# Patient Record
Sex: MALE | Race: WHITE | NOT HISPANIC OR LATINO | Employment: OTHER | URBAN - METROPOLITAN AREA
[De-identification: names, ages, dates, MRNs, and addresses within clinical notes are randomized per-mention and may not be internally consistent; named-entity substitution may affect disease eponyms.]

---

## 2017-01-03 ENCOUNTER — OFFICE VISIT (OUTPATIENT)
Dept: CARDIOLOGY | Facility: CLINIC | Age: 82
End: 2017-01-03

## 2017-01-03 VITALS
WEIGHT: 166 LBS | HEIGHT: 68 IN | HEART RATE: 87 BPM | DIASTOLIC BLOOD PRESSURE: 76 MMHG | SYSTOLIC BLOOD PRESSURE: 144 MMHG | BODY MASS INDEX: 25.16 KG/M2

## 2017-01-03 DIAGNOSIS — I10 ESSENTIAL HYPERTENSION: ICD-10-CM

## 2017-01-03 DIAGNOSIS — I48.0 PAROXYSMAL ATRIAL FIBRILLATION (HCC): Primary | ICD-10-CM

## 2017-01-03 DIAGNOSIS — R60.0 BILATERAL EDEMA OF LOWER EXTREMITY: ICD-10-CM

## 2017-01-03 PROCEDURE — 93000 ELECTROCARDIOGRAM COMPLETE: CPT | Performed by: INTERNAL MEDICINE

## 2017-01-03 PROCEDURE — 99213 OFFICE O/P EST LOW 20 MIN: CPT | Performed by: INTERNAL MEDICINE

## 2017-01-03 RX ORDER — ATORVASTATIN CALCIUM 10 MG/1
TABLET, FILM COATED ORAL
COMMUNITY
End: 2023-03-20 | Stop reason: ALTCHOICE

## 2017-01-03 NOTE — MR AVS SNAPSHOT
Asia Ly   1/3/2017 10:20 AM   Office Visit    Dept Phone:  [phone]   Encounter #:  [id number]    Provider:  Hector Rivera MD   Department:  [facility] CARDIOLOGY                Your Full Care Plan              Today's Medication Changes          These changes are accurate as of: 1/3/17 10:52 AM.  If you have any questions, ask your nurse or doctor.               Stop taking medication(s)listed here:     [id number] PO   Stopped by:  Hector Rivera MD           simvastatin 20 MG tablet   Commonly known as:  ZOCOR   Stopped by:  Hector Rivera MD                      Your Updated Medication List          This list is accurate as of: 1/3/17 10:52 AM.  Always use your most recent med list.                apixaban 5 MG tablet tablet   Commonly known as:  ELIQUIS       atorvastatin 10 MG tablet   Commonly known as:  LIPITOR       BUDESONIDE NA       diltiaZEM 240 MG 24 hr capsule   Commonly known as:  TIAZAC   Take 1 capsule by mouth Daily.       L-methylfolate Calcium 15 MG tablet       VITAMIN B-12 CR PO               We Performed the Following     ECG 12 Lead       You Were Diagnosed With        Codes Comments    Paroxysmal atrial fibrillation    -  Primary ICD-10-CM: I48.0  ICD-9-CM: 427.31     Essential hypertension     ICD-10-CM: I10  ICD-9-CM: 401.9     Bilateral edema of lower extremity     ICD-10-CM: R60.0  ICD-9-CM: 782.3       Instructions     None    Patient Instructions History      Upcoming Appointments     Visit Type Date Time Department    FOLLOW UP 1/3/2017 10:20 AM [facility]    FOLLOW UP 5/3/2017 12:00 PM [facility]      [employer] Signup     [facility] [employer] allows you to send messages to your doctor, view your test results, renew your prescriptions, schedule appointments, and more. To sign up, go to [employer] and click on the Sign Up Now link in the New User? box. Enter your  "[employer] Activation Code exactly as it appears below along with the last four digits of your Social Security Number and your Date of Birth () to complete the sign-up process. If you do not sign up before the expiration date, you must request a new code.    [employer] Activation Code: [email or web address]  Expires: 2017 10:52 AM    If you have questions, you can email [email or web address] or call [phone] to talk to our [employer] staff. Remember, [employer] is NOT to be used for urgent needs. For medical emergencies, dial 911.               Other Info from Your Visit           Your Appointments     May 03, 2017 12:00 PM EDT   Follow Up with Hector Rivera MD   [facility] CARDIOLOGY (--)    [address]  [place] [id number] [phone]           Arrive 15 minutes prior to appointment.              Allergies     No Known Allergies      Reason for Visit     Atrial Fibrillation           Vital Signs     Blood Pressure Pulse Height Weight Body Mass Index Smoking Status    144/76 (BP Location: Right arm) 87 68\" (172.7 cm) 166 lb (75.3 kg) 25.24 kg/m2 Former Smoker      Problems and Diagnoses Noted     Atrial fibrillation (irregular heartbeat)    High blood pressure        Edema of both legs          Results     ECG 12 Lead               "

## 2017-01-03 NOTE — PROGRESS NOTES
Date of Office Visit: 17  Encounter Provider: Hector Rivera MD  Place of Service: [facility] CARDIOLOGY  Patient Name: Asia Ly  :1932      Chief Complaint   Patient presents with   • Atrial Fibrillation     History of Present Illness  HPI Comments:  The patient is a pleasant 84-year-old gentleman with history of paroxysmal  atrial fibrillation. He had been going in and out of atrial fibrillation  when he had run out of his sotalol. This happened in May 2004 when he was  traveling on a plane a fair amount. He went to the emergency room in [place]  where he was found to be in atrial fibrillation. He converted back to sinus  rhythm spontaneously. He saw Dr. Hector Patel about atrial fibrillation  ablation but it was felt it would be best not to pursue that. He also saw  Dr. Man, for a second opinion but again it was felt he was doing well  on sotalol and to maintain him on that.   He then had a stroke and TIA in 2016 was at [facility] was switched from aspirin Ellik was for that event.  He then had been doing reasonably well but then presented to [facility] in 2016 2 times with symptomatic rapid atrial fibrillation.  While there he had an echocardiogram that showed normal left ventricular systolic function.  Moderate tricuspid insufficiency with mild pulmonary hypertension mild mitral insufficiency.  He also do perfusion stress test for some reason that was negative for ischemia.  He was started on amiodarone and discharged came right back in with rapid atrial fibrillation he was given a higher dose of amiodarone and a slower taper dose.  Given his history of pulmonary fibrosis we felt that was a poor choice so discontinued it.  He comes in for follow-up now.  He has some cough but unchanged.  About a week ago he developed some lower extremity edema just at his feet though.  He's had no increased shortness of breath.  No  orthopnea or PND.  No palpitations, near-syncope or syncope.  No stroke type symptoms and no bleeding.  He exercises every day at the gym walks a mile in 15 minutes up at 10% grade and then lifts for 25 minutes    Atrial Fibrillation   Symptoms are negative for dizziness and weakness. Past medical history includes atrial fibrillation.         Past Medical History   Diagnosis Date   • Aspiration pneumonia    • Atrial fibrillation    • Bronchiectasis    • Crohn's disease    • Gout    • Gout    • Hard of hearing    • Hyperlipidemia    • Peptic ulcer    • Pulmonary fibrosis          Past Surgical History   Procedure Laterality Date   • Testicle surgery       benign tumor removed.   • Colonoscopy           Current Outpatient Prescriptions on File Prior to Visit   Medication Sig Dispense Refill   • apixaban (ELIQUIS) 5 MG tablet tablet Take 5 mg by mouth 2 (Two) Times a Day.     • BUDESONIDE NA Take 3 mg by mouth 2 (Two) Times a Day.     • Cyanocobalamin (VITAMIN B-12 CR PO) Take  by mouth Daily.     • diltiaZEM (TIAZAC) 240 MG 24 hr capsule Take 1 capsule by mouth Daily. 30 capsule 11   • [DISCONTINUED] simvastatin (ZOCOR) 20 MG tablet Take 1 tablet by mouth Daily. 30 tablet 11     No current facility-administered medications on file prior to visit.          Social History     Social History   • Marital status:      Spouse name: N/A   • Number of children: N/A   • Years of education: N/A     Occupational History   • Not on file.     Social History Main Topics   • Smoking status: Former Smoker     Types: Cigars   • Smokeless tobacco: Not on file      Comment: Quit 25 years ago   • Alcohol use No   • Drug use: No   • Sexual activity: Defer     Other Topics Concern   • Not on file     Social History Narrative       Family History   Problem Relation Age of Onset   • Stroke Mother    • Heart disease Sister      Heart Valve Replacement   • Ovarian cancer Sister    • Rheumatic fever Sister    • Stroke Father   "        Review of Systems   Constitution: Negative for decreased appetite, diaphoresis, fever, weakness, malaise/fatigue, weight gain and weight loss.   HENT: Positive for hearing loss. Negative for congestion, headaches, nosebleeds and tinnitus.    Eyes: Negative for blurred vision, double vision, vision loss in left eye, vision loss in right eye and visual disturbance.   Cardiovascular:        As noted in HPI   Respiratory:        As noted HPI   Endocrine: Negative for cold intolerance and heat intolerance.   Hematologic/Lymphatic: Negative for bleeding problem. Does not bruise/bleed easily.   Skin: Negative for color change, flushing, itching and rash.   Musculoskeletal: Negative for arthritis, back pain, joint pain, joint swelling, muscle weakness and myalgias.   Gastrointestinal: Negative for bloating, abdominal pain, constipation, diarrhea, dysphagia, heartburn, hematemesis, hematochezia, melena, nausea and vomiting.   Genitourinary: Negative for bladder incontinence, dysuria, frequency, nocturia and urgency.   Neurological: Negative for dizziness, focal weakness, light-headedness, loss of balance, numbness, paresthesias and vertigo.   Psychiatric/Behavioral: Negative for depression, memory loss and substance abuse.       Procedures      ECG 12 Lead  Date/Time: 1/3/2017 10:48 AM  Performed by: SHARON DOBBINS  Authorized by: SHARON DOBBINS   Comparison: compared with previous ECG   Similar to previous ECG  Rhythm: sinus rhythm  Rate: normal  QRS axis: normal                 Objective:      Visit Vitals   • /76 (BP Location: Right arm)   • Pulse 87   • Ht 68\" (172.7 cm)   • Wt 166 lb (75.3 kg)   • BMI 25.24 kg/m2          Physical Exam  Physical Exam   Constitutional: He is oriented to person, place, and time. He appears well-developed and well-nourished. No distress.   HENT:   Head: Normocephalic.   Eyes: Conjunctivae are normal. Pupils are equal, round, and reactive to light. No scleral icterus. "   Neck: JVD (Mild) present. Normal carotid pulses and no hepatojugular reflux present. Carotid bruit is not present. No tracheal deviation, no edema and no erythema present. No thyromegaly present.   Cardiovascular: Normal rate, regular rhythm, S1 normal, S2 normal, normal heart sounds and intact distal pulses.   No extrasystoles are present. PMI is not displaced.  Exam reveals no gallop, no distant heart sounds and no friction rub.    No murmur heard.  Pulses:       Carotid pulses are 2+ on the right side, and 2+ on the left side.       Radial pulses are 2+ on the right side, and 2+ on the left side.        Femoral pulses are 2+ on the right side, and 2+ on the left side.       Dorsalis pedis pulses are 2+ on the right side, and 2+ on the left side.        Posterior tibial pulses are 2+ on the right side, and 2+ on the left side.   Pulmonary/Chest: Effort normal. No respiratory distress. He has no decreased breath sounds. He has no wheezes. He has no rhonchi. He has rales (Diffuse coarse worse in the bases.). He exhibits no tenderness.   Abdominal: Soft. Bowel sounds are normal. He exhibits no distension and no mass. There is no hepatosplenomegaly. There is no tenderness. There is no rebound and no guarding.   Musculoskeletal: He exhibits edema (1+ bilateral at his feet.). He exhibits no tenderness or deformity.   Neurological: He is alert and oriented to person, place, and time.   Skin: Skin is warm and dry. No rash noted. He is not diaphoretic. No cyanosis or erythema. No pallor. Nails show no clubbing.   Psychiatric: He has a normal mood and affect. His speech is normal and behavior is normal. Judgment and thought content normal.           Assessment:   1. This is a 84-year-old gentleman with a history of paroxysmal atrial fibrillation.   Atrial Fibrillation and Atrial Flutter  Assessment  • The patient has paroxysmal atrial fibrillation  • This is non-valvular in etiology  • The patient's CHADS2-VASc score is  3  • A [id number] score of 2 or more is considered a high risk for a thromboembolic event  • Apixaban prescribed     Plan  • Attempt to maintain sinus rhythm  • Continue apixaban for antithrombotic therapy, bleeding issues discussed  • Continue diltiazem for rhythm control       2. History of Crohn's disease.  3. History of gout, seems to be stable.  4. History of aspiration pneumonia, pulmonary fibrosis, and bronchiectasis.   5. Elevated blood pressure. As above we'll increase the diltiazem.   6.  Mild lower extremity edema this could be due to his underlying lung disease it could be distributed venous insufficiency has no other symptoms to suggest heart failure will follow this.         Plan:

## 2017-01-09 ENCOUNTER — OFFICE (OUTPATIENT)
Dept: URBAN - METROPOLITAN AREA OTHER 6 | Facility: OTHER | Age: 82
End: 2017-01-09

## 2017-01-09 VITALS
WEIGHT: 168 LBS | HEART RATE: 68 BPM | SYSTOLIC BLOOD PRESSURE: 140 MMHG | DIASTOLIC BLOOD PRESSURE: 69 MMHG | HEIGHT: 68 IN

## 2017-01-09 DIAGNOSIS — Z86.010 PERSONAL HISTORY OF COLONIC POLYPS: ICD-10-CM

## 2017-01-09 DIAGNOSIS — K50.10 CROHN'S DISEASE OF LARGE INTESTINE WITHOUT COMPLICATIONS: ICD-10-CM

## 2017-01-09 PROCEDURE — 99212 OFFICE O/P EST SF 10 MIN: CPT | Performed by: INTERNAL MEDICINE

## 2017-05-03 ENCOUNTER — OFFICE VISIT (OUTPATIENT)
Dept: CARDIOLOGY | Facility: CLINIC | Age: 82
End: 2017-05-03

## 2017-05-03 VITALS
WEIGHT: 166 LBS | HEIGHT: 68 IN | SYSTOLIC BLOOD PRESSURE: 140 MMHG | DIASTOLIC BLOOD PRESSURE: 70 MMHG | HEART RATE: 88 BPM | BODY MASS INDEX: 25.16 KG/M2

## 2017-05-03 DIAGNOSIS — I48.0 PAROXYSMAL ATRIAL FIBRILLATION (HCC): Primary | ICD-10-CM

## 2017-05-03 DIAGNOSIS — J84.10 PULMONARY FIBROSIS (HCC): ICD-10-CM

## 2017-05-03 DIAGNOSIS — R60.0 BILATERAL EDEMA OF LOWER EXTREMITY: ICD-10-CM

## 2017-05-03 DIAGNOSIS — I10 ESSENTIAL HYPERTENSION: ICD-10-CM

## 2017-05-03 PROCEDURE — 99214 OFFICE O/P EST MOD 30 MIN: CPT | Performed by: INTERNAL MEDICINE

## 2017-05-03 PROCEDURE — 93000 ELECTROCARDIOGRAM COMPLETE: CPT | Performed by: INTERNAL MEDICINE

## 2017-07-10 ENCOUNTER — OFFICE (OUTPATIENT)
Dept: URBAN - METROPOLITAN AREA OTHER 6 | Facility: OTHER | Age: 82
End: 2017-07-10

## 2017-07-10 VITALS
WEIGHT: 165 LBS | HEART RATE: 68 BPM | SYSTOLIC BLOOD PRESSURE: 140 MMHG | HEIGHT: 68 IN | DIASTOLIC BLOOD PRESSURE: 78 MMHG

## 2017-07-10 DIAGNOSIS — Z86.010 PERSONAL HISTORY OF COLONIC POLYPS: ICD-10-CM

## 2017-07-10 DIAGNOSIS — Z79.52 LONG TERM (CURRENT) USE OF SYSTEMIC STEROIDS: ICD-10-CM

## 2017-07-10 DIAGNOSIS — K50.10 CROHN'S DISEASE OF LARGE INTESTINE WITHOUT COMPLICATIONS: ICD-10-CM

## 2017-07-10 PROCEDURE — 99212 OFFICE O/P EST SF 10 MIN: CPT | Performed by: INTERNAL MEDICINE

## 2017-07-11 ENCOUNTER — TRANSCRIBE ORDERS (OUTPATIENT)
Dept: ADMINISTRATIVE | Facility: HOSPITAL | Age: 82
End: 2017-07-11

## 2017-07-11 DIAGNOSIS — Z79.52 LONG TERM (CURRENT) USE OF SYSTEMIC STEROIDS: Primary | ICD-10-CM

## 2017-07-17 ENCOUNTER — HOSPITAL ENCOUNTER (OUTPATIENT)
Dept: BONE DENSITY | Facility: HOSPITAL | Age: 82
Discharge: HOME | End: 2017-07-17
Attending: INTERNAL MEDICINE | Admitting: INTERNAL MEDICINE

## 2017-07-17 DIAGNOSIS — Z79.52 LONG TERM (CURRENT) USE OF SYSTEMIC STEROIDS: ICD-10-CM

## 2017-07-17 PROCEDURE — 77080 DXA BONE DENSITY AXIAL: CPT

## 2017-07-27 ENCOUNTER — TELEPHONE (OUTPATIENT)
Dept: CARDIOLOGY | Facility: CLINIC | Age: 82
End: 2017-07-27

## 2017-07-27 NOTE — TELEPHONE ENCOUNTER
Patient called to report a rash on his back and neck x 1.5 months now.  He states his stomach is bloated x 2 mos.  He feels Eliquis is the cause.  He is not SOA but does have edema of his ankles.      Patient's phone number is [phone]/ LEIDA

## 2017-07-27 NOTE — TELEPHONE ENCOUNTER
Patient notified Dr. Rivera's instruction is to see PCP for the rash.  Patient verbalized understanding./ LEIDA

## 2017-07-28 ENCOUNTER — HOSPITAL ENCOUNTER (EMERGENCY)
Facility: HOSPITAL | Age: 82
Discharge: HOME | End: 2017-07-28
Attending: EMERGENCY MEDICINE | Admitting: EMERGENCY MEDICINE

## 2017-07-28 ENCOUNTER — APPOINTMENT (OUTPATIENT)
Dept: GENERAL RADIOLOGY | Facility: HOSPITAL | Age: 82
End: 2017-07-28

## 2017-07-28 VITALS
TEMPERATURE: 98.9 F | DIASTOLIC BLOOD PRESSURE: 80 MMHG | OXYGEN SATURATION: 95 % | BODY MASS INDEX: 24.86 KG/M2 | SYSTOLIC BLOOD PRESSURE: 139 MMHG | WEIGHT: 164 LBS | HEIGHT: 68 IN | HEART RATE: 78 BPM | RESPIRATION RATE: 15 BRPM

## 2017-07-28 DIAGNOSIS — I48.0 PAROXYSMAL ATRIAL FIBRILLATION (HCC): Primary | ICD-10-CM

## 2017-07-28 LAB
ALBUMIN SERPL-MCNC: 4 G/DL (ref 3.5–5.2)
ALBUMIN/GLOB SERPL: 1.2 G/DL
ALP SERPL-CCNC: 51 U/L (ref 39–117)
ALT SERPL W P-5'-P-CCNC: 17 U/L (ref 1–41)
ANION GAP SERPL CALCULATED.3IONS-SCNC: 13.2 MMOL/L
AST SERPL-CCNC: 15 U/L (ref 1–40)
BASOPHILS # BLD AUTO: 0.04 10*3/MM3 (ref 0–0.2)
BASOPHILS NFR BLD AUTO: 0.4 % (ref 0–1.5)
BILIRUB SERPL-MCNC: 0.3 MG/DL (ref 0.1–1.2)
BUN BLD-MCNC: 17 MG/DL (ref 8–23)
BUN/CREAT SERPL: 14.4 (ref 7–25)
CALCIUM SPEC-SCNC: 8.9 MG/DL (ref 8.6–10.5)
CHLORIDE SERPL-SCNC: 105 MMOL/L (ref 98–107)
CO2 SERPL-SCNC: 23.8 MMOL/L (ref 22–29)
CREAT BLD-MCNC: 1.18 MG/DL (ref 0.76–1.27)
DEPRECATED RDW RBC AUTO: 53.8 FL (ref 37–54)
EOSINOPHIL # BLD AUTO: 0.07 10*3/MM3 (ref 0–0.7)
EOSINOPHIL NFR BLD AUTO: 0.7 % (ref 0.3–6.2)
ERYTHROCYTE [DISTWIDTH] IN BLOOD BY AUTOMATED COUNT: 15.5 % (ref 11.5–14.5)
GFR SERPL CREATININE-BSD FRML MDRD: 59 ML/MIN/1.73
GLOBULIN UR ELPH-MCNC: 3.3 GM/DL
GLUCOSE BLD-MCNC: 121 MG/DL (ref 65–99)
HCT VFR BLD AUTO: 42.3 % (ref 40.4–52.2)
HGB BLD-MCNC: 13.3 G/DL (ref 13.7–17.6)
HOLD SPECIMEN: NORMAL
HOLD SPECIMEN: NORMAL
IMM GRANULOCYTES # BLD: 0.16 10*3/MM3 (ref 0–0.03)
IMM GRANULOCYTES NFR BLD: 1.6 % (ref 0–0.5)
INR PPP: 1.17 (ref 0.9–1.1)
LYMPHOCYTES # BLD AUTO: 1.25 10*3/MM3 (ref 0.9–4.8)
LYMPHOCYTES NFR BLD AUTO: 12.6 % (ref 19.6–45.3)
MAGNESIUM SERPL-MCNC: 2.3 MG/DL (ref 1.6–2.4)
MCH RBC QN AUTO: 30.2 PG (ref 27–32.7)
MCHC RBC AUTO-ENTMCNC: 31.4 G/DL (ref 32.6–36.4)
MCV RBC AUTO: 96.1 FL (ref 79.8–96.2)
MONOCYTES # BLD AUTO: 0.8 10*3/MM3 (ref 0.2–1.2)
MONOCYTES NFR BLD AUTO: 8 % (ref 5–12)
NEUTROPHILS # BLD AUTO: 7.64 10*3/MM3 (ref 1.9–8.1)
NEUTROPHILS NFR BLD AUTO: 76.7 % (ref 42.7–76)
NRBC BLD MANUAL-RTO: 0 /100 WBC (ref 0–0)
PLATELET # BLD AUTO: 195 10*3/MM3 (ref 140–500)
PMV BLD AUTO: 10.9 FL (ref 6–12)
POTASSIUM BLD-SCNC: 4.1 MMOL/L (ref 3.5–5.2)
PROT SERPL-MCNC: 7.3 G/DL (ref 6–8.5)
PROTHROMBIN TIME: 14.5 SECONDS (ref 11.7–14.2)
RBC # BLD AUTO: 4.4 10*6/MM3 (ref 4.6–6)
SODIUM BLD-SCNC: 142 MMOL/L (ref 136–145)
TROPONIN T SERPL-MCNC: <0.01 NG/ML (ref 0–0.03)
WBC NRBC COR # BLD: 9.96 10*3/MM3 (ref 4.5–10.7)
WHOLE BLOOD HOLD SPECIMEN: NORMAL
WHOLE BLOOD HOLD SPECIMEN: NORMAL

## 2017-07-28 PROCEDURE — 83735 ASSAY OF MAGNESIUM: CPT | Performed by: EMERGENCY MEDICINE

## 2017-07-28 PROCEDURE — 93010 ELECTROCARDIOGRAM REPORT: CPT | Performed by: INTERNAL MEDICINE

## 2017-07-28 PROCEDURE — 96374 THER/PROPH/DIAG INJ IV PUSH: CPT

## 2017-07-28 PROCEDURE — 99284 EMERGENCY DEPT VISIT MOD MDM: CPT

## 2017-07-28 PROCEDURE — 93005 ELECTROCARDIOGRAM TRACING: CPT

## 2017-07-28 PROCEDURE — 71010 HC CHEST PA OR AP: CPT

## 2017-07-28 PROCEDURE — 80053 COMPREHEN METABOLIC PANEL: CPT | Performed by: EMERGENCY MEDICINE

## 2017-07-28 PROCEDURE — 93005 ELECTROCARDIOGRAM TRACING: CPT | Performed by: EMERGENCY MEDICINE

## 2017-07-28 PROCEDURE — 85025 COMPLETE CBC W/AUTO DIFF WBC: CPT | Performed by: EMERGENCY MEDICINE

## 2017-07-28 PROCEDURE — 85610 PROTHROMBIN TIME: CPT | Performed by: EMERGENCY MEDICINE

## 2017-07-28 PROCEDURE — 84484 ASSAY OF TROPONIN QUANT: CPT | Performed by: EMERGENCY MEDICINE

## 2017-07-28 RX ORDER — SODIUM CHLORIDE 0.9 % (FLUSH) 0.9 %
10 SYRINGE (ML) INJECTION AS NEEDED
Status: DISCONTINUED | OUTPATIENT
Start: 2017-07-28 | End: 2017-07-28 | Stop reason: HOSPADM

## 2017-07-28 RX ORDER — METOPROLOL TARTRATE 5 MG/5ML
5 INJECTION INTRAVENOUS ONCE
Status: COMPLETED | OUTPATIENT
Start: 2017-07-28 | End: 2017-07-28

## 2017-07-28 RX ADMIN — METOPROLOL TARTRATE 5 MG: 5 INJECTION INTRAVENOUS at 15:36

## 2017-08-02 ENCOUNTER — OFFICE VISIT (OUTPATIENT)
Dept: CARDIOLOGY | Facility: CLINIC | Age: 82
End: 2017-08-02

## 2017-08-02 VITALS
BODY MASS INDEX: 24.86 KG/M2 | SYSTOLIC BLOOD PRESSURE: 150 MMHG | DIASTOLIC BLOOD PRESSURE: 70 MMHG | HEIGHT: 68 IN | HEART RATE: 82 BPM | WEIGHT: 164 LBS

## 2017-08-02 DIAGNOSIS — I10 ESSENTIAL HYPERTENSION: ICD-10-CM

## 2017-08-02 DIAGNOSIS — I48.0 PAROXYSMAL ATRIAL FIBRILLATION (HCC): Primary | ICD-10-CM

## 2017-08-02 DIAGNOSIS — J84.10 PULMONARY FIBROSIS (HCC): ICD-10-CM

## 2017-08-02 PROCEDURE — 99214 OFFICE O/P EST MOD 30 MIN: CPT | Performed by: INTERNAL MEDICINE

## 2017-08-02 PROCEDURE — 93000 ELECTROCARDIOGRAM COMPLETE: CPT | Performed by: INTERNAL MEDICINE

## 2017-08-02 NOTE — PROGRESS NOTES
Date of Office Visit: 17  Encounter Provider: Hector Rivera MD  Place of Service: [facility] CARDIOLOGY  Patient Name: Asia Ly  :1932      Chief Complaint   Patient presents with   • Palpitations   • Leg Swelling     History of Present Illness  HPI Comments:  The patient is a pleasant 84-year-old gentleman with history of paroxysmal  atrial fibrillation. He had been going in and out of atrial fibrillation  when he had run out of his sotalol. This happened in May 2004 when he was  traveling on a plane a fair amount. He went to the emergency room in [place]  where he was found to be in atrial fibrillation. He converted back to sinus  rhythm spontaneously. He saw Dr. Hector Patel about atrial fibrillation  ablation but it was felt it would be best not to pursue that. He also saw  Dr. Man, for a second opinion but again it was felt he was doing well  on sotalol and to maintain him on that.   He then had a stroke and TIA in 2016 was at [facility] was switched from aspirin Ellik was for that event.  He then had been doing reasonably well but then presented to [facility] in 2016 2 times with symptomatic rapid atrial fibrillation.  While there he had an echocardiogram that showed normal left ventricular systolic function.  Moderate tricuspid insufficiency with mild pulmonary hypertension mild mitral insufficiency.  He also do perfusion stress test for some reason that was negative for ischemia.  He was started on amiodarone and discharged came right back in with rapid atrial fibrillation he was given a higher dose of amiodarone and a slower taper dose.  Given his history of pulmonary fibrosis we felt that was a poor choice so discontinued it.  He comes in for follow-up now.  Unfortunately 2017 while just sitting there he just started not feeling normal and his wife taken the emergency him is found to be in atrial  fibrillation he did convert back to sinus rhythm.  That's the first episode he had an probably 3 or 4 years and he has had no episodes since then.  He still exercises he lifts weights daily and walks a mile in 15 minutes at 10% grade.  He denies chest pain and pressure denies near syncope and syncope and denies shortness of breath, orthopnea, and PND.  He's had no blood in his stool or black tarry stools and no stroke type symptoms.    Palpitations    Pertinent negatives include no diaphoresis, dizziness, fever, malaise/fatigue, nausea, numbness, vomiting or weakness.   Leg Swelling   Pertinent negatives include no abdominal pain, congestion, diaphoresis, fever, headaches, joint swelling, myalgias, nausea, numbness, rash, vertigo, vomiting or weakness.   Atrial Fibrillation   Symptoms are negative for dizziness and weakness. Past medical history includes atrial fibrillation.         Past Medical History:   Diagnosis Date   • Aspiration pneumonia    • Atrial fibrillation    • Bronchiectasis    • Crohn's disease    • Gout    • Gout    • Hard of hearing    • Hyperlipidemia    • Peptic ulcer    • Pulmonary fibrosis          Past Surgical History:   Procedure Laterality Date   • COLONOSCOPY     • TESTICLE SURGERY      benign tumor removed.         Current Outpatient Prescriptions on File Prior to Visit   Medication Sig Dispense Refill   • apixaban (ELIQUIS) 5 MG tablet tablet Take 5 mg by mouth 2 (Two) Times a Day.     • atorvastatin (LIPITOR) 10 MG tablet Take 10 mg by mouth Daily.     • BUDESONIDE NA Take 3 mg by mouth Daily.     • cephalexin (KEFLEX) 500 MG capsule Take 1 capsule by mouth 4 (Four) Times a Day. 40 capsule 0   • Cyanocobalamin (VITAMIN B-12 CR PO) Take 500 mg by mouth Daily.     • diltiaZEM (TIAZAC) 240 MG 24 hr capsule Take 1 capsule by mouth Daily. 30 capsule 11   • L-methylfolate Calcium 15 MG tablet Take 15 mg by mouth.       No current facility-administered medications on file prior to visit.           Social History     Social History   • Marital status:      Spouse name: N/A   • Number of children: N/A   • Years of education: N/A     Occupational History   • Not on file.     Social History Main Topics   • Smoking status: Former Smoker     Types: Cigars   • Smokeless tobacco: Not on file      Comment: Quit 25 years ago   • Alcohol use No   • Drug use: No   • Sexual activity: Defer     Other Topics Concern   • Not on file     Social History Narrative       Family History   Problem Relation Age of Onset   • Stroke Mother    • Heart disease Sister      Heart Valve Replacement   • Ovarian cancer Sister    • Rheumatic fever Sister    • Stroke Father          Review of Systems   Constitution: Negative for decreased appetite, diaphoresis, fever, weakness, malaise/fatigue, weight gain and weight loss.   HENT: Negative for congestion, headaches, hearing loss, nosebleeds and tinnitus.    Eyes: Negative for blurred vision, double vision, vision loss in left eye, vision loss in right eye and visual disturbance.   Cardiovascular:        As noted in HPI   Respiratory:        As noted HPI   Endocrine: Negative for cold intolerance and heat intolerance.   Hematologic/Lymphatic: Negative for bleeding problem. Does not bruise/bleed easily.   Skin: Negative for color change, flushing, itching and rash.   Musculoskeletal: Negative for arthritis, back pain, joint pain, joint swelling, muscle weakness and myalgias.   Gastrointestinal: Negative for bloating, abdominal pain, constipation, diarrhea, dysphagia, heartburn, hematemesis, hematochezia, melena, nausea and vomiting.   Genitourinary: Negative for bladder incontinence, dysuria, frequency, nocturia and urgency.   Neurological: Negative for dizziness, focal weakness, light-headedness, loss of balance, numbness, paresthesias and vertigo.   Psychiatric/Behavioral: Negative for depression, memory loss and substance abuse.       Procedures      ECG 12 Lead  Date/Time:  "8/2/2017 3:24 PM  Performed by: SHARON DOBBINS  Authorized by: SHARON DOBBINS   Comparison: compared with previous ECG   Similar to previous ECG  Rhythm: sinus rhythm  Rate: normal  QRS axis: normal                 Objective:    /70 (BP Location: Left arm, Patient Position: Sitting)  Pulse 82  Ht 68\" (172.7 cm)  Wt 164 lb (74.4 kg)  BMI 24.94 kg/m2       Physical Exam  Physical Exam   Constitutional: He is oriented to person, place, and time. He appears well-developed and well-nourished. No distress.   HENT:   Head: Normocephalic.   Eyes: Conjunctivae are normal. Pupils are equal, round, and reactive to light. No scleral icterus.   Neck: Normal carotid pulses, no hepatojugular reflux and no JVD present. Carotid bruit is not present. No tracheal deviation, no edema and no erythema present. No thyromegaly present.   Cardiovascular: Normal rate, regular rhythm, S1 normal, S2 normal and intact distal pulses.   No extrasystoles are present. PMI is not displaced.  Exam reveals no gallop, no distant heart sounds and no friction rub.    Murmur heard.   Systolic murmur is present with a grade of 2/6   Pulses:       Carotid pulses are 2+ on the right side, and 2+ on the left side.       Radial pulses are 2+ on the right side, and 2+ on the left side.        Femoral pulses are 2+ on the right side, and 2+ on the left side.       Dorsalis pedis pulses are 2+ on the right side, and 2+ on the left side.        Posterior tibial pulses are 2+ on the right side, and 2+ on the left side.   Pulmonary/Chest: Effort normal and breath sounds normal. No respiratory distress. He has no decreased breath sounds. He has no wheezes. He has no rhonchi. He has no rales. He exhibits no tenderness.   Abdominal: Soft. Bowel sounds are normal. He exhibits no distension and no mass. There is no hepatosplenomegaly. There is no tenderness. There is no rebound and no guarding.   Musculoskeletal: He exhibits no edema, tenderness or " deformity.   Neurological: He is alert and oriented to person, place, and time.   Skin: Skin is warm and dry. No rash noted. He is not diaphoretic. No cyanosis or erythema. No pallor. Nails show no clubbing.   Psychiatric: He has a normal mood and affect. His speech is normal and behavior is normal. Judgment and thought content normal.           Assessment:   1. This is a 84-year-old gentleman with a history of paroxysmal atrial fibrillation.   Atrial Fibrillation and Atrial Flutter  Assessment  • The patient has paroxysmal atrial fibrillation  • This is non-valvular in etiology  • The patient's CHADS2-VASc score is 2  • A [id number] score of 2 or more is considered a high risk for a thromboembolic event  • Apixaban prescribed    Plan  • Attempt to maintain sinus rhythm  • Continue apixaban for antithrombotic therapy, bleeding issues discussed  • Continue diltiazem for rhythm control  Recurrent episode in July 2017 but now back in sinus rhythm.  We did tell me did try taking diltiazem if it happens again if he is stable he'll see us skin in follow-up in 6 months.    2. History of Crohn's disease.  3. History of gout, seems to be stable.  4. History of aspiration pneumonia, pulmonary fibrosis, and bronchiectasis.   5. Elevated blood pressure. As above we'll increase the diltiazem.   6. Mild lower extremity edema resolved with compression stockings          Plan:

## 2017-11-03 ENCOUNTER — OFFICE VISIT (OUTPATIENT)
Dept: CARDIOLOGY | Facility: CLINIC | Age: 82
End: 2017-11-03

## 2017-11-03 VITALS
BODY MASS INDEX: 25.76 KG/M2 | HEIGHT: 68 IN | WEIGHT: 170 LBS | RESPIRATION RATE: 16 BRPM | DIASTOLIC BLOOD PRESSURE: 70 MMHG | HEART RATE: 130 BPM | SYSTOLIC BLOOD PRESSURE: 138 MMHG

## 2017-11-03 DIAGNOSIS — R60.0 BILATERAL EDEMA OF LOWER EXTREMITY: ICD-10-CM

## 2017-11-03 DIAGNOSIS — I48.0 PAROXYSMAL ATRIAL FIBRILLATION (HCC): Primary | ICD-10-CM

## 2017-11-03 DIAGNOSIS — J84.10 PULMONARY FIBROSIS (HCC): ICD-10-CM

## 2017-11-03 DIAGNOSIS — I10 ESSENTIAL HYPERTENSION: ICD-10-CM

## 2017-11-03 PROCEDURE — 99214 OFFICE O/P EST MOD 30 MIN: CPT | Performed by: INTERNAL MEDICINE

## 2017-11-03 PROCEDURE — 93000 ELECTROCARDIOGRAM COMPLETE: CPT | Performed by: INTERNAL MEDICINE

## 2017-11-03 RX ORDER — DILTIAZEM HYDROCHLORIDE 240 MG/1
240 CAPSULE, EXTENDED RELEASE ORAL DAILY
Qty: 30 CAPSULE | Refills: 11 | Status: SHIPPED | OUTPATIENT
Start: 2017-11-03 | End: 2017-11-03

## 2017-11-03 RX ORDER — FUROSEMIDE 20 MG/1
20 TABLET ORAL DAILY
Qty: 30 TABLET | Refills: 11 | Status: SHIPPED | OUTPATIENT
Start: 2017-11-03 | End: 2018-02-09 | Stop reason: SDDI

## 2017-11-03 NOTE — PROGRESS NOTES
Date of Office Visit: 17  Encounter Provider: Hector Rivera MD  Place of Service: [facility] CARDIOLOGY  Patient Name: Asia Ly  :1932      No chief complaint on file.    History of Present Illness  HPI Comments:  The patient is a pleasant 85-year-old gentleman with history of paroxysmal  atrial fibrillation. He had been going in and out of atrial fibrillation  when he had run out of his sotalol. This happened in May 2004 when he was  traveling on a plane a fair amount. He went to the emergency room in [place]  where he was found to be in atrial fibrillation. He converted back to sinus  rhythm spontaneously. He saw Dr. Hector Patel about atrial fibrillation  ablation but it was felt it would be best not to pursue that. He also saw  Dr. Man, for a second opinion but again it was felt he was doing well  on sotalol and to maintain him on that.   He then had a stroke and TIA in 2016 was at [facility] was switched from aspirin Eliquis was for that event.  He then had been doing reasonably well but then presented to [facility] in 2016 2 times with symptomatic rapid atrial fibrillation.  While there he had an echocardiogram that showed normal left ventricular systolic function.  Moderate tricuspid insufficiency with mild pulmonary hypertension mild mitral insufficiency.  He also do perfusion stress test for some reason that was negative for ischemia.  He was started on amiodarone and discharged came right back in with rapid atrial fibrillation he was given a higher dose of amiodarone and a slower taper dose.  Given his history of pulmonary fibrosis we felt that was a poor choice so discontinued it.  Unfortunately 2017 while just sitting there he just started not feeling normal and his wife taken the emergency him is found to be in atrial fibrillation he did convert back to sinus rhythm.  That's the first episode he  had an probably 3 or 4 years.  Of note however he been taken off his diltiazem due to an allergic reaction of labor rash.  He now comes in for follow-up he's had increased lower extremity edema.  No real increased shortness of breath he says he's gained about 4 pounds nor orthopnea or PND he still exercising 5 days a week where he walks a mile and lives weights and has no problems doing that he has noted that his heart rate is increased no near-syncope or syncope no fevers chills or sweats.    Palpitations    Pertinent negatives include no diaphoresis, dizziness, fever, malaise/fatigue, nausea, numbness, vomiting or weakness.   Leg Swelling   Pertinent negatives include no abdominal pain, congestion, diaphoresis, fever, headaches, joint swelling, myalgias, nausea, numbness, rash, vertigo, vomiting or weakness.   Atrial Fibrillation   Symptoms are negative for dizziness and weakness. Past medical history includes atrial fibrillation.         Past Medical History:   Diagnosis Date   • Aspiration pneumonia    • Atrial fibrillation    • Bronchiectasis    • Crohn's disease    • Gout    • Gout    • Hard of hearing    • Hyperlipidemia    • Peptic ulcer    • Pulmonary fibrosis          Past Surgical History:   Procedure Laterality Date   • COLONOSCOPY     • TESTICLE SURGERY      benign tumor removed.         Current Outpatient Prescriptions on File Prior to Visit   Medication Sig Dispense Refill   • apixaban (ELIQUIS) 5 MG tablet tablet Take 5 mg by mouth 2 (Two) Times a Day.     • atorvastatin (LIPITOR) 10 MG tablet Take 10 mg by mouth Daily.     • cephalexin (KEFLEX) 500 MG capsule Take 1 capsule by mouth 4 (Four) Times a Day. 40 capsule 0   • Cyanocobalamin (VITAMIN B-12 CR PO) Take 500 mg by mouth Daily.     • L-methylfolate Calcium 15 MG tablet Take 15 mg by mouth.     • [DISCONTINUED] BUDESONIDE NA Take 3 mg by mouth Daily.     • [DISCONTINUED] diltiaZEM (TIAZAC) 240 MG 24 hr capsule Take 1 capsule by mouth Daily. 30  capsule 11     No current facility-administered medications on file prior to visit.          Social History     Social History   • Marital status:      Spouse name: N/A   • Number of children: N/A   • Years of education: N/A     Occupational History   • Not on file.     Social History Main Topics   • Smoking status: Former Smoker     Types: Cigars   • Smokeless tobacco: Never Used      Comment: Quit 25 years ago   • Alcohol use No      Comment: daily caffiene   • Drug use: No   • Sexual activity: Defer     Other Topics Concern   • Not on file     Social History Narrative       Family History   Problem Relation Age of Onset   • Stroke Mother    • Heart disease Sister      Heart Valve Replacement   • Ovarian cancer Sister    • Rheumatic fever Sister    • Stroke Father          Review of Systems   Constitution: Negative for decreased appetite, diaphoresis, fever, weakness, malaise/fatigue, weight gain and weight loss.   HENT: Negative for congestion, hearing loss, nosebleeds and tinnitus.    Eyes: Negative for blurred vision, double vision, vision loss in left eye, vision loss in right eye and visual disturbance.   Cardiovascular:        As noted in HPI   Respiratory:        As noted HPI   Endocrine: Negative for cold intolerance and heat intolerance.   Hematologic/Lymphatic: Negative for bleeding problem. Does not bruise/bleed easily.   Skin: Negative for color change, flushing, itching and rash.   Musculoskeletal: Negative for arthritis, back pain, joint pain, joint swelling, muscle weakness and myalgias.   Gastrointestinal: Negative for bloating, abdominal pain, constipation, diarrhea, dysphagia, heartburn, hematemesis, hematochezia, melena, nausea and vomiting.   Genitourinary: Negative for bladder incontinence, dysuria, frequency, nocturia and urgency.   Neurological: Negative for dizziness, focal weakness, headaches, light-headedness, loss of balance, numbness, paresthesias and vertigo.  "  Psychiatric/Behavioral: Negative for depression, memory loss and substance abuse.       Procedures      ECG 12 Lead  Date/Time: 11/3/2017 11:29 AM  Performed by: SHARON DOBBINS  Authorized by: SHARON DOBBINS   Comparison: compared with previous ECG   Comparison to previous ECG: Atrial arrhythmias are new  Rate: normal  QRS axis: normal                 Objective:    /70 (BP Location: Left arm, Patient Position: Sitting, Cuff Size: Adult)  Pulse (!) 130  Resp 16  Ht 68\" (172.7 cm)  Wt 170 lb (77.1 kg)  BMI 25.85 kg/m2       Physical Exam  Physical Exam   Constitutional: He is oriented to person, place, and time. He appears well-developed and well-nourished. No distress.   HENT:   Head: Normocephalic.   Eyes: Conjunctivae are normal. Pupils are equal, round, and reactive to light. No scleral icterus.   Neck: Normal carotid pulses, no hepatojugular reflux and no JVD present. Carotid bruit is not present. No tracheal deviation, no edema and no erythema present. No thyromegaly present.   Cardiovascular: S1 normal, S2 normal, normal heart sounds and intact distal pulses.  An irregularly irregular rhythm present.  No extrasystoles are present. Tachycardia present.  PMI is not displaced.  Exam reveals no gallop, no distant heart sounds and no friction rub.    No murmur heard.  Pulses:       Carotid pulses are 2+ on the right side, and 2+ on the left side.       Radial pulses are 2+ on the right side, and 2+ on the left side.        Femoral pulses are 2+ on the right side, and 2+ on the left side.       Dorsalis pedis pulses are 2+ on the right side, and 2+ on the left side.        Posterior tibial pulses are 2+ on the right side, and 2+ on the left side.   Pulmonary/Chest: Effort normal. No respiratory distress. He has no decreased breath sounds. He has no wheezes. He has no rhonchi. He has rales (Coarse 1/4 up bilaterally). He exhibits no tenderness.   Abdominal: Soft. Bowel sounds are normal. He exhibits " no distension and no mass. There is no hepatosplenomegaly. There is no tenderness. There is no rebound and no guarding.   Musculoskeletal: He exhibits edema (2 + Bilateral tibial). He exhibits no tenderness or deformity.   Neurological: He is alert and oriented to person, place, and time.   Skin: Skin is warm and dry. No rash noted. He is not diaphoretic. No cyanosis or erythema. No pallor. Nails show no clubbing.   Psychiatric: He has a normal mood and affect. His speech is normal and behavior is normal. Judgment and thought content normal.           Assessment:   1. This is a 85-year-old gentleman with a history of paroxysmal atrial fibrillation.   Atrial Fibrillation and Atrial Flutter  Assessment  • The patient has paroxysmal atrial fibrillation  • This is non-valvular in etiology  • The patient's CHADS2-VASc score is 2  • A VAI9AZ2-RXAc score of 2 or more is considered a high risk for a thromboembolic event  • Apixaban prescribed    Plan  • Attempt to maintain sinus rhythm  • Continue apixaban for antithrombotic therapy, bleeding issues discussed  • Add beta blocker for rate control  Off amiodarone due to pulmonary fibrosis, rash I believe on diltiazem.  He is now having increased ectopy start metoprolol 25 mg twice a day.     2. History of Crohn's disease.  3. History of gout, seems to be stable.  4. History of aspiration pneumonia, pulmonary fibrosis, and bronchiectasis.  As above we'll stop the amiodarone.  5.   hypertension blood pressure adequate control.  6.  Lower extremity edema think this may just be due to his atrial arrhythmia he is to return for an echocardiogram and a start him on metoprolol really her going to start him on furosemide 87 echo done next week and check ABG MP.       Plan:

## 2017-11-09 ENCOUNTER — TELEPHONE (OUTPATIENT)
Dept: CARDIOLOGY | Facility: CLINIC | Age: 82
End: 2017-11-09

## 2017-11-09 ENCOUNTER — HOSPITAL ENCOUNTER (OUTPATIENT)
Dept: CARDIOLOGY | Facility: HOSPITAL | Age: 82
Discharge: HOME | End: 2017-11-09
Attending: INTERNAL MEDICINE | Admitting: INTERNAL MEDICINE

## 2017-11-09 VITALS
SYSTOLIC BLOOD PRESSURE: 118 MMHG | DIASTOLIC BLOOD PRESSURE: 60 MMHG | BODY MASS INDEX: 25.31 KG/M2 | HEIGHT: 68 IN | WEIGHT: 167 LBS | HEART RATE: 76 BPM

## 2017-11-09 LAB
ASCENDING AORTA: 3.8 CM
BH CV ECHO MEAS - ACS: 1.5 CM
BH CV ECHO MEAS - AO MAX PG (FULL): 23.6 MMHG
BH CV ECHO MEAS - AO MAX PG: 29.2 MMHG
BH CV ECHO MEAS - AO MEAN PG (FULL): 12.9 MMHG
BH CV ECHO MEAS - AO MEAN PG: 16.5 MMHG
BH CV ECHO MEAS - AO ROOT AREA (BSA CORRECTED): 1.8
BH CV ECHO MEAS - AO ROOT AREA: 9.1 CM^2
BH CV ECHO MEAS - AO ROOT DIAM: 3.4 CM
BH CV ECHO MEAS - AO V2 MAX: 270.3 CM/SEC
BH CV ECHO MEAS - AO V2 MEAN: 194 CM/SEC
BH CV ECHO MEAS - AO V2 VTI: 62.2 CM
BH CV ECHO MEAS - AVA(I,A): 1.5 CM^2
BH CV ECHO MEAS - AVA(I,D): 1.5 CM^2
BH CV ECHO MEAS - AVA(V,A): 1.4 CM^2
BH CV ECHO MEAS - AVA(V,D): 1.4 CM^2
BH CV ECHO MEAS - BSA(HAYCOCK): 1.9 M^2
BH CV ECHO MEAS - BSA: 1.9 M^2
BH CV ECHO MEAS - BZI_BMI: 25.5 KILOGRAMS/M^2
BH CV ECHO MEAS - BZI_METRIC_HEIGHT: 172.7 CM
BH CV ECHO MEAS - BZI_METRIC_WEIGHT: 76.2 KG
BH CV ECHO MEAS - CONTRAST EF (2CH): 56.4 ML/M^2
BH CV ECHO MEAS - CONTRAST EF 4CH: 60 ML/M^2
BH CV ECHO MEAS - EDV(MOD-SP2): 117 ML
BH CV ECHO MEAS - EDV(MOD-SP4): 130 ML
BH CV ECHO MEAS - EDV(TEICH): 97.5 ML
BH CV ECHO MEAS - EF(CUBED): 64.6 %
BH CV ECHO MEAS - EF(MOD-SP2): 56.4 %
BH CV ECHO MEAS - EF(MOD-SP4): 60 %
BH CV ECHO MEAS - EF(TEICH): 56.1 %
BH CV ECHO MEAS - ESV(MOD-SP2): 51 ML
BH CV ECHO MEAS - ESV(MOD-SP4): 52 ML
BH CV ECHO MEAS - ESV(TEICH): 42.8 ML
BH CV ECHO MEAS - FS: 29.2 %
BH CV ECHO MEAS - IVS/LVPW: 1
BH CV ECHO MEAS - IVSD: 1.1 CM
BH CV ECHO MEAS - LAT PEAK E' VEL: 8 CM/SEC
BH CV ECHO MEAS - LV DIASTOLIC VOL/BSA (35-75): 68.5 ML/M^2
BH CV ECHO MEAS - LV MASS(C)D: 173.3 GRAMS
BH CV ECHO MEAS - LV MASS(C)DI: 91.3 GRAMS/M^2
BH CV ECHO MEAS - LV MAX PG: 5.6 MMHG
BH CV ECHO MEAS - LV MEAN PG: 3.5 MMHG
BH CV ECHO MEAS - LV SYSTOLIC VOL/BSA (12-30): 27.4 ML/M^2
BH CV ECHO MEAS - LV V1 MAX: 118.8 CM/SEC
BH CV ECHO MEAS - LV V1 MEAN: 89.9 CM/SEC
BH CV ECHO MEAS - LV V1 VTI: 29.7 CM
BH CV ECHO MEAS - LVIDD: 4.6 CM
BH CV ECHO MEAS - LVIDS: 3.3 CM
BH CV ECHO MEAS - LVLD AP2: 8 CM
BH CV ECHO MEAS - LVLD AP4: 8.3 CM
BH CV ECHO MEAS - LVLS AP2: 7.1 CM
BH CV ECHO MEAS - LVLS AP4: 7.3 CM
BH CV ECHO MEAS - LVOT AREA (M): 3.1 CM^2
BH CV ECHO MEAS - LVOT AREA: 3.1 CM^2
BH CV ECHO MEAS - LVOT DIAM: 2 CM
BH CV ECHO MEAS - LVPWD: 1 CM
BH CV ECHO MEAS - MED PEAK E' VEL: 8 CM/SEC
BH CV ECHO MEAS - MR MAX PG: 112.3 MMHG
BH CV ECHO MEAS - MR MAX VEL: 529.9 CM/SEC
BH CV ECHO MEAS - MV A DUR: 0.12 SEC
BH CV ECHO MEAS - MV A MAX VEL: 96.5 CM/SEC
BH CV ECHO MEAS - MV DEC SLOPE: 395 CM/SEC^2
BH CV ECHO MEAS - MV DEC TIME: 0.23 SEC
BH CV ECHO MEAS - MV E MAX VEL: 95.1 CM/SEC
BH CV ECHO MEAS - MV E/A: 0.98
BH CV ECHO MEAS - MV MAX PG: 6.8 MMHG
BH CV ECHO MEAS - MV MEAN PG: 2.4 MMHG
BH CV ECHO MEAS - MV P1/2T MAX VEL: 95.1 CM/SEC
BH CV ECHO MEAS - MV P1/2T: 70.5 MSEC
BH CV ECHO MEAS - MV V2 MAX: 130.3 CM/SEC
BH CV ECHO MEAS - MV V2 MEAN: 72.9 CM/SEC
BH CV ECHO MEAS - MV V2 VTI: 37.2 CM
BH CV ECHO MEAS - MVA P1/2T LCG: 2.3 CM^2
BH CV ECHO MEAS - MVA(P1/2T): 3.1 CM^2
BH CV ECHO MEAS - MVA(VTI): 2.5 CM^2
BH CV ECHO MEAS - PA ACC TIME: 0.08 SEC
BH CV ECHO MEAS - PA MAX PG (FULL): 1.7 MMHG
BH CV ECHO MEAS - PA MAX PG: 3.4 MMHG
BH CV ECHO MEAS - PA PR(ACCEL): 42.6 MMHG
BH CV ECHO MEAS - PA V2 MAX: 92.3 CM/SEC
BH CV ECHO MEAS - PULM A REVS DUR: 0.12 SEC
BH CV ECHO MEAS - PULM A REVS VEL: 22.7 CM/SEC
BH CV ECHO MEAS - PULM DIAS VEL: 27.9 CM/SEC
BH CV ECHO MEAS - PULM S/D: 1.1
BH CV ECHO MEAS - PULM SYS VEL: 30.5 CM/SEC
BH CV ECHO MEAS - PVA(V,A): 2.4 CM^2
BH CV ECHO MEAS - PVA(V,D): 2.4 CM^2
BH CV ECHO MEAS - QP/QS: 0.49
BH CV ECHO MEAS - RV MAX PG: 1.7 MMHG
BH CV ECHO MEAS - RV MEAN PG: 1.1 MMHG
BH CV ECHO MEAS - RV V1 MAX: 66 CM/SEC
BH CV ECHO MEAS - RV V1 MEAN: 51 CM/SEC
BH CV ECHO MEAS - RV V1 VTI: 13.6 CM
BH CV ECHO MEAS - RVOT AREA: 3.3 CM^2
BH CV ECHO MEAS - RVOT DIAM: 2 CM
BH CV ECHO MEAS - SI(AO): 299.5 ML/M^2
BH CV ECHO MEAS - SI(CUBED): 33.2 ML/M^2
BH CV ECHO MEAS - SI(LVOT): 48.8 ML/M^2
BH CV ECHO MEAS - SI(MOD-SP2): 34.8 ML/M^2
BH CV ECHO MEAS - SI(MOD-SP4): 41.1 ML/M^2
BH CV ECHO MEAS - SI(TEICH): 28.9 ML/M^2
BH CV ECHO MEAS - SV(AO): 568.4 ML
BH CV ECHO MEAS - SV(CUBED): 63 ML
BH CV ECHO MEAS - SV(LVOT): 92.6 ML
BH CV ECHO MEAS - SV(MOD-SP2): 66 ML
BH CV ECHO MEAS - SV(MOD-SP4): 78 ML
BH CV ECHO MEAS - SV(RVOT): 45 ML
BH CV ECHO MEAS - SV(TEICH): 54.8 ML
BH CV ECHO MEAS - TAPSE (>1.6): 2.6 CM2
BH CV ECHO MEAS - TR MAX VEL: 272.8 CM/SEC
BH CV XLRA - RV BASE: 3.1 CM
BH CV XLRA - TDI S': 15 CM/SEC
E/E' RATIO: 12.5
LEFT ATRIUM VOLUME INDEX: 27 ML/M2
LV EF 2D ECHO EST: 60 %
SINUS: 2.5 CM
STJ: 3 CM

## 2017-11-09 PROCEDURE — 93306 TTE W/DOPPLER COMPLETE: CPT | Performed by: INTERNAL MEDICINE

## 2017-11-09 PROCEDURE — 93306 TTE W/DOPPLER COMPLETE: CPT

## 2017-11-09 PROCEDURE — 25010000002 PERFLUTREN (DEFINITY) 8.476 MG IN SODIUM CHLORIDE 0.9 % 10 ML INJECTION: Performed by: INTERNAL MEDICINE

## 2017-11-09 RX ADMIN — PERFLUTREN 1.5 ML: 6.52 INJECTION, SUSPENSION INTRAVENOUS at 13:58

## 2017-11-09 NOTE — TELEPHONE ENCOUNTER
Asia Ly  Male, 85 y.o., 08/21/1932  PCP:   Lubna Lobo MD  Language:   English  Need Interp:   No  Last Weight:   167 lb (75.8 kg)  Phone:   H: [phone] M: [phone]  Allergies  Diltiazem  Health Maintenance:   Due  FYI:   None  Primary Ins.:   MEDICARE  MRN:   [id number]  MyChart:   Pending  Pharmacy:   [facility] [id number] [place] [address] AT [facility] [address] - [phone] [facility] [phone] FX [[id number]] [facility] [address] [place], [place] - [address] [facility] - [phone] [facility] [phone] FX [[id number]]  Preferred Lab:   None  Next Appt with Me:   02/01/2018  Next Appt Date by Dept:   02/01/2018    PACS Images        Radiology Images         Adult Transthoracic Echo Complete W/ Cont if Necessary Per Protocol   Status:  Final result   Visible to patient:  No (Not Released) Dx:  Paroxysmal atrial fibrillation Order: [id number]         Details        Reading Physician Reading Date Result Priority       Nicky Giron MD 11/9/2017 Routine           Result Text             · Left ventricular systolic function is normal. Estimated EF = 60%.  · Left ventricular diastolic dysfunction (grade II) consistent with pseudonormalization.  · Mild aortic valve stenosis is present.                    All Measurements          Ref Range & Units 1:52 PM         TDI S' cm/sec 15.00       RV Base cm 3.10       Sinus cm 2.50       STJ cm 3.00       Ascending aorta cm 3.80       E/E' ratio  12.5       LA Volume Index mL/m2 27.0       Lat Peak E' Jaylon cm/sec 8.0       Med Peak E' Jaylon cm/sec 8.00       TAPSE (>1.6) cm2 2.60       BSA m^2 1.9P       IVSd cm 1.1P       LVIDd cm 4.6P       LVIDs cm 3.3P       LVPWd cm 1.0P       IVS/LVPW  1.0P       FS % 29.2P       EDV(Teich) ml 97.5P       ESV(Teich) ml 42.8P       EF(Teich) % 56.1P       EF(cubed) % 64.6P       LV mass(C)d grams 173.3P       LV mass(C)dI grams/m^2 91.3P       SV(Teich) ml 54.8P       SI(Teich) ml/m^2  28.9P       SV(cubed) ml 63.0P       SI(cubed) ml/m^2 33.2P       Ao root diam cm 3.4P       Ao root area cm^2 9.1P       ACS cm 1.5P       LVOT diam cm 2.0P       LVOT area cm^2 3.1P       LVOT area(traced) cm^2 3.1P       RVOT diam cm 2.0P       RVOT area cm^2 3.3P       LVLd ap4 cm 8.3P       EDV(MOD-sp4) ml 130.0P       LVLs ap4 cm 7.3P       ESV(MOD-sp4) ml 52.0P       EF(MOD-sp4) % 60.0P       LVLd ap2 cm 8.0P       EDV(MOD-sp2) ml 117.0P       LVLs ap2 cm 7.1P       ESV(MOD-sp2) ml 51.0P       EF(MOD-sp2) % 56.4P       SV(MOD-sp4) ml 78.0P       SI(MOD-sp4) ml/m^2 41.1P       SV(MOD-sp2) ml 66.0P       SI(MOD-sp2) ml/m^2 34.8P       Ao root area (BSA corrected)  1.8P       Ao root area (BSA corrected) ml/m^2 56.4P       CONTRAST EF 4CH ml/m^2 60.0P       LV Diastolic corrected for BSA ml/m^2 68.5P       LV Systolic corrected for BSA ml/m^2 27.4P       MV A dur sec 0.12P       MV E max josselin cm/sec 95.1P       MV A max josselin cm/sec 96.5P       MV E/A  0.98P       MV V2 max cm/sec 130.3P       MV max PG mmHg 6.8P       MV V2 mean cm/sec 72.9P       MV mean PG mmHg 2.4P       MV V2 VTI cm 37.2P       MVA(VTI) cm^2 2.5P       MV P1/2t max josselin cm/sec 95.1P       MV P1/2t msec 70.5P       MVA(P1/2t) cm^2 3.1P       MV dec slope cm/sec^2 395.0P       MV dec time sec 0.23P       Ao pk josselin cm/sec 270.3P       Ao max PG mmHg 29.2P       Ao max PG (full) mmHg 23.6P       Ao V2 mean cm/sec 194.0P       Ao mean PG mmHg 16.5P       Ao mean PG (full) mmHg 12.9P       Ao V2 VTI cm 62.2P       ÓSCAR(I,A) cm^2 1.5P       ÓSCAR(I,D) cm^2 1.5P       ÓSCAR(V,A) cm^2 1.4P       ÓSCAR(V,D) cm^2 1.4P       LV V1 max PG mmHg 5.6P       LV V1 mean PG mmHg 3.5P       LV V1 max cm/sec 118.8P       LV V1 mean cm/sec 89.9P       LV V1 VTI cm 29.7P       MR max josselin cm/sec 529.9P       MR max PG mmHg 112.3P       SV(Ao) ml 568.4P       SI(Ao) ml/m^2 299.5P       SV(LVOT) ml 92.6P       SV(RVOT) ml 45.0P       SI(LVOT) ml/m^2 48.8P       PA V2 max  cm/sec 92.3P       PA max PG mmHg 3.4P       PA max PG (full) mmHg 1.7P       BH CV ECHO DONG - PVA(V,A) cm^2 2.4P       BH CV ECHO DONG - PVA(V,D) cm^2 2.4P       PA acc time sec 0.08P       RV V1 max PG mmHg 1.7P       RV V1 mean PG mmHg 1.1P       RV V1 max cm/sec 66.0P       RV V1 mean cm/sec 51.0P       RV V1 VTI cm 13.6P       TR max jaylon cm/sec 272.8P       PA pr(Accel) mmHg 42.6P       Pulm Sys Jaylon cm/sec 30.5P       Pulm Fierro Jaylon cm/sec 27.9P       Pulm S/D  1.1P       Qp/Qs  0.49P       Pulm A Revs Dur sec 0.12P       Pulm A Revs Jaylon cm/sec 22.7P       MVA P1/2T LCG cm^2 2.3P       BH CV ECHO DONG - BZI_BMI kilograms/m^2 25.5P        CV ECHO DONG - BSA(HAYCOCK) m^2 1.9P       BH CV ECHO DONG - BZI_METRIC_WEIGHT kg 76.2P        CV ECHO DONG - BZI_METRIC_HEIGHT cm 172.7P       Echo EF Estimated % 60       Resulting Agency  [facility]CHOC[facility]AP[facility]  [address] [place] [address]  [address] [place] [id number] [phone]            Study Description        A two-dimensional transthoracic echocardiogram with color flow and Doppler was performed. The study is technically adequate for diagnosis.Verbal consent was obtained from the patient to use Definity contrast in order to optimize the study. The use of Definity was indicated as two or more contiguous segments of the left ventricular endocardial border were unable to be adequately visualized by standard imaging methods. 1.3 mL of mechanically activated Definity was mixed with 8.7 mL of normal saline. A total of 1.5 mL of the resulting Definity solution was administered, and the remaining contrast was wasted and discarded.   No adverse reaction to contrast was noted. Lot# [id number]  Exp 12/1/18.   O2 sat 96%.         Echocardiogram Findings        Left Ventricle  Left ventricular systolic function is normal. Calculated EF = 60%. Estimated EF = 60%. Normal left ventricular cavity size and wall thickness noted. All  left ventricular wall segments contract normally. Left ventricular diastolic dysfunction is noted (grade II w/high LAP) consistent with pseudonormalization.       Right Ventricle  Normal right ventricular cavity size and systolic function noted.       Left Atrium  Normal left atrial size noted.       Right Atrium  Normal right atrial size noted.       Aortic Valve  The valve appears trileaflet. The aortic valve is abnormal in structure. There is calcification of the aortic valve.Trace aortic valve regurgitation is present. Mild aortic valve stenosis is present.       Mitral Valve  The mitral valve is grossly normal in structure. Trace mitral valve regurgitation is present. No significant mitral valve stenosis is present.       Tricuspid Valve  The tricuspid valve is grossly normal. No tricuspid valve stenosis is present. Trace tricuspid valve regurgitation is present.       Pulmonic Valve  The pulmonic valve is grossly normal in structure. There is no significant pulmonic valve stenosis present. There is trace pulmonic valve regurgitation present.       Greater Vessels  No dilation of the aortic root is present. Inferior vena cava not well visualized. The inferior vena cava is indeterminate in size (unable to calculate RVSP). Abd aorta was not well visualized .       Pericardium  There is no evidence of pericardial effusion.           Wall Scoring        Score Index: 1.000 Percent Normal: 100.0%             The left ventricular wall motion is normal.                         PACS Images        Show images for Adult Transthoracic Echo Complete W/ Cont if Necessary Per Protocol                Specimen Collected: 11/09/17  1:35 PM Last Resulted: 11/09/17  3:29 PM                 P=Value has a preliminary status                Status of Other Orders        Order    Lab Status Result Date Provider Status       Basic Metabolic Panel No Result  Ordered       ECG 12 Lead Final result 11/3/2017 Open                  Routing  History        Priority Sent On From To Message Type        11/9/2017  3:33 PM MD Hector Fields MD Results

## 2017-11-10 NOTE — TELEPHONE ENCOUNTER
Called L/M echo ok. He is to get BMP and call me. Let me know how he is doing on the water pill and metoprolol.PIPPA

## 2017-11-13 ENCOUNTER — TELEPHONE (OUTPATIENT)
Dept: CARDIOLOGY | Facility: CLINIC | Age: 82
End: 2017-11-13

## 2017-11-13 ENCOUNTER — APPOINTMENT (OUTPATIENT)
Dept: LAB | Facility: HOSPITAL | Age: 82
End: 2017-11-13
Attending: INTERNAL MEDICINE

## 2017-11-13 LAB
ANION GAP SERPL CALCULATED.3IONS-SCNC: 12.1 MMOL/L
BUN BLD-MCNC: 23 MG/DL (ref 8–23)
BUN/CREAT SERPL: 19.8 (ref 7–25)
CALCIUM SPEC-SCNC: 9.2 MG/DL (ref 8.6–10.5)
CHLORIDE SERPL-SCNC: 104 MMOL/L (ref 98–107)
CO2 SERPL-SCNC: 25.9 MMOL/L (ref 22–29)
CREAT BLD-MCNC: 1.16 MG/DL (ref 0.76–1.27)
GFR SERPL CREATININE-BSD FRML MDRD: 60 ML/MIN/1.73
GLUCOSE BLD-MCNC: 107 MG/DL (ref 65–99)
POTASSIUM BLD-SCNC: 3.7 MMOL/L (ref 3.5–5.2)
SODIUM BLD-SCNC: 142 MMOL/L (ref 136–145)

## 2017-11-13 PROCEDURE — 36415 COLL VENOUS BLD VENIPUNCTURE: CPT | Performed by: INTERNAL MEDICINE

## 2017-11-13 PROCEDURE — 80048 BASIC METABOLIC PNL TOTAL CA: CPT | Performed by: INTERNAL MEDICINE

## 2017-11-13 NOTE — TELEPHONE ENCOUNTER
Asia Ly  Male, 85 y.o., 08/21/1932  PCP:   Lubna Lobo MD  Language:   English  Need Interp:   No  Last Weight:   167 lb (75.8 kg)  Phone:   H: [phone] M: [phone]  Allergies  Diltiazem  Health Maintenance:   Due  FYI:   None  Primary Ins.:   MEDICARE  MRN:   [id number]  MyChart:   Pending  Pharmacy:   [facility] [address] [place] [address] [address] [facility] [address] - [phone] [facility] [phone] FX [[id number]] [facility] [address] [place], [place] - [address] [facility] - [phone] [facility] [phone] FX [[id number]]  Preferred Lab:   None  Next Appt with Me:   02/01/2018  Next Appt Date by Dept:   02/01/2018   Basic Metabolic Panel   Status:  Final result   Visible to patient:  No (Not Released) Dx:  Bilateral edema of lower extremity Order: [id number]             Ref Range & Units 7:53 AM   3mo ago   1yr ago        Glucose 65 - 99 mg/dL 107 (H) 121 (H) 85      BUN 8 - 23 mg/dL 23 17 8      Creatinine 0.76 - 1.27 mg/dL 1.16 1.18 1.04      Sodium 136 - 145 mmol/L 142 142 140      Potassium 3.5 - 5.2 mmol/L 3.7 4.1 4.3      Chloride 98 - 107 mmol/L 104 105 103      CO2 22.0 - 29.0 mmol/L 25.9 23.8 23.9      Calcium 8.6 - 10.5 mg/dL 9.2 8.9 9.1      eGFR Non African Amer >60 mL/min/1.73 60 (L) 59 (L) 68      BUN/Creatinine Ratio 7.0 - 25.0 19.8 14.4 7.7      Anion Gap mmol/L 12.1 13.2 13.1     Resulting Agency   [facility]  [facility]  [facility]       Narrative        The MDRD GFR formula is only valid for adults with stable renal function between ages 18 and 70.            Specimen Collected: 11/13/17  7:53 AM Last Resulted: 11/13/17  8:56 AM                                 Status of Other Orders        Order    Lab Status Result Date Provider Status       Adult Transthoracic Echo Complete W/ Cont if Necessary Per Protocol Final result 11/9/2017 Reviewed 11/9/2017  3:41 PM       ECG 12 Lead Final result 11/3/2017 Open                  Routing History        Priority  Sent On From To Message Type        11/13/2017  8:56 AM Lab, Background User Hector Rivera MD Results        11/9/2017  3:33 PM MD Hector Fields MD Results

## 2017-11-20 ENCOUNTER — TELEPHONE (OUTPATIENT)
Dept: CARDIOLOGY | Facility: CLINIC | Age: 82
End: 2017-11-20

## 2017-11-20 NOTE — TELEPHONE ENCOUNTER
Patient called confused as to whether he should or should not take Furosemide.  It appears you wanted him to continue it in the notes.  He said you told him to stop the Furosemide or the Budesonide. He can't remember which.  He has an appt to Dr. Cabrera in a couple of weeks and one to see you 2/1/18.  His care giver says his feet and hands are swelling.  I do not think he has stopped taking Furosemide yet but it is difficult to get a clear answer when they not in agreement while on the phone.     Patient's phone number is [phone]./ LEIDA

## 2017-11-20 NOTE — TELEPHONE ENCOUNTER
I called and spoke with patient's caregiver.  I informed her patient is to continue on the Furosemide per Dr. Rivera's instruction.  She verbalized understanding and states she will inform the patient./ LEIDA

## 2017-12-09 ENCOUNTER — HOSPITAL ENCOUNTER (EMERGENCY)
Facility: HOSPITAL | Age: 82
Discharge: HOME | End: 2017-12-09
Attending: FAMILY MEDICINE | Admitting: FAMILY MEDICINE

## 2017-12-09 VITALS
SYSTOLIC BLOOD PRESSURE: 123 MMHG | OXYGEN SATURATION: 100 % | BODY MASS INDEX: 26.22 KG/M2 | HEART RATE: 72 BPM | RESPIRATION RATE: 16 BRPM | DIASTOLIC BLOOD PRESSURE: 62 MMHG | HEIGHT: 68 IN | TEMPERATURE: 97.9 F | WEIGHT: 173 LBS

## 2017-12-09 DIAGNOSIS — L85.3 DRY SKIN DERMATITIS: Primary | ICD-10-CM

## 2017-12-09 PROCEDURE — 99283 EMERGENCY DEPT VISIT LOW MDM: CPT

## 2017-12-09 RX ORDER — CLOTRIMAZOLE AND BETAMETHASONE DIPROPIONATE 10; .64 MG/G; MG/G
CREAM TOPICAL 2 TIMES DAILY
COMMUNITY
End: 2018-02-09

## 2017-12-09 RX ORDER — ERYTHROMYCIN 250 MG/1
500 TABLET, COATED ORAL 2 TIMES DAILY
COMMUNITY
End: 2018-02-09

## 2017-12-09 RX ORDER — INDOMETHACIN 25 MG/1
25 CAPSULE ORAL 3 TIMES DAILY PRN
COMMUNITY
End: 2018-03-09 | Stop reason: HOSPADM

## 2017-12-09 RX ORDER — HYDROXYZINE HYDROCHLORIDE 25 MG/1
25 TABLET, FILM COATED ORAL NIGHTLY PRN
Qty: 20 TABLET | Refills: 0 | Status: ON HOLD | OUTPATIENT
Start: 2017-12-09 | End: 2018-03-09

## 2017-12-09 NOTE — ED TRIAGE NOTES
Body wide itching and rash for 1 week that worsened in past 24 hours. Pt unsure what is causing the rash.

## 2017-12-10 NOTE — ED PROVIDER NOTES
EMERGENCY DEPARTMENT ENCOUNTER    CHIEF COMPLAINT  Chief Complaint: rash  History given by: patient  History limited by: nothing   Room Number: 26/26  PMD: Lubna Lobo MD      HPI:  Pt is a 85 y.o. male who presents complaining of diffuse, itchy rash for 4 days. Pt has been using Lubriderm lotion without relief. He is unsure what the source of his rash could be, and his spouse has not developed similar sx. He has had similar outbreaks in the past and usually receives an injection of something in ED; he does not remember what sort of injection he received in the past. Pt also complains of leg swelling and is on Lasix. He has not started any new medications recently. Pt also has a h/o A-fib (on Eliquis), Crohn's disease, and gout.     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   • TIA (transient ischemic attack) 08/19/2016   • Atrial fibrillation, paroxysmal 08/19/2016   • Hyperlipidemia 08/19/2016   • Gout 08/19/2016   • Crohn's disease 08/19/2016     Resolved Ambulatory Problems     Diagnosis Date Noted   • No Resolved Ambulatory Problems     Past Medical History:   Diagnosis Date   • Aspiration pneumonia    • Atrial fibrillation    • Bronchiectasis    • Crohn's disease    • Gout    • Gout    • Hard of hearing    • Hyperlipidemia    • Peptic ulcer    • Pulmonary fibrosis        PAST SURGICAL HISTORY  Past Surgical History:   Procedure Laterality Date   • COLONOSCOPY     • TESTICLE SURGERY      benign tumor removed.       FAMILY HISTORY  Family History   Problem Relation Age of Onset   • Stroke Mother     • Heart disease Sister      Heart Valve Replacement   • Ovarian cancer Sister    • Rheumatic fever Sister    • Stroke Father        SOCIAL HISTORY  Social History     Social History   • Marital status:      Spouse name: N/A   • Number of children: N/A   • Years of education: N/A     Occupational History   • Not on file.     Social History Main Topics   • Smoking status: Former Smoker     Types: Cigars   • Smokeless tobacco: Never Used      Comment: Quit 25 years ago   • Alcohol use No      Comment: daily caffiene   • Drug use: No   • Sexual activity: Defer     Other Topics Concern   • Not on file     Social History Narrative       ALLERGIES  Diltiazem    REVIEW OF SYSTEMS  Review of Systems   Constitutional: Negative for fever.   Respiratory: Negative for shortness of breath.    Cardiovascular: Positive for leg swelling. Negative for chest pain.   Skin: Positive for rash (itchy).       PHYSICAL EXAM  ED Triage Vitals   Temp Heart Rate Resp BP SpO2   12/09/17 1747 12/09/17 1747 12/09/17 1756 12/09/17 1756 12/09/17 1747   97.9 °F (36.6 °C) 93 18 132/65 97 %      Temp src Heart Rate Source Patient Position BP Location FiO2 (%)   -- -- -- -- --              Physical Exam   Constitutional: No distress.   HENT:   Head: Normocephalic and atraumatic.   Eyes: EOM are normal.   Neck: Normal range of motion.   Cardiovascular: Normal heart sounds.    Pulmonary/Chest: No respiratory distress.   Abdominal: There is no tenderness.   Musculoskeletal: He exhibits edema (mild, pedal).   Neurological: He is alert.   Skin: Skin is warm and dry. Bruising (scattered, BLE) and rash (scattered areas of mild excoriation on legs and forearms) noted.   Nursing note and vitals reviewed.        PROCEDURES  Procedures      PROGRESS AND CONSULTS  ED Course     2119: Discussed plan to treat pt with Atarax, and I recommended that he use Benadryl. I explained that dry skin could also cause these sx, so I encouraged him to take briefer  showers and use baby oil. He will f/u with his dermatologist as needed. All questions were addressed.       MEDICAL DECISION MAKING  Results were reviewed/discussed with the patient and they were also made aware of online access. Pt also made aware that some labs, such as cultures, will not be resulted during ER visit and follow up with PMD is necessary.     MDM  Number of Diagnoses or Management Options  Patient Progress  Patient progress: stable         DIAGNOSIS  Final diagnoses:   Dry skin dermatitis       DISPOSITION  DISCHARGE    Patient discharged in stable condition.    Reviewed implications of results, diagnosis, meds, responsibility to follow up, warning signs and symptoms of possible worsening, potential complications and reasons to return to ER.    Patient/Family voiced understanding of above instructions.    Discussed plan for discharge, as there is no emergent indication for admission.  Pt/family is agreeable and understands need for follow up and repeat testing.  Pt is aware that discharge does not mean that nothing is wrong but it indicates no emergency is present that requires admission and they must continue care with follow-up as given below or physician of their choice.     FOLLOW-UP  Vance Alexander MD  [address] [address]  [phone]      If not improving in a few days see Dr Alexander in follow up         Medication List      New Prescriptions          hydrOXYzine 25 MG tablet   Commonly known as:  ATARAX   Take 1 tablet by mouth At Night As Needed for Itching.         Stop          cephalexin 500 MG capsule   Commonly known as:  KEFLEX           Latest Documented Vital Signs:  As of 9:28 PM  BP- 127/62 HR- 74 Temp- 97.9 °F (36.6 °C) O2 sat- 94%    --  Documentation assistance provided by ananya Fox for Stanislaw Mayfield.  Information recorded by the ananya was done at my direction and has been verified and validated by me.       Fernanda Fox  12/09/17 5793       Stanislaw  MD Chaparro  12/10/17 [id number]

## 2017-12-10 NOTE — DISCHARGE INSTRUCTIONS
Take short showers and then towel dry.  Then use Shamar's Baby Oil on your arms and legs to trap in moisture.  Return if you worsen.

## 2018-01-08 ENCOUNTER — OFFICE (OUTPATIENT)
Dept: URBAN - METROPOLITAN AREA OTHER 6 | Facility: OTHER | Age: 83
End: 2018-01-08
Payer: COMMERCIAL

## 2018-01-08 VITALS
WEIGHT: 167 LBS | HEART RATE: 98 BPM | DIASTOLIC BLOOD PRESSURE: 72 MMHG | SYSTOLIC BLOOD PRESSURE: 120 MMHG | HEIGHT: 68 IN

## 2018-01-08 DIAGNOSIS — K50.10 CROHN'S DISEASE OF LARGE INTESTINE WITHOUT COMPLICATIONS: ICD-10-CM

## 2018-01-08 PROCEDURE — 99213 OFFICE O/P EST LOW 20 MIN: CPT | Performed by: INTERNAL MEDICINE

## 2018-01-14 ENCOUNTER — HOSPITAL ENCOUNTER (EMERGENCY)
Facility: HOSPITAL | Age: 83
Discharge: HOME | End: 2018-01-14
Attending: EMERGENCY MEDICINE | Admitting: EMERGENCY MEDICINE

## 2018-01-14 VITALS
RESPIRATION RATE: 16 BRPM | OXYGEN SATURATION: 99 % | SYSTOLIC BLOOD PRESSURE: 113 MMHG | HEIGHT: 68 IN | DIASTOLIC BLOOD PRESSURE: 62 MMHG | HEART RATE: 83 BPM | BODY MASS INDEX: 24.86 KG/M2 | WEIGHT: 164 LBS | TEMPERATURE: 98.2 F

## 2018-01-14 DIAGNOSIS — M10.042 ACUTE IDIOPATHIC GOUT OF LEFT HAND: Primary | ICD-10-CM

## 2018-01-14 LAB
ALBUMIN SERPL-MCNC: 3.2 G/DL (ref 3.5–5.2)
ALBUMIN/GLOB SERPL: 0.9 G/DL
ALP SERPL-CCNC: 51 U/L (ref 39–117)
ALT SERPL W P-5'-P-CCNC: 20 U/L (ref 1–41)
ANION GAP SERPL CALCULATED.3IONS-SCNC: 10.4 MMOL/L
AST SERPL-CCNC: 30 U/L (ref 1–40)
BASOPHILS # BLD AUTO: 0.01 10*3/MM3 (ref 0–0.2)
BASOPHILS NFR BLD AUTO: 0.1 % (ref 0–1.5)
BILIRUB SERPL-MCNC: 0.4 MG/DL (ref 0.1–1.2)
BUN BLD-MCNC: 16 MG/DL (ref 8–23)
BUN/CREAT SERPL: 14.2 (ref 7–25)
CALCIUM SPEC-SCNC: 8.6 MG/DL (ref 8.6–10.5)
CHLORIDE SERPL-SCNC: 100 MMOL/L (ref 98–107)
CO2 SERPL-SCNC: 26.6 MMOL/L (ref 22–29)
CREAT BLD-MCNC: 1.13 MG/DL (ref 0.76–1.27)
DEPRECATED RDW RBC AUTO: 49.1 FL (ref 37–54)
EOSINOPHIL # BLD AUTO: 0.09 10*3/MM3 (ref 0–0.7)
EOSINOPHIL NFR BLD AUTO: 1 % (ref 0.3–6.2)
ERYTHROCYTE [DISTWIDTH] IN BLOOD BY AUTOMATED COUNT: 13.8 % (ref 11.5–14.5)
ERYTHROCYTE [SEDIMENTATION RATE] IN BLOOD: 55 MM/HR (ref 0–20)
GFR SERPL CREATININE-BSD FRML MDRD: 62 ML/MIN/1.73
GLOBULIN UR ELPH-MCNC: 3.4 GM/DL
GLUCOSE BLD-MCNC: 161 MG/DL (ref 65–99)
HCT VFR BLD AUTO: 38.6 % (ref 40.4–52.2)
HGB BLD-MCNC: 11.9 G/DL (ref 13.7–17.6)
IMM GRANULOCYTES # BLD: 0.05 10*3/MM3 (ref 0–0.03)
IMM GRANULOCYTES NFR BLD: 0.6 % (ref 0–0.5)
LYMPHOCYTES # BLD AUTO: 0.85 10*3/MM3 (ref 0.9–4.8)
LYMPHOCYTES NFR BLD AUTO: 9.5 % (ref 19.6–45.3)
MCH RBC QN AUTO: 30 PG (ref 27–32.7)
MCHC RBC AUTO-ENTMCNC: 30.8 G/DL (ref 32.6–36.4)
MCV RBC AUTO: 97.2 FL (ref 79.8–96.2)
MONOCYTES # BLD AUTO: 0.79 10*3/MM3 (ref 0.2–1.2)
MONOCYTES NFR BLD AUTO: 8.8 % (ref 5–12)
NEUTROPHILS # BLD AUTO: 7.16 10*3/MM3 (ref 1.9–8.1)
NEUTROPHILS NFR BLD AUTO: 80 % (ref 42.7–76)
NRBC BLD MANUAL-RTO: 0 /100 WBC (ref 0–0)
PLAT MORPH BLD: NORMAL
PLATELET # BLD AUTO: 136 10*3/MM3 (ref 140–500)
PMV BLD AUTO: 12.8 FL (ref 6–12)
POTASSIUM BLD-SCNC: 4.6 MMOL/L (ref 3.5–5.2)
PROT SERPL-MCNC: 6.6 G/DL (ref 6–8.5)
RBC # BLD AUTO: 3.97 10*6/MM3 (ref 4.6–6)
RBC MORPH BLD: NORMAL
SODIUM BLD-SCNC: 137 MMOL/L (ref 136–145)
WBC MORPH BLD: NORMAL
WBC NRBC COR # BLD: 8.95 10*3/MM3 (ref 4.5–10.7)

## 2018-01-14 PROCEDURE — 85025 COMPLETE CBC W/AUTO DIFF WBC: CPT | Performed by: PHYSICIAN ASSISTANT

## 2018-01-14 PROCEDURE — 99283 EMERGENCY DEPT VISIT LOW MDM: CPT

## 2018-01-14 PROCEDURE — 85007 BL SMEAR W/DIFF WBC COUNT: CPT | Performed by: PHYSICIAN ASSISTANT

## 2018-01-14 PROCEDURE — 36415 COLL VENOUS BLD VENIPUNCTURE: CPT

## 2018-01-14 PROCEDURE — 80053 COMPREHEN METABOLIC PANEL: CPT | Performed by: PHYSICIAN ASSISTANT

## 2018-01-14 PROCEDURE — 85652 RBC SED RATE AUTOMATED: CPT | Performed by: PHYSICIAN ASSISTANT

## 2018-01-14 RX ORDER — PREDNISONE 20 MG/1
TABLET ORAL
Qty: 15 TABLET | Refills: 0 | Status: SHIPPED | OUTPATIENT
Start: 2018-01-14 | End: 2018-02-09

## 2018-01-14 RX ORDER — METOPROLOL TARTRATE 50 MG/1
50 TABLET, FILM COATED ORAL 2 TIMES DAILY
COMMUNITY
End: 2018-04-26 | Stop reason: SDUPTHER

## 2018-01-14 NOTE — ED PROVIDER NOTES
EMERGENCY DEPARTMENT ENCOUNTER    CHIEF COMPLAINT  Chief Complaint: L hand  History given by: Patient  History limited by: none  Room Number: 17/17  PMD: Lubna Lobo MD      HPI:  Pt is a 85 y.o. male who presents complaining of edema and pain to the left hand that began this morning upon awakening. Pt denies fever, chills, SOA, and any hx of injury. Pt confirms taking Tylenol prior to arrival at ED, which he states reduced the pain. Pt confirms hx of GOUT to BLE and his wife states he has had leg swelling since being put on Indocin and Eliquis. Pt denies hx of diabetes.     Duration:  Several hours  Onset: gradual  Timing: constant  Location: left hand  Radiation: none  Quality: pain and edema  Intensity/Severity: mild  Progression: improved  Associated Symptoms: none  Aggravating Factors: none  Alleviating Factors: tylenol  Previous Episodes: Pt has hx of GOUT to feet.   Treatment before arrival: Pt took tylenol prior to arrival at ED, which improved pain.     PAST MEDICAL HISTORY  Active Ambulatory Problems     Diagnosis Date Noted   • Ataxia 08/19/2016   • TIA (transient ischemic attack) 08/19/2016   • Atrial fibrillation, paroxysmal 08/19/2016   • Hyperlipidemia 08/19/2016   • Gout 08/19/2016   • Crohn's disease 08/19/2016     Resolved Ambulatory Problems     Diagnosis Date Noted   • No Resolved Ambulatory Problems     Past Medical History:   Diagnosis Date   • Aspiration pneumonia    • Atrial fibrillation    • Bronchiectasis    • Crohn's disease    • Gout    • Gout    • Hard of hearing    • Hyperlipidemia    • Peptic ulcer    • Pulmonary fibrosis        PAST SURGICAL HISTORY  Past Surgical History:   Procedure Laterality Date   • COLONOSCOPY     • TESTICLE SURGERY      benign tumor removed.       FAMILY HISTORY  Family History   Problem Relation Age of Onset   • Stroke Mother    • Heart disease Sister      Heart Valve Replacement   • Ovarian cancer Sister    • Rheumatic fever Sister    • Stroke Father         SOCIAL HISTORY  Social History     Social History   • Marital status:      Spouse name: N/A   • Number of children: N/A   • Years of education: N/A     Occupational History   • Not on file.     Social History Main Topics   • Smoking status: Former Smoker     Types: Cigars   • Smokeless tobacco: Never Used      Comment: Quit 25 years ago   • Alcohol use No      Comment: daily caffiene   • Drug use: No   • Sexual activity: Defer     Other Topics Concern   • Not on file     Social History Narrative       ALLERGIES  Diltiazem    REVIEW OF SYSTEMS  Review of Systems   Constitutional: Negative for activity change, appetite change and fever.   Respiratory: Negative for cough and shortness of breath.    Cardiovascular: Negative for chest pain and leg swelling.   Genitourinary: Negative for decreased urine volume and dysuria.   Musculoskeletal: Positive for joint swelling (Left hand). Negative for neck pain.   Skin: Negative for rash and wound.   Neurological: Negative for weakness, numbness and headaches.   Psychiatric/Behavioral: Negative.    All other systems reviewed and are negative.      PHYSICAL EXAM  ED Triage Vitals   Temp Heart Rate Resp BP SpO2   01/14/18 0738 01/14/18 0736 01/14/18 0736 01/14/18 0736 01/14/18 0738   98.2 °F (36.8 °C) 83 16 151/67 96 %      Temp src Heart Rate Source Patient Position BP Location FiO2 (%)   01/14/18 0738 01/14/18 0736 01/14/18 0736 01/14/18 0736 --   Oral Monitor Sitting Right arm        Physical Exam   Constitutional: He is oriented to person, place, and time and well-developed, well-nourished, and in no distress.   HENT:   Head: Normocephalic and atraumatic.   Eyes: EOM are normal. Pupils are equal, round, and reactive to light.   Neck: Normal range of motion. Neck supple.   Cardiovascular: Normal rate, regular rhythm and normal heart sounds.    Pulmonary/Chest: Effort normal and breath sounds normal. No respiratory distress.   Abdominal: Soft. There is no  tenderness. There is no rebound and no guarding.   Musculoskeletal: Normal range of motion. He exhibits no edema.        Left hand: He exhibits tenderness (at first MCP joint and carpal metacarpal joint) and swelling (and erythema).   Pt has no lymphangitis.    Neurological: He is alert and oriented to person, place, and time. He has normal sensation and normal strength.   Skin: Skin is warm and dry.   Psychiatric: Mood and affect normal.   Nursing note and vitals reviewed.      LAB RESULTS  Lab Results (last 24 hours)     Procedure Component Value Units Date/Time    CBC & Differential [[id number]] Collected:  01/14/18 0804    Specimen:  Blood Updated:  01/14/18 0850    Narrative:       The following orders were created for panel order CBC & Differential.  Procedure                               Abnormality         Status                     ---------                               -----------         ------                     Scan Slide[[id number]]                   Normal              Final result               CBC Auto Differential[[id number]]        Abnormal            Final result                 Please view results for these tests on the individual orders.    Comprehensive Metabolic Panel [[id number]]  (Abnormal) Collected:  01/14/18 0804    Specimen:  Blood Updated:  01/14/18 0843     Glucose 161 (H) mg/dL      BUN 16 mg/dL      Creatinine 1.13 mg/dL      Sodium 137 mmol/L      Potassium 4.6 mmol/L       Specimen hemolyzed.  Results may be affected.        Chloride 100 mmol/L      CO2 26.6 mmol/L      Calcium 8.6 mg/dL      Total Protein 6.6 g/dL      Albumin 3.20 (L) g/dL      ALT (SGPT) 20 U/L       Specimen hemolyzed.  Results may be affected.        AST (SGOT) 30 U/L       Specimen hemolyzed.  Results may be affected.        Alkaline Phosphatase 51 U/L      Total Bilirubin 0.4 mg/dL      eGFR Non African Amer 62 mL/min/1.73      Globulin 3.4 gm/dL      A/G Ratio 0.9 g/dL      BUN/Creatinine Ratio 14.2      Anion Gap 10.4 mmol/L     Narrative:       The MDRD GFR formula is only valid for adults with stable renal function between ages 18 and 70.    Sedimentation Rate [[id number]]  (Abnormal) Collected:  01/14/18 0804    Specimen:  Blood Updated:  01/14/18 0854     Sed Rate 55 (H) mm/hr     CBC Auto Differential [[id number]]  (Abnormal) Collected:  01/14/18 0804    Specimen:  Blood Updated:  01/14/18 0850     WBC 8.95 10*3/mm3      RBC 3.97 (L) 10*6/mm3      Hemoglobin 11.9 (L) g/dL      Hematocrit 38.6 (L) %      MCV 97.2 (H) fL      MCH 30.0 pg      MCHC 30.8 (L) g/dL      RDW 13.8 %      RDW-SD 49.1 fl      MPV 12.8 (H) fL      Platelets 136 (L) 10*3/mm3      Neutrophil % 80.0 (H) %      Lymphocyte % 9.5 (L) %      Monocyte % 8.8 %      Eosinophil % 1.0 %      Basophil % 0.1 %      Immature Grans % 0.6 (H) %      Neutrophils, Absolute 7.16 10*3/mm3      Lymphocytes, Absolute 0.85 (L) 10*3/mm3      Monocytes, Absolute 0.79 10*3/mm3      Eosinophils, Absolute 0.09 10*3/mm3      Basophils, Absolute 0.01 10*3/mm3      Immature Grans, Absolute 0.05 (H) 10*3/mm3      nRBC 0.0 /100 WBC     Scan Slide [[id number]]  (Normal) Collected:  01/14/18 0804    Specimen:  Blood Updated:  01/14/18 0850     RBC Morphology Normal     WBC Morphology Normal     Platelet Morphology Normal          I ordered the above labs and reviewed the results    Procedures      PROGRESS AND CONSULTS  ED Course     0755  Labs ordered for further evaluation.     0920  Pt rechecked and resting comfortably. Discussed indication of GOUT in hand, results of labs including elevated Sed rate, and plan for discharge with steroids, thumb splint, and plan to follow up with PCP. Pt understands and agrees with plan, all questions addressed.       MEDICAL DECISION MAKING  Results were reviewed/discussed with the patient and they were also made aware of online access. Pt also made aware that some labs, such as cultures, will not be resulted during ER visit and follow up  with PMD is necessary.     MDM  Number of Diagnoses or Management Options  Acute idiopathic gout of left hand:   Diagnosis management comments: No evidence of septic joint.        Amount and/or Complexity of Data Reviewed  Clinical lab tests: ordered and reviewed (Sed Rate - 55)           DIAGNOSIS  Final diagnoses:   Acute idiopathic gout of left hand       DISPOSITION  DISCHARGE    Patient discharged in stable condition.    Reviewed implications of results, diagnosis, meds, responsibility to follow up, warning signs and symptoms of possible worsening, potential complications and reasons to return to ER.    Patient/Family voiced understanding of above instructions.    Discussed plan for discharge, as there is no emergent indication for admission.  Pt/family is agreeable and understands need for follow up and repeat testing.  Pt is aware that discharge does not mean that nothing is wrong but it indicates no emergency is present that requires admission and they must continue care with follow-up as given below or physician of their choice.     FOLLOW-UP  Lubna Lobo MD  [address] [facility] [address]  [place] [address]  [phone]    In 1 week  if no improvement         Medication List      New Prescriptions          predniSONE 20 MG tablet   Commonly known as:  DELTASONE   Take 2 tablets once a day for 5 days, then 1 tablet once a day for 5 days         Changed          metoprolol tartrate 50 MG tablet   Commonly known as:  LOPRESSOR   What changed:  Another medication with the same name was removed. Continue   taking this medication, and follow the directions you see here.         Stop          cephalexin 500 MG capsule   Commonly known as:  KEFLEX               Latest Documented Vital Signs:  As of 9:18 AM  BP- 113/62 HR- 83 Temp- 98.2 °F (36.8 °C) (Oral) O2 sat- 99%    --  Documentation assistance provided by ananya Diamond for ZARA Ugarte.  Information recorded by the ananya was done at my  direction and has been verified and validated by me.          Cathleen Diamond  01/14/18 0922       ZARA Damon  01/14/18 [id number]

## 2018-01-14 NOTE — ED PROVIDER NOTES
The pt c/o left 1st finger edema and pain which began earlier this morning.       PEx  Left thumb: Mild swelling and limited range of motion due to pain      Agree with plan to discharge pt with steroids and a thumb splint. The pt denies pain medication.    I supervised care provided by the midlevel provider.    We have discussed this patient's history, physical exam, and treatment plan.   I have reviewed the note and personally saw and examined the patient and agree with the plan of care.    Documentation assistance provided by ananya Poole for Dr. Perez.  Information recorded by the scribe was done at my direction and has been verified and validated by me.       Lisa Poole  01/14/18 0910       Kevin Perez MD  01/14/18 0913

## 2018-02-09 ENCOUNTER — OFFICE VISIT (OUTPATIENT)
Dept: CARDIOLOGY | Facility: CLINIC | Age: 83
End: 2018-02-09

## 2018-02-09 VITALS
SYSTOLIC BLOOD PRESSURE: 110 MMHG | BODY MASS INDEX: 24.25 KG/M2 | HEART RATE: 77 BPM | DIASTOLIC BLOOD PRESSURE: 72 MMHG | HEIGHT: 68 IN | WEIGHT: 160 LBS

## 2018-02-09 DIAGNOSIS — J84.10 PULMONARY FIBROSIS (HCC): ICD-10-CM

## 2018-02-09 DIAGNOSIS — I10 ESSENTIAL HYPERTENSION: ICD-10-CM

## 2018-02-09 DIAGNOSIS — I48.0 PAROXYSMAL ATRIAL FIBRILLATION (HCC): Primary | ICD-10-CM

## 2018-02-09 DIAGNOSIS — E78.2 MIXED HYPERLIPIDEMIA: ICD-10-CM

## 2018-02-09 PROCEDURE — 99213 OFFICE O/P EST LOW 20 MIN: CPT | Performed by: INTERNAL MEDICINE

## 2018-02-09 PROCEDURE — 93000 ELECTROCARDIOGRAM COMPLETE: CPT | Performed by: INTERNAL MEDICINE

## 2018-02-09 NOTE — PROGRESS NOTES
Date of Office Visit: 18  Encounter Provider: Hector Rivera MD  Place of Service: [facility] CARDIOLOGY  Patient Name: Asia Ly  :1932      Chief Complaint   Patient presents with   • Atrial Fibrillation     History of Present Illness  HPI Comments:  The patient is a pleasant 85-year-old gentleman with history of paroxysmal  atrial fibrillation. He had been going in and out of atrial fibrillation  when he had run out of his sotalol. This happened in May 2004 when he was  traveling on a plane a fair amount. He went to the emergency room in [place]  where he was found to be in atrial fibrillation. He converted back to sinus  rhythm spontaneously. He saw Dr. Hector Patel about atrial fibrillation  ablation but it was felt it would be best not to pursue that. He also saw  Dr. Man, for a second opinion but again it was felt he was doing well  on sotalol and to maintain him on that.   He then had a stroke and TIA in 2016 was at [facility] was switched from aspirin Eliquis was for that event.  He then had been doing reasonably well but then presented to [facility] in 2016 2 times with symptomatic rapid atrial fibrillation.  While there he had an echocardiogram that showed normal left ventricular systolic function.  Moderate tricuspid insufficiency with mild pulmonary hypertension mild mitral insufficiency.  He also do perfusion stress test for some reason that was negative for ischemia.  He was started on amiodarone and discharged came right back in with rapid atrial fibrillation he was given a higher dose of amiodarone and a slower taper dose.  Given his history of pulmonary fibrosis we felt that was a poor choice so discontinued it.  Unfortunately 2017 while just sitting there he just started not feeling normal and his wife taken the emergency him is found to be in atrial fibrillation he did convert back to sinus  rhythm.  That's the first episode he had an probably 3 or 4 years.  Of note however he been taken off his diltiazem due to an allergic reaction of labor rash.    He was here in November with increased lower extremity edema he was also in rapid atrial fibrillation at that time he had a rash on diltiazem so we did an echocardiogram that showed normal left ventricular function mild aortic valve stenosis we placed him on metoprolol added diuretics he improved as his acidemia resolved he then stopped his diuretic.  He now comes in for follow-up.  He's doing much better.  His edema has resolved.  He denies shortness of breath.  He denies palpitations near syncope or syncope.  Denies orthopnea or PND.  Denies any stroke type symptoms like abrupt loss of vision, paralysis, paresthesia, or dysarthria.  No blood in his stool or black tarry stools.    Atrial Fibrillation   Symptoms are negative for dizziness and weakness. Past medical history includes atrial fibrillation.   Palpitations    Pertinent negatives include no diaphoresis, dizziness, fever, malaise/fatigue, nausea, numbness, vomiting or weakness.   Leg Swelling   Pertinent negatives include no abdominal pain, congestion, diaphoresis, fever, headaches, joint swelling, myalgias, nausea, numbness, rash, vertigo, vomiting or weakness.         Past Medical History:   Diagnosis Date   • Aspiration pneumonia    • Atrial fibrillation    • Bronchiectasis    • Crohn's disease    • Gout    • Hard of hearing    • Hyperlipidemia    • Peptic ulcer    • Pulmonary fibrosis    • Stroke    • TIA (transient ischemic attack)          Past Surgical History:   Procedure Laterality Date   • COLONOSCOPY     • TESTICLE SURGERY      benign tumor removed.         Current Outpatient Prescriptions on File Prior to Visit   Medication Sig Dispense Refill   • apixaban (ELIQUIS) 5 MG tablet tablet Take 5 mg by mouth 2 (Two) Times a Day.     • atorvastatin (LIPITOR) 10 MG tablet Take 10 mg by mouth  Daily.     • Cyanocobalamin (VITAMIN B-12 CR PO) Take 500 mg by mouth Daily.     • hydrOXYzine (ATARAX) 25 MG tablet Take 1 tablet by mouth At Night As Needed for Itching. 20 tablet 0   • indomethacin (INDOCIN) 25 MG capsule Take 25 mg by mouth 3 (Three) Times a Day As Needed for Mild Pain .     • L-methylfolate Calcium 15 MG tablet Take 15 mg by mouth Daily.     • metoprolol tartrate (LOPRESSOR) 50 MG tablet Take 50 mg by mouth 2 (Two) Times a Day.     • Polyethyl Glycol-Propyl Glycol (SYSTANE ULTRA OP) Apply  to eye As Needed.     • [DISCONTINUED] metoprolol tartrate (LOPRESSOR) 25 MG tablet Take 1 tablet by mouth 2 (Two) Times a Day. 60 tablet 11   • [DISCONTINUED] cephalexin (KEFLEX) 500 MG capsule Take 1 capsule by mouth 4 (Four) Times a Day. 40 capsule 0   • [DISCONTINUED] clotrimazole-betamethasone (LOTRISONE) 1-0.05 % cream Apply  topically 2 (Two) Times a Day.     • [DISCONTINUED] erythromycin base (E-MYCIN) 250 MG tablet Take 500 mg by mouth 2 (Two) Times a Day.     • [DISCONTINUED] furosemide (LASIX) 20 MG tablet Take 1 tablet by mouth Daily. 30 tablet 11   • [DISCONTINUED] predniSONE (DELTASONE) 20 MG tablet Take 2 tablets once a day for 5 days, then 1 tablet once a day for 5 days 15 tablet 0     No current facility-administered medications on file prior to visit.          Social History     Social History   • Marital status:      Spouse name: N/A   • Number of children: N/A   • Years of education: N/A     Occupational History   • Not on file.     Social History Main Topics   • Smoking status: Former Smoker     Types: Cigars   • Smokeless tobacco: Never Used      Comment: Quit 25 years ago   • Alcohol use No      Comment: daily caffiene   • Drug use: No   • Sexual activity: Defer     Other Topics Concern   • Not on file     Social History Narrative       Family History   Problem Relation Age of Onset   • Stroke Mother    • Heart disease Sister      Heart Valve Replacement   • Ovarian cancer Sister  "   • Rheumatic fever Sister    • Stroke Father          Review of Systems   Constitution: Negative for decreased appetite, diaphoresis, fever, weakness, malaise/fatigue, weight gain and weight loss.   HENT: Positive for hearing loss. Negative for congestion, nosebleeds and tinnitus.    Eyes: Negative for blurred vision, double vision, vision loss in left eye, vision loss in right eye and visual disturbance.   Cardiovascular:        As noted in HPI   Respiratory:        As noted HPI   Endocrine: Negative for cold intolerance and heat intolerance.   Hematologic/Lymphatic: Negative for bleeding problem. Does not bruise/bleed easily.   Skin: Negative for color change, flushing, itching and rash.   Musculoskeletal: Negative for arthritis, back pain, joint pain, joint swelling, muscle weakness and myalgias.   Gastrointestinal: Negative for bloating, abdominal pain, constipation, diarrhea, dysphagia, heartburn, hematemesis, hematochezia, melena, nausea and vomiting.   Genitourinary: Positive for frequency. Negative for bladder incontinence, dysuria, nocturia and urgency.   Neurological: Negative for dizziness, focal weakness, headaches, light-headedness, loss of balance, numbness, paresthesias and vertigo.   Psychiatric/Behavioral: Negative for depression, memory loss and substance abuse.       Procedures      ECG 12 Lead  Date/Time: 2/9/2018 1:16 PM  Performed by: SHARON DOBBINS  Authorized by: SHARON DOBBINS   Comparison: compared with previous ECG   Rhythm: sinus rhythm  Rate: normal  QRS axis: normal                 Objective:    /72 (BP Location: Left arm)  Pulse 77  Ht 172.7 cm (68\")  Wt 72.6 kg (160 lb)  BMI 24.33 kg/m2       Physical Exam  Physical Exam   Constitutional: He is oriented to person, place, and time. He appears well-developed and well-nourished. No distress.   HENT:   Head: Normocephalic.   Eyes: Conjunctivae are normal. Pupils are equal, round, and reactive to light. No scleral " icterus.   Neck: Normal carotid pulses, no hepatojugular reflux and no JVD present. Carotid bruit is not present. No tracheal deviation, no edema and no erythema present. No thyromegaly present.   Cardiovascular: Normal rate, regular rhythm, S1 normal, S2 normal, normal heart sounds and intact distal pulses.   No extrasystoles are present. PMI is not displaced.  Exam reveals no gallop, no distant heart sounds and no friction rub.    No murmur heard.  Pulses:       Carotid pulses are 2+ on the right side, and 2+ on the left side.       Radial pulses are 2+ on the right side, and 2+ on the left side.        Femoral pulses are 2+ on the right side, and 2+ on the left side.       Dorsalis pedis pulses are 2+ on the right side, and 2+ on the left side.        Posterior tibial pulses are 2+ on the right side, and 2+ on the left side.   Pulmonary/Chest: Effort normal. No respiratory distress. He has no decreased breath sounds. He has no wheezes. He has no rhonchi. He has rales (Coarse rales 1/3 up bilaterally). He exhibits no tenderness.   Abdominal: Soft. Bowel sounds are normal. He exhibits no distension and no mass. There is no hepatosplenomegaly. There is no tenderness. There is no rebound and no guarding.   Musculoskeletal: He exhibits no edema, tenderness or deformity.   Neurological: He is alert and oriented to person, place, and time.   Skin: Skin is warm and dry. No rash noted. He is not diaphoretic. No cyanosis or erythema. No pallor. Nails show no clubbing.   Psychiatric: He has a normal mood and affect. His speech is normal and behavior is normal. Judgment and thought content normal.           Assessment:   1. This is a 85-year-old gentleman with a history of paroxysmal atrial fibrillation. Off amiodarone due to pulmonary fibrosis, rash I believe on diltiazem.   Atrial Fibrillation and Atrial Flutter  Assessment  • The patient has paroxysmal atrial fibrillation  • This is non-valvular in etiology  • The  patient's CHADS2-VASc score is 3  • A [id number] score of 2 or more is considered a high risk for a thromboembolic event  • Apixaban prescribed    Plan  • Attempt to maintain sinus rhythm  • Continue apixaban for antithrombotic therapy, bleeding issues discussed  • Continue beta blocker for rhythm control  Remaining in sinus rhythm on beta blocker anticoagulated with eliquis.  He will continue the same see us in 6 months.     2. History of Crohn's disease.  3. History of gout, seems to be stable.  4. History of aspiration pneumonia, pulmonary fibrosis, and bronchiectasis.    5.   Hypertension blood pressure adequate control.  6.  Lower extremity edema in retrospect I believe this was due to his atrial fibrillation.  7.  Aortic stenosis.  Echocardiogram November 2017 this was just mild.         Plan:

## 2018-02-11 ENCOUNTER — APPOINTMENT (OUTPATIENT)
Dept: GENERAL RADIOLOGY | Facility: HOSPITAL | Age: 83
End: 2018-02-11

## 2018-02-11 ENCOUNTER — HOSPITAL ENCOUNTER (EMERGENCY)
Facility: HOSPITAL | Age: 83
Discharge: HOME | End: 2018-02-12
Attending: EMERGENCY MEDICINE | Admitting: EMERGENCY MEDICINE

## 2018-02-11 DIAGNOSIS — J18.9 PNEUMONIA OF LEFT LUNG DUE TO INFECTIOUS ORGANISM, UNSPECIFIED PART OF LUNG: Primary | ICD-10-CM

## 2018-02-11 LAB
ALBUMIN SERPL-MCNC: 3.6 G/DL (ref 3.5–5.2)
ALBUMIN/GLOB SERPL: 0.9 G/DL
ALP SERPL-CCNC: 51 U/L (ref 39–117)
ALT SERPL W P-5'-P-CCNC: 28 U/L (ref 1–41)
ANION GAP SERPL CALCULATED.3IONS-SCNC: 14 MMOL/L
AST SERPL-CCNC: 16 U/L (ref 1–40)
BASOPHILS # BLD AUTO: 0.02 10*3/MM3 (ref 0–0.2)
BASOPHILS NFR BLD AUTO: 0.2 % (ref 0–1.5)
BILIRUB SERPL-MCNC: 0.5 MG/DL (ref 0.1–1.2)
BUN BLD-MCNC: 20 MG/DL (ref 8–23)
BUN/CREAT SERPL: 14.2 (ref 7–25)
CALCIUM SPEC-SCNC: 9.4 MG/DL (ref 8.6–10.5)
CHLORIDE SERPL-SCNC: 98 MMOL/L (ref 98–107)
CO2 SERPL-SCNC: 28 MMOL/L (ref 22–29)
CREAT BLD-MCNC: 1.41 MG/DL (ref 0.76–1.27)
D-LACTATE SERPL-SCNC: 1.1 MMOL/L (ref 0.5–2)
DEPRECATED RDW RBC AUTO: 51.2 FL (ref 37–54)
EOSINOPHIL # BLD AUTO: 0.04 10*3/MM3 (ref 0–0.7)
EOSINOPHIL NFR BLD AUTO: 0.4 % (ref 0.3–6.2)
ERYTHROCYTE [DISTWIDTH] IN BLOOD BY AUTOMATED COUNT: 15.3 % (ref 11.5–14.5)
FLUAV AG NPH QL: NEGATIVE
FLUBV AG NPH QL IA: NEGATIVE
GFR SERPL CREATININE-BSD FRML MDRD: 48 ML/MIN/1.73
GLOBULIN UR ELPH-MCNC: 4.1 GM/DL
GLUCOSE BLD-MCNC: 136 MG/DL (ref 65–99)
HCT VFR BLD AUTO: 38.7 % (ref 40.4–52.2)
HGB BLD-MCNC: 12.3 G/DL (ref 13.7–17.6)
IMM GRANULOCYTES # BLD: 0.25 10*3/MM3 (ref 0–0.03)
IMM GRANULOCYTES NFR BLD: 2.8 % (ref 0–0.5)
LYMPHOCYTES # BLD AUTO: 1.03 10*3/MM3 (ref 0.9–4.8)
LYMPHOCYTES NFR BLD AUTO: 11.5 % (ref 19.6–45.3)
MCH RBC QN AUTO: 29.6 PG (ref 27–32.7)
MCHC RBC AUTO-ENTMCNC: 31.8 G/DL (ref 32.6–36.4)
MCV RBC AUTO: 93 FL (ref 79.8–96.2)
MONOCYTES # BLD AUTO: 0.6 10*3/MM3 (ref 0.2–1.2)
MONOCYTES NFR BLD AUTO: 6.7 % (ref 5–12)
NEUTROPHILS # BLD AUTO: 7 10*3/MM3 (ref 1.9–8.1)
NEUTROPHILS NFR BLD AUTO: 78.4 % (ref 42.7–76)
PLATELET # BLD AUTO: 168 10*3/MM3 (ref 140–500)
PMV BLD AUTO: 12 FL (ref 6–12)
POTASSIUM BLD-SCNC: 4.1 MMOL/L (ref 3.5–5.2)
PROT SERPL-MCNC: 7.7 G/DL (ref 6–8.5)
RBC # BLD AUTO: 4.16 10*6/MM3 (ref 4.6–6)
SODIUM BLD-SCNC: 140 MMOL/L (ref 136–145)
WBC NRBC COR # BLD: 8.94 10*3/MM3 (ref 4.5–10.7)

## 2018-02-11 PROCEDURE — 87804 INFLUENZA ASSAY W/OPTIC: CPT | Performed by: EMERGENCY MEDICINE

## 2018-02-11 PROCEDURE — 83605 ASSAY OF LACTIC ACID: CPT | Performed by: EMERGENCY MEDICINE

## 2018-02-11 PROCEDURE — 85025 COMPLETE CBC W/AUTO DIFF WBC: CPT | Performed by: EMERGENCY MEDICINE

## 2018-02-11 PROCEDURE — 96365 THER/PROPH/DIAG IV INF INIT: CPT

## 2018-02-11 PROCEDURE — 80053 COMPREHEN METABOLIC PANEL: CPT | Performed by: EMERGENCY MEDICINE

## 2018-02-11 PROCEDURE — 71046 X-RAY EXAM CHEST 2 VIEWS: CPT

## 2018-02-11 PROCEDURE — 87040 BLOOD CULTURE FOR BACTERIA: CPT | Performed by: EMERGENCY MEDICINE

## 2018-02-11 PROCEDURE — 25010000002 AZITHROMYCIN PER 500 MG: Performed by: EMERGENCY MEDICINE

## 2018-02-11 PROCEDURE — 25010000002 CEFTRIAXONE PER 250 MG: Performed by: EMERGENCY MEDICINE

## 2018-02-11 PROCEDURE — 99284 EMERGENCY DEPT VISIT MOD MDM: CPT

## 2018-02-11 PROCEDURE — 96367 TX/PROPH/DG ADDL SEQ IV INF: CPT

## 2018-02-11 RX ORDER — ACETAMINOPHEN 500 MG
1000 TABLET ORAL ONCE
Status: DISCONTINUED | OUTPATIENT
Start: 2018-02-11 | End: 2018-02-12 | Stop reason: HOSPADM

## 2018-02-11 RX ORDER — CEFTRIAXONE SODIUM 1 G/50ML
1 INJECTION, SOLUTION INTRAVENOUS ONCE
Status: COMPLETED | OUTPATIENT
Start: 2018-02-11 | End: 2018-02-11

## 2018-02-11 RX ADMIN — AZITHROMYCIN 500 MG: 500 INJECTION, POWDER, LYOPHILIZED, FOR SOLUTION INTRAVENOUS at 22:53

## 2018-02-11 RX ADMIN — SODIUM CHLORIDE 1000 ML: 9 INJECTION, SOLUTION INTRAVENOUS at 21:58

## 2018-02-11 RX ADMIN — CEFTRIAXONE SODIUM 1 G: 1 INJECTION, SOLUTION INTRAVENOUS at 21:58

## 2018-02-12 VITALS
RESPIRATION RATE: 18 BRPM | DIASTOLIC BLOOD PRESSURE: 75 MMHG | BODY MASS INDEX: 25.01 KG/M2 | HEART RATE: 98 BPM | HEIGHT: 68 IN | SYSTOLIC BLOOD PRESSURE: 128 MMHG | WEIGHT: 165 LBS | TEMPERATURE: 99.7 F | OXYGEN SATURATION: 97 %

## 2018-02-12 RX ORDER — LEVOFLOXACIN 500 MG/1
500 TABLET, FILM COATED ORAL DAILY
Qty: 5 TABLET | Refills: 0 | Status: ON HOLD | OUTPATIENT
Start: 2018-02-12 | End: 2018-03-09

## 2018-02-12 NOTE — ED PROVIDER NOTES
EMERGENCY DEPARTMENT ENCOUNTER    CHIEF COMPLAINT  Chief Complaint: fever, chills  History given by: patient, spouse  History limited by: nothing  Room Number: 41/41  PMD: Lubna Lobo MD      HPI:  Pt is a 85 y.o. male who presents complaining of chills for the last 2-3 days. Pt's spouse states that she measured the pt's fever at 102 earlier today. Pt's spouse states that the pt has had a chronic cough and pt denies sore throat, HA, CP, back pain or generalized myalgias.    Duration:  2-3 days  Onset: gradual  Timing: constant  Location: generalized  Radiation: N/A  Quality: chills  Intensity/Severity: moderate  Progression: worsening  Associated Symptoms: fever, chronic unchanged cough  Aggravating Factors: none  Alleviating Factors: none  Previous Episodes: none mentioned  Treatment before arrival: none    PAST MEDICAL HISTORY  Active Ambulatory Problems     Diagnosis Date Noted   • Ataxia 08/19/2016   • TIA (transient ischemic attack) 08/19/2016   • Atrial fibrillation, paroxysmal 08/19/2016   • Hyperlipidemia 08/19/2016   • Gout 08/19/2016   • Crohn's disease 08/19/2016     Resolved Ambulatory Problems     Diagnosis Date Noted   • No Resolved Ambulatory Problems     Past Medical History:   Diagnosis Date   • Aspiration pneumonia    • Atrial fibrillation    • Bronchiectasis    • Crohn's disease    • Gout    • Hard of hearing    • Hyperlipidemia    • Peptic ulcer    • Pulmonary fibrosis    • Stroke    • TIA (transient ischemic attack)        PAST SURGICAL HISTORY  Past Surgical History:   Procedure Laterality Date   • COLONOSCOPY     • TESTICLE SURGERY      benign tumor removed.       FAMILY HISTORY  Family History   Problem Relation Age of Onset   • Stroke Mother    • Heart disease Sister      Heart Valve Replacement   • Ovarian cancer Sister    • Rheumatic fever Sister    • Stroke Father        SOCIAL HISTORY  Social History     Social History   • Marital status:      Spouse name: N/A   • Number of  children: N/A   • Years of education: N/A     Occupational History   • Not on file.     Social History Main Topics   • Smoking status: Former Smoker     Types: Cigars   • Smokeless tobacco: Never Used      Comment: Quit 25 years ago   • Alcohol use No      Comment: daily caffiene   • Drug use: No   • Sexual activity: Defer     Other Topics Concern   • Not on file     Social History Narrative       ALLERGIES  Diltiazem    REVIEW OF SYSTEMS  Review of Systems   Constitutional: Positive for chills and fever (RIke=270). Negative for activity change and appetite change.   HENT: Negative for congestion and sore throat.    Eyes: Negative.    Respiratory: Positive for cough (chronic, unchanged). Negative for shortness of breath.    Cardiovascular: Negative for chest pain and leg swelling.   Gastrointestinal: Negative for abdominal pain, diarrhea and vomiting.   Endocrine: Negative.    Genitourinary: Negative for decreased urine volume and dysuria.   Musculoskeletal: Negative for neck pain.   Skin: Negative for rash and wound.   Allergic/Immunologic: Negative.    Neurological: Negative for weakness, numbness and headaches.   Hematological: Negative.    Psychiatric/Behavioral: Negative.    All other systems reviewed and are negative.      PHYSICAL EXAM  ED Triage Vitals   Temp Heart Rate Resp BP SpO2   02/11/18 2022 02/11/18 2022 02/11/18 2026 02/11/18 2037 02/11/18 2022   102.2 °F (39 °C) 104 20 167/80 94 %      Temp src Heart Rate Source Patient Position BP Location FiO2 (%)   02/11/18 2022 02/11/18 2022 02/11/18 2037 02/11/18 2037 --   Tympanic Monitor Lying Right arm        Physical Exam   Constitutional:   Pt appears elderly   HENT:   Head: Normocephalic and atraumatic.   Eyes: EOM are normal. Pupils are equal, round, and reactive to light.   Neck: Normal range of motion. Neck supple.   Cardiovascular: Normal rate, regular rhythm and normal heart sounds.    Pulmonary/Chest: Effort normal. No respiratory distress. He has  rales in the right lower field and the left lower field.   Abdominal: Soft. There is no tenderness. There is no rebound and no guarding.   Musculoskeletal: Normal range of motion. He exhibits no edema.   Neurological: He is alert. He has normal sensation and normal strength.   Skin: Skin is warm and dry.   Psychiatric: Mood and affect normal.   Nursing note and vitals reviewed.      LAB RESULTS  Lab Results (last 24 hours)     Procedure Component Value Units Date/Time    Influenza Antigen, Rapid - Swab, Nasopharynx [[id number]]  (Normal) Collected:  02/11/18 2037    Specimen:  Swab from Nasopharynx Updated:  02/11/18 2111     Influenza A Ag, EIA Negative     Influenza B Ag, EIA Negative    Blood Culture - Blood, [[id number]] Collected:  02/11/18 2138    Specimen:  Blood from Arm, Right Updated:  02/11/18 2209    CBC & Differential [[id number]] Collected:  02/11/18 2153    Specimen:  Blood Updated:  02/11/18 2216    Narrative:       The following orders were created for panel order CBC & Differential.  Procedure                               Abnormality         Status                     ---------                               -----------         ------                     CBC Auto Differential[[id number]]        Abnormal            Final result                 Please view results for these tests on the individual orders.    Comprehensive Metabolic Panel [[id number]]  (Abnormal) Collected:  02/11/18 2153    Specimen:  Blood Updated:  02/11/18 2244     Glucose 136 (H) mg/dL      BUN 20 mg/dL      Creatinine 1.41 (H) mg/dL      Sodium 140 mmol/L      Potassium 4.1 mmol/L      Chloride 98 mmol/L      CO2 28.0 mmol/L      Calcium 9.4 mg/dL      Total Protein 7.7 g/dL      Albumin 3.60 g/dL      ALT (SGPT) 28 U/L      AST (SGOT) 16 U/L      Alkaline Phosphatase 51 U/L      Total Bilirubin 0.5 mg/dL      eGFR Non African Amer 48 (L) mL/min/1.73      Globulin 4.1 gm/dL      A/G Ratio 0.9 g/dL      BUN/Creatinine Ratio 14.2      Anion Gap 14.0 mmol/L     Narrative:       The MDRD GFR formula is only valid for adults with stable renal function between ages 18 and 70.    Blood Culture - Blood, [[id number]] Collected:  02/11/18 2153    Specimen:  Blood from Arm, Right Updated:  02/11/18 2208    Lactic Acid, Plasma [[id number]]  (Normal) Collected:  02/11/18 2153    Specimen:  Blood Updated:  02/11/18 2241     Lactate 1.1 mmol/L     CBC Auto Differential [[id number]]  (Abnormal) Collected:  02/11/18 2153    Specimen:  Blood Updated:  02/11/18 2216     WBC 8.94 10*3/mm3      RBC 4.16 (L) 10*6/mm3      Hemoglobin 12.3 (L) g/dL      Hematocrit 38.7 (L) %      MCV 93.0 fL      MCH 29.6 pg      MCHC 31.8 (L) g/dL      RDW 15.3 (H) %      RDW-SD 51.2 fl      MPV 12.0 fL      Platelets 168 10*3/mm3      Neutrophil % 78.4 (H) %      Lymphocyte % 11.5 (L) %      Monocyte % 6.7 %      Eosinophil % 0.4 %      Basophil % 0.2 %      Immature Grans % 2.8 (H) %      Neutrophils, Absolute 7.00 10*3/mm3      Lymphocytes, Absolute 1.03 10*3/mm3      Monocytes, Absolute 0.60 10*3/mm3      Eosinophils, Absolute 0.04 10*3/mm3      Basophils, Absolute 0.02 10*3/mm3      Immature Grans, Absolute 0.25 (H) 10*3/mm3           I ordered the above labs and reviewed the results    RADIOLOGY  XR Chest 2 View   Preliminary Result   Infiltrate of the lingula cannot be excluded, please clinically   correlate.                   I ordered the above noted radiological studies. Interpreted by radiologist. Reviewed by me in PACS.       PROCEDURES  Procedures      PROGRESS AND CONSULTS  ED Course     2108- Ordered blood work, lactic acid, blood cultures for further evaluation. Ordered Rocephin and Zithromax for pneumonia and IVF for hydration.    2123- Ordered tylenol for fever.    2149- Rechecked pt. Pt's spouse expressed concern about the pt's confusion but states that this has been going on for several months. Notified pt and family of the pt's CXR and that I am waiting on the pt's  lab work at this time. Pt understands and agrees with the plan, all questions answered.    2357- Rechecked pt. Pt is resting comfortably. Notified pt and family of the pt's lab and imaging results, including his CXR results and WBC count. Discussed the plan to discharge the pt home with a prescription for abx after consult the pt's pulmonologist. Pt understands and agrees with the plan, all questions answered.    0002- Placed call to pulmonology for consult.    0011- Discussed the pt's case with Dr. Mueller (pulmonology), who agrees with the plan to discharge the pt home on abx.    MEDICAL DECISION MAKING  Results were reviewed/discussed with the patient and they were also made aware of online access. Pt also made aware that some labs, such as cultures, will not be resulted during ER visit and follow up with PMD is necessary.     MDM  Number of Diagnoses or Management Options  Pneumonia of left lung due to infectious organism, unspecified part of lung:      Amount and/or Complexity of Data Reviewed  Clinical lab tests: ordered and reviewed (WBC=8.94)  Tests in the radiology section of CPT®: ordered and reviewed (CXR shows an infiltrate of the lingula)  Obtain history from someone other than the patient: yes (spouse)  Discuss the patient with other providers: yes (D/w Dr. Mueller (pulmonology))  Independent visualization of images, tracings, or specimens: yes    Patient Progress  Patient progress: stable         DIAGNOSIS  Final diagnoses:   Pneumonia of left lung due to infectious organism, unspecified part of lung       DISPOSITION  DISCHARGE    Patient discharged in stable condition.    Reviewed implications of results, diagnosis, meds, responsibility to follow up, warning signs and symptoms of possible worsening, potential complications and reasons to return to ER, including fever, worsening cough or any concerns.    Patient/Family voiced understanding of above instructions.    Discussed plan for discharge, as  there is no emergent indication for admission.  Pt/family is agreeable and understands need for follow up and repeat testing.  Pt is aware that discharge does not mean that nothing is wrong but it indicates no emergency is present that requires admission and they must continue care with follow-up as given below or physician of their choice.     FOLLOW-UP  Lubna Lobo MD  [address]  [address]  [facility] [address]  [phone]    Call  As needed, If symptoms worsen    [facility]  [address]  [facility] [address]  [phone]  Schedule an appointment as soon as possible for a visit           Medication List      New Prescriptions          levoFLOXacin 500 MG tablet   Commonly known as:  LEVAQUIN   Take 1 tablet by mouth Daily.               Latest Documented Vital Signs:  As of 12:52 AM  BP- 128/75 HR- 98 Temp- 99.7 °F (37.6 °C) (Oral) O2 sat- 97%    --  Documentation assistance provided by ananya Bermudez for Dr. Perez.  Information recorded by the scribe was done at my direction and has been verified and validated by me.     Irina Bermudez  02/12/18 0052       Monty Perez MD  02/12/18 2382

## 2018-02-16 LAB
BACTERIA SPEC AEROBE CULT: NORMAL
BACTERIA SPEC AEROBE CULT: NORMAL

## 2018-03-02 ENCOUNTER — HOSPITAL ENCOUNTER (EMERGENCY)
Facility: HOSPITAL | Age: 83
Discharge: HOME | End: 2018-03-02
Attending: EMERGENCY MEDICINE | Admitting: EMERGENCY MEDICINE

## 2018-03-02 ENCOUNTER — APPOINTMENT (OUTPATIENT)
Dept: CT IMAGING | Facility: HOSPITAL | Age: 83
End: 2018-03-02

## 2018-03-02 VITALS
WEIGHT: 164 LBS | HEART RATE: 69 BPM | TEMPERATURE: 97.2 F | RESPIRATION RATE: 14 BRPM | OXYGEN SATURATION: 96 % | HEIGHT: 68 IN | SYSTOLIC BLOOD PRESSURE: 110 MMHG | DIASTOLIC BLOOD PRESSURE: 56 MMHG | BODY MASS INDEX: 24.86 KG/M2

## 2018-03-02 DIAGNOSIS — D64.9 MILD ANEMIA: ICD-10-CM

## 2018-03-02 DIAGNOSIS — R42 DIZZINESS: Primary | ICD-10-CM

## 2018-03-02 LAB
ALBUMIN SERPL-MCNC: 3.2 G/DL (ref 3.5–5.2)
ALBUMIN/GLOB SERPL: 1 G/DL
ALP SERPL-CCNC: 42 U/L (ref 39–117)
ALT SERPL W P-5'-P-CCNC: 9 U/L (ref 1–41)
ANION GAP SERPL CALCULATED.3IONS-SCNC: 11.3 MMOL/L
AST SERPL-CCNC: 18 U/L (ref 1–40)
BASOPHILS # BLD AUTO: 0.05 10*3/MM3 (ref 0–0.2)
BASOPHILS NFR BLD AUTO: 0.8 % (ref 0–1.5)
BILIRUB SERPL-MCNC: 0.4 MG/DL (ref 0.1–1.2)
BUN BLD-MCNC: 16 MG/DL (ref 8–23)
BUN/CREAT SERPL: 11.9 (ref 7–25)
CALCIUM SPEC-SCNC: 9.2 MG/DL (ref 8.6–10.5)
CHLORIDE SERPL-SCNC: 106 MMOL/L (ref 98–107)
CO2 SERPL-SCNC: 26.7 MMOL/L (ref 22–29)
CREAT BLD-MCNC: 1.34 MG/DL (ref 0.76–1.27)
DEPRECATED RDW RBC AUTO: 53.1 FL (ref 37–54)
EOSINOPHIL # BLD AUTO: 0.11 10*3/MM3 (ref 0–0.7)
EOSINOPHIL NFR BLD AUTO: 1.7 % (ref 0.3–6.2)
ERYTHROCYTE [DISTWIDTH] IN BLOOD BY AUTOMATED COUNT: 15.9 % (ref 11.5–14.5)
GFR SERPL CREATININE-BSD FRML MDRD: 51 ML/MIN/1.73
GLOBULIN UR ELPH-MCNC: 3.2 GM/DL
GLUCOSE BLD-MCNC: 122 MG/DL (ref 65–99)
HCT VFR BLD AUTO: 33.9 % (ref 40.4–52.2)
HGB BLD-MCNC: 10.6 G/DL (ref 13.7–17.6)
IMM GRANULOCYTES # BLD: 0.06 10*3/MM3 (ref 0–0.03)
IMM GRANULOCYTES NFR BLD: 1 % (ref 0–0.5)
LYMPHOCYTES # BLD AUTO: 1.79 10*3/MM3 (ref 0.9–4.8)
LYMPHOCYTES NFR BLD AUTO: 28.4 % (ref 19.6–45.3)
MCH RBC QN AUTO: 28.8 PG (ref 27–32.7)
MCHC RBC AUTO-ENTMCNC: 31.3 G/DL (ref 32.6–36.4)
MCV RBC AUTO: 92.1 FL (ref 79.8–96.2)
MONOCYTES # BLD AUTO: 0.63 10*3/MM3 (ref 0.2–1.2)
MONOCYTES NFR BLD AUTO: 10 % (ref 5–12)
NEUTROPHILS # BLD AUTO: 3.67 10*3/MM3 (ref 1.9–8.1)
NEUTROPHILS NFR BLD AUTO: 58.1 % (ref 42.7–76)
PLATELET # BLD AUTO: 264 10*3/MM3 (ref 140–500)
PMV BLD AUTO: 10.7 FL (ref 6–12)
POTASSIUM BLD-SCNC: 4.7 MMOL/L (ref 3.5–5.2)
PROT SERPL-MCNC: 6.4 G/DL (ref 6–8.5)
RBC # BLD AUTO: 3.68 10*6/MM3 (ref 4.6–6)
SODIUM BLD-SCNC: 144 MMOL/L (ref 136–145)
TROPONIN T SERPL-MCNC: 0.03 NG/ML (ref 0–0.03)
WBC NRBC COR # BLD: 6.31 10*3/MM3 (ref 4.5–10.7)

## 2018-03-02 PROCEDURE — 85025 COMPLETE CBC W/AUTO DIFF WBC: CPT | Performed by: EMERGENCY MEDICINE

## 2018-03-02 PROCEDURE — 99284 EMERGENCY DEPT VISIT MOD MDM: CPT

## 2018-03-02 PROCEDURE — 80053 COMPREHEN METABOLIC PANEL: CPT | Performed by: EMERGENCY MEDICINE

## 2018-03-02 PROCEDURE — 93010 ELECTROCARDIOGRAM REPORT: CPT | Performed by: INTERNAL MEDICINE

## 2018-03-02 PROCEDURE — 84484 ASSAY OF TROPONIN QUANT: CPT | Performed by: EMERGENCY MEDICINE

## 2018-03-02 PROCEDURE — 93005 ELECTROCARDIOGRAM TRACING: CPT | Performed by: EMERGENCY MEDICINE

## 2018-03-02 PROCEDURE — 70450 CT HEAD/BRAIN W/O DYE: CPT

## 2018-03-02 RX ORDER — SODIUM CHLORIDE 0.9 % (FLUSH) 0.9 %
10 SYRINGE (ML) INJECTION AS NEEDED
Status: DISCONTINUED | OUTPATIENT
Start: 2018-03-02 | End: 2018-03-02 | Stop reason: HOSPADM

## 2018-03-02 RX ORDER — FUROSEMIDE 10 MG/ML
SOLUTION ORAL DAILY
Status: ON HOLD | COMMUNITY
End: 2018-03-09 | Stop reason: DRUGHIGH

## 2018-03-02 RX ORDER — ALLOPURINOL 100 MG/1
100 TABLET ORAL DAILY
Status: ON HOLD | COMMUNITY
End: 2018-03-09 | Stop reason: ALTCHOICE

## 2018-03-02 NOTE — DISCHARGE INSTRUCTIONS
Anemia, Nonspecific  Anemia is a condition in which the concentration of red blood cells or hemoglobin in the blood is below normal. Hemoglobin is a substance in red blood cells that carries oxygen to the tissues of the body. Anemia results in not enough oxygen reaching these tissues.  What are the causes?  Common causes of anemia include:  · Excessive bleeding. Bleeding may be internal or external. This includes excessive bleeding from periods (in women) or from the intestine.  · Poor nutrition.  · Chronic kidney, thyroid, and liver disease.  · Bone marrow disorders that decrease red blood cell production.  · Cancer and treatments for cancer.  · HIV, AIDS, and their treatments.  · Spleen problems that increase red blood cell destruction.  · Blood disorders.  · Excess destruction of red blood cells due to infection, medicines, and autoimmune disorders.  What are the signs or symptoms?  · Minor weakness.  · Dizziness.  · Headache.  · Palpitations.  · Shortness of breath, especially with exercise.  · Paleness.  · Cold sensitivity.  · Indigestion.  · Nausea.  · Difficulty sleeping.  · Difficulty concentrating.  Symptoms may occur suddenly or they may develop slowly.  How is this diagnosed?  Additional blood tests are often needed. These help your health care provider determine the best treatment. Your health care provider will check your stool for blood and look for other causes of blood loss.  How is this treated?  Treatment varies depending on the cause of the anemia. Treatment can include:  · Supplements of iron, vitamin B12, or folic acid.  · Hormone medicines.  · A blood transfusion. This may be needed if blood loss is severe.  · Hospitalization. This may be needed if there is significant continual blood loss.  · Dietary changes.  · Spleen removal.  Follow these instructions at home:  Keep all follow-up appointments. It often takes many weeks to correct anemia, and having your health care provider check on your  condition and your response to treatment is very important.  Get help right away if:  · You develop extreme weakness, shortness of breath, or chest pain.  · You become dizzy or have trouble concentrating.  · You develop heavy vaginal bleeding.  · You develop a rash.  · You have bloody or black, tarry stools.  · You faint.  · You vomit up blood.  · You vomit repeatedly.  · You have abdominal pain.  · You have a fever or persistent symptoms for more than 2-3 days.  · You have a fever and your symptoms suddenly get worse.  · You are dehydrated.  This information is not intended to replace advice given to you by your health care provider. Make sure you discuss any questions you have with your health care provider.  Document Released: 01/25/2006 Document Revised: 05/31/2017 Document Reviewed: 06/13/2014  [email or web address] Patient Education © 2017 [email or web address]

## 2018-03-02 NOTE — ED NOTES
Pt reports that he was dizzy for a minute or two when he first got up this morning from bed. Went into kitchen and felt dizzy. Pt took all AM meds excepting lasix. Pt is currently resting comfortably and denies any current symptoms.      Tariq Petty RN  03/02/18 9036

## 2018-03-02 NOTE — ED PROVIDER NOTES
EMERGENCY DEPARTMENT ENCOUNTER    CHIEF COMPLAINT  Chief Complaint: dizziness  History given by: Patient  History limited by: N/A  Room Number:   PMD: Lubna Lobo MD      HPI:  Pt is a 85 y.o. male who presents complaining of a 5-6 episode of dizziness with near syncope this morning.  Pt states he felt off-balance and grabbed the wall to keep from falling, but the episode resolved on its own.   Pt reports his HR during the episode was 107, which is high for his baseline of high 80s.  Pt denies any other sx, including dysarthria and dysphasia, and states in the ER his dizziness is still resolved.  Pt also denies HA, nausea, numbness, tingling, or difficulty speaking or swallowing.    Duration:  5-6 minutes  Onset: gradual  Timin episoe  Location: neuro  Radiation: none  Quality: 'off-balance'  Intensity/Severity: moderate  Progression: resolved  Associated Symptoms: none  Aggravating Factors: none  Alleviating Factors: none  Previous Episodes: none  Treatment before arrival: none    PAST MEDICAL HISTORY  Active Ambulatory Problems     Diagnosis Date Noted   • Ataxia 2016   • TIA (transient ischemic attack) 2016   • Atrial fibrillation, paroxysmal 2016   • Hyperlipidemia 2016   • Gout 2016   • Crohn's disease 2016     Resolved Ambulatory Problems     Diagnosis Date Noted   • No Resolved Ambulatory Problems     Past Medical History:   Diagnosis Date   • Aspiration pneumonia    • Atrial fibrillation    • Bronchiectasis    • Crohn's disease    • Gout    • Hard of hearing    • Hyperlipidemia    • Peptic ulcer    • Pulmonary fibrosis    • Stroke    • TIA (transient ischemic attack)        PAST SURGICAL HISTORY  Past Surgical History:   Procedure Laterality Date   • COLONOSCOPY     • TESTICLE SURGERY      benign tumor removed.       FAMILY HISTORY  Family History   Problem Relation Age of Onset   • Stroke Mother    • Heart disease Sister      Heart Valve Replacement   •  Ovarian cancer Sister    • Rheumatic fever Sister    • Stroke Father        SOCIAL HISTORY  Social History     Social History   • Marital status:      Spouse name: N/A   • Number of children: N/A   • Years of education: N/A     Occupational History   • Not on file.     Social History Main Topics   • Smoking status: Former Smoker     Types: Cigars   • Smokeless tobacco: Never Used      Comment: Quit 25 years ago   • Alcohol use No      Comment: daily caffiene   • Drug use: No   • Sexual activity: Defer     Other Topics Concern   • Not on file     Social History Narrative       ALLERGIES  Diltiazem    REVIEW OF SYSTEMS  Review of Systems   Constitutional: Negative for activity change, appetite change and fever.   HENT: Negative for congestion and sore throat.    Eyes: Negative.    Respiratory: Negative for cough and shortness of breath.    Cardiovascular: Negative for chest pain and leg swelling.   Gastrointestinal: Negative for abdominal pain, diarrhea and vomiting.   Endocrine: Negative.    Genitourinary: Negative for decreased urine volume and dysuria.   Musculoskeletal: Negative for neck pain.   Skin: Negative for rash and wound.   Allergic/Immunologic: Negative.    Neurological: Positive for dizziness (5-6 minute episode this morning). Negative for weakness, numbness and headaches.        Negative for dysarthria or dysphagia   Hematological: Negative.    Psychiatric/Behavioral: Negative.    All other systems reviewed and are negative.      PHYSICAL EXAM  ED Triage Vitals   Temp Heart Rate Resp BP SpO2   03/02/18 0657 03/02/18 0657 03/02/18 0657 03/02/18 0724 03/02/18 0657   96.1 °F (35.6 °C) 110 16 115/68 98 %      Temp src Heart Rate Source Patient Position BP Location FiO2 (%)   03/02/18 0657 03/02/18 0657 03/02/18 0724 03/02/18 0724 --   Tympanic Monitor Lying Right arm        Physical Exam   Constitutional: He is oriented to person, place, and time and well-developed, well-nourished, and in no  distress.   HENT:   Head: Normocephalic and atraumatic.   Eyes: EOM are normal. Pupils are equal, round, and reactive to light.   Neck: Normal range of motion. Neck supple.   Cardiovascular: Normal rate, regular rhythm and normal heart sounds.    Pulmonary/Chest: Effort normal and breath sounds normal. No respiratory distress.   Abdominal: Soft. There is no tenderness. There is no rebound and no guarding.   Musculoskeletal: Normal range of motion. He exhibits no edema.   Neurological: He is alert and oriented to person, place, and time. He has normal motor skills, normal sensation, normal strength and intact cranial nerves. He has a normal Straight Leg Raise Test.   Neuro is intact.     Skin: Skin is warm and dry.   Psychiatric: Mood and affect normal.   Nursing note and vitals reviewed.      LAB RESULTS  Lab Results (last 24 hours)     Procedure Component Value Units Date/Time    CBC & Differential [[id number]] Collected:  03/02/18 0744    Specimen:  Blood Updated:  03/02/18 0757    Narrative:       The following orders were created for panel order CBC & Differential.  Procedure                               Abnormality         Status                     ---------                               -----------         ------                     CBC Auto Differential[[id number]]        Abnormal            Final result                 Please view results for these tests on the individual orders.    Comprehensive Metabolic Panel [[id number]]  (Abnormal) Collected:  03/02/18 0744    Specimen:  Blood Updated:  03/02/18 0821     Glucose 122 (H) mg/dL      BUN 16 mg/dL      Creatinine 1.34 (H) mg/dL      Sodium 144 mmol/L      Potassium 4.7 mmol/L      Chloride 106 mmol/L      CO2 26.7 mmol/L      Calcium 9.2 mg/dL      Total Protein 6.4 g/dL      Albumin 3.20 (L) g/dL      ALT (SGPT) 9 U/L      AST (SGOT) 18 U/L      Alkaline Phosphatase 42 U/L      Total Bilirubin 0.4 mg/dL      eGFR Non African Amer 51 (L) mL/min/1.73       Globulin 3.2 gm/dL      A/G Ratio 1.0 g/dL      BUN/Creatinine Ratio 11.9     Anion Gap 11.3 mmol/L     Narrative:       The MDRD GFR formula is only valid for adults with stable renal function between ages 18 and 70.    Troponin [[id number]]  (Normal) Collected:  03/02/18 0744    Specimen:  Blood Updated:  03/02/18 0821     Troponin T 0.030 ng/mL     Narrative:       Troponin T Reference Ranges:  Less than 0.03 ng/mL:    Negative for AMI  0.03 to 0.09 ng/mL:      Indeterminant for AMI  Greater than 0.09 ng/mL: Positive for AMI    CBC Auto Differential [[id number]]  (Abnormal) Collected:  03/02/18 0744    Specimen:  Blood Updated:  03/02/18 0757     WBC 6.31 10*3/mm3      RBC 3.68 (L) 10*6/mm3      Hemoglobin 10.6 (L) g/dL      Hematocrit 33.9 (L) %      MCV 92.1 fL      MCH 28.8 pg      MCHC 31.3 (L) g/dL      RDW 15.9 (H) %      RDW-SD 53.1 fl      MPV 10.7 fL      Platelets 264 10*3/mm3      Neutrophil % 58.1 %      Lymphocyte % 28.4 %      Monocyte % 10.0 %      Eosinophil % 1.7 %      Basophil % 0.8 %      Immature Grans % 1.0 (H) %      Neutrophils, Absolute 3.67 10*3/mm3      Lymphocytes, Absolute 1.79 10*3/mm3      Monocytes, Absolute 0.63 10*3/mm3      Eosinophils, Absolute 0.11 10*3/mm3      Basophils, Absolute 0.05 10*3/mm3      Immature Grans, Absolute 0.06 (H) 10*3/mm3           I ordered the above labs and reviewed the results    RADIOLOGY  CT Head Without Contrast    (Results Pending)   CT Head shows atrophy and small vessel disease compared to old but no acute changes.     I ordered the above noted radiological studies. Interpreted by radiologist. Discussed with radiologist (Dr. Davis). Reviewed by me in PACS.       PROCEDURES  Procedures    EKG           EKG time: 0816  Rhythm/Rate: NSR, 75  P waves and MD: normal  QRS, axis: normal   ST and T waves: normal     Interpreted Contemporaneously by me, independently viewed  Unchanged compared to prior 7/28/17.    PROGRESS AND CONSULTS  ED Course      0731  Ordered CT Head w/o contrast, troponin, CMP, CBC, EKG.  Ordered orthostatics, pulse ox, BP and cardiac monitoring.     0820  Per RN, pt's orthostatics are negative.     0930  Rechecked pt, who is resting comfortably.  Pt states he remains without dizziness.  Discussed slightly low Hgb level, but otherwise unremarkable workup and plan to discharge the pt with recommendation to f/u or return to the ED if episodic dizziness continues. Pt understands and agrees with the plan, all questions answered.      MEDICAL DECISION MAKING  Results were reviewed/discussed with the patient and they were also made aware of online access. Pt also made aware that some labs, such as cultures, will not be resulted during ER visit and follow up with PMD is necessary.     MDM  Number of Diagnoses or Management Options     Amount and/or Complexity of Data Reviewed  Clinical lab tests: ordered and reviewed (CBC: Hgb 10.6. )  Tests in the radiology section of CPT®: ordered and reviewed (CT Head shows atrophy and small vessel disease compared to old but no acute changes. )  Tests in the medicine section of CPT®: ordered and reviewed (See EKG in procedure note.)  Discussion of test results with the performing providers: yes (Dr. Davis (radiology))  Independent visualization of images, tracings, or specimens: yes    Patient Progress  Patient progress: stable         DIAGNOSIS  Final diagnoses:   Dizziness- resolved   Mild anemia       DISPOSITION  DISCHARGE    Patient discharged in stable condition.    Reviewed implications of results, diagnosis, meds, responsibility to follow up, warning signs and symptoms of possible worsening, potential complications and reasons to return to ER.    Patient/Family voiced understanding of above instructions.    Discussed plan for discharge, as there is no emergent indication for admission.  Pt/family is agreeable and understands need for follow up and repeat testing.  Pt is aware that discharge does  not mean that nothing is wrong but it indicates no emergency is present that requires admission and they must continue care with follow-up as given below or physician of their choice.     FOLLOW-UP  Lubna Lobo MD  [address] [id number] [phone]      follow up of symptoms and anemia         Medication List      Notice     No changes were made to your prescriptions during this visit.            Latest Documented Vital Signs:  As of 9:37 AM  BP- 107/61 HR- 77 Temp- 96.1 °F (35.6 °C) (Tympanic) O2 sat- 96%    --  Documentation assistance provided by ananya Mojica for Dr. Glynn.  Information recorded by the scribe was done at my direction and has been verified and validated by me.       Madonna Mojica  03/02/18 0951       Virgilio Glynn MD  03/02/18 0124

## 2018-03-02 NOTE — ED TRIAGE NOTES
Pt states when he got up this am out of bed had dizziness with near syncope.  States grabbed wall to keep from falling.

## 2018-03-04 ENCOUNTER — HOSPITAL ENCOUNTER (EMERGENCY)
Facility: HOSPITAL | Age: 83
Discharge: HOME | End: 2018-03-04
Attending: EMERGENCY MEDICINE | Admitting: EMERGENCY MEDICINE

## 2018-03-04 VITALS
DIASTOLIC BLOOD PRESSURE: 69 MMHG | WEIGHT: 164 LBS | TEMPERATURE: 98.6 F | BODY MASS INDEX: 24.86 KG/M2 | SYSTOLIC BLOOD PRESSURE: 129 MMHG | HEIGHT: 68 IN | RESPIRATION RATE: 16 BRPM | OXYGEN SATURATION: 97 % | HEART RATE: 92 BPM

## 2018-03-04 DIAGNOSIS — D64.9 ANEMIA, UNSPECIFIED TYPE: ICD-10-CM

## 2018-03-04 DIAGNOSIS — R53.1 GENERAL WEAKNESS: Primary | ICD-10-CM

## 2018-03-04 DIAGNOSIS — D50.8 IRON DEFICIENCY ANEMIA SECONDARY TO INADEQUATE DIETARY IRON INTAKE: ICD-10-CM

## 2018-03-04 LAB
ANION GAP SERPL CALCULATED.3IONS-SCNC: 11 MMOL/L
BASOPHILS # BLD AUTO: 0.04 10*3/MM3 (ref 0–0.2)
BASOPHILS NFR BLD AUTO: 0.6 % (ref 0–1.5)
BUN BLD-MCNC: 20 MG/DL (ref 8–23)
BUN/CREAT SERPL: 15.9 (ref 7–25)
CALCIUM SPEC-SCNC: 9.3 MG/DL (ref 8.6–10.5)
CHLORIDE SERPL-SCNC: 105 MMOL/L (ref 98–107)
CO2 SERPL-SCNC: 25 MMOL/L (ref 22–29)
CREAT BLD-MCNC: 1.26 MG/DL (ref 0.76–1.27)
DEPRECATED RDW RBC AUTO: 52.6 FL (ref 37–54)
EOSINOPHIL # BLD AUTO: 0.12 10*3/MM3 (ref 0–0.7)
EOSINOPHIL NFR BLD AUTO: 1.8 % (ref 0.3–6.2)
ERYTHROCYTE [DISTWIDTH] IN BLOOD BY AUTOMATED COUNT: 15.5 % (ref 11.5–14.5)
GFR SERPL CREATININE-BSD FRML MDRD: 54 ML/MIN/1.73
GLUCOSE BLD-MCNC: 94 MG/DL (ref 65–99)
HCT VFR BLD AUTO: 32 % (ref 40.4–52.2)
HGB BLD-MCNC: 9.8 G/DL (ref 13.7–17.6)
HOLD SPECIMEN: NORMAL
IMM GRANULOCYTES # BLD: 0.04 10*3/MM3 (ref 0–0.03)
IMM GRANULOCYTES NFR BLD: 0.6 % (ref 0–0.5)
LYMPHOCYTES # BLD AUTO: 2.02 10*3/MM3 (ref 0.9–4.8)
LYMPHOCYTES NFR BLD AUTO: 30.3 % (ref 19.6–45.3)
MCH RBC QN AUTO: 28.2 PG (ref 27–32.7)
MCHC RBC AUTO-ENTMCNC: 30.6 G/DL (ref 32.6–36.4)
MCV RBC AUTO: 92 FL (ref 79.8–96.2)
MONOCYTES # BLD AUTO: 0.57 10*3/MM3 (ref 0.2–1.2)
MONOCYTES NFR BLD AUTO: 8.6 % (ref 5–12)
NEUTROPHILS # BLD AUTO: 3.87 10*3/MM3 (ref 1.9–8.1)
NEUTROPHILS NFR BLD AUTO: 58.1 % (ref 42.7–76)
PLATELET # BLD AUTO: 227 10*3/MM3 (ref 140–500)
PMV BLD AUTO: 10.5 FL (ref 6–12)
POTASSIUM BLD-SCNC: 3.8 MMOL/L (ref 3.5–5.2)
RBC # BLD AUTO: 3.48 10*6/MM3 (ref 4.6–6)
SODIUM BLD-SCNC: 141 MMOL/L (ref 136–145)
TROPONIN T SERPL-MCNC: 0.02 NG/ML (ref 0–0.03)
WBC NRBC COR # BLD: 6.66 10*3/MM3 (ref 4.5–10.7)
WHOLE BLOOD HOLD SPECIMEN: NORMAL

## 2018-03-04 PROCEDURE — 82607 VITAMIN B-12: CPT | Performed by: INTERNAL MEDICINE

## 2018-03-04 PROCEDURE — 93010 ELECTROCARDIOGRAM REPORT: CPT | Performed by: INTERNAL MEDICINE

## 2018-03-04 PROCEDURE — 80048 BASIC METABOLIC PNL TOTAL CA: CPT | Performed by: EMERGENCY MEDICINE

## 2018-03-04 PROCEDURE — 84466 ASSAY OF TRANSFERRIN: CPT | Performed by: INTERNAL MEDICINE

## 2018-03-04 PROCEDURE — 83615 LACTATE (LD) (LDH) ENZYME: CPT | Performed by: INTERNAL MEDICINE

## 2018-03-04 PROCEDURE — 93005 ELECTROCARDIOGRAM TRACING: CPT | Performed by: EMERGENCY MEDICINE

## 2018-03-04 PROCEDURE — 83540 ASSAY OF IRON: CPT | Performed by: INTERNAL MEDICINE

## 2018-03-04 PROCEDURE — 99284 EMERGENCY DEPT VISIT MOD MDM: CPT

## 2018-03-04 PROCEDURE — 36415 COLL VENOUS BLD VENIPUNCTURE: CPT | Performed by: EMERGENCY MEDICINE

## 2018-03-04 PROCEDURE — 84484 ASSAY OF TROPONIN QUANT: CPT | Performed by: EMERGENCY MEDICINE

## 2018-03-04 PROCEDURE — 85025 COMPLETE CBC W/AUTO DIFF WBC: CPT | Performed by: EMERGENCY MEDICINE

## 2018-03-04 NOTE — ED TRIAGE NOTES
"Pt reports dizziness and nausea states he was the here for the same thing Friday. Pt states \"I feel lousy\" pt states he felt fine when he left ER Friday and this afternoon felt bad again   "

## 2018-03-04 NOTE — ED PROVIDER NOTES
" EMERGENCY DEPARTMENT ENCOUNTER    CHIEF COMPLAINT  Chief Complaint: generalized weakness   History given by: none  History limited by: none  Room Number: 12/12  PMD: MD Dr. Darius Becerra, GI    HPI:  Pt is a 85 y.o. male who presents complaining of generalized weakness and nausea starting at 1400 today, which he states is currently improved but not resolved. Pt described his feeling as \"I just didn't feel myself,\" and also c/o diarrhea for several days, with 3 episodes of liquid stool today. Pt denies vomiting, HA, fever, sore throat, CP, SOA, cough, abd pain, abnormal PO intake, bloody stool, dysuria, back pain. Pt states that he was here 2 days ago for intermittent similar symptoms. Pt states that he is compliant with his medications.     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   • TIA (transient ischemic attack) 08/19/2016   • Atrial fibrillation, paroxysmal 08/19/2016   • Hyperlipidemia 08/19/2016   • Gout 08/19/2016   • Crohn's disease 08/19/2016     Resolved Ambulatory Problems     Diagnosis Date Noted   • No Resolved Ambulatory Problems     Past Medical History:   Diagnosis Date   • Aspiration pneumonia    • Atrial fibrillation    • Bronchiectasis    • Crohn's disease    • Gout    • Hard of hearing    • Hyperlipidemia    • Peptic ulcer    • Pulmonary fibrosis    • Stroke    • TIA (transient ischemic attack)        PAST SURGICAL HISTORY  Past Surgical History:   Procedure Laterality Date   • COLONOSCOPY     • TESTICLE SURGERY      benign tumor removed.       FAMILY HISTORY  Family History   Problem Relation Age of " Onset   • Stroke Mother    • Heart disease Sister      Heart Valve Replacement   • Ovarian cancer Sister    • Rheumatic fever Sister    • Stroke Father        SOCIAL HISTORY  Social History     Social History   • Marital status:      Spouse name: N/A   • Number of children: N/A   • Years of education: N/A     Occupational History   • Not on file.     Social History Main Topics   • Smoking status: Former Smoker     Types: Cigars   • Smokeless tobacco: Never Used      Comment: Quit 25 years ago   • Alcohol use No      Comment: daily caffiene   • Drug use: No   • Sexual activity: Defer     Other Topics Concern   • Not on file     Social History Narrative       ALLERGIES  Diltiazem    REVIEW OF SYSTEMS  Review of Systems   Constitutional: Negative for appetite change, chills and fever.   HENT: Negative.  Negative for sore throat.    Eyes: Negative.    Respiratory: Negative.  Negative for cough.    Cardiovascular: Negative.  Negative for chest pain.   Gastrointestinal: Positive for diarrhea (x3 today) and nausea. Negative for vomiting.   Genitourinary: Negative.  Negative for dysuria.   Musculoskeletal: Negative.  Negative for back pain.   Skin: Negative.  Negative for rash.   Neurological: Positive for weakness (generalized). Negative for headaches.       PHYSICAL EXAM  ED Triage Vitals   Temp Heart Rate Resp BP SpO2   03/04/18 1456 03/04/18 1456 03/04/18 1502 03/04/18 1502 03/04/18 1456   98.8 °F (37.1 °C) 132 18 129/76 98 %      Temp src Heart Rate Source Patient Position BP Location FiO2 (%)   03/04/18 1456 -- -- -- --   Tympanic           Physical Exam   Constitutional: No distress.   HENT:   Head: Normocephalic and atraumatic.   Mouth/Throat: Oropharynx is clear and moist and mucous membranes are normal.   Eyes:   Unremarkable   Cardiovascular: Regular rhythm.  Tachycardia present.    Pulmonary/Chest: Breath sounds normal. No respiratory distress.   O2 saturation of 98% on room air   Abdominal: There is no  tenderness.   Genitourinary: Rectal exam shows guaiac negative stool.   Musculoskeletal: He exhibits no edema or tenderness.   Neurological: He is alert. No sensory deficit.   Skin: No rash noted.   Nursing note and vitals reviewed.      LAB RESULTS  Lab Results (last 24 hours)     Procedure Component Value Units Date/Time    CBC & Differential [[id number]] Collected:  03/04/18 1705    Specimen:  Blood Updated:  03/04/18 1719    Narrative:       The following orders were created for panel order CBC & Differential.  Procedure                               Abnormality         Status                     ---------                               -----------         ------                     CBC Auto Differential[[id number]]        Abnormal            Final result                 Please view results for these tests on the individual orders.    Basic Metabolic Panel [[id number]]  (Abnormal) Collected:  03/04/18 1705    Specimen:  Blood Updated:  03/04/18 1752     Glucose 94 mg/dL      BUN 20 mg/dL      Creatinine 1.26 mg/dL      Sodium 141 mmol/L      Potassium 3.8 mmol/L      Chloride 105 mmol/L      CO2 25.0 mmol/L      Calcium 9.3 mg/dL      eGFR Non African Amer 54 (L) mL/min/1.73      BUN/Creatinine Ratio 15.9     Anion Gap 11.0 mmol/L     Narrative:       The MDRD GFR formula is only valid for adults with stable renal function between ages 18 and 70.    Troponin [[id number]]  (Normal) Collected:  03/04/18 1705    Specimen:  Blood Updated:  03/04/18 1752     Troponin T 0.017 ng/mL     Narrative:       Troponin T Reference Ranges:  Less than 0.03 ng/mL:    Negative for AMI  0.03 to 0.09 ng/mL:      Indeterminant for AMI  Greater than 0.09 ng/mL: Positive for AMI    CBC Auto Differential [[id number]]  (Abnormal) Collected:  03/04/18 1705    Specimen:  Blood Updated:  03/04/18 1719     WBC 6.66 10*3/mm3      RBC 3.48 (L) 10*6/mm3      Hemoglobin 9.8 (L) g/dL      Hematocrit 32.0 (L) %      MCV 92.0 fL      MCH 28.2 pg       MCHC 30.6 (L) g/dL      RDW 15.5 (H) %      RDW-SD 52.6 fl      MPV 10.5 fL      Platelets 227 10*3/mm3      Neutrophil % 58.1 %      Lymphocyte % 30.3 %      Monocyte % 8.6 %      Eosinophil % 1.8 %      Basophil % 0.6 %      Immature Grans % 0.6 (H) %      Neutrophils, Absolute 3.87 10*3/mm3      Lymphocytes, Absolute 2.02 10*3/mm3      Monocytes, Absolute 0.57 10*3/mm3      Eosinophils, Absolute 0.12 10*3/mm3      Basophils, Absolute 0.04 10*3/mm3      Immature Grans, Absolute 0.04 (H) 10*3/mm3           I ordered the above labs and reviewed the results    PROCEDURES  Procedures  EKG           EKG time: 1504  Rhythm/Rate: sinus tachycardia 115  P waves and MT: normal   QRS, axis: normal    ST and T waves: normal      Interpreted Contemporaneously by me, independently viewed  changed compared to prior 3/2  Rate is faster    PROGRESS AND CONSULTS  ED Course   1610  Ordered labs for further evaluation.   1759  Rechecked pt, who is resting comfortably. Discussed the pt's labs with decreased HGB. Pt denies recurrent nosebleeds, bleeding, or black or bloody stool. Pt's wife reports a Hx of rectal bleeding. Preformed a rectal exam. Plan to admit the pt. Plan to consult the pt's GI, Dr. Mccoy. Pt understands and agrees with the plan, and all questions were answered.   1806  Ordered consult to GI.   1820  Received a call from Dr. Rizo and discussed pt's case. Dr. Rizo was comfortable with d/c wit hthe pt with close f/u with Dr. Mccoy.  1822  Rechecked pt, who is resting comfortably. Discussed conversation with Dr. Rizo and plan to d/c the pt with close f/u with Dr. Mccoy. Advised pt to stop his eliquis, and to return to the ER for development of further bleeding or bloody stool. Pt understands and agrees with the plan, and all questions were answered.     MEDICAL DECISION MAKING  Results were reviewed/discussed with the patient and they were also made aware of online access. Pt also made aware that some labs, such as  cultures, will not be resulted during ER visit and follow up with PMD is necessary.     MDM  Number of Diagnoses or Management Options  Anemia, unspecified type:   General weakness:      Amount and/or Complexity of Data Reviewed  Clinical lab tests: reviewed and ordered (HGB 9.8, WBC 6.66, Troponin 0.017)  Tests in the medicine section of CPT®: reviewed and ordered (See procedures section for EKG)  Decide to obtain previous medical records or to obtain history from someone other than the patient: yes (Pt's records in EPIC)  Review and summarize past medical records: yes (Reviewed pt's ER visit from 2 days ago. )    Patient Progress  Patient progress: stable         DIAGNOSIS  Final diagnoses:   General weakness   Anemia, unspecified type       DISPOSITION  DISCHARGE    Patient discharged in stable condition.    Reviewed implications of results, diagnosis, meds, responsibility to follow up, warning signs and symptoms of possible worsening, potential complications and reasons to return to ER.    Patient/Family voiced understanding of above instructions.    Discussed plan for discharge, as there is no emergent indication for admission.  Pt/family is agreeable and understands need for follow up and repeat testing.  Pt is aware that discharge does not mean that nothing is wrong but it indicates no emergency is present that requires admission and they must continue care with follow-up as given below or physician of their choice.     FOLLOW-UP  Aron Cabrera MD  [address] [facility] [address]  [place] [address]  [phone]          Lubna Lobo MD  [address] [facility] [address]  [place] [address]  [phone]      As needed         Medication List      Stop          furosemide 10 MG/ML solution   Commonly known as:  LASIX       indomethacin 25 MG capsule   Commonly known as:  INDOCIN       levoFLOXacin 500 MG tablet   Commonly known as:  LEVAQUIN       SYSTANE ULTRA OP               Latest Documented Vital Signs:  As  of 6:28 PM  BP- 129/76 HR- 101 Temp- 98.8 °F (37.1 °C) (Tympanic) O2 sat- 98%    --  Documentation assistance provided by ananya Meyer for Dr. Perez.  Information recorded by the ananya was done at my direction and has been verified and validated by me.       Prosper Meyer  03/04/18 1828       Kevin Perez MD  03/04/18 1827

## 2018-03-04 NOTE — DISCHARGE INSTRUCTIONS
Stop Eliquis for now.  Need to discuss further usage with Dr Cabrera and Dr Lobo.      Anemia, Nonspecific  Anemia is a condition in which the concentration of red blood cells or hemoglobin in the blood is below normal. Hemoglobin is a substance in red blood cells that carries oxygen to the tissues of the body. Anemia results in not enough oxygen reaching these tissues.  What are the causes?  Common causes of anemia include:  · Excessive bleeding. Bleeding may be internal or external. This includes excessive bleeding from periods (in women) or from the intestine.  · Poor nutrition.  · Chronic kidney, thyroid, and liver disease.  · Bone marrow disorders that decrease red blood cell production.  · Cancer and treatments for cancer.  · HIV, AIDS, and their treatments.  · Spleen problems that increase red blood cell destruction.  · Blood disorders.  · Excess destruction of red blood cells due to infection, medicines, and autoimmune disorders.  What are the signs or symptoms?  · Minor weakness.  · Dizziness.  · Headache.  · Palpitations.  · Shortness of breath, especially with exercise.  · Paleness.  · Cold sensitivity.  · Indigestion.  · Nausea.  · Difficulty sleeping.  · Difficulty concentrating.  Symptoms may occur suddenly or they may develop slowly.  How is this diagnosed?  Additional blood tests are often needed. These help your health care provider determine the best treatment. Your health care provider will check your stool for blood and look for other causes of blood loss.  How is this treated?  Treatment varies depending on the cause of the anemia. Treatment can include:  · Supplements of iron, vitamin B12, or folic acid.  · Hormone medicines.  · A blood transfusion. This may be needed if blood loss is severe.  · Hospitalization. This may be needed if there is significant continual blood loss.  · Dietary changes.  · Spleen removal.  Follow these instructions at home:  Keep all follow-up appointments. It often  takes many weeks to correct anemia, and having your health care provider check on your condition and your response to treatment is very important.  Get help right away if:  · You develop extreme weakness, shortness of breath, or chest pain.  · You become dizzy or have trouble concentrating.  · You develop heavy vaginal bleeding.  · You develop a rash.  · You have bloody or black, tarry stools.  · You faint.  · You vomit up blood.  · You vomit repeatedly.  · You have abdominal pain.  · You have a fever or persistent symptoms for more than 2-3 days.  · You have a fever and your symptoms suddenly get worse.  · You are dehydrated.  This information is not intended to replace advice given to you by your health care provider. Make sure you discuss any questions you have with your health care provider.  Document Released: 01/25/2006 Document Revised: 05/31/2017 Document Reviewed: 06/13/2014  [employer] Patient Education © 2017 [employer]

## 2018-03-05 ENCOUNTER — OFFICE (OUTPATIENT)
Dept: URBAN - METROPOLITAN AREA OTHER 6 | Facility: OTHER | Age: 83
End: 2018-03-05

## 2018-03-05 VITALS
HEIGHT: 68 IN | SYSTOLIC BLOOD PRESSURE: 150 MMHG | DIASTOLIC BLOOD PRESSURE: 86 MMHG | WEIGHT: 164 LBS | HEART RATE: 92 BPM

## 2018-03-05 DIAGNOSIS — Z86.010 PERSONAL HISTORY OF COLONIC POLYPS: ICD-10-CM

## 2018-03-05 DIAGNOSIS — D50.8 IRON DEFICIENCY ANEMIA SECONDARY TO INADEQUATE DIETARY IRON INTAKE: Primary | ICD-10-CM

## 2018-03-05 DIAGNOSIS — D50.9 IRON DEFICIENCY ANEMIA, UNSPECIFIED: ICD-10-CM

## 2018-03-05 DIAGNOSIS — K30 FUNCTIONAL DYSPEPSIA: ICD-10-CM

## 2018-03-05 DIAGNOSIS — K50.10 CROHN'S DISEASE OF LARGE INTESTINE WITHOUT COMPLICATIONS: ICD-10-CM

## 2018-03-05 LAB
IRON 24H UR-MRATE: 52 MCG/DL (ref 59–158)
IRON SATN MFR SERPL: 16 % (ref 20–50)
LDH SERPL-CCNC: 248 U/L (ref 135–225)
TIBC SERPL-MCNC: 326 MCG/DL (ref 298–536)
TRANSFERRIN SERPL-MCNC: 219 MG/DL (ref 200–360)
VIT B12 BLD-MCNC: 919 PG/ML (ref 211–946)

## 2018-03-05 PROCEDURE — 99213 OFFICE O/P EST LOW 20 MIN: CPT | Performed by: INTERNAL MEDICINE

## 2018-03-06 DIAGNOSIS — R53.83 MALAISE AND FATIGUE: ICD-10-CM

## 2018-03-06 DIAGNOSIS — R63.4 WEIGHT LOSS, ABNORMAL: Primary | ICD-10-CM

## 2018-03-06 DIAGNOSIS — R53.81 MALAISE AND FATIGUE: ICD-10-CM

## 2018-03-07 ENCOUNTER — TELEPHONE (OUTPATIENT)
Dept: CARDIOLOGY | Facility: CLINIC | Age: 83
End: 2018-03-07

## 2018-03-07 ENCOUNTER — OFFICE VISIT (OUTPATIENT)
Dept: CARDIOLOGY | Facility: CLINIC | Age: 83
End: 2018-03-07

## 2018-03-07 VITALS
WEIGHT: 165 LBS | BODY MASS INDEX: 25.01 KG/M2 | HEART RATE: 84 BPM | DIASTOLIC BLOOD PRESSURE: 76 MMHG | HEIGHT: 68 IN | SYSTOLIC BLOOD PRESSURE: 146 MMHG

## 2018-03-07 DIAGNOSIS — R60.0 BILATERAL EDEMA OF LOWER EXTREMITY: ICD-10-CM

## 2018-03-07 DIAGNOSIS — J84.10 PULMONARY FIBROSIS (HCC): ICD-10-CM

## 2018-03-07 DIAGNOSIS — D50.0 ANEMIA, BLOOD LOSS: ICD-10-CM

## 2018-03-07 DIAGNOSIS — I10 ESSENTIAL HYPERTENSION: ICD-10-CM

## 2018-03-07 DIAGNOSIS — G45.8 OTHER SPECIFIED TRANSIENT CEREBRAL ISCHEMIAS: ICD-10-CM

## 2018-03-07 DIAGNOSIS — E78.2 MIXED HYPERLIPIDEMIA: ICD-10-CM

## 2018-03-07 DIAGNOSIS — I48.0 PAROXYSMAL ATRIAL FIBRILLATION (HCC): Primary | ICD-10-CM

## 2018-03-07 PROCEDURE — 93000 ELECTROCARDIOGRAM COMPLETE: CPT | Performed by: NURSE PRACTITIONER

## 2018-03-07 PROCEDURE — 99214 OFFICE O/P EST MOD 30 MIN: CPT | Performed by: NURSE PRACTITIONER

## 2018-03-07 NOTE — TELEPHONE ENCOUNTER
----- Message from KYLAH Jha sent at 3/7/2018 10:35 AM EST -----  I called Dr. Mccoy back and left my cell phone number for him to return my phone call.  Should also have my note from today available and apneic to review.  It is okay for him to have EGD and colonoscopy done.   ----- Message -----     From: Madina Johnston MA     Sent: 3/7/2018  10:11 AM       To: KYLAH Jha,     Please see note below from Dr. Cabrera.     Thank you/ LEIDA    ----- Message -----     From: Aron Cabrera MD     Sent: 3/6/2018   4:51 PM       To: Hector Rivera MD    Just checking in , he is a patient of mine with some progressive anemia,  He feels weak, short of air, some ankle swelling,   More symptoms than I would expect for a gradual decrease of hemoglobin to 9.8  Please evaluate him ( has appointment tomorrow) and make sure there is nothing cardiac related that needs to be addressed prior to my doing and egd and colonoscopy. Please have the practitioner call me [phone]

## 2018-03-07 NOTE — TELEPHONE ENCOUNTER
Patient's wife called back to inform you patient is scheduled to have a colonoscopy on 3/9/18 with Dr. Cabrera./LEIDA

## 2018-03-07 NOTE — PROGRESS NOTES
Date of Office Visit: 2018  Encounter Provider: KYLAH Jha  Place of Service: [facility] CARDIOLOGY  Patient Name: Asia Ly  :1932    Chief Complaint   Patient presents with   • Atrial Fibrillation     Hospital followup   :     HPI: Asia Ly is a 85 y.o. male is a patient of Dr. Rivera. I am seeing Him for the first time today and have reviewed his records. His past medical history is significant of the dictation, paroxysmal atrial fibrillation, aortic stenosis, hyperlipidemia, B12 anemia,Crohn's disease, colon polyps, lower extremity edema, TIA,  Pulmonary HTN and pulmonary fibrosis.      The past he has been in and out of atrial fibrillation as a result of running out of his sotalol.  This occurred in May 2004 when he traveling and ended up in an emergency room in [place] found to be in atrial fibrillation.  Active sinus rhythm spontaneously.  He saw Dr. Hector Patel about atrial fibrillation ablation but it was felt it was best not to pursue that.  He was also evaluated by Dr. Man in this office with electrophysiology and it was felt that he was doing well on sotalol and to maintain him on that.   In 2016 he suffered from a stroke and TIA at [facility] and was switched from aspirin to elevate with at that time.  In 2016 he presented [facility] with atrial fibrillation and had an echocardiogram at that time that showed normal left ventricular systolic dysfunction, moderate tricuspid insufficiency, mild mitral insufficiency , mild pulmonary hypertension.  He also had a perfusion stress test that was negative for ischemia.  He was started on amiodarone.  At some point he was taking off diltiazem due to an allergic reaction of a rash.This was eventually discontinued considering his history of pulmonary fibrosis.     He had a Transthoracic echocardiogram in 2017 that showed an EF of 60%, grade 2  diastolic dysfunction, and mild aortic valve stenosis.  Around that same time he was complaining of increased lower extremity edema and was found to be in rapid atrial fibrillation at that time.  Metoprolol titrate was added along with diuretic and his acidemia improved.   He was last seen in the office on February 9, 2018.  At that time he verbalized he was doing much better.  His edema had resolved.   He presents today with complaint of lower extremity edema.  Currently taken 10 mg of Lasix daily verbalizes that this is helping.  2 weeks ago he was in the emergency room and was diagnosed with pneumonia and he is currently taking Levaquin.  Presented a second time to the ER with complaint of dizziness that occurred 5-6 times. He is occasionally lightheaded more so with position changes and gets off balance.  Appears that his primary care doctor had ordered a CT of the head on March 2, 2018 to evaluate vertigo and unsteady gait that was unchanged when compared to the prior CT in August 2016.He denies chest pain, stroke like symptoms, and near syncope or syncope.  He denies blood in the urine or stool currently.  His wife reports that one month ago she saw a trace amount of blood in the toilet with stool.  He occasionally feels his heart beating fast and when he checks that he gets a heart rate of 106.  He states that majority of the heart rate is 70s to 80s.  Denies shortness of breath.  It is of concern is that he was told to hold Eliquis due to anemia that was noted on March 4 with a hemoglobin of 9.8 and platelet count of 227.  Last year his hemoglobin was up to 13.  He is being followed by Dr. Mccoy for further evaluation.  Patient was scheduled for a EGD and colonoscopy this morning but decided to not get this testing done until they were evaluated by the cardiology and primary care doctor.     Allergies   Allergen Reactions   • Diltiazem        Past Medical History:   Diagnosis Date   • Aortic stenosis    •  "Aspiration pneumonia    • Atrial fibrillation    • Bronchiectasis    • Crohn's disease    • Dizziness    • Generalized weakness    • Gout    • Hard of hearing    • Hyperlipidemia    • Leg swelling    • Lower extremity edema    • Mild anemia    • Near syncope    • PAF (paroxysmal atrial fibrillation)    • Palpitations    • Peptic ulcer    • Pneumonia of left lung due to infectious organism    • Pulmonary fibrosis    • Stroke    • TIA (transient ischemic attack)        Past Surgical History:   Procedure Laterality Date   • COLONOSCOPY     • TESTICLE SURGERY      benign tumor removed.         Family and social history reviewed.     Review of Systems   Constitution: Positive for decreased appetite and malaise/fatigue.   HENT: Positive for hearing loss.         [place]   Cardiovascular: Positive for leg swelling.   Respiratory: Positive for snoring.    Musculoskeletal: Positive for joint pain and joint swelling.   Genitourinary: Positive for frequency.   Neurological: Positive for excessive daytime sleepiness.     All other systems were reviewed and are negative       Objective:     Vitals:    03/07/18 0758   BP: 146/76   Pulse: 84   Weight: 74.8 kg (165 lb)   Height: 172.7 cm (68\")     Body mass index is 25.09 kg/(m^2).    PHYSICAL EXAM:  Physical Exam   Constitutional: He is oriented to person, place, and time. He appears well-developed and well-nourished. No distress.   HENT:   Head: Normocephalic.   [place]   Eyes: Conjunctivae are normal.   Neck: Normal range of motion. No JVD present.   Cardiovascular: Normal rate, regular rhythm, normal heart sounds and intact distal pulses.    No murmur heard.  Pulses:       Carotid pulses are 2+ on the right side, and 2+ on the left side.       Radial pulses are 2+ on the right side, and 2+ on the left side.        Posterior tibial pulses are 2+ on the right side, and 2+ on the left side.   1 + lower extremity edema   Pulmonary/Chest: Effort normal and breath sounds normal. No " respiratory distress. He has no wheezes. He has no rhonchi. He has no rales. He exhibits no tenderness.   Abdominal: Soft. Bowel sounds are normal. He exhibits no distension.   Musculoskeletal: Normal range of motion. He exhibits no edema.   Neurological: He is alert and oriented to person, place, and time.   Skin: Skin is warm, dry and intact. No rash noted. He is not diaphoretic. No cyanosis.   Psychiatric: He has a normal mood and affect. His behavior is normal. Judgment and thought content normal.       ECG 12 Lead  Date/Time: 3/7/2018 9:11 AM  Performed by: CHEN DAWSON  Authorized by: CHEN DAWSON   Comparison: compared with previous ECG from 3/4/2018  Similar to previous ECG  Rhythm: sinus rhythm  Rate: normal  BPM: 84  ST Segments: ST segments normal  T Waves: T waves normal  QRS axis: normal  Clinical impression: normal ECG          Current Outpatient Prescriptions   Medication Sig Dispense Refill   • allopurinol (ZYLOPRIM) 100 MG tablet Take 100 mg by mouth Daily.     • atorvastatin (LIPITOR) 10 MG tablet Take 10 mg by mouth Daily.     • Cyanocobalamin (VITAMIN B-12 CR PO) Take 500 mg by mouth Daily.     • furosemide (LASIX) 10 MG/ML solution Take  by mouth Daily.     • hydrOXYzine (ATARAX) 25 MG tablet Take 1 tablet by mouth At Night As Needed for Itching. 20 tablet 0   • indomethacin (INDOCIN) 25 MG capsule Take 25 mg by mouth 3 (Three) Times a Day As Needed for Mild Pain .     • L-methylfolate Calcium 15 MG tablet Take 15 mg by mouth Daily.     • levoFLOXacin (LEVAQUIN) 500 MG tablet Take 1 tablet by mouth Daily. 5 tablet 0   • metoprolol tartrate (LOPRESSOR) 50 MG tablet Take 50 mg by mouth 2 (Two) Times a Day.     • Polyethyl Glycol-Propyl Glycol (SYSTANE ULTRA OP) Apply  to eye As Needed.       No current facility-administered medications for this visit.      Assessment:       Diagnosis Plan   1. Paroxysmal atrial fibrillation     2. Essential hypertension     3. Pulmonary fibrosis     4. Mixed  hyperlipidemia     5. Bilateral edema of lower extremity     6. Anemia, blood loss     7. Other specified transient cerebral ischemias          Orders Placed This Encounter   Procedures   • ECG 12 Lead     This order was created via procedure documentation        Plan:    This is a pleasant 85-year-old gentleman, accompanied by his wife, who has a history of paroxysmal atrial fibrillation.  He is currently taken metoprolol 25 mg twice a day.  In the past he was taken off of amiodarone due to pulmonary fibrosis, and did not tolerate diltiazem due to a rash.  Then in the ER recently and was diagnosed with pneumonia but has also been complaining of increased dizziness and unsteady gait.  He was found to be anemic with a hemoglobin in the 9 range where last year that was in the 13 range.  It was recommended that he have a EGD and colonoscopy with Dr. Cowart but the patient decided to hold off until he was evaluated in the cardiology and primary care office.  Considering his history of iron deficiency anemia, Crohn's disease, colon polyps, he has been instructed to have testing completed with Dr. Cowart as soon as possible and that we will reevaluate anticoagulation after that.  Considering his blood pressure today from what he reports at home I will have him take 50 mg of metoprolol tartrate twice a day and have instructed that he check blood pressure and heart rate daily.    1. Atrial Fibrillation and Atrial Flutter  Assessment  • The patient has paroxysmal atrial fibrillation  • This is non-valvular in etiology  • The patient's CHADS2-VASc score is 5  • A [id number] score of 2 or more is considered a high risk for a thromboembolic event  • Currently off Eliquis due to acute blood loss anemia- pending EGD and Colonoscopy.     Plan  • Attempt to maintain sinus rhythm  • Continue beta blocker for rhythm control  • Increased metoprolol to 50 mg BID    2.Hypertension-blood pressure today is 142/76.  Patient reports  average  blood pressure at home is 150-160/70-80s.  Bp in ER on 3/2/18 was 115/68. BP in ER on 3/4/18 was 129/76.  Increase his metoprolol to 50 mg twice a day from 25 mg twice a day with help for better rate control and to decrease the recurrence of him going into atrial fibrillation while off Eliquis.  Instructed that he continue to check blood pressure at home along with heart rate.  3.  Pulmonary fibrosis- patient is followed by Dr. Mueller with pulmonary. AMiodorone discontinued in the past due to this.   4.  Hyperlipidemia- on 10 mg Lipitor followed by PCP.  5.  Bilateral lower extremity edema- droop on Lasix.  6.  Anemia, blood loss-due to get EGD and colonoscopy with gastroenterology.  Pt wife is to call me with they are new appointment as they had canceled his appointment for this morning with the concern that they should seek cardiology and primary care doctor prior to this.    7.  History of TIA-no strokelike symptoms   8.  Hard of hearing     Plan of care reviewed with Dr. Vickers  Follow up in this office after results from EGD and colonoscopy are obtained and gastroenterology recommendations are made to reevaluate anticoagulation.    Patient was instructed to call the office if new symptoms develop or report to nearest ER if heart attack or stroke is suspected.        It has been a pleasure to participate in this patient's care.      Thank you,  KYLAH Jha

## 2018-03-09 ENCOUNTER — ON CAMPUS - OUTPATIENT (OUTPATIENT)
Dept: URBAN - METROPOLITAN AREA HOSPITAL 114 | Facility: HOSPITAL | Age: 83
End: 2018-03-09

## 2018-03-09 ENCOUNTER — TELEPHONE (OUTPATIENT)
Dept: CARDIOLOGY | Facility: CLINIC | Age: 83
End: 2018-03-09

## 2018-03-09 ENCOUNTER — HOSPITAL ENCOUNTER (OUTPATIENT)
Facility: HOSPITAL | Age: 83
Setting detail: HOSPITAL OUTPATIENT SURGERY
Discharge: HOME | End: 2018-03-09
Attending: INTERNAL MEDICINE | Admitting: INTERNAL MEDICINE

## 2018-03-09 ENCOUNTER — ANESTHESIA (OUTPATIENT)
Dept: GASTROENTEROLOGY | Facility: HOSPITAL | Age: 83
End: 2018-03-09

## 2018-03-09 ENCOUNTER — ANESTHESIA EVENT (OUTPATIENT)
Dept: GASTROENTEROLOGY | Facility: HOSPITAL | Age: 83
End: 2018-03-09

## 2018-03-09 VITALS
OXYGEN SATURATION: 100 % | HEART RATE: 78 BPM | DIASTOLIC BLOOD PRESSURE: 69 MMHG | TEMPERATURE: 97.9 F | BODY MASS INDEX: 24.46 KG/M2 | HEIGHT: 68 IN | WEIGHT: 161.4 LBS | SYSTOLIC BLOOD PRESSURE: 138 MMHG | RESPIRATION RATE: 16 BRPM

## 2018-03-09 DIAGNOSIS — R10.13 EPIGASTRIC PAIN: ICD-10-CM

## 2018-03-09 DIAGNOSIS — T50.2X5S DIURETICS CAUSING ADVERSE EFFECT IN THERAPEUTIC USE, SEQUELA: ICD-10-CM

## 2018-03-09 DIAGNOSIS — K44.9 DIAPHRAGMATIC HERNIA WITHOUT OBSTRUCTION OR GANGRENE: ICD-10-CM

## 2018-03-09 DIAGNOSIS — K29.50 UNSPECIFIED CHRONIC GASTRITIS WITHOUT BLEEDING: ICD-10-CM

## 2018-03-09 DIAGNOSIS — K57.30 DIVERTICULOSIS OF LARGE INTESTINE WITHOUT PERFORATION OR ABS: ICD-10-CM

## 2018-03-09 DIAGNOSIS — K50.90 CROHN'S DISEASE (HCC): ICD-10-CM

## 2018-03-09 DIAGNOSIS — I48.0 PAF (PAROXYSMAL ATRIAL FIBRILLATION) (HCC): Primary | ICD-10-CM

## 2018-03-09 DIAGNOSIS — K21.0 GASTRO-ESOPHAGEAL REFLUX DISEASE WITH ESOPHAGITIS: ICD-10-CM

## 2018-03-09 DIAGNOSIS — Z79.01 ANTICOAGULANT LONG-TERM USE: ICD-10-CM

## 2018-03-09 DIAGNOSIS — R63.4 ABNORMAL WEIGHT LOSS: ICD-10-CM

## 2018-03-09 DIAGNOSIS — D50.0 BLOOD LOSS ANEMIA: ICD-10-CM

## 2018-03-09 DIAGNOSIS — D50.9 IRON DEFICIENCY ANEMIA, UNSPECIFIED: ICD-10-CM

## 2018-03-09 DIAGNOSIS — D50.9 IRON DEFICIENCY ANEMIA: ICD-10-CM

## 2018-03-09 DIAGNOSIS — K29.80 DUODENITIS WITHOUT BLEEDING: ICD-10-CM

## 2018-03-09 DIAGNOSIS — K50.10 CROHN'S DISEASE OF LARGE INTESTINE WITHOUT COMPLICATIONS: ICD-10-CM

## 2018-03-09 LAB
ANION GAP SERPL CALCULATED.3IONS-SCNC: 7.5 MMOL/L
BUN BLD-MCNC: 13 MG/DL (ref 8–23)
BUN/CREAT SERPL: 9.3 (ref 7–25)
CALCIUM SPEC-SCNC: 8.8 MG/DL (ref 8.6–10.5)
CHLORIDE SERPL-SCNC: 101 MMOL/L (ref 98–107)
CO2 SERPL-SCNC: 31.5 MMOL/L (ref 22–29)
CREAT BLD-MCNC: 1.4 MG/DL (ref 0.76–1.27)
DEPRECATED RDW RBC AUTO: 53.2 FL (ref 37–54)
ERYTHROCYTE [DISTWIDTH] IN BLOOD BY AUTOMATED COUNT: 15.9 % (ref 11.5–14.5)
GFR SERPL CREATININE-BSD FRML MDRD: 48 ML/MIN/1.73
GLUCOSE BLD-MCNC: 94 MG/DL (ref 65–99)
HCT VFR BLD AUTO: 31.9 % (ref 40.4–52.2)
HGB BLD-MCNC: 9.9 G/DL (ref 13.7–17.6)
LDH SERPL-CCNC: 185 U/L (ref 135–225)
MCH RBC QN AUTO: 28.2 PG (ref 27–32.7)
MCHC RBC AUTO-ENTMCNC: 31 G/DL (ref 32.6–36.4)
MCV RBC AUTO: 90.9 FL (ref 79.8–96.2)
PLATELET # BLD AUTO: 189 10*3/MM3 (ref 140–500)
PMV BLD AUTO: 10.7 FL (ref 6–12)
POTASSIUM BLD-SCNC: 4.4 MMOL/L (ref 3.5–5.2)
RBC # BLD AUTO: 3.51 10*6/MM3 (ref 4.6–6)
SODIUM BLD-SCNC: 140 MMOL/L (ref 136–145)
WBC NRBC COR # BLD: 7.49 10*3/MM3 (ref 4.5–10.7)

## 2018-03-09 PROCEDURE — 25010000002 PROPOFOL 10 MG/ML EMULSION: Performed by: ANESTHESIOLOGY

## 2018-03-09 PROCEDURE — 45380 COLONOSCOPY AND BIOPSY: CPT | Performed by: INTERNAL MEDICINE

## 2018-03-09 PROCEDURE — 88305 TISSUE EXAM BY PATHOLOGIST: CPT | Performed by: INTERNAL MEDICINE

## 2018-03-09 PROCEDURE — 88312 SPECIAL STAINS GROUP 1: CPT | Performed by: INTERNAL MEDICINE

## 2018-03-09 PROCEDURE — 43239 EGD BIOPSY SINGLE/MULTIPLE: CPT | Performed by: INTERNAL MEDICINE

## 2018-03-09 PROCEDURE — 83615 LACTATE (LD) (LDH) ENZYME: CPT | Performed by: INTERNAL MEDICINE

## 2018-03-09 PROCEDURE — 80048 BASIC METABOLIC PNL TOTAL CA: CPT | Performed by: INTERNAL MEDICINE

## 2018-03-09 PROCEDURE — 85027 COMPLETE CBC AUTOMATED: CPT | Performed by: INTERNAL MEDICINE

## 2018-03-09 PROCEDURE — 88342 IMHCHEM/IMCYTCHM 1ST ANTB: CPT | Performed by: INTERNAL MEDICINE

## 2018-03-09 RX ORDER — SODIUM CHLORIDE, SODIUM LACTATE, POTASSIUM CHLORIDE, CALCIUM CHLORIDE 600; 310; 30; 20 MG/100ML; MG/100ML; MG/100ML; MG/100ML
1000 INJECTION, SOLUTION INTRAVENOUS CONTINUOUS PRN
Status: DISCONTINUED | OUTPATIENT
Start: 2018-03-09 | End: 2018-03-09 | Stop reason: HOSPADM

## 2018-03-09 RX ORDER — FUROSEMIDE 20 MG/1
20 TABLET ORAL DAILY
COMMUNITY
End: 2020-08-29 | Stop reason: HOSPADM

## 2018-03-09 RX ORDER — LIDOCAINE HYDROCHLORIDE 20 MG/ML
INJECTION, SOLUTION INFILTRATION; PERINEURAL AS NEEDED
Status: DISCONTINUED | OUTPATIENT
Start: 2018-03-09 | End: 2018-03-09 | Stop reason: SURG

## 2018-03-09 RX ORDER — PROPOFOL 10 MG/ML
VIAL (ML) INTRAVENOUS AS NEEDED
Status: DISCONTINUED | OUTPATIENT
Start: 2018-03-09 | End: 2018-03-09 | Stop reason: SURG

## 2018-03-09 RX ORDER — LIDOCAINE HYDROCHLORIDE 10 MG/ML
0.5 INJECTION, SOLUTION INFILTRATION; PERINEURAL ONCE AS NEEDED
Status: DISCONTINUED | OUTPATIENT
Start: 2018-03-09 | End: 2018-03-09 | Stop reason: HOSPADM

## 2018-03-09 RX ORDER — SODIUM CHLORIDE 0.9 % (FLUSH) 0.9 %
3 SYRINGE (ML) INJECTION AS NEEDED
Status: DISCONTINUED | OUTPATIENT
Start: 2018-03-09 | End: 2018-03-09 | Stop reason: HOSPADM

## 2018-03-09 RX ADMIN — SODIUM CHLORIDE, POTASSIUM CHLORIDE, SODIUM LACTATE AND CALCIUM CHLORIDE 1000 ML: 600; 310; 30; 20 INJECTION, SOLUTION INTRAVENOUS at 06:20

## 2018-03-09 RX ADMIN — PROPOFOL 200 MG: 10 INJECTION, EMULSION INTRAVENOUS at 07:30

## 2018-03-09 RX ADMIN — LIDOCAINE HYDROCHLORIDE 100 MG: 20 INJECTION, SOLUTION INFILTRATION; PERINEURAL at 07:13

## 2018-03-09 RX ADMIN — PROPOFOL 200 MG: 10 INJECTION, EMULSION INTRAVENOUS at 07:13

## 2018-03-09 NOTE — ANESTHESIA PREPROCEDURE EVALUATION
Anesthesia Evaluation     Patient summary reviewed and Nursing notes reviewed   NPO Solid Status: > 8 hours  NPO Liquid Status: > 2 hours           Airway   Mallampati: II  TM distance: <3 FB  Neck ROM: limited  Possible difficult intubation  Dental - normal exam   (+) poor dentition    Pulmonary - normal exam   (+) pneumonia resolved , COPD moderate,   Cardiovascular - normal exam    ECG reviewed  PT is on anticoagulation therapy  Patient on routine beta blocker and Beta blocker given within 24 hours of surgery    (+) hypertension well controlled, valvular problems/murmurs, dysrhythmias Atrial Fib,       Neuro/Psych  (+) TIA, CVA, dizziness/light headedness,     GI/Hepatic/Renal/Endo      Musculoskeletal     Abdominal  - normal exam    Bowel sounds: normal.   Substance History      OB/GYN          Other                      Anesthesia Plan    ASA 3     MAC     Anesthetic plan and risks discussed with patient.

## 2018-03-09 NOTE — DISCHARGE INSTRUCTIONS
For the next 24 hours patient needs to be with a responsible adult.    For 24 hours DO NOT drive, operate machinery, appliances, drink alcohol, make important decisions or sign legal documents.    Start with a light or bland diet and advance to regular diet as tolerated.    Follow recommendations on procedure report provided by your doctor.    Call Dr Cabrera for problems [phone]    Problems may include but not limited to: large amounts of bleeding, trouble breathing, repeated vomiting, severe unrelieved pain, fever or chills.

## 2018-03-09 NOTE — ANESTHESIA POSTPROCEDURE EVALUATION
"Patient: Asia Ly    Procedure Summary     Date Anesthesia Start Anesthesia Stop Room / Location    03/09/18 0709 0749  [facility] /  [facility]       Procedure Diagnosis Surgeon Provider    ESOPHAGOGASTRODUODENOSCOPY with bx (N/A Esophagus); COLONOSCOPY to terminal ileum with bx (N/A ) No diagnosis on file. MD Nayely Taveras MD          Anesthesia Type: MAC  Last vitals  BP   138/69 (03/09/18 0810)   Temp   36.6 °C (97.9 °F) (03/09/18 0614)   Pulse   78 (03/09/18 0810)   Resp   16 (03/09/18 0810)     SpO2   100 % (03/09/18 0810)     Post Anesthesia Care and Evaluation    Patient location during evaluation: PACU  Patient participation: complete - patient participated  Level of consciousness: awake  Pain score: 0  Pain management: adequate  Airway patency: patent  Anesthetic complications: No anesthetic complications    Cardiovascular status: acceptable  Respiratory status: acceptable  Hydration status: acceptable    Comments: Blood pressure 138/69, pulse 78, temperature 36.6 °C (97.9 °F), temperature source Oral, resp. rate 16, height 172.7 cm (68\"), weight 73.2 kg (161 lb 6.4 oz), SpO2 100 %.    No anesthesia care post op    "

## 2018-03-09 NOTE — H&P
"      Patient Care Team:  Lubna Lobo MD as PCP - General  Lubna Lobo MD as PCP - Family Medicine    Chief complaint  Anemia, fe def,  Weight loss    Subjective     History of Present Illness  Recent weakness,  Hx of crohns disease.  Progressive anemia . Case reviewed with cardiology who ok holding AC and scopes  Review of Systems   Constitutional: Positive for fatigue and unexpected weight change.   HENT: Negative.    Eyes: Negative.    Respiratory: Positive for shortness of breath.    Cardiovascular: Positive for chest pain.   Gastrointestinal: Positive for abdominal distention.        Past Medical History:   Diagnosis Date   • Aortic stenosis    • Aspiration pneumonia    • Atrial fibrillation    • Bronchiectasis    • COPD (chronic obstructive pulmonary disease)    • Crohn's disease    • Dizziness    • Gastric ulcer    • Generalized weakness    • Gout    • Hard of hearing    • Hyperlipidemia    • Hypertension    • Leg swelling    • Lower extremity edema    • Mild anemia    • Near syncope    • PAF (paroxysmal atrial fibrillation)    • Palpitations    • Peptic ulcer    • Pneumonia of left lung due to infectious organism    • Pulmonary fibrosis    • Stroke    • TIA (transient ischemic attack)      Past Surgical History:   Procedure Laterality Date   • CATARACT EXTRACTION Bilateral    • COLONOSCOPY     • CYSTECTOMY      back and shoulder area   • EYE SURGERY Left     detached retina   • EYE SURGERY Right     \"repaired a hole in the eye\"   • TESTICLE SURGERY      benign tumor removed.     Family History   Problem Relation Age of Onset   • Stroke Mother    • Heart disease Sister      Heart Valve Replacement   • Ovarian cancer Sister    • Rheumatic fever Sister    • Stroke Father      Social History   Substance Use Topics   • Smoking status: Former Smoker     Types: Cigars   • Smokeless tobacco: Never Used      Comment: Quit 25 years ago   • Alcohol use No      Comment: daily caffiene     Prescriptions Prior to " Admission   Medication Sig Dispense Refill Last Dose   • apixaban (ELIQUIS) 5 MG tablet tablet Take 5 mg by mouth 2 (Two) Times a Day.   3/4/2018 at 2100   • CLINDAMYCIN PHOSPHATE EX Apply  topically As Needed (rash).   3/9/2018 at 0430   • Cyanocobalamin (VITAMIN B-12 CR PO) Take 500 mg by mouth Daily.   3/8/2018 at 0900   • furosemide (LASIX) 20 MG tablet Take 20 mg by mouth Daily.   3/6/2018 at 0900   • Polyethyl Glycol-Propyl Glycol (SYSTANE ULTRA OP) Apply  to eye As Needed.   3/9/2018 at 0430   • atorvastatin (LIPITOR) 10 MG tablet Take 10 mg by mouth Daily.   3/8/2018 at 2100   • BUDESONIDE PO Take 3 mg by mouth Daily.   More than a month at Unknown time   • indomethacin (INDOCIN) 25 MG capsule Take 25 mg by mouth 3 (Three) Times a Day As Needed for Mild Pain .   3/3/2018 at 0900   • metoprolol tartrate (LOPRESSOR) 50 MG tablet Take 50 mg by mouth 2 (Two) Times a Day.   3/9/2018 at 0430     Allergies:  Diltiazem    Objective      Vital Signs  Temp:  [97.9 °F (36.6 °C)] 97.9 °F (36.6 °C)  Heart Rate:  [86] 86  Resp:  [16] 16  BP: (117)/(64) 117/64    Physical Exam   Constitutional: He is oriented to person, place, and time. He appears well-developed and well-nourished.   HENT:   Mouth/Throat: Oropharynx is clear and moist.   Eyes: Conjunctivae are normal.   Neck: Neck supple.   Pulmonary/Chest: Effort normal.   Abdominal: Soft.   Neurological: He is alert and oriented to person, place, and time.   Skin: Skin is warm and dry.   Psychiatric: He has a normal mood and affect.       Results Review:   I reviewed the patient's new clinical results.      Assessment/Plan     Active Problems:    * No active hospital problems. *      Assessment:  (Fe def anemia  Weakness  crohns disease).     Plan:   (Upper and lower tract endoscopy, risks, alternatives and benefits dicussed  with patient and patient is agreeable to proceed.).       I discussed the patients findings and my recommendations with patient and nursing  staff    Aron Cabrera MD  03/09/18  7:12 AM

## 2018-03-09 NOTE — TELEPHONE ENCOUNTER
Patient's wife called to discuss the patients plan of care w/you. Per Ms Aliyah he underwent a colonoscopy today, and was dx w/ulcers. She believes he was advised to begin Omeprazole and Iron, resume Eliquis, and discontinue his Furosemide and Indomethacin.  I do not see where the Furosemide was discontinued. She would like to d/w you before they implement any of these. She can be reached on [phone]. Thanks/amk

## 2018-03-09 NOTE — PLAN OF CARE
Problem: Patient Care Overview (Adult)  Goal: Plan of Care Review  Outcome: Ongoing (interventions implemented as appropriate)   03/09/18 0624   Coping/Psychosocial Response Interventions   Plan Of Care Reviewed With patient   Patient Care Overview   Progress no change   Outcome Evaluation   Outcome Summary/Follow up Plan pending procedure results     Goal: Adult Individualization and Mutuality  Outcome: Ongoing (interventions implemented as appropriate)   03/09/18 0624   Individualization   [facility]   Mutuality/Individual Preferences   What Anxieties, Fears or Concerns Do You Have About Your Health or Care? none     Goal: Discharge Needs Assessment  Outcome: Ongoing (interventions implemented as appropriate)   03/09/18 0624   Discharge Needs Assessment   Concerns To Be Addressed no discharge needs identified   Discharge Disposition home or self-care   Living Environment   Transportation Available car       Problem: GI Endoscopy (Adult)  Goal: Signs and Symptoms of Listed Potential Problems Will be Absent or Manageable (GI Endoscopy)  Outcome: Ongoing (interventions implemented as appropriate)   03/09/18 0624   GI Endoscopy   Problems Assessed (GI Endoscopy) pain;bleeding;hypoxia/hypoxemia   Problems Present (GI Endoscopy) none

## 2018-03-09 NOTE — TELEPHONE ENCOUNTER
Spoke with patient's wife and agreed to restart ELiquis. Agree with stop of lasix with decreased kidney function. Will have an appt made for the end of next week to f/u on lab work and swelling.    AL

## 2018-03-12 ENCOUNTER — LAB (OUTPATIENT)
Dept: LAB | Facility: HOSPITAL | Age: 83
End: 2018-03-12

## 2018-03-12 DIAGNOSIS — I48.0 PAF (PAROXYSMAL ATRIAL FIBRILLATION) (HCC): ICD-10-CM

## 2018-03-12 DIAGNOSIS — Z79.01 ANTICOAGULANT LONG-TERM USE: ICD-10-CM

## 2018-03-12 DIAGNOSIS — T50.2X5S DIURETICS CAUSING ADVERSE EFFECT IN THERAPEUTIC USE, SEQUELA: ICD-10-CM

## 2018-03-12 DIAGNOSIS — D50.0 BLOOD LOSS ANEMIA: ICD-10-CM

## 2018-03-12 LAB
ANION GAP SERPL CALCULATED.3IONS-SCNC: 12.3 MMOL/L
BUN BLD-MCNC: 14 MG/DL (ref 8–23)
BUN/CREAT SERPL: 11 (ref 7–25)
CALCIUM SPEC-SCNC: 9.6 MG/DL (ref 8.6–10.5)
CHLORIDE SERPL-SCNC: 99 MMOL/L (ref 98–107)
CO2 SERPL-SCNC: 27.7 MMOL/L (ref 22–29)
CREAT BLD-MCNC: 1.27 MG/DL (ref 0.76–1.27)
DEPRECATED RDW RBC AUTO: 52.8 FL (ref 37–54)
ERYTHROCYTE [DISTWIDTH] IN BLOOD BY AUTOMATED COUNT: 15.8 % (ref 11.5–14.5)
GFR SERPL CREATININE-BSD FRML MDRD: 54 ML/MIN/1.73
GLUCOSE BLD-MCNC: 97 MG/DL (ref 65–99)
HCT VFR BLD AUTO: 35.1 % (ref 40.4–52.2)
HGB BLD-MCNC: 10.8 G/DL (ref 13.7–17.6)
MCH RBC QN AUTO: 28.2 PG (ref 27–32.7)
MCHC RBC AUTO-ENTMCNC: 30.8 G/DL (ref 32.6–36.4)
MCV RBC AUTO: 91.6 FL (ref 79.8–96.2)
PLATELET # BLD AUTO: 219 10*3/MM3 (ref 140–500)
PMV BLD AUTO: 11.3 FL (ref 6–12)
POTASSIUM BLD-SCNC: 4.8 MMOL/L (ref 3.5–5.2)
RBC # BLD AUTO: 3.83 10*6/MM3 (ref 4.6–6)
SODIUM BLD-SCNC: 139 MMOL/L (ref 136–145)
WBC NRBC COR # BLD: 10.76 10*3/MM3 (ref 4.5–10.7)

## 2018-03-12 PROCEDURE — 36415 COLL VENOUS BLD VENIPUNCTURE: CPT

## 2018-03-12 PROCEDURE — 80048 BASIC METABOLIC PNL TOTAL CA: CPT

## 2018-03-12 PROCEDURE — 85027 COMPLETE CBC AUTOMATED: CPT

## 2018-03-13 LAB
CYTO UR: NORMAL
LAB AP CASE REPORT: NORMAL
Lab: NORMAL
PATH REPORT.ADDENDUM SPEC: NORMAL
PATH REPORT.FINAL DX SPEC: NORMAL
PATH REPORT.GROSS SPEC: NORMAL

## 2018-03-15 ENCOUNTER — OFFICE VISIT (OUTPATIENT)
Dept: CARDIOLOGY | Facility: CLINIC | Age: 83
End: 2018-03-15

## 2018-03-15 VITALS
HEART RATE: 73 BPM | HEIGHT: 68 IN | DIASTOLIC BLOOD PRESSURE: 70 MMHG | SYSTOLIC BLOOD PRESSURE: 122 MMHG | WEIGHT: 164 LBS | BODY MASS INDEX: 24.86 KG/M2

## 2018-03-15 DIAGNOSIS — Z79.01 ANTICOAGULANT LONG-TERM USE: ICD-10-CM

## 2018-03-15 DIAGNOSIS — D50.0 BLOOD LOSS ANEMIA: ICD-10-CM

## 2018-03-15 DIAGNOSIS — I10 ESSENTIAL HYPERTENSION: ICD-10-CM

## 2018-03-15 DIAGNOSIS — R60.0 BILATERAL EDEMA OF LOWER EXTREMITY: ICD-10-CM

## 2018-03-15 DIAGNOSIS — D50.9 IRON DEFICIENCY ANEMIA, UNSPECIFIED IRON DEFICIENCY ANEMIA TYPE: ICD-10-CM

## 2018-03-15 DIAGNOSIS — I48.0 PAF (PAROXYSMAL ATRIAL FIBRILLATION) (HCC): Primary | ICD-10-CM

## 2018-03-15 PROCEDURE — 99214 OFFICE O/P EST MOD 30 MIN: CPT | Performed by: NURSE PRACTITIONER

## 2018-03-15 PROCEDURE — 93000 ELECTROCARDIOGRAM COMPLETE: CPT | Performed by: NURSE PRACTITIONER

## 2018-03-15 RX ORDER — ERYTHROMYCIN 250 MG/1
250 TABLET, COATED ORAL 2 TIMES DAILY PRN
COMMUNITY
End: 2018-04-26

## 2018-03-15 RX ORDER — FERROUS SULFATE 325(65) MG
325 TABLET ORAL
COMMUNITY
End: 2020-08-29 | Stop reason: HOSPADM

## 2018-03-15 RX ORDER — OMEPRAZOLE 40 MG/1
40 CAPSULE, DELAYED RELEASE ORAL DAILY
COMMUNITY

## 2018-03-15 NOTE — PROGRESS NOTES
Date of Office Visit: 2018  Encounter Provider: KYLAH Jha  Place of Service: [facility] CARDIOLOGY  Patient Name: Asia Ly  :1932    Chief Complaint   Patient presents with   • Atrial Fibrillation   • Edema   • Hypertension   :     HPI: Asia Ly is a 85 y.o. male is a patient of Dr. Rivera.  His past medical history is significant of paroxysmal atrial fibrillation, aortic stenosis, hyperlipidemia, B12 anemia,Crohn's disease, colon polyps, lower extremity edema, TIA,  Pulmonary HTN and pulmonary fibrosis.      In the past he has been in and out of atrial fibrillation as a result of running out of his sotalol.  This occurred in May 2004 when he traveling and ended up in an emergency room in [place] found to be in atrial fibrillation.  Active sinus rhythm spontaneously.  He saw Dr. Hector Patel about atrial fibrillation ablation but it was felt it was best not to pursue that.  He was also evaluated by Dr. Man in this office with electrophysiology and it was felt that he was doing well on sotalol and to maintain him on that.   In 2016 he suffered from a stroke and TIA at [facility] and was switched from aspirin to elevate with at that time.  In 2016 he presented [facility] with atrial fibrillation and had an echocardiogram at that time that showed normal left ventricular systolic dysfunction, moderate tricuspid insufficiency, mild mitral insufficiency , mild pulmonary hypertension.  He also had a perfusion stress test that was negative for ischemia.  He was started on amiodarone.  At some point he was taking off diltiazem due to an allergic reaction of a rash.This was eventually discontinued considering his history of pulmonary fibrosis.     He had a Transthoracic echocardiogram in 2017 that showed an EF of 60%, grade 2 diastolic dysfunction, and mild aortic valve stenosis.  Around that same time he  was complaining of increased lower extremity edema and was found to be in rapid atrial fibrillation at that time.  Metoprolol titrate was added along with diuretic and his acidemia improved.   He was last seen in the office on February 9, 2018.  At that time he verbalized he was doing much better.  His edema had resolved.   When he was last seen he complained of lower extremity edema.  Currently taken 10 mg of Lasix daily verbalizes that this is helping.  2 weeks prior he was in the emergency room and was diagnosed with pneumonia and he is currently taking Levaquin.  Presented a second time to the ER with complaint of dizziness that occurred 5-6 times. He is occasionally lightheaded more so with position changes and gets off balance.  Appears that his primary care doctor had ordered a CT of the head on March 2, 2018 to evaluate vertigo and unsteady gait that was unchanged when compared to the prior CT in August 2016. He denies chest pain, stroke like symptoms, and near syncope or syncope.  He denies blood in the urine or stool currently.  His wife reports that one month ago she saw a trace amount of blood in the toilet with stool.  He occasionally feels his heart beating fast and when he checks that he gets a heart rate of 106.  He states that majority of the heart rate is 70s to 80s.  Denies shortness of breath.  It is of concern is that he was told to hold Eliquis due to anemia that was noted on March 4 with a hemoglobin of 9.8 and platelet count of 227.  Last year his hemoglobin was up to 13.  He was due for EGD and colonoscopy.      On 3/9/2018 he had EGD and colonoscopy that revealed sophagitis with no bleeding found at the gastroesophageal junction with biopsy sent, small hiatal hernia and inflammation in the duodenum. Colonoscopy revealed multiple large reticula in the sigmoid colon abscesses were sent from the right and left colon due to his history of Crohn's.  Thus he was instructed to restart Eliquis  "after the procedure.  He was instructed to stop taking indomethacin and allopurinol by Dr. Mccoy. His hemoglobin had increased to 10.8 on 3/12/2018, white count slightly elevated, and creatinine was 1.27 with an EGFR of 54.    Presents today for follow-up.  Reports improved strength and is no longer fatigued.  He plans to get back in the gym on Monday because for the last 2 months he has not felt like going to the gym.  He continues to have lower extremity edema that fluctuates daily and he is taking Lasix 20 mg for this.  He has not had blood in the urine or stool.  Due to see his primary care doctor tomorrow.  He is still on antibiotic for recent pneumonia and was told to continue that until pulmonary follow-up.    Allergies   Allergen Reactions   • Diltiazem        Past Medical History:   Diagnosis Date   • Aortic stenosis    • Aspiration pneumonia    • Atrial fibrillation    • Bronchiectasis    • COPD (chronic obstructive pulmonary disease)    • Crohn's disease    • Dizziness    • Gastric ulcer    • Generalized weakness    • Gout    • Hard of hearing    • Hyperlipidemia    • Hypertension    • Leg swelling    • Lower extremity edema    • Mild anemia    • Near syncope    • PAF (paroxysmal atrial fibrillation)    • Palpitations    • Peptic ulcer    • Pneumonia of left lung due to infectious organism    • Pulmonary fibrosis    • Stroke    • TIA (transient ischemic attack)        Past Surgical History:   Procedure Laterality Date   • CATARACT EXTRACTION Bilateral    • COLONOSCOPY     • COLONOSCOPY N/A 3/9/2018    Procedure: COLONOSCOPY to terminal ileum with bx;  Surgeon: Aron Cabrera MD;  Location: [facility] ENDOSCOPY;  Service:    • CYSTECTOMY      back and shoulder area   • ENDOSCOPY N/A 3/9/2018    Procedure: ESOPHAGOGASTRODUODENOSCOPY with bx;  Surgeon: Aron Cabrera MD;  Location: [facility] ENDOSCOPY;  Service:    • EYE SURGERY Left     detached retina   • EYE SURGERY Right     \"repaired a hole in the eye\"   • " "TESTICLE SURGERY      benign tumor removed.     Family and social history reviewed.     Review of Systems   HENT: Positive for hearing loss.         [place]   Cardiovascular: Positive for leg swelling.   Respiratory: Positive for snoring.    Musculoskeletal: Positive for joint pain and joint swelling.   Genitourinary: Positive for frequency.   Neurological: Positive for excessive daytime sleepiness.     All other systems were reviewed and are negative      Objective:     Vitals:    03/15/18 1135   BP: 122/70   BP Location: Left arm   Patient Position: Sitting   Pulse: 73   Weight: 74.4 kg (164 lb)   Height: 172.7 cm (68\")     Body mass index is 24.94 kg/m².    PHYSICAL EXAM:  Physical Exam   Constitutional: He is oriented to person, place, and time. He appears well-developed and well-nourished. No distress.   HENT:   Head: Normocephalic.   [place]   Eyes: Conjunctivae are normal.   Glasses on   Neck: Normal range of motion. No JVD present.   Cardiovascular: Normal rate, regular rhythm, normal heart sounds and intact distal pulses.    No murmur heard.  Pulses:       Carotid pulses are 2+ on the right side, and 2+ on the left side.       Radial pulses are 2+ on the right side, and 2+ on the left side.        Posterior tibial pulses are 2+ on the right side, and 2+ on the left side.   1 + lower extremity edema   Pulmonary/Chest: Effort normal. No respiratory distress. He has no wheezes. He has no rhonchi. He has rales in the right lower field and the left lower field. He exhibits no tenderness.   Abdominal: Soft. Bowel sounds are normal. He exhibits no distension.   Musculoskeletal: Normal range of motion. He exhibits no edema.   Neurological: He is alert and oriented to person, place, and time.   Skin: Skin is warm, dry and intact. No rash noted. He is not diaphoretic. No cyanosis.   Psychiatric: He has a normal mood and affect. His behavior is normal. Judgment and thought content normal.       ECG 12 Lead  Date/Time: " 3/15/2018 12:04 PM  Performed by: CHEN DAWSON  Authorized by: CHEN DAWSON   Comparison: compared with previous ECG from 3/7/2018  Similar to previous ECG  Rhythm: sinus rhythm  Ectopy: atrial premature contractions  Rate: normal  BPM: 73  ST Segments: ST segments normal  T Waves: T waves normal  QRS axis: normal  Clinical impression: non-specific ECG          Current Outpatient Prescriptions   Medication Sig Dispense Refill   • apixaban (ELIQUIS) 5 MG tablet tablet Take 5 mg by mouth 2 (Two) Times a Day.     • atorvastatin (LIPITOR) 10 MG tablet Take 10 mg by mouth Daily.     • BUDESONIDE PO Take 3 mg by mouth Daily.     • CLINDAMYCIN PHOSPHATE EX Apply  topically As Needed (rash).     • Cyanocobalamin (VITAMIN B-12 CR PO) Take 500 mg by mouth Daily.     • erythromycin base (E-MYCIN) 250 MG tablet Take 250 mg by mouth 2 (Two) Times a Day As Needed.     • ferrous sulfate 325 (65 FE) MG tablet Take 325 mg by mouth 3 (Three) Times a Day.     • furosemide (LASIX) 20 MG tablet Take 20 mg by mouth Daily.     • metoprolol tartrate (LOPRESSOR) 50 MG tablet Take 50 mg by mouth 2 (Two) Times a Day.     • omeprazole (priLOSEC) 40 MG capsule Take 40 mg by mouth Daily.     • Polyethyl Glycol-Propyl Glycol (SYSTANE ULTRA OP) Apply  to eye As Needed.       No current facility-administered medications for this visit.      Assessment:       Diagnosis Plan   1. PAF (paroxysmal atrial fibrillation)  ECG 12 Lead   2. Blood loss anemia     3. Anticoagulant long-term use  ECG 12 Lead   4. Essential hypertension  ECG 12 Lead   5. Bilateral edema of lower extremity  ECG 12 Lead   6. Iron deficiency anemia, unspecified iron deficiency anemia type  ECG 12 Lead        Orders Placed This Encounter   Procedures   • ECG 12 Lead     This order was created via procedure documentation        Plan:    This is a pleasant 85-year-old gentleman, accompanied by his wife, who has a history of paroxysmal atrial fibrillation.  He is currently taken  metoprolol 25 mg twice a day. He was taken off amiodarone due to pulmonary fibrosis and rash development.  Then in the ER recently, was diagnosed with pneumonia, complaining of increased dizziness and unsteady gait.  He was found to be anemic with a hemoglobin in the 9 range where last year that was in the 13 range.  He canceled his EGD and colonoscopy so that he could touch base with the cardiology office since Eliquis was stopped. When I last saw him I instructed that he take metoprolol 50 mg twice daily and continue to check his blood pressure and heart rate.  So I recommended that he get his EGD and colonoscopy by Dr. Mccoy wire was noted that he had esophagitis with no bleeding found at the gastroesophageal junction with biopsy sent, small hiatal hernia and inflammation in the duodenum on EGD.  Colonoscopy revealed multiple large reticula in the sigmoid colon abscesses were sent from the right and left colon due to his history of Crohn's. He restarted Eliquis after the procedure and started ferrous sulfate for iron replacement.      1. Atrial Fibrillation and Atrial Flutter  Assessment  • The patient has paroxysmal atrial fibrillation  • This is non-valvular in etiology  • The patient's CHADS2-VASc score is 5  • A [id number] score of 2 or more is considered a high risk for a thromboembolic event  • Currently off Eliquis due to acute blood loss anemia- pending EGD and Colonoscopy.     Plan  • Attempt to maintain sinus rhythm  • Continue apixaban for antithrombotic therapy, bleeding issues discussed  • Continue beta blocker for rhythm control  • Increased metoprolol to 50 mg BID    2.Hypertension-let pressure is 122/70 today.  This is much improved since increasing his metoprolol tartrate to 50 mg twice a day last visit.    3.  Pulmonary fibrosis- patient is followed by Dr. Mueller with pulmonary. Amiodorone discontinued in the past due to this.     4.  Hyperlipidemia- on 10 mg Lipitor followed by PCP.    5.   Bilateral lower extremity edema- continue on Lasix 20mg as last renal function was stable with creatinine 1.27 and eGFR 54.    6.  Iron deficiency anemia-he is currently on ferrous sulfate without constipation.  Last hemoglobin was  10.8.     7.  History of TIA-no strokelike symptoms     8.  Hard of hearing     9.  Currently being treated for pneumonia per Dr. Chidi Chung.  His exam today was positive for rales in the posterior lower lobes bilaterally.  Patient denies shortness of breath.    10. Gout- off of allopurinol and indomethacin per Dr. Mccoy.    Follow up in 3 months with Dr. Rivera.    Patient was instructed to call the office if new symptoms develop or report to nearest ER if heart attack or stroke is suspected.        It has been a pleasure to participate in this patient's care.      Thank you,  KYLAH Jha

## 2018-03-23 ENCOUNTER — HOSPITAL ENCOUNTER (EMERGENCY)
Facility: HOSPITAL | Age: 83
Discharge: HOME | End: 2018-03-23
Attending: EMERGENCY MEDICINE | Admitting: EMERGENCY MEDICINE

## 2018-03-23 ENCOUNTER — APPOINTMENT (OUTPATIENT)
Dept: GENERAL RADIOLOGY | Facility: HOSPITAL | Age: 83
End: 2018-03-23

## 2018-03-23 VITALS
HEIGHT: 68 IN | TEMPERATURE: 99.2 F | WEIGHT: 164 LBS | SYSTOLIC BLOOD PRESSURE: 99 MMHG | OXYGEN SATURATION: 98 % | DIASTOLIC BLOOD PRESSURE: 60 MMHG | HEART RATE: 70 BPM | RESPIRATION RATE: 16 BRPM | BODY MASS INDEX: 24.86 KG/M2

## 2018-03-23 DIAGNOSIS — M70.41 PREPATELLAR BURSITIS OF RIGHT KNEE: Primary | ICD-10-CM

## 2018-03-23 PROCEDURE — 73560 X-RAY EXAM OF KNEE 1 OR 2: CPT

## 2018-03-23 PROCEDURE — 99283 EMERGENCY DEPT VISIT LOW MDM: CPT

## 2018-03-23 RX ORDER — HYDROCODONE BITARTRATE AND ACETAMINOPHEN 5; 325 MG/1; MG/1
1 TABLET ORAL EVERY 6 HOURS PRN
Qty: 12 TABLET | Refills: 0 | Status: SHIPPED | OUTPATIENT
Start: 2018-03-23 | End: 2018-04-02

## 2018-03-23 RX ORDER — ACETAMINOPHEN 325 MG/1
650 TABLET ORAL ONCE
Status: DISCONTINUED | OUTPATIENT
Start: 2018-03-23 | End: 2018-03-23 | Stop reason: HOSPADM

## 2018-03-23 NOTE — ED TRIAGE NOTES
Patient to er from home with c/o pain in right knee, stated this pain started after working out at the gym yesterday.

## 2018-03-23 NOTE — ED PROVIDER NOTES
" FAST TRACK EMERGENCY DEPARTMENT ENCOUNTER    CHIEF COMPLAINT  Chief Complaint: Right knee pain  History given by: Patient  History limited by: Nothing  Room Number: 46/46  PMD: Lubna Lobo MD      HPI:  Pt is a 85 y.o. male who presents complaining of right knee pain that began 2 days ago. The pt states he went to the gym 3 days ago. The pt states he used the stationary bike for the first time in a long period of time but did not feel the knee pain initially. The pt states he woke up 2 days ago with right knee pain. The pt states the right knee pain is worse with bearing weight. The pt denies being on his knees crawling or kneeling in the past several days. The pt takes Eliquis secondary to atrial fibrillation.    Duration: 2 days  Onset: Gradual  Timing: Constant  Location: Right knee  Radiation: None  Quality: \"Pain\"  Intensity/Severity: Moderate  Progression: Unchanged  Associated Symptoms: None stated  Aggravating Factors: Bearing weight  Alleviating Factors: None stated  Previous Episodes: None  Treatment before arrival: Nothing    PAST MEDICAL HISTORY  Active Ambulatory Problems     Diagnosis Date Noted   • Ataxia 08/19/2016   • TIA (transient ischemic attack) 08/19/2016   • Atrial fibrillation, paroxysmal 08/19/2016   • Hyperlipidemia 08/19/2016   • Gout 08/19/2016   • Crohn's disease 08/19/2016     Resolved Ambulatory Problems     Diagnosis Date Noted   • No Resolved Ambulatory Problems     Past Medical History:   Diagnosis Date   • Aortic stenosis    • Aspiration pneumonia    • Atrial fibrillation    • Bronchiectasis    • COPD (chronic obstructive pulmonary disease)    • Crohn's disease    • Dizziness    • Gastric ulcer    • Generalized weakness    • Gout    • Hard of hearing    • Hyperlipidemia    • Hypertension    • Leg swelling    • Lower extremity edema    • Mild anemia    • Near syncope    • PAF (paroxysmal atrial fibrillation)    • Palpitations    • Peptic ulcer    • Pneumonia of left lung due to " "infectious organism    • Pulmonary fibrosis    • Stroke    • TIA (transient ischemic attack)        PAST SURGICAL HISTORY  Past Surgical History:   Procedure Laterality Date   • CATARACT EXTRACTION Bilateral    • COLONOSCOPY     • COLONOSCOPY N/A 3/9/2018    Procedure: COLONOSCOPY to terminal ileum with bx;  Surgeon: Aron Cabrera MD;  Location: [facility] ENDOSCOPY;  Service:    • CYSTECTOMY      back and shoulder area   • ENDOSCOPY N/A 3/9/2018    Procedure: ESOPHAGOGASTRODUODENOSCOPY with bx;  Surgeon: Aron Cabrera MD;  Location: [facility] ENDOSCOPY;  Service:    • EYE SURGERY Left     detached retina   • EYE SURGERY Right     \"repaired a hole in the eye\"   • TESTICLE SURGERY      benign tumor removed.       FAMILY HISTORY  Family History   Problem Relation Age of Onset   • Stroke Mother    • Heart disease Mother    • Hypertension Mother    • Diabetes Mother    • Heart disease Sister      Heart Valve Replacement   • Ovarian cancer Sister    • Rheumatic fever Sister    • Diabetes Sister    • Stroke Father        SOCIAL HISTORY  Social History     Social History   • Marital status:      Spouse name: N/A   • Number of children: N/A   • Years of education: N/A     Occupational History   • Not on file.     Social History Main Topics   • Smoking status: Former Smoker     Types: Cigars   • Smokeless tobacco: Never Used      Comment: Quit 25 years ago   • Alcohol use No      Comment: daily caffiene   • Drug use: No   • Sexual activity: Defer     Other Topics Concern   • Not on file     Social History Narrative   • No narrative on file       ALLERGIES  Diltiazem    REVIEW OF SYSTEMS  Review of Systems   Constitutional: Negative for chills and fever.   Respiratory: Negative for cough and shortness of breath.    Cardiovascular: Negative for chest pain and palpitations.   Gastrointestinal: Negative for abdominal pain and vomiting.   Genitourinary: Negative for dysuria.   Musculoskeletal: Positive for arthralgias (Right " knee). Negative for back pain.   Neurological: Negative for weakness and numbness.   All other systems reviewed and are negative.      PHYSICAL EXAM  ED Triage Vitals   Temp Heart Rate Resp BP SpO2   03/23/18 1310 03/23/18 1310 03/23/18 1314 03/23/18 1313 03/23/18 1310   99.2 °F (37.3 °C) 94 18 132/60 96 %      Temp src Heart Rate Source Patient Position BP Location FiO2 (%)   03/23/18 1310 03/23/18 1310 03/23/18 1313 -- --   Tympanic Monitor Sitting         Physical Exam   Constitutional: He is oriented to person, place, and time and well-developed, well-nourished, and in no distress.  Non-toxic appearance. No distress.   HENT:   Head: Normocephalic and atraumatic.   Eyes: EOM are normal.   Neck: Normal range of motion.   Cardiovascular: Normal rate, regular rhythm and normal heart sounds.    No murmur heard.  Pulses:       Posterior tibial pulses are 2+ on the right side, and 2+ on the left side.   Pulmonary/Chest: Effort normal and breath sounds normal. No respiratory distress. He has no wheezes.   Abdominal: Soft. Bowel sounds are normal. There is no tenderness. There is no rebound and no guarding.   Musculoskeletal: Normal range of motion. He exhibits tenderness (Anterior aspect of the right knee). He exhibits no edema.        Right knee: He exhibits swelling (localized to the prepatellar bursa) and effusion (of prepatellar bursa small, no joint effusion). He exhibits no ecchymosis, no deformity, no laceration, no LCL laxity, normal patellar mobility and no MCL laxity. Tenderness (mild without erythema to prepatellar area anterior-inferior aspect of patella) found. No medial joint line and no lateral joint line tenderness noted.   The pt has no joint effusion or laxity of the right knee.   Neurological: He is alert and oriented to person, place, and time.   Skin: Skin is warm and dry.   No signs of skin breakdown, abrasion, irritation to anterior knee   Psychiatric: Affect normal.   Nursing note and vitals  reviewed.    RADIOLOGY  XR Knee 1 or 2 View Right   Final Result   1. Normal osseous structures for patient's age, mild relative medial  compartmental narrowing to severe vascular calcification.  3. No acute abnormality.        I ordered the above noted radiological studies. Interpreted by radiologist. Reviewed by me in PACS.       PROCEDURES  Procedures      PROGRESS AND CONSULTS  ED Course     1329  XR Right Knee ordered for further evaluation.    1439  BP- 132/60 HR- 94 Temp- 99.2 °F (37.3 °C) (Tympanic) O2 sat- 96%    Rechecked pt, he is resting comfortably. Discussed the XR results with the pt that show no fx. Discussed the plan to discharge the pt with the plan to f/u with Dr. Lobo for further evaluation. I informed the pt that he should f/u with Dr. Zaidi or Dr. Estrada if the symptoms persist. I advised the pt to use ice and elevation. I informed the pt that he could take the Norco for his pain, but the patient refuses the prescription. The pt understands and agrees with the plan.    MEDICAL DECISION MAKING  Results were reviewed/discussed with the patient and they were also made aware of online access. Pt also made aware that some labs, such as cultures, will not be resulted during ER visit and follow up with PMD is necessary.     MDM  Number of Diagnoses or Management Options  Prepatellar bursitis of right knee:      Amount and/or Complexity of Data Reviewed  Tests in the radiology section of CPT®: ordered and reviewed (XR Right Knee - 1. Normal osseous structures for patient's age, mild relative medial  compartmental narrowing to severe vascular calcification.  3. No acute abnormality.)    Patient Progress  Patient progress: stable         DIAGNOSIS  Final diagnoses:   Prepatellar bursitis of right knee       DISPOSITION  DISCHARGE    Patient discharged in stable condition.    Reviewed implications of results, diagnosis, meds, responsibility to follow up, warning signs and symptoms of possible  worsening, potential complications and reasons to return to ER.    Patient/Family voiced understanding of above instructions.    Discussed plan for discharge, as there is no emergent indication for admission. Patient referred to primary care provider for BP management due to today's BP. Pt/family is agreeable and understands need for follow up and repeat testing.  Pt is aware that discharge does not mean that nothing is wrong but it indicates no emergency is present that requires admission and they must continue care with follow-up as given below or physician of their choice.     FOLLOW-UP  Lubna Lobo MD  [address]  [address]  [place] [address]  [phone]    In 3 days  For follow up    Sheree Zaidi MD  [address]  [address]  [place] [address]  [phone]    In 1 week  As needed    Jeff Estrada MD  [address]  [address]  [place] [address]  [phone]    In 1 week  As needed         Medication List      New Prescriptions    HYDROcodone-acetaminophen 5-325 MG per tablet  Commonly known as:  NORCO  Take 1 tablet by mouth Every 6 (Six) Hours As Needed for Moderate Pain .          Pt refused lortab rx and agrees to follow with ortho, ice/elevate while sitting    Latest Documented Vital Signs:  As of 2:41 PM  BP- 132/60 HR- 94 Temp- 99.2 °F (37.3 °C) (Tympanic) O2 sat- 96%    --  Documentation assistance provided by ananya Chin for Dr. Schwartz.  Information recorded by the scribe was done at my direction and has been verified and validated by me.       Tucker Chin  03/23/18 [id number]       Vivian Schwartz MD  03/24/18 [id number]

## 2018-03-23 NOTE — DISCHARGE INSTRUCTIONS
You are advised to follow closely with Dr. Lobo, and Dr Zaidi, Dr Estrada or orthopedist of your choice in 5-7 days for recheck, final results of imaging testing, and further testing/treatment as needed.    Ice right knee at least 3 times daily   Do not do any activity on your knees, no crawling, kneeling      Please return to the emergency department immediately with chest pain different than usual for you, shortness of air, abdominal pain, persistent vomiting/fever, blood in emesis or stool, lightheadedness/fainting, problems with speech, one sided weakness/numbness, new incontinence, problems with vision, increased pain/swelling/redness or for worsening of symptoms or other concerns.

## 2018-03-26 ENCOUNTER — TELEPHONE (OUTPATIENT)
Dept: ORTHOPEDIC SURGERY | Facility: CLINIC | Age: 83
End: 2018-03-26

## 2018-04-02 ENCOUNTER — OFFICE VISIT (OUTPATIENT)
Dept: ORTHOPEDIC SURGERY | Facility: CLINIC | Age: 83
End: 2018-04-02

## 2018-04-02 ENCOUNTER — LAB (OUTPATIENT)
Dept: LAB | Facility: HOSPITAL | Age: 83
End: 2018-04-02

## 2018-04-02 VITALS — WEIGHT: 162 LBS | BODY MASS INDEX: 24.55 KG/M2 | HEIGHT: 68 IN

## 2018-04-02 DIAGNOSIS — R53.83 MALAISE AND FATIGUE: ICD-10-CM

## 2018-04-02 DIAGNOSIS — R63.4 WEIGHT LOSS, ABNORMAL: ICD-10-CM

## 2018-04-02 DIAGNOSIS — M70.41 PREPATELLAR BURSITIS OF RIGHT KNEE: Primary | ICD-10-CM

## 2018-04-02 DIAGNOSIS — R53.81 MALAISE AND FATIGUE: ICD-10-CM

## 2018-04-02 LAB — CORTIS SERPL-MCNC: 3.68 MCG/DL

## 2018-04-02 PROCEDURE — 99202 OFFICE O/P NEW SF 15 MIN: CPT | Performed by: ORTHOPAEDIC SURGERY

## 2018-04-02 PROCEDURE — 82533 TOTAL CORTISOL: CPT

## 2018-04-02 PROCEDURE — 36415 COLL VENOUS BLD VENIPUNCTURE: CPT

## 2018-04-02 NOTE — PROGRESS NOTES
New Right Knee      Patient: Asia Ly        YOB: 1932    Medical Record Number: [id number]        Chief Complaints: right knee pain  Chief Complaint   Patient presents with   • Right Knee - Establish Care           History of Present Illness: This is a  85 y.o. patient of birth is complaining of right knee pain is been ongoing about a week and a half he states he was at the gym did nothing out of the ordinary got home and noticed a swelling on anterior aspect of his knee.  There is no redness no pain just swelling right on the front of his kneecap.  No prior history similar symptoms his symptoms are resolved at this point no prior history similar symptoms past medical history marked for stomach ulcers      Allergies:   Allergies   Allergen Reactions   • Diltiazem        Medications:   Home Medications:  Current Outpatient Prescriptions on File Prior to Visit   Medication Sig   • apixaban (ELIQUIS) 5 MG tablet tablet Take 5 mg by mouth 2 (Two) Times a Day.   • atorvastatin (LIPITOR) 10 MG tablet Take 10 mg by mouth Daily.   • BUDESONIDE PO Take 3 mg by mouth Daily.   • CLINDAMYCIN PHOSPHATE EX Apply  topically As Needed (rash).   • Cyanocobalamin (VITAMIN B-12 CR PO) Take 500 mg by mouth Daily.   • erythromycin base (E-MYCIN) 250 MG tablet Take 250 mg by mouth 2 (Two) Times a Day As Needed.   • ferrous sulfate 325 (65 FE) MG tablet Take 325 mg by mouth 3 (Three) Times a Day.   • furosemide (LASIX) 20 MG tablet Take 20 mg by mouth Daily.   • metoprolol tartrate (LOPRESSOR) 50 MG tablet Take 50 mg by mouth 2 (Two) Times a Day.   • omeprazole (priLOSEC) 40 MG capsule Take 40 mg by mouth Daily.   • Polyethyl Glycol-Propyl Glycol (SYSTANE ULTRA OP) Apply  to eye As Needed.   • [DISCONTINUED] HYDROcodone-acetaminophen (NORCO) 5-325 MG per tablet Take 1 tablet by mouth Every 6 (Six) Hours As Needed for Moderate Pain .     No current facility-administered medications on file prior to visit.      Current  "Medications:  Scheduled Meds:  Continuous Infusions:  No current facility-administered medications for this visit.   PRN Meds:.    Past Medical History:   Diagnosis Date   • Aortic stenosis    • Aspiration pneumonia    • Atrial fibrillation    • Bronchiectasis    • COPD (chronic obstructive pulmonary disease)    • Crohn's disease    • Dizziness    • Gastric ulcer    • Generalized weakness    • Gout    • Hard of hearing    • Hyperlipidemia    • Hypertension    • Leg swelling    • Lower extremity edema    • Mild anemia    • Near syncope    • PAF (paroxysmal atrial fibrillation)    • Palpitations    • Peptic ulcer    • Pneumonia of left lung due to infectious organism    • Pulmonary fibrosis    • Stroke    • TIA (transient ischemic attack)         Past Surgical History:   Procedure Laterality Date   • CATARACT EXTRACTION Bilateral    • COLONOSCOPY     • COLONOSCOPY N/A 3/9/2018    Procedure: COLONOSCOPY to terminal ileum with bx;  Surgeon: Aron Cabrera MD;  Location: [facility] ENDOSCOPY;  Service:    • CYSTECTOMY      back and shoulder area   • ENDOSCOPY N/A 3/9/2018    Procedure: ESOPHAGOGASTRODUODENOSCOPY with bx;  Surgeon: Aron Cabrera MD;  Location: [facility] ENDOSCOPY;  Service:    • EYE SURGERY Left     detached retina   • EYE SURGERY Right     \"repaired a hole in the eye\"   • TESTICLE SURGERY      benign tumor removed.        Social History     Occupational History   • Not on file.     Social History Main Topics   • Smoking status: Former Smoker     Types: Cigars   • Smokeless tobacco: Never Used      Comment: Quit 25 years ago   • Alcohol use No      Comment: daily caffiene   • Drug use: No   • Sexual activity: Defer    Social History     Social History Narrative   • No narrative on file        Family History   Problem Relation Age of Onset   • Stroke Mother    • Heart disease Mother    • Hypertension Mother    • Diabetes Mother    • Heart disease Sister      Heart Valve Replacement   • Ovarian cancer Sister  " "  • Rheumatic fever Sister    • Diabetes Sister    • Stroke Father              Review of Systems: 14 point review of systems are remarkable for the pertinent positives listed in the chart by the patient the remainder are negative    Review of Systems      Physical Exam: 85 y.o. male  General Appearance:    Alert, cooperative, in no acute distress                 Vitals:    04/02/18 1113   Weight: 73.5 kg (162 lb)   Height: 172.7 cm (68\")      Patient is alert and read ×3 no acute distress appears her above-listed at height weight and age.  Affect is normal respiratory rate is normal unlabored. Heart rate regular rate rhythm, sclera, dentition and hearing are normal for the purpose of this exam.        Ortho Exam Physical exam of the right knee reveals no effusion, no erythema.  It does appear to have some result redness that is faint just in the area the prepatellar bursa The patient has no palpable tenderness along the medial joint line, no tenderness about the lateral joint line.  The patient has a negative bounce home, negative Angi and a stable ligamentous exam.  Quad tone is reasonable and symmetric.  There are no overlying skin changes no lymphedema no lymphadenopathy.  There is good hip range of motion which is full symmetric and asymptomatic and a normal ankle exam.        Procedures             Radiology:   AP, Lateral x-rays of the right knee are in Epic for my review there are no other comparative films these show some mild patellofemoral OA and mild medial OA otherwise no acute bony pathology  Assessment/Plan:  Resolved prepatellar bursitis I instructed him to keep all weight off the anterior aspect of his knee if his symptoms return will call we will get him back in here quickly                                "

## 2018-04-13 ENCOUNTER — TRANSCRIBE ORDERS (OUTPATIENT)
Dept: LAB | Facility: HOSPITAL | Age: 83
End: 2018-04-13

## 2018-04-13 ENCOUNTER — LAB (OUTPATIENT)
Dept: LAB | Facility: HOSPITAL | Age: 83
End: 2018-04-13
Attending: INTERNAL MEDICINE

## 2018-04-13 DIAGNOSIS — D50.9 IRON DEFICIENCY ANEMIA, UNSPECIFIED IRON DEFICIENCY ANEMIA TYPE: Primary | ICD-10-CM

## 2018-04-13 DIAGNOSIS — R10.13 DYSPEPSIA: ICD-10-CM

## 2018-04-13 DIAGNOSIS — D50.9 IRON DEFICIENCY ANEMIA, UNSPECIFIED IRON DEFICIENCY ANEMIA TYPE: ICD-10-CM

## 2018-04-13 LAB
ALBUMIN SERPL-MCNC: 3.9 G/DL (ref 3.5–5.2)
ALBUMIN/GLOB SERPL: 1.3 G/DL
ALP SERPL-CCNC: 47 U/L (ref 39–117)
ALT SERPL W P-5'-P-CCNC: 12 U/L (ref 1–41)
ANION GAP SERPL CALCULATED.3IONS-SCNC: 11.1 MMOL/L
AST SERPL-CCNC: 15 U/L (ref 1–40)
BASOPHILS # BLD AUTO: 0.04 10*3/MM3 (ref 0–0.2)
BASOPHILS NFR BLD AUTO: 0.4 % (ref 0–1.5)
BILIRUB SERPL-MCNC: 0.6 MG/DL (ref 0.1–1.2)
BUN BLD-MCNC: 21 MG/DL (ref 8–23)
BUN/CREAT SERPL: 17.5 (ref 7–25)
CALCIUM SPEC-SCNC: 9.2 MG/DL (ref 8.6–10.5)
CHLORIDE SERPL-SCNC: 102 MMOL/L (ref 98–107)
CO2 SERPL-SCNC: 26.9 MMOL/L (ref 22–29)
CREAT BLD-MCNC: 1.2 MG/DL (ref 0.76–1.27)
DEPRECATED RDW RBC AUTO: 58.7 FL (ref 37–54)
EOSINOPHIL # BLD AUTO: 0.22 10*3/MM3 (ref 0–0.7)
EOSINOPHIL NFR BLD AUTO: 2.4 % (ref 0.3–6.2)
ERYTHROCYTE [DISTWIDTH] IN BLOOD BY AUTOMATED COUNT: 17 % (ref 11.5–14.5)
GFR SERPL CREATININE-BSD FRML MDRD: 58 ML/MIN/1.73
GLOBULIN UR ELPH-MCNC: 3 GM/DL
GLUCOSE BLD-MCNC: 96 MG/DL (ref 65–99)
HCT VFR BLD AUTO: 36 % (ref 40.4–52.2)
HGB BLD-MCNC: 10.7 G/DL (ref 13.7–17.6)
IMM GRANULOCYTES # BLD: 0 10*3/MM3 (ref 0–0.03)
IMM GRANULOCYTES NFR BLD: 0 % (ref 0–0.5)
LYMPHOCYTES # BLD AUTO: 2.15 10*3/MM3 (ref 0.9–4.8)
LYMPHOCYTES NFR BLD AUTO: 23.5 % (ref 19.6–45.3)
MCH RBC QN AUTO: 28.2 PG (ref 27–32.7)
MCHC RBC AUTO-ENTMCNC: 29.7 G/DL (ref 32.6–36.4)
MCV RBC AUTO: 95 FL (ref 79.8–96.2)
MONOCYTES # BLD AUTO: 0.76 10*3/MM3 (ref 0.2–1.2)
MONOCYTES NFR BLD AUTO: 8.3 % (ref 5–12)
NEUTROPHILS # BLD AUTO: 5.98 10*3/MM3 (ref 1.9–8.1)
NEUTROPHILS NFR BLD AUTO: 65.4 % (ref 42.7–76)
PLATELET # BLD AUTO: 173 10*3/MM3 (ref 140–500)
PMV BLD AUTO: 11.8 FL (ref 6–12)
POTASSIUM BLD-SCNC: 4.4 MMOL/L (ref 3.5–5.2)
PROT SERPL-MCNC: 6.9 G/DL (ref 6–8.5)
RBC # BLD AUTO: 3.79 10*6/MM3 (ref 4.6–6)
SODIUM BLD-SCNC: 140 MMOL/L (ref 136–145)
WBC NRBC COR # BLD: 9.15 10*3/MM3 (ref 4.5–10.7)

## 2018-04-13 PROCEDURE — 85025 COMPLETE CBC W/AUTO DIFF WBC: CPT

## 2018-04-13 PROCEDURE — 80053 COMPREHEN METABOLIC PANEL: CPT

## 2018-04-13 PROCEDURE — 36415 COLL VENOUS BLD VENIPUNCTURE: CPT

## 2018-04-22 ENCOUNTER — HOSPITAL ENCOUNTER (EMERGENCY)
Facility: HOSPITAL | Age: 83
Discharge: HOME | End: 2018-04-22
Attending: EMERGENCY MEDICINE | Admitting: EMERGENCY MEDICINE

## 2018-04-22 ENCOUNTER — APPOINTMENT (OUTPATIENT)
Dept: GENERAL RADIOLOGY | Facility: HOSPITAL | Age: 83
End: 2018-04-22

## 2018-04-22 VITALS
HEIGHT: 68 IN | SYSTOLIC BLOOD PRESSURE: 141 MMHG | BODY MASS INDEX: 24.86 KG/M2 | OXYGEN SATURATION: 97 % | DIASTOLIC BLOOD PRESSURE: 68 MMHG | HEART RATE: 70 BPM | TEMPERATURE: 98.6 F | WEIGHT: 164 LBS | RESPIRATION RATE: 16 BRPM

## 2018-04-22 DIAGNOSIS — E79.0 ELEVATED BLOOD URIC ACID LEVEL: ICD-10-CM

## 2018-04-22 DIAGNOSIS — M79.672 ACUTE FOOT PAIN, LEFT: Primary | ICD-10-CM

## 2018-04-22 LAB
ANION GAP SERPL CALCULATED.3IONS-SCNC: 10.3 MMOL/L
BASOPHILS # BLD AUTO: 0.04 10*3/MM3 (ref 0–0.2)
BASOPHILS NFR BLD AUTO: 0.4 % (ref 0–1.5)
BUN BLD-MCNC: 25 MG/DL (ref 8–23)
BUN/CREAT SERPL: 22.1 (ref 7–25)
CALCIUM SPEC-SCNC: 9.3 MG/DL (ref 8.6–10.5)
CHLORIDE SERPL-SCNC: 100 MMOL/L (ref 98–107)
CO2 SERPL-SCNC: 28.7 MMOL/L (ref 22–29)
CREAT BLD-MCNC: 1.13 MG/DL (ref 0.76–1.27)
DEPRECATED RDW RBC AUTO: 55.5 FL (ref 37–54)
EOSINOPHIL # BLD AUTO: 0.13 10*3/MM3 (ref 0–0.7)
EOSINOPHIL NFR BLD AUTO: 1.3 % (ref 0.3–6.2)
ERYTHROCYTE [DISTWIDTH] IN BLOOD BY AUTOMATED COUNT: 16.3 % (ref 11.5–14.5)
GFR SERPL CREATININE-BSD FRML MDRD: 62 ML/MIN/1.73
GLUCOSE BLD-MCNC: 80 MG/DL (ref 65–99)
HCT VFR BLD AUTO: 36.4 % (ref 40.4–52.2)
HGB BLD-MCNC: 11.2 G/DL (ref 13.7–17.6)
HOLD SPECIMEN: NORMAL
IMM GRANULOCYTES # BLD: 0.02 10*3/MM3 (ref 0–0.03)
IMM GRANULOCYTES NFR BLD: 0.2 % (ref 0–0.5)
LYMPHOCYTES # BLD AUTO: 1.84 10*3/MM3 (ref 0.9–4.8)
LYMPHOCYTES NFR BLD AUTO: 18.9 % (ref 19.6–45.3)
MCH RBC QN AUTO: 28.5 PG (ref 27–32.7)
MCHC RBC AUTO-ENTMCNC: 30.8 G/DL (ref 32.6–36.4)
MCV RBC AUTO: 92.6 FL (ref 79.8–96.2)
MONOCYTES # BLD AUTO: 0.79 10*3/MM3 (ref 0.2–1.2)
MONOCYTES NFR BLD AUTO: 8.1 % (ref 5–12)
NEUTROPHILS # BLD AUTO: 6.94 10*3/MM3 (ref 1.9–8.1)
NEUTROPHILS NFR BLD AUTO: 71.1 % (ref 42.7–76)
PLATELET # BLD AUTO: 182 10*3/MM3 (ref 140–500)
PMV BLD AUTO: 11.6 FL (ref 6–12)
POTASSIUM BLD-SCNC: 4.1 MMOL/L (ref 3.5–5.2)
RBC # BLD AUTO: 3.93 10*6/MM3 (ref 4.6–6)
SODIUM BLD-SCNC: 139 MMOL/L (ref 136–145)
URATE SERPL-MCNC: 8 MG/DL (ref 3.4–7)
WBC NRBC COR # BLD: 9.76 10*3/MM3 (ref 4.5–10.7)
WHOLE BLOOD HOLD SPECIMEN: NORMAL

## 2018-04-22 PROCEDURE — 99284 EMERGENCY DEPT VISIT MOD MDM: CPT

## 2018-04-22 PROCEDURE — 36415 COLL VENOUS BLD VENIPUNCTURE: CPT

## 2018-04-22 PROCEDURE — 73630 X-RAY EXAM OF FOOT: CPT

## 2018-04-22 PROCEDURE — 84550 ASSAY OF BLOOD/URIC ACID: CPT | Performed by: NURSE PRACTITIONER

## 2018-04-22 PROCEDURE — 85025 COMPLETE CBC W/AUTO DIFF WBC: CPT | Performed by: NURSE PRACTITIONER

## 2018-04-22 PROCEDURE — 80048 BASIC METABOLIC PNL TOTAL CA: CPT | Performed by: NURSE PRACTITIONER

## 2018-04-22 RX ORDER — DOXYCYCLINE HYCLATE 50 MG/1
100 TABLET, FILM COATED ORAL DAILY
COMMUNITY
End: 2018-04-26

## 2018-04-22 RX ORDER — ALLOPURINOL 100 MG/1
100 TABLET ORAL DAILY
COMMUNITY
End: 2021-07-12 | Stop reason: HOSPADM

## 2018-04-22 NOTE — ED PROVIDER NOTES
"EMERGENCY DEPARTMENT ENCOUNTER    Room Number:  06/06  Date seen:  4/22/2018  Time seen: 9:27 AM  PCP: Lubna Lobo MD    HPI:  Chief complaint:Left foot pain  Context:Asia Ly is a 85 y.o. male who presents to the ED with c/o left foot pain that began when he woke up in the morning on Thursday.  The pain was constant Thursday - Saturday but today the pain only occurs when he walks.  Pt has a history of gout in his right foot.  His last last flare up was one month ago. Pt denies any issues in he the left foot.     Timing:Constant  Duration: 3 days  Location:Left foot  Radiation:None  Quality:\"Pain\"  Intensity/Severity:Moderate  Associated Symptoms:None  Aggravating Factors:Walking  Alleviating Factors:None  Previous Episodes:None  Treatment before arrival:None      MEDICAL RECORD REVIEW  Pt is taking alocurenol for gout and ellaquis for afib.       ALLERGIES  Diltiazem    PAST MEDICAL HISTORY  Active Ambulatory Problems     Diagnosis Date Noted   • Ataxia 08/19/2016   • TIA (transient ischemic attack) 08/19/2016   • Atrial fibrillation, paroxysmal 08/19/2016   • Hyperlipidemia 08/19/2016   • Gout 08/19/2016   • Crohn's disease 08/19/2016     Resolved Ambulatory Problems     Diagnosis Date Noted   • No Resolved Ambulatory Problems     Past Medical History:   Diagnosis Date   • Aortic stenosis    • Aspiration pneumonia    • Atrial fibrillation    • Bronchiectasis    • COPD (chronic obstructive pulmonary disease)    • Crohn's disease    • Dizziness    • Gastric ulcer    • Generalized weakness    • Gout    • Hard of hearing    • Hyperlipidemia    • Hypertension    • Leg swelling    • Lower extremity edema    • Mild anemia    • Near syncope    • PAF (paroxysmal atrial fibrillation)    • Palpitations    • Peptic ulcer    • Pneumonia of left lung due to infectious organism    • Pulmonary fibrosis    • Stroke    • TIA (transient ischemic attack)        PAST SURGICAL HISTORY  Past Surgical History:   Procedure " "Laterality Date   • CATARACT EXTRACTION Bilateral    • COLONOSCOPY     • COLONOSCOPY N/A 3/9/2018    Procedure: COLONOSCOPY to terminal ileum with bx;  Surgeon: Aron Cabrera MD;  Location: [facility] ENDOSCOPY;  Service:    • CYSTECTOMY      back and shoulder area   • ENDOSCOPY N/A 3/9/2018    Procedure: ESOPHAGOGASTRODUODENOSCOPY with bx;  Surgeon: Aron Cabrera MD;  Location: [facility] ENDOSCOPY;  Service:    • EYE SURGERY Left     detached retina   • EYE SURGERY Right     \"repaired a hole in the eye\"   • TESTICLE SURGERY      benign tumor removed.       FAMILY HISTORY  Family History   Problem Relation Age of Onset   • Stroke Mother    • Heart disease Mother    • Hypertension Mother    • Diabetes Mother    • Heart disease Sister      Heart Valve Replacement   • Ovarian cancer Sister    • Rheumatic fever Sister    • Diabetes Sister    • Stroke Father        SOCIAL HISTORY  Social History     Social History   • Marital status:      Spouse name: N/A   • Number of children: N/A   • Years of education: N/A     Occupational History   • Not on file.     Social History Main Topics   • Smoking status: Former Smoker     Types: Cigars   • Smokeless tobacco: Never Used      Comment: Quit 25 years ago   • Alcohol use No      Comment: daily caffiene   • Drug use: No   • Sexual activity: Defer     Other Topics Concern   • Not on file     Social History Narrative   • No narrative on file       REVIEW OF SYSTEMS  Review of Systems   Constitutional: Negative for activity change, appetite change, diaphoresis and fever.   HENT: Negative for trouble swallowing.    Eyes: Negative for visual disturbance.   Respiratory: Negative for cough, chest tightness, shortness of breath and wheezing.    Cardiovascular: Negative for chest pain, palpitations and leg swelling.   Gastrointestinal: Negative for abdominal pain, diarrhea, nausea and vomiting.   Genitourinary: Negative for dysuria.   Musculoskeletal: Negative for back pain.        " Left foot pain   Skin: Negative for rash.   Neurological: Negative for dizziness, speech difficulty and light-headedness.       PHYSICAL EXAM  ED Triage Vitals   Temp Heart Rate Resp BP SpO2   04/22/18 0844 04/22/18 0844 04/22/18 0844 04/22/18 0848 04/22/18 0844   98.6 °F (37 °C) 74 16 121/62 100 %      Temp src Heart Rate Source Patient Position BP Location FiO2 (%)   04/22/18 0844 -- -- -- --   Tympanic         Physical Exam   Constitutional: He is oriented to person, place, and time and well-developed, well-nourished, and in no distress. No distress.   HENT:   Head: Normocephalic and atraumatic.   Eyes: Pupils are equal, round, and reactive to light.   Neck: Normal range of motion. Neck supple.   Cardiovascular: Normal rate, S1 normal, S2 normal and normal heart sounds.  Exam reveals no gallop and no friction rub.    No murmur heard.  Pulmonary/Chest: Effort normal. No respiratory distress. He has decreased breath sounds in the right lower field and the left lower field. He has no wheezes. He has no rales. He exhibits no tenderness.   Abdominal: Soft. There is no rebound and no guarding.   Musculoskeletal: Normal range of motion. He exhibits no deformity.   There is pain at the base of the 5th MTP on the left foot.   Lymphadenopathy:     He has no cervical adenopathy.   Neurological: He is alert and oriented to person, place, and time.   Skin: Skin is warm and dry.   Psychiatric: Affect normal.   Nursing note and vitals reviewed.      LAB RESULTS  Recent Results (from the past 24 hour(s))   Basic Metabolic Panel    Collection Time: 04/22/18 10:05 AM   Result Value Ref Range    Glucose 80 65 - 99 mg/dL    BUN 25 (H) 8 - 23 mg/dL    Creatinine 1.13 0.76 - 1.27 mg/dL    Sodium 139 136 - 145 mmol/L    Potassium 4.1 3.5 - 5.2 mmol/L    Chloride 100 98 - 107 mmol/L    CO2 28.7 22.0 - 29.0 mmol/L    Calcium 9.3 8.6 - 10.5 mg/dL    eGFR Non African Amer 62 >60 mL/min/1.73    BUN/Creatinine Ratio 22.1 7.0 - 25.0    Anion  Gap 10.3 mmol/L   Uric Acid    Collection Time: 04/22/18 10:05 AM   Result Value Ref Range    Uric Acid 8.0 (H) 3.4 - 7.0 mg/dL   CBC Auto Differential    Collection Time: 04/22/18 10:05 AM   Result Value Ref Range    WBC 9.76 4.50 - 10.70 10*3/mm3    RBC 3.93 (L) 4.60 - 6.00 10*6/mm3    Hemoglobin 11.2 (L) 13.7 - 17.6 g/dL    Hematocrit 36.4 (L) 40.4 - 52.2 %    MCV 92.6 79.8 - 96.2 fL    MCH 28.5 27.0 - 32.7 pg    MCHC 30.8 (L) 32.6 - 36.4 g/dL    RDW 16.3 (H) 11.5 - 14.5 %    RDW-SD 55.5 (H) 37.0 - 54.0 fl    MPV 11.6 6.0 - 12.0 fL    Platelets 182 140 - 500 10*3/mm3    Neutrophil % 71.1 42.7 - 76.0 %    Lymphocyte % 18.9 (L) 19.6 - 45.3 %    Monocyte % 8.1 5.0 - 12.0 %    Eosinophil % 1.3 0.3 - 6.2 %    Basophil % 0.4 0.0 - 1.5 %    Immature Grans % 0.2 0.0 - 0.5 %    Neutrophils, Absolute 6.94 1.90 - 8.10 10*3/mm3    Lymphocytes, Absolute 1.84 0.90 - 4.80 10*3/mm3    Monocytes, Absolute 0.79 0.20 - 1.20 10*3/mm3    Eosinophils, Absolute 0.13 0.00 - 0.70 10*3/mm3    Basophils, Absolute 0.04 0.00 - 0.20 10*3/mm3    Immature Grans, Absolute 0.02 0.00 - 0.03 10*3/mm3   Light Blue Top    Collection Time: 04/22/18 10:05 AM   Result Value Ref Range    Extra Tube hold for add-on    Gold Top - SST    Collection Time: 04/22/18 10:05 AM   Result Value Ref Range    Extra Tube Hold for add-ons.        I ordered the above labs and reviewed the results    RADIOLOGY  XR Foot 3+ View Left   Final Result        THREE-VIEW LEFT FOOT     HISTORY: Pain involving 5th metatarsal when walking.     FINDINGS:  There is moderate diffuse osteoporosis unchanged from  12/11/2015. There are prominent degenerative changes at the first MTP  joint also unchanged. The remainder of the foot is unremarkable with  particular reference to the 5th metatarsal.     This report was finalized on 4/22/2018 10:44 AM by Dr. Alexx Dimas MD.  I ordered the above noted radiological studies and reviewed the images on the PACS system.      MEDICATIONS GIVEN  "IN ER  Medications - No data to display      PROCEDURES  Procedures    COURSE & MEDICAL DECISION MAKING  Pertinent Labs and Imaging studies that were ordered and reviewed are noted above.  Results were reviewed/discussed with the patient and they were also made aware of online assess.  Pt also made aware that some labs, such as cultures, will not be resulted during ER visit and follow up with PMD is necessary.     PROGRESS AND CONSULTS    Progress Notes:    ED Course     0937  Ordered blood work and foot XR for further evaluation.    1048  Reviewed pt's history and workup with Dr. Glynn.  After a bedside evaluation; Dr Glynn agrees with the plan of care    1106  Workup shows nothing acute.  Discussed the plan to discharge with outpatient follow up with Dr. Lobo. Pt understands and agrees with the plan, all questions answered.    The patient's history, physical exam, and lab findings were discussed with the physician, who also performed a face to face history and physical exam.  I discussed all results and noted any abnormalities with patient.  Discussed absoute need to recheck abnormalities with their family physician.  I answered any of the patient's questions.  Discussed plan for discharge, as there is no emergent indication for admission.  Pt is agreeable and understands need for follow up and repeat testing.  Pt is aware that discharge does not mean that nothing is wrong but it indicates no emergency is present and they must continue care with their family physician.  Pt is discharged with instructions to follow up with primary care doctor to have their blood pressure rechecked.       Disposition vitals:  /68 (BP Location: Right arm, Patient Position: Lying)   Pulse 70   Temp 98.6 °F (37 °C) (Tympanic)   Resp 16   Ht 172.7 cm (68\")   Wt 74.4 kg (164 lb)   SpO2 97%   BMI 24.94 kg/m²       DIAGNOSIS  Final diagnoses:   Acute foot pain, left   Elevated blood uric acid level       FOLLOW UP "   Lubna Lobo MD  [address] [place] [address]  [phone]    Schedule an appointment as soon as possible for a visit in 3 days        RX     Medication List      No changes were made to your prescriptions during this visit.       Documentation assistance provided by ananya Hager for KYLAH Graham.  Information recorded by the scribe was done at my direction and has been verified and validated by me.  Electronically signed by Brenda Hager on 4/22/2018 at time 11:52 AM       Brenda Hager  04/22/18 1152       Trice Will, KYLAH  04/22/18 [id number]

## 2018-04-22 NOTE — ED PROVIDER NOTES
Pt presents to the ED c/o left foot pain that woke him up 4 days ago. He states that it continued until 2 days ago when the pain went away except for when he walks. Pt has hx of gout in foot. On exam, swelling and tenderness over dorsal aspect of the left foot. No lymphangitis.    Attestation:  The N[facility] and I have discussed this patient's history, physical exam, and treatment plan.  I have reviewed the documentation and personally had a face to face interaction with the patient. I affirm the documentation and agree with the treatment and plan.  The attached note describes my personal findings.      Documentation assistance provided by ananya Madrid for . Information recorded by the scribe was done at my direction and has been verified and validated by me.     Godfrey Madrid  04/22/18 3945       Virgilio Glynn MD  04/22/18 7212

## 2018-04-25 ENCOUNTER — APPOINTMENT (OUTPATIENT)
Dept: GENERAL RADIOLOGY | Facility: HOSPITAL | Age: 83
End: 2018-04-25

## 2018-04-25 ENCOUNTER — HOSPITAL ENCOUNTER (EMERGENCY)
Facility: HOSPITAL | Age: 83
Discharge: HOME | End: 2018-04-25
Attending: EMERGENCY MEDICINE | Admitting: EMERGENCY MEDICINE

## 2018-04-25 VITALS
RESPIRATION RATE: 18 BRPM | HEART RATE: 75 BPM | HEIGHT: 65 IN | WEIGHT: 161 LBS | SYSTOLIC BLOOD PRESSURE: 153 MMHG | DIASTOLIC BLOOD PRESSURE: 44 MMHG | BODY MASS INDEX: 26.82 KG/M2 | TEMPERATURE: 99.1 F | OXYGEN SATURATION: 95 %

## 2018-04-25 DIAGNOSIS — I48.91 ATRIAL FIBRILLATION WITH RVR (HCC): Primary | ICD-10-CM

## 2018-04-25 LAB
ALBUMIN SERPL-MCNC: 3.8 G/DL (ref 3.5–5.2)
ALBUMIN/GLOB SERPL: 1.2 G/DL
ALP SERPL-CCNC: 46 U/L (ref 39–117)
ALT SERPL W P-5'-P-CCNC: 7 U/L (ref 1–41)
ANION GAP SERPL CALCULATED.3IONS-SCNC: 13.1 MMOL/L
AST SERPL-CCNC: 15 U/L (ref 1–40)
BASOPHILS # BLD AUTO: 0.02 10*3/MM3 (ref 0–0.2)
BASOPHILS NFR BLD AUTO: 0.3 % (ref 0–1.5)
BILIRUB SERPL-MCNC: 0.6 MG/DL (ref 0.1–1.2)
BUN BLD-MCNC: 23 MG/DL (ref 8–23)
BUN/CREAT SERPL: 18.4 (ref 7–25)
CALCIUM SPEC-SCNC: 9.6 MG/DL (ref 8.6–10.5)
CHLORIDE SERPL-SCNC: 99 MMOL/L (ref 98–107)
CO2 SERPL-SCNC: 26.9 MMOL/L (ref 22–29)
CREAT BLD-MCNC: 1.25 MG/DL (ref 0.76–1.27)
DEPRECATED RDW RBC AUTO: 55.8 FL (ref 37–54)
EOSINOPHIL # BLD AUTO: 0.06 10*3/MM3 (ref 0–0.7)
EOSINOPHIL NFR BLD AUTO: 0.9 % (ref 0.3–6.2)
ERYTHROCYTE [DISTWIDTH] IN BLOOD BY AUTOMATED COUNT: 16.2 % (ref 11.5–14.5)
GFR SERPL CREATININE-BSD FRML MDRD: 55 ML/MIN/1.73
GLOBULIN UR ELPH-MCNC: 3.3 GM/DL
GLUCOSE BLD-MCNC: 141 MG/DL (ref 65–99)
HCT VFR BLD AUTO: 35.3 % (ref 40.4–52.2)
HGB BLD-MCNC: 10.9 G/DL (ref 13.7–17.6)
HOLD SPECIMEN: NORMAL
HOLD SPECIMEN: NORMAL
IMM GRANULOCYTES # BLD: 0.02 10*3/MM3 (ref 0–0.03)
IMM GRANULOCYTES NFR BLD: 0.3 % (ref 0–0.5)
INR PPP: 1.32 (ref 0.9–1.1)
LYMPHOCYTES # BLD AUTO: 0.67 10*3/MM3 (ref 0.9–4.8)
LYMPHOCYTES NFR BLD AUTO: 10.6 % (ref 19.6–45.3)
MAGNESIUM SERPL-MCNC: 2 MG/DL (ref 1.6–2.4)
MCH RBC QN AUTO: 28.7 PG (ref 27–32.7)
MCHC RBC AUTO-ENTMCNC: 30.9 G/DL (ref 32.6–36.4)
MCV RBC AUTO: 92.9 FL (ref 79.8–96.2)
MONOCYTES # BLD AUTO: 0.52 10*3/MM3 (ref 0.2–1.2)
MONOCYTES NFR BLD AUTO: 8.2 % (ref 5–12)
NEUTROPHILS # BLD AUTO: 5.05 10*3/MM3 (ref 1.9–8.1)
NEUTROPHILS NFR BLD AUTO: 80 % (ref 42.7–76)
PLATELET # BLD AUTO: 163 10*3/MM3 (ref 140–500)
PMV BLD AUTO: 11.8 FL (ref 6–12)
POTASSIUM BLD-SCNC: 3.9 MMOL/L (ref 3.5–5.2)
PROT SERPL-MCNC: 7.1 G/DL (ref 6–8.5)
PROTHROMBIN TIME: 16.2 SECONDS (ref 11.7–14.2)
RBC # BLD AUTO: 3.8 10*6/MM3 (ref 4.6–6)
SODIUM BLD-SCNC: 139 MMOL/L (ref 136–145)
TROPONIN T SERPL-MCNC: 0.03 NG/ML (ref 0–0.03)
TROPONIN T SERPL-MCNC: 0.03 NG/ML (ref 0–0.03)
WBC NRBC COR # BLD: 6.32 10*3/MM3 (ref 4.5–10.7)
WHOLE BLOOD HOLD SPECIMEN: NORMAL
WHOLE BLOOD HOLD SPECIMEN: NORMAL

## 2018-04-25 PROCEDURE — 85025 COMPLETE CBC W/AUTO DIFF WBC: CPT | Performed by: EMERGENCY MEDICINE

## 2018-04-25 PROCEDURE — 96374 THER/PROPH/DIAG INJ IV PUSH: CPT

## 2018-04-25 PROCEDURE — 93005 ELECTROCARDIOGRAM TRACING: CPT | Performed by: EMERGENCY MEDICINE

## 2018-04-25 PROCEDURE — 80053 COMPREHEN METABOLIC PANEL: CPT | Performed by: EMERGENCY MEDICINE

## 2018-04-25 PROCEDURE — 71046 X-RAY EXAM CHEST 2 VIEWS: CPT

## 2018-04-25 PROCEDURE — 96361 HYDRATE IV INFUSION ADD-ON: CPT

## 2018-04-25 PROCEDURE — 96376 TX/PRO/DX INJ SAME DRUG ADON: CPT

## 2018-04-25 PROCEDURE — 93010 ELECTROCARDIOGRAM REPORT: CPT | Performed by: INTERNAL MEDICINE

## 2018-04-25 PROCEDURE — 85610 PROTHROMBIN TIME: CPT | Performed by: EMERGENCY MEDICINE

## 2018-04-25 PROCEDURE — 84484 ASSAY OF TROPONIN QUANT: CPT | Performed by: EMERGENCY MEDICINE

## 2018-04-25 PROCEDURE — 99284 EMERGENCY DEPT VISIT MOD MDM: CPT

## 2018-04-25 PROCEDURE — 83735 ASSAY OF MAGNESIUM: CPT | Performed by: EMERGENCY MEDICINE

## 2018-04-25 RX ORDER — SODIUM CHLORIDE 0.9 % (FLUSH) 0.9 %
10 SYRINGE (ML) INJECTION AS NEEDED
Status: DISCONTINUED | OUTPATIENT
Start: 2018-04-25 | End: 2018-04-25 | Stop reason: HOSPADM

## 2018-04-25 RX ORDER — METOPROLOL TARTRATE 5 MG/5ML
INJECTION INTRAVENOUS
Status: COMPLETED
Start: 2018-04-25 | End: 2018-04-25

## 2018-04-25 RX ORDER — METOPROLOL TARTRATE 5 MG/5ML
5 INJECTION INTRAVENOUS ONCE
Status: COMPLETED | OUTPATIENT
Start: 2018-04-25 | End: 2018-04-25

## 2018-04-25 RX ADMIN — METOPROLOL TARTRATE 5 MG: 5 INJECTION, SOLUTION INTRAVENOUS at 07:45

## 2018-04-25 RX ADMIN — METOPROLOL TARTRATE 5 MG: 5 INJECTION, SOLUTION INTRAVENOUS at 08:40

## 2018-04-25 RX ADMIN — SODIUM CHLORIDE 500 ML: 9 INJECTION, SOLUTION INTRAVENOUS at 07:08

## 2018-04-25 NOTE — ED PROVIDER NOTES
EMERGENCY DEPARTMENT ENCOUNTER    CHIEF COMPLAINT  Chief Complaint: Generalized weakness  History given by: Pt  History limited by: Nothing  Room Number: 13/13  PMD: Lubna Lobo MD  Cardiology: Dr. Rivera    HPI:  Pt is a 85 y.o. male who presents complaining of generalized weakness starting this morning. Pt also c/o dizziness, nausea, pallor, palpitations and diaphoresis. Pt denies CP, SOA, blood in stool or fever. Wife reports that the pt had a cortisone shot for gout two days ago. Wife reports that the pt had colonoscopy with Dr. Cabrera last month for blood in stool. Pt report a hx of afib.     Duration:  This AM  Onset: gradual  Timing: constant  Location: generalized  Quality: generalized weakness  Intensity/Severity: moderate  Progression: unchanged  Associated Symptoms: dizziness, nausea, pallor, palpitations, diaphoresis  Aggravating Factors: none  Alleviating Factors: none  Previous Episodes: Pt reports a hx of afib  Treatment before arrival: Wife states that the pt had a cortisone shot for gout two days ago    PAST MEDICAL HISTORY  Active Ambulatory Problems     Diagnosis Date Noted   • Ataxia 08/19/2016   • TIA (transient ischemic attack) 08/19/2016   • Atrial fibrillation, paroxysmal 08/19/2016   • Hyperlipidemia 08/19/2016   • Gout 08/19/2016   • Crohn's disease 08/19/2016     Resolved Ambulatory Problems     Diagnosis Date Noted   • No Resolved Ambulatory Problems     Past Medical History:   Diagnosis Date   • Aortic stenosis    • Aspiration pneumonia    • Atrial fibrillation    • Bronchiectasis    • COPD (chronic obstructive pulmonary disease)    • Crohn's disease    • Dizziness    • Gastric ulcer    • Generalized weakness    • Gout    • Hard of hearing    • Hyperlipidemia    • Hypertension    • Leg swelling    • Lower extremity edema    • Mild anemia    • Near syncope    • PAF (paroxysmal atrial fibrillation)    • Palpitations    • Peptic ulcer    • Pneumonia of left lung due to infectious  "organism    • Pulmonary fibrosis    • Stroke    • TIA (transient ischemic attack)        PAST SURGICAL HISTORY  Past Surgical History:   Procedure Laterality Date   • CATARACT EXTRACTION Bilateral    • COLONOSCOPY     • COLONOSCOPY N/A 3/9/2018    Procedure: COLONOSCOPY to terminal ileum with bx;  Surgeon: Aron Cabrera MD;  Location: [facility] ENDOSCOPY;  Service:    • CYSTECTOMY      back and shoulder area   • ENDOSCOPY N/A 3/9/2018    Procedure: ESOPHAGOGASTRODUODENOSCOPY with bx;  Surgeon: Aron Cabrera MD;  Location: [facility] ENDOSCOPY;  Service:    • EYE SURGERY Left     detached retina   • EYE SURGERY Right     \"repaired a hole in the eye\"   • TESTICLE SURGERY      benign tumor removed.       FAMILY HISTORY  Family History   Problem Relation Age of Onset   • Stroke Mother    • Heart disease Mother    • Hypertension Mother    • Diabetes Mother    • Heart disease Sister      Heart Valve Replacement   • Ovarian cancer Sister    • Rheumatic fever Sister    • Diabetes Sister    • Stroke Father        SOCIAL HISTORY  Social History     Social History   • Marital status:      Spouse name: N/A   • Number of children: N/A   • Years of education: N/A     Occupational History   • Not on file.     Social History Main Topics   • Smoking status: Former Smoker     Types: Cigars   • Smokeless tobacco: Never Used      Comment: Quit 25 years ago   • Alcohol use No      Comment: daily caffiene   • Drug use: No   • Sexual activity: Defer     Other Topics Concern   • Not on file     Social History Narrative   • No narrative on file       ALLERGIES  Diltiazem    REVIEW OF SYSTEMS  Review of Systems   Constitutional: Positive for diaphoresis. Negative for activity change, appetite change and fever.   HENT: Negative for congestion and sore throat.    Eyes: Negative.    Respiratory: Negative for cough and shortness of breath.    Cardiovascular: Positive for palpitations. Negative for chest pain and leg swelling. "   Gastrointestinal: Negative for abdominal pain, diarrhea and vomiting.   Endocrine: Negative.    Genitourinary: Negative for decreased urine volume and dysuria.   Musculoskeletal: Negative for neck pain.   Skin: Positive for pallor. Negative for rash and wound.   Allergic/Immunologic: Negative.    Neurological: Positive for dizziness and weakness (generalized). Negative for numbness and headaches.   Hematological: Negative.    Psychiatric/Behavioral: Negative.    All other systems reviewed and are negative.      PHYSICAL EXAM  ED Triage Vitals   Temp Heart Rate Resp BP SpO2   04/25/18 0634 04/25/18 0634 04/25/18 0709 04/25/18 0709 04/25/18 0634   98.3 °F (36.8 °C) (!) 150 16 98/79 90 %      Temp src Heart Rate Source Patient Position BP Location FiO2 (%)   04/25/18 0634 04/25/18 0634 04/25/18 0709 04/25/18 0709 --   Oral Monitor Lying Left arm          Physical Exam   Constitutional: He is oriented to person, place, and time and well-developed, well-nourished, and in no distress.   HENT:   Head: Normocephalic and atraumatic.   Mouth/Throat: Oropharynx is clear and moist.   Eyes: EOM are normal. Pupils are equal, round, and reactive to light.   No pale conjunctivae   Neck: Normal range of motion. Neck supple.   Cardiovascular: Normal heart sounds.  An irregularly irregular rhythm present. Tachycardia present.    No murmur heard.  Pulmonary/Chest: Effort normal and breath sounds normal. No respiratory distress.   Abdominal: Soft. Bowel sounds are normal. There is no tenderness. There is no rebound and no guarding.   Musculoskeletal: Normal range of motion. He exhibits no edema (BLE) or tenderness (calf).   Neurological: He is alert and oriented to person, place, and time. He has normal sensation and normal strength.   Skin: Skin is warm and dry. No pallor.   Psychiatric: Mood and affect normal.   Nursing note and vitals reviewed.      LAB RESULTS  Lab Results (last 24 hours)     Procedure Component Value Units  Date/Time    CBC & Differential [[id number]] Collected:  04/25/18 0700    Specimen:  Blood Updated:  04/25/18 0724    Narrative:       The following orders were created for panel order CBC & Differential.  Procedure                               Abnormality         Status                     ---------                               -----------         ------                     CBC Auto Differential[[id number]]        Abnormal            Final result                 Please view results for these tests on the individual orders.    Comprehensive Metabolic Panel [[id number]]  (Abnormal) Collected:  04/25/18 0700    Specimen:  Blood Updated:  04/25/18 0734     Glucose 141 (H) mg/dL      BUN 23 mg/dL      Creatinine 1.25 mg/dL      Sodium 139 mmol/L      Potassium 3.9 mmol/L      Chloride 99 mmol/L      CO2 26.9 mmol/L      Calcium 9.6 mg/dL      Total Protein 7.1 g/dL      Albumin 3.80 g/dL      ALT (SGPT) 7 U/L      AST (SGOT) 15 U/L      Alkaline Phosphatase 46 U/L      Total Bilirubin 0.6 mg/dL      eGFR Non African Amer 55 (L) mL/min/1.73      Globulin 3.3 gm/dL      A/G Ratio 1.2 g/dL      BUN/Creatinine Ratio 18.4     Anion Gap 13.1 mmol/L     Narrative:       The MDRD GFR formula is only valid for adults with stable renal function between ages 18 and 70.    Protime-INR [[id number]]  (Abnormal) Collected:  04/25/18 0700    Specimen:  Blood Updated:  04/25/18 0732     Protime 16.2 (H) Seconds      INR 1.32 (H)    Magnesium [[id number]]  (Normal) Collected:  04/25/18 0700    Specimen:  Blood Updated:  04/25/18 0734     Magnesium 2.0 mg/dL     Troponin [[id number]]  (Abnormal) Collected:  04/25/18 0700    Specimen:  Blood Updated:  04/25/18 0734     Troponin T 0.032 (H) ng/mL     Narrative:       Troponin T Reference Ranges:  Less than 0.03 ng/mL:    Negative for AMI  0.03 to 0.09 ng/mL:      Indeterminant for AMI  Greater than 0.09 ng/mL: Positive for AMI    CBC Auto Differential [[id number]]  (Abnormal) Collected:   04/25/18 0700    Specimen:  Blood Updated:  04/25/18 0724     WBC 6.32 10*3/mm3      RBC 3.80 (L) 10*6/mm3      Hemoglobin 10.9 (L) g/dL      Hematocrit 35.3 (L) %      MCV 92.9 fL      MCH 28.7 pg      MCHC 30.9 (L) g/dL      RDW 16.2 (H) %      RDW-SD 55.8 (H) fl      MPV 11.8 fL      Platelets 163 10*3/mm3      Neutrophil % 80.0 (H) %      Lymphocyte % 10.6 (L) %      Monocyte % 8.2 %      Eosinophil % 0.9 %      Basophil % 0.3 %      Immature Grans % 0.3 %      Neutrophils, Absolute 5.05 10*3/mm3      Lymphocytes, Absolute 0.67 (L) 10*3/mm3      Monocytes, Absolute 0.52 10*3/mm3      Eosinophils, Absolute 0.06 10*3/mm3      Basophils, Absolute 0.02 10*3/mm3      Immature Grans, Absolute 0.02 10*3/mm3     Troponin [[id number]]  (Abnormal) Collected:  04/25/18 0957    Specimen:  Blood Updated:  04/25/18 1034     Troponin T 0.033 (H) ng/mL     Narrative:       Troponin T Reference Ranges:  Less than 0.03 ng/mL:    Negative for AMI  0.03 to 0.09 ng/mL:      Indeterminant for AMI  Greater than 0.09 ng/mL: Positive for AMI          I ordered the above labs and reviewed the results    RADIOLOGY  XR Chest 2 View   Final Result   FINDINGS: Lower lung volumes on the current examination with some  crowding of the bronchovascular markings, vasculature appears engorged  consistent with congestion. There is an area of scarring at the right  lung base. There is increased opacity at this location which could  represent atelectasis or acute consolidation.     Cardiac size upper limits of normal. Mediastinum is stable. There are  calcified mediastinal and hilar lymph nodes again noted. No pleural  effusion identified. The remainder is unremarkable.        I ordered the above noted radiological studies. Interpreted by radiologist. Reviewed by me in PACS.       PROCEDURES  Critical Care  Performed by: KILO BEE  Authorized by: KILO BEE     Critical care provider statement:     Critical care time (minutes):  35     Critical care time was exclusive of:  Separately billable procedures and treating other patients    Critical care was necessary to treat or prevent imminent or life-threatening deterioration of the following conditions:  Cardiac failure    Critical care was time spent personally by me on the following activities:  Ordering and performing treatments and interventions, ordering and review of laboratory studies, ordering and review of radiographic studies, pulse oximetry, re-evaluation of patient's condition, review of old charts, obtaining history from patient or surrogate, interpretation of cardiac output measurements, examination of patient, evaluation of patient's response to treatment, discussions with consultants and development of treatment plan with patient or surrogate          EKG           EKG time: 0642  Rhythm/Rate: afib, 168  P waves and DC: afib  QRS, axis: Q wave in V1  ST and T waves: 1 mm ST depression in V2 and V3     Interpreted Contemporaneously by me, independently viewed  changed compared to prior 3/4/18    EKG           EKG time: 1123  Rhythm/Rate: nsr, 86  P waves and DC: normal  QRS, axis: normal   ST and T waves: normal     Interpreted Contemporaneously by me, independently viewed  changed compared to prior at 0642 where the pt is no longer in afib        PROGRESS AND CONSULTS  ED Course     0659 - Lab work, EKG and CXR ordered for fruther evaluation. Lopressor ordered.     0707 - IVF ordered, and lopressor will be held until the pt's BP improves.    0813 - Rechecked pt. Pt is resting comfortably in NAD.     0826 - Lopressor ordered.    0847 - Repeat troponin ordered.     1052 - Call placed with LCG.     1106 -  Rechecked pt. Pt is resting comfortably in NAD. Pt has returned to a sinus rhythm. Wife reports that the pt stood up on his own to go to the bathroom and took his heart monitor leads off without alerting staff. Informed them of the pt's first troponin level which was in an  indeterminate range, but that his repeat troponin did not show any further increase. D/w pt the plan to recollect a second EKG prior to plans for disposition.     1218 - Discussed pt care with Dr. Delgado ([facility] [place]) who reports that the pt is suitable for discharge home and states he can follow up with his cardiologist.     1219 - Rechecked pt. Pt is resting comfortably in NAD. Pt remains in sinus rhythm. Informed them of the consult with Dr. Delgado and the plan to discharge home with a follow up with cardiology. Pt understands and agrees with plan. All questions answered.     MEDICAL DECISION MAKING  Results were reviewed/discussed with the patient and they were also made aware of online access. Pt also made aware that some labs, such as cultures, will not be resulted during ER visit and follow up with PMD is necessary.     MDM  Number of Diagnoses or Management Options  Atrial fibrillation with RVR:      Amount and/or Complexity of Data Reviewed  Clinical lab tests: reviewed and ordered (Magnesium - 2  First troponin - 0.032  Second troponin - 0.033)  Tests in the radiology section of CPT®: reviewed and ordered (CXR  - atelectasis in R lung base)  Tests in the medicine section of CPT®: reviewed and ordered (See EKG procedures note.)  Decide to obtain previous medical records or to obtain history from someone other than the patient: yes  Discuss the patient with other providers: yes (Dr. Delgado ([facility] [place]))    Critical Care  Total time providing critical care: 30-74 minutes         DIAGNOSIS  Final diagnoses:   Atrial fibrillation with RVR       DISPOSITION  DISCHARGE    Patient discharged in stable condition.    Reviewed implications of results, diagnosis, meds, responsibility to follow up, warning signs and symptoms of possible worsening, potential complications and reasons to return to ER.    Patient/Family voiced understanding of above instructions.    Discussed plan for discharge, as there is no emergent indication for  admission. Patient referred to primary care provider for BP management due to today's BP. Pt/family is agreeable and understands need for follow up and repeat testing.  Pt is aware that discharge does not mean that nothing is wrong but it indicates no emergency is present that requires admission and they must continue care with follow-up as given below or physician of their choice.     FOLLOW-UP  Lubna Lobo MD  [address]  [address]  [place] [address]  [phone]      As needed    Hector Rivera MD  [address]  [address]  [place] [address]  [phone]    Schedule an appointment as soon as possible for a visit            Medication List      No changes were made to your prescriptions during this visit.           Latest Documented Vital Signs:  As of 12:29 PM  BP- 98/47 HR- 85 Temp- 98.3 °F (36.8 °C) (Oral) O2 sat- 94%    --  Documentation assistance provided by ananya Rojas for Dr. Zuniga.  Information recorded by the scribe was done at my direction and has been verified and validated by me.              Km Rojas  04/25/18 1145       Km Rojas  04/25/18 1229       Km Rojas  04/25/18 1230       Flo Zuniga MD  04/25/18 5304

## 2018-04-25 NOTE — DISCHARGE INSTRUCTIONS
Continue home medications and follow up with Dr. Rivera.  Please return to the ED if symptoms worsen with dizziness or chest pain.

## 2018-04-25 NOTE — ED TRIAGE NOTES
Pt ambulated into ED without assistance using cane, states wanting to be seen, c/o weakness, does not feel well. Spouse stated pt has a heart condition, has had nausea, and when pt turned to sit down, spouse loudly stated pt was going to fall. Pt no sign he was in distress, no walking difficulty noted. Pt immediately assisted by EDT and [facility] paramedic into wheelchair.

## 2018-04-26 ENCOUNTER — OFFICE VISIT (OUTPATIENT)
Dept: CARDIOLOGY | Facility: CLINIC | Age: 83
End: 2018-04-26

## 2018-04-26 VITALS
BODY MASS INDEX: 24.71 KG/M2 | DIASTOLIC BLOOD PRESSURE: 76 MMHG | WEIGHT: 163 LBS | HEART RATE: 82 BPM | SYSTOLIC BLOOD PRESSURE: 122 MMHG | HEIGHT: 68 IN

## 2018-04-26 DIAGNOSIS — I35.0 NONRHEUMATIC AORTIC VALVE STENOSIS: ICD-10-CM

## 2018-04-26 DIAGNOSIS — I10 ESSENTIAL HYPERTENSION: ICD-10-CM

## 2018-04-26 DIAGNOSIS — I48.0 PAF (PAROXYSMAL ATRIAL FIBRILLATION) (HCC): Primary | ICD-10-CM

## 2018-04-26 PROCEDURE — 93000 ELECTROCARDIOGRAM COMPLETE: CPT | Performed by: INTERNAL MEDICINE

## 2018-04-26 PROCEDURE — 99214 OFFICE O/P EST MOD 30 MIN: CPT | Performed by: INTERNAL MEDICINE

## 2018-04-26 RX ORDER — METOPROLOL TARTRATE 50 MG/1
50 TABLET, FILM COATED ORAL 3 TIMES DAILY
Qty: 90 TABLET | Refills: 6 | Status: SHIPPED | OUTPATIENT
Start: 2018-04-26 | End: 2018-09-13 | Stop reason: SDUPTHER

## 2018-04-26 NOTE — PROGRESS NOTES
Date of Office Visit: 18  Encounter Provider: Hector Rivera MD  Place of Service: [facility] CARDIOLOGY  Patient Name: Asia Ly  :1932  Referral Provider:Lubna Lobo MD      Chief Complaint   Patient presents with   • Atrial Fibrillation     History of Present Illness   The patient is a pleasant 85-year-old gentleman with history of paroxysmal  atrial fibrillation. He had been going in and out of atrial fibrillation  when he had run out of his sotalol. This happened in May 2004 when he was  traveling on a plane a fair amount. He went to the emergency room in [place]  where he was found to be in atrial fibrillation. He converted back to sinus  rhythm spontaneously. He saw Dr. Hector Patel about atrial fibrillation  ablation but it was felt it would be best not to pursue that. He also saw  Dr. Man, for a second opinion but again it was felt he was doing well  on sotalol and to maintain him on that.   He then had a stroke and TIA in 2016 was at [facility] was switched from aspirin Eliquis was for that event.  He then had been doing reasonably well but then presented to [facility] in 2016 2 times with symptomatic rapid atrial fibrillation.  While there he had an echocardiogram that showed normal left ventricular systolic function.  Moderate tricuspid insufficiency with mild pulmonary hypertension mild mitral insufficiency.  He also do perfusion stress test for some reason that was negative for ischemia.  He was started on amiodarone and discharged came right back in with rapid atrial fibrillation he was given a higher dose of amiodarone and a slower taper dose.  Given his history of pulmonary fibrosis we felt that was a poor choice so discontinued it.  Unfortunately 2017 while just sitting there he just started not feeling normal and his wife taken the emergency him is found to be in atrial fibrillation he did  convert back to sinus rhythm.  That's the first episode he had an probably 3 or 4 years.  Of note however he been taken off his diltiazem due to an allergic reaction of labor rash.    He was here in November with increased lower extremity edema he was also in rapid atrial fibrillation at that time he had a rash on diltiazem so we did an echocardiogram that showed normal left ventricular function mild aortic valve stenosis we placed him on metoprolol added diuretics he improved as his acidemia resolved he then stopped his diuretic.    He then continued to have this rash was felt maybe could be due to his antibiotic doxycycline that was stopped.  He's been back in the emergency room around January February 2018 with iron deficiency anemia was anticoagulation he got scoped no obvious source was found but that been stable.  He's been in and out of the emergency room almost once or twice a month this year for orthopedic issues gout issues as well as his rash.  And then yesterday he went to the emergency room with rapid heart beating palpitations was found to be in rapid atrial fibrillation but did convert back to sinus rhythm.  In retrospect the last time he was in the emergency room for age fibrillation was July 2017.  He states overall he doesn't have a problem with his irregular rhythm his wife says that he does that she's often an atrial relation when they come to the emergency room looking back through all those it's not the case.  He's had no chest pain or pressure no shortness of breath.  He does have fatigue and of course all these other issues of concern of gradually worsening on him.      Atrial Fibrillation   Symptoms are negative for dizziness and weakness. Past medical history includes atrial fibrillation.   Palpitations    Associated symptoms include malaise/fatigue. Pertinent negatives include no diaphoresis, dizziness, fever, nausea, numbness, vomiting or weakness.   Leg Swelling   Pertinent negatives  "include no abdominal pain, congestion, diaphoresis, fever, headaches, joint swelling, myalgias, nausea, numbness, rash, vertigo, vomiting or weakness.         Past Medical History:   Diagnosis Date   • Aortic stenosis    • Aspiration pneumonia    • Atrial fibrillation    • Bronchiectasis    • COPD (chronic obstructive pulmonary disease)    • Crohn's disease    • Dizziness    • Gastric ulcer    • Generalized weakness    • Gout    • Hard of hearing    • Hyperlipidemia    • Hypertension    • Leg swelling    • Lower extremity edema    • Mild anemia    • Near syncope    • PAF (paroxysmal atrial fibrillation)    • Palpitations    • Peptic ulcer    • Pneumonia of left lung due to infectious organism    • Pulmonary fibrosis    • Stroke    • TIA (transient ischemic attack)          Past Surgical History:   Procedure Laterality Date   • CATARACT EXTRACTION Bilateral    • COLONOSCOPY     • COLONOSCOPY N/A 3/9/2018    Procedure: COLONOSCOPY to terminal ileum with bx;  Surgeon: Aron Cabrera MD;  Location: [facility] ENDOSCOPY;  Service:    • CYSTECTOMY      back and shoulder area   • ENDOSCOPY N/A 3/9/2018    Procedure: ESOPHAGOGASTRODUODENOSCOPY with bx;  Surgeon: Aron Cabrera MD;  Location: [facility] ENDOSCOPY;  Service:    • EYE SURGERY Left     detached retina   • EYE SURGERY Right     \"repaired a hole in the eye\"   • TESTICLE SURGERY      benign tumor removed.         Current Outpatient Prescriptions on File Prior to Visit   Medication Sig Dispense Refill   • allopurinol (ZYLOPRIM) 100 MG tablet Take 100 mg by mouth 2 (Two) Times a Day.     • apixaban (ELIQUIS) 5 MG tablet tablet Take 5 mg by mouth 2 (Two) Times a Day.     • atorvastatin (LIPITOR) 10 MG tablet Take 10 mg by mouth Daily.     • BUDESONIDE PO Take 3 mg by mouth Daily.     • CLINDAMYCIN PHOSPHATE EX Apply  topically As Needed (rash).     • Cyanocobalamin (VITAMIN B-12 CR PO) Take 500 mg by mouth Daily.     • ferrous sulfate 325 (65 FE) MG tablet Take 325 mg by " mouth 3 (Three) Times a Day.     • furosemide (LASIX) 20 MG tablet Take 20 mg by mouth Daily.     • omeprazole (priLOSEC) 40 MG capsule Take 40 mg by mouth Daily.     • [DISCONTINUED] metoprolol tartrate (LOPRESSOR) 50 MG tablet Take 50 mg by mouth 2 (Two) Times a Day.     • [DISCONTINUED] Doxycycline Hyclate 50 MG tablet Take 100 mg by mouth Daily.     • [DISCONTINUED] erythromycin base (E-MYCIN) 250 MG tablet Take 250 mg by mouth 2 (Two) Times a Day As Needed.       Current Facility-Administered Medications on File Prior to Visit   Medication Dose Route Frequency Provider Last Rate Last Dose   • [DISCONTINUED] sodium chloride 0.9 % flush 10 mL  10 mL Intravenous PRN Flo Zuniga MD       • [DISCONTINUED] sodium chloride 0.9 % flush 10 mL  10 mL Intravenous PRN Flo Zuniga MD             Social History     Social History   • Marital status:      Spouse name: N/A   • Number of children: N/A   • Years of education: N/A     Occupational History   • Not on file.     Social History Main Topics   • Smoking status: Former Smoker     Types: Cigars   • Smokeless tobacco: Never Used      Comment: Quit 25 years ago   • Alcohol use No      Comment: daily caffiene   • Drug use: No   • Sexual activity: Defer     Other Topics Concern   • Not on file     Social History Narrative   • No narrative on file       Family History   Problem Relation Age of Onset   • Stroke Mother    • Heart disease Mother    • Hypertension Mother    • Diabetes Mother    • Heart disease Sister      Heart Valve Replacement   • Ovarian cancer Sister    • Rheumatic fever Sister    • Diabetes Sister    • Stroke Father          Review of Systems   Constitution: Positive for malaise/fatigue. Negative for decreased appetite, diaphoresis, fever, weakness, weight gain and weight loss.   HENT: Positive for hearing loss. Negative for congestion, nosebleeds and tinnitus.    Eyes: Negative for blurred vision, double vision, vision loss in left eye,  "vision loss in right eye and visual disturbance.   Cardiovascular:        As noted in HPI   Respiratory:        As noted HPI   Endocrine: Negative for cold intolerance and heat intolerance.   Hematologic/Lymphatic: Negative for bleeding problem. Does not bruise/bleed easily.   Skin: Negative for color change, flushing, itching and rash.   Musculoskeletal: Positive for joint pain. Negative for arthritis, back pain, joint swelling, muscle weakness and myalgias.   Gastrointestinal: Negative for bloating, abdominal pain, constipation, diarrhea, dysphagia, heartburn, hematemesis, hematochezia, melena, nausea and vomiting.   Genitourinary: Positive for frequency. Negative for bladder incontinence, dysuria, nocturia and urgency.   Neurological: Negative for dizziness, focal weakness, headaches, light-headedness, loss of balance, numbness, paresthesias and vertigo.   Psychiatric/Behavioral: Negative for depression, memory loss and substance abuse.       Procedures      ECG 12 Lead  Date/Time: 4/26/2018 11:59 AM  Performed by: SHARON DOBBINS  Authorized by: SHARON DOBBINS   Comparison: compared with previous ECG   Similar to previous ECG  Rhythm: sinus rhythm  Rate: normal  QRS axis: normal                  Objective:    /76 (BP Location: Right arm)   Pulse 82   Ht 172.7 cm (68\")   Wt 73.9 kg (163 lb)   BMI 24.78 kg/m²        Physical Exam  Physical Exam   Constitutional: He is oriented to person, place, and time. He appears well-developed and well-nourished. No distress.   HENT:   Head: Normocephalic.   Eyes: Conjunctivae are normal. Pupils are equal, round, and reactive to light. No scleral icterus.   Neck: Normal carotid pulses, no hepatojugular reflux and no JVD present. Carotid bruit is not present. No tracheal deviation, no edema and no erythema present. No thyromegaly present.   Cardiovascular: Normal rate, regular rhythm, S1 normal, S2 normal and intact distal pulses.   No extrasystoles are present. " PMI is not displaced.  Exam reveals no gallop, no distant heart sounds and no friction rub.    Murmur heard.   Systolic murmur is present with a grade of 2/6  at the upper right sternal border  Pulses:       Carotid pulses are 2+ on the right side, and 2+ on the left side.       Radial pulses are 2+ on the right side, and 2+ on the left side.        Femoral pulses are 2+ on the right side, and 2+ on the left side.       Dorsalis pedis pulses are 2+ on the right side, and 2+ on the left side.        Posterior tibial pulses are 2+ on the right side, and 2+ on the left side.   Pulmonary/Chest: Effort normal and breath sounds normal. No respiratory distress. He has no decreased breath sounds. He has no wheezes. He has no rhonchi. He has no rales. He exhibits no tenderness.   Abdominal: Soft. Bowel sounds are normal. He exhibits no distension and no mass. There is no hepatosplenomegaly. There is no tenderness. There is no rebound and no guarding.   Musculoskeletal: He exhibits no edema, tenderness or deformity.   Neurological: He is alert and oriented to person, place, and time.   Skin: Skin is warm and dry. No rash noted. He is not diaphoretic. No cyanosis or erythema. No pallor. Nails show no clubbing.   Rash on face.   Psychiatric: He has a normal mood and affect. His speech is normal and behavior is normal. Judgment and thought content normal.           Assessment:   1. This is a 85-year-old gentleman with a history of paroxysmal atrial fibrillation. Off amiodarone due to pulmonary fibrosis, rash I believe on diltiazem.   Atrial Fibrillation and Atrial Flutter  Assessment  • The patient has paroxysmal atrial fibrillation  • This is non-valvular in etiology  • The patient's CHADS2-VASc score is 3  • A [id number] score of 2 or more is considered a high risk for a thromboembolic event  • Apixaban prescribed     Plan  • Attempt to maintain sinus rhythm  • Continue apixaban for antithrombotic therapy, bleeding issues  discussed  • Continue beta blocker for rhythm control  He's had another episode of atrial fibrillation but brief but symptomatic.  Were upping his metoprolol to 50 mg 3 times a day.  He's in a see our nurse practitioner in 3 months me in 6 months.  I discussed with both his wife the patient in their son on the phone that he really only had 2 episodes of atrial fibrillation in the past 9 months.  The next drug to treat him is going to be tikosyn which would not be easy to do it have to be admitted for 3-4 days.  Therefore been a continue this approach if he has increased episodes of atrial fibrillation will need to move towards that.    2. History of Crohn's disease.  3. History of gout, seems to be stable.  4. History of aspiration pneumonia, pulmonary fibrosis, and bronchiectasis.    5.   Hypertension blood pressure adequate control.  6.  Lower extremity edema in retrospect I believe this was due to his atrial fibrillation.  7.  Aortic stenosis.  Echocardiogram November 2017 this was just mild.   8.  Anemia appears stable continue the same he's tolerating eliquis.  9.  Rash of unclear etiology but he is following with dermatology as felt it was due to the diltiazem and perhaps now the antibiotic doxycycline.         Plan:

## 2018-04-29 ENCOUNTER — APPOINTMENT (OUTPATIENT)
Dept: GENERAL RADIOLOGY | Facility: HOSPITAL | Age: 83
End: 2018-04-29

## 2018-04-29 ENCOUNTER — HOSPITAL ENCOUNTER (INPATIENT)
Facility: HOSPITAL | Age: 83
LOS: 6 days | Discharge: HOME | End: 2018-05-05
Attending: EMERGENCY MEDICINE | Admitting: INTERNAL MEDICINE

## 2018-04-29 DIAGNOSIS — J10.1 INFLUENZA B: ICD-10-CM

## 2018-04-29 DIAGNOSIS — J18.9 PNEUMONIA OF BOTH LUNGS DUE TO INFECTIOUS ORGANISM, UNSPECIFIED PART OF LUNG: ICD-10-CM

## 2018-04-29 DIAGNOSIS — R09.02 HYPOXIA: Primary | ICD-10-CM

## 2018-04-29 LAB
ALBUMIN SERPL-MCNC: 3.5 G/DL (ref 3.5–5.2)
ALBUMIN/GLOB SERPL: 1.1 G/DL
ALP SERPL-CCNC: 40 U/L (ref 39–117)
ALT SERPL W P-5'-P-CCNC: 13 U/L (ref 1–41)
ANION GAP SERPL CALCULATED.3IONS-SCNC: 15.4 MMOL/L
AST SERPL-CCNC: 30 U/L (ref 1–40)
BASOPHILS # BLD AUTO: 0 10*3/MM3 (ref 0–0.2)
BASOPHILS NFR BLD AUTO: 0 % (ref 0–1.5)
BILIRUB SERPL-MCNC: 0.5 MG/DL (ref 0.1–1.2)
BUN BLD-MCNC: 32 MG/DL (ref 8–23)
BUN/CREAT SERPL: 22.5 (ref 7–25)
CALCIUM SPEC-SCNC: 8.9 MG/DL (ref 8.6–10.5)
CHLORIDE SERPL-SCNC: 99 MMOL/L (ref 98–107)
CO2 SERPL-SCNC: 22.6 MMOL/L (ref 22–29)
CREAT BLD-MCNC: 1.42 MG/DL (ref 0.76–1.27)
D-LACTATE SERPL-SCNC: 1.9 MMOL/L (ref 0.5–2)
DEPRECATED RDW RBC AUTO: 54.6 FL (ref 37–54)
EOSINOPHIL # BLD AUTO: 0 10*3/MM3 (ref 0–0.7)
EOSINOPHIL NFR BLD AUTO: 0 % (ref 0.3–6.2)
ERYTHROCYTE [DISTWIDTH] IN BLOOD BY AUTOMATED COUNT: 16.4 % (ref 11.5–14.5)
FLUAV AG NPH QL: NEGATIVE
FLUBV AG NPH QL IA: POSITIVE
GFR SERPL CREATININE-BSD FRML MDRD: 47 ML/MIN/1.73
GLOBULIN UR ELPH-MCNC: 3.3 GM/DL
GLUCOSE BLD-MCNC: 114 MG/DL (ref 65–99)
HCT VFR BLD AUTO: 32.4 % (ref 40.4–52.2)
HGB BLD-MCNC: 10.3 G/DL (ref 13.7–17.6)
IMM GRANULOCYTES # BLD: 0.03 10*3/MM3 (ref 0–0.03)
IMM GRANULOCYTES NFR BLD: 0.6 % (ref 0–0.5)
LYMPHOCYTES # BLD AUTO: 0.4 10*3/MM3 (ref 0.9–4.8)
LYMPHOCYTES NFR BLD AUTO: 7.7 % (ref 19.6–45.3)
MCH RBC QN AUTO: 28.5 PG (ref 27–32.7)
MCHC RBC AUTO-ENTMCNC: 31.8 G/DL (ref 32.6–36.4)
MCV RBC AUTO: 89.8 FL (ref 79.8–96.2)
MONOCYTES # BLD AUTO: 0.35 10*3/MM3 (ref 0.2–1.2)
MONOCYTES NFR BLD AUTO: 6.7 % (ref 5–12)
NEUTROPHILS # BLD AUTO: 4.46 10*3/MM3 (ref 1.9–8.1)
NEUTROPHILS NFR BLD AUTO: 85.6 % (ref 42.7–76)
NT-PROBNP SERPL-MCNC: 2375 PG/ML (ref 0–1800)
PLATELET # BLD AUTO: 160 10*3/MM3 (ref 140–500)
PMV BLD AUTO: 12.3 FL (ref 6–12)
POTASSIUM BLD-SCNC: 3.8 MMOL/L (ref 3.5–5.2)
PROCALCITONIN SERPL-MCNC: 1.64 NG/ML (ref 0.1–0.25)
PROT SERPL-MCNC: 6.8 G/DL (ref 6–8.5)
RBC # BLD AUTO: 3.61 10*6/MM3 (ref 4.6–6)
SODIUM BLD-SCNC: 137 MMOL/L (ref 136–145)
TROPONIN T SERPL-MCNC: 0.03 NG/ML (ref 0–0.03)
TROPONIN T SERPL-MCNC: 0.05 NG/ML (ref 0–0.03)
WBC NRBC COR # BLD: 5.21 10*3/MM3 (ref 4.5–10.7)

## 2018-04-29 PROCEDURE — 94799 UNLISTED PULMONARY SVC/PX: CPT

## 2018-04-29 PROCEDURE — 71046 X-RAY EXAM CHEST 2 VIEWS: CPT

## 2018-04-29 PROCEDURE — 87804 INFLUENZA ASSAY W/OPTIC: CPT | Performed by: EMERGENCY MEDICINE

## 2018-04-29 PROCEDURE — 25010000002 AMIODARONE IN DEXTROSE 5% 150-4.21 MG/100ML-% SOLUTION: Performed by: NURSE PRACTITIONER

## 2018-04-29 PROCEDURE — 93010 ELECTROCARDIOGRAM REPORT: CPT | Performed by: INTERNAL MEDICINE

## 2018-04-29 PROCEDURE — 83880 ASSAY OF NATRIURETIC PEPTIDE: CPT | Performed by: EMERGENCY MEDICINE

## 2018-04-29 PROCEDURE — 25010000002 CEFTRIAXONE PER 250 MG: Performed by: EMERGENCY MEDICINE

## 2018-04-29 PROCEDURE — 84484 ASSAY OF TROPONIN QUANT: CPT | Performed by: INTERNAL MEDICINE

## 2018-04-29 PROCEDURE — 93005 ELECTROCARDIOGRAM TRACING: CPT

## 2018-04-29 PROCEDURE — 80053 COMPREHEN METABOLIC PANEL: CPT | Performed by: EMERGENCY MEDICINE

## 2018-04-29 PROCEDURE — 25010000002 AZITHROMYCIN PER 500 MG: Performed by: EMERGENCY MEDICINE

## 2018-04-29 PROCEDURE — 94640 AIRWAY INHALATION TREATMENT: CPT

## 2018-04-29 PROCEDURE — 84484 ASSAY OF TROPONIN QUANT: CPT | Performed by: EMERGENCY MEDICINE

## 2018-04-29 PROCEDURE — 87040 BLOOD CULTURE FOR BACTERIA: CPT | Performed by: EMERGENCY MEDICINE

## 2018-04-29 PROCEDURE — 25010000002 AMIODARONE IN DEXTROSE 5% 360-4.14 MG/200ML-% SOLUTION: Performed by: NURSE PRACTITIONER

## 2018-04-29 PROCEDURE — 83605 ASSAY OF LACTIC ACID: CPT | Performed by: EMERGENCY MEDICINE

## 2018-04-29 PROCEDURE — 85025 COMPLETE CBC W/AUTO DIFF WBC: CPT | Performed by: EMERGENCY MEDICINE

## 2018-04-29 PROCEDURE — 25010000002 DIGOXIN PER 500 MCG: Performed by: NURSE PRACTITIONER

## 2018-04-29 PROCEDURE — 99284 EMERGENCY DEPT VISIT MOD MDM: CPT

## 2018-04-29 PROCEDURE — 84145 PROCALCITONIN (PCT): CPT | Performed by: INTERNAL MEDICINE

## 2018-04-29 PROCEDURE — 93005 ELECTROCARDIOGRAM TRACING: CPT | Performed by: EMERGENCY MEDICINE

## 2018-04-29 PROCEDURE — 36415 COLL VENOUS BLD VENIPUNCTURE: CPT

## 2018-04-29 RX ORDER — CEFTRIAXONE SODIUM 1 G/50ML
1 INJECTION, SOLUTION INTRAVENOUS ONCE
Status: COMPLETED | OUTPATIENT
Start: 2018-04-29 | End: 2018-04-29

## 2018-04-29 RX ORDER — ALBUTEROL SULFATE 2.5 MG/3ML
2.5 SOLUTION RESPIRATORY (INHALATION) ONCE
Status: COMPLETED | OUTPATIENT
Start: 2018-04-29 | End: 2018-04-29

## 2018-04-29 RX ORDER — OSELTAMIVIR PHOSPHATE 30 MG/1
30 CAPSULE ORAL EVERY 12 HOURS SCHEDULED
Status: DISCONTINUED | OUTPATIENT
Start: 2018-04-29 | End: 2018-05-04

## 2018-04-29 RX ORDER — CHOLECALCIFEROL (VITAMIN D3) 125 MCG
500 CAPSULE ORAL DAILY
Status: DISCONTINUED | OUTPATIENT
Start: 2018-04-30 | End: 2018-05-05 | Stop reason: HOSPADM

## 2018-04-29 RX ORDER — OSELTAMIVIR PHOSPHATE 30 MG/1
30 CAPSULE ORAL ONCE
Status: COMPLETED | OUTPATIENT
Start: 2018-04-29 | End: 2018-04-29

## 2018-04-29 RX ORDER — IPRATROPIUM BROMIDE AND ALBUTEROL SULFATE 2.5; .5 MG/3ML; MG/3ML
3 SOLUTION RESPIRATORY (INHALATION)
Status: DISCONTINUED | OUTPATIENT
Start: 2018-04-29 | End: 2018-05-04

## 2018-04-29 RX ORDER — PANTOPRAZOLE SODIUM 40 MG/1
40 TABLET, DELAYED RELEASE ORAL EVERY MORNING
Status: DISCONTINUED | OUTPATIENT
Start: 2018-04-30 | End: 2018-05-05 | Stop reason: HOSPADM

## 2018-04-29 RX ORDER — ATORVASTATIN CALCIUM 10 MG/1
10 TABLET, FILM COATED ORAL DAILY
Status: DISCONTINUED | OUTPATIENT
Start: 2018-04-29 | End: 2018-05-05 | Stop reason: HOSPADM

## 2018-04-29 RX ORDER — FUROSEMIDE 20 MG/1
20 TABLET ORAL DAILY
Status: DISCONTINUED | OUTPATIENT
Start: 2018-04-30 | End: 2018-05-05 | Stop reason: HOSPADM

## 2018-04-29 RX ORDER — DIGOXIN 0.25 MG/ML
125 INJECTION INTRAMUSCULAR; INTRAVENOUS ONCE
Status: COMPLETED | OUTPATIENT
Start: 2018-04-29 | End: 2018-04-29

## 2018-04-29 RX ORDER — METOPROLOL TARTRATE 50 MG/1
50 TABLET, FILM COATED ORAL 3 TIMES DAILY
Status: DISCONTINUED | OUTPATIENT
Start: 2018-04-29 | End: 2018-05-05 | Stop reason: HOSPADM

## 2018-04-29 RX ORDER — IPRATROPIUM BROMIDE AND ALBUTEROL SULFATE 2.5; .5 MG/3ML; MG/3ML
3 SOLUTION RESPIRATORY (INHALATION)
Status: DISCONTINUED | OUTPATIENT
Start: 2018-04-29 | End: 2018-05-05 | Stop reason: HOSPADM

## 2018-04-29 RX ORDER — FERROUS SULFATE 325(65) MG
325 TABLET ORAL 3 TIMES DAILY
Status: DISCONTINUED | OUTPATIENT
Start: 2018-04-29 | End: 2018-05-05 | Stop reason: HOSPADM

## 2018-04-29 RX ORDER — ACETAMINOPHEN 500 MG
1000 TABLET ORAL ONCE
Status: COMPLETED | OUTPATIENT
Start: 2018-04-29 | End: 2018-04-29

## 2018-04-29 RX ORDER — BUDESONIDE 3 MG/1
3 CAPSULE, COATED PELLETS ORAL DAILY
Status: DISCONTINUED | OUTPATIENT
Start: 2018-04-30 | End: 2018-05-05 | Stop reason: HOSPADM

## 2018-04-29 RX ADMIN — METOPROLOL TARTRATE 50 MG: 50 TABLET, FILM COATED ORAL at 16:58

## 2018-04-29 RX ADMIN — FERROUS SULFATE TAB 325 MG (65 MG ELEMENTAL FE) 325 MG: 325 (65 FE) TAB at 20:50

## 2018-04-29 RX ADMIN — APIXABAN 5 MG: 5 TABLET, FILM COATED ORAL at 20:49

## 2018-04-29 RX ADMIN — CEFTRIAXONE SODIUM 1 G: 1 INJECTION, SOLUTION INTRAVENOUS at 09:20

## 2018-04-29 RX ADMIN — OSELTAMIVIR PHOSPHATE 30 MG: 30 CAPSULE ORAL at 09:43

## 2018-04-29 RX ADMIN — OSELTAMIVIR PHOSPHATE 30 MG: 30 CAPSULE ORAL at 20:49

## 2018-04-29 RX ADMIN — ACETAMINOPHEN 1000 MG: 500 TABLET, FILM COATED ORAL at 07:53

## 2018-04-29 RX ADMIN — AMIODARONE HYDROCHLORIDE 0.5 MG/MIN: 1.8 INJECTION, SOLUTION INTRAVENOUS at 22:14

## 2018-04-29 RX ADMIN — ALBUTEROL SULFATE 2.5 MG: 2.5 SOLUTION RESPIRATORY (INHALATION) at 08:05

## 2018-04-29 RX ADMIN — FERROUS SULFATE TAB 325 MG (65 MG ELEMENTAL FE) 325 MG: 325 (65 FE) TAB at 16:58

## 2018-04-29 RX ADMIN — OSELTAMIVIR PHOSPHATE 30 MG: 30 CAPSULE ORAL at 16:58

## 2018-04-29 RX ADMIN — AZITHROMYCIN MONOHYDRATE 500 MG: 500 INJECTION, POWDER, LYOPHILIZED, FOR SOLUTION INTRAVENOUS at 09:43

## 2018-04-29 RX ADMIN — DIGOXIN 125 MCG: 0.25 INJECTION INTRAMUSCULAR; INTRAVENOUS at 18:07

## 2018-04-29 RX ADMIN — ATORVASTATIN CALCIUM 10 MG: 10 TABLET, FILM COATED ORAL at 16:58

## 2018-04-29 RX ADMIN — IPRATROPIUM BROMIDE AND ALBUTEROL SULFATE 3 ML: .5; 3 SOLUTION RESPIRATORY (INHALATION) at 16:15

## 2018-04-29 RX ADMIN — AMIODARONE HYDROCHLORIDE 150 MG: 1.5 INJECTION, SOLUTION INTRAVENOUS at 21:43

## 2018-04-30 ENCOUNTER — APPOINTMENT (OUTPATIENT)
Dept: GENERAL RADIOLOGY | Facility: HOSPITAL | Age: 83
End: 2018-04-30
Attending: INTERNAL MEDICINE

## 2018-04-30 LAB
ANION GAP SERPL CALCULATED.3IONS-SCNC: 12.7 MMOL/L
ANION GAP SERPL CALCULATED.3IONS-SCNC: 15 MMOL/L
BUN BLD-MCNC: 24 MG/DL (ref 8–23)
BUN BLD-MCNC: 25 MG/DL (ref 8–23)
BUN/CREAT SERPL: 21.1 (ref 7–25)
BUN/CREAT SERPL: 22.5 (ref 7–25)
CALCIUM SPEC-SCNC: 8.1 MG/DL (ref 8.6–10.5)
CALCIUM SPEC-SCNC: 8.3 MG/DL (ref 8.6–10.5)
CHLORIDE SERPL-SCNC: 100 MMOL/L (ref 98–107)
CHLORIDE SERPL-SCNC: 100 MMOL/L (ref 98–107)
CO2 SERPL-SCNC: 21 MMOL/L (ref 22–29)
CO2 SERPL-SCNC: 23.3 MMOL/L (ref 22–29)
CREAT BLD-MCNC: 1.11 MG/DL (ref 0.76–1.27)
CREAT BLD-MCNC: 1.14 MG/DL (ref 0.76–1.27)
DEPRECATED RDW RBC AUTO: 55.1 FL (ref 37–54)
ERYTHROCYTE [DISTWIDTH] IN BLOOD BY AUTOMATED COUNT: 16.6 % (ref 11.5–14.5)
GFR SERPL CREATININE-BSD FRML MDRD: 61 ML/MIN/1.73
GFR SERPL CREATININE-BSD FRML MDRD: 63 ML/MIN/1.73
GLUCOSE BLD-MCNC: 84 MG/DL (ref 65–99)
GLUCOSE BLD-MCNC: 90 MG/DL (ref 65–99)
HCT VFR BLD AUTO: 29.7 % (ref 40.4–52.2)
HGB BLD-MCNC: 9.4 G/DL (ref 13.7–17.6)
MAGNESIUM SERPL-MCNC: 2.1 MG/DL (ref 1.6–2.4)
MCH RBC QN AUTO: 28.6 PG (ref 27–32.7)
MCHC RBC AUTO-ENTMCNC: 31.6 G/DL (ref 32.6–36.4)
MCV RBC AUTO: 90.3 FL (ref 79.8–96.2)
PLATELET # BLD AUTO: 138 10*3/MM3 (ref 140–500)
PMV BLD AUTO: 12.1 FL (ref 6–12)
POTASSIUM BLD-SCNC: 3.6 MMOL/L (ref 3.5–5.2)
POTASSIUM BLD-SCNC: 3.7 MMOL/L (ref 3.5–5.2)
RBC # BLD AUTO: 3.29 10*6/MM3 (ref 4.6–6)
SODIUM BLD-SCNC: 136 MMOL/L (ref 136–145)
SODIUM BLD-SCNC: 136 MMOL/L (ref 136–145)
WBC NRBC COR # BLD: 6.18 10*3/MM3 (ref 4.5–10.7)

## 2018-04-30 PROCEDURE — 99223 1ST HOSP IP/OBS HIGH 75: CPT | Performed by: INTERNAL MEDICINE

## 2018-04-30 PROCEDURE — 93010 ELECTROCARDIOGRAM REPORT: CPT | Performed by: INTERNAL MEDICINE

## 2018-04-30 PROCEDURE — 80048 BASIC METABOLIC PNL TOTAL CA: CPT | Performed by: INTERNAL MEDICINE

## 2018-04-30 PROCEDURE — 83735 ASSAY OF MAGNESIUM: CPT | Performed by: INTERNAL MEDICINE

## 2018-04-30 PROCEDURE — 94799 UNLISTED PULMONARY SVC/PX: CPT

## 2018-04-30 PROCEDURE — 25010000002 CEFTRIAXONE PER 250 MG: Performed by: INTERNAL MEDICINE

## 2018-04-30 PROCEDURE — 85027 COMPLETE CBC AUTOMATED: CPT | Performed by: INTERNAL MEDICINE

## 2018-04-30 PROCEDURE — 71046 X-RAY EXAM CHEST 2 VIEWS: CPT

## 2018-04-30 PROCEDURE — 93005 ELECTROCARDIOGRAM TRACING: CPT | Performed by: INTERNAL MEDICINE

## 2018-04-30 RX ORDER — DOFETILIDE 0.25 MG/1
250 CAPSULE ORAL EVERY 12 HOURS SCHEDULED
Status: DISCONTINUED | OUTPATIENT
Start: 2018-05-01 | End: 2018-05-04

## 2018-04-30 RX ORDER — AZITHROMYCIN 250 MG/1
500 TABLET, FILM COATED ORAL
Status: DISCONTINUED | OUTPATIENT
Start: 2018-04-30 | End: 2018-05-01

## 2018-04-30 RX ORDER — DOFETILIDE 0.25 MG/1
250 CAPSULE ORAL EVERY 12 HOURS SCHEDULED
Status: DISCONTINUED | OUTPATIENT
Start: 2018-04-30 | End: 2018-04-30

## 2018-04-30 RX ORDER — NITROGLYCERIN 0.4 MG/1
0.4 TABLET SUBLINGUAL
Status: DISCONTINUED | OUTPATIENT
Start: 2018-04-30 | End: 2018-05-05 | Stop reason: HOSPADM

## 2018-04-30 RX ORDER — CEFTRIAXONE SODIUM 1 G/50ML
1 INJECTION, SOLUTION INTRAVENOUS EVERY 24 HOURS
Status: COMPLETED | OUTPATIENT
Start: 2018-04-30 | End: 2018-05-03

## 2018-04-30 RX ADMIN — OSELTAMIVIR PHOSPHATE 30 MG: 30 CAPSULE ORAL at 21:23

## 2018-04-30 RX ADMIN — APIXABAN 5 MG: 5 TABLET, FILM COATED ORAL at 09:53

## 2018-04-30 RX ADMIN — METOPROLOL TARTRATE 50 MG: 50 TABLET, FILM COATED ORAL at 17:10

## 2018-04-30 RX ADMIN — FERROUS SULFATE TAB 325 MG (65 MG ELEMENTAL FE) 325 MG: 325 (65 FE) TAB at 09:53

## 2018-04-30 RX ADMIN — IPRATROPIUM BROMIDE AND ALBUTEROL SULFATE 3 ML: .5; 3 SOLUTION RESPIRATORY (INHALATION) at 14:29

## 2018-04-30 RX ADMIN — CEFTRIAXONE SODIUM 1 G: 1 INJECTION, SOLUTION INTRAVENOUS at 21:24

## 2018-04-30 RX ADMIN — IPRATROPIUM BROMIDE AND ALBUTEROL SULFATE 3 ML: .5; 3 SOLUTION RESPIRATORY (INHALATION) at 07:13

## 2018-04-30 RX ADMIN — Medication 500 MCG: at 09:53

## 2018-04-30 RX ADMIN — AZITHROMYCIN 500 MG: 250 TABLET, FILM COATED ORAL at 21:24

## 2018-04-30 RX ADMIN — FUROSEMIDE 20 MG: 20 TABLET ORAL at 09:53

## 2018-04-30 RX ADMIN — ATORVASTATIN CALCIUM 10 MG: 10 TABLET, FILM COATED ORAL at 09:53

## 2018-04-30 RX ADMIN — BUDESONIDE 3 MG: 3 CAPSULE ORAL at 09:53

## 2018-04-30 RX ADMIN — FERROUS SULFATE TAB 325 MG (65 MG ELEMENTAL FE) 325 MG: 325 (65 FE) TAB at 21:24

## 2018-04-30 RX ADMIN — APIXABAN 5 MG: 5 TABLET, FILM COATED ORAL at 21:24

## 2018-04-30 RX ADMIN — IPRATROPIUM BROMIDE AND ALBUTEROL SULFATE 3 ML: .5; 3 SOLUTION RESPIRATORY (INHALATION) at 10:23

## 2018-04-30 RX ADMIN — FERROUS SULFATE TAB 325 MG (65 MG ELEMENTAL FE) 325 MG: 325 (65 FE) TAB at 17:10

## 2018-04-30 RX ADMIN — METOPROLOL TARTRATE 50 MG: 50 TABLET, FILM COATED ORAL at 09:53

## 2018-04-30 RX ADMIN — METOPROLOL TARTRATE 50 MG: 50 TABLET, FILM COATED ORAL at 21:23

## 2018-05-01 LAB
ANION GAP SERPL CALCULATED.3IONS-SCNC: 12.9 MMOL/L
BUN BLD-MCNC: 20 MG/DL (ref 8–23)
BUN/CREAT SERPL: 20 (ref 7–25)
CALCIUM SPEC-SCNC: 8.2 MG/DL (ref 8.6–10.5)
CHLORIDE SERPL-SCNC: 101 MMOL/L (ref 98–107)
CO2 SERPL-SCNC: 23.1 MMOL/L (ref 22–29)
CREAT BLD-MCNC: 1 MG/DL (ref 0.76–1.27)
DEPRECATED RDW RBC AUTO: 55.4 FL (ref 37–54)
ERYTHROCYTE [DISTWIDTH] IN BLOOD BY AUTOMATED COUNT: 16.3 % (ref 11.5–14.5)
GFR SERPL CREATININE-BSD FRML MDRD: 71 ML/MIN/1.73
GLUCOSE BLD-MCNC: 88 MG/DL (ref 65–99)
GLUCOSE BLDC GLUCOMTR-MCNC: 104 MG/DL (ref 70–130)
HCT VFR BLD AUTO: 31.9 % (ref 40.4–52.2)
HGB BLD-MCNC: 9.7 G/DL (ref 13.7–17.6)
MCH RBC QN AUTO: 28 PG (ref 27–32.7)
MCHC RBC AUTO-ENTMCNC: 30.4 G/DL (ref 32.6–36.4)
MCV RBC AUTO: 91.9 FL (ref 79.8–96.2)
NT-PROBNP SERPL-MCNC: 1099 PG/ML (ref 0–1800)
PLATELET # BLD AUTO: 132 10*3/MM3 (ref 140–500)
PMV BLD AUTO: 12.7 FL (ref 6–12)
POTASSIUM BLD-SCNC: 3.5 MMOL/L (ref 3.5–5.2)
RBC # BLD AUTO: 3.47 10*6/MM3 (ref 4.6–6)
SODIUM BLD-SCNC: 137 MMOL/L (ref 136–145)
WBC NRBC COR # BLD: 4.07 10*3/MM3 (ref 4.5–10.7)

## 2018-05-01 PROCEDURE — 97161 PT EVAL LOW COMPLEX 20 MIN: CPT

## 2018-05-01 PROCEDURE — 25010000002 METHYLPREDNISOLONE PER 125 MG: Performed by: INTERNAL MEDICINE

## 2018-05-01 PROCEDURE — 80048 BASIC METABOLIC PNL TOTAL CA: CPT | Performed by: INTERNAL MEDICINE

## 2018-05-01 PROCEDURE — 93010 ELECTROCARDIOGRAM REPORT: CPT | Performed by: INTERNAL MEDICINE

## 2018-05-01 PROCEDURE — 25010000002 CEFTRIAXONE PER 250 MG: Performed by: INTERNAL MEDICINE

## 2018-05-01 PROCEDURE — 83880 ASSAY OF NATRIURETIC PEPTIDE: CPT | Performed by: INTERNAL MEDICINE

## 2018-05-01 PROCEDURE — 85027 COMPLETE CBC AUTOMATED: CPT | Performed by: INTERNAL MEDICINE

## 2018-05-01 PROCEDURE — 99233 SBSQ HOSP IP/OBS HIGH 50: CPT | Performed by: INTERNAL MEDICINE

## 2018-05-01 PROCEDURE — 93005 ELECTROCARDIOGRAM TRACING: CPT | Performed by: INTERNAL MEDICINE

## 2018-05-01 PROCEDURE — 94799 UNLISTED PULMONARY SVC/PX: CPT

## 2018-05-01 PROCEDURE — 82962 GLUCOSE BLOOD TEST: CPT

## 2018-05-01 RX ORDER — METHYLPREDNISOLONE SODIUM SUCCINATE 125 MG/2ML
125 INJECTION, POWDER, LYOPHILIZED, FOR SOLUTION INTRAMUSCULAR; INTRAVENOUS ONCE
Status: COMPLETED | OUTPATIENT
Start: 2018-05-01 | End: 2018-05-01

## 2018-05-01 RX ORDER — PREDNISONE 20 MG/1
40 TABLET ORAL
Status: DISCONTINUED | OUTPATIENT
Start: 2018-05-02 | End: 2018-05-04

## 2018-05-01 RX ORDER — POTASSIUM CHLORIDE 750 MG/1
20 CAPSULE, EXTENDED RELEASE ORAL ONCE
Status: COMPLETED | OUTPATIENT
Start: 2018-05-01 | End: 2018-05-01

## 2018-05-01 RX ORDER — DIGOXIN 0.25 MG/ML
250 INJECTION INTRAMUSCULAR; INTRAVENOUS ONCE
Status: DISCONTINUED | OUTPATIENT
Start: 2018-05-01 | End: 2018-05-05 | Stop reason: HOSPADM

## 2018-05-01 RX ADMIN — FUROSEMIDE 20 MG: 20 TABLET ORAL at 09:10

## 2018-05-01 RX ADMIN — FERROUS SULFATE TAB 325 MG (65 MG ELEMENTAL FE) 325 MG: 325 (65 FE) TAB at 09:10

## 2018-05-01 RX ADMIN — IPRATROPIUM BROMIDE AND ALBUTEROL SULFATE 3 ML: .5; 3 SOLUTION RESPIRATORY (INHALATION) at 07:38

## 2018-05-01 RX ADMIN — FERROUS SULFATE TAB 325 MG (65 MG ELEMENTAL FE) 325 MG: 325 (65 FE) TAB at 16:02

## 2018-05-01 RX ADMIN — IPRATROPIUM BROMIDE AND ALBUTEROL SULFATE 3 ML: .5; 3 SOLUTION RESPIRATORY (INHALATION) at 15:37

## 2018-05-01 RX ADMIN — METHYLPREDNISOLONE SODIUM SUCCINATE 125 MG: 125 INJECTION, POWDER, FOR SOLUTION INTRAMUSCULAR; INTRAVENOUS at 12:54

## 2018-05-01 RX ADMIN — OSELTAMIVIR PHOSPHATE 30 MG: 30 CAPSULE ORAL at 09:10

## 2018-05-01 RX ADMIN — APIXABAN 5 MG: 5 TABLET, FILM COATED ORAL at 09:10

## 2018-05-01 RX ADMIN — Medication 500 MCG: at 09:10

## 2018-05-01 RX ADMIN — APIXABAN 5 MG: 5 TABLET, FILM COATED ORAL at 20:50

## 2018-05-01 RX ADMIN — ATORVASTATIN CALCIUM 10 MG: 10 TABLET, FILM COATED ORAL at 09:09

## 2018-05-01 RX ADMIN — METOPROLOL TARTRATE 50 MG: 50 TABLET, FILM COATED ORAL at 09:10

## 2018-05-01 RX ADMIN — POTASSIUM CHLORIDE 20 MEQ: 750 CAPSULE, EXTENDED RELEASE ORAL at 09:19

## 2018-05-01 RX ADMIN — OSELTAMIVIR PHOSPHATE 30 MG: 30 CAPSULE ORAL at 20:50

## 2018-05-01 RX ADMIN — FERROUS SULFATE TAB 325 MG (65 MG ELEMENTAL FE) 325 MG: 325 (65 FE) TAB at 20:50

## 2018-05-01 RX ADMIN — DOFETILIDE 250 MCG: 0.25 CAPSULE ORAL at 20:48

## 2018-05-01 RX ADMIN — BUDESONIDE 3 MG: 3 CAPSULE ORAL at 09:10

## 2018-05-01 RX ADMIN — PANTOPRAZOLE SODIUM 40 MG: 40 TABLET, DELAYED RELEASE ORAL at 06:16

## 2018-05-01 RX ADMIN — IPRATROPIUM BROMIDE AND ALBUTEROL SULFATE 3 ML: .5; 3 SOLUTION RESPIRATORY (INHALATION) at 19:02

## 2018-05-01 RX ADMIN — DOFETILIDE 250 MCG: 0.25 CAPSULE ORAL at 09:10

## 2018-05-01 RX ADMIN — CEFTRIAXONE SODIUM 1 G: 1 INJECTION, SOLUTION INTRAVENOUS at 20:47

## 2018-05-01 RX ADMIN — METOPROLOL TARTRATE 50 MG: 50 TABLET, FILM COATED ORAL at 16:02

## 2018-05-01 RX ADMIN — IPRATROPIUM BROMIDE AND ALBUTEROL SULFATE 3 ML: .5; 3 SOLUTION RESPIRATORY (INHALATION) at 11:50

## 2018-05-01 RX ADMIN — METOPROLOL TARTRATE 50 MG: 50 TABLET, FILM COATED ORAL at 20:50

## 2018-05-01 NOTE — PROGRESS NOTES
Continued Stay Note   [facility]     Patient Name: Asia Ly  MRN: [id number]  Today's Date: 5/1/2018    Admit Date: 4/29/2018          Discharge Plan     Row Name 05/01/18 1520       Plan    Plan Comments spoke to [facility]  She is checking on Tikosyn.  Pt has no needs                Discharge Codes    No documentation.           Gracie Mirza RN

## 2018-05-01 NOTE — PLAN OF CARE
Problem: Patient Care Overview  Goal: Plan of Care Review   05/01/18 8837   OTHER   Outcome Summary At this time pt is independent/supervision level of assistance w functional mobility. Due to the above pt is not appropriate for skilled PT at this time and is safe to ambulate w nsg while at [facility]. PT will sign off at this time.   Coping/Psychosocial   Plan of Care Reviewed With patient

## 2018-05-01 NOTE — THERAPY EVALUATION
"Acute Care - Physical Therapy Initial Evaluation   [facility]     Patient Name: Asia Ly  : 1932  MRN: [id number]  Today's Date: 2018   Onset of Illness/Injury or Date of Surgery: 18  Date of Referral to PT: 18  Referring Physician: Herrera      Admit Date: 2018    Visit Dx:     ICD-10-CM ICD-9-CM   1. Hypoxia R09.02 799.02   2. Pneumonia of both lungs due to infectious organism, unspecified part of lung J18.9 483.8   3. Influenza B J10.1 487.1     Patient Active Problem List   Diagnosis   • Ataxia   • TIA (transient ischemic attack)   • Atrial fibrillation, paroxysmal   • Hyperlipidemia   • Gout   • Crohn's disease   • Hypoxia     Past Medical History:   Diagnosis Date   • Aortic stenosis    • Aspiration pneumonia    • Atrial fibrillation    • Bronchiectasis    • COPD (chronic obstructive pulmonary disease)    • Crohn's disease    • Dizziness    • Gastric ulcer    • Generalized weakness    • Gout    • Hard of hearing    • Hyperlipidemia    • Hypertension    • Leg swelling    • Lower extremity edema    • Mild anemia    • Near syncope    • PAF (paroxysmal atrial fibrillation)    • Palpitations    • Peptic ulcer    • Pneumonia of left lung due to infectious organism    • Pulmonary fibrosis    • Stroke    • TIA (transient ischemic attack)      Past Surgical History:   Procedure Laterality Date   • CATARACT EXTRACTION Bilateral    • COLONOSCOPY     • COLONOSCOPY N/A 3/9/2018    Procedure: COLONOSCOPY to terminal ileum with bx;  Surgeon: Aron Cabrera MD;  Location: [facility] ENDOSCOPY;  Service:    • CYSTECTOMY      back and shoulder area   • ENDOSCOPY N/A 3/9/2018    Procedure: ESOPHAGOGASTRODUODENOSCOPY with bx;  Surgeon: Aron Cabrera MD;  Location: [facility] ENDOSCOPY;  Service:    • EYE SURGERY Left     detached retina   • EYE SURGERY Right     \"repaired a hole in the eye\"   • TESTICLE SURGERY      benign tumor removed.        PT ASSESSMENT (last 12 hours)      Physical Therapy " Evaluation     Row Name 05/01/18 1537          PT Evaluation Time/Intention    Subjective Information no complaints  -MD     Document Type evaluation  -MD     Mode of Treatment physical therapy  -MD     Patient Effort excellent  -MD     Row Name 05/01/18 1537          General Information    Patient Profile Reviewed? yes  -MD     Onset of Illness/Injury or Date of Surgery 04/29/18  -MD     Referring Physician Herrera  -MD     Patient Observations alert;cooperative;agree to therapy  -MD     Patient/Family Observations Pt supine in bed showing no signs of acute distress.  -MD     Prior Level of Function independent:  -MD     Equipment Currently Used at Home none  -MD     Limitations/Impairments hearing  -MD     Row Name 05/01/18 1537          Relationship/Environment    Primary Source of Support/Comfort spouse  -MD     Lives With spouse  -MD     Family Caregiver if Needed spouse  -MD     Row Name 05/01/18 1537          Resource/Environmental Concerns    Current Living Arrangements home/apartment/condo  -MD     Row Name 05/01/18 1537          Cognitive Assessment/Intervention- PT/OT    Orientation Status (Cognition) oriented x 3  -MD     Follows Commands (Cognition) WNL  -MD     Row Name 05/01/18 1537          Bed Mobility Assessment/Treatment    Bed Mobility Assessment/Treatment supine-sit-supine  -MD     Supine-Sit-Supine Holly (Bed Mobility) independent  -MD     Row Name 05/01/18 1537          Transfer Assessment/Treatment    Transfer Assessment/Treatment sit-stand transfer;stand-sit transfer  -MD     Sit-Stand Holly (Transfers) supervision  -MD     Stand-Sit Holly (Transfers) supervision  -MD     Row Name 05/01/18 1537          Gait/Stairs Assessment/Training    Holly Level (Gait) supervision  -MD     Distance in Feet (Gait) 120  -MD     Deviations/Abnormal Patterns (Gait) gait speed decreased  -MD     Row Name 05/01/18 1537          General ROM    GENERAL ROM COMMENTS B LE AROM WFL  -MD      Row Name 05/01/18 1537          MMT (Manual Muscle Testing)    Additional Documentation General Assessment (Manual Muscle Testing) (Group)  -MD     Row Name 05/01/18 1537          General Assessment (Manual Muscle Testing)    General Manual Muscle Testing (MMT) Assessment no strength deficits identified  -MD     Row Name 05/01/18 1537          Pain Assessment    Additional Documentation Pain Scale: Numbers Pre/Post-Treatment (Group)  -MD     Row Name 05/01/18 1537          Pain Scale: Numbers Pre/Post-Treatment    Pain Scale: Numbers, Pretreatment 0/10 - no pain  -MD     Row Name 05/01/18 1537          Physical Therapy Clinical Impression    Date of Referral to PT 05/01/18  -MD     Functional Level at Time of Evaluation (PT Clinical Impression) supervision-independent  -MD     Row Name 05/01/18 1537          Vital Signs    Pre SpO2 (%) 96  -MD     O2 Delivery Pre Treatment supplemental O2  -MD     Intra SpO2 (%) 91  -MD     O2 Delivery Intra Treatment room air  -MD     Post SpO2 (%) 94  -MD     O2 Delivery Post Treatment room air  -MD     Pre Patient Position Supine  -MD     Intra Patient Position Standing  -MD     Post Patient Position Supine  -MD     Row Name 05/01/18 1537          Positioning and Restraints    Pre-Treatment Position in bed  -MD     Post Treatment Position bed  -MD     In Bed supine;with family/caregiver;notified nsg  -MD       User Key  (r) = Recorded By, (t) = Taken By, (c) = Cosigned By    Initials Name Provider Type    MD Lisa Arroyo, PT Physical Therapist              PT Recommendation and Plan  Therapy Frequency (PT Clinical Impression): evaluation only  Plan of Care Reviewed With: patient  Outcome Summary: At this time pt is independent/supervision level of assistance w functional mobility.  Due to the above pt is not appropriate for skilled PT at this time and is safe to ambulate w nsg while at [facility].  PT will sign off at this time.          Outcome Measures     Row Name 05/01/18 1500              How much help from another person do you currently need...    Turning from your back to your side while in flat bed without using bedrails? 4  -MD      Moving from lying on back to sitting on the side of a flat bed without bedrails? 4  -MD      Moving to and from a bed to a chair (including a wheelchair)? 4  -MD      Standing up from a chair using your arms (e.g., wheelchair, bedside chair)? 4  -MD      Climbing 3-5 steps with a railing? 3  -MD      To walk in hospital room? 3  -MD      AM-PAC 6 Clicks Score 22  -MD         Functional Assessment    Outcome Measure Options AM-PAC 6 Clicks Basic Mobility (PT)  -MD        User Key  (r) = Recorded By, (t) = Taken By, (c) = Cosigned By    Initials Name Provider Type    MD Lisa Arroyo, PT Physical Therapist           Time Calculation:         PT Charges     Row Name 05/01/18 1537             Time Calculation    Start Time 1528  -MD      Stop Time 1537  -MD      Time Calculation (min) 9 min  -MD      PT Received On 05/01/18  -MD        User Key  (r) = Recorded By, (t) = Taken By, (c) = Cosigned By    Initials Name Provider Type    MD Lisa Arroyo, PT Physical Therapist          Therapy Charges for Today     Code Description Service Date Service Provider Modifiers Qty    91174752144 HC PT EVAL LOW COMPLEXITY 1 5/1/2018 Lisa Arroyo, PT GP 1          PT G-Codes  Outcome Measure Options: AM-PAC 6 Clicks Basic Mobility (PT)      Lisa Arroyo PT  5/1/2018

## 2018-05-01 NOTE — PLAN OF CARE
Problem: Patient Care Overview  Goal: Plan of Care Review  Outcome: Ongoing (interventions implemented as appropriate)   05/01/18 7015   Plan of Care Review   Progress improving   OTHER   Outcome Summary SATs decreased 87% while sleeping. Placed on 2LNC SATs increased to 92%. SATs 92-97% while awake and ambulating with PT. Lungs coarse. Solu-Medrol x 1 given today, Prednisone starting in am. Ambulated with PT without any difficulty. PT signed off. HR increased today 120-170's, A-fib. Now SR. Tikosyn started today. Monitor QTc. EKG's ordered before Tikosyn dose. Monitor SATs, lung sounds and vital signs.   Coping/Psychosocial   Plan of Care Reviewed With patient;spouse     Goal: Discharge Needs Assessment  Outcome: Ongoing (interventions implemented as appropriate)      Problem: Breathing Pattern Ineffective (Adult)  Goal: Effective Oxygenation/Ventilation  Outcome: Ongoing (interventions implemented as appropriate)    Goal: Anxiety/Fear Reduction  Outcome: Ongoing (interventions implemented as appropriate)      Problem: Fall Risk (Adult)  Goal: Absence of Fall  Outcome: Ongoing (interventions implemented as appropriate)

## 2018-05-01 NOTE — PROGRESS NOTES
[facility]  Phone: [phone]  Humphrey Silverman MD    Subjective:  LOS: 2    Difficult to communicate as he is very hard of hearing.  He denies complaints.  However he has never really mentioned a lot of medical problems to me.    Objective   Vital Signs past 24hrs    Temp range: Temp (24hrs), Av °F (36.7 °C), Min:97.2 °F (36.2 °C), Max:98.6 °F (37 °C)    BP range: BP: ()/(51-74) 101/51  Pulse range: Heart Rate:  [] 79  Resp rate range: Resp:  [15-18] 18    Device (Oxygen Therapy): room airFlow (L/min):  [2] 2  Oxygen range:SpO2:  [86 %-100 %] 95 %      69.7 kg (153 lb 9.6 oz); Body mass index is 23.35 kg/m².    Intake/Output Summary (Last 24 hours) at 18 1421  Last data filed at 18 0900   Gross per 24 hour   Intake              510 ml   Output                0 ml   Net              510 ml       Physical Exam   Constitutional: He appears well-developed.   Cardiovascular: Normal rate.  An irregular rhythm present.   No murmur heard.  Pulmonary/Chest: He has decreased breath sounds. He has wheezes (v mild coarse). He has no rhonchi. He has no rales.   Abdominal: Soft. Bowel sounds are normal. There is no tenderness.   Musculoskeletal: He exhibits no edema.   Neurological: He is alert.   Skin: Rash noted.   Nursing note and vitals reviewed.    Results Review:    I have reviewed the laboratory and imaging data since the last note by [facility] physician.  My annotations are noted in assessment and plan.    No radiology results for the last day           Medication Review:  I have reviewed the current MAR.  My annotations are noted in assessment and plan.       Plan   PCCM Problems  Influenza B infection  Right sided infiltrate  Bronchiectasis  Elevated troponin  Acute versus chronic kidney disease  Persistent A. Fib    Plan of Treatment  Continue Tamiflu.    Some wheezing today, give medrol x 1. May need oral steroids for few days.    Chest x-ray slightly worse and pro-calcitonin  elevated.  Given ceftriaxone and azithromycin.  Patient is allergic to doxycycline.    Appreciate cardiology input. Note plans to start Tikosyn. Will stop Azithro. Hold on plans for further diuretic as BNP not high.    I tried to call his wife to discuss planning of discharge with her. Unable to reach her. Order PT and walk ox.    Humphrey Silverman MD  05/01/18  2:21 PM    Part of this note may be an electronic transcription/translation of spoken language to printed text using the Dragon Dictation System.

## 2018-05-01 NOTE — PROGRESS NOTES
"CC: Atrial fibrillation    Interval History:   Feels better today.    Vital Signs  Temp:  [97.5 °F (36.4 °C)-98.6 °F (37 °C)] 98.6 °F (37 °C)  Heart Rate:  [63-85] 63  Resp:  [16-18] 18  BP: (103-112)/(52-61) 103/54    Intake/Output Summary (Last 24 hours) at 05/01/18 0720  Last data filed at 05/01/18 0627   Gross per 24 hour   Intake              480 ml   Output                0 ml   Net              480 ml     Flowsheet Rows    Flowsheet Row First Filed Value   Admission Height 172.7 cm (68\") Documented at 04/29/2018 0659   Admission Weight 69.9 kg (154 lb) Documented at 04/29/2018 0659          PHYSICAL EXAM:  General: No acute distress  Resp:NL Rate, unlabored, Decreased sounds in bases bilateral rales third way up.  CV:NL rate and rhythm, NL PMI, Nl S1 and S2, no Mumur, no gallop, no rub, No JVD. Normal pedal pulses  ABD:Nl sounds, no masses or tenderness, nondistended, no quarding or rebound  Neuro: alert,cooperative and oriented  Extr: No edema or cyanosis, moves all extremities      Results Review:      Results from last 7 days  Lab Units 05/01/18  0428   SODIUM mmol/L 137   POTASSIUM mmol/L 3.5   CHLORIDE mmol/L 101   CO2 mmol/L 23.1   BUN mg/dL 20   CREATININE mg/dL 1.00   GLUCOSE mg/dL 88   CALCIUM mg/dL 8.2*       Results from last 7 days  Lab Units 04/29/18  1355 04/29/18  0735 04/25/18  0957   TROPONIN T ng/mL 0.026 0.047* 0.033*       Results from last 7 days  Lab Units 05/01/18  0428   WBC 10*3/mm3 4.07*   HEMOGLOBIN g/dL 9.7*   HEMATOCRIT % 31.9*   PLATELETS 10*3/mm3 132*       Results from last 7 days  Lab Units 04/25/18  0700   INR  1.32*           Results from last 7 days  Lab Units 04/30/18  0537   MAGNESIUM mg/dL 2.1         @LABRCNT(bnp)@  I reviewed the patient's new clinical results.  I personally viewed and interpreted the patient's EKG/Telemetry data        Medication Review:   Meds reviewed         Assessment/Plan  1.  Fever chills evidence pneumonia positive for influenza..  Per " pulmonary.  2. Paroxysmal atrial fibrillation. Off amiodarone due to pulmonary fibrosis, he had been on diltiazem discontinued due to rash.  • The patient's CHADS2-VASc score is 3.  This episode could've been caused by fever.   this is been recurrent.  Will start Tikosyn today.  3. History of Crohn's disease.  4.. History of gout, seems to be stable.  5. History of aspiration pneumonia, pulmonary fibrosis, and bronchiectasis.    6.   Hypertension blood pressure adequate control.  7.  Aortic stenosis.  Echocardiogram November 2017 this was just mild.   8.  Anemia appears stable continue the same he's tolerating eliquis.  9.  Rash of unclear etiology        Hector Rivera MD  05/01/18  7:20 AM

## 2018-05-01 NOTE — PLAN OF CARE
Problem: Patient Care Overview  Goal: Plan of Care Review  Outcome: Ongoing (interventions implemented as appropriate)   05/01/18 5338   Plan of Care Review   Progress no change   OTHER   Outcome Summary no falls; vital signs stable; antibiotics adjusted per MD; medications given per MAR; 2L NC placed during sleep; encouraged to call for assistance; will continue to monitor   Coping/Psychosocial   Plan of Care Reviewed With patient       Problem: Breathing Pattern Ineffective (Adult)  Goal: Effective Oxygenation/Ventilation  Outcome: Ongoing (interventions implemented as appropriate)      Problem: Fall Risk (Adult)  Goal: Absence of Fall  Outcome: Ongoing (interventions implemented as appropriate)

## 2018-05-02 ENCOUNTER — APPOINTMENT (OUTPATIENT)
Dept: GENERAL RADIOLOGY | Facility: HOSPITAL | Age: 83
End: 2018-05-02

## 2018-05-02 PROCEDURE — 63710000001 PREDNISONE PER 1 MG: Performed by: INTERNAL MEDICINE

## 2018-05-02 PROCEDURE — 93010 ELECTROCARDIOGRAM REPORT: CPT | Performed by: INTERNAL MEDICINE

## 2018-05-02 PROCEDURE — 94762 N-INVAS EAR/PLS OXIMTRY CONT: CPT

## 2018-05-02 PROCEDURE — 25010000002 CEFTRIAXONE PER 250 MG: Performed by: INTERNAL MEDICINE

## 2018-05-02 PROCEDURE — 93005 ELECTROCARDIOGRAM TRACING: CPT | Performed by: INTERNAL MEDICINE

## 2018-05-02 PROCEDURE — 94618 PULMONARY STRESS TESTING: CPT

## 2018-05-02 PROCEDURE — 99233 SBSQ HOSP IP/OBS HIGH 50: CPT | Performed by: INTERNAL MEDICINE

## 2018-05-02 PROCEDURE — 94799 UNLISTED PULMONARY SVC/PX: CPT

## 2018-05-02 PROCEDURE — 71046 X-RAY EXAM CHEST 2 VIEWS: CPT

## 2018-05-02 RX ADMIN — IPRATROPIUM BROMIDE AND ALBUTEROL SULFATE 3 ML: .5; 3 SOLUTION RESPIRATORY (INHALATION) at 15:04

## 2018-05-02 RX ADMIN — DOFETILIDE 250 MCG: 0.25 CAPSULE ORAL at 09:25

## 2018-05-02 RX ADMIN — OSELTAMIVIR PHOSPHATE 30 MG: 30 CAPSULE ORAL at 09:29

## 2018-05-02 RX ADMIN — FUROSEMIDE 20 MG: 20 TABLET ORAL at 09:29

## 2018-05-02 RX ADMIN — METOPROLOL TARTRATE 50 MG: 50 TABLET, FILM COATED ORAL at 16:55

## 2018-05-02 RX ADMIN — IPRATROPIUM BROMIDE AND ALBUTEROL SULFATE 3 ML: .5; 3 SOLUTION RESPIRATORY (INHALATION) at 06:53

## 2018-05-02 RX ADMIN — METOPROLOL TARTRATE 50 MG: 50 TABLET, FILM COATED ORAL at 09:28

## 2018-05-02 RX ADMIN — PANTOPRAZOLE SODIUM 40 MG: 40 TABLET, DELAYED RELEASE ORAL at 06:38

## 2018-05-02 RX ADMIN — IPRATROPIUM BROMIDE AND ALBUTEROL SULFATE 3 ML: .5; 3 SOLUTION RESPIRATORY (INHALATION) at 19:31

## 2018-05-02 RX ADMIN — APIXABAN 5 MG: 5 TABLET, FILM COATED ORAL at 09:29

## 2018-05-02 RX ADMIN — ATORVASTATIN CALCIUM 10 MG: 10 TABLET, FILM COATED ORAL at 09:29

## 2018-05-02 RX ADMIN — FERROUS SULFATE TAB 325 MG (65 MG ELEMENTAL FE) 325 MG: 325 (65 FE) TAB at 16:55

## 2018-05-02 RX ADMIN — BUDESONIDE 3 MG: 3 CAPSULE ORAL at 09:29

## 2018-05-02 RX ADMIN — FERROUS SULFATE TAB 325 MG (65 MG ELEMENTAL FE) 325 MG: 325 (65 FE) TAB at 21:42

## 2018-05-02 RX ADMIN — FERROUS SULFATE TAB 325 MG (65 MG ELEMENTAL FE) 325 MG: 325 (65 FE) TAB at 09:29

## 2018-05-02 RX ADMIN — IPRATROPIUM BROMIDE AND ALBUTEROL SULFATE 3 ML: .5; 3 SOLUTION RESPIRATORY (INHALATION) at 11:22

## 2018-05-02 RX ADMIN — Medication 500 MCG: at 09:29

## 2018-05-02 RX ADMIN — METOPROLOL TARTRATE 50 MG: 50 TABLET, FILM COATED ORAL at 21:42

## 2018-05-02 RX ADMIN — PREDNISONE 40 MG: 20 TABLET ORAL at 09:29

## 2018-05-02 RX ADMIN — CEFTRIAXONE SODIUM 1 G: 1 INJECTION, SOLUTION INTRAVENOUS at 21:42

## 2018-05-02 RX ADMIN — APIXABAN 5 MG: 5 TABLET, FILM COATED ORAL at 21:42

## 2018-05-02 RX ADMIN — OSELTAMIVIR PHOSPHATE 30 MG: 30 CAPSULE ORAL at 21:42

## 2018-05-02 NOTE — PROGRESS NOTES
"CC: Afib    Interval History:   Feels better.    Vital Signs  Temp:  [97.7 °F (36.5 °C)-98.3 °F (36.8 °C)] 98.3 °F (36.8 °C)  Heart Rate:  [] 66  Resp:  [15-18] 16  BP: ()/(51-74) 110/59    Intake/Output Summary (Last 24 hours) at 05/02/18 0727  Last data filed at 05/02/18 0639   Gross per 24 hour   Intake             1100 ml   Output              500 ml   Net              600 ml     Flowsheet Rows    Flowsheet Row First Filed Value   Admission Height 172.7 cm (68\") Documented at 04/29/2018 0659   Admission Weight 69.9 kg (154 lb) Documented at 04/29/2018 0659          PHYSICAL EXAM:  General: No acute distress  Resp:NL Rate, unlabored, Decreased sounds in bases bilateral rales third way up.  CV:NL rate and rhythm, NL PMI, Nl S1 and S2, no Mumur, no gallop, no rub, No JVD. Normal pedal pulses  ABD:Nl sounds, no masses or tenderness, nondistended, no quarding or rebound  Neuro: alert,cooperative and oriented  Extr: No edema or cyanosis, moves all extremities      Results Review:      Results from last 7 days  Lab Units 05/01/18  0428   SODIUM mmol/L 137   POTASSIUM mmol/L 3.5   CHLORIDE mmol/L 101   CO2 mmol/L 23.1   BUN mg/dL 20   CREATININE mg/dL 1.00   GLUCOSE mg/dL 88   CALCIUM mg/dL 8.2*       Results from last 7 days  Lab Units 04/29/18  1355 04/29/18  0735 04/25/18  0957   TROPONIN T ng/mL 0.026 0.047* 0.033*       Results from last 7 days  Lab Units 05/01/18  0428   WBC 10*3/mm3 4.07*   HEMOGLOBIN g/dL 9.7*   HEMATOCRIT % 31.9*   PLATELETS 10*3/mm3 132*               Results from last 7 days  Lab Units 04/30/18  0537   MAGNESIUM mg/dL 2.1         @LABRCNT(bnp)@  I reviewed the patient's new clinical results.  I personally viewed and interpreted the patient's EKG/Telemetry data        Medication Review:   Meds reviewed         Assessment/Plan  1.  Fever chills evidence pneumonia positive for influenza..  Per pulmonary.  2. Paroxysmal atrial fibrillation. Off amiodarone due to pulmonary fibrosis, he " had been on diltiazem discontinued due to rash.  • The patient's CHADS2-VASc score is 3.  This episode could've been caused by fever.   this is been recurrent.   QTC is slightly longer will recheck this later today.   3. History of Crohn's disease.  4.. History of gout, seems to be stable.  5. History of aspiration pneumonia, pulmonary fibrosis, and bronchiectasis.    6.   Hypertension blood pressure adequate control.  7.  Aortic stenosis.  Echocardiogram November 2017 this was just mild.   8.  Anemia appears stable continue the same he's tolerating eliquis.  9.  Rash of unclear etiology        Hector Rivera MD  05/02/18  7:27 AM

## 2018-05-02 NOTE — PLAN OF CARE
Problem: Patient Care Overview  Goal: Plan of Care Review  Outcome: Ongoing (interventions implemented as appropriate)   05/02/18 0450   Plan of Care Review   Progress improving   OTHER   Outcome Summary meds per order, no falls, isol cont, see vs, ecg strips and labs   Coping/Psychosocial   Plan of Care Reviewed With patient     Goal: Individualization and Mutuality  Outcome: Ongoing (interventions implemented as appropriate)    Goal: Discharge Needs Assessment  Outcome: Ongoing (interventions implemented as appropriate)    Goal: Interprofessional Rounds/Family Conf  Outcome: Ongoing (interventions implemented as appropriate)      Problem: Breathing Pattern Ineffective (Adult)  Goal: Effective Oxygenation/Ventilation  Outcome: Ongoing (interventions implemented as appropriate)    Goal: Anxiety/Fear Reduction  Outcome: Ongoing (interventions implemented as appropriate)      Problem: Fall Risk (Adult)  Goal: Absence of Fall  Outcome: Ongoing (interventions implemented as appropriate)      Problem: Infection, Risk/Actual (Adult)  Goal: Identify Related Risk Factors and Signs and Symptoms  Outcome: Outcome(s) achieved Date Met: 05/02/18    Goal: Infection Prevention/Resolution  Outcome: Ongoing (interventions implemented as appropriate)

## 2018-05-02 NOTE — PROGRESS NOTES
Exercise Oximetry    Patient Name:Asia Ly   MRN: [id number]   Date: 05/02/18             ROOM AIR BASELINE   SpO2% 93   Heart Rate 98   Blood Pressure      EXERCISE ON ROOM AIR SpO2% EXERCISE ON O2 @  LPM SpO2%   1 MINUTE 92 1 MINUTE    2 MINUTES 93 2 MINUTES    3 MINUTES 93 3 MINUTES    4 MINUTES 92 4 MINUTES    5 MINUTES 91 5 MINUTES    6 MINUTES 93 6 MINUTES               Distance Walked   Distance Walked   Dyspnea (Lesley Scale)   Dyspnea (Lesley Scale)   Fatigue (Lesley Scale)   Fatigue (Lesley Scale)   SpO2% Post Exercise  93 SpO2% Post Exercise   HR Post Exercise  101 HR Post Exercise   Time to Recovery   Time to Recovery     Comments: patient tolerated ambulation with conversation well. No complaints of SOA

## 2018-05-02 NOTE — PROGRESS NOTES
Continued Stay Note   [facility]     Patient Name: Asia Ly  MRN: [id number]  Today's Date: 5/2/2018    Admit Date: 4/29/2018          Discharge Plan     Row Name 05/02/18 1455       Plan    Plan Comments If patient is DC on Tykosyn he will need a Rx for 7 day supply for us to fill here at [facility] pharmacy  and a 30 day Rx  For his own pharmacy                Discharge Codes    No documentation.           Gracie Mirza RN

## 2018-05-02 NOTE — PROGRESS NOTES
[facility]  Phone: [phone]  Humphrey Silverman MD    Subjective:  LOS: 3    Easier to communicate with hearing aids ! Feels better. Denies complaints. No cough etc.    Objective   Vital Signs past 24hrs    Temp range: Temp (24hrs), Av.7 °F (36.5 °C), Min:96.2 °F (35.7 °C), Max:98.3 °F (36.8 °C)    BP range: BP: ()/(51-70) 123/62  Pulse range: Heart Rate:  [] 91  Resp rate range: Resp:  [15-20] 18    Device (Oxygen Therapy): room airFlow (L/min):  [2] 2  Oxygen range:SpO2:  [92 %-99 %] 93 %      68.3 kg (150 lb 9.6 oz); Body mass index is 22.9 kg/m².    Intake/Output Summary (Last 24 hours) at 18 1014  Last data filed at 18 0639   Gross per 24 hour   Intake              860 ml   Output              500 ml   Net              360 ml       Physical Exam   Constitutional: He appears well-developed.   Cardiovascular: Normal rate.  An irregular rhythm present.   No murmur heard.  Pulmonary/Chest: He has decreased breath sounds. He has no wheezes. He has no rhonchi. He has rales (heard posteriorly bases).   Abdominal: Soft. Bowel sounds are normal. There is no tenderness.   Musculoskeletal: He exhibits no edema.   Neurological: He is alert.   Skin: Rash noted.   Nursing note and vitals reviewed.    Results Review:    I have reviewed the laboratory and imaging data since the last note by [facility] physician.  My annotations are noted in assessment and plan.    No radiology results for the last day           Medication Review:  I have reviewed the current MAR.  My annotations are noted in assessment and plan.       Plan   PCCM Problems  Influenza B infection  Right sided infiltrate  Bronchiectasis  Elevated troponin  Acute versus chronic kidney disease  Persistent A. Fib    Plan of Treatment  Continue Tamiflu.    Some wheezing yesterday. Now on po pred.    Chest x-ray slightly worse and pro-calcitonin elevated.  Now on Ceftriaxone. Change to Omnicef on discharge.    Bibasilar crackles, ?  More prominent. He has a hx of bronchiectasis. Check CxR again and perhaps HRCT.    Appreciate cardiology input.     Did not desat with PT. Low sats noted during sleep. Check sleep oximetry.    Possible discharge tomorrow.    Humphrey Silverman MD  05/02/18  10:14 AM    Part of this note may be an electronic transcription/translation of spoken language to printed text using the Dragon Dictation System.

## 2018-05-03 ENCOUNTER — APPOINTMENT (OUTPATIENT)
Dept: CT IMAGING | Facility: HOSPITAL | Age: 83
End: 2018-05-03
Attending: INTERNAL MEDICINE

## 2018-05-03 LAB
ANION GAP SERPL CALCULATED.3IONS-SCNC: 12.3 MMOL/L
BUN BLD-MCNC: 24 MG/DL (ref 8–23)
BUN/CREAT SERPL: 20.7 (ref 7–25)
CALCIUM SPEC-SCNC: 8.7 MG/DL (ref 8.6–10.5)
CHLORIDE SERPL-SCNC: 104 MMOL/L (ref 98–107)
CO2 SERPL-SCNC: 23.7 MMOL/L (ref 22–29)
CREAT BLD-MCNC: 1.16 MG/DL (ref 0.76–1.27)
DEPRECATED RDW RBC AUTO: 53.2 FL (ref 37–54)
ERYTHROCYTE [DISTWIDTH] IN BLOOD BY AUTOMATED COUNT: 16.1 % (ref 11.5–14.5)
GFR SERPL CREATININE-BSD FRML MDRD: 60 ML/MIN/1.73
GLUCOSE BLD-MCNC: 105 MG/DL (ref 65–99)
HCT VFR BLD AUTO: 29.2 % (ref 40.4–52.2)
HGB BLD-MCNC: 9.1 G/DL (ref 13.7–17.6)
MCH RBC QN AUTO: 28 PG (ref 27–32.7)
MCHC RBC AUTO-ENTMCNC: 31.2 G/DL (ref 32.6–36.4)
MCV RBC AUTO: 89.8 FL (ref 79.8–96.2)
NT-PROBNP SERPL-MCNC: 1435 PG/ML (ref 0–1800)
PLATELET # BLD AUTO: 177 10*3/MM3 (ref 140–500)
PMV BLD AUTO: 11.6 FL (ref 6–12)
POTASSIUM BLD-SCNC: 3.6 MMOL/L (ref 3.5–5.2)
RBC # BLD AUTO: 3.25 10*6/MM3 (ref 4.6–6)
SODIUM BLD-SCNC: 140 MMOL/L (ref 136–145)
WBC NRBC COR # BLD: 5.94 10*3/MM3 (ref 4.5–10.7)

## 2018-05-03 PROCEDURE — 71250 CT THORAX DX C-: CPT

## 2018-05-03 PROCEDURE — 25010000002 CEFTRIAXONE PER 250 MG: Performed by: INTERNAL MEDICINE

## 2018-05-03 PROCEDURE — 83880 ASSAY OF NATRIURETIC PEPTIDE: CPT | Performed by: INTERNAL MEDICINE

## 2018-05-03 PROCEDURE — 80048 BASIC METABOLIC PNL TOTAL CA: CPT | Performed by: INTERNAL MEDICINE

## 2018-05-03 PROCEDURE — 93010 ELECTROCARDIOGRAM REPORT: CPT | Performed by: INTERNAL MEDICINE

## 2018-05-03 PROCEDURE — 63710000001 PREDNISONE PER 1 MG: Performed by: INTERNAL MEDICINE

## 2018-05-03 PROCEDURE — 93005 ELECTROCARDIOGRAM TRACING: CPT | Performed by: INTERNAL MEDICINE

## 2018-05-03 PROCEDURE — 85027 COMPLETE CBC AUTOMATED: CPT | Performed by: INTERNAL MEDICINE

## 2018-05-03 PROCEDURE — 94799 UNLISTED PULMONARY SVC/PX: CPT

## 2018-05-03 PROCEDURE — 99233 SBSQ HOSP IP/OBS HIGH 50: CPT | Performed by: NURSE PRACTITIONER

## 2018-05-03 RX ORDER — CEFDINIR 300 MG/1
300 CAPSULE ORAL EVERY 12 HOURS SCHEDULED
Status: DISCONTINUED | OUTPATIENT
Start: 2018-05-04 | End: 2018-05-05 | Stop reason: HOSPADM

## 2018-05-03 RX ADMIN — PREDNISONE 40 MG: 20 TABLET ORAL at 09:07

## 2018-05-03 RX ADMIN — DOFETILIDE 250 MCG: 0.25 CAPSULE ORAL at 09:07

## 2018-05-03 RX ADMIN — APIXABAN 5 MG: 5 TABLET, FILM COATED ORAL at 22:06

## 2018-05-03 RX ADMIN — BUDESONIDE 3 MG: 3 CAPSULE ORAL at 09:07

## 2018-05-03 RX ADMIN — CEFTRIAXONE SODIUM 1 G: 1 INJECTION, SOLUTION INTRAVENOUS at 22:07

## 2018-05-03 RX ADMIN — IPRATROPIUM BROMIDE AND ALBUTEROL SULFATE 3 ML: .5; 3 SOLUTION RESPIRATORY (INHALATION) at 06:56

## 2018-05-03 RX ADMIN — FERROUS SULFATE TAB 325 MG (65 MG ELEMENTAL FE) 325 MG: 325 (65 FE) TAB at 16:21

## 2018-05-03 RX ADMIN — IPRATROPIUM BROMIDE AND ALBUTEROL SULFATE 3 ML: .5; 3 SOLUTION RESPIRATORY (INHALATION) at 20:26

## 2018-05-03 RX ADMIN — APIXABAN 5 MG: 5 TABLET, FILM COATED ORAL at 09:07

## 2018-05-03 RX ADMIN — OSELTAMIVIR PHOSPHATE 30 MG: 30 CAPSULE ORAL at 09:06

## 2018-05-03 RX ADMIN — IPRATROPIUM BROMIDE AND ALBUTEROL SULFATE 3 ML: .5; 3 SOLUTION RESPIRATORY (INHALATION) at 12:07

## 2018-05-03 RX ADMIN — METOPROLOL TARTRATE 50 MG: 50 TABLET, FILM COATED ORAL at 09:08

## 2018-05-03 RX ADMIN — PANTOPRAZOLE SODIUM 40 MG: 40 TABLET, DELAYED RELEASE ORAL at 06:42

## 2018-05-03 RX ADMIN — METOPROLOL TARTRATE 50 MG: 50 TABLET, FILM COATED ORAL at 22:07

## 2018-05-03 RX ADMIN — METOPROLOL TARTRATE 50 MG: 50 TABLET, FILM COATED ORAL at 16:21

## 2018-05-03 RX ADMIN — FUROSEMIDE 20 MG: 20 TABLET ORAL at 09:06

## 2018-05-03 RX ADMIN — FERROUS SULFATE TAB 325 MG (65 MG ELEMENTAL FE) 325 MG: 325 (65 FE) TAB at 22:07

## 2018-05-03 RX ADMIN — FERROUS SULFATE TAB 325 MG (65 MG ELEMENTAL FE) 325 MG: 325 (65 FE) TAB at 09:06

## 2018-05-03 RX ADMIN — OSELTAMIVIR PHOSPHATE 30 MG: 30 CAPSULE ORAL at 22:07

## 2018-05-03 RX ADMIN — Medication 500 MCG: at 09:08

## 2018-05-03 RX ADMIN — IPRATROPIUM BROMIDE AND ALBUTEROL SULFATE 3 ML: .5; 3 SOLUTION RESPIRATORY (INHALATION) at 15:30

## 2018-05-03 RX ADMIN — ATORVASTATIN CALCIUM 10 MG: 10 TABLET, FILM COATED ORAL at 09:07

## 2018-05-03 NOTE — PLAN OF CARE
Problem: Patient Care Overview  Goal: Plan of Care Review  Outcome: Ongoing (interventions implemented as appropriate)   05/02/18 6915   Plan of Care Review   Progress improving   OTHER   Outcome Summary PT REMAINS ON TIKOSYN- EKG WITH QTC 498MS THIS AM, 435 MS AT NOON TODAY, REMAINS IN NSR WITH CONTROLLED RATE AND OCCASIONAL PAC'S, VSS, SAFETY MAINTAINED, IV ANTIBIOTIC CONTINUED, LUNG SOUNDS IMPROVING, CONTINUE TO MONITOR   Coping/Psychosocial   Plan of Care Reviewed With patient

## 2018-05-03 NOTE — PROGRESS NOTES
"CC: a fib    Interval History: awake, hard of hearing, hearing aids not fitting. No chest pain. Denies shortness of breath. Wife to come in later.      Vital Signs  Temp:  [96.2 °F (35.7 °C)-97.8 °F (36.6 °C)] 97.8 °F (36.6 °C)  Heart Rate:  [66-91] 73  Resp:  [15-20] 18  BP: ()/(50-62) 111/53    Intake/Output Summary (Last 24 hours) at 05/03/18 0726  Last data filed at 05/02/18 1700   Gross per 24 hour   Intake              720 ml   Output                0 ml   Net              720 ml     Flowsheet Rows    Flowsheet Row First Filed Value   Admission Height 172.7 cm (68\") Documented at 04/29/2018 0659   Admission Weight 69.9 kg (154 lb) Documented at 04/29/2018 0659          PHYSICAL EXAM:  General: No acute distress  Resp:NL Rate, unlabored, Decreased sounds in bases bilateral rales, scattered wheeze  CV:NL rate and rhythm, NL PMI, Nl S1 and S2, no Mumur, no gallop, no rub, No JVD. Normal pedal pulses  ABD:Nl sounds, no masses or tenderness, nondistended, no quarding or rebound  Neuro: alert,cooperative and oriented  Extr: No edema or cyanosis, moves all extremities      Results Review:      Results from last 7 days  Lab Units 05/03/18  0419   SODIUM mmol/L 140   POTASSIUM mmol/L 3.6   CHLORIDE mmol/L 104   CO2 mmol/L 23.7   BUN mg/dL 24*   CREATININE mg/dL 1.16   GLUCOSE mg/dL 105*   CALCIUM mg/dL 8.7       Results from last 7 days  Lab Units 04/29/18  1355 04/29/18  0735   TROPONIN T ng/mL 0.026 0.047*       Results from last 7 days  Lab Units 05/03/18  0419   WBC 10*3/mm3 5.94   HEMOGLOBIN g/dL 9.1*   HEMATOCRIT % 29.2*   PLATELETS 10*3/mm3 177               Results from last 7 days  Lab Units 04/30/18  0537   MAGNESIUM mg/dL 2.1         @LABRCNT(bnp)@  I reviewed the patient's new clinical results.  I personally viewed and interpreted the patient's EKG/Telemetry data        Medication Review:   Meds reviewed         Assessment/Plan    1.  Fever chills evidence pneumonia positive for influenza B-  Per " pulmonary. Requiring O2 at night.   2. Paroxysmal atrial fibrillation. NSR. Off amiodarone due to pulmonary fibrosis, he had been on diltiazem discontinued due to rash.  • The patient's CHADS2-VASc score is 3.  This episode could've been caused by fever.   this is been recurrent. QTC was 522 at 12 noon 5/2/18. Tikosin held. Recheck 435. EKG pending this am. Plan to give Tikosin if QTc  Is  Still ok. Plan is to DC patient on Tikosin also. Will need 7 day Rx to be filled here and 30 day RX for pharmacy.   3. History of Crohn's disease.  4.. History of gout, seems to be stable.  5. History of aspiration pneumonia, pulmonary fibrosis, and bronchiectasis.  No complaint of SOB. Chronic rales.   6.   Hypertension blood pressure adequate control.  7.  Aortic stenosis.  Echocardiogram November 2017- mild AS  8.  Anemia appears stable. Tolerating eliquis.  9.  Rash of unclear etiology        KYLAH Jha  05/03/18  7:26 AM

## 2018-05-03 NOTE — PLAN OF CARE
Problem: Patient Care Overview  Goal: Plan of Care Review  Outcome: Ongoing (interventions implemented as appropriate)   05/03/18 1841   Plan of Care Review   Progress improving   OTHER   Outcome Summary VSS. NSR. Tikosyn given in AM. 1800 EKG QTc 505. Cardiology does not want to discharge him tomorrow due to Tikosyn. Safety maintained, continue to monitor.    Coping/Psychosocial   Plan of Care Reviewed With patient     Goal: Individualization and Mutuality  Outcome: Ongoing (interventions implemented as appropriate)    Goal: Discharge Needs Assessment  Outcome: Ongoing (interventions implemented as appropriate)    Goal: Interprofessional Rounds/Family Conf  Outcome: Ongoing (interventions implemented as appropriate)      Problem: Breathing Pattern Ineffective (Adult)  Goal: Effective Oxygenation/Ventilation  Outcome: Ongoing (interventions implemented as appropriate)    Goal: Anxiety/Fear Reduction  Outcome: Ongoing (interventions implemented as appropriate)      Problem: Fall Risk (Adult)  Goal: Absence of Fall  Outcome: Ongoing (interventions implemented as appropriate)      Problem: Infection, Risk/Actual (Adult)  Goal: Infection Prevention/Resolution  Outcome: Ongoing (interventions implemented as appropriate)

## 2018-05-03 NOTE — PROGRESS NOTES
[facility]  Phone: [phone]  Humphrey Silverman MD    Subjective:  LOS: 4    Feels better. Denies complaints. No cough etc.    Objective   Vital Signs past 24hrs    Temp range: Temp (24hrs), Av.4 °F (36.3 °C), Min:96.8 °F (36 °C), Max:97.8 °F (36.6 °C)    BP range: BP: ()/(48-58) 99/48  Pulse range: Heart Rate:  [73-91] 89  Resp rate range: Resp:  [15-20] 20    Device (Oxygen Therapy): room air   Oxygen range:SpO2:  [90 %-93 %] 93 %      68.3 kg (150 lb 9.6 oz); Body mass index is 22.9 kg/m².    Intake/Output Summary (Last 24 hours) at 18 1147  Last data filed at 18 1700   Gross per 24 hour   Intake              480 ml   Output                0 ml   Net              480 ml       Physical Exam   Constitutional: He appears well-developed.   Cardiovascular: Normal rate.  An irregular rhythm present.   No murmur heard.  Pulmonary/Chest: He has decreased breath sounds. He has no wheezes. He has no rhonchi. He has rales (heard posteriorly bases).   Abdominal: Soft. Bowel sounds are normal. There is no tenderness.   Musculoskeletal: He exhibits no edema.   Neurological: He is alert.   Skin: Rash noted.   Nursing note and vitals reviewed.    Results Review:    I have reviewed the laboratory and imaging data since the last note by [facility] physician.  My annotations are noted in assessment and plan.    No radiology results for the last day           Medication Review:  I have reviewed the current MAR.  My annotations are noted in assessment and plan.       Plan   PCCM Problems  Influenza B infection  Right sided infiltrate  Bronchiectasis  Elevated troponin  Acute versus chronic kidney disease  Persistent A. Fib    Plan of Treatment  Continue Tamiflu - finishing today.    Some wheezing - now resolved. Now on po pred. Taper off.    Pro-calcitonin elevated.  Now on Ceftriaxone. Change to Omnicef on discharge.    Chest x-ray reviewed.  Persistent bibasilar crackles therefore I ordered a  high-resolution CT.  I compared this to CT from 2008.  There seems to be progressive consolidation in both lower lobes.  The distribution is similar CT of 2008 however it is progressive he this does not look like IPF changes from amiodarone to me.  He needs a bronchoscopy in my initial plan was to do this tomorrow.  However patient is on Eliquis.  Overall as he is completing a course of antibiotics I will defer bronchoscopy on this admission and sent a note to Dr. Chidi Oconnor to consider as an outpatient in 4-6 weeks.    Appreciate cardiology input. Await sorting out his antiarrhythmics then discahrge.    Did not desat with PT. No significant desat with sleep. No need supplemental O2.    Possible discharge tomorrow.    Humphrey Silverman MD  05/03/18  11:47 AM    Part of this note may be an electronic transcription/translation of spoken language to printed text using the Dragon Dictation System.

## 2018-05-03 NOTE — PROGRESS NOTES
Continued Stay Note   [facility]     Patient Name: Asia Ly  MRN: [id number]  Today's Date: 5/3/2018    Admit Date: 4/29/2018          Discharge Plan     Row Name 05/03/18 1016       Plan    Plan Home with no needs    Plan Comments Spoke to patients pharmacy [facility] [id number] AT [facility] [address] - [phone]  - [phone] FX  They will have to order medication and they can have it in 24 hours.  They will do pre cert if required.  They are aware [facility] pharmacy will start pt with 7 days of med at DC                Discharge Codes    No documentation.           Gracie Mirza RN

## 2018-05-03 NOTE — PLAN OF CARE
Problem: Breathing Pattern Ineffective (Adult)  Goal: Effective Oxygenation/Ventilation  Outcome: Ongoing (interventions implemented as appropriate)    Goal: Anxiety/Fear Reduction  Outcome: Ongoing (interventions implemented as appropriate)      Problem: Fall Risk (Adult)  Goal: Absence of Fall  Outcome: Ongoing (interventions implemented as appropriate)      Problem: Infection, Risk/Actual (Adult)  Goal: Infection Prevention/Resolution  Outcome: Ongoing (interventions implemented as appropriate)

## 2018-05-03 NOTE — PLAN OF CARE
Problem: Patient Care Overview  Goal: Interprofessional Rounds/Family Conf  Outcome: Ongoing (interventions implemented as appropriate)      Problem: Breathing Pattern Ineffective (Adult)  Goal: Effective Oxygenation/Ventilation  Outcome: Ongoing (interventions implemented as appropriate)

## 2018-05-04 LAB
BACTERIA SPEC AEROBE CULT: NORMAL
BACTERIA SPEC AEROBE CULT: NORMAL
POTASSIUM BLD-SCNC: 3.6 MMOL/L (ref 3.5–5.2)

## 2018-05-04 PROCEDURE — 84132 ASSAY OF SERUM POTASSIUM: CPT | Performed by: INTERNAL MEDICINE

## 2018-05-04 PROCEDURE — 93005 ELECTROCARDIOGRAM TRACING: CPT | Performed by: INTERNAL MEDICINE

## 2018-05-04 PROCEDURE — 94799 UNLISTED PULMONARY SVC/PX: CPT

## 2018-05-04 PROCEDURE — 99233 SBSQ HOSP IP/OBS HIGH 50: CPT | Performed by: NURSE PRACTITIONER

## 2018-05-04 PROCEDURE — 63710000001 PREDNISONE PER 1 MG: Performed by: INTERNAL MEDICINE

## 2018-05-04 PROCEDURE — 93010 ELECTROCARDIOGRAM REPORT: CPT | Performed by: INTERNAL MEDICINE

## 2018-05-04 RX ORDER — PREDNISONE 20 MG/1
20 TABLET ORAL
Status: DISCONTINUED | OUTPATIENT
Start: 2018-05-05 | End: 2018-05-05 | Stop reason: HOSPADM

## 2018-05-04 RX ORDER — ARFORMOTEROL TARTRATE 15 UG/2ML
15 SOLUTION RESPIRATORY (INHALATION)
Status: DISCONTINUED | OUTPATIENT
Start: 2018-05-04 | End: 2018-05-05 | Stop reason: HOSPADM

## 2018-05-04 RX ORDER — DOFETILIDE 0.12 MG/1
125 CAPSULE ORAL EVERY 12 HOURS SCHEDULED
Status: DISCONTINUED | OUTPATIENT
Start: 2018-05-04 | End: 2018-05-05 | Stop reason: HOSPADM

## 2018-05-04 RX ADMIN — APIXABAN 5 MG: 5 TABLET, FILM COATED ORAL at 21:39

## 2018-05-04 RX ADMIN — FERROUS SULFATE TAB 325 MG (65 MG ELEMENTAL FE) 325 MG: 325 (65 FE) TAB at 15:16

## 2018-05-04 RX ADMIN — BUDESONIDE 3 MG: 3 CAPSULE ORAL at 08:49

## 2018-05-04 RX ADMIN — FERROUS SULFATE TAB 325 MG (65 MG ELEMENTAL FE) 325 MG: 325 (65 FE) TAB at 08:49

## 2018-05-04 RX ADMIN — ARFORMOTEROL TARTRATE 15 MCG: 15 SOLUTION RESPIRATORY (INHALATION) at 10:49

## 2018-05-04 RX ADMIN — Medication 500 MCG: at 08:49

## 2018-05-04 RX ADMIN — PREDNISONE 40 MG: 20 TABLET ORAL at 08:51

## 2018-05-04 RX ADMIN — CEFDINIR 300 MG: 300 CAPSULE ORAL at 08:49

## 2018-05-04 RX ADMIN — FERROUS SULFATE TAB 325 MG (65 MG ELEMENTAL FE) 325 MG: 325 (65 FE) TAB at 21:39

## 2018-05-04 RX ADMIN — CEFDINIR 300 MG: 300 CAPSULE ORAL at 21:39

## 2018-05-04 RX ADMIN — DOFETILIDE 125 MCG: 0.12 CAPSULE ORAL at 21:39

## 2018-05-04 RX ADMIN — METOPROLOL TARTRATE 50 MG: 50 TABLET, FILM COATED ORAL at 21:39

## 2018-05-04 RX ADMIN — ARFORMOTEROL TARTRATE 15 MCG: 15 SOLUTION RESPIRATORY (INHALATION) at 22:14

## 2018-05-04 RX ADMIN — APIXABAN 5 MG: 5 TABLET, FILM COATED ORAL at 08:50

## 2018-05-04 RX ADMIN — ATORVASTATIN CALCIUM 10 MG: 10 TABLET, FILM COATED ORAL at 08:50

## 2018-05-04 RX ADMIN — METOPROLOL TARTRATE 50 MG: 50 TABLET, FILM COATED ORAL at 08:49

## 2018-05-04 RX ADMIN — DOFETILIDE 125 MCG: 0.12 CAPSULE ORAL at 08:52

## 2018-05-04 RX ADMIN — METOPROLOL TARTRATE 50 MG: 50 TABLET, FILM COATED ORAL at 15:16

## 2018-05-04 RX ADMIN — FUROSEMIDE 20 MG: 20 TABLET ORAL at 08:49

## 2018-05-04 RX ADMIN — PANTOPRAZOLE SODIUM 40 MG: 40 TABLET, DELAYED RELEASE ORAL at 06:28

## 2018-05-04 NOTE — DISCHARGE PLACEMENT REQUEST
"Alex Burk (85 y.o. Male)     Date of Birth Social Security Number Address Home Phone MRN    08/21/1932  [id number]  [facility] [address] [phone] [id number]    Sabianism Marital Status          [place] [employer]        Admission Date Admission Type Admitting Provider Attending Provider Department, Room/Bed    4/29/18 Emergency Humphrey Silverman MD Jain, Subin, MD [address], [facility]/1    Discharge Date Discharge Disposition Discharge Destination                       Attending Provider:  Humphrey Silverman MD    Allergies:  Doxycycline, Diltiazem    Isolation:  Droplet   Infection:  Influenza (04/29/18)   Code Status:  FULL    Ht:  172.7 cm (68\")   Wt:  68.3 kg (150 lb 9.6 oz)    Admission Cmt:  None   Principal Problem:  None                Active Insurance as of 4/29/2018     Primary Coverage     Payor Plan Insurance Group Employer/Plan Group    MEDICARE MEDICARE A & B      Payor Plan Address Payor Plan Phone Number Effective From Effective To    [address] [phone] 8/1/1997     [place] [address]       Subscriber Name Subscriber Birth Date Member ID       ALEX BURK 8/21/1932 [id number]           Secondary Coverage     Payor Plan Insurance Group Employer/Plan Group    [facility] SUPP [id number]     Payor Plan Address Payor Plan Phone Number Effective From Effective To    [address]  12/1/2016     [place] [address]       Subscriber Name Subscriber Birth Date Member ID       ALEX BURK 8/21/1932 [id number]                 Emergency Contacts      (Rel.) Home Phone Work Phone Mobile Phone    Ni Burk (Spouse) [phone] -- [phone]    Raul Burk -- -- [phone]              "

## 2018-05-04 NOTE — PLAN OF CARE
Problem: Patient Care Overview  Goal: Plan of Care Review  Outcome: Ongoing (interventions implemented as appropriate)   05/04/18 1800   Plan of Care Review   Progress improving   OTHER   Outcome Summary VSS. 0900 Tikosyn reduced dose- 125. 1200 EKC QTc 485. No c/o pain. Family at bedside. Potassium 3.6. Safety maintained, continue to monitor.    Coping/Psychosocial   Plan of Care Reviewed With patient     Goal: Individualization and Mutuality  Outcome: Ongoing (interventions implemented as appropriate)    Goal: Discharge Needs Assessment  Outcome: Ongoing (interventions implemented as appropriate)    Goal: Interprofessional Rounds/Family Conf  Outcome: Ongoing (interventions implemented as appropriate)      Problem: Breathing Pattern Ineffective (Adult)  Goal: Effective Oxygenation/Ventilation  Outcome: Ongoing (interventions implemented as appropriate)    Goal: Anxiety/Fear Reduction  Outcome: Ongoing (interventions implemented as appropriate)      Problem: Fall Risk (Adult)  Goal: Absence of Fall  Outcome: Ongoing (interventions implemented as appropriate)      Problem: Infection, Risk/Actual (Adult)  Goal: Infection Prevention/Resolution  Outcome: Ongoing (interventions implemented as appropriate)

## 2018-05-04 NOTE — PROGRESS NOTES
Continued Stay Note   [facility]     Patient Name: Asia Ly  MRN: [id number]  Today's Date: 5/4/2018    Admit Date: 4/29/2018          Discharge Plan     Row Name 05/04/18 1134       Plan    Plan Home with [facility]    Plan Comments Referral to [facility]  Gracia to follow pt for DC              Discharge Codes    No documentation.           Gracie Mirza RN

## 2018-05-04 NOTE — PROGRESS NOTES
"CC: paroxysmal a fib. now in sinus     Interval History: doing \"fine\"  He states he is ready to go home and watch the derby. No complaint of CP or SOB.      Vital Signs  Temp:  [96.7 °F (35.9 °C)-98.4 °F (36.9 °C)] 98.4 °F (36.9 °C)  Heart Rate:  [68-95] 68  Resp:  [18-20] 18  BP: ()/(48-71) 122/71    Intake/Output Summary (Last 24 hours) at 05/04/18 0709  Last data filed at 05/03/18 1830   Gross per 24 hour   Intake              770 ml   Output              750 ml   Net               20 ml     Flowsheet Rows    Flowsheet Row First Filed Value   Admission Height 172.7 cm (68\") Documented at 04/29/2018 0659   Admission Weight 69.9 kg (154 lb) Documented at 04/29/2018 0659          PHYSICAL EXAM:  General: No acute distress  Resp:NL Rate, unlabored, Decreased sounds in bases bilateral rales  CV:NL rate and rhythm, NL PMI, Nl S1 and S2, no Mumur, no gallop, no rub, No JVD. Normal pedal pulses  ABD:Nl sounds, no masses or tenderness, nondistended, no quarding or rebound  Neuro: alert,cooperative and oriented  Extr: No edema or cyanosis, moves all extremities      Results Review:      Results from last 7 days  Lab Units 05/03/18  0419   SODIUM mmol/L 140   POTASSIUM mmol/L 3.6   CHLORIDE mmol/L 104   CO2 mmol/L 23.7   BUN mg/dL 24*   CREATININE mg/dL 1.16   GLUCOSE mg/dL 105*   CALCIUM mg/dL 8.7       Results from last 7 days  Lab Units 04/29/18  1355 04/29/18  0735   TROPONIN T ng/mL 0.026 0.047*       Results from last 7 days  Lab Units 05/03/18  0419   WBC 10*3/mm3 5.94   HEMOGLOBIN g/dL 9.1*   HEMATOCRIT % 29.2*   PLATELETS 10*3/mm3 177               Results from last 7 days  Lab Units 04/30/18  0537   MAGNESIUM mg/dL 2.1         I reviewed the patient's new clinical results.  I personally viewed and interpreted the patient's EKG/Telemetry data        Medication Review:   Meds reviewed         Assessment/Plan    1.  Fever chills evidence pneumonia positive for influenza B-  Per pulmonary. Requiring O2 at night. "   2. Paroxysmal atrial fibrillation. NSR. Off amiodarone due to pulmonary fibrosis, he had been on diltiazem discontinued due to rash.  • The patient's CHADS2-VASc score is 3. Tikosin held again last night. Recheck 469 this am. Tikosin decreased to 125 mcg to start this am with 3h after EKG. Q12h ekg. Needs to stay at least one more night until able to tolerate tikosin. Plan is to DC patient on Tikosin also. Will need 7 day Rx to be filled here and 30 day RX for pharmacy.   3. History of Crohn's disease.  4.. History of gout, seems to be stable.  5. History of aspiration pneumonia, pulmonary fibrosis, and bronchiectasis.  No complaint of SOB. Chronic rales.   6.   Hypertension blood pressure adequate control.  7.  Aortic stenosis.  Echocardiogram November 2017- mild AS  8.  Anemia appears stable. Tolerating eliquis.  9.  Rash of unclear etiology   10. DC planning: to see KYLHA Alatorre next week in the office.   KYLAH Jha  05/04/18  7:09 AM

## 2018-05-04 NOTE — PLAN OF CARE
Problem: Patient Care Overview  Goal: Plan of Care Review  Outcome: Ongoing (interventions implemented as appropriate)    Goal: Individualization and Mutuality  Outcome: Ongoing (interventions implemented as appropriate)    Goal: Discharge Needs Assessment  Outcome: Ongoing (interventions implemented as appropriate)      Problem: Breathing Pattern Ineffective (Adult)  Goal: Effective Oxygenation/Ventilation  Outcome: Ongoing (interventions implemented as appropriate)    Goal: Anxiety/Fear Reduction  Outcome: Ongoing (interventions implemented as appropriate)      Problem: Fall Risk (Adult)  Goal: Absence of Fall  Outcome: Ongoing (interventions implemented as appropriate)      Problem: Infection, Risk/Actual (Adult)  Goal: Infection Prevention/Resolution  Outcome: Ongoing (interventions implemented as appropriate)

## 2018-05-04 NOTE — PROGRESS NOTES
[facility]  Phone: [phone]  Humphrey Silverman MD    Subjective:  LOS: 5    No new respiratory complaints.    Objective   Vital Signs past 24hrs    Temp range: Temp (24hrs), Av.3 °F (36.3 °C), Min:96.7 °F (35.9 °C), Max:98.4 °F (36.9 °C)    BP range: BP: ()/(48-71) 122/71  Pulse range: Heart Rate:  [68-95] 68  Resp rate range: Resp:  [16-20] 16    Device (Oxygen Therapy): room air   Oxygen range:SpO2:  [93 %-98 %] 96 %      68.3 kg (150 lb 9.6 oz); Body mass index is 22.9 kg/m².    Intake/Output Summary (Last 24 hours) at 18 0801  Last data filed at 18 1830   Gross per 24 hour   Intake              770 ml   Output              750 ml   Net               20 ml       Physical Exam   Constitutional: He appears well-developed.   Cardiovascular: Normal rate.  An irregular rhythm present.   No murmur heard.  Pulmonary/Chest: He has decreased breath sounds. He has no wheezes. He has no rhonchi. He has rales (heard posteriorly bases).   Abdominal: Soft. Bowel sounds are normal. There is no tenderness.   Musculoskeletal: He exhibits no edema.   Neurological: He is alert.   Skin: Rash noted.   Nursing note and vitals reviewed.    Results Review:    I have reviewed the laboratory and imaging data since the last note by [facility] physician.  My annotations are noted in assessment and plan.    Ct Chest Hi Resolution    Result Date: 5/3/2018  1. Large geographic regions of scarring with bronchiectasis at both lower lobes and throughout the right middle lobe. Dense airspace consolidations were present in these regions on the previous chest CT from  suggesting that the current findings are sequela of the previous pneumonia or recurrent pneumonia. The lung surrounding these regions at the lower lobes appears unremarkable. 2. Small airspace opacities at both lung apices have developed in the interval, but are otherwise age indeterminate. Acute infectious infiltrates cannot be excluded. 3. Small fairly  free flowing right pleural effusion and the small left pleural effusion is predominantly loculated and not free flowing.               Medication Review:  I have reviewed the current MAR.  My annotations are noted in assessment and plan.       Plan   PCCM Problems  Influenza B infection  Right sided infiltrate  Bronchiectasis  Elevated troponin  Acute versus chronic kidney disease  Persistent A. Fib  Dr. Chidi Oconnor    Plan of Treatment  He has completed treatment for influenza B    CT reviewed.  Compared to previous.  I believe he needs bronchoscopy at some point for progressive consolidation in both lower lobes.  Given acute infection I would prefer to wait 4-6 weeks prior to bronchoscopy.  I sent a message to his regular pulmonologist through our EMR.    His wheezing has resolved.  He is on by mouth prednisone which will be tapered off.    His pro-calcitonin was elevated and suggestive of acute bacterial infection.  He is now on Omnicef after treatment with ceftriaxone.  He will complete the course.  He did not receive coverage with atypicals due to allergies and QTC prolongation.    Atrial fibrillation for which she is now on Tikosyn.  Cardiology is adjusting the dosage.    He does not require oxygen on discharge as he did not desaturate with PT or with nocturnal oximetry.    He can be discharged once cleared by cardiology.    Possible discharge tomorrow.    Humphrey Silverman MD  05/04/18  8:01 AM    Part of this note may be an electronic transcription/translation of spoken language to printed text using the Dragon Dictation System.

## 2018-05-05 VITALS
SYSTOLIC BLOOD PRESSURE: 114 MMHG | OXYGEN SATURATION: 96 % | HEART RATE: 66 BPM | BODY MASS INDEX: 22.82 KG/M2 | DIASTOLIC BLOOD PRESSURE: 55 MMHG | TEMPERATURE: 97.4 F | RESPIRATION RATE: 18 BRPM | HEIGHT: 68 IN | WEIGHT: 150.6 LBS

## 2018-05-05 PROBLEM — R09.02 HYPOXIA: Status: RESOLVED | Noted: 2018-04-29 | Resolved: 2018-05-05

## 2018-05-05 LAB — POTASSIUM BLD-SCNC: 3.5 MMOL/L (ref 3.5–5.2)

## 2018-05-05 PROCEDURE — 99232 SBSQ HOSP IP/OBS MODERATE 35: CPT | Performed by: INTERNAL MEDICINE

## 2018-05-05 PROCEDURE — 84132 ASSAY OF SERUM POTASSIUM: CPT | Performed by: INTERNAL MEDICINE

## 2018-05-05 PROCEDURE — 94799 UNLISTED PULMONARY SVC/PX: CPT

## 2018-05-05 PROCEDURE — 93010 ELECTROCARDIOGRAM REPORT: CPT | Performed by: INTERNAL MEDICINE

## 2018-05-05 PROCEDURE — 93005 ELECTROCARDIOGRAM TRACING: CPT | Performed by: INTERNAL MEDICINE

## 2018-05-05 PROCEDURE — 63710000001 PREDNISONE PER 1 MG: Performed by: INTERNAL MEDICINE

## 2018-05-05 RX ORDER — IPRATROPIUM BROMIDE AND ALBUTEROL SULFATE 2.5; .5 MG/3ML; MG/3ML
3 SOLUTION RESPIRATORY (INHALATION)
Qty: 360 ML | Refills: 0 | Status: SHIPPED | OUTPATIENT
Start: 2018-05-05 | End: 2018-06-12

## 2018-05-05 RX ORDER — CEFDINIR 300 MG/1
300 CAPSULE ORAL EVERY 12 HOURS SCHEDULED
Qty: 6 CAPSULE | Refills: 0 | Status: SHIPPED | OUTPATIENT
Start: 2018-05-05 | End: 2018-05-08

## 2018-05-05 RX ORDER — PREDNISONE 20 MG/1
20 TABLET ORAL
Qty: 3 TABLET | Refills: 0 | Status: SHIPPED | OUTPATIENT
Start: 2018-05-05 | End: 2018-05-08

## 2018-05-05 RX ORDER — ARFORMOTEROL TARTRATE 15 UG/2ML
15 SOLUTION RESPIRATORY (INHALATION)
Qty: 120 ML | Refills: 0 | Status: SHIPPED | OUTPATIENT
Start: 2018-05-05 | End: 2018-11-20

## 2018-05-05 RX ORDER — DOFETILIDE 0.12 MG/1
125 CAPSULE ORAL EVERY 12 HOURS SCHEDULED
Status: DISCONTINUED | OUTPATIENT
Start: 2018-05-05 | End: 2018-05-05

## 2018-05-05 RX ADMIN — ARFORMOTEROL TARTRATE 15 MCG: 15 SOLUTION RESPIRATORY (INHALATION) at 07:37

## 2018-05-05 RX ADMIN — PANTOPRAZOLE SODIUM 40 MG: 40 TABLET, DELAYED RELEASE ORAL at 07:42

## 2018-05-05 RX ADMIN — Medication 500 MCG: at 09:44

## 2018-05-05 RX ADMIN — FUROSEMIDE 20 MG: 20 TABLET ORAL at 09:44

## 2018-05-05 RX ADMIN — BUDESONIDE 3 MG: 3 CAPSULE ORAL at 09:44

## 2018-05-05 RX ADMIN — ATORVASTATIN CALCIUM 10 MG: 10 TABLET, FILM COATED ORAL at 09:44

## 2018-05-05 RX ADMIN — DOFETILIDE 125 MCG: 0.12 CAPSULE ORAL at 09:45

## 2018-05-05 RX ADMIN — FERROUS SULFATE TAB 325 MG (65 MG ELEMENTAL FE) 325 MG: 325 (65 FE) TAB at 09:44

## 2018-05-05 RX ADMIN — METOPROLOL TARTRATE 50 MG: 50 TABLET, FILM COATED ORAL at 09:44

## 2018-05-05 RX ADMIN — PREDNISONE 20 MG: 20 TABLET ORAL at 09:44

## 2018-05-05 RX ADMIN — APIXABAN 5 MG: 5 TABLET, FILM COATED ORAL at 09:44

## 2018-05-05 RX ADMIN — CEFDINIR 300 MG: 300 CAPSULE ORAL at 09:44

## 2018-05-05 NOTE — PROGRESS NOTES
Continued Stay Note   [facility] [place]     Patient Name: Asia Ly  MRN: [id number]  Today's Date: 5/5/2018    Admit Date: 4/29/2018          Discharge Plan     Row Name 05/05/18 1102       Plan    Plan Son Raul will be taking pt home; [facility] to follow and nebulizer from [facility].    Patient/Family in Agreement with Plan yes   spoke with pt's son Raul    Plan Comments Pt noted to have DC orders for today and needs a home nebulizer. Spoke with pt and he states to talk to his spouse. Attempted to call spouse and no answer on home or cell number. Called and spoke to pt's son Raul who states he is OK to use [facility] and for [facility] to deliver the nebulizer to pt's room. He would like the staff RN to call him on his cell so that he can be in the room when she goes over pt's DC paperwork. Son states he has been staying with pt and his wife since pt became sick. Updated staff RN Christine and spoke with Elayne with [facility] and they can accept and follow.............JW              Discharge Codes    No documentation.       Expected Discharge Date and Time     Expected Discharge Date Expected Discharge Time    May 5, 2018             Sonali Gant, JAMES

## 2018-05-05 NOTE — PLAN OF CARE
Problem: Patient Care Overview  Goal: Plan of Care Review  Outcome: Ongoing (interventions implemented as appropriate)   05/04/18 2139 05/05/18 0123   Plan of Care Review   Progress --  no change   OTHER   Outcome Summary --  VSS. Pt tolerating 125 mg dose of Tikosyn. No complaints of pain. Safety maintained. Continue to monitor. Possible d/c in am.    Coping/Psychosocial   Plan of Care Reviewed With patient --      Goal: Individualization and Mutuality  Outcome: Ongoing (interventions implemented as appropriate)    Goal: Discharge Needs Assessment  Outcome: Ongoing (interventions implemented as appropriate)    Goal: Interprofessional Rounds/Family Conf  Outcome: Ongoing (interventions implemented as appropriate)      Problem: Breathing Pattern Ineffective (Adult)  Goal: Effective Oxygenation/Ventilation  Outcome: Ongoing (interventions implemented as appropriate)    Goal: Anxiety/Fear Reduction  Outcome: Ongoing (interventions implemented as appropriate)      Problem: Fall Risk (Adult)  Goal: Absence of Fall  Outcome: Ongoing (interventions implemented as appropriate)      Problem: Infection, Risk/Actual (Adult)  Goal: Infection Prevention/Resolution  Outcome: Ongoing (interventions implemented as appropriate)

## 2018-05-05 NOTE — PROGRESS NOTES
Continued Stay Note   [facility]     Patient Name: Asia Ly  MRN: [id number]  Today's Date: 5/5/2018    Admit Date: 4/29/2018          Discharge Plan     Row Name 05/05/18 1102       Plan    Plan Son Raul will be taking pt home; [facility] to follow and nebulizer from [facility].    Patient/Family in Agreement with Plan yes   spoke with pt's son Raul    Plan Comments Pt noted to have DC orders for today and needs a home nebulizer. Spoke with pt and he is states to talk to his spouse. Attempted to call spouse and no answer on home or cell number. Called and spoke to pt's son Raul who states he is OK for [facility] to deliver the nebulizer to pt's room and he would like the staff RN to call him on his cell so that he can be in the room when she goes over pt's DC paperwork. Son states he has been staying with pt and his wife since pt became sick. Updated staff RN Christine and spoke with Elayne with [facility] and they can accept and follow.............JW              Discharge Codes    No documentation.       Expected Discharge Date and Time     Expected Discharge Date Expected Discharge Time    May 5, 2018             Sonali Gant, RN

## 2018-05-05 NOTE — DISCHARGE SUMMARY
[facility]    Admit date: 4/29/2018  Discharge date: 5/5/2018    Admission/discharge diagnosis:  1. Influenza B  2. Bronchiectasis with ae  3. Elevated troponin  4. CKD  5. Persistent afib  6. Abnormal ct chest    HPI: as per Dr. Silverman:  85-year-old male who presented to the emergency room at the insistence of his wife.  Patient himself states he has not had any cough and just was feeling a little unwell.  However he is also very hard of hearing which makes history taking extremely difficult.  According to his wife patient has been unwell for a few days.  He had excessive cough and phlegm production all night long.  He had a fever up to 102°F.  He was brought to the emergency room with testing showed influenza infection.  His wife states he is not complaining of chest pain but he does not complain much at all.  He does not have any known coronary artery disease or MI.  He does have atrial fibrillation for which he sees [facility].  He denies any leg edema.  Patient quit smoking several years ago.  According to our office notes he has bronchiectasis.  He has had repeated exacerbations from his bronchiectasis.  I do not see any documented spirometry data on him.  He does not use oxygen at home though he is on inhalers at home.  Please note patient was recently given doxycycline.  He developed a rash.  He may be allergic to this medicine.  Asia Green  reports that he does not drink alcohol.,  reports that he has quit smoking. His smoking use included Cigars. He has never used smokeless tobacco    Hospital Course:  Mr. green recovered unevenfully, he required adjustment to medications for his AFib.  His Ct does have impressive changes on it, but appears more chronic.    Discharge medications:  Per med rec    Activity:  Pre admission    Diet:  Pre admission    Consultants:  Dr. Rivera with cardiology    Follow up:  With me in office 2-4 weeks  With MIKALA MONTERO next week in office  Primary  care physician in 2-4 weeks    Dispo:  Home    Greater than 30 mins spent in discharging patient.

## 2018-05-05 NOTE — PROGRESS NOTES
Hospital Follow Up    LOS:  LOS: 6 days   Patient Name: Asia Ly  Age/Sex: 85 y.o. male  : 1932  MRN: [id number]    Day of Service: 18   Length of Stay: 6  Encounter Provider: Kiko Chacon MD  Place of Service: [facility] CARDIOLOGY  Patient Care Team:  Lubna Lobo MD as PCP - General  Lubna Lobo MD as PCP - Family Medicine    Subjective:     Chief Complaint: Atrial fibrillation, influenza B.    Interval History: Doing well plan discharge today.    Objective:     Objective:  Temp:  [96.9 °F (36.1 °C)-97.8 °F (36.6 °C)] 97.4 °F (36.3 °C)  Heart Rate:  [56-75] 56  Resp:  [16-20] 18  BP: (100-122)/(52-57) 122/55     Intake/Output Summary (Last 24 hours) at 18 1059  Last data filed at 18 1700   Gross per 24 hour   Intake              240 ml   Output                0 ml   Net              240 ml     Body mass index is 22.9 kg/m².  1    18  1047 18  0558 18  0518   Weight: 75.8 kg (167 lb 3.2 oz) 69.7 kg (153 lb 9.6 oz) 68.3 kg (150 lb 9.6 oz)     Weight change:       Physical Exam:   General : Alert, cooperative, in no acute distress.  Neuro: alert,cooperative and oriented  Lungs: CTAB. Normal respiratory effort and rate.  CV:: Regular rate and rhythm, normal S1 and S2, no murmurs, gallops or rubs.  ABD: Soft, nontender, non-distended. positive bowel sounds  Extr: No edema or cyanosis, moves all extremities    Lab Review:     Results from last 7 days  Lab Units 18  0508 18  1444 18  0419 18  0428  18  0735   SODIUM mmol/L  --   --  140 137  < > 137   POTASSIUM mmol/L 3.5 3.6 3.6 3.5  < > 3.8   CHLORIDE mmol/L  --   --  104 101  < > 99   CO2 mmol/L  --   --  23.7 23.1  < > 22.6   BUN mg/dL  --   --  24* 20  < > 32*   CREATININE mg/dL  --   --  1.16 1.00  < > 1.42*   GLUCOSE mg/dL  --   --  105* 88  < > 114*   CALCIUM mg/dL  --   --  8.7 8.2*  < > 8.9   AST (SGOT) U/L  --   --   --   --   --  30    ALT (SGPT) U/L  --   --   --   --   --  13   < > = values in this interval not displayed.    Results from last 7 days  Lab Units 04/29/18  1355 04/29/18  0735   TROPONIN T ng/mL 0.026 0.047*       Results from last 7 days  Lab Units 05/03/18  0419 05/01/18  0428   WBC 10*3/mm3 5.94 4.07*   HEMOGLOBIN g/dL 9.1* 9.7*   HEMATOCRIT % 29.2* 31.9*   PLATELETS 10*3/mm3 177 132*           Results from last 7 days  Lab Units 04/30/18  0537   MAGNESIUM mg/dL 2.1           Invalid input(s): LDLCALC    Results from last 7 days  Lab Units 05/03/18  0419 05/01/18  0428 04/29/18  0735   PROBNP pg/mL 1,435.0 1,099.0 2,375.0*         I reviewed the patient's new clinical results.  I personally viewed and interpreted the patient's EKG  Current Medications:   Scheduled Meds:  apixaban 5 mg Oral Q12H   arformoterol 15 mcg Nebulization BID - RT   atorvastatin 10 mg Oral Daily   Budesonide 3 mg Oral Daily   cefdinir 300 mg Oral Q12H   digoxin 250 mcg Intravenous Once   dofetilide 125 mcg Oral Q12H   ferrous sulfate 325 mg Oral TID   furosemide 20 mg Oral Daily   metoprolol tartrate 50 mg Oral TID   pantoprazole 40 mg Oral QAM   predniSONE 20 mg Oral Daily With Breakfast   vitamin B-12 500 mcg Oral Daily     Continuous Infusions:     Allergies:  Allergies   Allergen Reactions   • Doxycycline Rash   • Diltiazem        Assessment:     Active Problems:    * No active hospital problems. *        Plan:     1.  Influenza B better  2.  Paroxysmal atrial fibrillation.  Patient off amiodarone secondary portal fibrosis.  Patient was on Tikosyn and his dose was decreased.    will discharge home on a lower dose QTC okay.  Measured today at 456 ms  3.  Crohn's disease  4.  Mild aortic stenosis  5.  Will need an EKG done in my office next week some time.  Somewhere between Tuesday and Thursday.  6.  Okay to discharge follow-up with nurse practitioner in one week and primary cardiologist in 4 weeks    Kiko Chacon MD  05/05/18  10:59 AM

## 2018-05-07 ENCOUNTER — TELEPHONE (OUTPATIENT)
Dept: CARDIOLOGY | Facility: CLINIC | Age: 83
End: 2018-05-07

## 2018-05-07 NOTE — PROGRESS NOTES
Case Management Discharge Note    Final Note: Home with meds and nebulizer    Destination     No service coordination in this encounter.      Durable Medical Equipment - Selection Complete     Service Request Status Selected Specialties Address Phone Number Fax Number    [facility] Selected DME Services [address], [facility] [id number] [phone]      Dialysis/Infusion     No service coordination in this encounter.      Home Medical Care     Service Request Status Selected Specialties Address Phone Number Fax Number    [facility] [place] Selected Home Health Services [address] [facility] [address] [address] [phone]        Sonali Gant, RN 5/5/2018 1045    Spoke with Elayne and they will accept and follow               [facility] Declined N/A [address] [facility] [address] [address] [phone]      Social Care     No service coordination in this encounter.             Final Discharge Disposition Code: 01 - home or self-care

## 2018-05-07 NOTE — TELEPHONE ENCOUNTER
[phone]    Pts [facility] Karey RAMIREZ called stating she noticed a drug interaction while reviewing pts medications.      Pts Tikosyn and Ketoconazole ointment are said to have interactions.  Can you advise?    Thanks, C DAVID

## 2018-05-12 ENCOUNTER — HOSPITAL ENCOUNTER (EMERGENCY)
Facility: HOSPITAL | Age: 83
Discharge: HOME | End: 2018-05-12
Attending: EMERGENCY MEDICINE | Admitting: EMERGENCY MEDICINE

## 2018-05-12 VITALS
TEMPERATURE: 98.3 F | HEIGHT: 66 IN | WEIGHT: 150 LBS | OXYGEN SATURATION: 98 % | RESPIRATION RATE: 16 BRPM | BODY MASS INDEX: 24.11 KG/M2 | DIASTOLIC BLOOD PRESSURE: 60 MMHG | SYSTOLIC BLOOD PRESSURE: 108 MMHG | HEART RATE: 75 BPM

## 2018-05-12 DIAGNOSIS — M10.9 GOUTY ARTHRITIS OF TOE OF LEFT FOOT: Primary | ICD-10-CM

## 2018-05-12 PROCEDURE — 99283 EMERGENCY DEPT VISIT LOW MDM: CPT

## 2018-05-12 PROCEDURE — 96372 THER/PROPH/DIAG INJ SC/IM: CPT

## 2018-05-12 PROCEDURE — 25010000002 DEXAMETHASONE SODIUM PHOSPHATE 20 MG/5ML SOLUTION: Performed by: EMERGENCY MEDICINE

## 2018-05-12 RX ORDER — HYDROCODONE BITARTRATE AND ACETAMINOPHEN 7.5; 325 MG/1; MG/1
1 TABLET ORAL ONCE
Status: COMPLETED | OUTPATIENT
Start: 2018-05-12 | End: 2018-05-12

## 2018-05-12 RX ORDER — DEXAMETHASONE SODIUM PHOSPHATE 4 MG/ML
10 INJECTION, SOLUTION INTRA-ARTICULAR; INTRALESIONAL; INTRAMUSCULAR; INTRAVENOUS; SOFT TISSUE ONCE
Status: COMPLETED | OUTPATIENT
Start: 2018-05-12 | End: 2018-05-12

## 2018-05-12 RX ORDER — COLCHICINE 0.6 MG/1
0.6 TABLET ORAL 3 TIMES DAILY
Qty: 20 TABLET | Refills: 0 | Status: SHIPPED | OUTPATIENT
Start: 2018-05-12 | End: 2018-10-22 | Stop reason: HOSPADM

## 2018-05-12 RX ADMIN — DEXAMETHASONE SODIUM PHOSPHATE 10 MG: 4 INJECTION, SOLUTION INTRAMUSCULAR; INTRAVENOUS at 12:39

## 2018-05-12 RX ADMIN — HYDROCODONE BITARTRATE AND ACETAMINOPHEN 1 TABLET: 7.5; 325 TABLET ORAL at 12:39

## 2018-05-12 NOTE — ED TRIAGE NOTES
"Patient presents from home via private vehicle.  Patient is reports left lower extremity swelling.  \"I got the gout\"  Patient is ambulatory in triage.  "

## 2018-05-12 NOTE — ED PROVIDER NOTES
EMERGENCY DEPARTMENT ENCOUNTER    CHIEF COMPLAINT  Chief Complaint: Gout  History given by: Pt  History limited by: none  Room Number: 49/49  PMD: Lubna Lobo MD      HPI:  Pt is a 85 y.o. male who presents complaining of gout to his left foot that began last night. Pt states he has been taking his preventative gout medicine. Pt has hx of gout.    Duration:  One day  Onset: gradual  Timing: constant  Location: Left foot  Radiation: none stated  Quality: gout  Intensity/Severity: moderate  Progression: none stated  Associated Symptoms: none stated  Aggravating Factors: none stated  Alleviating Factors: none stated  Previous Episodes: Pt has hx of gout.  Treatment before arrival: Pt states he has been taking his preventative gout medicine    PAST MEDICAL HISTORY  Active Ambulatory Problems     Diagnosis Date Noted   • Ataxia 08/19/2016   • TIA (transient ischemic attack) 08/19/2016   • Atrial fibrillation, paroxysmal 08/19/2016   • Hyperlipidemia 08/19/2016   • Gout 08/19/2016   • Crohn's disease 08/19/2016     Resolved Ambulatory Problems     Diagnosis Date Noted   • Hypoxia 04/29/2018     Past Medical History:   Diagnosis Date   • Aortic stenosis    • Aspiration pneumonia    • Atrial fibrillation    • Bronchiectasis    • COPD (chronic obstructive pulmonary disease)    • Crohn's disease    • Dizziness    • Gastric ulcer    • Generalized weakness    • Gout    • Hard of hearing    • Hyperlipidemia    • Hypertension    • Leg swelling    • Lower extremity edema    • Mild anemia    • Near syncope    • PAF (paroxysmal atrial fibrillation)    • Palpitations    • Peptic ulcer    • Pneumonia of left lung due to infectious organism    • Pulmonary fibrosis    • Stroke    • TIA (transient ischemic attack)        PAST SURGICAL HISTORY  Past Surgical History:   Procedure Laterality Date   • CATARACT EXTRACTION Bilateral    • COLONOSCOPY     • COLONOSCOPY N/A 3/9/2018    Procedure: COLONOSCOPY to terminal ileum with bx;   "Surgeon: Aron Cabrera MD;  Location: [facility] ENDOSCOPY;  Service:    • CYSTECTOMY      back and shoulder area   • ENDOSCOPY N/A 3/9/2018    Procedure: ESOPHAGOGASTRODUODENOSCOPY with bx;  Surgeon: Aron Cabrera MD;  Location: [facility] ENDOSCOPY;  Service:    • EYE SURGERY Left     detached retina   • EYE SURGERY Right     \"repaired a hole in the eye\"   • TESTICLE SURGERY      benign tumor removed.       FAMILY HISTORY  Family History   Problem Relation Age of Onset   • Stroke Mother    • Heart disease Mother    • Hypertension Mother    • Diabetes Mother    • Heart disease Sister      Heart Valve Replacement   • Ovarian cancer Sister    • Rheumatic fever Sister    • Diabetes Sister    • Stroke Father        SOCIAL HISTORY  Social History     Social History   • Marital status:      Spouse name: N/A   • Number of children: N/A   • Years of education: N/A     Occupational History   • Not on file.     Social History Main Topics   • Smoking status: Former Smoker     Types: Cigars   • Smokeless tobacco: Never Used      Comment: Quit 25 years ago   • Alcohol use No      Comment: daily caffiene   • Drug use: No   • Sexual activity: Defer     Other Topics Concern   • Not on file     Social History Narrative   • No narrative on file       ALLERGIES  Doxycycline and Diltiazem    REVIEW OF SYSTEMS  Review of Systems   Constitutional: Negative for activity change, appetite change and fever.   HENT: Negative for congestion and sore throat.    Eyes: Negative.    Respiratory: Negative for cough and shortness of breath.    Cardiovascular: Negative for chest pain and leg swelling.   Gastrointestinal: Negative for abdominal pain, diarrhea and vomiting.   Endocrine: Negative.    Genitourinary: Negative for decreased urine volume and dysuria.   Musculoskeletal: Negative for neck pain.        Left foot gout   Skin: Negative for rash and wound.   Allergic/Immunologic: Negative.    Neurological: Negative for weakness, numbness and " headaches.   Hematological: Negative.    Psychiatric/Behavioral: Negative.    All other systems reviewed and are negative.      PHYSICAL EXAM  ED Triage Vitals   Temp Heart Rate Resp BP SpO2   05/12/18 1045 05/12/18 1043 05/12/18 1043 -- 05/12/18 1043   97.8 °F (36.6 °C) 78 15  99 %      Temp src Heart Rate Source Patient Position BP Location FiO2 (%)   05/12/18 1045 05/12/18 1043 -- -- --   Tympanic Monitor          Physical Exam   Constitutional: He is oriented to person, place, and time and well-developed, well-nourished, and in no distress.   HENT:   Head: Normocephalic and atraumatic.   Eyes: EOM are normal. Pupils are equal, round, and reactive to light.   Neck: Normal range of motion. Neck supple.   Cardiovascular: Normal rate, regular rhythm and normal heart sounds.    Pulmonary/Chest: Effort normal and breath sounds normal. No respiratory distress.   Abdominal: Soft. There is no tenderness. There is no rebound and no guarding.   Musculoskeletal: Normal range of motion. He exhibits no edema.   Left first toe NTP joint and surrounding soft tissue have redness, warmth, and tenderness with gouty arthritis.   Neurological: He is alert and oriented to person, place, and time. He has normal sensation and normal strength.   Skin: Skin is warm and dry.   Psychiatric: Mood and affect normal.   Nursing note and vitals reviewed.        PROCEDURES  Procedures      PROGRESS AND CONSULTS  ED Course   12:34 PM  Informed pt of the plan to administer decadron and discharge with gout medication.Pt understands and agrees with the plan, all questions answered.          MEDICAL DECISION MAKING  Results were reviewed/discussed with the patient and they were also made aware of online access. Pt also made aware that some labs, such as cultures, will not be resulted during ER visit and follow up with PMD is necessary.     MDM  Number of Diagnoses or Management Options     Amount and/or Complexity of Data Reviewed  Decide to obtain  previous medical records or to obtain history from someone other than the patient: yes  Review and summarize past medical records: yes           DIAGNOSIS  Final diagnoses:   Gouty arthritis of toe of left foot       DISPOSITION  DISCHARGE    Patient discharged in stable condition.    Reviewed implications of results, diagnosis, meds, responsibility to follow up, warning signs and symptoms of possible worsening, potential complications and reasons to return to ER.    Patient/Family voiced understanding of above instructions.    Discussed plan for discharge, as there is no emergent indication for admission. Patient referred to primary care provider for BP management due to today's BP. Pt/family is agreeable and understands need for follow up and repeat testing.  Pt is aware that discharge does not mean that nothing is wrong but it indicates no emergency is present that requires admission and they must continue care with follow-up as given below or physician of their choice.     FOLLOW-UP  Lubna Lobo MD  [address] [address]  [phone]    Call            Medication List      New Prescriptions    colchicine 0.6 MG tablet  Take 1 tablet by mouth 3 (Three) Times a Day.              Latest Documented Vital Signs:  As of 12:34 PM  BP- 105/54 HR- 77 Temp- 97.8 °F (36.6 °C) (Tympanic) O2 sat- 99%    --  Documentation assistance provided by ananya Madrid for .  Information recorded by the scribe was done at my direction and has been verified and validated by me.     Godfrey Madrid  05/12/18 9235       Francis Jolley MD  05/12/18 1056

## 2018-05-22 ENCOUNTER — OFFICE VISIT (OUTPATIENT)
Dept: CARDIOLOGY | Facility: CLINIC | Age: 83
End: 2018-05-22

## 2018-05-22 VITALS
BODY MASS INDEX: 23.49 KG/M2 | WEIGHT: 155 LBS | HEART RATE: 72 BPM | DIASTOLIC BLOOD PRESSURE: 50 MMHG | SYSTOLIC BLOOD PRESSURE: 100 MMHG | HEIGHT: 68 IN

## 2018-05-22 DIAGNOSIS — I48.0 PAF (PAROXYSMAL ATRIAL FIBRILLATION) (HCC): Primary | ICD-10-CM

## 2018-05-22 DIAGNOSIS — Z79.01 ANTICOAGULANT LONG-TERM USE: ICD-10-CM

## 2018-05-22 DIAGNOSIS — J84.10 PULMONARY FIBROSIS (HCC): ICD-10-CM

## 2018-05-22 DIAGNOSIS — I35.0 NONRHEUMATIC AORTIC VALVE STENOSIS: ICD-10-CM

## 2018-05-22 DIAGNOSIS — D50.9 IRON DEFICIENCY ANEMIA, UNSPECIFIED IRON DEFICIENCY ANEMIA TYPE: ICD-10-CM

## 2018-05-22 DIAGNOSIS — I10 ESSENTIAL HYPERTENSION: ICD-10-CM

## 2018-05-22 PROCEDURE — 93000 ELECTROCARDIOGRAM COMPLETE: CPT | Performed by: NURSE PRACTITIONER

## 2018-05-22 PROCEDURE — 99214 OFFICE O/P EST MOD 30 MIN: CPT | Performed by: NURSE PRACTITIONER

## 2018-05-22 RX ORDER — DOFETILIDE 0.12 MG/1
125 CAPSULE ORAL 2 TIMES DAILY
COMMUNITY
End: 2019-10-28 | Stop reason: SDUPTHER

## 2018-05-22 NOTE — PROGRESS NOTES
Date of Office Visit: 2018  Encounter Provider: KYLAH Jha  Place of Service: [facility] CARDIOLOGY  Patient Name: Asia Ly  :1932    Chief Complaint   Patient presents with   • Atrial Fibrillation   • Transient Ischemic Attack   • Edema   :     HPI: Asia Ly is a 85 y.o. male is a patient of Dr. Rivera. I am seeing him today and have reviewed the records.      His past medical history is significant of paroxysmal atrial fibrillation, aortic stenosis, hyperlipidemia, B12 anemia,Crohn's disease, colon polyps, lower extremity edema, TIA,  Pulmonary HTN and pulmonary fibrosis.      In May 2004, He was traveling in [place] and ran out of sotalol.  He saw Dr. shyanne Freeman about atrial fibrillation ablation but it was felt best not to pursue that.  He was evaluated by  in this office with electrophysiology and it was felt that he was doing well on sotalol and to maintain him on that.    In 2016 he suffered from a stroke and TIA at [facility] and was switched from aspirin to Eliquis at that time.  In 2016, he presented [facility] with atrial fibrillation and had an echocardiogram at that time that showed normal left ventricular systolic dysfunction, moderate tricuspid insufficiency, mild mitral insufficiency , mild pulmonary hypertension.  He also had a perfusion stress test that was negative for ischemia.  He was started on amiodarone.  At some point he was taking off diltiazem due to an allergic reaction of a rash.This was eventually discontinued considering his history of pulmonary fibrosis.     He had a Transthoracic echocardiogram in 2017 that showed an EF of 60%, grade 2 diastolic dysfunction, and mild aortic valve stenosis.  Around that same time he was complaining of increased lower extremity edema and was found to be in rapid atrial fibrillation at that time.  Metoprolol titrate was added  along with diuretic and his acidemia improved.           He later was in the emergency room with the diagnoses of pneumonia.  He been having some complaint of dizziness and lightheadedness with position changes.  He had a CT of the head on March 2, 2018 to evaluate vertigo and unsteady gait that was unchanged when compared to the prior CT in August 2016.    Sometime in 02/2018, his wife noticedHis wife reports that one month ago she saw a trace amount of blood in the toilet with stool. He was told to hold Eliquis and have an EGD and colonoscopy. He wanted to be seen by our office prior to that and was seen on 3/7/201. Then on 3/9/2018 he had EGD and colonoscopy that revealed sophagitis with no bleeding found at the gastroesophageal junction with biopsy sent, small hiatal hernia and inflammation in the duodenum. Colonoscopy revealed multiple large reticula in the sigmoid colon abscesses were sent from the right and left colon due to his history of Crohn's.  Thus he was instructed to restart Eliquis after the procedure.  He was instructed to stop taking indomethacin and allopurinol by Dr. Mccoy. His hemoglobin had increased to 10.8 on 3/12/2018, white count slightly elevated, and creatinine was 1.27 with an EGFR of 54.    He was seen in the office on 4/26/2018 for follow-up.  At that time it was noted that he had been in and out of the hospital once or twice per month for orthopedic issues.  He was in the hospital at the emergency department on 4/25/2018 with rapid heart beating and palpitations.  He was found to be in rapid atrial fibrillation but did convert back to sinus rhythm. His metoprolol was increased to 50 mg 3 times a day.  He then was admitted again on 4/29/2018 with hypoxia, pneumonia, and influenza B.  During this admission he was requiring oxygen at night.  He was started on Tikosin and had a couple of doses held due to prolonged QTc.  The plan was to discharge him on Tikosin and have him follow-up in  "one to 2 weeks. It was felt that some of his antibiotics were complicating his QT interval.     Then on 5/12/2018 he was in the emergency department for flareup of gout in the left toe. Likely due to recent discontinuation of indomethacin. He was prescribed colchicine and discharged in stable condition.    He presents today for follow up.  He states that he \"feels good\".  He denies palpitations, shortness of breath, dizziness, lightheadedness, near syncope, syncope, chest pain, fatigue, or blood in the urine or stool.  His lower extremity edema has not been issued.  He is tolerating T condition and metoprolol 50 mg 3 times per day.  He is not having any blood loss.  His gout attack is improving.  He is working through the [facility] to seek definitive proof for 3 months in one month.  Allergies   Allergen Reactions   • Diltiazem Arrhythmia   • Doxycycline Rash       Past Medical History:   Diagnosis Date   • Aortic stenosis    • Aspiration pneumonia    • Atrial fibrillation    • Bronchiectasis    • CKD (chronic kidney disease)    • COPD (chronic obstructive pulmonary disease)    • Crohn's disease    • Dizziness    • Gastric ulcer    • Generalized weakness    • Gout    • Hard of hearing    • Hyperlipidemia    • Hypertension    • Leg swelling    • Lower extremity edema    • Mild anemia    • Near syncope    • PAF (paroxysmal atrial fibrillation)    • Palpitations    • Peptic ulcer    • Pneumonia of left lung due to infectious organism    • Pulmonary fibrosis    • Stroke    • TIA (transient ischemic attack)        Past Surgical History:   Procedure Laterality Date   • CATARACT EXTRACTION Bilateral    • COLONOSCOPY     • COLONOSCOPY N/A 3/9/2018    Procedure: COLONOSCOPY to terminal ileum with bx;  Surgeon: Aron Cabrera MD;  Location: [facility] ENDOSCOPY;  Service:    • CYSTECTOMY      back and shoulder area   • ENDOSCOPY N/A 3/9/2018    Procedure: ESOPHAGOGASTRODUODENOSCOPY with bx;  Surgeon: Aron Cabrera MD;  " "Location: [facility] ENDOSCOPY;  Service:    • EYE SURGERY Left     detached retina   • EYE SURGERY Right     \"repaired a hole in the eye\"   • TESTICLE SURGERY      benign tumor removed.         Family and social history reviewed.     Review of Systems   Constitution: Negative for malaise/fatigue.   Cardiovascular: Positive for leg swelling.   Respiratory: Negative for shortness of breath.    Hematologic/Lymphatic: Negative for bleeding problem.   Musculoskeletal: Positive for gout.     All other systems were reviewed and are negative          Objective:     Vitals:    05/22/18 0929   BP: 100/50   BP Location: Left arm   Patient Position: Sitting   Pulse: 72   Weight: 70.3 kg (155 lb)   Height: 172.7 cm (68\")     Body mass index is 23.57 kg/m².    PHYSICAL EXAM:  Physical Exam   Constitutional: He is oriented to person, place, and time. He appears well-developed and well-nourished. No distress.   HENT:   Head: Normocephalic.   Eyes: Conjunctivae are normal.   Glasses on   Neck: Normal range of motion. No JVD present.   Cardiovascular: Normal rate, regular rhythm, normal heart sounds and intact distal pulses.    No murmur heard.  Pulses:       Carotid pulses are 2+ on the right side, and 2+ on the left side.       Radial pulses are 2+ on the right side, and 2+ on the left side.        Posterior tibial pulses are 2+ on the right side, and 2+ on the left side.   Pulmonary/Chest: Effort normal and breath sounds normal. No respiratory distress. He has no wheezes. He has no rhonchi. He has no rales. He exhibits no tenderness.   Abdominal: Soft. Bowel sounds are normal. He exhibits no distension.   Musculoskeletal: Normal range of motion. He exhibits no edema.   Neurological: He is alert and oriented to person, place, and time.   Skin: Skin is warm, dry and intact. No rash noted. He is not diaphoretic. No cyanosis.   Psychiatric: He has a normal mood and affect. His behavior is normal. Judgment and thought content normal. "         ECG 12 Lead  Date/Time: 5/22/2018 10:34 AM  Performed by: CHEN DAWSON  Authorized by: CHEN DAWSON   Comparison: compared with previous ECG from 5/5/2018  Similar to previous ECG  Rhythm: sinus rhythm  Rate: normal  BPM: 72  ST Segments: ST segments normal  T Waves: T waves normal  QRS axis: normal  Clinical impression: normal ECG            Current Outpatient Prescriptions   Medication Sig Dispense Refill   • allopurinol (ZYLOPRIM) 100 MG tablet Take 100 mg by mouth 2 (Two) Times a Day.     • apixaban (ELIQUIS) 5 MG tablet tablet Take 5 mg by mouth 2 (Two) Times a Day.     • arformoterol (BROVANA) 15 MCG/2ML nebulizer solution Take 2 mL by nebulization 2 (Two) Times a Day. 120 mL 0   • atorvastatin (LIPITOR) 10 MG tablet Take one tablet by mouth Mon, Wed and Fri.     • BUDESONIDE PO Take 3 mg by mouth Daily.     • colchicine 0.6 MG tablet Take 1 tablet by mouth 3 (Three) Times a Day. (Patient taking differently: Take 0.6 mg by mouth 2 (Two) Times a Day.) 20 tablet 0   • Cyanocobalamin (VITAMIN B-12 CR PO) Take 500 mg by mouth Daily.     • dofetilide (TIKOSYN) 125 MCG capsule Take 125 mcg by mouth 2 (Two) Times a Day.     • ferrous sulfate 325 (65 FE) MG tablet Take 325 mg by mouth 3 (Three) Times a Day.     • furosemide (LASIX) 20 MG tablet Take 20 mg by mouth Daily.     • ipratropium-albuterol (DUO-NEB) 0.5-2.5 mg/3 ml nebulizer Take 3 mL by nebulization Every 2 (Two) Hours As Needed for Shortness of Air. 360 mL 0   • metoprolol tartrate (LOPRESSOR) 50 MG tablet Take 1 tablet by mouth 3 (Three) Times a Day. 90 tablet 6   • omeprazole (priLOSEC) 40 MG capsule Take 40 mg by mouth Daily.       No current facility-administered medications for this visit.      Assessment:       Diagnosis Plan   1. PAF (paroxysmal atrial fibrillation)     2. Essential hypertension     3. Nonrheumatic aortic valve stenosis     4. Anticoagulant long-term use     5. Pulmonary fibrosis     6. Iron deficiency anemia, unspecified  iron deficiency anemia type          No orders of the defined types were placed in this encounter.        Plan:     1. Paroxysmal atrial fibrillation. NSR. Off amiodarone due to pulmonary fibrosis, he had been on diltiazem discontinued due to rash. Now on Tikosin 125mcg BID with stable QTc. Also on Lopressor 50 three times per day. Last BMP on 5/3/2018 Cr 1.16, eGFR 60. Will need follow up BMP and to avoid QTC prolongation drugs if to remain on tikosin long-term.     Atrial Fibrillation and Atrial Flutter  Assessment  • The patient has paroxysmal atrial fibrillation  • This is non-valvular in etiology  • The patient's CHADS2-VASc score is 5  • A [id number] score of 2 or more is considered a high risk for a thromboembolic event  • Apixaban prescribed    Plan  • Attempt to maintain sinus rhythm  • Continue apixaban for antithrombotic therapy, bleeding issues discussed  • Continue beta blocker and dofetilide for rhythm control  • Not on Metoprolol 50mg three times per day      2. Recent pneumonia and influenza B   3. History of Crohn's disease and iron deficiency anemia- on iron supplement.  4.. History of gout, recent exacerbation in left great toe.  He was instructed to stop indomethacin by gastroenterology.  He has no colchicine  5. History of aspiration pneumonia, pulmonary fibrosis, and bronchiectasis.   6.   Hypertension blood pressure adequate control.  7.  Aortic stenosis.  Echocardiogram November 2017- mild  8.  Anemia appears stable. Tolerating eliquis.  9.  Rash of unclear etiology - followed by dermatology. Exacerbated by diltiazem and most recently doxycycline.  10. Hyperlipidemia- on 10 atorvastatin M,W,F    Plan of care reviewed with Dr. Rivera  Follow up on 6/12/2018    Patient was instructed to call the office if new symptoms develop or report to nearest ER if heart attack or stroke is suspected.        It has been a pleasure to participate in this patient's care.      Thank you,  Alta Hackett ,  KYLAH      **[facility] Disclaimer:**  Much of this encounter note is an electronic transcription/translation of spoken language to printed text. The electronic translation of spoken language may permit erroneous, or at times, nonsensical words or phrases to be inadvertently transcribed. Although I have reviewed the note for such errors, some may still exist.

## 2018-06-12 ENCOUNTER — OFFICE VISIT (OUTPATIENT)
Dept: CARDIOLOGY | Facility: CLINIC | Age: 83
End: 2018-06-12

## 2018-06-12 VITALS
HEIGHT: 68 IN | WEIGHT: 157 LBS | BODY MASS INDEX: 23.79 KG/M2 | SYSTOLIC BLOOD PRESSURE: 110 MMHG | DIASTOLIC BLOOD PRESSURE: 64 MMHG | HEART RATE: 68 BPM

## 2018-06-12 DIAGNOSIS — J84.10 PULMONARY FIBROSIS (HCC): ICD-10-CM

## 2018-06-12 DIAGNOSIS — I48.0 PAF (PAROXYSMAL ATRIAL FIBRILLATION) (HCC): Primary | ICD-10-CM

## 2018-06-12 DIAGNOSIS — I35.0 NONRHEUMATIC AORTIC VALVE STENOSIS: ICD-10-CM

## 2018-06-12 DIAGNOSIS — I10 ESSENTIAL HYPERTENSION: ICD-10-CM

## 2018-06-12 PROCEDURE — 99213 OFFICE O/P EST LOW 20 MIN: CPT | Performed by: INTERNAL MEDICINE

## 2018-06-12 PROCEDURE — 93000 ELECTROCARDIOGRAM COMPLETE: CPT | Performed by: INTERNAL MEDICINE

## 2018-06-12 NOTE — PROGRESS NOTES
Date of Office Visit: 18  Encounter Provider: Hector Rivera MD  Place of Service: [facility] CARDIOLOGY  Patient Name: Asia Ly  :1932  Referral Provider:Lubna Lobo MD      Chief Complaint   Patient presents with   • Atrial Fibrillation     History of Present Illness   The patient is a pleasant 85-year-old gentleman with history of paroxysmal  atrial fibrillation. He had been going in and out of atrial fibrillation  when he had run out of his sotalol. This happened in May 2004 when he was  traveling on a plane a fair amount. He went to the emergency room in [place]  where he was found to be in atrial fibrillation. He converted back to sinus  rhythm spontaneously. He saw Dr. Hector Patel about atrial fibrillation  ablation but it was felt it would be best not to pursue that. He also saw  Dr. Man, for a second opinion but again it was felt he was doing well  on sotalol and to maintain him on that.   He then had a stroke and TIA in 2016 was at [facility] was switched from aspirin Eliquis was for that event.  He then had been doing reasonably well but then presented to [facility] in 2016 2 times with symptomatic rapid atrial fibrillation.  While there he had an echocardiogram that showed normal left ventricular systolic function.  Moderate tricuspid insufficiency with mild pulmonary hypertension mild mitral insufficiency.  He also do perfusion stress test for some reason that was negative for ischemia.  He was started on amiodarone and discharged came right back in with rapid atrial fibrillation he was given a higher dose of amiodarone and a slower taper dose.  Given his history of pulmonary fibrosis we felt that was a poor choice so discontinued it.  Unfortunately 2017 while just sitting there he just started not feeling normal and his wife taken the emergency him is found to be in atrial fibrillation he did  convert back to sinus rhythm.  That's the first episode he had an probably 3 or 4 years.  Of note however he been taken off his diltiazem due to an allergic reaction of labor rash.    He was here in November with increased lower extremity edema he was also in rapid atrial fibrillation at that time he had a rash on diltiazem so we did an echocardiogram that showed normal left ventricular function mild aortic valve stenosis we placed him on metoprolol added diuretics he improved as his acidemia resolved he then stopped his diuretic.    He then continued to have this rash was felt maybe could be due to his antibiotic doxycycline that was stopped.  He's been back in the emergency room around January February 2018 with iron deficiency anemia was anticoagulation he got scoped no obvious source was found but that been stable.   He then was having a lot of problems around January February in and out of the hospital for multiple reasons.  In and out of atrial fibrillation and very symptomatic from that.  He then ultimately was admitted to the hospital in May 2018 with the fluid turned out but also was in and out of atrial fibrillation.  We decided to start him on Tikosyn.  He had some prolonged QT when initially starting that may be related other medications he was taking including antibiotics.  But that improved and resolved.  He went home.  He now comes in for follow-up.  He's been doing great he is not back exercising yet but is can a start doing that within the next week.  He's had no chest pain or pressure.  No palpitations, near-syncope or syncope.  Does get some shortness of breath especially going up steps but no orthopnea or PND no near-syncope or syncope.      Atrial Fibrillation   Symptoms are negative for dizziness and weakness. Past medical history includes atrial fibrillation.   Palpitations    Associated symptoms include malaise/fatigue. Pertinent negatives include no diaphoresis, dizziness, fever, nausea,  "numbness, vomiting or weakness.   Leg Swelling   Pertinent negatives include no abdominal pain, congestion, diaphoresis, fever, headaches, joint swelling, myalgias, nausea, numbness, rash, vertigo, vomiting or weakness.         Past Medical History:   Diagnosis Date   • Aortic stenosis    • Aspiration pneumonia    • Atrial fibrillation    • Bronchiectasis    • CKD (chronic kidney disease)    • COPD (chronic obstructive pulmonary disease)    • Crohn's disease    • Dizziness    • Gastric ulcer    • Generalized weakness    • Gout    • Hard of hearing    • Hyperlipidemia    • Hypertension    • Leg swelling    • Lower extremity edema    • Mild anemia    • Near syncope    • PAF (paroxysmal atrial fibrillation)    • Palpitations    • Peptic ulcer    • Pneumonia of left lung due to infectious organism    • Pulmonary fibrosis    • Stroke    • TIA (transient ischemic attack)          Past Surgical History:   Procedure Laterality Date   • CATARACT EXTRACTION Bilateral    • COLONOSCOPY     • COLONOSCOPY N/A 3/9/2018    Procedure: COLONOSCOPY to terminal ileum with bx;  Surgeon: Aron Cabrera MD;  Location: [facility] ENDOSCOPY;  Service:    • CYSTECTOMY      back and shoulder area   • ENDOSCOPY N/A 3/9/2018    Procedure: ESOPHAGOGASTRODUODENOSCOPY with bx;  Surgeon: Aron Cabrera MD;  Location: [facility] ENDOSCOPY;  Service:    • EYE SURGERY Left     detached retina   • EYE SURGERY Right     \"repaired a hole in the eye\"   • TESTICLE SURGERY      benign tumor removed.         Current Outpatient Prescriptions on File Prior to Visit   Medication Sig Dispense Refill   • allopurinol (ZYLOPRIM) 100 MG tablet Take 100 mg by mouth 2 (Two) Times a Day.     • apixaban (ELIQUIS) 5 MG tablet tablet Take 5 mg by mouth 2 (Two) Times a Day.     • arformoterol (BROVANA) 15 MCG/2ML nebulizer solution Take 2 mL by nebulization 2 (Two) Times a Day. 120 mL 0   • atorvastatin (LIPITOR) 10 MG tablet Take one tablet by mouth Mon, Wed and Fri.     • " BUDESONIDE PO Take 3 mg by mouth Daily.     • colchicine 0.6 MG tablet Take 1 tablet by mouth 3 (Three) Times a Day. (Patient taking differently: Take 0.6 mg by mouth 2 (Two) Times a Day.) 20 tablet 0   • Cyanocobalamin (VITAMIN B-12 CR PO) Take 500 mg by mouth Daily.     • dofetilide (TIKOSYN) 125 MCG capsule Take 125 mcg by mouth 2 (Two) Times a Day.     • ferrous sulfate 325 (65 FE) MG tablet Take 325 mg by mouth 3 (Three) Times a Day.     • furosemide (LASIX) 20 MG tablet Take 20 mg by mouth Daily.     • metoprolol tartrate (LOPRESSOR) 50 MG tablet Take 1 tablet by mouth 3 (Three) Times a Day. 90 tablet 6   • omeprazole (priLOSEC) 40 MG capsule Take 40 mg by mouth Daily.     • [DISCONTINUED] ipratropium-albuterol (DUO-NEB) 0.5-2.5 mg/3 ml nebulizer Take 3 mL by nebulization Every 2 (Two) Hours As Needed for Shortness of Air. 360 mL 0     No current facility-administered medications on file prior to visit.          Social History     Social History   • Marital status:      Spouse name: N/A   • Number of children: N/A   • Years of education: N/A     Occupational History   • Not on file.     Social History Main Topics   • Smoking status: Former Smoker     Types: Cigars   • Smokeless tobacco: Never Used      Comment: Quit 25 years ago   • Alcohol use No      Comment: daily caffiene   • Drug use: No   • Sexual activity: Defer     Other Topics Concern   • Not on file     Social History Narrative   • No narrative on file       Family History   Problem Relation Age of Onset   • Stroke Mother    • Heart disease Mother    • Hypertension Mother    • Diabetes Mother    • Heart disease Sister         Heart Valve Replacement   • Ovarian cancer Sister    • Rheumatic fever Sister    • Diabetes Sister    • Stroke Father          Review of Systems   Constitution: Positive for malaise/fatigue. Negative for decreased appetite, diaphoresis, fever, weakness, weight gain and weight loss.   HENT: Negative for congestion, hearing  "loss, nosebleeds and tinnitus.    Eyes: Negative for blurred vision, double vision, vision loss in left eye, vision loss in right eye and visual disturbance.   Cardiovascular:        As noted in HPI   Respiratory:        As noted HPI   Endocrine: Negative for cold intolerance and heat intolerance.   Hematologic/Lymphatic: Negative for bleeding problem. Does not bruise/bleed easily.   Skin: Negative for color change, flushing, itching and rash.   Musculoskeletal: Negative for arthritis, back pain, joint pain, joint swelling, muscle weakness and myalgias.   Gastrointestinal: Negative for bloating, abdominal pain, constipation, diarrhea, dysphagia, heartburn, hematemesis, hematochezia, melena, nausea and vomiting.   Genitourinary: Negative for bladder incontinence, dysuria, frequency, nocturia and urgency.   Neurological: Negative for dizziness, focal weakness, headaches, light-headedness, loss of balance, numbness, paresthesias and vertigo.   Psychiatric/Behavioral: Negative for depression, memory loss and substance abuse.       Procedures      ECG 12 Lead  Date/Time: 6/12/2018 9:44 AM  Performed by: SHARON DOBBINS  Authorized by: SHARON DOBBINS   Comparison: compared with previous ECG   Similar to previous ECG  Rhythm: sinus rhythm  Rate: normal  QRS axis: normal                  Objective:    /64 (BP Location: Left arm)   Pulse 68   Ht 172.7 cm (68\")   Wt 71.2 kg (157 lb)   BMI 23.87 kg/m²        Physical Exam  Physical Exam   Constitutional: He is oriented to person, place, and time. He appears well-developed and well-nourished. No distress.   HENT:   Head: Normocephalic.   Eyes: Conjunctivae are normal. Pupils are equal, round, and reactive to light. No scleral icterus.   Neck: Normal carotid pulses, no hepatojugular reflux and no JVD present. Carotid bruit is not present. No tracheal deviation, no edema and no erythema present. No thyromegaly present.   Cardiovascular: Normal rate, regular rhythm, " S1 normal, S2 normal and intact distal pulses.   No extrasystoles are present. PMI is not displaced.  Exam reveals no gallop, no distant heart sounds and no friction rub.    Murmur heard.   Systolic murmur is present with a grade of 2/6  at the upper right sternal border  Pulses:       Carotid pulses are 2+ on the right side, and 2+ on the left side.       Radial pulses are 2+ on the right side, and 2+ on the left side.        Femoral pulses are 2+ on the right side, and 2+ on the left side.       Dorsalis pedis pulses are 2+ on the right side, and 2+ on the left side.        Posterior tibial pulses are 2+ on the right side, and 2+ on the left side.   Pulmonary/Chest: Effort normal. No respiratory distress. He has no decreased breath sounds. He has no wheezes. He has no rhonchi. He has rales (coarse) in the right lower field and the left lower field. He exhibits no tenderness.   Abdominal: Soft. Bowel sounds are normal. He exhibits no distension and no mass. There is no hepatosplenomegaly. There is no tenderness. There is no rebound and no guarding.   Musculoskeletal: He exhibits no edema, tenderness or deformity.   Neurological: He is alert and oriented to person, place, and time.   Skin: Skin is warm and dry. No rash noted. He is not diaphoretic. No cyanosis or erythema. No pallor. Nails show no clubbing.   Psychiatric: He has a normal mood and affect. His speech is normal and behavior is normal. Judgment and thought content normal.           Assessment:   1. This is a 85-year-old gentleman with a history of paroxysmal atrial fibrillation. Off amiodarone due to pulmonary fibrosis, rash I believe on diltiazem.   Atrial Fibrillation and Atrial Flutter  Assessment  • The patient has paroxysmal atrial fibrillation  • This is non-valvular in etiology  • The patient's CHADS2-VASc score is 3  • A [id number] score of 2 or more is considered a high risk for a thromboembolic event  • Apixaban prescribed    Plan  • Attempt  to maintain sinus rhythm  • Continue apixaban for antithrombotic therapy, bleeding issues discussed  • Continue dofetilide for rhythm control  He is now very stable on Tikosyn he is to continue the same.  He will see her nurse practitioner in 3 months we'll see me in 6 months after that we'll see him at six-month intervals.     2. History of Crohn's disease.  3. History of gout, seems to be stable.  4. History of aspiration pneumonia, pulmonary fibrosis, and bronchiectasis.    5.   Hypertension blood pressure adequate control.  6.  Lower extremity edema in retrospect I believe this was due to his atrial fibrillation.  This is now resolved.  7.  Aortic stenosis.  Echocardiogram November 2017 this was just mild.   8.  Anemia appears stable continue the same he's tolerating eliquis.  Continue the same.         Plan:

## 2018-07-09 ENCOUNTER — OFFICE (OUTPATIENT)
Dept: URBAN - METROPOLITAN AREA CLINIC 66 | Facility: CLINIC | Age: 83
End: 2018-07-09

## 2018-07-09 VITALS
DIASTOLIC BLOOD PRESSURE: 68 MMHG | WEIGHT: 159 LBS | HEIGHT: 68 IN | SYSTOLIC BLOOD PRESSURE: 112 MMHG | HEART RATE: 72 BPM

## 2018-07-09 DIAGNOSIS — K50.10 CROHN'S DISEASE OF LARGE INTESTINE WITHOUT COMPLICATIONS: ICD-10-CM

## 2018-07-09 DIAGNOSIS — D50.9 IRON DEFICIENCY ANEMIA, UNSPECIFIED: ICD-10-CM

## 2018-07-09 DIAGNOSIS — K29.40 CHRONIC ATROPHIC GASTRITIS WITHOUT BLEEDING: ICD-10-CM

## 2018-07-09 DIAGNOSIS — Z79.52 LONG TERM (CURRENT) USE OF SYSTEMIC STEROIDS: ICD-10-CM

## 2018-07-09 PROCEDURE — 99213 OFFICE O/P EST LOW 20 MIN: CPT | Performed by: INTERNAL MEDICINE

## 2018-07-09 NOTE — SERVICEHPINOTES
had some ankle swelling and issues with feet they are improved.  patient has been on iron supplements  for his anemia.   bowel movements are ok.     had some gastritis and esophagitis  . crohns disease  in remission.    using budesonide one daily.

## 2018-07-17 ENCOUNTER — TELEPHONE (OUTPATIENT)
Dept: CARDIOLOGY | Facility: CLINIC | Age: 83
End: 2018-07-17

## 2018-07-17 NOTE — TELEPHONE ENCOUNTER
Spoke with wife. Ok to take these with current medications. I did inform her of fluid retention and hypertension which can be a side effect from steroid use and instructed that they monitor his weights and blood pressures if that worries her. She verbalized understanding.     AL

## 2018-07-17 NOTE — TELEPHONE ENCOUNTER
Patient was prescribed two medications due to a skin rash.  He was doing so well his wife does not want to make him sick and end up back in the hospital.  Patient has had the rash for a long time and nothing seemed to help so they prescribed cephalexin 500 mg and methylprednisolone 4 mg.  [facility] and [facility] just came in the mail today.  Will these be okay to take with his cardiac medications.  His wife is worried.  She will be the one you speak with on the phone because he does not hear well on the phone.      Her number is [phone]    Thank you,  Qi

## 2018-07-26 ENCOUNTER — OFFICE VISIT (OUTPATIENT)
Dept: CARDIOLOGY | Facility: CLINIC | Age: 83
End: 2018-07-26

## 2018-07-26 VITALS
HEIGHT: 68 IN | HEART RATE: 60 BPM | SYSTOLIC BLOOD PRESSURE: 134 MMHG | DIASTOLIC BLOOD PRESSURE: 60 MMHG | WEIGHT: 154 LBS | BODY MASS INDEX: 23.34 KG/M2

## 2018-07-26 DIAGNOSIS — D50.9 IRON DEFICIENCY ANEMIA, UNSPECIFIED IRON DEFICIENCY ANEMIA TYPE: ICD-10-CM

## 2018-07-26 DIAGNOSIS — I10 ESSENTIAL HYPERTENSION: ICD-10-CM

## 2018-07-26 DIAGNOSIS — I48.0 PAF (PAROXYSMAL ATRIAL FIBRILLATION) (HCC): Primary | ICD-10-CM

## 2018-07-26 DIAGNOSIS — I35.0 NONRHEUMATIC AORTIC VALVE STENOSIS: ICD-10-CM

## 2018-07-26 DIAGNOSIS — J84.10 PULMONARY FIBROSIS (HCC): ICD-10-CM

## 2018-07-26 PROCEDURE — 99213 OFFICE O/P EST LOW 20 MIN: CPT | Performed by: NURSE PRACTITIONER

## 2018-07-26 PROCEDURE — 93000 ELECTROCARDIOGRAM COMPLETE: CPT | Performed by: NURSE PRACTITIONER

## 2018-07-26 NOTE — PROGRESS NOTES
Date of Office Visit: 2018  Encounter Provider: KYLAH Jha  Place of Service: [facility] CARDIOLOGY  Patient Name: Asia Ly  :1932    Chief Complaint   Patient presents with   • Atrial Fibrillation   • Transient Ischemic Attack   :     HPI: Asia Ly is a 85 y.o. male is a patient of Dr. Rivera. I am seeing him today and have reviewed his record.       His past medical history is significant of paroxysmal atrial fibrillation, aortic stenosis, hyperlipidemia, B12 anemia,Crohn's disease, colon polyps, lower extremity edema, TIA,  Pulmonary HTN and pulmonary fibrosis.                  In May 2004, He was traveling in [place] and ran out of sotalol.  He saw Dr. Freeman about atrial fibrillation ablation but it was felt best not to pursue that.  He was evaluated by  in this office with electrophysiology and it was felt that he was doing well on sotalol and to maintain him on that.               In 2016 he suffered from a stroke and TIA at [facility] and was switched from aspirin to Eliquis at that time.  In 2016, he presented [facility] with atrial fibrillation and had an echocardiogram at that time that showed normal left ventricular systolic dysfunction, moderate tricuspid insufficiency, mild mitral insufficiency , mild pulmonary hypertension.  He also had a perfusion stress test that was negative for ischemia.  He was started on amiodarone.  At some point he was taking off diltiazem due to an allergic reaction of a rash.This was eventually discontinued considering his history of pulmonary fibrosis.                He had a Transthoracic echocardiogram in 2017 that showed an EF of 60%, grade 2 diastolic dysfunction, and mild aortic valve stenosis.  Around that same time he was complaining of increased lower extremity edema and was found to be in rapid atrial fibrillation at that time.  Metoprolol  titrate was added along with diuretic and his acidemia improved.           He later was in the emergency room with the diagnoses of pneumonia.  He been having some complaint of dizziness and lightheadedness with position changes.  He had a CT of the head on March 2, 2018 to evaluate vertigo and unsteady gait that was unchanged when compared to the prior CT in August 2016.     in 02/2018, his wife reported that one month prior she saw a trace amount of blood in the toilet with stool. He was told to hold Eliquis and have an EGD and colonoscopy. He wanted to be seen by our office prior to that and was seen on 3/7/201. Then on 3/9/2018 he had EGD and colonoscopy that revealed sophagitis with no bleeding found at the gastroesophageal junction with biopsy sent, small hiatal hernia and inflammation in the duodenum. Colonoscopy revealed multiple large reticula in the sigmoid colon abscesses were sent from the right and left colon due to his history of Crohn's.  Thus he was instructed to restart Eliquis after the procedure.  He was instructed to stop taking indomethacin and allopurinol by Dr. Mccoy. His hemoglobin had increased to 10.8 on 3/12/2018, white count slightly elevated, and creatinine was 1.27 with an EGFR of 54.     He was seen in the office on 4/26/2018 for follow-up.  At that time it was noted that he had been in and out of the hospital once or twice per month for orthopedic issues.  He was in the hospital at the emergency department on 4/25/2018 with rapid heart beating and palpitations.  He was found to be in rapid atrial fibrillation but did convert back to sinus rhythm. His metoprolol was increased to 50 mg 3 times a day.  He then was admitted again on 4/29/2018 with hypoxia, pneumonia, and influenza B.  During this admission he was requiring oxygen at night.  He was started on Tikosin and had a couple of doses held due to prolonged QTc.  The plan was to discharge him on Tikosin and have him follow-up in one  to 2 weeks. It was felt that some of his antibiotics were complicating his QT interval.      Then on 5/12/2018 he was in the emergency department for flareup of gout in the left toe. Likely due to recent discontinuation of indomethacin. He was prescribed colchicine and discharged in stable condition.    He was last seen in the office on 6/12/2018 he was doing great and had plans to resume exercising.  He had no chest pain or pressure.  He had some shortness of breath especially going up steps.  He was doing well on TIkosyn was to follow-up in 3 months    He presents today for reassessment.  He has occasional shortness of breath and using his inhaler which helps with her.  He denies palpitation, edema, chest pain, and dizziness lightheadedness, near syncope, syncope, or fatigue.  He denies blood in the urine or stool  He has resumed walking 3 miles daily and is doing well with that.  Since his last visit he had some cerumen impaction removed bilaterally and has had improved hearing.  He is using ear drops.  Blood pressure has been stable.  He remains in sinus rhythm and tolerating TIkosin.     Allergies   Allergen Reactions   • Diltiazem Arrhythmia   • Doxycycline Rash       Past Medical History:   Diagnosis Date   • Aortic stenosis    • Aspiration pneumonia (CMS/HCC)    • Atrial fibrillation (CMS/HCC)    • Bronchiectasis (CMS/HCC)    • CKD (chronic kidney disease)    • COPD (chronic obstructive pulmonary disease) (CMS/HCC)    • Crohn's disease (CMS/HCC)    • Dizziness    • Gastric ulcer    • Generalized weakness    • Gout    • Hard of hearing    • Hyperlipidemia    • Hypertension    • Leg swelling    • Lower extremity edema    • Mild anemia    • Near syncope    • PAF (paroxysmal atrial fibrillation) (CMS/HCC)    • Palpitations    • Peptic ulcer    • Pneumonia of left lung due to infectious organism    • Pulmonary fibrosis (CMS/HCC)    • Stroke (CMS/HCC)    • TIA (transient ischemic attack)        Past Surgical  "History:   Procedure Laterality Date   • CATARACT EXTRACTION Bilateral    • COLONOSCOPY     • COLONOSCOPY N/A 3/9/2018    Procedure: COLONOSCOPY to terminal ileum with bx;  Surgeon: Aron Cabrera MD;  Location: [facility] ENDOSCOPY;  Service:    • CYSTECTOMY      back and shoulder area   • ENDOSCOPY N/A 3/9/2018    Procedure: ESOPHAGOGASTRODUODENOSCOPY with bx;  Surgeon: Aron Cabrera MD;  Location: [facility] ENDOSCOPY;  Service:    • EYE SURGERY Left     detached retina   • EYE SURGERY Right     \"repaired a hole in the eye\"   • TESTICLE SURGERY      benign tumor removed.         Family and social history reviewed.     Review of Systems   Respiratory: Positive for shortness of breath (occasional).      All other systems were reviewed and are negative          Objective:     Vitals:    07/26/18 1016   BP: 134/60   BP Location: Left arm   Patient Position: Sitting   Pulse: 60   Weight: 69.9 kg (154 lb)   Height: 172.7 cm (68\")     Body mass index is 23.42 kg/m².    PHYSICAL EXAM:  Physical Exam   Constitutional: He is oriented to person, place, and time. He appears well-developed and well-nourished. No distress.   HENT:   Head: Normocephalic.   Eyes: Conjunctivae are normal.   Neck: Normal range of motion. No JVD present.   Cardiovascular: Normal rate, regular rhythm and intact distal pulses.    Murmur heard.   Systolic murmur is present with a grade of 1/6  at the upper right sternal border  Pulses:       Carotid pulses are 2+ on the right side, and 2+ on the left side.       Radial pulses are 2+ on the right side, and 2+ on the left side.        Posterior tibial pulses are 2+ on the right side, and 2+ on the left side.   Pulmonary/Chest: Effort normal and breath sounds normal. No respiratory distress. He has no wheezes. He has no rhonchi. He has no rales. He exhibits no tenderness.   Abdominal: Soft. Bowel sounds are normal. He exhibits no distension.   Musculoskeletal: Normal range of motion. He exhibits no edema. "   Neurological: He is alert and oriented to person, place, and time.   Skin: Skin is warm, dry and intact. No rash noted. He is not diaphoretic. No cyanosis.   Psychiatric: He has a normal mood and affect. His behavior is normal. Judgment and thought content normal.         ECG 12 Lead  Date/Time: 7/26/2018 11:10 AM  Performed by: CHEN DAWSON  Authorized by: CHEN DAWSON   Comparison: compared with previous ECG from 6/12/2018  Similar to previous ECG  Rhythm: sinus rhythm  Rate: normal  BPM: 62  ST Segments: ST segments normal  T Waves: T waves normal  Other findings: PRWP  Comments: QTc 434 stable            Current Outpatient Prescriptions   Medication Sig Dispense Refill   • allopurinol (ZYLOPRIM) 100 MG tablet Take 100 mg by mouth 2 (Two) Times a Day.     • apixaban (ELIQUIS) 5 MG tablet tablet Take 5 mg by mouth 2 (Two) Times a Day.     • arformoterol (BROVANA) 15 MCG/2ML nebulizer solution Take 2 mL by nebulization 2 (Two) Times a Day. 120 mL 0   • atorvastatin (LIPITOR) 10 MG tablet Take one tablet by mouth Mon, Wed and Fri.     • BUDESONIDE PO Take 3 mg by mouth Daily.     • colchicine 0.6 MG tablet Take 1 tablet by mouth 3 (Three) Times a Day. (Patient taking differently: Take 0.6 mg by mouth 2 (Two) Times a Day.) 20 tablet 0   • Cyanocobalamin (VITAMIN B-12 CR PO) Take 500 mg by mouth Daily.     • dofetilide (TIKOSYN) 125 MCG capsule Take 125 mcg by mouth 2 (Two) Times a Day.     • ferrous sulfate 325 (65 FE) MG tablet Take 325 mg by mouth 3 (Three) Times a Day.     • furosemide (LASIX) 20 MG tablet Take 20 mg by mouth Daily.     • metoprolol tartrate (LOPRESSOR) 50 MG tablet Take 1 tablet by mouth 3 (Three) Times a Day. 90 tablet 6   • omeprazole (priLOSEC) 40 MG capsule Take 40 mg by mouth Daily.       No current facility-administered medications for this visit.      Assessment:       Diagnosis Plan   1. PAF (paroxysmal atrial fibrillation) (CMS/HCC)     2. Nonrheumatic aortic valve stenosis     3.  Essential hypertension     4. Pulmonary fibrosis (CMS/HCC)     5. Iron deficiency anemia, unspecified iron deficiency anemia type          Orders Placed This Encounter   Procedures   • ECG 12 Lead     This order was created via procedure documentation         Plan:         1.  Paroxysmal atrial fibrillation remains in sinus rhythm off amiodarone due to pulmonary fibrosis and rash with diltiazem.  He is  tolerating Tikosin and metoprolol. No bleeding issues on Eliquis.    Atrial Fibrillation and Atrial Flutter  Assessment  • The patient has paroxysmal atrial fibrillation  • This is non-valvular in etiology  • The patient's CHADS2-VASc score is 3  • A [id number] score of 2 or more is considered a high risk for a thromboembolic event  • Apixaban prescribed    Plan  • Attempt to maintain sinus rhythm  • Continue apixaban for antithrombotic therapy, bleeding issues discussed  • Continue beta blocker and dofetilide for rhythm control      2. History of Crohn's disease and iron deficiency anemia- on iron supplement.  3. History of TIA  4. Pulmonary fibrosis/history of aspiration pneumonia and bronchiectasis followed by Dr. Silverman   5.  Hypertension blood pressure adequate control.  6. Hyperlipidemia- on 10 atorvastatin M,W,F  7.  Aortic stenosis.  Echocardiogram November 2017- mild  8. History of iron deficient anemia   9.  Rash resolved with steroid treatment  10. Recent cerumen disimpaction bilateral with improved hearing  11.Gout on colchicine    Follow up on 10/26/2018 with Dr. Rivera    Patient was instructed to call the office if new symptoms develop or report to nearest ER if heart attack or stroke is suspected.        It has been a pleasure to participate in this patient's care.      Thank you,  KYLAH Jha      **Braxton Disclaimer:**  Much of this encounter note is an electronic transcription/translation of spoken language to printed text. The electronic translation of spoken language may permit  erroneous, or at times, nonsensical words or phrases to be inadvertently transcribed. Although I have reviewed the note for such errors, some may still exist.

## 2018-09-13 RX ORDER — METOPROLOL TARTRATE 50 MG/1
50 TABLET, FILM COATED ORAL 3 TIMES DAILY
Qty: 270 TABLET | Refills: 1 | Status: SHIPPED | OUTPATIENT
Start: 2018-09-13 | End: 2019-12-16 | Stop reason: SDUPTHER

## 2018-10-22 ENCOUNTER — ANESTHESIA (OUTPATIENT)
Dept: GASTROENTEROLOGY | Facility: HOSPITAL | Age: 83
End: 2018-10-22

## 2018-10-22 ENCOUNTER — HOSPITAL ENCOUNTER (OUTPATIENT)
Facility: HOSPITAL | Age: 83
Setting detail: HOSPITAL OUTPATIENT SURGERY
Discharge: HOME | End: 2018-10-22
Attending: INTERNAL MEDICINE | Admitting: INTERNAL MEDICINE

## 2018-10-22 ENCOUNTER — ANESTHESIA EVENT (OUTPATIENT)
Dept: GASTROENTEROLOGY | Facility: HOSPITAL | Age: 83
End: 2018-10-22

## 2018-10-22 VITALS
DIASTOLIC BLOOD PRESSURE: 66 MMHG | SYSTOLIC BLOOD PRESSURE: 130 MMHG | TEMPERATURE: 97.8 F | BODY MASS INDEX: 23.87 KG/M2 | OXYGEN SATURATION: 97 % | HEART RATE: 68 BPM | WEIGHT: 157.5 LBS | RESPIRATION RATE: 16 BRPM | HEIGHT: 68 IN

## 2018-10-22 DIAGNOSIS — J47.9 BRONCHIECTASIS (HCC): ICD-10-CM

## 2018-10-22 LAB
APPEARANCE FLD: ABNORMAL
B PERT DNA SPEC QL NAA+PROBE: NOT DETECTED
C PNEUM DNA NPH QL NAA+NON-PROBE: NOT DETECTED
COLOR FLD: ABNORMAL
FLUAV H1 2009 PAND RNA NPH QL NAA+PROBE: NOT DETECTED
FLUAV H1 HA GENE NPH QL NAA+PROBE: NOT DETECTED
FLUAV H3 RNA NPH QL NAA+PROBE: NOT DETECTED
FLUAV SUBTYP SPEC NAA+PROBE: NOT DETECTED
FLUBV RNA ISLT QL NAA+PROBE: NOT DETECTED
GIE STN SPEC: NORMAL
HADV DNA SPEC NAA+PROBE: NOT DETECTED
HCOV 229E RNA SPEC QL NAA+PROBE: NOT DETECTED
HCOV HKU1 RNA SPEC QL NAA+PROBE: NOT DETECTED
HCOV NL63 RNA SPEC QL NAA+PROBE: NOT DETECTED
HCOV OC43 RNA SPEC QL NAA+PROBE: NOT DETECTED
HMPV RNA NPH QL NAA+NON-PROBE: NOT DETECTED
HPIV1 RNA SPEC QL NAA+PROBE: NOT DETECTED
HPIV2 RNA SPEC QL NAA+PROBE: NOT DETECTED
HPIV3 RNA NPH QL NAA+PROBE: NOT DETECTED
HPIV4 P GENE NPH QL NAA+PROBE: NOT DETECTED
LYMPHOCYTES NFR FLD MANUAL: 2 %
M PNEUMO IGG SER IA-ACNC: NOT DETECTED
MONOCYTES NFR FLD: 1 %
NEUTROPHILS NFR FLD MANUAL: 97 %
RBC # FLD AUTO: 2025 /MM3
RHINOVIRUS RNA SPEC NAA+PROBE: NOT DETECTED
RSV RNA NPH QL NAA+NON-PROBE: NOT DETECTED
WBC # FLD: 4650 /MM3

## 2018-10-22 PROCEDURE — 88312 SPECIAL STAINS GROUP 1: CPT | Performed by: INTERNAL MEDICINE

## 2018-10-22 PROCEDURE — 87102 FUNGUS ISOLATION CULTURE: CPT | Performed by: INTERNAL MEDICINE

## 2018-10-22 PROCEDURE — 87581 M.PNEUMON DNA AMP PROBE: CPT | Performed by: INTERNAL MEDICINE

## 2018-10-22 PROCEDURE — 87116 MYCOBACTERIA CULTURE: CPT | Performed by: INTERNAL MEDICINE

## 2018-10-22 PROCEDURE — 87205 SMEAR GRAM STAIN: CPT | Performed by: INTERNAL MEDICINE

## 2018-10-22 PROCEDURE — 88189 FLOWCYTOMETRY/READ 16 & >: CPT

## 2018-10-22 PROCEDURE — 87206 SMEAR FLUORESCENT/ACID STAI: CPT | Performed by: INTERNAL MEDICINE

## 2018-10-22 PROCEDURE — 88305 TISSUE EXAM BY PATHOLOGIST: CPT | Performed by: INTERNAL MEDICINE

## 2018-10-22 PROCEDURE — 87071 CULTURE AEROBIC QUANT OTHER: CPT | Performed by: INTERNAL MEDICINE

## 2018-10-22 PROCEDURE — 87633 RESP VIRUS 12-25 TARGETS: CPT | Performed by: INTERNAL MEDICINE

## 2018-10-22 PROCEDURE — 87486 CHLMYD PNEUM DNA AMP PROBE: CPT | Performed by: INTERNAL MEDICINE

## 2018-10-22 PROCEDURE — 87186 SC STD MICRODIL/AGAR DIL: CPT | Performed by: INTERNAL MEDICINE

## 2018-10-22 PROCEDURE — 25010000002 PROPOFOL 10 MG/ML EMULSION: Performed by: ANESTHESIOLOGY

## 2018-10-22 PROCEDURE — 25010000002 PHENYLEPHRINE PER 1 ML: Performed by: ANESTHESIOLOGY

## 2018-10-22 PROCEDURE — 89051 BODY FLUID CELL COUNT: CPT | Performed by: INTERNAL MEDICINE

## 2018-10-22 PROCEDURE — 88112 CYTOPATH CELL ENHANCE TECH: CPT | Performed by: INTERNAL MEDICINE

## 2018-10-22 PROCEDURE — 88184 FLOWCYTOMETRY/ TC 1 MARKER: CPT

## 2018-10-22 PROCEDURE — 87798 DETECT AGENT NOS DNA AMP: CPT | Performed by: INTERNAL MEDICINE

## 2018-10-22 PROCEDURE — 88185 FLOWCYTOMETRY/TC ADD-ON: CPT

## 2018-10-22 RX ORDER — LIDOCAINE HYDROCHLORIDE 20 MG/ML
INJECTION, SOLUTION INFILTRATION; PERINEURAL AS NEEDED
Status: DISCONTINUED | OUTPATIENT
Start: 2018-10-22 | End: 2018-10-22 | Stop reason: SURG

## 2018-10-22 RX ORDER — PROPOFOL 10 MG/ML
VIAL (ML) INTRAVENOUS AS NEEDED
Status: DISCONTINUED | OUTPATIENT
Start: 2018-10-22 | End: 2018-10-22 | Stop reason: SURG

## 2018-10-22 RX ORDER — SODIUM CHLORIDE, SODIUM LACTATE, POTASSIUM CHLORIDE, CALCIUM CHLORIDE 600; 310; 30; 20 MG/100ML; MG/100ML; MG/100ML; MG/100ML
1000 INJECTION, SOLUTION INTRAVENOUS CONTINUOUS
Status: DISCONTINUED | OUTPATIENT
Start: 2018-10-22 | End: 2018-10-22 | Stop reason: HOSPADM

## 2018-10-22 RX ORDER — SODIUM CHLORIDE 0.9 % (FLUSH) 0.9 %
3 SYRINGE (ML) INJECTION AS NEEDED
Status: DISCONTINUED | OUTPATIENT
Start: 2018-10-22 | End: 2018-10-22 | Stop reason: HOSPADM

## 2018-10-22 RX ORDER — EPHEDRINE SULFATE 50 MG/ML
INJECTION, SOLUTION INTRAVENOUS AS NEEDED
Status: DISCONTINUED | OUTPATIENT
Start: 2018-10-22 | End: 2018-10-22

## 2018-10-22 RX ADMIN — SODIUM CHLORIDE, POTASSIUM CHLORIDE, SODIUM LACTATE AND CALCIUM CHLORIDE: 600; 310; 30; 20 INJECTION, SOLUTION INTRAVENOUS at 07:30

## 2018-10-22 RX ADMIN — PROPOFOL 150 MG: 10 INJECTION, EMULSION INTRAVENOUS at 07:40

## 2018-10-22 RX ADMIN — LIDOCAINE HYDROCHLORIDE 60 MG: 20 INJECTION, SOLUTION INFILTRATION; PERINEURAL at 07:40

## 2018-10-22 RX ADMIN — PHENYLEPHRINE HYDROCHLORIDE 100 MCG: 10 INJECTION INTRAVENOUS at 08:15

## 2018-10-22 RX ADMIN — PROPOFOL 100 MG: 10 INJECTION, EMULSION INTRAVENOUS at 07:45

## 2018-10-22 RX ADMIN — SODIUM CHLORIDE, POTASSIUM CHLORIDE, SODIUM LACTATE AND CALCIUM CHLORIDE 1000 ML: 600; 310; 30; 20 INJECTION, SOLUTION INTRAVENOUS at 07:22

## 2018-10-22 RX ADMIN — PROPOFOL 100 MG: 10 INJECTION, EMULSION INTRAVENOUS at 07:50

## 2018-10-22 RX ADMIN — PHENYLEPHRINE HYDROCHLORIDE 100 MCG: 10 INJECTION INTRAVENOUS at 08:10

## 2018-10-22 NOTE — ANESTHESIA PROCEDURE NOTES
Airway  Urgency: elective      General Information and Staff    Patient location during procedure: OR  Anesthesiologist: ALEXANDER GAYTAN    Indications and Patient Condition  Indications for airway management: airway protection    Preoxygenated: yes  MILS maintained throughout      Final Airway Details  Final airway type: supraglottic airway      Successful airway: classic  Size 4    Number of attempts at approach: 1

## 2018-10-22 NOTE — ANESTHESIA PREPROCEDURE EVALUATION
Anesthesia Evaluation     Patient summary reviewed and Nursing notes reviewed                Airway   Mallampati: I  TM distance: >3 FB  Neck ROM: full  No difficulty expected  Dental - normal exam     Pulmonary - normal exam   (+) pneumonia , a smoker Former, COPD,   Cardiovascular - normal exam    (+) hypertension, valvular problems/murmurs, dysrhythmias, hyperlipidemia,       Neuro/Psych  (+) TIA, CVA, dizziness/light headedness,     GI/Hepatic/Renal/Endo    (+)  PUD,      Musculoskeletal (-) negative ROS    Abdominal  - normal exam    Bowel sounds: normal.   Substance History - negative use     OB/GYN negative ob/gyn ROS         Other                        Anesthesia Plan    ASA 4     general     Anesthetic plan, all risks, benefits, and alternatives have been provided, discussed and informed consent has been obtained with: patient.

## 2018-10-22 NOTE — ANESTHESIA POSTPROCEDURE EVALUATION
"Patient: Asia Ly    Procedure Summary     Date:  10/22/18 Room / Location:  [facility] ENDOSCOPY 7 / [facility] ENDOSCOPY    Anesthesia Start:  0738 Anesthesia Stop:  0824    Procedure:  BRONCHOSCOPY WITH BRONCHALVEOLAR LAVAGE (N/A Bronchus) Diagnosis:      Surgeon:  Chidi Oconnor MD Provider:  Kevin Hudson MD    Anesthesia Type:  general ASA Status:  4          Anesthesia Type: general  Last vitals  BP   135/61 (10/22/18 0843)   Temp   36.6 °C (97.8 °F) (10/22/18 0704)   Pulse   66 (10/22/18 0843)   Resp   16 (10/22/18 0843)     SpO2   98 % (10/22/18 0843)     Post Anesthesia Care and Evaluation    Patient location during evaluation: bedside  Patient participation: complete - patient participated  Level of consciousness: awake and alert  Pain management: adequate  Airway patency: patent  Anesthetic complications: No anesthetic complications    Cardiovascular status: acceptable  Respiratory status: acceptable  Hydration status: acceptable    Comments: /61 (BP Location: Left arm, Patient Position: Lying)   Pulse 66   Temp 36.6 °C (97.8 °F) (Oral)   Resp 16   Ht 172.7 cm (68\")   Wt 71.4 kg (157 lb 8 oz)   SpO2 98%   BMI 23.95 kg/m²       "

## 2018-10-23 LAB — REF LAB TEST METHOD: NORMAL

## 2018-10-25 LAB
BACTERIA SPEC AEROBE CULT: ABNORMAL
GRAM STN SPEC: ABNORMAL

## 2018-10-26 ENCOUNTER — OFFICE VISIT (OUTPATIENT)
Dept: CARDIOLOGY | Facility: CLINIC | Age: 83
End: 2018-10-26

## 2018-10-26 VITALS
WEIGHT: 159 LBS | SYSTOLIC BLOOD PRESSURE: 144 MMHG | DIASTOLIC BLOOD PRESSURE: 68 MMHG | HEART RATE: 67 BPM | HEIGHT: 67 IN | BODY MASS INDEX: 24.96 KG/M2

## 2018-10-26 DIAGNOSIS — D50.9 IRON DEFICIENCY ANEMIA, UNSPECIFIED IRON DEFICIENCY ANEMIA TYPE: ICD-10-CM

## 2018-10-26 DIAGNOSIS — I10 ESSENTIAL HYPERTENSION: ICD-10-CM

## 2018-10-26 DIAGNOSIS — I35.0 NONRHEUMATIC AORTIC VALVE STENOSIS: ICD-10-CM

## 2018-10-26 DIAGNOSIS — I48.0 PAF (PAROXYSMAL ATRIAL FIBRILLATION) (HCC): Primary | ICD-10-CM

## 2018-10-26 DIAGNOSIS — E78.2 MIXED HYPERLIPIDEMIA: ICD-10-CM

## 2018-10-26 DIAGNOSIS — J84.10 PULMONARY FIBROSIS (HCC): ICD-10-CM

## 2018-10-26 PROCEDURE — 99213 OFFICE O/P EST LOW 20 MIN: CPT | Performed by: INTERNAL MEDICINE

## 2018-10-26 PROCEDURE — 93000 ELECTROCARDIOGRAM COMPLETE: CPT | Performed by: INTERNAL MEDICINE

## 2018-10-26 NOTE — PROGRESS NOTES
Date of Office Visit: 10/26/18  Encounter Provider: Hector Rivera MD  Place of Service: [facility] CARDIOLOGY  Patient Name: Asia Ly  :1932  Referral Provider:No ref. provider found      Chief Complaint   Patient presents with   • Atrial Fibrillation   • Hypertension     History of Present Illness   The patient is a pleasant 85-year-old gentleman with history of paroxysmal  atrial fibrillation. He had been going in and out of atrial fibrillation  when he had run out of his sotalol. This happened in May 2004 when he was  traveling on a plane a fair amount. He went to the emergency room in [place]  where he was found to be in atrial fibrillation. He converted back to sinus  rhythm spontaneously. He saw Dr. Hector Patel about atrial fibrillation  ablation but it was felt it would be best not to pursue that. He also saw  Dr. Man, for a second opinion but again it was felt he was doing well  on sotalol and to maintain him on that.   He then had a stroke and TIA in 2016 was at [facility] was switched from aspirin Eliquis was for that event.  He then had been doing reasonably well but then presented to [facility] in 2016 2 times with symptomatic rapid atrial fibrillation.  While there he had an echocardiogram that showed normal left ventricular systolic function.  Moderate tricuspid insufficiency with mild pulmonary hypertension mild mitral insufficiency.  He also do perfusion stress test for some reason that was negative for ischemia.  He was started on amiodarone and discharged came right back in with rapid atrial fibrillation he was given a higher dose of amiodarone and a slower taper dose.  Given his history of pulmonary fibrosis we felt that was a poor choice so discontinued it.  Unfortunately 2017 while just sitting there he just started not feeling normal and his wife taken the emergency him is found to be in  atrial fibrillation he did convert back to sinus rhythm.  That's the first episode he had an probably 3 or 4 years.  Of note however he been taken off his diltiazem due to an allergic reaction of labor rash.    He was here in November with increased lower extremity edema he was also in rapid atrial fibrillation at that time he had a rash on diltiazem so we did an echocardiogram that showed normal left ventricular function mild aortic valve stenosis we placed him on metoprolol added diuretics he improved as his acidemia resolved he then stopped his diuretic.    He then continued to have this rash was felt maybe could be due to his antibiotic doxycycline that was stopped.  He's been back in the emergency room around January February 2018 with iron deficiency anemia was anticoagulation he got scoped no obvious source was found but that been stable.   He then was having a lot of problems around January February in and out of the hospital for multiple reasons.  In and out of atrial fibrillation and very symptomatic from that.  He then ultimately was admitted to the hospital in May 2018 with the fluid turned out but also was in and out of atrial fibrillation.  We decided to start him on Tikosyn.  He had some prolonged QT when initially starting that may be related other medications he was taking including antibiotics.  But that improved and resolved.  He went home.  He now comes in for follow-up. The patient denies chest pain, pressure and heaviness. No shortness of breath, othopnea or PND. No palpitations, near syncope or syncope. No stroke type symptoms like paralysis, paresthesia, abrupt vision loss and dysarthria. No bleeding like blood in the stool or dark stools.  He's back exercising without any problems.  He's doing well.  Things at home are good.      Atrial Fibrillation   Symptoms are negative for dizziness and weakness. Past medical history includes atrial fibrillation.   Palpitations    Pertinent negatives  "include no diaphoresis, dizziness, fever, malaise/fatigue, nausea, numbness, vomiting or weakness.   Leg Swelling   Pertinent negatives include no abdominal pain, congestion, diaphoresis, fever, headaches, joint swelling, myalgias, nausea, numbness, rash, vertigo, vomiting or weakness.         Past Medical History:   Diagnosis Date   • Aortic stenosis    • Aspiration pneumonia (CMS/HCC)    • Atrial fibrillation (CMS/HCC)    • Bronchiectasis (CMS/HCC)    • CKD (chronic kidney disease)    • COPD (chronic obstructive pulmonary disease) (CMS/HCC)    • Crohn's disease (CMS/HCC)    • Dizziness    • Elevated cholesterol    • Gastric ulcer    • Generalized weakness    • Gout    • Hard of hearing    • Hyperlipidemia    • Hypertension    • Leg swelling    • Lower extremity edema    • Mild anemia    • Near syncope    • PAF (paroxysmal atrial fibrillation) (CMS/HCC)    • Palpitations    • Peptic ulcer    • Pneumonia of left lung due to infectious organism    • Pulmonary fibrosis (CMS/HCC)    • Stroke (CMS/HCC)    • TIA (transient ischemic attack)          Past Surgical History:   Procedure Laterality Date   • BRONCHOSCOPY N/A 10/22/2018    Procedure: BRONCHOSCOPY WITH BRONCHALVEOLAR LAVAGE;  Surgeon: Chidi Oconnor MD;  Location: [facility] ENDOSCOPY;  Service: Pulmonary   • CATARACT EXTRACTION Bilateral    • COLONOSCOPY     • COLONOSCOPY N/A 3/9/2018    Procedure: COLONOSCOPY to terminal ileum with bx;  Surgeon: Aron Cabrera MD;  Location: [facility] ENDOSCOPY;  Service:    • CYSTECTOMY      back and shoulder area   • ENDOSCOPY N/A 3/9/2018    Procedure: ESOPHAGOGASTRODUODENOSCOPY with bx;  Surgeon: Aron Cabrera MD;  Location: [facility] ENDOSCOPY;  Service:    • EYE SURGERY Left     detached retina   • EYE SURGERY Right     \"repaired a hole in the eye\"   • TESTICLE SURGERY      benign tumor removed.         Current Outpatient Prescriptions on File Prior to Visit   Medication Sig Dispense Refill   • allopurinol (ZYLOPRIM) 100 MG " tablet Take 100 mg by mouth 2 (Two) Times a Day.     • arformoterol (BROVANA) 15 MCG/2ML nebulizer solution Take 2 mL by nebulization 2 (Two) Times a Day. 120 mL 0   • atorvastatin (LIPITOR) 10 MG tablet Take one tablet by mouth Mon, Wed and Fri.     • BUDESONIDE PO Take 3 mg by mouth Daily.     • Cyanocobalamin (VITAMIN B-12 CR PO) Take 500 mg by mouth Daily.     • dofetilide (TIKOSYN) 125 MCG capsule Take 125 mcg by mouth 2 (Two) Times a Day.     • ferrous sulfate 325 (65 FE) MG tablet Take 325 mg by mouth 3 (Three) Times a Day.     • furosemide (LASIX) 20 MG tablet Take 20 mg by mouth Daily.     • metoprolol tartrate (LOPRESSOR) 50 MG tablet Take 1 tablet by mouth 3 (Three) Times a Day. 270 tablet 1   • omeprazole (priLOSEC) 40 MG capsule Take 40 mg by mouth Daily.     • [DISCONTINUED] apixaban (ELIQUIS) 5 MG tablet tablet Take 5 mg by mouth 2 (Two) Times a Day.       No current facility-administered medications on file prior to visit.          Social History     Social History   • Marital status:      Spouse name: N/A   • Number of children: N/A   • Years of education: N/A     Occupational History   • Not on file.     Social History Main Topics   • Smoking status: Former Smoker     Types: Cigars   • Smokeless tobacco: Never Used      Comment: Quit 25 years ago   • Alcohol use No      Comment: daily caffiene   • Drug use: No   • Sexual activity: Defer     Other Topics Concern   • Not on file     Social History Narrative   • No narrative on file       Family History   Problem Relation Age of Onset   • Stroke Mother    • Heart disease Mother    • Hypertension Mother    • Diabetes Mother    • Heart disease Sister         Heart Valve Replacement   • Ovarian cancer Sister    • Rheumatic fever Sister    • Diabetes Sister    • Stroke Father          Review of Systems   Constitution: Negative for decreased appetite, diaphoresis, fever, weakness, malaise/fatigue, weight gain and weight loss.   HENT: Negative for  "congestion, hearing loss, nosebleeds and tinnitus.    Eyes: Negative for blurred vision, double vision, vision loss in left eye, vision loss in right eye and visual disturbance.   Cardiovascular:        As noted in HPI   Respiratory:        As noted HPI   Endocrine: Negative for cold intolerance and heat intolerance.   Hematologic/Lymphatic: Negative for bleeding problem. Does not bruise/bleed easily.   Skin: Negative for color change, flushing, itching and rash.   Musculoskeletal: Negative for arthritis, back pain, joint pain, joint swelling, muscle weakness and myalgias.   Gastrointestinal: Negative for bloating, abdominal pain, constipation, diarrhea, dysphagia, heartburn, hematemesis, hematochezia, melena, nausea and vomiting.   Genitourinary: Negative for bladder incontinence, dysuria, frequency, nocturia and urgency.   Neurological: Negative for dizziness, focal weakness, headaches, light-headedness, loss of balance, numbness, paresthesias and vertigo.   Psychiatric/Behavioral: Negative for depression, memory loss and substance abuse.       Procedures      ECG 12 Lead  Date/Time: 10/26/2018 11:25 AM  Performed by: SHARON DOBBINS  Authorized by: SHARON DOBBINS   Comparison: compared with previous ECG   Similar to previous ECG  Rhythm: sinus rhythm  Rate: normal  QRS axis: normal                  Objective:    /68 (BP Location: Right arm, Patient Position: Sitting)   Pulse 67   Ht 170.2 cm (67\")   Wt 72.1 kg (159 lb)   BMI 24.90 kg/m²        Physical Exam  Physical Exam   Constitutional: He is oriented to person, place, and time. He appears well-developed and well-nourished. No distress.   HENT:   Head: Normocephalic.   Eyes: Pupils are equal, round, and reactive to light. Conjunctivae are normal. No scleral icterus.   Neck: Normal carotid pulses, no hepatojugular reflux and no JVD present. Carotid bruit is not present. No tracheal deviation, no edema and no erythema present. No thyromegaly " present.   Cardiovascular: Normal rate, regular rhythm, S1 normal, S2 normal and intact distal pulses.   No extrasystoles are present. PMI is not displaced.  Exam reveals no gallop, no distant heart sounds and no friction rub.    Murmur heard.   Systolic murmur is present with a grade of 2/6  at the upper right sternal border  Pulses:       Carotid pulses are 2+ on the right side, and 2+ on the left side.       Radial pulses are 2+ on the right side, and 2+ on the left side.        Femoral pulses are 2+ on the right side, and 2+ on the left side.       Dorsalis pedis pulses are 2+ on the right side, and 2+ on the left side.        Posterior tibial pulses are 2+ on the right side, and 2+ on the left side.   Pulmonary/Chest: Effort normal. No respiratory distress. He has no decreased breath sounds. He has no wheezes. He has no rhonchi. He has rales (coarse) in the right lower field and the left lower field. He exhibits no tenderness.   Abdominal: Soft. Bowel sounds are normal. He exhibits no distension and no mass. There is no hepatosplenomegaly. There is no tenderness. There is no rebound and no guarding.   Musculoskeletal: He exhibits no edema, tenderness or deformity.   Neurological: He is alert and oriented to person, place, and time.   Skin: Skin is warm and dry. No rash noted. He is not diaphoretic. No cyanosis or erythema. No pallor. Nails show no clubbing.   Psychiatric: He has a normal mood and affect. His speech is normal and behavior is normal. Judgment and thought content normal.           Assessment:   1. This is a 86-year-old gentleman with a history of paroxysmal atrial fibrillation. Off amiodarone due to pulmonary fibrosis, rash I believe on diltiazem.   Atrial Fibrillation and Atrial Flutter  Assessment  • The patient has paroxysmal atrial fibrillation  • This is non-valvular in etiology  • The patient's CHADS2-VASc score is 3  • A [id number]-HPCv score of 2 or more is considered a high risk for a  thromboembolic event  • Apixaban prescribed     Plan  • Attempt to maintain sinus rhythm  • Continue apixaban for antithrombotic therapy, bleeding issues discussed  • Continue dofetilide for rhythm control  He is now very stable on Tikosyn he is to continue the same.  He will see her nurse practitioner in 6 months we'll see me in one year.  He is to call back if any problems.     2. History of Crohn's disease.  3. History of gout, seems to be stable.  4. History of aspiration pneumonia, pulmonary fibrosis, and bronchiectasis.    5.   Hypertension blood pressure adequate control.  6.  Lower extremity edema in retrospect I believe this was due to his atrial fibrillation.  This is now resolved.  7.  Aortic stenosis.  Echocardiogram November 2017 this was just mild.   8.  Anemia appears stable continue the same he's tolerating eliquis.  Continue the same.          Plan:

## 2018-10-27 ENCOUNTER — HOSPITAL ENCOUNTER (OUTPATIENT)
Facility: HOSPITAL | Age: 83
Setting detail: OBSERVATION
Discharge: HOME | End: 2018-10-28
Attending: EMERGENCY MEDICINE | Admitting: INTERNAL MEDICINE

## 2018-10-27 DIAGNOSIS — I48.91 ATRIAL FIBRILLATION WITH RVR (HCC): Primary | ICD-10-CM

## 2018-10-27 LAB
ALBUMIN SERPL-MCNC: 3.8 G/DL (ref 3.5–5.2)
ALBUMIN/GLOB SERPL: 1 G/DL
ALP SERPL-CCNC: 57 U/L (ref 39–117)
ALT SERPL W P-5'-P-CCNC: 10 U/L (ref 1–41)
ANION GAP SERPL CALCULATED.3IONS-SCNC: 13.8 MMOL/L
AST SERPL-CCNC: 14 U/L (ref 1–40)
BASOPHILS # BLD AUTO: 0.02 10*3/MM3 (ref 0–0.2)
BASOPHILS NFR BLD AUTO: 0.2 % (ref 0–1.5)
BILIRUB SERPL-MCNC: 0.2 MG/DL (ref 0.1–1.2)
BUN BLD-MCNC: 18 MG/DL (ref 8–23)
BUN/CREAT SERPL: 14.5 (ref 7–25)
CALCIUM SPEC-SCNC: 9.5 MG/DL (ref 8.6–10.5)
CHLORIDE SERPL-SCNC: 103 MMOL/L (ref 98–107)
CO2 SERPL-SCNC: 25.2 MMOL/L (ref 22–29)
CREAT BLD-MCNC: 1.24 MG/DL (ref 0.76–1.27)
DEPRECATED RDW RBC AUTO: 54.3 FL (ref 37–54)
EOSINOPHIL # BLD AUTO: 0.15 10*3/MM3 (ref 0–0.7)
EOSINOPHIL NFR BLD AUTO: 1.9 % (ref 0.3–6.2)
ERYTHROCYTE [DISTWIDTH] IN BLOOD BY AUTOMATED COUNT: 15.8 % (ref 11.5–14.5)
GFR SERPL CREATININE-BSD FRML MDRD: 55 ML/MIN/1.73
GLOBULIN UR ELPH-MCNC: 3.8 GM/DL
GLUCOSE BLD-MCNC: 158 MG/DL (ref 65–99)
HCT VFR BLD AUTO: 38.2 % (ref 40.4–52.2)
HGB BLD-MCNC: 12.1 G/DL (ref 13.7–17.6)
HOLD SPECIMEN: NORMAL
HOLD SPECIMEN: NORMAL
IMM GRANULOCYTES # BLD: 0.05 10*3/MM3 (ref 0–0.03)
IMM GRANULOCYTES NFR BLD: 0.6 % (ref 0–0.5)
LYMPHOCYTES # BLD AUTO: 1.67 10*3/MM3 (ref 0.9–4.8)
LYMPHOCYTES NFR BLD AUTO: 20.7 % (ref 19.6–45.3)
MCH RBC QN AUTO: 29.8 PG (ref 27–32.7)
MCHC RBC AUTO-ENTMCNC: 31.7 G/DL (ref 32.6–36.4)
MCV RBC AUTO: 94.1 FL (ref 79.8–96.2)
MONOCYTES # BLD AUTO: 0.6 10*3/MM3 (ref 0.2–1.2)
MONOCYTES NFR BLD AUTO: 7.5 % (ref 5–12)
NEUTROPHILS # BLD AUTO: 5.61 10*3/MM3 (ref 1.9–8.1)
NEUTROPHILS NFR BLD AUTO: 69.7 % (ref 42.7–76)
PLATELET # BLD AUTO: 227 10*3/MM3 (ref 140–500)
PMV BLD AUTO: 11.5 FL (ref 6–12)
POTASSIUM BLD-SCNC: 4.6 MMOL/L (ref 3.5–5.2)
PROT SERPL-MCNC: 7.6 G/DL (ref 6–8.5)
RBC # BLD AUTO: 4.06 10*6/MM3 (ref 4.6–6)
SODIUM BLD-SCNC: 142 MMOL/L (ref 136–145)
TROPONIN T SERPL-MCNC: <0.01 NG/ML (ref 0–0.03)
WBC NRBC COR # BLD: 8.05 10*3/MM3 (ref 4.5–10.7)
WHOLE BLOOD HOLD SPECIMEN: NORMAL
WHOLE BLOOD HOLD SPECIMEN: NORMAL

## 2018-10-27 PROCEDURE — 85025 COMPLETE CBC W/AUTO DIFF WBC: CPT | Performed by: EMERGENCY MEDICINE

## 2018-10-27 PROCEDURE — 84484 ASSAY OF TROPONIN QUANT: CPT | Performed by: EMERGENCY MEDICINE

## 2018-10-27 PROCEDURE — G0378 HOSPITAL OBSERVATION PER HR: HCPCS

## 2018-10-27 PROCEDURE — 96374 THER/PROPH/DIAG INJ IV PUSH: CPT

## 2018-10-27 PROCEDURE — 93005 ELECTROCARDIOGRAM TRACING: CPT

## 2018-10-27 PROCEDURE — 99284 EMERGENCY DEPT VISIT MOD MDM: CPT

## 2018-10-27 PROCEDURE — 99220 PR INITIAL OBSERVATION CARE/DAY 70 MINUTES: CPT | Performed by: INTERNAL MEDICINE

## 2018-10-27 PROCEDURE — 93005 ELECTROCARDIOGRAM TRACING: CPT | Performed by: EMERGENCY MEDICINE

## 2018-10-27 PROCEDURE — 80053 COMPREHEN METABOLIC PANEL: CPT | Performed by: EMERGENCY MEDICINE

## 2018-10-27 PROCEDURE — 93010 ELECTROCARDIOGRAM REPORT: CPT | Performed by: INTERNAL MEDICINE

## 2018-10-27 RX ORDER — METOPROLOL TARTRATE 5 MG/5ML
5 INJECTION INTRAVENOUS
Status: DISCONTINUED | OUTPATIENT
Start: 2018-10-27 | End: 2018-10-28 | Stop reason: HOSPADM

## 2018-10-27 RX ORDER — METOPROLOL TARTRATE 50 MG/1
50 TABLET, FILM COATED ORAL EVERY 12 HOURS SCHEDULED
Status: DISCONTINUED | OUTPATIENT
Start: 2018-10-27 | End: 2018-10-28 | Stop reason: HOSPADM

## 2018-10-27 RX ORDER — SODIUM CHLORIDE 0.9 % (FLUSH) 0.9 %
10 SYRINGE (ML) INJECTION AS NEEDED
Status: DISCONTINUED | OUTPATIENT
Start: 2018-10-27 | End: 2018-10-28 | Stop reason: HOSPADM

## 2018-10-27 RX ORDER — SODIUM CHLORIDE 0.9 % (FLUSH) 0.9 %
3-10 SYRINGE (ML) INJECTION AS NEEDED
Status: DISCONTINUED | OUTPATIENT
Start: 2018-10-27 | End: 2018-10-28 | Stop reason: HOSPADM

## 2018-10-27 RX ORDER — METOPROLOL TARTRATE 50 MG/1
50 TABLET, FILM COATED ORAL 3 TIMES DAILY
Status: DISCONTINUED | OUTPATIENT
Start: 2018-10-27 | End: 2018-10-27

## 2018-10-27 RX ORDER — SODIUM CHLORIDE 0.9 % (FLUSH) 0.9 %
3 SYRINGE (ML) INJECTION EVERY 12 HOURS SCHEDULED
Status: DISCONTINUED | OUTPATIENT
Start: 2018-10-27 | End: 2018-10-28 | Stop reason: HOSPADM

## 2018-10-27 RX ORDER — DILTIAZEM HCL 90 MG
90 TABLET ORAL EVERY 8 HOURS SCHEDULED
Status: DISCONTINUED | OUTPATIENT
Start: 2018-10-27 | End: 2018-10-28

## 2018-10-27 RX ORDER — DOFETILIDE 0.12 MG/1
125 CAPSULE ORAL EVERY 12 HOURS SCHEDULED
Status: DISCONTINUED | OUTPATIENT
Start: 2018-10-27 | End: 2018-10-28 | Stop reason: HOSPADM

## 2018-10-27 RX ORDER — FUROSEMIDE 20 MG/1
20 TABLET ORAL DAILY
Status: DISCONTINUED | OUTPATIENT
Start: 2018-10-28 | End: 2018-10-28 | Stop reason: HOSPADM

## 2018-10-27 RX ORDER — FERROUS SULFATE 325(65) MG
325 TABLET ORAL 3 TIMES DAILY
Status: DISCONTINUED | OUTPATIENT
Start: 2018-10-27 | End: 2018-10-28 | Stop reason: HOSPADM

## 2018-10-27 RX ORDER — PANTOPRAZOLE SODIUM 40 MG/1
40 TABLET, DELAYED RELEASE ORAL EVERY MORNING
Status: DISCONTINUED | OUTPATIENT
Start: 2018-10-28 | End: 2018-10-28 | Stop reason: HOSPADM

## 2018-10-27 RX ORDER — ARFORMOTEROL TARTRATE 15 UG/2ML
15 SOLUTION RESPIRATORY (INHALATION)
Status: DISCONTINUED | OUTPATIENT
Start: 2018-10-27 | End: 2018-10-28 | Stop reason: HOSPADM

## 2018-10-27 RX ORDER — ALLOPURINOL 100 MG/1
100 TABLET ORAL 2 TIMES DAILY
Status: DISCONTINUED | OUTPATIENT
Start: 2018-10-27 | End: 2018-10-28 | Stop reason: HOSPADM

## 2018-10-27 RX ADMIN — DILTIAZEM HYDROCHLORIDE 90 MG: 90 TABLET, FILM COATED ORAL at 22:49

## 2018-10-27 RX ADMIN — ALLOPURINOL 100 MG: 100 TABLET ORAL at 22:49

## 2018-10-27 RX ADMIN — APIXABAN 5 MG: 5 TABLET, FILM COATED ORAL at 22:49

## 2018-10-27 RX ADMIN — METOPROLOL TARTRATE 5 MG: 5 INJECTION, SOLUTION INTRAVENOUS at 18:39

## 2018-10-27 RX ADMIN — Medication 3 ML: at 22:51

## 2018-10-27 RX ADMIN — DOFETILIDE 125 MCG: 0.12 CAPSULE ORAL at 22:49

## 2018-10-27 RX ADMIN — FERROUS SULFATE TAB 325 MG (65 MG ELEMENTAL FE) 325 MG: 325 (65 FE) TAB at 22:49

## 2018-10-27 RX ADMIN — METOPROLOL TARTRATE 5 MG: 5 INJECTION, SOLUTION INTRAVENOUS at 17:50

## 2018-10-27 RX ADMIN — METOPROLOL TARTRATE 5 MG: 5 INJECTION, SOLUTION INTRAVENOUS at 19:11

## 2018-10-28 VITALS
BODY MASS INDEX: 24.57 KG/M2 | HEART RATE: 62 BPM | SYSTOLIC BLOOD PRESSURE: 105 MMHG | RESPIRATION RATE: 18 BRPM | HEIGHT: 68 IN | TEMPERATURE: 97.7 F | DIASTOLIC BLOOD PRESSURE: 50 MMHG | OXYGEN SATURATION: 97 % | WEIGHT: 162.13 LBS

## 2018-10-28 LAB
ANION GAP SERPL CALCULATED.3IONS-SCNC: 12.9 MMOL/L
BASOPHILS # BLD AUTO: 0.05 10*3/MM3 (ref 0–0.2)
BASOPHILS NFR BLD AUTO: 0.6 % (ref 0–1.5)
BUN BLD-MCNC: 20 MG/DL (ref 8–23)
BUN/CREAT SERPL: 16.8 (ref 7–25)
CALCIUM SPEC-SCNC: 9 MG/DL (ref 8.6–10.5)
CHLORIDE SERPL-SCNC: 106 MMOL/L (ref 98–107)
CO2 SERPL-SCNC: 24.1 MMOL/L (ref 22–29)
CREAT BLD-MCNC: 1.19 MG/DL (ref 0.76–1.27)
DEPRECATED RDW RBC AUTO: 54.8 FL (ref 37–54)
EOSINOPHIL # BLD AUTO: 0.18 10*3/MM3 (ref 0–0.7)
EOSINOPHIL NFR BLD AUTO: 2 % (ref 0.3–6.2)
ERYTHROCYTE [DISTWIDTH] IN BLOOD BY AUTOMATED COUNT: 15.8 % (ref 11.5–14.5)
GFR SERPL CREATININE-BSD FRML MDRD: 58 ML/MIN/1.73
GLUCOSE BLD-MCNC: 98 MG/DL (ref 65–99)
HCT VFR BLD AUTO: 36 % (ref 40.4–52.2)
HGB BLD-MCNC: 10.8 G/DL (ref 13.7–17.6)
IMM GRANULOCYTES # BLD: 0.09 10*3/MM3 (ref 0–0.03)
IMM GRANULOCYTES NFR BLD: 1 % (ref 0–0.5)
LYMPHOCYTES # BLD AUTO: 2.03 10*3/MM3 (ref 0.9–4.8)
LYMPHOCYTES NFR BLD AUTO: 22.4 % (ref 19.6–45.3)
MCH RBC QN AUTO: 28.6 PG (ref 27–32.7)
MCHC RBC AUTO-ENTMCNC: 30 G/DL (ref 32.6–36.4)
MCV RBC AUTO: 95.2 FL (ref 79.8–96.2)
MONOCYTES # BLD AUTO: 0.88 10*3/MM3 (ref 0.2–1.2)
MONOCYTES NFR BLD AUTO: 9.7 % (ref 5–12)
NEUTROPHILS # BLD AUTO: 5.83 10*3/MM3 (ref 1.9–8.1)
NEUTROPHILS NFR BLD AUTO: 64.3 % (ref 42.7–76)
PLATELET # BLD AUTO: 226 10*3/MM3 (ref 140–500)
PMV BLD AUTO: 11.5 FL (ref 6–12)
POTASSIUM BLD-SCNC: 3.9 MMOL/L (ref 3.5–5.2)
RBC # BLD AUTO: 3.78 10*6/MM3 (ref 4.6–6)
SODIUM BLD-SCNC: 143 MMOL/L (ref 136–145)
WBC NRBC COR # BLD: 9.06 10*3/MM3 (ref 4.5–10.7)

## 2018-10-28 PROCEDURE — 93005 ELECTROCARDIOGRAM TRACING: CPT | Performed by: INTERNAL MEDICINE

## 2018-10-28 PROCEDURE — 94640 AIRWAY INHALATION TREATMENT: CPT

## 2018-10-28 PROCEDURE — 85025 COMPLETE CBC W/AUTO DIFF WBC: CPT | Performed by: INTERNAL MEDICINE

## 2018-10-28 PROCEDURE — 93010 ELECTROCARDIOGRAM REPORT: CPT | Performed by: INTERNAL MEDICINE

## 2018-10-28 PROCEDURE — 94799 UNLISTED PULMONARY SVC/PX: CPT

## 2018-10-28 PROCEDURE — G0378 HOSPITAL OBSERVATION PER HR: HCPCS

## 2018-10-28 PROCEDURE — 99217 PR OBSERVATION CARE DISCHARGE MANAGEMENT: CPT | Performed by: INTERNAL MEDICINE

## 2018-10-28 PROCEDURE — 80048 BASIC METABOLIC PNL TOTAL CA: CPT | Performed by: INTERNAL MEDICINE

## 2018-10-28 RX ORDER — DILTIAZEM HYDROCHLORIDE 120 MG/1
120 CAPSULE, COATED, EXTENDED RELEASE ORAL
Status: DISCONTINUED | OUTPATIENT
Start: 2018-10-28 | End: 2018-10-28 | Stop reason: HOSPADM

## 2018-10-28 RX ADMIN — PANTOPRAZOLE SODIUM 40 MG: 40 TABLET, DELAYED RELEASE ORAL at 07:11

## 2018-10-28 RX ADMIN — METOPROLOL TARTRATE 50 MG: 50 TABLET, FILM COATED ORAL at 10:04

## 2018-10-28 RX ADMIN — ARFORMOTEROL TARTRATE 15 MCG: 15 SOLUTION RESPIRATORY (INHALATION) at 08:18

## 2018-10-28 RX ADMIN — FUROSEMIDE 20 MG: 20 TABLET ORAL at 10:05

## 2018-10-28 RX ADMIN — DILTIAZEM HYDROCHLORIDE 120 MG: 120 CAPSULE, COATED, EXTENDED RELEASE ORAL at 11:29

## 2018-10-28 RX ADMIN — APIXABAN 5 MG: 5 TABLET, FILM COATED ORAL at 08:52

## 2018-10-28 RX ADMIN — DOFETILIDE 125 MCG: 0.12 CAPSULE ORAL at 09:58

## 2018-10-28 RX ADMIN — Medication 3 ML: at 09:00

## 2018-10-28 RX ADMIN — ALLOPURINOL 100 MG: 100 TABLET ORAL at 08:52

## 2018-10-28 RX ADMIN — FERROUS SULFATE TAB 325 MG (65 MG ELEMENTAL FE) 325 MG: 325 (65 FE) TAB at 08:52

## 2018-10-29 ENCOUNTER — HOSPITAL ENCOUNTER (OUTPATIENT)
Facility: HOSPITAL | Age: 83
Setting detail: OBSERVATION
Discharge: HOME | End: 2018-10-31
Attending: EMERGENCY MEDICINE | Admitting: EMERGENCY MEDICINE

## 2018-10-29 DIAGNOSIS — I48.0 PAROXYSMAL ATRIAL FIBRILLATION (HCC): Primary | ICD-10-CM

## 2018-10-29 PROCEDURE — 93010 ELECTROCARDIOGRAM REPORT: CPT | Performed by: INTERNAL MEDICINE

## 2018-10-29 PROCEDURE — 36415 COLL VENOUS BLD VENIPUNCTURE: CPT

## 2018-10-29 PROCEDURE — 83735 ASSAY OF MAGNESIUM: CPT | Performed by: EMERGENCY MEDICINE

## 2018-10-29 PROCEDURE — 99284 EMERGENCY DEPT VISIT MOD MDM: CPT

## 2018-10-29 PROCEDURE — 80048 BASIC METABOLIC PNL TOTAL CA: CPT | Performed by: EMERGENCY MEDICINE

## 2018-10-29 PROCEDURE — 96374 THER/PROPH/DIAG INJ IV PUSH: CPT

## 2018-10-29 PROCEDURE — 93005 ELECTROCARDIOGRAM TRACING: CPT | Performed by: EMERGENCY MEDICINE

## 2018-10-29 RX ADMIN — METOPROLOL TARTRATE 5 MG: 5 INJECTION, SOLUTION INTRAVENOUS at 23:55

## 2018-10-30 LAB
ANION GAP SERPL CALCULATED.3IONS-SCNC: 11.5 MMOL/L
BASOPHILS # BLD AUTO: 0.04 10*3/MM3 (ref 0–0.2)
BASOPHILS NFR BLD AUTO: 0.5 % (ref 0–1.5)
BUN BLD-MCNC: 21 MG/DL (ref 8–23)
BUN/CREAT SERPL: 18.1 (ref 7–25)
CALCIUM SPEC-SCNC: 9.1 MG/DL (ref 8.6–10.5)
CHLORIDE SERPL-SCNC: 105 MMOL/L (ref 98–107)
CO2 SERPL-SCNC: 24.5 MMOL/L (ref 22–29)
CREAT BLD-MCNC: 1.16 MG/DL (ref 0.76–1.27)
CYTO UR: NORMAL
DEPRECATED RDW RBC AUTO: 53 FL (ref 37–54)
EOSINOPHIL # BLD AUTO: 0.15 10*3/MM3 (ref 0–0.7)
EOSINOPHIL NFR BLD AUTO: 1.7 % (ref 0.3–6.2)
ERYTHROCYTE [DISTWIDTH] IN BLOOD BY AUTOMATED COUNT: 15.5 % (ref 11.5–14.5)
GFR SERPL CREATININE-BSD FRML MDRD: 60 ML/MIN/1.73
GLUCOSE BLD-MCNC: 180 MG/DL (ref 65–99)
GLUCOSE BLDC GLUCOMTR-MCNC: 117 MG/DL (ref 70–130)
HCT VFR BLD AUTO: 36.3 % (ref 40.4–52.2)
HGB BLD-MCNC: 10.8 G/DL (ref 13.7–17.6)
IMM GRANULOCYTES # BLD: 0.06 10*3/MM3 (ref 0–0.03)
IMM GRANULOCYTES NFR BLD: 0.7 % (ref 0–0.5)
LAB AP CASE REPORT: NORMAL
LAB AP SPECIAL STAINS: NORMAL
LYMPHOCYTES # BLD AUTO: 2 10*3/MM3 (ref 0.9–4.8)
LYMPHOCYTES NFR BLD AUTO: 22.7 % (ref 19.6–45.3)
MAGNESIUM SERPL-MCNC: 2 MG/DL (ref 1.6–2.4)
MCH RBC QN AUTO: 28.1 PG (ref 27–32.7)
MCHC RBC AUTO-ENTMCNC: 29.8 G/DL (ref 32.6–36.4)
MCV RBC AUTO: 94.5 FL (ref 79.8–96.2)
MONOCYTES # BLD AUTO: 0.75 10*3/MM3 (ref 0.2–1.2)
MONOCYTES NFR BLD AUTO: 8.5 % (ref 5–12)
NEUTROPHILS # BLD AUTO: 5.81 10*3/MM3 (ref 1.9–8.1)
NEUTROPHILS NFR BLD AUTO: 65.9 % (ref 42.7–76)
PATH REPORT.ADDENDUM SPEC: NORMAL
PATH REPORT.FINAL DX SPEC: NORMAL
PATH REPORT.GROSS SPEC: NORMAL
PLATELET # BLD AUTO: 216 10*3/MM3 (ref 140–500)
PMV BLD AUTO: 11.2 FL (ref 6–12)
POTASSIUM BLD-SCNC: 4 MMOL/L (ref 3.5–5.2)
RBC # BLD AUTO: 3.84 10*6/MM3 (ref 4.6–6)
SODIUM BLD-SCNC: 141 MMOL/L (ref 136–145)
TSH SERPL DL<=0.05 MIU/L-ACNC: 2.7 MIU/ML (ref 0.27–4.2)
WBC NRBC COR # BLD: 8.81 10*3/MM3 (ref 4.5–10.7)

## 2018-10-30 PROCEDURE — 93005 ELECTROCARDIOGRAM TRACING: CPT | Performed by: INTERNAL MEDICINE

## 2018-10-30 PROCEDURE — G0378 HOSPITAL OBSERVATION PER HR: HCPCS

## 2018-10-30 PROCEDURE — 99220 PR INITIAL OBSERVATION CARE/DAY 70 MINUTES: CPT | Performed by: INTERNAL MEDICINE

## 2018-10-30 PROCEDURE — 93010 ELECTROCARDIOGRAM REPORT: CPT | Performed by: INTERNAL MEDICINE

## 2018-10-30 PROCEDURE — 99214 OFFICE O/P EST MOD 30 MIN: CPT | Performed by: INTERNAL MEDICINE

## 2018-10-30 PROCEDURE — 85025 COMPLETE CBC W/AUTO DIFF WBC: CPT | Performed by: INTERNAL MEDICINE

## 2018-10-30 PROCEDURE — 96376 TX/PRO/DX INJ SAME DRUG ADON: CPT

## 2018-10-30 PROCEDURE — 82962 GLUCOSE BLOOD TEST: CPT

## 2018-10-30 PROCEDURE — 84443 ASSAY THYROID STIM HORMONE: CPT | Performed by: INTERNAL MEDICINE

## 2018-10-30 RX ORDER — PANTOPRAZOLE SODIUM 40 MG/1
40 TABLET, DELAYED RELEASE ORAL EVERY MORNING
Status: DISCONTINUED | OUTPATIENT
Start: 2018-10-31 | End: 2018-10-31 | Stop reason: HOSPADM

## 2018-10-30 RX ORDER — ACETAMINOPHEN 325 MG/1
650 TABLET ORAL EVERY 4 HOURS PRN
Status: DISCONTINUED | OUTPATIENT
Start: 2018-10-30 | End: 2018-10-31 | Stop reason: HOSPADM

## 2018-10-30 RX ORDER — ATORVASTATIN CALCIUM 10 MG/1
10 TABLET, FILM COATED ORAL
Status: DISCONTINUED | OUTPATIENT
Start: 2018-10-30 | End: 2018-10-31 | Stop reason: HOSPADM

## 2018-10-30 RX ORDER — BUDESONIDE 3 MG/1
3 CAPSULE, COATED PELLETS ORAL DAILY
Status: DISCONTINUED | OUTPATIENT
Start: 2018-10-30 | End: 2018-10-31 | Stop reason: HOSPADM

## 2018-10-30 RX ORDER — FUROSEMIDE 20 MG/1
20 TABLET ORAL DAILY
Status: DISCONTINUED | OUTPATIENT
Start: 2018-10-30 | End: 2018-10-31 | Stop reason: HOSPADM

## 2018-10-30 RX ORDER — DILTIAZEM HYDROCHLORIDE 120 MG/1
120 CAPSULE, COATED, EXTENDED RELEASE ORAL
Status: DISCONTINUED | OUTPATIENT
Start: 2018-10-30 | End: 2018-10-31 | Stop reason: HOSPADM

## 2018-10-30 RX ORDER — SODIUM CHLORIDE 0.9 % (FLUSH) 0.9 %
3-10 SYRINGE (ML) INJECTION AS NEEDED
Status: DISCONTINUED | OUTPATIENT
Start: 2018-10-30 | End: 2018-10-31 | Stop reason: HOSPADM

## 2018-10-30 RX ORDER — METOPROLOL TARTRATE 50 MG/1
50 TABLET, FILM COATED ORAL 3 TIMES DAILY
Status: DISCONTINUED | OUTPATIENT
Start: 2018-10-30 | End: 2018-10-31 | Stop reason: HOSPADM

## 2018-10-30 RX ORDER — ALLOPURINOL 100 MG/1
100 TABLET ORAL 2 TIMES DAILY
Status: DISCONTINUED | OUTPATIENT
Start: 2018-10-30 | End: 2018-10-31 | Stop reason: HOSPADM

## 2018-10-30 RX ORDER — DOFETILIDE 0.12 MG/1
125 CAPSULE ORAL EVERY 12 HOURS SCHEDULED
Status: DISCONTINUED | OUTPATIENT
Start: 2018-10-30 | End: 2018-10-31 | Stop reason: HOSPADM

## 2018-10-30 RX ORDER — ARFORMOTEROL TARTRATE 15 UG/2ML
15 SOLUTION RESPIRATORY (INHALATION)
Status: DISCONTINUED | OUTPATIENT
Start: 2018-10-30 | End: 2018-10-31 | Stop reason: HOSPADM

## 2018-10-30 RX ORDER — SODIUM CHLORIDE 0.9 % (FLUSH) 0.9 %
3 SYRINGE (ML) INJECTION EVERY 12 HOURS SCHEDULED
Status: DISCONTINUED | OUTPATIENT
Start: 2018-10-30 | End: 2018-10-31 | Stop reason: HOSPADM

## 2018-10-30 RX ORDER — FERROUS SULFATE 325(65) MG
325 TABLET ORAL 3 TIMES DAILY
Status: DISCONTINUED | OUTPATIENT
Start: 2018-10-30 | End: 2018-10-31 | Stop reason: HOSPADM

## 2018-10-30 RX ADMIN — Medication 3 ML: at 09:26

## 2018-10-30 RX ADMIN — ATORVASTATIN CALCIUM 10 MG: 10 TABLET, FILM COATED ORAL at 21:38

## 2018-10-30 RX ADMIN — Medication 3 ML: at 21:42

## 2018-10-30 RX ADMIN — APIXABAN 5 MG: 5 TABLET, FILM COATED ORAL at 21:38

## 2018-10-30 RX ADMIN — METOPROLOL TARTRATE 5 MG: 5 INJECTION, SOLUTION INTRAVENOUS at 00:42

## 2018-10-30 RX ADMIN — ALLOPURINOL 100 MG: 100 TABLET ORAL at 21:38

## 2018-10-30 RX ADMIN — DOFETILIDE 125 MCG: 0.12 CAPSULE ORAL at 21:38

## 2018-10-30 RX ADMIN — FUROSEMIDE 20 MG: 20 TABLET ORAL at 18:22

## 2018-10-30 RX ADMIN — FERROUS SULFATE TAB 325 MG (65 MG ELEMENTAL FE) 325 MG: 325 (65 FE) TAB at 21:37

## 2018-10-30 RX ADMIN — METOPROLOL TARTRATE 50 MG: 50 TABLET, FILM COATED ORAL at 21:37

## 2018-10-30 RX ADMIN — DILTIAZEM HYDROCHLORIDE 120 MG: 120 CAPSULE, COATED, EXTENDED RELEASE ORAL at 21:38

## 2018-10-30 RX ADMIN — BUDESONIDE 3 MG: 3 CAPSULE, GELATIN COATED ORAL at 18:22

## 2018-10-31 VITALS
TEMPERATURE: 98.9 F | RESPIRATION RATE: 18 BRPM | HEART RATE: 65 BPM | HEIGHT: 70 IN | WEIGHT: 163 LBS | SYSTOLIC BLOOD PRESSURE: 128 MMHG | DIASTOLIC BLOOD PRESSURE: 60 MMHG | OXYGEN SATURATION: 95 % | BODY MASS INDEX: 23.34 KG/M2

## 2018-10-31 PROCEDURE — 93010 ELECTROCARDIOGRAM REPORT: CPT | Performed by: INTERNAL MEDICINE

## 2018-10-31 PROCEDURE — 93005 ELECTROCARDIOGRAM TRACING: CPT | Performed by: NURSE PRACTITIONER

## 2018-10-31 PROCEDURE — G0378 HOSPITAL OBSERVATION PER HR: HCPCS

## 2018-10-31 PROCEDURE — 99217 PR OBSERVATION CARE DISCHARGE MANAGEMENT: CPT | Performed by: NURSE PRACTITIONER

## 2018-10-31 RX ADMIN — METOPROLOL TARTRATE 50 MG: 50 TABLET, FILM COATED ORAL at 08:22

## 2018-10-31 RX ADMIN — FERROUS SULFATE TAB 325 MG (65 MG ELEMENTAL FE) 325 MG: 325 (65 FE) TAB at 08:22

## 2018-10-31 RX ADMIN — DOFETILIDE 125 MCG: 0.12 CAPSULE ORAL at 08:22

## 2018-10-31 RX ADMIN — BUDESONIDE 3 MG: 3 CAPSULE, GELATIN COATED ORAL at 08:22

## 2018-10-31 RX ADMIN — PANTOPRAZOLE SODIUM 40 MG: 40 TABLET, DELAYED RELEASE ORAL at 06:06

## 2018-10-31 RX ADMIN — ALLOPURINOL 100 MG: 100 TABLET ORAL at 08:22

## 2018-10-31 RX ADMIN — APIXABAN 5 MG: 5 TABLET, FILM COATED ORAL at 08:22

## 2018-10-31 RX ADMIN — Medication 3 ML: at 08:22

## 2018-10-31 RX ADMIN — DILTIAZEM HYDROCHLORIDE 120 MG: 120 CAPSULE, COATED, EXTENDED RELEASE ORAL at 08:22

## 2018-10-31 RX ADMIN — FUROSEMIDE 20 MG: 20 TABLET ORAL at 08:22

## 2018-11-16 ENCOUNTER — APPOINTMENT (OUTPATIENT)
Dept: GENERAL RADIOLOGY | Facility: HOSPITAL | Age: 83
End: 2018-11-16

## 2018-11-16 ENCOUNTER — HOSPITAL ENCOUNTER (EMERGENCY)
Facility: HOSPITAL | Age: 83
Discharge: HOME | End: 2018-11-16
Attending: EMERGENCY MEDICINE | Admitting: EMERGENCY MEDICINE

## 2018-11-16 VITALS
OXYGEN SATURATION: 97 % | DIASTOLIC BLOOD PRESSURE: 52 MMHG | RESPIRATION RATE: 16 BRPM | HEIGHT: 68 IN | SYSTOLIC BLOOD PRESSURE: 113 MMHG | WEIGHT: 160 LBS | TEMPERATURE: 99.1 F | BODY MASS INDEX: 24.25 KG/M2 | HEART RATE: 66 BPM

## 2018-11-16 DIAGNOSIS — I48.91 ATRIAL FIBRILLATION WITH RVR (HCC): Primary | ICD-10-CM

## 2018-11-16 LAB
ALBUMIN SERPL-MCNC: 3.7 G/DL (ref 3.5–5.2)
ALBUMIN/GLOB SERPL: 1 G/DL
ALP SERPL-CCNC: 62 U/L (ref 39–117)
ALT SERPL W P-5'-P-CCNC: 9 U/L (ref 1–41)
ANION GAP SERPL CALCULATED.3IONS-SCNC: 12.8 MMOL/L
AST SERPL-CCNC: 14 U/L (ref 1–40)
BASOPHILS # BLD AUTO: 0.04 10*3/MM3 (ref 0–0.2)
BASOPHILS NFR BLD AUTO: 0.4 % (ref 0–1.5)
BILIRUB SERPL-MCNC: 0.4 MG/DL (ref 0.1–1.2)
BUN BLD-MCNC: 19 MG/DL (ref 8–23)
BUN/CREAT SERPL: 15.2 (ref 7–25)
CALCIUM SPEC-SCNC: 9.2 MG/DL (ref 8.6–10.5)
CHLORIDE SERPL-SCNC: 102 MMOL/L (ref 98–107)
CO2 SERPL-SCNC: 25.2 MMOL/L (ref 22–29)
CREAT BLD-MCNC: 1.25 MG/DL (ref 0.76–1.27)
DEPRECATED RDW RBC AUTO: 53.8 FL (ref 37–54)
EOSINOPHIL # BLD AUTO: 0.1 10*3/MM3 (ref 0–0.7)
EOSINOPHIL NFR BLD AUTO: 0.9 % (ref 0.3–6.2)
ERYTHROCYTE [DISTWIDTH] IN BLOOD BY AUTOMATED COUNT: 15.6 % (ref 11.5–14.5)
GFR SERPL CREATININE-BSD FRML MDRD: 55 ML/MIN/1.73
GLOBULIN UR ELPH-MCNC: 3.8 GM/DL
GLUCOSE BLD-MCNC: 149 MG/DL (ref 65–99)
HCT VFR BLD AUTO: 38.3 % (ref 40.4–52.2)
HGB BLD-MCNC: 12.1 G/DL (ref 13.7–17.6)
IMM GRANULOCYTES # BLD: 0.06 10*3/MM3 (ref 0–0.03)
IMM GRANULOCYTES NFR BLD: 0.5 % (ref 0–0.5)
LYMPHOCYTES # BLD AUTO: 1.88 10*3/MM3 (ref 0.9–4.8)
LYMPHOCYTES NFR BLD AUTO: 17 % (ref 19.6–45.3)
MAGNESIUM SERPL-MCNC: 1.9 MG/DL (ref 1.6–2.4)
MCH RBC QN AUTO: 29.7 PG (ref 27–32.7)
MCHC RBC AUTO-ENTMCNC: 31.6 G/DL (ref 32.6–36.4)
MCV RBC AUTO: 94.1 FL (ref 79.8–96.2)
MONOCYTES # BLD AUTO: 0.62 10*3/MM3 (ref 0.2–1.2)
MONOCYTES NFR BLD AUTO: 5.6 % (ref 5–12)
NEUTROPHILS # BLD AUTO: 8.39 10*3/MM3 (ref 1.9–8.1)
NEUTROPHILS NFR BLD AUTO: 76.1 % (ref 42.7–76)
PLATELET # BLD AUTO: 220 10*3/MM3 (ref 140–500)
PMV BLD AUTO: 11.9 FL (ref 6–12)
POTASSIUM BLD-SCNC: 4.3 MMOL/L (ref 3.5–5.2)
PROT SERPL-MCNC: 7.5 G/DL (ref 6–8.5)
RBC # BLD AUTO: 4.07 10*6/MM3 (ref 4.6–6)
SODIUM BLD-SCNC: 140 MMOL/L (ref 136–145)
TROPONIN T SERPL-MCNC: 0.01 NG/ML (ref 0–0.03)
WBC NRBC COR # BLD: 11.03 10*3/MM3 (ref 4.5–10.7)

## 2018-11-16 PROCEDURE — 80053 COMPREHEN METABOLIC PANEL: CPT | Performed by: EMERGENCY MEDICINE

## 2018-11-16 PROCEDURE — 96375 TX/PRO/DX INJ NEW DRUG ADDON: CPT

## 2018-11-16 PROCEDURE — 96374 THER/PROPH/DIAG INJ IV PUSH: CPT

## 2018-11-16 PROCEDURE — 71045 X-RAY EXAM CHEST 1 VIEW: CPT

## 2018-11-16 PROCEDURE — 84484 ASSAY OF TROPONIN QUANT: CPT | Performed by: EMERGENCY MEDICINE

## 2018-11-16 PROCEDURE — 99284 EMERGENCY DEPT VISIT MOD MDM: CPT

## 2018-11-16 PROCEDURE — 25010000002 DIGOXIN PER 500 MCG: Performed by: EMERGENCY MEDICINE

## 2018-11-16 PROCEDURE — 83735 ASSAY OF MAGNESIUM: CPT | Performed by: EMERGENCY MEDICINE

## 2018-11-16 PROCEDURE — 93005 ELECTROCARDIOGRAM TRACING: CPT | Performed by: EMERGENCY MEDICINE

## 2018-11-16 PROCEDURE — 85025 COMPLETE CBC W/AUTO DIFF WBC: CPT | Performed by: EMERGENCY MEDICINE

## 2018-11-16 PROCEDURE — 93010 ELECTROCARDIOGRAM REPORT: CPT | Performed by: INTERNAL MEDICINE

## 2018-11-16 RX ORDER — DIGOXIN 125 MCG
125 TABLET ORAL
Qty: 30 TABLET | Refills: 0 | Status: SHIPPED | OUTPATIENT
Start: 2018-11-16 | End: 2018-11-20

## 2018-11-16 RX ORDER — DIGOXIN 0.25 MG/ML
250 INJECTION INTRAMUSCULAR; INTRAVENOUS ONCE
Status: COMPLETED | OUTPATIENT
Start: 2018-11-16 | End: 2018-11-16

## 2018-11-16 RX ADMIN — DIGOXIN 250 MCG: 0.25 INJECTION INTRAMUSCULAR; INTRAVENOUS at 15:30

## 2018-11-16 RX ADMIN — SODIUM CHLORIDE 500 ML: 9 INJECTION, SOLUTION INTRAVENOUS at 14:14

## 2018-11-16 RX ADMIN — METOPROLOL TARTRATE 5 MG: 1 INJECTION, SOLUTION INTRAVENOUS at 14:10

## 2018-11-16 NOTE — ED PROVIDER NOTES
" EMERGENCY DEPARTMENT ENCOUNTER    Room Number:  17/17  PCP: Lubna Lobo MD   Cardiologist: Dr. Rivera/Dr. Nettles   Historian: Patient, Family      HPI  Chief Complaint: Palpitations  Context: Asia Ly is an 86 y.o. male with h/o A-Fib for which pt is currently taking Tikosyn and Eliquis. Pt was admitted 10/27/18-10/28/18 at this facility secondary to A-Fib with RVR s/p bronchoscopy performed on 10/22/18. During the admission, pt was treated with oral Metoprolol and Cardizem after which pt converted to sinus rhythm. Pt was evaluated by the electrophysiologist for this, who felt that due to pt's age, a cardiac ablation was a high risk procedure. Pt was again admitted at this facility 10/29/18-10/31/18 secondary to paroxysmal A-Fib. During this admission, pt was administered IV Metoprolol and Cardizem after which pt converted to sinus rhythm. Pt presents to the ED c/o constant palpitations described as \"rapid\" onset at about 13:10 today. Pt denies chest pain/chest pressure/chest tightness, dyspnea, diaphoresis, nausea, vomiting, dizziness/lightheadedness, abdominal pain, and generalized weakness. Pt suspects that he is in A-Fib currently. There are no other complaints at this time.       Location: Chest   Radiation: None  Character: Palpitations described as \"rapid\"  Duration: Onset at about 13:10 today  Severity: Moderate  Progression: Unchanged  Aggravating Factors: Nothing  Alleviating Factors: Nothing        MEDICAL RECORD REVIEW    Pt was admitted 10/27/18-10/28/18 secondary to A-Fib with RVR. Per hospital course:  Patient was admitted with atrial fibrillation with RVR.  He was given oral metoprolol and Cardizem.  And he converted back to sinus rhythm.  He is currently asymptomatic.  He is been continued on his Tikosyn 125 MCG twice a day daily.  Tikosyn is actually working reasonably well this is the first breakthrough of atrial fibrillation in over 8 months.  He has failed multiple antiarrhythmics in " the past.  He has declined ablation in the past.  At his age, ablation would be a very high risk procedure.  I think it best we just continue the current management strategy.  I'll set him up to see Dr. Rivera the next couple weeks to make sure his atrial fibrillation has remained well controlled.      Pt was admitted 10/29/18-10/31/18 secondary to paroxysmal A-Fib. Per hospital course:   Patient presented with Afib RVR on 10/29/2019. This was one week after elective bronchoscopy which was felt to have contributed. He has colonized pseudomonas. Pulmonary evaluated and no antibiotics prescribed as this was not active infection. He was rate controlled with IV lopressor and one dose diltiazem  mg x1. He was recently evaluated by EP who felt that ablation would be too high risk considering his age as he also refused ablation in the past.  He converted back to sinus rhythm and was discharged on 10/31/2018 on prior home regimen.        PAST MEDICAL HISTORY  Active Ambulatory Problems     Diagnosis Date Noted   • Ataxia 08/19/2016   • TIA (transient ischemic attack) 08/19/2016   • Atrial fibrillation, paroxysmal 08/19/2016   • Hyperlipidemia 08/19/2016   • Gout 08/19/2016   • Crohn's disease (CMS/HCC) 08/19/2016   • Atrial fibrillation with RVR (CMS/HCC) 10/27/2018     Resolved Ambulatory Problems     Diagnosis Date Noted   • Hypoxia 04/29/2018     Past Medical History:   Diagnosis Date   • Aortic stenosis    • Aspiration pneumonia (CMS/HCC)    • Atrial fibrillation (CMS/HCC)    • Bronchiectasis (CMS/HCC)    • CKD (chronic kidney disease)    • COPD (chronic obstructive pulmonary disease) (CMS/HCC)    • Crohn's disease (CMS/HCC)    • Dizziness    • Elevated cholesterol    • Gastric ulcer    • Generalized weakness    • Gout    • Hard of hearing    • Hyperlipidemia    • Hypertension    • Leg swelling    • Lower extremity edema    • Mild anemia    • Near syncope    • PAF (paroxysmal atrial fibrillation) (CMS/HCC)    •  "Palpitations    • Peptic ulcer    • Pneumonia of left lung due to infectious organism    • Pulmonary fibrosis (CMS/HCC)    • Stroke (CMS/HCC)    • TIA (transient ischemic attack)          PAST SURGICAL HISTORY  Past Surgical History:   Procedure Laterality Date   • CATARACT EXTRACTION Bilateral    • COLONOSCOPY     • CYSTECTOMY      back and shoulder area   • EYE SURGERY Left     detached retina   • EYE SURGERY Right     \"repaired a hole in the eye\"   • TESTICLE SURGERY      benign tumor removed.         FAMILY HISTORY  Family History   Problem Relation Age of Onset   • Stroke Mother    • Heart disease Mother    • Hypertension Mother    • Diabetes Mother    • Heart disease Sister         Heart Valve Replacement   • Ovarian cancer Sister    • Rheumatic fever Sister    • Diabetes Sister    • Stroke Father          SOCIAL HISTORY  Social History     Socioeconomic History   • Marital status:      Spouse name: Not on file   • Number of children: Not on file   • Years of education: Not on file   • Highest education level: Not on file   Social Needs   • Financial resource strain: Not on file   • Food insecurity - worry: Not on file   • Food insecurity - inability: Not on file   • Transportation needs - medical: Not on file   • Transportation needs - non-medical: Not on file   Occupational History   • Not on file   Tobacco Use   • Smoking status: Former Smoker     Types: Cigars   • Smokeless tobacco: Never Used   • Tobacco comment: Quit 25 years ago   Substance and Sexual Activity   • Alcohol use: No     Comment: daily caffiene   • Drug use: No   • Sexual activity: Defer   Other Topics Concern   • Not on file   Social History Narrative   • Not on file         ALLERGIES  Diltiazem; Doxycycline; Amiodarone; and Indomethacin        REVIEW OF SYSTEMS  Review of Systems   Constitutional: Negative for chills and diaphoresis.   HENT: Negative for congestion, rhinorrhea and sore throat.    Eyes: Negative for pain. " "  Respiratory: Negative for cough and shortness of breath.    Cardiovascular: Positive for palpitations (\"rapid\"). Negative for chest pain and leg swelling.   Gastrointestinal: Negative for abdominal pain, diarrhea, nausea and vomiting.   Genitourinary: Negative for difficulty urinating, dysuria, flank pain and frequency.   Musculoskeletal: Negative for myalgias, neck pain and neck stiffness.   Skin: Negative for rash.   Neurological: Negative for dizziness, speech difficulty, weakness, light-headedness, numbness and headaches.   Psychiatric/Behavioral: Negative.    All other systems reviewed and are negative.           PHYSICAL EXAM  ED Triage Vitals   Temp Heart Rate Resp BP SpO2   11/16/18 1345 11/16/18 1345 11/16/18 1345 11/16/18 1402 11/16/18 1345   99.1 °F (37.3 °C) (!) 159 16 102/68 97 %      Temp src Heart Rate Source Patient Position BP Location FiO2 (%)   11/16/18 1345 -- -- -- --   Tympanic             Physical Exam   Constitutional: He is oriented to person, place, and time. No distress.   HENT:   Head: Normocephalic.   Mouth/Throat: Mucous membranes are normal.   Eyes: EOM are normal. Pupils are equal, round, and reactive to light.   Neck: Normal range of motion. Neck supple.   Cardiovascular: Normal heart sounds. An irregular rhythm present. Tachycardia present.   Pulmonary/Chest: Effort normal and breath sounds normal. No respiratory distress. He has no decreased breath sounds. He has no wheezes. He has no rhonchi. He has no rales.   Abdominal: Soft. There is no tenderness. There is no rebound and no guarding.   Musculoskeletal: Normal range of motion.   Neurological: He is alert and oriented to person, place, and time. He has normal sensation.   Skin: Skin is warm and dry. Rash (macular rash on the back - pt reports that this is chronic) noted.   Psychiatric: Mood and affect normal.   Nursing note and vitals reviewed.          LAB RESULTS  Recent Results (from the past 24 hour(s))   Comprehensive " Metabolic Panel    Collection Time: 11/16/18  2:07 PM   Result Value Ref Range    Glucose 149 (H) 65 - 99 mg/dL    BUN 19 8 - 23 mg/dL    Creatinine 1.25 0.76 - 1.27 mg/dL    Sodium 140 136 - 145 mmol/L    Potassium 4.3 3.5 - 5.2 mmol/L    Chloride 102 98 - 107 mmol/L    CO2 25.2 22.0 - 29.0 mmol/L    Calcium 9.2 8.6 - 10.5 mg/dL    Total Protein 7.5 6.0 - 8.5 g/dL    Albumin 3.70 3.50 - 5.20 g/dL    ALT (SGPT) 9 1 - 41 U/L    AST (SGOT) 14 1 - 40 U/L    Alkaline Phosphatase 62 39 - 117 U/L    Total Bilirubin 0.4 0.1 - 1.2 mg/dL    eGFR Non African Amer 55 (L) >60 mL/min/1.73    Globulin 3.8 gm/dL    A/G Ratio 1.0 g/dL    BUN/Creatinine Ratio 15.2 7.0 - 25.0    Anion Gap 12.8 mmol/L   Troponin    Collection Time: 11/16/18  2:07 PM   Result Value Ref Range    Troponin T 0.011 0.000 - 0.030 ng/mL   Magnesium    Collection Time: 11/16/18  2:07 PM   Result Value Ref Range    Magnesium 1.9 1.6 - 2.4 mg/dL   CBC Auto Differential    Collection Time: 11/16/18  2:07 PM   Result Value Ref Range    WBC 11.03 (H) 4.50 - 10.70 10*3/mm3    RBC 4.07 (L) 4.60 - 6.00 10*6/mm3    Hemoglobin 12.1 (L) 13.7 - 17.6 g/dL    Hematocrit 38.3 (L) 40.4 - 52.2 %    MCV 94.1 79.8 - 96.2 fL    MCH 29.7 27.0 - 32.7 pg    MCHC 31.6 (L) 32.6 - 36.4 g/dL    RDW 15.6 (H) 11.5 - 14.5 %    RDW-SD 53.8 37.0 - 54.0 fl    MPV 11.9 6.0 - 12.0 fL    Platelets 220 140 - 500 10*3/mm3    Neutrophil % 76.1 (H) 42.7 - 76.0 %    Lymphocyte % 17.0 (L) 19.6 - 45.3 %    Monocyte % 5.6 5.0 - 12.0 %    Eosinophil % 0.9 0.3 - 6.2 %    Basophil % 0.4 0.0 - 1.5 %    Immature Grans % 0.5 0.0 - 0.5 %    Neutrophils, Absolute 8.39 (H) 1.90 - 8.10 10*3/mm3    Lymphocytes, Absolute 1.88 0.90 - 4.80 10*3/mm3    Monocytes, Absolute 0.62 0.20 - 1.20 10*3/mm3    Eosinophils, Absolute 0.10 0.00 - 0.70 10*3/mm3    Basophils, Absolute 0.04 0.00 - 0.20 10*3/mm3    Immature Grans, Absolute 0.06 (H) 0.00 - 0.03 10*3/mm3       Ordered the above labs and reviewed the  results.        RADIOLOGY  XR Chest 1 View   Final Result    The lungs are well-expanded with some localized fibrotic  scarring at both lung bases that is unchanged from 05/02/2018. The heart  remains mildly enlarged. No acute abnormality is seen.           Ordered the above noted radiological studies. Reviewed by me in PACS.           PROCEDURES  Procedures      EKG:          EKG time: 13:50  Rhythm/Rate: A-Fib rate 161  P waves and ND: None present  QRS, axis: Normal QRS   ST and T waves: Rate-related ST depression diffusely     Interpreted Contemporaneously by me, independently viewed  changed compared to prior 10/31/18 (A-Fib is new)        MEDICATIONS GIVEN IN ER  Medications   metoprolol tartrate (LOPRESSOR) injection 5 mg (5 mg Intravenous Given 11/16/18 1410)   sodium chloride 0.9 % bolus 500 mL (0 mL Intravenous Stopped 11/16/18 1531)   digoxin (LANOXIN) injection 250 mcg (250 mcg Intravenous Given 11/16/18 1530)             PROGRESS AND CONSULTS  ED Course as of Nov 16 1719 Fri Nov 16, 2018   1652 4:52 PM  Patient here for rapid a fib.  Has had this multiple times and has been on various anti arrythmics.  He is on Tikosyn but cannot go up on the dose.  On metoprolol now.  Given a dose of metoprolol here with improvement of heart rate but still in a fib.  Discussed with Dr. Rivera.  He asked for 0.25 of IV digoxin.  Will start on oral dig at 0.125.  He has appointment with Dr. Rivera on the 20th.    [SL]      ED Course User Index  [SL] Ayaan Kerr MD       2:05 PM:  Pt is currently in A-Fib with HR in the 160s - Metoprolol ordered and IV fluids ordered. Blood work ordered for further evaluation.    2:15 PM:  Rechecked pt. On re-evaluation, after administration of the Metoprolol, pt is still in A-Fib. HR is 85.     2:55 PM:  Rechecked pt. Pt is resting comfortably and appears in no acute distress. Pt continues to remain in A-Fib with HR in the 80s-100s.     Informed pt and family that pt's  troponin is 0.011. Upon pt's arrival to the ER today, pt was in A-Fib with HR in the 160s. Currently in the ER, pt is still in A-Fib; however, pt's HR has improved to the 80s-100s after administration of Metoprolol. Will discuss further course of care with the cardiologist. Pt agrees with plan.    2:52 PM:  Placed call to [facility] to discuss further course of care. CXR ordered for further evaluation.     3:17 PM:  Discussed the case with Dr. Rivera, cardiologist. He would like pt administered Digoxin in the ER and for pt to be started on Digoxin at home. Pt has an appointment with the [facility] office on 11/20/18 and Dr. Rivera would like pt to f/u with them as scheduled.    3:19 PM:  Rechecked pt. Informed pt of my d/w Dr. Rivera, cardiologist. Pt will be started on Digoxin (first dose will be given here in the ER). Pt agrees with plan.    Digoxin ordered for pt.     4:47 PM:  Rechecked pt. On re-evaluation, pt's HR is currently in the 60s. Pt reports that he is feeling better. Pt has an appointment with [facility] on 11/20/18 -> Pt was strongly advised to f/u as scheduled. Reiterated to pt that he will be started on Digoxin. Strict RTER warnings given. Pt agrees with plan for discharge.           MEDICAL DECISION MAKING      MDM  Number of Diagnoses or Management Options     Amount and/or Complexity of Data Reviewed  Clinical lab tests: ordered and reviewed (Troponin is 0.011. )  Tests in the radiology section of CPT®: ordered and reviewed (CXR:  The lungs are well-expanded with some localized fibrotic  scarring at both lung bases that is unchanged from 05/02/2018. The heart  remains mildly enlarged. No acute abnormality is seen.)  Tests in the medicine section of CPT®: ordered and reviewed (EKG interpreted.   )  Decide to obtain previous medical records or to obtain history from someone other than the patient: yes  Discuss the patient with other providers: yes (Discussed  the case with Dr. Rivera, cardiologist.  )    Patient Progress  Patient progress: stable             DIAGNOSIS  Final diagnoses:   Atrial fibrillation with RVR (CMS/HCC)           DISPOSITION  Pt discharged.      DISCHARGE    Patient discharged in stable condition.    Reviewed implications of results, diagnosis, meds, responsibility to follow up, warning signs and symptoms of possible worsening, potential complications and reasons to return to ER.    Patient/Family voiced understanding of above instructions.    Discussed plan for discharge, as there is no emergent indication for admission. Pt/family is agreeable and understands need for follow up and repeat testing. Pt is aware that discharge does not mean that nothing is wrong but it indicates no emergency is present that requires admission and they must continue care with follow-up as given below or physician of their choice.     FOLLOW-UP  Hector Rivera MD  [address] [id number] [phone]    Schedule an appointment as soon as possible for a visit            Medication List      New Prescriptions    digoxin 125 MCG tablet  Commonly known as:  LANOXIN  Take 1 tablet by mouth Daily.              Latest Documented Vital Signs:  As of 4:58 PM  BP- 110/59 HR- 70 Temp- 99.1 °F (37.3 °C) (Tympanic) O2 sat- 97%        --  Documentation assistance provided by ananya Adam for Dr. Cirilo MD.  Information recorded by the scribe was done at my direction and has been verified and validated by me.       Jose Adam  11/16/18 1710       Ayaan Kerr MD  11/16/18 1800

## 2018-11-19 LAB — FUNGUS WND CULT: NORMAL

## 2018-11-20 ENCOUNTER — OFFICE VISIT (OUTPATIENT)
Dept: CARDIOLOGY | Facility: CLINIC | Age: 83
End: 2018-11-20

## 2018-11-20 ENCOUNTER — TELEPHONE (OUTPATIENT)
Dept: CARDIOLOGY | Facility: CLINIC | Age: 83
End: 2018-11-20

## 2018-11-20 VITALS
BODY MASS INDEX: 25.27 KG/M2 | SYSTOLIC BLOOD PRESSURE: 130 MMHG | DIASTOLIC BLOOD PRESSURE: 60 MMHG | HEART RATE: 64 BPM | WEIGHT: 161 LBS | HEIGHT: 67 IN

## 2018-11-20 DIAGNOSIS — J84.10 PULMONARY FIBROSIS (HCC): ICD-10-CM

## 2018-11-20 DIAGNOSIS — Z79.01 ANTICOAGULANT LONG-TERM USE: ICD-10-CM

## 2018-11-20 DIAGNOSIS — I48.0 PAF (PAROXYSMAL ATRIAL FIBRILLATION) (HCC): Primary | ICD-10-CM

## 2018-11-20 DIAGNOSIS — I10 ESSENTIAL HYPERTENSION: ICD-10-CM

## 2018-11-20 DIAGNOSIS — I35.0 NONRHEUMATIC AORTIC VALVE STENOSIS: ICD-10-CM

## 2018-11-20 PROCEDURE — 99214 OFFICE O/P EST MOD 30 MIN: CPT | Performed by: NURSE PRACTITIONER

## 2018-11-20 PROCEDURE — 93000 ELECTROCARDIOGRAM COMPLETE: CPT | Performed by: NURSE PRACTITIONER

## 2018-11-20 RX ORDER — DIGOXIN 125 MCG
125 TABLET ORAL
Qty: 90 TABLET | Refills: 3 | Status: SHIPPED | OUTPATIENT
Start: 2018-11-20 | End: 2018-11-30 | Stop reason: SDUPTHER

## 2018-11-20 NOTE — TELEPHONE ENCOUNTER
Faxed signed printed RX for Eliquis 5 mg #180 x 3 and Digoxin 125mcg #90 x 3 to [facility].  Fax# [phone]./ [place]

## 2018-11-20 NOTE — PROGRESS NOTES
Date of Office Visit: 2018  Encounter Provider: KYLAH Jha  Place of Service: [facility] CARDIOLOGY  Patient Name: Asia Ly  :1932    Chief Complaint   Patient presents with   • Atrial Fibrillation   • Transient Ischemic Attack   • Cardiac Valve Problem   • Hypertension   • Hyperlipidemia   :     HPI: Asia Ly is a 86 y.o. male is a patient of Dr. Rivera. I have reviewed his record.     His past medical history is significant of paroxysmal atrial fibrillation, aortic stenosis, hyperlipidemia, B12 anemia,Crohn's disease, colon polyps, lower extremity edema, TIA,  Pulmonary HTN and pulmonary fibrosis.                  In May 2004, He was traveling in [place] and ran out of sotalol.  He saw Dr. Freeman about atrial fibrillation ablation but it was felt best not to pursue that.  He was evaluated by  in this office with electrophysiology and it was felt that he was doing well on sotalol and to maintain him on that.               In 2016 he suffered from a stroke and TIA at [facility] and was switched from aspirin to Eliquis at that time.  In 2016, [facility] presented [facility] with atrial fibrillation and had an echocardiogram at that time that showed normal left ventricular systolic dysfunction, moderate tricuspid insufficiency, mild mitral insufficiency , mild pulmonary hypertension.  He also had a perfusion stress test that was negative for ischemia.  He was started on amiodarone.  At some point he was taking off diltiazem due to an allergic reaction of a rash.This was eventually discontinued considering his history of pulmonary fibrosis.                He had a Transthoracic echocardiogram in 2017 that showed an EF of 60%, grade 2 diastolic dysfunction, and mild aortic valve stenosis.  Around that same time he was complaining of increased lower extremity edema and was found to be in rapid atrial  fibrillation at that time.  Metoprolol titrate was added along with diuretic and his acidemia improved.           He later was in the emergency room with the diagnoses of pneumonia.  He been having some complaint of dizziness and lightheadedness with position changes.  He had a CT of the head on March 2, 2018 to evaluate vertigo and unsteady gait that was unchanged when compared to the prior CT in August 2016.     in 02/2018, his wife reported that one month prior she saw a trace amount of blood in the toilet with stool. He was told to hold Eliquis and have an EGD and colonoscopy. He wanted to be seen by our office prior to that and was seen on 3/7/201. Then on 3/9/2018 he had EGD and colonoscopy that revealed sophagitis with no bleeding found at the gastroesophageal junction with biopsy sent, small hiatal hernia and inflammation in the duodenum. Colonoscopy revealed multiple large reticula in the sigmoid colon abscesses were sent from the right and left colon due to his history of Crohn's.  Thus he was instructed to restart Eliquis after the procedure.  He was instructed to stop taking indomethacin and allopurinol by Dr. Mccoy. His hemoglobin had increased to 10.8 on 3/12/2018, white count slightly elevated, and creatinine was 1.27 with an EGFR of 54.     He was seen in the office on 4/26/2018 for follow-up.  At that time it was noted that he had been in and out of the hospital once or twice per month for orthopedic issues.  He was in the hospital at the emergency department on 4/25/2018 with rapid heart beating and palpitations.  He was found to be in rapid atrial fibrillation but did convert back to sinus rhythm. His metoprolol was increased to 50 mg 3 times a day.  He then was admitted again on 4/29/2018 with hypoxia, pneumonia, and influenza B.  During this admission he was requiring oxygen at night.  He was started on Tikosin and had a couple of doses held due to prolonged QTc.  The plan was to discharge him on  Tikosin and have him follow-up in one to 2 weeks. It was felt that some of his antibiotics were complicating his QT interval.      Then on 5/12/2018 he was in the emergency department for flareup of gout in the left toe. Likely due to recent discontinuation of indomethacin. He was prescribed colchicine and discharged in stable condition.     He was last seen in the office on 6/12/2018 he was doing great and had plans to resume exercising.  He had no chest pain or pressure.  He had some shortness of breath especially going up steps.  He was doing well on TIkosyn.    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  He then had some atrial fibrillation with RVR and was given oral metoprolol and Cardizem and converted back to sinus rhythm.  He was evaluated by electrophysiology with Dr. Nettles who felt that his ablation at his age would be very high risk procedure.  Then readmitted and given IV metoprolol and Cardizem and spontaneously converted back to sinus rhythm.    He presented back to the emergency department in November 2018 with current atrial fibrillation with RVR.  Heart rate was 159.  He was given one dose of 5 mg IV Lopressor as well as 250 mcg dioxin IV then to start on oral digoxin 125 daily.    Patient presents today in EKG shows normal sinus rhythm with heart rate 64.  He has not had any atrial fibrillation as he is very symptomatic and aware when he is out of rhythm.  He denies chest pain tightness pressure, shortness of breath, palpitation, edema, lightheadedness, near syncope, or syncope.  He and his wife are concerned if he continues to have recurrent atrial fibrillation with RVR with the plan would be in the future.  Patient also wants clearance to resume walking half hour daily.  He continues to have some itching rash intermittently on his back which is alleviated with Lubriderm.      Allergies   Allergen Reactions   • Amiodarone Unknown (See  "Comments)     Pulmonary fibrosis   • Diltiazem Arrhythmia   • Doxycycline Rash   • Indomethacin Rash       Past Medical History:   Diagnosis Date   • Aortic stenosis    • Aspiration pneumonia (CMS/HCC)    • Atrial fibrillation (CMS/HCC)    • Bronchiectasis (CMS/HCC)    • CKD (chronic kidney disease)    • COPD (chronic obstructive pulmonary disease) (CMS/HCC)    • Crohn's disease (CMS/HCC)    • Dizziness    • Elevated cholesterol    • Gastric ulcer    • Generalized weakness    • Gout    • Hard of hearing    • Hyperlipidemia    • Hypertension    • Leg swelling    • Lower extremity edema    • Mild anemia    • Near syncope    • PAF (paroxysmal atrial fibrillation) (CMS/HCC)    • Palpitations    • Peptic ulcer    • Pneumonia of left lung due to infectious organism    • Pulmonary fibrosis (CMS/HCC)    • Stroke (CMS/HCC)    • TIA (transient ischemic attack)        Past Surgical History:   Procedure Laterality Date   • CATARACT EXTRACTION Bilateral    • COLONOSCOPY     • CYSTECTOMY      back and shoulder area   • EYE SURGERY Left     detached retina   • EYE SURGERY Right     \"repaired a hole in the eye\"   • TESTICLE SURGERY      benign tumor removed.         Family and social history reviewed.     Review of Systems   Cardiovascular: Positive for palpitations.   Skin: Positive for itching and rash.     All other systems were reviewed and are negative          Objective:     Vitals:    11/20/18 1352   BP: 130/60   BP Location: Left arm   Patient Position: Sitting   Pulse: 64   Weight: 73 kg (161 lb)   Height: 170.2 cm (67\")     Body mass index is 25.22 kg/m².    PHYSICAL EXAM:  Physical Exam   Constitutional: He is oriented to person, place, and time. He appears well-developed and well-nourished. No distress.   HENT:   Head: Normocephalic.   Eyes: Conjunctivae are normal.   Neck: Normal range of motion. No JVD present.   Cardiovascular: Normal rate, regular rhythm and intact distal pulses.   Murmur heard.   Systolic murmur is " present with a grade of 1/6 at the upper right sternal border.  Pulses:       Carotid pulses are 2+ on the right side, and 2+ on the left side.       Radial pulses are 2+ on the right side, and 2+ on the left side.        Posterior tibial pulses are 2+ on the right side, and 2+ on the left side.   Pulmonary/Chest: Effort normal and breath sounds normal. No respiratory distress. He has no wheezes. He has no rhonchi. He has no rales. He exhibits no tenderness.   Abdominal: Soft. Bowel sounds are normal. He exhibits no distension.   Musculoskeletal: Normal range of motion. He exhibits no edema.   Neurological: He is alert and oriented to person, place, and time.   Skin: Skin is warm, dry and intact. No rash noted. He is not diaphoretic. No cyanosis.   Psychiatric: He has a normal mood and affect. His behavior is normal. Judgment and thought content normal.         ECG 12 Lead  Date/Time: 11/20/2018 3:57 PM  Performed by: Alta Hackett APRN  Authorized by: Alta Hackett APRN   Comparison: compared with previous ECG from 10/31/2018  Similar to previous ECG  Rhythm: sinus rhythm  Rate: normal  QRS axis: normal  Q waves: III  Clinical impression: non-specific ECG            Current Outpatient Medications   Medication Sig Dispense Refill   • allopurinol (ZYLOPRIM) 100 MG tablet Take 100 mg by mouth 2 (Two) Times a Day.     • apixaban (ELIQUIS) 5 MG tablet tablet Take 1 tablet by mouth Every 12 (Twelve) Hours. 180 tablet 3   • atorvastatin (LIPITOR) 10 MG tablet Take one tablet by mouth Mon, Wed and Fri.     • BUDESONIDE PO Take 3 mg by mouth Daily.     • Cyanocobalamin (VITAMIN B-12 CR PO) Take 500 mg by mouth Daily.     • digoxin (LANOXIN) 125 MCG tablet Take 1 tablet by mouth Daily. 90 tablet 3   • dofetilide (TIKOSYN) 125 MCG capsule Take 125 mcg by mouth 2 (Two) Times a Day.     • ferrous sulfate 325 (65 FE) MG tablet Take 325 mg by mouth 3 (Three) Times a Day.     • furosemide (LASIX) 20 MG tablet Take 20 mg by mouth  Daily.     • metoprolol tartrate (LOPRESSOR) 50 MG tablet Take 1 tablet by mouth 3 (Three) Times a Day. 270 tablet 1   • omeprazole (priLOSEC) 40 MG capsule Take 40 mg by mouth Daily.       No current facility-administered medications for this visit.      Assessment:       Diagnosis Plan   1. PAF (paroxysmal atrial fibrillation) (CMS/HCC)     2. Nonrheumatic aortic valve stenosis     3. Pulmonary fibrosis (CMS/HCC)     4. Essential hypertension     5. Anticoagulant long-term use          No orders of the defined types were placed in this encounter.        Plan:     1.  Paroxysmal atrial fibrillation off amiodarone due to pulmonary fibrosis and rash with diltiazem.  He had been toleratingTikosyn 125 mcq BID, metoprolol tartrate 50 mg TID.  He has had recurrent atrial fibrillation with RVR most recently given IV digoxin and IV metoprolol and discharged on by mouth digoxin 125 mcg daily. NSR on EKG today. Discussed with Dr. Nettles who stated he may be a candidate for pacemaker implantation with AV node ablation in the future. Written information about those procedure given to wife per request.  Atrial Fibrillation and Atrial Flutter  Assessment  • The patient has paroxysmal atrial fibrillation  • This is non-valvular in etiology  • The patient's CHADS2-VASc score is 3  • A [id number] score of 2 or more is considered a high risk for a thromboembolic event  • Apixaban prescribed    Plan  • Attempt to maintain sinus rhythm  • Continue apixaban for antithrombotic therapy, bleeding issues discussed  • Continue beta blocker and dofetilide for rhythm control  • Continue beta blocker and digoxin for rate control        2. History of Crohn's disease and iron deficiency anemia- on iron supplement.  3. History of TIA  4. Pulmonary fibrosis/history of aspiration pneumonia and bronchiectasis with colonized pseudomonas  followed by Dr. Silverman   5.  Hypertension blood pressure adequate control.  6. Hyperlipidemia- on 10 atorvastatin  M,W,F  7.  Aortic stenosis.  Echocardiogram November 2017- mild  8. History of iron deficient anemia   9.  Rash/pruritis improved with Lubriderm lotion  10. Recent cerumen disimpaction bilateral with improved hearing  11.Gout on colchicine    Plan of care reviewed with Dr. Rashaun Nettles    Follow up in 04/2019 as scheduled or call with issues.             It has been a pleasure to participate in this patient's care.      Thank you,  KYLAH Jha      **[facility] Disclaimer:**  Much of this encounter note is an electronic transcription/translation of spoken language to printed text. The electronic translation of spoken language may permit erroneous, or at times, nonsensical words or phrases to be inadvertently transcribed. Although I have reviewed the note for such errors, some may still exist.

## 2018-11-20 NOTE — PATIENT INSTRUCTIONS
Cardiac Ablation  Cardiac ablation is a procedure to disable (ablate) a small amount of heart tissue in very specific places. The heart has many electrical connections. Sometimes these connections are abnormal and can cause the heart to beat very fast or irregularly. Ablating some of the problem areas can improve the heart rhythm or return it to normal. Ablation may be done for people who:  · Have Delvis-Parkinson-White syndrome.  · Have fast heart rhythms (tachycardia).  · Have taken medicines for an abnormal heart rhythm (arrhythmia) that were not effective or caused side effects.  · Have a high-risk heartbeat that may be life-threatening.    During the procedure, a small incision is made in the neck or the groin, and a long, thin, flexible tube (catheter) is inserted into the incision and moved to the heart. Small devices (electrodes) on the tip of the catheter will send out electrical currents. A type of X-ray (fluoroscopy) will be used to help guide the catheter and to provide images of the heart.  Tell a health care provider about:  · Any allergies you have.  · All medicines you are taking, including vitamins, herbs, eye drops, creams, and over-the-counter medicines.  · Any problems you or family members have had with anesthetic medicines.  · Any blood disorders you have.  · Any surgeries you have had.  · Any medical conditions you have, such as kidney failure.  · Whether you are pregnant or may be pregnant.  What are the risks?  Generally, this is a safe procedure. However, problems may occur, including:  · Infection.  · Bruising and bleeding at the catheter insertion site.  · Bleeding into the chest, especially into the sac that surrounds the heart. This is a serious complication.  · Stroke or blood clots.  · Damage to other structures or organs.  · Allergic reaction to medicines or dyes.  · Need for a permanent pacemaker if the normal electrical system is damaged. A pacemaker is a small computer that sends  electrical signals to the heart and helps your heart beat normally.  · The procedure not being fully effective. This may not be recognized until months later. Repeat ablation procedures are sometimes required.    What happens before the procedure?  · Follow instructions from your health care provider about eating or drinking restrictions.  · Ask your health care provider about:  ? Changing or stopping your regular medicines. This is especially important if you are taking diabetes medicines or blood thinners.  ? Taking medicines such as aspirin and ibuprofen. These medicines can thin your blood. Do not take these medicines before your procedure if your health care provider instructs you not to.  · Plan to have someone take you home from the hospital or clinic.  · If you will be going home right after the procedure, plan to have someone with you for 24 hours.  What happens during the procedure?  · To lower your risk of infection:  ? Your health care team will wash or sanitize their hands.  ? Your skin will be washed with soap.  ? Hair may be removed from the incision area.  · An IV tube will be inserted into one of your veins.  · You will be given a medicine to help you relax (sedative).  · The skin on your neck or groin will be numbed.  · An incision will be made in your neck or your groin.  · A needle will be inserted through the incision and into a large vein in your neck or groin.  · A catheter will be inserted into the needle and moved to your heart.  · Dye may be injected through the catheter to help your surgeon see the area of the heart that needs treatment.  · Electrical currents will be sent from the catheter to ablate heart tissue in desired areas. There are three types of energy that may be used to ablate heart tissue:  ? Heat (radiofrequency energy).  ? Laser energy.  ? Extreme cold (cryoablation).  · When the necessary tissue has been ablated, the catheter will be removed.  · Pressure will be held on the  catheter insertion area to prevent excessive bleeding.  · A bandage (dressing) will be placed over the catheter insertion area.  The procedure may vary among health care providers and hospitals.  What happens after the procedure?  · Your blood pressure, heart rate, breathing rate, and blood oxygen level will be monitored until the medicines you were given have worn off.  · Your catheter insertion area will be monitored for bleeding. You will need to lie still for a few hours to ensure that you do not bleed from the catheter insertion area.  · Do not drive for 24 hours or as long as directed by your health care provider.  Summary  · Cardiac ablation is a procedure to disable (ablate) a small amount of heart tissue in very specific places. Ablating some of the problem areas can improve the heart rhythm or return it to normal.  · During the procedure, electrical currents will be sent from the catheter to ablate heart tissue in desired areas.  This information is not intended to replace advice given to you by your health care provider. Make sure you discuss any questions you have with your health care provider.  Document Released: 05/06/2010 Document Revised: 11/06/2017 Document Reviewed: 11/06/2017  [employer] Interactive Patient Education © 2018 [employer]  Pacemaker Implantation, Adult, Care After  This sheet gives you information about how to care for yourself after your procedure. Your health care provider may also give you more specific instructions. If you have problems or questions, contact your health care provider.  What can I expect after the procedure?  After the procedure, it is common to have:  · Mild pain.  · Slight bruising.  · Some swelling over the incision.  · A slight bump over the skin where the device was placed. Sometimes, it is possible to feel the device under the skin. This is normal.    Follow these instructions at home:  Medicines  · Take over-the-counter and prescription medicines only as  told by your health care provider.  · If you were prescribed an antibiotic medicine, take it as told by your health care provider. Do not stop taking the antibiotic even if you start to feel better.  Wound care  · Do not remove the bandage on your chest until directed to do so by your health care provider.  · After your bandage is removed, you may see pieces of tape called skin adhesive strips over the area where the cut was made (incision site). Let them fall off on their own.  · Check the incision site every day to make sure it is not infected, bleeding, or starting to pull apart.  · Do not use lotions or ointments near the incision site unless directed to do so.  · Keep the incision area clean and dry for 2-3 days after the procedure or as directed by your health care provider. It takes several weeks for the incision site to completely heal.  · Do not take baths, swim, or use a hot tub for 7-10 days or as otherwise directed by your health care provider.  Activity  · Do not drive or use heavy machinery while taking prescription pain medicine.  · Do not drive for 24 hours if you were given a medicine to help you relax (sedative).  · Check with your health care provider before you start to drive or play sports.  · Avoid sudden jerking, pulling, or chopping movements that pull your upper arm far away from your body. Avoid these movements for at least 6 weeks or as long as told by your health care provider.  · Do not lift your upper arm above your shoulders for at least 6 weeks or as long as told by your health care provider. This means no tennis, golf, or swimming.  · You may go back to work when your health care provider says it is okay.  Pacemaker care  · You may be shown how to transfer data from your pacemaker through the phone to your health care provider.  · Always let all health care providers know about your pacemaker before you have any medical procedures or tests.  · Wear a medical ID bracelet or necklace  stating that you have a pacemaker. Carry a pacemaker ID card with you at all times.  · Your pacemaker battery will last for 5-15 years. Routine checks by your health care provider will let the health care provider know when the battery is starting to run down. The pacemaker will need to be replaced when the battery starts to run down.  · Do not use amateur radio equipment or electric welding torches. Other electrical devices are safe to use, including power tools, lawn mowers, and speakers. If you are unsure of whether something is safe to use, ask your health care provider.  · When using your cell phone, hold it to the ear opposite the pacemaker. Do not leave your cell phone in a pocket over the pacemaker.  · Avoid places or objects that have a strong electric or magnetic field, including:  ? Airport security [place]. When at the airport, let officials know that you have a pacemaker.  ? Power plants.  ? Large electrical generators.  ? Radiofrequency transmission towers, such as cell phone and radio towers.  General instructions  · Weigh yourself every day. If you suddenly gain weight, fluid may be building up in your body.  · Keep all follow-up visits as told by your health care provider. This is important.  Contact a health care provider if:  · You gain weight suddenly.  · Your legs or feet swell.  · It feels like your heart is fluttering or skipping beats (heart palpitations).  · You have chills or a fever.  · You have more redness, swelling, or pain around your incisions.  · You have more fluid or blood coming from your incisions.  · Your incisions feel warm to the touch.  · You have pus or a bad smell coming from your incisions.  Get help right away if:  · You have chest pain.  · You have trouble breathing or are short of breath.  · You become extremely tired.  · You are light-headed or you faint.  This information is not intended to replace advice given to you by your health care provider. Make sure you discuss  any questions you have with your health care provider.  Document Released: 07/07/2006 Document Revised: 09/29/2017 Document Reviewed: 09/29/2017  [employer] Interactive Patient Education © 2018 [employer]  Pacemaker Implantation, Adult  Pacemaker implantation is a procedure to place a pacemaker inside your chest. A pacemaker is a small computer that sends electrical signals to the heart and helps your heart beat normally. A pacemaker also stores information about your heart rhythms. You may need pacemaker implantation if you:  · Have a slow heartbeat (bradycardia).  · Faint (syncope).  · Have shortness of breath (dyspnea) due to heart problems.    The pacemaker attaches to your heart through a wire, called a lead. Sometimes just one lead is needed. Other times, there will be two leads. There are two types of pacemakers:  · Transvenous pacemaker. This type is placed under the skin or muscle of your chest. The lead goes through a vein in the chest area to reach the inside of the heart.  · Epicardial pacemaker. This type is placed under the skin or muscle of your chest or belly. The lead goes through your chest to the outside of the heart.    Tell a health care provider about:  · Any allergies you have.  · All medicines you are taking, including vitamins, herbs, eye drops, creams, and over-the-counter medicines.  · Any problems you or family members have had with anesthetic medicines.  · Any blood or bone disorders you have.  · Any surgeries you have had.  · Any medical conditions you have.  · Whether you are pregnant or may be pregnant.  What are the risks?  Generally, this is a safe procedure. However, problems may occur, including:  · Infection.  · Bleeding.  · Failure of the pacemaker or the lead.  · Collapse of a lung or bleeding into a lung.  · Blood clot inside a blood vessel with a lead.  · Damage to the heart.  · Infection inside the heart (endocarditis).  · Allergic reactions to medicines.    What happens  before the procedure?  Staying hydrated  Follow instructions from your health care provider about hydration, which may include:  · Up to 2 hours before the procedure - you may continue to drink clear liquids, such as water, clear fruit juice, black coffee, and plain tea.    Eating and drinking restrictions  Follow instructions from your health care provider about eating and drinking, which may include:  · 8 hours before the procedure - stop eating heavy meals or foods such as meat, fried foods, or fatty foods.  · 6 hours before the procedure - stop eating light meals or foods, such as toast or cereal.  · 6 hours before the procedure - stop drinking milk or drinks that contain milk.  · 2 hours before the procedure - stop drinking clear liquids.    Medicines  · Ask your health care provider about:  ? Changing or stopping your regular medicines. This is especially important if you are taking diabetes medicines or blood thinners.  ? Taking medicines such as aspirin and ibuprofen. These medicines can thin your blood. Do not take these medicines before your procedure if your health care provider instructs you not to.  · You may be given antibiotic medicine to help prevent infection.  General instructions  · You will have a heart evaluation. This may include an electrocardiogram (ECG), chest X-ray, and heart imaging (echocardiogram,  or echo) tests.  · You will have blood tests.  · Do not use any products that contain nicotine or tobacco, such as cigarettes and e-cigarettes. If you need help quitting, ask your health care provider.  · Plan to have someone take you home from the hospital or clinic.  · If you will be going home right after the procedure, plan to have someone with you for 24 hours.  · Ask your health care provider how your surgical site will be marked or identified.  What happens during the procedure?  · To reduce your risk of infection:  ? Your health care team will wash or sanitize their hands.  ? Your skin  will be washed with soap.  ? Hair may be removed from the surgical area.  · An IV tube will be inserted into one of your veins.  · You will be given one or more of the following:  ? A medicine to help you relax (sedative).  ? A medicine to numb the area (local anesthetic).  ? A medicine to make you fall asleep (general anesthetic).  · If you are getting a transvenous pacemaker:  ? An incision will be made in your upper chest.  ? A pocket will be made for the pacemaker. It may be placed under the skin or between layers of muscle.  ? The lead will be inserted into a blood vessel that returns to the heart.  ? While X-rays are taken by an imaging machine (fluoroscopy), the lead will be advanced through the vein to the inside of your heart.  ? The other end of the lead will be tunneled under the skin and attached to the pacemaker.  · If you are getting an epicardial pacemaker:  ? An incision will be made near your ribs or breastbone (sternum) for the lead.  ? The lead will be attached to the outside of your heart.  ? Another incision will be made in your chest or upper belly to create a pocket for the pacemaker.  ? The free end of the lead will be tunneled under the skin and attached to the pacemaker.  · The transvenous or epicardial pacemaker will be tested. Imaging studies may be done to check the lead position.  · The incisions will be closed with stitches (sutures), adhesive strips, or skin glue.  · Bandages (dressing) will be placed over the incisions.  The procedure may vary among health care providers and hospitals.  What happens after the procedure?  · Your blood pressure, heart rate, breathing rate, and blood oxygen level will be monitored until the medicines you were given have worn off.  · You will be given antibiotics and pain medicine.  · ECG and chest x-rays will be done.  · You will wear a continuous type of ECG (Holter monitor) to check your heart rhythm.  · Your health care provider will program the  pacemaker.  · Do not drive for 24 hours if you received a sedative.  This information is not intended to replace advice given to you by your health care provider. Make sure you discuss any questions you have with your health care provider.  Document Released: 12/08/2003 Document Revised: 07/07/2017 Document Reviewed: 05/31/2017  [employer] Interactive Patient Education © 2018 [employer]

## 2018-11-30 RX ORDER — DIGOXIN 125 MCG
125 TABLET ORAL
Qty: 90 TABLET | Refills: 1 | Status: SHIPPED | OUTPATIENT
Start: 2018-11-30 | End: 2019-04-26 | Stop reason: SDUPTHER

## 2018-12-03 LAB
MYCOBACTERIUM SPEC CULT: NORMAL
NIGHT BLUE STAIN TISS: NORMAL

## 2019-01-08 ENCOUNTER — OFFICE (OUTPATIENT)
Dept: URBAN - METROPOLITAN AREA CLINIC 66 | Facility: CLINIC | Age: 84
End: 2019-01-08

## 2019-01-08 VITALS
HEIGHT: 68 IN | WEIGHT: 163 LBS | HEART RATE: 68 BPM | DIASTOLIC BLOOD PRESSURE: 74 MMHG | SYSTOLIC BLOOD PRESSURE: 110 MMHG

## 2019-01-08 DIAGNOSIS — K50.10 CROHN'S DISEASE OF LARGE INTESTINE WITHOUT COMPLICATIONS: ICD-10-CM

## 2019-01-08 DIAGNOSIS — Z86.010 PERSONAL HISTORY OF COLONIC POLYPS: ICD-10-CM

## 2019-01-08 PROCEDURE — 99212 OFFICE O/P EST SF 10 MIN: CPT | Performed by: INTERNAL MEDICINE

## 2019-01-10 ENCOUNTER — HOSPITAL ENCOUNTER (OUTPATIENT)
Facility: HOSPITAL | Age: 84
Setting detail: OBSERVATION
Discharge: HOME | End: 2019-01-11
Attending: EMERGENCY MEDICINE | Admitting: EMERGENCY MEDICINE

## 2019-01-10 ENCOUNTER — APPOINTMENT (OUTPATIENT)
Dept: GENERAL RADIOLOGY | Facility: HOSPITAL | Age: 84
End: 2019-01-10

## 2019-01-10 DIAGNOSIS — I48.91 ATRIAL FIBRILLATION WITH RVR (HCC): Primary | ICD-10-CM

## 2019-01-10 LAB
ALBUMIN SERPL-MCNC: 3.7 G/DL (ref 3.5–5.2)
ALBUMIN/GLOB SERPL: 1 G/DL
ALP SERPL-CCNC: 57 U/L (ref 39–117)
ALT SERPL W P-5'-P-CCNC: 11 U/L (ref 1–41)
ANION GAP SERPL CALCULATED.3IONS-SCNC: 11.8 MMOL/L
AST SERPL-CCNC: 16 U/L (ref 1–40)
BASOPHILS # BLD AUTO: 0.04 10*3/MM3 (ref 0–0.2)
BASOPHILS NFR BLD AUTO: 0.4 % (ref 0–1.5)
BILIRUB SERPL-MCNC: 0.3 MG/DL (ref 0.1–1.2)
BUN BLD-MCNC: 19 MG/DL (ref 8–23)
BUN/CREAT SERPL: 16.4 (ref 7–25)
CALCIUM SPEC-SCNC: 9.3 MG/DL (ref 8.6–10.5)
CHLORIDE SERPL-SCNC: 104 MMOL/L (ref 98–107)
CO2 SERPL-SCNC: 26.2 MMOL/L (ref 22–29)
CREAT BLD-MCNC: 1.16 MG/DL (ref 0.76–1.27)
DEPRECATED RDW RBC AUTO: 57.2 FL (ref 37–54)
DIGOXIN SERPL-MCNC: 0.8 NG/ML (ref 0.6–1.2)
EOSINOPHIL # BLD AUTO: 0.2 10*3/MM3 (ref 0–0.7)
EOSINOPHIL NFR BLD AUTO: 2.1 % (ref 0.3–6.2)
ERYTHROCYTE [DISTWIDTH] IN BLOOD BY AUTOMATED COUNT: 16.1 % (ref 11.5–14.5)
GFR SERPL CREATININE-BSD FRML MDRD: 60 ML/MIN/1.73
GLOBULIN UR ELPH-MCNC: 3.7 GM/DL
GLUCOSE BLD-MCNC: 113 MG/DL (ref 65–99)
HCT VFR BLD AUTO: 40.3 % (ref 40.4–52.2)
HGB BLD-MCNC: 12.4 G/DL (ref 13.7–17.6)
IMM GRANULOCYTES # BLD AUTO: 0.07 10*3/MM3 (ref 0–0.03)
IMM GRANULOCYTES NFR BLD AUTO: 0.7 % (ref 0–0.5)
INR PPP: 1.22 (ref 0.9–1.1)
LYMPHOCYTES # BLD AUTO: 1.94 10*3/MM3 (ref 0.9–4.8)
LYMPHOCYTES NFR BLD AUTO: 20.7 % (ref 19.6–45.3)
MAGNESIUM SERPL-MCNC: 2 MG/DL (ref 1.6–2.4)
MCH RBC QN AUTO: 30.2 PG (ref 27–32.7)
MCHC RBC AUTO-ENTMCNC: 30.8 G/DL (ref 32.6–36.4)
MCV RBC AUTO: 98.3 FL (ref 79.8–96.2)
MONOCYTES # BLD AUTO: 0.82 10*3/MM3 (ref 0.2–1.2)
MONOCYTES NFR BLD AUTO: 8.8 % (ref 5–12)
NEUTROPHILS # BLD AUTO: 6.3 10*3/MM3 (ref 1.9–8.1)
NEUTROPHILS NFR BLD AUTO: 67.3 % (ref 42.7–76)
PLATELET # BLD AUTO: 175 10*3/MM3 (ref 140–500)
PMV BLD AUTO: 11.2 FL (ref 6–12)
POTASSIUM BLD-SCNC: 4.1 MMOL/L (ref 3.5–5.2)
PROT SERPL-MCNC: 7.4 G/DL (ref 6–8.5)
PROTHROMBIN TIME: 15.2 SECONDS (ref 11.7–14.2)
RBC # BLD AUTO: 4.1 10*6/MM3 (ref 4.6–6)
SODIUM BLD-SCNC: 142 MMOL/L (ref 136–145)
TROPONIN T SERPL-MCNC: 0.01 NG/ML (ref 0–0.03)
WBC NRBC COR # BLD: 9.37 10*3/MM3 (ref 4.5–10.7)

## 2019-01-10 PROCEDURE — G0378 HOSPITAL OBSERVATION PER HR: HCPCS

## 2019-01-10 PROCEDURE — 71046 X-RAY EXAM CHEST 2 VIEWS: CPT

## 2019-01-10 PROCEDURE — 93005 ELECTROCARDIOGRAM TRACING: CPT | Performed by: EMERGENCY MEDICINE

## 2019-01-10 PROCEDURE — 80162 ASSAY OF DIGOXIN TOTAL: CPT | Performed by: EMERGENCY MEDICINE

## 2019-01-10 PROCEDURE — 84484 ASSAY OF TROPONIN QUANT: CPT | Performed by: EMERGENCY MEDICINE

## 2019-01-10 PROCEDURE — 83735 ASSAY OF MAGNESIUM: CPT | Performed by: EMERGENCY MEDICINE

## 2019-01-10 PROCEDURE — 96374 THER/PROPH/DIAG INJ IV PUSH: CPT

## 2019-01-10 PROCEDURE — 80053 COMPREHEN METABOLIC PANEL: CPT | Performed by: EMERGENCY MEDICINE

## 2019-01-10 PROCEDURE — 99284 EMERGENCY DEPT VISIT MOD MDM: CPT

## 2019-01-10 PROCEDURE — 85025 COMPLETE CBC W/AUTO DIFF WBC: CPT | Performed by: EMERGENCY MEDICINE

## 2019-01-10 PROCEDURE — 96376 TX/PRO/DX INJ SAME DRUG ADON: CPT

## 2019-01-10 PROCEDURE — 85610 PROTHROMBIN TIME: CPT | Performed by: EMERGENCY MEDICINE

## 2019-01-10 PROCEDURE — 93010 ELECTROCARDIOGRAM REPORT: CPT | Performed by: INTERNAL MEDICINE

## 2019-01-10 RX ORDER — DIGOXIN 125 MCG
125 TABLET ORAL
Status: DISCONTINUED | OUTPATIENT
Start: 2019-01-11 | End: 2019-01-11 | Stop reason: HOSPADM

## 2019-01-10 RX ORDER — CHOLECALCIFEROL (VITAMIN D3) 125 MCG
500 CAPSULE ORAL DAILY
Status: DISCONTINUED | OUTPATIENT
Start: 2019-01-11 | End: 2019-01-11 | Stop reason: HOSPADM

## 2019-01-10 RX ORDER — SODIUM CHLORIDE 0.9 % (FLUSH) 0.9 %
3-10 SYRINGE (ML) INJECTION AS NEEDED
Status: DISCONTINUED | OUTPATIENT
Start: 2019-01-10 | End: 2019-01-11 | Stop reason: HOSPADM

## 2019-01-10 RX ORDER — ALLOPURINOL 100 MG/1
100 TABLET ORAL 2 TIMES DAILY
Status: DISCONTINUED | OUTPATIENT
Start: 2019-01-10 | End: 2019-01-11 | Stop reason: HOSPADM

## 2019-01-10 RX ORDER — METOPROLOL TARTRATE 5 MG/5ML
5 INJECTION INTRAVENOUS ONCE
Status: COMPLETED | OUTPATIENT
Start: 2019-01-10 | End: 2019-01-10

## 2019-01-10 RX ORDER — METOPROLOL TARTRATE 5 MG/5ML
2.5 INJECTION INTRAVENOUS ONCE
Status: COMPLETED | OUTPATIENT
Start: 2019-01-10 | End: 2019-01-10

## 2019-01-10 RX ORDER — ACETAMINOPHEN 325 MG/1
650 TABLET ORAL EVERY 6 HOURS PRN
Status: DISCONTINUED | OUTPATIENT
Start: 2019-01-10 | End: 2019-01-11 | Stop reason: HOSPADM

## 2019-01-10 RX ORDER — BUDESONIDE 3 MG/1
3 CAPSULE, COATED PELLETS ORAL DAILY
Status: DISCONTINUED | OUTPATIENT
Start: 2019-01-11 | End: 2019-01-11 | Stop reason: HOSPADM

## 2019-01-10 RX ORDER — PANTOPRAZOLE SODIUM 40 MG/1
40 TABLET, DELAYED RELEASE ORAL EVERY MORNING
Status: DISCONTINUED | OUTPATIENT
Start: 2019-01-11 | End: 2019-01-11 | Stop reason: HOSPADM

## 2019-01-10 RX ORDER — TEMAZEPAM 7.5 MG/1
7.5 CAPSULE ORAL NIGHTLY PRN
Status: DISCONTINUED | OUTPATIENT
Start: 2019-01-10 | End: 2019-01-11 | Stop reason: HOSPADM

## 2019-01-10 RX ORDER — FERROUS SULFATE 325(65) MG
325 TABLET ORAL 3 TIMES DAILY
Status: DISCONTINUED | OUTPATIENT
Start: 2019-01-10 | End: 2019-01-11 | Stop reason: HOSPADM

## 2019-01-10 RX ORDER — METOPROLOL TARTRATE 50 MG/1
50 TABLET, FILM COATED ORAL 3 TIMES DAILY
Status: DISCONTINUED | OUTPATIENT
Start: 2019-01-10 | End: 2019-01-11 | Stop reason: HOSPADM

## 2019-01-10 RX ORDER — ONDANSETRON 2 MG/ML
4 INJECTION INTRAMUSCULAR; INTRAVENOUS EVERY 6 HOURS PRN
Status: DISCONTINUED | OUTPATIENT
Start: 2019-01-10 | End: 2019-01-11 | Stop reason: HOSPADM

## 2019-01-10 RX ORDER — DIGOXIN 0.25 MG/ML
250 INJECTION INTRAMUSCULAR; INTRAVENOUS ONCE
Status: DISCONTINUED | OUTPATIENT
Start: 2019-01-10 | End: 2019-01-11 | Stop reason: HOSPADM

## 2019-01-10 RX ORDER — SODIUM CHLORIDE 0.9 % (FLUSH) 0.9 %
3 SYRINGE (ML) INJECTION EVERY 12 HOURS SCHEDULED
Status: DISCONTINUED | OUTPATIENT
Start: 2019-01-10 | End: 2019-01-11 | Stop reason: HOSPADM

## 2019-01-10 RX ORDER — SODIUM CHLORIDE 0.9 % (FLUSH) 0.9 %
10 SYRINGE (ML) INJECTION AS NEEDED
Status: DISCONTINUED | OUTPATIENT
Start: 2019-01-10 | End: 2019-01-11 | Stop reason: HOSPADM

## 2019-01-10 RX ORDER — FUROSEMIDE 20 MG/1
20 TABLET ORAL DAILY
Status: DISCONTINUED | OUTPATIENT
Start: 2019-01-11 | End: 2019-01-11 | Stop reason: HOSPADM

## 2019-01-10 RX ORDER — DOFETILIDE 0.12 MG/1
125 CAPSULE ORAL EVERY 12 HOURS SCHEDULED
Status: DISCONTINUED | OUTPATIENT
Start: 2019-01-10 | End: 2019-01-11 | Stop reason: HOSPADM

## 2019-01-10 RX ADMIN — METOPROLOL TARTRATE 2.5 MG: 1 INJECTION, SOLUTION INTRAVENOUS at 21:30

## 2019-01-10 RX ADMIN — METOPROLOL TARTRATE 2.5 MG: 1 INJECTION, SOLUTION INTRAVENOUS at 21:15

## 2019-01-10 RX ADMIN — SODIUM CHLORIDE 500 ML: 9 INJECTION, SOLUTION INTRAVENOUS at 20:23

## 2019-01-10 RX ADMIN — METOPROLOL TARTRATE 5 MG: 1 INJECTION, SOLUTION INTRAVENOUS at 20:29

## 2019-01-10 RX ADMIN — Medication 3 ML: at 22:40

## 2019-01-11 VITALS
SYSTOLIC BLOOD PRESSURE: 120 MMHG | HEART RATE: 62 BPM | DIASTOLIC BLOOD PRESSURE: 52 MMHG | BODY MASS INDEX: 29.25 KG/M2 | WEIGHT: 182 LBS | TEMPERATURE: 98.3 F | HEIGHT: 66 IN | OXYGEN SATURATION: 96 % | RESPIRATION RATE: 16 BRPM

## 2019-01-11 LAB
ANION GAP SERPL CALCULATED.3IONS-SCNC: 10.9 MMOL/L
BUN BLD-MCNC: 17 MG/DL (ref 8–23)
BUN/CREAT SERPL: 15 (ref 7–25)
CALCIUM SPEC-SCNC: 8.6 MG/DL (ref 8.6–10.5)
CHLORIDE SERPL-SCNC: 104 MMOL/L (ref 98–107)
CO2 SERPL-SCNC: 27.1 MMOL/L (ref 22–29)
CREAT BLD-MCNC: 1.13 MG/DL (ref 0.76–1.27)
DEPRECATED RDW RBC AUTO: 59.2 FL (ref 37–54)
ERYTHROCYTE [DISTWIDTH] IN BLOOD BY AUTOMATED COUNT: 16.4 % (ref 11.5–14.5)
GFR SERPL CREATININE-BSD FRML MDRD: 62 ML/MIN/1.73
GLUCOSE BLD-MCNC: 87 MG/DL (ref 65–99)
HCT VFR BLD AUTO: 37 % (ref 40.4–52.2)
HGB BLD-MCNC: 11.1 G/DL (ref 13.7–17.6)
MCH RBC QN AUTO: 29.7 PG (ref 27–32.7)
MCHC RBC AUTO-ENTMCNC: 30 G/DL (ref 32.6–36.4)
MCV RBC AUTO: 98.9 FL (ref 79.8–96.2)
PLATELET # BLD AUTO: 160 10*3/MM3 (ref 140–500)
PMV BLD AUTO: 11.8 FL (ref 6–12)
POTASSIUM BLD-SCNC: 4 MMOL/L (ref 3.5–5.2)
RBC # BLD AUTO: 3.74 10*6/MM3 (ref 4.6–6)
SODIUM BLD-SCNC: 142 MMOL/L (ref 136–145)
TROPONIN T SERPL-MCNC: 0.02 NG/ML (ref 0–0.03)
TSH SERPL DL<=0.05 MIU/L-ACNC: 4.54 MIU/ML (ref 0.27–4.2)
WBC NRBC COR # BLD: 8.3 10*3/MM3 (ref 4.5–10.7)

## 2019-01-11 PROCEDURE — G0378 HOSPITAL OBSERVATION PER HR: HCPCS

## 2019-01-11 PROCEDURE — 99235 HOSP IP/OBS SAME DATE MOD 70: CPT | Performed by: INTERNAL MEDICINE

## 2019-01-11 PROCEDURE — 80048 BASIC METABOLIC PNL TOTAL CA: CPT | Performed by: INTERNAL MEDICINE

## 2019-01-11 PROCEDURE — 84484 ASSAY OF TROPONIN QUANT: CPT | Performed by: INTERNAL MEDICINE

## 2019-01-11 PROCEDURE — 84443 ASSAY THYROID STIM HORMONE: CPT | Performed by: INTERNAL MEDICINE

## 2019-01-11 PROCEDURE — 93010 ELECTROCARDIOGRAM REPORT: CPT | Performed by: INTERNAL MEDICINE

## 2019-01-11 PROCEDURE — 93005 ELECTROCARDIOGRAM TRACING: CPT | Performed by: INTERNAL MEDICINE

## 2019-01-11 PROCEDURE — 36415 COLL VENOUS BLD VENIPUNCTURE: CPT | Performed by: INTERNAL MEDICINE

## 2019-01-11 PROCEDURE — 85027 COMPLETE CBC AUTOMATED: CPT | Performed by: INTERNAL MEDICINE

## 2019-01-11 RX ADMIN — ALLOPURINOL 100 MG: 100 TABLET ORAL at 09:39

## 2019-01-11 RX ADMIN — FERROUS SULFATE TAB 325 MG (65 MG ELEMENTAL FE) 325 MG: 325 (65 FE) TAB at 09:39

## 2019-01-11 RX ADMIN — APIXABAN 5 MG: 5 TABLET, FILM COATED ORAL at 09:39

## 2019-01-11 RX ADMIN — PANTOPRAZOLE SODIUM 40 MG: 40 TABLET, DELAYED RELEASE ORAL at 06:48

## 2019-01-11 RX ADMIN — BUDESONIDE 3 MG: 3 CAPSULE, GELATIN COATED ORAL at 09:39

## 2019-01-11 RX ADMIN — Medication 3 ML: at 09:45

## 2019-01-11 RX ADMIN — DIGOXIN 125 MCG: 125 TABLET ORAL at 12:27

## 2019-01-11 RX ADMIN — FUROSEMIDE 20 MG: 20 TABLET ORAL at 09:39

## 2019-01-11 RX ADMIN — Medication 500 MCG: at 09:39

## 2019-01-11 RX ADMIN — DOFETILIDE 125 MCG: 0.12 CAPSULE ORAL at 09:39

## 2019-01-11 RX ADMIN — METOPROLOL TARTRATE 50 MG: 50 TABLET, FILM COATED ORAL at 09:39

## 2019-01-11 RX ADMIN — METOPROLOL TARTRATE 50 MG: 50 TABLET, FILM COATED ORAL at 00:01

## 2019-01-11 NOTE — PLAN OF CARE
Problem: Fall Risk (Adult)  Goal: Identify Related Risk Factors and Signs and Symptoms  Outcome: Ongoing (interventions implemented as appropriate)   01/11/19 0438   Fall Risk (Adult)   Related Risk Factors (Fall Risk) age-related changes;impaired vision;environment unfamiliar   Signs and Symptoms (Fall Risk) presence of risk factors      01/11/19 0438   Fall Risk (Adult)   Related Risk Factors (Fall Risk) age-related changes;impaired vision;environment unfamiliar;history of falls   Signs and Symptoms (Fall Risk) presence of risk factors     Goal: Absence of Fall  Outcome: Ongoing (interventions implemented as appropriate)   01/11/19 0438   Fall Risk (Adult)   Absence of Fall making progress toward outcome       Problem: Cardiac: ACS (Acute Coronary Syndrome) (Adult)  Goal: Signs and Symptoms of Listed Potential Problems Will be Absent, Minimized or Managed (Cardiac: ACS)  Outcome: Ongoing (interventions implemented as appropriate)   01/11/19 0438   Goal/Outcome Evaluation   Problems Assessed (Acute Coronary Syndrome) all   Problems Present (Acute Coronary Syn) dysrhythmia/arrhythmia       Problem: Patient Care Overview  Goal: Plan of Care Review  Outcome: Ongoing (interventions implemented as appropriate)   01/11/19 0438   Coping/Psychosocial   Plan of Care Reviewed With patient   Plan of Care Review   Progress no change   OTHER   Outcome Summary VS as charted. Patient started out in AFIB with a verying rate 90's-140's and converted into NSR/SB early in the morning. Reamins SR-SB at this time. IV lopressor held d/t no longer in AFIB with high rate and IV digoxin held d/t HR <60. No c/o pain or discomfort. Patient is hoping for an early discharge this AM. Will con't to monitor.      Goal: Individualization and Mutuality  Outcome: Ongoing (interventions implemented as appropriate)

## 2019-01-11 NOTE — ED PROVIDER NOTES
EMERGENCY DEPARTMENT ENCOUNTER    CHIEF COMPLAINT  Chief Complaint: rapid heart rate  History given by: patient and family  History limited by: none  Room Number: 12/12  PMD: Lubna Lobo MD      HPI:  Pt is a 86 y.o. male who presents complaining of rapid heart rate that began 1-2 hours ago. Pt also complains of lightheadedness initially which has since resolved. Wife gave pt Tikosyn PTA which he takes twice daily. Pt denies dizziness, chest pain, SOA or and any other sx. Pt has hx of aFib.  Family states that pt has been taking his medications as prescribed and has not missed any doses.     Duration:  1-2 hours  Onset: gradual  Timing: constant  Location: chest  Quality: rapid  Intensity/Severity: moderate  Progression: unchanged  Associated Symptoms: lightheadedness  Previous Episodes: Pt has hx of aFib.   Treatment before arrival: Wife gave pt Tikosyn PTA which he takes twice daily.    PAST MEDICAL HISTORY  Active Ambulatory Problems     Diagnosis Date Noted   • Ataxia 08/19/2016   • TIA (transient ischemic attack) 08/19/2016   • Atrial fibrillation, paroxysmal 08/19/2016   • Hyperlipidemia 08/19/2016   • Gout 08/19/2016   • Crohn's disease (CMS/HCC) 08/19/2016   • Atrial fibrillation with RVR (CMS/HCC) 10/27/2018     Resolved Ambulatory Problems     Diagnosis Date Noted   • Hypoxia 04/29/2018     Past Medical History:   Diagnosis Date   • Aortic stenosis    • Aspiration pneumonia (CMS/HCC)    • Atrial fibrillation (CMS/HCC)    • Bronchiectasis (CMS/HCC)    • CKD (chronic kidney disease)    • COPD (chronic obstructive pulmonary disease) (CMS/HCC)    • Crohn's disease (CMS/HCC)    • Dizziness    • Elevated cholesterol    • Gastric ulcer    • Generalized weakness    • Gout    • Hard of hearing    • Hyperlipidemia    • Hypertension    • Leg swelling    • Lower extremity edema    • Mild anemia    • Near syncope    • PAF (paroxysmal atrial fibrillation) (CMS/HCC)    • Palpitations    • Peptic ulcer    • Pneumonia  "of left lung due to infectious organism    • Pulmonary fibrosis (CMS/HCC)    • Stroke (CMS/HCC)    • TIA (transient ischemic attack)        PAST SURGICAL HISTORY  Past Surgical History:   Procedure Laterality Date   • BRONCHOSCOPY N/A 10/22/2018    Procedure: BRONCHOSCOPY WITH BRONCHALVEOLAR LAVAGE;  Surgeon: Chidi Oconnor MD;  Location: [facility] ENDOSCOPY;  Service: Pulmonary   • CATARACT EXTRACTION Bilateral    • COLONOSCOPY     • COLONOSCOPY N/A 3/9/2018    Procedure: COLONOSCOPY to terminal ileum with bx;  Surgeon: Aron Cabrera MD;  Location: [facility] ENDOSCOPY;  Service:    • CYSTECTOMY      back and shoulder area   • ENDOSCOPY N/A 3/9/2018    Procedure: ESOPHAGOGASTRODUODENOSCOPY with bx;  Surgeon: Aron Cabrera MD;  Location: [facility] ENDOSCOPY;  Service:    • EYE SURGERY Left     detached retina   • EYE SURGERY Right     \"repaired a hole in the eye\"   • TESTICLE SURGERY      benign tumor removed.       FAMILY HISTORY  Family History   Problem Relation Age of Onset   • Stroke Mother    • Heart disease Mother    • Hypertension Mother    • Diabetes Mother    • Heart disease Sister         Heart Valve Replacement   • Ovarian cancer Sister    • Rheumatic fever Sister    • Diabetes Sister    • Stroke Father        SOCIAL HISTORY  Social History     Socioeconomic History   • Marital status:      Spouse name: Not on file   • Number of children: Not on file   • Years of education: Not on file   • Highest education level: Not on file   Social Needs   • Financial resource strain: Not on file   • Food insecurity - worry: Not on file   • Food insecurity - inability: Not on file   • Transportation needs - medical: Not on file   • Transportation needs - non-medical: Not on file   Occupational History   • Not on file   Tobacco Use   • Smoking status: Former Smoker     Types: Cigars   • Smokeless tobacco: Never Used   • Tobacco comment: Quit 25 years ago   Substance and Sexual Activity   • Alcohol use: No     " Comment: daily caffiene   • Drug use: No   • Sexual activity: Defer   Other Topics Concern   • Not on file   Social History Narrative   • Not on file       ALLERGIES  Amiodarone; Diltiazem; Doxycycline; and Indomethacin    REVIEW OF SYSTEMS  Review of Systems   Constitutional: Negative for activity change, appetite change and fever.   HENT: Negative for congestion and sore throat.    Eyes: Negative.    Respiratory: Negative for cough and shortness of breath.    Cardiovascular: Negative for chest pain and leg swelling.        (+) rapid heart rate   Gastrointestinal: Negative for abdominal pain, diarrhea and vomiting.   Endocrine: Negative.    Genitourinary: Negative for decreased urine volume and dysuria.   Musculoskeletal: Negative for neck pain.   Skin: Negative for rash and wound.   Allergic/Immunologic: Negative.    Neurological: Positive for light-headedness. Negative for dizziness, weakness, numbness and headaches.   Hematological: Negative.    Psychiatric/Behavioral: Negative.    All other systems reviewed and are negative.      PHYSICAL EXAM  ED Triage Vitals   Temp Heart Rate Resp BP SpO2   01/10/19 1927 01/10/19 1927 01/10/19 1927 01/10/19 2024 01/10/19 1927   97.7 °F (36.5 °C) 56 16 114/68 97 %      Temp src Heart Rate Source Patient Position BP Location FiO2 (%)   01/10/19 1927 01/10/19 1927 -- -- --   Tympanic Monitor            Physical Exam   Constitutional: He is oriented to person, place, and time. He appears distressed (mild).   HENT:   Head: Normocephalic and atraumatic.   Mouth/Throat: Oropharynx is clear and moist.   Eyes: EOM are normal. Pupils are equal, round, and reactive to light.   Neck: Normal range of motion. Neck supple.   Cardiovascular: Normal rate and normal heart sounds. An irregularly irregular rhythm present.   No murmur heard.  Pulmonary/Chest: Effort normal and breath sounds normal. No respiratory distress.   CTAB   Abdominal: Soft. Bowel sounds are normal. He exhibits no  distension. There is no tenderness. There is no rebound and no guarding.   Musculoskeletal: Normal range of motion. He exhibits no edema (pedal) or tenderness (calf tenderness).   Neurological: He is alert and oriented to person, place, and time. He has normal sensation and normal strength.   Skin: Skin is warm and dry.   Psychiatric: Mood and affect normal.   Nursing note and vitals reviewed.      LAB RESULTS  Lab Results (last 24 hours)     Procedure Component Value Units Date/Time    CBC & Differential [[id number]] Collected:  01/10/19 1952    Specimen:  Blood Updated:  01/10/19 2005    Narrative:       The following orders were created for panel order CBC & Differential.  Procedure                               Abnormality         Status                     ---------                               -----------         ------                     CBC Auto Differential[[id number]]        Abnormal            Final result                 Please view results for these tests on the individual orders.    Comprehensive Metabolic Panel [[id number]]  (Abnormal) Collected:  01/10/19 1952    Specimen:  Blood Updated:  01/10/19 2027     Glucose 113 mg/dL      BUN 19 mg/dL      Creatinine 1.16 mg/dL      Sodium 142 mmol/L      Potassium 4.1 mmol/L      Chloride 104 mmol/L      CO2 26.2 mmol/L      Calcium 9.3 mg/dL      Total Protein 7.4 g/dL      Albumin 3.70 g/dL      ALT (SGPT) 11 U/L      AST (SGOT) 16 U/L      Alkaline Phosphatase 57 U/L      Total Bilirubin 0.3 mg/dL      eGFR Non African Amer 60 mL/min/1.73      Globulin 3.7 gm/dL      A/G Ratio 1.0 g/dL      BUN/Creatinine Ratio 16.4     Anion Gap 11.8 mmol/L     Narrative:       The MDRD GFR formula is only valid for adults with stable renal function between ages 18 and 70.    Protime-INR [[id number]]  (Abnormal) Collected:  01/10/19 1952    Specimen:  Blood Updated:  01/10/19 2016     Protime 15.2 Seconds      INR 1.22    Digoxin Level [[id number]]  (Normal) Collected:   01/10/19 1952    Specimen:  Blood Updated:  01/10/19 2028     Digoxin 0.80 ng/mL     Troponin [[id number]]  (Normal) Collected:  01/10/19 1952    Specimen:  Blood Updated:  01/10/19 2027     Troponin T 0.013 ng/mL     Narrative:       Troponin T Reference Ranges:  Less than 0.03 ng/mL:    Negative for AMI  0.03 to 0.09 ng/mL:      Indeterminant for AMI  Greater than 0.09 ng/mL: Positive for AMI    Magnesium [[id number]]  (Normal) Collected:  01/10/19 1952    Specimen:  Blood Updated:  01/10/19 2027     Magnesium 2.0 mg/dL     CBC Auto Differential [[id number]]  (Abnormal) Collected:  01/10/19 1952    Specimen:  Blood Updated:  01/10/19 2005     WBC 9.37 10*3/mm3      RBC 4.10 10*6/mm3      Hemoglobin 12.4 g/dL      Hematocrit 40.3 %      MCV 98.3 fL      MCH 30.2 pg      MCHC 30.8 g/dL      RDW 16.1 %      RDW-SD 57.2 fl      MPV 11.2 fL      Platelets 175 10*3/mm3      Neutrophil % 67.3 %      Lymphocyte % 20.7 %      Monocyte % 8.8 %      Eosinophil % 2.1 %      Basophil % 0.4 %      Immature Grans % 0.7 %      Neutrophils, Absolute 6.30 10*3/mm3      Lymphocytes, Absolute 1.94 10*3/mm3      Monocytes, Absolute 0.82 10*3/mm3      Eosinophils, Absolute 0.20 10*3/mm3      Basophils, Absolute 0.04 10*3/mm3      Immature Grans, Absolute 0.07 10*3/mm3           I ordered the above labs and reviewed the results    RADIOLOGY  XR Chest 2 View   Final Result   Chronic changes in the chest. No acute abnormality is   identified.       This report was finalized on 1/10/2019 8:22 PM by Dr. Nixon Blair M.D.               I ordered the above noted radiological studies. Interpreted by radiologist. Reviewed by me in PACS.       PROCEDURES  Procedures    EKG          EKG time: 1933  Rhythm/Rate: aFib 124 BPM with occasional PVC  QRS, axis: Q waves in lead 3   ST and T waves: normal     Interpreted Contemporaneously by me, independently viewed  Similar compared to prior 11/16/18      PROGRESS AND CONSULTS     1938-Ordered CXR and  lab work for further evaluation. Ordered Lopressor and IVF for aFib.    2058-Rechecked pt. Pt is resting comfortbly. Pt is still in aFib with HR-109 BPM. Notified pt of troponin-0.013 and CXR-negative acute. Discussed the plan to order Lopressor. Pt understands and agrees with the plan, all questions answered.    2059-Ordered Lopressor for aFib.     2127-Placed call to [facility] for consult.     2129-Ordered Lopressor for aFib.     2146-Discussed pt case with  ([facility]) who states he will admit pt for evaluation or that pt can f/u outpatient. Informed him of my discussion with family and he states he will admit pt for further evaluation and care. Discussed Cardizem allergy with pt at this time which he states gives him an itchy rash. Pt and family state they would like to be admitted for further evaluation instead of following up outpatient tomorrow. Discussed the plan to admit pt as discussed with  ([facility]). Pt understands and agrees with the plan, all questions answered.    MEDICAL DECISION MAKING  Results were reviewed/discussed with the patient and they were also made aware of online access. Pt also made aware that some labs, such as cultures, will not be resulted during ER visit and follow up with PMD is necessary.     MDM  Number of Diagnoses or Management Options  Atrial fibrillation with RVR (CMS/[facility]):      Amount and/or Complexity of Data Reviewed  Clinical lab tests: reviewed and ordered (Troponin-0.013  Magnesium-2.0  WBC-9.37)  Tests in the radiology section of CPT®: reviewed  Tests in the medicine section of CPT®: reviewed (EKG procedure note)  Decide to obtain previous medical records or to obtain history from someone other than the patient: yes  Obtain history from someone other than the patient: yes (family)  Discuss the patient with other providers: yes ( ([facility]))  Independent visualization of images, tracings, or specimens: yes           DIAGNOSIS  Final diagnoses:   Atrial  fibrillation with RVR (CMS/HCC)       DISPOSITION  ADMISSION    Discussed treatment plan and reason for admission with pt/family and admitting physician.  Pt/family voiced understanding of the plan for admission for further testing/treatment as needed.         Latest Documented Vital Signs:  As of 9:55 PM  BP- 104/76 HR- 105 Temp- 97.7 °F (36.5 °C) (Tympanic) O2 sat- 96%    --  Documentation assistance provided by ananya Roberson for .  Information recorded by the scribe was done at my direction and has been verified and validated by me.                Heidy Roberson  01/10/19 6961       Flo Zuniga MD  01/10/19 6923

## 2019-01-11 NOTE — H&P
History and Physical and Discharge Summary  Patient Name: Asia Ly  :1932  86 y.o.    Date of Admission: 1/10/2019  Date of Consultation:  19  Encounter Provider: Rashaun Nettles MD  Place of Service: [facility] CARDIOLOGY  Referring Provider: Matt Vickers MD  Patient Care Team:  Lubna Lobo MD as PCP - General  Lubna Lobo MD as PCP - Family Medicine      Chief complaint: tachycardia    CHADsVASc score:     History of Present Illness: Mr. Ly is a 85 y/o with a history of A fib on Tikosyn, digoxin, metoprolol and Eliquis, AS, HTN, PF, CKD, COPD, TIA, Crohn's disease and hyperlipidemia.  He presented to the ED yesterday with c/o tachycardia and lightheadedness. On arrival his HR was 56, /68. Labs showed a bun/creat of 19/1.16, dig 0.80, trop 0.013, andn hgb 12.4. CXR was negative for acute. Preliminary EKG showed atrial fib, rate of 124. He was given IV lopressor x 3 doses.  This am his HR is 60 and sinus. He converted around 11:30 last night.     The patient feels well tonight.  He is back in his usual state.  He states that he is pretty happy with how his atrial fibrillation treatment has been going on.  He last had an exacerbation in October.     Cardiac Testing:  Echo 17  · Left ventricular systolic function is normal. Estimated EF = 60%.  · Left ventricular diastolic dysfunction (grade II) consistent with pseudonormalization.  · Mild aortic valve stenosis is present.  Stress test     Past Medical History:   Diagnosis Date   • Aortic stenosis    • Aspiration pneumonia (CMS/HCC)    • Atrial fibrillation (CMS/HCC)    • Bronchiectasis (CMS/HCC)    • CKD (chronic kidney disease)    • COPD (chronic obstructive pulmonary disease) (CMS/HCC)    • Crohn's disease (CMS/HCC)    • Dizziness    • Elevated cholesterol    • Gastric ulcer    • Generalized weakness    • Gout    • Hard of hearing    • Hyperlipidemia    • Hypertension    • Leg  "swelling    • Lower extremity edema    • Mild anemia    • Near syncope    • PAF (paroxysmal atrial fibrillation) (CMS/HCC)    • Palpitations    • Peptic ulcer    • Pneumonia of left lung due to infectious organism    • Pulmonary fibrosis (CMS/HCC)    • Stroke (CMS/HCC)    • TIA (transient ischemic attack)        Past Surgical History:   Procedure Laterality Date   • BRONCHOSCOPY N/A 10/22/2018    Procedure: BRONCHOSCOPY WITH BRONCHALVEOLAR LAVAGE;  Surgeon: Chidi Oconnor MD;  Location: [facility] ENDOSCOPY;  Service: Pulmonary   • CATARACT EXTRACTION Bilateral    • COLONOSCOPY     • COLONOSCOPY N/A 3/9/2018    Procedure: COLONOSCOPY to terminal ileum with bx;  Surgeon: Aron Cabrera MD;  Location: [facility] ENDOSCOPY;  Service:    • CYSTECTOMY      back and shoulder area   • ENDOSCOPY N/A 3/9/2018    Procedure: ESOPHAGOGASTRODUODENOSCOPY with bx;  Surgeon: Aron Cabrera MD;  Location: [facility] ENDOSCOPY;  Service:    • EYE SURGERY Left     detached retina   • EYE SURGERY Right     \"repaired a hole in the eye\"   • TESTICLE SURGERY      benign tumor removed.         Prior to Admission medications    Medication Sig Start Date End Date Taking? Authorizing Provider   allopurinol (ZYLOPRIM) 100 MG tablet Take 100 mg by mouth 2 (Two) Times a Day.    Valeria Palomino MD   apixaban (ELIQUIS) 5 MG tablet tablet Take 1 tablet by mouth Every 12 (Twelve) Hours. 11/20/18   Alta Hackett APRN   atorvastatin (LIPITOR) 10 MG tablet Take one tablet by mouth Mon, Wed and Fri.    Valeria Palomino MD   BUDESONIDE PO Take 3 mg by mouth Daily.    Valeria Palomino MD   Cyanocobalamin (VITAMIN B-12 CR PO) Take 500 mg by mouth Daily.    Valeria Palomino MD   digoxin (LANOXIN) 125 MCG tablet Take 1 tablet by mouth Daily. 11/30/18   Hector Rivera MD   dofetilide (TIKOSYN) 125 MCG capsule Take 125 mcg by mouth 2 (Two) Times a Day.    Valeria Palomino MD   ferrous sulfate 325 (65 FE) MG tablet Take 325 mg by mouth 3 " (Three) Times a Day.    Provider, Historical, MD   furosemide (LASIX) 20 MG tablet Take 20 mg by mouth Daily.    Provider, Historical, MD   metoprolol tartrate (LOPRESSOR) 50 MG tablet Take 1 tablet by mouth 3 (Three) Times a Day. 9/13/18   Alta Hackett APRN   omeprazole (priLOSEC) 40 MG capsule Take 40 mg by mouth Daily.    Provider, MD Valeria       Allergies   Allergen Reactions   • Amiodarone Unknown (See Comments)     Pulmonary fibrosis   • Diltiazem Arrhythmia   • Doxycycline Rash   • Indomethacin Rash       Social History     Socioeconomic History   • Marital status:      Spouse name: Not on file   • Number of children: Not on file   • Years of education: Not on file   • Highest education level: Not on file   Tobacco Use   • Smoking status: Former Smoker     Types: Cigars   • Smokeless tobacco: Never Used   • Tobacco comment: Quit 25 years ago   Substance and Sexual Activity   • Alcohol use: No     Comment: daily caffiene   • Drug use: No   • Sexual activity: Defer       Family History   Problem Relation Age of Onset   • Stroke Mother    • Heart disease Mother    • Hypertension Mother    • Diabetes Mother    • Heart disease Sister         Heart Valve Replacement   • Ovarian cancer Sister    • Rheumatic fever Sister    • Diabetes Sister    • Stroke Father        REVIEW OF SYSTEMS:   All systems reviewed.  Pertinent positives identified in HPI.  All other systems are negative.      Objective:     Vitals:    01/11/19 0132 01/11/19 0717 01/11/19 0939 01/11/19 1114   BP:  119/54 119/50 106/46   BP Location:  Left arm  Left arm   Patient Position:  Lying  Lying   Pulse: 58 57 63 62   Resp:  16  16   Temp:  98.3 °F (36.8 °C)  98.3 °F (36.8 °C)   TempSrc:  Oral  Oral   SpO2:  96%  96%   Weight:       Height:         Body mass index is 29.38 kg/m².    Physical Exam  Physical Exam   Constitutional: He is oriented to person, place, and time. He appears well-developed. No distress.   HENT:   Head:  Normocephalic.   Right Ear: External ear normal.   Left Ear: External ear normal.   Eyes: Right eye exhibits no discharge. Left eye exhibits no discharge.   Neck: No tracheal deviation present. No thyromegaly present.   Cardiovascular: Normal rate and regular rhythm. Exam reveals no friction rub.   No murmur heard.  Pulmonary/Chest: Effort normal and breath sounds normal.   Abdominal: Soft. Bowel sounds are normal.   Musculoskeletal: He exhibits no edema or deformity.   Neurological: He is alert and oriented to person, place, and time.   Skin: Skin is warm and dry. He is not diaphoretic.   Psychiatric: He has a normal mood and affect. His behavior is normal. Thought content normal.   Nursing note and vitals reviewed.      Lab Review:     Results from last 7 days   Lab Units  01/11/19   0406  01/10/19   1952   SODIUM mmol/L  142  142   POTASSIUM mmol/L  4.0  4.1   CHLORIDE mmol/L  104  104   CO2 mmol/L  27.1  26.2   BUN mg/dL  17  19   CREATININE mg/dL  1.13  1.16   CALCIUM mg/dL  8.6  9.3   BILIRUBIN mg/dL   --   0.3   ALK PHOS U/L   --   57   ALT (SGPT) U/L   --   11   AST (SGOT) U/L   --   16   GLUCOSE mg/dL  87  113*     Results from last 7 days   Lab Units  01/11/19   0406  01/10/19   1952   TROPONIN T ng/mL  0.016  0.013     Results from last 7 days   Lab Units  01/11/19 0406   WBC 10*3/mm3  8.30   HEMOGLOBIN g/dL  11.1*   HEMATOCRIT %  37.0*   PLATELETS 10*3/mm3  160     Results from last 7 days   Lab Units  01/10/19   1952   INR   1.22*     Results from last 7 days   Lab Units  01/10/19   1952   MAGNESIUM mg/dL  2.0                      I personally viewed and interpreted the patient's EKG/Telemetry data.      Current Facility-Administered Medications:   •  acetaminophen (TYLENOL) tablet 650 mg, 650 mg, Oral, Q6H PRN, Matt Vickers MD  •  allopurinol (ZYLOPRIM) tablet 100 mg, 100 mg, Oral, BID, Matt Vickers MD, 100 mg at 01/11/19 0939  •  apixaban (ELIQUIS) tablet 5 mg, 5 mg, Oral, Q12H,  Matt Vickers MD, 5 mg at 01/11/19 0939  •  Budesonide (ENTOCORT EC) 24 hr capsule 3 mg, 3 mg, Oral, Daily, Matt Vickers MD, 3 mg at 01/11/19 0939  •  digoxin (LANOXIN) injection 250 mcg, 250 mcg, Intravenous, Once, Matt Vickers MD, Stopped at 01/11/19 0132  •  digoxin (LANOXIN) tablet 125 mcg, 125 mcg, Oral, Daily, Matt Vickers MD, 125 mcg at 01/11/19 1227  •  dofetilide (TIKOSYN) capsule 125 mcg, 125 mcg, Oral, Q12H, Matt Vickers MD, 125 mcg at 01/11/19 0939  •  ferrous sulfate tablet 325 mg, 325 mg, Oral, TID, Matt Vickres MD, 325 mg at 01/11/19 0939  •  furosemide (LASIX) tablet 20 mg, 20 mg, Oral, Daily, Matt Vickers MD, 20 mg at 01/11/19 0939  •  metoprolol tartrate (LOPRESSOR) injection 5 mg, 5 mg, Intravenous, Q4H, Matt Vickers MD, Stopped at 01/11/19 0330  •  metoprolol tartrate (LOPRESSOR) tablet 50 mg, 50 mg, Oral, TID, Matt Vickers MD, 50 mg at 01/11/19 0939  •  ondansetron (ZOFRAN) injection 4 mg, 4 mg, Intravenous, Q6H PRN, Matt Vickers MD  •  pantoprazole (PROTONIX) EC tablet 40 mg, 40 mg, Oral, QAM, Matt Vickers MD, 40 mg at 01/11/19 0648  •  [COMPLETED] Insert peripheral IV, , , Once **AND** sodium chloride 0.9 % flush 10 mL, 10 mL, Intravenous, PRN, Flo Zuniga MD  •  sodium chloride 0.9 % flush 3 mL, 3 mL, Intravenous, Q12H, Matt Vickers MD, 3 mL at 01/11/19 0945  •  sodium chloride 0.9 % flush 3-10 mL, 3-10 mL, Intravenous, PRN, Matt Vickers MD  •  temazepam (RESTORIL) capsule 7.5 mg, 7.5 mg, Oral, Nightly PRN, Matt Vickers MD  •  vitamin B-12 (CYANOCOBALAMIN) tablet 500 mcg, 500 mcg, Oral, Daily, Matt Vickers MD, 500 mcg at 01/11/19 0939    Assessment and Plan:       Active Hospital Problems    Diagnosis Date Noted   • Atrial fibrillation with RVR (CMS/HCC) [I48.91] 10/27/2018      Resolved Hospital Problems   No resolved problems to display.     Paroxsymal Atrial  Fibrillation now converted to sinus rhythm.  He has been managed on Tikosyn for the past couple of years.  He has had several exacerbations.  He was intolerant to amiodarone in the past.  He really doesn't have any other options for antiarrhythmia therapy.  I think that all I could offer him would be a pacemaker with a His lead and AV node ablation.   He would just prefer to stay on his current regimen and have occasional breakthroughs. No other changes to his medical therapy.  He is ready for discharge.    Rashaun Nettles MD  01/11/19  5:12 PM

## 2019-02-13 ENCOUNTER — TELEPHONE (OUTPATIENT)
Dept: CARDIOLOGY | Facility: CLINIC | Age: 84
End: 2019-02-13

## 2019-03-19 ENCOUNTER — TELEPHONE (OUTPATIENT)
Dept: CARDIOLOGY | Facility: CLINIC | Age: 84
End: 2019-03-19

## 2019-03-19 DIAGNOSIS — Z51.81 THERAPEUTIC DRUG MONITORING: Primary | ICD-10-CM

## 2019-03-19 NOTE — TELEPHONE ENCOUNTER
Patient called to see if it is safe for him to take Levofloxacin 500 mg for 4 days every other day for a lung infection.  He had taken a round of antibiotics (wife did not know the name) and it did not take care of the infection so they want him to take the Levofloxacin.      He is concerned regarding it effecting his afib./ LEIDA

## 2019-03-19 NOTE — TELEPHONE ENCOUNTER
I spoke with patient's wife.  She verbalized understanding.  Patient will take the 1st dose tonight.  His second dose on Thursday night and will need an order for EKG only on Friday.  Can you please place the order if you have not already done do.  I will have a  to call him with an appointment on Friday.  Thank you/ LEIDA

## 2019-03-19 NOTE — TELEPHONE ENCOUNTER
He may take Levaquin. It might prolong the QT interval. He also is on Tikosyn. I would bring him in for EKG check after two doses to assess his EKG. And if EKG stable he can finish that therapy.    AL

## 2019-04-26 ENCOUNTER — OFFICE VISIT (OUTPATIENT)
Dept: CARDIOLOGY | Facility: CLINIC | Age: 84
End: 2019-04-26

## 2019-04-26 VITALS
HEIGHT: 68 IN | DIASTOLIC BLOOD PRESSURE: 58 MMHG | BODY MASS INDEX: 23.64 KG/M2 | WEIGHT: 156 LBS | HEART RATE: 65 BPM | SYSTOLIC BLOOD PRESSURE: 122 MMHG

## 2019-04-26 DIAGNOSIS — I48.0 PAF (PAROXYSMAL ATRIAL FIBRILLATION) (HCC): Primary | ICD-10-CM

## 2019-04-26 DIAGNOSIS — I35.0 NONRHEUMATIC AORTIC VALVE STENOSIS: ICD-10-CM

## 2019-04-26 DIAGNOSIS — I10 ESSENTIAL HYPERTENSION: ICD-10-CM

## 2019-04-26 DIAGNOSIS — Z79.01 ANTICOAGULANT LONG-TERM USE: ICD-10-CM

## 2019-04-26 DIAGNOSIS — J84.10 PULMONARY FIBROSIS (HCC): ICD-10-CM

## 2019-04-26 DIAGNOSIS — Z79.899 HIGH RISK MEDICATION USE: ICD-10-CM

## 2019-04-26 DIAGNOSIS — E78.2 MIXED HYPERLIPIDEMIA: ICD-10-CM

## 2019-04-26 PROCEDURE — 99214 OFFICE O/P EST MOD 30 MIN: CPT | Performed by: NURSE PRACTITIONER

## 2019-04-26 PROCEDURE — 93000 ELECTROCARDIOGRAM COMPLETE: CPT | Performed by: NURSE PRACTITIONER

## 2019-04-26 RX ORDER — DIGOXIN 125 MCG
125 TABLET ORAL
Qty: 90 TABLET | Refills: 3 | Status: SHIPPED | OUTPATIENT
Start: 2019-04-26 | End: 2019-10-07 | Stop reason: SDUPTHER

## 2019-04-26 NOTE — PROGRESS NOTES
Date of Office Visit: 2019  Encounter Provider: KYLAH Jha  Place of Service: [facility] CARDIOLOGY  Patient Name: Asia Ly  :1932    Chief Complaint   Patient presents with   • Atrial Fibrillation   • Transient Ischemic Attack   • Follow-up   :     HPI: Asia Ly is a 86 y.o. male is a patient of Dr. Rivera I am seeing him today we will have reviewed his record.    His past medical history is significant of paroxysmal atrial fibrillation, aortic stenosis, hyperlipidemia, B12 anemia,Crohn's disease, colon polyps, lower extremity edema, TIA,  Pulmonary HTN and pulmonary fibrosis.     In May 2004 he was traveling in [place] and ran out of sotalol.  Atrial fibrillation ablation was considered but it was felt to maintain him on sotalol.  In 2016 he suffered a stroke and TIA at [facility].  He was switched from aspirin to Eliquis.  He was started on amiodarone for recurrent atrial fibrillation.  Echocardiogram 2016 showed normal left ventricular systolic dysfunction, moderate tricuspid insufficiency, mild mitral insufficiency , mild pulmonary hypertension.  He also had a perfusion stress test that was negative for ischemia.  He was taken off diltiazem for allergic reaction to rash.  2017 he had an echocardiogram which showed an EF of 60%, grade 2 diastolic dysfunction and mild aortic he had increased lower extremity edema and had again rapid atrial fibrillation.  He was started on a diuretic.  And thyroid  he had a trace amount of blood in the stool.  His anticoagulation was held.  He had EGD and colonoscopy in 2018 which revealed esophagitis with no bleeding found at the gastroesophageal junction with biopsy.  He had a small hiatal hernia and inflammation in the duodenum.Colonoscopy revealed multiple large reticula in the sigmoid colon abscesses were sent from the right and left colon due to his history of Crohn's.   Thus he was instructed to restart Eliquis after the procedure.  He was instructed to stop taking indomethacin and allopurinol by Dr. Mccoy. His hemoglobin had increased to 10.8 on 3/12/2018, white count slightly elevated, and creatinine was 1.27 with an EGFR of 54.  In April he was in the hospital for orthopedic issues.  He had some rapid atrial fibrillation and his metoprolol was increased.  He then had pneumonia hypoxia and influenza and was started on Tikosyn.  He has some prolonged QTC complicated by antibiotics.  He then had issues with gout felt to be related to discontinuation of indomethacin and was prescribed colchicine.  In October 2018 he had bronchiectasis and had a bronchoscopy and was found colonized pseudomonas.  He then had some atrial fibrillation with RVR and was given oral metoprolol and Cardizem and converted back to sinus rhythm.  He was evaluated by electrophysiology with Dr. Nettles who felt that his ablation at his age would be very high risk procedure.  He was later started on digoxin for recurrent rapid atrial fibrillation.  He was admitted again January 2019 and electrophysiology felt that he may for AV node ablation and pacemaker implantation.  However he preferred to stay on Tikosyn and accept occasional breakthroughs.    Patient presents today for reassessment.  He is doing very well from a cardiac perspective.  He is seeing his PCP Monday for suspected gout in the right toe.  He is started to have some mild pain there.  He was treated for bronchitis last month with levofloxacin.  He has done better.  He denies chest pain tightness pressure, recent palpitations, shortness of breath, edema, lightheadedness, fatigue.  He denies near syncope, or syncope.  He has a gym membership and has been going most days of the week walking without exertional symptoms.      Allergies   Allergen Reactions   • Amiodarone Unknown (See Comments)     Pulmonary fibrosis   • Diltiazem Arrhythmia   •  "Doxycycline Rash   • Indomethacin Rash       Past Medical History:   Diagnosis Date   • Aortic stenosis    • Aspiration pneumonia (CMS/HCC)    • Atrial fibrillation (CMS/HCC)    • Bronchiectasis (CMS/HCC)    • CKD (chronic kidney disease)    • COPD (chronic obstructive pulmonary disease) (CMS/HCC)    • Crohn's disease (CMS/HCC)    • Dizziness    • Elevated cholesterol    • Gastric ulcer    • Generalized weakness    • Gout    • Hard of hearing    • Hyperlipidemia    • Hypertension    • Leg swelling    • Lower extremity edema    • Mild anemia    • Near syncope    • Nonrheumatic aortic valve stenosis    • PAF (paroxysmal atrial fibrillation) (CMS/HCC)    • Palpitations    • Peptic ulcer    • Pneumonia of left lung due to infectious organism    • Pulmonary fibrosis (CMS/HCC)    • Stroke (CMS/HCC)    • TIA (transient ischemic attack)        Past Surgical History:   Procedure Laterality Date   • BRONCHOSCOPY N/A 10/22/2018    Procedure: BRONCHOSCOPY WITH BRONCHALVEOLAR LAVAGE;  Surgeon: Chidi Oconnor MD;  Location: [facility] ENDOSCOPY;  Service: Pulmonary   • CATARACT EXTRACTION Bilateral    • COLONOSCOPY     • COLONOSCOPY N/A 3/9/2018    Procedure: COLONOSCOPY to terminal ileum with bx;  Surgeon: Aron Cabrera MD;  Location: [facility] ENDOSCOPY;  Service:    • CYSTECTOMY      back and shoulder area   • ENDOSCOPY N/A 3/9/2018    Procedure: ESOPHAGOGASTRODUODENOSCOPY with bx;  Surgeon: Aron Cabrera MD;  Location: [facility] ENDOSCOPY;  Service:    • EYE SURGERY Left     detached retina   • EYE SURGERY Right     \"repaired a hole in the eye\"   • TESTICLE SURGERY      benign tumor removed.         Family and social history reviewed.     Review of Systems   Musculoskeletal: Positive for gout and joint swelling (right great toe).   Neurological: Positive for light-headedness.     All other systems were reviewed and are negative          Objective:     Vitals:    04/26/19 1328   BP: 122/58   BP Location: Left arm   Patient " "Position: Sitting   Pulse: 65   Weight: 70.8 kg (156 lb)   Height: 172.7 cm (68\")     Body mass index is 23.72 kg/m².    PHYSICAL EXAM:  Physical Exam   Constitutional: He is oriented to person, place, and time. He appears well-developed and well-nourished. No distress.   HENT:   Head: Normocephalic.   Eyes: Conjunctivae are normal.   Neck: Normal range of motion. No JVD present.   Cardiovascular: Normal rate, regular rhythm and intact distal pulses.   Murmur heard.   Systolic murmur is present with a grade of 1/6 at the upper right sternal border.  Pulses:       Carotid pulses are 2+ on the right side, and 2+ on the left side.       Radial pulses are 2+ on the right side, and 2+ on the left side.        Posterior tibial pulses are 2+ on the right side, and 2+ on the left side.   Pulmonary/Chest: Effort normal. No respiratory distress. He has no wheezes. He has rhonchi in the right middle field. He has no rales. He exhibits no tenderness.   Abdominal: Soft. Bowel sounds are normal. He exhibits no distension.   Musculoskeletal: Normal range of motion. He exhibits no edema.   Neurological: He is alert and oriented to person, place, and time.   Skin: Skin is warm, dry and intact. No rash noted. He is not diaphoretic. No cyanosis.   Psychiatric: He has a normal mood and affect. His behavior is normal. Judgment and thought content normal.         ECG 12 Lead  Date/Time: 4/26/2019 1:36 PM  Performed by: Alta Hackett APRN  Authorized by: Alta Hackett APRN   Comparison: compared with previous ECG from 1/11/2019  Similar to previous ECG  Rhythm: sinus rhythm  Rate: normal  T flattening: aVL  QRS axis: normal    Clinical impression: non-specific ECG  Comments: No significant changes            Current Outpatient Medications   Medication Sig Dispense Refill   • allopurinol (ZYLOPRIM) 100 MG tablet Take 100 mg by mouth 2 (Two) Times a Day.     • apixaban (ELIQUIS) 5 MG tablet tablet Take 1 tablet by mouth Every 12 (Twelve) " Hours. 180 tablet 3   • atorvastatin (LIPITOR) 10 MG tablet Take one tablet by mouth Mon, Wed and Fri.     • BUDESONIDE PO Take 3 mg by mouth Daily.     • Cyanocobalamin (VITAMIN B-12 CR PO) Take 500 mg by mouth Daily.     • digoxin (LANOXIN) 125 MCG tablet Take 1 tablet by mouth Daily. 90 tablet 3   • dofetilide (TIKOSYN) 125 MCG capsule Take 125 mcg by mouth 2 (Two) Times a Day.     • ferrous sulfate 325 (65 FE) MG tablet Take 325 mg by mouth 3 (Three) Times a Day.     • furosemide (LASIX) 20 MG tablet Take 20 mg by mouth Daily.     • metoprolol tartrate (LOPRESSOR) 50 MG tablet Take 1 tablet by mouth 3 (Three) Times a Day. 270 tablet 1   • omeprazole (priLOSEC) 40 MG capsule Take 40 mg by mouth Daily.       No current facility-administered medications for this visit.      Assessment:       Diagnosis Plan   1. PAF (paroxysmal atrial fibrillation) (CMS/HCC)  ECG 12 Lead   2. Nonrheumatic aortic valve stenosis     3. Essential hypertension     4. Pulmonary fibrosis (CMS/HCC)     5. Anticoagulant long-term use     6. Mixed hyperlipidemia     7. High risk medication use  ECG 12 Lead        Orders Placed This Encounter   Procedures   • ECG 12 Lead     This order was created via procedure documentation         Plan:       1.  Paroxysmal atrial fibrillation off amiodarone due to pulmonary fibrosis and rash with diltiazem. ToleratingTikosyn 125 mcq BID, metoprolol tartrate 50 mg TID and digoxin 125 mcg daily.  He has declined AV node ablation with pacemaker.  Atrial Fibrillation and Atrial Flutter  Assessment  • The patient has paroxysmal atrial fibrillation  • This is non-valvular in etiology  • The patient's CHADS2-VASc score is 3  • A [id number] score of 2 or more is considered a high risk for a thromboembolic event  • Apixaban prescribed    Plan  • Attempt to maintain sinus rhythm  • Continue apixaban for antithrombotic therapy, bleeding issues discussed  • Continue beta blocker and dofetilide for rhythm control  •  Continue beta blocker and digoxin for rate control      2. History of Crohn's disease and iron deficiency anemia- on iron supplement.  3. History of TIA no recurrence  4. Pulmonary fibrosis/history of aspiration pneumonia and bronchiectasis with colonized pseudomonas  followed by Dr. Chidi Oconnor  5.  Hypertension blood pressure adequate control.  6. Hyperlipidemia- on 10 atorvastatin M,W,F  7.  Aortic stenosis.  Echocardiogram November 2017- mild  8. History of iron deficient anemia   9.  Rash/pruritis improved with Lubriderm lotion  10. Recent cerumen disimpaction bilateral with improved hearing  11.Gout on colchicine- suspected flare of right great toe- to see PCP monday      Follow up in 6 months or call with questions or concerns.             It has been a pleasure to participate in this patient's care.      Thank you,  KYLAH Jha      **[facility] Disclaimer:**  Much of this encounter note is an electronic transcription/translation of spoken language to printed text. The electronic translation of spoken language may permit erroneous, or at times, nonsensical words or phrases to be inadvertently transcribed. Although I have reviewed the note for such errors, some may still exist.

## 2019-05-21 ENCOUNTER — TELEPHONE (OUTPATIENT)
Dept: CARDIOLOGY | Facility: CLINIC | Age: 84
End: 2019-05-21

## 2019-05-21 NOTE — TELEPHONE ENCOUNTER
Patient called informing me that he was just prescribed Cephalexin 500mg x7 days.  Patient would like to double check with you and make sure that you are ok with him taking this medication.  Please advise.  Anna

## 2019-05-24 RX ORDER — DIGOXIN 125 UG/1
TABLET ORAL
Qty: 90 TABLET | Refills: 0 | Status: SHIPPED | OUTPATIENT
Start: 2019-05-24 | End: 2019-11-22 | Stop reason: SDUPTHER

## 2019-07-10 ENCOUNTER — TELEPHONE (OUTPATIENT)
Dept: CARDIOLOGY | Facility: CLINIC | Age: 84
End: 2019-07-10

## 2019-07-16 ENCOUNTER — OFFICE (OUTPATIENT)
Dept: URBAN - METROPOLITAN AREA CLINIC 66 | Facility: CLINIC | Age: 84
End: 2019-07-16

## 2019-07-16 VITALS
HEIGHT: 68 IN | DIASTOLIC BLOOD PRESSURE: 68 MMHG | SYSTOLIC BLOOD PRESSURE: 112 MMHG | WEIGHT: 154 LBS | HEART RATE: 76 BPM

## 2019-07-16 DIAGNOSIS — Z79.52 LONG TERM (CURRENT) USE OF SYSTEMIC STEROIDS: ICD-10-CM

## 2019-07-16 DIAGNOSIS — K50.10 CROHN'S DISEASE OF LARGE INTESTINE WITHOUT COMPLICATIONS: ICD-10-CM

## 2019-07-16 PROCEDURE — 99213 OFFICE O/P EST LOW 20 MIN: CPT | Performed by: INTERNAL MEDICINE

## 2019-10-07 RX ORDER — DIGOXIN 125 MCG
125 TABLET ORAL
Qty: 90 TABLET | Refills: 1 | Status: SHIPPED | OUTPATIENT
Start: 2019-10-07 | End: 2020-03-13

## 2019-10-28 ENCOUNTER — OFFICE VISIT (OUTPATIENT)
Dept: CARDIOLOGY | Facility: CLINIC | Age: 84
End: 2019-10-28

## 2019-10-28 VITALS
WEIGHT: 158 LBS | HEIGHT: 68 IN | SYSTOLIC BLOOD PRESSURE: 140 MMHG | HEART RATE: 71 BPM | DIASTOLIC BLOOD PRESSURE: 60 MMHG | BODY MASS INDEX: 23.95 KG/M2

## 2019-10-28 DIAGNOSIS — J84.10 PULMONARY FIBROSIS (HCC): ICD-10-CM

## 2019-10-28 DIAGNOSIS — I35.0 NONRHEUMATIC AORTIC VALVE STENOSIS: ICD-10-CM

## 2019-10-28 DIAGNOSIS — E78.2 MIXED HYPERLIPIDEMIA: ICD-10-CM

## 2019-10-28 DIAGNOSIS — I48.0 PAF (PAROXYSMAL ATRIAL FIBRILLATION) (HCC): Primary | ICD-10-CM

## 2019-10-28 DIAGNOSIS — I10 ESSENTIAL HYPERTENSION: ICD-10-CM

## 2019-10-28 PROCEDURE — 99214 OFFICE O/P EST MOD 30 MIN: CPT | Performed by: INTERNAL MEDICINE

## 2019-10-28 PROCEDURE — 93000 ELECTROCARDIOGRAM COMPLETE: CPT | Performed by: INTERNAL MEDICINE

## 2019-10-28 RX ORDER — DOFETILIDE 0.12 MG/1
125 CAPSULE ORAL 2 TIMES DAILY
Qty: 180 CAPSULE | Refills: 3 | Status: SHIPPED | OUTPATIENT
Start: 2019-10-28 | End: 2021-09-03 | Stop reason: HOSPADM

## 2019-10-28 NOTE — PROGRESS NOTES
Date of Office Visit: 10/28/19  Encounter Provider: Hector Rivera MD  Place of Service: [facility] CARDIOLOGY  Patient Name: Asia Ly  :[id number]  Referral Provider:No ref. provider found      Chief Complaint   Patient presents with   • Atrial Fibrillation   • Transient Ischemic Attack     History of Present Illness   The patient is a pleasant 87-year-old gentleman with history of paroxysmal  atrial fibrillation. He had been going in and out of atrial fibrillation  when he had run out of his sotalol. This happened in May 2004 when he was  traveling on a plane a fair amount. He went to the emergency room in [place]  where he was found to be in atrial fibrillation. He converted back to sinus  rhythm spontaneously. He saw Dr. Hector Patel about atrial fibrillation  ablation but it was felt it would be best not to pursue that. He also saw  Dr. Man, for a second opinion but again it was felt he was doing well  on sotalol and to maintain him on that.   He then had a stroke and TIA in 2016 was at [facility] was switched from aspirin Eliquis was for that event.  He then had been doing reasonably well but then presented to [facility] in 2016 2 times with symptomatic rapid atrial fibrillation.  While there he had an echocardiogram that showed normal left ventricular systolic function.  Moderate tricuspid insufficiency with mild pulmonary hypertension mild mitral insufficiency.  He also do perfusion stress test for some reason that was negative for ischemia.  He was started on amiodarone and discharged came right back in with rapid atrial fibrillation he was given a higher dose of amiodarone and a slower taper dose.  Given his history of pulmonary fibrosis we felt that was a poor choice so discontinued it.  Unfortunately 2017 while just sitting there he just started not feeling normal and his wife taken the emergency him is found  to be in atrial fibrillation he did convert back to sinus rhythm.  That's the first episode he had an probably 3 or 4 years.  Of note however he been taken off his diltiazem due to an allergic reaction of labor rash.    He was here in November with increased lower extremity edema he was also in rapid atrial fibrillation at that time he had a rash on diltiazem so we did an echocardiogram that showed normal left ventricular function mild aortic valve stenosis we placed him on metoprolol added diuretics he improved as his acidemia resolved he then stopped his diuretic.    He then continued to have this rash was felt maybe could be due to his antibiotic doxycycline that was stopped.  He's been back in the emergency room around January February 2018 with iron deficiency anemia was anticoagulation he got scoped no obvious source was found but that been stable.   He then was having a lot of problems around January February in and out of the hospital for multiple reasons.  In and out of atrial fibrillation and very symptomatic from that.  He then ultimately was admitted to the hospital in May 2018 with the fluid turned out but also was in and out of atrial fibrillation.  We decided to start him on Tikosyn.  He had some prolonged QT when initially starting that may be related other medications he was taking including antibiotics.  But that improved and resolved.  He went home.    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  He then had some atrial fibrillation with RVR and was given oral metoprolol and Cardizem and converted back to sinus rhythm.  He was evaluated by electrophysiology with Dr. Nettles who felt that his ablation at his age would be very high risk procedure.  He was later started on digoxin for recurrent rapid atrial fibrillation.  He was admitted again January 2019 and electrophysiology felt that he may for AV node ablation and pacemaker implantation.   However he preferred to stay on Tikosyn and accept occasional breakthroughs.  He now comes in for follow-up. The patient denies chest pain, pressure and heaviness. No shortness of breath, othopnea or PND. No palpitations, near syncope or syncope. No stroke type symptoms like paralysis, paresthesia, abrupt vision loss and dysarthria. No bleeding like blood in the stool or dark stools.  Feeling great.  Is exercising walking wearing regularly without problems.      Atrial Fibrillation   Symptoms are negative for dizziness and weakness. Past medical history includes atrial fibrillation.   Palpitations    Pertinent negatives include no diaphoresis, dizziness, fever, malaise/fatigue, nausea, numbness, vomiting or weakness.   Leg Swelling   Pertinent negatives include no abdominal pain, congestion, diaphoresis, fever, headaches, joint swelling, myalgias, nausea, numbness, rash, vertigo, vomiting or weakness.         Past Medical History:   Diagnosis Date   • Aortic stenosis    • Aspiration pneumonia (CMS/HCC)    • Atrial fibrillation (CMS/HCC)    • Bronchiectasis (CMS/HCC)    • CKD (chronic kidney disease)    • COPD (chronic obstructive pulmonary disease) (CMS/HCC)    • Crohn's disease (CMS/HCC)    • Dizziness    • Elevated cholesterol    • Gastric ulcer    • Generalized weakness    • Gout    • Hard of hearing    • Hyperlipidemia    • Hypertension    • Leg swelling    • Lower extremity edema    • Mild anemia    • Near syncope    • Nonrheumatic aortic valve stenosis    • PAF (paroxysmal atrial fibrillation) (CMS/HCC)    • Palpitations    • Peptic ulcer    • Pneumonia of left lung due to infectious organism    • Pulmonary fibrosis (CMS/HCC)    • Stroke (CMS/HCC)    • TIA (transient ischemic attack)          Past Surgical History:   Procedure Laterality Date   • BRONCHOSCOPY N/A 10/22/2018    Procedure: BRONCHOSCOPY WITH BRONCHALVEOLAR LAVAGE;  Surgeon: Chidi Oconnor MD;  Location: [facility] ENDOSCOPY;  Service: Pulmonary   •  "CATARACT EXTRACTION Bilateral    • COLONOSCOPY     • COLONOSCOPY N/A 3/9/2018    Procedure: COLONOSCOPY to terminal ileum with bx;  Surgeon: Aron Cabrera MD;  Location: [facility] ENDOSCOPY;  Service:    • CYSTECTOMY      back and shoulder area   • ENDOSCOPY N/A 3/9/2018    Procedure: ESOPHAGOGASTRODUODENOSCOPY with bx;  Surgeon: Aorn Cabrera MD;  Location: [facility] ENDOSCOPY;  Service:    • EYE SURGERY Left     detached retina   • EYE SURGERY Right     \"repaired a hole in the eye\"   • TESTICLE SURGERY      benign tumor removed.         Current Outpatient Medications on File Prior to Visit   Medication Sig Dispense Refill   • allopurinol (ZYLOPRIM) 100 MG tablet Take 100 mg by mouth 2 (Two) Times a Day.     • apixaban (ELIQUIS) 5 MG tablet tablet Take 1 tablet by mouth Every 12 (Twelve) Hours. 180 tablet 3   • atorvastatin (LIPITOR) 10 MG tablet Take one tablet by mouth Mon, Wed and Fri.     • BUDESONIDE PO Take 3 mg by mouth Daily.     • Cyanocobalamin (VITAMIN B-12 CR PO) Take 500 mg by mouth Daily.     • DIGOX 125 MCG tablet TAKE 1 TABLET BY MOUTH DAILY 90 tablet 0   • digoxin (LANOXIN) 125 MCG tablet Take 1 tablet by mouth Daily. 90 tablet 1   • dofetilide (TIKOSYN) 125 MCG capsule Take 125 mcg by mouth 2 (Two) Times a Day.     • ferrous sulfate 325 (65 FE) MG tablet Take 325 mg by mouth 3 (Three) Times a Day.     • furosemide (LASIX) 20 MG tablet Take 20 mg by mouth Daily.     • metoprolol tartrate (LOPRESSOR) 50 MG tablet Take 1 tablet by mouth 3 (Three) Times a Day. 270 tablet 1   • omeprazole (priLOSEC) 40 MG capsule Take 40 mg by mouth Daily.       No current facility-administered medications on file prior to visit.          Social History     Socioeconomic History   • Marital status:      Spouse name: Not on file   • Number of children: Not on file   • Years of education: Not on file   • Highest education level: Not on file   Tobacco Use   • Smoking status: Former Smoker     Types: Cigars     Last " attempt to quit: 10/28/1994     Years since quittin.0   • Smokeless tobacco: Never Used   • Tobacco comment: Quit 25 years ago   Substance and Sexual Activity   • Alcohol use: No     Comment: daily caffiene - 1 cup of coffee   • Drug use: No   • Sexual activity: Defer       Family History   Problem Relation Age of Onset   • Stroke Mother    • Heart disease Mother    • Hypertension Mother    • Diabetes Mother    • Heart disease Sister         Heart Valve Replacement   • Ovarian cancer Sister    • Rheumatic fever Sister    • Diabetes Sister    • Stroke Father          Review of Systems   Constitution: Negative for decreased appetite, diaphoresis, fever, weakness, malaise/fatigue, weight gain and weight loss.   HENT: Negative for congestion, hearing loss, nosebleeds and tinnitus.    Eyes: Negative for blurred vision, double vision, vision loss in left eye, vision loss in right eye and visual disturbance.   Cardiovascular:        As noted in HPI   Respiratory:        As noted HPI   Endocrine: Negative for cold intolerance and heat intolerance.   Hematologic/Lymphatic: Negative for bleeding problem. Does not bruise/bleed easily.   Skin: Negative for color change, flushing, itching and rash.   Musculoskeletal: Negative for arthritis, back pain, joint pain, joint swelling, muscle weakness and myalgias.   Gastrointestinal: Negative for bloating, abdominal pain, constipation, diarrhea, dysphagia, heartburn, hematemesis, hematochezia, melena, nausea and vomiting.   Genitourinary: Negative for bladder incontinence, dysuria, frequency, nocturia and urgency.   Neurological: Negative for dizziness, focal weakness, headaches, light-headedness, loss of balance, numbness, paresthesias and vertigo.   Psychiatric/Behavioral: Negative for depression, memory loss and substance abuse.       Procedures      ECG 12 Lead  Date/Time: 10/28/2019 1:48 PM  Performed by: Hector Rivera MD  Authorized by: Hector Rivera MD  "  Comparison: compared with previous ECG   Similar to previous ECG  Rhythm: sinus rhythm  Rate: normal  QRS axis: normal                  Objective:    Ht 172.7 cm (68\")   Wt 71.7 kg (158 lb)   BMI 24.02 kg/m²        Physical Exam  Physical Exam   Constitutional: He is oriented to person, place, and time. He appears well-developed and well-nourished. No distress.   HENT:   Head: Normocephalic.   Eyes: Conjunctivae are normal. Pupils are equal, round, and reactive to light. No scleral icterus.   Neck: Normal carotid pulses, no hepatojugular reflux and no JVD present. Carotid bruit is not present. No tracheal deviation, no edema and no erythema present. No thyromegaly present.   Cardiovascular: Normal rate, regular rhythm, S1 normal, S2 normal and intact distal pulses.  No extrasystoles are present. PMI is not displaced. Exam reveals no gallop, no distant heart sounds and no friction rub.   Murmur heard.   Systolic murmur is present with a grade of 3/6 at the upper right sternal border.  Pulses:       Carotid pulses are 2+ on the right side, and 2+ on the left side.       Radial pulses are 2+ on the right side, and 2+ on the left side.        Femoral pulses are 2+ on the right side, and 2+ on the left side.       Dorsalis pedis pulses are 2+ on the right side, and 2+ on the left side.        Posterior tibial pulses are 2+ on the right side, and 2+ on the left side.   Pulmonary/Chest: Effort normal. No respiratory distress. He has no decreased breath sounds. He has no wheezes. He has no rhonchi. He has rales (coarse) in the right lower field and the left lower field. He exhibits no tenderness.   Abdominal: Soft. Bowel sounds are normal. He exhibits no distension and no mass. There is no hepatosplenomegaly. There is no tenderness. There is no rebound and no guarding.   Musculoskeletal: He exhibits no edema, tenderness or deformity.   Neurological: He is alert and oriented to person, place, and time.   Skin: Skin is " warm and dry. No rash noted. He is not diaphoretic. No cyanosis or erythema. No pallor. Nails show no clubbing.   Psychiatric: He has a normal mood and affect. His speech is normal and behavior is normal. Judgment and thought content normal.           Assessment:   1. This is a 87-year-old gentleman with a history of paroxysmal atrial fibrillation. Off amiodarone due to pulmonary fibrosis, rash I believe on diltiazem.   Atrial Fibrillation and Atrial Flutter  Assessment  • The patient has paroxysmal atrial fibrillation  • This is non-valvular in etiology  • The patient's CHADS2-VASc score is 3  • A [id number] score of 2 or more is considered a high risk for a thromboembolic event  • Apixaban prescribed     Plan  • Attempt to maintain sinus rhythm  • Continue apixaban for antithrombotic therapy, bleeding issues discussed  • Continue dofetilide for rhythm control  He is now very stable on Tikosyn, digoxin, and metoprolol.  He will continue the same we will see his skin follow-up in 6 months and call if problems.     2. History of Crohn's disease.  3. History of gout, seems to be stable.  4. History of aspiration pneumonia, pulmonary fibrosis, and bronchiectasis.    5.   Hypertension blood pressure adequate control.  6.  Lower extremity edema in retrospect I believe this was due to his atrial fibrillation.  This is now resolved.  7.  Aortic stenosis.  Echocardiogram November 2017 this was just mild.  Continue to follow him clinically.  8.  Anemia appears stable continue the same he's tolerating eliquis.  Continue the same.           Plan:

## 2019-11-22 RX ORDER — DIGOXIN 125 MCG
TABLET ORAL
Qty: 90 TABLET | Refills: 0 | Status: SHIPPED | OUTPATIENT
Start: 2019-11-22 | End: 2020-03-13 | Stop reason: SDUPTHER

## 2019-12-16 RX ORDER — METOPROLOL TARTRATE 50 MG/1
50 TABLET, FILM COATED ORAL 3 TIMES DAILY
Qty: 270 TABLET | Refills: 3 | Status: SHIPPED | OUTPATIENT
Start: 2019-12-16

## 2020-01-21 ENCOUNTER — OFFICE (OUTPATIENT)
Dept: URBAN - METROPOLITAN AREA CLINIC 66 | Facility: CLINIC | Age: 85
End: 2020-01-21

## 2020-01-21 VITALS
HEART RATE: 72 BPM | SYSTOLIC BLOOD PRESSURE: 118 MMHG | DIASTOLIC BLOOD PRESSURE: 76 MMHG | WEIGHT: 154 LBS | HEIGHT: 68 IN

## 2020-01-21 DIAGNOSIS — Z79.52 LONG TERM (CURRENT) USE OF SYSTEMIC STEROIDS: ICD-10-CM

## 2020-01-21 DIAGNOSIS — M81.0 AGE-RELATED OSTEOPOROSIS WITHOUT CURRENT PATHOLOGICAL FRACTU: ICD-10-CM

## 2020-01-21 DIAGNOSIS — K50.10 CROHN'S DISEASE OF LARGE INTESTINE WITHOUT COMPLICATIONS: ICD-10-CM

## 2020-01-21 PROCEDURE — 99213 OFFICE O/P EST LOW 20 MIN: CPT | Performed by: INTERNAL MEDICINE

## 2020-03-13 RX ORDER — DIGOXIN 125 MCG
125 TABLET ORAL
Qty: 90 TABLET | Refills: 1 | Status: SHIPPED | OUTPATIENT
Start: 2020-03-13 | End: 2020-10-12 | Stop reason: SDUPTHER

## 2020-04-15 ENCOUNTER — TELEPHONE (OUTPATIENT)
Dept: CARDIOLOGY | Facility: CLINIC | Age: 85
End: 2020-04-15

## 2020-04-29 ENCOUNTER — OFFICE VISIT (OUTPATIENT)
Dept: CARDIOLOGY | Facility: CLINIC | Age: 85
End: 2020-04-29

## 2020-04-29 VITALS
HEART RATE: 60 BPM | DIASTOLIC BLOOD PRESSURE: 43 MMHG | WEIGHT: 155 LBS | BODY MASS INDEX: 23.49 KG/M2 | HEIGHT: 68 IN | SYSTOLIC BLOOD PRESSURE: 119 MMHG

## 2020-04-29 DIAGNOSIS — E78.2 MIXED HYPERLIPIDEMIA: ICD-10-CM

## 2020-04-29 DIAGNOSIS — I10 ESSENTIAL HYPERTENSION: ICD-10-CM

## 2020-04-29 DIAGNOSIS — I48.0 PAF (PAROXYSMAL ATRIAL FIBRILLATION) (HCC): Primary | ICD-10-CM

## 2020-04-29 DIAGNOSIS — J84.10 PULMONARY FIBROSIS (HCC): ICD-10-CM

## 2020-04-29 DIAGNOSIS — I35.0 NONRHEUMATIC AORTIC VALVE STENOSIS: ICD-10-CM

## 2020-04-29 PROCEDURE — 99442 PR PHYS/QHP TELEPHONE EVALUATION 11-20 MIN: CPT | Performed by: INTERNAL MEDICINE

## 2020-04-29 NOTE — PROGRESS NOTES
Date of Office Visit: 20  Encounter Provider: Hector Rivera MD  Place of Service: [facility] CARDIOLOGY  Patient Name: Asia Ly  :1932  Referral Provider:No ref. provider found      No chief complaint on file.    History of Present Illness   This patient has consented to a telehealth visit via phone. The visit was scheduled as a follow up visit to comply with patient safety concerns in accordance with CDC recommendations.  All vitals recorded within this visit are reported by the patient.  I spent  20 minutes in total including but not limited to the 10 minutes spent in direct conversation with this patient.     The patient is a pleasant 87-year-old gentleman with history of paroxysmal  atrial fibrillation. He had been going in and out of atrial fibrillation  when he had run out of his sotalol. This happened in May 2004 when he was  traveling on a plane a fair amount. He went to the emergency room in [place]  where he was found to be in atrial fibrillation. He converted back to sinus  rhythm spontaneously. He saw Dr. Hector Patel about atrial fibrillation  ablation but it was felt it would be best not to pursue that. He also saw  Dr. Man, for a second opinion but again it was felt he was doing well  on sotalol and to maintain him on that.   He then had a stroke and TIA in 2016 was at [facility] was switched from aspirin Eliquis was for that event.  He then had been doing reasonably well but then presented to [facility] in 2016 2 times with symptomatic rapid atrial fibrillation.  While there he had an echocardiogram that showed normal left ventricular systolic function.  Moderate tricuspid insufficiency with mild pulmonary hypertension mild mitral insufficiency.  He also do perfusion stress test for some reason that was negative for ischemia.  He was started on amiodarone and discharged came right back in with rapid  atrial fibrillation he was given a higher dose of amiodarone and a slower taper dose.  Given his history of pulmonary fibrosis we felt that was a poor choice so discontinued it.  Unfortunately July 28, 2017 while just sitting there he just started not feeling normal and his wife taken the emergency him is found to be in atrial fibrillation he did convert back to sinus rhythm.  That's the first episode he had an probably 3 or 4 years.  Of note however he been taken off his diltiazem due to an allergic reaction of labor rash.    He was here in November with increased lower extremity edema he was also in rapid atrial fibrillation at that time he had a rash on diltiazem so we did an echocardiogram that showed normal left ventricular function mild aortic valve stenosis we placed him on metoprolol added diuretics he improved as his acidemia resolved he then stopped his diuretic.    He then continued to have this rash was felt maybe could be due to his antibiotic doxycycline that was stopped.  He's been back in the emergency room around January February 2018 with iron deficiency anemia was anticoagulation he got scoped no obvious source was found but that been stable.   He then was having a lot of problems around January February in and out of the hospital for multiple reasons.  In and out of atrial fibrillation and very symptomatic from that.  He then ultimately was admitted to the hospital in May 2018 with the fluid turned out but also was in and out of atrial fibrillation.  We decided to start him on Tikosyn.  He had some prolonged QT when initially starting that may be related other medications he was taking including antibiotics.  But that improved and resolved.  He went home.    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  He then had some atrial fibrillation with RVR and was given oral metoprolol and Cardizem and converted back to sinus rhythm.  He was evaluated by  electrophysiology with Dr. Nettles who felt that his ablation at his age would be very high risk procedure.  He was later started on digoxin for recurrent rapid atrial fibrillation.  He was admitted again January 2019 and electrophysiology felt that he may for AV node ablation and pacemaker implantation.  However he preferred to stay on Tikosyn and accept occasional breakthroughs.  The patient denies chest pain, pressure and heaviness. No shortness of breath, othopnea or PND. No palpitations, near syncope or syncope. No stroke type symptoms like paralysis, paresthesia, abrupt vision loss and dysarthria. No bleeding like blood in the stool or dark stools.  He is walking 2 miles a to the gym.      Atrial Fibrillation   Symptoms include palpitations. Symptoms are negative for dizziness and weakness. Past medical history includes atrial fibrillation.   Palpitations    Pertinent negatives include no diaphoresis, dizziness, fever, malaise/fatigue, nausea, numbness, vomiting or weakness.   Leg Swelling   Pertinent negatives include no abdominal pain, congestion, diaphoresis, fever, headaches, joint swelling, myalgias, nausea, numbness, rash, vertigo, vomiting or weakness.         Past Medical History:   Diagnosis Date   • Aortic stenosis    • Aspiration pneumonia (CMS/HCC)    • Atrial fibrillation (CMS/HCC)    • Bronchiectasis (CMS/HCC)    • CKD (chronic kidney disease)    • COPD (chronic obstructive pulmonary disease) (CMS/HCC)    • Crohn's disease (CMS/HCC)    • Dizziness    • Elevated cholesterol    • Gastric ulcer    • Generalized weakness    • Gout    • Hard of hearing    • Hyperlipidemia    • Hypertension    • Leg swelling    • Lower extremity edema    • Mild anemia    • Near syncope    • Nonrheumatic aortic valve stenosis    • PAF (paroxysmal atrial fibrillation) (CMS/HCC)    • Palpitations    • Peptic ulcer    • Pneumonia of left lung due to infectious organism    • Pulmonary fibrosis (CMS/HCC)    • Stroke  "(CMS/[facility])    • TIA (transient ischemic attack)          Past Surgical History:   Procedure Laterality Date   • BRONCHOSCOPY N/A 10/22/2018    Procedure: BRONCHOSCOPY WITH BRONCHALVEOLAR LAVAGE;  Surgeon: Chidi Oconnor MD;  Location: [facility] ENDOSCOPY;  Service: Pulmonary   • CATARACT EXTRACTION Bilateral    • COLONOSCOPY     • COLONOSCOPY N/A 3/9/2018    Procedure: COLONOSCOPY to terminal ileum with bx;  Surgeon: Aron Cabrera MD;  Location: [facility] ENDOSCOPY;  Service:    • CYSTECTOMY      back and shoulder area   • ENDOSCOPY N/A 3/9/2018    Procedure: ESOPHAGOGASTRODUODENOSCOPY with bx;  Surgeon: Aron Cabrera MD;  Location: [facility] ENDOSCOPY;  Service:    • EYE SURGERY Left     detached retina   • EYE SURGERY Right     \"repaired a hole in the eye\"   • TESTICLE SURGERY      benign tumor removed.         Current Outpatient Medications on File Prior to Visit   Medication Sig Dispense Refill   • allopurinol (ZYLOPRIM) 100 MG tablet Take 100 mg by mouth 2 (Two) Times a Day.     • apixaban (ELIQUIS) 5 MG tablet tablet Take 1 tablet by mouth Every 12 (Twelve) Hours. 180 tablet 3   • atorvastatin (LIPITOR) 10 MG tablet Take one tablet by mouth Mon, Wed and Fri.     • BUDESONIDE PO Take 3 mg by mouth Daily.     • Cyanocobalamin (VITAMIN B-12 CR PO) Take 500 mg by mouth Daily.     • digoxin (LANOXIN) 125 MCG tablet TAKE 1 TABLET BY MOUTH DAILY 90 tablet 1   • dofetilide (TIKOSYN) 125 MCG capsule Take 1 capsule by mouth 2 (Two) Times a Day. 180 capsule 3   • ferrous sulfate 325 (65 FE) MG tablet Take 325 mg by mouth 3 (Three) Times a Day.     • furosemide (LASIX) 20 MG tablet Take 20 mg by mouth Daily.     • metoprolol tartrate (LOPRESSOR) 50 MG tablet Take 1 tablet by mouth 3 (Three) Times a Day. 270 tablet 3   • omeprazole (priLOSEC) 40 MG capsule Take 40 mg by mouth Daily.       No current facility-administered medications on file prior to visit.          Social History     Socioeconomic History   • Marital status: "      Spouse name: Not on file   • Number of children: Not on file   • Years of education: Not on file   • Highest education level: Not on file   Tobacco Use   • Smoking status: Former Smoker     Types: Cigars     Last attempt to quit: 10/28/1994     Years since quittin.5   • Smokeless tobacco: Never Used   • Tobacco comment: Quit 25 years ago   Substance and Sexual Activity   • Alcohol use: No     Comment: daily caffiene - 1 cup of coffee   • Drug use: No   • Sexual activity: Defer       Family History   Problem Relation Age of Onset   • Stroke Mother    • Heart disease Mother    • Hypertension Mother    • Diabetes Mother    • Heart disease Sister         Heart Valve Replacement   • Ovarian cancer Sister    • Rheumatic fever Sister    • Diabetes Sister    • Stroke Father          Review of Systems   Constitution: Negative for decreased appetite, diaphoresis, fever, malaise/fatigue, weight gain and weight loss.   HENT: Negative for congestion, hearing loss, nosebleeds and tinnitus.    Eyes: Negative for blurred vision, double vision, vision loss in left eye, vision loss in right eye and visual disturbance.   Cardiovascular: Positive for palpitations.        As noted in HPI   Respiratory:        As noted HPI   Endocrine: Negative for cold intolerance and heat intolerance.   Hematologic/Lymphatic: Negative for bleeding problem. Does not bruise/bleed easily.   Skin: Negative for color change, flushing, itching and rash.   Musculoskeletal: Negative for arthritis, back pain, joint pain, joint swelling, muscle weakness and myalgias.   Gastrointestinal: Negative for bloating, abdominal pain, constipation, diarrhea, dysphagia, heartburn, hematemesis, hematochezia, melena, nausea and vomiting.   Genitourinary: Negative for bladder incontinence, dysuria, frequency, nocturia and urgency.   Neurological: Negative for dizziness, focal weakness, headaches, light-headedness, loss of balance, numbness, paresthesias,  vertigo and weakness.   Psychiatric/Behavioral: Negative for depression, memory loss and substance abuse.       Procedures    Procedures        Objective:    There were no vitals taken for this visit.       Physical Exam  Physical Exam  Phone call      Assessment:   1. This is a 87-year-old gentleman with a history of paroxysmal atrial fibrillation. Off amiodarone due to pulmonary fibrosis, rash I believe on diltiazem.  The patient's CHADS2-VASc score is 3.  He feels since adding that digoxin he feels better.  Return to the clinic in 6 months for follow-up will need an EKG pressure and digoxin level.  2. History of Crohn's disease.  3. History of gout, seems to be stable.  4. History of aspiration pneumonia, pulmonary fibrosis, and bronchiectasis.  Stable follow with pulmonary.  5.   Hypertension blood pressure adequate control.  6.  Lower extremity edema in retrospect I believe this was due to his atrial fibrillation.  This is now resolved.  7.  Aortic stenosis.  Echocardiogram November 2017 this was just mild.  Continue to follow him clinically.  8.  Anemia appears stable continue the same he's tolerating eliquis.  Continue the same.            Plan:

## 2020-05-26 ENCOUNTER — HOSPITAL ENCOUNTER (EMERGENCY)
Facility: HOSPITAL | Age: 85
Discharge: HOME | End: 2020-05-26
Attending: EMERGENCY MEDICINE | Admitting: EMERGENCY MEDICINE

## 2020-05-26 ENCOUNTER — APPOINTMENT (OUTPATIENT)
Dept: CT IMAGING | Facility: HOSPITAL | Age: 85
End: 2020-05-26

## 2020-05-26 VITALS
TEMPERATURE: 97.9 F | HEART RATE: 56 BPM | SYSTOLIC BLOOD PRESSURE: 136 MMHG | RESPIRATION RATE: 18 BRPM | DIASTOLIC BLOOD PRESSURE: 67 MMHG | OXYGEN SATURATION: 93 %

## 2020-05-26 DIAGNOSIS — R10.10 UPPER ABDOMINAL PAIN: Primary | ICD-10-CM

## 2020-05-26 LAB
ALBUMIN SERPL-MCNC: 4.3 G/DL (ref 3.5–5.2)
ALBUMIN/GLOB SERPL: 1.4 G/DL
ALP SERPL-CCNC: 54 U/L (ref 39–117)
ALT SERPL W P-5'-P-CCNC: 11 U/L (ref 1–41)
ANION GAP SERPL CALCULATED.3IONS-SCNC: 10.7 MMOL/L (ref 5–15)
AST SERPL-CCNC: 16 U/L (ref 1–40)
BACTERIA UR QL AUTO: NORMAL /HPF
BASOPHILS # BLD AUTO: 0.07 10*3/MM3 (ref 0–0.2)
BASOPHILS NFR BLD AUTO: 0.7 % (ref 0–1.5)
BILIRUB SERPL-MCNC: 0.4 MG/DL (ref 0.2–1.2)
BILIRUB UR QL STRIP: NEGATIVE
BUN BLD-MCNC: 19 MG/DL (ref 8–23)
BUN/CREAT SERPL: 15.1 (ref 7–25)
CALCIUM SPEC-SCNC: 9.2 MG/DL (ref 8.6–10.5)
CHLORIDE SERPL-SCNC: 100 MMOL/L (ref 98–107)
CLARITY UR: CLEAR
CO2 SERPL-SCNC: 26.3 MMOL/L (ref 22–29)
COLOR UR: YELLOW
CREAT BLD-MCNC: 1.26 MG/DL (ref 0.76–1.27)
DEPRECATED RDW RBC AUTO: 48.2 FL (ref 37–54)
EOSINOPHIL # BLD AUTO: 0.17 10*3/MM3 (ref 0–0.4)
EOSINOPHIL NFR BLD AUTO: 1.7 % (ref 0.3–6.2)
ERYTHROCYTE [DISTWIDTH] IN BLOOD BY AUTOMATED COUNT: 13.4 % (ref 12.3–15.4)
GFR SERPL CREATININE-BSD FRML MDRD: 54 ML/MIN/1.73
GLOBULIN UR ELPH-MCNC: 3.1 GM/DL
GLUCOSE BLD-MCNC: 142 MG/DL (ref 65–99)
GLUCOSE UR STRIP-MCNC: NEGATIVE MG/DL
HCT VFR BLD AUTO: 37.1 % (ref 37.5–51)
HGB BLD-MCNC: 12 G/DL (ref 13–17.7)
HGB UR QL STRIP.AUTO: NEGATIVE
HYALINE CASTS UR QL AUTO: NORMAL /LPF
IMM GRANULOCYTES # BLD AUTO: 0.05 10*3/MM3 (ref 0–0.05)
IMM GRANULOCYTES NFR BLD AUTO: 0.5 % (ref 0–0.5)
KETONES UR QL STRIP: NEGATIVE
LEUKOCYTE ESTERASE UR QL STRIP.AUTO: ABNORMAL
LIPASE SERPL-CCNC: 21 U/L (ref 13–60)
LYMPHOCYTES # BLD AUTO: 1.45 10*3/MM3 (ref 0.7–3.1)
LYMPHOCYTES NFR BLD AUTO: 14.1 % (ref 19.6–45.3)
MCH RBC QN AUTO: 30.9 PG (ref 26.6–33)
MCHC RBC AUTO-ENTMCNC: 32.3 G/DL (ref 31.5–35.7)
MCV RBC AUTO: 95.6 FL (ref 79–97)
MONOCYTES # BLD AUTO: 0.82 10*3/MM3 (ref 0.1–0.9)
MONOCYTES NFR BLD AUTO: 8 % (ref 5–12)
NEUTROPHILS # BLD AUTO: 7.71 10*3/MM3 (ref 1.7–7)
NEUTROPHILS NFR BLD AUTO: 75 % (ref 42.7–76)
NITRITE UR QL STRIP: NEGATIVE
NRBC BLD AUTO-RTO: 0 /100 WBC (ref 0–0.2)
PH UR STRIP.AUTO: <=5 [PH] (ref 5–8)
PLATELET # BLD AUTO: 178 10*3/MM3 (ref 140–450)
PMV BLD AUTO: 11.1 FL (ref 6–12)
POTASSIUM BLD-SCNC: 4.4 MMOL/L (ref 3.5–5.2)
PROT SERPL-MCNC: 7.4 G/DL (ref 6–8.5)
PROT UR QL STRIP: ABNORMAL
RBC # BLD AUTO: 3.88 10*6/MM3 (ref 4.14–5.8)
RBC # UR: NORMAL /HPF
REF LAB TEST METHOD: NORMAL
SODIUM BLD-SCNC: 137 MMOL/L (ref 136–145)
SP GR UR STRIP: 1.02 (ref 1–1.03)
SQUAMOUS #/AREA URNS HPF: NORMAL /HPF
UROBILINOGEN UR QL STRIP: ABNORMAL
WBC NRBC COR # BLD: 10.27 10*3/MM3 (ref 3.4–10.8)
WBC UR QL AUTO: NORMAL /HPF

## 2020-05-26 PROCEDURE — 99284 EMERGENCY DEPT VISIT MOD MDM: CPT

## 2020-05-26 PROCEDURE — 74177 CT ABD & PELVIS W/CONTRAST: CPT

## 2020-05-26 PROCEDURE — 81001 URINALYSIS AUTO W/SCOPE: CPT | Performed by: EMERGENCY MEDICINE

## 2020-05-26 PROCEDURE — 25010000002 ONDANSETRON PER 1 MG: Performed by: EMERGENCY MEDICINE

## 2020-05-26 PROCEDURE — 85025 COMPLETE CBC W/AUTO DIFF WBC: CPT | Performed by: EMERGENCY MEDICINE

## 2020-05-26 PROCEDURE — 83690 ASSAY OF LIPASE: CPT | Performed by: EMERGENCY MEDICINE

## 2020-05-26 PROCEDURE — 25010000002 IOPAMIDOL 61 % SOLUTION: Performed by: EMERGENCY MEDICINE

## 2020-05-26 PROCEDURE — 96375 TX/PRO/DX INJ NEW DRUG ADDON: CPT

## 2020-05-26 PROCEDURE — 25010000002 MORPHINE PER 10 MG: Performed by: EMERGENCY MEDICINE

## 2020-05-26 PROCEDURE — 96374 THER/PROPH/DIAG INJ IV PUSH: CPT

## 2020-05-26 PROCEDURE — 80053 COMPREHEN METABOLIC PANEL: CPT | Performed by: EMERGENCY MEDICINE

## 2020-05-26 RX ORDER — MORPHINE SULFATE 2 MG/ML
4 INJECTION, SOLUTION INTRAMUSCULAR; INTRAVENOUS ONCE
Status: COMPLETED | OUTPATIENT
Start: 2020-05-26 | End: 2020-05-26

## 2020-05-26 RX ORDER — SODIUM CHLORIDE 0.9 % (FLUSH) 0.9 %
10 SYRINGE (ML) INJECTION AS NEEDED
Status: DISCONTINUED | OUTPATIENT
Start: 2020-05-26 | End: 2020-05-26 | Stop reason: HOSPADM

## 2020-05-26 RX ORDER — ONDANSETRON 2 MG/ML
4 INJECTION INTRAMUSCULAR; INTRAVENOUS ONCE
Status: COMPLETED | OUTPATIENT
Start: 2020-05-26 | End: 2020-05-26

## 2020-05-26 RX ORDER — SUCRALFATE ORAL 1 G/10ML
1 SUSPENSION ORAL
Qty: 420 ML | Refills: 0 | Status: SHIPPED | OUTPATIENT
Start: 2020-05-26 | End: 2020-08-26 | Stop reason: SDUPTHER

## 2020-05-26 RX ORDER — ONDANSETRON 8 MG/1
8 TABLET, ORALLY DISINTEGRATING ORAL EVERY 8 HOURS PRN
Qty: 12 TABLET | Refills: 0 | Status: SHIPPED | OUTPATIENT
Start: 2020-05-26 | End: 2020-09-22

## 2020-05-26 RX ADMIN — MORPHINE SULFATE 4 MG: 2 INJECTION, SOLUTION INTRAMUSCULAR; INTRAVENOUS at 02:08

## 2020-05-26 RX ADMIN — SODIUM CHLORIDE 1000 ML: 9 INJECTION, SOLUTION INTRAVENOUS at 02:07

## 2020-05-26 RX ADMIN — ONDANSETRON 4 MG: 2 INJECTION INTRAMUSCULAR; INTRAVENOUS at 02:08

## 2020-05-26 RX ADMIN — IOPAMIDOL 85 ML: 612 INJECTION, SOLUTION INTRAVENOUS at 02:33

## 2020-05-31 ENCOUNTER — HOSPITAL ENCOUNTER (EMERGENCY)
Facility: HOSPITAL | Age: 85
Discharge: HOME | End: 2020-06-01
Attending: EMERGENCY MEDICINE | Admitting: EMERGENCY MEDICINE

## 2020-05-31 VITALS
HEART RATE: 59 BPM | TEMPERATURE: 96.2 F | HEIGHT: 68 IN | OXYGEN SATURATION: 97 % | BODY MASS INDEX: 23.34 KG/M2 | SYSTOLIC BLOOD PRESSURE: 142 MMHG | DIASTOLIC BLOOD PRESSURE: 61 MMHG | WEIGHT: 154 LBS | RESPIRATION RATE: 16 BRPM

## 2020-05-31 DIAGNOSIS — K29.00 ACUTE GASTRITIS WITHOUT HEMORRHAGE, UNSPECIFIED GASTRITIS TYPE: ICD-10-CM

## 2020-05-31 DIAGNOSIS — R10.13 EPIGASTRIC PAIN: Primary | ICD-10-CM

## 2020-05-31 LAB
ALBUMIN SERPL-MCNC: 4.2 G/DL (ref 3.5–5.2)
ALBUMIN/GLOB SERPL: 1.4 G/DL
ALP SERPL-CCNC: 84 U/L (ref 39–117)
ALT SERPL W P-5'-P-CCNC: 36 U/L (ref 1–41)
ANION GAP SERPL CALCULATED.3IONS-SCNC: 9.8 MMOL/L (ref 5–15)
AST SERPL-CCNC: 68 U/L (ref 1–40)
BASOPHILS # BLD AUTO: 0.05 10*3/MM3 (ref 0–0.2)
BASOPHILS NFR BLD AUTO: 0.5 % (ref 0–1.5)
BILIRUB SERPL-MCNC: 0.9 MG/DL (ref 0.2–1.2)
BUN BLD-MCNC: 20 MG/DL (ref 8–23)
BUN/CREAT SERPL: 14 (ref 7–25)
CALCIUM SPEC-SCNC: 9 MG/DL (ref 8.6–10.5)
CHLORIDE SERPL-SCNC: 102 MMOL/L (ref 98–107)
CO2 SERPL-SCNC: 25.2 MMOL/L (ref 22–29)
CREAT BLD-MCNC: 1.43 MG/DL (ref 0.76–1.27)
D-LACTATE SERPL-SCNC: 1.3 MMOL/L (ref 0.5–2)
DEPRECATED RDW RBC AUTO: 46.3 FL (ref 37–54)
EOSINOPHIL # BLD AUTO: 0.27 10*3/MM3 (ref 0–0.4)
EOSINOPHIL NFR BLD AUTO: 2.9 % (ref 0.3–6.2)
ERYTHROCYTE [DISTWIDTH] IN BLOOD BY AUTOMATED COUNT: 13.4 % (ref 12.3–15.4)
GFR SERPL CREATININE-BSD FRML MDRD: 47 ML/MIN/1.73
GLOBULIN UR ELPH-MCNC: 3.1 GM/DL
GLUCOSE BLD-MCNC: 124 MG/DL (ref 65–99)
HCT VFR BLD AUTO: 35.9 % (ref 37.5–51)
HGB BLD-MCNC: 11.8 G/DL (ref 13–17.7)
HOLD SPECIMEN: NORMAL
HOLD SPECIMEN: NORMAL
IMM GRANULOCYTES # BLD AUTO: 0.08 10*3/MM3 (ref 0–0.05)
IMM GRANULOCYTES NFR BLD AUTO: 0.9 % (ref 0–0.5)
LIPASE SERPL-CCNC: 28 U/L (ref 13–60)
LYMPHOCYTES # BLD AUTO: 1.55 10*3/MM3 (ref 0.7–3.1)
LYMPHOCYTES NFR BLD AUTO: 16.8 % (ref 19.6–45.3)
MCH RBC QN AUTO: 30.8 PG (ref 26.6–33)
MCHC RBC AUTO-ENTMCNC: 32.9 G/DL (ref 31.5–35.7)
MCV RBC AUTO: 93.7 FL (ref 79–97)
MONOCYTES # BLD AUTO: 0.95 10*3/MM3 (ref 0.1–0.9)
MONOCYTES NFR BLD AUTO: 10.3 % (ref 5–12)
NEUTROPHILS # BLD AUTO: 6.34 10*3/MM3 (ref 1.7–7)
NEUTROPHILS NFR BLD AUTO: 68.6 % (ref 42.7–76)
NRBC BLD AUTO-RTO: 0 /100 WBC (ref 0–0.2)
PLATELET # BLD AUTO: 173 10*3/MM3 (ref 140–450)
PMV BLD AUTO: 11.4 FL (ref 6–12)
POTASSIUM BLD-SCNC: 4.5 MMOL/L (ref 3.5–5.2)
PROT SERPL-MCNC: 7.3 G/DL (ref 6–8.5)
RBC # BLD AUTO: 3.83 10*6/MM3 (ref 4.14–5.8)
SODIUM BLD-SCNC: 137 MMOL/L (ref 136–145)
WBC NRBC COR # BLD: 9.24 10*3/MM3 (ref 3.4–10.8)
WHOLE BLOOD HOLD SPECIMEN: NORMAL
WHOLE BLOOD HOLD SPECIMEN: NORMAL

## 2020-05-31 PROCEDURE — 80053 COMPREHEN METABOLIC PANEL: CPT | Performed by: EMERGENCY MEDICINE

## 2020-05-31 PROCEDURE — 83690 ASSAY OF LIPASE: CPT | Performed by: EMERGENCY MEDICINE

## 2020-05-31 PROCEDURE — 83605 ASSAY OF LACTIC ACID: CPT | Performed by: EMERGENCY MEDICINE

## 2020-05-31 PROCEDURE — 99283 EMERGENCY DEPT VISIT LOW MDM: CPT

## 2020-05-31 PROCEDURE — 85025 COMPLETE CBC W/AUTO DIFF WBC: CPT | Performed by: EMERGENCY MEDICINE

## 2020-05-31 RX ORDER — SODIUM CHLORIDE 0.9 % (FLUSH) 0.9 %
10 SYRINGE (ML) INJECTION AS NEEDED
Status: DISCONTINUED | OUTPATIENT
Start: 2020-05-31 | End: 2020-06-01 | Stop reason: HOSPADM

## 2020-06-01 NOTE — ED PROVIDER NOTES
EMERGENCY DEPARTMENT ENCOUNTER    CHIEF COMPLAINT  Chief Complaint: Abdominal pain  History given by: Patient  History limited by: None  Room Number: 15/15  PMD: Lubna Lobo MD      HPI:  Pt is a 87 y.o. male who presents complaining of sudden onset epigastric abdominal pain that occurred at approximately 9 PM last night.  The symptoms were not associated with eating.  He states that after they occurred, he ate ice cream as well as milk to try and alleviate the symptoms without relief.  He describes the pain as a dull ache without radiation.  He states that at the time it was moderate in intensity and rates it an 8 out of 10.  He was seen here in the emergency room 4 days ago for the same type pain but says it was worse today.  He has been taking Carafate as well as Zofran to treat the symptoms.  Upon arrival to the emergency room, the patient states all symptoms have entirely resolved and he currently has no complaints.  The patient denies chest pain, shortness of breath, fever/chills, nausea/vomiting, diarrhea.  There were no exacerbating or alleviating factors to the pain.    PAST MEDICAL HISTORY  Active Ambulatory Problems     Diagnosis Date Noted   • Ataxia 08/19/2016   • TIA (transient ischemic attack) 08/19/2016   • Atrial fibrillation, paroxysmal 08/19/2016   • Hyperlipidemia 08/19/2016   • Gout 08/19/2016   • Crohn's disease (CMS/HCC) 08/19/2016   • Atrial fibrillation with RVR (CMS/HCC) 10/27/2018     Resolved Ambulatory Problems     Diagnosis Date Noted   • Hypoxia 04/29/2018     Past Medical History:   Diagnosis Date   • Aortic stenosis    • Aspiration pneumonia (CMS/[facility])    • Bronchiectasis (CMS/HCC)    • CKD (chronic kidney disease)    • COPD (chronic obstructive pulmonary disease) (CMS/[facility])    • Dizziness    • Elevated cholesterol    • Gastric ulcer    • Generalized weakness    • Hard of hearing    • Hypertension    • Leg swelling    • Lower extremity edema    • Mild anemia    • Near syncope    •  "Nonrheumatic aortic valve stenosis    • PAF (paroxysmal atrial fibrillation) (CMS/HCC)    • Palpitations    • Peptic ulcer    • Pneumonia of left lung due to infectious organism    • Pulmonary fibrosis (CMS/HCC)    • Stroke (CMS/HCC)        PAST SURGICAL HISTORY  Past Surgical History:   Procedure Laterality Date   • BRONCHOSCOPY N/A 10/22/2018    Procedure: BRONCHOSCOPY WITH BRONCHALVEOLAR LAVAGE;  Surgeon: Chidi Oconnor MD;  Location: [facility] ENDOSCOPY;  Service: Pulmonary   • CATARACT EXTRACTION Bilateral    • COLONOSCOPY     • COLONOSCOPY N/A 3/9/2018    Procedure: COLONOSCOPY to terminal ileum with bx;  Surgeon: Aron Cabrera MD;  Location: [facility] ENDOSCOPY;  Service:    • CYSTECTOMY      back and shoulder area   • ENDOSCOPY N/A 3/9/2018    Procedure: ESOPHAGOGASTRODUODENOSCOPY with bx;  Surgeon: Aron Caberra MD;  Location: [facility] ENDOSCOPY;  Service:    • EYE SURGERY Left     detached retina   • EYE SURGERY Right     \"repaired a hole in the eye\"   • TESTICLE SURGERY      benign tumor removed.       FAMILY HISTORY  Family History   Problem Relation Age of Onset   • Stroke Mother    • Heart disease Mother    • Hypertension Mother    • Diabetes Mother    • Heart disease Sister         Heart Valve Replacement   • Ovarian cancer Sister    • Rheumatic fever Sister    • Diabetes Sister    • Stroke Father        SOCIAL HISTORY  Social History     Socioeconomic History   • Marital status:      Spouse name: Not on file   • Number of children: Not on file   • Years of education: Not on file   • Highest education level: Not on file   Tobacco Use   • Smoking status: Former Smoker     Types: Cigars     Last attempt to quit: 10/28/1994     Years since quittin.6   • Smokeless tobacco: Never Used   • Tobacco comment: Quit 25 years ago   Substance and Sexual Activity   • Alcohol use: No     Comment: daily caffiene - 1 cup of coffee   • Drug use: No   • Sexual activity: Defer   Lifestyle   • Physical activity: "     Days per week: 7 days     Minutes per session: 30 min   • Stress: Not at all       ALLERGIES  Amiodarone; Diltiazem; Doxycycline; and Indomethacin    REVIEW OF SYSTEMS  Review of Systems   Constitutional: Negative for activity change, appetite change and fever.   HENT: Negative for congestion and sore throat.    Eyes: Negative.    Respiratory: Negative for cough and shortness of breath.    Cardiovascular: Negative for chest pain and leg swelling.   Gastrointestinal: Positive for abdominal pain. Negative for diarrhea and vomiting.   Endocrine: Negative.    Genitourinary: Negative for decreased urine volume and dysuria.   Musculoskeletal: Negative for neck pain.   Skin: Negative for rash and wound.   Allergic/Immunologic: Negative.    Neurological: Negative for weakness, numbness and headaches.   Hematological: Negative.    Psychiatric/Behavioral: Negative.    All other systems reviewed and are negative.      PHYSICAL EXAM  ED Triage Vitals   Temp Heart Rate Resp BP SpO2   05/31/20 2218 05/31/20 2218 05/31/20 2218 05/31/20 2239 05/31/20 2218   96.2 °F (35.7 °C) 59 16 142/61 97 %      Temp src Heart Rate Source Patient Position BP Location FiO2 (%)   05/31/20 2218 05/31/20 2218 05/31/20 2239 05/31/20 2239 --   Tympanic Monitor Lying Right arm        Physical Exam   Constitutional: He is oriented to person, place, and time. No distress.   HENT:   Head: Normocephalic and atraumatic.   Eyes: Pupils are equal, round, and reactive to light. EOM are normal.   Neck: Normal range of motion. Neck supple.   Cardiovascular: Normal rate, regular rhythm and normal heart sounds.   Pulmonary/Chest: Effort normal and breath sounds normal. No respiratory distress.   Abdominal: Soft. There is no tenderness. There is no rebound and no guarding.   Musculoskeletal: Normal range of motion. He exhibits no edema.   Neurological: He is alert and oriented to person, place, and time. He has normal sensation and normal strength.   Skin: Skin  is warm and dry.   Psychiatric: Mood and affect normal.   Nursing note and vitals reviewed.      LAB RESULTS  Lab Results (last 24 hours)     Procedure Component Value Units Date/Time    CBC & Differential [[id number]] Collected:  05/31/20 2245    Specimen:  Blood Updated:  05/31/20 2258    Narrative:       The following orders were created for panel order CBC & Differential.  Procedure                               Abnormality         Status                     ---------                               -----------         ------                     CBC Auto Differential[[id number]]        Abnormal            Final result                 Please view results for these tests on the individual orders.    Comprehensive Metabolic Panel [[id number]]  (Abnormal) Collected:  05/31/20 2245    Specimen:  Blood Updated:  05/31/20 2323     Glucose 124 mg/dL      BUN 20 mg/dL      Creatinine 1.43 mg/dL      Sodium 137 mmol/L      Potassium 4.5 mmol/L      Chloride 102 mmol/L      CO2 25.2 mmol/L      Calcium 9.0 mg/dL      Total Protein 7.3 g/dL      Albumin 4.20 g/dL      ALT (SGPT) 36 U/L      AST (SGOT) 68 U/L      Alkaline Phosphatase 84 U/L      Total Bilirubin 0.9 mg/dL      eGFR Non African Amer 47 mL/min/1.73      Globulin 3.1 gm/dL      A/G Ratio 1.4 g/dL      BUN/Creatinine Ratio 14.0     Anion Gap 9.8 mmol/L     Narrative:       GFR Normal >60  Chronic Kidney Disease <60  Kidney Failure <15      Lipase [[id number]]  (Normal) Collected:  05/31/20 2245    Specimen:  Blood Updated:  05/31/20 2321     Lipase 28 U/L     Lactic Acid, Plasma [[id number]]  (Normal) Collected:  05/31/20 2245    Specimen:  Blood Updated:  05/31/20 2310     Lactate 1.3 mmol/L     CBC Auto Differential [[id number]]  (Abnormal) Collected:  05/31/20 2245    Specimen:  Blood Updated:  05/31/20 2258     WBC 9.24 10*3/mm3      RBC 3.83 10*6/mm3      Hemoglobin 11.8 g/dL      Hematocrit 35.9 %      MCV 93.7 fL      MCH 30.8 pg      MCHC 32.9 g/dL      RDW 13.4  %      RDW-SD 46.3 fl      MPV 11.4 fL      Platelets 173 10*3/mm3      Neutrophil % 68.6 %      Lymphocyte % 16.8 %      Monocyte % 10.3 %      Eosinophil % 2.9 %      Basophil % 0.5 %      Immature Grans % 0.9 %      Neutrophils, Absolute 6.34 10*3/mm3      Lymphocytes, Absolute 1.55 10*3/mm3      Monocytes, Absolute 0.95 10*3/mm3      Eosinophils, Absolute 0.27 10*3/mm3      Basophils, Absolute 0.05 10*3/mm3      Immature Grans, Absolute 0.08 10*3/mm3      nRBC 0.0 /100 WBC           I ordered the above labs and reviewed the results    RADIOLOGY  No orders to display        I ordered the above noted radiological studies. Interpreted by radiologist.  Reviewed by me in PACS.       PROCEDURES  Procedures      PROGRESS AND CONSULTS     The patient was wearing a facemask upon entrance into the room and remained in such throughout their visit.  I was wearing PPE including a facemask as well as gloves at any point entering the room and throughout the visit    0115  Upon reevaluation the patient states that he is pain-free and has remained pain-free throughout the entire ER visit today.  We have instructed the patient to continue his Carafate and Zofran as needed and to follow-up with Dr. Cabrera.  All questions answered and addressed and he will be stable for discharge      MEDICAL DECISION MAKING  Results were reviewed/discussed with the patient and they were also made aware of online access. Pt also made aware that some labs, such as cultures, will not be resulted during ER visit and follow up with PMD is necessary.     MDM  Number of Diagnoses or Management Options     Amount and/or Complexity of Data Reviewed  Clinical lab tests: ordered and reviewed  Review and summarize past medical records: yes (Upon record review, patient was seen and evaluated by myself in this emergency room on 5/26/2020.  He was seen and evaluated for upper abdominal pain and had an unremarkable CT scan of the abdomen and pelvis.)            DIAGNOSIS  Final diagnoses:   Epigastric pain   Acute gastritis without hemorrhage, unspecified gastritis type       DISPOSITION  DISCHARGE    Patient discharged in stable condition.    Reviewed implications of results, diagnosis, meds, responsibility to follow up, warning signs and symptoms of possible worsening, potential complications and reasons to return to ER    Patient/Family voiced understanding of above instructions.    Discussed plan for discharge, as there is no emergent indication for admission. Patient referred to primary care provider for BP management due to today's BP. Pt/family is agreeable and understands need for follow up and repeat testing.  Pt is aware that discharge does not mean that nothing is wrong but it indicates no emergency is present that requires admission and they must continue care with follow-up as given below or physician of their choice.     FOLLOW-UP  Lubna Lobo MD  [address] [facility] [address]  [facility] [id number] [phone]    Schedule an appointment as soon as possible for a visit       Aron Cabrera MD  [address]  [facility] [id number] [phone]    Schedule an appointment as soon as possible for a visit            Medication List      No changes were made to your prescriptions during this visit.           Latest Documented Vital Signs:  As of 04:44  BP- 142/61 HR- 59 Temp- 96.2 °F (35.7 °C) (Tympanic) O2 sat- 97%       Johnnie Chin MD  06/01/20 0341

## 2020-06-01 NOTE — ED TRIAGE NOTES
Pt reports epigastric pain 8/10 that came on suddenly 2hrs ago. Pt denies NVD. Pt reports he has a hx of copd and stomach ulcers and was seen here 2 days ago.     Pt was placed in a mask at triage.   This RN wore PPE.

## 2020-06-01 NOTE — ED NOTES
Pt ambulatory c steady gait, AAOx4, ABC's intact, NAD noted at this time. Pt discharged c belongings and educational packet.        Jakub Jolley, RN  06/01/20 0159

## 2020-06-01 NOTE — ED NOTES
Pt unable to provide urine sample at this time.      Patient was wearing a face mask throughout encounter.   wore personal protective equipment throughout the encounter.       Jakub Jolley RN  05/31/20 1847

## 2020-06-04 ENCOUNTER — OFFICE (OUTPATIENT)
Dept: URBAN - METROPOLITAN AREA CLINIC 66 | Facility: CLINIC | Age: 85
End: 2020-06-04

## 2020-06-04 VITALS
TEMPERATURE: 98.3 F | HEART RATE: 92 BPM | WEIGHT: 152 LBS | SYSTOLIC BLOOD PRESSURE: 137 MMHG | HEIGHT: 68 IN | DIASTOLIC BLOOD PRESSURE: 61 MMHG

## 2020-06-04 DIAGNOSIS — Z86.010 PERSONAL HISTORY OF COLONIC POLYPS: ICD-10-CM

## 2020-06-04 DIAGNOSIS — K50.10 CROHN'S DISEASE OF LARGE INTESTINE WITHOUT COMPLICATIONS: ICD-10-CM

## 2020-06-04 DIAGNOSIS — R63.4 ABNORMAL WEIGHT LOSS: ICD-10-CM

## 2020-06-04 DIAGNOSIS — R10.13 EPIGASTRIC PAIN: ICD-10-CM

## 2020-06-04 DIAGNOSIS — K30 FUNCTIONAL DYSPEPSIA: ICD-10-CM

## 2020-06-04 PROCEDURE — 99214 OFFICE O/P EST MOD 30 MIN: CPT | Performed by: INTERNAL MEDICINE

## 2020-06-04 RX ORDER — SUCRALFATE 1 G/10ML
3000 SUSPENSION ORAL
Qty: 900 | Refills: 12 | Status: COMPLETED
Start: 2020-06-04 | End: 2020-11-04

## 2020-08-26 ENCOUNTER — APPOINTMENT (OUTPATIENT)
Dept: CT IMAGING | Facility: HOSPITAL | Age: 85
End: 2020-08-26

## 2020-08-26 ENCOUNTER — APPOINTMENT (OUTPATIENT)
Dept: GENERAL RADIOLOGY | Facility: HOSPITAL | Age: 85
End: 2020-08-26

## 2020-08-26 ENCOUNTER — HOSPITAL ENCOUNTER (EMERGENCY)
Facility: HOSPITAL | Age: 85
Discharge: HOME | End: 2020-08-26
Attending: EMERGENCY MEDICINE | Admitting: EMERGENCY MEDICINE

## 2020-08-26 ENCOUNTER — HOSPITAL ENCOUNTER (INPATIENT)
Facility: HOSPITAL | Age: 85
LOS: 3 days | Discharge: HOME | End: 2020-08-29
Attending: EMERGENCY MEDICINE | Admitting: HOSPITALIST

## 2020-08-26 VITALS
DIASTOLIC BLOOD PRESSURE: 60 MMHG | HEIGHT: 68 IN | TEMPERATURE: 96 F | SYSTOLIC BLOOD PRESSURE: 151 MMHG | WEIGHT: 156.53 LBS | BODY MASS INDEX: 23.72 KG/M2 | RESPIRATION RATE: 14 BRPM | OXYGEN SATURATION: 95 % | HEART RATE: 86 BPM

## 2020-08-26 DIAGNOSIS — K81.0 ACUTE CHOLECYSTITIS: Primary | ICD-10-CM

## 2020-08-26 DIAGNOSIS — K29.00 ACUTE GASTRITIS WITHOUT HEMORRHAGE, UNSPECIFIED GASTRITIS TYPE: ICD-10-CM

## 2020-08-26 DIAGNOSIS — K83.09 CHOLANGITIS: ICD-10-CM

## 2020-08-26 DIAGNOSIS — R10.10 UPPER ABDOMINAL PAIN: Primary | ICD-10-CM

## 2020-08-26 PROBLEM — K83.1 OBSTRUCTIVE JAUNDICE: Status: ACTIVE | Noted: 2020-08-26

## 2020-08-26 LAB
ALBUMIN SERPL-MCNC: 3.8 G/DL (ref 3.5–5.2)
ALBUMIN SERPL-MCNC: 4.1 G/DL (ref 3.5–5.2)
ALBUMIN/GLOB SERPL: 1.2 G/DL
ALBUMIN/GLOB SERPL: 1.4 G/DL
ALP SERPL-CCNC: 134 U/L (ref 39–117)
ALP SERPL-CCNC: 96 U/L (ref 39–117)
ALT SERPL W P-5'-P-CCNC: 284 U/L (ref 1–41)
ALT SERPL W P-5'-P-CCNC: 96 U/L (ref 1–41)
ANION GAP SERPL CALCULATED.3IONS-SCNC: 10.2 MMOL/L (ref 5–15)
ANION GAP SERPL CALCULATED.3IONS-SCNC: 6.5 MMOL/L (ref 5–15)
AST SERPL-CCNC: 135 U/L (ref 1–40)
AST SERPL-CCNC: 373 U/L (ref 1–40)
B PARAPERT DNA SPEC QL NAA+PROBE: NOT DETECTED
B PERT DNA SPEC QL NAA+PROBE: NOT DETECTED
BACTERIA UR QL AUTO: NORMAL /HPF
BASOPHILS # BLD AUTO: 0.02 10*3/MM3 (ref 0–0.2)
BASOPHILS # BLD AUTO: 0.04 10*3/MM3 (ref 0–0.2)
BASOPHILS NFR BLD AUTO: 0.1 % (ref 0–1.5)
BASOPHILS NFR BLD AUTO: 0.4 % (ref 0–1.5)
BILIRUB SERPL-MCNC: 1 MG/DL (ref 0–1.2)
BILIRUB SERPL-MCNC: 2.3 MG/DL (ref 0–1.2)
BILIRUB UR QL STRIP: NEGATIVE
BUN SERPL-MCNC: 22 MG/DL (ref 8–23)
BUN SERPL-MCNC: 22 MG/DL (ref 8–23)
BUN/CREAT SERPL: 17.7 (ref 7–25)
BUN/CREAT SERPL: 18.3 (ref 7–25)
C PNEUM DNA NPH QL NAA+NON-PROBE: NOT DETECTED
CALCIUM SPEC-SCNC: 9.1 MG/DL (ref 8.6–10.5)
CALCIUM SPEC-SCNC: 9.2 MG/DL (ref 8.6–10.5)
CHLORIDE SERPL-SCNC: 105 MMOL/L (ref 98–107)
CHLORIDE SERPL-SCNC: 105 MMOL/L (ref 98–107)
CLARITY UR: CLEAR
CO2 SERPL-SCNC: 21.8 MMOL/L (ref 22–29)
CO2 SERPL-SCNC: 26.5 MMOL/L (ref 22–29)
COLOR UR: YELLOW
CREAT SERPL-MCNC: 1.2 MG/DL (ref 0.76–1.27)
CREAT SERPL-MCNC: 1.24 MG/DL (ref 0.76–1.27)
D-LACTATE SERPL-SCNC: 2.5 MMOL/L (ref 0.5–2)
D-LACTATE SERPL-SCNC: 2.6 MMOL/L (ref 0.5–2)
DEPRECATED RDW RBC AUTO: 49.2 FL (ref 37–54)
DEPRECATED RDW RBC AUTO: 51.5 FL (ref 37–54)
DIGOXIN SERPL-MCNC: 0.6 NG/ML (ref 0.6–1.2)
EOSINOPHIL # BLD AUTO: 0.03 10*3/MM3 (ref 0–0.4)
EOSINOPHIL # BLD AUTO: 0.11 10*3/MM3 (ref 0–0.4)
EOSINOPHIL NFR BLD AUTO: 0.2 % (ref 0.3–6.2)
EOSINOPHIL NFR BLD AUTO: 1 % (ref 0.3–6.2)
ERYTHROCYTE [DISTWIDTH] IN BLOOD BY AUTOMATED COUNT: 14.4 % (ref 12.3–15.4)
ERYTHROCYTE [DISTWIDTH] IN BLOOD BY AUTOMATED COUNT: 14.6 % (ref 12.3–15.4)
FLUAV H1 2009 PAND RNA NPH QL NAA+PROBE: NOT DETECTED
FLUAV H1 HA GENE NPH QL NAA+PROBE: NOT DETECTED
FLUAV H3 RNA NPH QL NAA+PROBE: NOT DETECTED
FLUAV SUBTYP SPEC NAA+PROBE: NOT DETECTED
FLUBV RNA ISLT QL NAA+PROBE: NOT DETECTED
GFR SERPL CREATININE-BSD FRML MDRD: 55 ML/MIN/1.73
GFR SERPL CREATININE-BSD FRML MDRD: 57 ML/MIN/1.73
GLOBULIN UR ELPH-MCNC: 3 GM/DL
GLOBULIN UR ELPH-MCNC: 3.1 GM/DL
GLUCOSE SERPL-MCNC: 133 MG/DL (ref 65–99)
GLUCOSE SERPL-MCNC: 171 MG/DL (ref 65–99)
GLUCOSE UR STRIP-MCNC: NEGATIVE MG/DL
HADV DNA SPEC NAA+PROBE: NOT DETECTED
HCOV 229E RNA SPEC QL NAA+PROBE: NOT DETECTED
HCOV HKU1 RNA SPEC QL NAA+PROBE: NOT DETECTED
HCOV NL63 RNA SPEC QL NAA+PROBE: NOT DETECTED
HCOV OC43 RNA SPEC QL NAA+PROBE: NOT DETECTED
HCT VFR BLD AUTO: 37.1 % (ref 37.5–51)
HCT VFR BLD AUTO: 38.1 % (ref 37.5–51)
HGB BLD-MCNC: 11.8 G/DL (ref 13–17.7)
HGB BLD-MCNC: 11.9 G/DL (ref 13–17.7)
HGB UR QL STRIP.AUTO: NEGATIVE
HMPV RNA NPH QL NAA+NON-PROBE: NOT DETECTED
HOLD SPECIMEN: NORMAL
HOLD SPECIMEN: NORMAL
HPIV1 RNA SPEC QL NAA+PROBE: NOT DETECTED
HPIV2 RNA SPEC QL NAA+PROBE: NOT DETECTED
HPIV3 RNA NPH QL NAA+PROBE: NOT DETECTED
HPIV4 P GENE NPH QL NAA+PROBE: NOT DETECTED
HYALINE CASTS UR QL AUTO: NORMAL /LPF
IMM GRANULOCYTES # BLD AUTO: 0.09 10*3/MM3 (ref 0–0.05)
IMM GRANULOCYTES # BLD AUTO: 0.1 10*3/MM3 (ref 0–0.05)
IMM GRANULOCYTES NFR BLD AUTO: 0.7 % (ref 0–0.5)
IMM GRANULOCYTES NFR BLD AUTO: 0.9 % (ref 0–0.5)
KETONES UR QL STRIP: NEGATIVE
LACTATE HOLD SPECIMEN: NORMAL
LEUKOCYTE ESTERASE UR QL STRIP.AUTO: ABNORMAL
LIPASE SERPL-CCNC: 27 U/L (ref 13–60)
LYMPHOCYTES # BLD AUTO: 0.64 10*3/MM3 (ref 0.7–3.1)
LYMPHOCYTES # BLD AUTO: 1.31 10*3/MM3 (ref 0.7–3.1)
LYMPHOCYTES NFR BLD AUTO: 11.5 % (ref 19.6–45.3)
LYMPHOCYTES NFR BLD AUTO: 4.7 % (ref 19.6–45.3)
M PNEUMO IGG SER IA-ACNC: NOT DETECTED
MCH RBC QN AUTO: 29.8 PG (ref 26.6–33)
MCH RBC QN AUTO: 29.8 PG (ref 26.6–33)
MCHC RBC AUTO-ENTMCNC: 31 G/DL (ref 31.5–35.7)
MCHC RBC AUTO-ENTMCNC: 32.1 G/DL (ref 31.5–35.7)
MCV RBC AUTO: 93 FL (ref 79–97)
MCV RBC AUTO: 96.2 FL (ref 79–97)
MONOCYTES # BLD AUTO: 0.81 10*3/MM3 (ref 0.1–0.9)
MONOCYTES # BLD AUTO: 1.09 10*3/MM3 (ref 0.1–0.9)
MONOCYTES NFR BLD AUTO: 7.1 % (ref 5–12)
MONOCYTES NFR BLD AUTO: 8.1 % (ref 5–12)
NEUTROPHILS NFR BLD AUTO: 11.66 10*3/MM3 (ref 1.7–7)
NEUTROPHILS NFR BLD AUTO: 79.1 % (ref 42.7–76)
NEUTROPHILS NFR BLD AUTO: 86.2 % (ref 42.7–76)
NEUTROPHILS NFR BLD AUTO: 9.03 10*3/MM3 (ref 1.7–7)
NITRITE UR QL STRIP: NEGATIVE
NRBC BLD AUTO-RTO: 0 /100 WBC (ref 0–0.2)
NRBC BLD AUTO-RTO: 0 /100 WBC (ref 0–0.2)
PH UR STRIP.AUTO: 6 [PH] (ref 5–8)
PLATELET # BLD AUTO: 141 10*3/MM3 (ref 140–450)
PLATELET # BLD AUTO: 154 10*3/MM3 (ref 140–450)
PMV BLD AUTO: 11.5 FL (ref 6–12)
PMV BLD AUTO: 11.8 FL (ref 6–12)
POTASSIUM SERPL-SCNC: 3.7 MMOL/L (ref 3.5–5.2)
POTASSIUM SERPL-SCNC: 4.3 MMOL/L (ref 3.5–5.2)
PROCALCITONIN SERPL-MCNC: 0.53 NG/ML (ref 0–0.25)
PROT SERPL-MCNC: 6.9 G/DL (ref 6–8.5)
PROT SERPL-MCNC: 7.1 G/DL (ref 6–8.5)
PROT UR QL STRIP: NEGATIVE
RBC # BLD AUTO: 3.96 10*6/MM3 (ref 4.14–5.8)
RBC # BLD AUTO: 3.99 10*6/MM3 (ref 4.14–5.8)
RBC # UR: NORMAL /HPF
REF LAB TEST METHOD: NORMAL
RHINOVIRUS RNA SPEC NAA+PROBE: NOT DETECTED
RSV RNA NPH QL NAA+NON-PROBE: NOT DETECTED
SARS-COV-2 RNA NPH QL NAA+NON-PROBE: NOT DETECTED
SODIUM SERPL-SCNC: 137 MMOL/L (ref 136–145)
SODIUM SERPL-SCNC: 138 MMOL/L (ref 136–145)
SP GR UR STRIP: 1.01 (ref 1–1.03)
SQUAMOUS #/AREA URNS HPF: NORMAL /HPF
UROBILINOGEN UR QL STRIP: ABNORMAL
WBC # BLD AUTO: 11.4 10*3/MM3 (ref 3.4–10.8)
WBC # BLD AUTO: 13.53 10*3/MM3 (ref 3.4–10.8)
WBC UR QL AUTO: NORMAL /HPF
WHOLE BLOOD HOLD SPECIMEN: NORMAL
WHOLE BLOOD HOLD SPECIMEN: NORMAL

## 2020-08-26 PROCEDURE — 80053 COMPREHEN METABOLIC PANEL: CPT | Performed by: PHYSICIAN ASSISTANT

## 2020-08-26 PROCEDURE — 80053 COMPREHEN METABOLIC PANEL: CPT | Performed by: EMERGENCY MEDICINE

## 2020-08-26 PROCEDURE — 25010000002 PIPERACILLIN SOD-TAZOBACTAM PER 1 G: Performed by: PHYSICIAN ASSISTANT

## 2020-08-26 PROCEDURE — 93010 ELECTROCARDIOGRAM REPORT: CPT | Performed by: INTERNAL MEDICINE

## 2020-08-26 PROCEDURE — 25010000002 IOPAMIDOL 61 % SOLUTION: Performed by: EMERGENCY MEDICINE

## 2020-08-26 PROCEDURE — 85025 COMPLETE CBC W/AUTO DIFF WBC: CPT | Performed by: EMERGENCY MEDICINE

## 2020-08-26 PROCEDURE — 25010000002 PIPERACILLIN SOD-TAZOBACTAM PER 1 G: Performed by: HOSPITALIST

## 2020-08-26 PROCEDURE — 80162 ASSAY OF DIGOXIN TOTAL: CPT | Performed by: PHYSICIAN ASSISTANT

## 2020-08-26 PROCEDURE — 84145 PROCALCITONIN (PCT): CPT | Performed by: PHYSICIAN ASSISTANT

## 2020-08-26 PROCEDURE — 85025 COMPLETE CBC W/AUTO DIFF WBC: CPT | Performed by: PHYSICIAN ASSISTANT

## 2020-08-26 PROCEDURE — 0202U NFCT DS 22 TRGT SARS-COV-2: CPT | Performed by: PHYSICIAN ASSISTANT

## 2020-08-26 PROCEDURE — 36415 COLL VENOUS BLD VENIPUNCTURE: CPT

## 2020-08-26 PROCEDURE — 99283 EMERGENCY DEPT VISIT LOW MDM: CPT

## 2020-08-26 PROCEDURE — 36415 COLL VENOUS BLD VENIPUNCTURE: CPT | Performed by: PHYSICIAN ASSISTANT

## 2020-08-26 PROCEDURE — 93005 ELECTROCARDIOGRAM TRACING: CPT | Performed by: EMERGENCY MEDICINE

## 2020-08-26 PROCEDURE — 71045 X-RAY EXAM CHEST 1 VIEW: CPT

## 2020-08-26 PROCEDURE — 87040 BLOOD CULTURE FOR BACTERIA: CPT | Performed by: PHYSICIAN ASSISTANT

## 2020-08-26 PROCEDURE — 99285 EMERGENCY DEPT VISIT HI MDM: CPT

## 2020-08-26 PROCEDURE — 81001 URINALYSIS AUTO W/SCOPE: CPT | Performed by: PHYSICIAN ASSISTANT

## 2020-08-26 PROCEDURE — 25810000003 SODIUM CHLORIDE 0.9 % WITH KCL 20 MEQ 20-0.9 MEQ/L-% SOLUTION: Performed by: HOSPITALIST

## 2020-08-26 PROCEDURE — 99222 1ST HOSP IP/OBS MODERATE 55: CPT | Performed by: SURGERY

## 2020-08-26 PROCEDURE — 74177 CT ABD & PELVIS W/CONTRAST: CPT

## 2020-08-26 PROCEDURE — 74018 RADEX ABDOMEN 1 VIEW: CPT

## 2020-08-26 PROCEDURE — 83690 ASSAY OF LIPASE: CPT | Performed by: EMERGENCY MEDICINE

## 2020-08-26 PROCEDURE — 93005 ELECTROCARDIOGRAM TRACING: CPT | Performed by: PHYSICIAN ASSISTANT

## 2020-08-26 PROCEDURE — 83605 ASSAY OF LACTIC ACID: CPT | Performed by: PHYSICIAN ASSISTANT

## 2020-08-26 RX ORDER — HYDROMORPHONE HYDROCHLORIDE 1 MG/ML
0.5 INJECTION, SOLUTION INTRAMUSCULAR; INTRAVENOUS; SUBCUTANEOUS 4 TIMES DAILY PRN
Status: DISCONTINUED | OUTPATIENT
Start: 2020-08-26 | End: 2020-08-29

## 2020-08-26 RX ORDER — ONDANSETRON 8 MG/1
8 TABLET, ORALLY DISINTEGRATING ORAL EVERY 6 HOURS PRN
Status: DISCONTINUED | OUTPATIENT
Start: 2020-08-26 | End: 2020-08-26

## 2020-08-26 RX ORDER — SODIUM CHLORIDE AND POTASSIUM CHLORIDE 150; 900 MG/100ML; MG/100ML
50 INJECTION, SOLUTION INTRAVENOUS CONTINUOUS
Status: DISCONTINUED | OUTPATIENT
Start: 2020-08-26 | End: 2020-08-29

## 2020-08-26 RX ORDER — METOPROLOL TARTRATE 50 MG/1
50 TABLET, FILM COATED ORAL ONCE
Status: DISCONTINUED | OUTPATIENT
Start: 2020-08-26 | End: 2020-08-26

## 2020-08-26 RX ORDER — METOPROLOL TARTRATE 50 MG/1
50 TABLET, FILM COATED ORAL 3 TIMES DAILY
Status: DISCONTINUED | OUTPATIENT
Start: 2020-08-26 | End: 2020-08-29 | Stop reason: HOSPADM

## 2020-08-26 RX ORDER — DIGOXIN 125 MCG
125 TABLET ORAL
Status: DISCONTINUED | OUTPATIENT
Start: 2020-08-27 | End: 2020-08-29 | Stop reason: HOSPADM

## 2020-08-26 RX ORDER — ONDANSETRON 2 MG/ML
4 INJECTION INTRAMUSCULAR; INTRAVENOUS EVERY 6 HOURS PRN
Status: DISCONTINUED | OUTPATIENT
Start: 2020-08-26 | End: 2020-08-29

## 2020-08-26 RX ORDER — FUROSEMIDE 20 MG/1
20 TABLET ORAL DAILY
Status: DISCONTINUED | OUTPATIENT
Start: 2020-08-26 | End: 2020-08-26

## 2020-08-26 RX ORDER — ACETAMINOPHEN 500 MG
1000 TABLET ORAL ONCE
Status: COMPLETED | OUTPATIENT
Start: 2020-08-26 | End: 2020-08-26

## 2020-08-26 RX ORDER — BUDESONIDE 3 MG/1
3 CAPSULE, COATED PELLETS ORAL DAILY
Status: DISCONTINUED | OUTPATIENT
Start: 2020-08-26 | End: 2020-08-29 | Stop reason: HOSPADM

## 2020-08-26 RX ORDER — PANTOPRAZOLE SODIUM 40 MG/1
40 TABLET, DELAYED RELEASE ORAL EVERY MORNING
Status: DISCONTINUED | OUTPATIENT
Start: 2020-08-27 | End: 2020-08-26

## 2020-08-26 RX ORDER — SODIUM CHLORIDE 0.9 % (FLUSH) 0.9 %
10 SYRINGE (ML) INJECTION AS NEEDED
Status: DISCONTINUED | OUTPATIENT
Start: 2020-08-26 | End: 2020-08-26 | Stop reason: HOSPADM

## 2020-08-26 RX ORDER — PANTOPRAZOLE SODIUM 40 MG/10ML
40 INJECTION, POWDER, LYOPHILIZED, FOR SOLUTION INTRAVENOUS
Status: DISCONTINUED | OUTPATIENT
Start: 2020-08-27 | End: 2020-08-29

## 2020-08-26 RX ORDER — FERROUS SULFATE 325(65) MG
325 TABLET ORAL
Status: DISCONTINUED | OUTPATIENT
Start: 2020-08-27 | End: 2020-08-26

## 2020-08-26 RX ORDER — ALLOPURINOL 100 MG/1
100 TABLET ORAL 2 TIMES DAILY
Status: DISCONTINUED | OUTPATIENT
Start: 2020-08-26 | End: 2020-08-29 | Stop reason: HOSPADM

## 2020-08-26 RX ORDER — ACETAMINOPHEN 500 MG
1000 TABLET ORAL ONCE
Status: DISCONTINUED | OUTPATIENT
Start: 2020-08-26 | End: 2020-08-26

## 2020-08-26 RX ORDER — CHOLECALCIFEROL (VITAMIN D3) 125 MCG
1000 CAPSULE ORAL DAILY
Status: DISCONTINUED | OUTPATIENT
Start: 2020-08-26 | End: 2020-08-27

## 2020-08-26 RX ORDER — ATORVASTATIN CALCIUM 20 MG/1
10 TABLET, FILM COATED ORAL NIGHTLY
Status: DISCONTINUED | OUTPATIENT
Start: 2020-08-26 | End: 2020-08-26

## 2020-08-26 RX ORDER — IPRATROPIUM BROMIDE AND ALBUTEROL SULFATE 2.5; .5 MG/3ML; MG/3ML
1.5 SOLUTION RESPIRATORY (INHALATION) EVERY 4 HOURS PRN
Status: DISCONTINUED | OUTPATIENT
Start: 2020-08-26 | End: 2020-08-29

## 2020-08-26 RX ORDER — DOFETILIDE 0.12 MG/1
125 CAPSULE ORAL 2 TIMES DAILY
Status: DISCONTINUED | OUTPATIENT
Start: 2020-08-26 | End: 2020-08-29 | Stop reason: HOSPADM

## 2020-08-26 RX ORDER — SUCRALFATE ORAL 1 G/10ML
1 SUSPENSION ORAL
Qty: 420 ML | Refills: 0 | Status: SHIPPED | OUTPATIENT
Start: 2020-08-26 | End: 2020-09-22

## 2020-08-26 RX ADMIN — ACETAMINOPHEN 1000 MG: 500 TABLET ORAL at 08:58

## 2020-08-26 RX ADMIN — SODIUM CHLORIDE 500 ML: 9 INJECTION, SOLUTION INTRAVENOUS at 08:58

## 2020-08-26 RX ADMIN — TAZOBACTAM SODIUM AND PIPERACILLIN SODIUM 3.38 G: 375; 3 INJECTION, SOLUTION INTRAVENOUS at 17:00

## 2020-08-26 RX ADMIN — BUDESONIDE 3 MG: 3 CAPSULE, GELATIN COATED ORAL at 17:04

## 2020-08-26 RX ADMIN — DOFETILIDE 125 MCG: 0.12 CAPSULE ORAL at 21:30

## 2020-08-26 RX ADMIN — TAZOBACTAM SODIUM AND PIPERACILLIN SODIUM 3.38 G: 375; 3 INJECTION, SOLUTION INTRAVENOUS at 09:37

## 2020-08-26 RX ADMIN — METOPROLOL TARTRATE 50 MG: 25 TABLET, FILM COATED ORAL at 11:16

## 2020-08-26 RX ADMIN — ALLOPURINOL 100 MG: 100 TABLET ORAL at 20:43

## 2020-08-26 RX ADMIN — APIXABAN 5 MG: 5 TABLET, FILM COATED ORAL at 20:43

## 2020-08-26 RX ADMIN — IOPAMIDOL 85 ML: 612 INJECTION, SOLUTION INTRAVENOUS at 09:48

## 2020-08-26 RX ADMIN — METOPROLOL TARTRATE 5 MG: 5 INJECTION INTRAVENOUS at 09:14

## 2020-08-26 RX ADMIN — POTASSIUM CHLORIDE AND SODIUM CHLORIDE 75 ML/HR: 900; 150 INJECTION, SOLUTION INTRAVENOUS at 17:00

## 2020-08-26 RX ADMIN — Medication 1000 MCG: at 17:05

## 2020-08-27 ENCOUNTER — APPOINTMENT (OUTPATIENT)
Dept: CARDIOLOGY | Facility: HOSPITAL | Age: 85
End: 2020-08-27

## 2020-08-27 LAB
ALBUMIN SERPL-MCNC: 3.4 G/DL (ref 3.5–5.2)
ALBUMIN/GLOB SERPL: 1.3 G/DL
ALP SERPL-CCNC: 118 U/L (ref 39–117)
ALT SERPL W P-5'-P-CCNC: 293 U/L (ref 1–41)
ANION GAP SERPL CALCULATED.3IONS-SCNC: 11.1 MMOL/L (ref 5–15)
AST SERPL-CCNC: 186 U/L (ref 1–40)
BASOPHILS # BLD AUTO: 0.04 10*3/MM3 (ref 0–0.2)
BASOPHILS NFR BLD AUTO: 0.4 % (ref 0–1.5)
BH CV ECHO MEAS - AI DEC SLOPE: 269 CM/SEC^2
BH CV ECHO MEAS - AI MAX PG: 87.2 MMHG
BH CV ECHO MEAS - AI MAX VEL: 467 CM/SEC
BH CV ECHO MEAS - AI P1/2T: 508.5 MSEC
BH CV ECHO MEAS - AO MAX PG (FULL): 37.4 MMHG
BH CV ECHO MEAS - AO MAX PG: 42 MMHG
BH CV ECHO MEAS - AO MEAN PG (FULL): 22 MMHG
BH CV ECHO MEAS - AO MEAN PG: 24 MMHG
BH CV ECHO MEAS - AO ROOT AREA (BSA CORRECTED): 1.8
BH CV ECHO MEAS - AO ROOT AREA: 8.6 CM^2
BH CV ECHO MEAS - AO ROOT DIAM: 3.3 CM
BH CV ECHO MEAS - AO V2 MAX: 324 CM/SEC
BH CV ECHO MEAS - AO V2 MEAN: 233 CM/SEC
BH CV ECHO MEAS - AO V2 VTI: 77.4 CM
BH CV ECHO MEAS - AVA(I,A): 1.1 CM^2
BH CV ECHO MEAS - AVA(I,D): 1.1 CM^2
BH CV ECHO MEAS - AVA(V,A): 1 CM^2
BH CV ECHO MEAS - AVA(V,D): 1 CM^2
BH CV ECHO MEAS - BSA(HAYCOCK): 1.8 M^2
BH CV ECHO MEAS - BSA: 1.8 M^2
BH CV ECHO MEAS - BZI_BMI: 22.8 KILOGRAMS/M^2
BH CV ECHO MEAS - BZI_METRIC_HEIGHT: 172.7 CM
BH CV ECHO MEAS - BZI_METRIC_WEIGHT: 68 KG
BH CV ECHO MEAS - EDV(CUBED): 91.1 ML
BH CV ECHO MEAS - EDV(MOD-SP2): 61 ML
BH CV ECHO MEAS - EDV(MOD-SP4): 70 ML
BH CV ECHO MEAS - EDV(TEICH): 92.4 ML
BH CV ECHO MEAS - EF(CUBED): 73.2 %
BH CV ECHO MEAS - EF(MOD-BP): 59.4 %
BH CV ECHO MEAS - EF(MOD-SP2): 55.7 %
BH CV ECHO MEAS - EF(MOD-SP4): 60 %
BH CV ECHO MEAS - EF(TEICH): 65.2 %
BH CV ECHO MEAS - ESV(CUBED): 24.4 ML
BH CV ECHO MEAS - ESV(MOD-SP2): 27 ML
BH CV ECHO MEAS - ESV(MOD-SP4): 28 ML
BH CV ECHO MEAS - ESV(TEICH): 32.2 ML
BH CV ECHO MEAS - FS: 35.6 %
BH CV ECHO MEAS - IVS/LVPW: 0.92
BH CV ECHO MEAS - IVSD: 1.2 CM
BH CV ECHO MEAS - LAT PEAK E' VEL: 4.8 CM/SEC
BH CV ECHO MEAS - LV DIASTOLIC VOL/BSA (35-75): 38.7 ML/M^2
BH CV ECHO MEAS - LV MASS(C)D: 210.2 GRAMS
BH CV ECHO MEAS - LV MASS(C)DI: 116.2 GRAMS/M^2
BH CV ECHO MEAS - LV MAX PG: 4.6 MMHG
BH CV ECHO MEAS - LV MEAN PG: 2 MMHG
BH CV ECHO MEAS - LV SYSTOLIC VOL/BSA (12-30): 15.5 ML/M^2
BH CV ECHO MEAS - LV V1 MAX: 107 CM/SEC
BH CV ECHO MEAS - LV V1 MEAN: 71.7 CM/SEC
BH CV ECHO MEAS - LV V1 VTI: 27.5 CM
BH CV ECHO MEAS - LVIDD: 4.5 CM
BH CV ECHO MEAS - LVIDS: 2.9 CM
BH CV ECHO MEAS - LVLD AP2: 6.5 CM
BH CV ECHO MEAS - LVLD AP4: 7.5 CM
BH CV ECHO MEAS - LVLS AP2: 5.9 CM
BH CV ECHO MEAS - LVLS AP4: 6.1 CM
BH CV ECHO MEAS - LVOT AREA (M): 3.1 CM^2
BH CV ECHO MEAS - LVOT AREA: 3.1 CM^2
BH CV ECHO MEAS - LVOT DIAM: 2 CM
BH CV ECHO MEAS - LVPWD: 1.3 CM
BH CV ECHO MEAS - MED PEAK E' VEL: 5.4 CM/SEC
BH CV ECHO MEAS - MR MAX PG: 111.1 MMHG
BH CV ECHO MEAS - MR MAX VEL: 527 CM/SEC
BH CV ECHO MEAS - MV A DUR: 0.11 SEC
BH CV ECHO MEAS - MV A MAX VEL: 58.2 CM/SEC
BH CV ECHO MEAS - MV DEC SLOPE: 481 CM/SEC^2
BH CV ECHO MEAS - MV DEC TIME: 199 SEC
BH CV ECHO MEAS - MV E MAX VEL: 139 CM/SEC
BH CV ECHO MEAS - MV E/A: 2.4
BH CV ECHO MEAS - MV MAX PG: 8.5 MMHG
BH CV ECHO MEAS - MV MEAN PG: 2 MMHG
BH CV ECHO MEAS - MV P1/2T MAX VEL: 146 CM/SEC
BH CV ECHO MEAS - MV P1/2T: 88.9 MSEC
BH CV ECHO MEAS - MV V2 MAX: 146 CM/SEC
BH CV ECHO MEAS - MV V2 MEAN: 61.7 CM/SEC
BH CV ECHO MEAS - MV V2 VTI: 37.6 CM
BH CV ECHO MEAS - MVA P1/2T LCG: 1.5 CM^2
BH CV ECHO MEAS - MVA(P1/2T): 2.5 CM^2
BH CV ECHO MEAS - MVA(VTI): 2.3 CM^2
BH CV ECHO MEAS - PA ACC TIME: 0.12 SEC
BH CV ECHO MEAS - PA MAX PG (FULL): 0.92 MMHG
BH CV ECHO MEAS - PA MAX PG: 2.4 MMHG
BH CV ECHO MEAS - PA PR(ACCEL): 25.5 MMHG
BH CV ECHO MEAS - PA V2 MAX: 77.4 CM/SEC
BH CV ECHO MEAS - PULM A REVS DUR: 0.14 SEC
BH CV ECHO MEAS - PULM A REVS VEL: 26.2 CM/SEC
BH CV ECHO MEAS - PULM DIAS VEL: 54.6 CM/SEC
BH CV ECHO MEAS - PULM S/D: 0.66
BH CV ECHO MEAS - PULM SYS VEL: 36.1 CM/SEC
BH CV ECHO MEAS - RAP SYSTOLE: 3 MMHG
BH CV ECHO MEAS - RV MAX PG: 1.5 MMHG
BH CV ECHO MEAS - RV MEAN PG: 1 MMHG
BH CV ECHO MEAS - RV V1 MAX: 60.7 CM/SEC
BH CV ECHO MEAS - RV V1 MEAN: 40.5 CM/SEC
BH CV ECHO MEAS - RV V1 VTI: 13.8 CM
BH CV ECHO MEAS - RVSP: 57.8 MMHG
BH CV ECHO MEAS - SI(AO): 366 ML/M^2
BH CV ECHO MEAS - SI(CUBED): 36.9 ML/M^2
BH CV ECHO MEAS - SI(LVOT): 47.8 ML/M^2
BH CV ECHO MEAS - SI(MOD-SP2): 18.8 ML/M^2
BH CV ECHO MEAS - SI(MOD-SP4): 23.2 ML/M^2
BH CV ECHO MEAS - SI(TEICH): 33.3 ML/M^2
BH CV ECHO MEAS - SV(AO): 662 ML
BH CV ECHO MEAS - SV(CUBED): 66.7 ML
BH CV ECHO MEAS - SV(LVOT): 86.4 ML
BH CV ECHO MEAS - SV(MOD-SP2): 34 ML
BH CV ECHO MEAS - SV(MOD-SP4): 42 ML
BH CV ECHO MEAS - SV(TEICH): 60.2 ML
BH CV ECHO MEAS - TAPSE (>1.6): 1.7 CM2
BH CV ECHO MEAS - TR MAX VEL: 370 CM/SEC
BH CV ECHO MEASUREMENTS AVERAGE E/E' RATIO: 27.25
BH CV VAS BP RIGHT ARM: NORMAL MMHG
BH CV XLRA - RV BASE: 3.1 CM
BH CV XLRA - RV LENGTH: 6.3 CM
BH CV XLRA - RV MID: 2.6 CM
BH CV XLRA - TDI S': 11.4 CM/SEC
BILIRUB SERPL-MCNC: 3.3 MG/DL (ref 0–1.2)
BUN SERPL-MCNC: 19 MG/DL (ref 8–23)
BUN/CREAT SERPL: 15.6 (ref 7–25)
CALCIUM SPEC-SCNC: 8.4 MG/DL (ref 8.6–10.5)
CHLORIDE SERPL-SCNC: 107 MMOL/L (ref 98–107)
CHOLEST SERPL-MCNC: 96 MG/DL (ref 0–200)
CO2 SERPL-SCNC: 20.9 MMOL/L (ref 22–29)
CREAT SERPL-MCNC: 1.22 MG/DL (ref 0.76–1.27)
DEPRECATED RDW RBC AUTO: 48 FL (ref 37–54)
EOSINOPHIL # BLD AUTO: 0.12 10*3/MM3 (ref 0–0.4)
EOSINOPHIL NFR BLD AUTO: 1.2 % (ref 0.3–6.2)
ERYTHROCYTE [DISTWIDTH] IN BLOOD BY AUTOMATED COUNT: 14.5 % (ref 12.3–15.4)
GFR SERPL CREATININE-BSD FRML MDRD: 56 ML/MIN/1.73
GLOBULIN UR ELPH-MCNC: 2.7 GM/DL
GLUCOSE SERPL-MCNC: 101 MG/DL (ref 65–99)
HBA1C MFR BLD: 5.4 % (ref 4.8–5.6)
HCT VFR BLD AUTO: 31.1 % (ref 37.5–51)
HDLC SERPL-MCNC: 39 MG/DL (ref 40–60)
HGB BLD-MCNC: 10.2 G/DL (ref 13–17.7)
IMM GRANULOCYTES # BLD AUTO: 0.05 10*3/MM3 (ref 0–0.05)
IMM GRANULOCYTES NFR BLD AUTO: 0.5 % (ref 0–0.5)
LDLC SERPL CALC-MCNC: 44 MG/DL (ref 0–100)
LDLC/HDLC SERPL: 1.13 {RATIO}
LEFT ATRIUM VOLUME INDEX: 43 ML/M2
LV EF 2D ECHO EST: 60 %
LYMPHOCYTES # BLD AUTO: 1.02 10*3/MM3 (ref 0.7–3.1)
LYMPHOCYTES NFR BLD AUTO: 10.2 % (ref 19.6–45.3)
MAXIMAL PREDICTED HEART RATE: 132 BPM
MCH RBC QN AUTO: 29.6 PG (ref 26.6–33)
MCHC RBC AUTO-ENTMCNC: 32.8 G/DL (ref 31.5–35.7)
MCV RBC AUTO: 90.1 FL (ref 79–97)
MONOCYTES # BLD AUTO: 0.8 10*3/MM3 (ref 0.1–0.9)
MONOCYTES NFR BLD AUTO: 8 % (ref 5–12)
NEUTROPHILS NFR BLD AUTO: 7.93 10*3/MM3 (ref 1.7–7)
NEUTROPHILS NFR BLD AUTO: 79.7 % (ref 42.7–76)
NRBC BLD AUTO-RTO: 0 /100 WBC (ref 0–0.2)
NT-PROBNP SERPL-MCNC: 1542 PG/ML (ref 0–1800)
PLATELET # BLD AUTO: 131 10*3/MM3 (ref 140–450)
PMV BLD AUTO: 11.5 FL (ref 6–12)
POTASSIUM SERPL-SCNC: 3.8 MMOL/L (ref 3.5–5.2)
PROT SERPL-MCNC: 6.1 G/DL (ref 6–8.5)
RBC # BLD AUTO: 3.45 10*6/MM3 (ref 4.14–5.8)
SODIUM SERPL-SCNC: 139 MMOL/L (ref 136–145)
STRESS TARGET HR: 112 BPM
TRIGL SERPL-MCNC: 65 MG/DL (ref 0–150)
TSH SERPL DL<=0.05 MIU/L-ACNC: 1.59 UIU/ML (ref 0.27–4.2)
VIT B12 BLD-MCNC: 1410 PG/ML (ref 211–946)
VLDLC SERPL-MCNC: 13 MG/DL (ref 5–40)
WBC # BLD AUTO: 9.96 10*3/MM3 (ref 3.4–10.8)

## 2020-08-27 PROCEDURE — 25010000002 PIPERACILLIN SOD-TAZOBACTAM PER 1 G: Performed by: HOSPITALIST

## 2020-08-27 PROCEDURE — 80061 LIPID PANEL: CPT | Performed by: HOSPITALIST

## 2020-08-27 PROCEDURE — 83880 ASSAY OF NATRIURETIC PEPTIDE: CPT | Performed by: HOSPITALIST

## 2020-08-27 PROCEDURE — 99222 1ST HOSP IP/OBS MODERATE 55: CPT | Performed by: INTERNAL MEDICINE

## 2020-08-27 PROCEDURE — 82607 VITAMIN B-12: CPT | Performed by: HOSPITALIST

## 2020-08-27 PROCEDURE — 80053 COMPREHEN METABOLIC PANEL: CPT | Performed by: HOSPITALIST

## 2020-08-27 PROCEDURE — 85025 COMPLETE CBC W/AUTO DIFF WBC: CPT | Performed by: HOSPITALIST

## 2020-08-27 PROCEDURE — 99221 1ST HOSP IP/OBS SF/LOW 40: CPT | Performed by: NURSE PRACTITIONER

## 2020-08-27 PROCEDURE — 93306 TTE W/DOPPLER COMPLETE: CPT

## 2020-08-27 PROCEDURE — 99231 SBSQ HOSP IP/OBS SF/LOW 25: CPT | Performed by: SURGERY

## 2020-08-27 PROCEDURE — 83036 HEMOGLOBIN GLYCOSYLATED A1C: CPT | Performed by: HOSPITALIST

## 2020-08-27 PROCEDURE — 93306 TTE W/DOPPLER COMPLETE: CPT | Performed by: INTERNAL MEDICINE

## 2020-08-27 PROCEDURE — 25810000003 SODIUM CHLORIDE 0.9 % WITH KCL 20 MEQ 20-0.9 MEQ/L-% SOLUTION: Performed by: HOSPITALIST

## 2020-08-27 PROCEDURE — 84443 ASSAY THYROID STIM HORMONE: CPT | Performed by: HOSPITALIST

## 2020-08-27 RX ADMIN — APIXABAN 5 MG: 5 TABLET, FILM COATED ORAL at 10:27

## 2020-08-27 RX ADMIN — DOFETILIDE 125 MCG: 0.12 CAPSULE ORAL at 10:28

## 2020-08-27 RX ADMIN — METOPROLOL TARTRATE 50 MG: 50 TABLET, FILM COATED ORAL at 15:18

## 2020-08-27 RX ADMIN — PANTOPRAZOLE SODIUM 40 MG: 40 INJECTION, POWDER, FOR SOLUTION INTRAVENOUS at 05:20

## 2020-08-27 RX ADMIN — ALLOPURINOL 100 MG: 100 TABLET ORAL at 10:27

## 2020-08-27 RX ADMIN — Medication 1000 MCG: at 10:27

## 2020-08-27 RX ADMIN — TAZOBACTAM SODIUM AND PIPERACILLIN SODIUM 3.38 G: 375; 3 INJECTION, SOLUTION INTRAVENOUS at 00:05

## 2020-08-27 RX ADMIN — TAZOBACTAM SODIUM AND PIPERACILLIN SODIUM 3.38 G: 375; 3 INJECTION, SOLUTION INTRAVENOUS at 10:30

## 2020-08-27 RX ADMIN — METOPROLOL TARTRATE 50 MG: 50 TABLET, FILM COATED ORAL at 23:39

## 2020-08-27 RX ADMIN — METOPROLOL TARTRATE 50 MG: 50 TABLET, FILM COATED ORAL at 10:27

## 2020-08-27 RX ADMIN — APIXABAN 5 MG: 5 TABLET, FILM COATED ORAL at 20:40

## 2020-08-27 RX ADMIN — BUDESONIDE 3 MG: 3 CAPSULE, GELATIN COATED ORAL at 12:30

## 2020-08-27 RX ADMIN — TAZOBACTAM SODIUM AND PIPERACILLIN SODIUM 3.38 G: 375; 3 INJECTION, SOLUTION INTRAVENOUS at 15:31

## 2020-08-27 RX ADMIN — TAZOBACTAM SODIUM AND PIPERACILLIN SODIUM 3.38 G: 375; 3 INJECTION, SOLUTION INTRAVENOUS at 23:39

## 2020-08-27 RX ADMIN — ALLOPURINOL 100 MG: 100 TABLET ORAL at 20:40

## 2020-08-27 RX ADMIN — DIGOXIN 125 MCG: 125 TABLET ORAL at 12:30

## 2020-08-27 RX ADMIN — POTASSIUM CHLORIDE AND SODIUM CHLORIDE 75 ML/HR: 900; 150 INJECTION, SOLUTION INTRAVENOUS at 05:21

## 2020-08-27 RX ADMIN — POTASSIUM CHLORIDE AND SODIUM CHLORIDE 50 ML/HR: 900; 150 INJECTION, SOLUTION INTRAVENOUS at 20:40

## 2020-08-27 RX ADMIN — DOFETILIDE 125 MCG: 0.12 CAPSULE ORAL at 20:40

## 2020-08-28 ENCOUNTER — APPOINTMENT (OUTPATIENT)
Dept: MRI IMAGING | Facility: HOSPITAL | Age: 85
End: 2020-08-28

## 2020-08-28 LAB
ALBUMIN SERPL-MCNC: 3.3 G/DL (ref 3.5–5.2)
ALBUMIN/GLOB SERPL: 1 G/DL
ALP SERPL-CCNC: 142 U/L (ref 39–117)
ALPHA1 GLOB MFR UR ELPH: 163 MG/DL (ref 90–200)
ALT SERPL W P-5'-P-CCNC: 250 U/L (ref 1–41)
ANION GAP SERPL CALCULATED.3IONS-SCNC: 7.8 MMOL/L (ref 5–15)
AST SERPL-CCNC: 129 U/L (ref 1–40)
BASOPHILS # BLD AUTO: 0.03 10*3/MM3 (ref 0–0.2)
BASOPHILS NFR BLD AUTO: 0.4 % (ref 0–1.5)
BILIRUB SERPL-MCNC: 3.4 MG/DL (ref 0–1.2)
BUN SERPL-MCNC: 16 MG/DL (ref 8–23)
BUN/CREAT SERPL: 14.3 (ref 7–25)
CALCIUM SPEC-SCNC: 8.8 MG/DL (ref 8.6–10.5)
CERULOPLASMIN SERPL-MCNC: 23 MG/DL (ref 16–31)
CHLORIDE SERPL-SCNC: 107 MMOL/L (ref 98–107)
CO2 SERPL-SCNC: 24.2 MMOL/L (ref 22–29)
CREAT SERPL-MCNC: 1.12 MG/DL (ref 0.76–1.27)
DEPRECATED RDW RBC AUTO: 48.5 FL (ref 37–54)
EOSINOPHIL # BLD AUTO: 0.11 10*3/MM3 (ref 0–0.4)
EOSINOPHIL NFR BLD AUTO: 1.4 % (ref 0.3–6.2)
ERYTHROCYTE [DISTWIDTH] IN BLOOD BY AUTOMATED COUNT: 14.6 % (ref 12.3–15.4)
GFR SERPL CREATININE-BSD FRML MDRD: 62 ML/MIN/1.73
GGT SERPL-CCNC: 276 U/L (ref 8–61)
GLOBULIN UR ELPH-MCNC: 3.2 GM/DL
GLUCOSE SERPL-MCNC: 86 MG/DL (ref 65–99)
HAV IGM SERPL QL IA: NORMAL
HBV CORE IGM SERPL QL IA: NORMAL
HBV SURFACE AG SERPL QL IA: NORMAL
HCT VFR BLD AUTO: 31.3 % (ref 37.5–51)
HCV AB SER DONR QL: NORMAL
HGB BLD-MCNC: 10.5 G/DL (ref 13–17.7)
IGA1 MFR SER: 419 MG/DL (ref 70–400)
IGG1 SER-MCNC: 1078 MG/DL (ref 700–1600)
IGM SERPL-MCNC: 31 MG/DL (ref 40–230)
IMM GRANULOCYTES # BLD AUTO: 0.03 10*3/MM3 (ref 0–0.05)
IMM GRANULOCYTES NFR BLD AUTO: 0.4 % (ref 0–0.5)
LYMPHOCYTES # BLD AUTO: 0.85 10*3/MM3 (ref 0.7–3.1)
LYMPHOCYTES NFR BLD AUTO: 11.1 % (ref 19.6–45.3)
MAGNESIUM SERPL-MCNC: 2.2 MG/DL (ref 1.6–2.4)
MCH RBC QN AUTO: 30.5 PG (ref 26.6–33)
MCHC RBC AUTO-ENTMCNC: 33.5 G/DL (ref 31.5–35.7)
MCV RBC AUTO: 91 FL (ref 79–97)
MONOCYTES # BLD AUTO: 0.62 10*3/MM3 (ref 0.1–0.9)
MONOCYTES NFR BLD AUTO: 8.1 % (ref 5–12)
NEUTROPHILS NFR BLD AUTO: 6.04 10*3/MM3 (ref 1.7–7)
NEUTROPHILS NFR BLD AUTO: 78.6 % (ref 42.7–76)
NRBC BLD AUTO-RTO: 0 /100 WBC (ref 0–0.2)
PLATELET # BLD AUTO: 131 10*3/MM3 (ref 140–450)
PMV BLD AUTO: 11.3 FL (ref 6–12)
POTASSIUM SERPL-SCNC: 4.2 MMOL/L (ref 3.5–5.2)
PROT SERPL-MCNC: 6.5 G/DL (ref 6–8.5)
RBC # BLD AUTO: 3.44 10*6/MM3 (ref 4.14–5.8)
SODIUM SERPL-SCNC: 139 MMOL/L (ref 136–145)
WBC # BLD AUTO: 7.68 10*3/MM3 (ref 3.4–10.8)

## 2020-08-28 PROCEDURE — 82390 ASSAY OF CERULOPLASMIN: CPT | Performed by: NURSE PRACTITIONER

## 2020-08-28 PROCEDURE — 83735 ASSAY OF MAGNESIUM: CPT | Performed by: HOSPITALIST

## 2020-08-28 PROCEDURE — 82103 ALPHA-1-ANTITRYPSIN TOTAL: CPT | Performed by: NURSE PRACTITIONER

## 2020-08-28 PROCEDURE — 82977 ASSAY OF GGT: CPT | Performed by: NURSE PRACTITIONER

## 2020-08-28 PROCEDURE — 82784 ASSAY IGA/IGD/IGG/IGM EACH: CPT | Performed by: NURSE PRACTITIONER

## 2020-08-28 PROCEDURE — 80074 ACUTE HEPATITIS PANEL: CPT | Performed by: NURSE PRACTITIONER

## 2020-08-28 PROCEDURE — 74183 MRI ABD W/O CNTR FLWD CNTR: CPT

## 2020-08-28 PROCEDURE — 25010000002 PIPERACILLIN SOD-TAZOBACTAM PER 1 G: Performed by: HOSPITALIST

## 2020-08-28 PROCEDURE — 25810000003 SODIUM CHLORIDE 0.9 % WITH KCL 20 MEQ 20-0.9 MEQ/L-% SOLUTION: Performed by: HOSPITALIST

## 2020-08-28 PROCEDURE — 99231 SBSQ HOSP IP/OBS SF/LOW 25: CPT | Performed by: SURGERY

## 2020-08-28 PROCEDURE — 85025 COMPLETE CBC W/AUTO DIFF WBC: CPT | Performed by: HOSPITALIST

## 2020-08-28 PROCEDURE — 99232 SBSQ HOSP IP/OBS MODERATE 35: CPT | Performed by: NURSE PRACTITIONER

## 2020-08-28 PROCEDURE — 80053 COMPREHEN METABOLIC PANEL: CPT | Performed by: HOSPITALIST

## 2020-08-28 PROCEDURE — A9575 INJ GADOTERATE MEGLUMI 0.1ML: HCPCS | Performed by: HOSPITALIST

## 2020-08-28 PROCEDURE — 99232 SBSQ HOSP IP/OBS MODERATE 35: CPT | Performed by: INTERNAL MEDICINE

## 2020-08-28 PROCEDURE — 25010000002 GADOTERATE MEGLUMINE 7.5 MMOL/15ML SOLUTION: Performed by: HOSPITALIST

## 2020-08-28 RX ORDER — POLYETHYLENE GLYCOL 3350 17 G/17G
17 POWDER, FOR SOLUTION ORAL 2 TIMES DAILY
Status: DISCONTINUED | OUTPATIENT
Start: 2020-08-28 | End: 2020-08-29 | Stop reason: HOSPADM

## 2020-08-28 RX ORDER — POLYETHYLENE GLYCOL 3350 17 G/17G
17 POWDER, FOR SOLUTION ORAL DAILY
Status: DISCONTINUED | OUTPATIENT
Start: 2020-08-28 | End: 2020-08-28

## 2020-08-28 RX ORDER — DOCUSATE SODIUM 50 MG/5 ML
100 LIQUID (ML) ORAL DAILY
Status: DISCONTINUED | OUTPATIENT
Start: 2020-08-28 | End: 2020-08-29 | Stop reason: HOSPADM

## 2020-08-28 RX ORDER — GADOTERATE MEGLUMINE 376.9 MG/ML
14 INJECTION INTRAVENOUS
Status: COMPLETED | OUTPATIENT
Start: 2020-08-28 | End: 2020-08-28

## 2020-08-28 RX ADMIN — BUDESONIDE 3 MG: 3 CAPSULE, GELATIN COATED ORAL at 09:04

## 2020-08-28 RX ADMIN — POLYETHYLENE GLYCOL 3350 17 G: 17 POWDER, FOR SOLUTION ORAL at 21:47

## 2020-08-28 RX ADMIN — TAZOBACTAM SODIUM AND PIPERACILLIN SODIUM 3.38 G: 375; 3 INJECTION, SOLUTION INTRAVENOUS at 15:27

## 2020-08-28 RX ADMIN — DOFETILIDE 125 MCG: 0.12 CAPSULE ORAL at 21:47

## 2020-08-28 RX ADMIN — ALLOPURINOL 100 MG: 100 TABLET ORAL at 21:46

## 2020-08-28 RX ADMIN — GADOTERATE MEGLUMINE 14 ML: 376.9 INJECTION, SOLUTION INTRAVENOUS at 07:13

## 2020-08-28 RX ADMIN — METOPROLOL TARTRATE 50 MG: 50 TABLET, FILM COATED ORAL at 15:25

## 2020-08-28 RX ADMIN — APIXABAN 5 MG: 5 TABLET, FILM COATED ORAL at 09:04

## 2020-08-28 RX ADMIN — APIXABAN 5 MG: 5 TABLET, FILM COATED ORAL at 21:46

## 2020-08-28 RX ADMIN — METOPROLOL TARTRATE 50 MG: 50 TABLET, FILM COATED ORAL at 09:04

## 2020-08-28 RX ADMIN — DOCUSATE SODIUM 100 MG: 50 LIQUID ORAL at 15:25

## 2020-08-28 RX ADMIN — POTASSIUM CHLORIDE AND SODIUM CHLORIDE 50 ML/HR: 900; 150 INJECTION, SOLUTION INTRAVENOUS at 21:47

## 2020-08-28 RX ADMIN — DIGOXIN 125 MCG: 125 TABLET ORAL at 12:44

## 2020-08-28 RX ADMIN — TAZOBACTAM SODIUM AND PIPERACILLIN SODIUM 3.38 G: 375; 3 INJECTION, SOLUTION INTRAVENOUS at 09:04

## 2020-08-28 RX ADMIN — METOPROLOL TARTRATE 50 MG: 50 TABLET, FILM COATED ORAL at 21:46

## 2020-08-28 RX ADMIN — PANTOPRAZOLE SODIUM 40 MG: 40 INJECTION, POWDER, FOR SOLUTION INTRAVENOUS at 05:35

## 2020-08-28 RX ADMIN — DOFETILIDE 125 MCG: 0.12 CAPSULE ORAL at 09:04

## 2020-08-28 RX ADMIN — ALLOPURINOL 100 MG: 100 TABLET ORAL at 09:04

## 2020-08-29 ENCOUNTER — INPATIENT HOSPITAL (OUTPATIENT)
Dept: URBAN - METROPOLITAN AREA HOSPITAL 113 | Facility: HOSPITAL | Age: 85
End: 2020-08-29

## 2020-08-29 VITALS
OXYGEN SATURATION: 91 % | HEART RATE: 79 BPM | WEIGHT: 156 LBS | RESPIRATION RATE: 18 BRPM | DIASTOLIC BLOOD PRESSURE: 74 MMHG | HEIGHT: 68 IN | SYSTOLIC BLOOD PRESSURE: 149 MMHG | TEMPERATURE: 97.8 F | BODY MASS INDEX: 23.64 KG/M2

## 2020-08-29 DIAGNOSIS — K81.0 ACUTE CHOLECYSTITIS: ICD-10-CM

## 2020-08-29 DIAGNOSIS — R17 UNSPECIFIED JAUNDICE: ICD-10-CM

## 2020-08-29 DIAGNOSIS — R94.5 ABNORMAL RESULTS OF LIVER FUNCTION STUDIES: ICD-10-CM

## 2020-08-29 LAB
ALBUMIN SERPL-MCNC: 3.1 G/DL (ref 3.5–5.2)
ALBUMIN/GLOB SERPL: 1 G/DL
ALP SERPL-CCNC: 173 U/L (ref 39–117)
ALT SERPL W P-5'-P-CCNC: 201 U/L (ref 1–41)
ANION GAP SERPL CALCULATED.3IONS-SCNC: 9.7 MMOL/L (ref 5–15)
AST SERPL-CCNC: 96 U/L (ref 1–40)
BASOPHILS # BLD AUTO: 0.03 10*3/MM3 (ref 0–0.2)
BASOPHILS NFR BLD AUTO: 0.3 % (ref 0–1.5)
BILIRUB CONJ SERPL-MCNC: 2.4 MG/DL (ref 0–0.3)
BILIRUB INDIRECT SERPL-MCNC: 0.9 MG/DL
BILIRUB SERPL-MCNC: 3.3 MG/DL (ref 0–1.2)
BILIRUB SERPL-MCNC: 3.5 MG/DL (ref 0–1.2)
BUN SERPL-MCNC: 13 MG/DL (ref 8–23)
BUN/CREAT SERPL: 10.7 (ref 7–25)
CALCIUM SPEC-SCNC: 8.4 MG/DL (ref 8.6–10.5)
CHLORIDE SERPL-SCNC: 106 MMOL/L (ref 98–107)
CO2 SERPL-SCNC: 19.3 MMOL/L (ref 22–29)
CREAT SERPL-MCNC: 1.21 MG/DL (ref 0.76–1.27)
DEPRECATED RDW RBC AUTO: 48.3 FL (ref 37–54)
EOSINOPHIL # BLD AUTO: 0.08 10*3/MM3 (ref 0–0.4)
EOSINOPHIL NFR BLD AUTO: 0.9 % (ref 0.3–6.2)
ERYTHROCYTE [DISTWIDTH] IN BLOOD BY AUTOMATED COUNT: 14.7 % (ref 12.3–15.4)
FERRITIN SERPL-MCNC: 133 NG/ML (ref 30–400)
GFR SERPL CREATININE-BSD FRML MDRD: 57 ML/MIN/1.73
GLOBULIN UR ELPH-MCNC: 3.2 GM/DL
GLUCOSE SERPL-MCNC: 110 MG/DL (ref 65–99)
HCT VFR BLD AUTO: 29.7 % (ref 37.5–51)
HGB BLD-MCNC: 10.1 G/DL (ref 13–17.7)
HOLD SPECIMEN: NORMAL
IMM GRANULOCYTES # BLD AUTO: 0.04 10*3/MM3 (ref 0–0.05)
IMM GRANULOCYTES NFR BLD AUTO: 0.5 % (ref 0–0.5)
IRON 24H UR-MRATE: 32 MCG/DL (ref 59–158)
IRON SATN MFR SERPL: 12 % (ref 20–50)
LYMPHOCYTES # BLD AUTO: 0.99 10*3/MM3 (ref 0.7–3.1)
LYMPHOCYTES NFR BLD AUTO: 11.2 % (ref 19.6–45.3)
MCH RBC QN AUTO: 30.3 PG (ref 26.6–33)
MCHC RBC AUTO-ENTMCNC: 34 G/DL (ref 31.5–35.7)
MCV RBC AUTO: 89.2 FL (ref 79–97)
MONOCYTES # BLD AUTO: 0.65 10*3/MM3 (ref 0.1–0.9)
MONOCYTES NFR BLD AUTO: 7.4 % (ref 5–12)
NEUTROPHILS NFR BLD AUTO: 7.03 10*3/MM3 (ref 1.7–7)
NEUTROPHILS NFR BLD AUTO: 79.7 % (ref 42.7–76)
NRBC BLD AUTO-RTO: 0 /100 WBC (ref 0–0.2)
PLATELET # BLD AUTO: 126 10*3/MM3 (ref 140–450)
PMV BLD AUTO: 11.1 FL (ref 6–12)
POTASSIUM SERPL-SCNC: 4 MMOL/L (ref 3.5–5.2)
PROT SERPL-MCNC: 6.3 G/DL (ref 6–8.5)
RBC # BLD AUTO: 3.33 10*6/MM3 (ref 4.14–5.8)
SODIUM SERPL-SCNC: 135 MMOL/L (ref 136–145)
TIBC SERPL-MCNC: 258 MCG/DL (ref 298–536)
TRANSFERRIN SERPL-MCNC: 173 MG/DL (ref 200–360)
WBC # BLD AUTO: 8.82 10*3/MM3 (ref 3.4–10.8)

## 2020-08-29 PROCEDURE — 83540 ASSAY OF IRON: CPT | Performed by: NURSE PRACTITIONER

## 2020-08-29 PROCEDURE — 82247 BILIRUBIN TOTAL: CPT | Performed by: INTERNAL MEDICINE

## 2020-08-29 PROCEDURE — 80053 COMPREHEN METABOLIC PANEL: CPT | Performed by: HOSPITALIST

## 2020-08-29 PROCEDURE — 25010000002 PIPERACILLIN SOD-TAZOBACTAM PER 1 G: Performed by: HOSPITALIST

## 2020-08-29 PROCEDURE — 84466 ASSAY OF TRANSFERRIN: CPT | Performed by: NURSE PRACTITIONER

## 2020-08-29 PROCEDURE — 82728 ASSAY OF FERRITIN: CPT | Performed by: NURSE PRACTITIONER

## 2020-08-29 PROCEDURE — 86376 MICROSOMAL ANTIBODY EACH: CPT | Performed by: NURSE PRACTITIONER

## 2020-08-29 PROCEDURE — 99233 SBSQ HOSP IP/OBS HIGH 50: CPT | Performed by: INTERNAL MEDICINE

## 2020-08-29 PROCEDURE — 99231 SBSQ HOSP IP/OBS SF/LOW 25: CPT | Performed by: INTERNAL MEDICINE

## 2020-08-29 PROCEDURE — 86255 FLUORESCENT ANTIBODY SCREEN: CPT | Performed by: NURSE PRACTITIONER

## 2020-08-29 PROCEDURE — 85025 COMPLETE CBC W/AUTO DIFF WBC: CPT | Performed by: HOSPITALIST

## 2020-08-29 PROCEDURE — 83516 IMMUNOASSAY NONANTIBODY: CPT | Performed by: NURSE PRACTITIONER

## 2020-08-29 PROCEDURE — 82784 ASSAY IGA/IGD/IGG/IGM EACH: CPT | Performed by: NURSE PRACTITIONER

## 2020-08-29 PROCEDURE — 86038 ANTINUCLEAR ANTIBODIES: CPT | Performed by: NURSE PRACTITIONER

## 2020-08-29 PROCEDURE — 82248 BILIRUBIN DIRECT: CPT | Performed by: INTERNAL MEDICINE

## 2020-08-29 RX ORDER — POLYETHYLENE GLYCOL 3350 17 G/17G
17 POWDER, FOR SOLUTION ORAL 2 TIMES DAILY
Qty: 60 PACKET | Refills: 0 | Status: SHIPPED | OUTPATIENT
Start: 2020-08-29 | End: 2020-09-28

## 2020-08-29 RX ORDER — DOCUSATE SODIUM 50 MG/5 ML
100 LIQUID (ML) ORAL DAILY
Qty: 100 ML | Refills: 0 | Status: SHIPPED | OUTPATIENT
Start: 2020-08-30 | End: 2020-09-29

## 2020-08-29 RX ADMIN — TAZOBACTAM SODIUM AND PIPERACILLIN SODIUM 3.38 G: 375; 3 INJECTION, SOLUTION INTRAVENOUS at 00:23

## 2020-08-29 RX ADMIN — BUDESONIDE 3 MG: 3 CAPSULE, GELATIN COATED ORAL at 08:45

## 2020-08-29 RX ADMIN — METOPROLOL TARTRATE 50 MG: 50 TABLET, FILM COATED ORAL at 08:45

## 2020-08-29 RX ADMIN — TAZOBACTAM SODIUM AND PIPERACILLIN SODIUM 3.38 G: 375; 3 INJECTION, SOLUTION INTRAVENOUS at 08:45

## 2020-08-29 RX ADMIN — DOCUSATE SODIUM 100 MG: 50 LIQUID ORAL at 08:46

## 2020-08-29 RX ADMIN — ALLOPURINOL 100 MG: 100 TABLET ORAL at 08:45

## 2020-08-29 RX ADMIN — POLYETHYLENE GLYCOL 3350 17 G: 17 POWDER, FOR SOLUTION ORAL at 08:45

## 2020-08-29 RX ADMIN — APIXABAN 5 MG: 5 TABLET, FILM COATED ORAL at 08:45

## 2020-08-29 RX ADMIN — PANTOPRAZOLE SODIUM 40 MG: 40 INJECTION, POWDER, FOR SOLUTION INTRAVENOUS at 05:43

## 2020-08-29 RX ADMIN — DOFETILIDE 125 MCG: 0.12 CAPSULE ORAL at 08:46

## 2020-08-29 RX ADMIN — DIGOXIN 125 MCG: 125 TABLET ORAL at 13:35

## 2020-08-30 ENCOUNTER — READMISSION MANAGEMENT (OUTPATIENT)
Dept: CALL CENTER | Facility: HOSPITAL | Age: 85
End: 2020-08-30

## 2020-08-30 NOTE — OUTREACH NOTE
Prep Survey      Responses   [facility] patient discharged from?  [place]   Is LACE score < 7 ?  No   Eligibility  Readm Mgmt   Discharge diagnosis  Chronic Afib on eliquis, Crohn's disease, HTN, HLD, Chronic constipation, GERD, s/p MRCP   COVID-19 Test Status  Negative   Does the patient have one of the following disease processes/diagnoses(primary or secondary)?  Other   Does the patient have Home health ordered?  No   Is there a DME ordered?  No   Comments regarding appointments  Pt to schedule F/U with PCP and Gen Surg.    Medication alerts for this patient  see AVS   Prep survey completed?  Yes          Lisa Garcia RN

## 2020-08-31 LAB
ACTIN IGG SERPL-ACNC: 27 UNITS (ref 0–19)
ALP LIVER CFR SERPL: 1.2 UNITS (ref 0–20)
BACTERIA SPEC AEROBE CULT: NORMAL
BACTERIA SPEC AEROBE CULT: NORMAL
DEPRECATED MITOCHONDRIA M2 IGG SER-ACNC: <20 UNITS (ref 0–20)
ENDOMYSIUM IGA SER QL: NEGATIVE
IGA SERPL-MCNC: 393 MG/DL (ref 61–437)
TTG IGA SER-ACNC: <2 U/ML (ref 0–3)

## 2020-09-01 LAB — ANA SER QL IA: NEGATIVE

## 2020-09-02 ENCOUNTER — READMISSION MANAGEMENT (OUTPATIENT)
Dept: CALL CENTER | Facility: HOSPITAL | Age: 85
End: 2020-09-02

## 2020-09-02 ENCOUNTER — NURSE TRIAGE (OUTPATIENT)
Dept: CALL CENTER | Facility: HOSPITAL | Age: 85
End: 2020-09-02

## 2020-09-02 NOTE — OUTREACH NOTE
Medical Week 1 Survey      Responses   [facility]   [facility] Test Status  Negative   Does the patient have one of the following disease processes/diagnoses(primary or secondary)?  Other   Is there a successful TCM telephone encounter documented?  No   Week 1 attempt successful?  Yes   Call start time  1408   Call end time  1416   Discharge diagnosis  Chronic Afib on eliquis, Crohn's disease, HTN, HLD, Chronic constipation, GERD, s/p MRCP   Is patient permission given to speak with other caregiver?  Yes   Person spoke with today (if not patient) and relationship  Ni, spouse   Meds reviewed with patient/caregiver?  Yes   Is the patient having any side effects they believe may be caused by any medication additions or changes?  No   Does the patient have all medications ordered at discharge?  Yes   Is the patient taking all medications as directed (includes completed medication regime)?  Yes   Medication comments  antibiotic prescribed, for coughing all night, last night    Does the patient have a primary care provider?   Yes   Does the patient have an appointment with their PCP within 7 days of discharge?  Yes   Has the patient kept scheduled appointments due by today?  N/A   Comments  PCP Friday 09/04/2020   Dr Dejuan merchant scheduled   Has home health visited the patient within 72 hours of discharge?  N/A   Pulse Ox monitoring  None   Psychosocial issues?  No   Did the patient receive a copy of their discharge instructions?  Yes   Nursing interventions  Reviewed instructions with patient   What is the patient's perception of their health status since discharge?  Improving   Is the patient/caregiver able to teach back signs and symptoms related to disease process for when to call PCP?  Yes   Is the patient/caregiver able to teach back signs and symptoms related to disease process for when to call 911?  Yes   Is the patient/caregiver able to teach back the hierarchy of who to  call/visit for symptoms/problems? PCP, Specialist, Home health nurse, Urgent Care, ED, 911  Yes   Week 1 call completed?  Yes          Sofya Montaño RN

## 2020-09-02 NOTE — TELEPHONE ENCOUNTER
"    Reason for Disposition  • Health Information question, no triage required and triager able to answer question    Additional Information  • Negative: [1] Caller is not with the adult (patient) AND [2] reporting urgent symptoms  • Negative: Lab result questions  • Negative: Medication questions  • Negative: Caller can't be reached by phone  • Negative: Caller has already spoken to PCP or another triager  • Negative: RN needs further essential information from caller in order to complete triage  • Negative: Requesting regular office appointment  • Negative: [1] Caller requesting NON-URGENT health information AND [2] PCP's office is the best resource    Answer Assessment - Initial Assessment Questions  1. REASON FOR CALL or QUESTION: \"What is your reason for calling today?\" or \"How can I best help you?\" or \"What question do you have that I can help answer?\"      He was started on a new antibiotic today by PCP. Wife wants to make sure this is ok. Explained that they should follow directions from PCP.    Protocols used: INFORMATION ONLY CALL - NO TRIAGE-ADULT-      "

## 2020-09-16 ENCOUNTER — OFFICE (OUTPATIENT)
Dept: URBAN - METROPOLITAN AREA CLINIC 66 | Facility: CLINIC | Age: 85
End: 2020-09-16

## 2020-09-16 VITALS
TEMPERATURE: 97.8 F | WEIGHT: 146 LBS | DIASTOLIC BLOOD PRESSURE: 71 MMHG | SYSTOLIC BLOOD PRESSURE: 138 MMHG | HEIGHT: 68 IN | HEART RATE: 58 BPM

## 2020-09-16 DIAGNOSIS — K50.10 CROHN'S DISEASE OF LARGE INTESTINE WITHOUT COMPLICATIONS: ICD-10-CM

## 2020-09-16 DIAGNOSIS — R63.4 ABNORMAL WEIGHT LOSS: ICD-10-CM

## 2020-09-16 DIAGNOSIS — R74.8 ABNORMAL LEVELS OF OTHER SERUM ENZYMES: ICD-10-CM

## 2020-09-16 DIAGNOSIS — D50.9 IRON DEFICIENCY ANEMIA, UNSPECIFIED: ICD-10-CM

## 2020-09-16 PROCEDURE — 99213 OFFICE O/P EST LOW 20 MIN: CPT | Performed by: NURSE PRACTITIONER

## 2020-09-22 ENCOUNTER — OFFICE VISIT (OUTPATIENT)
Dept: SURGERY | Facility: CLINIC | Age: 85
End: 2020-09-22

## 2020-09-22 DIAGNOSIS — K81.9 CHOLECYSTITIS: Primary | ICD-10-CM

## 2020-09-22 PROCEDURE — 99214 OFFICE O/P EST MOD 30 MIN: CPT | Performed by: SURGERY

## 2020-09-22 NOTE — PROGRESS NOTES
Subjective   Asia Ly is a 88 y.o. male who returns to the office after recent hospitalization at which time he was diagnosed with cholecystitis.    History of Present Illness     The patient has multiple medical problems and developed severe epigastric abdominal pain that he thought was related to gastritis.  He then ate cream cheese to try to soothe the symptoms and his pain got worse.  He presented to the emergency room and was noted to have elevation of his liver function test and a CT scan of the abdomen and pelvis consistent with acute cholecystitis.  He was admitted and his symptoms quickly resolved.  Based on his multiple medical problems we did not proceed with surgery since his symptoms had resolved.  He was ultimately discharged and now returns for follow-up.    He has not had any recurrent abdominal pain.  He eats a bland diet.  He does have paroxysmal atrial fibrillation for which he takes Eliquis.  He also has aortic stenosis and pulmonary fibrosis.  His wife expresses great concern over general anesthesia because she states that his lungs suffer whenever he is put under an anesthetic.    Review of Systems   Constitutional: Negative for fatigue and fever.   Respiratory: Negative for chest tightness and shortness of breath.    Cardiovascular: Negative for chest pain and palpitations.   Gastrointestinal: Negative for abdominal pain, blood in stool, constipation, diarrhea, nausea and vomiting.     Past Medical History:   Diagnosis Date   • Aortic stenosis    • Aspiration pneumonia (CMS/HCC)    • Atrial fibrillation (CMS/HCC)    • Bronchiectasis (CMS/HCC)    • CKD (chronic kidney disease)    • COPD (chronic obstructive pulmonary disease) (CMS/HCC)    • Crohn's disease (CMS/HCC)    • Dizziness    • Elevated cholesterol    • Gastric ulcer    • Generalized weakness    • Gout    • Hard of hearing    • Hyperlipidemia    • Hypertension    • Leg swelling    • Lower extremity edema    • Mild anemia    • Near  "syncope    • Nonrheumatic aortic valve stenosis    • PAF (paroxysmal atrial fibrillation) (CMS/HCC)    • Palpitations    • Peptic ulcer    • Pneumonia of left lung due to infectious organism    • Pulmonary fibrosis (CMS/HCC)    • Stroke (CMS/HCC)    • TIA (transient ischemic attack)      Past Surgical History:   Procedure Laterality Date   • BRONCHOSCOPY N/A 10/22/2018    Procedure: BRONCHOSCOPY WITH BRONCHALVEOLAR LAVAGE;  Surgeon: Chidi Oconnor MD;  Location: [facility] ENDOSCOPY;  Service: Pulmonary   • CATARACT EXTRACTION Bilateral    • COLONOSCOPY     • COLONOSCOPY N/A 3/9/2018    Procedure: COLONOSCOPY to terminal ileum with bx;  Surgeon: Aron Cabrera MD;  Location: [facility] ENDOSCOPY;  Service:    • CYSTECTOMY      back and shoulder area   • ENDOSCOPY N/A 3/9/2018    Procedure: ESOPHAGOGASTRODUODENOSCOPY with bx;  Surgeon: Aron Cabrera MD;  Location: [facility] ENDOSCOPY;  Service:    • EYE SURGERY Left     detached retina   • EYE SURGERY Right     \"repaired a hole in the eye\"   • TESTICLE SURGERY      benign tumor removed.     Family History   Problem Relation Age of Onset   • Stroke Mother    • Heart disease Mother    • Hypertension Mother    • Diabetes Mother    • Heart disease Sister         Heart Valve Replacement   • Ovarian cancer Sister    • Rheumatic fever Sister    • Diabetes Sister    • Stroke Father      Social History     Socioeconomic History   • Marital status:      Spouse name: Not on file   • Number of children: Not on file   • Years of education: Not on file   • Highest education level: Not on file   Tobacco Use   • Smoking status: Former Smoker     Types: Cigars     Quit date: 10/28/1994     Years since quittin.9   • Smokeless tobacco: Never Used   • Tobacco comment: Quit 25 years ago   Substance and Sexual Activity   • Alcohol use: No     Comment: daily caffiene - 1 cup of coffee   • Drug use: No   • Sexual activity: Defer   Lifestyle   • Physical activity     Days per week: 7 " days     Minutes per session: 30 min   • Stress: Not at all       Objective   Physical Exam  Constitutional:       Appearance: Normal appearance. He is well-developed. He is not toxic-appearing.   Eyes:      General: No scleral icterus.  Pulmonary:      Effort: Pulmonary effort is normal. No respiratory distress.   Abdominal:      Palpations: Abdomen is soft.      Tenderness: There is no abdominal tenderness.   Skin:     General: Skin is warm and dry.   Neurological:      Mental Status: He is alert and oriented to person, place, and time.   Psychiatric:         Behavior: Behavior normal.         Thought Content: Thought content normal.         Judgment: Judgment normal.         Assessment/Plan       The encounter diagnosis was Cholecystitis.    The patient was recently admitted to the hospital with evidence of acute cholecystitis that completely resolved.  He has not had any recurrent symptoms.  He has multiple medical problems.  A cholecystectomy was discussed with the patient and his wife.  They are both concerned about proceeding with surgery due to his risk related to his comorbidities.  They have elected to manage nonoperatively.  He was advised to return to our office if gallbladder type symptoms recur.

## 2020-10-12 ENCOUNTER — OFFICE VISIT (OUTPATIENT)
Dept: CARDIOLOGY | Facility: CLINIC | Age: 85
End: 2020-10-12

## 2020-10-12 ENCOUNTER — LAB (OUTPATIENT)
Dept: LAB | Facility: HOSPITAL | Age: 85
End: 2020-10-12

## 2020-10-12 VITALS
DIASTOLIC BLOOD PRESSURE: 56 MMHG | HEIGHT: 68 IN | HEART RATE: 60 BPM | BODY MASS INDEX: 21.98 KG/M2 | WEIGHT: 145 LBS | SYSTOLIC BLOOD PRESSURE: 142 MMHG

## 2020-10-12 DIAGNOSIS — R73.01 ELEVATED FASTING BLOOD SUGAR: ICD-10-CM

## 2020-10-12 DIAGNOSIS — Z51.81 ENCOUNTER FOR THERAPEUTIC DRUG LEVEL MONITORING: ICD-10-CM

## 2020-10-12 DIAGNOSIS — I48.0 PAF (PAROXYSMAL ATRIAL FIBRILLATION) (HCC): Primary | ICD-10-CM

## 2020-10-12 DIAGNOSIS — I35.0 AORTIC STENOSIS, MODERATE: ICD-10-CM

## 2020-10-12 LAB
ALBUMIN SERPL-MCNC: 3.8 G/DL (ref 3.5–5.2)
ALBUMIN/GLOB SERPL: 1.1 G/DL
ALP SERPL-CCNC: 60 U/L (ref 39–117)
ALT SERPL W P-5'-P-CCNC: 10 U/L (ref 1–41)
ANION GAP SERPL CALCULATED.3IONS-SCNC: 9.5 MMOL/L (ref 5–15)
AST SERPL-CCNC: 12 U/L (ref 1–40)
BILIRUB SERPL-MCNC: 0.5 MG/DL (ref 0–1.2)
BUN SERPL-MCNC: 21 MG/DL (ref 8–23)
BUN/CREAT SERPL: 16 (ref 7–25)
CALCIUM SPEC-SCNC: 8.9 MG/DL (ref 8.6–10.5)
CHLORIDE SERPL-SCNC: 99 MMOL/L (ref 98–107)
CO2 SERPL-SCNC: 25.5 MMOL/L (ref 22–29)
CREAT SERPL-MCNC: 1.31 MG/DL (ref 0.76–1.27)
GFR SERPL CREATININE-BSD FRML MDRD: 52 ML/MIN/1.73
GLOBULIN UR ELPH-MCNC: 3.5 GM/DL
GLUCOSE SERPL-MCNC: 80 MG/DL (ref 65–99)
HBA1C MFR BLD: 5.88 % (ref 4.8–5.6)
POTASSIUM SERPL-SCNC: 3.9 MMOL/L (ref 3.5–5.2)
PROT SERPL-MCNC: 7.3 G/DL (ref 6–8.5)
SODIUM SERPL-SCNC: 134 MMOL/L (ref 136–145)

## 2020-10-12 PROCEDURE — 36415 COLL VENOUS BLD VENIPUNCTURE: CPT

## 2020-10-12 PROCEDURE — 83036 HEMOGLOBIN GLYCOSYLATED A1C: CPT

## 2020-10-12 PROCEDURE — 80053 COMPREHEN METABOLIC PANEL: CPT

## 2020-10-12 PROCEDURE — 93000 ELECTROCARDIOGRAM COMPLETE: CPT | Performed by: NURSE PRACTITIONER

## 2020-10-12 PROCEDURE — 99214 OFFICE O/P EST MOD 30 MIN: CPT | Performed by: NURSE PRACTITIONER

## 2020-10-12 RX ORDER — DIGOXIN 125 MCG
125 TABLET ORAL
Qty: 90 TABLET | Refills: 1 | Status: SHIPPED | OUTPATIENT
Start: 2020-10-12 | End: 2020-12-21 | Stop reason: SDUPTHER

## 2020-10-12 NOTE — PROGRESS NOTES
Date of Office Visit: 10/12/20  Encounter Provider: KYLAH Jha  Place of Service: [facility] CARDIOLOGY  Patient Name: Asia Ly  :1932    Chief Complaint   Patient presents with   • PAF (paroxysmal atrial fibrillation) (CMS/HCC   • Follow-up   :     HPI: Asia Ly is a 88 y.o. male  with history of paroxysmal atrial fibrillation, stroke and TIA in 2016, aortic stenosis, hyperlipidemia, B12 anemia,Crohn's disease, colon polyps, lower extremity edema, TIA,  pulmonary hypertension and pulmonary fibrosis.     He has been followed by Dr. Hector Rivera  Has been on sotalol in the past as well as amiodarone for recurrent atrial fibrillation.  He had Echocardiogram 2016 showed normal left ventricular systolic dysfunction, moderate tricuspid insufficiency, mild mitral insufficiency , mild pulmonary hypertension.  He also had a perfusion stress test that was negative for ischemia.  He was taken off diltiazem for allergic reaction to rash.  2017 he had an echocardiogram which showed an EF of 60%, grade 2 diastolic dysfunction and mild aortic he had increased lower extremity edema and had again rapid atrial fibrillation.  He was started on a diuretic.  He then had pneumonia hypoxia and influenza and was started on Tikosyn.  He has some prolonged QTC complicated by antibiotics.  He then had issues with gout felt to be related to discontinuation of indomethacin and was prescribed colchicine.  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  He then had some atrial fibrillation with RVR and was given oral metoprolol and Cardizem and converted back to sinus rhythm.  He was evaluated by electrophysiology with Dr. Nettles who felt that his ablation at his age would be very high risk procedure.  He was later started on digoxin for recurrent rapid atrial fibrillation.  He was admitted again 2019 and  electrophysiology felt that he may for AV node ablation and pacemaker implantation.  However he preferred to stay on Tikosyn and accept occasional breakthroughs.    He then had some confusion and upper epigastric abdominal pain he was hospitalized August 2020 found to have transaminitis with hyperbilirubinemia.  He had some atrial fibrillation with RVR received IV metoprolol and his heart rate improved.  Echocardiogram showed normal left ventricular systolic function, mild concentric hypertrophy, moderate aortic valve stenoses with aortic valve area 1.1 cm², maximum pressure gradient of 42 and mean pressure gradient 24 mmHg.  RVSP was severely elevated at 57.8.  He had mild aortic valve regurgitation and mild mitral regurgitation.      We visit today for reassessment.  He has had no further abdominal pain.  He denies palpitation, shortness of breath, edema, lightheadedness, chest pain tightness pressure, fatigue.  He had a HIDA scan which was reportedly normal.  He is accompanied by his wife today who is concerned about an elevated glucose level he had last month with his PCP.    Allergies   Allergen Reactions   • Amiodarone Unknown (See Comments)     Pulmonary fibrosis   • Diltiazem Arrhythmia   • Doxycycline Rash   • Indomethacin Rash       Past Medical History:   Diagnosis Date   • Aortic stenosis    • Aspiration pneumonia (CMS/HCC)    • Atrial fibrillation (CMS/HCC)    • Bronchiectasis (CMS/HCC)    • CKD (chronic kidney disease)    • COPD (chronic obstructive pulmonary disease) (CMS/HCC)    • Crohn's disease (CMS/HCC)    • Dizziness    • Elevated cholesterol    • Gastric ulcer    • Generalized weakness    • Gout    • Hard of hearing    • Hyperlipidemia    • Hypertension    • Leg swelling    • Lower extremity edema    • Mild anemia    • Near syncope    • Nonrheumatic aortic valve stenosis    • PAF (paroxysmal atrial fibrillation) (CMS/HCC)    • Palpitations    • Peptic ulcer    • Pneumonia of left lung due to  "infectious organism    • Pulmonary fibrosis (CMS/HCC)    • Stroke (CMS/HCC)    • TIA (transient ischemic attack)        Past Surgical History:   Procedure Laterality Date   • BRONCHOSCOPY N/A 10/22/2018    Procedure: BRONCHOSCOPY WITH BRONCHALVEOLAR LAVAGE;  Surgeon: Chidi Oconnor MD;  Location: [facility] ENDOSCOPY;  Service: Pulmonary   • CATARACT EXTRACTION Bilateral    • COLONOSCOPY     • COLONOSCOPY N/A 3/9/2018    Procedure: COLONOSCOPY to terminal ileum with bx;  Surgeon: Aron Cabrera MD;  Location: [facility] ENDOSCOPY;  Service:    • CYSTECTOMY      back and shoulder area   • ENDOSCOPY N/A 3/9/2018    Procedure: ESOPHAGOGASTRODUODENOSCOPY with bx;  Surgeon: Aron Cabrera MD;  Location: [facility];  Service:    • EYE SURGERY Left     detached retina   • EYE SURGERY Right     \"repaired a hole in the eye\"   • TESTICLE SURGERY      benign tumor removed.         Family and social history reviewed.     ROS  All other systems were reviewed and are negative          Objective:     Vitals:    10/12/20 1117   BP: 142/56   BP Location: Left arm   Patient Position: Sitting   Pulse: 60   Weight: 65.8 kg (145 lb)   Height: 172.7 cm (68\")     Body mass index is 22.05 kg/m².    PHYSICAL EXAM:  Constitutional:       General: Not in acute distress.     Appearance: Well-developed. Not diaphoretic.   Eyes:      Conjunctiva/sclera: Conjunctivae normal.   HENT:      Head: Normocephalic.   Neck:      Musculoskeletal: Normal range of motion.      Vascular: No JVD.   Pulmonary:      Effort: Pulmonary effort is normal. No respiratory distress.      Breath sounds: Examination of the left-lower field reveals rhonchi. No wheezing. Rhonchi present. No rales.   Chest:      Chest wall: Not tender to palpatation.   Cardiovascular:      Normal rate. Regular rhythm.   Abdominal:      General: Bowel sounds are normal. There is no distension.      Palpations: Abdomen is soft.   Musculoskeletal: Normal range of motion.   Skin:     " General: Skin is warm and dry. There is no cyanosis.      Findings: No rash.   Neurological:      Mental Status: Alert and oriented to person, place, and time.   Psychiatric:         Behavior: Behavior normal.         Thought Content: Thought content normal.         Judgment: Judgment normal.           ECG 12 Lead    Date/Time: 10/12/2020 11:31 AM  Performed by: Alta Hackett APRN  Authorized by: Alta Hackett APRN   Comparison: compared with previous ECG   Similar to previous ECG  Rhythm: sinus rhythm  Rate: normal  ST Flattening: III  Other findings: left ventricular hypertrophy    Clinical impression: non-specific ECG  Comments: No change            Current Outpatient Medications   Medication Sig Dispense Refill   • allopurinol (ZYLOPRIM) 100 MG tablet Take 100 mg by mouth 2 (Two) Times a Day.     • apixaban (ELIQUIS) 5 MG tablet tablet Take 1 tablet by mouth Every 12 (Twelve) Hours. 180 tablet 3   • atorvastatin (LIPITOR) 10 MG tablet Take one tablet by mouth Mon, Wed and Fri.     • BUDESONIDE PO Take 3 mg by mouth Daily.     • digoxin (LANOXIN) 125 MCG tablet Take 1 tablet by mouth Daily. 90 tablet 1   • dofetilide (TIKOSYN) 125 MCG capsule Take 1 capsule by mouth 2 (Two) Times a Day. 180 capsule 3   • IPRATROPIUM-ALBUTEROL IN Inhale Daily.     • metoprolol tartrate (LOPRESSOR) 50 MG tablet Take 1 tablet by mouth 3 (Three) Times a Day. 270 tablet 3   • omeprazole (priLOSEC) 40 MG capsule Take 40 mg by mouth Daily.       No current facility-administered medications for this visit.      Assessment:       Diagnosis Plan   1. PAF (paroxysmal atrial fibrillation) (CMS/[facility])  ECG 12 Lead   2. Aortic stenosis, moderate     3. Encounter for therapeutic drug level monitoring  Hemoglobin A1c    Comprehensive Metabolic Panel   4. Elevated fasting blood sugar  Hemoglobin A1c    Comprehensive Metabolic Panel        Orders Placed This Encounter   Procedures   • Hemoglobin A1c     Standing Status:   Future     Number of  Occurrences:   1     Standing Expiration Date:   10/12/2021   • Comprehensive Metabolic Panel     Standing Status:   Future     Number of Occurrences:   1     Standing Expiration Date:   10/13/2021   • ECG 12 Lead     This order was created via procedure documentation         Plan:          1. 88 year old gentleman with paroxysmal atrial fibrillation off amiodarone due to history of pulmonary fibrosis.  He  had a rash with diltiazem in the past.  He has been maintained on Tikosyn, metoprolol and digoxin.  He previously declined AV node ablation with pacemaker.     His rate is better since IV metoprolol in ED. Continue beta blockade and Tikosyn at this time.   2. Choledocholithiasis-liver enzymes elevated. general surgery has consulted GI/ Dr. Kim for possible ERCP  3.  History of TIA no recurrence  3.  History of pulmonary fibrosis usually sees Dr. Chidi Oconnor  4.  Aortic stenosis mild in November 2017 and moderate on most recent echo August 2020  5.  History of Crohn's disease and iron deficient anemia usually sees Dr. Susie Mccoy  6.  Hypertension continue home therapy  7.  Hyperlipidemia continue home therapy  8.  Evidence of acute cholecystitis which has completely resolved.  Surgical intervention was discussed and patient and wife opted for nonoperative management due to multiple comorbities.  He is to see Dr. Itz Zaidi Jr if symptoms recurr  9. Prediabetes-hemoglobin A1c today is still within prediabetic range.  He was actually in this range for years ago        Follow-up in 6 months call with questions or concerns.            It has been a pleasure to participate in this patient's care.      Thank you,  KYLAH Jha      **I used Dragon to dictate this note:**

## 2020-11-04 ENCOUNTER — OFFICE (OUTPATIENT)
Dept: URBAN - METROPOLITAN AREA CLINIC 66 | Facility: CLINIC | Age: 85
End: 2020-11-04

## 2020-11-04 VITALS
DIASTOLIC BLOOD PRESSURE: 46 MMHG | HEART RATE: 71 BPM | HEIGHT: 68 IN | WEIGHT: 206 LBS | SYSTOLIC BLOOD PRESSURE: 123 MMHG | TEMPERATURE: 98 F

## 2020-11-04 DIAGNOSIS — K50.10 CROHN'S DISEASE OF LARGE INTESTINE WITHOUT COMPLICATIONS: ICD-10-CM

## 2020-11-04 DIAGNOSIS — Z86.010 PERSONAL HISTORY OF COLONIC POLYPS: ICD-10-CM

## 2020-11-04 DIAGNOSIS — Z79.52 LONG TERM (CURRENT) USE OF SYSTEMIC STEROIDS: ICD-10-CM

## 2020-11-04 PROCEDURE — 99214 OFFICE O/P EST MOD 30 MIN: CPT | Performed by: INTERNAL MEDICINE

## 2020-11-17 ENCOUNTER — INPATIENT HOSPITAL (OUTPATIENT)
Dept: URBAN - METROPOLITAN AREA HOSPITAL 113 | Facility: HOSPITAL | Age: 85
End: 2020-11-17

## 2020-11-17 ENCOUNTER — HOSPITAL ENCOUNTER (INPATIENT)
Facility: HOSPITAL | Age: 85
LOS: 4 days | Discharge: HOME HEALTH | End: 2020-11-21
Attending: EMERGENCY MEDICINE | Admitting: HOSPITALIST

## 2020-11-17 DIAGNOSIS — K92.2 GASTROINTESTINAL HEMORRHAGE, UNSPECIFIED: ICD-10-CM

## 2020-11-17 DIAGNOSIS — K92.2 ACUTE LOWER GI BLEEDING: Primary | ICD-10-CM

## 2020-11-17 DIAGNOSIS — D50.0 IRON DEFICIENCY ANEMIA SECONDARY TO BLOOD LOSS (CHRONIC): ICD-10-CM

## 2020-11-17 DIAGNOSIS — D62 ACUTE BLOOD LOSS ANEMIA: ICD-10-CM

## 2020-11-17 DIAGNOSIS — Z79.01 LONG TERM (CURRENT) USE OF ANTICOAGULANTS: ICD-10-CM

## 2020-11-17 DIAGNOSIS — I48.91 UNSPECIFIED ATRIAL FIBRILLATION: ICD-10-CM

## 2020-11-17 DIAGNOSIS — J84.10 PULMONARY FIBROSIS, UNSPECIFIED: ICD-10-CM

## 2020-11-17 LAB
ABO GROUP BLD: NORMAL
ALBUMIN SERPL-MCNC: 3.5 G/DL (ref 3.5–5.2)
ALBUMIN/GLOB SERPL: 1.3 G/DL
ALP SERPL-CCNC: 46 U/L (ref 39–117)
ALT SERPL W P-5'-P-CCNC: 11 U/L (ref 1–41)
ANION GAP SERPL CALCULATED.3IONS-SCNC: 9.1 MMOL/L (ref 5–15)
APTT PPP: 33 SECONDS (ref 22.7–35.4)
AST SERPL-CCNC: 11 U/L (ref 1–40)
B PARAPERT DNA SPEC QL NAA+PROBE: NOT DETECTED
B PERT DNA SPEC QL NAA+PROBE: NOT DETECTED
BASOPHILS # BLD AUTO: 0.05 10*3/MM3 (ref 0–0.2)
BASOPHILS NFR BLD AUTO: 0.5 % (ref 0–1.5)
BILIRUB SERPL-MCNC: 0.3 MG/DL (ref 0–1.2)
BLD GP AB SCN SERPL QL: NEGATIVE
BUN SERPL-MCNC: 25 MG/DL (ref 8–23)
BUN/CREAT SERPL: 21.6 (ref 7–25)
C PNEUM DNA NPH QL NAA+NON-PROBE: NOT DETECTED
CALCIUM SPEC-SCNC: 8.7 MG/DL (ref 8.6–10.5)
CHLORIDE SERPL-SCNC: 107 MMOL/L (ref 98–107)
CO2 SERPL-SCNC: 22.9 MMOL/L (ref 22–29)
CREAT SERPL-MCNC: 1.16 MG/DL (ref 0.76–1.27)
DEPRECATED RDW RBC AUTO: 46 FL (ref 37–54)
EOSINOPHIL # BLD AUTO: 0.11 10*3/MM3 (ref 0–0.4)
EOSINOPHIL NFR BLD AUTO: 1.1 % (ref 0.3–6.2)
ERYTHROCYTE [DISTWIDTH] IN BLOOD BY AUTOMATED COUNT: 14.6 % (ref 12.3–15.4)
EXPIRATION DATE: ABNORMAL
FECAL OCCULT BLOOD SCREEN, POC: POSITIVE
FLUAV SUBTYP SPEC NAA+PROBE: NOT DETECTED
FLUBV RNA ISLT QL NAA+PROBE: NOT DETECTED
GFR SERPL CREATININE-BSD FRML MDRD: 59 ML/MIN/1.73
GLOBULIN UR ELPH-MCNC: 2.7 GM/DL
GLUCOSE SERPL-MCNC: 122 MG/DL (ref 65–99)
HADV DNA SPEC NAA+PROBE: NOT DETECTED
HCOV 229E RNA SPEC QL NAA+PROBE: NOT DETECTED
HCOV HKU1 RNA SPEC QL NAA+PROBE: NOT DETECTED
HCOV NL63 RNA SPEC QL NAA+PROBE: NOT DETECTED
HCOV OC43 RNA SPEC QL NAA+PROBE: NOT DETECTED
HCT VFR BLD AUTO: 19.1 % (ref 37.5–51)
HCT VFR BLD AUTO: 23.4 % (ref 37.5–51)
HCT VFR BLD AUTO: 24.9 % (ref 37.5–51)
HGB BLD-MCNC: 5.7 G/DL (ref 13–17.7)
HGB BLD-MCNC: 7.3 G/DL (ref 13–17.7)
HGB BLD-MCNC: 7.8 G/DL (ref 13–17.7)
HMPV RNA NPH QL NAA+NON-PROBE: NOT DETECTED
HPIV1 RNA SPEC QL NAA+PROBE: NOT DETECTED
HPIV2 RNA SPEC QL NAA+PROBE: NOT DETECTED
HPIV3 RNA NPH QL NAA+PROBE: NOT DETECTED
HPIV4 P GENE NPH QL NAA+PROBE: NOT DETECTED
IMM GRANULOCYTES # BLD AUTO: 0.05 10*3/MM3 (ref 0–0.05)
IMM GRANULOCYTES NFR BLD AUTO: 0.5 % (ref 0–0.5)
INR PPP: 1.56 (ref 0.9–1.1)
LYMPHOCYTES # BLD AUTO: 1.69 10*3/MM3 (ref 0.7–3.1)
LYMPHOCYTES NFR BLD AUTO: 17.6 % (ref 19.6–45.3)
Lab: 148
M PNEUMO IGG SER IA-ACNC: NOT DETECTED
MCH RBC QN AUTO: 25.8 PG (ref 26.6–33)
MCHC RBC AUTO-ENTMCNC: 29.8 G/DL (ref 31.5–35.7)
MCV RBC AUTO: 86.4 FL (ref 79–97)
MONOCYTES # BLD AUTO: 0.83 10*3/MM3 (ref 0.1–0.9)
MONOCYTES NFR BLD AUTO: 8.6 % (ref 5–12)
NEGATIVE CONTROL: NEGATIVE
NEUTROPHILS NFR BLD AUTO: 6.89 10*3/MM3 (ref 1.7–7)
NEUTROPHILS NFR BLD AUTO: 71.7 % (ref 42.7–76)
NRBC BLD AUTO-RTO: 0 /100 WBC (ref 0–0.2)
PLATELET # BLD AUTO: 211 10*3/MM3 (ref 140–450)
PMV BLD AUTO: 10.7 FL (ref 6–12)
POSITIVE CONTROL: POSITIVE
POTASSIUM SERPL-SCNC: 4.5 MMOL/L (ref 3.5–5.2)
PROT SERPL-MCNC: 6.2 G/DL (ref 6–8.5)
PROTHROMBIN TIME: 18.5 SECONDS (ref 11.7–14.2)
QT INTERVAL: 323 MS
RBC # BLD AUTO: 2.21 10*6/MM3 (ref 4.14–5.8)
RH BLD: POSITIVE
RHINOVIRUS RNA SPEC NAA+PROBE: NOT DETECTED
RSV RNA NPH QL NAA+NON-PROBE: NOT DETECTED
SARS-COV-2 RNA NPH QL NAA+NON-PROBE: NOT DETECTED
SODIUM SERPL-SCNC: 139 MMOL/L (ref 136–145)
T&S EXPIRATION DATE: NORMAL
WBC # BLD AUTO: 9.62 10*3/MM3 (ref 3.4–10.8)

## 2020-11-17 PROCEDURE — 85730 THROMBOPLASTIN TIME PARTIAL: CPT | Performed by: EMERGENCY MEDICINE

## 2020-11-17 PROCEDURE — 99285 EMERGENCY DEPT VISIT HI MDM: CPT

## 2020-11-17 PROCEDURE — P9016 RBC LEUKOCYTES REDUCED: HCPCS

## 2020-11-17 PROCEDURE — 82270 OCCULT BLOOD FECES: CPT | Performed by: EMERGENCY MEDICINE

## 2020-11-17 PROCEDURE — 86900 BLOOD TYPING SEROLOGIC ABO: CPT | Performed by: EMERGENCY MEDICINE

## 2020-11-17 PROCEDURE — 0202U NFCT DS 22 TRGT SARS-COV-2: CPT | Performed by: EMERGENCY MEDICINE

## 2020-11-17 PROCEDURE — 86920 COMPATIBILITY TEST SPIN: CPT

## 2020-11-17 PROCEDURE — 85025 COMPLETE CBC W/AUTO DIFF WBC: CPT | Performed by: EMERGENCY MEDICINE

## 2020-11-17 PROCEDURE — 80053 COMPREHEN METABOLIC PANEL: CPT | Performed by: EMERGENCY MEDICINE

## 2020-11-17 PROCEDURE — 85014 HEMATOCRIT: CPT | Performed by: NURSE PRACTITIONER

## 2020-11-17 PROCEDURE — 99222 1ST HOSP IP/OBS MODERATE 55: CPT | Performed by: INTERNAL MEDICINE

## 2020-11-17 PROCEDURE — 36415 COLL VENOUS BLD VENIPUNCTURE: CPT | Performed by: NURSE PRACTITIONER

## 2020-11-17 PROCEDURE — 86900 BLOOD TYPING SEROLOGIC ABO: CPT

## 2020-11-17 PROCEDURE — 93005 ELECTROCARDIOGRAM TRACING: CPT | Performed by: INTERNAL MEDICINE

## 2020-11-17 PROCEDURE — 86901 BLOOD TYPING SEROLOGIC RH(D): CPT | Performed by: EMERGENCY MEDICINE

## 2020-11-17 PROCEDURE — 86850 RBC ANTIBODY SCREEN: CPT | Performed by: EMERGENCY MEDICINE

## 2020-11-17 PROCEDURE — 85018 HEMOGLOBIN: CPT | Performed by: NURSE PRACTITIONER

## 2020-11-17 PROCEDURE — 36430 TRANSFUSION BLD/BLD COMPNT: CPT

## 2020-11-17 PROCEDURE — 86901 BLOOD TYPING SEROLOGIC RH(D): CPT

## 2020-11-17 PROCEDURE — 86923 COMPATIBILITY TEST ELECTRIC: CPT

## 2020-11-17 PROCEDURE — 99223 1ST HOSP IP/OBS HIGH 75: CPT | Performed by: INTERNAL MEDICINE

## 2020-11-17 PROCEDURE — 93010 ELECTROCARDIOGRAM REPORT: CPT | Performed by: INTERNAL MEDICINE

## 2020-11-17 PROCEDURE — 85610 PROTHROMBIN TIME: CPT | Performed by: EMERGENCY MEDICINE

## 2020-11-17 RX ORDER — ONDANSETRON 4 MG/1
4 TABLET, FILM COATED ORAL EVERY 6 HOURS PRN
Status: DISCONTINUED | OUTPATIENT
Start: 2020-11-17 | End: 2020-11-17

## 2020-11-17 RX ORDER — METOPROLOL TARTRATE 50 MG/1
50 TABLET, FILM COATED ORAL 3 TIMES DAILY
Status: DISCONTINUED | OUTPATIENT
Start: 2020-11-17 | End: 2020-11-21 | Stop reason: HOSPADM

## 2020-11-17 RX ORDER — SODIUM CHLORIDE 0.9 % (FLUSH) 0.9 %
10 SYRINGE (ML) INJECTION AS NEEDED
Status: DISCONTINUED | OUTPATIENT
Start: 2020-11-17 | End: 2020-11-17

## 2020-11-17 RX ORDER — NITROGLYCERIN 0.4 MG/1
0.4 TABLET SUBLINGUAL
Status: DISCONTINUED | OUTPATIENT
Start: 2020-11-17 | End: 2020-11-17

## 2020-11-17 RX ORDER — IPRATROPIUM BROMIDE AND ALBUTEROL SULFATE 2.5; .5 MG/3ML; MG/3ML
1.5 SOLUTION RESPIRATORY (INHALATION) EVERY 4 HOURS PRN
Status: DISCONTINUED | OUTPATIENT
Start: 2020-11-17 | End: 2020-11-21

## 2020-11-17 RX ORDER — BISACODYL 10 MG
10 SUPPOSITORY, RECTAL RECTAL DAILY PRN
Status: DISCONTINUED | OUTPATIENT
Start: 2020-11-17 | End: 2020-11-17

## 2020-11-17 RX ORDER — ALUMINA, MAGNESIA, AND SIMETHICONE 2400; 2400; 240 MG/30ML; MG/30ML; MG/30ML
15 SUSPENSION ORAL EVERY 6 HOURS PRN
Status: DISCONTINUED | OUTPATIENT
Start: 2020-11-17 | End: 2020-11-17

## 2020-11-17 RX ORDER — ALLOPURINOL 100 MG/1
100 TABLET ORAL 2 TIMES DAILY
Status: DISCONTINUED | OUTPATIENT
Start: 2020-11-17 | End: 2020-11-21 | Stop reason: HOSPADM

## 2020-11-17 RX ORDER — ONDANSETRON 2 MG/ML
4 INJECTION INTRAMUSCULAR; INTRAVENOUS EVERY 6 HOURS PRN
Status: DISCONTINUED | OUTPATIENT
Start: 2020-11-17 | End: 2020-11-21

## 2020-11-17 RX ORDER — BISACODYL 5 MG/1
5 TABLET, DELAYED RELEASE ORAL DAILY PRN
Status: DISCONTINUED | OUTPATIENT
Start: 2020-11-17 | End: 2020-11-21

## 2020-11-17 RX ORDER — CHOLECALCIFEROL (VITAMIN D3) 125 MCG
500 CAPSULE ORAL DAILY
COMMUNITY
End: 2020-12-10 | Stop reason: HOSPADM

## 2020-11-17 RX ORDER — ATORVASTATIN CALCIUM 20 MG/1
10 TABLET, FILM COATED ORAL NIGHTLY
Status: DISCONTINUED | OUTPATIENT
Start: 2020-11-17 | End: 2020-11-21 | Stop reason: HOSPADM

## 2020-11-17 RX ORDER — DIGOXIN 125 MCG
125 TABLET ORAL
Status: DISCONTINUED | OUTPATIENT
Start: 2020-11-17 | End: 2020-11-21 | Stop reason: HOSPADM

## 2020-11-17 RX ORDER — ACETAMINOPHEN 325 MG/1
650 TABLET ORAL EVERY 4 HOURS PRN
Status: DISCONTINUED | OUTPATIENT
Start: 2020-11-17 | End: 2020-11-21

## 2020-11-17 RX ORDER — SODIUM CHLORIDE 0.9 % (FLUSH) 0.9 %
10 SYRINGE (ML) INJECTION AS NEEDED
Status: DISCONTINUED | OUTPATIENT
Start: 2020-11-17 | End: 2020-11-21 | Stop reason: HOSPADM

## 2020-11-17 RX ORDER — PANTOPRAZOLE SODIUM 40 MG/10ML
40 INJECTION, POWDER, LYOPHILIZED, FOR SOLUTION INTRAVENOUS EVERY 12 HOURS SCHEDULED
Status: DISCONTINUED | OUTPATIENT
Start: 2020-11-17 | End: 2020-11-20

## 2020-11-17 RX ORDER — CHOLECALCIFEROL (VITAMIN D3) 125 MCG
500 CAPSULE ORAL DAILY
Status: DISCONTINUED | OUTPATIENT
Start: 2020-11-17 | End: 2020-11-21 | Stop reason: HOSPADM

## 2020-11-17 RX ORDER — DOFETILIDE 0.12 MG/1
125 CAPSULE ORAL 2 TIMES DAILY
Status: DISCONTINUED | OUTPATIENT
Start: 2020-11-17 | End: 2020-11-21 | Stop reason: HOSPADM

## 2020-11-17 RX ADMIN — DIGOXIN 125 MCG: 125 TABLET ORAL at 14:00

## 2020-11-17 RX ADMIN — PANTOPRAZOLE SODIUM 40 MG: 40 INJECTION, POWDER, FOR SOLUTION INTRAVENOUS at 21:11

## 2020-11-17 RX ADMIN — ATORVASTATIN CALCIUM 10 MG: 20 TABLET, FILM COATED ORAL at 21:11

## 2020-11-17 RX ADMIN — PANTOPRAZOLE SODIUM 40 MG: 40 INJECTION, POWDER, FOR SOLUTION INTRAVENOUS at 14:00

## 2020-11-17 RX ADMIN — METOPROLOL TARTRATE 50 MG: 50 TABLET, FILM COATED ORAL at 21:10

## 2020-11-17 RX ADMIN — Medication 500 MCG: at 14:00

## 2020-11-17 RX ADMIN — ALLOPURINOL 100 MG: 100 TABLET ORAL at 21:11

## 2020-11-17 RX ADMIN — DOFETILIDE 125 MCG: 0.12 CAPSULE ORAL at 14:00

## 2020-11-17 RX ADMIN — SODIUM CHLORIDE 1000 ML: 9 INJECTION, SOLUTION INTRAVENOUS at 04:06

## 2020-11-17 RX ADMIN — DOFETILIDE 125 MCG: 0.12 CAPSULE ORAL at 21:10

## 2020-11-17 RX ADMIN — ALLOPURINOL 100 MG: 100 TABLET ORAL at 14:00

## 2020-11-17 RX ADMIN — SODIUM CHLORIDE, PRESERVATIVE FREE 10 ML: 5 INJECTION INTRAVENOUS at 14:00

## 2020-11-17 NOTE — ED PROVIDER NOTES
EMERGENCY DEPARTMENT ENCOUNTER    CHIEF COMPLAINT  Chief Complaint: Blood in stool  History given by: Patient  History limited by: None  Room Number: 14/14  PMD: Lubna Lobo MD      HPI:  Pt is a 88 y.o. male who presents complaining of bright red blood per rectum.  The patient states that approximately 1 to 2 hours prior to ED arrival he had a bowel movement that was mixed with red blood.  The patient states that since that point he had had another bowel movement with no stool but only red blood.  Symptoms were sudden in onset and have been progressively worsening since onset.  Symptoms are currently moderate in intensity.  The patient currently is on Eliquis secondary to atrial fibrillation.  The patient denies abdominal pain, diarrhea/constipation, back pain, rectal pain, fever/chills, nausea/vomiting, or any known sick contacts.      Location: rectal  Radiation: none  Aggravating Factors: none  Alleviating Factors: none  Previous Episodes: none  Treatment before arrival: none    PAST MEDICAL HISTORY  Active Ambulatory Problems     Diagnosis Date Noted   • Ataxia 08/19/2016   • TIA (transient ischemic attack) 08/19/2016   • Atrial fibrillation, paroxysmal 08/19/2016   • Hyperlipidemia 08/19/2016   • Gout 08/19/2016   • Crohn's disease (CMS/HCC) 08/19/2016   • Atrial fibrillation with RVR (CMS/HCC) 10/27/2018   • Acute cholecystitis 08/26/2020   • Obstructive jaundice 08/26/2020     Resolved Ambulatory Problems     Diagnosis Date Noted   • Hypoxia 04/29/2018     Past Medical History:   Diagnosis Date   • Aortic stenosis    • Aspiration pneumonia (CMS/HCC)    • Bronchiectasis (CMS/HCC)    • CKD (chronic kidney disease)    • COPD (chronic obstructive pulmonary disease) (CMS/[facility])    • Dizziness    • Elevated cholesterol    • Gastric ulcer    • Generalized weakness    • Hard of hearing    • Hypertension    • Leg swelling    • Lower extremity edema    • Mild anemia    • Near syncope    • Nonrheumatic aortic valve  "stenosis    • PAF (paroxysmal atrial fibrillation) (CMS/HCC)    • Palpitations    • Peptic ulcer    • Pneumonia of left lung due to infectious organism    • Pulmonary fibrosis (CMS/HCC)    • Stroke (CMS/HCC)        PAST SURGICAL HISTORY  Past Surgical History:   Procedure Laterality Date   • BRONCHOSCOPY N/A 10/22/2018    Procedure: BRONCHOSCOPY WITH BRONCHALVEOLAR LAVAGE;  Surgeon: Chidi Oconnor MD;  Location: [facility] ENDOSCOPY;  Service: Pulmonary   • CATARACT EXTRACTION Bilateral    • COLONOSCOPY     • COLONOSCOPY N/A 3/9/2018    Procedure: COLONOSCOPY to terminal ileum with bx;  Surgeon: Aron Cabrera MD;  Location: [facility] ENDOSCOPY;  Service:    • CYSTECTOMY      back and shoulder area   • ENDOSCOPY N/A 3/9/2018    Procedure: ESOPHAGOGASTRODUODENOSCOPY with bx;  Surgeon: Aron Cabrera MD;  Location: [facility] ENDOSCOPY;  Service:    • EYE SURGERY Left     detached retina   • EYE SURGERY Right     \"repaired a hole in the eye\"   • TESTICLE SURGERY      benign tumor removed.       FAMILY HISTORY  Family History   Problem Relation Age of Onset   • Stroke Mother    • Heart disease Mother    • Hypertension Mother    • Diabetes Mother    • Heart disease Sister         Heart Valve Replacement   • Ovarian cancer Sister    • Rheumatic fever Sister    • Diabetes Sister    • Stroke Father        SOCIAL HISTORY  Social History     Socioeconomic History   • Marital status:      Spouse name: Not on file   • Number of children: Not on file   • Years of education: Not on file   • Highest education level: Not on file   Tobacco Use   • Smoking status: Former Smoker     Types: Cigars     Quit date: 10/28/1994     Years since quittin.0   • Smokeless tobacco: Never Used   • Tobacco comment: Quit 25 years ago   Substance and Sexual Activity   • Alcohol use: No     Comment: daily caffiene - 1/2 cup of coffee   • Drug use: No   • Sexual activity: Defer   Lifestyle   • Physical activity     Days per week: 7 days     " Minutes per session: 30 min   • Stress: Not at all       ALLERGIES  Amiodarone, Diltiazem, Doxycycline, and Indomethacin    REVIEW OF SYSTEMS  Review of Systems   Constitutional: Negative for activity change, appetite change and fever.   HENT: Negative for congestion and sore throat.    Eyes: Negative.    Respiratory: Negative for cough and shortness of breath.    Cardiovascular: Negative for chest pain and leg swelling.   Gastrointestinal: Positive for blood in stool. Negative for abdominal pain, diarrhea and vomiting.   Endocrine: Negative.    Genitourinary: Negative for decreased urine volume and dysuria.   Musculoskeletal: Negative for neck pain.   Skin: Negative for rash and wound.   Allergic/Immunologic: Negative.    Neurological: Negative for weakness, numbness and headaches.   Hematological: Negative.    Psychiatric/Behavioral: Negative.    All other systems reviewed and are negative.      PHYSICAL EXAM  ED Triage Vitals   Temp Heart Rate Resp BP SpO2   11/17/20 0347 11/17/20 0346 11/17/20 0346 11/17/20 0346 11/17/20 0346   96.9 °F (36.1 °C) 56 18 128/48 100 %      Temp src Heart Rate Source Patient Position BP Location FiO2 (%)   11/17/20 0347 -- -- -- --   Tympanic           Physical Exam   Constitutional: He is oriented to person, place, and time. No distress.   HENT:   Head: Normocephalic and atraumatic.   Eyes: Pupils are equal, round, and reactive to light. EOM are normal.   Neck: Normal range of motion. Neck supple.   Cardiovascular: Normal rate, regular rhythm and normal heart sounds.   Pulmonary/Chest: Effort normal and breath sounds normal. No respiratory distress.   Abdominal: Soft. There is no abdominal tenderness. There is no rebound and no guarding.   Genitourinary: Rectum:      Guaiac result positive.      Genitourinary Comments: Heme positive red stool     Musculoskeletal: Normal range of motion.         General: No edema.   Neurological: He is alert and oriented to person, place, and time.  He has normal sensation and normal strength.   Skin: Skin is warm and dry.   Psychiatric: Mood and affect normal.   Nursing note and vitals reviewed.      LAB RESULTS  Lab Results (last 24 hours)     Procedure Component Value Units Date/Time    POCT Occult Blood Stool [[id number]]  (Abnormal) Collected: 11/17/20 0402    Specimen: Stool from Per Rectum Updated: 11/17/20 0402     Fecal Occult Blood Positive     Lot Number [id number]     Expiration Date 3/31/2021     Positive Control Positive     Negative Control Negative    CBC & Differential [[id number]]  (Abnormal) Collected: 11/17/20 0405    Specimen: Blood Updated: 11/17/20 0445    Narrative:      The following orders were created for panel order CBC & Differential.  Procedure                               Abnormality         Status                     ---------                               -----------         ------                     CBC Auto Differential[[id number]]        Abnormal            Final result                 Please view results for these tests on the individual orders.    Comprehensive Metabolic Panel [[id number]]  (Abnormal) Collected: 11/17/20 0405    Specimen: Blood Updated: 11/17/20 0446     Glucose 122 mg/dL      BUN 25 mg/dL      Creatinine 1.16 mg/dL      Sodium 139 mmol/L      Potassium 4.5 mmol/L      Chloride 107 mmol/L      CO2 22.9 mmol/L      Calcium 8.7 mg/dL      Total Protein 6.2 g/dL      Albumin 3.50 g/dL      ALT (SGPT) 11 U/L      AST (SGOT) 11 U/L      Alkaline Phosphatase 46 U/L      Total Bilirubin 0.3 mg/dL      eGFR Non African Amer 59 mL/min/1.73      Globulin 2.7 gm/dL      A/G Ratio 1.3 g/dL      BUN/Creatinine Ratio 21.6     Anion Gap 9.1 mmol/L     Narrative:      GFR Normal >60  Chronic Kidney Disease <60  Kidney Failure <15      CBC Auto Differential [[id number]]  (Abnormal) Collected: 11/17/20 0405    Specimen: Blood Updated: 11/17/20 0445     WBC 9.62 10*3/mm3      RBC 2.21 10*6/mm3      Hemoglobin 5.7 g/dL      Hematocrit  19.1 %      MCV 86.4 fL      MCH 25.8 pg      MCHC 29.8 g/dL      RDW 14.6 %      RDW-SD 46.0 fl      MPV 10.7 fL      Platelets 211 10*3/mm3      Neutrophil % 71.7 %      Lymphocyte % 17.6 %      Monocyte % 8.6 %      Eosinophil % 1.1 %      Basophil % 0.5 %      Immature Grans % 0.5 %      Neutrophils, Absolute 6.89 10*3/mm3      Lymphocytes, Absolute 1.69 10*3/mm3      Monocytes, Absolute 0.83 10*3/mm3      Eosinophils, Absolute 0.11 10*3/mm3      Basophils, Absolute 0.05 10*3/mm3      Immature Grans, Absolute 0.05 10*3/mm3      nRBC 0.0 /100 WBC     Protime-INR [[id number]]  (Abnormal) Collected: 11/17/20 0406    Specimen: Blood Updated: 11/17/20 0437     Protime 18.5 Seconds      INR 1.56    aPTT [[id number]]  (Normal) Collected: 11/17/20 0406    Specimen: Blood Updated: 11/17/20 0437     PTT 33.0 seconds           I ordered the above labs and reviewed the results    RADIOLOGY  No orders to display        I ordered the above noted radiological studies. Interpreted by radiologist.  Reviewed by me in PACS.       PROCEDURES  Critical Care  Performed by: Johnnie Chin MD  Authorized by: Johnnie Chin MD     Critical care provider statement:     Critical care time (minutes):  30    Critical care time was exclusive of:  Separately billable procedures and treating other patients    Critical care was necessary to treat or prevent imminent or life-threatening deterioration of the following conditions:  Circulatory failure (Blood loss anemia/GI bleed)    Critical care was time spent personally by me on the following activities:  Blood draw for specimens, development of treatment plan with patient or surrogate, discussions with consultants, evaluation of patient's response to treatment, examination of patient, obtaining history from patient or surrogate, ordering and performing treatments and interventions, ordering and review of laboratory studies, ordering and review of radiographic studies, re-evaluation of  patient's condition and review of old charts          PROGRESS AND CONSULTS     The patient was wearing a facemask upon entrance into the room and remained in such throughout their visit.  I was wearing PPE including a facemask, eye protection, as well as gloves at any point entering the room and throughout the visit    0445  Upon reevaluation, the patient is resting comfortably and has had no more episodes of blood, but he has not had a bowel movement since being here.  His vital signs are currently stable.  They did call from lab and his hemoglobin is significantly low at 5.7 with a hematocrit of 19.1.  That is a significant change from previous hemoglobins.  At this point, I will order 2 units of packed red blood cells for transfusion.    0520  Upon reevaluation, the patient's vital signs remained stable.  I did inform the patient that his hemoglobin is significantly low and will require transfusion.  The patient agrees with this plan and all the questions have currently been answered.  We are currently awaiting blood to be prepped and ready.    0530  Case discussed with KYLAH Mendez for [facility], who agrees to admit the patient to the hospital for Dr. Arrieta    MEDICAL DECISION MAKING  Results were reviewed/discussed with the patient and they were also made aware of online access. Pt also made aware that some labs, such as cultures, will not be resulted during ER visit and follow up with PMD is necessary.     MDM  Number of Diagnoses or Management Options  Acute blood loss anemia:   Acute lower GI bleeding:      Amount and/or Complexity of Data Reviewed  Clinical lab tests: ordered and reviewed  Tests in the radiology section of CPT®: ordered and reviewed  Tests in the medicine section of CPT®: ordered and reviewed  Review and summarize past medical records: yes (Upon medical records review, the patient was last seen and evaluated and admitted to the hospital on 8/26/2020 secondary to upper abdominal  pain.)  Discuss the patient with other providers: yes (KYLAH Mendez for [facility], who will admit the patient for Dr. Arrieta)           DIAGNOSIS  Final diagnoses:   Acute lower GI bleeding   Acute blood loss anemia       DISPOSITION  ADMISSION    Discussed treatment plan and reason for admission with pt/family and admitting physician.  Pt/family voiced understanding of the plan for admission for further testing/treatment as needed.         Latest Documented Vital Signs:  As of 06:24 EST  BP- 143/58 HR- 63 Temp- 98.4 °F (36.9 °C) O2 sat- 100%         Johnnie Chin MD  11/17/20 8489

## 2020-11-17 NOTE — ED TRIAGE NOTES
To ER via EMS.  Bright red blood in stool x 1 tonight.  Denies abd pain.  Pt is on Eliquis.     Pt in mask at time of triage.  Triage staff in appropriate PPE.

## 2020-11-17 NOTE — CONSULTS
Inpatient Gastroenterology Consult  Consult performed by: Aron Cabrera MD  Consult ordered by: Radha Red APRN          Patient Care Team:  Lubna Lobo MD as PCP - General  Lubna Lobo MD as PCP - Family Medicine    Chief complaint: gastointestinal bleed    Subjective   Pleasant gentleman known to me who presents with intractable rectal bleeding earlier this am.  He had urgency, regular bowel movements then noted rectal bleeding.   No black stools.  He has had crohns colitis that has been maintained on budesonide.  He has done well and denies any abdominal pain.  His last upper and lower tract endoscopy was in 2018  Noting gastritis, h pylori negative, and diverticulosis.  History of Present Illness    Review of Systems   Gastrointestinal: Positive for blood in stool.   Psychiatric/Behavioral: Positive for confusion.        Past Medical History:   Diagnosis Date   • Aortic stenosis    • Aspiration pneumonia (CMS/HCC)    • Atrial fibrillation (CMS/HCC)    • Bronchiectasis (CMS/HCC)    • CKD (chronic kidney disease)    • COPD (chronic obstructive pulmonary disease) (CMS/HCC)    • Crohn's disease (CMS/HCC)    • Dizziness    • Elevated cholesterol    • Gastric ulcer    • Generalized weakness    • Gout    • Hard of hearing    • Hyperlipidemia    • Hypertension    • Leg swelling    • Lower extremity edema    • Mild anemia    • Near syncope    • Nonrheumatic aortic valve stenosis    • PAF (paroxysmal atrial fibrillation) (CMS/HCC)    • Palpitations    • Peptic ulcer    • Pneumonia of left lung due to infectious organism    • Pulmonary fibrosis (CMS/HCC)    • Stroke (CMS/HCC)    • TIA (transient ischemic attack)    ,   Past Surgical History:   Procedure Laterality Date   • BRONCHOSCOPY N/A 10/22/2018    Procedure: BRONCHOSCOPY WITH BRONCHALVEOLAR LAVAGE;  Surgeon: Chidi Oconnor MD;  Location: [facility] ENDOSCOPY;  Service: Pulmonary   • CATARACT EXTRACTION Bilateral    • COLONOSCOPY     • COLONOSCOPY  "N/A 3/9/2018    Procedure: COLONOSCOPY to terminal ileum with bx;  Surgeon: Aron Cabrera MD;  Location: [facility] ENDOSCOPY;  Service:    • CYSTECTOMY      back and shoulder area   • ENDOSCOPY N/A 3/9/2018    Procedure: ESOPHAGOGASTRODUODENOSCOPY with bx;  Surgeon: Aron Cabrera MD;  Location: [facility] ENDOSCOPY;  Service:    • EYE SURGERY Left     detached retina   • EYE SURGERY Right     \"repaired a hole in the eye\"   • TESTICLE SURGERY      benign tumor removed.   ,   Family History   Problem Relation Age of Onset   • Stroke Mother    • Heart disease Mother    • Hypertension Mother    • Diabetes Mother    • Heart disease Sister         Heart Valve Replacement   • Ovarian cancer Sister    • Rheumatic fever Sister    • Diabetes Sister    • Stroke Father    ,   Social History     Tobacco Use   • Smoking status: Former Smoker     Types: Cigars     Quit date: 10/28/1994     Years since quittin.0   • Smokeless tobacco: Never Used   • Tobacco comment: Quit 25 years ago   Substance Use Topics   • Alcohol use: No     Comment: daily caffiene - 1/2 cup of coffee   • Drug use: No     E-cigarette/Vaping     E-cigarette/Vaping Substances     E-cigarette/Vaping Devices       ,   Medications Prior to Admission   Medication Sig Dispense Refill Last Dose   • allopurinol (ZYLOPRIM) 100 MG tablet Take 100 mg by mouth 2 (Two) Times a Day.   2020 at Unknown time   • apixaban (ELIQUIS) 5 MG tablet tablet Take 1 tablet by mouth Every 12 (Twelve) Hours. 180 tablet 3 2020 at Unknown time   • atorvastatin (LIPITOR) 10 MG tablet Take one tablet by mouth Mon, Wed and Fri.   2020 at Unknown time   • BUDESONIDE PO Take 3 mg by mouth Daily.   2020 at Unknown time   • digoxin (LANOXIN) 125 MCG tablet Take 1 tablet by mouth Daily. 90 tablet 1 2020 at Unknown time   • dofetilide (TIKOSYN) 125 MCG capsule Take 1 capsule by mouth 2 (Two) Times a Day. 180 capsule 3 2020 at Unknown time   • " IPRATROPIUM-ALBUTEROL IN Inhale Daily.   11/16/2020 at Unknown time   • metoprolol tartrate (LOPRESSOR) 50 MG tablet Take 1 tablet by mouth 3 (Three) Times a Day. 270 tablet 3 11/16/2020 at Unknown time   • omeprazole (priLOSEC) 40 MG capsule Take 40 mg by mouth Daily.   11/17/2020 at 0230   • vitamin B-12 (CYANOCOBALAMIN) 500 MCG tablet Take 500 mcg by mouth Daily.   11/16/2020 at Unknown time   , Scheduled Meds:  allopurinol, 100 mg, Oral, BID  atorvastatin, 10 mg, Oral, Nightly  digoxin, 125 mcg, Oral, Daily  dofetilide, 125 mcg, Oral, BID  metoprolol tartrate, 50 mg, Oral, TID  pantoprazole, 40 mg, Intravenous, Q12H  vitamin B-12, 500 mcg, Oral, Daily    , Continuous Infusions:   , PRN Meds:  •  acetaminophen  •  bisacodyl  •  ipratropium-albuterol  •  [DISCONTINUED] ondansetron **OR** ondansetron  •  [COMPLETED] Insert peripheral IV **AND** sodium chloride and Allergies:  Amiodarone, Diltiazem, Doxycycline, and Indomethacin    Objective      Vital Signs  Temp:  [96.9 °F (36.1 °C)-98.4 °F (36.9 °C)] 97.1 °F (36.2 °C)  Heart Rate:  [56-73] 65  Resp:  [16-18] 16  BP: (120-157)/(47-68) 145/56    Physical Exam  HENT:      Right Ear: External ear normal.   Eyes:      Conjunctiva/sclera: Conjunctivae normal.   Cardiovascular:      Rate and Rhythm: Normal rate.   Pulmonary:      Effort: Pulmonary effort is normal.   Abdominal:      General: Abdomen is flat.   Skin:     General: Skin is warm and dry.   Neurological:      General: No focal deficit present.      Mental Status: He is alert.   Psychiatric:         Mood and Affect: Mood normal.         Results Review:    I reviewed the patient's new clinical results.        Assessment/Plan       Acute lower GI bleeding    Atrial fibrillation, paroxysmal    Hyperlipidemia    Crohn's disease (CMS/HCC)    Aortic stenosis    Pulmonary fibrosis (CMS/HCC)    Anticoagulant long-term use      Assessment:  (Lower gastrointestinal bleed suspect diverticular bleed, doubt known crohns  disease as he has done well with that  Anemia  Aortic stenosis  History of stroke).     Plan:   (Hold anticoagulation,  Anticipate egd and colonoscopy 11/19 am ( took ac last night)  Will follow.  If scopes exclude other causes besides diverticular bleed, may be able to use AC again, but clearly always a risk  Of potentiating further bleeding episodes if they occur ( although not likely to recur) ).       I discussed the patients findings and my recommendations with patient, family, nursing staff and consulting provider    Aron Cabrera MD  11/17/20  17:29 EST    Time: Critical care 28 min

## 2020-11-17 NOTE — ED NOTES
Pt given phone to call out to family. PT wearing mask. RN performs hand hygeine before and during pt encounter. RN wearing mask, eye protection and gloves during encounter.       Danae Norwood RN  11/17/20 1318

## 2020-11-17 NOTE — ED NOTES
PT wearing mask. RN performs hand hygeine before and during pt encounter. RN wearing mask, eye protection and gloves during encounter.       Danae Norwood RN  11/17/20 7923

## 2020-11-17 NOTE — PLAN OF CARE
Goal Outcome Evaluation:     Progress: no change  Outcome Summary: vss, no c/o of pain, or chest pain, no bloody bm since admission, receiving 3/3 rbc, h and h q 8 eliquis on hold,plan for egd 11/19, will continue to monitor

## 2020-11-17 NOTE — ED NOTES
Patient was placed in face mask during first look triage.  Patient was wearing a face mask throughout encounter.  I wore personal protective equipment throughout the encounter.  Hand hygiene was performed before and after patient encounter.        Cristy Brown RN  11/17/20 0531

## 2020-11-17 NOTE — CONSULTS
Date of Hospital Visit: 2020  Encounter Provider: Kely Veronica RN  Place of Service: [facility] CARDIOLOGY  Patient Name: Asia Ly  :1932  Referral Provider: Sergey Arrieta MD    Chief complaint: GIB    Reason for Consult: GIB on AC    History of Present Illness: Mr Ly is a 88 year old male patient of Dr. Avalos with history of hypertension, hyperlipidemia, Crohn's disease, B12 anemia and previous CVA (2016). He has paroxsymal atrial fibrillation, anticoagulated with Eliquis. He has known aortic stenosis and pulmonary hypertension, with recent echocardiogram completed during an August admission demonstrating normal LVSF with EF 60%. He had mild MR/AR and moderate AS with a maximum pressure gradient of 42.0 mmHg, mean pressure gradient of 24.0 mmHg and valve area of 1.1 cm2.     He presented to [facility] 20 with reports of bright red blood per rectum that first occurred an hour prior to arrival. He had a positive fecal occult stool sample, with a hemoglobin of 5.7 upon arrival to the ED. He was subsequently transfused, admitted and awaiting further GI workup.  He denies chest pain, shortness of air or palpitations.  No orthopnea, PND or edema.  No dizziness or syncope.    Previous Cardiac Testing:    Echocardiogram 20  · Estimated EF = 60%.  · Left ventricular systolic function is normal.  · Left ventricular wall thickness is consistent with mild concentric hypertrophy.  · Left atrial cavity size is moderately dilated.  · There is calcification of the aortic valve.  · Moderate aortic valve stenosis is present.  · Aortic valve maximum pressure gradient is 42.0 mmHg.  · Aortic valve mean pressure gradient is 24.0 mmHg.  · Aortic valve area is 1.1 cm2.  · Mild tricuspid valve regurgitation is present.  · Left ventricular diastolic dysfunction (grade II) consistent with pseudonormalization.  · Calculated right ventricular systolic pressure  "from tricuspid regurgitation is 57.8 mmHg. Severe pulmonary hypertension is present.  · Mild aortic valve regurgitation is present.  · Mild mitral valve regurgitation is present      Past Medical History:   Diagnosis Date   • Aortic stenosis    • Aspiration pneumonia (CMS/HCC)    • Atrial fibrillation (CMS/HCC)    • Bronchiectasis (CMS/HCC)    • CKD (chronic kidney disease)    • COPD (chronic obstructive pulmonary disease) (CMS/HCC)    • Crohn's disease (CMS/HCC)    • Dizziness    • Elevated cholesterol    • Gastric ulcer    • Generalized weakness    • Gout    • Hard of hearing    • Hyperlipidemia    • Hypertension    • Leg swelling    • Lower extremity edema    • Mild anemia    • Near syncope    • Nonrheumatic aortic valve stenosis    • PAF (paroxysmal atrial fibrillation) (CMS/HCC)    • Palpitations    • Peptic ulcer    • Pneumonia of left lung due to infectious organism    • Pulmonary fibrosis (CMS/HCC)    • Stroke (CMS/HCC)    • TIA (transient ischemic attack)        Past Surgical History:   Procedure Laterality Date   • BRONCHOSCOPY N/A 10/22/2018    Procedure: BRONCHOSCOPY WITH BRONCHALVEOLAR LAVAGE;  Surgeon: Chidi Oconnor MD;  Location: [facility] ENDOSCOPY;  Service: Pulmonary   • CATARACT EXTRACTION Bilateral    • COLONOSCOPY     • COLONOSCOPY N/A 3/9/2018    Procedure: COLONOSCOPY to terminal ileum with bx;  Surgeon: Aron Cabrera MD;  Location: [facility] ENDOSCOPY;  Service:    • CYSTECTOMY      back and shoulder area   • ENDOSCOPY N/A 3/9/2018    Procedure: ESOPHAGOGASTRODUODENOSCOPY with bx;  Surgeon: Arno Cabrera MD;  Location: [facility] ENDOSCOPY;  Service:    • EYE SURGERY Left     detached retina   • EYE SURGERY Right     \"repaired a hole in the eye\"   • TESTICLE SURGERY      benign tumor removed.       Medications Prior to Admission   Medication Sig Dispense Refill Last Dose   • allopurinol (ZYLOPRIM) 100 MG tablet Take 100 mg by mouth 2 (Two) Times a Day.   11/16/2020 at Unknown time   • apixaban " (ELIQUIS) 5 MG tablet tablet Take 1 tablet by mouth Every 12 (Twelve) Hours. 180 tablet 3 2020 at Unknown time   • atorvastatin (LIPITOR) 10 MG tablet Take one tablet by mouth Mon, Wed and Fri.   2020 at Unknown time   • BUDESONIDE PO Take 3 mg by mouth Daily.   2020 at Unknown time   • digoxin (LANOXIN) 125 MCG tablet Take 1 tablet by mouth Daily. 90 tablet 1 2020 at Unknown time   • dofetilide (TIKOSYN) 125 MCG capsule Take 1 capsule by mouth 2 (Two) Times a Day. 180 capsule 3 2020 at Unknown time   • IPRATROPIUM-ALBUTEROL IN Inhale Daily.   2020 at Unknown time   • metoprolol tartrate (LOPRESSOR) 50 MG tablet Take 1 tablet by mouth 3 (Three) Times a Day. 270 tablet 3 2020 at Unknown time   • omeprazole (priLOSEC) 40 MG capsule Take 40 mg by mouth Daily.   2020 at 0230   • vitamin B-12 (CYANOCOBALAMIN) 500 MCG tablet Take 500 mcg by mouth Daily.   2020 at Unknown time       Current Meds  Scheduled Meds:   Continuous Infusions:   PRN Meds:.•  acetaminophen  •  aluminum-magnesium hydroxide-simethicone  •  bisacodyl  •  bisacodyl  •  nitroglycerin  •  ondansetron **OR** ondansetron  •  [COMPLETED] Insert peripheral IV **AND** sodium chloride  •  sodium chloride    Allergies as of 2020 - Reviewed 2020   Allergen Reaction Noted   • Amiodarone Unknown (See Comments) 10/22/2018   • Diltiazem Arrhythmia 2017   • Doxycycline Rash 2018   • Indomethacin Rash 10/22/2018       Social History     Socioeconomic History   • Marital status:      Spouse name: Not on file   • Number of children: Not on file   • Years of education: Not on file   • Highest education level: Not on file   Tobacco Use   • Smoking status: Former Smoker     Types: Cigars     Quit date: 10/28/1994     Years since quittin.0   • Smokeless tobacco: Never Used   • Tobacco comment: Quit 25 years ago   Substance and Sexual Activity   • Alcohol use: No     Comment: daily  "caffiene - 1/2 cup of coffee   • Drug use: No   • Sexual activity: Defer   Lifestyle   • Physical activity     Days per week: 7 days     Minutes per session: 30 min   • Stress: Not at all       Family History   Problem Relation Age of Onset   • Stroke Mother    • Heart disease Mother    • Hypertension Mother    • Diabetes Mother    • Heart disease Sister         Heart Valve Replacement   • Ovarian cancer Sister    • Rheumatic fever Sister    • Diabetes Sister    • Stroke Father        Review of Systems   Constitution: Negative for chills and fever.   HENT: Negative for hoarse voice and sore throat.    Eyes: Negative for double vision and photophobia.   Cardiovascular: Negative for chest pain, leg swelling, near-syncope, orthopnea, palpitations, paroxysmal nocturnal dyspnea and syncope.   Respiratory: Negative for cough and wheezing.    Skin: Negative for poor wound healing and rash.   Musculoskeletal: Negative for arthritis and joint swelling.   Gastrointestinal: Positive for hematochezia. Negative for bloating, abdominal pain and hematemesis.   Neurological: Negative for dizziness and focal weakness.   Psychiatric/Behavioral: Negative for depression and suicidal ideas.            Objective:   Temp:  [96.9 °F (36.1 °C)-98.4 °F (36.9 °C)] 97.1 °F (36.2 °C)  Heart Rate:  [56-73] 64  Resp:  [16-18] 16  BP: (120-157)/(47-68) 129/47  Body mass index is 22.5 kg/m².  Flowsheet Rows      First Filed Value   Admission Height  172.7 cm (68\") Documented at 11/17/2020 0347   Admission Weight  67.1 kg (148 lb) Documented at 11/17/2020 0353        Vitals:    11/17/20 1130   BP: 129/47   Pulse: 64   Resp: 16   Temp: 97.1 °F (36.2 °C)   SpO2: 100%       Vitals signs reviewed.   Constitutional:       Appearance: Healthy appearance. Not in distress.   Neck:      Vascular: No JVR. JVD normal.   Pulmonary:      Effort: Pulmonary effort is normal.      Breath sounds: Normal breath sounds. No wheezing. No rhonchi. No rales.   Chest:      " Chest wall: Not tender to palpatation.   Cardiovascular:      PMI at left midclavicular line. Normal rate. Regular rhythm.      Murmurs: There is no murmur.      No gallop. No click. No rub.   Pulses:     Intact distal pulses.   Edema:     Peripheral edema absent.   Abdominal:      General: Bowel sounds are normal.      Palpations: Abdomen is soft.      Tenderness: There is no abdominal tenderness.   Musculoskeletal: Normal range of motion.         General: No tenderness.   Skin:     General: Skin is warm and dry.   Neurological:      General: No focal deficit present.      Mental Status: Alert and oriented to person, place and time.                 Lab Review:      Results from last 7 days   Lab Units 11/17/20  0405   SODIUM mmol/L 139   POTASSIUM mmol/L 4.5   CHLORIDE mmol/L 107   CO2 mmol/L 22.9   BUN mg/dL 25*   CREATININE mg/dL 1.16   CALCIUM mg/dL 8.7   BILIRUBIN mg/dL 0.3   ALK PHOS U/L 46   ALT (SGPT) U/L 11   AST (SGOT) U/L 11   GLUCOSE mg/dL 122*         @LABRCNTbnp@  Results from last 7 days   Lab Units 11/17/20  0405   WBC 10*3/mm3 9.62   HEMOGLOBIN g/dL 5.7*   HEMATOCRIT % 19.1*   PLATELETS 10*3/mm3 211     Results from last 7 days   Lab Units 11/17/20  0406   INR  1.56*   APTT seconds 33.0         @LABRCNTIP(chol,trig,hdl,ldl)    Previous EKG 10/12/20      I personally viewed and interpreted the patient's EKG/Telemetry data  )  Patient Active Problem List   Diagnosis   • Ataxia   • TIA (transient ischemic attack)   • Atrial fibrillation, paroxysmal   • Hyperlipidemia   • Gout   • Crohn's disease (CMS/HCC)   • Atrial fibrillation with RVR (CMS/HCC)   • Acute cholecystitis   • Obstructive jaundice   • Acute lower GI bleeding   • Aortic stenosis   • Pulmonary fibrosis (CMS/HCC)   • Anticoagulant long-term use     Assessment and Plan:    1. Hematochezia -bright red blood per rectum per patient report.  Hemoglobin down to 5.7 g/dL.  Anticoagulation held at this point.  Patient is high risk with previous  history of stroke.  GI planning for endoscopy during this hospitalization.  Long-term may need to consider left atrial appendage exclusion.  Follow clinical progress for further treatment recommendations.  2. PAF -patient appears to be in sinus rhythm at this time.  Will check electrocardiogram.  Continue digoxin and metoprolol.  Continue Tikosyn and check QT length.  3. Aortic stenosis -moderate by echocardiogram in August.  Continue beta-blocker.  4. Dyslipidemia  5. History of Crohn's disease    Nixon Brown MD  11/17/20  11:41 EST.  Time spent in reviewing chart, discussion and examination:

## 2020-11-17 NOTE — H&P
History and physical    Primary care physician      Chief complaint  Bright red blood per rectum    History of present illness  88-year-old [place] male with history of proximal atrial fibrillation COPD pulmonary fibrosis aortic stenosis who is well-known to our service presented to [facility] emergency room with bright red blood per rectum started last night with no abdominal pain nausea vomiting.  Patient on Eliquis for paroxysmal atrial fibrillation and work-up in ER revealed hemoglobin of 5.7 admit for management.  Patient fully alert oriented and in no respiratory distress.  Patient denies any fever cough chest pain shortness of breath abdominal pain nausea vomiting diarrhea.    PAST MEDICAL HISTORY  • Aortic stenosis     • Aspiration pneumonia (CMS/HCC)     • Bronchiectasis (CMS/HCC)     • CKD (chronic kidney disease)     • COPD (chronic obstructive pulmonary disease) (CMS/HCC)     • Dizziness     • Elevated cholesterol     • Gastric ulcer     • Generalized weakness     • Hard of hearing     • Hypertension     • Leg swelling     • Lower extremity edema     • Mild anemia     • Near syncope     • Nonrheumatic aortic valve stenosis     • PAF (paroxysmal atrial fibrillation) (CMS/HCC)     • Palpitations     • Peptic ulcer     • Pneumonia of left lung due to infectious organism     • Pulmonary fibrosis (CMS/HCC)     • Stroke (CMS/HCC)        PAST SURGICAL HISTORY              Procedure Laterality Date   • BRONCHOSCOPY N/A 10/22/2018     Procedure: BRONCHOSCOPY WITH BRONCHALVEOLAR LAVAGE;  Surgeon: Chidi Oconnor MD;  Location: [facility] ENDOSCOPY;  Service: Pulmonary   • CATARACT EXTRACTION Bilateral     • COLONOSCOPY       • COLONOSCOPY N/A 3/9/2018     Procedure: COLONOSCOPY to terminal ileum with bx;  Surgeon: Aron Cabrera MD;  Location: [facility] ENDOSCOPY;  Service:    • CYSTECTOMY         back and shoulder area   • ENDOSCOPY N/A 3/9/2018     Procedure: ESOPHAGOGASTRODUODENOSCOPY with bx;  " Surgeon: Aron Cabrera MD;  Location: [facility] ENDOSCOPY;  Service:    • EYE SURGERY Left       detached retina   • EYE SURGERY Right       \"repaired a hole in the eye\"   • TESTICLE SURGERY         benign tumor removed.        FAMILY HISTORY           Problem Relation Age of Onset   • Stroke Mother     • Heart disease Mother     • Hypertension Mother     • Diabetes Mother     • Heart disease Sister           Heart Valve Replacement   • Ovarian cancer Sister     • Rheumatic fever Sister     • Diabetes Sister     • Stroke Father       SOCIAL HISTORY                 Socioeconomic History   • Marital status:        Spouse name: Not on file   • Number of children: Not on file   • Years of education: Not on file   • Highest education level: Not on file   Tobacco Use   • Smoking status: Former Smoker       Types: Cigars       Quit date: 10/28/1994       Years since quittin.0   • Smokeless tobacco: Never Used   • Tobacco comment: Quit 25 years ago   Substance and Sexual Activity   • Alcohol use: No       Comment: daily caffiene - 1/2 cup of coffee   • Drug use: No   • Sexual activity: Defer   Lifestyle   • Physical activity       Days per week: 7 days       Minutes per session: 30 min   • Stress: Not at all        ALLERGIES  Amiodarone, Diltiazem, Doxycycline, and Indomethacin  Home medications reviewed     REVIEW OF SYSTEMS  Constitutional: Negative for activity change, appetite change and fever.   HENT: Negative for congestion and sore throat.    Eyes: Negative.    Respiratory: Negative for cough and shortness of breath.    Cardiovascular: Negative for chest pain and leg swelling.   Gastrointestinal: Positive for blood in stool. Negative for abdominal pain, diarrhea and vomiting.   Endocrine: Negative.    Genitourinary: Negative for decreased urine volume and dysuria.   Musculoskeletal: Negative for neck pain.   Skin: Negative for rash and wound.   Allergic/Immunologic: Negative.    Neurological: Negative for " "weakness, numbness and headaches.   Hematological: Negative.    Psychiatric/Behavioral: Negative.    All other systems reviewed and are negative.     PHYSICAL EXAM  Blood pressure 129/47, pulse 64, temperature 97.1 °F (36.2 °C), temperature source Oral, resp. rate 16, height 172.7 cm (68\"), weight 67.1 kg (148 lb), SpO2 100 %.    Constitutional: He is oriented to person, place, and time. No distress.   Head: Normocephalic and atraumatic.   Eyes: Pupils are equal, round, and reactive to light. EOM are normal.   Neck: Normal range of motion. Neck supple.   Cardiovascular: Normal rate, regular rhythm and normal heart sounds.   Pulmonary/Chest: Effort normal and breath sounds normal. No respiratory distress.   Abdominal: Soft. There is no abdominal tenderness. There is no rebound and no guarding.   Rectum: Guaiac result positive.   Musculoskeletal: Normal range of motion.    No edema.   Neurological: He is alert and oriented to person, place, and time. He has normal sensation and normal strength.   Skin: Skin is warm and dry.   Psychiatric: Mood and affect normal.     LAB RESULTS  Lab Results (last 24 hours)     Procedure Component Value Units Date/Time    Hemoglobin & Hematocrit, Blood [[id number]] Collected: 11/17/20 1135    Specimen: Blood Updated: 11/17/20 1236    COVID PRE-OP / PRE-PROCEDURE SCREENING ORDER (NO ISOLATION) - Swab, Nasopharynx [[id number]]  (Normal) Collected: 11/17/20 0615    Specimen: Swab from Nasopharynx Updated: 11/17/20 0735    Narrative:      The following orders were created for panel order COVID PRE-OP / PRE-PROCEDURE SCREENING ORDER (NO ISOLATION) - Swab, Nasopharynx.  Procedure                               Abnormality         Status                     ---------                               -----------         ------                     Respiratory Panel PCR w/...[[id number]]  Normal              Final result                 Please view results for these tests on the individual orders.    " Respiratory Panel PCR w/COVID-19(SARS-CoV-2) MADHU/JOY/MONIQUE/PAD/COR/MAD/ELBERT In-House, NP Swab in UTM/VTM, 3-4 HR TAT - Swab, Nasopharynx [[id number]]  (Normal) Collected: 11/17/20 0615    Specimen: Swab from Nasopharynx Updated: 11/17/20 0735     ADENOVIRUS, PCR Not Detected     Coronavirus 229E Not Detected     Coronavirus HKU1 Not Detected     Coronavirus NL63 Not Detected     Coronavirus OC43 Not Detected     COVID19 Not Detected     Human Metapneumovirus Not Detected     Human Rhinovirus/Enterovirus Not Detected     Influenza A PCR Not Detected     Influenza B PCR Not Detected     Parainfluenza Virus 1 Not Detected     Parainfluenza Virus 2 Not Detected     Parainfluenza Virus 3 Not Detected     Parainfluenza Virus 4 Not Detected     RSV, PCR Not Detected     Bordetella pertussis pcr Not Detected     Bordetella parapertussis PCR Not Detected     Chlamydophila pneumoniae PCR Not Detected     Mycoplasma pneumo by PCR Not Detected    Narrative:      Fact sheet for providers: https://docs.[employer].com/wp-content/uploads/KRH6270-5383-ZS4.1-EUA-Provider-Fact-Sheet-3.pdf    Fact sheet for patients: https://docs.[employer].com/wp-content/uploads/GOG4112-5713-SF6.1-EUA-Patient-Fact-Sheet-1.pdf    Comprehensive Metabolic Panel [[id number]]  (Abnormal) Collected: 11/17/20 0405    Specimen: Blood Updated: 11/17/20 0446     Glucose 122 mg/dL      BUN 25 mg/dL      Creatinine 1.16 mg/dL      Sodium 139 mmol/L      Potassium 4.5 mmol/L      Chloride 107 mmol/L      CO2 22.9 mmol/L      Calcium 8.7 mg/dL      Total Protein 6.2 g/dL      Albumin 3.50 g/dL      ALT (SGPT) 11 U/L      AST (SGOT) 11 U/L      Alkaline Phosphatase 46 U/L      Total Bilirubin 0.3 mg/dL      eGFR Non African Amer 59 mL/min/1.73      Globulin 2.7 gm/dL      A/G Ratio 1.3 g/dL      BUN/Creatinine Ratio 21.6     Anion Gap 9.1 mmol/L     Narrative:      GFR Normal >60  Chronic Kidney Disease <60  Kidney Failure <15      CBC & Differential [[id number]]   (Abnormal) Collected: 11/17/20 0405    Specimen: Blood Updated: 11/17/20 0445    Narrative:      The following orders were created for panel order CBC & Differential.  Procedure                               Abnormality         Status                     ---------                               -----------         ------                     CBC Auto Differential[[id number]]        Abnormal            Final result                 Please view results for these tests on the individual orders.    CBC Auto Differential [[id number]]  (Abnormal) Collected: 11/17/20 0405    Specimen: Blood Updated: 11/17/20 0445     WBC 9.62 10*3/mm3      RBC 2.21 10*6/mm3      Hemoglobin 5.7 g/dL      Hematocrit 19.1 %      MCV 86.4 fL      MCH 25.8 pg      MCHC 29.8 g/dL      RDW 14.6 %      RDW-SD 46.0 fl      MPV 10.7 fL      Platelets 211 10*3/mm3      Neutrophil % 71.7 %      Lymphocyte % 17.6 %      Monocyte % 8.6 %      Eosinophil % 1.1 %      Basophil % 0.5 %      Immature Grans % 0.5 %      Neutrophils, Absolute 6.89 10*3/mm3      Lymphocytes, Absolute 1.69 10*3/mm3      Monocytes, Absolute 0.83 10*3/mm3      Eosinophils, Absolute 0.11 10*3/mm3      Basophils, Absolute 0.05 10*3/mm3      Immature Grans, Absolute 0.05 10*3/mm3      nRBC 0.0 /100 WBC     Protime-INR [[id number]]  (Abnormal) Collected: 11/17/20 0406    Specimen: Blood Updated: 11/17/20 0437     Protime 18.5 Seconds      INR 1.56    aPTT [[id number]]  (Normal) Collected: 11/17/20 0406    Specimen: Blood Updated: 11/17/20 0437     PTT 33.0 seconds     POCT Occult Blood Stool [[id number]]  (Abnormal) Collected: 11/17/20 0402    Specimen: Stool from Per Rectum Updated: 11/17/20 0402     Fecal Occult Blood Positive     Lot Number [id number]     Expiration Date 3/31/2021     Positive Control Positive     Negative Control Negative        Imaging Results (Last 24 Hours)     ** No results found for the last 24 hours. **          Current Facility-Administered Medications:   •   acetaminophen (TYLENOL) tablet 650 mg, 650 mg, Oral, Q4H PRN, Radha Red, KYLAH  •  allopurinol (ZYLOPRIM) tablet 100 mg, 100 mg, Oral, BID, Shelbie Bergeron MD  •  atorvastatin (LIPITOR) tablet 10 mg, 10 mg, Oral, Nightly, Shelbie Bergeron MD  •  bisacodyl (DULCOLAX) EC tablet 5 mg, 5 mg, Oral, Daily PRN, Radha Red APRN  •  digoxin (LANOXIN) tablet 125 mcg, 125 mcg, Oral, Daily, Shelbie Bergeron MD  •  dofetilide (TIKOSYN) capsule 125 mcg, 125 mcg, Oral, BID, Shelbie Bergeron MD  •  ipratropium-albuterol (DUO-NEB) nebulizer solution 1.5 mL, 1.5 mL, Nebulization, Q4H PRN, Shelbie Bergeron MD  •  metoprolol tartrate (LOPRESSOR) tablet 50 mg, 50 mg, Oral, TID, Shelbie Bergeron MD  •  [DISCONTINUED] ondansetron (ZOFRAN) tablet 4 mg, 4 mg, Oral, Q6H PRN **OR** ondansetron (ZOFRAN) injection 4 mg, 4 mg, Intravenous, Q6H PRN, Radha Red APRN  •  pantoprazole (PROTONIX) injection 40 mg, 40 mg, Intravenous, Q12H, Shelbie Bergeron MD  •  [COMPLETED] Insert peripheral IV, , , Once **AND** sodium chloride 0.9 % flush 10 mL, 10 mL, Intravenous, PRN, Johnnie Chin MD  •  vitamin B-12 (CYANOCOBALAMIN) tablet 500 mcg, 500 mcg, Oral, Daily, Shelbie Bergeron MD     ASSESSMENT  Hematochezia consistent with lower GI bleed  Acute blood loss anemia  Chronic atrial fibrillation on Eliquis  Crohn's disease  Hypertension  Hyperlipidemia  Gastroesophageal reflux disease    PLAN  Admit  Transfuse 2 units packed RBC  Serial H&H  IV Protonix  Hold Eliquis  GI consult  Adjust home medications  Stress ulcer DVT prophylaxis  Supportive care  Patient is full code  Discussed with nursing staff  Follow closely with recommendation current hospital course    SHELBIE BERGERON MD

## 2020-11-18 ENCOUNTER — INPATIENT HOSPITAL (OUTPATIENT)
Dept: URBAN - METROPOLITAN AREA HOSPITAL 113 | Facility: HOSPITAL | Age: 85
End: 2020-11-18

## 2020-11-18 DIAGNOSIS — K50.90 CROHN'S DISEASE, UNSPECIFIED, WITHOUT COMPLICATIONS: ICD-10-CM

## 2020-11-18 DIAGNOSIS — K92.2 GASTROINTESTINAL HEMORRHAGE, UNSPECIFIED: ICD-10-CM

## 2020-11-18 DIAGNOSIS — J84.10 PULMONARY FIBROSIS, UNSPECIFIED: ICD-10-CM

## 2020-11-18 DIAGNOSIS — I48.91 UNSPECIFIED ATRIAL FIBRILLATION: ICD-10-CM

## 2020-11-18 DIAGNOSIS — Z79.01 LONG TERM (CURRENT) USE OF ANTICOAGULANTS: ICD-10-CM

## 2020-11-18 DIAGNOSIS — D50.0 IRON DEFICIENCY ANEMIA SECONDARY TO BLOOD LOSS (CHRONIC): ICD-10-CM

## 2020-11-18 LAB
ALBUMIN SERPL-MCNC: 3.1 G/DL (ref 3.5–5.2)
ALBUMIN/GLOB SERPL: 1.1 G/DL
ALP SERPL-CCNC: 47 U/L (ref 39–117)
ALT SERPL W P-5'-P-CCNC: 11 U/L (ref 1–41)
ANION GAP SERPL CALCULATED.3IONS-SCNC: 6.9 MMOL/L (ref 5–15)
AST SERPL-CCNC: 13 U/L (ref 1–40)
BASOPHILS # BLD AUTO: 0.07 10*3/MM3 (ref 0–0.2)
BASOPHILS NFR BLD AUTO: 0.9 % (ref 0–1.5)
BH BB BLOOD EXPIRATION DATE: NORMAL
BH BB BLOOD TYPE BARCODE: 5100
BH BB BLOOD TYPE BARCODE: 5100
BH BB BLOOD TYPE BARCODE: 6200
BH BB DISPENSE STATUS: NORMAL
BH BB PRODUCT CODE: NORMAL
BH BB UNIT NUMBER: NORMAL
BILIRUB SERPL-MCNC: 1.1 MG/DL (ref 0–1.2)
BUN SERPL-MCNC: 16 MG/DL (ref 8–23)
BUN/CREAT SERPL: 13.9 (ref 7–25)
CALCIUM SPEC-SCNC: 8.4 MG/DL (ref 8.6–10.5)
CHLORIDE SERPL-SCNC: 107 MMOL/L (ref 98–107)
CHOLEST SERPL-MCNC: 96 MG/DL (ref 0–200)
CO2 SERPL-SCNC: 26.1 MMOL/L (ref 22–29)
CREAT SERPL-MCNC: 1.15 MG/DL (ref 0.76–1.27)
CROSSMATCH INTERPRETATION: NORMAL
DEPRECATED RDW RBC AUTO: 44.6 FL (ref 37–54)
DIGOXIN SERPL-MCNC: 0.8 NG/ML (ref 0.6–1.2)
EOSINOPHIL # BLD AUTO: 0.15 10*3/MM3 (ref 0–0.4)
EOSINOPHIL NFR BLD AUTO: 1.9 % (ref 0.3–6.2)
ERYTHROCYTE [DISTWIDTH] IN BLOOD BY AUTOMATED COUNT: 14.5 % (ref 12.3–15.4)
GFR SERPL CREATININE-BSD FRML MDRD: 60 ML/MIN/1.73
GLOBULIN UR ELPH-MCNC: 2.9 GM/DL
GLUCOSE SERPL-MCNC: 78 MG/DL (ref 65–99)
HBA1C MFR BLD: 5.6 % (ref 4.8–5.6)
HCT VFR BLD AUTO: 27.1 % (ref 37.5–51)
HCT VFR BLD AUTO: 28.5 % (ref 37.5–51)
HCT VFR BLD AUTO: 28.5 % (ref 37.5–51)
HDLC SERPL-MCNC: 42 MG/DL (ref 40–60)
HGB BLD-MCNC: 8.7 G/DL (ref 13–17.7)
HGB BLD-MCNC: 8.9 G/DL (ref 13–17.7)
HGB BLD-MCNC: 9.3 G/DL (ref 13–17.7)
IMM GRANULOCYTES # BLD AUTO: 0.04 10*3/MM3 (ref 0–0.05)
IMM GRANULOCYTES NFR BLD AUTO: 0.5 % (ref 0–0.5)
LDLC SERPL CALC-MCNC: 40 MG/DL (ref 0–100)
LDLC/HDLC SERPL: 1 {RATIO}
LYMPHOCYTES # BLD AUTO: 1.4 10*3/MM3 (ref 0.7–3.1)
LYMPHOCYTES NFR BLD AUTO: 17.5 % (ref 19.6–45.3)
MCH RBC QN AUTO: 27.3 PG (ref 26.6–33)
MCHC RBC AUTO-ENTMCNC: 32.1 G/DL (ref 31.5–35.7)
MCV RBC AUTO: 85 FL (ref 79–97)
MONOCYTES # BLD AUTO: 0.78 10*3/MM3 (ref 0.1–0.9)
MONOCYTES NFR BLD AUTO: 9.8 % (ref 5–12)
NEUTROPHILS NFR BLD AUTO: 5.56 10*3/MM3 (ref 1.7–7)
NEUTROPHILS NFR BLD AUTO: 69.4 % (ref 42.7–76)
NRBC BLD AUTO-RTO: 0.1 /100 WBC (ref 0–0.2)
NT-PROBNP SERPL-MCNC: 850.3 PG/ML (ref 0–1800)
PLATELET # BLD AUTO: 170 10*3/MM3 (ref 140–450)
PMV BLD AUTO: 10.9 FL (ref 6–12)
POTASSIUM SERPL-SCNC: 4.7 MMOL/L (ref 3.5–5.2)
PROT SERPL-MCNC: 6 G/DL (ref 6–8.5)
RBC # BLD AUTO: 3.19 10*6/MM3 (ref 4.14–5.8)
SODIUM SERPL-SCNC: 140 MMOL/L (ref 136–145)
TRIGL SERPL-MCNC: 60 MG/DL (ref 0–150)
TSH SERPL DL<=0.05 MIU/L-ACNC: 4.16 UIU/ML (ref 0.27–4.2)
UNIT  ABO: NORMAL
UNIT  RH: NORMAL
VLDLC SERPL-MCNC: 14 MG/DL (ref 5–40)
WBC # BLD AUTO: 8 10*3/MM3 (ref 3.4–10.8)

## 2020-11-18 PROCEDURE — 80162 ASSAY OF DIGOXIN TOTAL: CPT | Performed by: HOSPITALIST

## 2020-11-18 PROCEDURE — 83036 HEMOGLOBIN GLYCOSYLATED A1C: CPT | Performed by: HOSPITALIST

## 2020-11-18 PROCEDURE — 85014 HEMATOCRIT: CPT | Performed by: NURSE PRACTITIONER

## 2020-11-18 PROCEDURE — 85025 COMPLETE CBC W/AUTO DIFF WBC: CPT | Performed by: HOSPITALIST

## 2020-11-18 PROCEDURE — 85018 HEMOGLOBIN: CPT | Performed by: NURSE PRACTITIONER

## 2020-11-18 PROCEDURE — 36415 COLL VENOUS BLD VENIPUNCTURE: CPT | Performed by: NURSE PRACTITIONER

## 2020-11-18 PROCEDURE — 99232 SBSQ HOSP IP/OBS MODERATE 35: CPT | Performed by: NURSE PRACTITIONER

## 2020-11-18 PROCEDURE — 84443 ASSAY THYROID STIM HORMONE: CPT | Performed by: HOSPITALIST

## 2020-11-18 PROCEDURE — 80053 COMPREHEN METABOLIC PANEL: CPT | Performed by: HOSPITALIST

## 2020-11-18 PROCEDURE — 99233 SBSQ HOSP IP/OBS HIGH 50: CPT | Performed by: INTERNAL MEDICINE

## 2020-11-18 PROCEDURE — 83880 ASSAY OF NATRIURETIC PEPTIDE: CPT | Performed by: HOSPITALIST

## 2020-11-18 PROCEDURE — 80061 LIPID PANEL: CPT | Performed by: HOSPITALIST

## 2020-11-18 RX ORDER — POLYETHYLENE GLYCOL 3350 17 G/17G
1 POWDER, FOR SOLUTION ORAL ONCE
Status: COMPLETED | OUTPATIENT
Start: 2020-11-18 | End: 2020-11-18

## 2020-11-18 RX ORDER — BISACODYL 5 MG/1
20 TABLET, DELAYED RELEASE ORAL ONCE
Status: COMPLETED | OUTPATIENT
Start: 2020-11-18 | End: 2020-11-18

## 2020-11-18 RX ADMIN — Medication 500 MCG: at 09:21

## 2020-11-18 RX ADMIN — METOPROLOL TARTRATE 50 MG: 50 TABLET, FILM COATED ORAL at 09:21

## 2020-11-18 RX ADMIN — PANTOPRAZOLE SODIUM 40 MG: 40 INJECTION, POWDER, FOR SOLUTION INTRAVENOUS at 09:22

## 2020-11-18 RX ADMIN — METOPROLOL TARTRATE 50 MG: 50 TABLET, FILM COATED ORAL at 18:13

## 2020-11-18 RX ADMIN — SODIUM CHLORIDE, PRESERVATIVE FREE 10 ML: 5 INJECTION INTRAVENOUS at 09:22

## 2020-11-18 RX ADMIN — BISACODYL 20 MG: 5 TABLET ORAL at 18:12

## 2020-11-18 RX ADMIN — DIGOXIN 125 MCG: 125 TABLET ORAL at 12:15

## 2020-11-18 RX ADMIN — POLYETHYLENE GLYCOL 3350 1 BOTTLE: 17 POWDER, FOR SOLUTION ORAL at 18:12

## 2020-11-18 RX ADMIN — ALLOPURINOL 100 MG: 100 TABLET ORAL at 09:21

## 2020-11-18 RX ADMIN — DOFETILIDE 125 MCG: 0.12 CAPSULE ORAL at 09:22

## 2020-11-18 NOTE — PROGRESS NOTES
"Adult Nutrition  Assessment/PES    Patient Name:  Asia Ly  YOB: 1932  MRN: [id number]  Admit Date:  11/17/2020    Assessment Date:  11/18/2020    Comments:  Nutrition screen completed for MST 3 on admission screen. Pt with report of wt loss x 6 mo. Diet Rx: Full liquid. Note plan for GI scope tomorrow. Will offer food pref's and supplement and continue to monitor po intake.    Reason for Assessment     Row Name 11/18/20 1203          Reason for Assessment    Reason For Assessment  identified at risk by screening criteria     Diagnosis  -- anemia, afib, COPD, pulmonary fibrosis, CKD, hx of crohns     Identified At Risk by Screening Criteria  MST SCORE 2+           Anthropometrics     Row Name 11/18/20 1204 11/18/20 0500       Anthropometrics    Height  172.7 cm (67.99\")  --    Weight  65.4 kg (144 lb 2.9 oz) not weighed by RD  65.4 kg (144 lb 2.9 oz)       Ideal Body Weight (IBW)    Ideal Body Weight (IBW) (kg)  70.87  --    % Ideal Body Weight  92.28  --       Usual Body Weight (UBW)    Weight Loss  unintentional  --    Weight Loss Time Frame  10lbs  --       Body Mass Index (BMI)    BMI (kg/m2)  21.97  --    BMI Assessment  BMI 18.5-24.9: normal  --        Labs/Tests/Procedures/Meds     Row Name 11/18/20 1204          Labs/Procedures/Meds    Lab Results Reviewed  reviewed     Lab Results Comments  h/h, glu, bun        Diagnostic Tests/Procedures    Diagnostic Test/Procedure Reviewed  reviewed     Diagnostic Test/Procedures Comments  Scopes planned for tomorrow        Medications    Pertinent Medications Reviewed  reviewed     Pertinent Medications Comments  protonix, B12         Physical Findings     Row Name 11/18/20 1205          Physical Findings    Skin  -- margarita 22         Estimated/Assessed Needs     Row Name 11/18/20 1208 11/18/20 1204       Calculation Measurements    Weight Used For Calculations  65.4 kg (144 lb 2.9 oz)  --    Height  --  172.7 cm (67.99\")       Estimated/Assessed " Needs    Additional Documentation  KCAL/KG (Group);Protein Requirements (Group);Fluid Requirements (Group)  --       KCAL/KG    KCAL/KG  30 Kcal/Kg (kcal)  --    30 Kcal/Kg (kcal)  1962  --       Protein Requirements    Weight Used For Protein Calculations  65.4 kg (144 lb 2.9 oz)  --    Est Protein Requirement Amount (gms/kg)  1.2 gm protein  --    Estimated Protein Requirements (gms/day)  78.48  --       Fluid Requirements    Fluid Requirements (mL/day)  1962  --    RDA Method (mL)  1962  --        Nutrition Prescription Ordered     Row Name 11/18/20 1206          Nutrition Prescription PO    Current PO Diet  Full Liquid                 Problem/Interventions:  Problem 1     Row Name 11/18/20 1208          Nutrition Diagnoses Problem 1    Problem 1  Unintended Weight Loss     Etiology (related to)  Medical Diagnosis               Intervention Goal     Row Name 11/18/20 1208          Intervention Goal    General  Maintain nutrition;Disease management/therapy;Reduce/improve symptoms;Meet nutritional needs for age/condition     PO  Tolerate PO;Advance diet;PO intake (%)     PO Intake %  75 %     Weight  Maintain weight         Nutrition Intervention     Row Name 11/18/20 1205          Nutrition Intervention    RD/Tech Action  Follow Tx progress;Care plan reviewd         Nutrition Prescription     Row Name 11/18/20 1209          Nutrition Prescription PO    PO Prescription  Begin/change supplement     Supplement  Boost Plus         Education/Evaluation     Row Name 11/18/20 1203          Education    Education  Will Instruct as appropriate        Monitor/Evaluation    Monitor  Per protocol           Electronically signed by:  Yuli Urrutia RD  11/18/20 12:11 EST

## 2020-11-18 NOTE — PROGRESS NOTES
"    Patient Name: Asia Ly  :1932  88 y.o.      Patient Care Team:  Lubna Lobo MD as PCP - General  Lubna Lobo MD as PCP - Family Medicine    Chief Complaint: follow up PAF, on AC, GIB    Interval History: no chest pain or chest pressure. No additional bleeding noted.        Objective   Vital Signs  Temp:  [97.1 °F (36.2 °C)-98.6 °F (37 °C)] 97.9 °F (36.6 °C)  Heart Rate:  [59-73] 59  Resp:  [16-18] 18  BP: (126-154)/(46-67) 138/60    Intake/Output Summary (Last 24 hours) at 2020 1131  Last data filed at 2020 2118  Gross per 24 hour   Intake 780 ml   Output --   Net 780 ml     Flowsheet Rows      First Filed Value   Admission Height  172.7 cm (68\") Documented at 2020 0347   Admission Weight  67.1 kg (148 lb) Documented at 2020 0353          Physical Exam:   General Appearance:    Alert, cooperative, in no acute distress   Lungs:     Clear to auscultation.  Normal respiratory effort and rate.      Heart:    Regular rhythm and normal rate, normal S1 and S2, no murmurs, gallops or rubs.     Chest Wall:    No abnormalities observed   Abdomen:     Soft, nontender, positive bowel sounds.     Extremities:   no cyanosis, clubbing or edema.  No marked joint deformities.  Adequate musculoskeletal strength.       Results Review:    Results from last 7 days   Lab Units 20  0635   SODIUM mmol/L 140   POTASSIUM mmol/L 4.7   CHLORIDE mmol/L 107   CO2 mmol/L 26.1   BUN mg/dL 16   CREATININE mg/dL 1.15   GLUCOSE mg/dL 78   CALCIUM mg/dL 8.4*         Results from last 7 days   Lab Units 20  0635   WBC 10*3/mm3 8.00   HEMOGLOBIN g/dL 8.7*   HEMATOCRIT % 27.1*   PLATELETS 10*3/mm3 170     Results from last 7 days   Lab Units 20  0406   INR  1.56*   APTT seconds 33.0         Results from last 7 days   Lab Units 20  0635   CHOLESTEROL mg/dL 96   TRIGLYCERIDES mg/dL 60   HDL CHOL mg/dL 42   LDL CHOL mg/dL 40               Medication Review:   allopurinol, 100 mg, Oral, " BID  atorvastatin, 10 mg, Oral, Nightly  digoxin, 125 mcg, Oral, Daily  dofetilide, 125 mcg, Oral, BID  metoprolol tartrate, 50 mg, Oral, TID  pantoprazole, 40 mg, Intravenous, Q12H  vitamin B-12, 500 mcg, Oral, Daily              Assessment/Plan   1. Hematochezia - Hgb 5.7 on admission, s/p transfusion 3 units.  Scopes planned for tomorrow.   2. PAF - remains in SR on Tikosyn. QT stable on EKG. apixaban on hold due to #1. He is high risk with previous history of stroke. Discussed with patient and wife. Would resume as soon as safe from GI standpoint.  3. Aortic stenosis - moderate by echocardiogram in August  4. Dyslipidemia - on statin  5. History of Chron's     KYLAH Garcia  [facility]  11/18/20  11:31 EST

## 2020-11-18 NOTE — PROGRESS NOTES
Discharge Planning Assessment  [facility]     Patient Name: Asia Ly  MRN: [id number]  Today's Date: 11/18/2020    Admit Date: 11/17/2020    Discharge Needs Assessment     Row Name 11/18/20 1324       Living Environment    Lives With  spouse    Name(s) of Who Lives With Patient  Ni    Current Living Arrangements  home/apartment/condo    Primary Care Provided by  spouse/significant other    Provides Primary Care For  no one, unable/limited ability to care for self    Family Caregiver if Needed  child(astrid), adult;spouse    Family Caregiver Names  wife states sons are assistive as needed but live out of town    Quality of Family Relationships  helpful;involved;supportive    Able to Return to Prior Arrangements  yes       Resource/Environmental Concerns    Resource/Environmental Concerns  none    Transportation Concerns  car, none       Transition Planning    Patient/Family Anticipates Transition to  home with help/services;home with family    Patient/Family Anticipated Services at Transition  none    Transportation Anticipated  family or friend will provide       Discharge Needs Assessment    Readmission Within the Last 30 Days  no previous admission in last 30 days    Equipment Currently Used at Home  none    Concerns to be Addressed  discharge planning;denies needs/concerns at this time    Equipment Needed After Discharge  other (see comments)    Discharge Facility/Level of Care Needs  home with home health    Provided Post Acute Provider List?  Yes    Post Acute Provider List  Home Health    Provided Post Acute Provider Quality & Resource List?  Refused    Delivered To  Support Person    Method of Delivery  Telephone        Discharge Plan     Row Name 11/18/20 1317       Plan    Plan  Home with spouse and [facility] referral    Patient/Family in Agreement with Plan  yes    Plan Comments  Spoke with pt via inbound call to room, introduced self and explained CCP role. Verified facesheet and confirmed local  pharmacy is [facility] on [address]. Pt's wife Ni Ly [phone] denies any problems with medication management. Prior to admission pt lives at home with wife in a s/s home with 1 step to enter home. Pt is independent with mobility but wife does the driving and assists with meals and medications due to pts memory. Pt does not use any DME and no history of SNF. Pt has used HH many years ago. Pts wife would like [facility] referral at [place]. CCP explained will make referral and follow up after scope for any additional [place] needs. lala subramanian/ccp        Continued Care and Services - Admitted Since 11/17/2020     Home Medical Care     Service Provider Request Status Selected Services Address Phone Fax Patient Preferred    [facility]  Pending - Request Sent N/A [address] [address] [phone] --                Demographic Summary     Row Name 11/18/20 1323       General Information    Admission Type  inpatient    Referral Source  admission list    Reason for Consult  discharge planning;decision-making    Preferred Language  English     Used During This Interaction  no       Contact Information    Permission Granted to Share Info With  family/designee        Functional Status     Row Name 11/18/20 1323       Functional Status    Usual Activity Tolerance  moderate    Current Activity Tolerance  fair       Functional Status, IADL    Medications  assistive person    Meal Preparation  assistive person    Housekeeping  assistive person    Laundry  assistive person    Shopping  assistive person    IADL Comments  Wife reports pt is able to bath and dress himself       Mental Status    General Appearance WDL  WDL       Mental Status Summary    Recent Changes in Mental Status/Cognitive Functioning  no changes       Employment/    Employment Status  retired        Psychosocial    No documentation.       Abuse/Neglect    No  documentation.       Legal    No documentation.       Substance Abuse    No documentation.       Patient Forms    No documentation.           Sendy Holm RN

## 2020-11-18 NOTE — PROGRESS NOTES
"Daily progress note    Chief complaint  Doing little better  No abdominal pain nausea vomiting  No more bleeding since admission  Family at bedside    History of present illness  88-year-old Turkmen male with history of proximal atrial fibrillation COPD pulmonary fibrosis aortic stenosis who is well-known to our service presented to [facility] emergency room with bright red blood per rectum started last night with no abdominal pain nausea vomiting.  Patient on Eliquis for paroxysmal atrial fibrillation and work-up in ER revealed hemoglobin of 5.7 admit for management.  Patient fully alert oriented and in no respiratory distress.  Patient denies any fever cough chest pain shortness of breath abdominal pain nausea vomiting diarrhea.     REVIEW OF SYSTEMS  Constitutional: Negative for activity change, appetite change and fever.   HENT: Negative for congestion and sore throat.    Eyes: Negative.    Respiratory: Negative for cough and shortness of breath.    Cardiovascular: Negative for chest pain and leg swelling.   Gastrointestinal: Positive for blood in stool. Negative for abdominal pain, diarrhea and vomiting.   Endocrine: Negative.    Genitourinary: Negative for decreased urine volume and dysuria.   Musculoskeletal: Negative for neck pain.   Skin: Negative for rash and wound.   Allergic/Immunologic: Negative.    Neurological: Negative for weakness, numbness and headaches.   Hematological: Negative.    Psychiatric/Behavioral: Negative.    All other systems reviewed and are negative.     PHYSICAL EXAM  Blood pressure 123/56, pulse 68, temperature 97.7 °F (36.5 °C), temperature source Oral, resp. rate 18, height 172.7 cm (67.99\"), weight 65.4 kg (144 lb 2.9 oz), SpO2 97 %.    Constitutional: He is oriented to person, place, and time. No distress.   Head: Normocephalic and atraumatic.   Eyes: Pupils are equal, round, and reactive to light. EOM are normal.   Neck: Normal range of motion. Neck supple. "   Cardiovascular: Normal rate, regular rhythm and normal heart sounds.   Pulmonary/Chest: Effort normal and breath sounds normal. No respiratory distress.   Abdominal: Soft. There is no abdominal tenderness. There is no rebound and no guarding.   Rectum: Guaiac result positive.   Musculoskeletal: Normal range of motion.    No edema.   Neurological: He is alert and oriented to person, place, and time. He has normal sensation and normal strength.   Skin: Skin is warm and dry.   Psychiatric: Mood and affect normal.     LAB RESULTS  Lab Results (last 24 hours)     Procedure Component Value Units Date/Time    CBC & Differential [[id number]]  (Abnormal) Collected: 11/18/20 0635    Specimen: Blood Updated: 11/18/20 0813    Narrative:      The following orders were created for panel order CBC & Differential.  Procedure                               Abnormality         Status                     ---------                               -----------         ------                     CBC Auto Differential[[id number]]        Abnormal            Final result                 Please view results for these tests on the individual orders.    CBC Auto Differential [[id number]]  (Abnormal) Collected: 11/18/20 0635    Specimen: Blood Updated: 11/18/20 0813     WBC 8.00 10*3/mm3      RBC 3.19 10*6/mm3      Hemoglobin 8.7 g/dL      Hematocrit 27.1 %      MCV 85.0 fL      MCH 27.3 pg      MCHC 32.1 g/dL      RDW 14.5 %      RDW-SD 44.6 fl      MPV 10.9 fL      Platelets 170 10*3/mm3      Neutrophil % 69.4 %      Lymphocyte % 17.5 %      Monocyte % 9.8 %      Eosinophil % 1.9 %      Basophil % 0.9 %      Immature Grans % 0.5 %      Neutrophils, Absolute 5.56 10*3/mm3      Lymphocytes, Absolute 1.40 10*3/mm3      Monocytes, Absolute 0.78 10*3/mm3      Eosinophils, Absolute 0.15 10*3/mm3      Basophils, Absolute 0.07 10*3/mm3      Immature Grans, Absolute 0.04 10*3/mm3      nRBC 0.1 /100 WBC     BNP [[id number]]  (Normal) Collected: 11/18/20 0635     Specimen: Blood Updated: 11/18/20 0802     proBNP 850.3 pg/mL     Narrative:      Among patients with dyspnea, NT-proBNP is highly sensitive for the detection of acute congestive heart failure. In addition NT-proBNP of <300 pg/ml effectively rules out acute congestive heart failure with 99% negative predictive value.    Results may be falsely decreased if patient taking Biotin.      TSH [[id number]]  (Normal) Collected: 11/18/20 0635    Specimen: Blood Updated: 11/18/20 0802     TSH 4.160 uIU/mL     Comprehensive Metabolic Panel [[id number]]  (Abnormal) Collected: 11/18/20 0635    Specimen: Blood Updated: 11/18/20 0801     Glucose 78 mg/dL      BUN 16 mg/dL      Creatinine 1.15 mg/dL      Sodium 140 mmol/L      Potassium 4.7 mmol/L      Chloride 107 mmol/L      CO2 26.1 mmol/L      Calcium 8.4 mg/dL      Total Protein 6.0 g/dL      Albumin 3.10 g/dL      ALT (SGPT) 11 U/L      AST (SGOT) 13 U/L      Alkaline Phosphatase 47 U/L      Total Bilirubin 1.1 mg/dL      eGFR Non African Amer 60 mL/min/1.73      Globulin 2.9 gm/dL      A/G Ratio 1.1 g/dL      BUN/Creatinine Ratio 13.9     Anion Gap 6.9 mmol/L     Narrative:      GFR Normal >60  Chronic Kidney Disease <60  Kidney Failure <15      Lipid Panel [[id number]] Collected: 11/18/20 0635    Specimen: Blood Updated: 11/18/20 0801     Total Cholesterol 96 mg/dL      Triglycerides 60 mg/dL      HDL Cholesterol 42 mg/dL      LDL Cholesterol  40 mg/dL      VLDL Cholesterol 14 mg/dL      LDL/HDL Ratio 1.00    Narrative:      Cholesterol Reference Ranges  (U.S. Department of Health and Human Services ATP III Classifications)    Desirable          <200 mg/dL  Borderline High    200-239 mg/dL  High Risk          >240 mg/dL      Triglyceride Reference Ranges  (U.S. Department of Health and Human Services ATP III Classifications)    Normal           <150 mg/dL  Borderline High  150-199 mg/dL  High             200-499 mg/dL  Very High        >500 mg/dL    HDL Reference  Ranges  (U.S. Department of Health and Human Services ATP III Classifcations)    Low     <40 mg/dl (major risk factor for CHD)  High    >60 mg/dl ('negative' risk factor for CHD)        LDL Reference Ranges  (U.S. Department of Health and Human Services ATP III Classifcations)    Optimal          <100 mg/dL  Near Optimal     100-129 mg/dL  Borderline High  130-159 mg/dL  High             160-189 mg/dL  Very High        >189 mg/dL    Digoxin Level [[id number]]  (Normal) Collected: 11/18/20 0635    Specimen: Blood Updated: 11/18/20 0801     Digoxin 0.80 ng/mL     Hemoglobin A1c [[id number]]  (Normal) Collected: 11/18/20 0635    Specimen: Blood Updated: 11/18/20 0735     Hemoglobin A1C 5.60 %     Narrative:      Hemoglobin A1C Ranges:    Increased Risk for Diabetes  5.7% to 6.4%  Diabetes                     >= 6.5%  Diabetic Goal                < 7.0%    Hemoglobin & Hematocrit, Blood [[id number]]  (Abnormal) Collected: 11/18/20 0010    Specimen: Blood Updated: 11/18/20 0026     Hemoglobin 8.9 g/dL      Hematocrit 28.5 %     Hemoglobin & Hematocrit, Blood [[id number]]  (Abnormal) Collected: 11/17/20 1608    Specimen: Blood Updated: 11/17/20 1646     Hemoglobin 7.3 g/dL      Hematocrit 23.4 %         Imaging Results (Last 24 Hours)     ** No results found for the last 24 hours. **          Current Facility-Administered Medications:   •  acetaminophen (TYLENOL) tablet 650 mg, 650 mg, Oral, Q4H PRN, Radha Red APRN  •  allopurinol (ZYLOPRIM) tablet 100 mg, 100 mg, Oral, BID, Calvin Bergeron MD, 100 mg at 11/18/20 0921  •  atorvastatin (LIPITOR) tablet 10 mg, 10 mg, Oral, Nightly, Calvin Bergeron MD, 10 mg at 11/17/20 2111  •  bisacodyl (DULCOLAX) EC tablet 20 mg, 20 mg, Oral, Once, Aron Cabrera MD  •  bisacodyl (DULCOLAX) EC tablet 5 mg, 5 mg, Oral, Daily PRN, Radha Red APRN  •  digoxin (LANOXIN) tablet 125 mcg, 125 mcg, Oral, Daily, Calvin Bergeron MD, 125 mcg at 11/18/20 1215  •  dofetilide (TIKOSYN)  capsule 125 mcg, 125 mcg, Oral, BID, Shelbie Bergeron MD, 125 mcg at 11/18/20 0922  •  ipratropium-albuterol (DUO-NEB) nebulizer solution 1.5 mL, 1.5 mL, Nebulization, Q4H PRN, Shelbie Bergeron MD  •  metoprolol tartrate (LOPRESSOR) tablet 50 mg, 50 mg, Oral, TID, Shelbie Bergeron MD, 50 mg at 11/18/20 0921  •  [DISCONTINUED] ondansetron (ZOFRAN) tablet 4 mg, 4 mg, Oral, Q6H PRN **OR** ondansetron (ZOFRAN) injection 4 mg, 4 mg, Intravenous, Q6H PRN, Radha Red APRN  •  pantoprazole (PROTONIX) injection 40 mg, 40 mg, Intravenous, Q12H, Shelbie Bergeron MD, 40 mg at 11/18/20 0922  •  polyethylene glycol (MIRALAX) powder 1 bottle, 1 bottle, Oral, Once, Aron Cabrera MD  •  [COMPLETED] Insert peripheral IV, , , Once **AND** sodium chloride 0.9 % flush 10 mL, 10 mL, Intravenous, PRN, Johnnie Chin MD, 10 mL at 11/18/20 0922  •  vitamin B-12 (CYANOCOBALAMIN) tablet 500 mcg, 500 mcg, Oral, Daily, Shelbie Bergeron MD, 500 mcg at 11/18/20 0921     ASSESSMENT  Hematochezia consistent with lower GI bleed  Acute blood loss anemia  Chronic atrial fibrillation on Eliquis  Crohn's disease  Hypertension  Hyperlipidemia  Gastroesophageal reflux disease    PLAN  CPM  Transfuse PRN  Serial H&H  IV Protonix  Hold Eliquis  GI consult appreciated  Upper and lower endoscopy in a.m.  Adjust home medications  Stress ulcer DVT prophylaxis  Supportive care  Discussed with nursing staff and family  Follow closely with recommendation current hospital course    SHELBIE BERGERON MD

## 2020-11-18 NOTE — DISCHARGE PLACEMENT REQUEST
"Alex Burk (88 y.o. Male)     Date of Birth Social Security Number Address Home Phone MRN    08/21/1932  [id number]  [facility] [address] [phone] [id number]    [employer]oration Marital Status          [place] citizen [place]        Admission Date Admission Type Admitting Provider Attending Provider Department, Room/Bed    11/17/20 Emergency Calvin Bergeron MD Ahmed, Aftab, MD [address], [address]    Discharge Date Discharge Disposition Discharge Destination                       Attending Provider: Calvin Bergeron MD    Allergies: Amiodarone, Diltiazem, Doxycycline, Indomethacin    Isolation: None   Infection: None   Code Status: CPR    Ht: 172.7 cm (67.99\")   Wt: 65.4 kg (144 lb 2.9 oz)    Admission Cmt: None   Principal Problem: Acute lower GI bleeding [K92.2]                 Active Insurance as of 11/17/2020     Primary Coverage     Payor Plan Insurance Group Employer/Plan Group    MEDICARE MEDICARE A & B      Payor Plan Address Payor Plan Phone Number Payor Plan Fax Number Effective Dates    [address] [phone]  8/1/1997 - None Entered    [facility] [id number]       Subscriber Name Subscriber Birth Date Member ID       ALEX BURK 8/21/1932 [id number]           Secondary Coverage     Payor Plan Insurance Group Employer/Plan Group    [facility] SUPP [id number]     Payor Plan Address Payor Plan Phone Number Payor Plan Fax Number Effective Dates    [address]   12/1/2016 - None Entered    [facility] [id number]       Subscriber Name Subscriber Birth Date Member ID       ALEX BURK 8/21/1932 [id number]                 Emergency Contacts      (Rel.) Home Phone Work Phone Mobile Phone    HermanNi hamilton (Spouse) [phone] -- [phone]    Raul Burk (Son) [phone] -- [phone]              "

## 2020-11-18 NOTE — PROGRESS NOTES
"   LOS: 1 day   Patient Care Team:  Lubna Lobo MD as PCP - General  Lubna Lobo MD as PCP - Family Medicine    Chief Complaint: gastrointestinal bleed    Subjective     History of Present Illness    Subjective:  Symptoms:  Stable.    Diet:  Adequate intake.    Activity level: Impaired due to weakness.    Pain:  He reports no pain.        History taken from: patient chart family    Objective     Vital Signs  Temp:  [97.1 °F (36.2 °C)-98.6 °F (37 °C)] 97.9 °F (36.6 °C)  Heart Rate:  [59-73] 59  Resp:  [16-18] 18  BP: (126-157)/(46-68) 138/60    Objective:  General Appearance:  Well-appearing.    Vital signs: (most recent): Blood pressure 138/60, pulse 59, temperature 97.9 °F (36.6 °C), temperature source Oral, resp. rate 18, height 172.7 cm (67.99\"), weight 65.4 kg (144 lb 2.9 oz), SpO2 97 %.  Vital signs are normal.    Output: Producing urine.    HEENT: Normal HEENT exam.    Lungs:  Normal effort.    Heart: Normal rate.    Abdomen: Abdomen is soft.  There is no abdominal tenderness.     Skin:  Warm and dry.              Results Review:     I reviewed the patient's new clinical results.    Medication Review: done    Assessment/Plan       Acute lower GI bleeding    Atrial fibrillation, paroxysmal    Hyperlipidemia    Crohn's disease (CMS/HCC)    Aortic stenosis    Pulmonary fibrosis (CMS/HCC)    Anticoagulant long-term use      Assessment:  (Lower gastrointestinal bleed suspect diverticular bleed   Anemia improved with transfusion  History of crohns disease).     Plan:   (Earliest I can safely do an upper and lower scope tomorrow,  My scheduled will allow to be done late after noon 4pm ).       Aron Cabrera MD  11/18/20  07:02 EST    Time: Critical care 20 min      "

## 2020-11-19 ENCOUNTER — INPATIENT HOSPITAL (OUTPATIENT)
Dept: URBAN - METROPOLITAN AREA HOSPITAL 113 | Facility: HOSPITAL | Age: 85
End: 2020-11-19

## 2020-11-19 ENCOUNTER — ANESTHESIA (OUTPATIENT)
Dept: GASTROENTEROLOGY | Facility: HOSPITAL | Age: 85
End: 2020-11-19

## 2020-11-19 ENCOUNTER — ANESTHESIA EVENT (OUTPATIENT)
Dept: GASTROENTEROLOGY | Facility: HOSPITAL | Age: 85
End: 2020-11-19

## 2020-11-19 DIAGNOSIS — K44.9 DIAPHRAGMATIC HERNIA WITHOUT OBSTRUCTION OR GANGRENE: ICD-10-CM

## 2020-11-19 DIAGNOSIS — D62 ACUTE POSTHEMORRHAGIC ANEMIA: ICD-10-CM

## 2020-11-19 DIAGNOSIS — K55.20 ANGIODYSPLASIA OF COLON WITHOUT HEMORRHAGE: ICD-10-CM

## 2020-11-19 DIAGNOSIS — K92.1 MELENA: ICD-10-CM

## 2020-11-19 DIAGNOSIS — K64.1 SECOND DEGREE HEMORRHOIDS: ICD-10-CM

## 2020-11-19 DIAGNOSIS — K57.30 DIVERTICULOSIS OF LARGE INTESTINE WITHOUT PERFORATION OR ABS: ICD-10-CM

## 2020-11-19 LAB
ANION GAP SERPL CALCULATED.3IONS-SCNC: 6.5 MMOL/L (ref 5–15)
BASOPHILS # BLD AUTO: 0.06 10*3/MM3 (ref 0–0.2)
BASOPHILS NFR BLD AUTO: 0.8 % (ref 0–1.5)
BUN SERPL-MCNC: 12 MG/DL (ref 8–23)
BUN/CREAT SERPL: 9.3 (ref 7–25)
CALCIUM SPEC-SCNC: 8.7 MG/DL (ref 8.6–10.5)
CHLORIDE SERPL-SCNC: 102 MMOL/L (ref 98–107)
CO2 SERPL-SCNC: 26.5 MMOL/L (ref 22–29)
CREAT SERPL-MCNC: 1.29 MG/DL (ref 0.76–1.27)
DEPRECATED RDW RBC AUTO: 44.4 FL (ref 37–54)
EOSINOPHIL # BLD AUTO: 0.19 10*3/MM3 (ref 0–0.4)
EOSINOPHIL NFR BLD AUTO: 2.4 % (ref 0.3–6.2)
ERYTHROCYTE [DISTWIDTH] IN BLOOD BY AUTOMATED COUNT: 14.6 % (ref 12.3–15.4)
GFR SERPL CREATININE-BSD FRML MDRD: 53 ML/MIN/1.73
GLUCOSE SERPL-MCNC: 86 MG/DL (ref 65–99)
HCT VFR BLD AUTO: 29.4 % (ref 37.5–51)
HCT VFR BLD AUTO: 29.4 % (ref 37.5–51)
HCT VFR BLD AUTO: 32.4 % (ref 37.5–51)
HGB BLD-MCNC: 10.2 G/DL (ref 13–17.7)
HGB BLD-MCNC: 9.5 G/DL (ref 13–17.7)
HGB BLD-MCNC: 9.5 G/DL (ref 13–17.7)
IMM GRANULOCYTES # BLD AUTO: 0.03 10*3/MM3 (ref 0–0.05)
IMM GRANULOCYTES NFR BLD AUTO: 0.4 % (ref 0–0.5)
LYMPHOCYTES # BLD AUTO: 1.39 10*3/MM3 (ref 0.7–3.1)
LYMPHOCYTES NFR BLD AUTO: 17.7 % (ref 19.6–45.3)
MCH RBC QN AUTO: 27.1 PG (ref 26.6–33)
MCHC RBC AUTO-ENTMCNC: 32.3 G/DL (ref 31.5–35.7)
MCV RBC AUTO: 84 FL (ref 79–97)
MONOCYTES # BLD AUTO: 0.8 10*3/MM3 (ref 0.1–0.9)
MONOCYTES NFR BLD AUTO: 10.2 % (ref 5–12)
NEUTROPHILS NFR BLD AUTO: 5.37 10*3/MM3 (ref 1.7–7)
NEUTROPHILS NFR BLD AUTO: 68.5 % (ref 42.7–76)
NRBC BLD AUTO-RTO: 0.1 /100 WBC (ref 0–0.2)
PLATELET # BLD AUTO: 188 10*3/MM3 (ref 140–450)
PMV BLD AUTO: 10.6 FL (ref 6–12)
POTASSIUM SERPL-SCNC: 4.3 MMOL/L (ref 3.5–5.2)
RBC # BLD AUTO: 3.5 10*6/MM3 (ref 4.14–5.8)
SODIUM SERPL-SCNC: 135 MMOL/L (ref 136–145)
VIT B12 BLD-MCNC: 866 PG/ML (ref 211–946)
WBC # BLD AUTO: 7.84 10*3/MM3 (ref 3.4–10.8)

## 2020-11-19 PROCEDURE — 45388 COLONOSCOPY W/ABLATION: CPT | Performed by: INTERNAL MEDICINE

## 2020-11-19 PROCEDURE — 82607 VITAMIN B-12: CPT | Performed by: HOSPITALIST

## 2020-11-19 PROCEDURE — 43235 EGD DIAGNOSTIC BRUSH WASH: CPT | Performed by: INTERNAL MEDICINE

## 2020-11-19 PROCEDURE — 80048 BASIC METABOLIC PNL TOTAL CA: CPT | Performed by: HOSPITALIST

## 2020-11-19 PROCEDURE — 85018 HEMOGLOBIN: CPT | Performed by: NURSE PRACTITIONER

## 2020-11-19 PROCEDURE — 0W3P8ZZ CONTROL BLEEDING IN GASTROINTESTINAL TRACT, VIA NATURAL OR ARTIFICIAL OPENING ENDOSCOPIC: ICD-10-PCS | Performed by: INTERNAL MEDICINE

## 2020-11-19 PROCEDURE — 25010000002 PROPOFOL 10 MG/ML EMULSION: Performed by: ANESTHESIOLOGY

## 2020-11-19 PROCEDURE — 85014 HEMATOCRIT: CPT | Performed by: NURSE PRACTITIONER

## 2020-11-19 PROCEDURE — 85025 COMPLETE CBC W/AUTO DIFF WBC: CPT | Performed by: HOSPITALIST

## 2020-11-19 PROCEDURE — 0DJ08ZZ INSPECTION OF UPPER INTESTINAL TRACT, VIA NATURAL OR ARTIFICIAL OPENING ENDOSCOPIC: ICD-10-PCS | Performed by: INTERNAL MEDICINE

## 2020-11-19 PROCEDURE — 99231 SBSQ HOSP IP/OBS SF/LOW 25: CPT | Performed by: NURSE PRACTITIONER

## 2020-11-19 PROCEDURE — 25010000002 PHENYLEPHRINE PER 1 ML: Performed by: ANESTHESIOLOGY

## 2020-11-19 RX ORDER — PROMETHAZINE HYDROCHLORIDE 25 MG/1
25 TABLET ORAL ONCE AS NEEDED
Status: DISCONTINUED | OUTPATIENT
Start: 2020-11-19 | End: 2020-11-19 | Stop reason: HOSPADM

## 2020-11-19 RX ORDER — PROMETHAZINE HYDROCHLORIDE 25 MG/1
25 SUPPOSITORY RECTAL ONCE AS NEEDED
Status: DISCONTINUED | OUTPATIENT
Start: 2020-11-19 | End: 2020-11-19 | Stop reason: HOSPADM

## 2020-11-19 RX ORDER — SODIUM CHLORIDE 9 MG/ML
30 INJECTION, SOLUTION INTRAVENOUS CONTINUOUS
Status: DISCONTINUED | OUTPATIENT
Start: 2020-11-19 | End: 2020-11-21

## 2020-11-19 RX ORDER — PROPOFOL 10 MG/ML
VIAL (ML) INTRAVENOUS CONTINUOUS PRN
Status: DISCONTINUED | OUTPATIENT
Start: 2020-11-19 | End: 2020-11-19 | Stop reason: SURG

## 2020-11-19 RX ADMIN — METOPROLOL TARTRATE 50 MG: 50 TABLET, FILM COATED ORAL at 21:25

## 2020-11-19 RX ADMIN — METOPROLOL TARTRATE 50 MG: 50 TABLET, FILM COATED ORAL at 00:19

## 2020-11-19 RX ADMIN — PHENYLEPHRINE HYDROCHLORIDE 100 MCG: 10 INJECTION INTRAVENOUS at 16:52

## 2020-11-19 RX ADMIN — PANTOPRAZOLE SODIUM 40 MG: 40 INJECTION, POWDER, FOR SOLUTION INTRAVENOUS at 09:35

## 2020-11-19 RX ADMIN — PROPOFOL 200 MCG/KG/MIN: 10 INJECTION, EMULSION INTRAVENOUS at 16:32

## 2020-11-19 RX ADMIN — ALLOPURINOL 100 MG: 100 TABLET ORAL at 21:25

## 2020-11-19 RX ADMIN — SODIUM CHLORIDE 30 ML/HR: 9 INJECTION, SOLUTION INTRAVENOUS at 14:45

## 2020-11-19 RX ADMIN — PANTOPRAZOLE SODIUM 40 MG: 40 INJECTION, POWDER, FOR SOLUTION INTRAVENOUS at 21:25

## 2020-11-19 RX ADMIN — DIGOXIN 125 MCG: 125 TABLET ORAL at 12:32

## 2020-11-19 RX ADMIN — ALLOPURINOL 100 MG: 100 TABLET ORAL at 09:35

## 2020-11-19 RX ADMIN — ALLOPURINOL 100 MG: 100 TABLET ORAL at 00:19

## 2020-11-19 RX ADMIN — ATORVASTATIN CALCIUM 10 MG: 20 TABLET, FILM COATED ORAL at 21:25

## 2020-11-19 RX ADMIN — SODIUM CHLORIDE: 9 INJECTION, SOLUTION INTRAVENOUS at 16:24

## 2020-11-19 RX ADMIN — ATORVASTATIN CALCIUM 10 MG: 20 TABLET, FILM COATED ORAL at 00:19

## 2020-11-19 RX ADMIN — PANTOPRAZOLE SODIUM 40 MG: 40 INJECTION, POWDER, FOR SOLUTION INTRAVENOUS at 00:19

## 2020-11-19 RX ADMIN — DOFETILIDE 125 MCG: 0.12 CAPSULE ORAL at 21:25

## 2020-11-19 RX ADMIN — DOFETILIDE 125 MCG: 0.12 CAPSULE ORAL at 00:19

## 2020-11-19 RX ADMIN — PHENYLEPHRINE HYDROCHLORIDE 200 MCG: 10 INJECTION INTRAVENOUS at 16:42

## 2020-11-19 RX ADMIN — Medication 500 MCG: at 09:35

## 2020-11-19 RX ADMIN — DOFETILIDE 125 MCG: 0.12 CAPSULE ORAL at 09:35

## 2020-11-19 RX ADMIN — APIXABAN 5 MG: 5 TABLET, FILM COATED ORAL at 21:26

## 2020-11-19 RX ADMIN — METOPROLOL TARTRATE 50 MG: 50 TABLET, FILM COATED ORAL at 09:35

## 2020-11-19 NOTE — PROGRESS NOTES
"Daily progress note    Chief complaint  Doing same  No abdominal pain nausea vomiting  Family at bedside    History of present illness  88-year-old [place] male with history of proximal atrial fibrillation COPD pulmonary fibrosis aortic stenosis who is well-known to our service presented to [facility], [place], operated by [facility] emergency room with bright red blood per rectum started last night with no abdominal pain nausea vomiting.  Patient on Eliquis for paroxysmal atrial fibrillation and work-up in ER revealed hemoglobin of 5.7 admit for management.  Patient fully alert oriented and in no respiratory distress.  Patient denies any fever cough chest pain shortness of breath abdominal pain nausea vomiting diarrhea.     REVIEW OF SYSTEMS  Unremarkable     PHYSICAL EXAM  Blood pressure 129/51, pulse 58, temperature 98.3 °F (36.8 °C), temperature source Oral, resp. rate 16, height 172.7 cm (67.99\"), weight 65.4 kg (144 lb 2.9 oz), SpO2 99 %.    Constitutional: He is oriented to person, place, and time. No distress.   Head: Normocephalic and atraumatic.   Eyes: Pupils are equal, round, and reactive to light. EOM are normal.   Neck: Normal range of motion. Neck supple.   Cardiovascular: Normal rate, regular rhythm and normal heart sounds.   Pulmonary/Chest: Effort normal and breath sounds normal. No respiratory distress.   Abdominal: Soft. There is no abdominal tenderness. There is no rebound and no guarding.   Rectum: Guaiac result positive.   Musculoskeletal: Normal range of motion.    No edema.   Neurological: He is alert and oriented to person, place, and time. He has normal sensation and normal strength.   Skin: Skin is warm and dry.   Psychiatric: Mood and affect normal.     LAB RESULTS  Lab Results (last 24 hours)     Procedure Component Value Units Date/Time    Vitamin B12 [[id number]]  (Normal) Collected: 11/19/20 0755    Specimen: Blood from Arm, Right Updated: 11/19/20 0905     Vitamin B-12 866 pg/mL     Narrative:      " Results may be falsely increased if patient taking Biotin.      Basic Metabolic Panel [[id number]]  (Abnormal) Collected: 11/19/20 0755    Specimen: Blood from Arm, Right Updated: 11/19/20 0850     Glucose 86 mg/dL      BUN 12 mg/dL      Creatinine 1.29 mg/dL      Sodium 135 mmol/L      Potassium 4.3 mmol/L      Chloride 102 mmol/L      CO2 26.5 mmol/L      Calcium 8.7 mg/dL      eGFR Non African Amer 53 mL/min/1.73      BUN/Creatinine Ratio 9.3     Anion Gap 6.5 mmol/L     Narrative:      GFR Normal >60  Chronic Kidney Disease <60  Kidney Failure <15      Hemoglobin & Hematocrit, Blood [[id number]]  (Abnormal) Collected: 11/19/20 0755    Specimen: Blood from Arm, Right Updated: 11/19/20 0818     Hemoglobin 9.5 g/dL      Hematocrit 29.4 %     CBC & Differential [[id number]]  (Abnormal) Collected: 11/19/20 0755    Specimen: Blood from Arm, Right Updated: 11/19/20 0818    Narrative:      The following orders were created for panel order CBC & Differential.  Procedure                               Abnormality         Status                     ---------                               -----------         ------                     CBC Auto Differential[[id number]]        Abnormal            Final result                 Please view results for these tests on the individual orders.    CBC Auto Differential [[id number]]  (Abnormal) Collected: 11/19/20 0755    Specimen: Blood from Arm, Right Updated: 11/19/20 0818     WBC 7.84 10*3/mm3      RBC 3.50 10*6/mm3      Hemoglobin 9.5 g/dL      Hematocrit 29.4 %      MCV 84.0 fL      MCH 27.1 pg      MCHC 32.3 g/dL      RDW 14.6 %      RDW-SD 44.4 fl      MPV 10.6 fL      Platelets 188 10*3/mm3      Neutrophil % 68.5 %      Lymphocyte % 17.7 %      Monocyte % 10.2 %      Eosinophil % 2.4 %      Basophil % 0.8 %      Immature Grans % 0.4 %      Neutrophils, Absolute 5.37 10*3/mm3      Lymphocytes, Absolute 1.39 10*3/mm3      Monocytes, Absolute 0.80 10*3/mm3      Eosinophils, Absolute  0.19 10*3/mm3      Basophils, Absolute 0.06 10*3/mm3      Immature Grans, Absolute 0.03 10*3/mm3      nRBC 0.1 /100 WBC     Hemoglobin & Hematocrit, Blood [[id number]]  (Abnormal) Collected: 11/18/20 2357    Specimen: Blood Updated: 11/19/20 0018     Hemoglobin 10.2 g/dL      Hematocrit 32.4 %     Hemoglobin & Hematocrit, Blood [[id number]]  (Abnormal) Collected: 11/18/20 1602    Specimen: Blood Updated: 11/18/20 1650     Hemoglobin 9.3 g/dL      Hematocrit 28.5 %         Imaging Results (Last 24 Hours)     ** No results found for the last 24 hours. **          Current Facility-Administered Medications:   •  acetaminophen (TYLENOL) tablet 650 mg, 650 mg, Oral, Q4H PRN, Radha Red APRN  •  allopurinol (ZYLOPRIM) tablet 100 mg, 100 mg, Oral, BID, Calvin Bergeron MD, 100 mg at 11/19/20 0935  •  atorvastatin (LIPITOR) tablet 10 mg, 10 mg, Oral, Nightly, Calvin Bergeron MD, 10 mg at 11/19/20 0019  •  bisacodyl (DULCOLAX) EC tablet 5 mg, 5 mg, Oral, Daily PRN, Radha Red APRN  •  digoxin (LANOXIN) tablet 125 mcg, 125 mcg, Oral, Daily, Calvin Bergeron MD, 125 mcg at 11/19/20 1232  •  dofetilide (TIKOSYN) capsule 125 mcg, 125 mcg, Oral, BID, Calvin Bergeron MD, 125 mcg at 11/19/20 0935  •  ipratropium-albuterol (DUO-NEB) nebulizer solution 1.5 mL, 1.5 mL, Nebulization, Q4H PRN, Calvin Bergeron MD  •  metoprolol tartrate (LOPRESSOR) tablet 50 mg, 50 mg, Oral, TID, Calvin Bergeron MD, 50 mg at 11/19/20 0935  •  [DISCONTINUED] ondansetron (ZOFRAN) tablet 4 mg, 4 mg, Oral, Q6H PRN **OR** ondansetron (ZOFRAN) injection 4 mg, 4 mg, Intravenous, Q6H PRN, Radha Red, APRN  •  pantoprazole (PROTONIX) injection 40 mg, 40 mg, Intravenous, Q12H, Calvin Bergeron MD, 40 mg at 11/19/20 0935  •  [COMPLETED] Insert peripheral IV, , , Once **AND** sodium chloride 0.9 % flush 10 mL, 10 mL, Intravenous, PRN, Johnnie Chin MD, 10 mL at 11/18/20 0922  •  sodium chloride 0.9 % infusion, 30 mL/hr, Intravenous, Continuous, Rick,  Aron MCNEAL MD, Last Rate: 30 mL/hr at 11/19/20 1445, 30 mL/hr at 11/19/20 1445  •  vitamin B-12 (CYANOCOBALAMIN) tablet 500 mcg, 500 mcg, Oral, Daily, Calvin Cowart MD, 500 mcg at 11/19/20 0935     ASSESSMENT  Hematochezia consistent with lower GI bleed  Acute blood loss anemia  Chronic atrial fibrillation on Eliquis  Crohn's disease  Hypertension  Hyperlipidemia  Gastroesophageal reflux disease    PLAN  CPM  Transfuse PRN  Serial H&H  IV Protonix  Hold Eliquis  GI consult appreciated  Upper and lower endoscopy today  Adjust home medications  Stress ulcer DVT prophylaxis  Supportive care  Discussed with nursing staff and family  Follow closely with recommendation current hospital course    CALVIN COWART MD

## 2020-11-19 NOTE — ANESTHESIA PREPROCEDURE EVALUATION
Anesthesia Evaluation     Patient summary reviewed and Nursing notes reviewed                Airway   Mallampati: I  TM distance: >3 FB  Neck ROM: full  No difficulty expected  Dental    (+) poor dentition        Pulmonary - normal exam   (+) pneumonia resolved , a smoker Former, COPD,   Cardiovascular     Rhythm: regular    (+) hypertension, valvular problems/murmurs AS, dysrhythmias Paroxysmal Atrial Fib, murmur, hyperlipidemia,     ROS comment: EF 60%, moderate aortic stenosis    Neuro/Psych  (+) TIA, CVA, dizziness/light headedness,     GI/Hepatic/Renal/Endo    (+)  PUD, GI bleeding , renal disease CRI,     Musculoskeletal (-) negative ROS    Abdominal  - normal exam    Bowel sounds: normal.   Substance History - negative use     OB/GYN negative ob/gyn ROS         Other                      Anesthesia Plan    ASA 4     MAC       Anesthetic plan, all risks, benefits, and alternatives have been provided, discussed and informed consent has been obtained with: patient.

## 2020-11-19 NOTE — PROGRESS NOTES
"    Patient Name: Asia Ly  :1932  88 y.o.      Patient Care Team:  Lubna Lobo MD as PCP - General  Lubna Lobo MD as PCP - Family Medicine    Chief Complaint: follow up PAF, on AC, GIB    Interval History: no complaints. He wants to take a shower. No additional bleeding noted.        Objective   Vital Signs  Temp:  [97.7 °F (36.5 °C)-98.6 °F (37 °C)] 98 °F (36.7 °C)  Heart Rate:  [61-68] 63  Resp:  [16-18] 18  BP: (123-147)/(54-86) 134/86    Intake/Output Summary (Last 24 hours) at 2020 1124  Last data filed at 2020 0813  Gross per 24 hour   Intake 0 ml   Output --   Net 0 ml     Flowsheet Rows      First Filed Value   Admission Height  172.7 cm (68\") Documented at 2020 0347   Admission Weight  67.1 kg (148 lb) Documented at 2020 0353          Physical Exam:   General Appearance:    Alert, cooperative, in no acute distress   Lungs:     Clear to auscultation.  Normal respiratory effort and rate.      Heart:    Regular rhythm and normal rate, normal S1 and S2, no murmurs, gallops or rubs.     Chest Wall:    No abnormalities observed   Abdomen:     Soft, nontender, positive bowel sounds.     Extremities:   no cyanosis, clubbing or edema.  No marked joint deformities.  Adequate musculoskeletal strength.       Results Review:    Results from last 7 days   Lab Units 20  0755   SODIUM mmol/L 135*   POTASSIUM mmol/L 4.3   CHLORIDE mmol/L 102   CO2 mmol/L 26.5   BUN mg/dL 12   CREATININE mg/dL 1.29*   GLUCOSE mg/dL 86   CALCIUM mg/dL 8.7         Results from last 7 days   Lab Units 20  0755   WBC 10*3/mm3 7.84   HEMOGLOBIN g/dL 9.5*  9.5*   HEMATOCRIT % 29.4*  29.4*   PLATELETS 10*3/mm3 188     Results from last 7 days   Lab Units 20  0406   INR  1.56*   APTT seconds 33.0         Results from last 7 days   Lab Units 20  0635   CHOLESTEROL mg/dL 96   TRIGLYCERIDES mg/dL 60   HDL CHOL mg/dL 42   LDL CHOL mg/dL 40               Medication Review: "   allopurinol, 100 mg, Oral, BID  atorvastatin, 10 mg, Oral, Nightly  digoxin, 125 mcg, Oral, Daily  dofetilide, 125 mcg, Oral, BID  metoprolol tartrate, 50 mg, Oral, TID  pantoprazole, 40 mg, Intravenous, Q12H  vitamin B-12, 500 mcg, Oral, Daily              Assessment/Plan   1. Hematochezia - Hgb 5.7 on admission, s/p transfusion 3 units.  Scopes planned for tomorrow.   2. PAF - remains in SR on Tikosyn. QT stable on EKG 11/17. apixaban on hold due to #1. He is high risk with previous history of stroke. Discussed with patient and wife. Would resume as soon as safe from GI standpoint.  3. Aortic stenosis - moderate by echocardiogram in August  4. Dyslipidemia - on statin  5. History of Chron's     Scopes today. Ok to shower.     KYLAH Garcia  [facility]  11/19/20  11:24 EST

## 2020-11-19 NOTE — PLAN OF CARE
Goal Outcome Evaluation:  Plan of Care Reviewed With: patient  Progress: improving   Vitals stable. EGD and colonoscopy today. No c/o pain. Eliquis to start tonight

## 2020-11-19 NOTE — ANESTHESIA PROCEDURE NOTES
Peripheral IV    Start time: 11/19/2020 4:44 PM  End time: 11/19/2020 4:48 PM  Line placed for Fluids/Medication Admin.  Performed By   Anesthesiologist: Errol Lima MD  Peripheral IV Prep   Patient position: supine   Prep: ChloraPrep  Patient monitoring: heart rate, cardiac monitor and continuous pulse ox  Peripheral IV Procedure   Laterality:right  Location:  Wrist  Catheter size: 20 G         Post Assessment   Dressing Type: gauze, tape and transparent.    IV Dressing/Site: clean, dry and intact

## 2020-11-20 ENCOUNTER — INPATIENT HOSPITAL (OUTPATIENT)
Dept: URBAN - METROPOLITAN AREA HOSPITAL 113 | Facility: HOSPITAL | Age: 85
End: 2020-11-20

## 2020-11-20 DIAGNOSIS — K57.30 DIVERTICULOSIS OF LARGE INTESTINE WITHOUT PERFORATION OR ABS: ICD-10-CM

## 2020-11-20 DIAGNOSIS — K50.10 CROHN'S DISEASE OF LARGE INTESTINE WITHOUT COMPLICATIONS: ICD-10-CM

## 2020-11-20 DIAGNOSIS — K55.20 ANGIODYSPLASIA OF COLON WITHOUT HEMORRHAGE: ICD-10-CM

## 2020-11-20 DIAGNOSIS — K62.5 HEMORRHAGE OF ANUS AND RECTUM: ICD-10-CM

## 2020-11-20 LAB
ANION GAP SERPL CALCULATED.3IONS-SCNC: 11.6 MMOL/L (ref 5–15)
BASOPHILS # BLD AUTO: 0.05 10*3/MM3 (ref 0–0.2)
BASOPHILS NFR BLD AUTO: 0.3 % (ref 0–1.5)
BUN SERPL-MCNC: 12 MG/DL (ref 8–23)
BUN/CREAT SERPL: 10.1 (ref 7–25)
CALCIUM SPEC-SCNC: 8.3 MG/DL (ref 8.6–10.5)
CHLORIDE SERPL-SCNC: 107 MMOL/L (ref 98–107)
CO2 SERPL-SCNC: 19.4 MMOL/L (ref 22–29)
CREAT SERPL-MCNC: 1.19 MG/DL (ref 0.76–1.27)
D-LACTATE SERPL-SCNC: 1.8 MMOL/L (ref 0.5–2)
D-LACTATE SERPL-SCNC: 2.5 MMOL/L (ref 0.5–2)
DEPRECATED RDW RBC AUTO: 45.9 FL (ref 37–54)
DIGOXIN SERPL-MCNC: 0.8 NG/ML (ref 0.6–1.2)
EOSINOPHIL # BLD AUTO: 0.04 10*3/MM3 (ref 0–0.4)
EOSINOPHIL NFR BLD AUTO: 0.2 % (ref 0.3–6.2)
ERYTHROCYTE [DISTWIDTH] IN BLOOD BY AUTOMATED COUNT: 15.1 % (ref 12.3–15.4)
GFR SERPL CREATININE-BSD FRML MDRD: 58 ML/MIN/1.73
GLUCOSE SERPL-MCNC: 93 MG/DL (ref 65–99)
HCT VFR BLD AUTO: 29.3 % (ref 37.5–51)
HGB BLD-MCNC: 9.5 G/DL (ref 13–17.7)
IMM GRANULOCYTES # BLD AUTO: 0.08 10*3/MM3 (ref 0–0.05)
IMM GRANULOCYTES NFR BLD AUTO: 0.5 % (ref 0–0.5)
LACTATE HOLD SPECIMEN: NORMAL
LYMPHOCYTES # BLD AUTO: 1.27 10*3/MM3 (ref 0.7–3.1)
LYMPHOCYTES NFR BLD AUTO: 7.9 % (ref 19.6–45.3)
MAGNESIUM SERPL-MCNC: 2 MG/DL (ref 1.6–2.4)
MCH RBC QN AUTO: 27.2 PG (ref 26.6–33)
MCHC RBC AUTO-ENTMCNC: 32.4 G/DL (ref 31.5–35.7)
MCV RBC AUTO: 84 FL (ref 79–97)
MONOCYTES # BLD AUTO: 1.11 10*3/MM3 (ref 0.1–0.9)
MONOCYTES NFR BLD AUTO: 6.9 % (ref 5–12)
NEUTROPHILS NFR BLD AUTO: 13.55 10*3/MM3 (ref 1.7–7)
NEUTROPHILS NFR BLD AUTO: 84.2 % (ref 42.7–76)
NRBC BLD AUTO-RTO: 0 /100 WBC (ref 0–0.2)
PLATELET # BLD AUTO: 182 10*3/MM3 (ref 140–450)
PMV BLD AUTO: 11.2 FL (ref 6–12)
POTASSIUM SERPL-SCNC: 4 MMOL/L (ref 3.5–5.2)
POTASSIUM SERPL-SCNC: 4.2 MMOL/L (ref 3.5–5.2)
QT INTERVAL: 307 MS
QT INTERVAL: 383 MS
RBC # BLD AUTO: 3.49 10*6/MM3 (ref 4.14–5.8)
SODIUM SERPL-SCNC: 138 MMOL/L (ref 136–145)
TROPONIN T SERPL-MCNC: <0.01 NG/ML (ref 0–0.03)
WBC # BLD AUTO: 16.1 10*3/MM3 (ref 3.4–10.8)

## 2020-11-20 PROCEDURE — 80162 ASSAY OF DIGOXIN TOTAL: CPT | Performed by: NURSE PRACTITIONER

## 2020-11-20 PROCEDURE — 83735 ASSAY OF MAGNESIUM: CPT | Performed by: HOSPITALIST

## 2020-11-20 PROCEDURE — 83605 ASSAY OF LACTIC ACID: CPT | Performed by: HOSPITALIST

## 2020-11-20 PROCEDURE — 93005 ELECTROCARDIOGRAM TRACING: CPT | Performed by: HOSPITALIST

## 2020-11-20 PROCEDURE — 93005 ELECTROCARDIOGRAM TRACING: CPT | Performed by: INTERNAL MEDICINE

## 2020-11-20 PROCEDURE — 99231 SBSQ HOSP IP/OBS SF/LOW 25: CPT | Performed by: NURSE PRACTITIONER

## 2020-11-20 PROCEDURE — 85025 COMPLETE CBC W/AUTO DIFF WBC: CPT | Performed by: HOSPITALIST

## 2020-11-20 PROCEDURE — 84132 ASSAY OF SERUM POTASSIUM: CPT | Performed by: HOSPITALIST

## 2020-11-20 PROCEDURE — 84484 ASSAY OF TROPONIN QUANT: CPT | Performed by: HOSPITALIST

## 2020-11-20 PROCEDURE — 80048 BASIC METABOLIC PNL TOTAL CA: CPT | Performed by: HOSPITALIST

## 2020-11-20 PROCEDURE — 99232 SBSQ HOSP IP/OBS MODERATE 35: CPT | Performed by: INTERNAL MEDICINE

## 2020-11-20 PROCEDURE — 93010 ELECTROCARDIOGRAM REPORT: CPT | Performed by: INTERNAL MEDICINE

## 2020-11-20 RX ORDER — PANTOPRAZOLE SODIUM 40 MG/1
40 TABLET, DELAYED RELEASE ORAL
Status: DISCONTINUED | OUTPATIENT
Start: 2020-11-21 | End: 2020-11-21 | Stop reason: HOSPADM

## 2020-11-20 RX ORDER — SODIUM CHLORIDE 9 MG/ML
50 INJECTION, SOLUTION INTRAVENOUS CONTINUOUS
Status: CANCELLED | OUTPATIENT
Start: 2020-11-20

## 2020-11-20 RX ADMIN — Medication 500 MCG: at 09:39

## 2020-11-20 RX ADMIN — ALLOPURINOL 100 MG: 100 TABLET ORAL at 22:20

## 2020-11-20 RX ADMIN — PANTOPRAZOLE SODIUM 40 MG: 40 INJECTION, POWDER, FOR SOLUTION INTRAVENOUS at 09:49

## 2020-11-20 RX ADMIN — METOPROLOL TARTRATE 50 MG: 50 TABLET, FILM COATED ORAL at 09:39

## 2020-11-20 RX ADMIN — DOFETILIDE 125 MCG: 0.12 CAPSULE ORAL at 21:07

## 2020-11-20 RX ADMIN — SODIUM CHLORIDE 500 ML: 9 INJECTION, SOLUTION INTRAVENOUS at 09:39

## 2020-11-20 RX ADMIN — DOFETILIDE 125 MCG: 0.12 CAPSULE ORAL at 09:39

## 2020-11-20 RX ADMIN — ALLOPURINOL 100 MG: 100 TABLET ORAL at 09:39

## 2020-11-20 RX ADMIN — ATORVASTATIN CALCIUM 10 MG: 20 TABLET, FILM COATED ORAL at 21:06

## 2020-11-20 RX ADMIN — APIXABAN 5 MG: 5 TABLET, FILM COATED ORAL at 22:20

## 2020-11-20 RX ADMIN — METOPROLOL TARTRATE 50 MG: 50 TABLET, FILM COATED ORAL at 21:06

## 2020-11-20 RX ADMIN — DIGOXIN 125 MCG: 125 TABLET ORAL at 17:11

## 2020-11-20 RX ADMIN — SODIUM CHLORIDE 250 ML: 9 INJECTION, SOLUTION INTRAVENOUS at 04:15

## 2020-11-20 RX ADMIN — APIXABAN 5 MG: 5 TABLET, FILM COATED ORAL at 10:32

## 2020-11-20 NOTE — PROGRESS NOTES
"Daily progress note    Chief complaint  Events noted  Doing okay now  Denies chest pain shortness of breath palpitation or dizziness  Family at bedside    History of present illness  88-year-old [place] male with history of proximal atrial fibrillation COPD pulmonary fibrosis aortic stenosis who is well-known to our service presented to [facility] emergency room with bright red blood per rectum started last night with no abdominal pain nausea vomiting.  Patient on Eliquis for paroxysmal atrial fibrillation and work-up in ER revealed hemoglobin of 5.7 admit for management.  Patient fully alert oriented and in no respiratory distress.  Patient denies any fever cough chest pain shortness of breath abdominal pain nausea vomiting diarrhea.     REVIEW OF SYSTEMS  Unremarkable     PHYSICAL EXAM  Blood pressure 100/67, pulse 79, temperature 98.4 °F (36.9 °C), temperature source Oral, resp. rate 20, height 172.7 cm (67.99\"), weight 65.4 kg (144 lb 2.9 oz), SpO2 98 %.    Constitutional: He is oriented to person, place, and time. No distress.   Head: Normocephalic and atraumatic.   Eyes: Pupils are equal, round, and reactive to light. EOM are normal.   Neck: Normal range of motion. Neck supple.   Cardiovascular: Normal rate, regular rhythm and normal heart sounds.   Pulmonary/Chest: Effort normal and breath sounds normal. No respiratory distress.   Abdominal: Soft. There is no abdominal tenderness. There is no rebound and no guarding.   Rectum: Guaiac result positive.   Musculoskeletal: Normal range of motion.    No edema.   Neurological: He is alert and oriented to person, place, and time. He has normal sensation and normal strength.   Skin: Skin is warm and dry.   Psychiatric: Mood and affect normal.     LAB RESULTS  Lab Results (last 24 hours)     Procedure Component Value Units Date/Time    Lactic Acid, Reflex [[id number]]  (Normal) Collected: 11/20/20 1238    Specimen: Blood Updated: 11/20/20 1310     Lactate " 1.8 mmol/L     Lactic Acid, Reflex Timer (This will reflex a repeat order 3-3:15 hours after ordered.) [[id number]] Collected: 11/20/20 0840    Specimen: Blood Updated: 11/20/20 1215     Hold Tube Hold for add-ons.     Comment: Auto resulted.       Digoxin Level [[id number]]  (Normal) Collected: 11/20/20 0527    Specimen: Blood Updated: 11/20/20 0927     Digoxin 0.80 ng/mL     Troponin [[id number]]  (Normal) Collected: 11/20/20 0527    Specimen: Blood Updated: 11/20/20 0921     Troponin T <0.010 ng/mL     Narrative:      Troponin T Reference Range:  <= 0.03 ng/mL-   Negative for AMI  >0.03 ng/mL-     Abnormal for myocardial necrosis.  Clinicians would have to utilize clinical acumen, EKG, Troponin and serial changes to determine if it is an Acute Myocardial Infarction or myocardial injury due to an underlying chronic condition.       Results may be falsely decreased if patient taking Biotin.      Lactic Acid, Plasma [[id number]]  (Abnormal) Collected: 11/20/20 0840    Specimen: Blood Updated: 11/20/20 0910     Lactate 2.5 mmol/L     Potassium [[id number]]  (Normal) Collected: 11/20/20 0840    Specimen: Blood Updated: 11/20/20 0908     Potassium 4.0 mmol/L     Magnesium [[id number]]  (Normal) Collected: 11/20/20 0527    Specimen: Blood Updated: 11/20/20 0821     Magnesium 2.0 mg/dL     Basic Metabolic Panel [[id number]]  (Abnormal) Collected: 11/20/20 0527    Specimen: Blood Updated: 11/20/20 0622     Glucose 93 mg/dL      BUN 12 mg/dL      Creatinine 1.19 mg/dL      Sodium 138 mmol/L      Potassium 4.2 mmol/L      Chloride 107 mmol/L      CO2 19.4 mmol/L      Calcium 8.3 mg/dL      eGFR Non African Amer 58 mL/min/1.73      BUN/Creatinine Ratio 10.1     Anion Gap 11.6 mmol/L     Narrative:      GFR Normal >60  Chronic Kidney Disease <60  Kidney Failure <15      CBC & Differential [[id number]]  (Abnormal) Collected: 11/20/20 0527    Specimen: Blood Updated: 11/20/20 0605    Narrative:      The following orders were created  for panel order CBC & Differential.  Procedure                               Abnormality         Status                     ---------                               -----------         ------                     CBC Auto Differential[[id number]]        Abnormal            Final result                 Please view results for these tests on the individual orders.    CBC Auto Differential [[id number]]  (Abnormal) Collected: 11/20/20 0527    Specimen: Blood Updated: 11/20/20 0605     WBC 16.10 10*3/mm3      RBC 3.49 10*6/mm3      Hemoglobin 9.5 g/dL      Hematocrit 29.3 %      MCV 84.0 fL      MCH 27.2 pg      MCHC 32.4 g/dL      RDW 15.1 %      RDW-SD 45.9 fl      MPV 11.2 fL      Platelets 182 10*3/mm3      Neutrophil % 84.2 %      Lymphocyte % 7.9 %      Monocyte % 6.9 %      Eosinophil % 0.2 %      Basophil % 0.3 %      Immature Grans % 0.5 %      Neutrophils, Absolute 13.55 10*3/mm3      Lymphocytes, Absolute 1.27 10*3/mm3      Monocytes, Absolute 1.11 10*3/mm3      Eosinophils, Absolute 0.04 10*3/mm3      Basophils, Absolute 0.05 10*3/mm3      Immature Grans, Absolute 0.08 10*3/mm3      nRBC 0.0 /100 WBC         Imaging Results (Last 24 Hours)     ** No results found for the last 24 hours. **        EGD and colonoscopy noted and discussed with patient    Current Facility-Administered Medications:   •  acetaminophen (TYLENOL) tablet 650 mg, 650 mg, Oral, Q4H PRN, Radha Red APRN  •  allopurinol (ZYLOPRIM) tablet 100 mg, 100 mg, Oral, BID, Calvin Bergeron MD, 100 mg at 11/20/20 0939  •  apixaban (ELIQUIS) tablet 5 mg, 5 mg, Oral, Q12H, Aron Cabrera MD, 5 mg at 11/20/20 1032  •  atorvastatin (LIPITOR) tablet 10 mg, 10 mg, Oral, Nightly, Calvin Bergeron MD, 10 mg at 11/19/20 2125  •  bisacodyl (DULCOLAX) EC tablet 5 mg, 5 mg, Oral, Daily PRN, Teofilo, Jacquetta N, APRN  •  digoxin (LANOXIN) tablet 125 mcg, 125 mcg, Oral, Daily, Calvin Bergeron MD, 125 mcg at 11/19/20 1232  •  dofetilide (TIKOSYN) capsule 125 mcg,  125 mcg, Oral, BID, Shelbie Bergeron MD, 125 mcg at 11/20/20 0939  •  ipratropium-albuterol (DUO-NEB) nebulizer solution 1.5 mL, 1.5 mL, Nebulization, Q4H PRN, Shelbie Bergeron MD  •  metoprolol tartrate (LOPRESSOR) injection 2.5 mg, 2.5 mg, Intravenous, Once, Libra Gonzalez, APRN  •  metoprolol tartrate (LOPRESSOR) tablet 50 mg, 50 mg, Oral, TID, Shelbie Bergeron MD, 50 mg at 11/20/20 0939  •  [DISCONTINUED] ondansetron (ZOFRAN) tablet 4 mg, 4 mg, Oral, Q6H PRN **OR** ondansetron (ZOFRAN) injection 4 mg, 4 mg, Intravenous, Q6H PRN, Radha Red, APRN  •  pantoprazole (PROTONIX) injection 40 mg, 40 mg, Intravenous, Q12H, Shelbie Bergeron MD, 40 mg at 11/20/20 0949  •  [COMPLETED] Insert peripheral IV, , , Once **AND** sodium chloride 0.9 % flush 10 mL, 10 mL, Intravenous, PRN, Johnnie Chin MD, 10 mL at 11/18/20 0922  •  sodium chloride 0.9 % infusion, 30 mL/hr, Intravenous, Continuous, Aron Cabrera MD, Last Rate: 30 mL/hr at 11/19/20 1445, New Bag at 11/19/20 1624  •  vitamin B-12 (CYANOCOBALAMIN) tablet 500 mcg, 500 mcg, Oral, Daily, Shelbie Bergeron MD, 500 mcg at 11/20/20 0939     ASSESSMENT  Hematochezia consistent with lower GI bleed  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  Resume Eliquis  GI consult appreciated  Adjust home medications  Stress ulcer DVT prophylaxis  Supportive care  Discussed with nursing staff and family  Follow closely with recommendation current hospital course    SHELBIE BERGERON MD

## 2020-11-20 NOTE — PROGRESS NOTES
"    Patient Name: Asia Ly  :1932  88 y.o.      Patient Care Team:  Lubna Lobo MD as PCP - General  Lubna Lobo MD as PCP - Family Medicine    Chief Complaint: follow up PAF, on AC, GIB    Interval History: had AF with RVR overnight. Hypotensive. Received IVF bolus. Converted to NSR this morning. He feels well. No complaints.     Objective   Vital Signs  Temp:  [98.1 °F (36.7 °C)-98.4 °F (36.9 °C)] 98.4 °F (36.9 °C)  Heart Rate:  [] 79  Resp:  [16-20] 20  BP: ()/(40-86) 100/67    Intake/Output Summary (Last 24 hours) at 2020 1310  Last data filed at 2020 0809  Gross per 24 hour   Intake 640 ml   Output --   Net 640 ml     Flowsheet Rows      First Filed Value   Admission Height  172.7 cm (68\") Documented at 2020 0347   Admission Weight  67.1 kg (148 lb) Documented at 2020 0353          Physical Exam:   General Appearance:    Alert, cooperative, in no acute distress   Lungs:     Clear to auscultation.  Normal respiratory effort and rate.      Heart:    Regular rhythm and normal rate, normal S1 and S2, no murmurs, gallops or rubs.     Chest Wall:    No abnormalities observed   Abdomen:     Soft, nontender, positive bowel sounds.     Extremities:   no cyanosis, clubbing or edema.  No marked joint deformities.  Adequate musculoskeletal strength.       Results Review:    Results from last 7 days   Lab Units 20  0840 20  05   SODIUM mmol/L  --  138   POTASSIUM mmol/L 4.0 4.2   CHLORIDE mmol/L  --  107   CO2 mmol/L  --  19.4*   BUN mg/dL  --  12   CREATININE mg/dL  --  1.19   GLUCOSE mg/dL  --  93   CALCIUM mg/dL  --  8.3*     Results from last 7 days   Lab Units 20  05   TROPONIN T ng/mL <0.010     Results from last 7 days   Lab Units 20  05   WBC 10*3/mm3 16.10*   HEMOGLOBIN g/dL 9.5*   HEMATOCRIT % 29.3*   PLATELETS 10*3/mm3 182     Results from last 7 days   Lab Units 20  0406   INR  1.56*   APTT seconds 33.0     Results from " last 7 days   Lab Units 11/20/20  0527   MAGNESIUM mg/dL 2.0     Results from last 7 days   Lab Units 11/18/20  0635   CHOLESTEROL mg/dL 96   TRIGLYCERIDES mg/dL 60   HDL CHOL mg/dL 42   LDL CHOL mg/dL 40               Medication Review:   allopurinol, 100 mg, Oral, BID  apixaban, 5 mg, Oral, Q12H  atorvastatin, 10 mg, Oral, Nightly  digoxin, 125 mcg, Oral, Daily  dofetilide, 125 mcg, Oral, BID  metoprolol tartrate, 2.5 mg, Intravenous, Once  metoprolol tartrate, 50 mg, Oral, TID  pantoprazole, 40 mg, Intravenous, Q12H  vitamin B-12, 500 mcg, Oral, Daily         sodium chloride, 30 mL/hr, Last Rate: 30 mL/hr (11/19/20 1445)        Assessment/Plan   1. Hematochezia - Hgb 5.7 on admission, s/p transfusion 3 units.  Colonoscopy with single non bleeding colonic angiodysplastic lesion treated with APC. EGD unremarkable for bleeding. Cleared to resume apixaban and follow.   2. PAF - remains in SR on Tikosyn. QT stable on EKG 11/17. apixaban on hold due to #1. He is high risk with previous history of stroke. Discussed with patient and wife. Would resume as soon as safe from GI standpoint.  3. Aortic stenosis - moderate by echocardiogram in August  4. Dyslipidemia - on statin  5. History of Chron's     AF resolved with volume resuscitation. apixaban has been restarted.   Ok to discharge from cardiac standpoint. Will sign off. Please call with additional questions.     See Dr. Brown in 4 weeks.     KYLAH Garcia  [facility]  11/20/20  13:10 EST

## 2020-11-20 NOTE — PROGRESS NOTES
Continued Stay Note  [facility]     Patient Name: Asia Ly  MRN: [id number]  Today's Date: 11/20/2020    Admit Date: 11/17/2020    Discharge Plan     Row Name 11/20/20 1719       Plan    Plan  Home with wife and [facility]- Wife to transport    Patient/Family in Agreement with Plan  yes    Plan Comments  Followed up with wife at bedside, IMM notice provided discussed dc planning. Plan remains home with [facility]. CCP explained will continue to follow. lala subramanian/ccp        Discharge Codes    No documentation.             Sendy Holm, RN

## 2020-11-20 NOTE — PROGRESS NOTES
"   LOS: 3 days   Patient Care Team:  Lubna Lobo MD as PCP - General  Lubna Lobo MD as PCP - Family Medicine     Chief Complaint: bleeding    Subjective   No further bleeding, events noted   History of Present Illness    Subjective:  Symptoms:  Improved.    Diet:  Adequate intake.    Pain:  He reports no pain.        History taken from: patient chart RN    Objective     Vital Signs  Temp:  [98.1 °F (36.7 °C)-98.4 °F (36.9 °C)] 98.4 °F (36.9 °C)  Heart Rate:  [] 79  Resp:  [16-20] 20  BP: ()/(40-86) 100/67    Objective:  General Appearance:  Comfortable.    Vital signs: (most recent): Blood pressure 100/67, pulse 79, temperature 98.4 °F (36.9 °C), temperature source Oral, resp. rate 20, height 172.7 cm (67.99\"), weight 65.4 kg (144 lb 2.9 oz), SpO2 98 %.  Vital signs are normal.    HEENT: Normal HEENT exam.    Lungs:  Normal effort.    Heart: Normal rate.    Abdomen: Abdomen is soft.  There is no abdominal tenderness.               Results Review:     I reviewed the patient's new clinical results.    Medication Review: done    Assessment/Plan       Acute lower GI bleeding    Atrial fibrillation, paroxysmal    Hyperlipidemia    Crohn's disease (CMS/HCC)    Aortic stenosis    Pulmonary fibrosis (CMS/HCC)    Anticoagulant long-term use      Assessment:  (Lower gastrointestinal bleeding secondary to angiodysplasia of the colon  Diverticulosis  crohns disease in remission   Anemia  Hemoglobin at 9.5).     Plan:   (Ok to discharge from GI perspective on his eliquis routine dose,  followup as scheduled.   Discussed with cardiology,  High risk for stroke, hence recommendation to quickly restart anticoagulation ).       Aron Cabrera MD  11/20/20  12:42 EST    Time: Critical care 15 min      "

## 2020-11-20 NOTE — PLAN OF CARE
Patient went in to Afib with RVR. Call placed to cardiology orders received. BP still low. Fluid bolus given. Patient states that he does not feel like his heart is beating fast and has no complaint of shortness of breath. Will continue to monitor.    Goal Outcome Evaluation:  Plan of Care Reviewed With: patient  Progress: improving       Problem: Adult Inpatient Plan of Care  Goal: Plan of Care Review  Outcome: Ongoing, Progressing  Flowsheets (Taken 11/19/2020 1446 by Lisa German RN)  Plan of Care Reviewed With: patient  Goal: Patient-Specific Goal (Individualized)  Outcome: Ongoing, Progressing  Goal: Absence of Hospital-Acquired Illness or Injury  Outcome: Ongoing, Progressing  Intervention: Identify and Manage Fall Risk  Recent Flowsheet Documentation  Taken 11/20/2020 0226 by Carmen Sanchez RN  Safety Promotion/Fall Prevention:   safety round/check completed   room organization consistent   activity supervised   assistive device/personal items within reach   clutter free environment maintained  Taken 11/20/2020 0045 by Carmen Sanchez RN  Safety Promotion/Fall Prevention:   safety round/check completed   room organization consistent   activity supervised   assistive device/personal items within reach   clutter free environment maintained  Intervention: Prevent Skin Injury  Recent Flowsheet Documentation  Taken 11/20/2020 0045 by Carmen Sanchez RN  Body Position: position changed independently  Intervention: Prevent and Manage VTE (venous thromboembolism) Risk  Recent Flowsheet Documentation  Taken 11/19/2020 2127 by Carmen Sanchez RN  VTE Prevention/Management: patient refused intervention  Goal: Optimal Comfort and Wellbeing  Outcome: Ongoing, Progressing  Goal: Readiness for Transition of Care  Outcome: Ongoing, Progressing     Problem: Adjustment to Illness (Gastrointestinal Bleeding)  Goal: Optimal Coping with Acute Illness  Outcome: Ongoing, Progressing     Problem: Bleeding (Gastrointestinal  Bleeding)  Goal: Hemostasis  Outcome: Ongoing, Progressing

## 2020-11-21 VITALS
TEMPERATURE: 99 F | WEIGHT: 144.18 LBS | BODY MASS INDEX: 21.85 KG/M2 | HEART RATE: 70 BPM | RESPIRATION RATE: 18 BRPM | OXYGEN SATURATION: 98 % | DIASTOLIC BLOOD PRESSURE: 68 MMHG | SYSTOLIC BLOOD PRESSURE: 122 MMHG | HEIGHT: 68 IN

## 2020-11-21 LAB
ALBUMIN SERPL-MCNC: 3.3 G/DL (ref 3.5–5.2)
ALBUMIN/GLOB SERPL: 1 G/DL
ALP SERPL-CCNC: 51 U/L (ref 39–117)
ALT SERPL W P-5'-P-CCNC: 10 U/L (ref 1–41)
ANION GAP SERPL CALCULATED.3IONS-SCNC: 5.7 MMOL/L (ref 5–15)
AST SERPL-CCNC: 13 U/L (ref 1–40)
BASOPHILS # BLD AUTO: 0.05 10*3/MM3 (ref 0–0.2)
BASOPHILS NFR BLD AUTO: 0.6 % (ref 0–1.5)
BILIRUB SERPL-MCNC: 0.8 MG/DL (ref 0–1.2)
BUN SERPL-MCNC: 14 MG/DL (ref 8–23)
BUN/CREAT SERPL: 10.4 (ref 7–25)
CALCIUM SPEC-SCNC: 8.4 MG/DL (ref 8.6–10.5)
CHLORIDE SERPL-SCNC: 106 MMOL/L (ref 98–107)
CO2 SERPL-SCNC: 25.3 MMOL/L (ref 22–29)
CREAT SERPL-MCNC: 1.35 MG/DL (ref 0.76–1.27)
DEPRECATED RDW RBC AUTO: 46.9 FL (ref 37–54)
EOSINOPHIL # BLD AUTO: 0.24 10*3/MM3 (ref 0–0.4)
EOSINOPHIL NFR BLD AUTO: 3.1 % (ref 0.3–6.2)
ERYTHROCYTE [DISTWIDTH] IN BLOOD BY AUTOMATED COUNT: 15 % (ref 12.3–15.4)
GFR SERPL CREATININE-BSD FRML MDRD: 50 ML/MIN/1.73
GLOBULIN UR ELPH-MCNC: 3.4 GM/DL
GLUCOSE SERPL-MCNC: 83 MG/DL (ref 65–99)
HCT VFR BLD AUTO: 29.4 % (ref 37.5–51)
HGB BLD-MCNC: 9.2 G/DL (ref 13–17.7)
IMM GRANULOCYTES # BLD AUTO: 0.04 10*3/MM3 (ref 0–0.05)
IMM GRANULOCYTES NFR BLD AUTO: 0.5 % (ref 0–0.5)
LYMPHOCYTES # BLD AUTO: 1.34 10*3/MM3 (ref 0.7–3.1)
LYMPHOCYTES NFR BLD AUTO: 17.3 % (ref 19.6–45.3)
MCH RBC QN AUTO: 26.7 PG (ref 26.6–33)
MCHC RBC AUTO-ENTMCNC: 31.3 G/DL (ref 31.5–35.7)
MCV RBC AUTO: 85.5 FL (ref 79–97)
MONOCYTES # BLD AUTO: 0.74 10*3/MM3 (ref 0.1–0.9)
MONOCYTES NFR BLD AUTO: 9.5 % (ref 5–12)
NEUTROPHILS NFR BLD AUTO: 5.35 10*3/MM3 (ref 1.7–7)
NEUTROPHILS NFR BLD AUTO: 69 % (ref 42.7–76)
NRBC BLD AUTO-RTO: 0 /100 WBC (ref 0–0.2)
PLATELET # BLD AUTO: 173 10*3/MM3 (ref 140–450)
PMV BLD AUTO: 10.6 FL (ref 6–12)
POTASSIUM SERPL-SCNC: 3.9 MMOL/L (ref 3.5–5.2)
PROT SERPL-MCNC: 6.7 G/DL (ref 6–8.5)
RBC # BLD AUTO: 3.44 10*6/MM3 (ref 4.14–5.8)
SODIUM SERPL-SCNC: 137 MMOL/L (ref 136–145)
WBC # BLD AUTO: 7.76 10*3/MM3 (ref 3.4–10.8)

## 2020-11-21 PROCEDURE — 85025 COMPLETE CBC W/AUTO DIFF WBC: CPT | Performed by: HOSPITALIST

## 2020-11-21 PROCEDURE — 80053 COMPREHEN METABOLIC PANEL: CPT | Performed by: HOSPITALIST

## 2020-11-21 RX ADMIN — PANTOPRAZOLE SODIUM 40 MG: 40 TABLET, DELAYED RELEASE ORAL at 06:31

## 2020-11-21 RX ADMIN — SODIUM CHLORIDE, PRESERVATIVE FREE 10 ML: 5 INJECTION INTRAVENOUS at 09:16

## 2020-11-21 RX ADMIN — ALLOPURINOL 100 MG: 100 TABLET ORAL at 09:15

## 2020-11-21 RX ADMIN — DOFETILIDE 125 MCG: 0.12 CAPSULE ORAL at 09:15

## 2020-11-21 RX ADMIN — APIXABAN 5 MG: 5 TABLET, FILM COATED ORAL at 09:15

## 2020-11-21 RX ADMIN — Medication 500 MCG: at 09:15

## 2020-11-21 RX ADMIN — METOPROLOL TARTRATE 50 MG: 50 TABLET, FILM COATED ORAL at 09:15

## 2020-11-21 NOTE — PROGRESS NOTES
"Daily progress note    Chief complaint  Doing better  No new complaints  Wants to go home  Family at bedside    History of present illness  88-year-old [place] male with history of proximal atrial fibrillation COPD pulmonary fibrosis aortic stenosis who is well-known to our service presented to [facility] emergency room with bright red blood per rectum started last night with no abdominal pain nausea vomiting.  Patient on Eliquis for paroxysmal atrial fibrillation and work-up in ER revealed hemoglobin of 5.7 admit for management.  Patient fully alert oriented and in no respiratory distress.  Patient denies any fever cough chest pain shortness of breath abdominal pain nausea vomiting diarrhea.     REVIEW OF SYSTEMS  Unremarkable     PHYSICAL EXAM  Blood pressure 133/58, pulse 61, temperature 98.1 °F (36.7 °C), temperature source Oral, resp. rate 18, height 172.7 cm (67.99\"), weight 65.4 kg (144 lb 2.9 oz), SpO2 98 %.    Constitutional: He is oriented to person, place, and time. No distress.   Head: Normocephalic and atraumatic.   Eyes: Pupils are equal, round, and reactive to light. EOM are normal.   Neck: Normal range of motion. Neck supple.   Cardiovascular: Normal rate, regular rhythm and normal heart sounds.   Pulmonary/Chest: Effort normal and breath sounds normal. No respiratory distress.   Abdominal: Soft. There is no abdominal tenderness. There is no rebound and no guarding.   Rectum: Guaiac result positive.   Musculoskeletal: Normal range of motion.    No edema.   Neurological: He is alert and oriented to person, place, and time. He has normal sensation and normal strength.   Skin: Skin is warm and dry.   Psychiatric: Mood and affect normal.     LAB RESULTS  Lab Results (last 24 hours)     Procedure Component Value Units Date/Time    Comprehensive Metabolic Panel [[id number]]  (Abnormal) Collected: 11/21/20 0650    Specimen: Blood Updated: 11/21/20 0728     Glucose 83 mg/dL      BUN 14 mg/dL      " Creatinine 1.35 mg/dL      Sodium 137 mmol/L      Potassium 3.9 mmol/L      Chloride 106 mmol/L      CO2 25.3 mmol/L      Calcium 8.4 mg/dL      Total Protein 6.7 g/dL      Albumin 3.30 g/dL      ALT (SGPT) 10 U/L      AST (SGOT) 13 U/L      Alkaline Phosphatase 51 U/L      Total Bilirubin 0.8 mg/dL      eGFR Non African Amer 50 mL/min/1.73      Globulin 3.4 gm/dL      A/G Ratio 1.0 g/dL      BUN/Creatinine Ratio 10.4     Anion Gap 5.7 mmol/L     Narrative:      GFR Normal >60  Chronic Kidney Disease <60  Kidney Failure <15      CBC & Differential [[id number]]  (Abnormal) Collected: 11/21/20 0650    Specimen: Blood Updated: 11/21/20 0707    Narrative:      The following orders were created for panel order CBC & Differential.  Procedure                               Abnormality         Status                     ---------                               -----------         ------                     CBC Auto Differential[[id number]]        Abnormal            Final result                 Please view results for these tests on the individual orders.    CBC Auto Differential [[id number]]  (Abnormal) Collected: 11/21/20 0650    Specimen: Blood Updated: 11/21/20 0707     WBC 7.76 10*3/mm3      RBC 3.44 10*6/mm3      Hemoglobin 9.2 g/dL      Hematocrit 29.4 %      MCV 85.5 fL      MCH 26.7 pg      MCHC 31.3 g/dL      RDW 15.0 %      RDW-SD 46.9 fl      MPV 10.6 fL      Platelets 173 10*3/mm3      Neutrophil % 69.0 %      Lymphocyte % 17.3 %      Monocyte % 9.5 %      Eosinophil % 3.1 %      Basophil % 0.6 %      Immature Grans % 0.5 %      Neutrophils, Absolute 5.35 10*3/mm3      Lymphocytes, Absolute 1.34 10*3/mm3      Monocytes, Absolute 0.74 10*3/mm3      Eosinophils, Absolute 0.24 10*3/mm3      Basophils, Absolute 0.05 10*3/mm3      Immature Grans, Absolute 0.04 10*3/mm3      nRBC 0.0 /100 WBC     Lactic Acid, Reflex [[id number]]  (Normal) Collected: 11/20/20 1238    Specimen: Blood Updated: 11/20/20 1310     Lactate 1.8  mmol/L         Imaging Results (Last 24 Hours)     ** No results found for the last 24 hours. **        EGD and colonoscopy noted and discussed with patient    Current Facility-Administered Medications:   •  acetaminophen (TYLENOL) tablet 650 mg, 650 mg, Oral, Q4H PRN, Radha Red APRN  •  allopurinol (ZYLOPRIM) tablet 100 mg, 100 mg, Oral, BID, Calvin Bergeron MD, 100 mg at 11/21/20 0915  •  apixaban (ELIQUIS) tablet 5 mg, 5 mg, Oral, Q12H, Aron Cabrera MD, 5 mg at 11/21/20 0915  •  atorvastatin (LIPITOR) tablet 10 mg, 10 mg, Oral, Nightly, Calvin Bergeron MD, 10 mg at 11/20/20 2106  •  bisacodyl (DULCOLAX) EC tablet 5 mg, 5 mg, Oral, Daily PRN, Rahda Red APRN  •  digoxin (LANOXIN) tablet 125 mcg, 125 mcg, Oral, Daily, Calvin Bergeron MD, 125 mcg at 11/20/20 1711  •  dofetilide (TIKOSYN) capsule 125 mcg, 125 mcg, Oral, BID, Calvin Bergeron MD, 125 mcg at 11/21/20 0915  •  ipratropium-albuterol (DUO-NEB) nebulizer solution 1.5 mL, 1.5 mL, Nebulization, Q4H PRN, Calvin Bergeron MD  •  metoprolol tartrate (LOPRESSOR) tablet 50 mg, 50 mg, Oral, TID, Calvin Bergeron MD, 50 mg at 11/21/20 0915  •  [DISCONTINUED] ondansetron (ZOFRAN) tablet 4 mg, 4 mg, Oral, Q6H PRN **OR** ondansetron (ZOFRAN) injection 4 mg, 4 mg, Intravenous, Q6H PRN, Radha Red APRN  •  pantoprazole (PROTONIX) EC tablet 40 mg, 40 mg, Oral, Q AM, Calvin Bergeron MD, 40 mg at 11/21/20 0631  •  [COMPLETED] Insert peripheral IV, , , Once **AND** sodium chloride 0.9 % flush 10 mL, 10 mL, Intravenous, PRN, Johnnie Chin MD, 10 mL at 11/21/20 0916  •  sodium chloride 0.9 % infusion, 30 mL/hr, Intravenous, Continuous, Aron Cabrera MD, Last Rate: 30 mL/hr at 11/19/20 1445, New Bag at 11/19/20 1624  •  vitamin B-12 (CYANOCOBALAMIN) tablet 500 mcg, 500 mcg, Oral, Daily, Calvin Bergeron MD, 500 mcg at 11/21/20 0915     ASSESSMENT  Nonbleeding colonic angiodysplastic lesion treated with APC  S/p lower GI bleed  Acute blood loss  anemia  Chronic atrial fibrillation on Eliquis  Crohn's disease  Hypertension  Hyperlipidemia  Gastroesophageal reflux disease    PLAN  Discharge home  Discharge summary dictated    SHELBIE COWART MD

## 2020-11-21 NOTE — DISCHARGE SUMMARY
Discharge summary    Date of admission 11/17/2020  Date of discharge 11/21/2020    Final diagnosis  Nonbleeding colonic angiodysplastic lesion treated with APC  S/p lower GI bleed  Acute blood loss anemia  Chronic atrial fibrillation on Eliquis  Crohn's disease  Hypertension  Hyperlipidemia  Gastroesophageal reflux disease    Discharge medications    Current Facility-Administered Medications:   •  allopurinol (ZYLOPRIM) tablet 100 mg, 100 mg, Oral, BID, Calvin Bergeron MD, 100 mg at 11/21/20 0915  •  apixaban (ELIQUIS) tablet 5 mg, 5 mg, Oral, Q12H, Aron Cabrera MD, 5 mg at 11/21/20 0915  •  atorvastatin (LIPITOR) tablet 10 mg, 10 mg, Oral, Nightly, Calvin Bergeron MD, 10 mg at 11/20/20 2106  •  digoxin (LANOXIN) tablet 125 mcg, 125 mcg, Oral, Daily, Cavlin Bergeron MD, 125 mcg at 11/20/20 1711  •  dofetilide (TIKOSYN) capsule 125 mcg, 125 mcg, Oral, BID, Calvin Bergeron MD, 125 mcg at 11/21/20 0915  •  metoprolol tartrate (LOPRESSOR) tablet 50 mg, 50 mg, Oral, TID, Calvin Bergeron MD, 50 mg at 11/21/20 0915  •  pantoprazole (PROTONIX) EC tablet 40 mg, 40 mg, Oral, Q AM, Calvin Bergeron MD, 40 mg at 11/21/20 0631  •  [COMPLETED] Insert peripheral IV, , , Once **AND** sodium chloride 0.9 % flush 10 mL, 10 mL, Intravenous, PRN, Johnnie Chin MD, 10 mL at 11/21/20 0916  •  vitamin B-12 (CYANOCOBALAMIN) tablet 500 mcg, 500 mcg, Oral, Daily, Calvin Bergeron MD, 500 mcg at 11/21/20 0915     Consults obtained  Cardiology  Gastroenterology    Procedures  Upper and lower endoscopy    Hospital course  88-year-old [place] male with history of paroxysmal atrial fibrillation on Eliquis COPD pulmonary fibrosis admitted to emergency room with bright red blood per rectum.  Patient evaluated found to have hemoglobin of 5.7 admit for management.  Patient admitted received 2 units packed RBC and he was on Eliquis which was discontinued and further evaluated by GI and recommend upper and lower endoscopy.  Patient endoscopy revealed  nonbleeding colonic angiodysplastic lesion treated with APC.  Patient has no more bleeding hemoglobin remained stable after transfusion and his Eliquis restarted.  Patient has an episode where his blood pressure drops but get back to the baseline.  Patient has no symptoms and wants to go home.  Patient clear for discharge from cardiology and gastroenterology.  Patient hemoglobin 9.1 potassium 3.9 at the time of discharge    Discharge diet regular    Activity as tolerated    Medication as above    Follow-up with primary doctor in 1 week and follow with cardiology gastroenterology per the instruction and take medication as directed    SHELBIE COWART MD

## 2020-11-21 NOTE — PLAN OF CARE
Goal Outcome Evaluation:  Plan of Care Reviewed With: patient  Progress: no change  Outcome Summary: Pt is alert and oriented x4. Stand by assist to the bathroom. Pt denies any pain or discomfort. Pt able to sleep most of the hs. Possible d/c today home

## 2020-11-22 ENCOUNTER — READMISSION MANAGEMENT (OUTPATIENT)
Dept: CALL CENTER | Facility: HOSPITAL | Age: 85
End: 2020-11-22

## 2020-11-22 NOTE — OUTREACH NOTE
Prep Survey      Responses   [facility] patient discharged from?  [place]   Is LACE score < 7 ?  No   Eligibility  Readm Mgmt   Discharge diagnosis  Nonbleeding colonic angiodysplastic lesion treated with APC, low GI bleed, chronic atrial fibrillation [s/p Upper and lower endoscopy]   Does the patient have one of the following disease processes/diagnoses(primary or secondary)?  Other   Does the patient have Home health ordered?  Yes   What is the Home health agency?   [facility] [place]   Is there a DME ordered?  No   Comments regarding appointments  Needs f/u scheduled   Medication alerts for this patient  continue eliquis   Prep survey completed?  Yes          Ursula Oneal RN

## 2020-11-23 NOTE — PROGRESS NOTES
Case Management Discharge Note      Final Note: DC'd home with [facility] following 11/21.    Provided Post Acute Provider List?: Yes  Post Acute Provider List: Home Health  Provided Post Acute Provider Quality & Resource List?: Refused  Delivered To: Support Person  Method of Delivery: Telephone        Home Medical Care     Service Provider Selected Services Address Phone Fax Patient Preferred    [facility] [place]  [facility] [address] [address] [phone] --                  Transportation Services  Private: Car    Final Discharge Disposition Code: 06 - home with home health care

## 2020-12-02 ENCOUNTER — READMISSION MANAGEMENT (OUTPATIENT)
Dept: CALL CENTER | Facility: HOSPITAL | Age: 85
End: 2020-12-02

## 2020-12-02 NOTE — OUTREACH NOTE
Medical Week 2 Survey      Responses   [facility] patient discharged from?  [place]   Does the patient have one of the following disease processes/diagnoses(primary or secondary)?  Other   Week 2 attempt successful?  Yes   Call start time  1318   Discharge diagnosis  Nonbleeding colonic angiodysplastic lesion treated with APC, low GI bleed, chronic atrial fibrillation   Call end time  1330   Is patient permission given to speak with other caregiver?  Yes   Person spoke with today (if not patient) and relationship  Ni, spouse   Meds reviewed with patient/caregiver?  Yes   Is the patient having any side effects they believe may be caused by any medication additions or changes?  No   Does the patient have all medications ordered at discharge?  Yes   Is the patient taking all medications as directed (includes completed medication regime)?  Yes   Does the patient have a primary care provider?   Yes   Does the patient have an appointment with their PCP within 7 days of discharge?  Yes   Has the patient kept scheduled appointments due by today?  Yes   Comments  PCP 11/30/2020 12/18/2020 cardiology     12/17/2020 Pulmonary   What is the Home health agency?   [facility]   Has home health visited the patient within 72 hours of discharge?  Yes   Psychosocial issues?  No   Did the patient receive a copy of their discharge instructions?  Yes   Nursing interventions  Reviewed instructions with patient   What is the patient's perception of their health status since discharge?  Improving   Is the patient/caregiver able to teach back the hierarchy of who to call/visit for symptoms/problems? PCP, Specialist, Home health nurse, Urgent Care, ED, 911  Yes   If the patient is a current smoker, are they able to teach back resources for cessation?  Not a smoker   Week 2 Call Completed?  Yes          Sofya Montaño RN

## 2020-12-07 ENCOUNTER — APPOINTMENT (OUTPATIENT)
Dept: CT IMAGING | Facility: HOSPITAL | Age: 85
End: 2020-12-07

## 2020-12-07 ENCOUNTER — TELEPHONE (OUTPATIENT)
Dept: CARDIOLOGY | Facility: CLINIC | Age: 85
End: 2020-12-07

## 2020-12-07 ENCOUNTER — READMISSION MANAGEMENT (OUTPATIENT)
Dept: CALL CENTER | Facility: HOSPITAL | Age: 85
End: 2020-12-07

## 2020-12-07 ENCOUNTER — HOSPITAL ENCOUNTER (INPATIENT)
Facility: HOSPITAL | Age: 85
LOS: 3 days | Discharge: HOME HEALTH | End: 2020-12-10
Attending: EMERGENCY MEDICINE | Admitting: HOSPITALIST

## 2020-12-07 ENCOUNTER — APPOINTMENT (OUTPATIENT)
Dept: GENERAL RADIOLOGY | Facility: HOSPITAL | Age: 85
End: 2020-12-07

## 2020-12-07 DIAGNOSIS — R50.9 FEBRILE ILLNESS, ACUTE: Primary | ICD-10-CM

## 2020-12-07 DIAGNOSIS — I50.9 ACUTE CONGESTIVE HEART FAILURE, UNSPECIFIED HEART FAILURE TYPE (HCC): ICD-10-CM

## 2020-12-07 LAB
ALBUMIN SERPL-MCNC: 3.6 G/DL (ref 3.5–5.2)
ALBUMIN/GLOB SERPL: 0.9 G/DL
ALP SERPL-CCNC: 64 U/L (ref 39–117)
ALT SERPL W P-5'-P-CCNC: 15 U/L (ref 1–41)
ANION GAP SERPL CALCULATED.3IONS-SCNC: 13 MMOL/L (ref 5–15)
AST SERPL-CCNC: 18 U/L (ref 1–40)
B PARAPERT DNA SPEC QL NAA+PROBE: NOT DETECTED
B PERT DNA SPEC QL NAA+PROBE: NOT DETECTED
BASOPHILS # BLD AUTO: 0.09 10*3/MM3 (ref 0–0.2)
BASOPHILS NFR BLD AUTO: 0.5 % (ref 0–1.5)
BILIRUB SERPL-MCNC: 0.9 MG/DL (ref 0–1.2)
BILIRUB UR QL STRIP: NEGATIVE
BUN SERPL-MCNC: 18 MG/DL (ref 8–23)
BUN/CREAT SERPL: 14.4 (ref 7–25)
C PNEUM DNA NPH QL NAA+NON-PROBE: NOT DETECTED
CALCIUM SPEC-SCNC: 8.8 MG/DL (ref 8.6–10.5)
CHLORIDE SERPL-SCNC: 101 MMOL/L (ref 98–107)
CLARITY UR: CLEAR
CO2 SERPL-SCNC: 22 MMOL/L (ref 22–29)
COLOR UR: YELLOW
CREAT SERPL-MCNC: 1.25 MG/DL (ref 0.76–1.27)
D-LACTATE SERPL-SCNC: 2 MMOL/L (ref 0.5–2)
DEPRECATED RDW RBC AUTO: 48.6 FL (ref 37–54)
DIGOXIN SERPL-MCNC: 0.6 NG/ML (ref 0.6–1.2)
EOSINOPHIL # BLD AUTO: 0.07 10*3/MM3 (ref 0–0.4)
EOSINOPHIL NFR BLD AUTO: 0.4 % (ref 0.3–6.2)
ERYTHROCYTE [DISTWIDTH] IN BLOOD BY AUTOMATED COUNT: 15.8 % (ref 12.3–15.4)
FLUAV SUBTYP SPEC NAA+PROBE: NOT DETECTED
FLUBV RNA ISLT QL NAA+PROBE: NOT DETECTED
GFR SERPL CREATININE-BSD FRML MDRD: 55 ML/MIN/1.73
GLOBULIN UR ELPH-MCNC: 3.8 GM/DL
GLUCOSE SERPL-MCNC: 116 MG/DL (ref 65–99)
GLUCOSE UR STRIP-MCNC: NEGATIVE MG/DL
HADV DNA SPEC NAA+PROBE: NOT DETECTED
HCOV 229E RNA SPEC QL NAA+PROBE: NOT DETECTED
HCOV HKU1 RNA SPEC QL NAA+PROBE: NOT DETECTED
HCOV NL63 RNA SPEC QL NAA+PROBE: NOT DETECTED
HCOV OC43 RNA SPEC QL NAA+PROBE: NOT DETECTED
HCT VFR BLD AUTO: 32.4 % (ref 37.5–51)
HGB BLD-MCNC: 10 G/DL (ref 13–17.7)
HGB UR QL STRIP.AUTO: NEGATIVE
HMPV RNA NPH QL NAA+NON-PROBE: NOT DETECTED
HPIV1 RNA SPEC QL NAA+PROBE: NOT DETECTED
HPIV2 RNA SPEC QL NAA+PROBE: NOT DETECTED
HPIV3 RNA NPH QL NAA+PROBE: NOT DETECTED
HPIV4 P GENE NPH QL NAA+PROBE: NOT DETECTED
IMM GRANULOCYTES # BLD AUTO: 0.12 10*3/MM3 (ref 0–0.05)
IMM GRANULOCYTES NFR BLD AUTO: 0.7 % (ref 0–0.5)
KETONES UR QL STRIP: NEGATIVE
LEUKOCYTE ESTERASE UR QL STRIP.AUTO: NEGATIVE
LYMPHOCYTES # BLD AUTO: 1.4 10*3/MM3 (ref 0.7–3.1)
LYMPHOCYTES NFR BLD AUTO: 8.2 % (ref 19.6–45.3)
M PNEUMO IGG SER IA-ACNC: NOT DETECTED
MAGNESIUM SERPL-MCNC: 1.8 MG/DL (ref 1.6–2.4)
MCH RBC QN AUTO: 26.2 PG (ref 26.6–33)
MCHC RBC AUTO-ENTMCNC: 30.9 G/DL (ref 31.5–35.7)
MCV RBC AUTO: 85 FL (ref 79–97)
MONOCYTES # BLD AUTO: 1.41 10*3/MM3 (ref 0.1–0.9)
MONOCYTES NFR BLD AUTO: 8.2 % (ref 5–12)
NEUTROPHILS NFR BLD AUTO: 14.06 10*3/MM3 (ref 1.7–7)
NEUTROPHILS NFR BLD AUTO: 82 % (ref 42.7–76)
NITRITE UR QL STRIP: NEGATIVE
NRBC BLD AUTO-RTO: 0 /100 WBC (ref 0–0.2)
NT-PROBNP SERPL-MCNC: 2313 PG/ML (ref 0–1800)
PH UR STRIP.AUTO: 7.5 [PH] (ref 5–8)
PLATELET # BLD AUTO: 419 10*3/MM3 (ref 140–450)
PMV BLD AUTO: 10.6 FL (ref 6–12)
POTASSIUM SERPL-SCNC: 4.1 MMOL/L (ref 3.5–5.2)
PROCALCITONIN SERPL-MCNC: 0.12 NG/ML (ref 0–0.25)
PROT SERPL-MCNC: 7.4 G/DL (ref 6–8.5)
PROT UR QL STRIP: ABNORMAL
QT INTERVAL: 306 MS
RBC # BLD AUTO: 3.81 10*6/MM3 (ref 4.14–5.8)
RHINOVIRUS RNA SPEC NAA+PROBE: NOT DETECTED
RSV RNA NPH QL NAA+NON-PROBE: NOT DETECTED
SARS-COV-2 RNA NPH QL NAA+NON-PROBE: NOT DETECTED
SODIUM SERPL-SCNC: 136 MMOL/L (ref 136–145)
SP GR UR STRIP: 1.02 (ref 1–1.03)
TROPONIN T SERPL-MCNC: <0.01 NG/ML (ref 0–0.03)
UROBILINOGEN UR QL STRIP: ABNORMAL
WBC # BLD AUTO: 17.15 10*3/MM3 (ref 3.4–10.8)

## 2020-12-07 PROCEDURE — 85025 COMPLETE CBC W/AUTO DIFF WBC: CPT | Performed by: EMERGENCY MEDICINE

## 2020-12-07 PROCEDURE — 93010 ELECTROCARDIOGRAM REPORT: CPT | Performed by: INTERNAL MEDICINE

## 2020-12-07 PROCEDURE — 83605 ASSAY OF LACTIC ACID: CPT | Performed by: EMERGENCY MEDICINE

## 2020-12-07 PROCEDURE — 99284 EMERGENCY DEPT VISIT MOD MDM: CPT

## 2020-12-07 PROCEDURE — 25010000002 CEFTRIAXONE PER 250 MG: Performed by: EMERGENCY MEDICINE

## 2020-12-07 PROCEDURE — 25010000002 AZITHROMYCIN PER 500 MG: Performed by: EMERGENCY MEDICINE

## 2020-12-07 PROCEDURE — 25010000002 FUROSEMIDE PER 20 MG: Performed by: HOSPITALIST

## 2020-12-07 PROCEDURE — 93005 ELECTROCARDIOGRAM TRACING: CPT | Performed by: EMERGENCY MEDICINE

## 2020-12-07 PROCEDURE — 84145 PROCALCITONIN (PCT): CPT | Performed by: EMERGENCY MEDICINE

## 2020-12-07 PROCEDURE — 71045 X-RAY EXAM CHEST 1 VIEW: CPT

## 2020-12-07 PROCEDURE — 83735 ASSAY OF MAGNESIUM: CPT | Performed by: EMERGENCY MEDICINE

## 2020-12-07 PROCEDURE — 80053 COMPREHEN METABOLIC PANEL: CPT | Performed by: EMERGENCY MEDICINE

## 2020-12-07 PROCEDURE — 81003 URINALYSIS AUTO W/O SCOPE: CPT | Performed by: EMERGENCY MEDICINE

## 2020-12-07 PROCEDURE — 83880 ASSAY OF NATRIURETIC PEPTIDE: CPT | Performed by: EMERGENCY MEDICINE

## 2020-12-07 PROCEDURE — 99222 1ST HOSP IP/OBS MODERATE 55: CPT | Performed by: INTERNAL MEDICINE

## 2020-12-07 PROCEDURE — 84484 ASSAY OF TROPONIN QUANT: CPT | Performed by: EMERGENCY MEDICINE

## 2020-12-07 PROCEDURE — 25010000002 IOPAMIDOL 61 % SOLUTION: Performed by: EMERGENCY MEDICINE

## 2020-12-07 PROCEDURE — 80162 ASSAY OF DIGOXIN TOTAL: CPT | Performed by: EMERGENCY MEDICINE

## 2020-12-07 PROCEDURE — 87040 BLOOD CULTURE FOR BACTERIA: CPT | Performed by: EMERGENCY MEDICINE

## 2020-12-07 PROCEDURE — 25010000002 DIGOXIN PER 500 MCG: Performed by: EMERGENCY MEDICINE

## 2020-12-07 PROCEDURE — 71275 CT ANGIOGRAPHY CHEST: CPT

## 2020-12-07 PROCEDURE — 0202U NFCT DS 22 TRGT SARS-COV-2: CPT | Performed by: EMERGENCY MEDICINE

## 2020-12-07 RX ORDER — IPRATROPIUM BROMIDE AND ALBUTEROL SULFATE 2.5; .5 MG/3ML; MG/3ML
1.5 SOLUTION RESPIRATORY (INHALATION) EVERY 4 HOURS PRN
Status: DISCONTINUED | OUTPATIENT
Start: 2020-12-07 | End: 2020-12-10

## 2020-12-07 RX ORDER — GUAIFENESIN 600 MG/1
600 TABLET, EXTENDED RELEASE ORAL EVERY 12 HOURS SCHEDULED
Status: DISCONTINUED | OUTPATIENT
Start: 2020-12-07 | End: 2020-12-10 | Stop reason: HOSPADM

## 2020-12-07 RX ORDER — ATORVASTATIN CALCIUM 20 MG/1
10 TABLET, FILM COATED ORAL NIGHTLY
Status: DISCONTINUED | OUTPATIENT
Start: 2020-12-07 | End: 2020-12-10 | Stop reason: HOSPADM

## 2020-12-07 RX ORDER — PANTOPRAZOLE SODIUM 40 MG/1
40 TABLET, DELAYED RELEASE ORAL EVERY MORNING
Status: DISCONTINUED | OUTPATIENT
Start: 2020-12-07 | End: 2020-12-07

## 2020-12-07 RX ORDER — CEFTRIAXONE SODIUM 1 G/50ML
1 INJECTION, SOLUTION INTRAVENOUS EVERY 24 HOURS
Status: DISCONTINUED | OUTPATIENT
Start: 2020-12-08 | End: 2020-12-08

## 2020-12-07 RX ORDER — DIGOXIN 0.25 MG/ML
250 INJECTION INTRAMUSCULAR; INTRAVENOUS ONCE
Status: DISCONTINUED | OUTPATIENT
Start: 2020-12-07 | End: 2020-12-07

## 2020-12-07 RX ORDER — ALLOPURINOL 100 MG/1
100 TABLET ORAL 2 TIMES DAILY
Status: DISCONTINUED | OUTPATIENT
Start: 2020-12-07 | End: 2020-12-10 | Stop reason: HOSPADM

## 2020-12-07 RX ORDER — CHOLECALCIFEROL (VITAMIN D3) 125 MCG
500 CAPSULE ORAL DAILY
Status: DISCONTINUED | OUTPATIENT
Start: 2020-12-07 | End: 2020-12-07

## 2020-12-07 RX ORDER — SODIUM CHLORIDE 0.9 % (FLUSH) 0.9 %
10 SYRINGE (ML) INJECTION AS NEEDED
Status: DISCONTINUED | OUTPATIENT
Start: 2020-12-07 | End: 2020-12-10 | Stop reason: HOSPADM

## 2020-12-07 RX ORDER — DIGOXIN 0.25 MG/ML
250 INJECTION INTRAMUSCULAR; INTRAVENOUS ONCE
Status: COMPLETED | OUTPATIENT
Start: 2020-12-07 | End: 2020-12-07

## 2020-12-07 RX ORDER — CEFTRIAXONE SODIUM 1 G/50ML
1 INJECTION, SOLUTION INTRAVENOUS ONCE
Status: COMPLETED | OUTPATIENT
Start: 2020-12-07 | End: 2020-12-07

## 2020-12-07 RX ORDER — PANTOPRAZOLE SODIUM 40 MG/1
40 TABLET, DELAYED RELEASE ORAL
Status: DISCONTINUED | OUTPATIENT
Start: 2020-12-08 | End: 2020-12-10 | Stop reason: HOSPADM

## 2020-12-07 RX ORDER — FUROSEMIDE 10 MG/ML
40 INJECTION INTRAMUSCULAR; INTRAVENOUS ONCE
Status: COMPLETED | OUTPATIENT
Start: 2020-12-07 | End: 2020-12-07

## 2020-12-07 RX ORDER — DIGOXIN 125 MCG
125 TABLET ORAL
Status: DISCONTINUED | OUTPATIENT
Start: 2020-12-07 | End: 2020-12-10 | Stop reason: HOSPADM

## 2020-12-07 RX ORDER — CHOLECALCIFEROL (VITAMIN D3) 125 MCG
1000 CAPSULE ORAL DAILY
Status: DISCONTINUED | OUTPATIENT
Start: 2020-12-08 | End: 2020-12-08

## 2020-12-07 RX ORDER — DOFETILIDE 0.12 MG/1
125 CAPSULE ORAL 2 TIMES DAILY
Status: DISCONTINUED | OUTPATIENT
Start: 2020-12-07 | End: 2020-12-10 | Stop reason: HOSPADM

## 2020-12-07 RX ORDER — METOPROLOL TARTRATE 50 MG/1
50 TABLET, FILM COATED ORAL 3 TIMES DAILY
Status: DISCONTINUED | OUTPATIENT
Start: 2020-12-07 | End: 2020-12-10 | Stop reason: HOSPADM

## 2020-12-07 RX ADMIN — GUAIFENESIN 600 MG: 600 TABLET, EXTENDED RELEASE ORAL at 13:33

## 2020-12-07 RX ADMIN — CEFTRIAXONE SODIUM 1 G: 1 INJECTION, SOLUTION INTRAVENOUS at 03:00

## 2020-12-07 RX ADMIN — DIGOXIN 250 MCG: 0.25 INJECTION INTRAMUSCULAR; INTRAVENOUS at 02:10

## 2020-12-07 RX ADMIN — METOPROLOL TARTRATE 50 MG: 50 TABLET, FILM COATED ORAL at 13:31

## 2020-12-07 RX ADMIN — DIGOXIN 125 MCG: 125 TABLET ORAL at 13:33

## 2020-12-07 RX ADMIN — PANTOPRAZOLE SODIUM 40 MG: 40 TABLET, DELAYED RELEASE ORAL at 13:33

## 2020-12-07 RX ADMIN — METOROPROLOL TARTRATE 5 MG: 5 INJECTION, SOLUTION INTRAVENOUS at 02:09

## 2020-12-07 RX ADMIN — ATORVASTATIN CALCIUM 10 MG: 20 TABLET, FILM COATED ORAL at 20:54

## 2020-12-07 RX ADMIN — APIXABAN 5 MG: 5 TABLET, FILM COATED ORAL at 20:55

## 2020-12-07 RX ADMIN — DOFETILIDE 125 MCG: 0.12 CAPSULE ORAL at 20:55

## 2020-12-07 RX ADMIN — FUROSEMIDE 40 MG: 10 INJECTION, SOLUTION INTRAMUSCULAR; INTRAVENOUS at 13:34

## 2020-12-07 RX ADMIN — ALLOPURINOL 100 MG: 100 TABLET ORAL at 20:55

## 2020-12-07 RX ADMIN — Medication 500 MCG: at 13:33

## 2020-12-07 RX ADMIN — APIXABAN 5 MG: 5 TABLET, FILM COATED ORAL at 13:33

## 2020-12-07 RX ADMIN — IOPAMIDOL 95 ML: 612 INJECTION, SOLUTION INTRAVENOUS at 03:33

## 2020-12-07 RX ADMIN — METOPROLOL TARTRATE 50 MG: 50 TABLET, FILM COATED ORAL at 20:55

## 2020-12-07 RX ADMIN — AZITHROMYCIN 500 MG: 500 INJECTION, POWDER, LYOPHILIZED, FOR SOLUTION INTRAVENOUS at 03:41

## 2020-12-07 RX ADMIN — ALLOPURINOL 100 MG: 100 TABLET ORAL at 13:33

## 2020-12-07 NOTE — ED NOTES
"Pt ambulatory to triage from home with c/o \"irregular heart\" per pt wife. Pt recently put on cipro.  Pt with history of atrial fibrillation. Pt provided with mask in triage.  Triage personnel wore appropriate PPE, mask, gloves and goggles         Maryjane Armenta RN  12/07/20 0136    "

## 2020-12-07 NOTE — OUTREACH NOTE
Medical Week 3 Survey      Responses   [facility] patient discharged from?  [place]   Does the patient have one of the following disease processes/diagnoses(primary or secondary)?  Other   Week 3 attempt successful?  No   Unsuccessful attempts  Attempt 1   Revoke  Readmitted          Elvi Pastor RN

## 2020-12-07 NOTE — PLAN OF CARE
Goal Outcome Evaluation:   Admitted for complaint of shortness of breath, CHF, febrile illness. Dr. Bergeron called for admission orders. Patient resting quietly in no distress. No voiced complaints of pain or shortness of breath at this time. On room air. Afib noted on cardiac monitor. Vital signs stable. Will continue to monitor.

## 2020-12-07 NOTE — H&P
History and physical    Primary care physician      Chief complaint  Generalized weakness  Shortness of breath    History of present illness  88-year-old male who is well-known to our service with history of chronic atrial fibrillation Crohn's disease hypertension hyperlipidemia chronic anemia and gastroesophageal reflux disease presented to [facility] emergency room with generalized weakness and shortness of breath.  Patient denies any fever cough abdominal pain nausea vomiting diarrhea.  Patient work-up in ER revealed bilateral pneumonia community-acquired infectious admit for management.  Patient also ruled out for COVID-19 infection and pneumonia.     PAST MEDICAL HISTORY  • Aspiration pneumonia (CMS/HCC)     • Bronchiectasis (CMS/HCC)     • CKD (chronic kidney disease)     • COPD (chronic obstructive pulmonary disease) (CMS/[facility])     • Dizziness     • Elevated cholesterol     • Gastric ulcer     • Generalized weakness     • Hard of hearing     • Hypertension     • Leg swelling     • Lower extremity edema     • Mild anemia     • Near syncope     • Nonrheumatic aortic valve stenosis     • PAF (paroxysmal atrial fibrillation) (CMS/[facility])     • Palpitations     • Peptic ulcer     • Pneumonia of left lung due to infectious organism     • Stroke (CMS/[facility])        PAST SURGICAL HISTORY              Procedure Laterality Date   • BRONCHOSCOPY N/A 10/22/2018     Procedure: BRONCHOSCOPY WITH BRONCHALVEOLAR LAVAGE;  Surgeon: Chidi Oconnor MD;  Location: [facility] ENDOSCOPY;  Service: Pulmonary   • CATARACT EXTRACTION Bilateral     • COLONOSCOPY       • COLONOSCOPY N/A 3/9/2018     Procedure: COLONOSCOPY to terminal ileum with bx;  Surgeon: Aron Cabrera MD;  Location: [facility] ENDOSCOPY;  Service:    • COLONOSCOPY N/A 11/19/2020     Procedure: COLONOSCOPY to cecum:  apc to cecum angiodysplagia,;  Surgeon: Aron Cabrera MD;  Location: [facility] ENDOSCOPY;  Service: Gastroenterology;  Laterality: N/A;  GI  "bleed  post:  diverticulosis, angiodysplagia   • CYSTECTOMY         back and shoulder area   • ENDOSCOPY N/A 3/9/2018     Procedure: ESOPHAGOGASTRODUODENOSCOPY with bx;  Surgeon: Aron Cabrera MD;  Location: [facility] ENDOSCOPY;  Service:    • ENDOSCOPY N/A 2020     Procedure: ESOPHAGOGASTRODUODENOSCOPY;  Surgeon: Aron Cabrera MD;  Location: [facility] ENDOSCOPY;  Service: Gastroenterology;  Laterality: N/A;  GI bleed  post:  HH.   • EYE SURGERY Left       detached retina   • EYE SURGERY Right       \"repaired a hole in the eye\"   • TESTICLE SURGERY         benign tumor removed.        FAMILY HISTORY           Problem Relation Age of Onset   • Stroke Mother     • Heart disease Mother     • Hypertension Mother     • Diabetes Mother     • Heart disease Sister           Heart Valve Replacement   • Ovarian cancer Sister     • Rheumatic fever Sister     • Diabetes Sister     • Stroke Father        SOCIAL HISTORY                 Socioeconomic History   • Marital status:        Spouse name: Not on file   • Number of children: Not on file   • Years of education: Not on file   • Highest education level: Not on file   Tobacco Use   • Smoking status: Former Smoker       Types: Cigars       Quit date: 10/28/1994       Years since quittin.1   • Smokeless tobacco: Never Used   • Tobacco comment: Quit 25 years ago   Substance and Sexual Activity   • Alcohol use: No       Comment: daily caffiene - 1/2 cup of coffee   • Drug use: No   • Sexual activity: Defer   Lifestyle   • Physical activity       Days per week: 7 days       Minutes per session: 30 min   • Stress: Not at all        ALLERGIES  Amiodarone, Diltiazem, Doxycycline, and Indomethacin  Home medications reviewed     REVIEW OF SYSTEMS  Unremarkable except generalized weakness and shortness of breath     PHYSICAL EXAM  Blood pressure 132/75, pulse 111, temperature 97.7 °F (36.5 °C), temperature source Oral, resp. rate 16, height 172.7 cm (67.99\"), weight 65.3 " kg (144 lb), SpO2 94 %.    GENERAL: Wake and alert, mild distress  HENT: nares patent  EYES: no scleral icterus  CV:  Irregular S1-S2  RESPIRATORY: Mild tachypnea, diminished breath sounds bilaterally with rales in the bases  ABDOMEN: soft nontender bowel sounds positive  MUSCULOSKELETAL: no deformity, trace pedal edema without calf tenderness  NEURO: alert, moves all extremities, follows commands  SKIN: warm, dry     LAB RESULTS  Lab Results (last 24 hours)     Procedure Component Value Units Date/Time    Urinalysis With Microscopic If Indicated (No Culture) - Urine, Clean Catch [[id number]]  (Abnormal) Collected: 12/07/20 0445    Specimen: Urine, Clean Catch Updated: 12/07/20 0503     Color, UA Yellow     Appearance, UA Clear     pH, UA 7.5     Specific Gravity, UA 1.024     Glucose, UA Negative     Ketones, UA Negative     Bilirubin, UA Negative     Blood, UA Negative     Protein, UA Trace     Leuk Esterase, UA Negative     Nitrite, UA Negative     Urobilinogen, UA 0.2 E.U./dL    Narrative:      Urine microscopic not indicated.    Digoxin Level [[id number]]  (Normal) Collected: 12/07/20 0154    Specimen: Blood Updated: 12/07/20 0329     Digoxin 0.60 ng/mL     Respiratory Panel PCR w/COVID-19(SARS-CoV-2) MADHU/JOY/MONIQUE/PAD/COR/MAD/ELBERT In-House, NP Swab in UTM/VTM, 3-4 HR TAT - Swab, Nasopharynx [[id number]]  (Normal) Collected: 12/07/20 0208    Specimen: Swab from Nasopharynx Updated: 12/07/20 0328     ADENOVIRUS, PCR Not Detected     Coronavirus 229E Not Detected     Coronavirus HKU1 Not Detected     Coronavirus NL63 Not Detected     Coronavirus OC43 Not Detected     COVID19 Not Detected     Human Metapneumovirus Not Detected     Human Rhinovirus/Enterovirus Not Detected     Influenza A PCR Not Detected     Influenza B PCR Not Detected     Parainfluenza Virus 1 Not Detected     Parainfluenza Virus 2 Not Detected     Parainfluenza Virus 3 Not Detected     Parainfluenza Virus 4 Not Detected     RSV, PCR Not Detected      "Bordetella pertussis pcr Not Detected     Bordetella parapertussis PCR Not Detected     Chlamydophila pneumoniae PCR Not Detected     Mycoplasma pneumo by PCR Not Detected    Narrative:      Fact sheet for providers: https://docs.[employer]/wp-content/uploads/SOG7105-4422-EN6.1-EUA-Provider-Fact-Sheet-3.pdf    Fact sheet for patients: https://docs.[employer]/wp-content/uploads/LNP3998-5454-ZM2.1-EUA-Patient-Fact-Sheet-1.pdf    Procalcitonin [[id number]]  (Normal) Collected: 12/07/20 0154    Specimen: Blood Updated: 12/07/20 0234     Procalcitonin 0.12 ng/mL     Narrative:      As a Marker for Sepsis (Non-Neonates):   1. <0.5 ng/mL represents a low risk of severe sepsis and/or septic shock.  1. >2 ng/mL represents a high risk of severe sepsis and/or septic shock.    As a Marker for Lower Respiratory Tract Infections that require antibiotic therapy:  PCT on Admission     Antibiotic Therapy             6-12 Hrs later  > 0.5                Strongly Recommended            >0.25 - <0.5         Recommended  0.1 - 0.25           Discouraged                   Remeasure/reassess PCT  <0.1                 Strongly Discouraged          Remeasure/reassess PCT      As 28 day mortality risk marker: \"Change in Procalcitonin Result\" (> 80 % or <=80 %) if Day 0 (or Day 1) and Day 4 values are available. Refer to http://www.[employer] [employer]-pct-calculator.com/   Change in PCT <=80 %   A decrease of PCT levels below or equal to 80 % defines a positive change in PCT test result representing a higher risk for 28-day all-cause mortality of patients diagnosed with severe sepsis or septic shock.  Change in PCT > 80 %   A decrease of PCT levels of more than 80 % defines a negative change in PCT result representing a lower risk for 28-day all-cause mortality of patients diagnosed with severe sepsis or septic shock.                Results may be falsely decreased if patient taking Biotin.     Troponin [[id number]]  (Normal) Collected: 12/07/20 0154 "    Specimen: Blood Updated: 12/07/20 0234     Troponin T <0.010 ng/mL     Narrative:      Troponin T Reference Range:  <= 0.03 ng/mL-   Negative for AMI  >0.03 ng/mL-     Abnormal for myocardial necrosis.  Clinicians would have to utilize clinical acumen, EKG, Troponin and serial changes to determine if it is an Acute Myocardial Infarction or myocardial injury due to an underlying chronic condition.       Results may be falsely decreased if patient taking Biotin.      BNP [[id number]]  (Abnormal) Collected: 12/07/20 0154    Specimen: Blood Updated: 12/07/20 0234     proBNP 2,313.0 pg/mL     Narrative:      Among patients with dyspnea, NT-proBNP is highly sensitive for the detection of acute congestive heart failure. In addition NT-proBNP of <300 pg/ml effectively rules out acute congestive heart failure with 99% negative predictive value.    Results may be falsely decreased if patient taking Biotin.      CBC & Differential [[id number]]  (Abnormal) Collected: 12/07/20 0154    Specimen: Blood Updated: 12/07/20 0231    Narrative:      The following orders were created for panel order CBC & Differential.  Procedure                               Abnormality         Status                     ---------                               -----------         ------                     CBC Auto Differential[[id number]]        Abnormal            Final result                 Please view results for these tests on the individual orders.    CBC Auto Differential [[id number]]  (Abnormal) Collected: 12/07/20 0154    Specimen: Blood Updated: 12/07/20 0231     WBC 17.15 10*3/mm3      RBC 3.81 10*6/mm3      Hemoglobin 10.0 g/dL      Hematocrit 32.4 %      MCV 85.0 fL      MCH 26.2 pg      MCHC 30.9 g/dL      RDW 15.8 %      RDW-SD 48.6 fl      MPV 10.6 fL      Platelets 419 10*3/mm3      Neutrophil % 82.0 %      Lymphocyte % 8.2 %      Monocyte % 8.2 %      Eosinophil % 0.4 %      Basophil % 0.5 %      Immature Grans % 0.7 %       Neutrophils, Absolute 14.06 10*3/mm3      Lymphocytes, Absolute 1.40 10*3/mm3      Monocytes, Absolute 1.41 10*3/mm3      Eosinophils, Absolute 0.07 10*3/mm3      Basophils, Absolute 0.09 10*3/mm3      Immature Grans, Absolute 0.12 10*3/mm3      nRBC 0.0 /100 WBC     Comprehensive Metabolic Panel [[id number]]  (Abnormal) Collected: 12/07/20 0154    Specimen: Blood Updated: 12/07/20 0228     Glucose 116 mg/dL      BUN 18 mg/dL      Creatinine 1.25 mg/dL      Sodium 136 mmol/L      Potassium 4.1 mmol/L      Chloride 101 mmol/L      CO2 22.0 mmol/L      Calcium 8.8 mg/dL      Total Protein 7.4 g/dL      Albumin 3.60 g/dL      ALT (SGPT) 15 U/L      AST (SGOT) 18 U/L      Alkaline Phosphatase 64 U/L      Total Bilirubin 0.9 mg/dL      eGFR Non African Amer 55 mL/min/1.73      Globulin 3.8 gm/dL      A/G Ratio 0.9 g/dL      BUN/Creatinine Ratio 14.4     Anion Gap 13.0 mmol/L     Narrative:      GFR Normal >60  Chronic Kidney Disease <60  Kidney Failure <15      Magnesium [[id number]]  (Normal) Collected: 12/07/20 0154    Specimen: Blood Updated: 12/07/20 0228     Magnesium 1.8 mg/dL     Blood Culture - Blood, Arm, Left [[id number]] Collected: 12/07/20 0208    Specimen: Blood from Arm, Left Updated: 12/07/20 0221    Lactic Acid, Plasma [[id number]]  (Normal) Collected: 12/07/20 0154    Specimen: Blood Updated: 12/07/20 0218     Lactate 2.0 mmol/L     Blood Culture - Blood, Arm, Left [[id number]] Collected: 12/07/20 0154    Specimen: Blood from Arm, Left Updated: 12/07/20 0201        Imaging Results (Last 24 Hours)     Procedure Component Value Units Date/Time    CT Angiogram Chest [[id number]] Collected: 12/07/20 0336     Updated: 12/07/20 0408    Narrative:      CT PULMONARY ANGIOGRAM:     HISTORY:  Shortness of breath, effusions.     TECHNIQUE:  Non contrast localizing images were performed through the  mediastinum for localization and bolus timing. Axial images were  obtained from the apex to the lung bases and during IV  contrast  infusion. No adverse reaction. Multiplanar reformatted images were  reconstructed from the helical source data. Radiation dose reduction  techniques were utilized, including automated exposure control and  exposure modulation based on body size.         COMPARISON:  CTA chest 07/19/2008.     FINDINGS:        Excellent opacification of the pulmonary arterial system. No  respiratory motion artifact. Normal caliber main pulmonary artery. No  evidence of pulmonary embolus.     The thoracic aorta is normal in caliber with atherosclerotic  calcifications.     The heart is enlarged. Coronary artery calcifications. No pericardial  effusion.     Mild mediastinal lymphadenopathy. Calcified right hilar lymph nodes. No  hilar or axillary adenopathy.     The central airways are patent. Moderate right and mild left pleural  effusions with associated compressive atelectasis. Groundglass opacities  at the peripheral left lung apex and in the lower lung bases. Calcified  granuloma in the right lung base. No pneumothorax.     Calcified granulomas in the spleen. Stable bilateral renal cysts.     No acute osseous abnormality. No aggressive osseous lesions. Moderate  multilevel spondylosis.     The chest wall soft tissues are normal.     The visualized thyroid gland is unremarkable.          Impression:      1.  No evidence of pulmonary embolism.  2.  Moderate right and mild left pleural effusions with a associated  dependent atelectasis.  3.  Ground glass opacity in the peripheral left lung apex and in the  left lower lung concerning for infiltrate.  4.  Mediastinal lymphadenopathy, likely reactive.  5.  Evidence of prior granulomatous disease.     This report was finalized on 12/7/2020 4:05 AM by Dr. Michael Contreras M.D.       XR Chest 1 View [[id number]] Collected: 12/07/20 0214     Updated: 12/07/20 0221    Narrative:      CHEST X-RAY 1 View:      HISTORY: Palpitations       COMPARISON:   Chest x-ray 08/26/2020.     FINDINGS:       The cardiomediastinal silhouette is stable.  Moderate right pleural effusion with overlying atelectasis. Underlying  infiltrate cannot be excluded. No pneumothorax.    No acute osseous abnormality.        Impression:      Moderate right pleural effusion with atelectasis. Underlying infiltrate  cannot be excluded.     This report was finalized on 12/7/2020 2:18 AM by Dr. Michael Contreras M.D.           ECG 12 Lead                 HEART RATE= 152  bpm  RR Interval= 394  ms  [place] Interval=   ms  P Horizontal Axis=   deg  P Front Axis=   deg  QRSD Interval= 74  ms  QT Interval= 306  ms  QRS Axis= 3  deg  T Wave Axis= 147  deg  - ABNORMAL ECG -  Atrial fibrillation with rapid V-rate  Consider posterior infarct             Current Facility-Administered Medications:   •  allopurinol (ZYLOPRIM) tablet 100 mg, 100 mg, Oral, BID, Calvin Bergeron MD  •  apixaban (ELIQUIS) tablet 5 mg, 5 mg, Oral, Q12H, Calvin Bergeron MD  •  atorvastatin (LIPITOR) tablet 10 mg, 10 mg, Oral, Nightly, Calvin Bergeron MD  •  [START ON 12/8/2020] AZITHROMYCIN 500 MG/250 ML 0.9% NS IVPB (vial-mate), 500 mg, Intravenous, Q24H, Calvin Bergeron MD  •  [START ON 12/8/2020] cefTRIAXone (ROCEPHIN) IVPB 1 g, 1 g, Intravenous, Q24H, Calvin Bergeron MD  •  digoxin (LANOXIN) tablet 125 mcg, 125 mcg, Oral, Daily, Calvin Bergeron MD  •  dofetilide (TIKOSYN) capsule 125 mcg, 125 mcg, Oral, BID, Calvin Bergeron MD  •  ipratropium-albuterol (DUO-NEB) nebulizer solution 1.5 mL, 1.5 mL, Nebulization, Q4H PRN, Calvin Bergeron MD  •  metoprolol tartrate (LOPRESSOR) tablet 50 mg, 50 mg, Oral, TID, Calvin Bergeron MD  •  pantoprazole (PROTONIX) EC tablet 40 mg, 40 mg, Oral, QAM, Calvin Bergeron MD  •  [COMPLETED] Insert peripheral IV, , , Once **AND** sodium chloride 0.9 % flush 10 mL, 10 mL, Intravenous, PRN, Francis Jolley MD  •  vitamin B-12 (CYANOCOBALAMIN) tablet 500 mcg, 500 mcg, Oral, Daily, Calvin Bergeron MD     ASSESSMENT  Bilateral community-acquired pneumonia  infectious  Chronic atrial fibrillation with rapid ventricular rate  History of Crohn's disease  Chronic anemia with recent GI bleed secondary to nonbleeding colonic angiodysplastic lesions   Hypertension  Hyperlipidemia  Gastroesophageal reflux disease    PLAN  Admit  IV antibiotics after obtaining the cultures  Control the heart rate  Adjust home medications  Stress ulcer DVT prophylaxis  Cardiology to follow patient  Supportive care  Patient is full code  Discussed with nursing staff  Follow closely further recommendation current hospital course    SHELBIE COWART MD

## 2020-12-07 NOTE — CONSULTS
Patient Name: Asia Ly  :[id number]  88 y.o.    Date of Admission: 2020  Date of Consultation:  20  Encounter Provider: Leo Morley RN  Place of Service: [facility] CARDIOLOGY  Referring Provider: Calvin Bergeron MD  Patient Care Team:  Lubna Lobo MD as PCP - General  Lubna Lobo MD as PCP - Family Medicine      Chief complaint: Weakness, irregular heartbeat    Reason for Consult: afib w/RVR    History of Present Illness:  Asia Ly in an 88 year old male who is a known patient to our practice and is followed by Dr. Rivera and was last seen in our office by Alta on 10/12/2020. He has a history of stroke and TIA in 2016, moderate aortic valve stenosis, hyperlipidemia, B12 anemia, Chron's disease, colon polyps, lower extremity edema, pulmonary hypertension and pulmonary fibrosis. His last ECHO showed an EF 60%. He is followed primarly for his paroxysmal atrial fibrillation for which he is maintained on Tikosyn, metoprolol and digoxin. He previously declined AV node ablation with pacemaker. He is anticoagulated on Eliquis.     He presented to [facility] emergency room on  with vague complaints, stating he felt weak, he had some shortness of breath, and felt some fluttering in his chest.  No fevers and no chills.  No lower extremity edema.  He had no chest pain, pressure, tightness, squeezing, heartburn.  No abdominal pain, no pain on urination.  EKG in the emergency room demonstrated atrial fibrillation with rapid ventricular response.  Hemoglobin was 10 with hematocrit 32.4.  White count was elevated at 17.15 with a lactic acid level of 2.0.  Chest x-ray showed moderate right pleural effusion with overlying atelectasis and a potential infiltrate.  He had a low-grade temperature.  He was admitted for IV antibiotics for acute infectious pneumonia.  COVID-19 test in the emergency room along with the rest of the respiratory panel  was negative.    Previous Cardiac Testing:   ECHO 08/27/2020:  · Estimated EF = 60%.  · Left ventricular systolic function is normal.  · Left ventricular wall thickness is consistent with mild concentric hypertrophy.  · Left atrial cavity size is moderately dilated.  · There is calcification of the aortic valve.  · Moderate aortic valve stenosis is present.  · Aortic valve maximum pressure gradient is 42.0 mmHg.  · Aortic valve mean pressure gradient is 24.0 mmHg.  · Aortic valve area is 1.1 cm2.  · Mild tricuspid valve regurgitation is present.  · Left ventricular diastolic dysfunction (grade II) consistent with pseudonormalization.  · Calculated right ventricular systolic pressure from tricuspid regurgitation is 57.8 mmHg. Severe pulmonary hypertension is present.  · Mild aortic valve regurgitation is present.  · Mild mitral valve regurgitation is present    STRESS TEST 11/18/2016        Past Medical History:   Diagnosis Date   • Aortic stenosis    • Aspiration pneumonia (CMS/HCC)    • Atrial fibrillation (CMS/HCC)    • Bronchiectasis (CMS/HCC)    • CKD (chronic kidney disease)    • COPD (chronic obstructive pulmonary disease) (CMS/HCC)    • Crohn's disease (CMS/HCC)    • Dizziness    • Elevated cholesterol    • Gastric ulcer    • Generalized weakness    • Gout    • Hard of hearing    • Hyperlipidemia    • Hypertension    • Leg swelling    • Lower extremity edema    • Mild anemia    • Near syncope    • Nonrheumatic aortic valve stenosis    • PAF (paroxysmal atrial fibrillation) (CMS/HCC)    • Palpitations    • Peptic ulcer    • Pneumonia of left lung due to infectious organism    • Pulmonary fibrosis (CMS/HCC)    • Stroke (CMS/HCC)    • TIA (transient ischemic attack)        Past Surgical History:   Procedure Laterality Date   • BRONCHOSCOPY N/A 10/22/2018    Procedure: BRONCHOSCOPY WITH BRONCHALVEOLAR LAVAGE;  Surgeon: Chidi Oconnor MD;  Location: [facility] ENDOSCOPY;  Service: Pulmonary   • CATARACT EXTRACTION  "Bilateral    • COLONOSCOPY     • COLONOSCOPY N/A 3/9/2018    Procedure: COLONOSCOPY to terminal ileum with bx;  Surgeon: Aron Cabrera MD;  Location:  [facility] ENDOSCOPY;  Service:    • COLONOSCOPY N/A 11/19/2020    Procedure: COLONOSCOPY to cecum:  apc to cecum angiodysplagia,;  Surgeon: Aron Cabrera MD;  Location:  [facility] ENDOSCOPY;  Service: Gastroenterology;  Laterality: N/A;  GI bleed  post:  diverticulosis, angiodysplagia   • CYSTECTOMY      back and shoulder area   • ENDOSCOPY N/A 3/9/2018    Procedure: ESOPHAGOGASTRODUODENOSCOPY with bx;  Surgeon: Aron Cabrera MD;  Location:  [facility] ENDOSCOPY;  Service:    • ENDOSCOPY N/A 11/19/2020    Procedure: ESOPHAGOGASTRODUODENOSCOPY;  Surgeon: Aron Cabrera MD;  Location:  [facility] ENDOSCOPY;  Service: Gastroenterology;  Laterality: N/A;  GI bleed  post:  HH.   • EYE SURGERY Left     detached retina   • EYE SURGERY Right     \"repaired a hole in the eye\"   • TESTICLE SURGERY      benign tumor removed.         Prior to Admission medications    Medication Sig Start Date End Date Taking? Authorizing Provider   allopurinol (ZYLOPRIM) 100 MG tablet Take 100 mg by mouth 2 (Two) Times a Day.    ProviderValeria MD   apixaban (ELIQUIS) 5 MG tablet tablet Take 1 tablet by mouth Every 12 (Twelve) Hours. 12/16/19   Hector Rivera MD   atorvastatin (LIPITOR) 10 MG tablet Take one tablet by mouth Mon, Wed and Fri.    ProviderValeria MD   BUDESONIDE PO Take 3 mg by mouth Daily.    ProviderValeria MD   digoxin (LANOXIN) 125 MCG tablet Take 1 tablet by mouth Daily. 10/12/20   Alta Hackett APRN   dofetilide (TIKOSYN) 125 MCG capsule Take 1 capsule by mouth 2 (Two) Times a Day. 10/28/19   Hector Rivera MD   IPRATROPIUM-ALBUTEROL IN Inhale Daily.    ProviderValeria MD   metoprolol tartrate (LOPRESSOR) 50 MG tablet Take 1 tablet by mouth 3 (Three) Times a Day. 12/16/19   Hector Rivera MD   omeprazole (priLOSEC) 40 MG capsule Take 40 mg by mouth " "Daily.    Provider, MD Valeria   vitamin B-12 (CYANOCOBALAMIN) 500 MCG tablet Take 500 mcg by mouth Daily.    Provider, Valeria, MD       Allergies   Allergen Reactions   • Amiodarone Unknown (See Comments)     Pulmonary fibrosis   • Diltiazem Arrhythmia   • Doxycycline Rash   • Indomethacin Rash       Social History     Socioeconomic History   • Marital status:      Spouse name: Not on file   • Number of children: Not on file   • Years of education: Not on file   • Highest education level: Not on file   Tobacco Use   • Smoking status: Former Smoker     Types: Cigars     Quit date: 10/28/1994     Years since quittin.1   • Smokeless tobacco: Never Used   • Tobacco comment: Quit 25 years ago   Substance and Sexual Activity   • Alcohol use: No     Comment: daily caffiene - 1/2 cup of coffee   • Drug use: No   • Sexual activity: Defer   Lifestyle   • Physical activity     Days per week: 7 days     Minutes per session: 30 min   • Stress: Not at all       Family History   Problem Relation Age of Onset   • Stroke Mother    • Heart disease Mother    • Hypertension Mother    • Diabetes Mother    • Heart disease Sister         Heart Valve Replacement   • Ovarian cancer Sister    • Rheumatic fever Sister    • Diabetes Sister    • Stroke Father        REVIEW OF SYSTEMS:   All systems reviewed.  Pertinent positives identified in HPI.  All other systems are negative.      Objective:     Vitals:    20 0700 20 0814 20 0816 20 1331   BP: 132/75 132/75  132/51   BP Location: Left arm Left arm  Left arm   Patient Position: Lying Lying  Lying   Pulse: 111 111  73   Resp:  16  16   Temp: 97.7 °F (36.5 °C) 97.7 °F (36.5 °C)  97.5 °F (36.4 °C)   TempSrc:  Oral  Oral   SpO2:  94%  96%   Weight:   65.3 kg (144 lb)    Height:  172.7 cm (67.99\")       Body mass index is 21.9 kg/m².    Physical Exam:  General Appearance:    Alert, cooperative, in no acute distress   Head:    Normocephalic, without " obvious abnormality, atraumatic   Eyes:            Lids and lashes normal, conjunctivae and sclerae normal, no   icterus, no pallor, corneas clear, PERRLA   Ears:    Ears appear intact with no abnormalities noted   Throat:   No oral lesions, no thrush, oral mucosa moist   Neck:   No adenopathy, supple, trachea midline, no thyromegaly, no   carotid bruit, no JVD   Back:     No kyphosis present, no scoliosis present, no skin lesions, erythema or scars, no tenderness to percussion or palpation, range of motion normal   Lungs:     Coarse BS,respirations regular, even and unlabored    Heart:    irregular rhythm and normal rate, normal S1 and S2, 2/6 RADHA, no gallop, no rub, no click   Chest Wall:    No abnormalities observed   Abdomen:     Normal bowel sounds, no masses, no organomegaly, soft        non-tender, non-distended, no guarding, no rebound  tenderness   Extremities:   Moves all extremities well, no edema, no cyanosis, no redness   Pulses:   Pulses palpable and equal bilaterally. Normal radial, carotid, femoral, dorsalis pedis and posterior tibial pulses bilaterally. Normal abdominal aorta   Skin:  Psychiatric:   No bleeding, bruising or rash    Alert and oriented x 3, normal mood and affect         Lab Review:     Results from last 7 days   Lab Units 12/07/20  0154   SODIUM mmol/L 136   POTASSIUM mmol/L 4.1   CHLORIDE mmol/L 101   CO2 mmol/L 22.0   BUN mg/dL 18   CREATININE mg/dL 1.25   CALCIUM mg/dL 8.8   BILIRUBIN mg/dL 0.9   ALK PHOS U/L 64   ALT (SGPT) U/L 15   AST (SGOT) U/L 18   GLUCOSE mg/dL 116*     Results from last 7 days   Lab Units 12/07/20  0154   TROPONIN T ng/mL <0.010     Results from last 7 days   Lab Units 12/07/20  0154   WBC 10*3/mm3 17.15*   HEMOGLOBIN g/dL 10.0*   HEMATOCRIT % 32.4*   PLATELETS 10*3/mm3 419         Results from last 7 days   Lab Units 12/07/20  0154   MAGNESIUM mg/dL 1.8                  TELESTRIP 12/7/2020:    EKG 12/7/2020:      PREVIOUS EKG 11/20/2020:      I personally  viewed and interpreted the patient's EKG/Telemetry data.      Current Facility-Administered Medications:   •  allopurinol (ZYLOPRIM) tablet 100 mg, 100 mg, Oral, BID, Calvin Bergeron MD, 100 mg at 12/07/20 1333  •  apixaban (ELIQUIS) tablet 5 mg, 5 mg, Oral, Q12H, Calvin Bergeron MD, 5 mg at 12/07/20 1333  •  atorvastatin (LIPITOR) tablet 10 mg, 10 mg, Oral, Nightly, Calvin Bergeron MD  •  [START ON 12/8/2020] AZITHROMYCIN 500 MG/250 ML 0.9% NS IVPB (vial-mate), 500 mg, Intravenous, Q24H, Calvin Bergeron MD  •  [START ON 12/8/2020] cefTRIAXone (ROCEPHIN) IVPB 1 g, 1 g, Intravenous, Q24H, Calvin Bergeron MD  •  digoxin (LANOXIN) tablet 125 mcg, 125 mcg, Oral, Daily, Calvin Bergeron MD, 125 mcg at 12/07/20 1333  •  dofetilide (TIKOSYN) capsule 125 mcg, 125 mcg, Oral, BID, Calvin Bergeron MD  •  guaiFENesin (MUCINEX) 12 hr tablet 600 mg, 600 mg, Oral, Q12H, Calvin Bergeron MD, 600 mg at 12/07/20 1333  •  ipratropium-albuterol (DUO-NEB) nebulizer solution 1.5 mL, 1.5 mL, Nebulization, Q4H PRN, Calvin Bergeron MD  •  metoprolol tartrate (LOPRESSOR) tablet 50 mg, 50 mg, Oral, TID, Calvin Bergeron MD, 50 mg at 12/07/20 1331  •  pantoprazole (PROTONIX) EC tablet 40 mg, 40 mg, Oral, QAM, Calvin Bergeron MD, 40 mg at 12/07/20 1333  •  [COMPLETED] Insert peripheral IV, , , Once **AND** sodium chloride 0.9 % flush 10 mL, 10 mL, Intravenous, PRN, Francis Jolley MD  •  [START ON 12/8/2020] vitamin B-12 (CYANOCOBALAMIN) tablet 1,000 mcg, 1,000 mcg, Oral, Daily, Calvin Bergeron MD    Assessment and Plan:       Active Hospital Problems    Diagnosis  POA   • **Febrile illness, acute [R50.9]  Yes   • Aortic stenosis [I35.0]  Yes   • Anticoagulant long-term use [Z79.01]  Not Applicable   • Atrial fibrillation with RVR (CMS/HCC) [I48.91]  Yes   • Atrial fibrillation, paroxysmal [I48.91]  Yes      Resolved Hospital Problems   No resolved problems to display.     1.  Acute febrile illness, on IV antibiotics  2.  Paroxysmal atrial fibrillation with RVR-received  therapy in the emergency room with control of his heart rate.  Heart rate is currently in the 70s.  3.  Aortic stenosis-patient has a history of moderate aortic stenosis with most recent echocardiogram August 27, 2020 demonstrating a mean gradient 24 mmHg.  Normal left systolic size and function.  Continue to follow-up    Patient states that he has been feeling so much better through the day that he hopes that he will be allowed to go home in the morning.  We will follow his heart rate response overnight, no other cardiac diagnostic testing is required at this time    Leo Morley RN  12/07/20  14:31 EST

## 2020-12-07 NOTE — PLAN OF CARE
Goal Outcome Evaluation:  Plan of Care Reviewed With: patient  Progress: improving  Admitted for shortness of breath and heart palpitations. Dr. Bergeron to see patient this morning. Vital signs stable. No voiced complaints. Resting in bed. On room air. A fib cardiac rhythm. Will continue to monitor.

## 2020-12-07 NOTE — ED PROVIDER NOTES
" EMERGENCY DEPARTMENT ENCOUNTER    Room Number:  E561/1  Date of encounter:  12/9/2020  PCP: Lubna Lobo MD  Historian: Patient      HPI:  Chief Complaint: Palpitation  A complete HPI/ROS/PMH/PSH/SH/FH are unobtainable due to: Very poor historian    Context: Asia Ly is a 88 y.o. male who presents to the ED c/o multiple vague and poorly described complaints.  Patient said he just felt \"weak\" without being able to specify.  He also states that his heart is been fluttering and beating fast today.  He states some shortness of breath, but is not able to quantify it.  He has no fevers, no leg swelling, no chest pain, no abdominal pain, and just continues to say \"I feel weak\"      PAST MEDICAL HISTORY  Active Ambulatory Problems     Diagnosis Date Noted   • Ataxia 08/19/2016   • TIA (transient ischemic attack) 08/19/2016   • Atrial fibrillation, paroxysmal 08/19/2016   • Hyperlipidemia 08/19/2016   • Gout 08/19/2016   • Crohn's disease (CMS/HCC) 08/19/2016   • Atrial fibrillation with RVR (CMS/HCC) 10/27/2018   • Acute cholecystitis 08/26/2020   • Obstructive jaundice 08/26/2020   • Acute lower GI bleeding 11/17/2020   • Aortic stenosis    • Pulmonary fibrosis (CMS/HCC)    • Anticoagulant long-term use      Resolved Ambulatory Problems     Diagnosis Date Noted   • Hypoxia 04/29/2018     Past Medical History:   Diagnosis Date   • Aspiration pneumonia (CMS/HCC)    • Bronchiectasis (CMS/HCC)    • CKD (chronic kidney disease)    • COPD (chronic obstructive pulmonary disease) (CMS/HCC)    • Dizziness    • Elevated cholesterol    • Gastric ulcer    • Generalized weakness    • Hard of hearing    • Hypertension    • Leg swelling    • Lower extremity edema    • Mild anemia    • Near syncope    • Nonrheumatic aortic valve stenosis    • PAF (paroxysmal atrial fibrillation) (CMS/HCC)    • Palpitations    • Peptic ulcer    • Pneumonia of left lung due to infectious organism    • Stroke (CMS/HCC)          PAST SURGICAL " "HISTORY  Past Surgical History:   Procedure Laterality Date   • BRONCHOSCOPY N/A 10/22/2018    Procedure: BRONCHOSCOPY WITH BRONCHALVEOLAR LAVAGE;  Surgeon: Chidi Oconnor MD;  Location: [facility] ENDOSCOPY;  Service: Pulmonary   • CATARACT EXTRACTION Bilateral    • COLONOSCOPY     • COLONOSCOPY N/A 3/9/2018    Procedure: COLONOSCOPY to terminal ileum with bx;  Surgeon: Aron Cabrera MD;  Location: [facility] ENDOSCOPY;  Service:    • COLONOSCOPY N/A 2020    Procedure: COLONOSCOPY to cecum:  apc to cecum angiodysplagia,;  Surgeon: Aron Cabrera MD;  Location: [facility] ENDOSCOPY;  Service: Gastroenterology;  Laterality: N/A;  GI bleed  post:  diverticulosis, angiodysplagia   • CYSTECTOMY      back and shoulder area   • ENDOSCOPY N/A 3/9/2018    Procedure: ESOPHAGOGASTRODUODENOSCOPY with bx;  Surgeon: Aron Cabrera MD;  Location: [facility] ENDOSCOPY;  Service:    • ENDOSCOPY N/A 2020    Procedure: ESOPHAGOGASTRODUODENOSCOPY;  Surgeon: Aron Cabrera MD;  Location: [facility] ENDOSCOPY;  Service: Gastroenterology;  Laterality: N/A;  GI bleed  post:  HH.   • EYE SURGERY Left     detached retina   • EYE SURGERY Right     \"repaired a hole in the eye\"   • TESTICLE SURGERY      benign tumor removed.         FAMILY HISTORY  Family History   Problem Relation Age of Onset   • Stroke Mother    • Heart disease Mother    • Hypertension Mother    • Diabetes Mother    • Heart disease Sister         Heart Valve Replacement   • Ovarian cancer Sister    • Rheumatic fever Sister    • Diabetes Sister    • Stroke Father          SOCIAL HISTORY  Social History     Socioeconomic History   • Marital status:      Spouse name: Not on file   • Number of children: Not on file   • Years of education: Not on file   • Highest education level: Not on file   Tobacco Use   • Smoking status: Former Smoker     Types: Cigars     Quit date: 10/28/1994     Years since quittin.1   • Smokeless tobacco: Never Used   • Tobacco comment: Quit " 25 years ago   Substance and Sexual Activity   • Alcohol use: No     Comment: daily caffiene - 1/2 cup of coffee   • Drug use: No   • Sexual activity: Defer   Lifestyle   • Physical activity     Days per week: 7 days     Minutes per session: 30 min   • Stress: Not at all         ALLERGIES  Amiodarone, Diltiazem, Doxycycline, and Indomethacin        REVIEW OF SYSTEMS  Review of Systems   Limited because he is a very poor historian      PHYSICAL EXAM    I have reviewed the triage vital signs and nursing notes.    ED Triage Vitals [12/07/20 0138]   Temp Heart Rate Resp BP SpO2   99.9 °F (37.7 °C) (!) 126 16 -- 92 %      Temp src Heart Rate Source Patient Position BP Location FiO2 (%)   Tympanic Monitor -- -- --       Physical Exam  GENERAL: Wake and alert, mild distress  HENT: nares patent  EYES: no scleral icterus  CV: Atrial fibrillation 130s to 160s  RESPIRATORY: Mild tachypnea, diminished breath sounds bilaterally with rales in the bases  ABDOMEN: soft  MUSCULOSKELETAL: no deformity, trace pedal edema without calf tenderness  NEURO: alert, moves all extremities, follows commands  SKIN: warm, dry        LAB RESULTS  Recent Results (from the past 24 hour(s))   Basic Metabolic Panel    Collection Time: 12/09/20  6:50 AM    Specimen: Blood   Result Value Ref Range    Glucose 77 65 - 99 mg/dL    BUN 17 8 - 23 mg/dL    Creatinine 1.11 0.76 - 1.27 mg/dL    Sodium 137 136 - 145 mmol/L    Potassium 3.8 3.5 - 5.2 mmol/L    Chloride 104 98 - 107 mmol/L    CO2 22.1 22.0 - 29.0 mmol/L    Calcium 8.5 (L) 8.6 - 10.5 mg/dL    eGFR Non African Amer 63 >60 mL/min/1.73    BUN/Creatinine Ratio 15.3 7.0 - 25.0    Anion Gap 10.9 5.0 - 15.0 mmol/L   CBC Auto Differential    Collection Time: 12/09/20  6:50 AM    Specimen: Blood   Result Value Ref Range    WBC 7.66 3.40 - 10.80 10*3/mm3    RBC 3.44 (L) 4.14 - 5.80 10*6/mm3    Hemoglobin 9.0 (L) 13.0 - 17.7 g/dL    Hematocrit 28.8 (L) 37.5 - 51.0 %    MCV 83.7 79.0 - 97.0 fL    MCH 26.2 (L)  26.6 - 33.0 pg    MCHC 31.3 (L) 31.5 - 35.7 g/dL    RDW 15.8 (H) 12.3 - 15.4 %    RDW-SD 47.9 37.0 - 54.0 fl    MPV 10.7 6.0 - 12.0 fL    Platelets 312 140 - 450 10*3/mm3    Neutrophil % 70.4 42.7 - 76.0 %    Lymphocyte % 14.8 (L) 19.6 - 45.3 %    Monocyte % 9.8 5.0 - 12.0 %    Eosinophil % 3.5 0.3 - 6.2 %    Basophil % 1.0 0.0 - 1.5 %    Immature Grans % 0.5 0.0 - 0.5 %    Neutrophils, Absolute 5.39 1.70 - 7.00 10*3/mm3    Lymphocytes, Absolute 1.13 0.70 - 3.10 10*3/mm3    Monocytes, Absolute 0.75 0.10 - 0.90 10*3/mm3    Eosinophils, Absolute 0.27 0.00 - 0.40 10*3/mm3    Basophils, Absolute 0.08 0.00 - 0.20 10*3/mm3    Immature Grans, Absolute 0.04 0.00 - 0.05 10*3/mm3    nRBC 0.0 0.0 - 0.2 /100 WBC       Ordered the above labs and independently reviewed the results.        RADIOLOGY  No Radiology Exams Resulted Within Past 24 Hours    I ordered the above noted radiological studies. Reviewed by me and discussed with radiologist.  See dictation for official radiology interpretation.      PROCEDURES    Procedures      MEDICATIONS GIVEN IN ER    Medications   sodium chloride 0.9 % flush 10 mL (10 mL Intravenous Given 12/9/20 0826)   allopurinol (ZYLOPRIM) tablet 100 mg (100 mg Oral Given 12/9/20 0826)   apixaban (ELIQUIS) tablet 5 mg (5 mg Oral Given 12/9/20 0826)   atorvastatin (LIPITOR) tablet 10 mg (10 mg Oral Given 12/8/20 2042)   digoxin (LANOXIN) tablet 125 mcg (125 mcg Oral Given 12/9/20 1100)   dofetilide (TIKOSYN) capsule 125 mcg (125 mcg Oral Given 12/9/20 0826)   ipratropium-albuterol (DUO-NEB) nebulizer solution 1.5 mL (has no administration in time range)   metoprolol tartrate (LOPRESSOR) tablet 50 mg (50 mg Oral Given 12/9/20 1602)   guaiFENesin (MUCINEX) 12 hr tablet 600 mg (600 mg Oral Given 12/9/20 0826)   pantoprazole (PROTONIX) EC tablet 40 mg (40 mg Oral Given 12/9/20 0826)   azithromycin (ZITHROMAX) 500 mg 0.9% NaCl (Add-vantage) 250 mL (500 mg Intravenous New Bag 12/9/20 1100)   cefTRIAXone  (ROCEPHIN) IVPB 1 g (1 g Intravenous New Bag 12/9/20 0829)   metoprolol tartrate (LOPRESSOR) injection 5 mg (5 mg Intravenous Given 12/7/20 0209)   digoxin (LANOXIN) injection 250 mcg (250 mcg Intravenous Given 12/7/20 0210)   cefTRIAXone (ROCEPHIN) IVPB 1 g (0 g Intravenous Stopped 12/7/20 0330)   azithromycin (ZITHROMAX) 500 mg 0.9% NaCl (Add-vantage) 250 mL (0 mg Intravenous Stopped 12/7/20 0455)   iopamidol (ISOVUE-300) 61 % injection 100 mL (95 mL Intravenous Given by Other 12/7/20 0333)   furosemide (LASIX) injection 40 mg (40 mg Intravenous Given 12/7/20 1334)         PROGRESS, DATA ANALYSIS, CONSULTS, AND MEDICAL DECISION MAKING    All labs have been independently reviewed by me.  All radiology studies have been reviewed by me and discussed with radiologist dictating the report.   EKG's independently viewed and interpreted by me.  Discussion below represents my analysis of pertinent findings related to patient's condition, differential diagnosis, treatment plan and final disposition.        ED Course as of Dec 09 1928   Mon Dec 07, 2020   0631 Discussed with Dr. Bergeron, will admit to telemetry.    [DP]   0631 Heart rate is improved after dose of digoxin and Lopressor.    [DP]   0631 Based on the work-up it appears the patient has pulmonary edema with pleural effusions for sure, but it also shows white count of 17,000 and low-grade temp suggestive of pneumonia.    [DP]   0631 His Covid is negative, somata treat him with Rocephin and Zithromax    [DP]   Wed Dec 09, 2020   1926 Leukocytosis present    [DP]   1927 Procalcitonin negative    [DP]   1927 Chemistry unremarkable    [DP]   1927 Troponin and digoxin level normal    [DP]   1927 proBNP 2300    [DP]   1927 Urinalysis negative    [DP]   1927 Respiratory viral panel including Covid negative    [DP]   1927 Chest x-ray showed congestive heart failure    [DP]   1927 CT angiogram of the chest shows no evidence for PE    [DP]   1927 There is groundglass opacity  in the right upper lobe, there are also pleural effusions    [DP]   1928 Patient was treated empirically with antibiotics, his heart rate was treated with Lopressor and digoxin.    [DP]   1928 I spoke with Dr. Bergeron who will admit the patient to a telemetry bed for further evaluation treatment.    [DP]      ED Course User Index  [DP] Francis Jolley MD           PPE: Both the patient and I wore a surgical mask throughout the entire patient encounter. I wore protective goggles, gown and gloves    AS OF 19:28 EST VITALS:    BP - 136/56  HR - 65  TEMP - 97.8 °F (36.6 °C) (Oral)  O2 SATS - 94%        DIAGNOSIS  Final diagnoses:   Febrile illness, acute   Acute congestive heart failure, unspecified heart failure type (CMS/[facility])         DISPOSITION  Admit           Francis Jolley MD  12/09/20 1928

## 2020-12-07 NOTE — TELEPHONE ENCOUNTER
Pt's wife called and wanted you to be aware that her  is in the hospital.  She states she took him to the ER this morning for CHF.     # [phone]/ana

## 2020-12-07 NOTE — PLAN OF CARE
Goal Outcome Evaluation:  Vital signs stable. No complaints of pain or shortness of breath voiced. Alert and oriented x 3. Patient is forgetful and very hard of hearing. Up with assistance to bathroom. On room air. A fib cardiac rhythm. Resting quietly in bed. Spouse has called frequently to check on his condition. Cardiology consult called. Dx: CHF. Patient was complaining of heart palpitations and shortness of breath at home. No voiced complaints today. Afebrile. Tolerating regular regular diet well. Will continue to monitor.

## 2020-12-08 LAB
ALBUMIN SERPL-MCNC: 3.5 G/DL (ref 3.5–5.2)
ALBUMIN/GLOB SERPL: 1 G/DL
ALP SERPL-CCNC: 59 U/L (ref 39–117)
ALT SERPL W P-5'-P-CCNC: 13 U/L (ref 1–41)
ANION GAP SERPL CALCULATED.3IONS-SCNC: 8.3 MMOL/L (ref 5–15)
AST SERPL-CCNC: 15 U/L (ref 1–40)
BASOPHILS # BLD AUTO: 0.08 10*3/MM3 (ref 0–0.2)
BASOPHILS NFR BLD AUTO: 0.9 % (ref 0–1.5)
BILIRUB SERPL-MCNC: 0.6 MG/DL (ref 0–1.2)
BUN SERPL-MCNC: 17 MG/DL (ref 8–23)
BUN/CREAT SERPL: 15.2 (ref 7–25)
CALCIUM SPEC-SCNC: 8.7 MG/DL (ref 8.6–10.5)
CHLORIDE SERPL-SCNC: 102 MMOL/L (ref 98–107)
CHOLEST SERPL-MCNC: 113 MG/DL (ref 0–200)
CO2 SERPL-SCNC: 25.7 MMOL/L (ref 22–29)
CREAT SERPL-MCNC: 1.12 MG/DL (ref 0.76–1.27)
DEPRECATED RDW RBC AUTO: 45.4 FL (ref 37–54)
EOSINOPHIL # BLD AUTO: 0.25 10*3/MM3 (ref 0–0.4)
EOSINOPHIL NFR BLD AUTO: 2.9 % (ref 0.3–6.2)
ERYTHROCYTE [DISTWIDTH] IN BLOOD BY AUTOMATED COUNT: 15.6 % (ref 12.3–15.4)
GFR SERPL CREATININE-BSD FRML MDRD: 62 ML/MIN/1.73
GLOBULIN UR ELPH-MCNC: 3.5 GM/DL
GLUCOSE SERPL-MCNC: 78 MG/DL (ref 65–99)
HBA1C MFR BLD: 5.3 % (ref 4.8–5.6)
HCT VFR BLD AUTO: 28.3 % (ref 37.5–51)
HDLC SERPL-MCNC: 50 MG/DL (ref 40–60)
HGB BLD-MCNC: 8.8 G/DL (ref 13–17.7)
IMM GRANULOCYTES # BLD AUTO: 0.07 10*3/MM3 (ref 0–0.05)
IMM GRANULOCYTES NFR BLD AUTO: 0.8 % (ref 0–0.5)
LDLC SERPL CALC-MCNC: 50 MG/DL (ref 0–100)
LDLC/HDLC SERPL: 1.02 {RATIO}
LYMPHOCYTES # BLD AUTO: 1.1 10*3/MM3 (ref 0.7–3.1)
LYMPHOCYTES NFR BLD AUTO: 12.9 % (ref 19.6–45.3)
MCH RBC QN AUTO: 25.3 PG (ref 26.6–33)
MCHC RBC AUTO-ENTMCNC: 31.1 G/DL (ref 31.5–35.7)
MCV RBC AUTO: 81.3 FL (ref 79–97)
MONOCYTES # BLD AUTO: 0.83 10*3/MM3 (ref 0.1–0.9)
MONOCYTES NFR BLD AUTO: 9.7 % (ref 5–12)
NEUTROPHILS NFR BLD AUTO: 6.23 10*3/MM3 (ref 1.7–7)
NEUTROPHILS NFR BLD AUTO: 72.8 % (ref 42.7–76)
NRBC BLD AUTO-RTO: 0 /100 WBC (ref 0–0.2)
NT-PROBNP SERPL-MCNC: 2080 PG/ML (ref 0–1800)
PLATELET # BLD AUTO: 326 10*3/MM3 (ref 140–450)
PMV BLD AUTO: 10.6 FL (ref 6–12)
POTASSIUM SERPL-SCNC: 3.9 MMOL/L (ref 3.5–5.2)
PROT SERPL-MCNC: 7 G/DL (ref 6–8.5)
QT INTERVAL: 375 MS
RBC # BLD AUTO: 3.48 10*6/MM3 (ref 4.14–5.8)
SODIUM SERPL-SCNC: 136 MMOL/L (ref 136–145)
TRIGL SERPL-MCNC: 60 MG/DL (ref 0–150)
TSH SERPL DL<=0.05 MIU/L-ACNC: 3.41 UIU/ML (ref 0.27–4.2)
VIT B12 BLD-MCNC: 981 PG/ML (ref 211–946)
VLDLC SERPL-MCNC: 13 MG/DL (ref 5–40)
WBC # BLD AUTO: 8.56 10*3/MM3 (ref 3.4–10.8)

## 2020-12-08 PROCEDURE — 85025 COMPLETE CBC W/AUTO DIFF WBC: CPT | Performed by: HOSPITALIST

## 2020-12-08 PROCEDURE — 97161 PT EVAL LOW COMPLEX 20 MIN: CPT

## 2020-12-08 PROCEDURE — 80061 LIPID PANEL: CPT | Performed by: HOSPITALIST

## 2020-12-08 PROCEDURE — 82607 VITAMIN B-12: CPT | Performed by: HOSPITALIST

## 2020-12-08 PROCEDURE — 99232 SBSQ HOSP IP/OBS MODERATE 35: CPT | Performed by: INTERNAL MEDICINE

## 2020-12-08 PROCEDURE — 25010000002 AZITHROMYCIN PER 500 MG: Performed by: HOSPITALIST

## 2020-12-08 PROCEDURE — 93005 ELECTROCARDIOGRAM TRACING: CPT | Performed by: INTERNAL MEDICINE

## 2020-12-08 PROCEDURE — 84443 ASSAY THYROID STIM HORMONE: CPT | Performed by: HOSPITALIST

## 2020-12-08 PROCEDURE — 83880 ASSAY OF NATRIURETIC PEPTIDE: CPT | Performed by: HOSPITALIST

## 2020-12-08 PROCEDURE — 93010 ELECTROCARDIOGRAM REPORT: CPT | Performed by: INTERNAL MEDICINE

## 2020-12-08 PROCEDURE — 80053 COMPREHEN METABOLIC PANEL: CPT | Performed by: HOSPITALIST

## 2020-12-08 PROCEDURE — 25010000002 CEFTRIAXONE PER 250 MG: Performed by: HOSPITALIST

## 2020-12-08 PROCEDURE — 83036 HEMOGLOBIN GLYCOSYLATED A1C: CPT | Performed by: HOSPITALIST

## 2020-12-08 RX ORDER — CEFTRIAXONE SODIUM 1 G/50ML
1 INJECTION, SOLUTION INTRAVENOUS EVERY 24 HOURS
Status: DISCONTINUED | OUTPATIENT
Start: 2020-12-08 | End: 2020-12-10

## 2020-12-08 RX ADMIN — Medication 1000 MCG: at 08:56

## 2020-12-08 RX ADMIN — METOPROLOL TARTRATE 50 MG: 50 TABLET, FILM COATED ORAL at 08:56

## 2020-12-08 RX ADMIN — GUAIFENESIN 600 MG: 600 TABLET, EXTENDED RELEASE ORAL at 20:42

## 2020-12-08 RX ADMIN — ATORVASTATIN CALCIUM 10 MG: 20 TABLET, FILM COATED ORAL at 20:42

## 2020-12-08 RX ADMIN — METOPROLOL TARTRATE 50 MG: 50 TABLET, FILM COATED ORAL at 20:42

## 2020-12-08 RX ADMIN — CEFTRIAXONE SODIUM 1 G: 1 INJECTION, SOLUTION INTRAVENOUS at 08:57

## 2020-12-08 RX ADMIN — AZITHROMYCIN 500 MG: 500 INJECTION, POWDER, LYOPHILIZED, FOR SOLUTION INTRAVENOUS at 08:57

## 2020-12-08 RX ADMIN — METOPROLOL TARTRATE 50 MG: 50 TABLET, FILM COATED ORAL at 17:00

## 2020-12-08 RX ADMIN — ALLOPURINOL 100 MG: 100 TABLET ORAL at 20:42

## 2020-12-08 RX ADMIN — ALLOPURINOL 100 MG: 100 TABLET ORAL at 08:56

## 2020-12-08 RX ADMIN — GUAIFENESIN 600 MG: 600 TABLET, EXTENDED RELEASE ORAL at 08:56

## 2020-12-08 RX ADMIN — DOFETILIDE 125 MCG: 0.12 CAPSULE ORAL at 08:56

## 2020-12-08 RX ADMIN — APIXABAN 5 MG: 5 TABLET, FILM COATED ORAL at 08:56

## 2020-12-08 RX ADMIN — PANTOPRAZOLE SODIUM 40 MG: 40 TABLET, DELAYED RELEASE ORAL at 08:56

## 2020-12-08 RX ADMIN — DOFETILIDE 125 MCG: 0.12 CAPSULE ORAL at 20:42

## 2020-12-08 RX ADMIN — DIGOXIN 125 MCG: 125 TABLET ORAL at 13:00

## 2020-12-08 RX ADMIN — APIXABAN 5 MG: 5 TABLET, FILM COATED ORAL at 20:42

## 2020-12-08 NOTE — PLAN OF CARE
"VSS, no c/o pain overnight. Pt had fall overnight. Reports \"getting up to quickly\". Orders placed for up with assistance, education provided on need to call when getting up for help, and bed alarm activated. Small skin tear to R forearm result of fall mepilex applied, no other s/s of injury/distress. Rested through night, will continue to monitor  Problem: Adult Inpatient Plan of Care  Goal: Plan of Care Review  Outcome: Ongoing, Progressing  Goal: Patient-Specific Goal (Individualized)  Outcome: Ongoing, Progressing  Goal: Absence of Hospital-Acquired Illness or Injury  Outcome: Ongoing, Progressing  Goal: Optimal Comfort and Wellbeing  Outcome: Ongoing, Progressing  Intervention: Provide Person-Centered Care  Recent Flowsheet Documentation  Taken 12/7/2020 2100 by Elizabeth Mcfadden RN  Trust Relationship/Rapport:   care explained   choices provided  Goal: Readiness for Transition of Care  Outcome: Ongoing, Progressing     Problem: Gas Exchange Impaired  Goal: Optimal Gas Exchange  Outcome: Ongoing, Progressing     Problem: Activity and Energy Impairment (Anxiety Signs/Symptoms)  Goal: Optimized Energy Level (Anxiety Signs/Symptoms)  Outcome: Ongoing, Progressing     Problem: Cognitive Impairment (Anxiety Signs/Symptoms)  Goal: Optimized Cognitive Function (Anxiety Signs/Symptoms)  Outcome: Ongoing, Progressing     Problem: Mood Impairment (Anxiety Signs/Symptoms)  Goal: Improved Mood Symptoms (Anxiety Signs/Symptoms)  Outcome: Ongoing, Progressing     Problem: Sleep Disturbance (Anxiety Signs/Symptoms)  Goal: Improved Sleep (Anxiety Signs/Symptoms)  Outcome: Ongoing, Progressing     Problem: Social, Occupational or Functional Impairment (Anxiety Signs/Symptoms)  Goal: Enhanced Social, Occupational or Functional Skills (Anxiety Signs/Symptoms)  Outcome: Ongoing, Progressing  Intervention: Promote Social, Occupational and Functional Ability  Recent Flowsheet Documentation  Taken 12/7/2020 2100 by Elizabeth Mcfadden RN  Trust " Relationship/Rapport:   care explained   choices provided     Problem: Somatic Disturbance (Anxiety Signs/Symptoms)  Goal: Improved Somatic Symptoms (Anxiety Signs/Symptoms)  Outcome: Ongoing, Progressing     Problem: Fall Injury Risk  Goal: Absence of Fall and Fall-Related Injury  Outcome: Ongoing, Progressing   Goal Outcome Evaluation:  Plan of Care Reviewed With: patient  Progress: improving

## 2020-12-08 NOTE — DISCHARGE PLACEMENT REQUEST
"Alex Burk (88 y.o. Male)     Date of Birth Social Security Number Address Home Phone MRN    08/21/1932  [id number]  [facility] [address] [phone] [id number]    Sikhism Marital Status          Green[place]ic Hindu        Admission Date Admission Type Admitting Provider Attending Provider Department, Room/Bed    12/7/20 Emergency Calvin Bergeron MD Ahmed, Aftab, MD [address], [address]    Discharge Date Discharge Disposition Discharge Destination                       Attending Provider: Calvin Bergeron MD    Allergies: Amiodarone, Diltiazem, Doxycycline, Indomethacin    Isolation: None   Infection: None   Code Status: CPR    Ht: 172.7 cm (67.99\")   Wt: 65.3 kg (144 lb)    Admission Cmt: None   Principal Problem: Febrile illness, acute [R50.9]                 Active Insurance as of 12/7/2020     Primary Coverage     Payor Plan Insurance Group Employer/Plan Group    MEDICARE MEDICARE A & B      Payor Plan Address Payor Plan Phone Number Payor Plan Fax Number Effective Dates    [address] [phone]  8/1/1997 - None Entered    [facility] [id number]       Subscriber Name Subscriber Birth Date Member ID       ALEX BURK 8/21/1932 [id number]           Secondary Coverage     Payor Plan Insurance Group Employer/Plan Group    [facility] SUPP [id number]     Payor Plan Address Payor Plan Phone Number Payor Plan Fax Number Effective Dates    [address]   12/1/2016 - None Entered    [facility] [id number]       Subscriber Name Subscriber Birth Date Member ID       ALEX BURK 8/21/1932 [id number]                 Emergency Contacts      (Rel.) Home Phone Work Phone Mobile Phone    Ni Burk (Spouse) [phone] -- [phone]    Raul Burk (Son) [phone] -- [phone]            "

## 2020-12-08 NOTE — PROGRESS NOTES
"Daily progress note    Chief complaint  Events noted  Fell last night but no injury  Doing better this morning  No new complaints   Still short of breath and weak but no fever cough chest pain or palpitation    History of present illness  88-year-old male who is well-known to our service with history of chronic atrial fibrillation Crohn's disease hypertension hyperlipidemia chronic anemia and gastroesophageal reflux disease presented to [facility] emergency room with generalized weakness and shortness of breath.  Patient denies any fever cough abdominal pain nausea vomiting diarrhea.  Patient work-up in ER revealed bilateral pneumonia community-acquired infectious admit for management.  Patient also ruled out for COVID-19 infection and pneumonia.      REVIEW OF SYSTEMS  Unremarkable except generalized weakness and shortness of breath     PHYSICAL EXAM  Blood pressure 119/52, pulse 70, temperature 97.7 °F (36.5 °C), temperature source Oral, resp. rate 16, height 172.7 cm (67.99\"), weight 65.3 kg (144 lb), SpO2 94 %.    GENERAL: Wake and alert, mild distress  HENT: nares patent  EYES: no scleral icterus  CV:  Irregular S1-S2  RESPIRATORY: Mild tachypnea, diminished breath sounds bilaterally with rales in the bases  ABDOMEN: soft nontender bowel sounds positive  MUSCULOSKELETAL: no deformity, trace pedal edema ithout calf tenderness  NEURO: alert, moves all extremities, follows commands  SKIN: warm, dry     LAB RESULTS  Lab Results (last 24 hours)     Procedure Component Value Units Date/Time    Vitamin B12 [[id number]]  (Abnormal) Collected: 12/08/20 0701    Specimen: Blood from Arm, Left Updated: 12/08/20 1409     Vitamin B-12 981 pg/mL     Narrative:      Results may be falsely increased if patient taking Biotin.      Comprehensive Metabolic Panel [[id number]] Collected: 12/08/20 0701    Specimen: Blood from Arm, Left Updated: 12/08/20 0820     Glucose 78 mg/dL      BUN 17 mg/dL      Creatinine 1.12 mg/dL  "     Sodium 136 mmol/L      Potassium 3.9 mmol/L      Chloride 102 mmol/L      CO2 25.7 mmol/L      Calcium 8.7 mg/dL      Total Protein 7.0 g/dL      Albumin 3.50 g/dL      ALT (SGPT) 13 U/L      AST (SGOT) 15 U/L      Alkaline Phosphatase 59 U/L      Total Bilirubin 0.6 mg/dL      eGFR Non African Amer 62 mL/min/1.73      Globulin 3.5 gm/dL      A/G Ratio 1.0 g/dL      BUN/Creatinine Ratio 15.2     Anion Gap 8.3 mmol/L     Narrative:      GFR Normal >60  Chronic Kidney Disease <60  Kidney Failure <15      Lipid Panel [[id number]] Collected: 12/08/20 0701    Specimen: Blood from Arm, Left Updated: 12/08/20 0820     Total Cholesterol 113 mg/dL      Triglycerides 60 mg/dL      HDL Cholesterol 50 mg/dL      LDL Cholesterol  50 mg/dL      VLDL Cholesterol 13 mg/dL      LDL/HDL Ratio 1.02    Narrative:      Cholesterol Reference Ranges  (U.S. Department of Health and Human Services ATP III Classifications)    Desirable          <200 mg/dL  Borderline High    200-239 mg/dL  High Risk          >240 mg/dL      Triglyceride Reference Ranges  (U.S. Department of Health and Human Services ATP III Classifications)    Normal           <150 mg/dL  Borderline High  150-199 mg/dL  High             200-499 mg/dL  Very High        >500 mg/dL    HDL Reference Ranges  (U.S. Department of Health and Human Services ATP III Classifcations)    Low     <40 mg/dl (major risk factor for CHD)  High    >60 mg/dl ('negative' risk factor for CHD)        LDL Reference Ranges  (U.S. Department of Health and Human Services ATP III Classifcations)    Optimal          <100 mg/dL  Near Optimal     100-129 mg/dL  Borderline High  130-159 mg/dL  High             160-189 mg/dL  Very High        >189 mg/dL    BNP [[id number]]  (Abnormal) Collected: 12/08/20 0701    Specimen: Blood from Arm, Left Updated: 12/08/20 0819     proBNP 2,080.0 pg/mL     Narrative:      Among patients with dyspnea, NT-proBNP is highly sensitive for the detection of acute congestive  heart failure. In addition NT-proBNP of <300 pg/ml effectively rules out acute congestive heart failure with 99% negative predictive value.    Results may be falsely decreased if patient taking Biotin.      Hemoglobin A1c [[id number]]  (Normal) Collected: 12/08/20 0701    Specimen: Blood from Arm, Left Updated: 12/08/20 0758     Hemoglobin A1C 5.30 %     Narrative:      Hemoglobin A1C Ranges:    Increased Risk for Diabetes  5.7% to 6.4%  Diabetes                     >= 6.5%  Diabetic Goal                < 7.0%    TSH [[id number]] Collected: 12/08/20 0701    Specimen: Blood from Arm, Left Updated: 12/08/20 0748    CBC & Differential [[id number]]  (Abnormal) Collected: 12/08/20 0701    Specimen: Blood from Arm, Left Updated: 12/08/20 0736    Narrative:      The following orders were created for panel order CBC & Differential.  Procedure                               Abnormality         Status                     ---------                               -----------         ------                     CBC Auto Differential[[id number]]        Abnormal            Final result                 Please view results for these tests on the individual orders.    CBC Auto Differential [[id number]]  (Abnormal) Collected: 12/08/20 0701    Specimen: Blood from Arm, Left Updated: 12/08/20 0736     WBC 8.56 10*3/mm3      RBC 3.48 10*6/mm3      Hemoglobin 8.8 g/dL      Hematocrit 28.3 %      MCV 81.3 fL      MCH 25.3 pg      MCHC 31.1 g/dL      RDW 15.6 %      RDW-SD 45.4 fl      MPV 10.6 fL      Platelets 326 10*3/mm3      Neutrophil % 72.8 %      Lymphocyte % 12.9 %      Monocyte % 9.7 %      Eosinophil % 2.9 %      Basophil % 0.9 %      Immature Grans % 0.8 %      Neutrophils, Absolute 6.23 10*3/mm3      Lymphocytes, Absolute 1.10 10*3/mm3      Monocytes, Absolute 0.83 10*3/mm3      Eosinophils, Absolute 0.25 10*3/mm3      Basophils, Absolute 0.08 10*3/mm3      Immature Grans, Absolute 0.07 10*3/mm3      nRBC 0.0 /100 WBC     Blood Culture  - Blood, Arm, Left [[id number]] Collected: 12/07/20 0208    Specimen: Blood from Arm, Left Updated: 12/08/20 0230     Blood Culture No growth at 24 hours    Blood Culture - Blood, Arm, Left [[id number]] Collected: 12/07/20 0154    Specimen: Blood from Arm, Left Updated: 12/08/20 0215     Blood Culture No growth at 24 hours        Imaging Results (Last 24 Hours)     ** No results found for the last 24 hours. **        ECG 12 Lead                 HEART RATE= 152  bpm  RR Interval= 394  ms  OK Interval=   ms  P Horizontal Axis=   deg  P Front Axis=   deg  QRSD Interval= 74  ms  QT Interval= 306  ms  QRS Axis= 3  deg  T Wave Axis= 147  deg  - ABNORMAL ECG -  Atrial fibrillation with rapid V-rate  Consider posterior infarct             Current Facility-Administered Medications:   •  allopurinol (ZYLOPRIM) tablet 100 mg, 100 mg, Oral, BID, Calvin Bergeron MD, 100 mg at 12/08/20 0856  •  apixaban (ELIQUIS) tablet 5 mg, 5 mg, Oral, Q12H, Calvin Bergeron MD, 5 mg at 12/08/20 0856  •  atorvastatin (LIPITOR) tablet 10 mg, 10 mg, Oral, Nightly, Calvin Bergeron MD, 10 mg at 12/07/20 2054  •  azithromycin (ZITHROMAX) 500 mg 0.9% NaCl (Add-vantage) 250 mL, 500 mg, Intravenous, Q24H, Calvin Bergeron MD, Last Rate: 250 mL/hr at 12/08/20 0857, 500 mg at 12/08/20 0857  •  cefTRIAXone (ROCEPHIN) IVPB 1 g, 1 g, Intravenous, Q24H, Calvin Bergeron MD, Last Rate: 100 mL/hr at 12/08/20 0857, 1 g at 12/08/20 0857  •  digoxin (LANOXIN) tablet 125 mcg, 125 mcg, Oral, Daily, Calvin Bergeron MD, 125 mcg at 12/07/20 1333  •  dofetilide (TIKOSYN) capsule 125 mcg, 125 mcg, Oral, BID, Calvin Bergeron MD, 125 mcg at 12/08/20 0856  •  guaiFENesin (MUCINEX) 12 hr tablet 600 mg, 600 mg, Oral, Q12H, Calvin Bergeron MD, 600 mg at 12/08/20 0856  •  ipratropium-albuterol (DUO-NEB) nebulizer solution 1.5 mL, 1.5 mL, Nebulization, Q4H PRN, Calvin Bergeron MD  •  metoprolol tartrate (LOPRESSOR) tablet 50 mg, 50 mg, Oral, TID, Calvin Bergeron MD, 50 mg at 12/08/20 0856  •   pantoprazole (PROTONIX) EC tablet 40 mg, 40 mg, Oral, QAM AC, Shelbie Cowart MD, 40 mg at 12/08/20 0856  •  [COMPLETED] Insert peripheral IV, , , Once **AND** sodium chloride 0.9 % flush 10 mL, 10 mL, Intravenous, PRN, Francis Jolley MD  •  vitamin B-12 (CYANOCOBALAMIN) tablet 1,000 mcg, 1,000 mcg, Oral, Daily, Shelbie Cowart MD, 1,000 mcg at 12/08/20 0856     ASSESSMENT  Bilateral community-acquired pneumonia infectious  Chronic atrial fibrillation with rapid ventricular rate  History of Crohn's disease  Chronic anemia with recent GI bleed secondary to nonbleeding colonic angiodysplastic lesions   Hypertension  Hyperlipidemia  Gastroesophageal reflux disease    PLAN  CPM  IV antibiotics   Control the heart rate  Adjust home medications  Stress ulcer DVT prophylaxis  Cardiology to follow patient  Supportive care  PT/OT  Discussed with nursing staff  Follow closely further recommendation current hospital course    SHELBIE COWART MD

## 2020-12-08 NOTE — THERAPY DISCHARGE NOTE
Patient Name: Asia Ly  : 1932    MRN: [id number]                              Today's Date: 2020       Admit Date: 2020    Visit Dx:     ICD-10-CM ICD-9-CM   1. Febrile illness, acute  R50.9 780.60   2. Acute congestive heart failure, unspecified heart failure type (CMS/HCC)  I50.9 428.0     Patient Active Problem List   Diagnosis   • Ataxia   • TIA (transient ischemic attack)   • Atrial fibrillation, paroxysmal   • Hyperlipidemia   • Gout   • Crohn's disease (CMS/HCC)   • Atrial fibrillation with RVR (CMS/HCC)   • Acute cholecystitis   • Obstructive jaundice   • Acute lower GI bleeding   • Aortic stenosis   • Pulmonary fibrosis (CMS/HCC)   • Anticoagulant long-term use   • Febrile illness, acute     Past Medical History:   Diagnosis Date   • Aortic stenosis    • Aspiration pneumonia (CMS/HCC)    • Atrial fibrillation (CMS/HCC)    • Bronchiectasis (CMS/HCC)    • CKD (chronic kidney disease)    • COPD (chronic obstructive pulmonary disease) (CMS/HCC)    • Crohn's disease (CMS/HCC)    • Dizziness    • Elevated cholesterol    • Gastric ulcer    • Generalized weakness    • Gout    • Hard of hearing    • Hyperlipidemia    • Hypertension    • Leg swelling    • Lower extremity edema    • Mild anemia    • Near syncope    • Nonrheumatic aortic valve stenosis    • PAF (paroxysmal atrial fibrillation) (CMS/HCC)    • Palpitations    • Peptic ulcer    • Pneumonia of left lung due to infectious organism    • Pulmonary fibrosis (CMS/HCC)    • Stroke (CMS/HCC)    • TIA (transient ischemic attack)      Past Surgical History:   Procedure Laterality Date   • BRONCHOSCOPY N/A 10/22/2018    Procedure: BRONCHOSCOPY WITH BRONCHALVEOLAR LAVAGE;  Surgeon: Chidi Oconnor MD;  Location: [facility] ENDOSCOPY;  Service: Pulmonary   • CATARACT EXTRACTION Bilateral    • COLONOSCOPY     • COLONOSCOPY N/A 3/9/2018    Procedure: COLONOSCOPY to terminal ileum with bx;  Surgeon: Aron Cabrera MD;  Location: [facility] ENDOSCOPY;   "Service:    • COLONOSCOPY N/A 11/19/2020    Procedure: COLONOSCOPY to cecum:  apc to cecum angiodysplagia,;  Surgeon: Aron Cabrera MD;  Location: [facility] ENDOSCOPY;  Service: Gastroenterology;  Laterality: N/A;  GI bleed  post:  diverticulosis, angiodysplagia   • CYSTECTOMY      back and shoulder area   • ENDOSCOPY N/A 3/9/2018    Procedure: ESOPHAGOGASTRODUODENOSCOPY with bx;  Surgeon: Aron Cabrera MD;  Location: [facility] ENDOSCOPY;  Service:    • ENDOSCOPY N/A 11/19/2020    Procedure: ESOPHAGOGASTRODUODENOSCOPY;  Surgeon: Aron Cabrera MD;  Location: [facility] ENDOSCOPY;  Service: Gastroenterology;  Laterality: N/A;  GI bleed  post:  HH.   • EYE SURGERY Left     detached retina   • EYE SURGERY Right     \"repaired a hole in the eye\"   • TESTICLE SURGERY      benign tumor removed.     General Information     Row Name 12/08/20 1620          Physical Therapy Time and Intention    Document Type  discharge evaluation/summary  -MS     Mode of Treatment  individual therapy;physical therapy  -MS     Row Name 12/08/20 1620          General Information    Patient Profile Reviewed  yes  -MS     Prior Level of Function  independent:  -MS     Barriers to Rehab  none identified  -MS     Row Name 12/08/20 1620          Cognition    Orientation Status (Cognition)  oriented x 3  -MS     Row Name 12/08/20 1620          Safety Issues, Functional Mobility    Comment, Safety Issues/Impairments (Mobility)  Gait belt used for safety.  -MS       User Key  (r) = Recorded By, (t) = Taken By, (c) = Cosigned By    Initials Name Provider Type    MS Bib Erazo, PT Physical Therapist        Mobility     Row Name 12/08/20 1621          Bed Mobility    Bed Mobility  supine-sit;sit-supine  -MS     Supine-Sit [place]mer (Bed Mobility)  independent  -MS     Sit-Supine [place] (Bed Mobility)  independent  -MS     Row Name 12/08/20 1621          Sit-Stand Transfer    Sit-Stand Par[place] (Transfers)  independent  -MS     Row Name " 12/08/20 1621          Gait/Stairs (Locomotion)    [place] Level (Gait)  independent  -MS     Distance in Feet (Gait)  300 feet  -MS     Comment (Gait/Stairs)  No balance deficits noted.  -MS       User Key  (r) = Recorded By, (t) = Taken By, (c) = Cosigned By    Initials Name Provider Type    Bib Aguilar, PT Physical Therapist        Obj/Interventions     Row Name 12/08/20 1621          Range of Motion Comprehensive    General Range of Motion  no range of motion deficits identified  -MS     Row Name 12/08/20 1621          Strength Comprehensive (MMT)    Comment, General Manual Muscle Testing (MMT) Assessment  BUE/LE (4+/5)  -MS       User Key  (r) = Recorded By, (t) = Taken By, (c) = Cosigned By    Initials Name Provider Type    Bib Aguilar, PT Physical Therapist        Goals/Plan    No documentation.       Clinical Impression     Row Name 12/08/20 1621          Pain    Additional Documentation  Pain Scale: Numbers Pre/Post-Treatment (Group)  -MS     Row Name 12/08/20 1621          Pain Scale: Numbers Pre/Post-Treatment    Pretreatment Pain Rating  0/10 - no pain  -MS     Posttreatment Pain Rating  0/10 - no pain  -MS     Row Name 12/08/20 1621          Therapy Assessment/Plan (PT)    Criteria for Skilled Interventions Met (PT)  no problems identified which require skilled intervention  -MS     Row Name 12/08/20 1621          Positioning and Restraints    Pre-Treatment Position  in bed  -MS     Post Treatment Position  bed  -MS     In Bed  notified nsg;supine;call light within reach;encouraged to call for assist;exit alarm on All lines intact.  -MS       User Key  (r) = Recorded By, (t) = Taken By, (c) = Cosigned By    Initials Name Provider Type    MS ErazoBib, PT Physical Therapist        Outcome Measures     Row Name 12/08/20 1622          How much help from another person do you currently need...    Turning from your back to your side while in flat bed without using bedrails?  4   -MS     Moving from lying on back to sitting on the side of a flat bed without bedrails?  4  -MS     Moving to and from a bed to a chair (including a wheelchair)?  4  -MS     Standing up from a chair using your arms (e.g., wheelchair, bedside chair)?  4  -MS     Climbing 3-5 steps with a railing?  4  -MS     To walk in hospital room?  4  -MS     AM-PAC 6 Clicks Score (PT)  24  -MS     Row Name 12/08/20 1622          Functional Assessment    Outcome Measure Options  AM-PAC 6 Clicks Basic Mobility (PT)  -MS       User Key  (r) = Recorded By, (t) = Taken By, (c) = Cosigned By    Initials Name Provider Type    Bib Aguilar, PT Physical Therapist        Physical Therapy Education                 Title: PT OT SLP Therapies (Resolved)     Topic: Physical Therapy (Resolved)     Point: Mobility training (Resolved)     Learning Progress Summary           Patient Acceptance, E,D, VU,DU by MS at 12/8/2020 1622                   Point: Body mechanics (Resolved)     Learning Progress Summary           Patient Acceptance, E,D, VU,DU by MS at 12/8/2020 1622                   Point: Precautions (Resolved)     Learning Progress Summary           Patient Acceptance, E,D, VU,DU by MS at 12/8/2020 1622                               User Key     Initials Effective Dates Name Provider Type Discipline    MS 04/03/18 -  Bib Erazo, PT Physical Therapist PT              PT Recommendation and Plan     Plan of Care Reviewed With: patient  Outcome Summary: Pt. is currently independent with functional mobility and is back to baseline functioning.  Pt. with no further questions/concerns regarding functional mobility or home safety. Encouraged pt. to continue ambulation with nursing staff while here in the hospital. Otherwise, P.T. will sign off.     Time Calculation:   PT Charges     Row Name 12/08/20 1624             Time Calculation    Start Time  1520  -MS      Stop Time  1533  -MS      Time Calculation (min)  13 min  -MS       PT Received On  12/08/20  -MS         Time Calculation- PT    Total Timed Code Minutes- PT  12 minute(s)  -MS        User Key  (r) = Recorded By, (t) = Taken By, (c) = Cosigned By    Initials Name Provider Type    Bib Aguilar, PT Physical Therapist        Therapy Charges for Today     Code Description Service Date Service Provider Modifiers Qty    09331758231 HC PT EVAL LOW COMPLEXITY 1 12/8/2020 Bib Erazo, PT GP 1          PT G-Codes  Outcome Measure Options: AM-PAC 6 Clicks Basic Mobility (PT)  AM-PAC 6 Clicks Score (PT): 24    PT Discharge Summary  Anticipated Discharge Disposition (PT): home  Reason for Discharge: At baseline function, Independent  Discharge Destination: Home    Bib Erazo, PT  12/8/2020

## 2020-12-08 NOTE — PROGRESS NOTES
"    Patient Name: Asia Ly  :1932  88 y.o.      Patient Care Team:  Lubna Lobo MD as PCP - General  Lubna Lobo MD as PCP - Family Medicine    Chief Complaint: Acute febrile illness    Interval History: comfortable,    Had fall overnight getting up    Objective   Vital Signs  Temp:  [97.5 °F (36.4 °C)-98.6 °F (37 °C)] 97.7 °F (36.5 °C)  Heart Rate:  [58-77] 77  Resp:  [16] 16  BP: (127-143)/(51-64) 143/64    Intake/Output Summary (Last 24 hours) at 2020 0950  Last data filed at 2020 0857  Gross per 24 hour   Intake 1210 ml   Output --   Net 1210 ml     Flowsheet Rows      First Filed Value   Admission Height  172.7 cm (68\") Documented at 2020 0138   Admission Weight  65.3 kg (144 lb) Documented at 2020 0816          Physical Exam:   General Appearance:    comfortable, in no acute distress   Lungs:     Clear to auscultation.  Normal respiratory effort and rate.      Heart:    Regular rhythm and normal rate, normal S1 and S2, no murmurs, gallops or rubs.     Chest Wall:    No abnormalities observed   Abdomen:     Soft, nontender, positive bowel sounds.     Extremities:   no cyanosis, clubbing or edema.  No marked joint deformities.  Adequate musculoskeletal strength.       Results Review:    Results from last 7 days   Lab Units 20  0701   SODIUM mmol/L 136   POTASSIUM mmol/L 3.9   CHLORIDE mmol/L 102   CO2 mmol/L 25.7   BUN mg/dL 17   CREATININE mg/dL 1.12   GLUCOSE mg/dL 78   CALCIUM mg/dL 8.7     Results from last 7 days   Lab Units 20  0154   TROPONIN T ng/mL <0.010     Results from last 7 days   Lab Units 20  0701   WBC 10*3/mm3 8.56   HEMOGLOBIN g/dL 8.8*   HEMATOCRIT % 28.3*   PLATELETS 10*3/mm3 326         Results from last 7 days   Lab Units 20  0154   MAGNESIUM mg/dL 1.8     Results from last 7 days   Lab Units 20  0701   CHOLESTEROL mg/dL 113   TRIGLYCERIDES mg/dL 60   HDL CHOL mg/dL 50   LDL CHOL mg/dL 50               Medication " Review:   allopurinol, 100 mg, Oral, BID  apixaban, 5 mg, Oral, Q12H  atorvastatin, 10 mg, Oral, Nightly  azithromycin, 500 mg, Intravenous, Q24H  cefTRIAXone, 1 g, Intravenous, Q24H  digoxin, 125 mcg, Oral, Daily  dofetilide, 125 mcg, Oral, BID  guaiFENesin, 600 mg, Oral, Q12H  metoprolol tartrate, 50 mg, Oral, TID  pantoprazole, 40 mg, Oral, QAM AC  vitamin B-12, 1,000 mcg, Oral, Daily              Assessment/Plan     Active Hospital Problems    Diagnosis  POA   • **Febrile illness, acute [R50.9]  Yes   • Aortic stenosis [I35.0]  Yes   • Anticoagulant long-term use [Z79.01]  Not Applicable   • Atrial fibrillation with RVR (CMS/HCC) [I48.91]  Yes   • Atrial fibrillation, paroxysmal [I48.91]  Yes      Resolved Hospital Problems   No resolved problems to display.     1.  Acute febrile illness, on IV antibiotics  2.  Paroxysmal atrial fibrillation- in SR today, cont dig/metoprolol/tikosyn. QTc normal  3.  Aortic stenosis-patient has a history of moderate aortic stenosis with most recent echocardiogram August 27, 2020 demonstrating a mean gradient 24 mmHg.  Normal left systolic size and function.  Continue to follow-up  4.  Fall overnight    Oseas Delgado III, MD  [facility]  12/08/20  09:50 EST

## 2020-12-08 NOTE — PROGRESS NOTES
Discharge Planning Assessment  [facility]     Patient Name: Asia Ly  MRN: [id number]  Today's Date: 12/8/2020    Admit Date: 12/7/2020    Discharge Needs Assessment     Row Name 12/08/20 1030       Living Environment    Lives With  spouse    Current Living Arrangements  home/apartment/condo    Primary Care Provided by  self    Family Caregiver if Needed  spouse       Resource/Environmental Concerns    Resource/Environmental Concerns  none       Transition Planning    Patient/Family Anticipates Transition to  home with family    Patient/Family Anticipated Services at Transition  none    Transportation Anticipated  family or friend will provide       Discharge Needs Assessment    Equipment Currently Used at Home  none        Discharge Plan     Row Name 12/08/20 1031       Plan    Plan  Return home with [facility]    Patient/Family in Agreement with Plan  yes    Plan Comments  Spoke with pt.  Introduced self, explained CCP role, facesheet verified.  Pt states he lives with spouse and is indpendent with ADLs.  Denies use of any DME. Pt is current with [facility] and would like to resume.  Referral placed in [facility] and notified [facility].  No further needs identified.  CCP will follow. MATA rao RN        Continued Care and Services - Admitted Since 12/7/2020     Home Medical Care     Service Provider Request Status Selected Services Address Phone Fax Patient Preferred    [facility]  Pending - Request Sent N/A [address] [address] [phone] --            Selected Continued Care - Prior Encounters Includes selections from prior encounters from 9/8/2020 to 12/8/2020    Discharged on 11/21/2020 Admission date: 11/17/2020 - Discharge disposition: Home-Health Care [facility]    Home Medical Care     Service Provider Selected Services Address Phone Fax Patient Preferred    [facility] [address] [facility] [address]  [place] [address] [phone] --                      Demographic Summary     Row Name 12/08/20 1023       General Information    Admission Type  inpatient    Arrived From  home    Referral Source  admission list    Reason for Consult  discharge planning    Preferred Language  English       Contact Information    Permission Granted to Share Info With  family/designee Ni Ly/spouse        Functional Status    No documentation.       Psychosocial    No documentation.       Abuse/Neglect    No documentation.       Legal    No documentation.       Substance Abuse    No documentation.       Patient Forms    No documentation.           Ramya Conklin RN

## 2020-12-08 NOTE — PLAN OF CARE
Goal Outcome Evaluation:  Plan of Care Reviewed With: patient  Progress: improving  Outcome Summary: Pt. is currently independent with functional mobility and is back to baseline functioning.  Pt. with no further questions/concerns regarding functional mobility or home safety. Encouraged pt. to continue ambulation with nursing staff while here in the hospital. Otherwise, P.T. will sign off.    Patient was wearing a face mask during this therapy encounter. Therapist used appropriate personal protective equipment including eye protection, mask, and gloves.  Mask used was standard procedure mask. Appropriate PPE was worn during the entire therapy session. Hand hygiene was completed before and after therapy session. Patient is not in enhanced droplet precautions.

## 2020-12-09 LAB
ANION GAP SERPL CALCULATED.3IONS-SCNC: 10.9 MMOL/L (ref 5–15)
BASOPHILS # BLD AUTO: 0.08 10*3/MM3 (ref 0–0.2)
BASOPHILS NFR BLD AUTO: 1 % (ref 0–1.5)
BUN SERPL-MCNC: 17 MG/DL (ref 8–23)
BUN/CREAT SERPL: 15.3 (ref 7–25)
CALCIUM SPEC-SCNC: 8.5 MG/DL (ref 8.6–10.5)
CHLORIDE SERPL-SCNC: 104 MMOL/L (ref 98–107)
CO2 SERPL-SCNC: 22.1 MMOL/L (ref 22–29)
CREAT SERPL-MCNC: 1.11 MG/DL (ref 0.76–1.27)
DEPRECATED RDW RBC AUTO: 47.9 FL (ref 37–54)
EOSINOPHIL # BLD AUTO: 0.27 10*3/MM3 (ref 0–0.4)
EOSINOPHIL NFR BLD AUTO: 3.5 % (ref 0.3–6.2)
ERYTHROCYTE [DISTWIDTH] IN BLOOD BY AUTOMATED COUNT: 15.8 % (ref 12.3–15.4)
GFR SERPL CREATININE-BSD FRML MDRD: 63 ML/MIN/1.73
GLUCOSE SERPL-MCNC: 77 MG/DL (ref 65–99)
HCT VFR BLD AUTO: 28.8 % (ref 37.5–51)
HGB BLD-MCNC: 9 G/DL (ref 13–17.7)
IMM GRANULOCYTES # BLD AUTO: 0.04 10*3/MM3 (ref 0–0.05)
IMM GRANULOCYTES NFR BLD AUTO: 0.5 % (ref 0–0.5)
LYMPHOCYTES # BLD AUTO: 1.13 10*3/MM3 (ref 0.7–3.1)
LYMPHOCYTES NFR BLD AUTO: 14.8 % (ref 19.6–45.3)
MCH RBC QN AUTO: 26.2 PG (ref 26.6–33)
MCHC RBC AUTO-ENTMCNC: 31.3 G/DL (ref 31.5–35.7)
MCV RBC AUTO: 83.7 FL (ref 79–97)
MONOCYTES # BLD AUTO: 0.75 10*3/MM3 (ref 0.1–0.9)
MONOCYTES NFR BLD AUTO: 9.8 % (ref 5–12)
NEUTROPHILS NFR BLD AUTO: 5.39 10*3/MM3 (ref 1.7–7)
NEUTROPHILS NFR BLD AUTO: 70.4 % (ref 42.7–76)
NRBC BLD AUTO-RTO: 0 /100 WBC (ref 0–0.2)
PLATELET # BLD AUTO: 312 10*3/MM3 (ref 140–450)
PMV BLD AUTO: 10.7 FL (ref 6–12)
POTASSIUM SERPL-SCNC: 3.8 MMOL/L (ref 3.5–5.2)
RBC # BLD AUTO: 3.44 10*6/MM3 (ref 4.14–5.8)
SODIUM SERPL-SCNC: 137 MMOL/L (ref 136–145)
WBC # BLD AUTO: 7.66 10*3/MM3 (ref 3.4–10.8)

## 2020-12-09 PROCEDURE — 85025 COMPLETE CBC W/AUTO DIFF WBC: CPT | Performed by: HOSPITALIST

## 2020-12-09 PROCEDURE — 25010000002 AZITHROMYCIN PER 500 MG: Performed by: HOSPITALIST

## 2020-12-09 PROCEDURE — 99231 SBSQ HOSP IP/OBS SF/LOW 25: CPT | Performed by: NURSE PRACTITIONER

## 2020-12-09 PROCEDURE — 25010000002 CEFTRIAXONE PER 250 MG: Performed by: HOSPITALIST

## 2020-12-09 PROCEDURE — 97530 THERAPEUTIC ACTIVITIES: CPT

## 2020-12-09 PROCEDURE — 97165 OT EVAL LOW COMPLEX 30 MIN: CPT

## 2020-12-09 PROCEDURE — 80048 BASIC METABOLIC PNL TOTAL CA: CPT | Performed by: HOSPITALIST

## 2020-12-09 RX ADMIN — ATORVASTATIN CALCIUM 10 MG: 20 TABLET, FILM COATED ORAL at 20:52

## 2020-12-09 RX ADMIN — GUAIFENESIN 600 MG: 600 TABLET, EXTENDED RELEASE ORAL at 20:51

## 2020-12-09 RX ADMIN — DIGOXIN 125 MCG: 125 TABLET ORAL at 11:00

## 2020-12-09 RX ADMIN — APIXABAN 5 MG: 5 TABLET, FILM COATED ORAL at 20:51

## 2020-12-09 RX ADMIN — METOPROLOL TARTRATE 50 MG: 50 TABLET, FILM COATED ORAL at 20:51

## 2020-12-09 RX ADMIN — DOFETILIDE 125 MCG: 0.12 CAPSULE ORAL at 20:51

## 2020-12-09 RX ADMIN — PANTOPRAZOLE SODIUM 40 MG: 40 TABLET, DELAYED RELEASE ORAL at 08:26

## 2020-12-09 RX ADMIN — ALLOPURINOL 100 MG: 100 TABLET ORAL at 08:26

## 2020-12-09 RX ADMIN — METOPROLOL TARTRATE 50 MG: 50 TABLET, FILM COATED ORAL at 08:26

## 2020-12-09 RX ADMIN — GUAIFENESIN 600 MG: 600 TABLET, EXTENDED RELEASE ORAL at 08:26

## 2020-12-09 RX ADMIN — CEFTRIAXONE SODIUM 1 G: 1 INJECTION, SOLUTION INTRAVENOUS at 08:29

## 2020-12-09 RX ADMIN — AZITHROMYCIN 500 MG: 500 INJECTION, POWDER, LYOPHILIZED, FOR SOLUTION INTRAVENOUS at 11:00

## 2020-12-09 RX ADMIN — APIXABAN 5 MG: 5 TABLET, FILM COATED ORAL at 08:26

## 2020-12-09 RX ADMIN — METOPROLOL TARTRATE 50 MG: 50 TABLET, FILM COATED ORAL at 16:02

## 2020-12-09 RX ADMIN — DOFETILIDE 125 MCG: 0.12 CAPSULE ORAL at 08:26

## 2020-12-09 RX ADMIN — ALLOPURINOL 100 MG: 100 TABLET ORAL at 20:51

## 2020-12-09 RX ADMIN — SODIUM CHLORIDE, PRESERVATIVE FREE 10 ML: 5 INJECTION INTRAVENOUS at 08:26

## 2020-12-09 NOTE — PROGRESS NOTES
"    Patient Name: Asia Ly  :1932  88 y.o.      Patient Care Team:  Lubna Lobo MD as PCP - General  Lubna Lobo MD as PCP - Family Medicine    Chief Complaint: follow up fever, paroxysmal atrial fibrillation    Interval History: He is resting in bed. Lying flat with one pillow. Denies PND, orthopnea, shortness of breath. No chest pain. Remains in SR.        Objective   Vital Signs  Temp:  [97.3 °F (36.3 °C)-98.8 °F (37.1 °C)] 97.3 °F (36.3 °C)  Heart Rate:  [57-79] 70  Resp:  [16-18] 18  BP: (134-161)/(48-68) 134/48    Intake/Output Summary (Last 24 hours) at 2020 1137  Last data filed at 2020 0846  Gross per 24 hour   Intake 840 ml   Output --   Net 840 ml     Flowsheet Rows      First Filed Value   Admission Height  172.7 cm (68\") Documented at 2020 0138   Admission Weight  65.3 kg (144 lb) Documented at 2020 0816          Physical Exam:   General Appearance:    Alert, cooperative, in no acute distress   Lungs:     Clear to auscultation.  Normal respiratory effort and rate.      Heart:    Regular rhythm and normal rate, normal S1 and S2, no murmurs, gallops or rubs.     Chest Wall:    No abnormalities observed   Abdomen:     Soft, nontender, positive bowel sounds.     Extremities:   no cyanosis, clubbing or edema.  No marked joint deformities.  Adequate musculoskeletal strength.       Results Review:    Results from last 7 days   Lab Units 20  0650   SODIUM mmol/L 137   POTASSIUM mmol/L 3.8   CHLORIDE mmol/L 104   CO2 mmol/L 22.1   BUN mg/dL 17   CREATININE mg/dL 1.11   GLUCOSE mg/dL 77   CALCIUM mg/dL 8.5*     Results from last 7 days   Lab Units 20  0154   TROPONIN T ng/mL <0.010     Results from last 7 days   Lab Units 20  0650   WBC 10*3/mm3 7.66   HEMOGLOBIN g/dL 9.0*   HEMATOCRIT % 28.8*   PLATELETS 10*3/mm3 312         Results from last 7 days   Lab Units 20  0154   MAGNESIUM mg/dL 1.8     Results from last 7 days   Lab Units 20  0701 "   CHOLESTEROL mg/dL 113   TRIGLYCERIDES mg/dL 60   HDL CHOL mg/dL 50   LDL CHOL mg/dL 50               Medication Review:   allopurinol, 100 mg, Oral, BID  apixaban, 5 mg, Oral, Q12H  atorvastatin, 10 mg, Oral, Nightly  azithromycin, 500 mg, Intravenous, Q24H  cefTRIAXone, 1 g, Intravenous, Q24H  digoxin, 125 mcg, Oral, Daily  dofetilide, 125 mcg, Oral, BID  guaiFENesin, 600 mg, Oral, Q12H  metoprolol tartrate, 50 mg, Oral, TID  pantoprazole, 40 mg, Oral, QAM AC              Assessment/Plan   1. Acute febrile illness on antibiotics per Dr. Bergeron.  2. Paroxysmal atrial fibrillation - remains in SR today. Continue digoxin/metoprolol/dofetilide. He is on apixaban.  3. Aortic stenosis - moderate AS. No evidence of heart failure currently. He has normal LV systolic function.   4. Dyslipidemia - on atorvastatin    Nothing further to add from cardiac standpoint. Will sign off. Please call with additional questions. Keep routine follow up with Dr. Brown.     KYLAH Garcia  [facility]  12/09/20  11:37 EST

## 2020-12-09 NOTE — PLAN OF CARE
Problem: Adult Inpatient Plan of Care  Goal: Plan of Care Review  Outcome: Ongoing, Progressing  Goal: Patient-Specific Goal (Individualized)  Outcome: Ongoing, Progressing  Goal: Absence of Hospital-Acquired Illness or Injury  Outcome: Ongoing, Progressing  Intervention: Identify and Manage Fall Risk  Recent Flowsheet Documentation  Taken 12/8/2020 2045 by Elizabeth Mcfadden RN  Safety Promotion/Fall Prevention:   activity supervised   fall prevention program maintained   nonskid shoes/slippers when out of bed   safety round/check completed  Goal: Optimal Comfort and Wellbeing  Outcome: Ongoing, Progressing  Goal: Readiness for Transition of Care  Outcome: Ongoing, Progressing     Problem: Gas Exchange Impaired  Goal: Optimal Gas Exchange  Outcome: Ongoing, Progressing     Problem: Activity and Energy Impairment (Anxiety Signs/Symptoms)  Goal: Optimized Energy Level (Anxiety Signs/Symptoms)  Outcome: Ongoing, Progressing     Problem: Cognitive Impairment (Anxiety Signs/Symptoms)  Goal: Optimized Cognitive Function (Anxiety Signs/Symptoms)  Outcome: Ongoing, Progressing     Problem: Mood Impairment (Anxiety Signs/Symptoms)  Goal: Improved Mood Symptoms (Anxiety Signs/Symptoms)  Outcome: Ongoing, Progressing     Problem: Sleep Disturbance (Anxiety Signs/Symptoms)  Goal: Improved Sleep (Anxiety Signs/Symptoms)  Outcome: Ongoing, Progressing     Problem: Social, Occupational or Functional Impairment (Anxiety Signs/Symptoms)  Goal: Enhanced Social, Occupational or Functional Skills (Anxiety Signs/Symptoms)  Outcome: Ongoing, Progressing     Problem: Somatic Disturbance (Anxiety Signs/Symptoms)  Goal: Improved Somatic Symptoms (Anxiety Signs/Symptoms)  Outcome: Ongoing, Progressing     Problem: Fall Injury Risk  Goal: Absence of Fall and Fall-Related Injury  Outcome: Ongoing, Progressing  Intervention: Promote Injury-Free Environment  Recent Flowsheet Documentation  Taken 12/8/2020 2045 by Elizabeth Mcfadden RN  Safety  Promotion/Fall Prevention:   activity supervised   fall prevention program maintained   nonskid shoes/slippers when out of bed   safety round/check completed   Goal Outcome Evaluation:  Plan of Care Reviewed With: patient  Progress: improving

## 2020-12-09 NOTE — PROGRESS NOTES
"Daily progress note    Chief complaint  Doing better this morning  No new complaints    History of present illness  88-year-old male who is well-known to our service with history of chronic atrial fibrillation Crohn's disease hypertension hyperlipidemia chronic anemia and gastroesophageal reflux disease presented to [facility] emergency room with generalized weakness and shortness of breath.  Patient denies any fever cough abdominal pain nausea vomiting diarrhea.  Patient work-up in ER revealed bilateral pneumonia community-acquired infectious admit for management.  Patient also ruled out for COVID-19 infection and pneumonia.      REVIEW OF SYSTEMS  Unremarkable except generalized weakness and shortness of breath     PHYSICAL EXAM  Blood pressure 134/48, pulse 70, temperature 97.3 °F (36.3 °C), temperature source Oral, resp. rate 18, height 172.7 cm (67.99\"), weight 65.3 kg (144 lb), SpO2 96 %.    GENERAL: Wake and alert, mild distress  HENT: nares patent  EYES: no scleral icterus  CV:  Irregular S1-S2  RESPIRATORY: Mild tachypnea, diminished breath sounds bilaterally with rales in the bases  ABDOMEN: soft nontender bowel sounds positive  MUSCULOSKELETAL: no deformity, trace pedal edema ithout calf tenderness  NEURO: alert, moves all extremities, follows commands  SKIN: warm, dry     LAB RESULTS  Lab Results (last 24 hours)     Procedure Component Value Units Date/Time    Basic Metabolic Panel [[id number]]  (Abnormal) Collected: 12/09/20 0650    Specimen: Blood Updated: 12/09/20 1037     Glucose 77 mg/dL      BUN 17 mg/dL      Creatinine 1.11 mg/dL      Sodium 137 mmol/L      Potassium 3.8 mmol/L      Chloride 104 mmol/L      CO2 22.1 mmol/L      Calcium 8.5 mg/dL      eGFR Non African Amer 63 mL/min/1.73      BUN/Creatinine Ratio 15.3     Anion Gap 10.9 mmol/L     Narrative:      GFR Normal >60  Chronic Kidney Disease <60  Kidney Failure <15      CBC & Differential [[id number]]  (Abnormal) Collected: " 12/09/20 0650    Specimen: Blood Updated: 12/09/20 0843    Narrative:      The following orders were created for panel order CBC & Differential.  Procedure                               Abnormality         Status                     ---------                               -----------         ------                     CBC Auto Differential[[id number]]        Abnormal            Final result                 Please view results for these tests on the individual orders.    CBC Auto Differential [[id number]]  (Abnormal) Collected: 12/09/20 0650    Specimen: Blood Updated: 12/09/20 0843     WBC 7.66 10*3/mm3      RBC 3.44 10*6/mm3      Hemoglobin 9.0 g/dL      Hematocrit 28.8 %      MCV 83.7 fL      MCH 26.2 pg      MCHC 31.3 g/dL      RDW 15.8 %      RDW-SD 47.9 fl      MPV 10.7 fL      Platelets 312 10*3/mm3      Neutrophil % 70.4 %      Lymphocyte % 14.8 %      Monocyte % 9.8 %      Eosinophil % 3.5 %      Basophil % 1.0 %      Immature Grans % 0.5 %      Neutrophils, Absolute 5.39 10*3/mm3      Lymphocytes, Absolute 1.13 10*3/mm3      Monocytes, Absolute 0.75 10*3/mm3      Eosinophils, Absolute 0.27 10*3/mm3      Basophils, Absolute 0.08 10*3/mm3      Immature Grans, Absolute 0.04 10*3/mm3      nRBC 0.0 /100 WBC     Blood Culture - Blood, Arm, Left [[id number]] Collected: 12/07/20 0208    Specimen: Blood from Arm, Left Updated: 12/09/20 0230     Blood Culture No growth at 2 days    Blood Culture - Blood, Arm, Left [[id number]] Collected: 12/07/20 0154    Specimen: Blood from Arm, Left Updated: 12/09/20 0215     Blood Culture No growth at 2 days    TSH [[id number]]  (Normal) Collected: 12/08/20 0701    Specimen: Blood from Arm, Left Updated: 12/08/20 1837     TSH 3.410 uIU/mL     Vitamin B12 [[id number]]  (Abnormal) Collected: 12/08/20 0701    Specimen: Blood from Arm, Left Updated: 12/08/20 1409     Vitamin B-12 981 pg/mL     Narrative:      Results may be falsely increased if patient taking Biotin.          Imaging  Results (Last 24 Hours)     ** No results found for the last 24 hours. **        ECG 12 Lead                 HEART RATE= 152  bpm  RR Interval= 394  ms  NJ Interval=   ms  P Horizontal Axis=   deg  P Front Axis=   deg  QRSD Interval= 74  ms  QT Interval= 306  ms  QRS Axis= 3  deg  T Wave Axis= 147  deg  - ABNORMAL ECG -  Atrial fibrillation with rapid V-rate  Consider posterior infarct             Current Facility-Administered Medications:   •  allopurinol (ZYLOPRIM) tablet 100 mg, 100 mg, Oral, BID, Calvin Bergeron MD, 100 mg at 12/09/20 0826  •  apixaban (ELIQUIS) tablet 5 mg, 5 mg, Oral, Q12H, Calvin Bergeron MD, 5 mg at 12/09/20 0826  •  atorvastatin (LIPITOR) tablet 10 mg, 10 mg, Oral, Nightly, Calvin Bergeron MD, 10 mg at 12/08/20 2042  •  azithromycin (ZITHROMAX) 500 mg 0.9% NaCl (Add-vantage) 250 mL, 500 mg, Intravenous, Q24H, Calvin Bergeron MD, Last Rate: 250 mL/hr at 12/09/20 1100, 500 mg at 12/09/20 1100  •  cefTRIAXone (ROCEPHIN) IVPB 1 g, 1 g, Intravenous, Q24H, Calvin Bergeron MD, Last Rate: 100 mL/hr at 12/09/20 0829, 1 g at 12/09/20 0829  •  digoxin (LANOXIN) tablet 125 mcg, 125 mcg, Oral, Daily, Calvin Bergeron MD, 125 mcg at 12/09/20 1100  •  dofetilide (TIKOSYN) capsule 125 mcg, 125 mcg, Oral, BID, Calvin Bergeron MD, 125 mcg at 12/09/20 0826  •  guaiFENesin (MUCINEX) 12 hr tablet 600 mg, 600 mg, Oral, Q12H, Calvin Bergeron MD, 600 mg at 12/09/20 0826  •  ipratropium-albuterol (DUO-NEB) nebulizer solution 1.5 mL, 1.5 mL, Nebulization, Q4H PRN, Calvin Bergeron MD  •  metoprolol tartrate (LOPRESSOR) tablet 50 mg, 50 mg, Oral, TID, Calvin Bergeron MD, 50 mg at 12/09/20 0826  •  pantoprazole (PROTONIX) EC tablet 40 mg, 40 mg, Oral, QAM AC, Calvin Bergeron MD, 40 mg at 12/09/20 0826  •  [COMPLETED] Insert peripheral IV, , , Once **AND** sodium chloride 0.9 % flush 10 mL, 10 mL, Intravenous, PRN, Francis Jolley MD, 10 mL at 12/09/20 0826     ASSESSMENT  Bilateral community-acquired pneumonia infectious  Chronic atrial  fibrillation with rapid ventricular rate  History of Crohn's disease  Chronic anemia with recent GI bleed secondary to nonbleeding colonic angiodysplastic lesions   Hypertension  Hyperlipidemia  Gastroesophageal reflux disease    PLAN  CPM  IV antibiotics   Control the heart rate  Adjust home medications  Stress ulcer DVT prophylaxis  Cardiology to follow patient  Supportive care  PT/OT  Discussed with nursing staff  Discharge planning    SHELBIE COWART MD

## 2020-12-09 NOTE — PLAN OF CARE
Goal Outcome Evaluation:  Plan of Care Reviewed With: patient  Progress: improving  Outcome Summary: NSR; up with SB assistance, no c/o pain, [place], VSS, AAOx4, IV abx as ordered

## 2020-12-09 NOTE — THERAPY DISCHARGE NOTE
Patient Name: Asia Ly  : 1932    MRN: [id number]                              Today's Date: 2020       Admit Date: 2020    Visit Dx:     ICD-10-CM ICD-9-CM   1. Febrile illness, acute  R50.9 780.60   2. Acute congestive heart failure, unspecified heart failure type (CMS/HCC)  I50.9 428.0     Patient Active Problem List   Diagnosis   • Ataxia   • TIA (transient ischemic attack)   • Atrial fibrillation, paroxysmal   • Hyperlipidemia   • Gout   • Crohn's disease (CMS/HCC)   • Atrial fibrillation with RVR (CMS/HCC)   • Acute cholecystitis   • Obstructive jaundice   • Acute lower GI bleeding   • Aortic stenosis   • Pulmonary fibrosis (CMS/HCC)   • Anticoagulant long-term use   • Febrile illness, acute     Past Medical History:   Diagnosis Date   • Aortic stenosis    • Aspiration pneumonia (CMS/HCC)    • Atrial fibrillation (CMS/HCC)    • Bronchiectasis (CMS/HCC)    • CKD (chronic kidney disease)    • COPD (chronic obstructive pulmonary disease) (CMS/HCC)    • Crohn's disease (CMS/HCC)    • Dizziness    • Elevated cholesterol    • Gastric ulcer    • Generalized weakness    • Gout    • Hard of hearing    • Hyperlipidemia    • Hypertension    • Leg swelling    • Lower extremity edema    • Mild anemia    • Near syncope    • Nonrheumatic aortic valve stenosis    • PAF (paroxysmal atrial fibrillation) (CMS/HCC)    • Palpitations    • Peptic ulcer    • Pneumonia of left lung due to infectious organism    • Pulmonary fibrosis (CMS/HCC)    • Stroke (CMS/HCC)    • TIA (transient ischemic attack)      Past Surgical History:   Procedure Laterality Date   • BRONCHOSCOPY N/A 10/22/2018    Procedure: BRONCHOSCOPY WITH BRONCHALVEOLAR LAVAGE;  Surgeon: Chidi Oconnor MD;  Location: [facility] ENDOSCOPY;  Service: Pulmonary   • CATARACT EXTRACTION Bilateral    • COLONOSCOPY     • COLONOSCOPY N/A 3/9/2018    Procedure: COLONOSCOPY to terminal ileum with bx;  Surgeon: Aron Cabrera MD;  Location: [facility] ENDOSCOPY;   "Service:    • COLONOSCOPY N/A 11/19/2020    Procedure: COLONOSCOPY to cecum:  apc to cecum angiodysplagia,;  Surgeon: Aron Cabrera MD;  Location: [facility] ENDOSCOPY;  Service: Gastroenterology;  Laterality: N/A;  GI bleed  post:  diverticulosis, angiodysplagia   • CYSTECTOMY      back and shoulder area   • ENDOSCOPY N/A 3/9/2018    Procedure: ESOPHAGOGASTRODUODENOSCOPY with bx;  Surgeon: Aron Cabrera MD;  Location: [facility] ENDOSCOPY;  Service:    • ENDOSCOPY N/A 11/19/2020    Procedure: ESOPHAGOGASTRODUODENOSCOPY;  Surgeon: Aron Cabrera MD;  Location: [facility] ENDOSCOPY;  Service: Gastroenterology;  Laterality: N/A;  GI bleed  post:  HH.   • EYE SURGERY Left     detached retina   • EYE SURGERY Right     \"repaired a hole in the eye\"   • TESTICLE SURGERY      benign tumor removed.     General Information     Row Name 12/09/20 0910          OT Time and Intention    Document Type  evaluation;discharge evaluation/summary  -     Mode of Treatment  occupational therapy  -     Row Name 12/09/20 0910          General Information    Patient Profile Reviewed  yes  -BL     Prior Level of Function  independent:  -     Existing Precautions/Restrictions  no known precautions/restrictions  -     Barriers to Rehab  none identified  -     Row Name 12/09/20 0910          Living Environment    Lives With  spouse  -     Row Name 12/09/20 0910          Home Main Entrance    Number of Stairs, Main Entrance  none  -BL     Stair Railings, Main Entrance  none  -BL     Row Name 12/09/20 0910          Stairs Within Home, Primary    Number of Stairs, Within Home, Primary  none  -BL     Stair Railings, Within Home, Primary  none  -BL     Row Name 12/09/20 0910          Cognition    Orientation Status (Cognition)  oriented x 4  -     Row Name 12/09/20 0910          Safety Issues, Functional Mobility    Safety Issues Affecting Function (Mobility)  ability to follow commands  -       User Key  (r) = Recorded By, (t) = Taken " By, (c) = Cosigned By    Initials Name Provider Type     Caprice Campa OT Occupational Therapist        Mobility/ADL's     Row Name 12/09/20 1354          Bed Mobility    Bed Mobility  bed mobility (all) activities  -BL     All Activities, [place] (Bed Mobility)  independent  -BL     Row Name 12/09/20 1354          Transfers    Transfers  sit-stand transfer  -BL     Sit-Stand [place] (Transfers)  independent  -BL     Row Name 12/09/20 1354          Activities of Daily Living    BADL Assessment/Intervention  upper body dressing;lower body dressing  -BL     Row Name 12/09/20 1354          Upper Body Dressing Assessment/Training    [place] Level (Upper Body Dressing)  doff;don;independent gown  -BL     Position (Upper Body Dressing)  edge of bed sitting  -BL     Row Name 12/09/20 1355 12/09/20 1354       Lower Body Dressing Assessment/Training    [place] Level (Lower Body Dressing)  independent;doff;don;socks  -BL  doff;don;socks;modified independence  -BL    Position (Lower Body Dressing)  --  edge of bed sitting  -BL      User Key  (r) = Recorded By, (t) = Taken By, (c) = Cosigned By    Initials Name Provider Type     Caprice Campa OT Occupational Therapist        Obj/Interventions     Row Name 12/09/20 1356          Range of Motion Comprehensive    General Range of Motion  no range of motion deficits identified  -     Row Name 12/09/20 1356          Strength Comprehensive (MMT)    General Manual Muscle Testing (MMT) Assessment  no strength deficits identified  -       User Key  (r) = Recorded By, (t) = Taken By, (c) = Cosigned By    Initials Name Provider Type     Caprice Campa OT Occupational Therapist        Goals/Plan     Row Name 12/09/20 1359          Dressing Goal 1 (OT)    Activity/Device (Dressing Goal 1, OT)  lower body dressing  -BL     [place]/Cues Needed (Dressing Goal 1, OT)  independent  -BL     Time Frame (Dressing Goal 1, OT)  short term goal (STG);2  weeks  -BL     Strategies/Barriers (Dressing Goal 1, OT)  goal met  -BL     Progress/Outcome (Dressing Goal 1, OT)  good progress toward goal  -BL     Row Name 12/09/20 1359          Therapy Assessment/Plan (OT)    Planned Therapy Interventions (OT)  other (see comments) OT services not needed at this time  -       User Key  (r) = Recorded By, (t) = Taken By, (c) = Cosigned By    Initials Name Provider Type     Caprice Campa OT Occupational Therapist        Clinical Impression     Row Name 12/09/20 1356          Pain Assessment    Additional Documentation  Pain Scale: Numbers Pre/Post-Treatment (Group)  -     Row Name 12/09/20 1356          Pain Scale: Numbers Pre/Post-Treatment    Pretreatment Pain Rating  0/10 - no pain  -     Posttreatment Pain Rating  0/10 - no pain  -     Row Name 12/09/20 [facility]6          Plan of Care Review    Plan of Care Reviewed With  patient  -BL     Progress  improving  -BL     Outcome Summary  Pt seen by OT this date for evaluation. Pt A&O x 4 this date. Pt performed bed mobility with Independent level. Pt performed ADL task with independent level. No strength deficits or ROM deficits noted at this time. Pt wore mask, Ot wore mask, gloves, glasses, hand hygiene performed. Pt left supine in bed, needs in reach, alarm on.  -     Row Name 12/09/20 1356          Therapy Assessment/Plan (OT)    Rehab Potential (OT)  good, to achieve stated therapy goals  -     Criteria for Skilled Therapeutic Interventions Met (OT)  yes  -     Therapy Frequency (OT)  -- Ot services not needed at this time.  -     Row Name 12/09/20 1356          Therapy Plan Review/Discharge Plan (OT)    Anticipated Discharge Disposition (OT)  home  -     Row Name 12/09/20 1356          Vital Signs    Pre Patient Position  Supine  -BL     Intra Patient Position  Sitting  -BL     Post Patient Position  Supine  -BL     Row Name 12/09/20 1356          Positioning and Restraints    Pre-Treatment Position  in  bed  -BL     Post Treatment Position  bed  -BL     In Bed  supine;notified nsg;call light within reach;exit alarm on;encouraged to call for assist  -BL       User Key  (r) = Recorded By, (t) = Taken By, (c) = Cosigned By    Initials Name Provider Type     Caprice Campa OT Occupational Therapist        Outcome Measures     Row Name 12/09/20 1400          How much help from another is currently needed...    Putting on and taking off regular lower body clothing?  4  -BL     Bathing (including washing, rinsing, and drying)  4  -BL     Toileting (which includes using toilet bed pan or urinal)  4  -BL     Putting on and taking off regular upper body clothing  4  -BL     Taking care of personal grooming (such as brushing teeth)  4  -BL     Eating meals  4  -BL     AM-PAC 6 Clicks Score (OT)  24  -BL     Row Name 12/09/20 1400          Functional Assessment    Outcome Measure Options  AM-PAC 6 Clicks Daily Activity (OT)  -BL       User Key  (r) = Recorded By, (t) = Taken By, (c) = Cosigned By    Initials Name Provider Type    Caprice Rea OT Occupational Therapist        Occupational Therapy Education                 Title: PT OT SLP Therapies (Done)     Topic: Occupational Therapy (Done)     Point: ADL training (Done)     Description:   Instruct learner(s) on proper safety adaptation and remediation techniques during self care or transfers.   Instruct in proper use of assistive devices.              Learning Progress Summary           Patient Acceptance, E, VU by  at 12/9/2020 1401                   Point: Home exercise program (Done)     Description:   Instruct learner(s) on appropriate technique for monitoring, assisting and/or progressing therapeutic exercises/activities.              Learning Progress Summary           Patient Acceptance, E, VU by  at 12/9/2020 1401                               User Key     Initials Effective Dates Name Provider Type [facility] 11/13/20 -  Caprice Campa OT  Occupational Therapist OT              OT Recommendation and Plan  Planned Therapy Interventions (OT): other (see comments)(OT services not needed at this time)  Therapy Frequency (OT): (Ot services not needed at this time.)  Plan of Care Review  Plan of Care Reviewed With: patient  Progress: improving  Outcome Summary: Pt seen by OT this date for evaluation. Pt A&O x 4 this date. Pt performed bed mobility with Independent level. Pt performed ADL task with independent level. No strength deficits or ROM deficits noted at this time. Pt wore mask, Ot wore mask, gloves, glasses, hand hygiene performed. Pt left supine in bed, needs in reach, alarm on.     Time Calculation:   Time Calculation- OT     Row Name 12/09/20 1401             Time Calculation- OT    OT Start Time  0910  -BL      OT Stop Time  0923  -BL      OT Time Calculation (min)  13 min  -BL      Total Timed Code Minutes- OT  8 minute(s)  -BL      OT Received On  12/09/20  -BL        User Key  (r) = Recorded By, (t) = Taken By, (c) = Cosigned By    Initials Name Provider Type     Caprice Campa OT Occupational Therapist        Therapy Charges for Today     Code Description Service Date Service Provider Modifiers Qty    [id number]  OT EVAL LOW COMPLEXITY 2 12/9/2020 Caprice Campa OT GO 1    [id number]  OT THERAPEUTIC ACT EA 15 MIN 12/9/2020 Caprice Campa OT GO 1               Mitch Campa OT  12/9/2020

## 2020-12-09 NOTE — PLAN OF CARE
Goal Outcome Evaluation:  Plan of Care Reviewed With: patient  Progress: improving  Outcome Summary: Pt seen by OT this date for evaluation. Pt A&O x 4 this date. Pt performed bed mobility with Independent level. Pt performed ADL task with independent level. No strength deficits or ROM deficits noted at this time. Pt wore mask, Ot wore mask, gloves, glasses, hand hygiene performed. Pt left supine in bed, needs in reach, alarm on.

## 2020-12-10 VITALS
HEIGHT: 68 IN | SYSTOLIC BLOOD PRESSURE: 127 MMHG | HEART RATE: 63 BPM | OXYGEN SATURATION: 93 % | BODY MASS INDEX: 21.82 KG/M2 | DIASTOLIC BLOOD PRESSURE: 58 MMHG | TEMPERATURE: 97.9 F | WEIGHT: 144 LBS | RESPIRATION RATE: 16 BRPM

## 2020-12-10 LAB
ANION GAP SERPL CALCULATED.3IONS-SCNC: 8.1 MMOL/L (ref 5–15)
BASOPHILS # BLD AUTO: 0.08 10*3/MM3 (ref 0–0.2)
BASOPHILS NFR BLD AUTO: 1 % (ref 0–1.5)
BUN SERPL-MCNC: 14 MG/DL (ref 8–23)
BUN/CREAT SERPL: 12.7 (ref 7–25)
CALCIUM SPEC-SCNC: 8.5 MG/DL (ref 8.6–10.5)
CHLORIDE SERPL-SCNC: 101 MMOL/L (ref 98–107)
CO2 SERPL-SCNC: 23.9 MMOL/L (ref 22–29)
CREAT SERPL-MCNC: 1.1 MG/DL (ref 0.76–1.27)
DEPRECATED RDW RBC AUTO: 47.5 FL (ref 37–54)
EOSINOPHIL # BLD AUTO: 0.29 10*3/MM3 (ref 0–0.4)
EOSINOPHIL NFR BLD AUTO: 3.7 % (ref 0.3–6.2)
ERYTHROCYTE [DISTWIDTH] IN BLOOD BY AUTOMATED COUNT: 15.8 % (ref 12.3–15.4)
GFR SERPL CREATININE-BSD FRML MDRD: 63 ML/MIN/1.73
GLUCOSE SERPL-MCNC: 83 MG/DL (ref 65–99)
HCT VFR BLD AUTO: 28.8 % (ref 37.5–51)
HGB BLD-MCNC: 8.9 G/DL (ref 13–17.7)
IMM GRANULOCYTES # BLD AUTO: 0.05 10*3/MM3 (ref 0–0.05)
IMM GRANULOCYTES NFR BLD AUTO: 0.6 % (ref 0–0.5)
LYMPHOCYTES # BLD AUTO: 1.29 10*3/MM3 (ref 0.7–3.1)
LYMPHOCYTES NFR BLD AUTO: 16.4 % (ref 19.6–45.3)
MCH RBC QN AUTO: 25.5 PG (ref 26.6–33)
MCHC RBC AUTO-ENTMCNC: 30.9 G/DL (ref 31.5–35.7)
MCV RBC AUTO: 82.5 FL (ref 79–97)
MONOCYTES # BLD AUTO: 0.88 10*3/MM3 (ref 0.1–0.9)
MONOCYTES NFR BLD AUTO: 11.2 % (ref 5–12)
NEUTROPHILS NFR BLD AUTO: 5.29 10*3/MM3 (ref 1.7–7)
NEUTROPHILS NFR BLD AUTO: 67.1 % (ref 42.7–76)
NRBC BLD AUTO-RTO: 0 /100 WBC (ref 0–0.2)
PLATELET # BLD AUTO: 292 10*3/MM3 (ref 140–450)
PMV BLD AUTO: 10.3 FL (ref 6–12)
POTASSIUM SERPL-SCNC: 4 MMOL/L (ref 3.5–5.2)
RBC # BLD AUTO: 3.49 10*6/MM3 (ref 4.14–5.8)
SODIUM SERPL-SCNC: 133 MMOL/L (ref 136–145)
WBC # BLD AUTO: 7.88 10*3/MM3 (ref 3.4–10.8)

## 2020-12-10 PROCEDURE — 25010000002 CEFTRIAXONE PER 250 MG: Performed by: HOSPITALIST

## 2020-12-10 PROCEDURE — 85025 COMPLETE CBC W/AUTO DIFF WBC: CPT | Performed by: HOSPITALIST

## 2020-12-10 PROCEDURE — 25010000002 AZITHROMYCIN PER 500 MG: Performed by: HOSPITALIST

## 2020-12-10 PROCEDURE — 80048 BASIC METABOLIC PNL TOTAL CA: CPT | Performed by: HOSPITALIST

## 2020-12-10 RX ORDER — CEFDINIR 300 MG/1
300 CAPSULE ORAL EVERY 12 HOURS SCHEDULED
Qty: 10 CAPSULE | Refills: 0 | Status: SHIPPED | OUTPATIENT
Start: 2020-12-10 | End: 2020-12-15

## 2020-12-10 RX ORDER — CEFDINIR 300 MG/1
300 CAPSULE ORAL EVERY 12 HOURS SCHEDULED
Status: DISCONTINUED | OUTPATIENT
Start: 2020-12-10 | End: 2020-12-10 | Stop reason: HOSPADM

## 2020-12-10 RX ORDER — GUAIFENESIN 600 MG/1
600 TABLET, EXTENDED RELEASE ORAL EVERY 12 HOURS SCHEDULED
Qty: 60 TABLET | Refills: 0 | Status: SHIPPED | OUTPATIENT
Start: 2020-12-10 | End: 2021-01-09

## 2020-12-10 RX ADMIN — APIXABAN 5 MG: 5 TABLET, FILM COATED ORAL at 08:18

## 2020-12-10 RX ADMIN — GUAIFENESIN 600 MG: 600 TABLET, EXTENDED RELEASE ORAL at 08:18

## 2020-12-10 RX ADMIN — SODIUM CHLORIDE, PRESERVATIVE FREE 10 ML: 5 INJECTION INTRAVENOUS at 08:18

## 2020-12-10 RX ADMIN — PANTOPRAZOLE SODIUM 40 MG: 40 TABLET, DELAYED RELEASE ORAL at 08:18

## 2020-12-10 RX ADMIN — DOFETILIDE 125 MCG: 0.12 CAPSULE ORAL at 08:18

## 2020-12-10 RX ADMIN — ALLOPURINOL 100 MG: 100 TABLET ORAL at 08:18

## 2020-12-10 RX ADMIN — CEFTRIAXONE SODIUM 1 G: 1 INJECTION, SOLUTION INTRAVENOUS at 08:18

## 2020-12-10 RX ADMIN — AZITHROMYCIN 500 MG: 500 INJECTION, POWDER, LYOPHILIZED, FOR SOLUTION INTRAVENOUS at 08:18

## 2020-12-10 RX ADMIN — DIGOXIN 125 MCG: 125 TABLET ORAL at 11:33

## 2020-12-10 RX ADMIN — METOPROLOL TARTRATE 50 MG: 50 TABLET, FILM COATED ORAL at 08:18

## 2020-12-10 NOTE — PROGRESS NOTES
"Daily progress note    Chief complaint  Doing better   No new complaints  Wants to go home    History of present illness  88-year-old male who is well-known to our service with history of chronic atrial fibrillation Crohn's disease hypertension hyperlipidemia chronic anemia and gastroesophageal reflux disease presented to [facility] emergency room with generalized weakness and shortness of breath.  Patient denies any fever cough abdominal pain nausea vomiting diarrhea.  Patient work-up in ER revealed bilateral pneumonia community-acquired infectious admit for management.  Patient also ruled out for COVID-19 infection and pneumonia.      REVIEW OF SYSTEMS  Unremarkable      PHYSICAL EXAM  Blood pressure 127/58, pulse 63, temperature 97.9 °F (36.6 °C), temperature source Oral, resp. rate 16, height 172.7 cm (67.99\"), weight 65.3 kg (144 lb), SpO2 93 %.    GENERAL: Wake and alert, mild distress  HENT: nares patent  EYES: no scleral icterus  CV:  Irregular S1-S2  RESPIRATORY: Mild tachypnea, diminished breath sounds bilaterally with rales in the bases  ABDOMEN: soft nontender bowel sounds positive  MUSCULOSKELETAL: no deformity, trace pedal edema ithout calf tenderness  NEURO: alert, moves all extremities, follows commands  SKIN: warm, dry     LAB RESULTS  Lab Results (last 24 hours)     Procedure Component Value Units Date/Time    Basic Metabolic Panel [[id number]]  (Abnormal) Collected: 12/10/20 0545    Specimen: Blood Updated: 12/10/20 0635     Glucose 83 mg/dL      BUN 14 mg/dL      Creatinine 1.10 mg/dL      Sodium 133 mmol/L      Potassium 4.0 mmol/L      Chloride 101 mmol/L      CO2 23.9 mmol/L      Calcium 8.5 mg/dL      eGFR Non African Amer 63 mL/min/1.73      BUN/Creatinine Ratio 12.7     Anion Gap 8.1 mmol/L     Narrative:      GFR Normal >60  Chronic Kidney Disease <60  Kidney Failure <15      CBC & Differential [[id number]]  (Abnormal) Collected: 12/10/20 0545    Specimen: Blood Updated: 12/10/20 " 0613    Narrative:      The following orders were created for panel order CBC & Differential.  Procedure                               Abnormality         Status                     ---------                               -----------         ------                     CBC Auto Differential[[id number]]        Abnormal            Final result                 Please view results for these tests on the individual orders.    CBC Auto Differential [[id number]]  (Abnormal) Collected: 12/10/20 0545    Specimen: Blood Updated: 12/10/20 0613     WBC 7.88 10*3/mm3      RBC 3.49 10*6/mm3      Hemoglobin 8.9 g/dL      Hematocrit 28.8 %      MCV 82.5 fL      MCH 25.5 pg      MCHC 30.9 g/dL      RDW 15.8 %      RDW-SD 47.5 fl      MPV 10.3 fL      Platelets 292 10*3/mm3      Neutrophil % 67.1 %      Lymphocyte % 16.4 %      Monocyte % 11.2 %      Eosinophil % 3.7 %      Basophil % 1.0 %      Immature Grans % 0.6 %      Neutrophils, Absolute 5.29 10*3/mm3      Lymphocytes, Absolute 1.29 10*3/mm3      Monocytes, Absolute 0.88 10*3/mm3      Eosinophils, Absolute 0.29 10*3/mm3      Basophils, Absolute 0.08 10*3/mm3      Immature Grans, Absolute 0.05 10*3/mm3      nRBC 0.0 /100 WBC     Blood Culture - Blood, Arm, Left [[id number]] Collected: 12/07/20 0208    Specimen: Blood from Arm, Left Updated: 12/10/20 0230     Blood Culture No growth at 3 days    Blood Culture - Blood, Arm, Left [[id number]] Collected: 12/07/20 0154    Specimen: Blood from Arm, Left Updated: 12/10/20 0215     Blood Culture No growth at 3 days        Imaging Results (Last 24 Hours)     ** No results found for the last 24 hours. **        ECG 12 Lead                 HEART RATE= 152  bpm  RR Interval= 394  ms  MT Interval=   ms  P Horizontal Axis=   deg  P Front Axis=   deg  QRSD Interval= 74  ms  QT Interval= 306  ms  QRS Axis= 3  deg  T Wave Axis= 147  deg  - ABNORMAL ECG -  Atrial fibrillation with rapid V-rate  Consider posterior infarct             Current  Facility-Administered Medications:   •  allopurinol (ZYLOPRIM) tablet 100 mg, 100 mg, Oral, BID, Shelbie Cowart MD, 100 mg at 12/10/20 0818  •  apixaban (ELIQUIS) tablet 5 mg, 5 mg, Oral, Q12H, Shelbie Cowart MD, 5 mg at 12/10/20 0818  •  atorvastatin (LIPITOR) tablet 10 mg, 10 mg, Oral, Nightly, Shelbie Cowart MD, 10 mg at 12/09/20 2052  •  azithromycin (ZITHROMAX) 500 mg 0.9% NaCl (Add-vantage) 250 mL, 500 mg, Intravenous, Q24H, Shelbie Cowart MD, Last Rate: 250 mL/hr at 12/10/20 0818, 500 mg at 12/10/20 0818  •  cefTRIAXone (ROCEPHIN) IVPB 1 g, 1 g, Intravenous, Q24H, Shelbie Cowart MD, Last Rate: 100 mL/hr at 12/10/20 0818, 1 g at 12/10/20 0818  •  digoxin (LANOXIN) tablet 125 mcg, 125 mcg, Oral, Daily, Shelbie Cowart MD, 125 mcg at 12/10/20 1133  •  dofetilide (TIKOSYN) capsule 125 mcg, 125 mcg, Oral, BID, Shelbie Cowart MD, 125 mcg at 12/10/20 0818  •  guaiFENesin (MUCINEX) 12 hr tablet 600 mg, 600 mg, Oral, Q12H, Shelbie Cowart MD, 600 mg at 12/10/20 0818  •  ipratropium-albuterol (DUO-NEB) nebulizer solution 1.5 mL, 1.5 mL, Nebulization, Q4H PRN, Shelbie Cowart MD  •  metoprolol tartrate (LOPRESSOR) tablet 50 mg, 50 mg, Oral, TID, Shelbie Cowart MD, 50 mg at 12/10/20 0818  •  pantoprazole (PROTONIX) EC tablet 40 mg, 40 mg, Oral, QAM AC, Shelbie Cowart MD, 40 mg at 12/10/20 0818  •  [COMPLETED] Insert peripheral IV, , , Once **AND** sodium chloride 0.9 % flush 10 mL, 10 mL, Intravenous, PRN, Francis Jolley MD, 10 mL at 12/10/20 0818     ASSESSMENT  Bilateral community-acquired pneumonia   Chronic atrial fibrillation with rapid ventricular rate  History of Crohn's disease  Chronic anemia with recent GI bleed secondary to nonbleeding colonic angiodysplastic lesions   Hypertension  Hyperlipidemia  Gastroesophageal reflux disease    PLAN  Discharge home on oral antibiotics for 5 more days  Discharge summary dictated    SHELBIE COWART MD

## 2020-12-10 NOTE — PROGRESS NOTES
Case Management Discharge Note      Final Note: Pt discharged with [facility], confirmed with Analilia/EVELYN Conklin RN         Selected Continued Care - Discharged on 12/10/2020 Admission date: 12/7/2020 - Discharge disposition: Home or Self Care    Destination    No services have been selected for the patient.              Durable Medical Equipment    No services have been selected for the patient.              Dialysis/Infusion    No services have been selected for the patient.              Home Medical Care Coordination complete    Service Provider Selected Services Address Phone Fax Patient Preferred    [facility] [address] [facility] [address] [address] [phone] --          Therapy    No services have been selected for the patient.              Community Resources    No services have been selected for the patient.                Selected Continued Care - Prior Encounters Includes selections from prior encounters from 9/8/2020 to 12/10/2020    Discharged on 11/21/2020 Admission date: 11/17/2020 - Discharge disposition: Home-Health Care c    Home Medical Care     Service Provider Selected Services Address Phone Fax Patient Preferred    [facility] [address] [facility] [address] [id number] [phone] --                    Transportation Services  Private: Car    Final Discharge Disposition Code: 06 - home with home health care

## 2020-12-10 NOTE — DISCHARGE SUMMARY
Discharge summary    Date of admission 12/7/2020  Date of discharge 12/10/2020    Final diagnosis  Bilateral community-acquired pneumonia resolving  Chronic atrial fibrillation with rapid ventricular rate  History of Crohn's disease  Chronic anemia with recent GI bleed secondary to nonbleeding colonic angiodysplastic lesions   Hypertension  Hyperlipidemia  Gastroesophageal reflux disease    Discharge medications    Current Facility-Administered Medications:   •  allopurinol (ZYLOPRIM) tablet 100 mg, 100 mg, Oral, BID, Calvin Bergeron MD, 100 mg at 12/10/20 0818  •  apixaban (ELIQUIS) tablet 5 mg, 5 mg, Oral, Q12H, Calvin Bergeron MD, 5 mg at 12/10/20 0818  •  atorvastatin (LIPITOR) tablet 10 mg, 10 mg, Oral, Nightly, Calvin Bergeron MD, 10 mg at 12/09/20 2052  •  cefdinir (OMNICEF) capsule 300 mg, 300 mg, Oral, Q12H, Calvin Bergeron MD  •  digoxin (LANOXIN) tablet 125 mcg, 125 mcg, Oral, Daily, Calvin Bergeron MD, 125 mcg at 12/10/20 1133  •  dofetilide (TIKOSYN) capsule 125 mcg, 125 mcg, Oral, BID, Calvin Bergeron MD, 125 mcg at 12/10/20 0818  •  guaiFENesin (MUCINEX) 12 hr tablet 600 mg, 600 mg, Oral, Q12H, Calvin Bergeron MD, 600 mg at 12/10/20 0818  •  metoprolol tartrate (LOPRESSOR) tablet 50 mg, 50 mg, Oral, TID, Calvin Bergeron MD, 50 mg at 12/10/20 0818  •  pantoprazole (PROTONIX) EC tablet 40 mg, 40 mg, Oral, QAM AC, Calvin Bergeron MD, 40 mg at 12/10/20 0818  •  [COMPLETED] Insert peripheral IV, , , Once **AND** sodium chloride 0.9 % flush 10 mL, 10 mL, Intravenous, PRN, Francis Jolley MD, 10 mL at 12/10/20 0818     Consults obtained  Cardiology    Procedures  None    Hospital course  88-year-old [place] male with history of chronic atrial fibrillation Crohn's disease hypertension hyperlipidemia chronic anemia who is well-known to our service admitted to emergency room with shortness of breath and generalized weakness.  Patient work-up revealed bilateral pneumonia and also found to be in rapid ventricular rate with  history of chronic A. fib admit for management.  Patient admitted treated with IV antibiotics and heart rate control with medications.  Patient followed by cardiology and his heart rate remained in control with Lanoxin Tikosyn and Lopressor.  Patient feeling much better cultures remains negative antibiotics changed to by mouth and he will finish oral antibiotics at home.  Patient is afebrile vital signs stable and he is walking without any problem.  Patient remained on Eliquis.    Discharge diet regular    Activity as tolerated    Medication as above    Follow-up with primary doctor in 1 week and follow-up with cardiology per the instruction and take medication as directed    SHELBIE COWART MD

## 2020-12-10 NOTE — PLAN OF CARE
Problem: Adult Inpatient Plan of Care  Goal: Plan of Care Review  Outcome: Ongoing, Progressing  Goal: Patient-Specific Goal (Individualized)  Outcome: Ongoing, Progressing  Goal: Absence of Hospital-Acquired Illness or Injury  Outcome: Ongoing, Progressing  Intervention: Identify and Manage Fall Risk  Recent Flowsheet Documentation  Taken 12/9/2020 2100 by Elizabeth Mcfadden RN  Safety Promotion/Fall Prevention:   activity supervised   fall prevention program maintained   nonskid shoes/slippers when out of bed   safety round/check completed  Goal: Optimal Comfort and Wellbeing  Outcome: Ongoing, Progressing  Goal: Readiness for Transition of Care  Outcome: Ongoing, Progressing     Problem: Gas Exchange Impaired  Goal: Optimal Gas Exchange  Outcome: Ongoing, Progressing     Problem: Activity and Energy Impairment (Anxiety Signs/Symptoms)  Goal: Optimized Energy Level (Anxiety Signs/Symptoms)  Outcome: Ongoing, Progressing     Problem: Cognitive Impairment (Anxiety Signs/Symptoms)  Goal: Optimized Cognitive Function (Anxiety Signs/Symptoms)  Outcome: Ongoing, Progressing     Problem: Mood Impairment (Anxiety Signs/Symptoms)  Goal: Improved Mood Symptoms (Anxiety Signs/Symptoms)  Outcome: Ongoing, Progressing     Problem: Sleep Disturbance (Anxiety Signs/Symptoms)  Goal: Improved Sleep (Anxiety Signs/Symptoms)  Outcome: Ongoing, Progressing     Problem: Social, Occupational or Functional Impairment (Anxiety Signs/Symptoms)  Goal: Enhanced Social, Occupational or Functional Skills (Anxiety Signs/Symptoms)  Outcome: Ongoing, Progressing     Problem: Somatic Disturbance (Anxiety Signs/Symptoms)  Goal: Improved Somatic Symptoms (Anxiety Signs/Symptoms)  Outcome: Ongoing, Progressing     Problem: Fall Injury Risk  Goal: Absence of Fall and Fall-Related Injury  Outcome: Ongoing, Progressing  Intervention: Promote Injury-Free Environment  Recent Flowsheet Documentation  Taken 12/9/2020 2100 by Elizabeth Mcfadden RN  Safety  Promotion/Fall Prevention:   activity supervised   fall prevention program maintained   nonskid shoes/slippers when out of bed   safety round/check completed   Goal Outcome Evaluation:  Plan of Care Reviewed With: patient  Progress: improving

## 2020-12-11 ENCOUNTER — READMISSION MANAGEMENT (OUTPATIENT)
Dept: CALL CENTER | Facility: HOSPITAL | Age: 85
End: 2020-12-11

## 2020-12-11 NOTE — OUTREACH NOTE
Prep Survey      Responses   [facility] patient discharged from?  [place]   Is LACE score < 7 ?  No   Eligibility  Readm Mgmt   Discharge diagnosis  Bilateral community-acquired PNA, chronic atrial fibrillation with RVR   Does the patient have one of the following disease processes/diagnoses(primary or secondary)?  COPD/Pneumonia   Does the patient have Home health ordered?  Yes   What is the Home health agency?   [facility] [place]   Is there a DME ordered?  No   Comments regarding appointments  Per AVS   Medication alerts for this patient  continue eliquis   Prep survey completed?  Yes          Ursula Oneal RN

## 2020-12-12 LAB
BACTERIA SPEC AEROBE CULT: NORMAL
BACTERIA SPEC AEROBE CULT: NORMAL

## 2020-12-14 ENCOUNTER — READMISSION MANAGEMENT (OUTPATIENT)
Dept: CALL CENTER | Facility: HOSPITAL | Age: 85
End: 2020-12-14

## 2020-12-14 NOTE — OUTREACH NOTE
COPD/PN Week 1 Survey      Responses   [facility] patient discharged from?  [place]   Does the patient have one of the following disease processes/diagnoses(primary or secondary)?  COPD/Pneumonia   Was the primary reason for admission:  Pneumonia   Week 1 attempt successful?  Yes   Call start time  1658   Call end time  1702   Discharge diagnosis  Bilateral community-acquired PNA, chronic atrial fibrillation with RVR   Is patient permission given to speak with other caregiver?  Yes   Person spoke with today (if not patient) and relationship  Ni, spouse   Medication alerts for this patient  continue eliquis   Meds reviewed with patient/caregiver?  Yes   Is the patient having any side effects they believe may be caused by any medication additions or changes?  No   Does the patient have all medications ordered at discharge?  Yes   Is the patient taking all medications as directed (includes completed medication regime)?  Yes   Does the patient have a primary care provider?   Yes   Has the patient kept scheduled appointments due by today?  N/A   What is the Home health agency?   [facility]   Has home health visited the patient within 72 hours of discharge?  Yes   Pulse Ox monitoring  None   Psychosocial issues?  No   Did the patient receive a copy of their discharge instructions?  Yes   Nursing interventions  Reviewed instructions with patient   What is the patient's perception of their health status since discharge?  Improving   Are the patient's immunizations up to date?   Yes   Nursing interventions  Educated on importance of maintaining up to date immunizations as advised by provider   If the patient is a current smoker, are they able to teach back resources for cessation?  Not a smoker   Is the patient/caregiver able to teach back the hierarchy of who to call/visit for symptoms/problems? PCP, Specialist, Home health nurse, Urgent Care, ED, 911  Yes   Additional teach back  comments  He is improving, no swelling, urine is clear, afebrile.   Is the patient/caregiver able to teach back signs and symptoms of worsening condition:  Fever/chills, Chest pain, Shortness of breath   Is the patient/caregiver able to teach back importance of completing antibiotic course of treatment?  Yes   Week 1 call completed?  Yes   Wrap up additional comments  He is improving. Taking Boost. APpts scheduled.          Zakiya Garrido RN

## 2020-12-18 ENCOUNTER — OFFICE VISIT (OUTPATIENT)
Dept: CARDIOLOGY | Facility: CLINIC | Age: 85
End: 2020-12-18

## 2020-12-18 VITALS
RESPIRATION RATE: 16 BRPM | BODY MASS INDEX: 22.91 KG/M2 | HEART RATE: 57 BPM | SYSTOLIC BLOOD PRESSURE: 114 MMHG | OXYGEN SATURATION: 98 % | HEIGHT: 67 IN | WEIGHT: 146 LBS | DIASTOLIC BLOOD PRESSURE: 78 MMHG

## 2020-12-18 DIAGNOSIS — I48.0 PAF (PAROXYSMAL ATRIAL FIBRILLATION) (HCC): Primary | ICD-10-CM

## 2020-12-18 PROCEDURE — 99214 OFFICE O/P EST MOD 30 MIN: CPT | Performed by: INTERNAL MEDICINE

## 2020-12-18 RX ORDER — CEFDINIR 300 MG/1
300 CAPSULE ORAL
COMMUNITY
End: 2021-07-12 | Stop reason: HOSPADM

## 2020-12-18 NOTE — PROGRESS NOTES
[facility]      Patient Name: Asia Ly  :1932  Age: 88 y.o.  Encounter Provider:  Nixon Brown Jr, MD      Chief Complaint:   Chief Complaint   Patient presents with   • Atrial Fibrillation     4 week hospital f/u CHF, Afib, High Lipids, HTN & Palps    • Palpitations   • Congestive Heart Failure   • Hypertension   • Hyperlipidemia         HPI  Asia Ly is a 88 y.o. male past medical history of paroxysmal atrial fibrillation, stroke, moderate aortic stenosis, Crohn's disease, pulmonary fibrosis and pulmonary hypertension who presents for follow-up of chronic mental illness.  I first met Mr. Ly in mid November when he presented to the hospital with hematochezia.  He had a full GI work-up which showed no evidence of active bleeding.  He was placed back on apixaban given his high risk for future stroke.  He presented back to the hospital a few days later with febrile illness.  He was found to have bilateral pneumonia and had an extensive hospital stay with full course of IV antibiotics.  He is slowly recovering from that hospital stay and states that his main complaint is generalized weakness and fatigue.  He does feel that he is getting better with each day.  He was found to have residual pneumonia by PCP and placed on 7-day oral antibiotic course.  He has not had fever in the past week.  He denies chest pain, palpitations, dizziness or syncope.  He denies orthopnea or PND but has some residual edema.  He has some increased exertional dyspnea but states that this is improving since his first course of antibiotics.  He is back on apixaban with no further bleeding complications.  Recovery has been slow but steady.      The following portions of the patient's history were reviewed and updated as appropriate: allergies, current medications, past family history, past medical history, past social history, past surgical history and problem list.      Review of Systems  "  Constitution: Positive for decreased appetite, malaise/fatigue and weight loss. Negative for chills and fever.   HENT: Positive for hearing loss.    Cardiovascular: Negative for chest pain, leg swelling, near-syncope, orthopnea, palpitations, paroxysmal nocturnal dyspnea and syncope.   Respiratory: Positive for cough. Negative for wheezing.    Skin: Positive for itching. Negative for rash.   Musculoskeletal: Negative for joint pain and joint swelling.   Gastrointestinal: Negative for bloating and abdominal pain.   Neurological: Negative for dizziness and focal weakness.   Psychiatric/Behavioral: Negative for altered mental status and suicidal ideas.     ROS was reviewed, updated and amended where necessary.    OBJECTIVE:   Vital Signs  There were no vitals filed for this visit.  Estimated body mass index is 21.9 kg/m² as calculated from the following:    Height as of 12/7/20: 172.7 cm (67.99\").    Weight as of 12/7/20: 65.3 kg (144 lb).    Vitals signs reviewed.   Constitutional:       Appearance: Healthy appearance. Not in distress.   Neck:      Vascular: No JVR. JVD normal.   Pulmonary:      Effort: Pulmonary effort is normal.      Breath sounds: No wheezing. No rhonchi.      Comments: Mild bibasilar crackles  Chest:      Chest wall: Not tender to palpatation.   Cardiovascular:      PMI at left midclavicular line. Normal rate. Regular rhythm.      Murmurs: There is no murmur.      No gallop. No click. No rub.   Pulses:     Intact distal pulses.   Edema:     Peripheral edema absent.   Abdominal:      General: Bowel sounds are normal.      Palpations: Abdomen is soft.      Tenderness: There is no abdominal tenderness.   Musculoskeletal: Normal range of motion.         General: No tenderness.   Skin:     General: Skin is warm and dry.   Neurological:      General: No focal deficit present.      Mental Status: Alert and oriented to person, place and time.         Procedures          ASSESSMENT:     88-year-old male " with a forementioned past medical history who presents for hospital follow-up for bilateral pneumonia.    PLAN OF CARE:     1. PAF -continue digoxin, metoprolol, Tikosyn and apixaban.  No further evidence for bleeding.  Monitor closely.  2. Bilateral pneumonia -patient does have bibasilar crackles today.  He appears euvolemic today and do not think this is heart failure.  He was placed back on antibiotics by PCP.  Will monitor very closely.  3. Aortic stenosis -moderate by echo in August.  Will repeat in March.  4. Pulmonary fibrosis  5. Pulmonary hypertension  6. Crohn's disease    Return to clinic 3 months.             Discharge Medications          Accurate as of December 18, 2020  2:28 PM. If you have any questions, ask your nurse or doctor.            Continue These Medications      Instructions Start Date   allopurinol 100 MG tablet  Commonly known as: ZYLOPRIM   100 mg, Oral, 2 Times Daily      apixaban 5 MG tablet tablet  Commonly known as: ELIQUIS   5 mg, Oral, Every 12 Hours Scheduled      atorvastatin 10 MG tablet  Commonly known as: LIPITOR   Take one tablet by mouth Mon, Wed and Fri.      BUDESONIDE PO   3 mg, Oral, Daily      digoxin 125 MCG tablet  Commonly known as: LANOXIN   125 mcg, Oral, Daily Digoxin      dofetilide 125 MCG capsule  Commonly known as: Tikosyn   125 mcg, Oral, 2 Times Daily      guaiFENesin 600 MG 12 hr tablet  Commonly known as: MUCINEX   600 mg, Oral, Every 12 Hours Scheduled      IPRATROPIUM-ALBUTEROL IN   Inhalation, Daily      metoprolol tartrate 50 MG tablet  Commonly known as: LOPRESSOR   50 mg, Oral, 3 Times Daily      omeprazole 40 MG capsule  Commonly known as: priLOSEC   40 mg, Oral, Daily             Thank you for allowing me to participate in the care of your patient,      Sincerely,   Nixon Brown MD   [facility]  12/18/20  14:28 EST

## 2020-12-21 RX ORDER — DIGOXIN 125 MCG
125 TABLET ORAL
Qty: 90 TABLET | Refills: 3 | Status: SHIPPED | OUTPATIENT
Start: 2020-12-21 | End: 2021-09-03 | Stop reason: HOSPADM

## 2020-12-22 ENCOUNTER — READMISSION MANAGEMENT (OUTPATIENT)
Dept: CALL CENTER | Facility: HOSPITAL | Age: 85
End: 2020-12-22

## 2020-12-22 NOTE — OUTREACH NOTE
COPD/PN Week 2 Survey      Responses   [facility] patient discharged from?  [place]   Does the patient have one of the following disease processes/diagnoses(primary or secondary)?  COPD/Pneumonia   Was the primary reason for admission:  Pneumonia   Week 2 attempt successful?  Yes   Call start time  0929   Call end time  0948   General alerts for this patient  Patient [place], per wife.    Discharge diagnosis  Bilateral community-acquired PNA, chronic atrial fibrillation with RVR   Is patient permission given to speak with other caregiver?  Yes   List who call center can speak with  Ni, spouse   Person spoke with today (if not patient) and relationship  Ni, spouse   Meds reviewed with patient/caregiver?  Yes   Is the patient having any side effects they believe may be caused by any medication additions or changes?  No   Does the patient have all medications ordered at discharge?  Yes   Is the patient taking all medications as directed (includes completed medication regime)?  Yes   Medication comments  Wife reports PCP ordered additional antibiotic with appt.    Does the patient have a primary care provider?   Yes   Comments regarding PCP  PCP Dr Lobo. Patient has seen since discharge.    Has the patient kept scheduled appointments due by today?  Yes   Comments  Patient has had f/u with cardiology and pulmonology   What is the Home health agency?   [facility]   Pulse Ox monitoring  Intermittent   Pulse Ox device source  Patient [Just obtained pulse ox yesterday. ]   O2 Sat comments  wife reports sat was 97% on room air.    O2 Sat: education provided  Sat levels, Monitoring frequency, When to seek care   O2 Sat education comments  Informed wife for patient to seek out care if unable to maintain O2 sat 92% or greater.    Psychosocial issues?  No   Did the patient receive a copy of their discharge instructions?  Yes   Nursing interventions  Reviewed instructions with patient [wife]    What is the patient's perception of their health status since discharge?  Improving   Is the patient/caregiver able to teach back the hierarchy of who to call/visit for symptoms/problems? PCP, Specialist, Home health nurse, Urgent Care, ED, 911  Yes   Is the patient/caregiver able to teach back signs and symptoms of worsening condition:  Fever/chills, Shortness of breath, Chest pain   Is the patient/caregiver able to teach back importance of completing antibiotic course of treatment?  Yes   Week 2 call completed?  Yes   Wrap up additional comments  wife reports patient without fever and ate pretty good breakfast. Denies any new symptoms or concerns today.           Lisa Griffith RN

## 2020-12-29 ENCOUNTER — READMISSION MANAGEMENT (OUTPATIENT)
Dept: CALL CENTER | Facility: HOSPITAL | Age: 85
End: 2020-12-29

## 2020-12-29 NOTE — OUTREACH NOTE
COPD/PN Week 3 Survey      Responses   [facility] patient discharged from?  [place]   Does the patient have one of the following disease processes/diagnoses(primary or secondary)?  COPD/Pneumonia   Was the primary reason for admission:  Pneumonia   Week 3 attempt successful?  Yes   Call start time  1510   Call end time  1513   General alerts for this patient  Patient [place], per wife.    Discharge diagnosis  Bilateral community-acquired PNA, chronic atrial fibrillation with RVR   Is patient permission given to speak with other caregiver?  Yes   List who call center can speak with  spouse- Ni   Person spoke with today (if not patient) and relationship  spouse- Ni   Is the patient taking all medications as directed (includes completed medication regime)?  Yes   Comments regarding PCP  will be seeing Dr. Lobo soon   Has the patient kept scheduled appointments due by today?  Yes   Comments  recently saw cardiology   What is the Home health agency?   [facility]   Psychosocial issues?  No   What is the patient's perception of their health status since discharge?  Improving   Nursing Interventions  Nurse provided patient education   Is the patient/caregiver able to teach back the hierarchy of who to call/visit for symptoms/problems? PCP, Specialist, Home health nurse, Urgent Care, ED, 911  Yes   Is the patient/caregiver able to teach back signs and symptoms of worsening condition:  Fever/chills, Shortness of breath, Chest pain   Is the patient/caregiver able to teach back importance of completing antibiotic course of treatment?  Yes   Week 3 call completed?  Yes   Wrap up additional comments  Per spouse, patient is doing well but still coughing up phlegm, no fever, they are seeing Dr. Lobo soon.          Holley Cooper RN

## 2021-01-07 ENCOUNTER — READMISSION MANAGEMENT (OUTPATIENT)
Dept: CALL CENTER | Facility: HOSPITAL | Age: 86
End: 2021-01-07

## 2021-01-07 NOTE — OUTREACH NOTE
COPD/PN Week 4 Survey      Responses   [facility] patient discharged from?  [place]   Does the patient have one of the following disease processes/diagnoses(primary or secondary)?  COPD/Pneumonia   Was the primary reason for admission:  Pneumonia   Week 4 attempt successful?  No          Holley Cooper RN

## 2021-01-14 ENCOUNTER — TELEPHONE (OUTPATIENT)
Dept: CARDIOLOGY | Facility: CLINIC | Age: 86
End: 2021-01-14

## 2021-03-24 ENCOUNTER — OFFICE VISIT (OUTPATIENT)
Dept: CARDIOLOGY | Facility: CLINIC | Age: 86
End: 2021-03-24

## 2021-03-24 VITALS
BODY MASS INDEX: 22.55 KG/M2 | DIASTOLIC BLOOD PRESSURE: 60 MMHG | WEIGHT: 148.8 LBS | SYSTOLIC BLOOD PRESSURE: 120 MMHG | HEIGHT: 68 IN | HEART RATE: 56 BPM

## 2021-03-24 DIAGNOSIS — I48.0 PAF (PAROXYSMAL ATRIAL FIBRILLATION) (HCC): Primary | ICD-10-CM

## 2021-03-24 PROCEDURE — 99214 OFFICE O/P EST MOD 30 MIN: CPT | Performed by: INTERNAL MEDICINE

## 2021-03-24 PROCEDURE — 93000 ELECTROCARDIOGRAM COMPLETE: CPT | Performed by: INTERNAL MEDICINE

## 2021-04-12 ENCOUNTER — TELEPHONE (OUTPATIENT)
Dept: CARDIOLOGY | Facility: CLINIC | Age: 86
End: 2021-04-12

## 2021-04-12 ENCOUNTER — OFFICE VISIT (OUTPATIENT)
Dept: CARDIOLOGY | Facility: CLINIC | Age: 86
End: 2021-04-12

## 2021-04-12 VITALS
HEIGHT: 68 IN | SYSTOLIC BLOOD PRESSURE: 122 MMHG | HEART RATE: 59 BPM | BODY MASS INDEX: 22.7 KG/M2 | WEIGHT: 149.8 LBS | DIASTOLIC BLOOD PRESSURE: 62 MMHG

## 2021-04-12 DIAGNOSIS — E78.2 MIXED HYPERLIPIDEMIA: ICD-10-CM

## 2021-04-12 DIAGNOSIS — J84.10 PULMONARY FIBROSIS (HCC): ICD-10-CM

## 2021-04-12 DIAGNOSIS — I48.0 PAF (PAROXYSMAL ATRIAL FIBRILLATION) (HCC): Primary | ICD-10-CM

## 2021-04-12 DIAGNOSIS — I10 ESSENTIAL HYPERTENSION: ICD-10-CM

## 2021-04-12 DIAGNOSIS — I35.0 AORTIC STENOSIS, MODERATE: ICD-10-CM

## 2021-04-12 DIAGNOSIS — Z51.81 ENCOUNTER FOR THERAPEUTIC DRUG LEVEL MONITORING: ICD-10-CM

## 2021-04-12 PROCEDURE — 99214 OFFICE O/P EST MOD 30 MIN: CPT | Performed by: NURSE PRACTITIONER

## 2021-04-12 PROCEDURE — 93000 ELECTROCARDIOGRAM COMPLETE: CPT | Performed by: NURSE PRACTITIONER

## 2021-04-12 NOTE — PROGRESS NOTES
Date of Office Visit: 21  Encounter Provider: KYLAH Jha  Place of Service: [facility]  Patient Name: Asia Ly  :1932    Chief Complaint   Patient presents with   • PAF (paroxysmal atrial fibrillation)   • Follow-up   :     HPI: Asia Ly is a 88 y.o. male  with history of paroxysmal atrial fibrillation, stroke and TIA in 2016, aortic stenosis, hyperlipidemia, B12 anemia,Crohn's disease, colon polyps, lower extremity edema, TIA,  pulmonary hypertension and pulmonary fibrosis.      He has been followed by Dr. Hector Rivera. He is now followed by Dr. Brown and I will visit with him in follow up today.     Has been on sotalol in the past as well as amiodarone for recurrent atrial fibrillation.  He had Echocardiogram 2016 showed normal left ventricular systolic dysfunction, moderate tricuspid insufficiency, mild mitral insufficiency , mild pulmonary hypertension.  He also had a perfusion stress test that was negative for ischemia.  He was taken off diltiazem for allergic reaction to rash.  2017 he had an echocardiogram which showed an EF of 60%, grade 2 diastolic dysfunction and mild aortic he had increased lower extremity edema and had again rapid atrial fibrillation.  He was started on a diuretic.  He then had pneumonia hypoxia and influenza and was started on Tikosyn.  He has some prolonged QTC complicated by antibiotics.  He then had issues with gout felt to be related to discontinuation of indomethacin and was prescribed colchicine.  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  He then had some atrial fibrillation with RVR and was given oral metoprolol and Cardizem and converted back to sinus rhythm.  He was evaluated by electrophysiology with Dr. Nettles who felt that his ablation at his age would be very high risk procedure.  He was later started on digoxin for  "recurrent rapid atrial fibrillation.  He was admitted again January 2019 and electrophysiology felt that he may for AV node ablation and pacemaker implantation.  However he preferred to stay on Tikosyn and accept occasional breakthroughs.     He then had some confusion and upper epigastric abdominal pain he was hospitalized August 2020 found to have transaminitis with hyperbilirubinemia.  He had some atrial fibrillation with RVR received IV metoprolol and his heart rate improved.  Echocardiogram showed normal left ventricular systolic function, mild concentric hypertrophy, moderate aortic valve stenoses with aortic valve area 1.1 cm², maximum pressure gradient of 42 and mean pressure gradient 24 mmHg.  RVSP was severely elevated at 57.8.  He had mild aortic valve regurgitation and mild mitral regurgitation.    In November 2020 was hospitalized with hematochezia and underwent GI evaluation with no evidence of active bleed.  He was cleared to resume apixaban and was later found to have bilateral pneumonia.  He presents for reassessment accompanied by his wife.  He was recently started on iron replacement due to recurrent anemia.  He has been doing well denies chest pain tightness pressure, shortness of breath, near-syncope, or syncope.  He is not having any current active bleeding issues.  He is on cefdinir once a month for prophylaxis she has had recurrent lung infections.        Allergies   Allergen Reactions   • Amiodarone Unknown (See Comments)     Pulmonary fibrosis   • Diltiazem Arrhythmia   • Doxycycline Rash   • Indomethacin Rash           Family and social history reviewed.     ROS  All other systems were reviewed and are negative          Objective:     Vitals:    04/12/21 1118   BP: 122/62   BP Location: Left arm   Patient Position: Sitting   Pulse: 59   Weight: 67.9 kg (149 lb 12.8 oz)   Height: 172.7 cm (68\")     Body mass index is 22.78 kg/m².    PHYSICAL EXAM:  Constitutional:       General: Not in " acute distress.     Appearance: Well-developed. Not diaphoretic.   HENT:      Head: Normocephalic.   Pulmonary:      Effort: Pulmonary effort is normal. No respiratory distress.      Breath sounds: Normal breath sounds. No wheezing. No rhonchi. No rales.   Cardiovascular:      Bradycardia present. Regular rhythm.      Murmurs: There is a grade 2/6 systolic murmur at the URSB and ULSB.   Pulses:     Radial: 2+ bilaterally.  Skin:     General: Skin is warm and dry. There is no cyanosis.      Findings: No rash.   Neurological:      Mental Status: Alert and oriented to person, place, and time.   Psychiatric:         Behavior: Behavior normal.         Thought Content: Thought content normal.         Judgment: Judgment normal.           ECG 12 Lead    Date/Time: 4/12/2021 3:30 PM  Performed by: Alta Hackett APRN  Authorized by: Alta Hcakett APRN   Comparison: compared with previous ECG   Similar to previous ECG  Rhythm: sinus rhythm  Rate: normal  Comments: Qt ok              Current Outpatient Medications   Medication Sig Dispense Refill   • allopurinol (ZYLOPRIM) 100 MG tablet Take 100 mg by mouth 2 (Two) Times a Day.     • apixaban (ELIQUIS) 5 MG tablet tablet Take 1 tablet by mouth Every 12 (Twelve) Hours. 180 tablet 3   • atorvastatin (LIPITOR) 10 MG tablet Take one tablet by mouth Mon, Wed and Fri.     • BUDESONIDE PO Take 3 mg by mouth Daily.     • cefdinir (OMNICEF) 300 MG capsule Take 300 mg by mouth.     • digoxin (LANOXIN) 125 MCG tablet Take 1 tablet by mouth Daily. 90 tablet 3   • dofetilide (TIKOSYN) 125 MCG capsule Take 1 capsule by mouth 2 (Two) Times a Day. 180 capsule 3   • IPRATROPIUM-ALBUTEROL IN Inhale Daily.     • metoprolol tartrate (LOPRESSOR) 50 MG tablet Take 1 tablet by mouth 3 (Three) Times a Day. 270 tablet 3   • omeprazole (priLOSEC) 40 MG capsule Take 40 mg by mouth 2 (Two) Times a Day.       No current facility-administered medications for this visit.     Assessment:       Diagnosis  Plan   1. PAF (paroxysmal atrial fibrillation) (CMS/HCC)     2. Essential hypertension     3. Mixed hyperlipidemia     4. Encounter for therapeutic drug level monitoring     5. Pulmonary fibrosis (CMS/HCC)     6. Aortic stenosis, moderate  Adult Transthoracic Echo Complete W/ Cont if Necessary Per Protocol        Orders Placed This Encounter   Procedures   • Adult Transthoracic Echo Complete W/ Cont if Necessary Per Protocol     Standing Status:   Future     Standing Expiration Date:   4/12/2022     Order Specific Question:   Reason for exam?     Answer:   Valvular Function     Order Specific Question:   Valvular Function specification?     Answer:   Native Valvular Stenosis     Order Specific Question:   Valvular stenosis severity?     Answer:   Moderate         Plan:            1. 88 year old gentleman with paroxysmal atrial fibrillation off amiodarone due to history of pulmonary fibrosis.  He  had a rash with diltiazem in the past.  He has been maintained on Tikosyn, metoprolol and digoxin.  He previously declined AV node ablation with pacemaker.   continue current regimen with apixaban  2. Prediabetes-hemoglobin A1c today is still within prediabetic range.  He was actually in this range for years ago  3.  History of TIA no recurrence  3.  History of pulmonary fibrosis usually sees Dr. Chidi Oconnor.  He is on prophylactic cefdinir once a month  4.  Aortic stenosis mild in November 2017 and moderate on most recent echo August 2020- he is to have repeat echo when he returns in 6 months. He is stable at this time  5.  History of Crohn's disease and iron deficient anemia usually sees Dr. Susie Mccoy  6.  Hypertension continue home therapy  7.  Hyperlipidemia continue home therapy  8.  History of cholecystitis    Follow-up in 6 months call with questions or concerns.            It has been a pleasure to participate in this patient's care.      Thank you,  KYLAH Jha      **I used Dragon to dictate this  note:**

## 2021-04-12 NOTE — TELEPHONE ENCOUNTER
FAX ALL PRESCRIPTION REFILLS TO [facility].  FAX# [phone].  PHONE [phone]

## 2021-06-09 ENCOUNTER — OFFICE (OUTPATIENT)
Dept: URBAN - METROPOLITAN AREA CLINIC 66 | Facility: CLINIC | Age: 86
End: 2021-06-09

## 2021-06-09 VITALS
DIASTOLIC BLOOD PRESSURE: 70 MMHG | WEIGHT: 151 LBS | HEART RATE: 88 BPM | SYSTOLIC BLOOD PRESSURE: 138 MMHG | HEIGHT: 68 IN

## 2021-06-09 DIAGNOSIS — K50.10 CROHN'S DISEASE OF LARGE INTESTINE WITHOUT COMPLICATIONS: ICD-10-CM

## 2021-06-09 DIAGNOSIS — D50.9 IRON DEFICIENCY ANEMIA, UNSPECIFIED: ICD-10-CM

## 2021-06-09 DIAGNOSIS — K55.20 ANGIODYSPLASIA OF COLON WITHOUT HEMORRHAGE: ICD-10-CM

## 2021-06-09 PROCEDURE — 99214 OFFICE O/P EST MOD 30 MIN: CPT | Performed by: INTERNAL MEDICINE

## 2021-06-22 ENCOUNTER — HOSPITAL ENCOUNTER (OUTPATIENT)
Facility: HOSPITAL | Age: 86
Setting detail: OBSERVATION
Discharge: HOME | End: 2021-06-24
Attending: EMERGENCY MEDICINE | Admitting: HOSPITALIST

## 2021-06-22 ENCOUNTER — APPOINTMENT (OUTPATIENT)
Dept: GENERAL RADIOLOGY | Facility: HOSPITAL | Age: 86
End: 2021-06-22

## 2021-06-22 DIAGNOSIS — R55 NEAR SYNCOPE: ICD-10-CM

## 2021-06-22 DIAGNOSIS — I48.91 ATRIAL FIBRILLATION WITH RAPID VENTRICULAR RESPONSE (HCC): Primary | ICD-10-CM

## 2021-06-22 LAB
ALBUMIN SERPL-MCNC: 3.9 G/DL (ref 3.5–5.2)
ALBUMIN/GLOB SERPL: 1.1 G/DL
ALP SERPL-CCNC: 51 U/L (ref 39–117)
ALT SERPL W P-5'-P-CCNC: 15 U/L (ref 1–41)
ANION GAP SERPL CALCULATED.3IONS-SCNC: 7.6 MMOL/L (ref 5–15)
AST SERPL-CCNC: 17 U/L (ref 1–40)
BASOPHILS # BLD AUTO: 0.07 10*3/MM3 (ref 0–0.2)
BASOPHILS NFR BLD AUTO: 0.8 % (ref 0–1.5)
BILIRUB SERPL-MCNC: 0.4 MG/DL (ref 0–1.2)
BUN SERPL-MCNC: 20 MG/DL (ref 8–23)
BUN/CREAT SERPL: 17.5 (ref 7–25)
CALCIUM SPEC-SCNC: 9.1 MG/DL (ref 8.6–10.5)
CHLORIDE SERPL-SCNC: 105 MMOL/L (ref 98–107)
CO2 SERPL-SCNC: 25.4 MMOL/L (ref 22–29)
CREAT SERPL-MCNC: 1.14 MG/DL (ref 0.76–1.27)
DEPRECATED RDW RBC AUTO: 58.3 FL (ref 37–54)
DIGOXIN SERPL-MCNC: 0.9 NG/ML (ref 0.6–1.2)
EOSINOPHIL # BLD AUTO: 0.21 10*3/MM3 (ref 0–0.4)
EOSINOPHIL NFR BLD AUTO: 2.3 % (ref 0.3–6.2)
ERYTHROCYTE [DISTWIDTH] IN BLOOD BY AUTOMATED COUNT: 18.7 % (ref 12.3–15.4)
GFR SERPL CREATININE-BSD FRML MDRD: 61 ML/MIN/1.73
GLOBULIN UR ELPH-MCNC: 3.6 GM/DL
GLUCOSE SERPL-MCNC: 83 MG/DL (ref 65–99)
HCT VFR BLD AUTO: 39.4 % (ref 37.5–51)
HGB BLD-MCNC: 12.9 G/DL (ref 13–17.7)
HOLD SPECIMEN: NORMAL
HOLD SPECIMEN: NORMAL
IMM GRANULOCYTES # BLD AUTO: 0.08 10*3/MM3 (ref 0–0.05)
IMM GRANULOCYTES NFR BLD AUTO: 0.9 % (ref 0–0.5)
INR PPP: 1.44 (ref 0.9–1.1)
LYMPHOCYTES # BLD AUTO: 1.42 10*3/MM3 (ref 0.7–3.1)
LYMPHOCYTES NFR BLD AUTO: 15.4 % (ref 19.6–45.3)
MAGNESIUM SERPL-MCNC: 1.9 MG/DL (ref 1.6–2.4)
MCH RBC QN AUTO: 28 PG (ref 26.6–33)
MCHC RBC AUTO-ENTMCNC: 32.7 G/DL (ref 31.5–35.7)
MCV RBC AUTO: 85.7 FL (ref 79–97)
MONOCYTES # BLD AUTO: 0.96 10*3/MM3 (ref 0.1–0.9)
MONOCYTES NFR BLD AUTO: 10.4 % (ref 5–12)
NEUTROPHILS NFR BLD AUTO: 6.47 10*3/MM3 (ref 1.7–7)
NEUTROPHILS NFR BLD AUTO: 70.2 % (ref 42.7–76)
NRBC BLD AUTO-RTO: 0 /100 WBC (ref 0–0.2)
PLATELET # BLD AUTO: 182 10*3/MM3 (ref 140–450)
PMV BLD AUTO: 10.9 FL (ref 6–12)
POTASSIUM SERPL-SCNC: 4.5 MMOL/L (ref 3.5–5.2)
PROT SERPL-MCNC: 7.5 G/DL (ref 6–8.5)
PROTHROMBIN TIME: 17.3 SECONDS (ref 11.7–14.2)
RBC # BLD AUTO: 4.6 10*6/MM3 (ref 4.14–5.8)
SARS-COV-2 RNA RESP QL NAA+PROBE: NOT DETECTED
SODIUM SERPL-SCNC: 138 MMOL/L (ref 136–145)
TROPONIN T SERPL-MCNC: <0.01 NG/ML (ref 0–0.03)
WBC # BLD AUTO: 9.21 10*3/MM3 (ref 3.4–10.8)
WHOLE BLOOD HOLD SPECIMEN: NORMAL

## 2021-06-22 PROCEDURE — 96374 THER/PROPH/DIAG INJ IV PUSH: CPT

## 2021-06-22 PROCEDURE — 93010 ELECTROCARDIOGRAM REPORT: CPT | Performed by: INTERNAL MEDICINE

## 2021-06-22 PROCEDURE — 84484 ASSAY OF TROPONIN QUANT: CPT | Performed by: EMERGENCY MEDICINE

## 2021-06-22 PROCEDURE — 85025 COMPLETE CBC W/AUTO DIFF WBC: CPT | Performed by: EMERGENCY MEDICINE

## 2021-06-22 PROCEDURE — G0378 HOSPITAL OBSERVATION PER HR: HCPCS

## 2021-06-22 PROCEDURE — 83735 ASSAY OF MAGNESIUM: CPT | Performed by: EMERGENCY MEDICINE

## 2021-06-22 PROCEDURE — 80053 COMPREHEN METABOLIC PANEL: CPT | Performed by: EMERGENCY MEDICINE

## 2021-06-22 PROCEDURE — 25010000002 DIGOXIN PER 500 MCG: Performed by: EMERGENCY MEDICINE

## 2021-06-22 PROCEDURE — U0003 INFECTIOUS AGENT DETECTION BY NUCLEIC ACID (DNA OR RNA); SEVERE ACUTE RESPIRATORY SYNDROME CORONAVIRUS 2 (SARS-COV-2) (CORONAVIRUS DISEASE [COVID-19]), AMPLIFIED PROBE TECHNIQUE, MAKING USE OF HIGH THROUGHPUT TECHNOLOGIES AS DESCRIBED BY CMS-2020-01-R: HCPCS | Performed by: EMERGENCY MEDICINE

## 2021-06-22 PROCEDURE — 93005 ELECTROCARDIOGRAM TRACING: CPT

## 2021-06-22 PROCEDURE — 93005 ELECTROCARDIOGRAM TRACING: CPT | Performed by: EMERGENCY MEDICINE

## 2021-06-22 PROCEDURE — 99284 EMERGENCY DEPT VISIT MOD MDM: CPT

## 2021-06-22 PROCEDURE — 71045 X-RAY EXAM CHEST 1 VIEW: CPT

## 2021-06-22 PROCEDURE — U0005 INFEC AGEN DETEC AMPLI PROBE: HCPCS | Performed by: EMERGENCY MEDICINE

## 2021-06-22 PROCEDURE — 96375 TX/PRO/DX INJ NEW DRUG ADDON: CPT

## 2021-06-22 PROCEDURE — 80162 ASSAY OF DIGOXIN TOTAL: CPT | Performed by: EMERGENCY MEDICINE

## 2021-06-22 PROCEDURE — 85610 PROTHROMBIN TIME: CPT | Performed by: EMERGENCY MEDICINE

## 2021-06-22 RX ORDER — DOFETILIDE 0.12 MG/1
125 CAPSULE ORAL ONCE
Status: COMPLETED | OUTPATIENT
Start: 2021-06-22 | End: 2021-06-22

## 2021-06-22 RX ORDER — SODIUM CHLORIDE 0.9 % (FLUSH) 0.9 %
10 SYRINGE (ML) INJECTION AS NEEDED
Status: DISCONTINUED | OUTPATIENT
Start: 2021-06-22 | End: 2021-06-24 | Stop reason: HOSPADM

## 2021-06-22 RX ORDER — SODIUM CHLORIDE 0.9 % (FLUSH) 0.9 %
10 SYRINGE (ML) INJECTION AS NEEDED
Status: DISCONTINUED | OUTPATIENT
Start: 2021-06-22 | End: 2021-06-23

## 2021-06-22 RX ORDER — DIGOXIN 0.25 MG/ML
125 INJECTION INTRAMUSCULAR; INTRAVENOUS ONCE
Status: COMPLETED | OUTPATIENT
Start: 2021-06-22 | End: 2021-06-22

## 2021-06-22 RX ADMIN — DOFETILIDE 125 MCG: 0.12 CAPSULE ORAL at 22:43

## 2021-06-22 RX ADMIN — METOROPROLOL TARTRATE 5 MG: 5 INJECTION, SOLUTION INTRAVENOUS at 20:59

## 2021-06-22 RX ADMIN — DIGOXIN 125 MCG: 250 INJECTION, SOLUTION INTRAMUSCULAR; INTRAVENOUS; PARENTERAL at 21:20

## 2021-06-22 NOTE — ED PROVIDER NOTES
EMERGENCY DEPARTMENT ENCOUNTER    Room Number:  22/22  Date of encounter:  6/22/2021  PCP: Lubna Lobo MD  Historian: Patient    Patient was placed in face mask during triage process. Patient was wearing facemask when I entered the room and throughout our encounter. I wore full protective equipment throughout this patient encounter including a face mask, eye protection, and gloves. Hand hygiene was performed before donning protective equipment and again following doffing of PPE after leaving the room.    HPI:  Chief Complaint: Elevated heart rate  A complete HPI/ROS/PMH/PSH/SH/FH are unobtainable due to: N/A   Context: Asia Ly is a 88 y.o. male with a history of paroxysmal atrial fibrillation, stroke and TIA in 2016, aortic stenosis, hyperlipidemia, B12 anemia,Crohn's disease, colon polyps, lower extremity edema, TIA,  pulmonary hypertension and pulmonary fibrosis who presents to the ED c/o elevated heart rate.  Patient's wife took his vital signs at home earlier today and noted that his heart rate was elevated.  He has had a mild scratchy throat but no chest pain or shortness of breath.  Rare cough.  No abdominal pain, nausea or vomiting.  No recent black or bloody stools.  Patient also notes that on his way to the emergency department while he is riding with his wife he had a brief episode of lightheadedness and felt like he might pass out.  Again there was no chest pain or dyspnea with this event.  Patient feels stable at this time.      MEDICAL HISTORY REVIEW  Cardiology office note for 4/12/21:  1. 88 year old gentleman with paroxysmal atrial fibrillation off amiodarone due to history of pulmonary fibrosis.  He  had a rash with diltiazem in the past.  He has been maintained on Tikosyn, metoprolol and digoxin.  He previously declined AV node ablation with pacemaker.   continue current regimen with apixaban  2. Prediabetes-hemoglobin A1c today is still within prediabetic range.  He was actually in this  range for years ago  3.  History of TIA no recurrence  3.  History of pulmonary fibrosis usually sees Dr. Chidi Oconnor.  He is on prophylactic cefdinir once a month  4.  Aortic stenosis mild in November 2017 and moderate on most recent echo August 2020- he is to have repeat echo when he returns in 6 months. He is stable at this time  5.  History of Crohn's disease and iron deficient anemia usually sees Dr. Susie Mccoy  6.  Hypertension continue home therapy  7.  Hyperlipidemia continue home therapy  8.  History of cholecystitis    PAST MEDICAL HISTORY  Active Ambulatory Problems     Diagnosis Date Noted   • Ataxia 08/19/2016   • TIA (transient ischemic attack) 08/19/2016   • Atrial fibrillation, paroxysmal 08/19/2016   • Hyperlipidemia 08/19/2016   • Gout 08/19/2016   • Crohn's disease (CMS/HCC) 08/19/2016   • Atrial fibrillation with RVR (CMS/HCC) 10/27/2018   • Acute cholecystitis 08/26/2020   • Obstructive jaundice 08/26/2020   • Acute lower GI bleeding 11/17/2020   • Aortic stenosis    • Pulmonary fibrosis (CMS/[facility])    • Anticoagulant long-term use    • Febrile illness, acute 12/07/2020     Resolved Ambulatory Problems     Diagnosis Date Noted   • Hypoxia 04/29/2018     Past Medical History:   Diagnosis Date   • Aspiration pneumonia (CMS/[facility])    • Bronchiectasis (CMS/HCC)    • CKD (chronic kidney disease)    • COPD (chronic obstructive pulmonary disease) (CMS/HCC)    • Dizziness    • Elevated cholesterol    • Gastric ulcer    • Generalized weakness    • Hard of hearing    • Hypertension    • Leg swelling    • Lower extremity edema    • Mild anemia    • Near syncope    • Nonrheumatic aortic valve stenosis    • PAF (paroxysmal atrial fibrillation) (CMS/[facility])    • Palpitations    • Peptic ulcer    • Pneumonia of left lung due to infectious organism    • Stroke (CMS/HCC)          PAST SURGICAL HISTORY  Past Surgical History:   Procedure Laterality Date   • BRONCHOSCOPY N/A 10/22/2018    Procedure: BRONCHOSCOPY WITH  "BRONCHALVEOLAR LAVAGE;  Surgeon: Chidi Oconnor MD;  Location: [facility] ENDOSCOPY;  Service: Pulmonary   • CATARACT EXTRACTION Bilateral    • COLONOSCOPY     • COLONOSCOPY N/A 3/9/2018    Procedure: COLONOSCOPY to terminal ileum with bx;  Surgeon: Aron Cabrera MD;  Location: [facility] ENDOSCOPY;  Service:    • COLONOSCOPY N/A 2020    Procedure: COLONOSCOPY to cecum:  apc to cecum angiodysplagia,;  Surgeon: Aron Cabrera MD;  Location: [facility] ENDOSCOPY;  Service: Gastroenterology;  Laterality: N/A;  GI bleed  post:  diverticulosis, angiodysplagia   • CYSTECTOMY      back and shoulder area   • ENDOSCOPY N/A 3/9/2018    Procedure: ESOPHAGOGASTRODUODENOSCOPY with bx;  Surgeon: Aron Cabrera MD;  Location: [facility] ENDOSCOPY;  Service:    • ENDOSCOPY N/A 2020    Procedure: ESOPHAGOGASTRODUODENOSCOPY;  Surgeon: Aron Cabrera MD;  Location: [facility] ENDOSCOPY;  Service: Gastroenterology;  Laterality: N/A;  GI bleed  post:  HH.   • EYE SURGERY Left     detached retina   • EYE SURGERY Right     \"repaired a hole in the eye\"   • TESTICLE SURGERY      benign tumor removed.         FAMILY HISTORY  Family History   Problem Relation Age of Onset   • Stroke Mother    • Heart disease Mother    • Hypertension Mother    • Diabetes Mother    • Heart disease Sister         Heart Valve Replacement   • Ovarian cancer Sister    • Rheumatic fever Sister    • Diabetes Sister    • Stroke Father          SOCIAL HISTORY  Social History     Socioeconomic History   • Marital status:      Spouse name: Not on file   • Number of children: Not on file   • Years of education: Not on file   • Highest education level: Not on file   Tobacco Use   • Smoking status: Former Smoker     Types: Cigars     Quit date: 10/28/1994     Years since quittin.6   • Smokeless tobacco: Never Used   • Tobacco comment: Quit 25 years ago   Substance and Sexual Activity   • Alcohol use: No     Comment: daily caffiene - 1/2 cup of coffee   • Drug " use: No   • Sexual activity: Defer         ALLERGIES  Amiodarone, Diltiazem, Doxycycline, and Indomethacin        REVIEW OF SYSTEMS  Review of Systems     All systems reviewed and negative except for those discussed in HPI.       PHYSICAL EXAM    I have reviewed the triage vital signs and nursing notes.    ED Triage Vitals   Temp Heart Rate Resp BP SpO2   06/22/21 1900 06/22/21 1900 06/22/21 1900 06/22/21 1935 06/22/21 1900   99.4 °F (37.4 °C) (!) 131 20 (!) 148/110 97 %      Temp src Heart Rate Source Patient Position BP Location FiO2 (%)   -- -- 06/22/21 1935 06/22/21 1935 --     Lying Right arm        Physical Exam    Physical Exam   Constitutional: No distress.   HENT:  Head: Normocephalic and atraumatic.   Oropharynx: Mucous membranes are moist.   Eyes: No scleral icterus. No conjunctival pallor.  Neck: Painless range of motion noted. Neck supple.   Cardiovascular: Tachycardic with irregularly irregular rhythm and intact distal pulses.  Pulmonary/Chest: No respiratory distress.  No tachypnea or increased work of breathing noted.    Abdominal: Soft. There is no tenderness. There is no rebound and no guarding.   Musculoskeletal: Moves all extremities equally. There is no pedal edema or calf tenderness.   Neurological: Alert.  Baseline strength and sensation noted.  Stable gait and station  Skin: Skin is pink, warm, and dry. No pallor.   Psychiatric: Mood and affect normal.   Nursing note and vitals reviewed.    LAB RESULTS  Recent Results (from the past 24 hour(s))   ECG 12 Lead    Collection Time: 06/22/21  7:28 PM   Result Value Ref Range    QT Interval 314 ms   Digoxin Level    Collection Time: 06/22/21  8:26 PM    Specimen: Blood   Result Value Ref Range    Digoxin 0.90 0.60 - 1.20 ng/mL   Comprehensive Metabolic Panel    Collection Time: 06/22/21  8:26 PM    Specimen: Blood   Result Value Ref Range    Glucose 83 65 - 99 mg/dL    BUN 20 8 - 23 mg/dL    Creatinine 1.14 0.76 - 1.27 mg/dL    Sodium 138 136 - 145  mmol/L    Potassium 4.5 3.5 - 5.2 mmol/L    Chloride 105 98 - 107 mmol/L    CO2 25.4 22.0 - 29.0 mmol/L    Calcium 9.1 8.6 - 10.5 mg/dL    Total Protein 7.5 6.0 - 8.5 g/dL    Albumin 3.90 3.50 - 5.20 g/dL    ALT (SGPT) 15 1 - 41 U/L    AST (SGOT) 17 1 - 40 U/L    Alkaline Phosphatase 51 39 - 117 U/L    Total Bilirubin 0.4 0.0 - 1.2 mg/dL    eGFR Non African Amer 61 >60 mL/min/1.73    Globulin 3.6 gm/dL    A/G Ratio 1.1 g/dL    BUN/Creatinine Ratio 17.5 7.0 - 25.0    Anion Gap 7.6 5.0 - 15.0 mmol/L   Magnesium    Collection Time: 06/22/21  8:26 PM    Specimen: Blood   Result Value Ref Range    Magnesium 1.9 1.6 - 2.4 mg/dL   Troponin    Collection Time: 06/22/21  8:26 PM    Specimen: Blood   Result Value Ref Range    Troponin T <0.010 0.000 - 0.030 ng/mL   Protime-INR    Collection Time: 06/22/21  8:26 PM    Specimen: Blood   Result Value Ref Range    Protime 17.3 (H) 11.7 - 14.2 Seconds    INR 1.44 (H) 0.90 - 1.10   CBC Auto Differential    Collection Time: 06/22/21  8:26 PM    Specimen: Blood   Result Value Ref Range    WBC 9.21 3.40 - 10.80 10*3/mm3    RBC 4.60 4.14 - 5.80 10*6/mm3    Hemoglobin 12.9 (L) 13.0 - 17.7 g/dL    Hematocrit 39.4 37.5 - 51.0 %    MCV 85.7 79.0 - 97.0 fL    MCH 28.0 26.6 - 33.0 pg    MCHC 32.7 31.5 - 35.7 g/dL    RDW 18.7 (H) 12.3 - 15.4 %    RDW-SD 58.3 (H) 37.0 - 54.0 fl    MPV 10.9 6.0 - 12.0 fL    Platelets 182 140 - 450 10*3/mm3    Neutrophil % 70.2 42.7 - 76.0 %    Lymphocyte % 15.4 (L) 19.6 - 45.3 %    Monocyte % 10.4 5.0 - 12.0 %    Eosinophil % 2.3 0.3 - 6.2 %    Basophil % 0.8 0.0 - 1.5 %    Immature Grans % 0.9 (H) 0.0 - 0.5 %    Neutrophils, Absolute 6.47 1.70 - 7.00 10*3/mm3    Lymphocytes, Absolute 1.42 0.70 - 3.10 10*3/mm3    Monocytes, Absolute 0.96 (H) 0.10 - 0.90 10*3/mm3    Eosinophils, Absolute 0.21 0.00 - 0.40 10*3/mm3    Basophils, Absolute 0.07 0.00 - 0.20 10*3/mm3    Immature Grans, Absolute 0.08 (H) 0.00 - 0.05 10*3/mm3    nRBC 0.0 0.0 - 0.2 /100 WBC       Ordered  the above labs and independently reviewed the results.        RADIOLOGY  XR Chest 1 View    Result Date: 6/22/2021  PORTABLE CHEST  HISTORY: Near syncope.  COMPARISON: Chest x-ray 12/07/2020.  FINDINGS: The heart is at the upper limits of normal in size. There is bibasilar atelectasis/infiltrate more prominent on the right. The infiltrates are less prominent as compared to the study from 12/07/2020. There is mild prominence of pulmonary vasculature in the perihilar regions bilaterally, exaggerated by inspiratory effort.        I ordered the above noted radiological studies. Reviewed by me and discussed with radiologist.  See dictation for official radiology interpretation.      PROCEDURES    Procedures        MEDICATIONS GIVEN IN ER    Medications   sodium chloride 0.9 % flush 10 mL (has no administration in time range)   sodium chloride 0.9 % flush 10 mL (has no administration in time range)   dofetilide (TIKOSYN) capsule 125 mcg (has no administration in time range)   digoxin (LANOXIN) injection 125 mcg (has no administration in time range)   metoprolol tartrate (LOPRESSOR) injection 5 mg (5 mg Intravenous Given 6/22/21 2059)         PROGRESS, DATA ANALYSIS, CONSULTS, AND MEDICAL DECISION MAKING    My differential diagnosis for syncope includes but is not limited to:  Vasovagal reflex - situational stimulus, micturition, defecation, cough, sneezing, swallowing, postprandial state, react sinus hypersensitivity  Vascular-prolonged recumbency, sudden postural change, prolonged standing, hypovolemia, vasodilator drugs, autonomic neuropathy, adrenal insufficiency, subclavian steal, pulmonary embolism  Cardiac -arrhythmia, heart block, myocardial infarction, aortic stenosis, cardiac myxoma, cardiac, LV Dysfunction, Aortic Dissection, Pulmonary Hypertension, Pulmonary Stenosis, Pacemaker Failure  CNS-seizure, hypoxia, hypoglycemia, TIA,(basal vertebral), hydrocephalus    Total critical care time: Approximately 35  minutes    Due to a high probability of clinically significant, life threatening deterioration, the patient required my highest level of preparedness to intervene emergently and I personally spent this critical care time directly and personally managing the patient. This critical care time included obtaining a history; examining the patient; vital sign monitoring; ordering and review of studies; arranging urgent treatment with development of a management plan; evaluation of patient's response to treatment; frequent reassessment; and, discussions with other providers.    This critical care time was performed to assess and manage the high probability of imminent, life-threatening deterioration that could result in multi-organ failure. It was exclusive of separately billable procedures and treating other patients and teaching time.    Please see MDM section and the rest of the note for further information on patient assessment and treatment.      All labs have been independently reviewed by me.  All radiology studies have been reviewed by me and discussed with radiologist dictating the report.   EKG's independently viewed and interpreted by me.  Discussion below represents my analysis of pertinent findings related to patient's condition, differential diagnosis, treatment plan and final disposition.      ED Course as of Jun 22 2120   Tue Jun 22, 2021 2001 EKG           EKG time: 1928  Rhythm/Rate: A. fib with RVR; ventricular rate 125  P waves and RI: NA  QRS, axis: Narrow complex  ST and T waves: No STEMI; QTC within normal limits    Interpreted Contemporaneously by me, independently viewed  Comparison: 12/8/2020-previously sinus      [RS]   2052 Improved from prior   Hemoglobin(!): 12.9 [RS]   2108 Digoxin: 0.90 [RS]   2108 Troponin T: <0.010 [RS]   2108 Magnesium: 1.9 [RS]   2115 Rate not significantly improved as he is still running in the 130s though his blood pressure did dip to 104 systolic following metoprolol  IV.  I have reviewed labs and x-ray result with the patient and his spouse.  He is due for his Tikosyn at this time and it will be given to him orally.  I will also give him a small dose of IV digoxin to see if we can improve his rate control.  Plan admission with cardiology consultation.  Patient agreeable.    [RS]   2118 CONSULT        Provider: Dr. Laguna PPS    Discussion: Reviewed patient history, ED presentation and evaluation as well as therapies that have been initiated in the ED.  He is aware of the pending cardiology consultation.  He is agreeable to accept the patient for observation admission with telemetry.    Agreeable c treatment and planned disposition.            [RS]      ED Course User Index  [RS] Jeromy Petty MD       AS OF 21:20 EDT VITALS:    BP - 122/82  HR - 102  TEMP - 99.4 °F (37.4 °C)  O2 SATS - 96%        DIAGNOSIS  Final diagnoses:   Atrial fibrillation with rapid ventricular response (CMS/HCC)   Near syncope         DISPOSITION  ADMISSION    Discussed treatment plan and reason for admission with pt/family and admitting physician.  Pt/family voiced understanding of the plan for admission for further testing/treatment as needed.          Jeromy Petty MD  06/22/21 3486

## 2021-06-22 NOTE — ED NOTES
Pt arrived by PV, near syncope, cough starting last night. Denies fever.     Patient was placed in face mask during first look triage.  Patient was wearing a face mask throughout encounter.  I wore personal protective equipment throughout the encounter.  Hand hygiene was performed before and after patient encounter.        Yari Lindsey, RN  06/22/21 1926

## 2021-06-23 LAB
ANION GAP SERPL CALCULATED.3IONS-SCNC: 8.9 MMOL/L (ref 5–15)
BUN SERPL-MCNC: 18 MG/DL (ref 8–23)
BUN/CREAT SERPL: 15 (ref 7–25)
CALCIUM SPEC-SCNC: 9 MG/DL (ref 8.6–10.5)
CHLORIDE SERPL-SCNC: 105 MMOL/L (ref 98–107)
CO2 SERPL-SCNC: 26.1 MMOL/L (ref 22–29)
CREAT SERPL-MCNC: 1.2 MG/DL (ref 0.76–1.27)
DEPRECATED RDW RBC AUTO: 59.9 FL (ref 37–54)
ERYTHROCYTE [DISTWIDTH] IN BLOOD BY AUTOMATED COUNT: 18.7 % (ref 12.3–15.4)
GFR SERPL CREATININE-BSD FRML MDRD: 57 ML/MIN/1.73
GLUCOSE SERPL-MCNC: 91 MG/DL (ref 65–99)
HCT VFR BLD AUTO: 39.1 % (ref 37.5–51)
HGB BLD-MCNC: 12.4 G/DL (ref 13–17.7)
MCH RBC QN AUTO: 27.5 PG (ref 26.6–33)
MCHC RBC AUTO-ENTMCNC: 31.7 G/DL (ref 31.5–35.7)
MCV RBC AUTO: 86.7 FL (ref 79–97)
PLATELET # BLD AUTO: 175 10*3/MM3 (ref 140–450)
PMV BLD AUTO: 10.8 FL (ref 6–12)
POTASSIUM SERPL-SCNC: 4.3 MMOL/L (ref 3.5–5.2)
QT INTERVAL: 314 MS
QT INTERVAL: 360 MS
RBC # BLD AUTO: 4.51 10*6/MM3 (ref 4.14–5.8)
SODIUM SERPL-SCNC: 140 MMOL/L (ref 136–145)
WBC # BLD AUTO: 8.74 10*3/MM3 (ref 3.4–10.8)

## 2021-06-23 PROCEDURE — 93010 ELECTROCARDIOGRAM REPORT: CPT | Performed by: INTERNAL MEDICINE

## 2021-06-23 PROCEDURE — 85027 COMPLETE CBC AUTOMATED: CPT | Performed by: HOSPITALIST

## 2021-06-23 PROCEDURE — 99214 OFFICE O/P EST MOD 30 MIN: CPT | Performed by: NURSE PRACTITIONER

## 2021-06-23 PROCEDURE — 93005 ELECTROCARDIOGRAM TRACING: CPT | Performed by: NURSE PRACTITIONER

## 2021-06-23 PROCEDURE — 80048 BASIC METABOLIC PNL TOTAL CA: CPT | Performed by: HOSPITALIST

## 2021-06-23 PROCEDURE — G0378 HOSPITAL OBSERVATION PER HR: HCPCS

## 2021-06-23 RX ORDER — DOFETILIDE 0.12 MG/1
125 CAPSULE ORAL ONCE
Status: DISCONTINUED | OUTPATIENT
Start: 2021-06-23 | End: 2021-06-23

## 2021-06-23 RX ORDER — ATORVASTATIN CALCIUM 20 MG/1
10 TABLET, FILM COATED ORAL NIGHTLY
Status: DISCONTINUED | OUTPATIENT
Start: 2021-06-23 | End: 2021-06-24 | Stop reason: HOSPADM

## 2021-06-23 RX ORDER — DIGOXIN 125 MCG
125 TABLET ORAL
Status: DISCONTINUED | OUTPATIENT
Start: 2021-06-23 | End: 2021-06-24 | Stop reason: HOSPADM

## 2021-06-23 RX ORDER — IPRATROPIUM BROMIDE AND ALBUTEROL SULFATE 2.5; .5 MG/3ML; MG/3ML
1.5 SOLUTION RESPIRATORY (INHALATION) EVERY 4 HOURS PRN
Status: DISCONTINUED | OUTPATIENT
Start: 2021-06-23 | End: 2021-06-24 | Stop reason: HOSPADM

## 2021-06-23 RX ORDER — CEFDINIR 300 MG/1
300 CAPSULE ORAL EVERY 12 HOURS SCHEDULED
Status: DISCONTINUED | OUTPATIENT
Start: 2021-06-23 | End: 2021-06-24 | Stop reason: HOSPADM

## 2021-06-23 RX ORDER — DOFETILIDE 0.12 MG/1
125 CAPSULE ORAL EVERY 12 HOURS SCHEDULED
Status: DISCONTINUED | OUTPATIENT
Start: 2021-06-23 | End: 2021-06-24 | Stop reason: HOSPADM

## 2021-06-23 RX ORDER — BUDESONIDE 3 MG/1
3 CAPSULE, COATED PELLETS ORAL DAILY
Status: DISCONTINUED | OUTPATIENT
Start: 2021-06-23 | End: 2021-06-24 | Stop reason: HOSPADM

## 2021-06-23 RX ORDER — DIGOXIN 0.25 MG/ML
125 INJECTION INTRAMUSCULAR; INTRAVENOUS ONCE
Status: DISCONTINUED | OUTPATIENT
Start: 2021-06-23 | End: 2021-06-23

## 2021-06-23 RX ORDER — METOPROLOL TARTRATE 50 MG/1
50 TABLET, FILM COATED ORAL 3 TIMES DAILY
Status: DISCONTINUED | OUTPATIENT
Start: 2021-06-23 | End: 2021-06-24 | Stop reason: HOSPADM

## 2021-06-23 RX ORDER — PANTOPRAZOLE SODIUM 40 MG/1
40 TABLET, DELAYED RELEASE ORAL EVERY MORNING
Status: DISCONTINUED | OUTPATIENT
Start: 2021-06-24 | End: 2021-06-24 | Stop reason: HOSPADM

## 2021-06-23 RX ORDER — ALLOPURINOL 100 MG/1
100 TABLET ORAL 2 TIMES DAILY
Status: DISCONTINUED | OUTPATIENT
Start: 2021-06-23 | End: 2021-06-24 | Stop reason: HOSPADM

## 2021-06-23 RX ORDER — ACETAMINOPHEN 325 MG/1
650 TABLET ORAL EVERY 4 HOURS PRN
Status: DISCONTINUED | OUTPATIENT
Start: 2021-06-23 | End: 2021-06-24 | Stop reason: HOSPADM

## 2021-06-23 RX ADMIN — APIXABAN 5 MG: 5 TABLET, FILM COATED ORAL at 09:23

## 2021-06-23 RX ADMIN — METOPROLOL TARTRATE 50 MG: 50 TABLET, FILM COATED ORAL at 22:28

## 2021-06-23 RX ADMIN — ATORVASTATIN CALCIUM 10 MG: 20 TABLET, FILM COATED ORAL at 22:28

## 2021-06-23 RX ADMIN — BUDESONIDE 3 MG: 3 CAPSULE, GELATIN COATED ORAL at 14:09

## 2021-06-23 RX ADMIN — DIGOXIN 125 MCG: 125 TABLET ORAL at 14:10

## 2021-06-23 RX ADMIN — APIXABAN 5 MG: 5 TABLET, FILM COATED ORAL at 22:28

## 2021-06-23 RX ADMIN — ALLOPURINOL 100 MG: 100 TABLET ORAL at 14:09

## 2021-06-23 RX ADMIN — ACETAMINOPHEN 650 MG: 325 TABLET, FILM COATED ORAL at 22:28

## 2021-06-23 RX ADMIN — CEFDINIR 300 MG: 300 CAPSULE ORAL at 22:28

## 2021-06-23 RX ADMIN — METOPROLOL TARTRATE 50 MG: 50 TABLET, FILM COATED ORAL at 18:31

## 2021-06-23 RX ADMIN — ALLOPURINOL 100 MG: 100 TABLET ORAL at 22:28

## 2021-06-23 RX ADMIN — DOFETILIDE 125 MCG: 0.12 CAPSULE ORAL at 22:28

## 2021-06-23 RX ADMIN — METOPROLOL TARTRATE 50 MG: 50 TABLET, FILM COATED ORAL at 09:23

## 2021-06-23 NOTE — PLAN OF CARE
Pt. VS WNL.  No c/o pain.  Pt. A&O x4.  Up ad michell, gait steady.  Pt. No longer in A-fib, home meds restarted.  Pt. [place].  Pt. Restarted on Cefdinir for PNA that was being treated by Pulmonary as an outpatient.  Pt. With cough, thick green secretions, MD aware.  Pt. Resting comfortably at present, will continue to monitor closely.      Problem: Adult Inpatient Plan of Care  Goal: Plan of Care Review  Outcome: Ongoing, Progressing  Flowsheets (Taken 6/23/2021 1920)  Progress: improving  Plan of Care Reviewed With: patient

## 2021-06-23 NOTE — ED NOTES
Nursing report ED to floor  Asia Ly  88 y.o.  male    HPI (triage note):   Chief Complaint   Patient presents with   • Syncope   • Weakness - Generalized       Admitting doctor:   Calvin Bergeron MD    Admitting diagnosis:   The primary encounter diagnosis was Atrial fibrillation with rapid ventricular response (CMS/HCC). A diagnosis of Near syncope was also pertinent to this visit.    Code status:   Current Code Status     Date Active Code Status Order ID Comments User Context       Prior    Advance Care Planning Activity          Allergies:   Amiodarone, Diltiazem, Doxycycline, and Indomethacin    Weight:   There were no vitals filed for this visit.    Most recent vitals:   Vitals:    06/22/21 2116 06/22/21 2219 06/22/21 2220 06/22/21 2231   BP:   137/74    BP Location:       Patient Position:       Pulse: 102 (!) 137  111   Resp:       Temp:       SpO2: 96% 96%         Active LDAs/IV Access:   Lines, Drains & Airways    Active LDAs     Name:   Placement date:   Placement time:   Site:   Days:    Peripheral IV 06/22/21 2029 Right Antecubital   06/22/21 2029    Antecubital   less than 1                Labs (abnormal labs have a star):   Labs Reviewed   PROTIME-INR - Abnormal; Notable for the following components:       Result Value    Protime 17.3 (*)     INR 1.44 (*)     All other components within normal limits   CBC WITH AUTO DIFFERENTIAL - Abnormal; Notable for the following components:    Hemoglobin 12.9 (*)     RDW 18.7 (*)     RDW-SD 58.3 (*)     Lymphocyte % 15.4 (*)     Immature Grans % 0.9 (*)     Monocytes, Absolute 0.96 (*)     Immature Grans, Absolute 0.08 (*)     All other components within normal limits   DIGOXIN LEVEL - Normal   MAGNESIUM - Normal   TROPONIN (IN-HOUSE) - Normal    Narrative:     Troponin T Reference Range:  <= 0.03 ng/mL-   Negative for AMI  >0.03 ng/mL-     Abnormal for myocardial necrosis.  Clinicians would have to utilize clinical acumen, EKG, Troponin and serial changes to  determine if it is an Acute Myocardial Infarction or myocardial injury due to an underlying chronic condition.       Results may be falsely decreased if patient taking Biotin.     COVID PRE-OP / PRE-PROCEDURE SCREENING ORDER (NO ISOLATION)    Narrative:     The following orders were created for panel order COVID PRE-OP / PRE-PROCEDURE SCREENING ORDER (NO ISOLATION) - Swab, Nasopharynx.  Procedure                               Abnormality         Status                     ---------                               -----------         ------                     COVID-19,[facility] IN-HOUSE...[[id number]]                      In process                   Please view results for these tests on the individual orders.   COVID-19,[facility] IN-HOUSE CEPHEID/VERITO, NP SWAB IN TRANSPORT MEDIA 8-12 HR TAT   RAINBOW DRAW    Narrative:     The following orders were created for panel order [place] Draw.  Procedure                               Abnormality         Status                     ---------                               -----------         ------                     Green Top (Gel)[[id number]]                                  Final result               Lavender Top[[id number]]                                     Final result               Gold Top - SST[[id number]]                                   Final result                 Please view results for these tests on the individual orders.   COMPREHENSIVE METABOLIC PANEL    Narrative:     GFR Normal >60  Chronic Kidney Disease <60  Kidney Failure <15     POCT GLUCOSE FINGERSTICK   CBC AND DIFFERENTIAL    Narrative:     The following orders were created for panel order CBC & Differential.  Procedure                               Abnormality         Status                     ---------                               -----------         ------                     CBC Auto Differential[[id number]]        Abnormal            Final result                 Please view results for these tests on  the individual orders.   GREEN TOP   LAVENDER TOP   GOLD TOP - SST       EKG:   ECG 12 Lead   Preliminary Result   HEART RATE= 124  bpm   RR Interval= 482  ms   SD Interval=   ms   P Horizontal Axis=   deg   P Front Axis=   deg   QRSD Interval= 77  ms   QT Interval= 314  ms   QRS Axis= 1  deg   T Wave Axis= 66  deg   - ABNORMAL ECG -   Atrial fibrillation   ST depression, probably rate related   Electronically Signed By:    Date and Time of Study: 2021 19:28:45          Meds given in ED:   Medications   sodium chloride 0.9 % flush 10 mL (has no administration in time range)   sodium chloride 0.9 % flush 10 mL (has no administration in time range)   dofetilide (TIKOSYN) capsule 125 mcg (has no administration in time range)   metoprolol tartrate (LOPRESSOR) injection 5 mg (5 mg Intravenous Given 21)   digoxin (LANOXIN) injection 125 mcg (125 mcg Intravenous Given 21)       Imaging results:  No radiology results for the last day    Ambulatory status:   - up with assistance.     Social issues:   Social History     Socioeconomic History   • Marital status:      Spouse name: Not on file   • Number of children: Not on file   • Years of education: Not on file   • Highest education level: Not on file   Tobacco Use   • Smoking status: Former Smoker     Types: Cigars     Quit date: 10/28/1994     Years since quittin.6   • Smokeless tobacco: Never Used   • Tobacco comment: Quit 25 years ago   Substance and Sexual Activity   • Alcohol use: No     Comment: daily caffiene - 1/2 cup of coffee   • Drug use: No   • Sexual activity: Defer    Nursing report ED to floor     Leah Red RN  21 1879

## 2021-06-23 NOTE — CONSULTS
Patient Name: Asia Ly  :[id number]  88 y.o.    Date of Admission: 2021  Date of Consultation:  21  Encounter Provider: KYLAH Garcia  Place of Service: [facility] CARDIOLOGY  Referring Provider: Calvin Bergeron MD  Patient Care Team:  Lubna Lobo MD as PCP - General  Lubna Lobo MD as PCP - Family Medicine      Chief complaint: tachycardia    Reason for Consult: AF with RVR    History of Present Illness:    Mr. Ly is an 88 year old make with history of paroxysmal atrial fibrillation. He has been on sotalol and amiodarone in the past. HE was previously evaluated by Dr. Nettles for possible AV node ablation and PPM. Patient elected to avoid PPM if possible and accept occasional break through episodes of AF.  He has been maintained on Tikosyn and done relatively well.     He also has a history of aortic stenosis, hypertension, hyperlipidemia, TIA/CVA, and pulmonary hypertension.     Patient's wife noted yesterday that his HR was elevated in the 130's. Patient said he felt fine. He did not feel his heart racing. His wife drove him to the emergency room. In the car he had near syncope. He felt a wave come over him for a few seconds. He did not pass out.     In the ER he was noted to be in rapid atrial fibrillation, rates 120-130's upon arrival, treated with IV Lopressor/Digoxin. around 5:45 am he converted to NSR.     Troponin negative. INR 1.44. Hgb 12.9. Remaining labs unremarkable.         CXR 21  FINDINGS: The heart is at the upper limits of normal in size. There is  bibasilar atelectasis/infiltrate more prominent on the right. The  infiltrates are less prominent as compared to the study from 2020.  There is mild prominence of pulmonary vasculature in the perihilar  regions bilaterally, exaggerated by inspiratory effort.    Previous Cardiac Testing:    Echocardiogram 20  · Estimated EF = 60%.  · Left ventricular systolic function is  normal.  · Left ventricular wall thickness is consistent with mild concentric hypertrophy.  · Left atrial cavity size is moderately dilated.  · There is calcification of the aortic valve.  · Moderate aortic valve stenosis is present.  · Aortic valve maximum pressure gradient is 42.0 mmHg.  · Aortic valve mean pressure gradient is 24.0 mmHg.  · Aortic valve area is 1.1 cm2.  · Mild tricuspid valve regurgitation is present.  · Left ventricular diastolic dysfunction (grade II) consistent with pseudonormalization.  · Calculated right ventricular systolic pressure from tricuspid regurgitation is 57.8 mmHg. Severe pulmonary hypertension is present.  · Mild aortic valve regurgitation is present.  · Mild mitral valve regurgitation is present      Past Medical History:   Diagnosis Date   • Aortic stenosis    • Aspiration pneumonia (CMS/HCC)    • Atrial fibrillation (CMS/HCC)    • Bronchiectasis (CMS/HCC)    • CKD (chronic kidney disease)    • COPD (chronic obstructive pulmonary disease) (CMS/HCC)    • Crohn's disease (CMS/HCC)    • Dizziness    • Elevated cholesterol    • Gastric ulcer    • Generalized weakness    • Gout    • Hard of hearing    • Hyperlipidemia    • Hypertension    • Leg swelling    • Lower extremity edema    • Mild anemia    • Near syncope    • Nonrheumatic aortic valve stenosis    • PAF (paroxysmal atrial fibrillation) (CMS/HCC)    • Palpitations    • Peptic ulcer    • Pneumonia of left lung due to infectious organism    • Pulmonary fibrosis (CMS/HCC)    • Stroke (CMS/HCC)    • TIA (transient ischemic attack)        Past Surgical History:   Procedure Laterality Date   • BRONCHOSCOPY N/A 10/22/2018    Procedure: BRONCHOSCOPY WITH BRONCHALVEOLAR LAVAGE;  Surgeon: Chidi Oconnor MD;  Location: [facility] ENDOSCOPY;  Service: Pulmonary   • CATARACT EXTRACTION Bilateral    • COLONOSCOPY     • COLONOSCOPY N/A 3/9/2018    Procedure: COLONOSCOPY to terminal ileum with bx;  Surgeon: Aron Cabrera MD;  Location:   "[facility] ENDOSCOPY;  Service:    • COLONOSCOPY N/A 11/19/2020    Procedure: COLONOSCOPY to cecum:  apc to cecum angiodysplagia,;  Surgeon: Aron Cabrera MD;  Location:  [facility] ENDOSCOPY;  Service: Gastroenterology;  Laterality: N/A;  GI bleed  post:  diverticulosis, angiodysplagia   • CYSTECTOMY      back and shoulder area   • ENDOSCOPY N/A 3/9/2018    Procedure: ESOPHAGOGASTRODUODENOSCOPY with bx;  Surgeon: Aron Cabrera MD;  Location:  [facility] ENDOSCOPY;  Service:    • ENDOSCOPY N/A 11/19/2020    Procedure: ESOPHAGOGASTRODUODENOSCOPY;  Surgeon: Aron Cabrera MD;  Location:  [facility] ENDOSCOPY;  Service: Gastroenterology;  Laterality: N/A;  GI bleed  post:  HH.   • EYE SURGERY Left     detached retina   • EYE SURGERY Right     \"repaired a hole in the eye\"   • TESTICLE SURGERY      benign tumor removed.         Prior to Admission medications    Medication Sig Start Date End Date Taking? Authorizing Provider   allopurinol (ZYLOPRIM) 100 MG tablet Take 100 mg by mouth 2 (Two) Times a Day.   Yes Valeria Palomino MD   apixaban (ELIQUIS) 5 MG tablet tablet Take 1 tablet by mouth Every 12 (Twelve) Hours. 12/16/19  Yes Hector Rivera MD   atorvastatin (LIPITOR) 10 MG tablet Take one tablet by mouth Mon, Wed and Fri.   Yes ProviderValeria MD   BUDESONIDE PO Take 3 mg by mouth Daily.   Yes Valeria Palomino MD   digoxin (LANOXIN) 125 MCG tablet Take 1 tablet by mouth Daily. 12/21/20  Yes Alta Hackett APRN   dofetilide (TIKOSYN) 125 MCG capsule Take 1 capsule by mouth 2 (Two) Times a Day. 10/28/19  Yes Hector Rivera MD   metoprolol tartrate (LOPRESSOR) 50 MG tablet Take 1 tablet by mouth 3 (Three) Times a Day. 12/16/19  Yes Hector Rivera MD   omeprazole (priLOSEC) 40 MG capsule Take 40 mg by mouth 2 (Two) Times a Day.   Yes Valeria Palomino MD   cefdinir (OMNICEF) 300 MG capsule Take 300 mg by mouth.    Valeria Palomino MD   IPRATROPIUM-ALBUTEROL IN Inhale Daily.    Provider, Historical, " MD       Allergies   Allergen Reactions   • Amiodarone Unknown (See Comments)     Pulmonary fibrosis   • Diltiazem Arrhythmia   • Doxycycline Rash   • Indomethacin Rash       Social History     Socioeconomic History   • Marital status:      Spouse name: Not on file   • Number of children: Not on file   • Years of education: Not on file   • Highest education level: Not on file   Tobacco Use   • Smoking status: Former Smoker     Types: Cigars     Quit date: 10/28/1994     Years since quittin.6   • Smokeless tobacco: Never Used   • Tobacco comment: Quit 25 years ago   Substance and Sexual Activity   • Alcohol use: No     Comment: daily caffiene - 1/2 cup of coffee   • Drug use: No   • Sexual activity: Defer       Family History   Problem Relation Age of Onset   • Stroke Mother    • Heart disease Mother    • Hypertension Mother    • Diabetes Mother    • Heart disease Sister         Heart Valve Replacement   • Ovarian cancer Sister    • Rheumatic fever Sister    • Diabetes Sister    • Stroke Father        REVIEW OF SYSTEMS:   All systems reviewed.  Pertinent positives identified in HPI.  All other systems are negative.    I have reviewed and confirmed the accuracy of the ROS as documented by the MA/AYUSH/RN KYLAH Garcia        Objective:     Vitals:    21 0722 21 0923 21 1327 21 1410   BP: 132/56 128/73 143/56 148/56   BP Location: Left arm Left arm Left arm    Patient Position: Lying Lying Lying    Pulse:  76 78 66   Resp: 18  18    Temp: 97.2 °F (36.2 °C)  98 °F (36.7 °C)    TempSrc: Oral  Oral    SpO2:       Weight:       Height:         Body mass index is 21.29 kg/m².    Constitutional: He is oriented to person, place, and time. He appears well-developed. He does not appear ill.   HENT:   Head: Normocephalic and atraumatic. Head is without contusion.   Right Ear: Hearing normal. No drainage.   Left Ear: Hearing normal. No drainage.   Nose: No nasal deformity. No epistaxis.    Eyes: Lids are normal. Right eye exhibits no exudate. Left eye exhibits no exudate.  Neck: No JVD present. Carotid bruit is not present. No tracheal deviation present. No thyroid mass and no thyromegaly present.   Cardiovascular: Normal rate, regular rhythm and normal heart sounds.    Pulses:       Posterior tibial pulses are 2+ on the right side, and 2+ on the left side.   Pulmonary/Chest: Effort normal and breath sounds normal.   Abdominal: Soft. Normal appearance and bowel sounds are normal. There is no tenderness.   Musculoskeletal: Normal range of motion.        Right shoulder: He exhibits no deformity.        Left shoulder: He exhibits no deformity.   Neurological: He is alert and oriented to person, place, and time. He has normal strength.   Skin: Skin is warm, dry and intact. No rash noted.   Psychiatric: He has a normal mood and affect. His behavior is normal. Thought content normal.   Vitals reviewed    Lab Review:     Results from last 7 days   Lab Units 06/23/21  0608 06/22/21 2026   SODIUM mmol/L 140 138   POTASSIUM mmol/L 4.3 4.5   CHLORIDE mmol/L 105 105   CO2 mmol/L 26.1 25.4   BUN mg/dL 18 20   CREATININE mg/dL 1.20 1.14   CALCIUM mg/dL 9.0 9.1   BILIRUBIN mg/dL  --  0.4   ALK PHOS U/L  --  51   ALT (SGPT) U/L  --  15   AST (SGOT) U/L  --  17   GLUCOSE mg/dL 91 83     Results from last 7 days   Lab Units 06/22/21  2026   TROPONIN T ng/mL <0.010     Results from last 7 days   Lab Units 06/23/21  0608   WBC 10*3/mm3 8.74   HEMOGLOBIN g/dL 12.4*   HEMATOCRIT % 39.1   PLATELETS 10*3/mm3 175     Results from last 7 days   Lab Units 06/22/21  2026   INR  1.44*     Results from last 7 days   Lab Units 06/22/21  2026   MAGNESIUM mg/dL 1.9                    EKG 6/22/21    Previous EKG 4/12/21        Assessment and Plan:       1. Paroxysmal atrial fibrillation with RVR - break through AF. He was asymptomatic. He is now back in SR. He doesn't really want a pace maker and has declined this in the past. He  did have near syncope in the car. I wonder if he had a pretty long conversion pause.  He did not have any symptoms when he converted to SR this morning. Will dc with Jeromeo. I would continue his current regimen. I did tell him he could take extra beta blocker at home for break through AF. He can also call our office for help if HR elevated and he is asymptomatic.     2. Moderate aortic stenosis. He has an outpatient echo planned for routine surveillance.     3. Pulmonary fibrosis    4. Pulmonary hypertension    Ok for discharge from cardiac standpoint.     Libra Gonzalez, APRN  06/23/21  14:55 EDT

## 2021-06-23 NOTE — H&P
History and physical    Primary care physician      Chief complaint  Palpitations    History of present illness  88-year-old [place] male who is well-known to our service with history of chronic atrial fibrillation Crohn's disease hypertension hyperlipidemia chronic anemia presented to [facility] emergency room with palpitation.  Patient found to have increased heart rate in the ER with history of chronic atrial fibrillation.  Patient denies any chest pain shortness of breath.  Patient does have rare cough and thought he is going to passed out.  But no fever.  Patient denies any noncompliance with medications.  Patient work-up in ER revealed chronic A. fib with rapid ventricular rate admit for management.  At the time of interview patient is in no distress and denies any palpitation chest pain or shortness of breath.    PAST MEDICAL HISTORY  • Aspiration pneumonia (CMS/HCC)     • Bronchiectasis (CMS/HCC)     • CKD (chronic kidney disease)     • COPD (chronic obstructive pulmonary disease)     • Dizziness     • Elevated cholesterol     • Gastric ulcer     • Generalized weakness     • Hard of hearing     • Hypertension     • Leg swelling     • Lower extremity edema     • Mild anemia     • Near syncope     • Nonrheumatic aortic valve stenosis     • PAF (paroxysmal atrial fibrillation) (CMS/HCC)     • Palpitations     • Peptic ulcer     • Pneumonia of left lung due to infectious organism     • Stroke        PAST SURGICAL HISTORY              Procedure Laterality Date   • BRONCHOSCOPY N/A 10/22/2018     Procedure: BRONCHOSCOPY WITH BRONCHALVEOLAR LAVAGE;  Surgeon: Chidi Oconnor MD;  Location: [facility] ENDOSCOPY;  Service: Pulmonary   • CATARACT EXTRACTION Bilateral     • COLONOSCOPY       • COLONOSCOPY N/A 3/9/2018     Procedure: COLONOSCOPY to terminal ileum with bx;  Surgeon: Aron Cabrera MD;  Location: [facility] ENDOSCOPY;  Service:    • COLONOSCOPY N/A 11/19/2020     Procedure: COLONOSCOPY to  "cecum:  apc to cecum angiodysplagia,;  Surgeon: Aron Cabrera MD;  Location:  [facility] ENDOSCOPY;  Service: Gastroenterology;  Laterality: N/A;  GI bleed  post:  diverticulosis, angiodysplagia   • CYSTECTOMY         back and shoulder area   • ENDOSCOPY N/A 3/9/2018     Procedure: ESOPHAGOGASTRODUODENOSCOPY with bx;  Surgeon: Aron Cabrera MD;  Location[facility] END[facility]COPY;  Service:    • ENDOSCOPY N/A 2020     Procedure: ESOPHAGOGASTRODUODENOSCOPY;  Surgeon: Aron Cabrera MD;  [facility];  Service: Gastroenterology;  Laterality: N/A;  GI bleed  post:  HH.   • EYE SURGERY Left       detached retina   • EYE SURGERY Right       \"repaired a hole in the eye\"   • TESTICLE SURGERY         benign tumor removed.         FAMILY HISTORY           Problem Relation Age of Onset   • Stroke Mother     • Heart disease Mother     • Hypertension Mother     • Diabetes Mother     • Heart disease Sister           Heart Valve Replacement   • Ovarian cancer Sister     • Rheumatic fever Sister     • Diabetes Sister     • Stroke Father        SOCIAL HISTORY                 Socioeconomic History   • Marital status:        Spouse name: Not on file   • Number of children: Not on file   • Years of education: Not on file   • Highest education level: Not on file   Tobacco Use   • Smoking status: Former Smoker       Types: Cigars       Quit date: 10/28/1994       Years since quittin.6   • Smokeless tobacco: Never Used   • Tobacco comment: Quit 25 years ago   Substance and Sexual Activity   • Alcohol use: No       Comment: daily caffiene - 1/2 cup of coffee   • Drug use: No   • Sexual activity: Defer         ALLERGIES  Amiodarone, Diltiazem, Doxycycline, and Indomethacin  Home medications reviewed     REVIEW OF SYSTEMS  All systems reviewed and negative except for those discussed in HPI.      PHYSICAL EXAM   Blood pressure 128/73, pulse 76, temperature 97.2 °F (36.2 °C), temperature source Oral, resp. rate 18, " "height 172.7 cm (68\"), weight 63.5 kg (140 lb), SpO2 98 %.    Constitutional: No distress.   Head: Normocephalic and atraumatic.   Oropharynx: Mucous membranes are moist.   Eyes: No scleral icterus. No conjunctival pallor.  Neck: Painless range of motion noted. Neck supple.   Cardiovascular: Tachycardic with irregularly irregular rhythm and intact distal pulses.  Pulmonary/Chest: No respiratory distress.  No tachypnea or increased work of breathing noted.    Abdominal: Soft. There is no tenderness. There is no rebound and no guarding.   Musculoskeletal: Moves all extremities equally. There is no pedal edema or calf tenderness.   Neurological: Alert.  Baseline strength and sensation noted.  Stable gait and station  Skin: Skin is pink, warm, and dry. No pallor.   Psychiatric: Mood and affect normal.     LAB RESULTS   Lab Results (last 24 hours)     Procedure Component Value Units Date/Time    Basic Metabolic Panel [[id number]]  (Abnormal) Collected: 06/23/21 0608    Specimen: Blood Updated: 06/23/21 0705     Glucose 91 mg/dL      BUN 18 mg/dL      Creatinine 1.20 mg/dL      Sodium 140 mmol/L      Potassium 4.3 mmol/L      Chloride 105 mmol/L      CO2 26.1 mmol/L      Calcium 9.0 mg/dL      eGFR Non African Amer 57 mL/min/1.73      BUN/Creatinine Ratio 15.0     Anion Gap 8.9 mmol/L     Narrative:      GFR Normal >60  Chronic Kidney Disease <60  Kidney Failure <15      CBC (No Diff) [[id number]]  (Abnormal) Collected: 06/23/21 0608    Specimen: Blood Updated: 06/23/21 0642     WBC 8.74 10*3/mm3      RBC 4.51 10*6/mm3      Hemoglobin 12.4 g/dL      Hematocrit 39.1 %      MCV 86.7 fL      MCH 27.5 pg      MCHC 31.7 g/dL      RDW 18.7 %      RDW-SD 59.9 fl      MPV 10.8 fL      Platelets 175 10*3/mm3     COVID PRE-OP / PRE-PROCEDURE SCREENING ORDER (NO ISOLATION) - Swab, Nasopharynx [[id number]]  (Normal) Collected: 06/22/21 2124    Specimen: Swab from Nasopharynx Updated: 06/22/21 2237    Narrative:      The following orders " were created for panel order COVID PRE-OP / PRE-PROCEDURE SCREENING ORDER (NO ISOLATION) - Swab, Nasopharynx.  Procedure                               Abnormality         Status                     ---------                               -----------         ------                     COVID-19,[facility] IN-HOUSE...[[id number]]  Normal              Final result                 Please view results for these tests on the individual orders.    COVID-19,[facility] IN-HOUSE CEPHEID/VERITO NP SWAB IN TRANSPORT MEDIA 8-12 HR TAT - Swab, Nasopharynx [[id number]]  (Normal) Collected: 06/22/21 2124    Specimen: Swab from Nasopharynx Updated: 06/22/21 2237     COVID19 Not Detected    Narrative:      Fact sheet for providers: https://www.fda.gov/media/847183/download     Fact sheet for patients: https://www.fda.gov/media/637233/download    [place] Draw [[id number]] Collected: 06/22/21 2026    Specimen: Blood Updated: 06/22/21 2130    Narrative:      The following orders were created for panel order [place] Draw.  Procedure                               Abnormality         Status                     ---------                               -----------         ------                     Green Top (Gel)[[id number]]                                  Final result               Lavender Top[[id number]]                                     Final result               Gold Top - SST[[id number]]                                   Final result                 Please view results for these tests on the individual orders.    Green Top (Gel) [[id number]] Collected: 06/22/21 2026    Specimen: Blood Updated: 06/22/21 2130     Extra Tube Hold for add-ons.     Comment: Auto resulted.       Lavender Top [[id number]] Collected: 06/22/21 2026    Specimen: Blood Updated: 06/22/21 2130     Extra Tube hold for add-on     Comment: Auto resulted       Gold Top - SST [[id number]] Collected: 06/22/21 2026    Specimen: Blood Updated: 06/22/21 2130     Extra Tube Hold for add-ons.      Comment: Auto resulted.       Digoxin Level [[id number]]  (Normal) Collected: 06/22/21 2026    Specimen: Blood Updated: 06/22/21 2108     Digoxin 0.90 ng/mL     Comprehensive Metabolic Panel [[id number]] Collected: 06/22/21 2026    Specimen: Blood Updated: 06/22/21 2106     Glucose 83 mg/dL      BUN 20 mg/dL      Creatinine 1.14 mg/dL      Sodium 138 mmol/L      Potassium 4.5 mmol/L      Chloride 105 mmol/L      CO2 25.4 mmol/L      Calcium 9.1 mg/dL      Total Protein 7.5 g/dL      Albumin 3.90 g/dL      ALT (SGPT) 15 U/L      AST (SGOT) 17 U/L      Alkaline Phosphatase 51 U/L      Total Bilirubin 0.4 mg/dL      eGFR Non African Amer 61 mL/min/1.73      Globulin 3.6 gm/dL      A/G Ratio 1.1 g/dL      BUN/Creatinine Ratio 17.5     Anion Gap 7.6 mmol/L     Narrative:      GFR Normal >60  Chronic Kidney Disease <60  Kidney Failure <15      Magnesium [[id number]]  (Normal) Collected: 06/22/21 2026    Specimen: Blood Updated: 06/22/21 2106     Magnesium 1.9 mg/dL     Troponin [[id number]]  (Normal) Collected: 06/22/21 2026    Specimen: Blood Updated: 06/22/21 2106     Troponin T <0.010 ng/mL     Narrative:      Troponin T Reference Range:  <= 0.03 ng/mL-   Negative for AMI  >0.03 ng/mL-     Abnormal for myocardial necrosis.  Clinicians would have to utilize clinical acumen, EKG, Troponin and serial changes to determine if it is an Acute Myocardial Infarction or myocardial injury due to an underlying chronic condition.       Results may be falsely decreased if patient taking Biotin.      Protime-INR [[id number]]  (Abnormal) Collected: 06/22/21 2026    Specimen: Blood Updated: 06/22/21 2054     Protime 17.3 Seconds      INR 1.44    CBC & Differential [[id number]]  (Abnormal) Collected: 06/22/21 2026    Specimen: Blood Updated: 06/22/21 2046    Narrative:      The following orders were created for panel order CBC & Differential.  Procedure                               Abnormality         Status                     ---------                                -----------         ------                     CBC Auto Differential[[id number]]        Abnormal            Final result                 Please view results for these tests on the individual orders.    CBC Auto Differential [[id number]]  (Abnormal) Collected: 06/22/21 2026    Specimen: Blood Updated: 06/22/21 2046     WBC 9.21 10*3/mm3      RBC 4.60 10*6/mm3      Hemoglobin 12.9 g/dL      Hematocrit 39.4 %      MCV 85.7 fL      MCH 28.0 pg      MCHC 32.7 g/dL      RDW 18.7 %      RDW-SD 58.3 fl      MPV 10.9 fL      Platelets 182 10*3/mm3      Neutrophil % 70.2 %      Lymphocyte % 15.4 %      Monocyte % 10.4 %      Eosinophil % 2.3 %      Basophil % 0.8 %      Immature Grans % 0.9 %      Neutrophils, Absolute 6.47 10*3/mm3      Lymphocytes, Absolute 1.42 10*3/mm3      Monocytes, Absolute 0.96 10*3/mm3      Eosinophils, Absolute 0.21 10*3/mm3      Basophils, Absolute 0.07 10*3/mm3      Immature Grans, Absolute 0.08 10*3/mm3      nRBC 0.0 /100 WBC         Imaging Results (Last 24 Hours)     Procedure Component Value Units Date/Time    XR Chest 1 View [[id number]] Collected: 06/22/21 2115     Updated: 06/23/21 0759    Narrative:      PORTABLE CHEST     HISTORY: Near syncope.     COMPARISON: Chest x-ray 12/07/2020.     FINDINGS: The heart is at the upper limits of normal in size. There is  bibasilar atelectasis/infiltrate more prominent on the right. The  infiltrates are less prominent as compared to the study from 12/07/2020.  There is mild prominence of pulmonary vasculature in the perihilar  regions bilaterally, exaggerated by inspiratory effort.     This report was finalized on 6/23/2021 7:56 AM by Dr. Sergey Peralta M.D.              ECG 12 Lead  Component   Ref Range & Units 6/22/21 1928 12/8/20 0605   QT Interval   ms 314 P  375         HEART RATE= 124  bpm  RR Interval= 482  ms  [place] Interval=   ms  P Horizontal Axis=   deg  P Front Axis=   deg  QRSD Interval= 77  ms  QT Interval= 314   ms  QRS Axis= 1  deg  T Wave Axis= 66  deg  - ABNORMAL ECG -  Atrial fibrillation  ST depression, probably rate related             Current Facility-Administered Medications:   •  allopurinol (ZYLOPRIM) tablet 100 mg, 100 mg, Oral, BID, Shelbie Bergeron MD  •  apixaban (ELIQUIS) tablet 5 mg, 5 mg, Oral, Q12H, Shelbie Bergeron MD, 5 mg at 06/23/21 0923  •  atorvastatin (LIPITOR) tablet 10 mg, 10 mg, Oral, Nightly, Shelbie Bergeron MD  •  Budesonide (ENTOCORT EC) 24 hr capsule 3 mg, 3 mg, Oral, Daily, Shelbie Bergeron MD  •  digoxin (LANOXIN) tablet 125 mcg, 125 mcg, Oral, Daily, Shelbie Bergeron MD  •  ipratropium-albuterol (DUO-NEB) nebulizer solution 1.5 mL, 1.5 mL, Nebulization, Q4H PRN, Shelbie Bergeron MD  •  metoprolol tartrate (LOPRESSOR) tablet 50 mg, 50 mg, Oral, TID, Shelbie Bergeron MD, 50 mg at 06/23/21 0923  •  [START ON 6/24/2021] pantoprazole (PROTONIX) EC tablet 40 mg, 40 mg, Oral, QAM, Shelbie Bergeron MD  •  sodium chloride 0.9 % flush 10 mL, 10 mL, Intravenous, PRN, Jeromy Petty MD    ASSESSMENT  Chronic atrial fibrillation with rapid ventricular rate  Hypertension  Hyperlipidemia  History of Crohn's disease  Gastroesophageal reflux disease    PLAN  Admit  Lanoxin beta-blocker Eliquis  Control the heart rate  Cardiology consult  Continue home medications   Stress ulcer DVT prophylaxis  Supportive care   Patient is full code  Discussed with nursing staff and family   Follow closely further recommendation current hospital course    SHELBIE BERGERON MD

## 2021-06-24 ENCOUNTER — NURSE TRIAGE (OUTPATIENT)
Dept: CALL CENTER | Facility: HOSPITAL | Age: 86
End: 2021-06-24

## 2021-06-24 ENCOUNTER — APPOINTMENT (OUTPATIENT)
Dept: CARDIOLOGY | Facility: HOSPITAL | Age: 86
End: 2021-06-24

## 2021-06-24 VITALS
WEIGHT: 140 LBS | HEIGHT: 68 IN | RESPIRATION RATE: 16 BRPM | DIASTOLIC BLOOD PRESSURE: 64 MMHG | OXYGEN SATURATION: 93 % | HEART RATE: 76 BPM | BODY MASS INDEX: 21.22 KG/M2 | TEMPERATURE: 98.6 F | SYSTOLIC BLOOD PRESSURE: 163 MMHG

## 2021-06-24 LAB
ALBUMIN SERPL-MCNC: 3.3 G/DL (ref 3.5–5.2)
ALBUMIN/GLOB SERPL: 0.9 G/DL
ALP SERPL-CCNC: 47 U/L (ref 39–117)
ALT SERPL W P-5'-P-CCNC: 11 U/L (ref 1–41)
ANION GAP SERPL CALCULATED.3IONS-SCNC: 10.4 MMOL/L (ref 5–15)
AST SERPL-CCNC: 17 U/L (ref 1–40)
BASOPHILS # BLD AUTO: 0.05 10*3/MM3 (ref 0–0.2)
BASOPHILS NFR BLD AUTO: 0.5 % (ref 0–1.5)
BILIRUB SERPL-MCNC: 0.7 MG/DL (ref 0–1.2)
BUN SERPL-MCNC: 20 MG/DL (ref 8–23)
BUN/CREAT SERPL: 19.4 (ref 7–25)
CALCIUM SPEC-SCNC: 8.6 MG/DL (ref 8.6–10.5)
CHLORIDE SERPL-SCNC: 103 MMOL/L (ref 98–107)
CHOLEST SERPL-MCNC: 125 MG/DL (ref 0–200)
CO2 SERPL-SCNC: 23.6 MMOL/L (ref 22–29)
CREAT SERPL-MCNC: 1.03 MG/DL (ref 0.76–1.27)
DEPRECATED RDW RBC AUTO: 56.2 FL (ref 37–54)
DIGOXIN SERPL-MCNC: 1 NG/ML (ref 0.6–1.2)
EOSINOPHIL # BLD AUTO: 0.19 10*3/MM3 (ref 0–0.4)
EOSINOPHIL NFR BLD AUTO: 1.9 % (ref 0.3–6.2)
ERYTHROCYTE [DISTWIDTH] IN BLOOD BY AUTOMATED COUNT: 18.2 % (ref 12.3–15.4)
GFR SERPL CREATININE-BSD FRML MDRD: 68 ML/MIN/1.73
GLOBULIN UR ELPH-MCNC: 3.8 GM/DL
GLUCOSE SERPL-MCNC: 97 MG/DL (ref 65–99)
HBA1C MFR BLD: 6.03 % (ref 4.8–5.6)
HCT VFR BLD AUTO: 37.2 % (ref 37.5–51)
HDLC SERPL-MCNC: 39 MG/DL (ref 40–60)
HGB BLD-MCNC: 12.4 G/DL (ref 13–17.7)
IMM GRANULOCYTES # BLD AUTO: 0.07 10*3/MM3 (ref 0–0.05)
IMM GRANULOCYTES NFR BLD AUTO: 0.7 % (ref 0–0.5)
LDLC SERPL CALC-MCNC: 73 MG/DL (ref 0–100)
LDLC/HDLC SERPL: 1.9 {RATIO}
LYMPHOCYTES # BLD AUTO: 0.87 10*3/MM3 (ref 0.7–3.1)
LYMPHOCYTES NFR BLD AUTO: 8.6 % (ref 19.6–45.3)
MCH RBC QN AUTO: 28.4 PG (ref 26.6–33)
MCHC RBC AUTO-ENTMCNC: 33.3 G/DL (ref 31.5–35.7)
MCV RBC AUTO: 85.1 FL (ref 79–97)
MONOCYTES # BLD AUTO: 0.76 10*3/MM3 (ref 0.1–0.9)
MONOCYTES NFR BLD AUTO: 7.5 % (ref 5–12)
NEUTROPHILS NFR BLD AUTO: 8.14 10*3/MM3 (ref 1.7–7)
NEUTROPHILS NFR BLD AUTO: 80.8 % (ref 42.7–76)
NRBC BLD AUTO-RTO: 0 /100 WBC (ref 0–0.2)
NT-PROBNP SERPL-MCNC: 1853 PG/ML (ref 0–1800)
PLATELET # BLD AUTO: 153 10*3/MM3 (ref 140–450)
PMV BLD AUTO: 10.8 FL (ref 6–12)
POTASSIUM SERPL-SCNC: 4.4 MMOL/L (ref 3.5–5.2)
PROT SERPL-MCNC: 7.1 G/DL (ref 6–8.5)
RBC # BLD AUTO: 4.37 10*6/MM3 (ref 4.14–5.8)
SODIUM SERPL-SCNC: 137 MMOL/L (ref 136–145)
TRIGL SERPL-MCNC: 59 MG/DL (ref 0–150)
TROPONIN T SERPL-MCNC: <0.01 NG/ML (ref 0–0.03)
TSH SERPL DL<=0.05 MIU/L-ACNC: 2.31 UIU/ML (ref 0.27–4.2)
VLDLC SERPL-MCNC: 13 MG/DL (ref 5–40)
WBC # BLD AUTO: 10.08 10*3/MM3 (ref 3.4–10.8)

## 2021-06-24 PROCEDURE — 84484 ASSAY OF TROPONIN QUANT: CPT | Performed by: HOSPITALIST

## 2021-06-24 PROCEDURE — 93246 EXT ECG>7D<15D RECORDING: CPT

## 2021-06-24 PROCEDURE — 80053 COMPREHEN METABOLIC PANEL: CPT | Performed by: HOSPITALIST

## 2021-06-24 PROCEDURE — 85025 COMPLETE CBC W/AUTO DIFF WBC: CPT | Performed by: HOSPITALIST

## 2021-06-24 PROCEDURE — 84443 ASSAY THYROID STIM HORMONE: CPT | Performed by: HOSPITALIST

## 2021-06-24 PROCEDURE — 83880 ASSAY OF NATRIURETIC PEPTIDE: CPT | Performed by: HOSPITALIST

## 2021-06-24 PROCEDURE — 83036 HEMOGLOBIN GLYCOSYLATED A1C: CPT | Performed by: HOSPITALIST

## 2021-06-24 PROCEDURE — G0378 HOSPITAL OBSERVATION PER HR: HCPCS

## 2021-06-24 PROCEDURE — 80061 LIPID PANEL: CPT | Performed by: HOSPITALIST

## 2021-06-24 PROCEDURE — 80162 ASSAY OF DIGOXIN TOTAL: CPT | Performed by: HOSPITALIST

## 2021-06-24 RX ADMIN — PANTOPRAZOLE SODIUM 40 MG: 40 TABLET, DELAYED RELEASE ORAL at 08:20

## 2021-06-24 RX ADMIN — ALLOPURINOL 100 MG: 100 TABLET ORAL at 08:21

## 2021-06-24 RX ADMIN — DOFETILIDE 125 MCG: 0.12 CAPSULE ORAL at 08:21

## 2021-06-24 RX ADMIN — BUDESONIDE 3 MG: 3 CAPSULE, GELATIN COATED ORAL at 08:21

## 2021-06-24 RX ADMIN — CEFDINIR 300 MG: 300 CAPSULE ORAL at 08:21

## 2021-06-24 RX ADMIN — APIXABAN 5 MG: 5 TABLET, FILM COATED ORAL at 08:21

## 2021-06-24 RX ADMIN — DIGOXIN 125 MCG: 125 TABLET ORAL at 12:12

## 2021-06-24 RX ADMIN — METOPROLOL TARTRATE 50 MG: 50 TABLET, FILM COATED ORAL at 08:21

## 2021-06-24 NOTE — DISCHARGE SUMMARY
Discharge summary    Date of admission 6/22/2021  Date of discharge 6/24/2021    Final diagnosis  Chronic atrial fibrillation with rapid ventricular rate  Hypertension  Hyperlipidemia  Aortic stenosis  History of Crohn's disease  Pulmonary hypertension  Gastroesophageal reflux disease    Discharge medications    Current Facility-Administered Medications:   •  acetaminophen (TYLENOL) tablet 650 mg, 650 mg, Oral, Q4H PRN, Calvin Bergeron MD, 650 mg at 06/23/21 2228  •  allopurinol (ZYLOPRIM) tablet 100 mg, 100 mg, Oral, BID, Calvin Bergeron MD, 100 mg at 06/24/21 0821  •  apixaban (ELIQUIS) tablet 5 mg, 5 mg, Oral, Q12H, Calvin Bergeron MD, 5 mg at 06/24/21 0821  •  atorvastatin (LIPITOR) tablet 10 mg, 10 mg, Oral, Nightly, Calvin Bergeron MD, 10 mg at 06/23/21 2228  •  Budesonide (ENTOCORT EC) 24 hr capsule 3 mg, 3 mg, Oral, Daily, Calvin Bergeron MD, 3 mg at 06/24/21 0821  •  cefdinir (OMNICEF) capsule 300 mg, 300 mg, Oral, Q12H, Calvin Bergeron MD, 300 mg at 06/24/21 0821  •  digoxin (LANOXIN) tablet 125 mcg, 125 mcg, Oral, Daily, Calvin Bergeron MD, 125 mcg at 06/24/21 1212  •  dofetilide (TIKOSYN) capsule 125 mcg, 125 mcg, Oral, Q12H, Calvin Bergeron MD, 125 mcg at 06/24/21 0821  •  ipratropium-albuterol (DUO-NEB) nebulizer solution 1.5 mL, 1.5 mL, Nebulization, Q4H PRN, Calvin Bergeron MD  •  metoprolol tartrate (LOPRESSOR) tablet 50 mg, 50 mg, Oral, TID, Calvin Bergeron MD, 50 mg at 06/24/21 0821  •  pantoprazole (PROTONIX) EC tablet 40 mg, 40 mg, Oral, QAM, Calvin Bergeron MD, 40 mg at 06/24/21 0820  •  sodium chloride 0.9 % flush 10 mL, 10 mL, Intravenous, PRN, Jeromy Petty MD     Consults obtained  Cardiology    Procedures  None    Hospital course  88-year-old Marshallese male who is well-known to our service with history of chronic atrial fibrillation admit to emergency room with palpitation.  Patient work-up in ER revealed rapid ventricular rate with history of chronic atrial fibrillation admit for management.  Patient admitted  his heart rate controlled with beta-blocker and Lanoxin.  Patient heart rate remained controlled and home medication continued including Eliquis.  Patient has no complaint whatsoever and cardiology recommend to discharge him on Zio patch and they will follow as an outpatient.  Patient has no complaint whatsoever at the time of discharge.    Discharge diet regular    Activity as tolerated    Medication as above    Follow-up with primary doctor in 1 week and follow with cardiology per their instruction and take medication as directed    SHELBIE COWART MD

## 2021-06-24 NOTE — PROGRESS NOTES
"Daily progress note    Chief complaint  Doing better  No specific complaints  Denies any chest pain shortness of breath palpitation  Wants to go home  Family at bedside    History of present illness  88-year-old [place] male who is well-known to our service with history of chronic atrial fibrillation Crohn's disease hypertension hyperlipidemia chronic anemia presented to [facility] emergency room with palpitation.  Patient found to have increased heart rate in the ER with history of chronic atrial fibrillation.  Patient denies any chest pain shortness of breath.  Patient does have rare cough and thought he is going to passed out.  But no fever.  Patient denies any noncompliance with medications.  Patient work-up in ER revealed chronic A. fib with rapid ventricular rate admit for management.  At the time of interview patient is in no distress and denies any palpitation chest pain or shortness of breath.     REVIEW OF SYSTEMS  All systems reviewed and negative except for those discussed in HPI.      PHYSICAL EXAM   Blood pressure 163/64, pulse 76, temperature 98.6 °F (37 °C), temperature source Oral, resp. rate 16, height 172.7 cm (68\"), weight 63.5 kg (140 lb), SpO2 93 %.    Constitutional: No distress.   Head: Normocephalic and atraumatic.   Oropharynx: Mucous membranes are moist.   Eyes: No scleral icterus. No conjunctival pallor.  Neck: Painless range of motion noted. Neck supple.   Cardiovascular: Tachycardic with irregularly irregular rhythm and intact distal pulses.  Pulmonary/Chest: No respiratory distress.  No tachypnea or increased work of breathing noted.    Abdominal: Soft. There is no tenderness. There is no rebound and no guarding.   Musculoskeletal: Moves all extremities equally. There is no pedal edema or calf tenderness.   Neurological: Alert.  Baseline strength and sensation noted.  Stable gait and station  Skin: Skin is pink, warm, and dry. No pallor.   Psychiatric: Mood and affect " normal.     LAB RESULTS   Lab Results (last 24 hours)     Procedure Component Value Units Date/Time    Comprehensive Metabolic Panel [[id number]]  (Abnormal) Collected: 06/24/21 0553    Specimen: Blood Updated: 06/24/21 0733     Glucose 97 mg/dL      BUN 20 mg/dL      Creatinine 1.03 mg/dL      Sodium 137 mmol/L      Potassium 4.4 mmol/L      Chloride 103 mmol/L      CO2 23.6 mmol/L      Calcium 8.6 mg/dL      Total Protein 7.1 g/dL      Albumin 3.30 g/dL      ALT (SGPT) 11 U/L      AST (SGOT) 17 U/L      Alkaline Phosphatase 47 U/L      Total Bilirubin 0.7 mg/dL      eGFR Non African Amer 68 mL/min/1.73      Globulin 3.8 gm/dL      A/G Ratio 0.9 g/dL      BUN/Creatinine Ratio 19.4     Anion Gap 10.4 mmol/L     Narrative:      GFR Normal >60  Chronic Kidney Disease <60  Kidney Failure <15      Digoxin Level [[id number]]  (Normal) Collected: 06/24/21 0553    Specimen: Blood Updated: 06/24/21 0733     Digoxin 1.00 ng/mL     Lipid Panel [[id number]]  (Abnormal) Collected: 06/24/21 0553    Specimen: Blood Updated: 06/24/21 0733     Total Cholesterol 125 mg/dL      Triglycerides 59 mg/dL      HDL Cholesterol 39 mg/dL      LDL Cholesterol  73 mg/dL      VLDL Cholesterol 13 mg/dL      LDL/HDL Ratio 1.90    Narrative:      Cholesterol Reference Ranges  (U.S. Department of Health and Human Services ATP III Classifications)    Desirable          <200 mg/dL  Borderline High    200-239 mg/dL  High Risk          >240 mg/dL      Triglyceride Reference Ranges  (U.S. Department of Health and Human Services ATP III Classifications)    Normal           <150 mg/dL  Borderline High  150-199 mg/dL  High             200-499 mg/dL  Very High        >500 mg/dL    HDL Reference Ranges  (U.S. Department of Health and Human Services ATP III Classifcations)    Low     <40 mg/dl (major risk factor for CHD)  High    >60 mg/dl ('negative' risk factor for CHD)        LDL Reference Ranges  (U.S. Department of Health and Human Services ATP III  Classifcations)    Optimal          <100 mg/dL  Near Optimal     100-129 mg/dL  Borderline High  130-159 mg/dL  High             160-189 mg/dL  Very High        >189 mg/dL    Troponin [[id number]]  (Normal) Collected: 06/24/21 0553    Specimen: Blood Updated: 06/24/21 0731     Troponin T <0.010 ng/mL     Narrative:      Troponin T Reference Range:  <= 0.03 ng/mL-   Negative for AMI  >0.03 ng/mL-     Abnormal for myocardial necrosis.  Clinicians would have to utilize clinical acumen, EKG, Troponin and serial changes to determine if it is an Acute Myocardial Infarction or myocardial injury due to an underlying chronic condition.       Results may be falsely decreased if patient taking Biotin.      BNP [[id number]]  (Abnormal) Collected: 06/24/21 0553    Specimen: Blood Updated: 06/24/21 0726     proBNP 1,853.0 pg/mL     Narrative:      Among patients with dyspnea, NT-proBNP is highly sensitive for the detection of acute congestive heart failure. In addition NT-proBNP of <300 pg/ml effectively rules out acute congestive heart failure with 99% negative predictive value.    Results may be falsely decreased if patient taking Biotin.      TSH [[id number]]  (Normal) Collected: 06/24/21 0553    Specimen: Blood Updated: 06/24/21 0726     TSH 2.310 uIU/mL     Hemoglobin A1c [[id number]]  (Abnormal) Collected: 06/24/21 0553    Specimen: Blood Updated: 06/24/21 0658     Hemoglobin A1C 6.03 %     Narrative:      Hemoglobin A1C Ranges:    Increased Risk for Diabetes  5.7% to 6.4%  Diabetes                     >= 6.5%  Diabetic Goal                < 7.0%    CBC & Differential [[id number]]  (Abnormal) Collected: 06/24/21 0553    Specimen: Blood Updated: 06/24/21 0655    Narrative:      The following orders were created for panel order CBC & Differential.  Procedure                               Abnormality         Status                     ---------                               -----------         ------                     CBC Auto  Differential[[id number]]        Abnormal            Final result                 Please view results for these tests on the individual orders.    CBC Auto Differential [[id number]]  (Abnormal) Collected: 06/24/21 0553    Specimen: Blood Updated: 06/24/21 0655     WBC 10.08 10*3/mm3      RBC 4.37 10*6/mm3      Hemoglobin 12.4 g/dL      Hematocrit 37.2 %      MCV 85.1 fL      MCH 28.4 pg      MCHC 33.3 g/dL      RDW 18.2 %      RDW-SD 56.2 fl      MPV 10.8 fL      Platelets 153 10*3/mm3      Neutrophil % 80.8 %      Lymphocyte % 8.6 %      Monocyte % 7.5 %      Eosinophil % 1.9 %      Basophil % 0.5 %      Immature Grans % 0.7 %      Neutrophils, Absolute 8.14 10*3/mm3      Lymphocytes, Absolute 0.87 10*3/mm3      Monocytes, Absolute 0.76 10*3/mm3      Eosinophils, Absolute 0.19 10*3/mm3      Basophils, Absolute 0.05 10*3/mm3      Immature Grans, Absolute 0.07 10*3/mm3      nRBC 0.0 /100 WBC         Imaging Results (Last 24 Hours)     ** No results found for the last 24 hours. **           ECG 12 Lead  Component   Ref Range & Units 6/22/21 1928 12/8/20 0605   QT Interval   ms 314 P  375         HEART RATE= 124  bpm  RR Interval= 482  ms  MA Interval=   ms  P Horizontal Axis=   deg  P Front Axis=   deg  QRSD Interval= 77  ms  QT Interval= 314  ms  QRS Axis= 1  deg  T Wave Axis= 66  deg  - ABNORMAL ECG -  Atrial fibrillation  ST depression, probably rate related             Current Facility-Administered Medications:   •  acetaminophen (TYLENOL) tablet 650 mg, 650 mg, Oral, Q4H PRN, Calvin Bergeron MD, 650 mg at 06/23/21 2228  •  allopurinol (ZYLOPRIM) tablet 100 mg, 100 mg, Oral, BID, Calvin Bergeron MD, 100 mg at 06/24/21 0821  •  apixaban (ELIQUIS) tablet 5 mg, 5 mg, Oral, Q12H, Calvin Bergeron MD, 5 mg at 06/24/21 0821  •  atorvastatin (LIPITOR) tablet 10 mg, 10 mg, Oral, Nightly, Calvin Bergeron MD, 10 mg at 06/23/21 5846  •  Budesonide (ENTOCORT EC) 24 hr capsule 3 mg, 3 mg, Oral, Daily, Calvin Bergeron MD, 3 mg at 06/24/21  0821  •  cefdinir (OMNICEF) capsule 300 mg, 300 mg, Oral, Q12H, Shelbie Bergeron MD, 300 mg at 06/24/21 0821  •  digoxin (LANOXIN) tablet 125 mcg, 125 mcg, Oral, Daily, Shelbie Bergeron MD, 125 mcg at 06/24/21 1212  •  dofetilide (TIKOSYN) capsule 125 mcg, 125 mcg, Oral, Q12H, Shelbie Bergeron MD, 125 mcg at 06/24/21 0821  •  ipratropium-albuterol (DUO-NEB) nebulizer solution 1.5 mL, 1.5 mL, Nebulization, Q4H PRN, Shelbie Bergeron MD  •  metoprolol tartrate (LOPRESSOR) tablet 50 mg, 50 mg, Oral, TID, Shelbie Bergeron MD, 50 mg at 06/24/21 0821  •  pantoprazole (PROTONIX) EC tablet 40 mg, 40 mg, Oral, QAM, Shelbie Bergeron MD, 40 mg at 06/24/21 0820  •  sodium chloride 0.9 % flush 10 mL, 10 mL, Intravenous, PRN, Jeromy Petty MD    ASSESSMENT  Chronic atrial fibrillation with rapid ventricular rate  Hypertension  Hyperlipidemia  Aortic stenosis  History of Crohn's disease  Pulmonary hypertension  Gastroesophageal reflux disease    PLAN  Discharge home on Zio patch  Discharge summary dictated    SHELBIE BERGERON MD

## 2021-06-24 NOTE — PLAN OF CARE
Goal Outcome Evaluation:  Plan of Care Reviewed With: patient        Progress: improving    VSS on RA. A&O x 4. SR on monitor. Low fever at start of shift, 100.3. Tylenol given, temp improved. Tikosyn given as ordered. Pt hopeful for d/c today. Will CTM.

## 2021-06-24 NOTE — TELEPHONE ENCOUNTER
She is calling about a medication question,-He was recently discharged form [facility]- with Afib. She states he has bad lungs. He is on Cefdinir.  He has a fever of 100.3. She is very worried about  him spitting and cough.  She is very frustrated. She took his temp while we were talking and it was 99.2 tympanic. Discussed the temp. She is planning on calling his PCP in the am. He is wearing a ZIO patch. She was calling wanting to know if he received the Cefdinir in the hospital. Looked at the Mar and replied yes. She states her question is answered and she will watch him closely.     Reason for Disposition  • Caller has medicine question, adult has minor symptoms, caller declines triage, AND triager answers question    Additional Information  • Negative: [1] Intentional drug overdose AND [2] suicidal thoughts or ideas  • Negative: Drug overdose and triager unable to answer question  • Negative: Caller requesting information unrelated to medicine  • Negative: Caller requesting information about COVID-19 Vaccine  • Negative: Caller requesting information about Emergency Contraception  • Negative: Caller requesting information about Combined Birth Control Pills  • Negative: Caller requesting information about Progestin Birth Control Pills  • Negative: Caller requesting information about Post-Op pain or medicines  • Negative: Caller requesting a prescription antibiotic (such as Penicillin) for Strep throat and has a positive culture result  • Negative: Caller requesting a prescription anti-viral med (such as Tamiflu) and has influenza (flu)  symptoms  • Negative: Immunization reaction suspected  • Negative: Rash while taking a medicine or within 3 days of stopping it  • Negative: [1] Asthma and [2] having symptoms of asthma (cough, wheezing, etc.)  • Negative: [1] Symptom of illness (e.g., headache, abdominal pain, earache, vomiting) AND [2] more than mild  • Negative: Breastfeeding questions about mother's medicines and  diet  • Negative: MORE THAN A DOUBLE DOSE of a prescription or over-the-counter (OTC) drug  • Negative: [1] DOUBLE DOSE (an extra dose or lesser amount) of prescription drug AND [2] any symptoms (e.g., dizziness, nausea, pain, sleepiness)  • Negative: [1] DOUBLE DOSE (an extra dose or lesser amount) of over-the-counter (OTC) drug AND [2] any symptoms (e.g., dizziness, nausea, pain, sleepiness)  • Negative: Took another person's prescription drug  • Negative: [1] DOUBLE DOSE (an extra dose or lesser amount) of prescription drug AND [2] NO symptoms (Exception: a double dose of antibiotics)  • Negative: Diabetes drug error or overdose (e.g., took wrong type of insulin or took extra dose)  • Negative: [1] Prescription refill request for ESSENTIAL medicine (i.e., likelihood of harm to patient if not taken) AND [2] triager unable to refill per department policy  • Negative: [1] Prescription not at pharmacy AND [2] was prescribed by PCP recently (Exception: triager has access to EMR and prescription is recorded there. Go to Home Care and confirm for pharmacy.)  • Negative: [1] Pharmacy calling with prescription question AND [2] triager unable to answer question  • Negative: [1] Caller has URGENT medicine question about med that PCP or specialist prescribed AND [2] triager unable to answer question  • Negative: Medicine patch causing local rash or itching  • Negative: [1] Caller has medicine question about med NOT prescribed by PCP AND [2] triager unable to answer question (e.g., compatibility with other med, storage)  • Negative: Prescription request for new medicine (not a refill)  • Negative: Prescription refill request for a CONTROLLED substance (e.g., narcotics, ADHD medicines)  • Negative: [1] Prescription refill request for NON-ESSENTIAL medicine (i.e., no harm to patient if med not taken) AND [2] triager unable to refill per department policy  • Negative: [1] Caller has NON-URGENT medicine question about med that  "PCP prescribed AND [2] triager unable to answer question  • Negative: Caller wants to use a complementary or alternative medicine  • Negative: [1] Prescription prescribed recently is not at pharmacy AND [2] triager has access to patient's EMR AND [3] prescription is recorded in the EMR  • Negative: [1] DOUBLE DOSE (an extra dose or lesser amount) of over-the-counter (OTC) drug AND [2] NO symptoms  • Negative: [1] DOUBLE DOSE (an extra dose or lesser amount) of antibiotic drug AND [2] NO symptoms  • Negative: Caller has medicine question only, adult not sick, AND triager answers question    Answer Assessment - Initial Assessment Questions  1. NAME of MEDICATION: \"What medicine are you calling about?\"      *No Answer*Cefdinir   2. QUESTION: \"What is your question?\" (e.g., medication refill, side effect)      Was my  on the medication in the hospital.   3. PRESCRIBING HCP: \"Who prescribed it?\" Reason: if prescribed by specialist, call should be referred to that group.      PCp  4. SYMPTOMS: \"Do you have any symptoms?\"      Cough \" bad lungs\"  5. SEVERITY: If symptoms are present, ask \"Are they mild, moderate or severe?\"      He is this way all of the time.   6. PREGNANCY:  \"Is there any chance that you are pregnant?\" \"When was your last menstrual period?\"      n    Protocols used: MEDICATION QUESTION CALL-ADULT-AH      "

## 2021-06-24 NOTE — CASE MANAGEMENT/SOCIAL WORK
Case Management Discharge Note      Final Note: Pt was dc'd home         Selected Continued Care - Discharged on 6/24/2021 Admission date: 6/22/2021 - Discharge disposition: Home or Self Care    Destination    No services have been selected for the patient.              Durable Medical Equipment    No services have been selected for the patient.              Dialysis/Infusion    No services have been selected for the patient.              Home Medical Care    No services have been selected for the patient.              Therapy    No services have been selected for the patient.              Community Resources    No services have been selected for the patient.              Community & DME    No services have been selected for the patient.                  Transportation Services  Private: Car    Final Discharge Disposition Code: 01 - home or self-care

## 2021-06-24 NOTE — PROGRESS NOTES
I reviewed this patient's chart.  I agree with Libra's assessment.  Plan is to have a Zio patch at discharge and follow-up with [facility] in 4 weeks when she should have the results back of the Zio patch.  Continue Eliquis, digoxin, metoprolol.  Patient has declined pacemaker.  We will follow up on the aortic stenosis and pulmonary hypertension as an outpatient but likely not much to be done about it.  Okay with discharge today from a cardiac standpoint.  Please call if there are any further issues.

## 2021-06-30 ENCOUNTER — TRANSCRIBE ORDERS (OUTPATIENT)
Dept: ADMINISTRATIVE | Facility: HOSPITAL | Age: 86
End: 2021-06-30

## 2021-06-30 ENCOUNTER — HOSPITAL ENCOUNTER (OUTPATIENT)
Facility: HOSPITAL | Age: 86
Setting detail: HOSPITAL OUTPATIENT SURGERY
End: 2021-06-30
Attending: INTERNAL MEDICINE | Admitting: INTERNAL MEDICINE

## 2021-06-30 ENCOUNTER — HOSPITAL ENCOUNTER (OUTPATIENT)
Dept: CT IMAGING | Facility: HOSPITAL | Age: 86
Discharge: HOME | End: 2021-06-30
Admitting: INTERNAL MEDICINE

## 2021-06-30 ENCOUNTER — TRANSCRIBE ORDERS (OUTPATIENT)
Dept: SLEEP MEDICINE | Facility: HOSPITAL | Age: 86
End: 2021-06-30

## 2021-06-30 DIAGNOSIS — R05.9 COUGH: ICD-10-CM

## 2021-06-30 DIAGNOSIS — R91.8 PULMONARY INFILTRATE: ICD-10-CM

## 2021-06-30 DIAGNOSIS — Z01.818 OTHER SPECIFIED PRE-OPERATIVE EXAMINATION: Primary | ICD-10-CM

## 2021-06-30 DIAGNOSIS — R05.9 COUGH: Primary | ICD-10-CM

## 2021-06-30 PROCEDURE — 71250 CT THORAX DX C-: CPT

## 2021-07-05 ENCOUNTER — HOSPITAL ENCOUNTER (INPATIENT)
Facility: HOSPITAL | Age: 86
LOS: 6 days | Discharge: HOME HEALTH | End: 2021-07-12
Attending: EMERGENCY MEDICINE | Admitting: HOSPITALIST

## 2021-07-05 ENCOUNTER — APPOINTMENT (OUTPATIENT)
Dept: GENERAL RADIOLOGY | Facility: HOSPITAL | Age: 86
End: 2021-07-05

## 2021-07-05 DIAGNOSIS — I48.91 ATRIAL FIBRILLATION WITH RAPID VENTRICULAR RESPONSE (HCC): ICD-10-CM

## 2021-07-05 DIAGNOSIS — J84.10 PULMONARY FIBROSIS (HCC): ICD-10-CM

## 2021-07-05 DIAGNOSIS — N28.9 ACUTE ON CHRONIC RENAL INSUFFICIENCY: ICD-10-CM

## 2021-07-05 DIAGNOSIS — R73.9 HYPERGLYCEMIA: ICD-10-CM

## 2021-07-05 DIAGNOSIS — N18.9 ACUTE ON CHRONIC RENAL INSUFFICIENCY: ICD-10-CM

## 2021-07-05 DIAGNOSIS — I48.91 ATRIAL FIBRILLATION WITH RVR (HCC): Primary | ICD-10-CM

## 2021-07-05 LAB
ANION GAP SERPL CALCULATED.3IONS-SCNC: 11.2 MMOL/L (ref 5–15)
BACTERIA UR QL AUTO: ABNORMAL /HPF
BASOPHILS # BLD AUTO: 0.04 10*3/MM3 (ref 0–0.2)
BASOPHILS NFR BLD AUTO: 0.4 % (ref 0–1.5)
BILIRUB UR QL STRIP: NEGATIVE
BUN SERPL-MCNC: 25 MG/DL (ref 8–23)
BUN/CREAT SERPL: 18.1 (ref 7–25)
CALCIUM SPEC-SCNC: 8.8 MG/DL (ref 8.6–10.5)
CHLORIDE SERPL-SCNC: 102 MMOL/L (ref 98–107)
CLARITY UR: CLEAR
CO2 SERPL-SCNC: 20.8 MMOL/L (ref 22–29)
COLOR UR: YELLOW
CREAT SERPL-MCNC: 1.38 MG/DL (ref 0.76–1.27)
DEPRECATED RDW RBC AUTO: 53.2 FL (ref 37–54)
DIGOXIN SERPL-MCNC: 0.4 NG/ML (ref 0.6–1.2)
EOSINOPHIL # BLD AUTO: 0.06 10*3/MM3 (ref 0–0.4)
EOSINOPHIL NFR BLD AUTO: 0.6 % (ref 0.3–6.2)
ERYTHROCYTE [DISTWIDTH] IN BLOOD BY AUTOMATED COUNT: 16.8 % (ref 12.3–15.4)
GFR SERPL CREATININE-BSD FRML MDRD: 49 ML/MIN/1.73
GLUCOSE BLDC GLUCOMTR-MCNC: 183 MG/DL (ref 70–130)
GLUCOSE SERPL-MCNC: 247 MG/DL (ref 65–99)
GLUCOSE UR STRIP-MCNC: NEGATIVE MG/DL
HCT VFR BLD AUTO: 37.1 % (ref 37.5–51)
HGB BLD-MCNC: 11.7 G/DL (ref 13–17.7)
HGB UR QL STRIP.AUTO: NEGATIVE
HYALINE CASTS UR QL AUTO: ABNORMAL /LPF
IMM GRANULOCYTES # BLD AUTO: 0.1 10*3/MM3 (ref 0–0.05)
IMM GRANULOCYTES NFR BLD AUTO: 0.9 % (ref 0–0.5)
KETONES UR QL STRIP: ABNORMAL
LEUKOCYTE ESTERASE UR QL STRIP.AUTO: ABNORMAL
LYMPHOCYTES # BLD AUTO: 1.02 10*3/MM3 (ref 0.7–3.1)
LYMPHOCYTES NFR BLD AUTO: 9.5 % (ref 19.6–45.3)
MAGNESIUM SERPL-MCNC: 1.9 MG/DL (ref 1.6–2.4)
MCH RBC QN AUTO: 27.6 PG (ref 26.6–33)
MCHC RBC AUTO-ENTMCNC: 31.5 G/DL (ref 31.5–35.7)
MCV RBC AUTO: 87.5 FL (ref 79–97)
MONOCYTES # BLD AUTO: 0.65 10*3/MM3 (ref 0.1–0.9)
MONOCYTES NFR BLD AUTO: 6.1 % (ref 5–12)
NEUTROPHILS NFR BLD AUTO: 8.84 10*3/MM3 (ref 1.7–7)
NEUTROPHILS NFR BLD AUTO: 82.5 % (ref 42.7–76)
NITRITE UR QL STRIP: NEGATIVE
NRBC BLD AUTO-RTO: 0 /100 WBC (ref 0–0.2)
PH UR STRIP.AUTO: 5.5 [PH] (ref 5–8)
PLATELET # BLD AUTO: 246 10*3/MM3 (ref 140–450)
PMV BLD AUTO: 10.9 FL (ref 6–12)
POTASSIUM SERPL-SCNC: 4.7 MMOL/L (ref 3.5–5.2)
PROT UR QL STRIP: ABNORMAL
RBC # BLD AUTO: 4.24 10*6/MM3 (ref 4.14–5.8)
RBC # UR: ABNORMAL /HPF
REF LAB TEST METHOD: ABNORMAL
SODIUM SERPL-SCNC: 134 MMOL/L (ref 136–145)
SP GR UR STRIP: 1.02 (ref 1–1.03)
SQUAMOUS #/AREA URNS HPF: ABNORMAL /HPF
TROPONIN T SERPL-MCNC: <0.01 NG/ML (ref 0–0.03)
UROBILINOGEN UR QL STRIP: ABNORMAL
WBC # BLD AUTO: 10.71 10*3/MM3 (ref 3.4–10.8)
WBC UR QL AUTO: ABNORMAL /HPF

## 2021-07-05 PROCEDURE — 93010 ELECTROCARDIOGRAM REPORT: CPT | Performed by: INTERNAL MEDICINE

## 2021-07-05 PROCEDURE — 93005 ELECTROCARDIOGRAM TRACING: CPT

## 2021-07-05 PROCEDURE — 71045 X-RAY EXAM CHEST 1 VIEW: CPT

## 2021-07-05 PROCEDURE — 83036 HEMOGLOBIN GLYCOSYLATED A1C: CPT | Performed by: EMERGENCY MEDICINE

## 2021-07-05 PROCEDURE — 81001 URINALYSIS AUTO W/SCOPE: CPT | Performed by: EMERGENCY MEDICINE

## 2021-07-05 PROCEDURE — 25010000002 DIGOXIN PER 500 MCG: Performed by: EMERGENCY MEDICINE

## 2021-07-05 PROCEDURE — 99284 EMERGENCY DEPT VISIT MOD MDM: CPT

## 2021-07-05 PROCEDURE — 80162 ASSAY OF DIGOXIN TOTAL: CPT | Performed by: EMERGENCY MEDICINE

## 2021-07-05 PROCEDURE — 85025 COMPLETE CBC W/AUTO DIFF WBC: CPT | Performed by: EMERGENCY MEDICINE

## 2021-07-05 PROCEDURE — 80048 BASIC METABOLIC PNL TOTAL CA: CPT | Performed by: EMERGENCY MEDICINE

## 2021-07-05 PROCEDURE — 84484 ASSAY OF TROPONIN QUANT: CPT | Performed by: EMERGENCY MEDICINE

## 2021-07-05 PROCEDURE — 93005 ELECTROCARDIOGRAM TRACING: CPT | Performed by: EMERGENCY MEDICINE

## 2021-07-05 PROCEDURE — 83735 ASSAY OF MAGNESIUM: CPT | Performed by: EMERGENCY MEDICINE

## 2021-07-05 PROCEDURE — 82962 GLUCOSE BLOOD TEST: CPT

## 2021-07-05 RX ORDER — SODIUM CHLORIDE 0.9 % (FLUSH) 0.9 %
10 SYRINGE (ML) INJECTION AS NEEDED
Status: DISCONTINUED | OUTPATIENT
Start: 2021-07-05 | End: 2021-07-12 | Stop reason: HOSPADM

## 2021-07-05 RX ORDER — DIGOXIN 0.25 MG/ML
125 INJECTION INTRAMUSCULAR; INTRAVENOUS ONCE
Status: COMPLETED | OUTPATIENT
Start: 2021-07-05 | End: 2021-07-05

## 2021-07-05 RX ADMIN — DIGOXIN 125 MCG: 250 INJECTION, SOLUTION INTRAMUSCULAR; INTRAVENOUS; PARENTERAL at 23:39

## 2021-07-05 RX ADMIN — METOROPROLOL TARTRATE 2.5 MG: 5 INJECTION, SOLUTION INTRAVENOUS at 22:01

## 2021-07-06 LAB
ANION GAP SERPL CALCULATED.3IONS-SCNC: 9.8 MMOL/L (ref 5–15)
BASOPHILS # BLD AUTO: 0.07 10*3/MM3 (ref 0–0.2)
BASOPHILS NFR BLD AUTO: 0.6 % (ref 0–1.5)
BUN SERPL-MCNC: 22 MG/DL (ref 8–23)
BUN/CREAT SERPL: 18.5 (ref 7–25)
CALCIUM SPEC-SCNC: 8.7 MG/DL (ref 8.6–10.5)
CHLORIDE SERPL-SCNC: 107 MMOL/L (ref 98–107)
CO2 SERPL-SCNC: 22.2 MMOL/L (ref 22–29)
CREAT SERPL-MCNC: 1.19 MG/DL (ref 0.76–1.27)
DEPRECATED RDW RBC AUTO: 52.9 FL (ref 37–54)
EOSINOPHIL # BLD AUTO: 0.19 10*3/MM3 (ref 0–0.4)
EOSINOPHIL NFR BLD AUTO: 1.7 % (ref 0.3–6.2)
ERYTHROCYTE [DISTWIDTH] IN BLOOD BY AUTOMATED COUNT: 17 % (ref 12.3–15.4)
GFR SERPL CREATININE-BSD FRML MDRD: 58 ML/MIN/1.73
GLUCOSE BLDC GLUCOMTR-MCNC: 100 MG/DL (ref 70–130)
GLUCOSE BLDC GLUCOMTR-MCNC: 143 MG/DL (ref 70–130)
GLUCOSE BLDC GLUCOMTR-MCNC: 157 MG/DL (ref 70–130)
GLUCOSE SERPL-MCNC: 84 MG/DL (ref 65–99)
HBA1C MFR BLD: 6.24 % (ref 4.8–5.6)
HCT VFR BLD AUTO: 36.3 % (ref 37.5–51)
HGB BLD-MCNC: 11.9 G/DL (ref 13–17.7)
IMM GRANULOCYTES # BLD AUTO: 0.09 10*3/MM3 (ref 0–0.05)
IMM GRANULOCYTES NFR BLD AUTO: 0.8 % (ref 0–0.5)
INR PPP: 1.35 (ref 0.9–1.1)
LYMPHOCYTES # BLD AUTO: 1.32 10*3/MM3 (ref 0.7–3.1)
LYMPHOCYTES NFR BLD AUTO: 12 % (ref 19.6–45.3)
MAGNESIUM SERPL-MCNC: 1.8 MG/DL (ref 1.6–2.4)
MCH RBC QN AUTO: 28.1 PG (ref 26.6–33)
MCHC RBC AUTO-ENTMCNC: 32.8 G/DL (ref 31.5–35.7)
MCV RBC AUTO: 85.8 FL (ref 79–97)
MONOCYTES # BLD AUTO: 0.85 10*3/MM3 (ref 0.1–0.9)
MONOCYTES NFR BLD AUTO: 7.7 % (ref 5–12)
NEUTROPHILS NFR BLD AUTO: 77.2 % (ref 42.7–76)
NEUTROPHILS NFR BLD AUTO: 8.52 10*3/MM3 (ref 1.7–7)
NRBC BLD AUTO-RTO: 0 /100 WBC (ref 0–0.2)
PLATELET # BLD AUTO: 229 10*3/MM3 (ref 140–450)
PMV BLD AUTO: 10.5 FL (ref 6–12)
POTASSIUM SERPL-SCNC: 4.7 MMOL/L (ref 3.5–5.2)
PROTHROMBIN TIME: 16.4 SECONDS (ref 11.7–14.2)
QT INTERVAL: 314 MS
QT INTERVAL: 330 MS
QT INTERVAL: 369 MS
RBC # BLD AUTO: 4.23 10*6/MM3 (ref 4.14–5.8)
SARS-COV-2 RNA RESP QL NAA+PROBE: NOT DETECTED
SODIUM SERPL-SCNC: 139 MMOL/L (ref 136–145)
WBC # BLD AUTO: 11.04 10*3/MM3 (ref 3.4–10.8)

## 2021-07-06 PROCEDURE — 93005 ELECTROCARDIOGRAM TRACING: CPT | Performed by: INTERNAL MEDICINE

## 2021-07-06 PROCEDURE — U0003 INFECTIOUS AGENT DETECTION BY NUCLEIC ACID (DNA OR RNA); SEVERE ACUTE RESPIRATORY SYNDROME CORONAVIRUS 2 (SARS-COV-2) (CORONAVIRUS DISEASE [COVID-19]), AMPLIFIED PROBE TECHNIQUE, MAKING USE OF HIGH THROUGHPUT TECHNOLOGIES AS DESCRIBED BY CMS-2020-01-R: HCPCS | Performed by: EMERGENCY MEDICINE

## 2021-07-06 PROCEDURE — U0005 INFEC AGEN DETEC AMPLI PROBE: HCPCS | Performed by: EMERGENCY MEDICINE

## 2021-07-06 PROCEDURE — 93010 ELECTROCARDIOGRAM REPORT: CPT | Performed by: INTERNAL MEDICINE

## 2021-07-06 PROCEDURE — 85610 PROTHROMBIN TIME: CPT | Performed by: HOSPITALIST

## 2021-07-06 PROCEDURE — 93005 ELECTROCARDIOGRAM TRACING: CPT | Performed by: NURSE PRACTITIONER

## 2021-07-06 PROCEDURE — 99222 1ST HOSP IP/OBS MODERATE 55: CPT | Performed by: INTERNAL MEDICINE

## 2021-07-06 PROCEDURE — 63710000001 INSULIN LISPRO (HUMAN) PER 5 UNITS: Performed by: HOSPITALIST

## 2021-07-06 PROCEDURE — 80048 BASIC METABOLIC PNL TOTAL CA: CPT | Performed by: HOSPITALIST

## 2021-07-06 PROCEDURE — 83735 ASSAY OF MAGNESIUM: CPT | Performed by: HOSPITALIST

## 2021-07-06 PROCEDURE — 85025 COMPLETE CBC W/AUTO DIFF WBC: CPT | Performed by: HOSPITALIST

## 2021-07-06 PROCEDURE — 82962 GLUCOSE BLOOD TEST: CPT

## 2021-07-06 RX ORDER — INSULIN LISPRO 100 [IU]/ML
0-7 INJECTION, SOLUTION INTRAVENOUS; SUBCUTANEOUS
Status: DISCONTINUED | OUTPATIENT
Start: 2021-07-06 | End: 2021-07-12

## 2021-07-06 RX ORDER — BUDESONIDE 3 MG/1
9 CAPSULE, COATED PELLETS ORAL DAILY
Status: DISCONTINUED | OUTPATIENT
Start: 2021-07-06 | End: 2021-07-06

## 2021-07-06 RX ORDER — DIGOXIN 125 MCG
125 TABLET ORAL
Status: DISCONTINUED | OUTPATIENT
Start: 2021-07-06 | End: 2021-07-12 | Stop reason: HOSPADM

## 2021-07-06 RX ORDER — METOPROLOL TARTRATE 50 MG/1
50 TABLET, FILM COATED ORAL 2 TIMES DAILY
COMMUNITY
End: 2021-07-12 | Stop reason: HOSPADM

## 2021-07-06 RX ORDER — ALLOPURINOL 100 MG/1
100 TABLET ORAL DAILY
Status: DISCONTINUED | OUTPATIENT
Start: 2021-07-06 | End: 2021-07-12 | Stop reason: HOSPADM

## 2021-07-06 RX ORDER — IPRATROPIUM BROMIDE AND ALBUTEROL SULFATE 2.5; .5 MG/3ML; MG/3ML
3 SOLUTION RESPIRATORY (INHALATION) EVERY 4 HOURS PRN
Status: DISCONTINUED | OUTPATIENT
Start: 2021-07-06 | End: 2021-07-12

## 2021-07-06 RX ORDER — BUDESONIDE 3 MG/1
3 CAPSULE, COATED PELLETS ORAL DAILY
Status: DISCONTINUED | OUTPATIENT
Start: 2021-07-06 | End: 2021-07-12 | Stop reason: HOSPADM

## 2021-07-06 RX ORDER — SODIUM CHLORIDE 9 MG/ML
100 INJECTION, SOLUTION INTRAVENOUS CONTINUOUS
Status: DISCONTINUED | OUTPATIENT
Start: 2021-07-06 | End: 2021-07-06

## 2021-07-06 RX ORDER — ATORVASTATIN CALCIUM 20 MG/1
10 TABLET, FILM COATED ORAL 3 TIMES WEEKLY
COMMUNITY
End: 2021-07-12 | Stop reason: HOSPADM

## 2021-07-06 RX ORDER — DOFETILIDE 0.12 MG/1
125 CAPSULE ORAL EVERY 12 HOURS
Status: DISCONTINUED | OUTPATIENT
Start: 2021-07-06 | End: 2021-07-12 | Stop reason: HOSPADM

## 2021-07-06 RX ORDER — PANTOPRAZOLE SODIUM 40 MG/1
40 TABLET, DELAYED RELEASE ORAL
Status: DISCONTINUED | OUTPATIENT
Start: 2021-07-06 | End: 2021-07-12 | Stop reason: HOSPADM

## 2021-07-06 RX ORDER — ALLOPURINOL 100 MG/1
100 TABLET ORAL 2 TIMES DAILY
COMMUNITY
Start: 2021-07-18 | End: 2021-09-28

## 2021-07-06 RX ORDER — METOPROLOL TARTRATE 50 MG/1
50 TABLET, FILM COATED ORAL 2 TIMES DAILY
Status: DISCONTINUED | OUTPATIENT
Start: 2021-07-06 | End: 2021-07-11

## 2021-07-06 RX ORDER — ATORVASTATIN CALCIUM 20 MG/1
10 TABLET, FILM COATED ORAL NIGHTLY
Status: DISCONTINUED | OUTPATIENT
Start: 2021-07-06 | End: 2021-07-12 | Stop reason: HOSPADM

## 2021-07-06 RX ADMIN — PANTOPRAZOLE SODIUM 40 MG: 40 TABLET, DELAYED RELEASE ORAL at 08:51

## 2021-07-06 RX ADMIN — BUDESONIDE 9 MG: 3 CAPSULE, GELATIN COATED ORAL at 08:52

## 2021-07-06 RX ADMIN — METOPROLOL TARTRATE 50 MG: 50 TABLET, FILM COATED ORAL at 08:52

## 2021-07-06 RX ADMIN — SODIUM CHLORIDE 100 ML/HR: 9 INJECTION, SOLUTION INTRAVENOUS at 11:41

## 2021-07-06 RX ADMIN — APIXABAN 5 MG: 5 TABLET, FILM COATED ORAL at 08:52

## 2021-07-06 RX ADMIN — BUDESONIDE 3 MG: 3 CAPSULE, GELATIN COATED ORAL at 14:42

## 2021-07-06 RX ADMIN — INSULIN LISPRO 2 UNITS: 100 INJECTION, SOLUTION INTRAVENOUS; SUBCUTANEOUS at 17:34

## 2021-07-06 RX ADMIN — APIXABAN 5 MG: 5 TABLET, FILM COATED ORAL at 21:49

## 2021-07-06 RX ADMIN — DOFETILIDE 125 MCG: 0.12 CAPSULE ORAL at 11:55

## 2021-07-06 RX ADMIN — DOFETILIDE 125 MCG: 0.12 CAPSULE ORAL at 21:49

## 2021-07-06 RX ADMIN — PANTOPRAZOLE SODIUM 40 MG: 40 TABLET, DELAYED RELEASE ORAL at 17:31

## 2021-07-06 RX ADMIN — SODIUM CHLORIDE 250 ML: 9 INJECTION, SOLUTION INTRAVENOUS at 02:04

## 2021-07-06 RX ADMIN — ALLOPURINOL 100 MG: 100 TABLET ORAL at 08:52

## 2021-07-06 RX ADMIN — METOPROLOL TARTRATE 50 MG: 50 TABLET, FILM COATED ORAL at 21:49

## 2021-07-06 RX ADMIN — DIGOXIN 125 MCG: 125 TABLET ORAL at 13:27

## 2021-07-06 RX ADMIN — ATORVASTATIN CALCIUM 10 MG: 20 TABLET, FILM COATED ORAL at 21:48

## 2021-07-06 NOTE — ED NOTES
"Pt ambulatory to ED triage c/o heart rate \"going up and down.\" Pt was informed by MD on-call to seek treatment because of heart rate. Pt denies pain and is in NAD at this time.     Patient was placed in face mask during first look triage.  Patient was wearing a face mask throughout encounter.  I wore personal protective equipment throughout the encounter.  Hand hygiene was performed before and after patient encounter.      Jakub Jolley RN  07/05/21 8209    "

## 2021-07-06 NOTE — ED NOTES
"\"  Nursing report ED to floor  Asia Ly  88 y.o.  male    HPI (triage note):   Chief Complaint   Patient presents with   • Palpitations       Admitting doctor:   Calvin Bergeron MD    Admitting diagnosis:   The primary encounter diagnosis was Atrial fibrillation with RVR (CMS/[facility]). Diagnoses of Hyperglycemia and Acute on chronic renal insufficiency were also pertinent to this visit.    Code status:   Current Code Status     Date Active Code Status Order ID Comments User Context       Prior    Advance Care Planning Activity          Allergies:   Amiodarone, Diltiazem, Doxycycline, and Indomethacin    Weight:   There were no vitals filed for this visit.    Most recent vitals:   Vitals:    07/06/21 0000 07/06/21 0006 07/06/21 0100 07/06/21 0128   BP: 135/74  126/87    Pulse:  101 108 109   Resp:       Temp:       TempSrc:       SpO2:  96% 96% 97%   Height:           Active LDAs/IV Access:   Lines, Drains & Airways    Active LDAs     Name:   Placement date:   Placement time:   Site:   Days:    Peripheral IV 07/05/21 2151 Right Forearm   07/05/21 2151    Forearm   less than 1                Labs (abnormal labs have a star):   Labs Reviewed   DIGOXIN LEVEL - Abnormal; Notable for the following components:       Result Value    Digoxin 0.40 (*)     All other components within normal limits   BASIC METABOLIC PANEL - Abnormal; Notable for the following components:    Glucose 247 (*)     BUN 25 (*)     Creatinine 1.38 (*)     Sodium 134 (*)     CO2 20.8 (*)     eGFR Non  Amer 49 (*)     All other components within normal limits    Narrative:     GFR Normal >60  Chronic Kidney Disease <60  Kidney Failure <15     CBC WITH AUTO DIFFERENTIAL - Abnormal; Notable for the following components:    Hemoglobin 11.7 (*)     Hematocrit 37.1 (*)     RDW 16.8 (*)     Neutrophil % 82.5 (*)     Lymphocyte % 9.5 (*)     Immature Grans % 0.9 (*)     Neutrophils, Absolute 8.84 (*)     Immature Grans, Absolute 0.10 (*)     All other " components within normal limits   URINALYSIS W/ MICROSCOPIC IF INDICATED (NO CULTURE) - Abnormal; Notable for the following components:    Ketones, UA Trace (*)     Protein, UA 30 mg/dL (1+) (*)     Leuk Esterase, UA Small (1+) (*)     All other components within normal limits   HEMOGLOBIN A1C - Abnormal; Notable for the following components:    Hemoglobin A1C 6.24 (*)     All other components within normal limits    Narrative:     Hemoglobin A1C Ranges:    Increased Risk for Diabetes  5.7% to 6.4%  Diabetes                     >= 6.5%  Diabetic Goal                < 7.0%   URINALYSIS, MICROSCOPIC ONLY - Abnormal; Notable for the following components:    WBC, UA 3-5 (*)     All other components within normal limits   POCT GLUCOSE FINGERSTICK - Abnormal; Notable for the following components:    Glucose 183 (*)     All other components within normal limits   MAGNESIUM - Normal   TROPONIN (IN-HOUSE) - Normal    Narrative:     Troponin T Reference Range:  <= 0.03 ng/mL-   Negative for AMI  >0.03 ng/mL-     Abnormal for myocardial necrosis.  Clinicians would have to utilize clinical acumen, EKG, Troponin and serial changes to determine if it is an Acute Myocardial Infarction or myocardial injury due to an underlying chronic condition.       Results may be falsely decreased if patient taking Biotin.     COVID PRE-OP / PRE-PROCEDURE SCREENING ORDER (NO ISOLATION)    Narrative:     The following orders were created for panel order COVID PRE-OP / PRE-PROCEDURE SCREENING ORDER (NO ISOLATION) - Swab, Nasopharynx.  Procedure                               Abnormality         Status                     ---------                               -----------         ------                     COVID-19,RADHA LEUNG IN-HOUSE...[[id number]]                                                   Please view results for these tests on the individual orders.   COVID-19RADHA IN-HOUSE CEPHEID/VERITO, NP SWAB IN TRANSPORT MEDIA 8-12 HR TAT   POCT GLUCOSE  FINGERSTICK   CBC AND DIFFERENTIAL    Narrative:     The following orders were created for panel order CBC & Differential.  Procedure                               Abnormality         Status                     ---------                               -----------         ------                     CBC Auto Differential[[id number]]        Abnormal            Final result                 Please view results for these tests on the individual orders.       EKG:   ECG 12 Lead   Preliminary Result   HEART RATE= 105  bpm   RR Interval= 542  ms   WY Interval=   ms   P Horizontal Axis=   deg   P Front Axis=   deg   QRSD Interval= 78  ms   QT Interval= 330  ms   QRS Axis= 14  deg   T Wave Axis= -1  deg   - ABNORMAL ECG -   Atrial flutter   Probable LVH with secondary repol abnrm   Electronically Signed By:    Date and Time of Study: 2021 21:35:36          Meds given in ED:   Medications   sodium chloride 0.9 % flush 10 mL (has no administration in time range)   sodium chloride 0.9 % bolus 250 mL (has no administration in time range)   sodium chloride 0.9 % infusion (has no administration in time range)   metoprolol tartrate (LOPRESSOR) injection 2.5 mg (2.5 mg Intravenous Given 21)   digoxin (LANOXIN) injection 125 mcg (125 mcg Intravenous Given 21 2339)       Imaging results:  XR Chest 1 View    Result Date: 2021  Electronically signed by Pavan Askew MD on 21 at 2343      Ambulatory status:   - x 1 assist    Social issues:   Social History     Socioeconomic History   • Marital status:      Spouse name: Not on file   • Number of children: Not on file   • Years of education: Not on file   • Highest education level: Not on file   Tobacco Use   • Smoking status: Former Smoker     Types: Cigars     Quit date: 10/28/1994     Years since quittin.7   • Smokeless tobacco: Never Used   • Tobacco comment: Quit 25 years ago   Substance and Sexual Activity   • Alcohol use: No     Comment: daily  caffiene - 1/2 cup of coffee   • Drug use: No   • Sexual activity: Defer    Nursing report ED to floor       Danae Ibanez, RN  07/06/21 0135

## 2021-07-06 NOTE — CONSULTS
Date of Hospital Visit: 2021  Encounter Provider: Leo Morley RN  Place of Service: [facility] CARDIOLOGY  Patient Name: Asia Ly  :1932  Referral Provider: Calvin Bergeron MD    Chief complaint: Tachycardia    Reason for consult: Afib    History of Present Illness: 88-year-old male patient of mine with a past medical history of paroxysmal atrial fibrillation, stroke, moderate aortic stenosis, Crohn's disease, pulmonary fibrosis and pulmonary hypertension who presents for atrial fibrillation with RVR.  He is followed in the past with Dr. Rivera and I met him in 2020 for hospitalization with hematochezia.  GI work-up showed no evidence of active bleeding and he was put back on apixaban given his stroke risk.  He presented back to the hospital few days later with febrile illness.  Found to have bilateral pneumonia and extensive hospital stay followed with full course of antibiotics.  He has been on sotalol and amiodarone in the past but most recently on dofetilide for monotherapy.  He is previously been evaluated by Dr. Nettles for possible AV johanne ablation and pacemaker but they have been reticent to consider intervention.  At last hospital stay in April he was stable with no changes made to medications.  At that time he was taking dofetilide.      He was hospitalized in late  for atrial fibrillation with RVR.  He spontaneously converted to sinus rhythm and was sent home on extended ambulatory telemetry.  That report is still pending.  There were no other medication changes made and he was sent home the following day.    On  he did not feel well and had decreased oral intake per wife.  He had no specific complaints other than generalized weakness and fatigue.  His wife noted by pulse oximeter that his heart rate was significantly elevated.  She called the service on Monday he told them to take his morning medication and monitor progress  throughout the day.  No response to medication in he came to the emergency room for evaluation.  He was admitted to the CCU only for overflow telemetry.  He remains in atrial fibrillation with heart rate ranging 100 to 120 bpm.  He denies chest pain.  No orthopnea, PND or edema.  For some reason dofetilide has fallen off of his outpatient medication regimen but he and his wife state he is taking the medication every day and has not missed a dose.      Previous Cardiac testing:     Echocardiogram 08/27/2020:  · Estimated EF = 60%.  · Left ventricular systolic function is normal.  · Left ventricular wall thickness is consistent with mild concentric hypertrophy.  · Left atrial cavity size is moderately dilated.  · There is calcification of the aortic valve.  · Moderate aortic valve stenosis is present.  · Aortic valve maximum pressure gradient is 42.0 mmHg.  · Aortic valve mean pressure gradient is 24.0 mmHg.  · Aortic valve area is 1.1 cm2.  · Mild tricuspid valve regurgitation is present.  · Left ventricular diastolic dysfunction (grade II) consistent with pseudonormalization.  · Calculated right ventricular systolic pressure from tricuspid regurgitation is 57.8 mmHg. Severe pulmonary hypertension is present.  · Mild aortic valve regurgitation is present.  · Mild mitral valve regurgitation is present    Stress Test 11/18/2016:  IMPRESSION:   1. Clinically negative for exercise-induced chest pain.   2. ECG negative for ischemic changes.   3. Appropriate heart rate and blood pressure response with fairly good   exercise tolerance.   4. No significant arrhythmias.   5. Cardiolite images pending, see below.     Past Medical History:   Diagnosis Date   • Aortic stenosis    • Aspiration pneumonia (CMS/HCC)    • Atrial fibrillation (CMS/HCC)    • Bronchiectasis (CMS/HCC)    • CKD (chronic kidney disease)    • COPD (chronic obstructive pulmonary disease) (CMS/HCC)    • Crohn's disease (CMS/HCC)    • Dizziness    • Elevated  "cholesterol    • Gastric ulcer    • Generalized weakness    • Gout    • Hard of hearing    • Hyperlipidemia    • Hypertension    • Leg swelling    • Lower extremity edema    • Mild anemia    • Near syncope    • Nonrheumatic aortic valve stenosis    • PAF (paroxysmal atrial fibrillation) (CMS/HCC)    • Palpitations    • Peptic ulcer    • Pneumonia of left lung due to infectious organism    • Pulmonary fibrosis (CMS/HCC)    • Stroke (CMS/HCC)    • TIA (transient ischemic attack)        Past Surgical History:   Procedure Laterality Date   • BRONCHOSCOPY N/A 10/22/2018    Procedure: BRONCHOSCOPY WITH BRONCHALVEOLAR LAVAGE;  Surgeon: Chidi Oconnor MD;  Location: [facility] ENDOSCOPY;  Service: Pulmonary   • CATARACT EXTRACTION Bilateral    • COLONOSCOPY     • COLONOSCOPY N/A 3/9/2018    Procedure: COLONOSCOPY to terminal ileum with bx;  Surgeon: Aron Cabrera MD;  Location: [facility] ENDOSCOPY;  Service:    • COLONOSCOPY N/A 11/19/2020    Procedure: COLONOSCOPY to cecum:  apc to cecum angiodysplagia,;  Surgeon: Aron Cabrera MD;  Location: [facility] ENDOSCOPY;  Service: Gastroenterology;  Laterality: N/A;  GI bleed  post:  diverticulosis, angiodysplagia   • CYSTECTOMY      back and shoulder area   • ENDOSCOPY N/A 3/9/2018    Procedure: ESOPHAGOGASTRODUODENOSCOPY with bx;  Surgeon: Aron Cabrera MD;  Location: [facility] ENDOSCOPY;  Service:    • ENDOSCOPY N/A 11/19/2020    Procedure: ESOPHAGOGASTRODUODENOSCOPY;  Surgeon: Aron Cabrera MD;  Location: [facility] ENDOSCOPY;  Service: Gastroenterology;  Laterality: N/A;  GI bleed  post:  HH.   • EYE SURGERY Left     detached retina   • EYE SURGERY Right     \"repaired a hole in the eye\"   • TESTICLE SURGERY      benign tumor removed.       Medications Prior to Admission   Medication Sig Dispense Refill Last Dose   • allopurinol (ZYLOPRIM) 100 MG tablet Take 100 mg by mouth Daily.   7/5/2021 at Unknown time   • allopurinol (ZYLOPRIM) 100 MG tablet Take 100 mg by mouth Daily.   " 7/5/2021 at Unknown time   • apixaban (ELIQUIS) 5 MG tablet tablet Take 1 tablet by mouth Every 12 (Twelve) Hours. 180 tablet 3 7/5/2021 at Unknown time   • atorvastatin (LIPITOR) 10 MG tablet Take one tablet by mouth Mon, Wed and Fri.   7/5/2021 at Unknown time   • atorvastatin (LIPITOR) 20 MG tablet Take 10 mg by mouth 3 (Three) Times a Week. Mon, Wed, Friday 7/5/2021 at Unknown time   • BUDESONIDE PO Take 3 mg by mouth Daily.   7/5/2021 at Unknown time   • digoxin (LANOXIN) 125 MCG tablet Take 1 tablet by mouth Daily. 90 tablet 3 7/5/2021 at Unknown time   • IPRATROPIUM-ALBUTEROL IN Inhale Daily.   7/5/2021 at Unknown time   • metoprolol tartrate (LOPRESSOR) 50 MG tablet Take 1 tablet by mouth 3 (Three) Times a Day. 270 tablet 3 7/5/2021 at Unknown time   • metoprolol tartrate (LOPRESSOR) 50 MG tablet Take 50 mg by mouth 2 (two) times a day.   7/5/2021 at Unknown time   • omeprazole (priLOSEC) 40 MG capsule Take 40 mg by mouth 2 (Two) Times a Day.   7/5/2021 at Unknown time   • cefdinir (OMNICEF) 300 MG capsule Take 300 mg by mouth.   Unknown at Unknown time   • dofetilide (TIKOSYN) 125 MCG capsule Take 1 capsule by mouth 2 (Two) Times a Day. (Patient not taking: Reported on 7/6/2021) 180 capsule 3 Unknown at Unknown time       Current Meds  Scheduled Meds:allopurinol, 100 mg, Oral, Daily  apixaban, 5 mg, Oral, Q12H  Budesonide, 9 mg, Oral, Daily  digoxin, 125 mcg, Oral, Daily  metoprolol tartrate, 50 mg, Oral, BID  pantoprazole, 40 mg, Oral, BID AC      Continuous Infusions:sodium chloride, 100 mL/hr      PRN Meds:.ipratropium-albuterol  •  [COMPLETED] Insert peripheral IV **AND** sodium chloride    Allergies as of 07/05/2021 - Reviewed 07/05/2021   Allergen Reaction Noted   • Amiodarone Unknown (See Comments) 10/22/2018   • Diltiazem Arrhythmia 09/18/2017   • Doxycycline Rash 04/26/2018   • Indomethacin Rash 10/22/2018       Social History     Socioeconomic History   • Marital status:      Spouse name:  "Not on file   • Number of children: Not on file   • Years of education: Not on file   • Highest education level: Not on file   Tobacco Use   • Smoking status: Former Smoker     Types: Cigars     Quit date: 10/28/1994     Years since quittin.7   • Smokeless tobacco: Never Used   • Tobacco comment: Quit 25 years ago   Substance and Sexual Activity   • Alcohol use: No     Comment: daily caffiene - 1/2 cup of coffee   • Drug use: No   • Sexual activity: Defer       Family History   Problem Relation Age of Onset   • Stroke Mother    • Heart disease Mother    • Hypertension Mother    • Diabetes Mother    • Heart disease Sister         Heart Valve Replacement   • Ovarian cancer Sister    • Rheumatic fever Sister    • Diabetes Sister    • Stroke Father        Review of Systems   Constitutional: Positive for malaise/fatigue. Negative for chills and fever.   HENT: Negative for hoarse voice and sore throat.    Eyes: Negative for double vision and photophobia.   Cardiovascular: Negative for chest pain, leg swelling, near-syncope, orthopnea, palpitations, paroxysmal nocturnal dyspnea and syncope.   Respiratory: Negative for cough and wheezing.    Skin: Negative for poor wound healing and rash.   Musculoskeletal: Negative for arthritis and joint swelling.   Gastrointestinal: Negative for bloating, abdominal pain, hematemesis and hematochezia.   Neurological: Negative for dizziness and focal weakness.   Psychiatric/Behavioral: Negative for depression and suicidal ideas.            Objective:   Temp:  [98 °F (36.7 °C)-98.4 °F (36.9 °C)] 98 °F (36.7 °C)  Heart Rate:  [] 123  Resp:  [16-18] 16  BP: (125-143)/() 136/87  Body mass index is 20.67 kg/m².  Flowsheet Rows      First Filed Value   Admission Height  175.3 cm (69\") Documented at 2021 2200   Admission Weight  --        Vitals:    21 0654   BP:    Pulse:    Resp:    Temp: 98 °F (36.7 °C)   SpO2:        Vitals reviewed.   Constitutional:       " Appearance: Healthy appearance. Not in distress.   Neck:      Vascular: No JVR. JVD normal.   Pulmonary:      Effort: Pulmonary effort is normal.      Breath sounds: Normal breath sounds. No wheezing. No rhonchi. No rales.   Chest:      Chest wall: Not tender to palpatation.   Cardiovascular:      PMI at left midclavicular line. Tachycardia present. Irregularly irregular rhythm. Normal S1. Normal S2.      Murmurs: There is no murmur.      No gallop. No click. No rub.   Pulses:     Intact distal pulses.   Edema:     Peripheral edema absent.   Abdominal:      General: Bowel sounds are normal.      Palpations: Abdomen is soft.      Tenderness: There is no abdominal tenderness.   Musculoskeletal: Normal range of motion.         General: No tenderness. Skin:     General: Skin is warm and dry.   Neurological:      General: No focal deficit present.      Mental Status: Alert and oriented to person, place and time.                 Lab Review:      Results from last 7 days   Lab Units 07/06/21  0617   SODIUM mmol/L 139   POTASSIUM mmol/L 4.7   CHLORIDE mmol/L 107   CO2 mmol/L 22.2   BUN mg/dL 22   CREATININE mg/dL 1.19   CALCIUM mg/dL 8.7   GLUCOSE mg/dL 84     Results from last 7 days   Lab Units 07/05/21  2154   TROPONIN T ng/mL <0.010     @LABRCNTbnp@  Results from last 7 days   Lab Units 07/06/21  0617 07/05/21  2154   WBC 10*3/mm3 11.04* 10.71   HEMOGLOBIN g/dL 11.9* 11.7*   HEMATOCRIT % 36.3* 37.1*   PLATELETS 10*3/mm3 229 246     Results from last 7 days   Lab Units 07/06/21  0617   INR  1.35*     Results from last 7 days   Lab Units 07/06/21  0617   MAGNESIUM mg/dL 1.8     @LABRCNTIP(chol,trig,hdl,ldl)    Admission EKG 07/05/2021:      Previous EKG 06/22/2021:      I personally viewed and interpreted the patient's EKG/Telemetry data  )  Patient Active Problem List   Diagnosis   • Ataxia   • TIA (transient ischemic attack)   • Atrial fibrillation, paroxysmal   • Hyperlipidemia   • Gout   • Crohn's disease (CMS/HCC)    • Atrial fibrillation with RVR (CMS/HCC)   • Acute cholecystitis   • Obstructive jaundice   • Acute lower GI bleeding   • Aortic stenosis   • Pulmonary fibrosis (CMS/HCC)   • Anticoagulant long-term use   • Febrile illness, acute   • Atrial fibrillation with rapid ventricular response (CMS/HCC)     Assessment and Plan:    1. Atrial fibrillation with RVR -patient remains in atrial fibrillation and will get his dose of dofetilide this morning.  Continue beta-blocker and digoxin for now.  Continue apixaban.  We will ask electrophysiology to evaluate the patient for any recommendations of further optimization of medical therapy versus interventional options.  Patient and family remain reticent to intervention at this time.  I am concerned that we have limited options but will discuss with EP after evaluation.  2. CKD -creatinine at baseline.  Electrolytes within normal range.  Euvolemic.  3. History of pulmonary fibrosis -recent pneumonia with full course of antibiotics.  Lungs sound clear today on clinical exam.  Continue to monitor clinical progress for further treatment recommendations.  4. Aortic stenosis -moderate by echocardiogram last year.  No indication for repeat echocardiogram at this time as it will not add to current management.  5. History of Crohn's disease  6. Prediabetes    Nixon Brown MD  07/06/21  07:33 EDT.  Time spent in reviewing chart, discussion and examination:

## 2021-07-06 NOTE — PLAN OF CARE
Goal Outcome Evaluation:  Pt VSS, converted back to NSR this afternoon after receiving Metoprolol, Digoxin and Tikosyn, on room air, BP stable, no complaints at this time, EP consult with discussions of ablation and pacemaker placement, outpt bronch with Dr. DENIA Oconnor canceled for tomorrow, will evaluate at a later time, wife at bedside

## 2021-07-06 NOTE — ED PROVIDER NOTES
EMERGENCY DEPARTMENT ENCOUNTER    Room Number:  24/24  Date of encounter:  7/6/2021  PCP: Lubna Lobo MD  Historian: Patient and spouse    Patient was placed in face mask during triage process. Patient was wearing facemask when I entered the room and throughout our encounter. I wore full protective equipment throughout this patient encounter including a face mask, eye protection, and gloves. Hand hygiene was performed before donning protective equipment and again following doffing of PPE after leaving the room.    HPI:  Chief Complaint: Waxing and waning tachycardia  A complete HPI/ROS/PMH/PSH/SH/FH are unobtainable due to: N/A   Context: Asia Ly is a 88 y.o. male with a history of paroxysmal atrial fibrillation was recently admitted for A. fib with RVR and syncopal episode who presents to the ED after his wife noted that he was eating less yesterday and today and had intermittently elevated heart rate.  The patient denies any change in his chronic dyspnea and cough.  The patient denies any chest pain, lightheadedness/dizziness, or near syncope.  Patient notes no abdominal pain, black or bloody stools, dysuria or hematuria, lower extremity swelling or tenderness.    Cardiology: Brandon      MEDICAL HISTORY REVIEW  Date of admission 6/22/2021  Date of discharge 6/24/2021     Final diagnosis  Chronic atrial fibrillation with rapid ventricular rate  Hypertension  Hyperlipidemia  Aortic stenosis  History of Crohn's disease  Pulmonary hypertension  Gastroesophageal reflux disease    PAST MEDICAL HISTORY  Active Ambulatory Problems     Diagnosis Date Noted   • Ataxia 08/19/2016   • TIA (transient ischemic attack) 08/19/2016   • Atrial fibrillation, paroxysmal 08/19/2016   • Hyperlipidemia 08/19/2016   • Gout 08/19/2016   • Crohn's disease (CMS/HCC) 08/19/2016   • Atrial fibrillation with RVR (CMS/[facility]) 10/27/2018   • Acute cholecystitis 08/26/2020   • Obstructive jaundice 08/26/2020   • Acute lower GI bleeding  11/17/2020   • Aortic stenosis    • Pulmonary fibrosis (CMS/[facility])    • Anticoagulant long-term use    • Febrile illness, acute 12/07/2020   • Atrial fibrillation with rapid ventricular response (CMS/[facility]) 06/22/2021     Resolved Ambulatory Problems     Diagnosis Date Noted   • Hypoxia 04/29/2018     Past Medical History:   Diagnosis Date   • Aspiration pneumonia (CMS/[facility])    • Bronchiectasis (CMS/[facility])    • CKD (chronic kidney disease)    • COPD (chronic obstructive pulmonary disease) (CMS/[facility])    • Dizziness    • Elevated cholesterol    • Gastric ulcer    • Generalized weakness    • Hard of hearing    • Hypertension    • Leg swelling    • Lower extremity edema    • Mild anemia    • Near syncope    • Nonrheumatic aortic valve stenosis    • PAF (paroxysmal atrial fibrillation) (CMS/[facility])    • Palpitations    • Peptic ulcer    • Pneumonia of left lung due to infectious organism    • Stroke (CMS/[facility])          PAST SURGICAL HISTORY  Past Surgical History:   Procedure Laterality Date   • BRONCHOSCOPY N/A 10/22/2018    Procedure: BRONCHOSCOPY WITH BRONCHALVEOLAR LAVAGE;  Surgeon: Chidi Oconnor MD;  Location: [facility] ENDOSCOPY;  Service: Pulmonary   • CATARACT EXTRACTION Bilateral    • COLONOSCOPY     • COLONOSCOPY N/A 3/9/2018    Procedure: COLONOSCOPY to terminal ileum with bx;  Surgeon: Aron Cabrera MD;  Location: [facility] ENDOSCOPY;  Service:    • COLONOSCOPY N/A 11/19/2020    Procedure: COLONOSCOPY to cecum:  apc to cecum angiodysplagia,;  Surgeon: Aron Cabrera MD;  Location: [facility] ENDOSCOPY;  Service: Gastroenterology;  Laterality: N/A;  GI bleed  post:  diverticulosis, angiodysplagia   • CYSTECTOMY      back and shoulder area   • ENDOSCOPY N/A 3/9/2018    Procedure: ESOPHAGOGASTRODUODENOSCOPY with bx;  Surgeon: Aron Cabrera MD;  Location: [facility] ENDOSCOPY;  Service:    • ENDOSCOPY N/A 11/19/2020    Procedure: ESOPHAGOGASTRODUODENOSCOPY;  Surgeon: Aron Cabrera MD;  Location: [facility] ENDOSCOPY;  Service:  "Gastroenterology;  Laterality: N/A;  GI bleed  post:  HH.   • EYE SURGERY Left     detached retina   • EYE SURGERY Right     \"repaired a hole in the eye\"   • TESTICLE SURGERY      benign tumor removed.         FAMILY HISTORY  Family History   Problem Relation Age of Onset   • Stroke Mother    • Heart disease Mother    • Hypertension Mother    • Diabetes Mother    • Heart disease Sister         Heart Valve Replacement   • Ovarian cancer Sister    • Rheumatic fever Sister    • Diabetes Sister    • Stroke Father          SOCIAL HISTORY  Social History     Socioeconomic History   • Marital status:      Spouse name: Not on file   • Number of children: Not on file   • Years of education: Not on file   • Highest education level: Not on file   Tobacco Use   • Smoking status: Former Smoker     Types: Cigars     Quit date: 10/28/1994     Years since quittin.7   • Smokeless tobacco: Never Used   • Tobacco comment: Quit 25 years ago   Substance and Sexual Activity   • Alcohol use: No     Comment: daily caffiene - 1/2 cup of coffee   • Drug use: No   • Sexual activity: Defer         ALLERGIES  Amiodarone, Diltiazem, Doxycycline, and Indomethacin        REVIEW OF SYSTEMS  Review of Systems     All systems reviewed and negative except for those discussed in HPI.       PHYSICAL EXAM    I have reviewed the triage vital signs and nursing notes.    ED Triage Vitals [21]   Temp Heart Rate Resp BP SpO2   98.2 °F (36.8 °C) 82 18 -- 95 %      Temp src Heart Rate Source Patient Position BP Location FiO2 (%)   Tympanic -- -- -- --       Physical Exam    Physical Exam   Constitutional: No distress.  Pleasant and nontoxic  HENT:  Head: Normocephalic and atraumatic.   Oropharynx: Mucous membranes are moist.   Eyes: No scleral icterus. No conjunctival pallor.  Neck: Painless range of motion noted. Neck supple.   Cardiovascular: Irregularly irregular and intermittently tachycardic and intact distal " pulses.  Pulmonary/Chest: No respiratory distress.  Diffuse fine rhonchi.   Abdominal: Soft. There is no tenderness. There is no rebound and no guarding.   Musculoskeletal: Moves all extremities equally. There is no pedal edema or calf tenderness.   Neurological: Alert.  Baseline strength and sensation noted.   Skin: Skin is pink, warm, and dry. No pallor.   Psychiatric: Mood and affect normal.   Nursing note and vitals reviewed.    LAB RESULTS  Recent Results (from the past 24 hour(s))   ECG 12 Lead    Collection Time: 07/05/21  9:35 PM   Result Value Ref Range    QT Interval 330 ms   Digoxin Level    Collection Time: 07/05/21  9:54 PM    Specimen: Blood   Result Value Ref Range    Digoxin 0.40 (L) 0.60 - 1.20 ng/mL   Basic Metabolic Panel    Collection Time: 07/05/21  9:54 PM    Specimen: Blood   Result Value Ref Range    Glucose 247 (H) 65 - 99 mg/dL    BUN 25 (H) 8 - 23 mg/dL    Creatinine 1.38 (H) 0.76 - 1.27 mg/dL    Sodium 134 (L) 136 - 145 mmol/L    Potassium 4.7 3.5 - 5.2 mmol/L    Chloride 102 98 - 107 mmol/L    CO2 20.8 (L) 22.0 - 29.0 mmol/L    Calcium 8.8 8.6 - 10.5 mg/dL    eGFR Non African Amer 49 (L) >60 mL/min/1.73    BUN/Creatinine Ratio 18.1 7.0 - 25.0    Anion Gap 11.2 5.0 - 15.0 mmol/L   Magnesium    Collection Time: 07/05/21  9:54 PM    Specimen: Blood   Result Value Ref Range    Magnesium 1.9 1.6 - 2.4 mg/dL   Troponin    Collection Time: 07/05/21  9:54 PM    Specimen: Blood   Result Value Ref Range    Troponin T <0.010 0.000 - 0.030 ng/mL   CBC Auto Differential    Collection Time: 07/05/21  9:54 PM    Specimen: Blood   Result Value Ref Range    WBC 10.71 3.40 - 10.80 10*3/mm3    RBC 4.24 4.14 - 5.80 10*6/mm3    Hemoglobin 11.7 (L) 13.0 - 17.7 g/dL    Hematocrit 37.1 (L) 37.5 - 51.0 %    MCV 87.5 79.0 - 97.0 fL    MCH 27.6 26.6 - 33.0 pg    MCHC 31.5 31.5 - 35.7 g/dL    RDW 16.8 (H) 12.3 - 15.4 %    RDW-SD 53.2 37.0 - 54.0 fl    MPV 10.9 6.0 - 12.0 fL    Platelets 246 140 - 450 10*3/mm3     Neutrophil % 82.5 (H) 42.7 - 76.0 %    Lymphocyte % 9.5 (L) 19.6 - 45.3 %    Monocyte % 6.1 5.0 - 12.0 %    Eosinophil % 0.6 0.3 - 6.2 %    Basophil % 0.4 0.0 - 1.5 %    Immature Grans % 0.9 (H) 0.0 - 0.5 %    Neutrophils, Absolute 8.84 (H) 1.70 - 7.00 10*3/mm3    Lymphocytes, Absolute 1.02 0.70 - 3.10 10*3/mm3    Monocytes, Absolute 0.65 0.10 - 0.90 10*3/mm3    Eosinophils, Absolute 0.06 0.00 - 0.40 10*3/mm3    Basophils, Absolute 0.04 0.00 - 0.20 10*3/mm3    Immature Grans, Absolute 0.10 (H) 0.00 - 0.05 10*3/mm3    nRBC 0.0 0.0 - 0.2 /100 WBC   Hemoglobin A1c    Collection Time: 07/05/21  9:54 PM    Specimen: Blood   Result Value Ref Range    Hemoglobin A1C 6.24 (H) 4.80 - 5.60 %   POC Glucose Once    Collection Time: 07/05/21 11:31 PM    Specimen: Blood   Result Value Ref Range    Glucose 183 (H) 70 - 130 mg/dL   Urinalysis With Microscopic If Indicated (No Culture) - Urine, Clean Catch    Collection Time: 07/05/21 11:39 PM    Specimen: Urine, Clean Catch   Result Value Ref Range    Color, UA Yellow Yellow, Straw    Appearance, UA Clear Clear    pH, UA 5.5 5.0 - 8.0    Specific Gravity, UA 1.022 1.005 - 1.030    Glucose, UA Negative Negative    Ketones, UA Trace (A) Negative    Bilirubin, UA Negative Negative    Blood, UA Negative Negative    Protein, UA 30 mg/dL (1+) (A) Negative    Leuk Esterase, UA Small (1+) (A) Negative    Nitrite, UA Negative Negative    Urobilinogen, UA 0.2 E.U./dL 0.2 - 1.0 E.U./dL   Urinalysis, Microscopic Only - Urine, Clean Catch    Collection Time: 07/05/21 11:39 PM    Specimen: Urine, Clean Catch   Result Value Ref Range    RBC, UA 0-2 None Seen, 0-2 /HPF    WBC, UA 3-5 (A) None Seen, 0-2 /HPF    Bacteria, UA None Seen None Seen /HPF    Squamous Epithelial Cells, UA 0-2 None Seen, 0-2 /HPF    Hyaline Casts, UA 3-6 None Seen /LPF    Methodology Automated Microscopy        Ordered the above labs and independently reviewed the results.        RADIOLOGY  XR Chest 1 View    Result Date:  7/5/2021  Patient: ALEX BURK  Time Out: 23:43 Exam(s): FILM CXR 1 VIEW EXAM:   XR Chest, 1 View CLINICAL HISTORY:    Reason for exam: palpitations. TECHNIQUE:   Frontal view of the chest. COMPARISON:   No relevant prior studies available. FINDINGS:   Lungs:  There are streaky densities in the right lung base just above the diaphragm partially blunting the costophrenic angle.  Pulmonary vasculature is congested but sharply defined.   Pleural space:  No pneumothorax is seen for   Heart:  The cardiac silhouette is mildly enlarged.   Mediastinum:  Unremarkable.   Bones joints:  Unremarkable.   Upper abdomen:  No pneumoperitoneum under the diaphragm. IMPRESSION:     Mild right basilar infiltrate, atelectasis, or scarring. Mild cardiomegaly without evidence of CHF.    Electronically signed by Pavan Askew MD on 07-05-21 at 2343      I ordered the above noted radiological studies. Reviewed by me and discussed with radiologist.  See dictation for official radiology interpretation.      PROCEDURES    Procedures        MEDICATIONS GIVEN IN ER    Medications   sodium chloride 0.9 % flush 10 mL (has no administration in time range)   sodium chloride 0.9 % bolus 250 mL (has no administration in time range)   sodium chloride 0.9 % infusion (has no administration in time range)   metoprolol tartrate (LOPRESSOR) injection 2.5 mg (2.5 mg Intravenous Given 7/5/21 2201)   digoxin (LANOXIN) injection 125 mcg (125 mcg Intravenous Given 7/5/21 2339)         PROGRESS, DATA ANALYSIS, CONSULTS, AND MEDICAL DECISION MAKING    My differential diagnosis includes but is not limited to generalized weakness, CVA, TIA, Bell's palsy, acute MI, GI bleed, urinary tract infection, systemic infections including sepsis, alcohol abuse, drug abuse including prescription and street drug.      All labs have been independently reviewed by me.  All radiology studies have been reviewed by me and discussed with radiologist dictating the report.   EKG's  independently viewed and interpreted by me.  Discussion below represents my analysis of pertinent findings related to patient's condition, differential diagnosis, treatment plan and final disposition.      ED Course as of Jul 06 0119   Mon Jul 05, 2021 2155 EKG           EKG time: 2135  Rhythm/Rate: A. fib/flutter with ventricular rate   P waves and TN: NA  QRS, axis: Narrow  ST and T waves: No STEMI; QTC within normal notes    Interpreted Contemporaneously by me, independently viewed  Comparison: 6/23/2021      [RS]   2328 Digoxin(!): 0.40 [RS]   2329 No prior hx of DM. Will check POC glucose.   Glucose(!): 247 [RS]   Tue Jul 06, 2021   0106 HR improved, pt resting. I updated family c abnl labs. No hx of DM. Recommend obs admit for further eval. All agreeable.    [RS]   0118 CONSULT        Provider: Dr. Rubio GAMBINO    Discussion: reviewed pt hx, ED presentation and eval, as well as, response to therapies. Agreeable to accept pt for OBS admit c tele.    Agreeable c treatment and planned disposition.            [RS]      ED Course User Index  [RS] Jeromy Petty MD       AS OF 01:19 EDT VITALS:    BP - 135/74  HR - 101  TEMP - 98.2 °F (36.8 °C) (Tympanic)  O2 SATS - 96%        DIAGNOSIS  Final diagnoses:   Atrial fibrillation with RVR (CMS/HCC)   Hyperglycemia   Acute on chronic renal insufficiency         DISPOSITION  ADMISSION    Discussed treatment plan and reason for admission with pt/family and admitting physician.  Pt/family voiced understanding of the plan for admission for further testing/treatment as needed.          Jeromy Petty MD  07/06/21 0120

## 2021-07-06 NOTE — H&P
History and physical    Primary care physician      Chief complaint  Palpitation    History of present illness  88-year-old [place] male who is well-known to our service with history of chronic atrial fibrillation hypertension hyperlipidemia aortic stenosis Crohn's disease pulmonary hypertension and gastroesophageal reflux disease brought to the emergency room by the wife when she noticed increased cloudiness eating less and occasional complaint of palpitation with elevated heart rate which she checked.  Patient has no chest pain shortness of breath.  Patient also denies any dizziness lightheadedness or near syncope.  Patient did not miss any medication and taking religiously.  Patient work-up in ER revealed rapid ventricular rate with history of chronic atrial fibrillation admit for management.  At the time of interview he is back to baseline has no specific complaints.     PAST MEDICAL HISTORY    • Aspiration pneumonia (CMS/HCC)     • Bronchiectasis (CMS/HCC)     • CKD (chronic kidney disease)     • COPD (chronic obstructive pulmonary disease) (CMS/HCC)     • Dizziness     • Elevated cholesterol     • Gastric ulcer     • Generalized weakness     • Hard of hearing     • Hypertension     • Leg swelling     • Lower extremity edema     • Mild anemia     • Near syncope     • Nonrheumatic aortic valve stenosis     • PAF (paroxysmal atrial fibrillation) (CMS/HCC)     • Palpitations     • Peptic ulcer     • Pneumonia of left lung due to infectious organism     • Stroke (CMS/HCC)        PAST SURGICAL HISTORY              Procedure Laterality Date   • BRONCHOSCOPY N/A 10/22/2018     Procedure: BRONCHOSCOPY WITH BRONCHALVEOLAR LAVAGE;  Surgeon: Chidi Oconnor MD;  Location: [facility];  Service: Pulmonary   • CATARACT EXTRACTION Bilateral     • COLONOSCOPY       • COLONOSCOPY N/A 3/9/2018     Procedure: COLONOSCOPY to terminal ileum with bx;  Surgeon: Aron Cabrera MD;  Location: [facility];   "Service:    • COLONOSCOPY N/A 2020     Procedure: COLONOSCOPY to cecum:  apc to cecum angiodysplagia,;  Surgeon: Aron Cabrera MD;  Location: [facility] ENDOSCOPY;  Service: Gastroenterology;  Laterality: N/A;  GI bleed  post:  diverticulosis, angiodysplagia   • CYSTECTOMY         back and shoulder area   • ENDOSCOPY N/A 3/9/2018     Procedure: ESOPHAGOGASTRODUODENOSCOPY with bx;  Surgeon: Aron Cabrera MD;  Location: [facility] ENDOSCOPY;  Service:    • ENDOSCOPY N/A 2020     Procedure: ESOPHAGOGASTRODUODENOSCOPY;  Surgeon: Aron Cabrera MD;  Location: [facility] ENDOSCOPY;  Service: Gastroenterology;  Laterality: N/A;  GI bleed  post:  HH.   • EYE SURGERY Left       detached retina   • EYE SURGERY Right       \"repaired a hole in the eye\"   • TESTICLE SURGERY         benign tumor removed.         FAMILY HISTORY           Problem Relation Age of Onset   • Stroke Mother     • Heart disease Mother     • Hypertension Mother     • Diabetes Mother     • Heart disease Sister           Heart Valve Replacement   • Ovarian cancer Sister     • Rheumatic fever Sister     • Diabetes Sister     • Stroke Father        SOCIAL HISTORY                 Socioeconomic History   • Marital status:        Spouse name: Not on file   • Number of children: Not on file   • Years of education: Not on file   • Highest education level: Not on file   Tobacco Use   • Smoking status: Former Smoker       Types: Cigars       Quit date: 10/28/1994       Years since quittin.7   • Smokeless tobacco: Never Used   • Tobacco comment: Quit 25 years ago   Substance and Sexual Activity   • Alcohol use: No       Comment: daily caffiene - 1/2 cup of coffee   • Drug use: No   • Sexual activity: Defer         ALLERGIES  Amiodarone, Diltiazem, Doxycycline, and Indomethacin  Home medications reviewed     REVIEW OF SYSTEMS  All systems reviewed and negative except for those discussed in HPI.      PHYSICAL EXAM   Blood pressure 113/72, pulse 101, " "temperature 98.1 °F (36.7 °C), temperature source Oral, resp. rate 16, height 175.3 cm (69\"), SpO2 96 %.    Constitutional: No distress.  Pleasant and nontoxic  Head: Normocephalic and atraumatic.   Oropharynx: Mucous membranes are moist.   Eyes: No scleral icterus. No conjunctival pallor.  Neck: Painless range of motion noted. Neck supple.   Cardiovascular: Irregularly irregular and intermittently tachycardic and intact distal pulses.  Pulmonary/Chest: No respiratory distress.  Diffuse fine rhonchi.   Abdominal: Soft. There is no tenderness and no guarding.  Bowel sounds positive  Musculoskeletal: Moves all extremities equally. There is no pedal edema or calf tenderness.   Neurological: Alert.  Baseline strength and sensation noted.   Skin: Skin is pink, warm, and dry. No pallor.   Psychiatric: Mood and affect normal.     LAB RESULTS  Lab Results (last 24 hours)     Procedure Component Value Units Date/Time    Basic Metabolic Panel [[id number]]  (Abnormal) Collected: 07/06/21 0617    Specimen: Blood Updated: 07/06/21 0707     Glucose 84 mg/dL      BUN 22 mg/dL      Creatinine 1.19 mg/dL      Sodium 139 mmol/L      Potassium 4.7 mmol/L      Chloride 107 mmol/L      CO2 22.2 mmol/L      Calcium 8.7 mg/dL      eGFR Non African Amer 58 mL/min/1.73      BUN/Creatinine Ratio 18.5     Anion Gap 9.8 mmol/L     Narrative:      GFR Normal >60  Chronic Kidney Disease <60  Kidney Failure <15      Magnesium [[id number]]  (Normal) Collected: 07/06/21 0617    Specimen: Blood Updated: 07/06/21 0707     Magnesium 1.8 mg/dL     Protime-INR [[id number]]  (Abnormal) Collected: 07/06/21 0617    Specimen: Blood from Arm, Left Updated: 07/06/21 0653     Protime 16.4 Seconds      INR 1.35    CBC & Differential [[id number]]  (Abnormal) Collected: 07/06/21 0617    Specimen: Blood Updated: 07/06/21 0646    Narrative:      The following orders were created for panel order CBC & Differential.  Procedure                               Abnormality    "      Status                     ---------                               -----------         ------                     CBC Auto Differential[[id number]]        Abnormal            Final result                 Please view results for these tests on the individual orders.    CBC Auto Differential [[id number]]  (Abnormal) Collected: 07/06/21 0617    Specimen: Blood Updated: 07/06/21 0646     WBC 11.04 10*3/mm3      RBC 4.23 10*6/mm3      Hemoglobin 11.9 g/dL      Hematocrit 36.3 %      MCV 85.8 fL      MCH 28.1 pg      MCHC 32.8 g/dL      RDW 17.0 %      RDW-SD 52.9 fl      MPV 10.5 fL      Platelets 229 10*3/mm3      Neutrophil % 77.2 %      Lymphocyte % 12.0 %      Monocyte % 7.7 %      Eosinophil % 1.7 %      Basophil % 0.6 %      Immature Grans % 0.8 %      Neutrophils, Absolute 8.52 10*3/mm3      Lymphocytes, Absolute 1.32 10*3/mm3      Monocytes, Absolute 0.85 10*3/mm3      Eosinophils, Absolute 0.19 10*3/mm3      Basophils, Absolute 0.07 10*3/mm3      Immature Grans, Absolute 0.09 10*3/mm3      nRBC 0.0 /100 WBC     COVID PRE-OP / PRE-PROCEDURE SCREENING ORDER (NO ISOLATION) - Swab, Nasopharynx [[id number]]  (Normal) Collected: 07/06/21 0111    Specimen: Swab from Nasopharynx Updated: 07/06/21 0325    Narrative:      The following orders were created for panel order COVID PRE-OP / PRE-PROCEDURE SCREENING ORDER (NO ISOLATION) - Swab, Nasopharynx.  Procedure                               Abnormality         Status                     ---------                               -----------         ------                     COVID-19,[facility]-HOUSE...[[id number]]  Normal              Final result                 Please view results for these tests on the individual orders.    COVID-19,[facility] IN-HOUSE CEPHEID/VERITO NP SWAB IN TRANSPORT MEDIA 8-12 HR TAT - Swab, Nasopharynx [[id number]]  (Normal) Collected: 07/06/21 0111    Specimen: Swab from Nasopharynx Updated: 07/06/21 0325     COVID19 Not Detected    Narrative:      Fact  sheet for providers: https://www.fda.gov/media/595466/download     Fact sheet for patients: https://www.fda.gov/media/817962/download    POC Glucose Once [[id number]]  (Normal) Collected: 07/06/21 0214    Specimen: Blood Updated: 07/06/21 0215     Glucose 100 mg/dL      Comment: Meter: [id number] : [id number] Lokesh Flores RN       Hemoglobin A1c [[id number]]  (Abnormal) Collected: 07/05/21 2154    Specimen: Blood Updated: 07/06/21 0019     Hemoglobin A1C 6.24 %     Narrative:      Hemoglobin A1C Ranges:    Increased Risk for Diabetes  5.7% to 6.4%  Diabetes                     >= 6.5%  Diabetic Goal                < 7.0%    Urinalysis With Microscopic If Indicated (No Culture) - Urine, Clean Catch [[id number]]  (Abnormal) Collected: 07/05/21 2339    Specimen: Urine, Clean Catch Updated: 07/05/21 2355     Color, UA Yellow     Appearance, UA Clear     pH, UA 5.5     Specific Gravity, UA 1.022     Glucose, UA Negative     Ketones, UA Trace     Bilirubin, UA Negative     Blood, UA Negative     Protein, UA 30 mg/dL (1+)     Leuk Esterase, UA Small (1+)     Nitrite, UA Negative     Urobilinogen, UA 0.2 E.U./dL    Urinalysis, Microscopic Only - Urine, Clean Catch [[id number]]  (Abnormal) Collected: 07/05/21 2339    Specimen: Urine, Clean Catch Updated: 07/05/21 2355     RBC, UA 0-2 /HPF      WBC, UA 3-5 /HPF      Bacteria, UA None Seen /HPF      Squamous Epithelial Cells, UA 0-2 /HPF      Hyaline Casts, UA 3-6 /LPF      Methodology Automated Microscopy    POC Glucose Once [[id number]]  (Abnormal) Collected: 07/05/21 2331    Specimen: Blood Updated: 07/05/21 2337     Glucose 183 mg/dL      Comment: Meter: [id number] : [id number] Chencho HERNADEZ       Digoxin Level [[id number]]  (Abnormal) Collected: 07/05/21 2154    Specimen: Blood Updated: 07/05/21 2233     Digoxin 0.40 ng/mL     Basic Metabolic Panel [[id number]]  (Abnormal) Collected: 07/05/21 2154    Specimen: Blood Updated: 07/05/21 2231     Glucose 247 mg/dL       BUN 25 mg/dL      Creatinine 1.38 mg/dL      Sodium 134 mmol/L      Potassium 4.7 mmol/L      Chloride 102 mmol/L      CO2 20.8 mmol/L      Calcium 8.8 mg/dL      eGFR Non African Amer 49 mL/min/1.73      BUN/Creatinine Ratio 18.1     Anion Gap 11.2 mmol/L     Narrative:      GFR Normal >60  Chronic Kidney Disease <60  Kidney Failure <15      Troponin [[id number]]  (Normal) Collected: 07/05/21 2154    Specimen: Blood Updated: 07/05/21 2231     Troponin T <0.010 ng/mL     Narrative:      Troponin T Reference Range:  <= 0.03 ng/mL-   Negative for AMI  >0.03 ng/mL-     Abnormal for myocardial necrosis.  Clinicians would have to utilize clinical acumen, EKG, Troponin and serial changes to determine if it is an Acute Myocardial Infarction or myocardial injury due to an underlying chronic condition.       Results may be falsely decreased if patient taking Biotin.      Magnesium [[id number]]  (Normal) Collected: 07/05/21 2154    Specimen: Blood Updated: 07/05/21 2231     Magnesium 1.9 mg/dL     CBC & Differential [[id number]]  (Abnormal) Collected: 07/05/21 2154    Specimen: Blood Updated: 07/05/21 2213    Narrative:      The following orders were created for panel order CBC & Differential.  Procedure                               Abnormality         Status                     ---------                               -----------         ------                     CBC Auto Differential[[id number]]        Abnormal            Final result                 Please view results for these tests on the individual orders.    CBC Auto Differential [[id number]]  (Abnormal) Collected: 07/05/21 2154    Specimen: Blood Updated: 07/05/21 2213     WBC 10.71 10*3/mm3      RBC 4.24 10*6/mm3      Hemoglobin 11.7 g/dL      Hematocrit 37.1 %      MCV 87.5 fL      MCH 27.6 pg      MCHC 31.5 g/dL      RDW 16.8 %      RDW-SD 53.2 fl      MPV 10.9 fL      Platelets 246 10*3/mm3      Neutrophil % 82.5 %      Lymphocyte % 9.5 %      Monocyte % 6.1 %       Eosinophil % 0.6 %      Basophil % 0.4 %      Immature Grans % 0.9 %      Neutrophils, Absolute 8.84 10*3/mm3      Lymphocytes, Absolute 1.02 10*3/mm3      Monocytes, Absolute 0.65 10*3/mm3      Eosinophils, Absolute 0.06 10*3/mm3      Basophils, Absolute 0.04 10*3/mm3      Immature Grans, Absolute 0.10 10*3/mm3      nRBC 0.0 /100 WBC         Imaging Results (Last 24 Hours)     Procedure Component Value Units Date/Time    XR Chest 1 View [[id number]] Collected: 07/05/21 2343     Updated: 07/05/21 2343    Narrative:        Patient: ALEX BURK  Time Out: 23:43  Exam(s): FILM CXR 1 VIEW     EXAM:    XR Chest, 1 View    CLINICAL HISTORY:     Reason for exam: palpitations.    TECHNIQUE:    Frontal view of the chest.    COMPARISON:    No relevant prior studies available.    FINDINGS:    Lungs:  There are streaky densities in the right lung base just above   the diaphragm partially blunting the costophrenic angle.  Pulmonary   vasculature is congested but sharply defined.    Pleural space:  No pneumothorax is seen for    Heart:  The cardiac silhouette is mildly enlarged.    Mediastinum:  Unremarkable.    Bones joints:  Unremarkable.    Upper abdomen:  No pneumoperitoneum under the diaphragm.    IMPRESSION:       Mild right basilar infiltrate, atelectasis, or scarring.    Mild cardiomegaly without evidence of CHF.    Impression:          Electronically signed by Pavan Askew MD on 07-05-21 at 2343           ECG 12 Lead  Component   Ref Range & Units 7/6/21 1123 7/5/21 2135   QT Interval   ms 314 P  330 P         HEART RATE= 101  bpm  RR Interval= 596  ms  MT Interval=   ms  P Horizontal Axis=   deg  P Front Axis=   deg  QRSD Interval= 76  ms  QT Interval= 314  ms  QRS Axis= 34  deg  T Wave Axis= 101  deg  - ABNORMAL ECG -  Atrial fibrillation             Current Facility-Administered Medications:   •  allopurinol (ZYLOPRIM) tablet 100 mg, 100 mg, Oral, Daily, Calvin Bergeron MD, 100 mg at 07/06/21 0852  •  apixaban  (ELIQUIS) tablet 5 mg, 5 mg, Oral, Q12H, Shelbie Bergeron MD, 5 mg at 07/06/21 0852  •  Budesonide (ENTOCORT EC) 24 hr capsule 9 mg, 9 mg, Oral, Daily, Shelbie Bergeron MD, 9 mg at 07/06/21 0852  •  digoxin (LANOXIN) tablet 125 mcg, 125 mcg, Oral, Daily, Shelbie Bergeron MD  •  dofetilide (TIKOSYN) capsule 125 mcg, 125 mcg, Oral, Q12H, Nixon Brown Jr., MD, 125 mcg at 07/06/21 1155  •  ipratropium-albuterol (DUO-NEB) nebulizer solution 3 mL, 3 mL, Nebulization, Q4H PRN, Shelbie Bergeron MD  •  metoprolol tartrate (LOPRESSOR) tablet 50 mg, 50 mg, Oral, BID, Shelbie Bergeron MD, 50 mg at 07/06/21 0852  •  pantoprazole (PROTONIX) EC tablet 40 mg, 40 mg, Oral, BID AC, Shelbie Bergeron MD, 40 mg at 07/06/21 0851  •  [COMPLETED] Insert peripheral IV, , , Once **AND** sodium chloride 0.9 % flush 10 mL, 10 mL, Intravenous, PRN, Jeromy Petty MD  •  sodium chloride 0.9 % infusion, 100 mL/hr, Intravenous, Continuous, Jeromy Petty MD, Last Rate: 100 mL/hr at 07/06/21 1141, 100 mL/hr at 07/06/21 1141     ASSESSMENT  Chronic atrial fibrillation with rapid ventricular rate  Hypertension  Hyperlipidemia  Aortic stenosis  Pulmonary fibrosis   Pulmonary hypertension  History of Crohn's disease  Gastroesophageal reflux disease    PLAN  Admit  Monitor  Control the heart rate  Cardiology consult  Continue home medications  Stress ulcer DVT prophylaxis  Supportive care  Patient is full code  Discussed with family and nursing staff  Follow closely further recommendation according to hospital course    SHELBIE BERGERON MD

## 2021-07-06 NOTE — PROGRESS NOTES
Discharge Planning Assessment  [facility]     Patient Name: Asia Ly  MRN: [id number]  Today's Date: 7/6/2021    Admit Date: 7/5/2021    Discharge Needs Assessment     Row Name 07/06/21 1302       Living Environment    Lives With  spouse    Current Living Arrangements  home/apartment/condo    Potentially Unsafe Housing Conditions  unable to assess    Primary Care Provided by  self;spouse/significant other    Provides Primary Care For  no one    Family Caregiver if Needed  none    Quality of Family Relationships  unable to assess    Able to Return to Prior Arrangements  yes       Resource/Environmental Concerns    Resource/Environmental Concerns  none    Transportation Concerns  car, none       Transition Planning    Patient/Family Anticipates Transition to  home with family    Patient/Family Anticipated Services at Transition  none    Transportation Anticipated  family or friend will provide       Discharge Needs Assessment    Equipment Currently Used at Home  none    Concerns to be Addressed  discharge planning;denies needs/concerns at this time    Anticipated Changes Related to Illness  none    Equipment Needed After Discharge  none    Provided Post Acute Provider List?  N/A    Provided Post Acute Provider Quality & Resource List?  N/A        Discharge Plan     Row Name 07/06/21 1304       Plan    Plan  Home  Pt declines referrals    Plan Comments  IMM noted.  CCP spoke with patient and his wife Ni, [phone], at bedside.  CCP introduced self and explained role.  Face sheet verified.  Discharge planning discussed.  Pt PCP is Dr. Stefano Lobo.  Patient’s wife is his emergency contact.    Pt lives in a one story house with his wife.  Pt uses a no DME to ambulate. They have a stair lift for the basement stairs.  He is independent with ADL’s.  Pt has a past history of Home Health with [facility].  He has no past history of rehab.  Pt obtains his medications from [facility]  [facility] if needed right away [facility]   Plan is Home. CCP discussed home health; patient declined a referral.  CCP will follow for discharge needs.        Continued Care and Services - Admitted Since 7/5/2021    Coordination has not been started for this encounter.         Demographic Summary    No documentation.       Functional Status    No documentation.       Psychosocial    No documentation.       Abuse/Neglect    No documentation.       Legal    No documentation.       Substance Abuse    No documentation.       Patient Forms    No documentation.           Gracie Mirza RN

## 2021-07-06 NOTE — PLAN OF CARE
Problem: Adult Inpatient Plan of Care  Goal: Absence of Hospital-Acquired Illness or Injury  Intervention: Identify and Manage Fall Risk  Recent Flowsheet Documentation  Taken 7/6/2021 0600 by Nehal Carney RN  Safety Promotion/Fall Prevention: safety round/check completed  Taken 7/6/2021 0400 by Nehal Carney RN  Safety Promotion/Fall Prevention:   activity supervised   safety round/check completed  Taken 7/6/2021 0230 by Nehal Carney RN  Safety Promotion/Fall Prevention: safety round/check completed     Problem: Adult Inpatient Plan of Care  Goal: Absence of Hospital-Acquired Illness or Injury  Intervention: Prevent Skin Injury  Recent Flowsheet Documentation  Taken 7/6/2021 0600 by Nehal Carney RN  Body Position: position changed independently  Taken 7/6/2021 0400 by Nehal Carney RN  Body Position:   turned   position changed independently  Taken 7/6/2021 0230 by Nehal Carney RN  Body Position: position changed independently  Skin Protection:   adhesive use limited   incontinence pads utilized   skin-to-device areas padded   skin-to-skin areas padded   Goal Outcome Evaluation:

## 2021-07-06 NOTE — CONSULTS
Patient Name: Asia Ly  Age/Sex: 88 y.o. male  : 1932  MRN: [id number]    Date of Admission: 2021  Date of Encounter Visit: 21  Encounter Provider: Rashaun Nettles MD  Place of Service: [facility] CARDIOLOGY      Referring Provider: Calvin Bergeron MD  Patient Care Team:  Lubna Lobo MD as PCP - General  Lubna Lobo MD as PCP - Family Medicine    Subjective:       Chief Complaint:   Atrial fibrillation with RVR    History of Present Illness:  Asia Ly is a 88 y.o. male with a history of paroxysmal atrial fibrillation, severe pulmonary fibrosis and bronchiectasis.    Patient has a long history of paroxysmal atrial fibrillation.  He has been treated in the past with amiodarone and sotalol.  I have seen him in the past with similar issues of A. fib with RVR, they almost always result in hospitalization.  He has currently been managed on Tikosyn for the last several years.  He has been hospitalized at least 4-5 times with a similar presentation of A. fib with RVR.  A lot of his care is provided by his wife.  She feels that he gets very fatigued and sluggish when in A. fib.  She notices his past irregular heart rate.    We have previously discussed pacemaker placement and AV node ablation, but he elected to continue with Tikosyn.    She helps him with his medications, and reports he has some difficulty with memory.    He actually converted to sinus rhythm while was here in the room with him.      Past Medical History:  Past Medical History:   Diagnosis Date   • Aortic stenosis    • Aspiration pneumonia (CMS/HCC)    • Atrial fibrillation (CMS/HCC)    • Bronchiectasis (CMS/HCC)    • CKD (chronic kidney disease)    • COPD (chronic obstructive pulmonary disease) (CMS/HCC)    • Crohn's disease (CMS/HCC)    • Dizziness    • Elevated cholesterol    • Gastric ulcer    • Generalized weakness    • Gout    • Hard of hearing    • Hyperlipidemia    • Hypertension    •  "Leg swelling    • Lower extremity edema    • Mild anemia    • Near syncope    • Nonrheumatic aortic valve stenosis    • PAF (paroxysmal atrial fibrillation) (CMS/HCC)    • Palpitations    • Peptic ulcer    • Pneumonia of left lung due to infectious organism    • Pulmonary fibrosis (CMS/HCC)    • Stroke (CMS/HCC)    • TIA (transient ischemic attack)        Past Surgical History:   Procedure Laterality Date   • BRONCHOSCOPY N/A 10/22/2018    Procedure: BRONCHOSCOPY WITH BRONCHALVEOLAR LAVAGE;  Surgeon: Chidi Oconnor MD;  Location: [facility] ENDOSCOPY;  Service: Pulmonary   • CATARACT EXTRACTION Bilateral    • COLONOSCOPY     • COLONOSCOPY N/A 3/9/2018    Procedure: COLONOSCOPY to terminal ileum with bx;  Surgeon: Aron Cabrera MD;  Location: [facility] ENDOSCOPY;  Service:    • COLONOSCOPY N/A 11/19/2020    Procedure: COLONOSCOPY to cecum:  apc to cecum angiodysplagia,;  Surgeon: Aron Cabrera MD;  Location: [facility] ENDOSCOPY;  Service: Gastroenterology;  Laterality: N/A;  GI bleed  post:  diverticulosis, angiodysplagia   • CYSTECTOMY      back and shoulder area   • ENDOSCOPY N/A 3/9/2018    Procedure: ESOPHAGOGASTRODUODENOSCOPY with bx;  Surgeon: Aron Cabrera MD;  Location: [facility] ENDOSCOPY;  Service:    • ENDOSCOPY N/A 11/19/2020    Procedure: ESOPHAGOGASTRODUODENOSCOPY;  Surgeon: Aron Cabrera MD;  Location: [facility] ENDOSCOPY;  Service: Gastroenterology;  Laterality: N/A;  GI bleed  post:  HH.   • EYE SURGERY Left     detached retina   • EYE SURGERY Right     \"repaired a hole in the eye\"   • TESTICLE SURGERY      benign tumor removed.       Home Medications:   Medications Prior to Admission   Medication Sig Dispense Refill Last Dose   • allopurinol (ZYLOPRIM) 100 MG tablet Take 100 mg by mouth Daily.   7/5/2021 at Unknown time   • allopurinol (ZYLOPRIM) 100 MG tablet Take 100 mg by mouth Daily.   7/5/2021 at Unknown time   • apixaban (ELIQUIS) 5 MG tablet tablet Take 1 tablet by mouth Every 12 (Twelve) Hours. " 180 tablet 3 2021 at Unknown time   • atorvastatin (LIPITOR) 10 MG tablet Take one tablet by mouth Mon, Wed and Fri.   2021 at Unknown time   • atorvastatin (LIPITOR) 20 MG tablet Take 10 mg by mouth 3 (Three) Times a Week. Mon, Wed, 2021 at Unknown time   • BUDESONIDE PO Take 3 mg by mouth Daily.   2021 at Unknown time   • digoxin (LANOXIN) 125 MCG tablet Take 1 tablet by mouth Daily. 90 tablet 3 2021 at Unknown time   • IPRATROPIUM-ALBUTEROL IN Inhale Daily.   2021 at Unknown time   • metoprolol tartrate (LOPRESSOR) 50 MG tablet Take 1 tablet by mouth 3 (Three) Times a Day. 270 tablet 3 2021 at Unknown time   • metoprolol tartrate (LOPRESSOR) 50 MG tablet Take 50 mg by mouth 2 (two) times a day.   2021 at Unknown time   • omeprazole (priLOSEC) 40 MG capsule Take 40 mg by mouth 2 (Two) Times a Day.   2021 at Unknown time   • cefdinir (OMNICEF) 300 MG capsule Take 300 mg by mouth.   Unknown at Unknown time   • dofetilide (TIKOSYN) 125 MCG capsule Take 1 capsule by mouth 2 (Two) Times a Day. (Patient not taking: Reported on 2021) 180 capsule 3 Unknown at Unknown time       Allergies:  Allergies   Allergen Reactions   • Amiodarone Unknown (See Comments)     Pulmonary fibrosis   • Diltiazem Arrhythmia   • Doxycycline Rash   • Indomethacin Rash       Past Social History:  Social History     Socioeconomic History   • Marital status:      Spouse name: Not on file   • Number of children: Not on file   • Years of education: Not on file   • Highest education level: Not on file   Tobacco Use   • Smoking status: Former Smoker     Types: Cigars     Quit date: 10/28/1994     Years since quittin.7   • Smokeless tobacco: Never Used   • Tobacco comment: Quit 25 years ago   Substance and Sexual Activity   • Alcohol use: No     Comment: daily caffiene - 1/2 cup of coffee   • Drug use: No   • Sexual activity: Defer        Past Family History:  Family History   Problem  Relation Age of Onset   • Stroke Mother    • Heart disease Mother    • Hypertension Mother    • Diabetes Mother    • Heart disease Sister         Heart Valve Replacement   • Ovarian cancer Sister    • Rheumatic fever Sister    • Diabetes Sister    • Stroke Father        Review of Systems: All systems reviewed. Pertinent positives identified in HPI. All other systems are negative.     Review of Systems   Constitutional: Negative for malaise/fatigue.   HENT: Negative.    Eyes: Negative.    Cardiovascular: Negative for chest pain, dyspnea on exertion, leg swelling and near-syncope.   Respiratory: Negative for cough and shortness of breath.    Endocrine: Negative.    Hematologic/Lymphatic: Negative.    Skin: Negative.    Musculoskeletal: Negative.    Gastrointestinal: Negative.    Genitourinary: Negative.    Neurological: Negative.  Negative for weakness.   Psychiatric/Behavioral: Negative.    Allergic/Immunologic: Negative.          Objective:     Objective:  Temp:  [98 °F (36.7 °C)-98.4 °F (36.9 °C)] 98.1 °F (36.7 °C)  Heart Rate:  [] 81  Resp:  [16-18] 16  BP: (113-143)/() 140/67    Intake/Output Summary (Last 24 hours) at 7/6/2021 1706  Last data filed at 7/6/2021 1638  Gross per 24 hour   Intake 559 ml   Output 550 ml   Net 9 ml     Body mass index is 22.01 kg/m².      07/06/21  1635   Weight: 67.6 kg (149 lb 0.5 oz)           Physical Exam:   Vitals and nursing note reviewed.   Constitutional:       Appearance: Normal and healthy appearance.   Eyes:      General: Lids are normal.   HENT:      Head: Normocephalic and atraumatic.   Pulmonary:      Effort: Pulmonary effort is normal.   Cardiovascular:      Normal rate.      Murmurs: There is a systolic murmur.   Edema:     Peripheral edema absent.   Abdominal:      General: Abdomen is flat.   Neurological:      Mental Status: Alert, oriented to person, place, and time and oriented to person, place and time.   Psychiatric:         Attention and Perception:  Attention and perception normal.         Mood and Affect: Mood and affect normal.         Behavior: Behavior is cooperative.         Cognition and Memory: Memory is impaired.           Lab Review:     Results from last 7 days   Lab Units 07/06/21 0617 07/05/21 2154   SODIUM mmol/L 139 134*   POTASSIUM mmol/L 4.7 4.7   CHLORIDE mmol/L 107 102   CO2 mmol/L 22.2 20.8*   BUN mg/dL 22 25*   CREATININE mg/dL 1.19 1.38*   GLUCOSE mg/dL 84 247*   CALCIUM mg/dL 8.7 8.8       Results from last 7 days   Lab Units 07/05/21 2154   TROPONIN T ng/mL <0.010     Results from last 7 days   Lab Units 07/06/21 0617   WBC 10*3/mm3 11.04*   HEMOGLOBIN g/dL 11.9*   HEMATOCRIT % 36.3*   PLATELETS 10*3/mm3 229     Results from last 7 days   Lab Units 07/06/21 0617   INR  1.35*         Results from last 7 days   Lab Units 07/06/21 0617   MAGNESIUM mg/dL 1.8             Results from last 7 days   Lab Units 07/05/21 2154   DIGOXIN LVL ng/mL 0.40*           Imaging:    Imaging Results (Most Recent)     Procedure Component Value Units Date/Time    XR Chest 1 View [[id number]] Collected: 07/05/21 2343     Updated: 07/05/21 2343    Narrative:        Patient: ALEX BURK  Time Out: 23:43  Exam(s): FILM CXR 1 VIEW     EXAM:    XR Chest, 1 View    CLINICAL HISTORY:     Reason for exam: palpitations.    TECHNIQUE:    Frontal view of the chest.    COMPARISON:    No relevant prior studies available.    FINDINGS:    Lungs:  There are streaky densities in the right lung base just above   the diaphragm partially blunting the costophrenic angle.  Pulmonary   vasculature is congested but sharply defined.    Pleural space:  No pneumothorax is seen for    Heart:  The cardiac silhouette is mildly enlarged.    Mediastinum:  Unremarkable.    Bones joints:  Unremarkable.    Upper abdomen:  No pneumoperitoneum under the diaphragm.    IMPRESSION:       Mild right basilar infiltrate, atelectasis, or scarring.    Mild cardiomegaly without evidence of CHF.     Impression:          Electronically signed by Pavan Askew MD on 07-05-21 at 2343          EKG:   His presenting EKG tracing demonstrates sinus atrial fibrillation with mildly elevated ventricular response      Baseline:     I personally viewed and interpreted the patient's EKG/Telemetry tracings.    Assessment:   Assessment/Plan         Atrial fibrillation with RVR (CMS/HCC)  Pulmonary fibrosis      Plan:     Patient has recurrent paroxysmal atrial fibrillation but asymptomatic and is resulted in multiple hospitalizations.  He is currently on Tikosyn 125 MCG twice daily.  We have previously discussed pacemaker with AV node ablation.  I think this remains the best option for the patient.  Given his lung disease very reluctant to use amiodarone, and I do not think that sotalol would offer us anything that Tikosyn has not.    I discussed this with his wife in person, and his adult son on the phone.  They are going to consider it and we will decide how to proceed tomorrow after the son arrives.    Thank you for allowing me to participate in the care of Asia Ly. Feel free to contact me directly with any further questions or concerns.    Rashaun Nettles MD  07/06/21  17:06 EDT

## 2021-07-06 NOTE — CONSULTS
[facility]    Reason for consult: pulmonary fibrosis, bronchiectasis    Requesting: Dr. Bergeron    HPI:  Mr. green is a 89yo WM well known to me.  He has long standing post inflammatory pulmonary fibrosis and bronchiectasis. He has chronic cough with sputum production.  AT last visit we had placed him on augmentin he did not quite finish this and we have actually bronchoscopy scheduled from tomorrow. He was admitted yesterday with afib with RVR.  He reports his breathing and cough have been better here of late.      Past Medical History:   Diagnosis Date   • Aortic stenosis    • Aspiration pneumonia (CMS/HCC)    • Atrial fibrillation (CMS/HCC)    • Bronchiectasis (CMS/HCC)    • CKD (chronic kidney disease)    • COPD (chronic obstructive pulmonary disease) (CMS/HCC)    • Crohn's disease (CMS/HCC)    • Dizziness    • Elevated cholesterol    • Gastric ulcer    • Generalized weakness    • Gout    • Hard of hearing    • Hyperlipidemia    • Hypertension    • Leg swelling    • Lower extremity edema    • Mild anemia    • Near syncope    • Nonrheumatic aortic valve stenosis    • PAF (paroxysmal atrial fibrillation) (CMS/HCC)    • Palpitations    • Peptic ulcer    • Pneumonia of left lung due to infectious organism    • Pulmonary fibrosis (CMS/HCC)    • Stroke (CMS/HCC)    • TIA (transient ischemic attack)      Social History     Socioeconomic History   • Marital status:      Spouse name: Not on file   • Number of children: Not on file   • Years of education: Not on file   • Highest education level: Not on file   Tobacco Use   • Smoking status: Former Smoker     Types: Cigars     Quit date: 10/28/1994     Years since quittin.7   • Smokeless tobacco: Never Used   • Tobacco comment: Quit 25 years ago   Substance and Sexual Activity   • Alcohol use: No     Comment: daily caffiene - 1/2 cup of coffee   • Drug use: No   • Sexual activity: Defer     Family History   Problem Relation Age of Onset   • Stroke  Mother    • Heart disease Mother    • Hypertension Mother    • Diabetes Mother    • Heart disease Sister         Heart Valve Replacement   • Ovarian cancer Sister    • Rheumatic fever Sister    • Diabetes Sister    • Stroke Father      MEDS: Reviewed as per chart  ALL: list of 4 reviewed as per chart  ROS: 12 point negative for any changes except as in HPI    Vital Sign Min/Max for last 24 hours  Temp  Min: 98 °F (36.7 °C)  Max: 98.4 °F (36.9 °C)   BP  Min: 113/72  Max: 143/79   Pulse  Min: 82  Max: 137   Resp  Min: 16  Max: 18   SpO2  Min: 92 %  Max: 97 %   No data recorded   No data recorded     GEN:   appears ill,  AxOx3  HEENT: PERRL, EOMI, no icterus, mmm, no jvd, trachea midline, neck supple  CHEST: decreased mildly coarse bilat, faint wheezes, no crackles, no use of accessory muscles  CV: irrg, tachy, no /g/r +murmur  ABD: soft, nt, nd +bs, no hepatosplenomegaly  EXT: no c/c/e  SKIN: no rashes, no xanthomas, nl turgor, warm, dry  LYMPH: no palpable cervical or supraclavicular lymphadenopathy  NEURO: CN 2-12 intact and symmetric bilaterally  PSYCH: nl affect, nl orientation, nl judgement, nl mood  MSK: no kyphoscoliosis, 5/5 strength ue and le bilaterally    Labs: 7/6: reviewed:  Bicarb 22  Cr 1.19  Wbc 11  hgb 11.9  plts 229    CXR: 7/5: reviewed, right lower lobe infiltrate, actually looks a bit improved from prior    A/P;  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 bronciectasis -- ok to stop antiboitics, pulm toilet  3. afib with rvr -- as per cards  4. Aortic stenosis    Will hold off on bronch for now

## 2021-07-07 LAB
ALBUMIN SERPL-MCNC: 3 G/DL (ref 3.5–5.2)
ALBUMIN/GLOB SERPL: 0.9 G/DL
ALP SERPL-CCNC: 41 U/L (ref 39–117)
ALT SERPL W P-5'-P-CCNC: 16 U/L (ref 1–41)
ANION GAP SERPL CALCULATED.3IONS-SCNC: 9.5 MMOL/L (ref 5–15)
AST SERPL-CCNC: 14 U/L (ref 1–40)
BASOPHILS # BLD AUTO: 0.06 10*3/MM3 (ref 0–0.2)
BASOPHILS NFR BLD AUTO: 0.6 % (ref 0–1.5)
BILIRUB SERPL-MCNC: 0.3 MG/DL (ref 0–1.2)
BUN SERPL-MCNC: 20 MG/DL (ref 8–23)
BUN/CREAT SERPL: 18 (ref 7–25)
CALCIUM SPEC-SCNC: 8.6 MG/DL (ref 8.6–10.5)
CHLORIDE SERPL-SCNC: 105 MMOL/L (ref 98–107)
CHOLEST SERPL-MCNC: 117 MG/DL (ref 0–200)
CO2 SERPL-SCNC: 22.5 MMOL/L (ref 22–29)
CREAT SERPL-MCNC: 1.11 MG/DL (ref 0.76–1.27)
DEPRECATED RDW RBC AUTO: 54 FL (ref 37–54)
EOSINOPHIL # BLD AUTO: 0.05 10*3/MM3 (ref 0–0.4)
EOSINOPHIL NFR BLD AUTO: 0.5 % (ref 0.3–6.2)
ERYTHROCYTE [DISTWIDTH] IN BLOOD BY AUTOMATED COUNT: 16.7 % (ref 12.3–15.4)
GFR SERPL CREATININE-BSD FRML MDRD: 63 ML/MIN/1.73
GLOBULIN UR ELPH-MCNC: 3.5 GM/DL
GLUCOSE BLDC GLUCOMTR-MCNC: 108 MG/DL (ref 70–130)
GLUCOSE BLDC GLUCOMTR-MCNC: 129 MG/DL (ref 70–130)
GLUCOSE BLDC GLUCOMTR-MCNC: 82 MG/DL (ref 70–130)
GLUCOSE BLDC GLUCOMTR-MCNC: 85 MG/DL (ref 70–130)
GLUCOSE BLDC GLUCOMTR-MCNC: 86 MG/DL (ref 70–130)
GLUCOSE SERPL-MCNC: 84 MG/DL (ref 65–99)
HCT VFR BLD AUTO: 35.2 % (ref 37.5–51)
HDLC SERPL-MCNC: 37 MG/DL (ref 40–60)
HGB BLD-MCNC: 11.3 G/DL (ref 13–17.7)
IMM GRANULOCYTES # BLD AUTO: 0.08 10*3/MM3 (ref 0–0.05)
IMM GRANULOCYTES NFR BLD AUTO: 0.8 % (ref 0–0.5)
LDLC SERPL CALC-MCNC: 66 MG/DL (ref 0–100)
LDLC/HDLC SERPL: 1.82 {RATIO}
LYMPHOCYTES # BLD AUTO: 1.04 10*3/MM3 (ref 0.7–3.1)
LYMPHOCYTES NFR BLD AUTO: 10.5 % (ref 19.6–45.3)
MCH RBC QN AUTO: 28.4 PG (ref 26.6–33)
MCHC RBC AUTO-ENTMCNC: 32.1 G/DL (ref 31.5–35.7)
MCV RBC AUTO: 88.4 FL (ref 79–97)
MONOCYTES # BLD AUTO: 0.66 10*3/MM3 (ref 0.1–0.9)
MONOCYTES NFR BLD AUTO: 6.7 % (ref 5–12)
NEUTROPHILS NFR BLD AUTO: 7.99 10*3/MM3 (ref 1.7–7)
NEUTROPHILS NFR BLD AUTO: 80.9 % (ref 42.7–76)
NRBC BLD AUTO-RTO: 0 /100 WBC (ref 0–0.2)
NT-PROBNP SERPL-MCNC: 2814 PG/ML (ref 0–1800)
PLATELET # BLD AUTO: 247 10*3/MM3 (ref 140–450)
PMV BLD AUTO: 11 FL (ref 6–12)
POTASSIUM SERPL-SCNC: 4.4 MMOL/L (ref 3.5–5.2)
PROT SERPL-MCNC: 6.5 G/DL (ref 6–8.5)
QT INTERVAL: 323 MS
RBC # BLD AUTO: 3.98 10*6/MM3 (ref 4.14–5.8)
SODIUM SERPL-SCNC: 137 MMOL/L (ref 136–145)
TRIGL SERPL-MCNC: 63 MG/DL (ref 0–150)
TSH SERPL DL<=0.05 MIU/L-ACNC: 1.45 UIU/ML (ref 0.27–4.2)
VLDLC SERPL-MCNC: 14 MG/DL (ref 5–40)
WBC # BLD AUTO: 9.88 10*3/MM3 (ref 3.4–10.8)

## 2021-07-07 PROCEDURE — 85025 COMPLETE CBC W/AUTO DIFF WBC: CPT | Performed by: HOSPITALIST

## 2021-07-07 PROCEDURE — 84443 ASSAY THYROID STIM HORMONE: CPT | Performed by: HOSPITALIST

## 2021-07-07 PROCEDURE — 83880 ASSAY OF NATRIURETIC PEPTIDE: CPT | Performed by: HOSPITALIST

## 2021-07-07 PROCEDURE — 99232 SBSQ HOSP IP/OBS MODERATE 35: CPT | Performed by: INTERNAL MEDICINE

## 2021-07-07 PROCEDURE — 93010 ELECTROCARDIOGRAM REPORT: CPT | Performed by: INTERNAL MEDICINE

## 2021-07-07 PROCEDURE — 99232 SBSQ HOSP IP/OBS MODERATE 35: CPT | Performed by: NURSE PRACTITIONER

## 2021-07-07 PROCEDURE — 80053 COMPREHEN METABOLIC PANEL: CPT | Performed by: HOSPITALIST

## 2021-07-07 PROCEDURE — 93005 ELECTROCARDIOGRAM TRACING: CPT | Performed by: INTERNAL MEDICINE

## 2021-07-07 PROCEDURE — 82962 GLUCOSE BLOOD TEST: CPT

## 2021-07-07 PROCEDURE — 80061 LIPID PANEL: CPT | Performed by: HOSPITALIST

## 2021-07-07 RX ADMIN — ALLOPURINOL 100 MG: 100 TABLET ORAL at 11:04

## 2021-07-07 RX ADMIN — APIXABAN 5 MG: 5 TABLET, FILM COATED ORAL at 11:04

## 2021-07-07 RX ADMIN — APIXABAN 5 MG: 5 TABLET, FILM COATED ORAL at 20:17

## 2021-07-07 RX ADMIN — PANTOPRAZOLE SODIUM 40 MG: 40 TABLET, DELAYED RELEASE ORAL at 11:04

## 2021-07-07 RX ADMIN — METOPROLOL TARTRATE 50 MG: 50 TABLET, FILM COATED ORAL at 20:17

## 2021-07-07 RX ADMIN — DOFETILIDE 125 MCG: 0.12 CAPSULE ORAL at 11:05

## 2021-07-07 RX ADMIN — ATORVASTATIN CALCIUM 10 MG: 20 TABLET, FILM COATED ORAL at 20:17

## 2021-07-07 RX ADMIN — PANTOPRAZOLE SODIUM 40 MG: 40 TABLET, DELAYED RELEASE ORAL at 18:34

## 2021-07-07 RX ADMIN — DIGOXIN 125 MCG: 125 TABLET ORAL at 14:52

## 2021-07-07 RX ADMIN — BUDESONIDE 3 MG: 3 CAPSULE, GELATIN COATED ORAL at 11:04

## 2021-07-07 RX ADMIN — DOFETILIDE 125 MCG: 0.12 CAPSULE ORAL at 23:07

## 2021-07-07 RX ADMIN — METOPROLOL TARTRATE 50 MG: 50 TABLET, FILM COATED ORAL at 11:04

## 2021-07-07 NOTE — PROGRESS NOTES
"[facility]    Patient Name: Asia Ly  :1932  88 y.o.  LOS: 1  Encounter Provider: Nixon Brown Jr, MD      Patient Care Team:  Lubna Lobo MD as PCP - General  Lubna Lobo MD as PCP - Family Medicine    Chief Complaint: A. fib RVR, pulmonary fibrosis    Interval History: Spontaneous cardioversion to sinus rhythm.  Patient feels well.  Pulmonology following for possible bronchoscopy in the morning.       Objective   Vital Signs  Temp:  [98.3 °F (36.8 °C)-98.9 °F (37.2 °C)] 98.3 °F (36.8 °C)  Heart Rate:  [62-88] 73  Resp:  [14-18] 16  BP: (129-154)/(56-81) 147/65    Intake/Output Summary (Last 24 hours) at 2021 1644  Last data filed at 2021 0713  Gross per 24 hour   Intake 120 ml   Output 875 ml   Net -755 ml     Flowsheet Rows      First Filed Value   Admission Height  175.3 cm (69\") Documented at 2021 2200   Admission Weight  67.6 kg (149 lb 0.5 oz) Documented at 2021 1635            Physical Exam  Vitals reviewed.   Constitutional:       Appearance: Healthy appearance. Not in distress.   Neck:      Vascular: No JVR. JVD normal.   Pulmonary:      Effort: Pulmonary effort is normal.      Breath sounds: Normal breath sounds. No wheezing. No rhonchi. No rales.   Chest:      Chest wall: Not tender to palpatation.   Cardiovascular:      PMI at left midclavicular line. Tachycardia present. Irregularly irregular rhythm. Normal S1. Normal S2.      Murmurs: There is no murmur.      No gallop. No click. No rub.   Pulses:     Intact distal pulses.   Edema:     Peripheral edema absent.   Abdominal:      General: Bowel sounds are normal.      Palpations: Abdomen is soft.      Tenderness: There is no abdominal tenderness.   Musculoskeletal: Normal range of motion.         General: No tenderness. Skin:     General: Skin is warm and dry.   Neurological:      General: No focal deficit present.      Mental Status: Alert and oriented to person, place and time. " [id number] [facility]  [facility]; [address]  [place], [address] [place] [address] 36 489 [phone] Fax        Joel Dubon  1983  Primary Care Physician: Jus Lange MD        [address]: [facility]      2020  MEDICAID CONSENT COMPLETED: No      HPI: Joel Dubon is a 39 y.o. female   Pt has the diagnosis/symptoms below affecting her ADL's. Conservative medical and surgical options were reviewed to preserve her uterus for future fertility. She has declined all other options and is demanding hysterectomy. SEE LETTER WRITTEN BY PATIENT. 1. Dysmenorrhea    2. Subserous leiomyoma of uterus posterior 5.1 x 5.3 x 4.5 cm    3. Menorrhagia with regular cycle    4. Dyspareunia in female    5. Pelvic pain in female            The uterus was immobile. The patient  does have a Narrow Pubic Arch. Her uterus is found to be undescended. The uterine size is 10-14 cm and irregular. I counseled the patient on ALL routes to complete her surgery: Vaginal, Laparoscopic Assisted, Robotic Assisted, and Traditional open technique. The morbidity, procedural costs, recuperative times, infection rates, blood loss, duration of hospital stay and complication rates were discussed with each route. The patient has elected to proceed with Open. Relevant Past History:   Patient Active Problem List    Diagnosis Date Noted    Dysmenorrhea 2020     Priority: High    Subserous leiomyoma of uterus posterior 5.1 x 5.3 x 4.5 cm 2020     Priority: High    Dyspareunia in female 2020     Priority: High    Menorrhagia with regular cycle 2020     Priority: High         OB History    Para Term  AB Living   0 0 0 0 0 0   SAB TAB Ectopic Molar Multiple Live Births   0 0 0 0 0 0       Past Medical History:   Diagnosis Date    Anxiety     Clostridium difficile infection        History reviewed. No pertinent surgical history.     Social History     Physical exam was reviewed, updated and amended when necessary.    Pertinent Test Results:  Results from last 7 days   Lab Units 07/07/21 0523 07/06/21 0617 07/05/21  2154   SODIUM mmol/L 137 139 134*   POTASSIUM mmol/L 4.4 4.7 4.7   CHLORIDE mmol/L 105 107 102   CO2 mmol/L 22.5 22.2 20.8*   BUN mg/dL 20 22 25*   CREATININE mg/dL 1.11 1.19 1.38*   GLUCOSE mg/dL 84 84 247*   CALCIUM mg/dL 8.6 8.7 8.8   AST (SGOT) U/L 14  --   --    ALT (SGPT) U/L 16  --   --      Results from last 7 days   Lab Units 07/05/21  2154   TROPONIN T ng/mL <0.010     Results from last 7 days   Lab Units 07/07/21 0523 07/06/21 0617 07/05/21  2154   WBC 10*3/mm3 9.88 11.04* 10.71   HEMOGLOBIN g/dL 11.3* 11.9* 11.7*   HEMATOCRIT % 35.2* 36.3* 37.1*   PLATELETS 10*3/mm3 247 229 246     Results from last 7 days   Lab Units 07/06/21 0617   INR  1.35*     Results from last 7 days   Lab Units 07/06/21 0617 07/05/21  2154   MAGNESIUM mg/dL 1.8 1.9     Results from last 7 days   Lab Units 07/07/21  0523   CHOLESTEROL mg/dL 117   TRIGLYCERIDES mg/dL 63   HDL CHOL mg/dL 37*     Results from last 7 days   Lab Units 07/07/21  0523   PROBNP pg/mL 2,814.0*     Results from last 7 days   Lab Units 07/07/21  0523   TSH uIU/mL 1.450           Medication Review:   allopurinol, 100 mg, Oral, Daily  apixaban, 5 mg, Oral, Q12H  atorvastatin, 10 mg, Oral, Nightly  Budesonide, 3 mg, Oral, Daily  digoxin, 125 mcg, Oral, Daily  dofetilide, 125 mcg, Oral, Q12H  insulin lispro, 0-7 Units, Subcutaneous, TID AC  metoprolol tartrate, 50 mg, Oral, BID  pantoprazole, 40 mg, Oral, BID AC              Assessment/Plan   1. Atrial fibrillation with RVR -patient converted to sinus rhythm yesterday afternoon.  Continue beta-blocker and digoxin.  Dofetilide restarted. Continue apixaban.  Appreciate EP input.  Family still deciding on possible intervention.  2. CKD -creatinine at baseline.  Electrolytes within normal range.  Euvolemic.  3. History of pulmonary fibrosis  Socioeconomic History    Marital status: Single     Spouse name: Not on file    Number of children: Not on file    Years of education: Not on file    Highest education level: Not on file   Occupational History    Not on file   Social Needs    Financial resource strain: Not on file    Food insecurity     Worry: Not on file     Inability: Not on file    Transportation needs     Medical: Not on file     Non-medical: Not on file   Tobacco Use    Smoking status: Former Smoker     Packs/day: 1.50     Years: 9.50     Pack years: 14.25     Types: Cigarettes    Smokeless tobacco: Never Used   Substance and Sexual Activity    Alcohol use:  Yes     Alcohol/week: 0.0 standard drinks     Comment: OCC    Drug use: No    Sexual activity: Yes     Partners: Male     Birth control/protection: Pill   Lifestyle    Physical activity     Days per week: Not on file     Minutes per session: Not on file    Stress: Not on file   Relationships    Social connections     Talks on phone: Not on file     Gets together: Not on file     Attends Shinto service: Not on file     Active member of club or organization: Not on file     Attends meetings of clubs or organizations: Not on file     Relationship status: Not on file    Intimate partner violence     Fear of current or ex partner: Not on file     Emotionally abused: Not on file     Physically abused: Not on file     Forced sexual activity: Not on file   Other Topics Concern    Not on file   Social History Narrative    Not on file       Psychosocial History: stable    MEDICATIONS:  Current Outpatient Medications   Medication Sig Dispense Refill    cetirizine (ZYRTEC) 10 MG tablet Take 10 mg by mouth daily      FLUoxetine (PROZAC) 20 MG capsule Take 20 mg by mouth daily      montelukast (SINGULAIR) 10 MG tablet Take 10 mg by mouth nightly      JUNEL FE 1/20 1-20 MG-MCG per tablet take 1 tablet by mouth once daily 28 tablet 11    ibuprofen (ADVIL;MOTRIN) 800 MG tablet -recent pneumonia with full course of antibiotics.  Lungs sound clear today on clinical exam.    Pulmonology planning for bronchoscopy tomorrow.  4. Aortic stenosis -moderate by echocardiogram last year.  No indication for repeat echocardiogram at this time as it will not add to current management.  5. History of Crohn's disease  6. Prediabetes    Nixon Brown Jr, MD  [facility]  07/07/21  16:44 EDT       masses. Extremities: Extremities warm to touch, pink, with no edema. Musculoskeletal:  negative  Peripheral Pulses:  Normal  Neurologic:  Gait normal. Reflexes normal and symmetric. Sensation grossly intact. Female Urogen:  declined  Female Rectal: declined    OMM Structural Component:  The patient did not complain of a Chief complaint requiring OMM. Chief Complaint:none    Structural Exam: No Interest              Diagnostics:  [employer] Findings:              LABS:  UPT: negative     6/17/2016  1:07 PM - [place], Lab [employer]      Component Collected Lab   Cytology Report 06/02/2016  8:04 AM [facility]   (NOTE)   [id number]   [employer]   [facility]   ANATOMIC PATHOLOGY   [address] [place] [address],  [address] [address] [place], [address]   [phone]   Fax: [phone]   GYNECOLOGIC CYTOLOGY REPORT     Patient Name: Fatou Velazquez   MR#: [id number]   Specimen #[id number]   Source:   1: Cervical material, (ThinPrep vial, Imaging-assisted review)     Clinical History   Birth control pills   Z01.419 Routine gyn exam without abnormal findings   Z11.51 Encounter for screening for HPV   Co-Test:  ThinPrep Pap with high risk HPV testing   LMP:  5/26/16   INTERPRETATION     Cervical material, (ThinPrep vial, Imaging-assisted review):   Specimen Adequacy:       Satisfactory for evaluation.       - Endocervical/transformation zone component present. Descriptive Diagnosis:       Negative for intraepithelial lesion or malignancy. Comments:       High Risk HPV testing was ordered.        Cytotechnologist:   CJ Cuba(ASCP)   **Electronically Signed Out**   merry/6/17/2016           Procedure/Addendum   HPV Procedure Report     Date Ordered:     6/6/2016     Status: Signed   Out       Date Complete:     6/6/2016     By: CJ Zuniga(ASCP)       Date Reported:     6/17/2016       INTERPRETATION   Roche HPV DNA High Risk                                   HPV Sample               Thin Prep                    (Ref Range)   HPV Type 16               Not Detected                    (Not   Detected)   HPV Type 18               Not Detected                    (Not   Detected)   Other High Risk HPV     Not Detected                    (Not Detected)       This test amplifies and detects DNA of 14 high-risk HPV types   associated with cervical cancer and its precursor lesions (HPV types   16, 18, 31, 33, 35, 39, 45, 51, 52, 56, 58, 59, 66, and 68). Sensitivity may be affected by specimen collection methods, stage of   infection, and the presence of interfering substances.  Results should   be interpreted in conjunction with other available laboratory and   clinical data.  A negative high-risk HPV result does not exclude the   possibility of future cytologic HSIL or underlying CIN2-3 or cancer.    This test is intended for medical purposes only and is not valid for   the evaluation of suspected sexual abuse or for other forensic   purposes.                    Lab Results:        Results for orders placed or performed during the hospital encounter of 05/11/20   CBC Auto Differential   Result Value Ref Range     WBC 8.5 3.5 - 11.3 k/uL     RBC 4.38 3.95 - 5.11 m/uL     Hemoglobin 12.7 11.9 - 15.1 g/dL     Hematocrit 38.7 36.3 - 47.1 %     MCV 88.4 82.6 - 102.9 fL     MCH 29.0 25.2 - 33.5 pg     MCHC 32.8 28.4 - 34.8 g/dL     RDW 12.7 11.8 - 14.4 %     Platelets 563 850 - 281 k/uL     MPV 11.9 8.1 - 13.5 fL     NRBC Automated 0.0 0.0 per 100 WBC     Differential Type NOT REPORTED       Seg Neutrophils 68 (H) 36 - 65 %     Lymphocytes 21 (L) 24 - 43 %     Monocytes 7 3 - 12 %     Eosinophils % 3 1 - 4 %     Basophils 1 0 - 2 %     Immature Granulocytes 0 0 %     Segs Absolute 5.79 1.50 - 8.10 k/uL     Absolute Lymph # 1.80 1.10 - 3.70 k/uL     Absolute Mono # 0.58 0.10 - 1.20 k/uL     Absolute Eos # 0.27 0.00 - 0.44 k/uL     Basophils Absolute 0.04 0.00 - 0.20 k/uL     Absolute Immature Granulocyte 0.03 0.00 - 0.30 k/uL     WBC Morphology NOT REPORTED       RBC Morphology NOT REPORTED       Platelet Estimate NOT REPORTED     TSH with Reflex   Result Value Ref Range     TSH 2.82 0.30 - 5.00 mIU/L   Protime-INR   Result Value Ref Range     Protime 9.4 9.0 - 12.0 sec     INR 0.9     APTT   Result Value Ref Range     PTT 21.4 20.5 - 30.5 sec      5/14/2020 11:44 AM - [place], [facility] Incoming Lab Results From [facility]      Component Collected Lab   Surgical Pathology Report 05/11/2020 12:13 PM [address] [facility]   [address] [facility]   [phone]   Fax: [phone]   SURGICAL PATHOLOGY REPORT     Patient Name: Taylor Gary   MR#: [id number]   Specimen #[id number]         Final Diagnosis   ENDOMETRIUM, BIOPSY:          BENIGN ENDOMETRIAL MUCOSA WITH FOCAL SECRETORY FEATURES       Ulysses Tatum. Hedy Madera D.O.   **Electronically Signed Out**         js/5/14/2020     Clinical Information   Dysmenorrhea, subserous leiomyoma of uterus     Source:   A: Endometrial bx     Gross Description   Received in formalin labeled \"endometrial biopsy\" are portions of   reddish tan to pink soft tissue aggregating to 1.5 x 1.2 x 0.2 cm. The specimen is entirely submitted in a single cassette.  JONH/samantha         Microscopic Description   Microscopic examination of two H&E slides confirms the diagnosis.                  Lab Results:  Results for orders placed or performed during the hospital encounter of 05/11/20   CBC Auto Differential   Result Value Ref Range    WBC 8.5 3.5 - 11.3 k/uL    RBC 4.38 3.95 - 5.11 m/uL    Hemoglobin 12.7 11.9 - 15.1 g/dL    Hematocrit 38.7 36.3 - 47.1 %    MCV 88.4 82.6 - 102.9 fL    MCH 29.0 25.2 - 33.5 pg    MCHC 32.8 28.4 - 34.8 g/dL    RDW 12.7 11.8 - 14.4 %    Platelets 702 282 - 715 k/uL    MPV 11.9 8.1 - 13.5 fL    NRBC Automated 0.0 0.0 per 100 WBC    Differential Type NOT REPORTED     Seg Neutrophils 68 (H) 36 - 65 % Lymphocytes 21 (L) 24 - 43 %    Monocytes 7 3 - 12 %    Eosinophils % 3 1 - 4 %    Basophils 1 0 - 2 %    Immature Granulocytes 0 0 %    Segs Absolute 5.79 1.50 - 8.10 k/uL    Absolute Lymph # 1.80 1.10 - 3.70 k/uL    Absolute Mono # 0.58 0.10 - 1.20 k/uL    Absolute Eos # 0.27 0.00 - 0.44 k/uL    Basophils Absolute 0.04 0.00 - 0.20 k/uL    Absolute Immature Granulocyte 0.03 0.00 - 0.30 k/uL    WBC Morphology NOT REPORTED     RBC Morphology NOT REPORTED     Platelet Estimate NOT REPORTED    TSH with Reflex   Result Value Ref Range    TSH 2.82 0.30 - 5.00 mIU/L   Protime-INR   Result Value Ref Range    Protime 9.4 9.0 - 12.0 sec    INR 0.9    APTT   Result Value Ref Range    PTT 21.4 20.5 - 30.5 sec           The patient was counseled at length about the risks of lashawn Covid-19 in the emerson-operative and post-operative states including the recovery window of their procedure. The patient was made aware that lashawn Covid-19 after a surgical procedure may worsen their prognosis for recovering from the virus and lend to a higher morbidity and or mortality risk. The patient was given the options of postponing their procedure. All of the risks, benefits, and alternatives were discussed. The patient  does wish to proceed with the procedure. Assessment:     Diagnosis Orders   1. Dysmenorrhea     2. Subserous leiomyoma of uterus posterior 5.1 x 5.3 x 4.5 cm     3. Menorrhagia with regular cycle     4. Dyspareunia in female     5. Pelvic pain in female         Patient Active Problem List    Diagnosis Date Noted    Dysmenorrhea 05/11/2020     Priority: High    Subserous leiomyoma of uterus posterior 5.1 x 5.3 x 4.5 cm 05/11/2020     Priority: High    Dyspareunia in female 05/11/2020     Priority: High    Menorrhagia with regular cycle 05/11/2020     Priority: High       PAP: see above  Permanent Sterilization Completed: No     Plan:  1. TICO BLS  2. +/- Enterocele  3. Negative Wound vac placement  4.  Body mass index is 36.65 kg/m². No orders of the defined types were placed in this encounter. Counseling: The patient was counseled on all options both medical and surgical, conservative as well as definitive. She has elected to proceed with the procedure as stated above. The patient was counseled on the procedure. Risks and complications were reviewed in detail. The patients orders, labs, consents have been completed. The history and physical as well as all supporting surgical documentation will be forwarded to the pre-operative holding area. The patient is aware that this procedure may not alleviate her symptoms. That there may be a necessity for a second surgery and that there may be an incomplete removal of abnormal tissue. A supracervical approach was reviewed in detail, leaving the cervix intact.                    ________________________________________D. O. Date:_______________  Dorrine Major, DO        Patient was seen with total face to face time of 15 minutes. More than 50% of this visit was on counseling and education regarding her    Diagnosis Orders   1. Dysmenorrhea     2. Subserous leiomyoma of uterus posterior 5.1 x 5.3 x 4.5 cm     3. Menorrhagia with regular cycle     4. Dyspareunia in female     5. Pelvic pain in female      and her options. She was also counseled on her preventative health maintenance recommendations and follow-up.

## 2021-07-07 NOTE — PROGRESS NOTES
Electrophysiology Follow-Up Note      Patient Name: Asia Ly  Age/Sex: 88 y.o. male  : 1932  MRN: [id number]      Day of Service: 21       Chief Complaint/Follow-up: Symptomatic recurrent PAF on dofetilide    S: No complaints today.       Temp:  [98.3 °F (36.8 °C)-98.9 °F (37.2 °C)] 98.3 °F (36.8 °C)  Heart Rate:  [62-88] 73  Resp:  [14-18] 16  BP: (129-154)/(56-81) 147/65     PHYSICAL EXAM:    General Appearance: No acute distress, well developed and well nourished.   Eyes: Conjunctiva and lids: No erythema, swelling, or discharge. Sclera non-icteric.   HENT: Atraumatic, normocephalic. External eyes, ears, and nose normal.   Respiratory: No signs of respiratory distress. Respiration rhythm and depth normal.   Cardiovascular:  Heart Rate and Rhythm: Normal  Lower Extremities: No edema noted.  Gastrointestinal:  Abdomen soft, non-distended, non-tender.  Musculoskeletal: Normal movement of extremities  Skin: Warm and dry.   Psychiatric: Patient alert and oriented to person, place, and time. Speech and behavior appropriate. Normal mood and affect.       ECG/TELE: SR      Results from last 7 days   Lab Units 21  2154   SODIUM mmol/L 137 139 134*   POTASSIUM mmol/L 4.4 4.7 4.7   CHLORIDE mmol/L 105 107 102   CO2 mmol/L 22.5 22.2 20.8*   BUN mg/dL 20 22 25*   CREATININE mg/dL 1.11 1.19 1.38*   GLUCOSE mg/dL 84 84 247*   CALCIUM mg/dL 8.6 8.7 8.8     Results from last 7 days   Lab Units 21  0521  2154   WBC 10*3/mm3 9.88 11.04* 10.71   HEMOGLOBIN g/dL 11.3* 11.9* 11.7*   HEMATOCRIT % 35.2* 36.3* 37.1*   PLATELETS 10*3/mm3 247 229 246     Results from last 7 days   Lab Units 21  0617   INR  1.35*     Results from last 7 days   Lab Units 21  2154   TROPONIN T ng/mL <0.010     Results from last 7 days   Lab Units 21  0523   TSH uIU/mL 1.450           Current Medications:   Scheduled Meds:allopurinol, 100 mg, Oral,  Daily  apixaban, 5 mg, Oral, Q12H  atorvastatin, 10 mg, Oral, Nightly  Budesonide, 3 mg, Oral, Daily  digoxin, 125 mcg, Oral, Daily  dofetilide, 125 mcg, Oral, Q12H  insulin lispro, 0-7 Units, Subcutaneous, TID AC  metoprolol tartrate, 50 mg, Oral, BID  pantoprazole, 40 mg, Oral, BID AC            Atrial fibrillation with RVR (CMS/HCC)       Plan:     Dr. Nettles and I saw patient in consultation yesterday to discuss treatment options for his recurrent episodes of symptomatic PAF.     He has been on dofetilide but is now having more frequent recurrent episodes of PAF.     He has been tried on sotalol in the past---amiodarone is not a good option for him secondary to his severe lung disease.     Discussed PPM and AV node ablation yesterday with patient, wife at the bedside and son who was on the phone. He spontaneously converted to SR while we were there seeing him.     They wanted to think about things and son was coming in today.     Son and wife here at bedside today---discussed again details of PPM and AV node ablation. He is maintaining SR so there is no urgency to doing procedure if they choose to move forward. Answered questions.     Dr. Chidi Oconnor was also there seeing patient and there are plans for possible bronchoscopy--it is fine to do bronch first and then do pacemaker again if they choose to proceed. They are going to continue to think about options and discuss further among the family.     Glenna Chacon, APRN  07/07/21  15:44 EDT

## 2021-07-07 NOTE — PROGRESS NOTES
[facility]     Mar/chart reviewed  Follow up bronchiectasis, chronic cough, post inflammatory pulmonary fibrosis  Some cough still  No chest pain    Vital Sign Min/Max for last 24 hours  Temp  Min: 98.3 °F (36.8 °C)  Max: 98.9 °F (37.2 °C)   BP  Min: 129/64  Max: 154/81   Pulse  Min: 62  Max: 88   Resp  Min: 14  Max: 18   SpO2  Min: 91 %  Max: 98 %   No data recorded   Weight  Min: 67.6 kg (149 lb 0.5 oz)  Max: 67.6 kg (149 lb 0.5 oz)     Appears ill, axox3,   perrl, eomi, no icterus,  mmm, no jvd, trachea midline, neck supple,  chest decreased ae bilaterally, + crackles, no wheezes,   rrr,   soft, nt, nd +bs,  no c/c/ e  Skin warm, dry no rashes    Labs: 7/7: reviewed:  Cr 1.1  Bicarb 22  probnp 2814  Wbc 9.8  hgb 11.3  plts 247    A/P;  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 bronciectasis -- ok to stop antiboitics, pulm toilet  3. afib with rvr -- as per cards --possible ablation and pacer placement  4. Aortic stenosis    bronchscopy

## 2021-07-07 NOTE — PROGRESS NOTES
"Daily progress note    Chief complaint  Doing much better  No complaint whatsoever  Denies any chest pain shortness of breath palpitation  Family at bedside    History of present illness  88-year-old [place] male who is well-known to our service with history of chronic atrial fibrillation hypertension hyperlipidemia aortic stenosis Crohn's disease pulmonary hypertension and gastroesophageal reflux disease brought to the emergency room by the wife when she noticed increased cloudiness eating less and occasional complaint of palpitation with elevated heart rate which she checked.  Patient has no chest pain shortness of breath.  Patient also denies any dizziness lightheadedness or near syncope.  Patient did not miss any medication and taking religiously.  Patient work-up in ER revealed rapid ventricular rate with history of chronic atrial fibrillation admit for management.  At the time of interview he is back to baseline has no specific complaints.      REVIEW OF SYSTEMS  All systems reviewed and negative except for those discussed in HPI.      PHYSICAL EXAM   Blood pressure 147/65, pulse 73, temperature 98.3 °F (36.8 °C), temperature source Oral, resp. rate 16, height 175.3 cm (69\"), weight 67.6 kg (149 lb 0.5 oz), SpO2 95 %.    Constitutional: No distress.  Pleasant and nontoxic  Head: Normocephalic and atraumatic.   Oropharynx: Mucous membranes are moist.   Eyes: No scleral icterus. No conjunctival pallor.  Neck: Painless range of motion noted. Neck supple.   Cardiovascular: Irregularly irregular and intermittently tachycardic and intact distal pulses.  Pulmonary/Chest: No respiratory distress.  Diffuse fine rhonchi.   Abdominal: Soft. There is no tenderness and no guarding.  Bowel sounds positive  Musculoskeletal: Moves all extremities equally. There is no pedal edema or calf tenderness.   Neurological: Alert.  Baseline strength and sensation noted.   Skin: Skin is pink, warm, and dry. No pallor.   Psychiatric: " Mood and affect normal.     LAB RESULTS  Lab Results (last 24 hours)     Procedure Component Value Units Date/Time    POC Glucose Once [[id number]]  (Normal) Collected: 07/07/21 1106    Specimen: Blood Updated: 07/07/21 1114     Glucose 85 mg/dL      Comment: Meter: [id number] : [id number] Chin Michelle NST       POC Glucose Once [[id number]]  (Normal) Collected: 07/07/21 0710    Specimen: Blood Updated: 07/07/21 0716     Glucose 82 mg/dL      Comment: Meter: [id number] : [id number] Chin Michelle NST       TSH [[id number]]  (Normal) Collected: 07/07/21 0523    Specimen: Blood from Arm, Right Updated: 07/07/21 0711     TSH 1.450 uIU/mL     BNP [[id number]]  (Abnormal) Collected: 07/07/21 0523    Specimen: Blood from Arm, Right Updated: 07/07/21 0711     proBNP 2,814.0 pg/mL     Narrative:      Among patients with dyspnea, NT-proBNP is highly sensitive for the detection of acute congestive heart failure. In addition NT-proBNP of <300 pg/ml effectively rules out acute congestive heart failure with 99% negative predictive value.    Results may be falsely decreased if patient taking Biotin.      Comprehensive Metabolic Panel [[id number]]  (Abnormal) Collected: 07/07/21 0523    Specimen: Blood from Arm, Right Updated: 07/07/21 0704     Glucose 84 mg/dL      BUN 20 mg/dL      Creatinine 1.11 mg/dL      Sodium 137 mmol/L      Potassium 4.4 mmol/L      Chloride 105 mmol/L      CO2 22.5 mmol/L      Calcium 8.6 mg/dL      Total Protein 6.5 g/dL      Albumin 3.00 g/dL      ALT (SGPT) 16 U/L      AST (SGOT) 14 U/L      Alkaline Phosphatase 41 U/L      Total Bilirubin 0.3 mg/dL      eGFR Non African Amer 63 mL/min/1.73      Globulin 3.5 gm/dL      A/G Ratio 0.9 g/dL      BUN/Creatinine Ratio 18.0     Anion Gap 9.5 mmol/L     Narrative:      GFR Normal >60  Chronic Kidney Disease <60  Kidney Failure <15      Lipid Panel [[id number]]  (Abnormal) Collected: 07/07/21 0523    Specimen: Blood from Arm, Right Updated: 07/07/21 0704      Total Cholesterol 117 mg/dL      Triglycerides 63 mg/dL      HDL Cholesterol 37 mg/dL      LDL Cholesterol  66 mg/dL      VLDL Cholesterol 14 mg/dL      LDL/HDL Ratio 1.82    Narrative:      Cholesterol Reference Ranges  (U.S. Department of Health and Human Services ATP III Classifications)    Desirable          <200 mg/dL  Borderline High    200-239 mg/dL  High Risk          >240 mg/dL      Triglyceride Reference Ranges  (U.S. Department of Health and Human Services ATP III Classifications)    Normal           <150 mg/dL  Borderline High  150-199 mg/dL  High             200-499 mg/dL  Very High        >500 mg/dL    HDL Reference Ranges  (U.S. Department of Health and Human Services ATP III Classifcations)    Low     <40 mg/dl (major risk factor for CHD)  High    >60 mg/dl ('negative' risk factor for CHD)        LDL Reference Ranges  (U.S. Department of Health and Human Services ATP III Classifcations)    Optimal          <100 mg/dL  Near Optimal     100-129 mg/dL  Borderline High  130-159 mg/dL  High             160-189 mg/dL  Very High        >189 mg/dL    CBC & Differential [[id number]]  (Abnormal) Collected: 07/07/21 0523    Specimen: Blood Updated: 07/07/21 0634    Narrative:      The following orders were created for panel order CBC & Differential.  Procedure                               Abnormality         Status                     ---------                               -----------         ------                     CBC Auto Differential[[id number]]        Abnormal            Final result                 Please view results for these tests on the individual orders.    CBC Auto Differential [[id number]]  (Abnormal) Collected: 07/07/21 0523    Specimen: Blood Updated: 07/07/21 0634     WBC 9.88 10*3/mm3      RBC 3.98 10*6/mm3      Hemoglobin 11.3 g/dL      Hematocrit 35.2 %      MCV 88.4 fL      MCH 28.4 pg      MCHC 32.1 g/dL      RDW 16.7 %      RDW-SD 54.0 fl      MPV 11.0 fL      Platelets 247 10*3/mm3       Neutrophil % 80.9 %      Lymphocyte % 10.5 %      Monocyte % 6.7 %      Eosinophil % 0.5 %      Basophil % 0.6 %      Immature Grans % 0.8 %      Neutrophils, Absolute 7.99 10*3/mm3      Lymphocytes, Absolute 1.04 10*3/mm3      Monocytes, Absolute 0.66 10*3/mm3      Eosinophils, Absolute 0.05 10*3/mm3      Basophils, Absolute 0.06 10*3/mm3      Immature Grans, Absolute 0.08 10*3/mm3      nRBC 0.0 /100 WBC     POC Glucose Once [[id number]]  (Abnormal) Collected: 07/06/21 2256    Specimen: Blood Updated: 07/06/21 2258     Glucose 143 mg/dL      Comment: Meter: [id number] : Mynor Ayers RN           Imaging Results (Last 24 Hours)     ** No results found for the last 24 hours. **           ECG 12 Lead  Component   Ref Range & Units 7/6/21 1123 7/5/21 2135   QT Interval   ms 314 P  330 P         HEART RATE= 101  bpm  RR Interval= 596  ms  WV Interval=   ms  P Horizontal Axis=   deg  P Front Axis=   deg  QRSD Interval= 76  ms  QT Interval= 314  ms  QRS Axis= 34  deg  T Wave Axis= 101  deg  - ABNORMAL ECG -  Atrial fibrillation             Current Facility-Administered Medications:   •  allopurinol (ZYLOPRIM) tablet 100 mg, 100 mg, Oral, Daily, Calvin Bergeron MD, 100 mg at 07/07/21 1104  •  apixaban (ELIQUIS) tablet 5 mg, 5 mg, Oral, Q12H, Calvin Bergeron MD, 5 mg at 07/07/21 1104  •  atorvastatin (LIPITOR) tablet 10 mg, 10 mg, Oral, Nightly, Calvin Bergeron MD, 10 mg at 07/06/21 2148  •  Budesonide (ENTOCORT EC) 24 hr capsule 3 mg, 3 mg, Oral, Daily, Calvin Bergeron MD, 3 mg at 07/07/21 1104  •  digoxin (LANOXIN) tablet 125 mcg, 125 mcg, Oral, Daily, Calvin Bergeron MD, 125 mcg at 07/06/21 1327  •  dofetilide (TIKOSYN) capsule 125 mcg, 125 mcg, Oral, Q12H, Nixon Brown Jr., MD, 125 mcg at 07/07/21 1105  •  insulin lispro (ADMELOG) injection 0-7 Units, 0-7 Units, Subcutaneous, TID AC, Calvin Bergeron MD, 2 Units at 07/06/21 1734  •  ipratropium-albuterol (DUO-NEB) nebulizer solution 3 mL, 3 mL, Nebulization, Q4H  PRN, Shelbie Bergeron MD  •  metoprolol tartrate (LOPRESSOR) tablet 50 mg, 50 mg, Oral, BID, Shelbie Bergeron MD, 50 mg at 07/07/21 1104  •  pantoprazole (PROTONIX) EC tablet 40 mg, 40 mg, Oral, BID AC, Shelbie Bergeron MD, 40 mg at 07/07/21 1104  •  [COMPLETED] Insert peripheral IV, , , Once **AND** sodium chloride 0.9 % flush 10 mL, 10 mL, Intravenous, PRN, Jeromy Petty MD     ASSESSMENT  Chronic atrial fibrillation with rapid ventricular rate  Hypertension  Hyperlipidemia  Aortic stenosis  Pulmonary fibrosis   Pulmonary hypertension  History of Crohn's disease  Gastroesophageal reflux disease    PLAN  CPM  Tikosyn Lanoxin and Lopressor  Anticoagulation  Cardiology consult appreciated  EP service to follow patient  Continue home medications  Stress ulcer DVT prophylaxis  Supportive care  Discussed with family and nursing staff  Follow closely further recommendation according to hospital course    SHELBIE BERGERON MD

## 2021-07-08 ENCOUNTER — ANESTHESIA (OUTPATIENT)
Dept: GASTROENTEROLOGY | Facility: HOSPITAL | Age: 86
End: 2021-07-08

## 2021-07-08 ENCOUNTER — ANESTHESIA EVENT (OUTPATIENT)
Dept: GASTROENTEROLOGY | Facility: HOSPITAL | Age: 86
End: 2021-07-08

## 2021-07-08 LAB
ANION GAP SERPL CALCULATED.3IONS-SCNC: 9.2 MMOL/L (ref 5–15)
APPEARANCE FLD: ABNORMAL
B PARAPERT DNA SPEC QL NAA+PROBE: NOT DETECTED
B PERT DNA SPEC QL NAA+PROBE: NOT DETECTED
BASOPHILS # BLD AUTO: 0.07 10*3/MM3 (ref 0–0.2)
BASOPHILS NFR BLD AUTO: 0.7 % (ref 0–1.5)
BUN SERPL-MCNC: 19 MG/DL (ref 8–23)
BUN/CREAT SERPL: 20.9 (ref 7–25)
C PNEUM DNA NPH QL NAA+NON-PROBE: NOT DETECTED
CALCIUM SPEC-SCNC: 8.1 MG/DL (ref 8.6–10.5)
CHLORIDE SERPL-SCNC: 106 MMOL/L (ref 98–107)
CO2 SERPL-SCNC: 22.8 MMOL/L (ref 22–29)
COLOR FLD: ABNORMAL
CREAT SERPL-MCNC: 0.91 MG/DL (ref 0.76–1.27)
DEPRECATED RDW RBC AUTO: 52.1 FL (ref 37–54)
EOSINOPHIL # BLD AUTO: 0.13 10*3/MM3 (ref 0–0.4)
EOSINOPHIL NFR BLD AUTO: 1.2 % (ref 0.3–6.2)
ERYTHROCYTE [DISTWIDTH] IN BLOOD BY AUTOMATED COUNT: 16.3 % (ref 12.3–15.4)
FLUAV SUBTYP SPEC NAA+PROBE: NOT DETECTED
FLUBV RNA ISLT QL NAA+PROBE: NOT DETECTED
GFR SERPL CREATININE-BSD FRML MDRD: 79 ML/MIN/1.73
GIE STN SPEC: NORMAL
GLUCOSE BLDC GLUCOMTR-MCNC: 118 MG/DL (ref 70–130)
GLUCOSE BLDC GLUCOMTR-MCNC: 126 MG/DL (ref 70–130)
GLUCOSE BLDC GLUCOMTR-MCNC: 189 MG/DL (ref 70–130)
GLUCOSE BLDC GLUCOMTR-MCNC: 79 MG/DL (ref 70–130)
GLUCOSE BLDC GLUCOMTR-MCNC: 83 MG/DL (ref 70–130)
GLUCOSE SERPL-MCNC: 64 MG/DL (ref 65–99)
HADV DNA SPEC NAA+PROBE: NOT DETECTED
HCOV 229E RNA SPEC QL NAA+PROBE: NOT DETECTED
HCOV HKU1 RNA SPEC QL NAA+PROBE: NOT DETECTED
HCOV NL63 RNA SPEC QL NAA+PROBE: NOT DETECTED
HCOV OC43 RNA SPEC QL NAA+PROBE: NOT DETECTED
HCT VFR BLD AUTO: 34.9 % (ref 37.5–51)
HGB BLD-MCNC: 11.4 G/DL (ref 13–17.7)
HMPV RNA NPH QL NAA+NON-PROBE: NOT DETECTED
HPIV1 RNA SPEC QL NAA+PROBE: NOT DETECTED
HPIV2 RNA SPEC QL NAA+PROBE: NOT DETECTED
HPIV3 RNA NPH QL NAA+PROBE: NOT DETECTED
HPIV4 P GENE NPH QL NAA+PROBE: NOT DETECTED
IMM GRANULOCYTES # BLD AUTO: 0.06 10*3/MM3 (ref 0–0.05)
IMM GRANULOCYTES NFR BLD AUTO: 0.6 % (ref 0–0.5)
LYMPHOCYTES # BLD AUTO: 1.35 10*3/MM3 (ref 0.7–3.1)
LYMPHOCYTES NFR BLD AUTO: 12.9 % (ref 19.6–45.3)
LYMPHOCYTES NFR FLD MANUAL: 5 %
M PNEUMO IGG SER IA-ACNC: NOT DETECTED
MCH RBC QN AUTO: 28.4 PG (ref 26.6–33)
MCHC RBC AUTO-ENTMCNC: 32.7 G/DL (ref 31.5–35.7)
MCV RBC AUTO: 86.8 FL (ref 79–97)
METHOD: ABNORMAL
MONOCYTES # BLD AUTO: 0.87 10*3/MM3 (ref 0.1–0.9)
MONOCYTES NFR BLD AUTO: 8.3 % (ref 5–12)
MONOS+MACROS NFR FLD: 10 %
NEUTROPHILS NFR BLD AUTO: 7.99 10*3/MM3 (ref 1.7–7)
NEUTROPHILS NFR BLD AUTO: 76.3 % (ref 42.7–76)
NEUTROPHILS NFR FLD MANUAL: 85 %
NRBC BLD AUTO-RTO: 0 /100 WBC (ref 0–0.2)
NUC CELL # FLD: 2525 /MM3
PLATELET # BLD AUTO: 222 10*3/MM3 (ref 140–450)
PMV BLD AUTO: 10.8 FL (ref 6–12)
POTASSIUM SERPL-SCNC: 4 MMOL/L (ref 3.5–5.2)
QT INTERVAL: 392 MS
RBC # BLD AUTO: 4.02 10*6/MM3 (ref 4.14–5.8)
RBC # FLD AUTO: 8650 /MM3
RHINOVIRUS RNA SPEC NAA+PROBE: DETECTED
RSV RNA NPH QL NAA+NON-PROBE: NOT DETECTED
SODIUM SERPL-SCNC: 138 MMOL/L (ref 136–145)
WBC # BLD AUTO: 10.47 10*3/MM3 (ref 3.4–10.8)

## 2021-07-08 PROCEDURE — 88312 SPECIAL STAINS GROUP 1: CPT

## 2021-07-08 PROCEDURE — 87206 SMEAR FLUORESCENT/ACID STAI: CPT | Performed by: INTERNAL MEDICINE

## 2021-07-08 PROCEDURE — 0B958ZZ DRAINAGE OF RIGHT MIDDLE LOBE BRONCHUS, VIA NATURAL OR ARTIFICIAL OPENING ENDOSCOPIC: ICD-10-PCS | Performed by: INTERNAL MEDICINE

## 2021-07-08 PROCEDURE — 88112 CYTOPATH CELL ENHANCE TECH: CPT | Performed by: INTERNAL MEDICINE

## 2021-07-08 PROCEDURE — 0B918ZZ DRAINAGE OF TRACHEA, VIA NATURAL OR ARTIFICIAL OPENING ENDOSCOPIC: ICD-10-PCS | Performed by: INTERNAL MEDICINE

## 2021-07-08 PROCEDURE — 93010 ELECTROCARDIOGRAM REPORT: CPT | Performed by: INTERNAL MEDICINE

## 2021-07-08 PROCEDURE — 87186 SC STD MICRODIL/AGAR DIL: CPT | Performed by: INTERNAL MEDICINE

## 2021-07-08 PROCEDURE — 87071 CULTURE AEROBIC QUANT OTHER: CPT | Performed by: INTERNAL MEDICINE

## 2021-07-08 PROCEDURE — 0B968ZZ DRAINAGE OF RIGHT LOWER LOBE BRONCHUS, VIA NATURAL OR ARTIFICIAL OPENING ENDOSCOPIC: ICD-10-PCS | Performed by: INTERNAL MEDICINE

## 2021-07-08 PROCEDURE — 87102 FUNGUS ISOLATION CULTURE: CPT | Performed by: INTERNAL MEDICINE

## 2021-07-08 PROCEDURE — 25010000002 PROPOFOL 10 MG/ML EMULSION: Performed by: NURSE ANESTHETIST, CERTIFIED REGISTERED

## 2021-07-08 PROCEDURE — 0B978ZZ DRAINAGE OF LEFT MAIN BRONCHUS, VIA NATURAL OR ARTIFICIAL OPENING ENDOSCOPIC: ICD-10-PCS | Performed by: INTERNAL MEDICINE

## 2021-07-08 PROCEDURE — 89051 BODY FLUID CELL COUNT: CPT | Performed by: INTERNAL MEDICINE

## 2021-07-08 PROCEDURE — 87205 SMEAR GRAM STAIN: CPT | Performed by: INTERNAL MEDICINE

## 2021-07-08 PROCEDURE — 87633 RESP VIRUS 12-25 TARGETS: CPT | Performed by: INTERNAL MEDICINE

## 2021-07-08 PROCEDURE — 0B9C8ZX DRAINAGE OF RIGHT UPPER LUNG LOBE, VIA NATURAL OR ARTIFICIAL OPENING ENDOSCOPIC, DIAGNOSTIC: ICD-10-PCS | Performed by: INTERNAL MEDICINE

## 2021-07-08 PROCEDURE — 93005 ELECTROCARDIOGRAM TRACING: CPT | Performed by: INTERNAL MEDICINE

## 2021-07-08 PROCEDURE — 88305 TISSUE EXAM BY PATHOLOGIST: CPT | Performed by: INTERNAL MEDICINE

## 2021-07-08 PROCEDURE — 0B9G8ZX DRAINAGE OF LEFT UPPER LUNG LOBE, VIA NATURAL OR ARTIFICIAL OPENING ENDOSCOPIC, DIAGNOSTIC: ICD-10-PCS | Performed by: INTERNAL MEDICINE

## 2021-07-08 PROCEDURE — 0B9H8ZX DRAINAGE OF LUNG LINGULA, VIA NATURAL OR ARTIFICIAL OPENING ENDOSCOPIC, DIAGNOSTIC: ICD-10-PCS | Performed by: INTERNAL MEDICINE

## 2021-07-08 PROCEDURE — 0B988ZZ DRAINAGE OF LEFT UPPER LOBE BRONCHUS, VIA NATURAL OR ARTIFICIAL OPENING ENDOSCOPIC: ICD-10-PCS | Performed by: INTERNAL MEDICINE

## 2021-07-08 PROCEDURE — 0B948ZZ DRAINAGE OF RIGHT UPPER LOBE BRONCHUS, VIA NATURAL OR ARTIFICIAL OPENING ENDOSCOPIC: ICD-10-PCS | Performed by: INTERNAL MEDICINE

## 2021-07-08 PROCEDURE — 99232 SBSQ HOSP IP/OBS MODERATE 35: CPT | Performed by: INTERNAL MEDICINE

## 2021-07-08 PROCEDURE — 87496 CYTOMEG DNA AMP PROBE: CPT | Performed by: INTERNAL MEDICINE

## 2021-07-08 PROCEDURE — 0B9F8ZX DRAINAGE OF RIGHT LOWER LUNG LOBE, VIA NATURAL OR ARTIFICIAL OPENING ENDOSCOPIC, DIAGNOSTIC: ICD-10-PCS | Performed by: INTERNAL MEDICINE

## 2021-07-08 PROCEDURE — 0B9D8ZX DRAINAGE OF RIGHT MIDDLE LUNG LOBE, VIA NATURAL OR ARTIFICIAL OPENING ENDOSCOPIC, DIAGNOSTIC: ICD-10-PCS | Performed by: INTERNAL MEDICINE

## 2021-07-08 PROCEDURE — 0B9J8ZX DRAINAGE OF LEFT LOWER LUNG LOBE, VIA NATURAL OR ARTIFICIAL OPENING ENDOSCOPIC, DIAGNOSTIC: ICD-10-PCS | Performed by: INTERNAL MEDICINE

## 2021-07-08 PROCEDURE — 80048 BASIC METABOLIC PNL TOTAL CA: CPT | Performed by: HOSPITALIST

## 2021-07-08 PROCEDURE — 0B9B8ZZ DRAINAGE OF LEFT LOWER LOBE BRONCHUS, VIA NATURAL OR ARTIFICIAL OPENING ENDOSCOPIC: ICD-10-PCS | Performed by: INTERNAL MEDICINE

## 2021-07-08 PROCEDURE — 85025 COMPLETE CBC W/AUTO DIFF WBC: CPT | Performed by: HOSPITALIST

## 2021-07-08 PROCEDURE — 0B938ZZ DRAINAGE OF RIGHT MAIN BRONCHUS, VIA NATURAL OR ARTIFICIAL OPENING ENDOSCOPIC: ICD-10-PCS | Performed by: INTERNAL MEDICINE

## 2021-07-08 PROCEDURE — 87077 CULTURE AEROBIC IDENTIFY: CPT | Performed by: INTERNAL MEDICINE

## 2021-07-08 PROCEDURE — 87116 MYCOBACTERIA CULTURE: CPT | Performed by: INTERNAL MEDICINE

## 2021-07-08 PROCEDURE — 82962 GLUCOSE BLOOD TEST: CPT

## 2021-07-08 RX ORDER — LIDOCAINE HYDROCHLORIDE 20 MG/ML
INJECTION, SOLUTION EPIDURAL; INFILTRATION; INTRACAUDAL; PERINEURAL AS NEEDED
Status: DISCONTINUED | OUTPATIENT
Start: 2021-07-08 | End: 2021-07-08 | Stop reason: HOSPADM

## 2021-07-08 RX ORDER — SODIUM CHLORIDE 9 MG/ML
1000 INJECTION, SOLUTION INTRAVENOUS CONTINUOUS
Status: DISCONTINUED | OUTPATIENT
Start: 2021-07-08 | End: 2021-07-10

## 2021-07-08 RX ORDER — LIDOCAINE HYDROCHLORIDE 20 MG/ML
INJECTION, SOLUTION INFILTRATION; PERINEURAL AS NEEDED
Status: DISCONTINUED | OUTPATIENT
Start: 2021-07-08 | End: 2021-07-08 | Stop reason: SURG

## 2021-07-08 RX ORDER — VANCOMYCIN HYDROCHLORIDE 1 G/200ML
15 INJECTION, SOLUTION INTRAVENOUS
Status: COMPLETED | OUTPATIENT
Start: 2021-07-09 | End: 2021-07-09

## 2021-07-08 RX ORDER — PROPOFOL 10 MG/ML
VIAL (ML) INTRAVENOUS AS NEEDED
Status: DISCONTINUED | OUTPATIENT
Start: 2021-07-08 | End: 2021-07-08 | Stop reason: SURG

## 2021-07-08 RX ORDER — DEXTROSE MONOHYDRATE 25 G/50ML
25 INJECTION, SOLUTION INTRAVENOUS
Status: DISCONTINUED | OUTPATIENT
Start: 2021-07-08 | End: 2021-07-10

## 2021-07-08 RX ORDER — NICOTINE POLACRILEX 4 MG
15 LOZENGE BUCCAL
Status: DISCONTINUED | OUTPATIENT
Start: 2021-07-08 | End: 2021-07-10

## 2021-07-08 RX ORDER — LIDOCAINE HYDROCHLORIDE 10 MG/ML
INJECTION, SOLUTION EPIDURAL; INFILTRATION; INTRACAUDAL; PERINEURAL AS NEEDED
Status: DISCONTINUED | OUTPATIENT
Start: 2021-07-08 | End: 2021-07-08 | Stop reason: HOSPADM

## 2021-07-08 RX ADMIN — PROPOFOL 140 MCG/KG/MIN: 10 INJECTION, EMULSION INTRAVENOUS at 11:47

## 2021-07-08 RX ADMIN — PROPOFOL 120 MG: 10 INJECTION, EMULSION INTRAVENOUS at 11:45

## 2021-07-08 RX ADMIN — APIXABAN 5 MG: 5 TABLET, FILM COATED ORAL at 21:46

## 2021-07-08 RX ADMIN — ALLOPURINOL 100 MG: 100 TABLET ORAL at 08:03

## 2021-07-08 RX ADMIN — DIGOXIN 125 MCG: 125 TABLET ORAL at 14:59

## 2021-07-08 RX ADMIN — SODIUM CHLORIDE 1000 ML: 9 INJECTION, SOLUTION INTRAVENOUS at 11:03

## 2021-07-08 RX ADMIN — SODIUM CHLORIDE, PRESERVATIVE FREE 10 ML: 5 INJECTION INTRAVENOUS at 08:04

## 2021-07-08 RX ADMIN — DOFETILIDE 125 MCG: 0.12 CAPSULE ORAL at 10:17

## 2021-07-08 RX ADMIN — DOFETILIDE 125 MCG: 0.12 CAPSULE ORAL at 21:46

## 2021-07-08 RX ADMIN — PANTOPRAZOLE SODIUM 40 MG: 40 TABLET, DELAYED RELEASE ORAL at 17:38

## 2021-07-08 RX ADMIN — PANTOPRAZOLE SODIUM 40 MG: 40 TABLET, DELAYED RELEASE ORAL at 08:04

## 2021-07-08 RX ADMIN — DEXTROSE MONOHYDRATE 25 G: 25 INJECTION, SOLUTION INTRAVENOUS at 07:25

## 2021-07-08 RX ADMIN — METOPROLOL TARTRATE 50 MG: 50 TABLET, FILM COATED ORAL at 21:46

## 2021-07-08 RX ADMIN — METOPROLOL TARTRATE 50 MG: 50 TABLET, FILM COATED ORAL at 08:02

## 2021-07-08 RX ADMIN — LIDOCAINE HYDROCHLORIDE 60 MG: 20 INJECTION, SOLUTION INFILTRATION; PERINEURAL at 11:45

## 2021-07-08 RX ADMIN — BUDESONIDE 3 MG: 3 CAPSULE, GELATIN COATED ORAL at 08:09

## 2021-07-08 RX ADMIN — APIXABAN 5 MG: 5 TABLET, FILM COATED ORAL at 08:03

## 2021-07-08 RX ADMIN — SODIUM CHLORIDE, PRESERVATIVE FREE 10 ML: 5 INJECTION INTRAVENOUS at 21:47

## 2021-07-08 RX ADMIN — ATORVASTATIN CALCIUM 10 MG: 20 TABLET, FILM COATED ORAL at 21:46

## 2021-07-08 NOTE — PROGRESS NOTES
"Daily progress note    Chief complaint  Doing same  No new complaint whatsoever  Denies any chest pain shortness of breath palpitation  Family at bedside    History of present illness  88-year-old [place] male who is well-known to our service with history of chronic atrial fibrillation hypertension hyperlipidemia aortic stenosis Crohn's disease pulmonary hypertension and gastroesophageal reflux disease brought to the emergency room by the wife when she noticed increased cloudiness eating less and occasional complaint of palpitation with elevated heart rate which she checked.  Patient has no chest pain shortness of breath.  Patient also denies any dizziness lightheadedness or near syncope.  Patient did not miss any medication and taking religiously.  Patient work-up in ER revealed rapid ventricular rate with history of chronic atrial fibrillation admit for management.  At the time of interview he is back to baseline has no specific complaints.      REVIEW OF SYSTEMS  All systems reviewed and negative except for those discussed in HPI.      PHYSICAL EXAM   Blood pressure 148/69, pulse 91, temperature 97.5 °F (36.4 °C), temperature source Oral, resp. rate 18, height 175.3 cm (69\"), weight 67.6 kg (149 lb 0.5 oz), SpO2 93 %.    Constitutional: No distress.  Pleasant and nontoxic  Head: Normocephalic and atraumatic.   Oropharynx: Mucous membranes are moist.   Eyes: No scleral icterus. No conjunctival pallor.  Neck: Painless range of motion noted. Neck supple.   Cardiovascular: Irregularly irregular and intermittently tachycardic and intact distal pulses.  Pulmonary/Chest: No respiratory distress.  Diffuse fine rhonchi.   Abdominal: Soft. There is no tenderness and no guarding.  Bowel sounds positive  Musculoskeletal: Moves all extremities equally. There is no pedal edema or calf tenderness.   Neurological: Alert.  Baseline strength and sensation noted.   Skin: Skin is pink, warm, and dry. No pallor.   Psychiatric: Mood " and affect normal.     LAB RESULTS  Lab Results (last 24 hours)     Procedure Component Value Units Date/Time    Body Fluid Cell Count With Differential - Lavage, Lung, Right Middle Lobe [[id number]]  (Abnormal) Collected: 07/08/21 1154    Specimen: Lavage from Lung, Right Middle Lobe Updated: 07/08/21 1509    Narrative:      The following orders were created for panel order Body Fluid Cell Count With Differential - Lavage, Lung, Right Middle Lobe.  Procedure                               Abnormality         Status                     ---------                               -----------         ------                     Body fluid cell count - ...[[id number]]  Abnormal            Final result               Body fluid differential ...[[id number]]                      Final result                 Please view results for these tests on the individual orders.    Body fluid differential - Lavage, Lung, Right Middle Lobe [[id number]] Collected: 07/08/21 1154    Specimen: Lavage from Lung, Right Middle Lobe Updated: 07/08/21 1509     Neutrophils, Fluid 85 %      Lymphocytes, Fluid 5 %      Mononuclear, Fluid 10 %     BAL Culture, Quantitative - Lavage, Lung, Right Middle Lobe [[id number]] Collected: 07/08/21 1154    Specimen: Lavage from Lung, Right Middle Lobe Updated: 07/08/21 1440     Gram Stain Many (4+) WBCs seen      Rare (1+) Mixed bacterial morphotypes seen on Gram Stain    Fungus Smear - Lavage, Lung, Right Middle Lobe [[id number]] Collected: 07/08/21 1154    Specimen: Lavage from Lung, Right Middle Lobe Updated: 07/08/21 1440     Fungal Stain No yeast or hyphal elements seen    Body fluid cell count - Lavage, Lung, Right Middle Lobe [[id number]]  (Abnormal) Collected: 07/08/21 1154    Specimen: Lavage from Lung, Right Middle Lobe Updated: 07/08/21 1422     Color, Fluid Pink     Appearance, Fluid Cloudy     RBC, Fluid 8,650 /mm3      Nucleated Cells, Fluid 2,525 /mm3      Method: Hemacytometer Method    Narrative:       No reference range established. Physician to interpret results with clinical findings.    Respiratory Panel, PCR (WITHOUT COVID) - Lavage, Lung, Right Middle Lobe [[id number]]  (Abnormal) Collected: 07/08/21 1154    Specimen: Lavage from Lung, Right Middle Lobe Updated: 07/08/21 1346     ADENOVIRUS, PCR Not Detected     Coronavirus 229E Not Detected     Coronavirus HKU1 Not Detected     Coronavirus NL63 Not Detected     Coronavirus OC43 Not Detected     Human Metapneumovirus Not Detected     Human Rhinovirus/Enterovirus Detected     Influenza B PCR Not Detected     Parainfluenza Virus 1 Not Detected     Parainfluenza Virus 2 Not Detected     Parainfluenza Virus 3 Not Detected     Parainfluenza Virus 4 Not Detected     Bordetella pertussis pcr Not Detected     Chlamydophila pneumoniae PCR Not Detected     Mycoplasma pneumo by PCR Not Detected     Influenza A PCR Not Detected     RSV, PCR Not Detected     Bordetella parapertussis PCR Not Detected    Narrative:      The coronavirus on the RVP is NOT COVID-19 and is NOT indicative of infection with COVID-19.    In the setting of a positive respiratory panel with a viral infection PLUS a negative procalcitonin without other underlying concern for bacterial infection, consider observing off antibiotics or discontinuation of antibiotics and continue supportive care. If the respiratory panel is positive for atypical bacterial infection (Bordetella pertussis, Chlamydophila pneumoniae, or Mycoplasma pneumoniae), consider antibiotic de-escalation to target atypical bacterial infection.    Fungus Culture - Lavage, Lung, Right Middle Lobe [[id number]] Collected: 07/08/21 1154    Specimen: Lavage from Lung, Right Middle Lobe Updated: 07/08/21 1222    AFB Culture - Lavage, Lung, Right Middle Lobe [[id number]] Collected: 07/08/21 1154    Specimen: Lavage from Lung, Right Middle Lobe Updated: 07/08/21 1222    Cytomegalovirus (CMV) By PCR - Lavage, Lung, Right Middle Lobe [[id number]]  Collected: 07/08/21 1154    Specimen: Lavage from Lung, Right Middle Lobe Updated: 07/08/21 1222    POC Glucose Once [[id number]]  (Normal) Collected: 07/08/21 1104    Specimen: Blood Updated: 07/08/21 1106     Glucose 83 mg/dL      Comment: Meter: [id number] : [id number] Rommel Caraballo RN       POC Glucose Once [[id number]]  (Abnormal) Collected: 07/08/21 0801    Specimen: Blood Updated: 07/08/21 0808     Glucose 189 mg/dL      Comment: Meter: [id number] : [id number] Sam Hassan RN       Basic Metabolic Panel [[id number]]  (Abnormal) Collected: 07/08/21 0436    Specimen: Blood Updated: 07/08/21 0655     Glucose 64 mg/dL      BUN 19 mg/dL      Creatinine 0.91 mg/dL      Sodium 138 mmol/L      Potassium 4.0 mmol/L      Chloride 106 mmol/L      CO2 22.8 mmol/L      Calcium 8.1 mg/dL      eGFR Non African Amer 79 mL/min/1.73      BUN/Creatinine Ratio 20.9     Anion Gap 9.2 mmol/L     Narrative:      GFR Normal >60  Chronic Kidney Disease <60  Kidney Failure <15      CBC & Differential [[id number]]  (Abnormal) Collected: 07/08/21 0436    Specimen: Blood Updated: 07/08/21 0620    Narrative:      The following orders were created for panel order CBC & Differential.  Procedure                               Abnormality         Status                     ---------                               -----------         ------                     CBC Auto Differential[[id number]]        Abnormal            Final result                 Please view results for these tests on the individual orders.    CBC Auto Differential [[id number]]  (Abnormal) Collected: 07/08/21 0436    Specimen: Blood Updated: 07/08/21 0620     WBC 10.47 10*3/mm3      RBC 4.02 10*6/mm3      Hemoglobin 11.4 g/dL      Hematocrit 34.9 %      MCV 86.8 fL      MCH 28.4 pg      MCHC 32.7 g/dL      RDW 16.3 %      RDW-SD 52.1 fl      MPV 10.8 fL      Platelets 222 10*3/mm3      Neutrophil % 76.3 %      Lymphocyte % 12.9 %      Monocyte % 8.3 %      Eosinophil % 1.2 %       Basophil % 0.7 %      Immature Grans % 0.6 %      Neutrophils, Absolute 7.99 10*3/mm3      Lymphocytes, Absolute 1.35 10*3/mm3      Monocytes, Absolute 0.87 10*3/mm3      Eosinophils, Absolute 0.13 10*3/mm3      Basophils, Absolute 0.07 10*3/mm3      Immature Grans, Absolute 0.06 10*3/mm3      nRBC 0.0 /100 WBC     POC Glucose Once [[id number]]  (Normal) Collected: 07/08/21 0327    Specimen: Blood Updated: 07/08/21 0340     Glucose 79 mg/dL      Comment: Meter: [id number] : [id number] Phyzios JORDAN       POC Glucose Once [[id number]]  (Normal) Collected: 07/07/21 2335    Specimen: Blood Updated: 07/07/21 2348     Glucose 86 mg/dL      Comment: Meter: [id number] : [id number] Phyzios JORDAN       POC Glucose Once [[id number]]  (Normal) Collected: 07/07/21 1938    Specimen: Blood Updated: 07/07/21 1947     Glucose 108 mg/dL      Comment: Meter: [id number] : [id number] Phyzios JORDAN           Imaging Results (Last 24 Hours)     ** No results found for the last 24 hours. **           ECG 12 Lead  Component   Ref Range & Units 7/6/21 1123 7/5/21 2135   QT Interval   ms 314 P  330 P         HEART RATE= 101  bpm  RR Interval= 596  ms  VT Interval=   ms  P Horizontal Axis=   deg  P Front Axis=   deg  QRSD Interval= 76  ms  QT Interval= 314  ms  QRS Axis= 34  deg  T Wave Axis= 101  deg  - ABNORMAL ECG -  Atrial fibrillation             Current Facility-Administered Medications:   •  allopurinol (ZYLOPRIM) tablet 100 mg, 100 mg, Oral, Daily, Calvin Bergeron MD, 100 mg at 07/08/21 0803  •  apixaban (ELIQUIS) tablet 5 mg, 5 mg, Oral, Q12H, Calvin Bergeron MD, 5 mg at 07/08/21 0803  •  atorvastatin (LIPITOR) tablet 10 mg, 10 mg, Oral, Nightly, Calvin Bergeron MD, 10 mg at 07/07/21 2017  •  Budesonide (ENTOCORT EC) 24 hr capsule 3 mg, 3 mg, Oral, Daily, Calvin Bergeron MD, 3 mg at 07/08/21 0809  •  dextrose (D50W) 25 g/ 50mL Intravenous Solution 25 g, 25 g, Intravenous, Q15 Min PRN, Alla Luna MD, 25 g at  07/08/21 0725  •  dextrose (GLUTOSE) oral gel 15 g, 15 g, Oral, Q15 Min PRN, Alla Luna MD  •  digoxin (LANOXIN) tablet 125 mcg, 125 mcg, Oral, Daily, Shelbie Bergeron MD, 125 mcg at 07/08/21 1459  •  dofetilide (TIKOSYN) capsule 125 mcg, 125 mcg, Oral, Q12H, Nixon Brown Jr., MD, 125 mcg at 07/08/21 1017  •  glucagon (human recombinant) (GLUCAGEN DIAGNOSTIC) injection 1 mg, 1 mg, Subcutaneous, Q15 Min PRN, Alla Luna MD  •  insulin lispro (ADMELOG) injection 0-7 Units, 0-7 Units, Subcutaneous, TID AC, Shelbie Bergeron MD, 2 Units at 07/06/21 1734  •  ipratropium-albuterol (DUO-NEB) nebulizer solution 3 mL, 3 mL, Nebulization, Q4H PRN, Shelbie Bergeron MD  •  metoprolol tartrate (LOPRESSOR) tablet 50 mg, 50 mg, Oral, BID, Shelbie Bergeron MD, 50 mg at 07/08/21 0802  •  pantoprazole (PROTONIX) EC tablet 40 mg, 40 mg, Oral, BID AC, Shelbie Bergeron MD, 40 mg at 07/08/21 0804  •  [COMPLETED] Insert peripheral IV, , , Once **AND** sodium chloride 0.9 % flush 10 mL, 10 mL, Intravenous, PRN, Jeromy Petty MD, 10 mL at 07/08/21 0804  •  sodium chloride 0.9 % infusion 1,000 mL, 1,000 mL, Intravenous, Continuous, Chidi Oconnor MD, Last Rate: 25 mL/hr at 07/08/21 1103, Restarted at 07/08/21 1215     ASSESSMENT  Chronic atrial fibrillation with rapid ventricular rate  Hypertension  Hyperlipidemia  Aortic stenosis  Pulmonary fibrosis/bronchiectasis status post bronchoscopy  Pulmonary hypertension  History of Crohn's disease  Gastroesophageal reflux disease    PLAN  CPM  Tikosyn Lanoxin and Lopressor  Anticoagulation  Pacemaker per cardiology  EP service to follow patient  Continue home medications  Stress ulcer DVT prophylaxis  Supportive care  Discussed with family and nursing staff  Follow closely further recommendation according to hospital course    SHELBIE BERGERON MD

## 2021-07-08 NOTE — PROGRESS NOTES
Dr Nettles and I saw patient this afternoon, had bronch and did well---he would like to proceed with staged pacemaker and AV node ablation.    Will plan on pacemaker tomorrow and then likely home on Saturday and return for AV node ablation in a couple of weeks.

## 2021-07-08 NOTE — PLAN OF CARE
Goal Outcome Evaluation:  Plan of Care Reviewed With: patient        Progress: improving  Outcome Summary: Transfer from CCU, received from endo after bronch. Plan for pacemaker placement tomorrow, NPO after mN, will obtain consent. No c/o pain or soa. Currently on room air. Wife at bedside. SR on monitor. RVP from bronch came back +rhinovirus, placed in droplet isolation.

## 2021-07-08 NOTE — PROGRESS NOTES
[facility]      Mar/chart reviewed  Follow up bronchiectasis, chronic cough, post inflammatory pulmonary fibrosis  Some cough still  No chest pain    Vital Sign Min/Max for last 24 hours  Temp  Min: 97.9 °F (36.6 °C)  Max: 98.3 °F (36.8 °C)   BP  Min: 132/55  Max: 169/69   Pulse  Min: 57  Max: 79   Resp  Min: 16  Max: 16   SpO2  Min: 88 %  Max: 98 %   No data recorded   No data recorded     Appears ill, axox3,   perrl, eomi, no icterus,  mmm, no jvd, trachea midline, neck supple,  chest decreased ae bilaterally, + crackles, no wheezes,   rrr,   soft, nt, nd +bs,  no c/c/ e  Skin warm, dry no rashes    A/P;  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 bronciectasis -- ok to stop antiboitics, pulm toilet  3. afib with rvr -- as per cards --possible ablation and pacer placement  4. Aortic stenosis  Diagnostic and therapeutic bronch today  Can d/c later today from my standpoint if he is doing well

## 2021-07-08 NOTE — ANESTHESIA PROCEDURE NOTES
Airway  Urgency: elective    Date/Time: 7/8/2021 11:46 AM  End Time:7/8/2021 11:46 AM    General Information and Staff    Patient location during procedure: OR  Anesthesiologist: Kevin Hudson MD  CRNA: Yamel Jade CRNA    Indications and Patient Condition  Indications for airway management: airway protection    Preoxygenated: yes  Mask difficulty assessment: 1 - vent by mask    Final Airway Details  Final airway type: supraglottic airway      Successful airway: bronch  Size 5  Airway Seal Pressure (cm H2O): 50    Number of attempts at approach: 1  Assessment: lips, teeth, and gum same as pre-op

## 2021-07-08 NOTE — NURSING NOTE
NOTIFIED DR. ANGELA REGARDING PATIENT LAST DOSE ELIQUIS TODAY. MD STATES OK TO PROCEED. NO NEW ORDERS.

## 2021-07-08 NOTE — ANESTHESIA PREPROCEDURE EVALUATION
Anesthesia Evaluation     Patient summary reviewed and Nursing notes reviewed   NPO Solid Status: > 8 hours  NPO Liquid Status: > 6 hours           Airway   Mallampati: I  TM distance: >3 FB  Neck ROM: full  No difficulty expected  Dental - normal exam     Pulmonary - normal exam   (+) pneumonia , COPD,   Cardiovascular - normal exam    (+) hypertension, valvular problems/murmurs AS, dysrhythmias Atrial Fib, hyperlipidemia,       Neuro/Psych  (+) TIA, CVA, dizziness/light headedness,     GI/Hepatic/Renal/Endo    (+)  PUD, GI bleeding , renal disease CRI,     Musculoskeletal (-) negative ROS    Abdominal  - normal exam    Bowel sounds: normal.   Substance History - negative use     OB/GYN negative ob/gyn ROS         Other                      Anesthesia Plan    ASA 4     general   total IV anesthesia    Anesthetic plan, all risks, benefits, and alternatives have been provided, discussed and informed consent has been obtained with: patient.

## 2021-07-08 NOTE — PROGRESS NOTES
"[facility]    Patient Name: Asia Ly  :1932  88 y.o.  LOS: 2  Encounter Provider: Nixon Brown Jr, MD      Patient Care Team:  uLbna Lobo MD as PCP - General  Lubna Lobo MD as PCP - Family Medicine    Chief Complaint: Follow-up paroxysmal atrial fibrillation, pulmonary fibrosis    Interval History: Patient remains in sinus rhythm.  Bronchoscopy performed today.  He is recovering from sedation and doing well.  No acute issues overnight.       Objective   Vital Signs  Temp:  [97.5 °F (36.4 °C)-98.3 °F (36.8 °C)] 97.5 °F (36.4 °C)  Heart Rate:  [57-91] 91  Resp:  [15-18] 18  BP: (113-169)/(54-86) 148/69    Intake/Output Summary (Last 24 hours) at 2021 1454  Last data filed at 2021 1251  Gross per 24 hour   Intake 410 ml   Output 1200 ml   Net -790 ml     Flowsheet Rows      First Filed Value   Admission Height  175.3 cm (69\") Documented at 2021 2200   Admission Weight  67.6 kg (149 lb 0.5 oz) Documented at 2021 1635            Physical Exam  Vitals reviewed.   Constitutional:       Appearance: Healthy appearance. Not in distress.   Neck:      Vascular: No JVR. JVD normal.   Pulmonary:      Effort: Pulmonary effort is normal.      Breath sounds: Normal breath sounds. No wheezing. No rhonchi. No rales.   Chest:      Chest wall: Not tender to palpatation.   Cardiovascular:      PMI at left midclavicular line. Regular rhythm. Normal S1. Normal S2.      Murmurs: There is no murmur.      No gallop. No click. No rub.   Pulses:     Intact distal pulses.   Edema:     Peripheral edema absent.   Abdominal:      General: Bowel sounds are normal.      Palpations: Abdomen is soft.      Tenderness: There is no abdominal tenderness.   Musculoskeletal: Normal range of motion.         General: No tenderness. Skin:     General: Skin is warm and dry.   Neurological:      General: No focal deficit present.      Mental Status: Alert and oriented to person, place and " time.     Physical exam was reviewed, updated and amended when necessary.    Pertinent Test Results:  Results from last 7 days   Lab Units 07/08/21 0436 07/07/21 0523 07/06/21 0617 07/05/21  2154   SODIUM mmol/L 138 137 139 134*   POTASSIUM mmol/L 4.0 4.4 4.7 4.7   CHLORIDE mmol/L 106 105 107 102   CO2 mmol/L 22.8 22.5 22.2 20.8*   BUN mg/dL 19 20 22 25*   CREATININE mg/dL 0.91 1.11 1.19 1.38*   GLUCOSE mg/dL 64* 84 84 247*   CALCIUM mg/dL 8.1* 8.6 8.7 8.8   AST (SGOT) U/L  --  14  --   --    ALT (SGPT) U/L  --  16  --   --      Results from last 7 days   Lab Units 07/05/21  2154   TROPONIN T ng/mL <0.010     Results from last 7 days   Lab Units 07/08/21 0436 07/07/21 0523 07/06/21 0617 07/05/21  2154   WBC 10*3/mm3 10.47 9.88 11.04* 10.71   HEMOGLOBIN g/dL 11.4* 11.3* 11.9* 11.7*   HEMATOCRIT % 34.9* 35.2* 36.3* 37.1*   PLATELETS 10*3/mm3 222 247 229 246     Results from last 7 days   Lab Units 07/06/21 0617   INR  1.35*     Results from last 7 days   Lab Units 07/06/21 0617 07/05/21  2154   MAGNESIUM mg/dL 1.8 1.9     Results from last 7 days   Lab Units 07/07/21  0523   CHOLESTEROL mg/dL 117   TRIGLYCERIDES mg/dL 63   HDL CHOL mg/dL 37*     Results from last 7 days   Lab Units 07/07/21 0523   PROBNP pg/mL 2,814.0*     Results from last 7 days   Lab Units 07/07/21 0523   TSH uIU/mL 1.450           Medication Review:   allopurinol, 100 mg, Oral, Daily  apixaban, 5 mg, Oral, Q12H  atorvastatin, 10 mg, Oral, Nightly  Budesonide, 3 mg, Oral, Daily  digoxin, 125 mcg, Oral, Daily  dofetilide, 125 mcg, Oral, Q12H  insulin lispro, 0-7 Units, Subcutaneous, TID AC  metoprolol tartrate, 50 mg, Oral, BID  pantoprazole, 40 mg, Oral, BID AC         sodium chloride, 1,000 mL, Last Rate: 1,000 mL (07/08/21 1103)        Assessment/Plan   1. Atrial fibrillation with RVR -patient converted to sinus rhythm.  Continue beta-blocker and digoxin.  Dofetilide restarted. Continue apixaban.  Appreciate EP input.  Family have  decided to move forward with pacemaker during this hospital stay and AV johanne ablation few weeks.  EP is okay with doing the procedure while on Eliquis.  2. CKD -creatinine at baseline.  Electrolytes within normal range.  Euvolemic.  3. History of pulmonary fibrosis -recent pneumonia with full course of antibiotics.  Lungs sound clear today on clinical exam.    Pulmonology performed bronchoscopy today.  4. Aortic stenosis -moderate by echocardiogram last year.  No indication for repeat echocardiogram at this time as it will not add to current management.  5. History of Crohn's disease  6. Prediabetes    Nixon Brown Jr, MD  [facility]  07/08/21  14:54 EDT

## 2021-07-08 NOTE — ANESTHESIA POSTPROCEDURE EVALUATION
"Patient: Asia Ly    Procedure Summary     Date: 07/08/21 Room / Location:  [facility] /  [facility]    Anesthesia Start: 1137 Anesthesia Stop: 1216    Procedure: BRONCHOSCOPY with BAL (N/A ) Diagnosis:       Pulmonary fibrosis (CMS/HCC)      (Pulmonary fibrosis (CMS/HCC) [J84.10])    Surgeons: Chidi Oconnor MD Provider: Kevin Hudson MD    Anesthesia Type: general ASA Status: 4          Anesthesia Type: general    Vitals  Vitals Value Taken Time   /68 07/08/21 1228   Temp     Pulse 63 07/08/21 1228   Resp 18 07/08/21 1228   SpO2 97 % 07/08/21 1228           Post Anesthesia Care and Evaluation    Patient location during evaluation: PACU  Patient participation: complete - patient participated  Level of consciousness: awake  Pain score: 0  Pain management: adequate  Airway patency: patent  Anesthetic complications: No anesthetic complications  PONV Status: none  Cardiovascular status: acceptable  Respiratory status: acceptable  Hydration status: acceptable    Comments: /69 (BP Location: Right arm, Patient Position: Lying)   Pulse 91   Temp 36.4 °C (97.5 °F) (Oral)   Resp 18   Ht 175.3 cm (69\")   Wt 67.6 kg (149 lb 0.5 oz)   SpO2 93%   BMI 22.01 kg/m²       "

## 2021-07-08 NOTE — NURSING NOTE
Pt VSS, rested well overnight, consent signed for bronch planned for this am, been NPO since midnight, no complaints, no further changes, CTM

## 2021-07-09 ENCOUNTER — APPOINTMENT (OUTPATIENT)
Dept: GENERAL RADIOLOGY | Facility: HOSPITAL | Age: 86
End: 2021-07-09

## 2021-07-09 LAB
ANION GAP SERPL CALCULATED.3IONS-SCNC: 13.9 MMOL/L (ref 5–15)
BASOPHILS # BLD AUTO: 0.07 10*3/MM3 (ref 0–0.2)
BASOPHILS NFR BLD AUTO: 0.6 % (ref 0–1.5)
BUN SERPL-MCNC: 20 MG/DL (ref 8–23)
BUN/CREAT SERPL: 17.2 (ref 7–25)
CALCIUM SPEC-SCNC: 8.8 MG/DL (ref 8.6–10.5)
CHLORIDE SERPL-SCNC: 98 MMOL/L (ref 98–107)
CO2 SERPL-SCNC: 22.1 MMOL/L (ref 22–29)
CREAT SERPL-MCNC: 1.16 MG/DL (ref 0.76–1.27)
DEPRECATED RDW RBC AUTO: 54.2 FL (ref 37–54)
EOSINOPHIL # BLD AUTO: 0.09 10*3/MM3 (ref 0–0.4)
EOSINOPHIL NFR BLD AUTO: 0.7 % (ref 0.3–6.2)
ERYTHROCYTE [DISTWIDTH] IN BLOOD BY AUTOMATED COUNT: 16.6 % (ref 12.3–15.4)
GFR SERPL CREATININE-BSD FRML MDRD: 59 ML/MIN/1.73
GLUCOSE BLDC GLUCOMTR-MCNC: 173 MG/DL (ref 70–130)
GLUCOSE BLDC GLUCOMTR-MCNC: 86 MG/DL (ref 70–130)
GLUCOSE BLDC GLUCOMTR-MCNC: 87 MG/DL (ref 70–130)
GLUCOSE BLDC GLUCOMTR-MCNC: 90 MG/DL (ref 70–130)
GLUCOSE SERPL-MCNC: 139 MG/DL (ref 65–99)
HCT VFR BLD AUTO: 42 % (ref 37.5–51)
HGB BLD-MCNC: 13.2 G/DL (ref 13–17.7)
IMM GRANULOCYTES # BLD AUTO: 0.11 10*3/MM3 (ref 0–0.05)
IMM GRANULOCYTES NFR BLD AUTO: 0.9 % (ref 0–0.5)
LYMPHOCYTES # BLD AUTO: 0.92 10*3/MM3 (ref 0.7–3.1)
LYMPHOCYTES NFR BLD AUTO: 7.5 % (ref 19.6–45.3)
MCH RBC QN AUTO: 27.8 PG (ref 26.6–33)
MCHC RBC AUTO-ENTMCNC: 31.4 G/DL (ref 31.5–35.7)
MCV RBC AUTO: 88.6 FL (ref 79–97)
MONOCYTES # BLD AUTO: 0.95 10*3/MM3 (ref 0.1–0.9)
MONOCYTES NFR BLD AUTO: 7.7 % (ref 5–12)
NEUTROPHILS NFR BLD AUTO: 10.18 10*3/MM3 (ref 1.7–7)
NEUTROPHILS NFR BLD AUTO: 82.6 % (ref 42.7–76)
NRBC BLD AUTO-RTO: 0 /100 WBC (ref 0–0.2)
PLATELET # BLD AUTO: 241 10*3/MM3 (ref 140–450)
PMV BLD AUTO: 10.4 FL (ref 6–12)
POTASSIUM SERPL-SCNC: 3.7 MMOL/L (ref 3.5–5.2)
QT INTERVAL: 390 MS
RBC # BLD AUTO: 4.74 10*6/MM3 (ref 4.14–5.8)
SODIUM SERPL-SCNC: 134 MMOL/L (ref 136–145)
WBC # BLD AUTO: 12.32 10*3/MM3 (ref 3.4–10.8)

## 2021-07-09 PROCEDURE — 99152 MOD SED SAME PHYS/QHP 5/>YRS: CPT | Performed by: INTERNAL MEDICINE

## 2021-07-09 PROCEDURE — 25010000002 FUROSEMIDE PER 20 MG: Performed by: NURSE PRACTITIONER

## 2021-07-09 PROCEDURE — 93005 ELECTROCARDIOGRAM TRACING: CPT | Performed by: NURSE PRACTITIONER

## 2021-07-09 PROCEDURE — 02HK3JZ INSERTION OF PACEMAKER LEAD INTO RIGHT VENTRICLE, PERCUTANEOUS APPROACH: ICD-10-PCS | Performed by: INTERNAL MEDICINE

## 2021-07-09 PROCEDURE — C1898 LEAD, PMKR, OTHER THAN TRANS: HCPCS | Performed by: INTERNAL MEDICINE

## 2021-07-09 PROCEDURE — 25010000002 FENTANYL CITRATE (PF) 50 MCG/ML SOLUTION: Performed by: INTERNAL MEDICINE

## 2021-07-09 PROCEDURE — C1887 CATHETER, GUIDING: HCPCS | Performed by: INTERNAL MEDICINE

## 2021-07-09 PROCEDURE — 25010000003 LIDOCAINE 1 % SOLUTION: Performed by: INTERNAL MEDICINE

## 2021-07-09 PROCEDURE — 02H63JZ INSERTION OF PACEMAKER LEAD INTO RIGHT ATRIUM, PERCUTANEOUS APPROACH: ICD-10-PCS | Performed by: INTERNAL MEDICINE

## 2021-07-09 PROCEDURE — 33208 INSRT HEART PM ATRIAL & VENT: CPT | Performed by: INTERNAL MEDICINE

## 2021-07-09 PROCEDURE — 99232 SBSQ HOSP IP/OBS MODERATE 35: CPT | Performed by: INTERNAL MEDICINE

## 2021-07-09 PROCEDURE — 82962 GLUCOSE BLOOD TEST: CPT

## 2021-07-09 PROCEDURE — 25010000002 VANCOMYCIN PER 500 MG: Performed by: NURSE PRACTITIONER

## 2021-07-09 PROCEDURE — 0 IOPAMIDOL PER 1 ML: Performed by: INTERNAL MEDICINE

## 2021-07-09 PROCEDURE — C1894 INTRO/SHEATH, NON-LASER: HCPCS | Performed by: INTERNAL MEDICINE

## 2021-07-09 PROCEDURE — 85025 COMPLETE CBC W/AUTO DIFF WBC: CPT | Performed by: HOSPITALIST

## 2021-07-09 PROCEDURE — 0JH606Z INSERTION OF PACEMAKER, DUAL CHAMBER INTO CHEST SUBCUTANEOUS TISSUE AND FASCIA, OPEN APPROACH: ICD-10-PCS | Performed by: INTERNAL MEDICINE

## 2021-07-09 PROCEDURE — 99153 MOD SED SAME PHYS/QHP EA: CPT | Performed by: INTERNAL MEDICINE

## 2021-07-09 PROCEDURE — C1785 PMKR, DUAL, RATE-RESP: HCPCS | Performed by: INTERNAL MEDICINE

## 2021-07-09 PROCEDURE — 25010000002 MIDAZOLAM PER 1 MG: Performed by: INTERNAL MEDICINE

## 2021-07-09 PROCEDURE — 71045 X-RAY EXAM CHEST 1 VIEW: CPT

## 2021-07-09 PROCEDURE — 80048 BASIC METABOLIC PNL TOTAL CA: CPT | Performed by: NURSE PRACTITIONER

## 2021-07-09 DEVICE — LD PM CAPSUREFIX NOVUS5076 52CM: Type: IMPLANTABLE DEVICE | Status: FUNCTIONAL

## 2021-07-09 DEVICE — GEN PM AZURE XT SURESCAN DR MRI: Type: IMPLANTABLE DEVICE | Status: FUNCTIONAL

## 2021-07-09 DEVICE — IMPLANTABLE DEVICE: Type: IMPLANTABLE DEVICE | Status: FUNCTIONAL

## 2021-07-09 RX ORDER — MIDAZOLAM HYDROCHLORIDE 1 MG/ML
INJECTION INTRAMUSCULAR; INTRAVENOUS AS NEEDED
Status: DISCONTINUED | OUTPATIENT
Start: 2021-07-09 | End: 2021-07-11 | Stop reason: HOSPADM

## 2021-07-09 RX ORDER — SODIUM CHLORIDE 9 MG/ML
INJECTION, SOLUTION INTRAVENOUS CONTINUOUS PRN
Status: DISCONTINUED | OUTPATIENT
Start: 2021-07-09 | End: 2021-07-11 | Stop reason: HOSPADM

## 2021-07-09 RX ORDER — NALOXONE HCL 0.4 MG/ML
0.4 VIAL (ML) INJECTION
Status: DISCONTINUED | OUTPATIENT
Start: 2021-07-09 | End: 2021-07-10

## 2021-07-09 RX ORDER — HYDROCODONE BITARTRATE AND ACETAMINOPHEN 5; 325 MG/1; MG/1
1 TABLET ORAL EVERY 4 HOURS PRN
Status: DISCONTINUED | OUTPATIENT
Start: 2021-07-09 | End: 2021-07-12

## 2021-07-09 RX ORDER — HYDROMORPHONE HYDROCHLORIDE 1 MG/ML
0.5 INJECTION, SOLUTION INTRAMUSCULAR; INTRAVENOUS; SUBCUTANEOUS
Status: DISCONTINUED | OUTPATIENT
Start: 2021-07-09 | End: 2021-07-10

## 2021-07-09 RX ORDER — ACETAMINOPHEN 325 MG/1
650 TABLET ORAL EVERY 4 HOURS PRN
Status: DISCONTINUED | OUTPATIENT
Start: 2021-07-09 | End: 2021-07-12

## 2021-07-09 RX ORDER — FUROSEMIDE 10 MG/ML
20 INJECTION INTRAMUSCULAR; INTRAVENOUS ONCE
Status: COMPLETED | OUTPATIENT
Start: 2021-07-09 | End: 2021-07-09

## 2021-07-09 RX ORDER — SODIUM CHLORIDE 0.9 % (FLUSH) 0.9 %
3 SYRINGE (ML) INJECTION EVERY 12 HOURS SCHEDULED
Status: DISCONTINUED | OUTPATIENT
Start: 2021-07-09 | End: 2021-07-12 | Stop reason: HOSPADM

## 2021-07-09 RX ORDER — ACETAMINOPHEN 650 MG/1
650 SUPPOSITORY RECTAL EVERY 4 HOURS PRN
Status: DISCONTINUED | OUTPATIENT
Start: 2021-07-09 | End: 2021-07-10

## 2021-07-09 RX ORDER — LIDOCAINE HYDROCHLORIDE 10 MG/ML
INJECTION, SOLUTION INFILTRATION; PERINEURAL AS NEEDED
Status: DISCONTINUED | OUTPATIENT
Start: 2021-07-09 | End: 2021-07-11 | Stop reason: HOSPADM

## 2021-07-09 RX ORDER — FENTANYL CITRATE 50 UG/ML
INJECTION, SOLUTION INTRAMUSCULAR; INTRAVENOUS AS NEEDED
Status: DISCONTINUED | OUTPATIENT
Start: 2021-07-09 | End: 2021-07-11 | Stop reason: HOSPADM

## 2021-07-09 RX ORDER — SODIUM CHLORIDE 0.9 % (FLUSH) 0.9 %
10 SYRINGE (ML) INJECTION AS NEEDED
Status: DISCONTINUED | OUTPATIENT
Start: 2021-07-09 | End: 2021-07-12

## 2021-07-09 RX ADMIN — METOPROLOL TARTRATE 50 MG: 50 TABLET, FILM COATED ORAL at 08:55

## 2021-07-09 RX ADMIN — FUROSEMIDE 20 MG: 10 INJECTION, SOLUTION INTRAMUSCULAR; INTRAVENOUS at 18:08

## 2021-07-09 RX ADMIN — DOFETILIDE 125 MCG: 0.12 CAPSULE ORAL at 16:44

## 2021-07-09 RX ADMIN — VANCOMYCIN HYDROCHLORIDE 1000 MG: 1 INJECTION, SOLUTION INTRAVENOUS at 10:30

## 2021-07-09 RX ADMIN — PANTOPRAZOLE SODIUM 40 MG: 40 TABLET, DELAYED RELEASE ORAL at 16:45

## 2021-07-09 RX ADMIN — SODIUM CHLORIDE, PRESERVATIVE FREE 3 ML: 5 INJECTION INTRAVENOUS at 23:04

## 2021-07-09 RX ADMIN — METOPROLOL TARTRATE 50 MG: 50 TABLET, FILM COATED ORAL at 23:03

## 2021-07-09 RX ADMIN — DOFETILIDE 125 MCG: 0.12 CAPSULE ORAL at 23:03

## 2021-07-09 RX ADMIN — BUDESONIDE 3 MG: 3 CAPSULE, GELATIN COATED ORAL at 16:43

## 2021-07-09 RX ADMIN — APIXABAN 5 MG: 5 TABLET, FILM COATED ORAL at 08:55

## 2021-07-09 RX ADMIN — ATORVASTATIN CALCIUM 10 MG: 20 TABLET, FILM COATED ORAL at 23:03

## 2021-07-09 RX ADMIN — ALLOPURINOL 100 MG: 100 TABLET ORAL at 16:44

## 2021-07-09 RX ADMIN — DIGOXIN 125 MCG: 125 TABLET ORAL at 16:43

## 2021-07-09 RX ADMIN — APIXABAN 5 MG: 5 TABLET, FILM COATED ORAL at 23:03

## 2021-07-09 NOTE — PLAN OF CARE
Goal Outcome Evaluation:           Progress: improving  Outcome Summary: VSS; pt had a pacemaker placed, incision intact; chest xray done, one dose of lasix was ordered; continue to monitor.

## 2021-07-09 NOTE — PLAN OF CARE
Goal Outcome Evaluation:  Plan of Care Reviewed With: patient        Progress: improving  Outcome Summary: VSS. No complaints of pain, no signs of distress. Pacemaker to be placed today, NPO since midnight. Droplet precautions maintained due to Rhinovirus. Overall an uneventful shift. WCM

## 2021-07-09 NOTE — NURSING NOTE
JAMES Graves said Dr. Nettles is okay w/ pt taking metoprolol and eliquis before pacemaker is placed.

## 2021-07-09 NOTE — PROGRESS NOTES
[facility]      Mar/chart reviewed  Follow up bronchiectasis, chronic cough, post inflammatory pulmonary fibrosis  Some cough still  No chest pain    Vital Sign Min/Max for last 24 hours  Temp  Min: 97.5 °F (36.4 °C)  Max: 97.8 °F (36.6 °C)   BP  Min: 142/59  Max: 155/62   Pulse  Min: 66  Max: 91   Resp  Min: 16  Max: 18   SpO2  Min: 90 %  Max: 93 %   No data recorded   No data recorded   Appears ill, axox3,   perrl, eomi, no icterus,  mmm, no jvd, trachea midline, neck supple,  chest decreased ae bilaterally, + crackles, no wheezes,   rrr,   soft, nt, nd +bs,  no c/c/ e  Skin warm, dry no rashes     A/P;  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 bronciectasis -- ok to stop antiboitics, pulm toilet  3. afib with rvr -- as per cards --possible ablation and pacer placement  4. Aortic stenosis  5. Rhinovirus bronchitis    Can d/c anytime from my standpoint   Follow up in office 2-4 weeks, monitor cultures

## 2021-07-09 NOTE — PROGRESS NOTES
"[facility]    Patient Name: Asia Ly  :1932  88 y.o.  LOS: 3  Encounter Provider: Nixon Brown Jr, MD      Patient Care Team:  Lubna Lobo MD as PCP - General  Lubna Lobo MD as PCP - Family Medicine    Chief Complaint: Follow-up paroxysmal atrial fibrillation, pulmonary fibrosis    Interval History: Patient remains in sinus rhythm.  Plan for pacemaker today.  No acute issues overnight.       Objective   Vital Signs  Temp:  [97.5 °F (36.4 °C)-97.8 °F (36.6 °C)] 97.7 °F (36.5 °C)  Heart Rate:  [66-85] 66  Resp:  [14-18] 18  BP: (142-155)/(59-62) 142/59    Intake/Output Summary (Last 24 hours) at 2021 1611  Last data filed at 2021 1510  Gross per 24 hour   Intake 475 ml   Output --   Net 475 ml     Flowsheet Rows      First Filed Value   Admission Height  175.3 cm (69\") Documented at 2021 2200   Admission Weight  67.6 kg (149 lb 0.5 oz) Documented at 2021 1635            Physical Exam  Vitals reviewed.   Constitutional:       Appearance: Healthy appearance. Not in distress.   Neck:      Vascular: No JVR. JVD normal.   Pulmonary:      Effort: Pulmonary effort is normal.      Breath sounds: Normal breath sounds. No wheezing. No rhonchi. No rales.   Chest:      Chest wall: Not tender to palpatation.   Cardiovascular:      PMI at left midclavicular line. Regular rhythm. Normal S1. Normal S2.      Murmurs: There is no murmur.      No gallop. No click. No rub.   Pulses:     Intact distal pulses.   Edema:     Peripheral edema absent.   Abdominal:      General: Bowel sounds are normal.      Palpations: Abdomen is soft.      Tenderness: There is no abdominal tenderness.   Musculoskeletal: Normal range of motion.         General: No tenderness. Skin:     General: Skin is warm and dry.   Neurological:      General: No focal deficit present.      Mental Status: Alert and oriented to person, place and time.     Physical exam was reviewed, updated and amended when " necessary.    Pertinent Test Results:  Results from last 7 days   Lab Units 07/08/21  0436 07/07/21  0523 07/06/21  0617 07/05/21  2154   SODIUM mmol/L 138 137 139 134*   POTASSIUM mmol/L 4.0 4.4 4.7 4.7   CHLORIDE mmol/L 106 105 107 102   CO2 mmol/L 22.8 22.5 22.2 20.8*   BUN mg/dL 19 20 22 25*   CREATININE mg/dL 0.91 1.11 1.19 1.38*   GLUCOSE mg/dL 64* 84 84 247*   CALCIUM mg/dL 8.1* 8.6 8.7 8.8   AST (SGOT) U/L  --  14  --   --    ALT (SGPT) U/L  --  16  --   --      Results from last 7 days   Lab Units 07/05/21  2154   TROPONIN T ng/mL <0.010     Results from last 7 days   Lab Units 07/08/21 0436 07/07/21  0523 07/06/21  0617 07/05/21  2154   WBC 10*3/mm3 10.47 9.88 11.04* 10.71   HEMOGLOBIN g/dL 11.4* 11.3* 11.9* 11.7*   HEMATOCRIT % 34.9* 35.2* 36.3* 37.1*   PLATELETS 10*3/mm3 222 247 229 246     Results from last 7 days   Lab Units 07/06/21 0617   INR  1.35*     Results from last 7 days   Lab Units 07/06/21  0617 07/05/21  2154   MAGNESIUM mg/dL 1.8 1.9     Results from last 7 days   Lab Units 07/07/21  0523   CHOLESTEROL mg/dL 117   TRIGLYCERIDES mg/dL 63   HDL CHOL mg/dL 37*     Results from last 7 days   Lab Units 07/07/21  0523   PROBNP pg/mL 2,814.0*     Results from last 7 days   Lab Units 07/07/21  0523   TSH uIU/mL 1.450           Medication Review:   [MAR Hold] allopurinol, 100 mg, Oral, Daily  [MAR Hold] apixaban, 5 mg, Oral, Q12H  [MAR Hold] atorvastatin, 10 mg, Oral, Nightly  [MAR Hold] Budesonide, 3 mg, Oral, Daily  [MAR Hold] digoxin, 125 mcg, Oral, Daily  dofetilide, 125 mcg, Oral, Q12H  [MAR Hold] insulin lispro, 0-7 Units, Subcutaneous, TID AC  metoprolol tartrate, 50 mg, Oral, BID  [MAR Hold] pantoprazole, 40 mg, Oral, BID AC         sodium chloride, 1,000 mL, Last Rate: 1,000 mL (07/08/21 1103)  sodium chloride, , Last Rate: 50 mL/hr (07/09/21 1324)        Assessment/Plan   1. Atrial fibrillation with RVR -patient converted to sinus rhythm.  Continue beta-blocker and digoxin.  Dofetilide  restarted. Continue apixaban.  Appreciate EP input.  Family have decided to move forward with pacemaker during this hospital stay and AV johanne ablation few weeks.  EP is okay with doing the procedure while on Eliquis.  2. CKD -creatinine at baseline.  Electrolytes within normal range.  Euvolemic.  3. History of pulmonary fibrosis -recent pneumonia with full course of antibiotics.  Lungs sound clear today on clinical exam.    Pulmonology performed bronchoscopy today.  4. Aortic stenosis -moderate by echocardiogram last year.  No indication for repeat echocardiogram at this time as it will not add to current management.  5. History of Crohn's disease  6. Prediabetes    Pacemaker planned for today.  Barring any complications plan for discharge tomorrow.    Nixon Brown Jr, MD  [facility]  07/09/21  16:11 EDT

## 2021-07-09 NOTE — PROGRESS NOTES
"Daily progress note    Chief complaint  Doing same  No new complaint   Denies any chest pain shortness of breath palpitation  Family at bedside    History of present illness  88-year-old [place] male who is well-known to our service with history of chronic atrial fibrillation hypertension hyperlipidemia aortic stenosis Crohn's disease pulmonary hypertension and gastroesophageal reflux disease brought to the emergency room by the wife when she noticed increased cloudiness eating less and occasional complaint of palpitation with elevated heart rate which she checked.  Patient has no chest pain shortness of breath.  Patient also denies any dizziness lightheadedness or near syncope.  Patient did not miss any medication and taking religiously.  Patient work-up in ER revealed rapid ventricular rate with history of chronic atrial fibrillation admit for management.  At the time of interview he is back to baseline has no specific complaints.      REVIEW OF SYSTEMS  Unremarkable     PHYSICAL EXAM   Blood pressure 142/59, pulse 66, temperature 97.7 °F (36.5 °C), temperature source Oral, resp. rate 14, height 175.3 cm (69\"), weight 67.6 kg (149 lb 0.5 oz), SpO2 92 %.    Constitutional: No distress.  Pleasant and nontoxic  Head: Normocephalic and atraumatic.   Oropharynx: Mucous membranes are moist.   Eyes: No scleral icterus. No conjunctival pallor.  Neck: Painless range of motion noted. Neck supple.   Cardiovascular: Irregularly irregular and intermittently tachycardic and intact distal pulses.  Pulmonary/Chest: No respiratory distress.  Diffuse fine rhonchi.   Abdominal: Soft. There is no tenderness and no guarding.  Bowel sounds positive  Musculoskeletal: Moves all extremities equally. There is no pedal edema or calf tenderness.   Neurological: Alert.  Baseline strength and sensation noted.   Skin: Skin is pink, warm, and dry. No pallor.   Psychiatric: Mood and affect normal.     LAB RESULTS  Lab Results (last 24 hours)     " Procedure Component Value Units Date/Time    BAL Culture, Quantitative - Lavage, Lung, Right Middle Lobe [[id number]] Collected: 07/08/21 1154    Specimen: Lavage from Lung, Right Middle Lobe Updated: 07/09/21 1151     BAL Culture Growth present, too young to evaluate     Gram Stain Many (4+) WBCs seen      Rare (1+) Mixed bacterial morphotypes seen on Gram Stain    POC Glucose Once [[id number]]  (Normal) Collected: 07/09/21 1124    Specimen: Blood Updated: 07/09/21 1125     Glucose 86 mg/dL      Comment: Meter: [id number] : [id number] David HERNADEZ       Non-gynecologic Cytology [[id number]] Collected: 07/08/21 1154    Specimen: Lavage from Lung, Right Middle Lobe Updated: 07/09/21 0928    POC Glucose Once [[id number]]  (Normal) Collected: 07/09/21 0620    Specimen: Blood Updated: 07/09/21 0621     Glucose 87 mg/dL      Comment: Meter: [id number] : [id number] Naveed HERNADEZ       POC Glucose Once [[id number]]  (Normal) Collected: 07/08/21 2044    Specimen: Blood Updated: 07/08/21 2047     Glucose 118 mg/dL      Comment: Meter: [id number] : [id number] Naveed HERNADEZ           Imaging Results (Last 24 Hours)     ** No results found for the last 24 hours. **           ECG 12 Lead  Component   Ref Range & Units 7/6/21 1123 7/5/21 2135   QT Interval   ms 314 P  330 P         HEART RATE= 101  bpm  RR Interval= 596  ms  VT Interval=   ms  P Horizontal Axis=   deg  P Front Axis=   deg  QRSD Interval= 76  ms  QT Interval= 314  ms  QRS Axis= 34  deg  T Wave Axis= 101  deg  - ABNORMAL ECG -  Atrial fibrillation             Current Facility-Administered Medications:   •  [MAR Hold] allopurinol (ZYLOPRIM) tablet 100 mg, 100 mg, Oral, Daily, Calvin Bergeron MD, 100 mg at 07/08/21 0803  •  [MAR Hold] apixaban (ELIQUIS) tablet 5 mg, 5 mg, Oral, Q12H, Calvin Bergeron MD, 5 mg at 07/09/21 0855  •  [MAR Hold] atorvastatin (LIPITOR) tablet 10 mg, 10 mg, Oral, Nightly, Calvin Bergeron MD, 10 mg at 07/08/21 2146  •  [MAR Hold]  Budesonide (ENTOCORT EC) 24 hr capsule 3 mg, 3 mg, Oral, Daily, Calvin Bergeron MD, 3 mg at 07/08/21 0809  •  [MAR Hold] dextrose (D50W) 25 g/ 50mL Intravenous Solution 25 g, 25 g, Intravenous, Q15 Min PRN, Alla Luna MD, 25 g at 07/08/21 0725  •  [MAR Hold] dextrose (GLUTOSE) oral gel 15 g, 15 g, Oral, Q15 Min PRN, Alla Luna MD  •  [MAR Hold] digoxin (LANOXIN) tablet 125 mcg, 125 mcg, Oral, Daily, Calvin Bergeron MD, 125 mcg at 07/08/21 1459  •  dofetilide (TIKOSYN) capsule 125 mcg, 125 mcg, Oral, Q12H, Nixon Brown Jr., MD, 125 mcg at 07/08/21 2146  •  fentaNYL citrate (PF) (SUBLIMAZE) injection, , , PRN, Rashaun Nettles MD, 50 mcg at 07/09/21 1406  •  [MAR Hold] glucagon (human recombinant) (GLUCAGEN DIAGNOSTIC) injection 1 mg, 1 mg, Subcutaneous, Q15 Min PRN, Alla Luna MD  •  [MAR Hold] insulin lispro (ADMELOG) injection 0-7 Units, 0-7 Units, Subcutaneous, TID AC, Calvin Bergeron MD, 2 Units at 07/06/21 1734  •  iopamidol (ISOVUE-370) 76 % injection, , , PRN, Rashaun Nettles MD, 10 mL at 07/09/21 1322  •  [MAR Hold] ipratropium-albuterol (DUO-NEB) nebulizer solution 3 mL, 3 mL, Nebulization, Q4H PRN, Calvin Bergeron MD  •  lidocaine (XYLOCAINE) 1 % injection, , , PRN, Rashaun Nettles MD, 20 mL at 07/09/21 1358  •  metoprolol tartrate (LOPRESSOR) tablet 50 mg, 50 mg, Oral, BID, Calvin Bergeron MD, 50 mg at 07/09/21 0855  •  midazolam (VERSED) injection, , , PRN, Rashaun Nettles MD, 1 mg at 07/09/21 1406  •  [MAR Hold] pantoprazole (PROTONIX) EC tablet 40 mg, 40 mg, Oral, BID AC, Calvin Bergeron MD, 40 mg at 07/08/21 1738  •  [COMPLETED] Insert peripheral IV, , , Once **AND** [MAR Hold] sodium chloride 0.9 % flush 10 mL, 10 mL, Intravenous, PRN, Jeromy Petty MD, 10 mL at 07/08/21 2147  •  sodium chloride 0.9 % infusion 1,000 mL, 1,000 mL, Intravenous, Continuous, Chidi Oconnor MD, Last Rate: 25 mL/hr at 07/08/21 1103, Restarted at 07/08/21 1215  •  sodium chloride 0.9 %  infusion, , , Continuous PRN, Yoon, Rashaun Whitehead MD, Last Rate: 50 mL/hr at 07/09/21 1324, 50 mL/hr at 07/09/21 1324     ASSESSMENT  Chronic atrial fibrillation with rapid ventricular rate  Hypertension  Hyperlipidemia  Aortic stenosis  Pulmonary fibrosis/bronchiectasis status post bronchoscopy  Pulmonary hypertension  History of Crohn's disease  Gastroesophageal reflux disease    PLAN  CPM  Tikosyn Lanoxin and Lopressor  Anticoagulation  Pacemaker today  EP service and pulmonary to follow patient  Continue home medications  Stress ulcer DVT prophylaxis  Supportive care  Discussed with family and nursing staff  Follow closely further recommendation according to hospital course    SHELBIE COWART MD

## 2021-07-10 LAB
ANION GAP SERPL CALCULATED.3IONS-SCNC: 12.1 MMOL/L (ref 5–15)
BASOPHILS # BLD AUTO: 0.06 10*3/MM3 (ref 0–0.2)
BASOPHILS NFR BLD AUTO: 0.4 % (ref 0–1.5)
BUN SERPL-MCNC: 19 MG/DL (ref 8–23)
BUN/CREAT SERPL: 20.2 (ref 7–25)
CALCIUM SPEC-SCNC: 8.5 MG/DL (ref 8.6–10.5)
CHLORIDE SERPL-SCNC: 99 MMOL/L (ref 98–107)
CO2 SERPL-SCNC: 21.9 MMOL/L (ref 22–29)
CREAT SERPL-MCNC: 0.94 MG/DL (ref 0.76–1.27)
DEPRECATED RDW RBC AUTO: 49.1 FL (ref 37–54)
EOSINOPHIL # BLD AUTO: 0.02 10*3/MM3 (ref 0–0.4)
EOSINOPHIL NFR BLD AUTO: 0.1 % (ref 0.3–6.2)
ERYTHROCYTE [DISTWIDTH] IN BLOOD BY AUTOMATED COUNT: 16.1 % (ref 12.3–15.4)
GFR SERPL CREATININE-BSD FRML MDRD: 76 ML/MIN/1.73
GLUCOSE BLDC GLUCOMTR-MCNC: 106 MG/DL (ref 70–130)
GLUCOSE BLDC GLUCOMTR-MCNC: 113 MG/DL (ref 70–130)
GLUCOSE BLDC GLUCOMTR-MCNC: 128 MG/DL (ref 70–130)
GLUCOSE BLDC GLUCOMTR-MCNC: 137 MG/DL (ref 70–130)
GLUCOSE SERPL-MCNC: 114 MG/DL (ref 65–99)
HCT VFR BLD AUTO: 35.8 % (ref 37.5–51)
HGB BLD-MCNC: 11.6 G/DL (ref 13–17.7)
IMM GRANULOCYTES # BLD AUTO: 0.15 10*3/MM3 (ref 0–0.05)
IMM GRANULOCYTES NFR BLD AUTO: 1 % (ref 0–0.5)
LYMPHOCYTES # BLD AUTO: 0.88 10*3/MM3 (ref 0.7–3.1)
LYMPHOCYTES NFR BLD AUTO: 6 % (ref 19.6–45.3)
MCH RBC QN AUTO: 27.6 PG (ref 26.6–33)
MCHC RBC AUTO-ENTMCNC: 32.4 G/DL (ref 31.5–35.7)
MCV RBC AUTO: 85 FL (ref 79–97)
MONOCYTES # BLD AUTO: 1.32 10*3/MM3 (ref 0.1–0.9)
MONOCYTES NFR BLD AUTO: 9 % (ref 5–12)
NEUTROPHILS NFR BLD AUTO: 12.17 10*3/MM3 (ref 1.7–7)
NEUTROPHILS NFR BLD AUTO: 83.5 % (ref 42.7–76)
NRBC BLD AUTO-RTO: 0 /100 WBC (ref 0–0.2)
PLATELET # BLD AUTO: 229 10*3/MM3 (ref 140–450)
PMV BLD AUTO: 11.2 FL (ref 6–12)
POTASSIUM SERPL-SCNC: 3.7 MMOL/L (ref 3.5–5.2)
QT INTERVAL: 397 MS
RBC # BLD AUTO: 4.21 10*6/MM3 (ref 4.14–5.8)
SODIUM SERPL-SCNC: 133 MMOL/L (ref 136–145)
WBC # BLD AUTO: 14.6 10*3/MM3 (ref 3.4–10.8)

## 2021-07-10 PROCEDURE — 82962 GLUCOSE BLOOD TEST: CPT

## 2021-07-10 PROCEDURE — 85025 COMPLETE CBC W/AUTO DIFF WBC: CPT | Performed by: NURSE PRACTITIONER

## 2021-07-10 PROCEDURE — 99232 SBSQ HOSP IP/OBS MODERATE 35: CPT | Performed by: NURSE PRACTITIONER

## 2021-07-10 PROCEDURE — 93005 ELECTROCARDIOGRAM TRACING: CPT | Performed by: NURSE PRACTITIONER

## 2021-07-10 PROCEDURE — 80048 BASIC METABOLIC PNL TOTAL CA: CPT | Performed by: NURSE PRACTITIONER

## 2021-07-10 RX ADMIN — BUDESONIDE 3 MG: 3 CAPSULE, GELATIN COATED ORAL at 09:07

## 2021-07-10 RX ADMIN — APIXABAN 5 MG: 5 TABLET, FILM COATED ORAL at 09:07

## 2021-07-10 RX ADMIN — PANTOPRAZOLE SODIUM 40 MG: 40 TABLET, DELAYED RELEASE ORAL at 08:33

## 2021-07-10 RX ADMIN — DIGOXIN 125 MCG: 125 TABLET ORAL at 11:54

## 2021-07-10 RX ADMIN — METOPROLOL TARTRATE 50 MG: 50 TABLET, FILM COATED ORAL at 09:07

## 2021-07-10 RX ADMIN — PANTOPRAZOLE SODIUM 40 MG: 40 TABLET, DELAYED RELEASE ORAL at 17:06

## 2021-07-10 RX ADMIN — ATORVASTATIN CALCIUM 10 MG: 20 TABLET, FILM COATED ORAL at 21:38

## 2021-07-10 RX ADMIN — DOFETILIDE 125 MCG: 0.12 CAPSULE ORAL at 21:38

## 2021-07-10 RX ADMIN — ALLOPURINOL 100 MG: 100 TABLET ORAL at 09:07

## 2021-07-10 RX ADMIN — SODIUM CHLORIDE, PRESERVATIVE FREE 3 ML: 5 INJECTION INTRAVENOUS at 21:38

## 2021-07-10 RX ADMIN — METOPROLOL TARTRATE 50 MG: 50 TABLET, FILM COATED ORAL at 21:38

## 2021-07-10 RX ADMIN — APIXABAN 5 MG: 5 TABLET, FILM COATED ORAL at 21:38

## 2021-07-10 RX ADMIN — DOFETILIDE 125 MCG: 0.12 CAPSULE ORAL at 09:06

## 2021-07-10 NOTE — PLAN OF CARE
Goal Outcome Evaluation:  Plan of Care Reviewed With: patient        Progress: improving  Outcome Summary: VSS. Noted pt mostly SR on monitor, very rarely intermittent moments of V-paced vs A-paced. Pt reminded repeatedly by staff to call out when needing to ambulate to bathroom for safety. No c/o nausea and no neuro changed noted when assessing pt. Wife at bedside early this am and updated on pt status. No distress noted.

## 2021-07-10 NOTE — PLAN OF CARE
Goal Outcome Evaluation:  Plan of Care Reviewed With: patient        Progress: improving  Outcome Summary: No major changes or issues over shift, VS stable, pt alert and oriented, pacemaker site bruised but intact and without signs of infection, pt in SR, no complaints of pain, family at bedside, pt to stay for another night of monitoring, will likely be discharged home tomorrow, family aware of plan, will continue to monitor

## 2021-07-10 NOTE — NURSING NOTE
"While assisting pt with toileting in bathroom, pt leaned forward and lost balance, hit head and a laceration formed on forehead, no immediate distress. Dr. Bergeron notified and asked if he wanted pt to be seen or any orders to be placed, response was to \"for now monitor pt closely and notify again for any changes such as nausea/vomiting or neuro changes, apply steri-strips to the area and will assess him in the morning\". Appropriate parties notified.  "

## 2021-07-10 NOTE — PROGRESS NOTES
LOS: 4 days   Patient Care Team:  Lubna Lobo MD as PCP - General  Lubna Lobo MD as PCP - Family Medicine    Subjective     Patient is new to me today I have viewed notes and records bronchiectasis chronic cough postinflammatory pulmonary fibrosis she is got a rhinovirus bronchitis that has exacerbated her along with A. fib and a rapid rate cardiology is following she also has aortic stenosis, she had a pacemaker placed and plan is for AV johanne ablation in a few weeks  Patient says he is doing great no shortness of breath no cough no chest pain  Review of Systems:          Objective     Vital Signs  Vital Sign Min/Max for last 24 hours  Temp  Min: 97.5 °F (36.4 °C)  Max: 98.4 °F (36.9 °C)   BP  Min: 139/55  Max: 147/64   Pulse  Min: 67  Max: 93   Resp  Min: 16  Max: 18   SpO2  Min: 81 %  Max: 92 %   No data recorded   No data recorded        Ventilator/Non-Invasive Ventilation Settings (From admission, onward) Comment    None                       Body mass index is 22.01 kg/m².  I/O last 3 completed shifts:  In: 715 [P.O.:240; I.V.:475]  Out: 500 [Urine:500]  I/O this shift:  In: -   Out: 100 [Urine:100]        Physical Exam:  General Appearance: Well-developed male resting comfortably in bed on room air in no apparent distress  Eyes: Conjunctiva are clear and anicteric  ENT: His membranes are moist no erythema or exudates  Neck: No jugular venous tension trachea midline  Lungs: Clear nonlabored symmetric expansion no wheezes no rales no rhonchi  Cardiac: Regular rate rhythm on the monitor sinus  Abdomen: Soft nontender no palpable hepatosplenomegaly or masses  : Not examined  Musculoskeletal: He has a left upper anterior chest incision that appears to be healing well little ecchymosis around it with subcutaneous mass consistent with his pacemaker placement  Skin: No jaundice no petechiae skin is warm and dry  Neuro: He is alert oriented cooperative grossly intact  Extremities/P Vascular:  Clubbing no cyanosis no edema palpable radial and dorsalis pedis pulses  MSE: Seems to be in very good spirits       Labs:  Results from last 7 days   Lab Units 07/10/21  0325 07/09/21  2124 07/08/21  0436 07/07/21  0523 07/06/21  0617 07/05/21  2154   GLUCOSE mg/dL 114* 139* 64* 84 84 247*   SODIUM mmol/L 133* 134* 138 137 139 134*   POTASSIUM mmol/L 3.7 3.7 4.0 4.4 4.7 4.7   MAGNESIUM mg/dL  --   --   --   --  1.8 1.9   CO2 mmol/L 21.9* 22.1 22.8 22.5 22.2 20.8*   CHLORIDE mmol/L 99 98 106 105 107 102   ANION GAP mmol/L 12.1 13.9 9.2 9.5 9.8 11.2   CREATININE mg/dL 0.94 1.16 0.91 1.11 1.19 1.38*   BUN mg/dL 19 20 19 20 22 25*   BUN / CREAT RATIO  20.2 17.2 20.9 18.0 18.5 18.1   CALCIUM mg/dL 8.5* 8.8 8.1* 8.6 8.7 8.8   EGFR IF NONAFRICN AM mL/min/1.73 76 59* 79 63 58* 49*   ALK PHOS U/L  --   --   --  41  --   --    TOTAL PROTEIN g/dL  --   --   --  6.5  --   --    ALT (SGPT) U/L  --   --   --  16  --   --    AST (SGOT) U/L  --   --   --  14  --   --    BILIRUBIN mg/dL  --   --   --  0.3  --   --    ALBUMIN g/dL  --   --   --  3.00*  --   --    GLOBULIN gm/dL  --   --   --  3.5  --   --      Estimated Creatinine Clearance: 51.9 mL/min (by C-G formula based on SCr of 0.94 mg/dL).      Results from last 7 days   Lab Units 07/10/21  0325 07/09/21  2124 07/08/21  0436 07/07/21  0523 07/06/21  0617 07/05/21  2154   WBC 10*3/mm3 14.60* 12.32* 10.47 9.88 11.04* 10.71   RBC 10*6/mm3 4.21 4.74 4.02* 3.98* 4.23 4.24   HEMOGLOBIN g/dL 11.6* 13.2 11.4* 11.3* 11.9* 11.7*   HEMATOCRIT % 35.8* 42.0 34.9* 35.2* 36.3* 37.1*   MCV fL 85.0 88.6 86.8 88.4 85.8 87.5   MCH pg 27.6 27.8 28.4 28.4 28.1 27.6   MCHC g/dL 32.4 31.4* 32.7 32.1 32.8 31.5   RDW % 16.1* 16.6* 16.3* 16.7* 17.0* 16.8*   RDW-SD fl 49.1 54.2* 52.1 54.0 52.9 53.2   MPV fL 11.2 10.4 10.8 11.0 10.5 10.9   PLATELETS 10*3/mm3 229 241 222 247 229 246   NEUTROPHIL % % 83.5* 82.6* 76.3* 80.9* 77.2* 82.5*   LYMPHOCYTE % % 6.0* 7.5* 12.9* 10.5* 12.0* 9.5*   MONOCYTES % % 9.0  7.7 8.3 6.7 7.7 6.1   EOSINOPHIL % % 0.1* 0.7 1.2 0.5 1.7 0.6   BASOPHIL % % 0.4 0.6 0.7 0.6 0.6 0.4   IMM GRAN % % 1.0* 0.9* 0.6* 0.8* 0.8* 0.9*   NEUTROS ABS 10*3/mm3 12.17* 10.18* 7.99* 7.99* 8.52* 8.84*   LYMPHS ABS 10*3/mm3 0.88 0.92 1.35 1.04 1.32 1.02   MONOS ABS 10*3/mm3 1.32* 0.95* 0.87 0.66 0.85 0.65   EOS ABS 10*3/mm3 0.02 0.09 0.13 0.05 0.19 0.06   BASOS ABS 10*3/mm3 0.06 0.07 0.07 0.06 0.07 0.04   IMMATURE GRANS (ABS) 10*3/mm3 0.15* 0.11* 0.06* 0.08* 0.09* 0.10*   NRBC /100 WBC 0.0 0.0 0.0 0.0 0.0 0.0         Results from last 7 days   Lab Units 07/05/21  2154   TROPONIN T ng/mL <0.010     Results from last 7 days   Lab Units 07/07/21  0523   PROBNP pg/mL 2,814.0*     Results from last 7 days   Lab Units 07/07/21  0523   TSH uIU/mL 1.450         Results from last 7 days   Lab Units 07/06/21  0617   INR  1.35*     Microbiology Results (last 10 days)     Procedure Component Value - Date/Time    AFB Culture - Lavage, Lung, Right Middle Lobe [[id number]] Collected: 07/08/21 1154    Lab Status: Preliminary result Specimen: Lavage from Lung, Right Middle Lobe Updated: 07/09/21 1634     AFB Stain No acid fast bacilli seen on concentrated smear    Fungus Smear - Lavage, Lung, Right Middle Lobe [[id number]] Collected: 07/08/21 1154    Lab Status: Final result Specimen: Lavage from Lung, Right Middle Lobe Updated: 07/08/21 1440     Fungal Stain No yeast or hyphal elements seen    BAL Culture, Quantitative - Lavage, Lung, Right Middle Lobe [[id number]]  (Abnormal) Collected: 07/08/21 1154    Lab Status: Preliminary result Specimen: Lavage from Lung, Right Middle Lobe Updated: 07/10/21 1000     BAL Culture >100,000 CFU/mL Pseudomonas species      <10,000 CFU/mL Normal Respiratory Arabella     Gram Stain Many (4+) WBCs seen      Rare (1+) Mixed bacterial morphotypes seen on Gram Stain    Respiratory Panel, PCR (WITHOUT COVID) - Lavage, Lung, Right Middle Lobe [[id number]]  (Abnormal) Collected: 07/08/21 1151    Lab Status:  Final result Specimen: Lavage from Lung, Right Middle Lobe Updated: 07/08/21 1346     ADENOVIRUS, PCR Not Detected     Coronavirus 229E Not Detected     Coronavirus HKU1 Not Detected     Coronavirus NL63 Not Detected     Coronavirus OC43 Not Detected     Human Metapneumovirus Not Detected     Human Rhinovirus/Enterovirus Detected     Influenza B PCR Not Detected     Parainfluenza Virus 1 Not Detected     Parainfluenza Virus 2 Not Detected     Parainfluenza Virus 3 Not Detected     Parainfluenza Virus 4 Not Detected     Bordetella pertussis pcr Not Detected     Chlamydophila pneumoniae PCR Not Detected     Mycoplasma pneumo by PCR Not Detected     Influenza A PCR Not Detected     RSV, PCR Not Detected     Bordetella parapertussis PCR Not Detected    Narrative:      The coronavirus on the RVP is NOT COVID-19 and is NOT indicative of infection with COVID-19.    In the setting of a positive respiratory panel with a viral infection PLUS a negative procalcitonin without other underlying concern for bacterial infection, consider observing off antibiotics or discontinuation of antibiotics and continue supportive care. If the respiratory panel is positive for atypical bacterial infection (Bordetella pertussis, Chlamydophila pneumoniae, or Mycoplasma pneumoniae), consider antibiotic de-escalation to target atypical bacterial infection.    COVID PRE-OP / PRE-PROCEDURE SCREENING ORDER (NO ISOLATION) - Swab, Nasopharynx [[id number]]  (Normal) Collected: 07/06/21 0111    Lab Status: Final result Specimen: Swab from Nasopharynx Updated: 07/06/21 0325    Narrative:      The following orders were created for panel order COVID PRE-OP / PRE-PROCEDURE SCREENING ORDER (NO ISOLATION) - Swab, Nasopharynx.  Procedure                               Abnormality         Status                     ---------                               -----------         ------                     COVID-19,[facility] IN-HOUSE...[[id number]]  Normal               Final result                 Please view results for these tests on the individual orders.    COVID-19,[facility] IN-HOUSE CEPHEID/VERITO NP SWAB IN TRANSPORT MEDIA 8-12 HR TAT - Swab, Nasopharynx [[id number]]  (Normal) Collected: 07/06/21 0111    Lab Status: Final result Specimen: Swab from Nasopharynx Updated: 07/06/21 0325     COVID19 Not Detected    Narrative:      Fact sheet for providers: https://www.fda.gov/media/105938/download     Fact sheet for patients: https://www.fda.gov/media/732601/download              allopurinol, 100 mg, Oral, Daily  apixaban, 5 mg, Oral, Q12H  atorvastatin, 10 mg, Oral, Nightly  Budesonide, 3 mg, Oral, Daily  digoxin, 125 mcg, Oral, Daily  dofetilide, 125 mcg, Oral, Q12H  insulin lispro, 0-7 Units, Subcutaneous, TID AC  metoprolol tartrate, 50 mg, Oral, BID  pantoprazole, 40 mg, Oral, BID AC  sodium chloride, 3 mL, Intravenous, Q12H      sodium chloride, , Last Rate: 50 mL/hr (07/09/21 1324)        Diagnostics:  CT Chest Without Contrast Diagnostic    Result Date: 6/30/2021  CT CHEST WO CONTRAST DIAGNOSTIC-  Radiation dose reduction techniques were utilized, including automated exposure control and exposure modulation based on body size.  CLINICAL: Cough, pulmonary infiltrate follow-up.  COMPARISON: 12/07/2020  FINDINGS: Bilateral gynecomastia again demonstrated. Trace right pleural effusion. There is again demonstrated bronchiectasis with consolidation/collapse of the right middle lobe demonstrating air bronchograms throughout. No endobronchial lesion or secretions identified. There is a trace amount of pleural fluid demonstrated within the minor fissure. There is also pleural thickening adjacent to this location, unchanged in the interim.  At the base of the right lower lobe and left lower lobe there is combination of bronchiectasis and consolidation which is more pronounced compared to the previous examination and greater on the right than the left. There is minimal amount of  bronchiectasis with bronchial wall thickening and associated discoid atelectasis along the posterior margin of the lingular segment, on the major fissure. The right upper lobe is clear.  Atherosclerotic calcification of the aorta, heavy coronary artery calcification. Diameter of the aorta is within normal limits. There is cardiac enlargement. No pericardial effusion. Esophagus is satisfactory in course and caliber.  Small to moderate size mediastinal lymph nodes seen, some of these are calcified. Some of the lymph nodes have diminished in size within the interim, majority appear stable. No new or enlarging lymph node has developed.  CONCLUSION: There is extensive bronchiectasis with areas of consolidation involving both lower lobes, right middle lobe as well as the lingular segment as described above. Lower lobe airspace disease appears more pronounced compared to 2020. Trace right pleural effusion is demonstrated and there is stable pleural thickening along the lateral right lung base. Dense consolidation with volume loss involving the entire right middle lobe which is largely collapsed without interval improvement.     This report was finalized on 6/30/2021 12:17 PM by Dr. Arsalan Max M.D.      XR Chest 1 View    Result Date: 7/9/2021  ONE VIEW PORTABLE CHEST AT 4:44 PM  HISTORY: Pacemaker placement  FINDINGS: There has been recent insertion of a left-sided pacer device with 2 leads in place and no complicating features are seen. There is no pneumothorax. There is cardiomegaly with considerable vascular congestion and associated small bilateral pleural effusions showing worsening from 4 days ago and consistent with worsening congestive heart failure.  This report was called directly to the nursing staff at the time of the dictation.  This report was finalized on 7/9/2021 5:04 PM by Dr. Alexx Dimas M.D.      XR Chest 1 View    Result Date: 7/5/2021  Patient: ALEX BURK  Time Out: 23:43 Exam(s): FILM CXR 1  VIEW EXAM:   XR Chest, 1 View CLINICAL HISTORY:    Reason for exam: palpitations. TECHNIQUE:   Frontal view of the chest. COMPARISON:   No relevant prior studies available. FINDINGS:   Lungs:  There are streaky densities in the right lung base just above the diaphragm partially blunting the costophrenic angle.  Pulmonary vasculature is congested but sharply defined.   Pleural space:  No pneumothorax is seen for   Heart:  The cardiac silhouette is mildly enlarged.   Mediastinum:  Unremarkable.   Bones joints:  Unremarkable.   Upper abdomen:  No pneumoperitoneum under the diaphragm. IMPRESSION:     Mild right basilar infiltrate, atelectasis, or scarring. Mild cardiomegaly without evidence of CHF.    Electronically signed by Pavan Askew MD on 07-05-21 at 2343    XR Chest 1 View    Result Date: 6/23/2021  PORTABLE CHEST  HISTORY: Near syncope.  COMPARISON: Chest x-ray 12/07/2020.  FINDINGS: The heart is at the upper limits of normal in size. There is bibasilar atelectasis/infiltrate more prominent on the right. The infiltrates are less prominent as compared to the study from 12/07/2020. There is mild prominence of pulmonary vasculature in the perihilar regions bilaterally, exaggerated by inspiratory effort.  This report was finalized on 6/23/2021 7:56 AM by Dr. Sergey Peralta M.D.      EP/CRM Study    Result Date: 7/9/2021  Preprocedure diagnosis: Atrial fibrillation Postprocedure diagnosis: Same Procedures performed Implantation of a dual-chamber pacemaker Complications: None Blood loss: Minimal Indication 88-year-old male with recurrent symptomatic paroxysmal A. fib with RVR.  He has had multiple admissions to the hospital over the past 2 years 6 or 7 with similar complaint.  After discussion we have decided to proceed with pacemaker implant, and future AV node ablation. Description of procedure Informed consent was obtained from the patient.  The patient was sedated with fentanyl, Versed, and methohexital.  Once  he was sedated 1% lidocaine was infiltrated for local anesthesia.  Using sharp dissection electrocautery a pocket was created the left chest accommodate the device.  Access was obtained in the left axillary vein at 2 sites using a micropuncture needle.  A 9 [place] sheath was placed.  This was used to advance the his sheath.  We used this to obtain left bundle branch pacing.  1 repositioning attempt was required.  There was great threshold and sensing, good injury, and a very nice paced QRS at the final position.  The 7 [place] sheath was placed.  An atrial lead was positioned in the right atrial appendage.  Good injury, sensing, and thresholds were obtained.  Leads were secured with 2 silk sutures apiece.  They were connected to the pulse generator.  The pulse generator was placed in the pocket and the pocket was flushed with antibiotic solution.  It was then closed in layers with 2-0 Vicryl, 3-0 Vicryl, and 4-0 Vicryl suture.  Dermabond was applied to the skin. Pulse Generator Medtronic [place] XT SN [id number] Right Atrial Lead Medtronic SN [id number] Right Ventricular Lead Select Secure SN [id number]    Results for orders placed during the hospital encounter of 08/26/20    Adult Transthoracic Echo Complete W/ Cont if Necessary Per Protocol    Interpretation Summary  · Estimated EF = 60%.  · Left ventricular systolic function is normal.  · Left ventricular wall thickness is consistent with mild concentric hypertrophy.  · Left atrial cavity size is moderately dilated.  · There is calcification of the aortic valve.  · Moderate aortic valve stenosis is present.  · Aortic valve maximum pressure gradient is 42.0 mmHg.  · Aortic valve mean pressure gradient is 24.0 mmHg.  · Aortic valve area is 1.1 cm2.  · Mild tricuspid valve regurgitation is present.  · Left ventricular diastolic dysfunction (grade II) consistent with pseudonormalization.  · Calculated right ventricular systolic pressure from tricuspid regurgitation is  57.8 mmHg. Severe pulmonary hypertension is present.  · Mild aortic valve regurgitation is present.  · Mild mitral valve regurgitation is present      Have reviewed multiple chest x-rays and CT scan chest.    Active Hospital Problems    Diagnosis  POA   • **Pulmonary fibrosis (CMS/HCC) [J84.10]  Unknown   • Atrial fibrillation with rapid ventricular response (CMS/HCC) [I48.91]  Unknown   • Atrial fibrillation with RVR (CMS/HCC) [I48.91]  Yes      Resolved Hospital Problems   No resolved problems to display.         Assessment/Plan     1. A. fib with rapid ventricular response status post pacemaker placement has converted to sinus rhythm on beta-blockers digoxin and dofetilide cardiology plans AV johanne ablation in a few weeks  2. Bronchiectasis and postinflammatory pulmonary fibrosis with recent pneumonia patient is complete a full course of antibiotics assisting with azithromycin and Rocephin/Omnicef but her 7/7/2021 bronchoscopy has been 100,000 Pseudomonas species specific ID and sensitivity are pending.  Her white count is rising question is is this colonization or is there is active infection.  I will get a repeat CBC and a pro calcitonin he may benefit from an infectious disease consultation.  3. Rhinovirus/enterovirus infection treatment is purely symptomatic  4. Aortic stenosis with a valve area of 1.1 cm² also mild aortic, tricuspid and mitral regurgitation.  5. Pulmonary hypertension by echocardiogram  6. Hyponatremia mild worsening will need to be monitored  7. Anemia mild hemoglobin stable    Plan for disposition:    Khoa Mueller MD  07/10/21  17:40 EDT    Time:

## 2021-07-10 NOTE — PROGRESS NOTES
"Daily progress note    Chief complaint  Events noted  Patient fell last night  No specific complaints except generalized weakness  Denies chest pain shortness of breath or palpitation  Family at bedside    History of present illness  88-year-old [place]er male who is well-known to our service with history of chronic atrial fibrillation hypertension hyperlipidemia aortic stenosis Crohn's disease pulmonary hypertension and gastroesophageal reflux disease brought to the emergency room by the wife when she noticed increased cloudiness eating less and occasional complaint of palpitation with elevated heart rate which she checked.  Patient has no chest pain shortness of breath.  Patient also denies any dizziness lightheadedness or near syncope.  Patient did not miss any medication and taking religiously.  Patient work-up in ER revealed rapid ventricular rate with history of chronic atrial fibrillation admit for management.  At the time of interview he is back to baseline has no specific complaints.      REVIEW OF SYSTEMS  Unremarkable except generalized weakness     PHYSICAL EXAM   Blood pressure 139/55, pulse 69, temperature 98.2 °F (36.8 °C), temperature source Oral, resp. rate 18, height 175.3 cm (69\"), weight 67.6 kg (149 lb 0.5 oz), SpO2 91 %.    Constitutional: No distress.  Pleasant and nontoxic  Head: Normocephalic and atraumatic.   Oropharynx: Mucous membranes are moist.   Eyes: No scleral icterus. No conjunctival pallor.  Neck: Painless range of motion noted. Neck supple.   Cardiovascular: Irregularly irregular and intermittently tachycardic and intact distal pulses.  Pulmonary/Chest: No respiratory distress.  Diffuse fine rhonchi.   Abdominal: Soft. There is no tenderness and no guarding.  Bowel sounds positive  Musculoskeletal: Moves all extremities equally. There is no pedal edema or calf tenderness.   Neurological: Alert.  Baseline strength and sensation noted.   Skin: Skin is pink, warm, and dry. No pallor. "   Psychiatric: Mood and affect normal.     LAB RESULTS  Lab Results (last 24 hours)     Procedure Component Value Units Date/Time    POC Glucose Once [[id number]]  (Normal) Collected: 07/10/21 1143    Specimen: Blood Updated: 07/10/21 1147     Glucose 113 mg/dL      Comment: Meter: [id number] : [id number] Mehrdad Christiano HERNADEZ       BAL Culture, Quantitative - Lavage, Lung, Right Middle Lobe [[id number]]  (Abnormal) Collected: 07/08/21 1154    Specimen: Lavage from Lung, Right Middle Lobe Updated: 07/10/21 1000     BAL Culture >100,000 CFU/mL Pseudomonas species      <10,000 CFU/mL Normal Respiratory Arabella     Gram Stain Many (4+) WBCs seen      Rare (1+) Mixed bacterial morphotypes seen on Gram Stain    POC Glucose Once [[id number]]  (Normal) Collected: 07/10/21 0614    Specimen: Blood Updated: 07/10/21 0616     Glucose 106 mg/dL      Comment: Meter: [id number] : [id number] Naveed HERNADEZ       Basic Metabolic Panel [[id number]]  (Abnormal) Collected: 07/10/21 0325    Specimen: Blood Updated: 07/10/21 0458     Glucose 114 mg/dL      BUN 19 mg/dL      Creatinine 0.94 mg/dL      Sodium 133 mmol/L      Potassium 3.7 mmol/L      Chloride 99 mmol/L      CO2 21.9 mmol/L      Calcium 8.5 mg/dL      eGFR Non African Amer 76 mL/min/1.73      BUN/Creatinine Ratio 20.2     Anion Gap 12.1 mmol/L     Narrative:      GFR Normal >60  Chronic Kidney Disease <60  Kidney Failure <15      CBC & Differential [[id number]]  (Abnormal) Collected: 07/10/21 0325    Specimen: Blood Updated: 07/10/21 0436    Narrative:      The following orders were created for panel order CBC & Differential.  Procedure                               Abnormality         Status                     ---------                               -----------         ------                     CBC Auto Differential[[id number]]        Abnormal            Final result                 Please view results for these tests on the individual orders.    CBC Auto Differential  [[id number]]  (Abnormal) Collected: 07/10/21 0325    Specimen: Blood Updated: 07/10/21 0436     WBC 14.60 10*3/mm3      RBC 4.21 10*6/mm3      Hemoglobin 11.6 g/dL      Hematocrit 35.8 %      MCV 85.0 fL      MCH 27.6 pg      MCHC 32.4 g/dL      RDW 16.1 %      RDW-SD 49.1 fl      MPV 11.2 fL      Platelets 229 10*3/mm3      Neutrophil % 83.5 %      Lymphocyte % 6.0 %      Monocyte % 9.0 %      Eosinophil % 0.1 %      Basophil % 0.4 %      Immature Grans % 1.0 %      Neutrophils, Absolute 12.17 10*3/mm3      Lymphocytes, Absolute 0.88 10*3/mm3      Monocytes, Absolute 1.32 10*3/mm3      Eosinophils, Absolute 0.02 10*3/mm3      Basophils, Absolute 0.06 10*3/mm3      Immature Grans, Absolute 0.15 10*3/mm3      nRBC 0.0 /100 WBC     Basic Metabolic Panel [[id number]]  (Abnormal) Collected: 07/09/21 2124    Specimen: Blood Updated: 07/09/21 2200     Glucose 139 mg/dL      BUN 20 mg/dL      Creatinine 1.16 mg/dL      Sodium 134 mmol/L      Potassium 3.7 mmol/L      Chloride 98 mmol/L      CO2 22.1 mmol/L      Calcium 8.8 mg/dL      eGFR Non African Amer 59 mL/min/1.73      BUN/Creatinine Ratio 17.2     Anion Gap 13.9 mmol/L     Narrative:      GFR Normal >60  Chronic Kidney Disease <60  Kidney Failure <15      CBC & Differential [[id number]]  (Abnormal) Collected: 07/09/21 2124    Specimen: Blood Updated: 07/09/21 2150    Narrative:      The following orders were created for panel order CBC & Differential.  Procedure                               Abnormality         Status                     ---------                               -----------         ------                     CBC Auto Differential[[id number]]        Abnormal            Final result                 Please view results for these tests on the individual orders.    CBC Auto Differential [[id number]]  (Abnormal) Collected: 07/09/21 2124    Specimen: Blood Updated: 07/09/21 2150     WBC 12.32 10*3/mm3      RBC 4.74 10*6/mm3      Hemoglobin 13.2 g/dL       Hematocrit 42.0 %      MCV 88.6 fL      MCH 27.8 pg      MCHC 31.4 g/dL      RDW 16.6 %      RDW-SD 54.2 fl      MPV 10.4 fL      Platelets 241 10*3/mm3      Neutrophil % 82.6 %      Lymphocyte % 7.5 %      Monocyte % 7.7 %      Eosinophil % 0.7 %      Basophil % 0.6 %      Immature Grans % 0.9 %      Neutrophils, Absolute 10.18 10*3/mm3      Lymphocytes, Absolute 0.92 10*3/mm3      Monocytes, Absolute 0.95 10*3/mm3      Eosinophils, Absolute 0.09 10*3/mm3      Basophils, Absolute 0.07 10*3/mm3      Immature Grans, Absolute 0.11 10*3/mm3      nRBC 0.0 /100 WBC         Imaging Results (Last 24 Hours)     ** No results found for the last 24 hours. **           ECG 12 Lead  Component   Ref Range & Units 7/6/21 1123 7/5/21 2135   QT Interval   ms 314 P  330 P         HEART RATE= 101  bpm  RR Interval= 596  ms  WA Interval=   ms  P Horizontal Axis=   deg  P Front Axis=   deg  QRSD Interval= 76  ms  QT Interval= 314  ms  QRS Axis= 34  deg  T Wave Axis= 101  deg  - ABNORMAL ECG -  Atrial fibrillation             Current Facility-Administered Medications:   •  acetaminophen (TYLENOL) tablet 650 mg, 650 mg, Oral, Q4H PRN **OR** acetaminophen (TYLENOL) suppository 650 mg, 650 mg, Rectal, Q4H PRN, Chacon, Glenna A, APRN  •  allopurinol (ZYLOPRIM) tablet 100 mg, 100 mg, Oral, Daily, Chacon, Glenna A, APRN, 100 mg at 07/10/21 0907  •  apixaban (ELIQUIS) tablet 5 mg, 5 mg, Oral, Q12H, Chacon, Glenna A, APRN, 5 mg at 07/10/21 0907  •  atorvastatin (LIPITOR) tablet 10 mg, 10 mg, Oral, Nightly, Chacon, Glenna A, APRN, 10 mg at 07/09/21 2303  •  Budesonide (ENTOCORT EC) 24 hr capsule 3 mg, 3 mg, Oral, Daily, Chacon, Glenna A, APRN, 3 mg at 07/10/21 0907  •  dextrose (D50W) 25 g/ 50mL Intravenous Solution 25 g, 25 g, Intravenous, Q15 Min PRN, Glenna Chacon APRN, 25 g at 07/08/21 0725  •  dextrose (GLUTOSE) oral gel 15 g, 15 g, Oral, Q15 Min PRN, Glenna Chacon APRN  •  digoxin (LANOXIN) tablet 125 mcg, 125 mcg, Oral, Daily, Glenna Chacon,  APRN, 125 mcg at 07/10/21 1154  •  dofetilide (TIKOSYN) capsule 125 mcg, 125 mcg, Oral, Q12H, Glenna Chacno APRN, 125 mcg at 07/10/21 0906  •  fentaNYL citrate (PF) (SUBLIMAZE) injection, , , PRN, Rashaun Nettles MD, 50 mcg at 07/09/21 1406  •  glucagon (human recombinant) (GLUCAGEN DIAGNOSTIC) injection 1 mg, 1 mg, Subcutaneous, Q15 Min PRN, Glenna Chacon APRN  •  HYDROcodone-acetaminophen (NORCO) 5-325 MG per tablet 1 tablet, 1 tablet, Oral, Q4H PRN, Glenna Chacon APRN  •  HYDROmorphone (DILAUDID) injection 0.5 mg, 0.5 mg, Intravenous, Q2H PRN **AND** naloxone (NARCAN) injection 0.4 mg, 0.4 mg, Intravenous, Q5 Min PRN, Glenna Chacon APRN  •  insulin lispro (ADMELOG) injection 0-7 Units, 0-7 Units, Subcutaneous, TID AC, Glenna Chacon APRN, 2 Units at 07/06/21 1734  •  iopamidol (ISOVUE-370) 76 % injection, , , PRN, Rashaun Nettles MD, 10 mL at 07/09/21 1322  •  ipratropium-albuterol (DUO-NEB) nebulizer solution 3 mL, 3 mL, Nebulization, Q4H PRN, Glenna Chacon APRN  •  lidocaine (XYLOCAINE) 1 % injection, , , PRN, Rashaun Nettles MD, 20 mL at 07/09/21 1358  •  metoprolol tartrate (LOPRESSOR) tablet 50 mg, 50 mg, Oral, BID, Glenna Chacon APRN, 50 mg at 07/10/21 0907  •  midazolam (VERSED) injection, , , PRN, Rashaun Nettles MD, 1 mg at 07/09/21 1509  •  pantoprazole (PROTONIX) EC tablet 40 mg, 40 mg, Oral, BID AC, Chacon, Glenna A, APRN, 40 mg at 07/10/21 0833  •  [COMPLETED] Insert peripheral IV, , , Once **AND** sodium chloride 0.9 % flush 10 mL, 10 mL, Intravenous, PRN, Chacon, Glenna A, APRN, 10 mL at 07/08/21 2147  •  sodium chloride 0.9 % flush 10 mL, 10 mL, Intravenous, PRN, Chacon, Glenna A, APRN  •  sodium chloride 0.9 % flush 3 mL, 3 mL, Intravenous, Q12H, Chacon, Glenna A, APRN, 3 mL at 07/09/21 2304  •  sodium chloride 0.9 % infusion, , , Continuous PRN, Rashaun Nettles MD, Last Rate: 50 mL/hr at 07/09/21 1324, 50 mL/hr at 07/09/21 1324     ASSESSMENT  Chronic atrial  fibrillation with rapid ventricular rate s/p permanent pacemaker placement  Hypertension  Hyperlipidemia  Aortic stenosis  Pulmonary fibrosis/bronchiectasis status post bronchoscopy  Pseudomonas in BAL no antibiotics per pulmonary  Pulmonary hypertension  History of Crohn's disease  Gastroesophageal reflux disease    PLAN  CPM  Monitor  Interrogate pacemaker  Tikosyn Lanoxin and Lopressor  Anticoagulation  Continue home medications  Stress ulcer DVT prophylaxis  Supportive care  Discussed with family and nursing staff  Follow closely further recommendation according to hospital course    SHELBIE COWART MD

## 2021-07-10 NOTE — PROGRESS NOTES
"CC: PAF    Interval History: Had a fall in the bathroom overnight after bending over. No dizziness, lightheadedness, or significant pain from that.       Vital Signs  Temp:  [97.4 °F (36.3 °C)-98.4 °F (36.9 °C)] 98.2 °F (36.8 °C)  Heart Rate:  [62-93] 69  Resp:  [14-18] 18  BP: (139-147)/(55-64) 139/55    Intake/Output Summary (Last 24 hours) at 7/10/2021 1356  Last data filed at 7/10/2021 1336  Gross per 24 hour   Intake 715 ml   Output 600 ml   Net 115 ml     Flowsheet Rows      First Filed Value   Admission Height  175.3 cm (69\") Documented at 07/05/2021 2200   Admission Weight  67.6 kg (149 lb 0.5 oz) Documented at 07/06/2021 1635          PHYSICAL EXAM:  General: No acute distress  Resp:NL Rate, unlabored, diminished   CV:NL rate and rhythm, NL PMI, Nl S1 and S2, no Murmur, no gallop, no rub, No JVD. Normal pedal pulses  ABD:Nl sounds, no masses or tenderness, nondistended, no guarding or rebound  Neuro: alert,cooperative and oriented  Extr: No edema or cyanosis, moves all extremities      Results Review:    Results from last 7 days   Lab Units 07/10/21  0325   SODIUM mmol/L 133*   POTASSIUM mmol/L 3.7   CHLORIDE mmol/L 99   CO2 mmol/L 21.9*   BUN mg/dL 19   CREATININE mg/dL 0.94   GLUCOSE mg/dL 114*   CALCIUM mg/dL 8.5*     Results from last 7 days   Lab Units 07/05/21  2154   TROPONIN T ng/mL <0.010     Results from last 7 days   Lab Units 07/10/21  0325   WBC 10*3/mm3 14.60*   HEMOGLOBIN g/dL 11.6*   HEMATOCRIT % 35.8*   PLATELETS 10*3/mm3 229     Results from last 7 days   Lab Units 07/06/21  0617   INR  1.35*     Results from last 7 days   Lab Units 07/07/21  0523   CHOLESTEROL mg/dL 117     Results from last 7 days   Lab Units 07/06/21  0617   MAGNESIUM mg/dL 1.8     Results from last 7 days   Lab Units 07/07/21  0523   CHOLESTEROL mg/dL 117   TRIGLYCERIDES mg/dL 63   HDL CHOL mg/dL 37*   LDL CHOL mg/dL 66     I reviewed the patient's new clinical results.  I personally viewed and interpreted the " patient's EKG/Telemetry data- NSR        Medication Review:   Meds reviewed    sodium chloride, , Last Rate: 50 mL/hr (07/09/21 1324)        Assessment/Plan    1. Atrial fibrillation with RVR -patient converted to sinus rhythm.  Continue beta-blocker and digoxin.  Dofetilide continues. Continue apixaban.  Appreciate EP input he is now s/p pacemaker during this hospital stay and plan is for AV johanne ablation few weeks.  Dr. Nettles is okay with doing the procedure while on Eliquis.  2. CKD -creatinine at baseline.  Electrolytes within normal range.  Euvolemic.  3. History of pulmonary fibrosis -recent pneumonia with full course of antibiotics.   Pulmonology performed bronchoscopy 7/7  4. Aortic stenosis -moderate by echocardiogram last year.  No indication for repeat echocardiogram inpatient. Will keep his scheduled outpatient appt  5. History of Crohn's disease  6. Prediabetes  7. Right forehead laceration secondary to fall overnight    Would observe him one more night and plan for discharge home  tomorrow      KYLAH Jha  07/10/21  13:56 EDT

## 2021-07-11 ENCOUNTER — APPOINTMENT (OUTPATIENT)
Dept: GENERAL RADIOLOGY | Facility: HOSPITAL | Age: 86
End: 2021-07-11

## 2021-07-11 LAB
ANION GAP SERPL CALCULATED.3IONS-SCNC: 13.1 MMOL/L (ref 5–15)
BACTERIA SPEC AEROBE CULT: ABNORMAL
BACTERIA SPEC AEROBE CULT: ABNORMAL
BASOPHILS # BLD AUTO: 0.04 10*3/MM3 (ref 0–0.2)
BASOPHILS NFR BLD AUTO: 0.3 % (ref 0–1.5)
BUN SERPL-MCNC: 22 MG/DL (ref 8–23)
BUN/CREAT SERPL: 20 (ref 7–25)
CALCIUM SPEC-SCNC: 8.8 MG/DL (ref 8.6–10.5)
CHLORIDE SERPL-SCNC: 102 MMOL/L (ref 98–107)
CO2 SERPL-SCNC: 20.9 MMOL/L (ref 22–29)
CREAT SERPL-MCNC: 1.1 MG/DL (ref 0.76–1.27)
DEPRECATED RDW RBC AUTO: 50.2 FL (ref 37–54)
EOSINOPHIL # BLD AUTO: 0.13 10*3/MM3 (ref 0–0.4)
EOSINOPHIL NFR BLD AUTO: 1.1 % (ref 0.3–6.2)
ERYTHROCYTE [DISTWIDTH] IN BLOOD BY AUTOMATED COUNT: 16.1 % (ref 12.3–15.4)
GFR SERPL CREATININE-BSD FRML MDRD: 63 ML/MIN/1.73
GLUCOSE BLDC GLUCOMTR-MCNC: 104 MG/DL (ref 70–130)
GLUCOSE BLDC GLUCOMTR-MCNC: 133 MG/DL (ref 70–130)
GLUCOSE BLDC GLUCOMTR-MCNC: 145 MG/DL (ref 70–130)
GLUCOSE BLDC GLUCOMTR-MCNC: 158 MG/DL (ref 70–130)
GLUCOSE SERPL-MCNC: 78 MG/DL (ref 65–99)
GRAM STN SPEC: ABNORMAL
GRAM STN SPEC: ABNORMAL
HCT VFR BLD AUTO: 35.7 % (ref 37.5–51)
HGB BLD-MCNC: 11.9 G/DL (ref 13–17.7)
IMM GRANULOCYTES # BLD AUTO: 0.1 10*3/MM3 (ref 0–0.05)
IMM GRANULOCYTES NFR BLD AUTO: 0.9 % (ref 0–0.5)
LYMPHOCYTES # BLD AUTO: 1.25 10*3/MM3 (ref 0.7–3.1)
LYMPHOCYTES NFR BLD AUTO: 10.6 % (ref 19.6–45.3)
MCH RBC QN AUTO: 28.4 PG (ref 26.6–33)
MCHC RBC AUTO-ENTMCNC: 33.3 G/DL (ref 31.5–35.7)
MCV RBC AUTO: 85.2 FL (ref 79–97)
MONOCYTES # BLD AUTO: 0.99 10*3/MM3 (ref 0.1–0.9)
MONOCYTES NFR BLD AUTO: 8.4 % (ref 5–12)
NEUTROPHILS NFR BLD AUTO: 78.7 % (ref 42.7–76)
NEUTROPHILS NFR BLD AUTO: 9.23 10*3/MM3 (ref 1.7–7)
NRBC BLD AUTO-RTO: 0 /100 WBC (ref 0–0.2)
PLATELET # BLD AUTO: 213 10*3/MM3 (ref 140–450)
PMV BLD AUTO: 11.4 FL (ref 6–12)
POTASSIUM SERPL-SCNC: 3.9 MMOL/L (ref 3.5–5.2)
PROCALCITONIN SERPL-MCNC: 0.19 NG/ML (ref 0–0.25)
QT INTERVAL: 332 MS
RBC # BLD AUTO: 4.19 10*6/MM3 (ref 4.14–5.8)
SODIUM SERPL-SCNC: 136 MMOL/L (ref 136–145)
WBC # BLD AUTO: 11.74 10*3/MM3 (ref 3.4–10.8)

## 2021-07-11 PROCEDURE — 71045 X-RAY EXAM CHEST 1 VIEW: CPT

## 2021-07-11 PROCEDURE — 97161 PT EVAL LOW COMPLEX 20 MIN: CPT

## 2021-07-11 PROCEDURE — 84145 PROCALCITONIN (PCT): CPT | Performed by: INTERNAL MEDICINE

## 2021-07-11 PROCEDURE — 80048 BASIC METABOLIC PNL TOTAL CA: CPT | Performed by: HOSPITALIST

## 2021-07-11 PROCEDURE — 99232 SBSQ HOSP IP/OBS MODERATE 35: CPT | Performed by: NURSE PRACTITIONER

## 2021-07-11 PROCEDURE — 73130 X-RAY EXAM OF HAND: CPT

## 2021-07-11 PROCEDURE — 82962 GLUCOSE BLOOD TEST: CPT

## 2021-07-11 PROCEDURE — 93010 ELECTROCARDIOGRAM REPORT: CPT | Performed by: INTERNAL MEDICINE

## 2021-07-11 PROCEDURE — 72050 X-RAY EXAM NECK SPINE 4/5VWS: CPT

## 2021-07-11 PROCEDURE — 93005 ELECTROCARDIOGRAM TRACING: CPT | Performed by: HOSPITALIST

## 2021-07-11 PROCEDURE — 85025 COMPLETE CBC W/AUTO DIFF WBC: CPT | Performed by: HOSPITALIST

## 2021-07-11 PROCEDURE — 97530 THERAPEUTIC ACTIVITIES: CPT

## 2021-07-11 RX ORDER — METOPROLOL TARTRATE 50 MG/1
50 TABLET, FILM COATED ORAL 3 TIMES DAILY
Status: DISCONTINUED | OUTPATIENT
Start: 2021-07-11 | End: 2021-07-12 | Stop reason: HOSPADM

## 2021-07-11 RX ADMIN — DOFETILIDE 125 MCG: 0.12 CAPSULE ORAL at 22:24

## 2021-07-11 RX ADMIN — METOPROLOL TARTRATE 25 MG: 25 TABLET, FILM COATED ORAL at 10:39

## 2021-07-11 RX ADMIN — BUDESONIDE 3 MG: 3 CAPSULE, GELATIN COATED ORAL at 09:44

## 2021-07-11 RX ADMIN — DIGOXIN 125 MCG: 125 TABLET ORAL at 12:25

## 2021-07-11 RX ADMIN — ALLOPURINOL 100 MG: 100 TABLET ORAL at 09:44

## 2021-07-11 RX ADMIN — APIXABAN 5 MG: 5 TABLET, FILM COATED ORAL at 09:44

## 2021-07-11 RX ADMIN — METOPROLOL TARTRATE 50 MG: 50 TABLET, FILM COATED ORAL at 21:07

## 2021-07-11 RX ADMIN — ATORVASTATIN CALCIUM 10 MG: 20 TABLET, FILM COATED ORAL at 21:07

## 2021-07-11 RX ADMIN — APIXABAN 5 MG: 5 TABLET, FILM COATED ORAL at 21:07

## 2021-07-11 RX ADMIN — DOFETILIDE 125 MCG: 0.12 CAPSULE ORAL at 09:44

## 2021-07-11 RX ADMIN — HYDROCODONE BITARTRATE AND ACETAMINOPHEN 1 TABLET: 5; 325 TABLET ORAL at 09:51

## 2021-07-11 RX ADMIN — SODIUM CHLORIDE, PRESERVATIVE FREE 3 ML: 5 INJECTION INTRAVENOUS at 09:48

## 2021-07-11 RX ADMIN — HYDROCODONE BITARTRATE AND ACETAMINOPHEN 1 TABLET: 5; 325 TABLET ORAL at 13:52

## 2021-07-11 RX ADMIN — SODIUM CHLORIDE, PRESERVATIVE FREE 3 ML: 5 INJECTION INTRAVENOUS at 21:00

## 2021-07-11 RX ADMIN — HYDROCODONE BITARTRATE AND ACETAMINOPHEN 1 TABLET: 5; 325 TABLET ORAL at 04:37

## 2021-07-11 RX ADMIN — PANTOPRAZOLE SODIUM 40 MG: 40 TABLET, DELAYED RELEASE ORAL at 16:43

## 2021-07-11 RX ADMIN — METOPROLOL TARTRATE 50 MG: 50 TABLET, FILM COATED ORAL at 16:43

## 2021-07-11 RX ADMIN — PANTOPRAZOLE SODIUM 40 MG: 40 TABLET, DELAYED RELEASE ORAL at 08:07

## 2021-07-11 RX ADMIN — METOPROLOL TARTRATE 50 MG: 50 TABLET, FILM COATED ORAL at 08:11

## 2021-07-11 NOTE — PROGRESS NOTES
"Daily progress note    Chief complaint  Doing same  No specific complaints  Son at bedside and he is telling me that he was complaining of neck pain after the fall    History of present illness  88-year-old [place] male who is well-known to our service with history of chronic atrial fibrillation hypertension hyperlipidemia aortic stenosis Crohn's disease pulmonary hypertension and gastroesophageal reflux disease brought to the emergency room by the wife when she noticed increased cloudiness eating less and occasional complaint of palpitation with elevated heart rate which she checked.  Patient has no chest pain shortness of breath.  Patient also denies any dizziness lightheadedness or near syncope.  Patient did not miss any medication and taking religiously.  Patient work-up in ER revealed rapid ventricular rate with history of chronic atrial fibrillation admit for management.  At the time of interview he is back to baseline has no specific complaints.      REVIEW OF SYSTEMS  Unremarkable except neck pain     PHYSICAL EXAM   Blood pressure 98/50, pulse 106, temperature 98.4 °F (36.9 °C), temperature source Oral, resp. rate 18, height 175.3 cm (69\"), weight 67.6 kg (149 lb 0.5 oz), SpO2 91 %.    Constitutional: No distress.  Pleasant and nontoxic  Head: Normocephalic and atraumatic.   Oropharynx: Mucous membranes are moist.   Eyes: No scleral icterus. No conjunctival pallor.  Neck: Painless range of motion noted. Neck supple.   Cardiovascular: Irregularly irregular and intermittently tachycardic and intact distal pulses.  Pulmonary/Chest: No respiratory distress.  Diffuse fine rhonchi.   Abdominal: Soft. There is no tenderness and no guarding.  Bowel sounds positive  Musculoskeletal: Moves all extremities equally. There is no pedal edema or calf tenderness.   Neurological: Alert.  Baseline strength and sensation noted.   Skin: Skin is pink, warm, and dry. No pallor.   Psychiatric: Mood and affect normal.     LAB " "RESULTS  Lab Results (last 24 hours)     Procedure Component Value Units Date/Time    POC Glucose Once [[id number]]  (Abnormal) Collected: 07/11/21 1143    Specimen: Blood Updated: 07/11/21 1145     Glucose 158 mg/dL      Comment: Meter: [id number] : [id number] Mehrdad HERNADEZ       BAL Culture, Quantitative - Lavage, Lung, Right Middle Lobe [[id number]]  (Abnormal)  (Susceptibility) Collected: 07/08/21 1154    Specimen: Lavage from Lung, Right Middle Lobe Updated: 07/11/21 1105     BAL Culture >100,000 CFU/mL Pseudomonas aeruginosa      <10,000 CFU/mL Normal Respiratory Arabella     Gram Stain Many (4+) WBCs seen      Rare (1+) Mixed bacterial morphotypes seen on Gram Stain    Susceptibility      Pseudomonas aeruginosa      MUSA      Cefepime Susceptible      Ceftazidime Susceptible      Ciprofloxacin Susceptible      Gentamicin Susceptible      Levofloxacin Susceptible      Piperacillin + Tazobactam Susceptible               Linear View                   Procalcitonin [[id number]]  (Normal) Collected: 07/11/21 0408    Specimen: Blood Updated: 07/11/21 0734     Procalcitonin 0.19 ng/mL     Narrative:      As a Marker for Sepsis (Non-Neonates):     1. <0.5 ng/mL represents a low risk of severe sepsis and/or septic shock.  2. >2 ng/mL represents a high risk of severe sepsis and/or septic shock.    As a Marker for Lower Respiratory Tract Infections that require antibiotic therapy:  PCT on Admission     Antibiotic Therapy             6-12 Hrs later  >0.5                          Strongly Recommended            >0.25 - <0.5             Recommended  0.1 - 0.25                  Discouraged                       Remeasure/reassess PCT  <0.1                         Strongly Discouraged         Remeasure/reassess PCT      As 28 day mortality risk marker: \"Change in Procalcitonin Result\" (>80% or <=80%) if Day 0 (or Day 1) and Day 4 values are available. Refer to http://[email or web address]-pct-calculator.com/    Change in PCT <=80 %   A " decrease of PCT levels below or equal to 80% defines a positive change in PCT test result representing a higher risk for 28-day all-cause mortality of patients diagnosed with severe sepsis or septic shock.    Change in PCT >80 %   A decrease of PCT levels of more than 80% defines a negative change in PCT result representing a lower risk for 28-day all-cause mortality of patients diagnosed with severe sepsis or septic shock.              Results may be falsely decreased if patient taking Biotin.     Basic Metabolic Panel [[id number]]  (Abnormal) Collected: 07/11/21 0408    Specimen: Blood Updated: 07/11/21 0730     Glucose 78 mg/dL      BUN 22 mg/dL      Creatinine 1.10 mg/dL      Sodium 136 mmol/L      Potassium 3.9 mmol/L      Comment: Slight hemolysis detected by analyzer. Results may be affected.        Chloride 102 mmol/L      CO2 20.9 mmol/L      Calcium 8.8 mg/dL      eGFR Non African Amer 63 mL/min/1.73      BUN/Creatinine Ratio 20.0     Anion Gap 13.1 mmol/L     Narrative:      GFR Normal >60  Chronic Kidney Disease <60  Kidney Failure <15      CBC & Differential [[id number]]  (Abnormal) Collected: 07/11/21 0408    Specimen: Blood Updated: 07/11/21 0647    Narrative:      The following orders were created for panel order CBC & Differential.  Procedure                               Abnormality         Status                     ---------                               -----------         ------                     CBC Auto Differential[[id number]]        Abnormal            Final result                 Please view results for these tests on the individual orders.    CBC Auto Differential [[id number]]  (Abnormal) Collected: 07/11/21 0408    Specimen: Blood Updated: 07/11/21 0647     WBC 11.74 10*3/mm3      RBC 4.19 10*6/mm3      Hemoglobin 11.9 g/dL      Hematocrit 35.7 %      MCV 85.2 fL      MCH 28.4 pg      MCHC 33.3 g/dL      RDW 16.1 %      RDW-SD 50.2 fl      MPV 11.4 fL      Platelets 213 10*3/mm3       Neutrophil % 78.7 %      Lymphocyte % 10.6 %      Monocyte % 8.4 %      Eosinophil % 1.1 %      Basophil % 0.3 %      Immature Grans % 0.9 %      Neutrophils, Absolute 9.23 10*3/mm3      Lymphocytes, Absolute 1.25 10*3/mm3      Monocytes, Absolute 0.99 10*3/mm3      Eosinophils, Absolute 0.13 10*3/mm3      Basophils, Absolute 0.04 10*3/mm3      Immature Grans, Absolute 0.10 10*3/mm3      nRBC 0.0 /100 WBC     POC Glucose Once [[id number]]  (Normal) Collected: 07/11/21 0553    Specimen: Blood Updated: 07/11/21 0554     Glucose 104 mg/dL      Comment: Meter: [id number] : [id number] FenLikewise Softwareell NA       POC Glucose Once [[id number]]  (Abnormal) Collected: 07/10/21 2126    Specimen: Blood Updated: 07/10/21 2128     Glucose 137 mg/dL      Comment: Meter: [id number] : [id number] Fendonovan Romelia NA           Imaging Results (Last 24 Hours)     Procedure Component Value Units Date/Time    XR Chest 1 View [[id number]] Collected: 07/11/21 0702     Updated: 07/11/21 0729    Narrative:      ONE-VIEW PORTABLE CHEST AT 5:43 AM     HISTORY: Recent pacemaker. Follow-up of pneumonia or CHF.     FINDINGS: The lungs are well-expanded with marked improvement in the  previous vascular congestion and associated atelectasis and pleural  effusions noted on the study of 2 days ago and likely related to  improving congestive heart failure. There remains some minimal  atelectasis and pleural fluid at the lung bases. The heart remains  enlarged with a pacemaker in place.     This report was finalized on 7/11/2021 7:26 AM by Dr. Alexx Dimas M.D.              ECG 12 Lead  Component   Ref Range & Units 7/6/21 1123 7/5/21 2135   QT Interval   ms 314 P  330 P         HEART RATE= 101  bpm  RR Interval= 596  ms  VA Interval=   ms  P Horizontal Axis=   deg  P Front Axis=   deg  QRSD Interval= 76  ms  QT Interval= 314  ms  QRS Axis= 34  deg  T Wave Axis= 101  deg  - ABNORMAL ECG -  Atrial fibrillation             Current Facility-Administered  Medications:   •  acetaminophen (TYLENOL) tablet 650 mg, 650 mg, Oral, Q4H PRN **OR** [DISCONTINUED] acetaminophen (TYLENOL) suppository 650 mg, 650 mg, Rectal, Q4H PRN, Chacon, Glenna A, APRN  •  allopurinol (ZYLOPRIM) tablet 100 mg, 100 mg, Oral, Daily, Chacon, Glenna A, APRN, 100 mg at 07/11/21 0944  •  apixaban (ELIQUIS) tablet 5 mg, 5 mg, Oral, Q12H, Chacon, Glenna A, APRN, 5 mg at 07/11/21 0944  •  atorvastatin (LIPITOR) tablet 10 mg, 10 mg, Oral, Nightly, Chacon, Glenna A, APRN, 10 mg at 07/10/21 2138  •  Budesonide (ENTOCORT EC) 24 hr capsule 3 mg, 3 mg, Oral, Daily, Chacon, Glenna A, APRN, 3 mg at 07/11/21 0944  •  digoxin (LANOXIN) tablet 125 mcg, 125 mcg, Oral, Daily, Chacon, Glenna A, APRN, 125 mcg at 07/11/21 1225  •  dofetilide (TIKOSYN) capsule 125 mcg, 125 mcg, Oral, Q12H, Chacon, Glenna A, APRN, 125 mcg at 07/11/21 0944  •  HYDROcodone-acetaminophen (NORCO) 5-325 MG per tablet 1 tablet, 1 tablet, Oral, Q4H PRN, Chacon, Glenna A, APRN, 1 tablet at 07/11/21 0951  •  insulin lispro (ADMELOG) injection 0-7 Units, 0-7 Units, Subcutaneous, TID AC, Chacon, Glenna A, APRN, 2 Units at 07/06/21 1734  •  ipratropium-albuterol (DUO-NEB) nebulizer solution 3 mL, 3 mL, Nebulization, Q4H PRN, Oswaldo Glenna A, APRN  •  metoprolol tartrate (LOPRESSOR) tablet 50 mg, 50 mg, Oral, TID, Roxann, Alta, APRN  •  pantoprazole (PROTONIX) EC tablet 40 mg, 40 mg, Oral, BID AC, Chacon, Glenna A, APRN, 40 mg at 07/11/21 0807  •  [COMPLETED] Insert peripheral IV, , , Once **AND** sodium chloride 0.9 % flush 10 mL, 10 mL, Intravenous, PRN, Chacon, Glenna A, APRN, 10 mL at 07/08/21 2147  •  sodium chloride 0.9 % flush 10 mL, 10 mL, Intravenous, PRN, Chacon, Glenna A, APRN  •  sodium chloride 0.9 % flush 3 mL, 3 mL, Intravenous, Q12H, Chacon, Glenna A, APRN, 3 mL at 07/11/21 0948     ASSESSMENT  Chronic atrial fibrillation with rapid ventricular rate s/p permanent pacemaker placement  Hypertension  Hyperlipidemia  Aortic stenosis  Pulmonary  fibrosis/bronchiectasis status post bronchoscopy  Rhinovirus infection andPseudomonas in BAL which is sensitive to Levaquin  Pulmonary hypertension  History of Crohn's disease  Gastroesophageal reflux disease    PLAN  CPM  Monitor  Interrogate pacemaker  Check C-spine x-ray  Tikosyn Lanoxin and Lopressor  Anticoagulation  Hold antibiotics  Infectious disease consult  Pulmonary and cardiology to follow patient  Continue home medications  Stress ulcer DVT prophylaxis  Supportive care  Discussed with family and nursing staff  Follow closely further recommendation according to hospital course    SHELBIE COWART MD

## 2021-07-11 NOTE — PROGRESS NOTES
"CC: PAF    Interval History: went into rapid a fib overnight but rate some better since morning dose metoprolol. Wife at bedside.      Vital Signs  Temp:  [97.3 °F (36.3 °C)-98.2 °F (36.8 °C)] 97.3 °F (36.3 °C)  Heart Rate:  [] 114  Resp:  [18] 18  BP: ()/(55-74) 106/73    Intake/Output Summary (Last 24 hours) at 7/11/2021 1004  Last data filed at 7/11/2021 0340  Gross per 24 hour   Intake 60 ml   Output 200 ml   Net -140 ml     Flowsheet Rows      First Filed Value   Admission Height  175.3 cm (69\") Documented at 07/05/2021 2200   Admission Weight  67.6 kg (149 lb 0.5 oz) Documented at 07/06/2021 1635          PHYSICAL EXAM:  General: No acute distress  Resp:NL Rate, unlabored, clear  CV:tachycardic rate and irregular  rhythm, NL PMI, Nl S1 and S2, no Murmur, no gallop, no rub, No JVD. Normal pedal pulses  ABD:Nl sounds, no masses or tenderness, nondistended, no guarding or rebound  Neuro: alert,cooperative and oriented  Extr: No edema or cyanosis, moves all extremities      Results Review:    Results from last 7 days   Lab Units 07/11/21  0408   SODIUM mmol/L 136   POTASSIUM mmol/L 3.9   CHLORIDE mmol/L 102   CO2 mmol/L 20.9*   BUN mg/dL 22   CREATININE mg/dL 1.10   GLUCOSE mg/dL 78   CALCIUM mg/dL 8.8     Results from last 7 days   Lab Units 07/05/21  2154   TROPONIN T ng/mL <0.010     Results from last 7 days   Lab Units 07/11/21  0408   WBC 10*3/mm3 11.74*   HEMOGLOBIN g/dL 11.9*   HEMATOCRIT % 35.7*   PLATELETS 10*3/mm3 213     Results from last 7 days   Lab Units 07/06/21  0617   INR  1.35*     Results from last 7 days   Lab Units 07/07/21  0523   CHOLESTEROL mg/dL 117     Results from last 7 days   Lab Units 07/06/21  0617   MAGNESIUM mg/dL 1.8     Results from last 7 days   Lab Units 07/07/21  0523   CHOLESTEROL mg/dL 117   TRIGLYCERIDES mg/dL 63   HDL CHOL mg/dL 37*   LDL CHOL mg/dL 66     I reviewed the patient's new clinical results.  I personally viewed and interpreted the patient's " EKG/Telemetry data- a fib        Medication Review:   Meds reviewed         Assessment/Plan    1. Atrial fibrillation with RVR -patient converted to sinus rhythm.  Continue beta-blocker and digoxin.  Dofetilide continues. Continue apixaban.  Appreciate EP input he is now s/p pacemaker during this hospital stay and plan is for AV johanne ablation few weeks.  Dr. Nettles is okay with doing the procedure while on Eliquis.  2. CKD -creatinine at baseline.  Electrolytes within normal range.  Euvolemic.  3. History of pulmonary fibrosis -recent pneumonia with full course of antibiotics.   Pulmonology performed bronchoscopy 7/7  4. Aortic stenosis -moderate by echocardiogram last year.  No indication for repeat echocardiogram inpatient. Will keep his scheduled outpatient appt  5. History of Crohn's disease  6. Prediabetes  7. Right forehead laceration secondary to fall  2 nights ago     -I am giving one additional metoprolol dose of 25 mg now.   - I am increasing metoprolol to three times daily which is his home dose.  Parameters set.  -We are planning outpatient AV node ablation in 3 weeks with DR. Nettles. He will see me in one week with pacemaker check.  - I think he may  discharge later this afternoon providing HR settles < 110.      KYLAH Jha  07/11/21  10:04 EDT

## 2021-07-11 NOTE — PLAN OF CARE
"Goal Outcome Evaluation:  Plan of Care Reviewed With: patient        Progress: improving  Outcome Summary: VSS, SR on the monitor. Pt a/o and calling out when needing to ambulate to the bathroom. C/o generalized soreness to head and neck from \"sleeping on it crooked\", assisted pt to reposition in bed and prn med given. No distress noted.  "

## 2021-07-11 NOTE — NURSING NOTE
Noted pt to flip into Afib on the heart monitor, -140s, pt asymptomatic, EKG ordered per protocol. Cardio notified, no new orders at this time other than to monitor rhythm and for pt symptoms.

## 2021-07-11 NOTE — PLAN OF CARE
Goal Outcome Evaluation:  Plan of Care Reviewed With: patient       Outcome Summary: No major issues over shift, Vs stable, pt still in afib today, HR better controlled as days wore with adjustments in BP meds, pt complaining of left hand and neck pain, x-rays obtained of both sites, no acute fracture seen, will continue to monitor, pt also converted back to sinus rhythm around 1820

## 2021-07-11 NOTE — PROGRESS NOTES
LOS: 5 days   Patient Care Team:  Lubna Lobo MD as PCP - General  Lubna Lobo MD as PCP - Family Medicine    Subjective     Patient reports he is doing very well he is breathing well no shortness of breath very minimal cough and very minimal sputum not enough to know what color it is even.  Review of Systems:          Objective     Vital Signs  Vital Sign Min/Max for last 24 hours  Temp  Min: 97.3 °F (36.3 °C)  Max: 98.4 °F (36.9 °C)   BP  Min: 90/74  Max: 145/60   Pulse  Min: 63  Max: 116   Resp  Min: 18  Max: 18   SpO2  Min: 91 %  Max: 98 %   No data recorded   No data recorded        Ventilator/Non-Invasive Ventilation Settings (From admission, onward) Comment    None                       Body mass index is 22.01 kg/m².  I/O last 3 completed shifts:  In: 60 [P.O.:60]  Out: 250 [Urine:250]  No intake/output data recorded.        Physical Exam:  General Appearance: Well-developed male resting comfortably in bed on room air in no apparent distress  Eyes: Conjunctiva are clear and anicteric  ENT: His membranes are moist no erythema or exudates  Neck: No jugular venous tension trachea midline  Lungs: Clear nonlabored symmetric expansion no wheezes no rales no rhonchi  Cardiac: Regular rate rhythm on the monitor sinus  Abdomen: Soft nontender no palpable hepatosplenomegaly or masses  : Not examined  Musculoskeletal: He has a left upper anterior chest incision that appears to be healing well little ecchymosis around it with subcutaneous mass consistent with his pacemaker placement  Skin: No jaundice no petechiae skin is warm and dry  Neuro: He is alert oriented cooperative grossly intact  Extremities/P Vascular: Clubbing no cyanosis no edema palpable radial and dorsalis pedis pulses  MSE: Seems to be in very good spirits       Labs:  Results from last 7 days   Lab Units 07/11/21  0408 07/10/21  0325 07/09/21  2124 07/08/21  0436 07/07/21  0523 07/06/21  0617 07/05/21  2154   GLUCOSE mg/dL 78 114*  139* 64* 84 84 247*   SODIUM mmol/L 136 133* 134* 138 137 139 134*   POTASSIUM mmol/L 3.9 3.7 3.7 4.0 4.4 4.7 4.7   MAGNESIUM mg/dL  --   --   --   --   --  1.8 1.9   CO2 mmol/L 20.9* 21.9* 22.1 22.8 22.5 22.2 20.8*   CHLORIDE mmol/L 102 99 98 106 105 107 102   ANION GAP mmol/L 13.1 12.1 13.9 9.2 9.5 9.8 11.2   CREATININE mg/dL 1.10 0.94 1.16 0.91 1.11 1.19 1.38*   BUN mg/dL 22 19 20 19 20 22 25*   BUN / CREAT RATIO  20.0 20.2 17.2 20.9 18.0 18.5 18.1   CALCIUM mg/dL 8.8 8.5* 8.8 8.1* 8.6 8.7 8.8   EGFR IF NONAFRICN AM mL/min/1.73 63 76 59* 79 63 58* 49*   ALK PHOS U/L  --   --   --   --  41  --   --    TOTAL PROTEIN g/dL  --   --   --   --  6.5  --   --    ALT (SGPT) U/L  --   --   --   --  16  --   --    AST (SGOT) U/L  --   --   --   --  14  --   --    BILIRUBIN mg/dL  --   --   --   --  0.3  --   --    ALBUMIN g/dL  --   --   --   --  3.00*  --   --    GLOBULIN gm/dL  --   --   --   --  3.5  --   --      Estimated Creatinine Clearance: 44.4 mL/min (by C-G formula based on SCr of 1.1 mg/dL).      Results from last 7 days   Lab Units 07/11/21  0408 07/10/21  0325 07/09/21 2124 07/08/21  0436 07/07/21  0523 07/06/21  0617 07/05/21  2154   WBC 10*3/mm3 11.74* 14.60* 12.32* 10.47 9.88 11.04* 10.71   RBC 10*6/mm3 4.19 4.21 4.74 4.02* 3.98* 4.23 4.24   HEMOGLOBIN g/dL 11.9* 11.6* 13.2 11.4* 11.3* 11.9* 11.7*   HEMATOCRIT % 35.7* 35.8* 42.0 34.9* 35.2* 36.3* 37.1*   MCV fL 85.2 85.0 88.6 86.8 88.4 85.8 87.5   MCH pg 28.4 27.6 27.8 28.4 28.4 28.1 27.6   MCHC g/dL 33.3 32.4 31.4* 32.7 32.1 32.8 31.5   RDW % 16.1* 16.1* 16.6* 16.3* 16.7* 17.0* 16.8*   RDW-SD fl 50.2 49.1 54.2* 52.1 54.0 52.9 53.2   MPV fL 11.4 11.2 10.4 10.8 11.0 10.5 10.9   PLATELETS 10*3/mm3 213 229 241 222 247 229 246   NEUTROPHIL % % 78.7* 83.5* 82.6* 76.3* 80.9* 77.2* 82.5*   LYMPHOCYTE % % 10.6* 6.0* 7.5* 12.9* 10.5* 12.0* 9.5*   MONOCYTES % % 8.4 9.0 7.7 8.3 6.7 7.7 6.1   EOSINOPHIL % % 1.1 0.1* 0.7 1.2 0.5 1.7 0.6   BASOPHIL % % 0.3 0.4 0.6 0.7  0.6 0.6 0.4   IMM GRAN % % 0.9* 1.0* 0.9* 0.6* 0.8* 0.8* 0.9*   NEUTROS ABS 10*3/mm3 9.23* 12.17* 10.18* 7.99* 7.99* 8.52* 8.84*   LYMPHS ABS 10*3/mm3 1.25 0.88 0.92 1.35 1.04 1.32 1.02   MONOS ABS 10*3/mm3 0.99* 1.32* 0.95* 0.87 0.66 0.85 0.65   EOS ABS 10*3/mm3 0.13 0.02 0.09 0.13 0.05 0.19 0.06   BASOS ABS 10*3/mm3 0.04 0.06 0.07 0.07 0.06 0.07 0.04   IMMATURE GRANS (ABS) 10*3/mm3 0.10* 0.15* 0.11* 0.06* 0.08* 0.09* 0.10*   NRBC /100 WBC 0.0 0.0 0.0 0.0 0.0 0.0 0.0         Results from last 7 days   Lab Units 07/05/21  2154   TROPONIN T ng/mL <0.010     Results from last 7 days   Lab Units 07/07/21  0523   PROBNP pg/mL 2,814.0*     Results from last 7 days   Lab Units 07/07/21  0523   TSH uIU/mL 1.450     Results from last 7 days   Lab Units 07/11/21  0408   PROCALCITONIN ng/mL 0.19     Results from last 7 days   Lab Units 07/06/21  0617   INR  1.35*     Microbiology Results (last 10 days)     Procedure Component Value - Date/Time    AFB Culture - Lavage, Lung, Right Middle Lobe [[id number]] Collected: 07/08/21 1154    Lab Status: Preliminary result Specimen: Lavage from Lung, Right Middle Lobe Updated: 07/09/21 1634     AFB Stain No acid fast bacilli seen on concentrated smear    Fungus Smear - Lavage, Lung, Right Middle Lobe [[id number]] Collected: 07/08/21 1154    Lab Status: Final result Specimen: Lavage from Lung, Right Middle Lobe Updated: 07/08/21 1440     Fungal Stain No yeast or hyphal elements seen    BAL Culture, Quantitative - Lavage, Lung, Right Middle Lobe [[id number]]  (Abnormal)  (Susceptibility) Collected: 07/08/21 1154    Lab Status: Final result Specimen: Lavage from Lung, Right Middle Lobe Updated: 07/11/21 1105     BAL Culture >100,000 CFU/mL Pseudomonas aeruginosa      <10,000 CFU/mL Normal Respiratory Arabella     Gram Stain Many (4+) WBCs seen      Rare (1+) Mixed bacterial morphotypes seen on Gram Stain    Susceptibility      Pseudomonas aeruginosa      MUSA      Cefepime Susceptible       Ceftazidime Susceptible      Ciprofloxacin Susceptible      Gentamicin Susceptible      Levofloxacin Susceptible      Piperacillin + Tazobactam Susceptible               Linear View                   Respiratory Panel, PCR (WITHOUT COVID) - Lavage, Lung, Right Middle Lobe [[id number]]  (Abnormal) Collected: 07/08/21 1154    Lab Status: Final result Specimen: Lavage from Lung, Right Middle Lobe Updated: 07/08/21 1346     ADENOVIRUS, PCR Not Detected     Coronavirus 229E Not Detected     Coronavirus HKU1 Not Detected     Coronavirus NL63 Not Detected     Coronavirus OC43 Not Detected     Human Metapneumovirus Not Detected     Human Rhinovirus/Enterovirus Detected     Influenza B PCR Not Detected     Parainfluenza Virus 1 Not Detected     Parainfluenza Virus 2 Not Detected     Parainfluenza Virus 3 Not Detected     Parainfluenza Virus 4 Not Detected     Bordetella pertussis pcr Not Detected     Chlamydophila pneumoniae PCR Not Detected     Mycoplasma pneumo by PCR Not Detected     Influenza A PCR Not Detected     RSV, PCR Not Detected     Bordetella parapertussis PCR Not Detected    Narrative:      The coronavirus on the RVP is NOT COVID-19 and is NOT indicative of infection with COVID-19.    In the setting of a positive respiratory panel with a viral infection PLUS a negative procalcitonin without other underlying concern for bacterial infection, consider observing off antibiotics or discontinuation of antibiotics and continue supportive care. If the respiratory panel is positive for atypical bacterial infection (Bordetella pertussis, Chlamydophila pneumoniae, or Mycoplasma pneumoniae), consider antibiotic de-escalation to target atypical bacterial infection.    COVID PRE-OP / PRE-PROCEDURE SCREENING ORDER (NO ISOLATION) - Swab, Nasopharynx [[id number]]  (Normal) Collected: 07/06/21 0111    Lab Status: Final result Specimen: Swab from Nasopharynx Updated: 07/06/21 0325    Narrative:      The following orders were  created for panel order COVID PRE-OP / PRE-PROCEDURE SCREENING ORDER (NO ISOLATION) - Swab, Nasopharynx.  Procedure                               Abnormality         Status                     ---------                               -----------         ------                     COVID-19,[facility]HOU[facility]...[[id number]]  Normal              Final result                 Please view results for these tests on the individual orders.    COVID-19,[facility]HOU[facility] CEPHEID/VERITO NP SWAB IN TRANSPORT MEDIA 8-12 HR TAT - Swab, Nasopharynx [[id number]]  (Normal) Collected: 07/06/21 0111    Lab Status: Final result Specimen: Swab from Nasopharynx Updated: 07/06/21 0325     COVID19 Not Detected    Narrative:      Fact sheet for providers: https://www.fda.gov/media/651300/download     Fact sheet for patients: https://www.fda.gov/media/775967/download              allopurinol, 100 mg, Oral, Daily  apixaban, 5 mg, Oral, Q12H  atorvastatin, 10 mg, Oral, Nightly  Budesonide, 3 mg, Oral, Daily  digoxin, 125 mcg, Oral, Daily  dofetilide, 125 mcg, Oral, Q12H  insulin lispro, 0-7 Units, Subcutaneous, TID AC  metoprolol tartrate, 50 mg, Oral, TID  pantoprazole, 40 mg, Oral, BID AC  sodium chloride, 3 mL, Intravenous, Q12H           Diagnostics:  CT Chest Without Contrast Diagnostic    Result Date: 6/30/2021  CT CHEST WO CONTRAST DIAGNOSTIC-  Radiation dose reduction techniques were utilized, including automated exposure control and exposure modulation based on body size.  CLINICAL: Cough, pulmonary infiltrate follow-up.  COMPARISON: 12/07/2020  FINDINGS: Bilateral gynecomastia again demonstrated. Trace right pleural effusion. There is again demonstrated bronchiectasis with consolidation/collapse of the right middle lobe demonstrating air bronchograms throughout. No endobronchial lesion or secretions identified. There is a trace amount of pleural fluid demonstrated within the minor fissure. There is also pleural thickening adjacent to this  location, unchanged in the interim.  At the base of the right lower lobe and left lower lobe there is combination of bronchiectasis and consolidation which is more pronounced compared to the previous examination and greater on the right than the left. There is minimal amount of bronchiectasis with bronchial wall thickening and associated discoid atelectasis along the posterior margin of the lingular segment, on the major fissure. The right upper lobe is clear.  Atherosclerotic calcification of the aorta, heavy coronary artery calcification. Diameter of the aorta is within normal limits. There is cardiac enlargement. No pericardial effusion. Esophagus is satisfactory in course and caliber.  Small to moderate size mediastinal lymph nodes seen, some of these are calcified. Some of the lymph nodes have diminished in size within the interim, majority appear stable. No new or enlarging lymph node has developed.  CONCLUSION: There is extensive bronchiectasis with areas of consolidation involving both lower lobes, right middle lobe as well as the lingular segment as described above. Lower lobe airspace disease appears more pronounced compared to 2020. Trace right pleural effusion is demonstrated and there is stable pleural thickening along the lateral right lung base. Dense consolidation with volume loss involving the entire right middle lobe which is largely collapsed without interval improvement.     This report was finalized on 6/30/2021 12:17 PM by Dr. Arsalan Max M.D.      XR Chest 1 View    Result Date: 7/9/2021  ONE VIEW PORTABLE CHEST AT 4:44 PM  HISTORY: Pacemaker placement  FINDINGS: There has been recent insertion of a left-sided pacer device with 2 leads in place and no complicating features are seen. There is no pneumothorax. There is cardiomegaly with considerable vascular congestion and associated small bilateral pleural effusions showing worsening from 4 days ago and consistent with worsening congestive  heart failure.  This report was called directly to the nursing staff at the time of the dictation.  This report was finalized on 7/9/2021 5:04 PM by Dr. Alexx Dimas M.D.      XR Chest 1 View    Result Date: 7/5/2021  Patient: ALEX BURK  Time Out: 23:43 Exam(s): FILM CXR 1 VIEW EXAM:   XR Chest, 1 View CLINICAL HISTORY:    Reason for exam: palpitations. TECHNIQUE:   Frontal view of the chest. COMPARISON:   No relevant prior studies available. FINDINGS:   Lungs:  There are streaky densities in the right lung base just above the diaphragm partially blunting the costophrenic angle.  Pulmonary vasculature is congested but sharply defined.   Pleural space:  No pneumothorax is seen for   Heart:  The cardiac silhouette is mildly enlarged.   Mediastinum:  Unremarkable.   Bones joints:  Unremarkable.   Upper abdomen:  No pneumoperitoneum under the diaphragm. IMPRESSION:     Mild right basilar infiltrate, atelectasis, or scarring. Mild cardiomegaly without evidence of CHF.    Electronically signed by Pavan Askew MD on 07-05-21 at 2343    XR Chest 1 View    Result Date: 6/23/2021  PORTABLE CHEST  HISTORY: Near syncope.  COMPARISON: Chest x-ray 12/07/2020.  FINDINGS: The heart is at the upper limits of normal in size. There is bibasilar atelectasis/infiltrate more prominent on the right. The infiltrates are less prominent as compared to the study from 12/07/2020. There is mild prominence of pulmonary vasculature in the perihilar regions bilaterally, exaggerated by inspiratory effort.  This report was finalized on 6/23/2021 7:56 AM by Dr. Sergey Peralta M.D.      EP/CRM Study    Result Date: 7/9/2021  Preprocedure diagnosis: Atrial fibrillation Postprocedure diagnosis: Same Procedures performed Implantation of a dual-chamber pacemaker Complications: None Blood loss: Minimal Indication 88-year-old male with recurrent symptomatic paroxysmal A. fib with RVR.  He has had multiple admissions to the hospital over the past 2  years 6 or 7 with similar complaint.  After discussion we have decided to proceed with pacemaker implant, and future AV node ablation. Description of procedure Informed consent was obtained from the patient.  The patient was sedated with fentanyl, Versed, and methohexital.  Once he was sedated 1% lidocaine was infiltrated for local anesthesia.  Using sharp dissection electrocautery a pocket was created the left chest accommodate the device.  Access was obtained in the left axillary vein at 2 sites using a micropuncture needle.  A 9 Italian sheath was placed.  This was used to advance the his sheath.  We used this to obtain left bundle branch pacing.  1 repositioning attempt was required.  There was great threshold and sensing, good injury, and a very nice paced QRS at the final position.  The 7 Italian sheath was placed.  An atrial lead was positioned in the right atrial appendage.  Good injury, sensing, and thresholds were obtained.  Leads were secured with 2 silk sutures apiece.  They were connected to the pulse generator.  The pulse generator was placed in the pocket and the pocket was flushed with antibiotic solution.  It was then closed in layers with 2-0 Vicryl, 3-0 Vicryl, and 4-0 Vicryl suture.  Dermabond was applied to the skin. Pulse Generator Medtronic Zeynep XT SN [id number] Right Atrial Lead Medtronic SN [id number] Right Ventricular Lead Select Secure SN [id number]    Results for orders placed during the hospital encounter of 08/26/20    Adult Transthoracic Echo Complete W/ Cont if Necessary Per Protocol    Interpretation Summary  · Estimated EF = 60%.  · Left ventricular systolic function is normal.  · Left ventricular wall thickness is consistent with mild concentric hypertrophy.  · Left atrial cavity size is moderately dilated.  · There is calcification of the aortic valve.  · Moderate aortic valve stenosis is present.  · Aortic valve maximum pressure gradient is 42.0 mmHg.  · Aortic valve mean pressure  gradient is 24.0 mmHg.  · Aortic valve area is 1.1 cm2.  · Mild tricuspid valve regurgitation is present.  · Left ventricular diastolic dysfunction (grade II) consistent with pseudonormalization.  · Calculated right ventricular systolic pressure from tricuspid regurgitation is 57.8 mmHg. Severe pulmonary hypertension is present.  · Mild aortic valve regurgitation is present.  · Mild mitral valve regurgitation is present      Have reviewed multiple chest x-rays and CT scan chest.    Active Hospital Problems    Diagnosis  POA   • **Pulmonary fibrosis (CMS/HCC) [J84.10]  Unknown   • Atrial fibrillation with rapid ventricular response (CMS/HCC) [I48.91]  Unknown   • Atrial fibrillation with RVR (CMS/HCC) [I48.91]  Yes      Resolved Hospital Problems   No resolved problems to display.         Assessment/Plan     1. A. fib with rapid ventricular response status post pacemaker placement has converted to sinus rhythm on beta-blockers digoxin and dofetilide cardiology plans AV johanne ablation in a few weeks  2. Bronchiectasis and postinflammatory pulmonary fibrosis with recent pneumonia patient has complete a full course of antibiotics  with azithromycin and Rocephin/Omnicef but  7/7/2021 bronchoscopy has [id number] Pseudomonas aeruginosa sensitivity are pending.   But white count has dropped back down to almost normal procalcitonin was negative.  He has no respiratory complaints I think this is likely colonization and I would not recommend treatment at this time  3. Rhinovirus/enterovirus infection treatment is purely symptomatic  4. Aortic stenosis with a valve area of 1.1 cm² also mild aortic, tricuspid and mitral regurgitation.  5. Pulmonary hypertension by echocardiogram  6. Hyponatremia mild worsening will need to be monitored  7. Anemia mild hemoglobin stable    Plan for disposition: From a pulmonary standpoint he is doing well as above I think likely he is just colonized we will see what ID thinks that from my  standpoint I do not think we need antibiotics I think he probably could go home we just have to remember if he does get infected that he has Pseudomonas    Khoa Mueller MD  07/11/21  19:45 EDT    Time:

## 2021-07-11 NOTE — THERAPY EVALUATION
Patient Name: Asia Ly  : 1932    MRN: [id number]                              Today's Date: 2021       Admit Date: 2021    Visit Dx:     ICD-10-CM ICD-9-CM   1. Atrial fibrillation with RVR (CMS/HCC)  I48.91 427.31   2. Hyperglycemia  R73.9 790.29   3. Acute on chronic renal insufficiency  N28.9 593.9    N18.9 585.9   4. Pulmonary fibrosis (CMS/HCC)  J84.10 515   5. Atrial fibrillation with rapid ventricular response (CMS/HCC)  I48.91 427.31     Patient Active Problem List   Diagnosis   • Ataxia   • TIA (transient ischemic attack)   • Atrial fibrillation, paroxysmal   • Hyperlipidemia   • Gout   • Crohn's disease (CMS/HCC)   • Atrial fibrillation with RVR (CMS/HCC)   • Acute cholecystitis   • Obstructive jaundice   • Acute lower GI bleeding   • Aortic stenosis   • Pulmonary fibrosis (CMS/HCC)   • Anticoagulant long-term use   • Febrile illness, acute   • Atrial fibrillation with rapid ventricular response (CMS/HCC)     Past Medical History:   Diagnosis Date   • Aortic stenosis    • Aspiration pneumonia (CMS/HCC)    • Atrial fibrillation (CMS/HCC)    • Bronchiectasis (CMS/HCC)    • CKD (chronic kidney disease)    • COPD (chronic obstructive pulmonary disease) (CMS/HCC)    • Crohn's disease (CMS/HCC)    • Dizziness    • Elevated cholesterol    • Gastric ulcer    • Generalized weakness    • Gout    • Hard of hearing    • Hyperlipidemia    • Hypertension    • Leg swelling    • Lower extremity edema    • Mild anemia    • Near syncope    • Nonrheumatic aortic valve stenosis    • PAF (paroxysmal atrial fibrillation) (CMS/HCC)    • Palpitations    • Peptic ulcer    • Pneumonia of left lung due to infectious organism    • Pulmonary fibrosis (CMS/HCC)    • Stroke (CMS/HCC)    • TIA (transient ischemic attack)      Past Surgical History:   Procedure Laterality Date   • BRONCHOSCOPY N/A 10/22/2018    Procedure: BRONCHOSCOPY WITH BRONCHALVEOLAR LAVAGE;  Surgeon: Chidi Oconnor MD;  Location: [facility]  "ENDOSCOPY;  Service: Pulmonary   • BRONCHOSCOPY N/A 7/8/2021    Procedure: BRONCHOSCOPY with BAL;  Surgeon: Chidi Oconnor MD;  Location: [facility] ENDOSCOPY;  Service: Pulmonary;  Laterality: N/A;  Pre: bronchectasis  Post: same   • CATARACT EXTRACTION Bilateral    • COLONOSCOPY     • COLONOSCOPY N/A 3/9/2018    Procedure: COLONOSCOPY to terminal ileum with bx;  Surgeon: Aron Cabrera MD;  [facility];  Service:    • COLONOSCOPY N/A 11/19/2020    Procedure: COLONOSCOPY to cecum:  apc to cecum angiodysplagia,;  Surgeon: Aron Cabrera MD;  [facility];  Service: Gastroenterology;  Laterality: N/A;  GI bleed  post:  diverticulosis, angiodysplagia   • CYSTECTOMY      back and shoulder area   • ENDOSCOPY N/A 3/9/2018    Procedure: ESOPHAGOGASTRODUODENOSCOPY with bx;  Surgeon: Aron Cabrera MD;  [facility];  Service:    • ENDOSCOPY N/A 11/19/2020    Procedure: ESOPHAGOGASTRODUODENOSCOPY;  Surgeon: Aron Cabrera MD;  Location: [facility] ENDOSCOPY;  Service: Gastroenterology;  Laterality: N/A;  GI bleed  post:  HH.   • EYE SURGERY Left     detached retina   • EYE SURGERY Right     \"repaired a hole in the eye\"   • TESTICLE SURGERY      benign tumor removed.     General Information     [facility] Name 07/11/21 1048          Physical Therapy Time and Intention    Document Type  evaluation  -     Mode of Treatment  individual therapy;physical therapy  -     Row Name 07/11/21 1048          General Information    Patient Profile Reviewed  yes  -     Prior Level of Function  independent:;gait;transfer;bed mobility  -     Existing Precautions/Restrictions  fall  -     Barriers to Rehab  none identified  -     Row Name 07/11/21 1048          Living Environment    Lives With  spouse  -     Row Name 07/11/21 1048          Cognition    Orientation Status (Cognition)  oriented x 4  -     Row Name 07/11/21 1048          Safety Issues, Functional Mobility    Impairments " Affecting Function (Mobility)  balance;strength;pain  -       User Key  (r) = Recorded By, (t) = Taken By, (c) = Cosigned By    Initials Name Provider Type     Qi Zhao Physical Therapist        Mobility     Row Name 07/11/21 1048          Bed Mobility    Bed Mobility  supine-sit  -     Supine-Sit [place] (Bed Mobility)  contact guard;1 person assist  -     Sit-Supine [place] (Bed Mobility)  not tested UIC  -     Assistive Device (Bed Mobility)  head of bed elevated  -     Row Name 07/11/21 1048          Sit-Stand Transfer    Sit-Stand [place] (Transfers)  standby assist  -     Assistive Device (Sit-Stand Transfers)  other (see comments) No AD  -[facility] Name 07/11/21 1048          Gait/Stairs (Locomotion)    [place] Level (Gait)  contact guard;standby assist  -     Assistive Device (Gait)  other (see comments) No AD  -     Distance in Feet (Gait)  300ft  -     Deviations/Abnormal Patterns (Gait)  stride length decreased  -     Bilateral Gait Deviations  forward flexed posture;heel strike decreased  -     Comment (Gait/Stairs)  Tolerated ambulation well w/o AD, mildly unsteady.  -       User Key  (r) = Recorded By, (t) = Taken By, (c) = Cosigned By    Initials Name Provider Type     Qi Zhao Physical Therapist        Obj/Interventions     [facility] Name 07/11/21 1050          Range of Motion Comprehensive    General Range of Motion  no range of motion deficits identified  -BH     Row Name 07/11/21 1050          Strength Comprehensive (MMT)    General Manual Muscle Testing (MMT) Assessment  lower extremity strength deficits identified  -     Comment, General Manual Muscle Testing (MMT) Assessment  Generalized weakness, BLE grossly 4/5  -[facility] Name 07/11/21 1050          Motor Skills    Therapeutic Exercise  -- 10 reps B AP/LAQ/seated marches  -[facility] Name 07/11/21 1050          Balance    Balance Assessment  sitting static balance;standing static  balance;standing dynamic balance  -     Static Sitting Balance  WFL;unsupported;sitting, edge of bed  -     Static Standing Balance  WFL;unsupported;standing  -     Dynamic Standing Balance  mild impairment;supported;standing  -     Balance Interventions  moderate challenge;standing;dynamic;tandem gait  -     Comment, Balance  Performed higher level balance activities - side stepping, retro-walking, tandem walking. Pt required 1-2 HHA for all activities, demonstrating increased unsteadiness and requiring assist to maintain safety.  -       User Key  (r) = Recorded By, (t) = Taken By, (c) = Cosigned By    Initials Name Provider Type     Qi Zhao Physical Therapist        Goals/Plan     Row Name 07/11/21 1102          Bed Mobility Goal 1 (PT)    Activity/Assistive Device (Bed Mobility Goal 1, PT)  bed mobility activities, all  -     [place] Level/Cues Needed (Bed Mobility Goal 1, PT)  independent  -     Time Frame (Bed Mobility Goal 1, PT)  1 week  -     Row Name 07/11/21 1102          Transfer Goal 1 (PT)    Activity/Assistive Device (Transfer Goal 1, PT)  transfers, all  -     [place] Level/Cues Needed (Transfer Goal 1, PT)  independent  -     Time Frame (Transfer Goal 1, PT)  1 week  -     Row Name 07/11/21 1102          Gait Training Goal 1 (PT)    Activity/Assistive Device (Gait Training Goal 1, PT)  gait (walking locomotion)  -     [place] Level (Gait Training Goal 1, PT)  independent  -     Distance (Gait Training Goal 1, PT)  300ft  -     Time Frame (Gait Training Goal 1, PT)  1 week  -       User Key  (r) = Recorded By, (t) = Taken By, (c) = Cosigned By    Initials Name Provider Type     Qi Zhao Physical Therapist        Clinical Impression     Row Name 07/11/21 1052          Pain    Additional Documentation  Pain Scale: FACES Pre/Post-Treatment (Group)  -     Row Name 07/11/21 1052          Pain Scale: Numbers Pre/Post-Treatment    Pain  Intervention(s)  Repositioned;Ambulation/increased activity  -     Row Name 07/11/21 1052          Pain Scale: FACES Pre/Post-Treatment    Pain: FACES Scale, Pretreatment  4-->hurts little more  -     Posttreatment Pain Rating  4-->hurts little more  -     Pain Location - Side  Left  -     Pain Location  shoulder;neck  [facility]     Pre/Posttreatment Pain Comment  Pt complaining of pain/stiffness in neck/R upper shoulder area that is new since his fall yesterday. Unclear if directly related to fall, or d/t poor head positioning in bed.  -     Row Name 07/11/21 1052          Plan of Care Review    Plan of Care Reviewed With  patient;spouse;son  -     Outcome Summary  Pt is an 87 yo M who presented with Afib with RVR and pt is now s/p pacemaker placement. PT consulted 2/2 fall forward off commode yesterday resulting in forehead laceration. Pt lives with his spouse, who is able to help him if needed. Pt denies use of any AD prior to this admission. Pt presents to PT with minimal functional deficits. Pt required CGA for bed mobility, SBA for STS, and was able to ambulate 300ft around unit with CGA-SBA. Pt mildly unsteady with gait, therefore assessed higher level balance exercises. Pt completed side steps, retrowalking, and tandem walking with 1-2 HHA due to overall unsteadiness. Pt may benefit from referral to [facility] to address balance deficits and decrease risk for future falls. Pt's family interested in further PT services in the hospital, but PT explained that pt was requiring little to no assistance for gait further PT may not be skilled treatment. Pt may benefit from an additional day of PT to decrease assistance needed for balance activities, but plan to D/C from caseload following. PT recommending home with [facility] referral if pt and family still interested.  -     Row Name 07/11/21 1056          Therapy Assessment/Plan (PT)    Patient/Family Therapy Goals Statement (PT)  Return to [facility]     Rehab  Potential (PT)  good, to achieve stated therapy goals  -     Criteria for Skilled Interventions Met (PT)  yes  -     Row Name 07/11/21 1052          Positioning and Restraints    Pre-Treatment Position  in bed  -     Post Treatment Position  chair  -     In Chair  reclined;call light within reach;encouraged to call for assist;with family/caregiver  -       User Key  (r) = Recorded By, (t) = Taken By, (c) = Cosigned By    Initials Name Provider Type     Qi Zhao Physical Therapist        Outcome Measures     Row Name 07/11/21 1103          How much help from another person do you currently need...    Turning from your back to your side while in flat bed without using bedrails?  4  -BH     Moving from lying on back to sitting on the side of a flat bed without bedrails?  3  -BH     Moving to and from a bed to a chair (including a wheelchair)?  3  -BH     Standing up from a chair using your arms (e.g., wheelchair, bedside chair)?  3  -BH     Climbing 3-5 steps with a railing?  3  -BH     To walk in hospital room?  4  -     AM-PAC 6 Clicks Score (PT)  20  -     Row Name 07/11/21 1103          Functional Assessment    Outcome Measure Options  AM-PAC 6 Clicks Basic Mobility (PT)  -       User Key  (r) = Recorded By, (t) = Taken By, (c) = Cosigned By    Initials Name Provider Type     Qi Zhao Physical Therapist        Physical Therapy Education                 Title: PT OT SLP Therapies (Done)     Topic: Physical Therapy (Done)     Point: Mobility training (Done)     Learning Progress Summary           Patient Acceptance, E,TB,D, VU,NR by  at 7/11/2021 1104                   Point: Home exercise program (Done)     Learning Progress Summary           Patient Acceptance, E,TB,D, VU,NR by  at 7/11/2021 1104                   Point: Body mechanics (Done)     Learning Progress Summary           Patient Acceptance, E,TB,D, VU,NR by  at 7/11/2021 1104                   Point: Precautions  (Done)     Learning Progress Summary           Patient Acceptance, E,TB,D, VU,NR by  at 7/11/2021 1104                               User Key     Initials Effective Dates Name Provider Type Discipline     05/10/21 -  Qi Zhao Physical Therapist PT              PT Recommendation and Plan  Planned Therapy Interventions (PT): balance training, bed mobility training, gait training, home exercise program, patient/family education, strengthening, transfer training  Plan of Care Reviewed With: patient, spouse, son  Outcome Summary: Pt is an 87 yo M who presented with Afib with RVR and pt is now s/p pacemaker placement. PT consulted 2/2 fall forward off commode yesterday resulting in forehead laceration. Pt lives with his spouse, who is able to help him if needed. Pt denies use of any AD prior to this admission. Pt presents to PT with minimal functional deficits. Pt required CGA for bed mobility, SBA for STS, and was able to ambulate 300ft around unit with CGA-SBA. Pt mildly unsteady with gait, therefore assessed higher level balance exercises. Pt completed side steps, retrowalking, and tandem walking with 1-2 HHA due to overall unsteadiness. Pt may benefit from referral to [facility] to address balance deficits and decrease risk for future falls. Pt's family interested in further PT services in the hospital, but PT explained that pt was requiring little to no assistance for gait further PT may not be skilled treatment. Pt may benefit from an additional day of PT to decrease assistance needed for balance activities, but plan to D/C from caseload following. PT recommending home with [facility] referral if pt and family still interested.     Time Calculation:   PT Charges     Row Name 07/11/21 1105             Time Calculation    Start Time  1006  -      Stop Time  1028  -      Time Calculation (min)  22 min  -      PT Received On  07/11/21  -      PT - Next Appointment  07/13/21  -      PT Goal Re-Cert Due Date   07/18/21  -         Time Calculation- PT    Total Timed Code Minutes- PT  18 minute(s)  -         Timed Charges    14399 - Gait Training Minutes   8  -      30246 - PT Therapeutic Activity Minutes  10  -         Untimed Charges    PT Eval/Re-eval Minutes  4  -         Total Minutes    Timed Charges Total Minutes  18  -      Untimed Charges Total Minutes  4  -       Total Minutes  22  -BH        User Key  (r) = Recorded By, (t) = Taken By, (c) = Cosigned By    Initials Name Provider Type     Qi Zhao Physical Therapist        Therapy Charges for Today     Code Description Service Date Service Provider Modifiers Qty    95971835386 HC PT THERAPEUTIC ACT EA 15 MIN 7/11/2021 Qi Zhao GP 1    97801584687 HC PT EVAL LOW COMPLEXITY 2 7/11/2021 Qi Zhao GP 1          PT G-Codes  Outcome Measure Options: AM-PAC 6 Clicks Basic Mobility (PT)  AM-PAC 6 Clicks Score (PT): 20    QI ZHAO  7/11/2021

## 2021-07-11 NOTE — PLAN OF CARE
Goal Outcome Evaluation:  Plan of Care Reviewed With: patient, spouse, son           Outcome Summary: Pt is an 89 yo M who presented with Afib with RVR and pt is now s/p pacemaker placement. PT consulted 2/2 fall forward off commode yesterday resulting in forehead laceration. Pt lives with his spouse, who is able to help him if needed. Pt denies use of any AD prior to this admission. Pt presents to PT with minimal functional deficits. Pt required CGA for bed mobility, SBA for STS, and was able to ambulate 300ft around unit with CGA-SBA. Pt mildly unsteady with gait, therefore assessed higher level balance exercises. Pt completed side steps, retrowalking, and tandem walking with 1-2 HHA due to overall unsteadiness. Pt may benefit from referral to [facility] to address balance deficits and decrease risk for future falls. Pt's family interested in further PT services in the hospital, but PT explained that pt was requiring little to no assistance for gait further PT may not be skilled treatment. Pt may benefit from an additional day of PT to decrease assistance needed for balance activities, but plan to D/C from caseload following. PT recommending home with [facility] referral if pt and family still interested.    Patient was intermittently wearing a face mask during this therapy encounter. Therapist used appropriate personal protective equipment including eye protection, mask, and gloves.  Mask used was standard procedure mask. Appropriate PPE was worn during the entire therapy session. Hand hygiene was completed before and after therapy session. Patient is not in enhanced droplet precautions.

## 2021-07-12 ENCOUNTER — HOME HEALTH ADMISSION (OUTPATIENT)
Dept: HOME HEALTH SERVICES | Facility: HOME HEALTHCARE | Age: 86
End: 2021-07-12

## 2021-07-12 VITALS
TEMPERATURE: 98.3 F | SYSTOLIC BLOOD PRESSURE: 140 MMHG | WEIGHT: 149.03 LBS | BODY MASS INDEX: 22.07 KG/M2 | HEART RATE: 65 BPM | OXYGEN SATURATION: 95 % | DIASTOLIC BLOOD PRESSURE: 70 MMHG | HEIGHT: 69 IN | RESPIRATION RATE: 18 BRPM

## 2021-07-12 LAB
ANION GAP SERPL CALCULATED.3IONS-SCNC: 11.7 MMOL/L (ref 5–15)
BASOPHILS # BLD AUTO: 0.04 10*3/MM3 (ref 0–0.2)
BASOPHILS NFR BLD AUTO: 0.4 % (ref 0–1.5)
BUN SERPL-MCNC: 29 MG/DL (ref 8–23)
BUN/CREAT SERPL: 23.2 (ref 7–25)
CALCIUM SPEC-SCNC: 8.6 MG/DL (ref 8.6–10.5)
CHLORIDE SERPL-SCNC: 99 MMOL/L (ref 98–107)
CO2 SERPL-SCNC: 22.3 MMOL/L (ref 22–29)
CREAT SERPL-MCNC: 1.25 MG/DL (ref 0.76–1.27)
CRP SERPL-MCNC: 20.21 MG/DL (ref 0–0.5)
DEPRECATED RDW RBC AUTO: 50.6 FL (ref 37–54)
EOSINOPHIL # BLD AUTO: 0.14 10*3/MM3 (ref 0–0.4)
EOSINOPHIL NFR BLD AUTO: 1.3 % (ref 0.3–6.2)
ERYTHROCYTE [DISTWIDTH] IN BLOOD BY AUTOMATED COUNT: 15.9 % (ref 12.3–15.4)
GFR SERPL CREATININE-BSD FRML MDRD: 55 ML/MIN/1.73
GLUCOSE BLDC GLUCOMTR-MCNC: 105 MG/DL (ref 70–130)
GLUCOSE BLDC GLUCOMTR-MCNC: 81 MG/DL (ref 70–130)
GLUCOSE SERPL-MCNC: 87 MG/DL (ref 65–99)
HCT VFR BLD AUTO: 34.4 % (ref 37.5–51)
HGB BLD-MCNC: 11.3 G/DL (ref 13–17.7)
IMM GRANULOCYTES # BLD AUTO: 0.12 10*3/MM3 (ref 0–0.05)
IMM GRANULOCYTES NFR BLD AUTO: 1.1 % (ref 0–0.5)
LYMPHOCYTES # BLD AUTO: 1.25 10*3/MM3 (ref 0.7–3.1)
LYMPHOCYTES NFR BLD AUTO: 11.8 % (ref 19.6–45.3)
MCH RBC QN AUTO: 28.6 PG (ref 26.6–33)
MCHC RBC AUTO-ENTMCNC: 32.8 G/DL (ref 31.5–35.7)
MCV RBC AUTO: 87.1 FL (ref 79–97)
MONOCYTES # BLD AUTO: 1.02 10*3/MM3 (ref 0.1–0.9)
MONOCYTES NFR BLD AUTO: 9.7 % (ref 5–12)
NEUTROPHILS NFR BLD AUTO: 7.98 10*3/MM3 (ref 1.7–7)
NEUTROPHILS NFR BLD AUTO: 75.7 % (ref 42.7–76)
NRBC BLD AUTO-RTO: 0 /100 WBC (ref 0–0.2)
PLATELET # BLD AUTO: 203 10*3/MM3 (ref 140–450)
PMV BLD AUTO: 10.6 FL (ref 6–12)
POTASSIUM SERPL-SCNC: 4.1 MMOL/L (ref 3.5–5.2)
RBC # BLD AUTO: 3.95 10*6/MM3 (ref 4.14–5.8)
SODIUM SERPL-SCNC: 133 MMOL/L (ref 136–145)
WBC # BLD AUTO: 10.55 10*3/MM3 (ref 3.4–10.8)

## 2021-07-12 PROCEDURE — 80048 BASIC METABOLIC PNL TOTAL CA: CPT | Performed by: HOSPITALIST

## 2021-07-12 PROCEDURE — 86140 C-REACTIVE PROTEIN: CPT | Performed by: HOSPITALIST

## 2021-07-12 PROCEDURE — 82962 GLUCOSE BLOOD TEST: CPT

## 2021-07-12 PROCEDURE — 99232 SBSQ HOSP IP/OBS MODERATE 35: CPT | Performed by: NURSE PRACTITIONER

## 2021-07-12 PROCEDURE — 97535 SELF CARE MNGMENT TRAINING: CPT

## 2021-07-12 PROCEDURE — 85025 COMPLETE CBC W/AUTO DIFF WBC: CPT | Performed by: HOSPITALIST

## 2021-07-12 PROCEDURE — 97166 OT EVAL MOD COMPLEX 45 MIN: CPT

## 2021-07-12 RX ADMIN — ACETAMINOPHEN 650 MG: 325 TABLET, FILM COATED ORAL at 00:04

## 2021-07-12 RX ADMIN — HYDROCODONE BITARTRATE AND ACETAMINOPHEN 1 TABLET: 5; 325 TABLET ORAL at 08:38

## 2021-07-12 RX ADMIN — DIGOXIN 125 MCG: 125 TABLET ORAL at 12:29

## 2021-07-12 RX ADMIN — PANTOPRAZOLE SODIUM 40 MG: 40 TABLET, DELAYED RELEASE ORAL at 06:52

## 2021-07-12 RX ADMIN — METOPROLOL TARTRATE 50 MG: 50 TABLET, FILM COATED ORAL at 08:31

## 2021-07-12 RX ADMIN — ALLOPURINOL 100 MG: 100 TABLET ORAL at 08:30

## 2021-07-12 RX ADMIN — BUDESONIDE 3 MG: 3 CAPSULE, GELATIN COATED ORAL at 08:30

## 2021-07-12 RX ADMIN — DOFETILIDE 125 MCG: 0.12 CAPSULE ORAL at 09:39

## 2021-07-12 RX ADMIN — SODIUM CHLORIDE, PRESERVATIVE FREE 3 ML: 5 INJECTION INTRAVENOUS at 08:31

## 2021-07-12 RX ADMIN — APIXABAN 5 MG: 5 TABLET, FILM COATED ORAL at 08:30

## 2021-07-12 NOTE — CASE MANAGEMENT/SOCIAL WORK
Continued Stay Note  [facility]     Patient Name: Asia Ly  MRN: [id number]  Today's Date: 7/12/2021    Admit Date: 7/5/2021    Discharge Plan     Row Name 07/12/21 1225       Plan    Plan  Home with spouse. [facility]  referral pending for Nursing, PT, and OT. (s/p pacemaker during this hospital stay). [employer] to provide rolling walker. Family to transport.    Patient/Family in Agreement with Plan  yes    Plan Comments  Met with pt, wife, and son at bedside to discuss D/C planning. PT recommending home with HH. All agreeable to HH and request referral to [facility]  to see at D/C. Referral placed in [facility] and notified Elay[facility] . Requesting rolling walker for home. Requesting [employer] to provide. Notified [facility]. Dru Xiao RN-BC        Discharge Codes    No documentation.             Dru Xiao RN

## 2021-07-12 NOTE — PLAN OF CARE
Pt admitted to [facility] 2/2 to Afib with RVR with cardioversion, bronch and pacemaker placement. Pmhx includes chronic atrial fibrillation, hypertension, hyperlipidemia, aortic stenosis, Crohn's disease, pulmonary hypertension, and gastroesophageal reflux disease. Pt fell off the commode several nights ago which resulted in a forehead laceration and cervical neck + L wrist/thumb pain (negative for fractures). Pt lives with his spouse in a single level home. He reports indep. With ADL's and mobility. Today, supervision provided for bed mobility. S/S LB dressing utilizing the figure 4 technique. CGA to mobility to bathroom for a commode and shower seat transfer. Pt returned to bed with CGA. No LOB. Pt educated on L wrist/thumb discomfort (around Scaphoid area) - pt's son with plans to purchase a comfort thumb spica splint as needed due to pain limiting + grimacing during ADL activity. Pt with plans to d/c home with HH OT and his wife/son's assistance.     Patient was not wearing a face mask during this therapy encounter. Therapist used appropriate personal protective equipment including mask, goggles and gloves. Mask used was standard procedure mask. Appropriate PPE was worn during the entire therapy session. Hand hygiene was completed before and after therapy session. Patient is not in enhanced droplet precautions.

## 2021-07-12 NOTE — PROGRESS NOTES
"CC: PAF    Interval History: no acute fracture on x ray of spine and left hand. Pain medication is helping left thumb pain. He is back in NSR. Wife at bedside.       Vital Signs  Temp:  [97.8 °F (36.6 °C)-98.4 °F (36.9 °C)] 98.3 °F (36.8 °C)  Heart Rate:  [] 81  Resp:  [16-18] 18  BP: ()/(50-70) 142/61    Intake/Output Summary (Last 24 hours) at 7/12/2021 0856  Last data filed at 7/11/2021 2349  Gross per 24 hour   Intake 120 ml   Output --   Net 120 ml     Flowsheet Rows      First Filed Value   Admission Height  175.3 cm (69\") Documented at 07/05/2021 2200   Admission Weight  67.6 kg (149 lb 0.5 oz) Documented at 07/06/2021 1635          PHYSICAL EXAM:  General: No acute distress  Resp:NL Rate, unlabored, clear  CV:NL rate and rhythm, NL PMI, Nl S1 and S2, no Murmur, no gallop, no rub, No JVD. Normal pedal pulses  ABD:Nl sounds, no masses or tenderness, nondistended, no guarding or rebound  Neuro: alert,cooperative and oriented  Extr: No edema or cyanosis, moves all extremities      Results Review:    Results from last 7 days   Lab Units 07/12/21  0343   SODIUM mmol/L 133*   POTASSIUM mmol/L 4.1   CHLORIDE mmol/L 99   CO2 mmol/L 22.3   BUN mg/dL 29*   CREATININE mg/dL 1.25   GLUCOSE mg/dL 87   CALCIUM mg/dL 8.6     Results from last 7 days   Lab Units 07/05/21  2154   TROPONIN T ng/mL <0.010     Results from last 7 days   Lab Units 07/12/21  0343   WBC 10*3/mm3 10.55   HEMOGLOBIN g/dL 11.3*   HEMATOCRIT % 34.4*   PLATELETS 10*3/mm3 203     Results from last 7 days   Lab Units 07/06/21  0617   INR  1.35*     Results from last 7 days   Lab Units 07/07/21  0523   CHOLESTEROL mg/dL 117     Results from last 7 days   Lab Units 07/06/21  0617   MAGNESIUM mg/dL 1.8     Results from last 7 days   Lab Units 07/07/21  0523   CHOLESTEROL mg/dL 117   TRIGLYCERIDES mg/dL 63   HDL CHOL mg/dL 37*   LDL CHOL mg/dL 66     I reviewed the patient's new clinical results.  I personally viewed and interpreted the patient's " EKG/Telemetry data- NSR        Medication Review:   Meds reviewed         Assessment/Plan    1. Atrial fibrillation with RVR - Continue beta-blocker, digoxin AND Dofetilide. Continue apixaban.   HE is now s/p pacemaker during this hospital stay and plan is for AV johanne ablation few weeks.  Dr. Nettles is okay with doing the procedure while on Eliquis.- he is in NSR today  2. CKD -creatinine at baseline.  Electrolytes within normal range.  Euvolemic.  3. History of pulmonary fibrosis/ COPD/bronchiectasis -recent pneumonia with full course of antibiotics.   Pulmonology performed bronchoscopy 7/7. He has colonized pseudomonas. ID recommended conservative treatment for now.   4. Aortic stenosis -moderate by echocardiogram last year.  No indication for repeat echocardiogram inpatient. Will keep his scheduled outpatient appt  5. History of Crohn's disease  6. Prediabetes  7. Right forehead laceration secondary to fall  2 nights ago  8.human rhinovirus/enterovirus found in bronchoalveolar lavage  9. S/p mechanical fall inpatient. No acute fracture on xray left hand or c spine     He may discharge from cardiology standpoint. However, will follow while he remains here       KYLAH Jha  07/12/21  08:56 EDT

## 2021-07-12 NOTE — THERAPY EVALUATION
Patient Name: Asia Ly  : 1932    MRN: [id number]                              Today's Date: 2021       Admit Date: 2021    Visit Dx:     ICD-10-CM ICD-9-CM   1. Atrial fibrillation with RVR (CMS/HCC)  I48.91 427.31   2. Hyperglycemia  R73.9 790.29   3. Acute on chronic renal insufficiency  N28.9 593.9    N18.9 585.9   4. Pulmonary fibrosis (CMS/HCC)  J84.10 515   5. Atrial fibrillation with rapid ventricular response (CMS/HCC)  I48.91 427.31     Patient Active Problem List   Diagnosis   • Ataxia   • TIA (transient ischemic attack)   • Atrial fibrillation, paroxysmal   • Hyperlipidemia   • Gout   • Crohn's disease (CMS/HCC)   • Atrial fibrillation with RVR (CMS/HCC)   • Acute cholecystitis   • Obstructive jaundice   • Acute lower GI bleeding   • Aortic stenosis   • Pulmonary fibrosis (CMS/HCC)   • Anticoagulant long-term use   • Febrile illness, acute   • Atrial fibrillation with rapid ventricular response (CMS/HCC)     Past Medical History:   Diagnosis Date   • Aortic stenosis    • Aspiration pneumonia (CMS/HCC)    • Atrial fibrillation (CMS/HCC)    • Bronchiectasis (CMS/HCC)    • CKD (chronic kidney disease)    • COPD (chronic obstructive pulmonary disease) (CMS/HCC)    • Crohn's disease (CMS/HCC)    • Dizziness    • Elevated cholesterol    • Gastric ulcer    • Generalized weakness    • Gout    • Hard of hearing    • Hyperlipidemia    • Hypertension    • Leg swelling    • Lower extremity edema    • Mild anemia    • Near syncope    • Nonrheumatic aortic valve stenosis    • PAF (paroxysmal atrial fibrillation) (CMS/HCC)    • Palpitations    • Peptic ulcer    • Pneumonia of left lung due to infectious organism    • Pulmonary fibrosis (CMS/HCC)    • Stroke (CMS/HCC)    • TIA (transient ischemic attack)      Past Surgical History:   Procedure Laterality Date   • BRONCHOSCOPY N/A 10/22/2018    Procedure: BRONCHOSCOPY WITH BRONCHALVEOLAR LAVAGE;  Surgeon: Chidi Oconnor MD;  Location: [facility]  "ENDOSCOPY;  Service: Pulmonary   • BRONCHOSCOPY N/A 7/8/2021    Procedure: BRONCHOSCOPY with BAL;  Surgeon: Chidi Oconnor MD;  Location: [facility] ENDOSCOPY;  Service: Pulmonary;  Laterality: N/A;  Pre: bronchectasis  Post: same   • CARDIAC ELECTROPHYSIOLOGY PROCEDURE N/A 7/9/2021    Procedure: Pacemaker DC new--[employer] possible His lead;  Surgeon: Rashaun Nettles MD;  Location: [facility] CATH INVASIVE LOCATION;  Service: Cardiology;  Laterality: N/A;   • CATARACT EXTRACTION Bilateral    • COLONOSCOPY     • COLONOSCOPY N/A 3/9/2018    Procedure: COLONOSCOPY to terminal ileum with bx;  Surgeon: Aron Cabrera MD;  Location[facility] END[facility];  Service:    • COLONOSCOPY N/A 11/19/2020    Procedure: COLONOSCOPY to cecum:  apc to cecum angiodysplagia,;  Surgeon: Aron Cabrera MD;  [facility] END[facility];  Service: Gastroenterology;  Laterality: N/A;  GI bleed  post:  diverticulosis, angiodysplagia   • CYSTECTOMY      back and shoulder area   • ENDOSCOPY N/A 3/9/2018    Procedure: ESOPHAGOGASTRODUODENOSCOPY with bx;  Surgeon: Aron Cabrera MD;  [facility];  Service:    • ENDOSCOPY N/A 11/19/2020    Procedure: ESOPHAGOGASTRODUODENOSCOPY;  Surgeon: Aron Cabrera MD;  Location: [facility] ENDOSCOPY;  Service: Gastroenterology;  Laterality: N/A;  GI bleed  post:  HH.   • EYE SURGERY Left     detached retina   • EYE SURGERY Right     \"repaired a hole in the eye\"   • TESTICLE SURGERY      benign tumor removed.     General Information     Row Name 07/12/21 1314          OT Time and Intention    Document Type  evaluation  -RB     Mode of Treatment  individual therapy;occupational therapy  -RB     Row Name 07/12/21 4743          General Information    Patient Profile Reviewed  yes  -RB     Prior Level of Function  independent:;ADL's;transfer  -RB     Existing Precautions/Restrictions  fall  -RB     Barriers to Rehab  none identified  -RB     Row Name 07/12/21 9546          Living Environment    Lives " With  spouse  -RB     Row Name 07/12/21 1314          Cognition    Orientation Status (Cognition)  oriented x 4  -RB     Row Name 07/12/21 1314          Safety Issues, Functional Mobility    Safety Issues Affecting Function (Mobility)  safety precautions follow-through/compliance;safety precaution awareness;awareness of need for assistance  -RB     Impairments Affecting Function (Mobility)  balance;strength;pain;range of motion (ROM)  -RB       User Key  (r) = Recorded By, (t) = Taken By, (c) = Cosigned By    Initials Name Provider Type    RB Manasa Scott OT Occupational Therapist          Mobility/ADL's     Row Name 07/12/21 1313          Bed Mobility    Bed Mobility  supine-sit;sit-supine  -RB     Supine-Sit [place] (Bed Mobility)  supervision  -RB     Sit-Supine [place] (Bed Mobility)  supervision  -RB     Assistive Device (Bed Mobility)  bed rails  -RB     Row Name 07/12/21 1313          Transfers    Transfers  sit-stand transfer;toilet transfer;other (see comments) CGA shower transfer  -RB     Sit-Stand [place] (Transfers)  contact guard  -RB     [place] Level (Toilet Transfer)  contact guard  -RB     Row Name 07/12/21 1313          Toilet Transfer    Type (Toilet Transfer)  sit-stand;stand-sit  -[facility] Name 07/12/21 1313          Functional Mobility    Functional Mobility- Ind. Level  contact guard assist  -RB     Functional Mobility- Safety Issues  balance decreased during turns  -RB     Row Name 07/12/21 1313          Activities of Daily Living    BADL Assessment/Intervention  lower body dressing;grooming  -     Row Name 07/12/21 1313          Lower Body Dressing Assessment/Training    [place] Level (Lower Body Dressing)  lower body dressing skills;set up  -RB     Position (Lower Body Dressing)  supported sitting;edge of bed sitting  -RB     Row Name 07/12/21 1313          Grooming Assessment/Training    [place] Level (Grooming)  grooming skills;set up  -RB      Position (Grooming)  supported sitting  -RB       User Key  (r) = Recorded By, (t) = Taken By, (c) = Cosigned By    Initials Name Provider Type    Manasa Mckenna OT Occupational Therapist        Obj/Interventions     Row Name 07/12/21 1311          Sensory Assessment (Somatosensory)    Sensory Assessment (Somatosensory)  sensation intact  -RB     Row Name 07/12/21 1311          Vision Assessment/Intervention    Visual Impairment/Limitations  WFL  -RB     Row Name 07/12/21 1311          Range of Motion Comprehensive    Comment, General Range of Motion  LUE limiting in L wrist/thumb due to pain post fall  -RB     Row Name 07/12/21 1311          Strength Comprehensive (MMT)    Comment, General Manual Muscle Testing (MMT) Assessment  Proximal BUE grossly 3+/5  -RB     Row Name 07/12/21 1311          Balance    Static Sitting Balance  WFL;unsupported;sitting, edge of bed  -RB     Static Standing Balance  WFL;supported;standing;unsupported  -RB     Dynamic Standing Balance  mild impairment;supported;unsupported;standing  -RB     Balance Interventions  occupation based/functional task  -RB     Comment, Balance  Mildly unsteady when pt attempts to complete tasks too quickly. Cues to slow pacing and attend to task one at a time  -RB       User Key  (r) = Recorded By, (t) = Taken By, (c) = Cosigned By    Initials Name Provider Type    Manasa Mckenna OT Occupational Therapist        Goals/Plan     Row Name 07/12/21 1309          Bed Mobility Goal 1 (OT)    Activity/Assistive Device (Bed Mobility Goal 1, OT)  bed mobility activities, all  -RB     [place] Level/Cues Needed (Bed Mobility Goal 1, OT)  supervision required  -RB     Time Frame (Bed Mobility Goal 1, OT)  short term goal (STG);2 weeks  -RB     Progress/Outcomes (Bed Mobility Goal 1, OT)  continuing progress toward goal  -RB     Row Name 07/12/21 1309          Transfer Goal 1 (OT)    Activity/Assistive Device (Transfer Goal 1, OT)  transfers, all   -RB     [place] Level/Cues Needed (Transfer Goal 1, OT)  supervision required  -RB     Time Frame (Transfer Goal 1, OT)  short term goal (STG);2 weeks  -RB     Progress/Outcome (Transfer Goal 1, OT)  continuing progress toward goal  -RB     Row Name 07/12/21 1309          Bathing Goal 1 (OT)    Activity/Device (Bathing Goal 1, OT)  bathing skills, all  -RB     [place] Level/Cues Needed (Bathing Goal 1, OT)  set-up required  -RB     Time Frame (Bathing Goal 1, OT)  short term goal (STG);2 weeks  -RB     Progress/Outcomes (Bathing Goal 1, OT)  continuing progress toward goal  -RB     Row Name 07/12/21 1309          Dressing Goal 1 (OT)    Activity/Device (Dressing Goal 1, OT)  dressing skills, all  -RB     [place]/Cues Needed (Dressing Goal 1, OT)  set-up required  -RB     Time Frame (Dressing Goal 1, OT)  short term goal (STG);2 weeks  -RB     Progress/Outcome (Dressing Goal 1, OT)  continuing progress toward goal  -RB     Row Name 07/12/21 1309          Toileting Goal 1 (OT)    Activity/Device (Toileting Goal 1, OT)  toileting skills, all  -RB     [place] Level/Cues Needed (Toileting Goal 1, OT)  supervision required  -RB     Time Frame (Toileting Goal 1, OT)  short term goal (STG);2 weeks  -RB     Progress/Outcome (Toileting Goal 1, OT)  continuing progress toward goal  -RB     Row Name 07/12/21 1309          Grooming Goal 1 (OT)    Activity/Device (Grooming Goal 1, OT)  grooming skills, all  -RB     [place] (Grooming Goal 1, OT)  supervision required  -RB     Time Frame (Grooming Goal 1, OT)  short term goal (STG);2 weeks  -RB     Progress/Outcome (Grooming Goal 1, OT)  continuing progress toward goal  -RB     Row Name 07/12/21 1309          Therapy Assessment/Plan (OT)    Planned Therapy Interventions (OT)  transfer/mobility retraining;strengthening exercise;ROM/therapeutic exercise;patient/caregiver education/training;occupation/activity based interventions;functional balance  retraining;activity tolerance training;BADL retraining  -RB       User Key  (r) = Recorded By, (t) = Taken By, (c) = Cosigned By    Initials Name Provider Type    RB Manasa Scott, OT Occupational Therapist        Clinical Impression     Row Name 07/12/21 1310          Pain Assessment    Additional Documentation  Pain Scale: Numbers Pre/Post-Treatment (Group)  -RB     Row Name 07/12/21 1310          Pain Scale: Numbers Pre/Post-Treatment    Pretreatment Pain Rating  5/10  -RB     Posttreatment Pain Rating  5/10  -RB     Pain Location - Side  Left  -RB     Pain Location  wrist;neck  -RB     Pain Intervention(s)  Repositioned;Rest  -RB     Row Name 07/12/21 1310          Plan of Care Review    Plan of Care Reviewed With  patient  -RB     Progress  no change  -RB     Row Name 07/12/21 1310          Therapy Assessment/Plan (OT)    Rehab Potential (OT)  good, to achieve stated therapy goals  -RB     Criteria for Skilled Therapeutic Interventions Met (OT)  yes;skilled treatment is necessary  -RB     Therapy Frequency (OT)  5 times/wk  -RB     Row Name 07/12/21 1310          Therapy Plan Review/Discharge Plan (OT)    Anticipated Discharge Disposition (OT)  home with 24/7 care;home with home health  -RB     Row Name 07/12/21 1310          Vital Signs    Pre SpO2 (%)  95  -RB     O2 Delivery Pre Treatment  room air  -RB     Intra SpO2 (%)  95  -RB     O2 Delivery Intra Treatment  room air  -RB     Post SpO2 (%)  96  -RB     O2 Delivery Post Treatment  room air  -RB     Pre Patient Position  Supine  -RB     Intra Patient Position  Standing  -RB     Post Patient Position  Supine  -RB     Row Name 07/12/21 1310          Positioning and Restraints    Pre-Treatment Position  in bed  -RB     Post Treatment Position  bed  -RB     In Bed  notified nsg;supine;fowlers;call light within reach;encouraged to call for assist;exit alarm on;LUE elevated  -RB       User Key  (r) = Recorded By, (t) = Taken By, (c) = Cosigned By     Initials Name Provider Type    Manasa Mckenna OT Occupational Therapist        Outcome Measures     Row Name 07/12/21 1309          How much help from another is currently needed...    Putting on and taking off regular lower body clothing?  3  -RB     Bathing (including washing, rinsing, and drying)  3  -RB     Toileting (which includes using toilet bed pan or urinal)  3  -RB     Putting on and taking off regular upper body clothing  3  -RB     Taking care of personal grooming (such as brushing teeth)  3  -RB     Eating meals  4  -RB     AM-PAC 6 Clicks Score (OT)  19  -RB     Row Name 07/12/21 1309          Functional Assessment    Outcome Measure Options  AM-PAC 6 Clicks Daily Activity (OT)  -RB       User Key  (r) = Recorded By, (t) = Taken By, (c) = Cosigned By    Initials Name Provider Type    Manasa Mckenna OT Occupational Therapist        Occupational Therapy Education                 Title: PT OT SLP Therapies (Done)     Topic: Occupational Therapy (Done)     Point: ADL training (Done)     Description:   Instruct learner(s) on proper safety adaptation and remediation techniques during self care or transfers.   Instruct in proper use of assistive devices.              Learning Progress Summary           Patient Acceptance, E,TB,D, DU,VU by RB at 7/12/2021 1308    Comment: Pain management L hand/thumb, splint for pain management, light exercise as tolerated, home safety                   Point: Home exercise program (Done)     Description:   Instruct learner(s) on appropriate technique for monitoring, assisting and/or progressing therapeutic exercises/activities.              Learning Progress Summary           Patient Acceptance, E,TB,D, DU,VU by RB at 7/12/2021 1308    Comment: Pain management L hand/thumb, splint for pain management, light exercise as tolerated, home safety                   Point: Precautions (Done)     Description:   Instruct learner(s) on prescribed precautions during  self-care and functional transfers.              Learning Progress Summary           Patient Acceptance, E,TB,D, DU,VU by  at 7/12/2021 1308    Comment: Pain management L hand/thumb, splint for pain management, light exercise as tolerated, home safety                   Point: Body mechanics (Done)     Description:   Instruct learner(s) on proper positioning and spine alignment during self-care, functional mobility activities and/or exercises.              Learning Progress Summary           Patient Acceptance, E,TB,D, DU,VU by  at 7/12/2021 1308    Comment: Pain management L hand/thumb, splint for pain management, light exercise as tolerated, home safety                               User Key     Initials Effective Dates Name Provider Type Discipline     06/16/21 -  Manasa Scott OT Occupational Therapist OT              OT Recommendation and Plan  Planned Therapy Interventions (OT): transfer/mobility retraining, strengthening exercise, ROM/therapeutic exercise, patient/caregiver education/training, occupation/activity based interventions, functional balance retraining, activity tolerance training, BADL retraining  Therapy Frequency (OT): 5 times/wk  Plan of Care Review  Plan of Care Reviewed With: patient  Progress: no change     Time Calculation:   Time Calculation- OT     Row Name 07/12/21 1308             Time Calculation- OT    OT Start Time  1113  -RB      OT Stop Time  1138  -RB      OT Time Calculation (min)  25 min  -RB      Total Timed Code Minutes- OT  15 minute(s)  -RB      OT Received On  07/12/21  -RB      OT - Next Appointment  07/13/21  -RB      OT Goal Re-Cert Due Date  07/26/21  -RB         Timed Charges    48333 - OT Therapeutic Activity Minutes  5  -RB      87686 - OT Self Care/Mgmt Minutes  10  -RB         Untimed Charges    OT Eval/Re-eval Minutes  10  -RB         Total Minutes    Timed Charges Total Minutes  15  -RB      Untimed Charges Total Minutes  10  -RB       Total Minutes  25   -RB        User Key  (r) = Recorded By, (t) = Taken By, (c) = Cosigned By    Initials Name Provider Type    RB Manasa Scott OT Occupational Therapist        Therapy Charges for Today     Code Description Service Date Service Provider Modifiers Qty    [id number]  OT EVAL MOD COMPLEXITY 2 7/12/2021 Manasa Scott OT GO 1    [id number]  OT SELF CARE/MGMT/TRAIN EA 15 MIN 7/12/2021 Manasa Scott OT GO 1               Maansa Scott OT  7/12/2021

## 2021-07-12 NOTE — CONSULTS
CONSULT NOTE    Infectious Diseases - Rolly Terrell MD  [facility]       Patient Identification:  Name: Asia Ly  Age: 88 y.o.  Sex: male  :  1932  MRN: [id number]             Date of Consultation: 2021      Primary Care Physician: Lubna Lobo MD                               Requesting Physician: Dr. flynn  Reason for Consultation: Pseudomonas in the sputum.    Impression: 88-year-old male with complicated past medical history remarkable for aortic stenosis, chronic bronchitis, COPD as well as Crohn's disease with paroxysmal atrial fibrillation and pulmonary fibrosis presented to the emergency room on 2021 with decrease activity and intake along with intermittently elevated heart rate.  Patient was recently hospitalized for 2 days from 2021 through 2021 for atrial fibrillation with rapid ventricular response.  He has done well since until a day prior to his visit on 2021 when he started having above symptoms.  Work-up revealed atrial fibrillation with rapid ventricular response, elevated digoxin level and hyperglycemia.  Patient was noted to have acute kidney injury mildly abnormal urinalysis and chest x-ray showing chronic changes including bibasilar infiltrate on the right lung.  As mentioned above patient has longstanding history of inflammatory pulmonary fibrosis and bronchiectasis and chronic cough and sputum production.  Patient was evaluated by pulmonary service and underwent therapeutic bronchoscopy with suctioning of secretions on 2021.  Patient has been afebrile throughout and according to him has been improving steadily since his hospitalization.  Patient was seen by cardiology service and on 2021 he underwent staged AV node ablation and subsequent dual-chamber pacemaker placement because of his intolerance to antiarrhythmics and ineffectiveness.  Patient did receive a dose of perioperative IV vancomycin on 2021.  Because of the positive  culture for Pseudomonas infectious disease service is consulted.  Patient has not received any other antibiotics throughout this hospitalization and has continued to feel better clinically.  His respiratory viral panel was also positive for human rhinovirus.  1-Pseudomonas aeruginosa in the respiratory secretions in a patient who is clinically improving with interventions such as therapeutic bronchoscopy and suctioning of secretions with no systemic fever and chills and progressive resolution of leukocytosis who has been managed expectantly suggest the remainder of his already compromised respiratory system due to previous bronchiectasis and inflammatory pulmonary process.  2-human rhinovirus/enterovirus found in bronchoalveolar lavage  3-atrial fibrillation eventually requiring permanent pacemaker placement on 7/9/2021  4-COPD  5-history of Crohn's disease  6-other diagnosis per primary team.    Recommendations/Discussions:  At this juncture given his clinical improvement without any specific antibiotic treatment both subjectively and on objective measures such as resolution of leukocytosis it is very difficult to make a case to treat Pseudomonas identified in the bronchoscopic sample.  At this juncture I would recommend continued treatment with supportive care without any specific antibiotic therapy.  However would recommend future treatment for suspected pneumonia as an exacerbation of COPD and bronchiectasis would require coverage for Pseudomonas as he is obviously colonized.  Thank you Dr. Duran for letting me be the part of your patient care.  We will follow this patient with you.      History of Present Illness:   88-year-old male with complicated past medical history remarkable for aortic stenosis, chronic bronchitis, COPD as well as Crohn's disease with paroxysmal atrial fibrillation and pulmonary fibrosis presented to the emergency room on 7/5/2021 with decrease activity and intake along with  intermittently elevated heart rate.  Patient was recently hospitalized for 2 days from 6/20/2021 through 6/24/2021 for atrial fibrillation with rapid ventricular response.  He has done well since until a day prior to his visit on 7/5/2021 when he started having above symptoms.  Work-up revealed atrial fibrillation with rapid ventricular response, elevated digoxin level and hyperglycemia.  Patient was noted to have acute kidney injury mildly abnormal urinalysis and chest x-ray showing chronic changes including bibasilar infiltrate on the right lung.  As mentioned above patient has longstanding history of inflammatory pulmonary fibrosis and bronchiectasis and chronic cough and sputum production.  Patient was evaluated by pulmonary service and underwent therapeutic bronchoscopy with suctioning of secretions on 7/8/2021.  Patient has been afebrile throughout and according to him has been improving steadily since his hospitalization.  Patient was seen by cardiology service and on 7/9/2021 he underwent staged AV node ablation and subsequent dual-chamber pacemaker placement because of his intolerance to antiarrhythmics and ineffectiveness.  Patient did receive a dose of perioperative IV vancomycin on 7/9/2021.  Because of the positive culture for Pseudomonas infectious disease service is consulted.  Patient has not received any other antibiotics throughout this      Past Medical History:  Past Medical History:   Diagnosis Date   • Aortic stenosis    • Aspiration pneumonia (CMS/HCC)    • Atrial fibrillation (CMS/HCC)    • Bronchiectasis (CMS/HCC)    • CKD (chronic kidney disease)    • COPD (chronic obstructive pulmonary disease) (CMS/HCC)    • Crohn's disease (CMS/HCC)    • Dizziness    • Elevated cholesterol    • Gastric ulcer    • Generalized weakness    • Gout    • Hard of hearing    • Hyperlipidemia    • Hypertension    • Leg swelling    • Lower extremity edema    • Mild anemia    • Near syncope    • Nonrheumatic aortic  "valve stenosis    • PAF (paroxysmal atrial fibrillation) (CMS/[facility])    • Palpitations    • Peptic ulcer    • Pneumonia of left lung due to infectious organism    • Pulmonary fibrosis (CMS/HCC)    • Stroke (CMS/[facility])    • TIA (transient ischemic attack)      Past Surgical History:  Past Surgical History:   Procedure Laterality Date   • BRONCHOSCOPY N/A 10/22/2018    Procedure: BRONCHOSCOPY WITH BRONCHALVEOLAR LAVAGE;  Surgeon: Chidi Oconnor MD;  Location: [facility] ENDOSCOPY;  Service: Pulmonary   • BRONCHOSCOPY N/A 7/8/2021    Procedure: BRONCHOSCOPY with BAL;  Surgeon: Chidi Oconnor MD;  Location: [facility] ENDOSCOPY;  Service: Pulmonary;  Laterality: N/A;  Pre: bronchectasis  Post: same   • CATARACT EXTRACTION Bilateral    • COLONOSCOPY     • COLONOSCOPY N/A 3/9/2018    Procedure: COLONOSCOPY to terminal ileum with bx;  Surgeon: Aron Cabrera MD;  Location: [facility] ENDOSCOPY;  Service:    • COLONOSCOPY N/A 11/19/2020    Procedure: COLONOSCOPY to cecum:  apc to cecum angiodysplagia,;  Surgeon: Aron Cabrera MD;  Location: [facility] ENDOSCOPY;  Service: Gastroenterology;  Laterality: N/A;  GI bleed  post:  diverticulosis, angiodysplagia   • CYSTECTOMY      back and shoulder area   • ENDOSCOPY N/A 3/9/2018    Procedure: ESOPHAGOGASTRODUODENOSCOPY with bx;  Surgeon: Aron Cabrera MD;  Location: [facility] ENDOSCOPY;  Service:    • ENDOSCOPY N/A 11/19/2020    Procedure: ESOPHAGOGASTRODUODENOSCOPY;  Surgeon: Aron Cabrera MD;  Location: [facility] ENDOSCOPY;  Service: Gastroenterology;  Laterality: N/A;  GI bleed  post:  HH.   • EYE SURGERY Left     detached retina   • EYE SURGERY Right     \"repaired a hole in the eye\"   • TESTICLE SURGERY      benign tumor removed.      Home Meds:  Medications Prior to Admission   Medication Sig Dispense Refill Last Dose   • allopurinol (ZYLOPRIM) 100 MG tablet Take 100 mg by mouth Daily.   7/5/2021 at Unknown time   • allopurinol (ZYLOPRIM) 100 MG tablet Take 100 mg by mouth Daily.   " 7/5/2021 at Unknown time   • apixaban (ELIQUIS) 5 MG tablet tablet Take 1 tablet by mouth Every 12 (Twelve) Hours. 180 tablet 3 7/5/2021 at Unknown time   • atorvastatin (LIPITOR) 10 MG tablet Take one tablet by mouth Mon, Wed and Fri.   7/5/2021 at Unknown time   • atorvastatin (LIPITOR) 20 MG tablet Take 10 mg by mouth 3 (Three) Times a Week. Mon, Wed, Friday 7/5/2021 at Unknown time   • BUDESONIDE PO Take 3 mg by mouth Daily.   7/5/2021 at Unknown time   • digoxin (LANOXIN) 125 MCG tablet Take 1 tablet by mouth Daily. 90 tablet 3 7/5/2021 at Unknown time   • IPRATROPIUM-ALBUTEROL IN Inhale Daily.   7/5/2021 at Unknown time   • metoprolol tartrate (LOPRESSOR) 50 MG tablet Take 1 tablet by mouth 3 (Three) Times a Day. 270 tablet 3 7/5/2021 at Unknown time   • metoprolol tartrate (LOPRESSOR) 50 MG tablet Take 50 mg by mouth 2 (two) times a day.   7/5/2021 at Unknown time   • omeprazole (priLOSEC) 40 MG capsule Take 40 mg by mouth 2 (Two) Times a Day.   7/5/2021 at Unknown time   • cefdinir (OMNICEF) 300 MG capsule Take 300 mg by mouth.   Unknown at Unknown time   • dofetilide (TIKOSYN) 125 MCG capsule Take 1 capsule by mouth 2 (Two) Times a Day. (Patient not taking: Reported on 7/6/2021) 180 capsule 3 Unknown at Unknown time     Current Meds:     Current Facility-Administered Medications:   •  acetaminophen (TYLENOL) tablet 650 mg, 650 mg, Oral, Q4H PRN **OR** [DISCONTINUED] acetaminophen (TYLENOL) suppository 650 mg, 650 mg, Rectal, Q4H PRN, Chacon, Glenna A, APRN  •  allopurinol (ZYLOPRIM) tablet 100 mg, 100 mg, Oral, Daily, Chacon, Glenna A, APRN, 100 mg at 07/11/21 0944  •  apixaban (ELIQUIS) tablet 5 mg, 5 mg, Oral, Q12H, Chacon, Glenna A, APRN, 5 mg at 07/11/21 0944  •  atorvastatin (LIPITOR) tablet 10 mg, 10 mg, Oral, Nightly, Glenna Chacon APRN, 10 mg at 07/10/21 2138  •  Budesonide (ENTOCORT EC) 24 hr capsule 3 mg, 3 mg, Oral, Daily, Glenna Chacon APRN, 3 mg at 07/11/21 0944  •  digoxin (LANOXIN) tablet 125  mcg, 125 mcg, Oral, Daily, Chacon, Glenna A, APRN, 125 mcg at 21 1225  •  dofetilide (TIKOSYN) capsule 125 mcg, 125 mcg, Oral, Q12H, Chacon, Glenna A, APRN, 125 mcg at 21 0944  •  HYDROcodone-acetaminophen (NORCO) 5-325 MG per tablet 1 tablet, 1 tablet, Oral, Q4H PRN, Chacon, Glenna A, APRN, 1 tablet at 21 1352  •  insulin lispro (ADMELOG) injection 0-7 Units, 0-7 Units, Subcutaneous, TID AC, Chacon, Glenna A, APRN, 2 Units at 21 1734  •  ipratropium-albuterol (DUO-NEB) nebulizer solution 3 mL, 3 mL, Nebulization, Q4H PRN, Chacon, Glenna A, APRN  •  metoprolol tartrate (LOPRESSOR) tablet 50 mg, 50 mg, Oral, TID, Roxann, Alta, APRN, 50 mg at 21 1643  •  pantoprazole (PROTONIX) EC tablet 40 mg, 40 mg, Oral, BID AC, Chacon, Glenna A, APRN, 40 mg at 21 1643  •  [COMPLETED] Insert peripheral IV, , , Once **AND** sodium chloride 0.9 % flush 10 mL, 10 mL, Intravenous, PRN, Chacon, Glenna A, APRN, 10 mL at 21 2147  •  sodium chloride 0.9 % flush 10 mL, 10 mL, Intravenous, PRN, Chacon, Glenna A, APRN  •  sodium chloride 0.9 % flush 3 mL, 3 mL, Intravenous, Q12H, Chacon, Glenna A, APRN, 3 mL at 21 0948  Allergies:  Allergies   Allergen Reactions   • Amiodarone Unknown (See Comments)     Pulmonary fibrosis   • Diltiazem Arrhythmia   • Doxycycline Rash   • Indomethacin Rash     Social History:   Social History     Tobacco Use   • Smoking status: Former Smoker     Types: Cigars     Quit date: 10/28/1994     Years since quittin.7   • Smokeless tobacco: Never Used   • Tobacco comment: Quit 25 years ago   Substance Use Topics   • Alcohol use: No     Comment: daily caffiene - 1/2 cup of coffee      Family History:  Family History   Problem Relation Age of Onset   • Stroke Mother    • Heart disease Mother    • Hypertension Mother    • Diabetes Mother    • Heart disease Sister         Heart Valve Replacement   • Ovarian cancer Sister    • Rheumatic fever Sister    • Diabetes Sister    • Stroke  "Father           Review of Systems  See history of present illness and past medical history.   Constitutional: Remarkable for no fever or chills  Cardiovascular: Remarkable for no chest pain or shortness of breath overall improved compared to when he come into the hospital.  Respiratory: Remarkable for chronic cough and congestion but no new worsening in fact feeling better after bronchoscopy  GI: Remarkable for improving and preserved appetite  : Remarkable for no burning urination frequency or urgency  Musculoskeletal: Remarkable for no new joint aches and pain  Neurological: Remarkable for no loss of consciousness or continence.    Remainder of ROS is negative.      Vitals:   /70   Pulse 115   Temp 98.4 °F (36.9 °C) (Oral)   Resp 18   Ht 175.3 cm (69\")   Wt 67.6 kg (149 lb 0.5 oz)   SpO2 91%   BMI 22.01 kg/m²   I/O:     Intake/Output Summary (Last 24 hours) at 7/11/2021 2005  Last data filed at 7/11/2021 0340  Gross per 24 hour   Intake 60 ml   Output 150 ml   Net -90 ml     Exam:  Patient is examined using the personal protective equipment as per guidelines from infection control for this particular patient as enacted.  Hand washing was performed before and after patient interaction.  General Appearance:    Alert, cooperative, no distress, appears stated age   Head:    Normocephalic, without obvious abnormality, atraumatic   Eyes:    PERRL, conjunctivae/corneas clear, EOM's intact, both eyes   Ears:    Normal external ear canals, both ears   Nose:   Nares normal, septum midline, mucosa normal, no drainage    or sinus tenderness   Throat:   Lips, tongue, gums normal; oral mucosa pink and moist   Neck:   Supple, symmetrical, trachea midline, no adenopathy;     thyroid:  no enlargement/tenderness/nodules; no carotid    bruit or JVD   Back:     Symmetric, no curvature, ROM normal, no CVA tenderness   Lungs:    Bibasilar crackles no disease accessory muscles of breathing noted   Chest Wall:   " Left-sided permanent pacemaker site well approximated    Heart:    Regular rate and rhythm, S1 and S2 normal, no murmur, rub   or gallop   Abdomen:     Soft, non-tender, bowel sounds active all four quadrants,     no masses, no hepatomegaly, no splenomegaly   Extremities:   Extremities normal, atraumatic, no cyanosis or edema   Pulses:   Pulses palpable in all extremities; symmetric all extremities   Skin:   Skin color normal, Skin is warm and dry,  no rashes or palpable lesions   Neurologic:  Awake oriented grossly nonfocal examination       Data Review:    I reviewed the patient's new clinical results.  Results from last 7 days   Lab Units 07/11/21  0408 07/10/21  0325 07/09/21  2124 07/08/21  0436 07/07/21  0523 07/06/21  0617 07/05/21  2154   WBC 10*3/mm3 11.74* 14.60* 12.32* 10.47 9.88 11.04* 10.71   HEMOGLOBIN g/dL 11.9* 11.6* 13.2 11.4* 11.3* 11.9* 11.7*   PLATELETS 10*3/mm3 213 229 241 222 247 229 246     Results from last 7 days   Lab Units 07/11/21 0408 07/10/21  0325 07/09/21  2124 07/08/21  0436 07/07/21  0523 07/06/21  0617 07/05/21  2154   SODIUM mmol/L 136 133* 134* 138 137 139 134*   POTASSIUM mmol/L 3.9 3.7 3.7 4.0 4.4 4.7 4.7   CHLORIDE mmol/L 102 99 98 106 105 107 102   CO2 mmol/L 20.9* 21.9* 22.1 22.8 22.5 22.2 20.8*   BUN mg/dL 22 19 20 19 20 22 25*   CREATININE mg/dL 1.10 0.94 1.16 0.91 1.11 1.19 1.38*   CALCIUM mg/dL 8.8 8.5* 8.8 8.1* 8.6 8.7 8.8   GLUCOSE mg/dL 78 114* 139* 64* 84 84 247*     Microbiology Results (last 10 days)     Procedure Component Value - Date/Time    AFB Culture - Lavage, Lung, Right Middle Lobe [[id number]] Collected: 07/08/21 1154    Lab Status: Preliminary result Specimen: Lavage from Lung, Right Middle Lobe Updated: 07/09/21 1634     AFB Stain No acid fast bacilli seen on concentrated smear    Fungus Smear - Lavage, Lung, Right Middle Lobe [[id number]] Collected: 07/08/21 1154    Lab Status: Final result Specimen: Lavage from Lung, Right Middle Lobe Updated: 07/08/21  1440     Fungal Stain No yeast or hyphal elements seen    BAL Culture, Quantitative - Lavage, Lung, Right Middle Lobe [[id number]]  (Abnormal)  (Susceptibility) Collected: 07/08/21 1154    Lab Status: Final result Specimen: Lavage from Lung, Right Middle Lobe Updated: 07/11/21 1105     BAL Culture >100,000 CFU/mL Pseudomonas aeruginosa      <10,000 CFU/mL Normal Respiratory Arabella     Gram Stain Many (4+) WBCs seen      Rare (1+) Mixed bacterial morphotypes seen on Gram Stain    Susceptibility      Pseudomonas aeruginosa      MUSA      Cefepime Susceptible      Ceftazidime Susceptible      Ciprofloxacin Susceptible      Gentamicin Susceptible      Levofloxacin Susceptible      Piperacillin + Tazobactam Susceptible               Linear View                   Respiratory Panel, PCR (WITHOUT COVID) - Lavage, Lung, Right Middle Lobe [[id number]]  (Abnormal) Collected: 07/08/21 1154    Lab Status: Final result Specimen: Lavage from Lung, Right Middle Lobe Updated: 07/08/21 1346     ADENOVIRUS, PCR Not Detected     Coronavirus 229E Not Detected     Coronavirus HKU1 Not Detected     Coronavirus NL63 Not Detected     Coronavirus OC43 Not Detected     Human Metapneumovirus Not Detected     Human Rhinovirus/Enterovirus Detected     Influenza B PCR Not Detected     Parainfluenza Virus 1 Not Detected     Parainfluenza Virus 2 Not Detected     Parainfluenza Virus 3 Not Detected     Parainfluenza Virus 4 Not Detected     Bordetella pertussis pcr Not Detected     Chlamydophila pneumoniae PCR Not Detected     Mycoplasma pneumo by PCR Not Detected     Influenza A PCR Not Detected     RSV, PCR Not Detected     Bordetella parapertussis PCR Not Detected    Narrative:      The coronavirus on the RVP is NOT COVID-19 and is NOT indicative of infection with COVID-19.    In the setting of a positive respiratory panel with a viral infection PLUS a negative procalcitonin without other underlying concern for bacterial infection, consider observing  off antibiotics or discontinuation of antibiotics and continue supportive care. If the respiratory panel is positive for atypical bacterial infection (Bordetella pertussis, Chlamydophila pneumoniae, or Mycoplasma pneumoniae), consider antibiotic de-escalation to target atypical bacterial infection.    COVID PRE-OP / PRE-PROCEDURE SCREENING ORDER (NO ISOLATION) - Swab, Nasopharynx [[id number]]  (Normal) Collected: 07/06/21 0111    Lab Status: Final result Specimen: Swab from Nasopharynx Updated: 07/06/21 0325    Narrative:      The following orders were created for panel order COVID PRE-OP / PRE-PROCEDURE SCREENING ORDER (NO ISOLATION) - Swab, Nasopharynx.  Procedure                               Abnormality         Status                     ---------                               -----------         ------                     COVID-19,[facility] IN-HOUSE...[[id number]]  Normal              Final result                 Please view results for these tests on the individual orders.    COVID-19,[facility] IN-HOUSE CEPHEID/VERITO NP SWAB IN TRANSPORT MEDIA 8-12 HR TAT - Swab, Nasopharynx [[id number]]  (Normal) Collected: 07/06/21 0111    Lab Status: Final result Specimen: Swab from Nasopharynx Updated: 07/06/21 0325     COVID19 Not Detected    Narrative:      Fact sheet for providers: https://www.fda.gov/media/281483/download     Fact sheet for patients: https://www.fda.gov/media/773131/download            Assessment:  Active Hospital Problems    Diagnosis  POA   • **Pulmonary fibrosis (CMS/HCC) [J84.10]  Unknown   • Atrial fibrillation with rapid ventricular response (CMS/HCC) [I48.91]  Unknown   • Atrial fibrillation with RVR (CMS/HCC) [I48.91]  Yes      Resolved Hospital Problems   No resolved problems to display.         Plan:  See above  Rolly Pedroza MD   7/11/2021  20:05 EDT    Much of this encounter note is an electronic transcription/translation of spoken language to printed text. The electronic translation of spoken  language may permit erroneous, or at times, nonsensical words or phrases to be inadvertently transcribed; Although I have reviewed the note for such errors, some may still exist

## 2021-07-12 NOTE — PROGRESS NOTES
"Daily progress note    Chief complaint  Doing same  No specific complaints  Wants to go home  Family at bedside    History of present illness  88-year-old Turkish male who is well-known to our service with history of chronic atrial fibrillation hypertension hyperlipidemia aortic stenosis Crohn's disease pulmonary hypertension and gastroesophageal reflux disease brought to the emergency room by the wife when she noticed increased cloudiness eating less and occasional complaint of palpitation with elevated heart rate which she checked.  Patient has no chest pain shortness of breath.  Patient also denies any dizziness lightheadedness or near syncope.  Patient did not miss any medication and taking religiously.  Patient work-up in ER revealed rapid ventricular rate with history of chronic atrial fibrillation admit for management.  At the time of interview he is back to baseline has no specific complaints.      REVIEW OF SYSTEMS  Unremarkable      PHYSICAL EXAM   Blood pressure 140/70, pulse 65, temperature 98.3 °F (36.8 °C), temperature source Oral, resp. rate 18, height 175.3 cm (69\"), weight 67.6 kg (149 lb 0.5 oz), SpO2 95 %.    Constitutional: No distress.  Pleasant and nontoxic  Head: Normocephalic and atraumatic.   Oropharynx: Mucous membranes are moist.   Eyes: No scleral icterus. No conjunctival pallor.  Neck: Painless range of motion noted. Neck supple.   Cardiovascular: Irregularly irregular and intermittently tachycardic and intact distal pulses.  Pulmonary/Chest: No respiratory distress.  Diffuse fine rhonchi.   Abdominal: Soft. There is no tenderness and no guarding.  Bowel sounds positive  Musculoskeletal: Moves all extremities equally. There is no pedal edema or calf tenderness.   Neurological: Alert.  Baseline strength and sensation noted.   Skin: Skin is pink, warm, and dry. No pallor.   Psychiatric: Mood and affect normal.     LAB RESULTS  Lab Results (last 24 hours)     Procedure Component Value " Units Date/Time    POC Glucose Once [[id number]]  (Normal) Collected: 07/12/21 1132    Specimen: Blood Updated: 07/12/21 1134     Glucose 105 mg/dL      Comment: Meter: [id number] : [id number] Mehrdad HERNADEZ       Non-gynecologic Cytology [[id number]] Collected: 07/08/21 1154    Specimen: Lavage from Lung, Right Middle Lobe Updated: 07/12/21 1032     Case Report --     Non-gynecologic Cytology                          Case: [id number]                                  Authorizing Provider:  Chidi Oconnor MD        Collected:           07/08/2021 11:54 AM          Ordering Location:     [facility]  Received:            07/09/2021 09:28 AM                                 ENDO SUITES                                                                  Pathologist:           Toshia Scales MD                                                    Specimen:    Lung, Right Middle Lobe, BAL RML                                                            Final Diagnosis --     1.  Lung, Right Middle Lobe, Bronchoalveolar Lavage (BAL):  A.  Negative for malignant cells.  B.  Acute inflammation, squamous epithelial cells, histiocytes, mesothelial cells and bronchial cells.       Clinical Information --     Pneumocystis, fungus       Gross Description --     23 ml of pink fluid with mucoid fragments received in collection tube.  Thin Prep (1) and cell block.       Microscopic Description --     1.  Lung, Right Middle Lobe, BAL:  Acute inflammation, squamous epithelial cells, histiocytes, mesothelial cells and bronchial cells.     Screened by:  Daquan       Special Stains --     GMS will be reported in an addendum.      POC Glucose Once [[id number]]  (Normal) Collected: 07/12/21 0604    Specimen: Blood Updated: 07/12/21 0609     Glucose 81 mg/dL      Comment: Meter: [id number] : [id number] Clif HERNADEZ       Basic Metabolic Panel [[id number]]  (Abnormal) Collected: 07/12/21 0343    Specimen: Blood  Updated: 07/12/21 0519     Glucose 87 mg/dL      BUN 29 mg/dL      Creatinine 1.25 mg/dL      Sodium 133 mmol/L      Potassium 4.1 mmol/L      Chloride 99 mmol/L      CO2 22.3 mmol/L      Calcium 8.6 mg/dL      eGFR Non African Amer 55 mL/min/1.73      BUN/Creatinine Ratio 23.2     Anion Gap 11.7 mmol/L     Narrative:      GFR Normal >60  Chronic Kidney Disease <60  Kidney Failure <15      C-reactive Protein [[id number]]  (Abnormal) Collected: 07/12/21 0343    Specimen: Blood Updated: 07/12/21 0519     C-Reactive Protein 20.21 mg/dL     CBC & Differential [[id number]]  (Abnormal) Collected: 07/12/21 0343    Specimen: Blood Updated: 07/12/21 0454    Narrative:      The following orders were created for panel order CBC & Differential.  Procedure                               Abnormality         Status                     ---------                               -----------         ------                     CBC Auto Differential[[id number]]        Abnormal            Final result                 Please view results for these tests on the individual orders.    CBC Auto Differential [[id number]]  (Abnormal) Collected: 07/12/21 0343    Specimen: Blood Updated: 07/12/21 0454     WBC 10.55 10*3/mm3      RBC 3.95 10*6/mm3      Hemoglobin 11.3 g/dL      Hematocrit 34.4 %      MCV 87.1 fL      MCH 28.6 pg      MCHC 32.8 g/dL      RDW 15.9 %      RDW-SD 50.6 fl      MPV 10.6 fL      Platelets 203 10*3/mm3      Neutrophil % 75.7 %      Lymphocyte % 11.8 %      Monocyte % 9.7 %      Eosinophil % 1.3 %      Basophil % 0.4 %      Immature Grans % 1.1 %      Neutrophils, Absolute 7.98 10*3/mm3      Lymphocytes, Absolute 1.25 10*3/mm3      Monocytes, Absolute 1.02 10*3/mm3      Eosinophils, Absolute 0.14 10*3/mm3      Basophils, Absolute 0.04 10*3/mm3      Immature Grans, Absolute 0.12 10*3/mm3      nRBC 0.0 /100 WBC     POC Glucose Once [[id number]]  (Abnormal) Collected: 07/11/21 2024    Specimen: Blood Updated: 07/11/21 2027      Glucose 133 mg/dL      Comment: Meter: [id number] : [id number] Clif HERNADEZ           Imaging Results (Last 24 Hours)     ** No results found for the last 24 hours. **           ECG 12 Lead  Component   Ref Range & Units 7/6/21 1123 7/5/21 2135   QT Interval   ms 314 P  330 P         HEART RATE= 101  bpm  RR Interval= 596  ms  IN Interval=   ms  P Horizontal Axis=   deg  P Front Axis=   deg  QRSD Interval= 76  ms  QT Interval= 314  ms  QRS Axis= 34  deg  T Wave Axis= 101  deg  - ABNORMAL ECG -  Atrial fibrillation             Current Facility-Administered Medications:   •  acetaminophen (TYLENOL) tablet 650 mg, 650 mg, Oral, Q4H PRN, 650 mg at 07/12/21 0004 **OR** [DISCONTINUED] acetaminophen (TYLENOL) suppository 650 mg, 650 mg, Rectal, Q4H PRN, Chacon, Glenna A, APRN  •  allopurinol (ZYLOPRIM) tablet 100 mg, 100 mg, Oral, Daily, Chacon, Glenna A, APRN, 100 mg at 07/12/21 0830  •  apixaban (ELIQUIS) tablet 5 mg, 5 mg, Oral, Q12H, Chacon, Glenna A, APRN, 5 mg at 07/12/21 0830  •  atorvastatin (LIPITOR) tablet 10 mg, 10 mg, Oral, Nightly, Chacon, Glenna A, APRN, 10 mg at 07/11/21 2107  •  Budesonide (ENTOCORT EC) 24 hr capsule 3 mg, 3 mg, Oral, Daily, Chacon, Glenna A, APRN, 3 mg at 07/12/21 0830  •  digoxin (LANOXIN) tablet 125 mcg, 125 mcg, Oral, Daily, Chacon, Glenna A, APRN, 125 mcg at 07/12/21 1229  •  dofetilide (TIKOSYN) capsule 125 mcg, 125 mcg, Oral, Q12H, Chacon, Glenna A, APRN, 125 mcg at 07/12/21 0939  •  HYDROcodone-acetaminophen (NORCO) 5-325 MG per tablet 1 tablet, 1 tablet, Oral, Q4H PRN, Chacon, Glenna A, APRN, 1 tablet at 07/12/21 0838  •  insulin lispro (ADMELOG) injection 0-7 Units, 0-7 Units, Subcutaneous, TID AC, Oswaldo, Glenna A, APRN, 2 Units at 07/06/21 1734  •  ipratropium-albuterol (DUO-NEB) nebulizer solution 3 mL, 3 mL, Nebulization, Q4H PRN, Glenna Chacon, APRN  •  metoprolol tartrate (LOPRESSOR) tablet 50 mg, 50 mg, Oral, TID, RoxannAlta rodriguez, APRN, 50 mg at 07/12/21 0831  •  pantoprazole  (PROTONIX) EC tablet 40 mg, 40 mg, Oral, BID AC, Chacon, Glenna A, APRN, 40 mg at 07/12/21 0652  •  [COMPLETED] Insert peripheral IV, , , Once **AND** sodium chloride 0.9 % flush 10 mL, 10 mL, Intravenous, PRN, Chacon, Glenna A, APRN, 10 mL at 07/08/21 2147  •  sodium chloride 0.9 % flush 10 mL, 10 mL, Intravenous, PRN, Chacon, Glenna A, APRN  •  sodium chloride 0.9 % flush 3 mL, 3 mL, Intravenous, Q12H, Chacon, Glenna A, APRN, 3 mL at 07/12/21 0831     ASSESSMENT  Chronic atrial fibrillation with rapid ventricular rate s/p permanent pacemaker placement  Hypertension  Hyperlipidemia  Aortic stenosis  Pulmonary fibrosis/bronchiectasis status post bronchoscopy  Acute rhinovirus infection supportive care  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

## 2021-07-12 NOTE — DISCHARGE SUMMARY
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  Pulmonary hypertension  Crohn's disease  Gastroesophageal reflux disease    Discharge medications    Current Facility-Administered Medications:   •  allopurinol (ZYLOPRIM) tablet 100 mg, 100 mg, Oral, Daily, ChaconHowardky A, APRN, 100 mg at 07/12/21 0830  •  apixaban (ELIQUIS) tablet 5 mg, 5 mg, Oral, Q12H, Chacon, Glenna A, APRN, 5 mg at 07/12/21 0830  •  atorvastatin (LIPITOR) tablet 10 mg, 10 mg, Oral, Nightly, Chacon, Glenna A, APRN, 10 mg at 07/11/21 2107  •  Budesonide (ENTOCORT EC) 24 hr capsule 3 mg, 3 mg, Oral, Daily, Chacon, Glenna A, APRN, 3 mg at 07/12/21 0830  •  digoxin (LANOXIN) tablet 125 mcg, 125 mcg, Oral, Daily, Chacon, Glenna A, APRN, 125 mcg at 07/12/21 1229  •  dofetilide (TIKOSYN) capsule 125 mcg, 125 mcg, Oral, Q12H, Howard Chaconky A, APRN, 125 mcg at 07/12/21 0939  •  insulin lispro (ADMELOG) injection 0-7 Units, 0-7 Units, Subcutaneous, TID AC, Glenna Chacon A, APRN, 2 Units at 07/06/21 1734  •  metoprolol tartrate (LOPRESSOR) tablet 50 mg, 50 mg, Oral, TID, Roxann, Alta, APRN, 50 mg at 07/12/21 0831  •  pantoprazole (PROTONIX) EC tablet 40 mg, 40 mg, Oral, BID AC, Howard Chaconky A, APRN, 40 mg at 07/12/21 0652  •  [COMPLETED] Insert peripheral IV, , , Once **AND** sodium chloride 0.9 % flush 10 mL, 10 mL, Intravenous, PRN, Howard Chaconky A, APRN, 10 mL at 07/08/21 2147  •  sodium chloride 0.9 % flush 3 mL, 3 mL, Intravenous, Q12H, Glenna Chacon APRN, 3 mL at 07/12/21 0831     Consults obtained  Cardiology  EP service  Pulmonary  Infectious disease    Procedures  S/p permanent pacemaker placement  [facility]  course  88-year-old male with history of atrial fibrillation aortic stenosis hypertension hyperlipidemia and pulmonary fibrosis with bronchiectasis who is well-known to our service admitted through emergency room with palpitation and work-up in ER revealed rapid ventricular rate with history of chronic atrial fibrillation admit for management.  Patient admitted his heart rate control with Tikosyn Lanoxin and Lopressor.  Patient followed by cardiology.  Patient also followed by pulmonary for pulmonary fibrosis and bronchiectasis.  Patient further evaluated by EP service for possible ablation and the recommend permanent pacemaker placement prior to the ablation.  Patient remain on anticoagulation.  Patient also has had bronchoscopy which revealed rhinovirus infection and also BAL revealed 100,000 colonies of Pseudomonas with no clinical evidence of infection and further evaluated by infectious disease recommend no antibiotics at this time just conservative treatment.  Patient did have fall and complained of neck pain and left wrist pain with no dislocation or fracture.  Patient will will get ablation in 2 weeks and clear for discharge from cardiology pulmonary and infectious disease.  Patient and family refusing subacute rehab and wants to go home with family support and home health which is arranged.    Discharge diet regular    Activity as tolerated    Medication as above    Follow-up with primary doctor in 1 week follow-up with cardiology EP service pulmonary and infectious disease per the instruction and take medication as directed    SHELBIE COWART MD

## 2021-07-12 NOTE — PLAN OF CARE
Problem: Adult Inpatient Plan of Care  Goal: Plan of Care Review  Outcome: Ongoing, Progressing  Flowsheets (Taken 7/12/2021 0340)  Progress: improving  Plan of Care Reviewed With: patient  Outcome Summary: VSS. Pt neck pain controlled by PRN meds. no acute change. will continue to monitor.

## 2021-07-12 NOTE — PROGRESS NOTES
Noted orders for SN, PT, OT evals per hospitalist.  [facility] will follow.  Spoke with wife and confirmed demographics and wife voiced approval for our services.  Call out to PCP Dr Yamil tolliver. our involvement in patient's care.  Thank you.  Elayne Buck RN

## 2021-07-12 NOTE — DISCHARGE PLACEMENT REQUEST
"Alex Burk (88 y.o. Male)     Date of Birth Social Security Number Address Home Phone MRN    08/21/1932  [id number]  [facility] [address] [phone] [id number]    Christianity Marital Status          [place] Gnosticism        Admission Date Admission Type Admitting Provider Attending Provider Department, Room/Bed    7/5/21 Emergency Calvin Bergeron MD Ahmed, Aftab, MD [address], [address]    Discharge Date Discharge Disposition Discharge Destination                       Attending Provider: Calvin Bergeron MD    Allergies: Amiodarone, Diltiazem, Doxycycline, Indomethacin    Isolation: Droplet   Infection: Rhinovirus  (07/08/21)   Code Status: CPR    Ht: 175.3 cm (69\")   Wt: 67.6 kg (149 lb 0.5 oz)    Admission Cmt: None   Principal Problem: Pulmonary fibrosis (CMS/HCC) [J84.10]                 Active Insurance as of 7/5/2021     Primary Coverage     Payor Plan Insurance Group Employer/Plan Group    MEDICARE MEDICARE A & B      Payor Plan Address Payor Plan Phone Number Payor Plan Fax Number Effective Dates    [address] [phone]  8/1/1997 - None Entered    [facility] [id number]       Subscriber Name Subscriber Birth Date Member ID       ALEX BURK 8/21/1932 [id number]           Secondary Coverage     Payor Plan Insurance Group Employer/Plan Group    [facility] SUPP [id number]     Payor Plan Address Payor Plan Phone Number Payor Plan Fax Number Effective Dates    [address]   12/1/2016 - None Entered    [facility] [address]       Subscriber Name Subscriber Birth Date Member ID       ALEX BURK 8/21/1932 [id number]                 Emergency Contacts      (Rel.) Home Phone Work Phone Mobile Phone    AliyahNi (Spouse) [phone] -- [phone]    AliyahRaul (Son) [phone] -- [phone]              "

## 2021-07-13 ENCOUNTER — READMISSION MANAGEMENT (OUTPATIENT)
Dept: CALL CENTER | Facility: HOSPITAL | Age: 86
End: 2021-07-13

## 2021-07-13 ENCOUNTER — HOME CARE VISIT (OUTPATIENT)
Dept: HOME HEALTH SERVICES | Facility: HOME HEALTHCARE | Age: 86
End: 2021-07-13

## 2021-07-13 LAB
CYTO UR: NORMAL
LAB AP CASE REPORT: NORMAL
LAB AP CLINICAL INFORMATION: NORMAL
LAB AP SPECIAL STAINS: NORMAL
PATH REPORT.ADDENDUM SPEC: NORMAL
PATH REPORT.FINAL DX SPEC: NORMAL
PATH REPORT.GROSS SPEC: NORMAL

## 2021-07-13 PROCEDURE — G0151 HHCP-SERV OF PT,EA 15 MIN: HCPCS

## 2021-07-13 PROCEDURE — G0495 RN CARE TRAIN/EDU IN HH: HCPCS

## 2021-07-13 NOTE — OUTREACH NOTE
Prep Survey      Responses   Gnosticist facility patient discharged from?  [facility]   Is LACE score < 7 ?  No   Emergency Room discharge w/ pulse ox?  No   Eligibility  Readm Mgmt   Discharge diagnosis  Chronic atrial fibrillation with rapid ventricular rate s/p permanent pacemaker placement,  Pulmonary fibrosis   Does the patient have one of the following disease processes/diagnoses(primary or secondary)?  General Surgery   Does the patient have Home health ordered?  Yes   What is the Home health agency?   [facility]   Is there a DME ordered?  Yes   What DME was ordered?  [employer] for rolling walker   Prep survey completed?  Yes          Soo Luis RN

## 2021-07-13 NOTE — CASE MANAGEMENT/SOCIAL WORK
Case Management Discharge Note      Final Note: Home with spouse. Rastafarian  to see for Nursing, PT, and OT. (s/p pacemaker during this hospital stay). [employer] to provided rolling walker and delivered to pt room prior to D/C.  Family to transport.    Provided Post Acute Provider List?: N/A  Provided Post Acute Provider Quality & Resource List?: N/A    Selected Continued Care - Discharged on 7/12/2021 Admission date: 7/5/2021 - Discharge disposition: Home or Self Care    Destination    No services have been selected for the patient.              Durable Medical Equipment Coordination complete    Service Provider Selected Services Address Phone Fax Patient Preferred    [facility]  Durable Medical Equipment [address], [facility] [address] [phone] --          Dialysis/Infusion    No services have been selected for the patient.              Home Medical Care Coordination complete    Service Provider Selected Services Address Phone Fax Patient Preferred     [facility]  Home Health Services [address] [place] [address] [id number] [phone] --          Therapy    No services have been selected for the patient.              Community Resources    No services have been selected for the patient.              Community & DME    No services have been selected for the patient.                  Transportation Services  Private: Car    Final Discharge Disposition Code: 06 - home with home health care

## 2021-07-14 ENCOUNTER — HOME CARE VISIT (OUTPATIENT)
Dept: HOME HEALTH SERVICES | Facility: HOME HEALTHCARE | Age: 86
End: 2021-07-14

## 2021-07-14 VITALS
OXYGEN SATURATION: 95 % | RESPIRATION RATE: 18 BRPM | SYSTOLIC BLOOD PRESSURE: 138 MMHG | BODY MASS INDEX: 23.23 KG/M2 | WEIGHT: 148 LBS | DIASTOLIC BLOOD PRESSURE: 62 MMHG | HEART RATE: 67 BPM | HEIGHT: 67 IN | TEMPERATURE: 97.6 F

## 2021-07-14 LAB
CMV DNA SPEC QL NAA+PROBE: NEGATIVE
SPECIMEN SOURCE: NORMAL

## 2021-07-14 PROCEDURE — G0152 HHCP-SERV OF OT,EA 15 MIN: HCPCS

## 2021-07-14 NOTE — HOME HEALTH
_____________________________________________________________________________  PHYSICAL THERAPY EVALUATION    REASON FOR REFERRAL:  88 yr old male with history of atrial fibrillation, aortic stenosis, hypertension, hyperlipidemia and pulmonary fibrosis with bronchiectasis who was admitted to [facility] 7/5/21 with rapid ventricular response for heart rate control.  Pt received a permanent pacemaker with plans for cardiac ablation in the future.  Bronchoscopy revealed rhinovirus infection & colonies of psuedomonus with no evidence of infection.  Pt sustained a fall in hospital & complained of neck pain and left wrist pain with no dislocation or fracture.  Pt declined subacute rehab & was discharged home on 7/12/21 in care of wife with referral for home health PT services to address functional deficits in strength & balance impeding functional mobility & gait activities.    Pt & son report pt fell in the hospital after his surgery & struck his head on the ledge of the shower.  He has pain in the (L) thumb/palm (from trying to break the fall) & has been wearing a wrist splint that has helped.  Pt reports his neck pain has gotten worse since the hospitalization & is impeding his ability to move & sleep.  Son reports pt's chin is getting progressively closer to his chest & his head is rotating.  Pt reports everything causes his neck to hurt which tends to spasm.  He reports pain medication did not seem to help that much when he took it in the hospital.      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  Pulmonary hypertension  Crohn's disease  Gastroesophageal reflux disease    SURGICAL PROCEDURE:  permanent pacemaker placement    PAST MEDICAL HISTORY:    Aspiration  "pneumonia    Bronchiectasis     CKD (chronic kidney disease)    COPD (chronic obstructive pulmonary disease)   Dizziness    Elevated cholesterol    Gastric ulcer    Generalized weakness    Hard of hearing    Hypertension    Leg swelling    Lower extremity edema    Mild anemia    Near syncope    Nonrheumatic aortic valve stenosis    PAF (paroxysmal atrial fibrillation)   Palpitations    Peptic ulcer    Pneumonia of left lung due to infectious organism    Stroke     PAST SURGICAL HISTORY:    CATARACT EXTRACTION  EYE SURGERY- Left detached retina repair  EYE SURGERY- Right \"repaired a hole in the eye\"  TESTICLE SURGERY- benign tumor removed.    PRIOR LEVEL OF FUNCTION:  Independent without assistive device.  Does not drive.    HOME ENVIRONMENT:  Lives with supportive wife in single story home with 3 steps to enter with rail.    MENTAL STATUS:  Alert & Oriented x     PATIENT GOAL FOR EPISODE OF CARE: To get rid of his neck pain & back to where he can walk without anything.    PAIN RATING:         LOCATION: Posterior Neck        AT BEST:    3/10        AT WORST:   8/10        AT PRESENT:   6/10        INCREASES WITH: mobility, yawning, poor positioning        CONTROLLED WITH: support, immobilization    VITAL SIGNS  BLOOD PRESSURE:  130/50 Sitting;   130/50 Standing  TEMPERATURE:  98.9  PULSE:  77  RESPIRATIONS:  16  OXYGEN SATURATION:   94% room air  OXYGEN USE:  No    EDEMA:  None    WOUND / SKIN CONDITION:  Laceration present (L) scalp healing well with no dressing or wound closure in place,  Incision for (L) pacemaker incision secured with dermabond is healing well.  Bruising noted around area.    POSTURE:  Forward head due to muscle tightness/spasming in neck.    MUSCLE TONE/COORDINATION:  WFL, however does demonstrate very taut cervical muscles with evidence of spasming.    SENSATION/PROPRIOCEPTION:  Intact    DOMINANT SIDE:  Right    OCCULOMOTOR TESTING  SMOOTH PURSUITS:  WFL  SACCADES:  [facility]  VERGENCE:  " Impaired  PERIPHERAL VISION:  WFL  VESTIBULO-OCCULOMOTOR FUNCTION:  Appears WFL with swiveling of chair, as pt is unable to rotate neck.    ROM: Neck is severely limited in all planes.  (L) UE restricted to 90* elevation & abduction due to pacemaker placement.  Pt & family instructed in importance of compliance with (L) UE ROM, as well as avoiding pushing/pulling with (L) UE.    TINETTI SCORE:   13/28  (TINETTI FALL RISK SCORING: Under 19= High  19-24= Moderate  25 & up= Low)    POST EVALUATION ASSESSMENT:  Pt's functional mobilty largely limited by cervical pain.  He would benefit from intermittent short term use of a soft cervical collar as well as a microbead travel pillow for positioning. Pt would also benefit from a mild muscle relaxant, as cervical muscles are extremely taut with evidence of spasming.

## 2021-07-14 NOTE — HOME HEALTH
"_____________________________________________________________________________  PHYSICAL THERAPY EVALUATION    REASON FOR REFERRAL:  88 yr old male with history of atrial fibrillation, aortic stenosis, hypertension, hyperlipidemia and pulmonary fibrosis with bronchiectasis who was admitted to [facility] 7/5/21 with rapid ventricular response for heart rate control.  Pt received a permanent pacemaker with plans for cardiac ablation in the future.  Bronchoscopy revealed rhinovirus infection & colonies of psuedomonus with no evidence of infection.  Pt sustained a fall in hospital & complained of neck pain and left wrist pain with no dislocation or fracture.  Pt declined subacute rehab & was discharged home on 7/12/21 in care of wife with referral for home health PT services to address functional deficits in strength & balance impeding functional mobility & gait activities.        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  Pulmonary hypertension  Crohn's disease  Gastroesophageal reflux disease    SURGICAL PROCEDURE:  permanent pacemaker placement    PAST MEDICAL HISTORY:    Aspiration pneumonia    Bronchiectasis     CKD (chronic kidney disease)    COPD (chronic obstructive pulmonary disease)   Dizziness    Elevated cholesterol    Gastric ulcer    Generalized weakness    Hard of hearing    Hypertension    Leg swelling    Lower extremity edema    Mild anemia    Near syncope    Nonrheumatic aortic valve stenosis    PAF (paroxysmal atrial fibrillation)   Palpitations    Peptic ulcer    Pneumonia of left lung due to infectious organism    Stroke     PAST SURGICAL HISTORY:    CATARACT EXTRACTION  EYE SURGERY- Left detached retina repair  EYE SURGERY- Right \"repaired a hole in the " "eye\"  TESTICLE SURGERY- benign tumor removed.    PRIOR LEVEL OF FUNCTION:        HOME ENVIRONMENT:      MENTAL STATUS:  Alert & Oriented x     PATIENT GOAL FOR EPISODE OF CARE:      PAIN RATING:         LOCATION:          AT BEST:    /10        AT WORST:   /10        AT PRESENT:   /10        INCREASES WITH:         CONTROLLED WITH:      VITAL SIGNS  BLOOD PRESSURE:    TEMPERATURE:    PULSE:    RESPIRATIONS:    OXYGEN SATURATION:   % room air  OXYGEN USE:  No    EDEMA:      WOUND / SKIN CONDITION:      POSTURE:      MUSCLE TONE/COORDINATION:      SENSATION/PROPRIOCEPTION:      DOMINANT SIDE:      OCCULOMOTOR TESTING  SMOOTH PURSUITS:    SACCADES:    VERGENCE:    PERIPHERAL VISION:    VESTIBULO-OCCULOMOTOR FUNCTION:    TINETTI SCORE:   /28  (TINETTI FALL RISK SCORING: Under 19= High  19-24= Moderate  25 & up= Low)    TIMED UP AND GO:   seconds- indicates   MOBILITY    (TUG Mobility: High > 10 secTypical 10-19Slower 20-29Diminished>30)    5 SIT<>STAND:   Seconds - indicates IMPAIRED MOBILITY associated with increased disability & morbidity for predicting recurrent fall risk   (60-69 YR= 11.4 [id number] YR= 12.6 sec 80-89 YR= 14.8 sec)"

## 2021-07-15 ENCOUNTER — READMISSION MANAGEMENT (OUTPATIENT)
Dept: CALL CENTER | Facility: HOSPITAL | Age: 86
End: 2021-07-15

## 2021-07-15 VITALS
OXYGEN SATURATION: 94 % | SYSTOLIC BLOOD PRESSURE: 148 MMHG | HEART RATE: 77 BPM | HEIGHT: 67 IN | BODY MASS INDEX: 23.07 KG/M2 | RESPIRATION RATE: 16 BRPM | TEMPERATURE: 98.9 F | WEIGHT: 147 LBS | DIASTOLIC BLOOD PRESSURE: 62 MMHG

## 2021-07-15 NOTE — OUTREACH NOTE
General Surgery Week 1 Survey      Responses   [facility] patient discharged from?  [place]   Does the patient have one of the following disease processes/diagnoses(primary or secondary)?  General Surgery   Week 1 attempt successful?  Yes   Call start time  1509   Call end time  1514   Discharge diagnosis  Chronic atrial fibrillation with rapid ventricular rate s/p permanent pacemaker placement,  Pulmonary fibrosis   Person spoke with today (if not patient) and relationship  spouse, Ni Howard reviewed with patient/caregiver?  Yes   Is the patient having any side effects they believe may be caused by any medication additions or changes?  No   Does the patient have all medications related to this admission filled (includes all antibiotics, pain medications, etc.)  Yes   Is the patient taking all medications as directed (includes completed medication regime)?  Yes   Does the patient have a follow up appointment scheduled with their surgeon?  Yes   Has the patient kept scheduled appointments due by today?  Yes   What is the Home health agency?   [facility]   Has home health visited the patient within 72 hours of discharge?  Yes   What DME was ordered?  [employer] for rolling walker   Psychosocial issues?  No   Did the patient receive a copy of their discharge instructions?  Yes   Nursing interventions  Reviewed instructions with patient   What is the patient's perception of their health status since discharge?  Improving   Is the patient/caregiver able to teach back signs and symptoms of incisional infection?  Increased redness, swelling or pain at the incisonal site, Increased drainage or bleeding, Incisional warmth, Pus or odor from incision, Fever   Is the patient/caregiver able to teach back steps to recovery at home?  Set small, achievable goals for return to baseline health, Rest and rebuild strength, gradually increase activity   If the patient is a current smoker, are they able to teach back resources for  cessation?  Not a smoker   Is the patient/caregiver able to teach back the hierarchy of who to call/visit for symptoms/problems? PCP, Specialist, Home health nurse, Urgent Care, ED, 911  Yes   Additional teach back comments  Pt is having neck pain.  Cardiology was notified and advised to give 500mg tylenol 3x per day.  Pain has improved   Week 1 call completed?  Yes   Wrap up additional comments  Per spouse patient is improving.  He has had neck pain due to the fall. She states pain has improved.  Incision site looks good.  denies needs.           Ketuarh Sneed RN

## 2021-07-15 NOTE — HOME HEALTH
Anticipate OT 1w1, 2w3 for ADL, home safety, falls prevention, monitor vital signs, including pulse oximetry, hand/ upper extremity strength, therapeutic exercise, safety in home, education and improve endurance and fatigue management with self care, and functional mobility with durable medical equipment as necessary.  Prior level of Function: Patient was independent with personal care; wife does all meal prep and homemaking, and they live in house with bedroom, bathroom, kitchen, living room, and exit with sevearl steps all on main level

## 2021-07-16 ENCOUNTER — HOME CARE VISIT (OUTPATIENT)
Dept: HOME HEALTH SERVICES | Facility: HOME HEALTHCARE | Age: 86
End: 2021-07-16

## 2021-07-16 PROCEDURE — G0151 HHCP-SERV OF PT,EA 15 MIN: HCPCS

## 2021-07-16 NOTE — HOME HEALTH
___________________________________________________________________________________  PHYSICAL THERAPY ROUTINE VISIT NOTE    SUBJECTIVE:  Patient states he's doing much better.  His neck pain is improving every day.  Wife reports pt is taking his time when he transfers & walking & has been safer.    MEDICATION CHANGES:  None    FALLS SINCE LAST VISIT:  None    MENTAL STATUS:  Alert & Oriented x 3    PAIN RATING:         LOCATION:  Neck          AT BEST:    3/10        AT WORST:   8/10        AT PRESENT:   3/10        INCREASES WITH: poor posture/positioning/support        CONTROLLED WITH:  positioning, medication,    VITAL SIGNS  BLOOD PRESSURE:  134/60  TEMPERATURE:  98.1  PULSE:  64  RESPIRATIONS:  18  OXYGEN SATURATION:   96% room air  OXYGEN USE:  No    EDEMA:  None    WOUND / SKIN CONDITION:  (L) Pacemaker incision secured with dermabond is healing well.  Mild bruising noted.    POST TREATMENT ASSESSMENT:  Pt demonstrates significant improvement in cervical pain.  He was able to transfer in/out of bed utilizing good technique.  Gait/balance also improved, as pt has not required a rolling walker during the day, however does use it in the am, when he first wakes up.

## 2021-07-17 ENCOUNTER — HOME CARE VISIT (OUTPATIENT)
Dept: HOME HEALTH SERVICES | Facility: HOME HEALTHCARE | Age: 86
End: 2021-07-17

## 2021-07-18 ENCOUNTER — HOME CARE VISIT (OUTPATIENT)
Dept: HOME HEALTH SERVICES | Facility: HOME HEALTHCARE | Age: 86
End: 2021-07-18

## 2021-07-19 ENCOUNTER — HOME CARE VISIT (OUTPATIENT)
Dept: HOME HEALTH SERVICES | Facility: HOME HEALTHCARE | Age: 86
End: 2021-07-19

## 2021-07-19 VITALS
RESPIRATION RATE: 18 BRPM | SYSTOLIC BLOOD PRESSURE: 122 MMHG | TEMPERATURE: 97.5 F | HEART RATE: 62 BPM | OXYGEN SATURATION: 97 % | DIASTOLIC BLOOD PRESSURE: 56 MMHG

## 2021-07-19 PROCEDURE — G0152 HHCP-SERV OF OT,EA 15 MIN: HCPCS

## 2021-07-20 ENCOUNTER — HOME CARE VISIT (OUTPATIENT)
Dept: HOME HEALTH SERVICES | Facility: HOME HEALTHCARE | Age: 86
End: 2021-07-20

## 2021-07-20 PROCEDURE — G0151 HHCP-SERV OF PT,EA 15 MIN: HCPCS

## 2021-07-20 NOTE — HOME HEALTH
___________________________________________________________________________________  PHYSICAL THERAPY ROUTINE VISIT NOTE     SUBJECTIVE:  Patient states his (R) foot started hurting on 7/17/21 & it's red.  He has a history of gout & had only been taking Allopurinol once daily, stating it was decreased in the hospital from 2 times daily.  He started taking it twice daily & there has been improvement, as the swelling is gone, however top of foot is still red.  He's no longer having any pain in his neck.  He's been sleeping well at night.    MEDICATION CHANGES:  None     FALLS SINCE LAST VISIT:  None     MENTAL STATUS:  Alert & Oriented x 3     PAIN RATING:         LOCATION:  Neck          AT BEST:    0/10        AT WORST:   1/10        AT PRESENT:   0/10        INCREASES WITH: poor posture/positioning/support        CONTROLLED WITH:  positioning, medication,     LOCATION: (L) anterior foot        AT BEST:    0/10        AT WORST:   7/10        AT PRESENT:   3/10        INCREASES WITH: Walking        CONTROLLED WITH:  Rest, medication, ice    VITAL SIGNS  BLOOD PRESSURE:  128/48  TEMPERATURE:  98.0  PULSE:  68 regular  RESPIRATIONS:  18  OXYGEN SATURATION:   96% room air  OXYGEN USE:  No     EDEMA:  None     WOUND / SKIN CONDITION:  (L) Pacemaker incision secured with dermabond is healing well.  Mild bruising noted.  (R) ant foot with increased erythema, not warm to touch, however slightly warmer as compared to the rest of his foot.    POST TREATMENT ASSESSMENT:  Pt continues to progress in all areas, however does appear to be developing gout.  He is taking Allopurinol twice daily & ambulating with the rolling walker to assist with off-loading the (R) LE.

## 2021-07-20 NOTE — HOME HEALTH
Patient said his foot began to hurt over the weekend and his wife said it is gout, they had decreased the allopurinol in the hospital, so he is back on his original dose of 100 mg twice daily, after calling and talking to the nurse on call

## 2021-07-21 NOTE — CASE COMMUNICATION
Dear      Re: Asia Ly  :1932    The skilled nursing visit  on   for the above patient was missed due to spouse refusing todays visit when called to inform that I was on the way, therefore, the prescribed frequency of visits was not met.    If you have questions or would like further information about this patient, please contact us at [phone].    Regards,  Katie Drew

## 2021-07-21 NOTE — CASE COMMUNICATION
Dear      Re: Asia Ly  : 1932    The skilled nurse visit  on  for the above patient was missed due to no answer at door or telephone , therefore, the prescribed frequency of visits was not met.    If you have questions or would like further information about this patient, please contact us at [phone].    Regards,  Katie Drew LPN

## 2021-07-22 ENCOUNTER — OFFICE VISIT (OUTPATIENT)
Dept: CARDIOLOGY | Facility: CLINIC | Age: 86
End: 2021-07-22

## 2021-07-22 VITALS
DIASTOLIC BLOOD PRESSURE: 50 MMHG | SYSTOLIC BLOOD PRESSURE: 136 MMHG | WEIGHT: 144 LBS | HEART RATE: 65 BPM | HEIGHT: 69 IN | BODY MASS INDEX: 21.33 KG/M2

## 2021-07-22 DIAGNOSIS — I10 ESSENTIAL HYPERTENSION: ICD-10-CM

## 2021-07-22 DIAGNOSIS — E78.2 MIXED HYPERLIPIDEMIA: ICD-10-CM

## 2021-07-22 DIAGNOSIS — I48.0 PAF (PAROXYSMAL ATRIAL FIBRILLATION) (HCC): Primary | ICD-10-CM

## 2021-07-22 DIAGNOSIS — I35.0 AORTIC STENOSIS, MODERATE: ICD-10-CM

## 2021-07-22 DIAGNOSIS — J84.10 PULMONARY FIBROSIS (HCC): ICD-10-CM

## 2021-07-22 LAB
MAXIMAL PREDICTED HEART RATE: 132 BPM
STRESS TARGET HR: 112 BPM

## 2021-07-22 PROCEDURE — 99214 OFFICE O/P EST MOD 30 MIN: CPT | Performed by: NURSE PRACTITIONER

## 2021-07-22 PROCEDURE — 93248 EXT ECG>7D<15D REV&INTERPJ: CPT | Performed by: INTERNAL MEDICINE

## 2021-07-22 PROCEDURE — 93000 ELECTROCARDIOGRAM COMPLETE: CPT | Performed by: NURSE PRACTITIONER

## 2021-07-22 RX ORDER — CLINDAMYCIN PHOSPHATE 10 MG/ML
SOLUTION TOPICAL EVERY 12 HOURS SCHEDULED
COMMUNITY
End: 2021-09-28

## 2021-07-23 ENCOUNTER — HOME CARE VISIT (OUTPATIENT)
Dept: HOME HEALTH SERVICES | Facility: HOME HEALTHCARE | Age: 86
End: 2021-07-23

## 2021-07-23 PROCEDURE — G0152 HHCP-SERV OF OT,EA 15 MIN: HCPCS

## 2021-07-23 NOTE — HOME HEALTH
___________________________________________________________________________________  PHYSICAL THERAPY ROUTINE VISIT NOTE     SUBJECTIVE:  Patient states     MEDICATION CHANGES:  None     FALLS SINCE LAST VISIT:  None     MENTAL STATUS:  Alert & Oriented x 3     PAIN RATING:         LOCATION:  Neck          AT BEST:    0/10        AT WORST:   1/10        AT PRESENT:   0/10        INCREASES WITH: poor posture/positioning/support        CONTROLLED WITH:  positioning, medication,     LOCATION: (L) anterior foot        AT BEST:    0/10        AT WORST:   7/10        AT PRESENT:   3/10        INCREASES WITH: Walking        CONTROLLED WITH:  Rest, medication, ice     VITAL SIGNS  BLOOD PRESSURE:    TEMPERATURE:   PULSE:    RESPIRATIONS:  18  OXYGEN SATURATION:   96% room air  OXYGEN USE:  No     EDEMA:  None     WOUND / SKIN CONDITION:  (L) Pacemaker incision secured with dermabond is healing well.  Mild bruising noted.  (R) ant foot with increased erythema, not warm to touch, however slightly warmer as compared to the rest of his foot.     POST TREATMENT ASSESSMENT:

## 2021-07-24 ENCOUNTER — HOME CARE VISIT (OUTPATIENT)
Dept: HOME HEALTH SERVICES | Facility: HOME HEALTHCARE | Age: 86
End: 2021-07-24

## 2021-07-25 VITALS
RESPIRATION RATE: 18 BRPM | SYSTOLIC BLOOD PRESSURE: 124 MMHG | HEART RATE: 65 BPM | OXYGEN SATURATION: 95 % | DIASTOLIC BLOOD PRESSURE: 58 MMHG | TEMPERATURE: 97.6 F

## 2021-07-25 NOTE — CASE COMMUNICATION
PT running late for 4:00 pm appt.  Received call from pt's wife at 4:06 who reports pt is doing very well & is back to his normal self & does not need any therapy.  She also reported she had to go to the grocery & he would be home alone.  PT offered to make DC home visit next week, as he had 1 scheduled visit left, however wife declined stating that pt had an appt with the cardiologist on Monday & if he was still feeling good, then she was going to take him to [employer] to play cards with his friends on Tuesday.  She indicated that pt has returned to his baseline & no longer requires PT services.  He is being discharged without a visit as of 7/23/21.  Will notify Dr. Juarez on 7/26/21 of [place].

## 2021-07-26 ENCOUNTER — READMISSION MANAGEMENT (OUTPATIENT)
Dept: CALL CENTER | Facility: HOSPITAL | Age: 86
End: 2021-07-26

## 2021-07-26 ENCOUNTER — HOME CARE VISIT (OUTPATIENT)
Dept: HOME HEALTH SERVICES | Facility: HOME HEALTHCARE | Age: 86
End: 2021-07-26

## 2021-07-26 DIAGNOSIS — I48.0 AF (PAROXYSMAL ATRIAL FIBRILLATION) (HCC): Primary | ICD-10-CM

## 2021-07-26 PROCEDURE — 93296 REM INTERROG EVL PM/IDS: CPT | Performed by: INTERNAL MEDICINE

## 2021-07-26 PROCEDURE — 93294 REM INTERROG EVL PM/LDLS PM: CPT | Performed by: INTERNAL MEDICINE

## 2021-07-26 NOTE — CASE COMMUNICATION
Dear Dr. Calvin Bergeron    Re:Asia Ly  :1932    The skilled nurse visit  on 2021 for the above patient was missed due to patient's wife refusing visit, therefore, the prescribed frequency of visits was not met.    If you have questions or would like further information about this patient, please contact us at [phone].    Regards,  Katie Drew LPN

## 2021-07-26 NOTE — OUTREACH NOTE
General Surgery Week 2 Survey      Responses   [facility] patient discharged from?  [place]   Does the patient have one of the following disease processes/diagnoses(primary or secondary)?  General Surgery   Week 2 attempt successful?  No   Unsuccessful attempts  Attempt 1          Dannielle Castillo RN

## 2021-07-27 ENCOUNTER — HOME CARE VISIT (OUTPATIENT)
Dept: HOME HEALTH SERVICES | Facility: HOME HEALTHCARE | Age: 86
End: 2021-07-27

## 2021-07-28 NOTE — HOME HEALTH
DISCHARGE SUMMARY WITHOUT A VISIT    PROGRESS TOWARD GOALS:  Patient progressing well toward all goals.  Wife notified PT that pt no longer required PT services as he was back to his normal baseline.    DISCHARGE STATUS: To home with assistance of wife  DISCHARGE CONDITION:  Good  INSTRUCTIONS/HOME PROGRAM PROVIDED:    CONTENT:  Written/pictoral HEP    PATIENT/CAREGIVER LEVEL OF COMPLIANCE:  Good    SERVICES CONTINUING:  SN

## 2021-07-29 ENCOUNTER — READMISSION MANAGEMENT (OUTPATIENT)
Dept: CALL CENTER | Facility: HOSPITAL | Age: 86
End: 2021-07-29

## 2021-07-29 ENCOUNTER — HOME CARE VISIT (OUTPATIENT)
Dept: HOME HEALTH SERVICES | Facility: HOME HEALTHCARE | Age: 86
End: 2021-07-29

## 2021-07-29 NOTE — OUTREACH NOTE
General Surgery Week 2 Survey      Responses   [facility] [place] patient discharged from?  [place]   Does the patient have one of the following disease processes/diagnoses(primary or secondary)?  General Surgery   Week 2 attempt successful?  No   Unsuccessful attempts  Attempt 2          Natalie Simmons RN

## 2021-08-03 ENCOUNTER — HOME CARE VISIT (OUTPATIENT)
Dept: HOME HEALTH SERVICES | Facility: HOME HEALTHCARE | Age: 86
End: 2021-08-03

## 2021-08-03 PROCEDURE — G0300 HHS/HOSPICE OF LPN EA 15 MIN: HCPCS

## 2021-08-03 PROCEDURE — G0299 HHS/HOSPICE OF RN EA 15 MIN: HCPCS

## 2021-08-04 VITALS
HEART RATE: 67 BPM | OXYGEN SATURATION: 96 % | TEMPERATURE: 97.5 F | SYSTOLIC BLOOD PRESSURE: 128 MMHG | DIASTOLIC BLOOD PRESSURE: 72 MMHG | RESPIRATION RATE: 20 BRPM

## 2021-08-05 ENCOUNTER — HOME CARE VISIT (OUTPATIENT)
Dept: HOME HEALTH SERVICES | Facility: HOME HEALTHCARE | Age: 86
End: 2021-08-05

## 2021-08-05 LAB — FUNGUS WND CULT: NORMAL

## 2021-08-05 PROCEDURE — G0495 RN CARE TRAIN/EDU IN HH: HCPCS

## 2021-08-06 VITALS
RESPIRATION RATE: 16 BRPM | SYSTOLIC BLOOD PRESSURE: 122 MMHG | OXYGEN SATURATION: 98 % | HEART RATE: 63 BPM | TEMPERATURE: 98.7 F | DIASTOLIC BLOOD PRESSURE: 58 MMHG

## 2021-08-09 ENCOUNTER — TELEPHONE (OUTPATIENT)
Dept: CARDIOLOGY | Facility: CLINIC | Age: 86
End: 2021-08-09

## 2021-08-09 NOTE — TELEPHONE ENCOUNTER
Patient's wife called to ask your opinion.  Patient was prescribed Levofloxacin 500mg one daily x 7 days for his lungs.  She is holding off starting this until she gets your opinion rather he should take this due to his pacer and upcoming AVN ablation.  Please call her at [phone]./ LEIDA

## 2021-08-09 NOTE — TELEPHONE ENCOUNTER
Patient's wife notified OK to give Levofloxacin.  She verbalized understanding and will watch for any side effects. / LEIDA

## 2021-08-09 NOTE — TELEPHONE ENCOUNTER
Deanna- let his wife know it is Ok to take & treat lungs despite upcoming procedure which is still not scheduled.      I noticed Dr. Nettles's case request has already been ordered.   Celeste- this is non-urgent but not sure when Dr. Nettles plans to take him for AV node ablation.

## 2021-08-12 ENCOUNTER — TRANSCRIBE ORDERS (OUTPATIENT)
Dept: CARDIOLOGY | Facility: CLINIC | Age: 86
End: 2021-08-12

## 2021-08-12 DIAGNOSIS — Z01.810 PREPROCEDURAL CARDIOVASCULAR EXAMINATION: ICD-10-CM

## 2021-08-12 DIAGNOSIS — Z13.6 SCREENING FOR CARDIOVASCULAR CONDITION: Primary | ICD-10-CM

## 2021-08-19 LAB
MYCOBACTERIUM SPEC CULT: NORMAL
NIGHT BLUE STAIN TISS: NORMAL

## 2021-08-23 ENCOUNTER — TELEPHONE (OUTPATIENT)
Dept: CARDIOLOGY | Facility: CLINIC | Age: 86
End: 2021-08-23

## 2021-08-23 NOTE — TELEPHONE ENCOUNTER
MsSudeep Suttonjoy notified OK to get booster but not necessary.  She verbalized understanding./ LEIDA

## 2021-08-23 NOTE — TELEPHONE ENCOUNTER
Patient's wife (Ni) called to ask if patient should get Covid 19 booster prior to his upcoming procedure.  Please advise or call her. / LEIDA     Patient can be reached at [phone]

## 2021-08-30 ENCOUNTER — LAB (OUTPATIENT)
Dept: LAB | Facility: HOSPITAL | Age: 86
End: 2021-08-30

## 2021-08-30 ENCOUNTER — TRANSCRIBE ORDERS (OUTPATIENT)
Dept: SLEEP MEDICINE | Facility: HOSPITAL | Age: 86
End: 2021-08-30

## 2021-08-30 DIAGNOSIS — Z01.810 PREPROCEDURAL CARDIOVASCULAR EXAMINATION: ICD-10-CM

## 2021-08-30 DIAGNOSIS — Z01.818 OTHER SPECIFIED PRE-OPERATIVE EXAMINATION: Primary | ICD-10-CM

## 2021-08-30 DIAGNOSIS — Z13.6 SCREENING FOR CARDIOVASCULAR CONDITION: ICD-10-CM

## 2021-08-30 LAB
ANION GAP SERPL CALCULATED.3IONS-SCNC: 10.7 MMOL/L (ref 5–15)
BASOPHILS # BLD AUTO: 0.05 10*3/MM3 (ref 0–0.2)
BASOPHILS NFR BLD AUTO: 0.5 % (ref 0–1.5)
BUN SERPL-MCNC: 18 MG/DL (ref 8–23)
BUN/CREAT SERPL: 16.7 (ref 7–25)
CALCIUM SPEC-SCNC: 9.2 MG/DL (ref 8.6–10.5)
CHLORIDE SERPL-SCNC: 97 MMOL/L (ref 98–107)
CO2 SERPL-SCNC: 23.3 MMOL/L (ref 22–29)
CREAT SERPL-MCNC: 1.08 MG/DL (ref 0.76–1.27)
DEPRECATED RDW RBC AUTO: 50.3 FL (ref 37–54)
EOSINOPHIL # BLD AUTO: 0.28 10*3/MM3 (ref 0–0.4)
EOSINOPHIL NFR BLD AUTO: 3 % (ref 0.3–6.2)
ERYTHROCYTE [DISTWIDTH] IN BLOOD BY AUTOMATED COUNT: 15.7 % (ref 12.3–15.4)
GFR SERPL CREATININE-BSD FRML MDRD: 64 ML/MIN/1.73
GLUCOSE SERPL-MCNC: 132 MG/DL (ref 65–99)
HCT VFR BLD AUTO: 36.8 % (ref 37.5–51)
HGB BLD-MCNC: 12.1 G/DL (ref 13–17.7)
IMM GRANULOCYTES # BLD AUTO: 0.09 10*3/MM3 (ref 0–0.05)
IMM GRANULOCYTES NFR BLD AUTO: 1 % (ref 0–0.5)
LYMPHOCYTES # BLD AUTO: 1.46 10*3/MM3 (ref 0.7–3.1)
LYMPHOCYTES NFR BLD AUTO: 15.6 % (ref 19.6–45.3)
MCH RBC QN AUTO: 28.9 PG (ref 26.6–33)
MCHC RBC AUTO-ENTMCNC: 32.9 G/DL (ref 31.5–35.7)
MCV RBC AUTO: 87.8 FL (ref 79–97)
MONOCYTES # BLD AUTO: 0.73 10*3/MM3 (ref 0.1–0.9)
MONOCYTES NFR BLD AUTO: 7.8 % (ref 5–12)
NEUTROPHILS NFR BLD AUTO: 6.74 10*3/MM3 (ref 1.7–7)
NEUTROPHILS NFR BLD AUTO: 72.1 % (ref 42.7–76)
NRBC BLD AUTO-RTO: 0 /100 WBC (ref 0–0.2)
PLATELET # BLD AUTO: 187 10*3/MM3 (ref 140–450)
PMV BLD AUTO: 11.2 FL (ref 6–12)
POTASSIUM SERPL-SCNC: 4.2 MMOL/L (ref 3.5–5.2)
RBC # BLD AUTO: 4.19 10*6/MM3 (ref 4.14–5.8)
SODIUM SERPL-SCNC: 131 MMOL/L (ref 136–145)
WBC # BLD AUTO: 9.35 10*3/MM3 (ref 3.4–10.8)

## 2021-08-30 PROCEDURE — 85025 COMPLETE CBC W/AUTO DIFF WBC: CPT

## 2021-08-30 PROCEDURE — 80048 BASIC METABOLIC PNL TOTAL CA: CPT

## 2021-08-30 PROCEDURE — 36415 COLL VENOUS BLD VENIPUNCTURE: CPT

## 2021-09-01 ENCOUNTER — LAB (OUTPATIENT)
Dept: LAB | Facility: HOSPITAL | Age: 86
End: 2021-09-01

## 2021-09-01 DIAGNOSIS — Z01.818 OTHER SPECIFIED PRE-OPERATIVE EXAMINATION: ICD-10-CM

## 2021-09-01 LAB — SARS-COV-2 ORF1AB RESP QL NAA+PROBE: NOT DETECTED

## 2021-09-01 PROCEDURE — U0005 INFEC AGEN DETEC AMPLI PROBE: HCPCS

## 2021-09-01 PROCEDURE — C9803 HOPD COVID-19 SPEC COLLECT: HCPCS

## 2021-09-01 PROCEDURE — U0004 COV-19 TEST NON-CDC HGH THRU: HCPCS

## 2021-09-03 ENCOUNTER — HOSPITAL ENCOUNTER (OUTPATIENT)
Facility: HOSPITAL | Age: 86
Setting detail: HOSPITAL OUTPATIENT SURGERY
Discharge: HOME | End: 2021-09-03
Attending: INTERNAL MEDICINE | Admitting: INTERNAL MEDICINE

## 2021-09-03 ENCOUNTER — HOSPITAL ENCOUNTER (EMERGENCY)
Facility: HOSPITAL | Age: 86
Discharge: LEFT BEFORE DISCHARGE | End: 2021-09-04

## 2021-09-03 ENCOUNTER — TELEPHONE (OUTPATIENT)
Dept: CARDIOLOGY | Facility: CLINIC | Age: 86
End: 2021-09-03

## 2021-09-03 VITALS
HEIGHT: 67 IN | BODY MASS INDEX: 23.18 KG/M2 | HEART RATE: 69 BPM | RESPIRATION RATE: 16 BRPM | SYSTOLIC BLOOD PRESSURE: 109 MMHG | OXYGEN SATURATION: 95 % | DIASTOLIC BLOOD PRESSURE: 46 MMHG | TEMPERATURE: 98.4 F

## 2021-09-03 VITALS
TEMPERATURE: 98.4 F | HEIGHT: 67 IN | OXYGEN SATURATION: 97 % | SYSTOLIC BLOOD PRESSURE: 161 MMHG | DIASTOLIC BLOOD PRESSURE: 61 MMHG | BODY MASS INDEX: 23.23 KG/M2 | WEIGHT: 148 LBS | RESPIRATION RATE: 18 BRPM | HEART RATE: 73 BPM

## 2021-09-03 DIAGNOSIS — I48.0 AF (PAROXYSMAL ATRIAL FIBRILLATION) (HCC): ICD-10-CM

## 2021-09-03 LAB
ALBUMIN SERPL-MCNC: 3.9 G/DL (ref 3.5–5.2)
ALBUMIN/GLOB SERPL: 1 G/DL
ALP SERPL-CCNC: 52 U/L (ref 39–117)
ALT SERPL W P-5'-P-CCNC: 10 U/L (ref 1–41)
ANION GAP SERPL CALCULATED.3IONS-SCNC: 12 MMOL/L (ref 5–15)
AST SERPL-CCNC: 18 U/L (ref 1–40)
BASOPHILS # BLD AUTO: 0.08 10*3/MM3 (ref 0–0.2)
BASOPHILS NFR BLD AUTO: 0.5 % (ref 0–1.5)
BILIRUB SERPL-MCNC: 0.6 MG/DL (ref 0–1.2)
BUN SERPL-MCNC: 21 MG/DL (ref 8–23)
BUN/CREAT SERPL: 16.9 (ref 7–25)
CALCIUM SPEC-SCNC: 9 MG/DL (ref 8.6–10.5)
CHLORIDE SERPL-SCNC: 96 MMOL/L (ref 98–107)
CO2 SERPL-SCNC: 22 MMOL/L (ref 22–29)
CREAT SERPL-MCNC: 1.24 MG/DL (ref 0.76–1.27)
D-LACTATE SERPL-SCNC: 1.7 MMOL/L (ref 0.5–2)
DEPRECATED RDW RBC AUTO: 49.6 FL (ref 37–54)
EOSINOPHIL # BLD AUTO: 0.19 10*3/MM3 (ref 0–0.4)
EOSINOPHIL NFR BLD AUTO: 1.2 % (ref 0.3–6.2)
ERYTHROCYTE [DISTWIDTH] IN BLOOD BY AUTOMATED COUNT: 15.7 % (ref 12.3–15.4)
GFR SERPL CREATININE-BSD FRML MDRD: 55 ML/MIN/1.73
GLOBULIN UR ELPH-MCNC: 3.8 GM/DL
GLUCOSE SERPL-MCNC: 112 MG/DL (ref 65–99)
HCT VFR BLD AUTO: 37.9 % (ref 37.5–51)
HGB BLD-MCNC: 12.7 G/DL (ref 13–17.7)
IMM GRANULOCYTES # BLD AUTO: 0.08 10*3/MM3 (ref 0–0.05)
IMM GRANULOCYTES NFR BLD AUTO: 0.5 % (ref 0–0.5)
LYMPHOCYTES # BLD AUTO: 1.21 10*3/MM3 (ref 0.7–3.1)
LYMPHOCYTES NFR BLD AUTO: 7.3 % (ref 19.6–45.3)
MCH RBC QN AUTO: 29.1 PG (ref 26.6–33)
MCHC RBC AUTO-ENTMCNC: 33.5 G/DL (ref 31.5–35.7)
MCV RBC AUTO: 86.9 FL (ref 79–97)
MONOCYTES # BLD AUTO: 1.06 10*3/MM3 (ref 0.1–0.9)
MONOCYTES NFR BLD AUTO: 6.4 % (ref 5–12)
NEUTROPHILS NFR BLD AUTO: 13.88 10*3/MM3 (ref 1.7–7)
NEUTROPHILS NFR BLD AUTO: 84.1 % (ref 42.7–76)
NRBC BLD AUTO-RTO: 0 /100 WBC (ref 0–0.2)
PLATELET # BLD AUTO: 202 10*3/MM3 (ref 140–450)
PMV BLD AUTO: 11 FL (ref 6–12)
POTASSIUM SERPL-SCNC: 4.6 MMOL/L (ref 3.5–5.2)
PROT SERPL-MCNC: 7.7 G/DL (ref 6–8.5)
QT INTERVAL: 405 MS
RBC # BLD AUTO: 4.36 10*6/MM3 (ref 4.14–5.8)
SODIUM SERPL-SCNC: 130 MMOL/L (ref 136–145)
WBC # BLD AUTO: 16.5 10*3/MM3 (ref 3.4–10.8)

## 2021-09-03 PROCEDURE — 85025 COMPLETE CBC W/AUTO DIFF WBC: CPT | Performed by: EMERGENCY MEDICINE

## 2021-09-03 PROCEDURE — 99211 OFF/OP EST MAY X REQ PHY/QHP: CPT

## 2021-09-03 PROCEDURE — 80053 COMPREHEN METABOLIC PANEL: CPT | Performed by: EMERGENCY MEDICINE

## 2021-09-03 PROCEDURE — 99152 MOD SED SAME PHYS/QHP 5/>YRS: CPT | Performed by: INTERNAL MEDICINE

## 2021-09-03 PROCEDURE — 99153 MOD SED SAME PHYS/QHP EA: CPT | Performed by: INTERNAL MEDICINE

## 2021-09-03 PROCEDURE — C1732 CATH, EP, DIAG/ABL, 3D/VECT: HCPCS | Performed by: INTERNAL MEDICINE

## 2021-09-03 PROCEDURE — 83605 ASSAY OF LACTIC ACID: CPT | Performed by: EMERGENCY MEDICINE

## 2021-09-03 PROCEDURE — 93650 ICAR CATH ABLTJ AV NODE FUNC: CPT | Performed by: INTERNAL MEDICINE

## 2021-09-03 PROCEDURE — 87040 BLOOD CULTURE FOR BACTERIA: CPT | Performed by: EMERGENCY MEDICINE

## 2021-09-03 PROCEDURE — 93286 PERI-PX EVAL PM/LDLS PM IP: CPT | Performed by: INTERNAL MEDICINE

## 2021-09-03 PROCEDURE — 93005 ELECTROCARDIOGRAM TRACING: CPT | Performed by: INTERNAL MEDICINE

## 2021-09-03 PROCEDURE — 93010 ELECTROCARDIOGRAM REPORT: CPT | Performed by: INTERNAL MEDICINE

## 2021-09-03 PROCEDURE — 25010000002 FENTANYL CITRATE (PF) 50 MCG/ML SOLUTION: Performed by: INTERNAL MEDICINE

## 2021-09-03 PROCEDURE — C1760 CLOSURE DEV, VASC: HCPCS | Performed by: INTERNAL MEDICINE

## 2021-09-03 PROCEDURE — C1894 INTRO/SHEATH, NON-LASER: HCPCS | Performed by: INTERNAL MEDICINE

## 2021-09-03 PROCEDURE — S0260 H&P FOR SURGERY: HCPCS | Performed by: INTERNAL MEDICINE

## 2021-09-03 PROCEDURE — 25010000002 MIDAZOLAM PER 1 MG: Performed by: INTERNAL MEDICINE

## 2021-09-03 RX ORDER — SODIUM CHLORIDE 0.9 % (FLUSH) 0.9 %
3 SYRINGE (ML) INJECTION EVERY 12 HOURS SCHEDULED
Status: DISCONTINUED | OUTPATIENT
Start: 2021-09-03 | End: 2021-09-03 | Stop reason: HOSPADM

## 2021-09-03 RX ORDER — ACETAMINOPHEN 325 MG/1
650 TABLET ORAL EVERY 4 HOURS PRN
Status: DISCONTINUED | OUTPATIENT
Start: 2021-09-03 | End: 2021-09-03 | Stop reason: HOSPADM

## 2021-09-03 RX ORDER — SODIUM CHLORIDE 0.9 % (FLUSH) 0.9 %
10 SYRINGE (ML) INJECTION AS NEEDED
Status: DISCONTINUED | OUTPATIENT
Start: 2021-09-03 | End: 2021-09-03 | Stop reason: HOSPADM

## 2021-09-03 RX ORDER — LIDOCAINE HYDROCHLORIDE AND EPINEPHRINE 10; 10 MG/ML; UG/ML
INJECTION, SOLUTION INFILTRATION; PERINEURAL AS NEEDED
Status: DISCONTINUED | OUTPATIENT
Start: 2021-09-03 | End: 2021-09-03 | Stop reason: HOSPADM

## 2021-09-03 RX ORDER — MIDAZOLAM HYDROCHLORIDE 1 MG/ML
INJECTION INTRAMUSCULAR; INTRAVENOUS AS NEEDED
Status: DISCONTINUED | OUTPATIENT
Start: 2021-09-03 | End: 2021-09-03 | Stop reason: HOSPADM

## 2021-09-03 RX ORDER — HYDROMORPHONE HYDROCHLORIDE 1 MG/ML
0.5 INJECTION, SOLUTION INTRAMUSCULAR; INTRAVENOUS; SUBCUTANEOUS
Status: DISCONTINUED | OUTPATIENT
Start: 2021-09-03 | End: 2021-09-03 | Stop reason: HOSPADM

## 2021-09-03 RX ORDER — FENTANYL CITRATE 50 UG/ML
INJECTION, SOLUTION INTRAMUSCULAR; INTRAVENOUS AS NEEDED
Status: DISCONTINUED | OUTPATIENT
Start: 2021-09-03 | End: 2021-09-03 | Stop reason: HOSPADM

## 2021-09-03 RX ORDER — NALOXONE HCL 0.4 MG/ML
0.4 VIAL (ML) INJECTION
Status: DISCONTINUED | OUTPATIENT
Start: 2021-09-03 | End: 2021-09-03 | Stop reason: HOSPADM

## 2021-09-03 RX ORDER — ACETAMINOPHEN 650 MG/1
650 SUPPOSITORY RECTAL EVERY 4 HOURS PRN
Status: DISCONTINUED | OUTPATIENT
Start: 2021-09-03 | End: 2021-09-03 | Stop reason: HOSPADM

## 2021-09-03 RX ORDER — SODIUM CHLORIDE 9 MG/ML
75 INJECTION, SOLUTION INTRAVENOUS CONTINUOUS
Status: DISCONTINUED | OUTPATIENT
Start: 2021-09-03 | End: 2021-09-03 | Stop reason: HOSPADM

## 2021-09-03 RX ORDER — ACETAMINOPHEN 500 MG
1000 TABLET ORAL ONCE
Status: DISCONTINUED | OUTPATIENT
Start: 2021-09-03 | End: 2021-09-04 | Stop reason: HOSPADM

## 2021-09-03 RX ADMIN — SODIUM CHLORIDE 75 ML/HR: 9 INJECTION, SOLUTION INTRAVENOUS at 07:43

## 2021-09-03 NOTE — TELEPHONE ENCOUNTER
Patient's wife called the answering service extremely anxious.  Patient had AV node ablation today.  Patient's wife states he feels cold and is shivering and is a little shaky.  Patient is in the background repeatedly saying I feel great.  She noted his heart rate to be 100 on pulse oximeter temperature 99.0.  She does not have a way to check blood pressure.  Patient denied chest pain, shortness of breath, dizziness.  He did just feel a little cold.  Patient's wife states he occasionally coughs up some mucus and has COPD and I encouraged him to discuss that with her pulmonologist.  I have reviewed this with Dr. Nettles who had no further recommendations.  I have reviewed with the patient and his wife when they should call back and notify me otherwise his vital signs seem stable and he sounded stable.

## 2021-09-03 NOTE — DISCHARGE INSTRUCTIONS
[facility]  Cardiology  [address] [place] [address]  [phone]    Coronary Ablation After Care    Refer to this sheet in the next few weeks. These instructions provide you with information on caring for yourself after your procedure. Your health care provider may also give you more specific instructions. Your treatment has been planned according to current medical practices, but problems sometimes occur. Call your health care provider if you have any problems or questions after your procedure.      What to Expect After the Procedure:  After your procedure, it is typical to have the following sensations:  · Minor discomfort or tenderness and a small bump at the catheter insertion site(s). The bump(s) should usually decrease in size and tenderness within 1 to 2 weeks.  · Any bruising will usually fade within 2 to 4 weeks.  Home Care Instructions:  · Do not apply powder or lotion to the site.  · Do not take baths, swim, or use a hot tub until your health care provider approves and the site is completely healed.  · Do not bend, squat, or lift anything over 20 lb (9 kg) or as directed by your health care provider. However, we recommend lifting nothing heavier than a gallon of milk.    · You may shower 24 hours after the procedure. Remove the bandage (dressing) and gently wash the site with plain soap and water. Gently pat the site dry. You may apply a band aid daily for 2 days if desired.    · Inspect the site at least twice daily.  · Increase your fluid intake for the next 2 days.    · Limit your activity for the first 48 hours.   · Avoid strenuous activity for 1 week or as advised by your physician.    · Follow instructions about when you can drive or return to work as directed by your physician.    · Hold direct pressure over the site when you cough, sneeze, laugh or change positions.  Do this for the next 2 days.    Call Your Doctor If:  · You have drainage (other than a small amount of  blood on the dressing).  · You have chills or a fever > 101.  · You have redness, warmth, swelling (larger than a walnut), or pain at the insertion   · You develop palpitations, chest pain or shortness of breath, feel faint, or pass out.  · You develop pain, discoloration, coldness, numbness, tingling, or severe bruising in the leg that held the catheter.  · You develop bleeding from any other place, such as the bowels.  · You have heavy bleeding from the site.  If this happens, hold pressure on the site and call 911.        Make Sure You:  · Understand these instructions.  · Will watch your condition.  · Will get help right away if you are not doing well or get worse.

## 2021-09-03 NOTE — H&P
[facility]   HISTORY AND PHYSICAL    Patient Name: Asia Ly  : 1932  MRN: [id number]  Primary Care Physician:  Lubna Lobo MD  Date of admission: 9/3/2021    Subjective   Subjective     Chief Complaint: Atrial Fibrillation    History of Present Illness   88 yo gentleman with PAF which results in frequent hospital admission.  He was last admitted in August, but had been admitted several times in the past year with similar compliant.  We decided at that visit to pursue an pace and ablate strategy.  During that admission, he had successful placement of a dual chamber pacemaker.  Lead testing is within normal limits.     Review of Systems   Constitutional: Negative for activity change and fatigue.   Eyes: Negative.    Respiratory: Negative for chest tightness and shortness of breath.    Cardiovascular: Negative for chest pain, palpitations and leg swelling.   Gastrointestinal: Negative.    Endocrine: Negative.    Genitourinary: Negative.    Musculoskeletal: Negative.    Skin: Negative.    Neurological: Negative for dizziness and syncope.   Hematological: Negative.    Psychiatric/Behavioral: Negative.         Personal History     Past Medical History:   Diagnosis Date   • Aortic stenosis    • Aspiration pneumonia (CMS/HCC)    • Atrial fibrillation (CMS/HCC)    • Bronchiectasis (CMS/HCC)    • CKD (chronic kidney disease)    • COPD (chronic obstructive pulmonary disease) (CMS/HCC)    • Crohn's disease (CMS/HCC)    • Dizziness    • Elevated cholesterol    • Gastric ulcer    • Generalized weakness    • Gout    • Hard of hearing    • Hyperlipidemia    • Hypertension    • Leg swelling    • Lower extremity edema    • Mild anemia    • Near syncope    • Nonrheumatic aortic valve stenosis    • PAF (paroxysmal atrial fibrillation) (CMS/HCC)    • Palpitations    • Peptic ulcer    • Pneumonia of left lung due to infectious organism    • Pulmonary fibrosis (CMS/HCC)    • Stroke (CMS/HCC)    • TIA (transient  "ischemic attack)        Past Surgical History:   Procedure Laterality Date   • BRONCHOSCOPY N/A 10/22/2018    Procedure: BRONCHOSCOPY WITH BRONCHALVEOLAR LAVAGE;  Surgeon: Chidi Oconnor MD;  Location: [facility] ENDOSCOPY;  Service: Pulmonary   • BRONCHOSCOPY N/A 7/8/2021    Procedure: BRONCHOSCOPY with BAL;  Surgeon: Chidi Oconnor MD;  Location: [facility] ENDOSCOPY;  Service: Pulmonary;  Laterality: N/A;  Pre: bronchectasis  Post: same   • CARDIAC ELECTROPHYSIOLOGY PROCEDURE N/A 7/9/2021    Procedure: Pacemaker DC new--[employer] possible His lead;  Surgeon: Rashaun Nettles MD;  Location: [facility] CATH INVASIVE LOCATION;  Service: Cardiology;  Laterality: N/A;   • CATARACT EXTRACTION Bilateral    • COLONOSCOPY     • COLONOSCOPY N/A 3/9/2018    Procedure: COLONOSCOPY to terminal ileum with bx;  Surgeon: Aron Cabrera MD;  Location: [facility] ENDOSCOPY;  Service:    • COLONOSCOPY N/A 11/19/2020    Procedure: COLONOSCOPY to cecum:  apc to cecum angiodysplagia,;  Surgeon: Aron Cabrera MD;  Location: [facility] ENDOSCOPY;  Service: Gastroenterology;  Laterality: N/A;  GI bleed  post:  diverticulosis, angiodysplagia   • CYSTECTOMY      back and shoulder area   • ENDOSCOPY N/A 3/9/2018    Procedure: ESOPHAGOGASTRODUODENOSCOPY with bx;  Surgeon: Aron Cabrera MD;  Location: [facility] ENDOSCOPY;  Service:    • ENDOSCOPY N/A 11/19/2020    Procedure: ESOPHAGOGASTRODUODENOSCOPY;  Surgeon: Aron Cabrera MD;  Location: [facility] ENDOSCOPY;  Service: Gastroenterology;  Laterality: N/A;  GI bleed  post:  HH.   • EYE SURGERY Left     detached retina   • EYE SURGERY Right     \"repaired a hole in the eye\"   • INSERT / REPLACE / REMOVE PACEMAKER     • TESTICLE SURGERY      benign tumor removed.       Family History: family history includes Diabetes in his mother and sister; Heart disease in his mother and sister; Hypertension in his mother; Ovarian cancer in his sister; Rheumatic fever in his sister; Stroke in his father and mother. " Otherwise pertinent FHx was reviewed and not pertinent to current issue.    Social History:  reports that he quit smoking about 26 years ago. His smoking use included cigars. He has never used smokeless tobacco. He reports that he does not drink alcohol and does not use drugs.    Home Medications:  Budesonide, Ipratropium-Albuterol, allopurinol, apixaban, atorvastatin, clindamycin, metoprolol tartrate, and omeprazole    Allergies:  Allergies   Allergen Reactions   • Amiodarone Unknown (See Comments)     Pulmonary fibrosis   • Diltiazem Arrhythmia   • Doxycycline Rash   • Cefdinir Arrhythmia     A-FIB   • Indomethacin Rash       Objective    Objective     Vitals:   Temp:  [98.4 °F (36.9 °C)] 98.4 °F (36.9 °C)  Heart Rate:  [59-73] 73  Resp:  [14-20] 18  BP: (138-178)/(59-72) 161/61  Flow (L/min):  [3] 3    Physical Exam  Vitals and nursing note reviewed.   Constitutional:       Appearance: Normal appearance.   HENT:      Head: Normocephalic.   Cardiovascular:      Rate and Rhythm: Normal rate.      Heart sounds: No murmur heard.     Pulmonary:      Effort: Pulmonary effort is normal.      Breath sounds: Normal breath sounds.   Abdominal:      General: Abdomen is flat.   Skin:     General: Skin is warm.   Neurological:      General: No focal deficit present.      Mental Status: He is alert and oriented to person, place, and time.   Psychiatric:         Mood and Affect: Mood normal.         Behavior: Behavior normal.         Result Review    Result Review:  I have personally reviewed the results from the time of this admission to 9/3/2021 16:53 EDT and agree with these findings:  []  Laboratory  []  Microbiology  []  Radiology  [x]  EKG/Telemetry   []  Cardiology/Vascular   []  Pathology  []  Old records  []  Other:  Most notable findings include: I reviewed his ekg.  It is normal sinus rhythm.   Assessment/Plan   Assessment / Plan     Brief Patient Summary:  Asia Ly is a 89 y.o. male who has a history of highly  symptomatic paroxysmal atrial fibrillation that cannot be rate controlled.   He presents today for AV node ablation. We reviewed the risk of the procedure     Active Hospital Problems:  Active Hospital Problems    Diagnosis    • **Atrial fibrillation, paroxysmal      Plan:   Proceed with AV Node Ablation

## 2021-09-04 ENCOUNTER — TELEPHONE (OUTPATIENT)
Dept: CARDIOLOGY | Facility: CLINIC | Age: 86
End: 2021-09-04

## 2021-09-04 NOTE — ED NOTES
Pt ambulatory to triage from home with c/o fever.  Pt had cardiac ablation earlier today.  Pt has not been given any tylenol. Tmax 103 at home. Pt wearing mask in triage.  Triage personnel wore appropriate PPE       Maryjane Armenta RN  09/03/21 [id number]

## 2021-09-04 NOTE — TELEPHONE ENCOUNTER
I am the on-call provider.  Patient's wife called the answering service.  She took patient to the ER earlier for T-max of 103 at home.  Temp was 101.3 in the ER.  CBC showed leukocytosis white blood cell count 16.5 previously normal at 98/30/21 with elevated neutrophils, low lymphocytes.  Minimal anemia.  CMP withNormal creatinine slightly low GFR slightly decreased chloride 96 low sodium of 130 (which the hyponatremia he has had intermittently) I spoke with triage RN Kristina about the patient.  Apparently his fever improved he was given Tylenol and he had a respiratory viral panel, UA and chest x-ray that was supposed to be performed.  She stated that patient was a little lethargic but she did not notice any other significant issues.  Patient and wife left before testing was complete.  I spoke with wife.  She reports that he has had chronic cough with increased mucus production recently no worsening of his shortness of breath and does see Dr. Oconnor as his pulmonologist disease.  Apparently does have a history of issues with pneumonia.  No other reported symptoms at this time or reported drainage from recent procedure site.  I recommended that they complete the work-up ordered in the ED to determine how to best treat his symptoms to rule out Covid/sepsis.  She intends to take him back to complete his testing.  In the interim I reviewed with her signs and symptoms which to monitor for.  I have sent a message to Dr. Nettles who performed his AV node ablation yesterday informing him.

## 2021-09-07 NOTE — TELEPHONE ENCOUNTER
I spoke with MsSudeep Aliyah. She states that the patient is feeling much better.  Denies any CP, Pain, SOA more than normal.  SPO2 usually around 95% and always >90%.  His fever is down, he does some nights have a low grade temp of 99.6/    They did not go back to the ED. Dr. Nettles did call and speak with them as well.    He has an appointment with his PCP on Monday. He has not had a repeat CoVid test since the illness started.     Thank you,  Zakiya Perdue RN  [facility]  Triage

## 2021-09-07 NOTE — TELEPHONE ENCOUNTER
Triage-This is a Dr. Brown patient that Dr. Nettles did an AV node ablation on. It appears patient is wife did not take him back to the hospital.  Can you please call and check on him? If he is still having symptoms needs to reach out to his pulmonologist or PCP if he won't go back to ER. Thanks

## 2021-09-08 ENCOUNTER — HOSPITAL ENCOUNTER (EMERGENCY)
Facility: HOSPITAL | Age: 86
Discharge: LEFT BEFORE DISCHARGE | End: 2021-09-08

## 2021-09-08 VITALS
TEMPERATURE: 97.3 F | HEIGHT: 68 IN | BODY MASS INDEX: 22.5 KG/M2 | HEART RATE: 96 BPM | RESPIRATION RATE: 16 BRPM | OXYGEN SATURATION: 95 % | SYSTOLIC BLOOD PRESSURE: 132 MMHG | DIASTOLIC BLOOD PRESSURE: 63 MMHG

## 2021-09-08 LAB — BACTERIA SPEC AEROBE CULT: NORMAL

## 2021-09-08 PROCEDURE — 99211 OFF/OP EST MAY X REQ PHY/QHP: CPT

## 2021-09-08 NOTE — ED NOTES
Pt ambulatory to triage from home with c/o fever tmax 101 at home last night.  Pt denies vomiting, states coughs when in his recliner, but when he's asleep he is rarely coughing.  Pt wife frequently answers questions for pt, contrary to what the pt is saying.  Pt wearing mask in triage.  Triage personnel wore appropriate PPE       Maryjane Armenta RN  09/08/21 [id number]

## 2021-09-27 ENCOUNTER — TELEPHONE (OUTPATIENT)
Dept: CARDIOLOGY | Facility: CLINIC | Age: 86
End: 2021-09-27

## 2021-09-27 NOTE — TELEPHONE ENCOUNTER
Per Dr Brown can you please see in the patient can com in earlier tomorrow or see if he can come in today. If he can com in today please let medical records know so they  Can bring me the chart. He want to clear anything past 1130 tomorrow?

## 2021-09-28 ENCOUNTER — HOSPITAL ENCOUNTER (OUTPATIENT)
Dept: CARDIOLOGY | Facility: HOSPITAL | Age: 86
Discharge: HOME | End: 2021-09-28
Admitting: NURSE PRACTITIONER

## 2021-09-28 ENCOUNTER — OFFICE VISIT (OUTPATIENT)
Dept: CARDIOLOGY | Facility: CLINIC | Age: 86
End: 2021-09-28

## 2021-09-28 VITALS
HEIGHT: 69 IN | HEART RATE: 81 BPM | OXYGEN SATURATION: 98 % | SYSTOLIC BLOOD PRESSURE: 124 MMHG | BODY MASS INDEX: 21.77 KG/M2 | DIASTOLIC BLOOD PRESSURE: 76 MMHG | WEIGHT: 147 LBS

## 2021-09-28 VITALS
HEIGHT: 68 IN | SYSTOLIC BLOOD PRESSURE: 150 MMHG | WEIGHT: 148 LBS | BODY MASS INDEX: 22.43 KG/M2 | HEART RATE: 85 BPM | DIASTOLIC BLOOD PRESSURE: 70 MMHG

## 2021-09-28 DIAGNOSIS — I35.0 AORTIC STENOSIS, MODERATE: ICD-10-CM

## 2021-09-28 DIAGNOSIS — I48.0 PAF (PAROXYSMAL ATRIAL FIBRILLATION) (HCC): Primary | ICD-10-CM

## 2021-09-28 PROCEDURE — 93306 TTE W/DOPPLER COMPLETE: CPT

## 2021-09-28 PROCEDURE — 93306 TTE W/DOPPLER COMPLETE: CPT | Performed by: INTERNAL MEDICINE

## 2021-09-28 PROCEDURE — 99214 OFFICE O/P EST MOD 30 MIN: CPT | Performed by: INTERNAL MEDICINE

## 2021-09-28 RX ORDER — POTASSIUM CHLORIDE 750 MG/1
10 TABLET, FILM COATED, EXTENDED RELEASE ORAL DAILY
Qty: 30 TABLET | Refills: 11 | Status: SHIPPED | OUTPATIENT
Start: 2021-09-28 | End: 2021-10-26

## 2021-09-28 RX ORDER — FUROSEMIDE 20 MG/1
20 TABLET ORAL DAILY
Qty: 30 TABLET | Refills: 11 | Status: SHIPPED | OUTPATIENT
Start: 2021-09-28 | End: 2021-10-26

## 2021-09-29 LAB
AORTIC ARCH: 2.9 CM
AORTIC DIMENSIONLESS INDEX: 0.3 (DI)
ASCENDING AORTA: 3.7 CM
BH CV ECHO MEAS - ACS: 1 CM
BH CV ECHO MEAS - AI DEC SLOPE: 255 CM/SEC^2
BH CV ECHO MEAS - AI MAX PG: 69.6 MMHG
BH CV ECHO MEAS - AI MAX VEL: 417 CM/SEC
BH CV ECHO MEAS - AI P1/2T: 479 MSEC
BH CV ECHO MEAS - AO MAX PG (FULL): 42.8 MMHG
BH CV ECHO MEAS - AO MAX PG: 49.3 MMHG
BH CV ECHO MEAS - AO MEAN PG (FULL): 28 MMHG
BH CV ECHO MEAS - AO MEAN PG: 31 MMHG
BH CV ECHO MEAS - AO ROOT AREA (BSA CORRECTED): 1.4
BH CV ECHO MEAS - AO ROOT AREA: 5.3 CM^2
BH CV ECHO MEAS - AO ROOT DIAM: 2.6 CM
BH CV ECHO MEAS - AO V2 MAX: 351 CM/SEC
BH CV ECHO MEAS - AO V2 MEAN: 268 CM/SEC
BH CV ECHO MEAS - AO V2 VTI: 88.1 CM
BH CV ECHO MEAS - ASC AORTA: 3.7 CM
BH CV ECHO MEAS - AVA(I,A): 1 CM^2
BH CV ECHO MEAS - AVA(I,D): 1 CM^2
BH CV ECHO MEAS - AVA(V,A): 1.1 CM^2
BH CV ECHO MEAS - AVA(V,D): 1.1 CM^2
BH CV ECHO MEAS - BSA(HAYCOCK): 1.8 M^2
BH CV ECHO MEAS - BSA: 1.8 M^2
BH CV ECHO MEAS - BZI_BMI: 22.5 KILOGRAMS/M^2
BH CV ECHO MEAS - BZI_METRIC_HEIGHT: 172.7 CM
BH CV ECHO MEAS - BZI_METRIC_WEIGHT: 67.1 KG
BH CV ECHO MEAS - EDV(CUBED): 97.3 ML
BH CV ECHO MEAS - EDV(MOD-SP2): 54 ML
BH CV ECHO MEAS - EDV(MOD-SP4): 44 ML
BH CV ECHO MEAS - EDV(TEICH): 97.3 ML
BH CV ECHO MEAS - EF(CUBED): 63.1 %
BH CV ECHO MEAS - EF(MOD-BP): 53.7 %
BH CV ECHO MEAS - EF(MOD-SP2): 50 %
BH CV ECHO MEAS - EF(MOD-SP4): 52.3 %
BH CV ECHO MEAS - EF(TEICH): 54.7 %
BH CV ECHO MEAS - ESV(CUBED): 35.9 ML
BH CV ECHO MEAS - ESV(MOD-SP2): 27 ML
BH CV ECHO MEAS - ESV(MOD-SP4): 21 ML
BH CV ECHO MEAS - ESV(TEICH): 44.1 ML
BH CV ECHO MEAS - FS: 28.3 %
BH CV ECHO MEAS - IVS/LVPW: 1.2
BH CV ECHO MEAS - IVSD: 1.5 CM
BH CV ECHO MEAS - LAT PEAK E' VEL: 10.1 CM/SEC
BH CV ECHO MEAS - LV DIASTOLIC VOL/BSA (35-75): 24.5 ML/M^2
BH CV ECHO MEAS - LV MASS(C)D: 256.8 GRAMS
BH CV ECHO MEAS - LV MASS(C)DI: 142.8 GRAMS/M^2
BH CV ECHO MEAS - LV MAX PG: 6.5 MMHG
BH CV ECHO MEAS - LV MEAN PG: 3 MMHG
BH CV ECHO MEAS - LV SYSTOLIC VOL/BSA (12-30): 11.7 ML/M^2
BH CV ECHO MEAS - LV V1 MAX: 127 CM/SEC
BH CV ECHO MEAS - LV V1 MEAN: 85.1 CM/SEC
BH CV ECHO MEAS - LV V1 VTI: 28.4 CM
BH CV ECHO MEAS - LVIDD: 4.6 CM
BH CV ECHO MEAS - LVIDS: 3.3 CM
BH CV ECHO MEAS - LVLD AP2: 7.4 CM
BH CV ECHO MEAS - LVLD AP4: 6.8 CM
BH CV ECHO MEAS - LVLS AP2: 6 CM
BH CV ECHO MEAS - LVLS AP4: 6 CM
BH CV ECHO MEAS - LVOT AREA (M): 3.1 CM^2
BH CV ECHO MEAS - LVOT AREA: 3.1 CM^2
BH CV ECHO MEAS - LVOT DIAM: 2 CM
BH CV ECHO MEAS - LVPWD: 1.3 CM
BH CV ECHO MEAS - MED PEAK E' VEL: 8.2 CM/SEC
BH CV ECHO MEAS - MR MAX PG: 120.1 MMHG
BH CV ECHO MEAS - MR MAX VEL: 548 CM/SEC
BH CV ECHO MEAS - MV DEC SLOPE: 530 CM/SEC^2
BH CV ECHO MEAS - MV DEC TIME: 0.23 SEC
BH CV ECHO MEAS - MV E MAX VEL: 149 CM/SEC
BH CV ECHO MEAS - MV MAX PG: 8.4 MMHG
BH CV ECHO MEAS - MV MEAN PG: 2 MMHG
BH CV ECHO MEAS - MV P1/2T MAX VEL: 149 CM/SEC
BH CV ECHO MEAS - MV P1/2T: 82.3 MSEC
BH CV ECHO MEAS - MV V2 MAX: 145 CM/SEC
BH CV ECHO MEAS - MV V2 MEAN: 63.6 CM/SEC
BH CV ECHO MEAS - MV V2 VTI: 34.8 CM
BH CV ECHO MEAS - MVA P1/2T LCG: 1.5 CM^2
BH CV ECHO MEAS - MVA(P1/2T): 2.7 CM^2
BH CV ECHO MEAS - MVA(VTI): 2.6 CM^2
BH CV ECHO MEAS - PA ACC TIME: 0.09 SEC
BH CV ECHO MEAS - PA MAX PG (FULL): 1.1 MMHG
BH CV ECHO MEAS - PA MAX PG: 3.3 MMHG
BH CV ECHO MEAS - PA PR(ACCEL): 39.9 MMHG
BH CV ECHO MEAS - PA V2 MAX: 90.9 CM/SEC
BH CV ECHO MEAS - PI END-D VEL: 97.7 CM/SEC
BH CV ECHO MEAS - RAP SYSTOLE: 15 MMHG
BH CV ECHO MEAS - RV MAX PG: 2.2 MMHG
BH CV ECHO MEAS - RV MEAN PG: 1 MMHG
BH CV ECHO MEAS - RV V1 MAX: 74.4 CM/SEC
BH CV ECHO MEAS - RV V1 MEAN: 47.4 CM/SEC
BH CV ECHO MEAS - RV V1 VTI: 16.5 CM
BH CV ECHO MEAS - RVSP: 71 MMHG
BH CV ECHO MEAS - SI(AO): 260.1 ML/M^2
BH CV ECHO MEAS - SI(CUBED): 34.1 ML/M^2
BH CV ECHO MEAS - SI(LVOT): 49.6 ML/M^2
BH CV ECHO MEAS - SI(MOD-SP2): 15 ML/M^2
BH CV ECHO MEAS - SI(MOD-SP4): 12.8 ML/M^2
BH CV ECHO MEAS - SI(TEICH): 29.6 ML/M^2
BH CV ECHO MEAS - SUP REN AO DIAM: 3 CM
BH CV ECHO MEAS - SV(AO): 467.7 ML
BH CV ECHO MEAS - SV(CUBED): 61.4 ML
BH CV ECHO MEAS - SV(LVOT): 89.2 ML
BH CV ECHO MEAS - SV(MOD-SP2): 27 ML
BH CV ECHO MEAS - SV(MOD-SP4): 23 ML
BH CV ECHO MEAS - SV(TEICH): 53.2 ML
BH CV ECHO MEAS - TAPSE (>1.6): 1.9 CM
BH CV ECHO MEAS - TR MAX VEL: 374 CM/SEC
BH CV ECHO MEASUREMENTS AVERAGE E/E' RATIO: 16.28
BH CV XLRA - RV BASE: 3.2 CM
BH CV XLRA - RV LENGTH: 7 CM
BH CV XLRA - TDI S': 11.6 CM/SEC
LEFT ATRIUM VOLUME INDEX: 62 ML/M2
MAXIMAL PREDICTED HEART RATE: 131 BPM
SINUS: 3.2 CM
STJ: 2.5 CM
STRESS TARGET HR: 111 BPM

## 2021-10-05 NOTE — PROGRESS NOTES
[facility]      Patient Name: Asia Ly  :1932  Age: 89 y.o.  Encounter Provider:  Nixon Brown Jr, MD      Chief Complaint:   Chief Complaint   Patient presents with   • PAF     hospital follow up         Atrial Fibrillation  Symptoms are negative for chest pain, dizziness, palpitations and syncope. Past medical history includes atrial fibrillation.     Asia Ly is a 89 y.o. male past medical history of paroxysmal atrial fibrillation, stroke, moderate aortic stenosis, Crohn's disease, pulmonary fibrosis and pulmonary hypertension who presents for follow-up of chronic mental illness.  I first met Mr. Ly in mid November when he presented to the hospital with hematochezia.  He had a full GI work-up which showed no evidence of active bleeding.  He was placed back on apixaban given his high risk for future stroke.  He presented back to the hospital a few days later with febrile illness.  He was found to have bilateral pneumonia and had an extensive hospital stay with full course of IV antibiotics.  He is slowly recovering from that hospital stay and states that his main complaint is generalized weakness and fatigue.  He does feel that he is getting better with each day.  He was found to have residual pneumonia by PCP and placed on 7-day oral antibiotic course.  He has not had fever in the past week.  He denies chest pain, palpitations, dizziness or syncope.  He denies orthopnea or PND but has some residual edema.  He has some increased exertional dyspnea but states that this is improving since his first course of antibiotics.  He is back on apixaban with no further bleeding complications.  Recovery has been slow but steady.    Patient had AV johanne ablation on October 3.  He feels well since then.  No palpitations, dizziness or syncope.  He has mild chronic stable dyspnea on exertion.  Known aortic valve stenosis.  Echocardiogram today shows EF of 50 to 55% with moderate  "aortic stenosis and mild to moderate aortic valve regurgitation.  Significant pulmonary hypertension is noted which is unchanged from previous.    The following portions of the patient's history were reviewed and updated as appropriate: allergies, current medications, past family history, past medical history, past social history, past surgical history and problem list.      Review of Systems   Constitutional: Positive for decreased appetite, malaise/fatigue and weight loss. Negative for chills and fever.   HENT: Positive for hearing loss.    Cardiovascular: Negative for chest pain, leg swelling, near-syncope, orthopnea, palpitations, paroxysmal nocturnal dyspnea and syncope.   Respiratory: Positive for cough. Negative for wheezing.    Skin: Positive for itching. Negative for rash.   Musculoskeletal: Negative for joint pain and joint swelling.   Gastrointestinal: Negative for bloating and abdominal pain.   Neurological: Negative for dizziness and focal weakness.   Psychiatric/Behavioral: Negative for altered mental status and suicidal ideas.     ROS was reviewed, updated and amended when necessary.    OBJECTIVE:   Vital Signs  Vitals:    09/28/21 1203   BP: 124/76   Pulse: 81   SpO2: 98%     Estimated body mass index is 21.71 kg/m² as calculated from the following:    Height as of this encounter: 175.3 cm (69\").    Weight as of this encounter: 66.7 kg (147 lb).    Vitals reviewed.   Constitutional:       Appearance: Healthy appearance. Not in distress.   Neck:      Vascular: No JVR. JVD normal.   Pulmonary:      Effort: Pulmonary effort is normal.      Breath sounds: No wheezing. No rhonchi.      Comments: Mild bibasilar crackles  Chest:      Chest wall: Not tender to palpatation.   Cardiovascular:      PMI at left midclavicular line. Normal rate. Regular rhythm.      Murmurs: There is a systolic murmur.      No gallop. No click. No rub.   Pulses:     Intact distal pulses.   Edema:     Peripheral edema absent. "   Abdominal:      General: Bowel sounds are normal.      Palpations: Abdomen is soft.      Tenderness: There is no abdominal tenderness.   Musculoskeletal: Normal range of motion.         General: No tenderness. Skin:     General: Skin is warm and dry.   Neurological:      General: No focal deficit present.      Mental Status: Alert and oriented to person, place and time.     Physical exam was reviewed, updated and amended when necessary.    Procedures          ASSESSMENT:     89-year-old male with a forementioned past medical history who presents for hospital follow-up for bilateral pneumonia.    PLAN OF CARE:     1. PAF -digoxin and Tikosyn were discontinued which is appropriate, we will continue metoprolol and apixaban.  No further evidence for bleeding.  Monitor closely.  2. Aortic stenosis -moderate by echo today.  We will continue to monitor clinical progress.  3. Pulmonary fibrosis  4. Pulmonary hypertension -mild bibasilar crackles noted.  It is difficult to discern between pulmonary fibrosis and acute on chronic diastolic heart failure in this gentleman.  We will restart furosemide 20 mg daily.  Continue potassium 10 mEq daily.  5. Crohn's disease    Return to clinic 6 months.             Discharge Medications          Accurate as of September 28, 2021 11:59 PM. If you have any questions, ask your nurse or doctor.            New Medications      Instructions Start Date   furosemide 20 MG tablet  Commonly known as: LASIX  Started by: Nixon Brown Jr, MD   20 mg, Oral, Daily      potassium chloride 10 MEQ CR tablet  Started by: Nixon Brown Jr, MD   10 mEq, Oral, Daily         Continue These Medications      Instructions Start Date   apixaban 5 MG tablet tablet  Commonly known as: ELIQUIS   5 mg, Oral, Every 12 Hours Scheduled      atorvastatin 10 MG tablet  Commonly known as: LIPITOR   Take one tablet by mouth Mon, Wed and Fri.      BUDESONIDE PO   3 mg, Oral, Daily      IPRATROPIUM-ALBUTEROL IN    Inhalation, Daily      metoprolol tartrate 50 MG tablet  Commonly known as: LOPRESSOR   50 mg, Oral, 3 Times Daily      omeprazole 40 MG capsule  Commonly known as: priLOSEC   40 mg, Oral, 2 Times Daily         Stop These Medications    allopurinol 100 MG tablet  Commonly known as: ZYLOPRIM  Stopped by: Nixon Brown Jr, MD     clindamycin 1 % swab  Commonly known as: CLEOCIN T  Stopped by: Nixon Brown Jr, MD            Thank you for allowing me to participate in the care of your patient,      Sincerely,   Nixon Brown MD   [facility]  10/05/21  16:27 EDT

## 2021-10-14 ENCOUNTER — OFFICE VISIT (OUTPATIENT)
Dept: CARDIOLOGY | Facility: CLINIC | Age: 86
End: 2021-10-14

## 2021-10-14 ENCOUNTER — CLINICAL SUPPORT NO REQUIREMENTS (OUTPATIENT)
Dept: CARDIOLOGY | Facility: CLINIC | Age: 86
End: 2021-10-14

## 2021-10-14 VITALS
DIASTOLIC BLOOD PRESSURE: 72 MMHG | HEART RATE: 81 BPM | WEIGHT: 145 LBS | HEIGHT: 69 IN | BODY MASS INDEX: 21.48 KG/M2 | SYSTOLIC BLOOD PRESSURE: 128 MMHG

## 2021-10-14 DIAGNOSIS — I48.91 ATRIAL FIBRILLATION WITH RAPID VENTRICULAR RESPONSE (HCC): Primary | ICD-10-CM

## 2021-10-14 PROCEDURE — 93000 ELECTROCARDIOGRAM COMPLETE: CPT | Performed by: INTERNAL MEDICINE

## 2021-10-14 PROCEDURE — 99213 OFFICE O/P EST LOW 20 MIN: CPT | Performed by: INTERNAL MEDICINE

## 2021-10-14 PROCEDURE — 93280 PM DEVICE PROGR EVAL DUAL: CPT | Performed by: INTERNAL MEDICINE

## 2021-10-14 RX ORDER — ALLOPURINOL 100 MG/1
100 TABLET ORAL 2 TIMES DAILY
COMMUNITY

## 2021-10-14 NOTE — PROGRESS NOTES
Date of Office Visit: 10/14/2021  Encounter Provider: Rashaun Nettles MD  Place of Service: [facility] CARDIOLOGY  Patient Name: Asia Ly  :1932    Chief Complaint   Patient presents with   • Atrial Fibrillation   :     HPI: Asia Ly is a 89 y.o. male who presents today for follow-up of atrial fibrillation, status post pacemaker placement AV node ablation    Patient had placement of a left bundle branch dual-chamber pacemaker.  He had subsequent AV node ablation.    He reports that he is done great.  We stopped his Tikosyn.    He has an atrial fibrillation today, but has no awareness.  His QRS morphology is excellent.          Past Medical History:   Diagnosis Date   • Aortic stenosis    • Aspiration pneumonia (HCC)    • Atrial fibrillation (HCC)    • Bronchiectasis (HCC)    • CKD (chronic kidney disease)    • COPD (chronic obstructive pulmonary disease) (HCC)    • Crohn's disease (HCC)    • Dizziness    • Elevated cholesterol    • Gastric ulcer    • Generalized weakness    • Gout    • Hard of hearing    • Hyperlipidemia    • Hypertension    • Leg swelling    • Lower extremity edema    • Mild anemia    • Near syncope    • Nonrheumatic aortic valve stenosis    • PAF (paroxysmal atrial fibrillation) (HCC)    • Palpitations    • Peptic ulcer    • Pneumonia of left lung due to infectious organism    • Pulmonary fibrosis (HCC)    • Stroke (HCC)    • TIA (transient ischemic attack)        Past Surgical History:   Procedure Laterality Date   • BRONCHOSCOPY N/A 10/22/2018    Procedure: BRONCHOSCOPY WITH BRONCHALVEOLAR LAVAGE;  Surgeon: Chidi Oconnor MD;  Location: [facility] ENDOSCOPY;  Service: Pulmonary   • BRONCHOSCOPY N/A 2021    Procedure: BRONCHOSCOPY with BAL;  Surgeon: Chidi Oconnor MD;  Location: [facility] ENDOSCOPY;  Service: Pulmonary;  Laterality: N/A;  Pre: bronchectasis  Post: same   • CARDIAC ELECTROPHYSIOLOGY PROCEDURE N/A 2021    Procedure: Pacemaker DC  "new--[employer] possible His lead;  Surgeon: Rashaun Nettles MD;  Location: [facility] CATH INVASIVE LOCATION;  Service: Cardiology;  Laterality: N/A;   • CARDIAC ELECTROPHYSIOLOGY PROCEDURE N/A 9/3/2021    Procedure: AV node 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 ppm;  Surgeon: Rashaun Nettles MD;  Location: [facility] CATH INVASIVE LOCATION;  Service: Cardiovascular;  Laterality: N/A;   • CATARACT EXTRACTION Bilateral    • COLONOSCOPY     • COLONOSCOPY N/A 3/9/2018    Procedure: COLONOSCOPY to terminal ileum with bx;  Surgeon: Aron Cabrera MD;  Location: [facility] ENDOSCOPY;  Service:    • COLONOSCOPY N/A 2020    Procedure: COLONOSCOPY to cecum:  apc to cecum angiodysplagia,;  Surgeon: Aron Cabrera MD;  Location: [facility] ENDOSCOPY;  Service: Gastroenterology;  Laterality: N/A;  GI bleed  post:  diverticulosis, angiodysplagia   • CYSTECTOMY      back and shoulder area   • ENDOSCOPY N/A 3/9/2018    Procedure: ESOPHAGOGASTRODUODENOSCOPY with bx;  Surgeon: Aron Cabrera MD;  Location: [facility] ENDOSCOPY;  Service:    • ENDOSCOPY N/A 2020    Procedure: ESOPHAGOGASTRODUODENOSCOPY;  Surgeon: Aron Cabrera MD;  Location: [facility] ENDOSCOPY;  Service: Gastroenterology;  Laterality: N/A;  GI bleed  post:  HH.   • EYE SURGERY Left     detached retina   • EYE SURGERY Right     \"repaired a hole in the eye\"   • INSERT / REPLACE / REMOVE PACEMAKER     • TESTICLE SURGERY      benign tumor removed.       Social History     Socioeconomic History   • Marital status:    Tobacco Use   • Smoking status: Former Smoker     Types: Cigars     Quit date: 10/28/1994     Years since quittin.9   • Smokeless tobacco: Never Used   • Tobacco comment: Quit 25 years ago   Substance and Sexual Activity   • Alcohol use: No     Comment: daily caffiene - 1/2 cup of coffee   • Drug use: No   • Sexual activity: Defer       Family History   Problem Relation Age of Onset   • Stroke Mother    • Heart disease Mother    • Hypertension Mother  " "  • Diabetes Mother    • Heart disease Sister         Heart Valve Replacement   • Ovarian cancer Sister    • Rheumatic fever Sister    • Diabetes Sister    • Stroke Father        Review of Systems   Constitutional: Negative.   Cardiovascular: Negative.    Respiratory: Negative.    Gastrointestinal: Negative.        Allergies   Allergen Reactions   • Amiodarone Unknown (See Comments)     Pulmonary fibrosis   • Diltiazem Arrhythmia   • Doxycycline Rash   • Cefdinir Arrhythmia     A-FIB   • Indomethacin Rash         Current Outpatient Medications:   •  allopurinol (ZYLOPRIM) 100 MG tablet, Take 100 mg by mouth 2 (Two) Times a Day., Disp: , Rfl:   •  apixaban (ELIQUIS) 5 MG tablet tablet, Take 1 tablet by mouth Every 12 (Twelve) Hours., Disp: 180 tablet, Rfl: 3  •  atorvastatin (LIPITOR) 10 MG tablet, Take one tablet by mouth Mon, Wed and Fri., Disp: , Rfl:   •  BUDESONIDE PO, Take 3 mg by mouth Daily., Disp: , Rfl:   •  furosemide (LASIX) 20 MG tablet, Take 1 tablet by mouth Daily., Disp: 30 tablet, Rfl: 11  •  hydrocortisone 2.5 % ointment, Apply  topically to the appropriate area as directed 2 (Two) Times a Day. to affected area, Disp: , Rfl:   •  IPRATROPIUM-ALBUTEROL IN, Inhale Daily., Disp: , Rfl:   •  metoprolol tartrate (LOPRESSOR) 50 MG tablet, Take 1 tablet by mouth 3 (Three) Times a Day., Disp: 270 tablet, Rfl: 3  •  Nutritional Supplements (BOOST 100 CALORIE SMART PO), Take  by mouth Daily., Disp: , Rfl:   •  omeprazole (priLOSEC) 40 MG capsule, Take 40 mg by mouth Daily., Disp: , Rfl:   •  potassium chloride 10 MEQ CR tablet, Take 1 tablet by mouth Daily., Disp: 30 tablet, Rfl: 11      Objective:     Vitals:    10/14/21 0934   BP: 128/72   Pulse: 81   Weight: 65.8 kg (145 lb)   Height: 175.3 cm (69\")     Body mass index is 21.41 kg/m².    PHYSICAL EXAM:    Vitals and nursing note reviewed.   Constitutional:       Appearance: Normal and healthy appearance.   Pulmonary:      Effort: Pulmonary effort is " normal.   Cardiovascular:      Normal rate. Regular rhythm.   Edema:     Peripheral edema absent.   Skin:     General: Skin is warm.   Neurological:      Mental Status: Alert, oriented to person, place, and time and oriented to person, place and time.   Psychiatric:         Attention and Perception: Attention and perception normal.         Mood and Affect: Mood and affect normal.         Behavior: Behavior is cooperative.             ECG 12 Lead    Date/Time: 10/14/2021 9:53 AM  Performed by: Rashaun Nettles MD  Authorized by: Rashaun Nettles MD   Comparison: compared with previous ECG from 7/12/2021  Comparison to previous ECG: A. fib with pacing has replaced sinus rhythm  Rhythm: atrial fibrillation and paced              Assessment:       Diagnosis Plan   1. Atrial fibrillation with rapid ventricular response (HCC)            Plan:     A great result from an ablate and pace strategy.  I will have him follow-up in clinic in 1 year, continue remote device monitoring.    As always, it has been a pleasure to participate in your patient's care.      Sincerely,         Rashaun Nettles MD

## 2021-10-20 LAB — QT INTERVAL: 414 MS

## 2021-10-25 PROCEDURE — 93296 REM INTERROG EVL PM/IDS: CPT | Performed by: INTERNAL MEDICINE

## 2021-10-25 PROCEDURE — 93294 REM INTERROG EVL PM/LDLS PM: CPT | Performed by: INTERNAL MEDICINE

## 2021-10-26 ENCOUNTER — OFFICE VISIT (OUTPATIENT)
Dept: CARDIOLOGY | Facility: CLINIC | Age: 86
End: 2021-10-26

## 2021-10-26 VITALS
BODY MASS INDEX: 22.07 KG/M2 | HEIGHT: 69 IN | WEIGHT: 149 LBS | DIASTOLIC BLOOD PRESSURE: 70 MMHG | HEART RATE: 77 BPM | SYSTOLIC BLOOD PRESSURE: 122 MMHG

## 2021-10-26 DIAGNOSIS — I35.0 AORTIC STENOSIS, MODERATE: ICD-10-CM

## 2021-10-26 DIAGNOSIS — E78.2 MIXED HYPERLIPIDEMIA: ICD-10-CM

## 2021-10-26 DIAGNOSIS — I10 ESSENTIAL HYPERTENSION: ICD-10-CM

## 2021-10-26 DIAGNOSIS — J84.10 PULMONARY FIBROSIS (HCC): ICD-10-CM

## 2021-10-26 DIAGNOSIS — I48.0 PAF (PAROXYSMAL ATRIAL FIBRILLATION) (HCC): Primary | ICD-10-CM

## 2021-10-26 PROCEDURE — 99214 OFFICE O/P EST MOD 30 MIN: CPT | Performed by: NURSE PRACTITIONER

## 2021-10-26 PROCEDURE — 93000 ELECTROCARDIOGRAM COMPLETE: CPT | Performed by: NURSE PRACTITIONER

## 2021-10-26 NOTE — PROGRESS NOTES
Date of Office Visit: 10/26/21  Encounter Provider: KYLAH Jha  Place of Service: [facility] CARDIOLOGY  Patient Name: Asia Ly  :1932    Chief Complaint   Patient presents with   • PAF (paroxysmal atrial fibrillation)   • Hyperlipidemia   • Transient Ischemic Attack   • Follow-up   :     HPI: Asia Ly is a 89 y.o. male  with history of paroxysmal atrial fibrillation, stroke and TIA in 2016, aortic stenosis, hyperlipidemia, B12 anemia,Crohn's disease, colon polyps, lower extremity edema, TIA,  pulmonary hypertension and pulmonary fibrosis.      He has been followed by Dr. Hector Rivera. He is now followed by Dr. Brown and I will visit with him in follow up today.      Has been on sotalol in the past as well as amiodarone for recurrent atrial fibrillation.  He had Echocardiogram 2016 showed normal left ventricular systolic dysfunction, moderate tricuspid insufficiency, mild mitral insufficiency , mild pulmonary hypertension.  He also had a perfusion stress test that was negative for ischemia.  He was taken off diltiazem for allergic reaction to rash.  2017 he had an echocardiogram which showed an EF of 60%, grade 2 diastolic dysfunction and mild aortic he had increased lower extremity edema and had again rapid atrial fibrillation.  He was started on a diuretic.  He then had pneumonia hypoxia and influenza and was started on Tikosyn.  He has some prolonged QTC complicated by antibiotics.  He then had issues with gout felt to be related to discontinuation of indomethacin and was prescribed colchicine.  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  He then had some atrial fibrillation with RVR and was given oral metoprolol and Cardizem and converted back to sinus rhythm.  He was evaluated by electrophysiology with Dr. Nettles who felt that his ablation at his age would be very high risk  procedure.  He was later started on digoxin for recurrent rapid atrial fibrillation.  He was admitted again January 2019 and electrophysiology felt that he may for AV node ablation and pacemaker implantation.  However he preferred to stay on Tikosyn and accept occasional breakthroughs.     He then had some confusion and upper epigastric abdominal pain he was hospitalized August 2020 found to have transaminitis with hyperbilirubinemia.  He had some atrial fibrillation with RVR received IV metoprolol and his heart rate improved.  Echocardiogram showed normal left ventricular systolic function, mild concentric hypertrophy, moderate aortic valve stenoses with aortic valve area 1.1 cm², maximum pressure gradient of 42 and mean pressure gradient 24 mmHg.  RVSP was severely elevated at 57.8.  He had mild aortic valve regurgitation and mild mitral regurgitation.     In November 2020 was hospitalized with hematochezia and underwent GI evaluation with no evidence of active bleed.  He was cleared to resume apixaban and was later found to have bilateral pneumonia.     He was treated again for pneumonia and had a bronchoscopy July 7.  He is colonized Pseudomonas and infectious disease recommended conservative treatment.  He had some rapid atrial fibrillation during that admission and had a pacemaker placed by Dr. Nettles.    And ultimately had AV node ablation.  His Tikosyn was stopped.       He now presents for assessment.  He has been doing great since his ablation and pacemaker.  His breathing has been stable.  He has no chest pain, palpitation, near-syncope, or syncope.  He does have some memory issues.  His wife is accompanying him today.          Allergies   Allergen Reactions   • Amiodarone Unknown (See Comments)     Pulmonary fibrosis   • Diltiazem Arrhythmia   • Doxycycline Rash   • Cefdinir Arrhythmia     A-FIB   • Indomethacin Rash           Family and social history reviewed.     ROS  All other systems were reviewed  "and are negative          Objective:     Vitals:    10/26/21 1300   BP: 122/70   BP Location: Left arm   Patient Position: Sitting   Pulse: 77   Weight: 67.6 kg (149 lb)   Height: 175.3 cm (69\")     Body mass index is 22 kg/m².    PHYSICAL EXAM:  Constitutional:       General: Not in acute distress.     Appearance: Well-developed. Not diaphoretic.   HENT:      Head: Normocephalic.   Pulmonary:      Effort: Pulmonary effort is normal. No respiratory distress.      Breath sounds: No wheezing. Rhonchi present. No rales.      Comments: scattered  Cardiovascular:      Normal rate. Irregularly irregular rhythm.      Murmurs: There is a systolic murmur.   Pulses:     Radial: 2+ bilaterally.  Skin:     General: Skin is warm and dry. There is no cyanosis.      Findings: No rash.   Neurological:      Mental Status: Alert and oriented to person, place, and time.   Psychiatric:         Behavior: Behavior normal.         Thought Content: Thought content normal.         Judgment: Judgment normal.           ECG 12 Lead    Date/Time: 10/26/2021 1:13 PM  Performed by: Alta Hackett APRN  Authorized by: Alta Hackett APRN   Comparison: compared with previous ECG   Similar to previous ECG  Rhythm: atrial fibrillation and paced  Pacing: ventricular paced rhythmComments: Back in a fib.               Current Outpatient Medications   Medication Sig Dispense Refill   • allopurinol (ZYLOPRIM) 100 MG tablet Take 100 mg by mouth 2 (Two) Times a Day.     • apixaban (ELIQUIS) 5 MG tablet tablet Take 1 tablet by mouth Every 12 (Twelve) Hours. 180 tablet 3   • atorvastatin (LIPITOR) 10 MG tablet Take one tablet by mouth Mon, Wed and Fri.     • BUDESONIDE PO Take 3 mg by mouth Daily.     • furosemide (LASIX) 20 MG tablet Take 1 tablet by mouth Daily. 30 tablet 11   • hydrocortisone 2.5 % ointment Apply  topically to the appropriate area as directed 2 (Two) Times a Day. to affected area     • IPRATROPIUM-ALBUTEROL IN Inhale Daily.     • " metoprolol tartrate (LOPRESSOR) 50 MG tablet Take 1 tablet by mouth 3 (Three) Times a Day. 270 tablet 3   • Nutritional Supplements (BOOST 100 CALORIE SMART PO) Take  by mouth Daily.     • omeprazole (priLOSEC) 40 MG capsule Take 40 mg by mouth Daily.     • potassium chloride 10 MEQ CR tablet Take 1 tablet by mouth Daily. 30 tablet 11     No current facility-administered medications for this visit.     Assessment:       Diagnosis Plan   1. PAF (paroxysmal atrial fibrillation) ([facility])  ECG 12 Lead   2. Essential hypertension     3. Mixed hyperlipidemia     4. Pulmonary fibrosis (HCC)     5. Aortic stenosis, moderate          Orders Placed This Encounter   Procedures   • ECG 12 Lead     This order was created via procedure documentation     Order Specific Question:   Release to patient     Answer:   Immediate         Plan:   1. 89 year old gentleman with paroxysmal atrial fibrillation off amiodarone due to history of pulmonary fibrosis.  He  had a rash with diltiazem in the past.  Now status post AV node ablation with permanent pacemaker.  He is off Tikosyn and remains in atrial fibrillation, asymptomatic.  He also follows with Dr. Nettles  2. Aortic stenosis mild in November 2017 and moderate on most recent echo August 2020- he is to have repeat echo when he returns in 6 months. He is stable at this time  3.  History of TIA no recurrence  3.  History of pulmonary fibrosis usually sees Dr. Chidi Oconnor.    4.  Prediabetes  5.  History of Crohn's disease and iron deficient anemia usually sees Dr. Susie Mccoy  6.  Hypertension continue home therapy  7.  Hyperlipidemia continue home therapy  8.  History of cholecystitis  9.   History of gout      Follow-up with me in 3 months call questions or concerns            It has been a pleasure to participate in this patient's care.      Thank you,  KYLAH Jha      **I used Dragon to dictate this note:**

## 2021-11-01 ENCOUNTER — TELEPHONE (OUTPATIENT)
Dept: CARDIOLOGY | Facility: CLINIC | Age: 86
End: 2021-11-01

## 2021-11-01 NOTE — TELEPHONE ENCOUNTER
May use lasix and potassium just as needed if he gains 2-3 pounds overnight or if he is up 5 pounds in a week. Mild swelling at end of the day that resolves overnight does not require lasix.

## 2021-11-01 NOTE — TELEPHONE ENCOUNTER
Patients wife (Ni) called to report pt has had a 1-2 pound weight gain over two days.  Patient's feet are swollen at the end of the day. Dr. Brown had him stop Lasix and Potassium.  Please call.  [phone] / LEIDA

## 2021-12-15 ENCOUNTER — OFFICE (OUTPATIENT)
Dept: URBAN - METROPOLITAN AREA CLINIC 66 | Facility: CLINIC | Age: 86
End: 2021-12-15

## 2021-12-15 VITALS
DIASTOLIC BLOOD PRESSURE: 70 MMHG | SYSTOLIC BLOOD PRESSURE: 118 MMHG | HEIGHT: 68 IN | HEART RATE: 86 BPM | WEIGHT: 151 LBS

## 2021-12-15 DIAGNOSIS — K50.80 CROHN'S DISEASE OF BOTH SMALL AND LARGE INTESTINE WITHOUT CO: ICD-10-CM

## 2021-12-15 PROCEDURE — 99214 OFFICE O/P EST MOD 30 MIN: CPT | Performed by: INTERNAL MEDICINE

## 2021-12-15 RX ORDER — BUDESONIDE 3 MG/1
3 CAPSULE ORAL
Qty: 90 | Refills: 4 | Status: ACTIVE

## 2022-01-24 PROCEDURE — 93294 REM INTERROG EVL PM/LDLS PM: CPT | Performed by: INTERNAL MEDICINE

## 2022-01-24 PROCEDURE — 93296 REM INTERROG EVL PM/IDS: CPT | Performed by: INTERNAL MEDICINE

## 2022-01-27 ENCOUNTER — OFFICE VISIT (OUTPATIENT)
Dept: CARDIOLOGY | Facility: CLINIC | Age: 87
End: 2022-01-27

## 2022-01-27 VITALS
HEIGHT: 69 IN | SYSTOLIC BLOOD PRESSURE: 134 MMHG | WEIGHT: 152 LBS | DIASTOLIC BLOOD PRESSURE: 76 MMHG | BODY MASS INDEX: 22.51 KG/M2 | HEART RATE: 71 BPM

## 2022-01-27 DIAGNOSIS — I35.0 AORTIC STENOSIS, MODERATE: ICD-10-CM

## 2022-01-27 DIAGNOSIS — E78.2 MIXED HYPERLIPIDEMIA: ICD-10-CM

## 2022-01-27 DIAGNOSIS — J84.10 PULMONARY FIBROSIS: ICD-10-CM

## 2022-01-27 DIAGNOSIS — I10 ESSENTIAL HYPERTENSION: ICD-10-CM

## 2022-01-27 DIAGNOSIS — I48.0 PAF (PAROXYSMAL ATRIAL FIBRILLATION): Primary | ICD-10-CM

## 2022-01-27 PROCEDURE — 99214 OFFICE O/P EST MOD 30 MIN: CPT | Performed by: NURSE PRACTITIONER

## 2022-01-27 PROCEDURE — 93000 ELECTROCARDIOGRAM COMPLETE: CPT | Performed by: NURSE PRACTITIONER

## 2022-01-27 NOTE — PROGRESS NOTES
Date of Office Visit: 22  Encounter Provider: KYLAH Jha  Place of Service: [facility] CARDIOLOGY  Patient Name: Asia Ly  :1932    Chief Complaint   Patient presents with   • Atrial fibrillation with rapid ventricular response   • Follow-up   :     HPI: Asia Ly is a 89 y.o. male  with paroxysmal atrial fibrillation, stroke and TIA in 2016, aortic stenosis, hyperlipidemia, B12 anemia,Crohn's disease, colon polyps, lower extremity edema, TIA,  pulmonary hypertension and pulmonary fibrosis.      He has been followed by Dr. Hector Rivera. He is now followed by Dr. Brown and I will visit with him in follow up today.      Has been on sotalol in the past as well as amiodarone for recurrent atrial fibrillation.  He had Echocardiogram 2016 showed normal left ventricular systolic dysfunction, moderate tricuspid insufficiency, mild mitral insufficiency , mild pulmonary hypertension.  He also had a perfusion stress test that was negative for ischemia.  He was taken off diltiazem for allergic reaction to rash.  2017 he had an echocardiogram which showed an EF of 60%, grade 2 diastolic dysfunction and mild aortic he had increased lower extremity edema and had again rapid atrial fibrillation.  He was started on a diuretic.  He then had pneumonia hypoxia and influenza and was started on Tikosyn.  He has some prolonged QTC complicated by antibiotics.  He then had issues with gout felt to be related to discontinuation of indomethacin and was prescribed colchicine.  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  He then had some atrial fibrillation with RVR and was given oral metoprolol and Cardizem and converted back to sinus rhythm.  He was evaluated by electrophysiology with Dr. Nettles who felt that his ablation at his age would be very high risk procedure.  He was later started on digoxin for  recurrent rapid atrial fibrillation.  He was admitted again January 2019 and electrophysiology felt that he may for AV node ablation and pacemaker implantation.  However he preferred to stay on Tikosyn and accept occasional breakthroughs.     He then had some confusion and upper epigastric abdominal pain he was hospitalized August 2020 found to have transaminitis with hyperbilirubinemia.  He had some atrial fibrillation with RVR received IV metoprolol and his heart rate improved.  Echocardiogram showed normal left ventricular systolic function, mild concentric hypertrophy, moderate aortic valve stenoses with aortic valve area 1.1 cm², maximum pressure gradient of 42 and mean pressure gradient 24 mmHg.  RVSP was severely elevated at 57.8.  He had mild aortic valve regurgitation and mild mitral regurgitation.     In November 2020 was hospitalized with hematochezia and underwent GI evaluation with no evidence of active bleed.  He was cleared to resume apixaban and was later found to have bilateral pneumonia.     He was treated again for pneumonia and had a bronchoscopy July 7.  He is colonized Pseudomonas and infectious disease recommended conservative treatment.  He had some rapid atrial fibrillation during that admission and had a pacemaker placed by Dr. Nettles.    And ultimately had AV node ablation.  His Tikosyn was stopped.       He presents today for reassessment.  He has been doing well.  Patient denies shortness of breath.  He needs a tooth extraction soon.  He has taken Lasix as needed maybe twice since he was last seen.  No near-syncope or syncope or palpitations.  Patient states he is doing well for man his age.          Allergies   Allergen Reactions   • Amiodarone Unknown (See Comments)     Pulmonary fibrosis   • Diltiazem Arrhythmia   • Doxycycline Rash   • Cefdinir Arrhythmia     A-FIB   • Indomethacin Rash           Family and social history reviewed.     ROS  All other systems were reviewed and are  "negative          Objective:     Vitals:    01/27/22 1102   BP: 134/76   BP Location: Left arm   Patient Position: Sitting   Pulse: 71   Weight: 68.9 kg (152 lb)   Height: 175.3 cm (69\")     Body mass index is 22.45 kg/m².    PHYSICAL EXAM:  Pulmonary:      Breath sounds: Examination of the left-middle field reveals rales. Examination of the left-lower field reveals rales. Rales present.   Cardiovascular:      Murmurs: There is a systolic murmur.           ECG 12 Lead    Date/Time: 1/27/2022 12:07 PM  Performed by: Alta Hackett APRN  Authorized by: Alta Hackett APRN   Comparison: compared with previous ECG   Similar to previous ECG  Rhythm: sinus rhythm and paced  Rate: normal    Clinical impression: abnormal EKG              Current Outpatient Medications   Medication Sig Dispense Refill   • allopurinol (ZYLOPRIM) 100 MG tablet Take 100 mg by mouth 2 (Two) Times a Day.     • apixaban (ELIQUIS) 5 MG tablet tablet Take 1 tablet by mouth Every 12 (Twelve) Hours. 180 tablet 3   • atorvastatin (LIPITOR) 10 MG tablet Take one tablet by mouth Mon, Wed and Fri.     • BUDESONIDE PO Take 3 mg by mouth Daily.     • hydrocortisone 2.5 % ointment Apply  topically to the appropriate area as directed 2 (Two) Times a Day. to affected area     • IPRATROPIUM-ALBUTEROL IN Inhale Daily.     • metoprolol tartrate (LOPRESSOR) 50 MG tablet Take 1 tablet by mouth 3 (Three) Times a Day. 270 tablet 3   • Nutritional Supplements (BOOST 100 CALORIE SMART PO) Take  by mouth Daily.     • omeprazole (priLOSEC) 40 MG capsule Take 40 mg by mouth Daily.       No current facility-administered medications for this visit.     Assessment:       Diagnosis Plan   1. PAF (paroxysmal atrial fibrillation) (HCC)     2. Essential hypertension     3. Pulmonary fibrosis (HCC)     4. Mixed hyperlipidemia     5. Aortic stenosis, moderate          No orders of the defined types were placed in this encounter.        Plan:    1. 89 year old gentleman " with paroxysmal atrial fibrillation off amiodarone due to history of pulmonary fibrosis.  He  had a rash with diltiazem in the past.  Now status post AV node ablation with permanent pacemaker.  He is off Tikosyn. He is sinus today He also follows with Dr. Nettles  2. Aortic stenosis mild in November 2017 and moderate on most recent echo August 2020-   3.  History of TIA no recurrence- continue eliquis   3.  History of pulmonary fibrosis usually sees Dr. Chidi Oconnor.    4.  Prediabetes  5.  History of Crohn's disease and iron deficient anemia usually sees Dr. Susie Mccoy  6.  Hypertension continue current regimen  7.  Hyperlipidemia continue current regimen  8.  History of cholecystitis  9.   History of gout  10.  Need for tooth extraction-he may hold Eliquis 2 days prior and resume Eliquis the day after his procedure.  He does not require antibiotic prophylaxis.                 It has been a pleasure to participate in this patient's care.      Thank you,  KYLAH Jha      **I used Dragon to dictate this note:**

## 2022-04-13 ENCOUNTER — TELEPHONE (OUTPATIENT)
Dept: CARDIOLOGY | Facility: CLINIC | Age: 87
End: 2022-04-13

## 2022-04-13 NOTE — TELEPHONE ENCOUNTER
Ni called to report patient is in need of having 3 teeth extracted.  The gum is infected and Dr. Itz Orlando would like patient to take Amoxicillin prior to procedure.  I see in your last office note you had cleared patient then for one tooth but since this is a second surgery I wanted to be certain you are OK to clear him.  / LEIDA Orlando, DMD  Phone [phone]   Fax# [phone]

## 2022-04-14 NOTE — TELEPHONE ENCOUNTER
Faxed copy of this telephone thread to Itz Orlando, PATRIZIA.  Fax# [phone].  Faxed confirmation received./ LEIDA

## 2022-05-04 ENCOUNTER — OFFICE VISIT (OUTPATIENT)
Dept: CARDIOLOGY | Facility: CLINIC | Age: 87
End: 2022-05-04

## 2022-05-04 VITALS
BODY MASS INDEX: 22.69 KG/M2 | HEIGHT: 69 IN | HEART RATE: 67 BPM | WEIGHT: 153.2 LBS | DIASTOLIC BLOOD PRESSURE: 70 MMHG | SYSTOLIC BLOOD PRESSURE: 138 MMHG

## 2022-05-04 DIAGNOSIS — I48.0 PAF (PAROXYSMAL ATRIAL FIBRILLATION): Primary | ICD-10-CM

## 2022-05-04 DIAGNOSIS — I35.0 AORTIC STENOSIS, MODERATE: ICD-10-CM

## 2022-05-04 PROCEDURE — 99214 OFFICE O/P EST MOD 30 MIN: CPT | Performed by: INTERNAL MEDICINE

## 2022-05-04 RX ORDER — LEVOTHYROXINE SODIUM 0.05 MG/1
50 TABLET ORAL DAILY
COMMUNITY
Start: 2022-04-27 | End: 2022-10-04 | Stop reason: ALTCHOICE

## 2022-05-04 RX ORDER — FUROSEMIDE 20 MG/1
20 TABLET ORAL DAILY
COMMUNITY

## 2022-05-04 RX ORDER — POTASSIUM CHLORIDE 750 MG/1
10 TABLET, FILM COATED, EXTENDED RELEASE ORAL DAILY
COMMUNITY

## 2022-05-04 RX ORDER — ERGOCALCIFEROL 1.25 MG/1
50000 CAPSULE ORAL WEEKLY
COMMUNITY
Start: 2022-04-27 | End: 2022-10-27 | Stop reason: ALTCHOICE

## 2022-05-04 NOTE — PROGRESS NOTES
[facility]      Patient Name: Asia Ly  :1932  Age: 89 y.o.  Encounter Provider:  Nixon Brown Jr, MD      Chief Complaint:   Chief Complaint   Patient presents with   • Atrial Fibrillation         Atrial Fibrillation  Symptoms are negative for chest pain, dizziness, palpitations and syncope. Past medical history includes atrial fibrillation.     Asia Ly is a 89 y.o. male past medical history of paroxysmal atrial fibrillation, stroke, moderate aortic stenosis, Crohn's disease, pulmonary fibrosis and pulmonary hypertension who presents for follow-up of chronic mental illness.  I first met Mr. Ly in mid November when he presented to the hospital with hematochezia.  He had a full GI work-up which showed no evidence of active bleeding.  He was placed back on apixaban given his high risk for future stroke.  He presented back to the hospital a few days later with febrile illness.  He was found to have bilateral pneumonia and had an extensive hospital stay with full course of IV antibiotics.  He is slowly recovering from that hospital stay and states that his main complaint is generalized weakness and fatigue.  He does feel that he is getting better with each day.  He was found to have residual pneumonia by PCP and placed on 7-day oral antibiotic course.  He has not had fever in the past week.  He denies chest pain, palpitations, dizziness or syncope.  He denies orthopnea or PND but has some residual edema.  He has some increased exertional dyspnea but states that this is improving since his first course of antibiotics.  He is back on apixaban with no further bleeding complications.  Recovery has been slow but steady. Patient had AV johanne ablation on October 3.  He feels well since then.  No palpitations, dizziness or syncope.  He has mild chronic stable dyspnea on exertion.  Known aortic valve stenosis.  Echocardiogram today shows EF of 50 to 55% with moderate aortic stenosis  "and mild to moderate aortic valve regurgitation.  Significant pulmonary hypertension is noted which is unchanged from previous.    He has been doing very well since last visit.  No palpitations, dizziness or syncope.  No angina or heart failure.  TSH is mildly elevated with normal free T4.  No bleeding complications of apixaban.  Social and family history was reviewed and is not pertinent to this clinic visit.    The following portions of the patient's history were reviewed and updated as appropriate: allergies, current medications, past family history, past medical history, past social history, past surgical history and problem list.      Review of Systems   Constitutional: Positive for decreased appetite, malaise/fatigue and weight loss. Negative for chills and fever.   HENT: Positive for hearing loss.    Cardiovascular: Negative for chest pain, leg swelling, near-syncope, orthopnea, palpitations, paroxysmal nocturnal dyspnea and syncope.   Respiratory: Positive for cough. Negative for wheezing.    Skin: Positive for itching. Negative for rash.   Musculoskeletal: Negative for joint pain and joint swelling.   Gastrointestinal: Negative for bloating and abdominal pain.   Neurological: Negative for dizziness and focal weakness.   Psychiatric/Behavioral: Negative for altered mental status and suicidal ideas.     ROS was reviewed, updated and amended when necessary.    OBJECTIVE:   Vital Signs  Vitals:    05/04/22 0921   BP: 138/70   Pulse: 67     Estimated body mass index is 22.62 kg/m² as calculated from the following:    Height as of this encounter: 175.3 cm (69\").    Weight as of this encounter: 69.5 kg (153 lb 3.2 oz).    Vitals reviewed.   Constitutional:       Appearance: Healthy appearance. Not in distress.   Neck:      Vascular: No JVR. JVD normal.   Pulmonary:      Effort: Pulmonary effort is normal.      Breath sounds: No wheezing. No rhonchi.      Comments: Mild bibasilar crackles  Chest:      Chest wall: " Not tender to palpatation.   Cardiovascular:      PMI at left midclavicular line. Normal rate. Regular rhythm.      Murmurs: There is a systolic murmur.      No gallop. No click. No rub.   Pulses:     Intact distal pulses.   Edema:     Peripheral edema absent.   Abdominal:      General: Bowel sounds are normal.      Palpations: Abdomen is soft.      Tenderness: There is no abdominal tenderness.   Musculoskeletal: Normal range of motion.         General: No tenderness. Skin:     General: Skin is warm and dry.   Neurological:      General: No focal deficit present.      Mental Status: Alert and oriented to person, place and time.       Physical exam was reviewed, updated and amended when necessary.    Procedures          ASSESSMENT:     89-year-old male with a forementioned past medical history who presents for hospital follow-up for bilateral pneumonia.    PLAN OF CARE:     1. PAF -digoxin and Tikosyn were discontinued which is appropriate, we will continue metoprolol and apixaban.  No further evidence for bleeding.  Monitor closely.  Device interrogation reviewed and device is functioning normally.  Status post AV johanne ablation.  2. Aortic stenosis -moderate by exam today.  Will repeat echo at next visit.  3. Pulmonary fibrosis  4. Pulmonary hypertension -mild bibasilar crackles noted.  This is more likely to be pulmonary fibrosis than heart failure.  Continue low-dose furosemide.  5. Crohn's disease    Return to clinic 6 months.             Discharge Medications          Accurate as of May 4, 2022  9:23 AM. If you have any questions, ask your nurse or doctor.            Continue These Medications      Instructions Start Date   allopurinol 100 MG tablet  Commonly known as: ZYLOPRIM   100 mg, Oral, 2 Times Daily      apixaban 5 MG tablet tablet  Commonly known as: ELIQUIS   5 mg, Oral, Every 12 Hours Scheduled      atorvastatin 10 MG tablet  Commonly known as: LIPITOR   Take one tablet by mouth Mon, Wed and Fri.       BOOST 100 CALORIE SMART PO   Oral, Daily      BUDESONIDE PO   3 mg, Oral, Daily      hydrocortisone 2.5 % ointment   Topical, 2 Times Daily, to affected area      IPRATROPIUM-ALBUTEROL IN   Inhalation, Daily      levothyroxine 50 MCG tablet  Commonly known as: SYNTHROID, LEVOTHROID   50 mcg, Oral, Daily      metoprolol tartrate 50 MG tablet  Commonly known as: LOPRESSOR   50 mg, Oral, 3 Times Daily      omeprazole 40 MG capsule  Commonly known as: priLOSEC   40 mg, Oral, Daily      vitamin D 1.25 MG (86967 UT) capsule capsule  Commonly known as: ERGOCALCIFEROL   50,000 Units, Oral, Weekly             Thank you for allowing me to participate in the care of your patient,      Sincerely,   Nixon Brown MD   [facility]  05/04/22  09:23 EDT

## 2022-06-20 ENCOUNTER — TELEPHONE (OUTPATIENT)
Dept: CARDIOLOGY | Facility: CLINIC | Age: 87
End: 2022-06-20

## 2022-06-20 DIAGNOSIS — R53.83 OTHER FATIGUE: Primary | ICD-10-CM

## 2022-06-20 NOTE — TELEPHONE ENCOUNTER
"S/W patient's wife. This could be thyroid related.  I have ordered CMP CBCB and TSH for further evaluation. She also reported there was a \"spot on his bone\" and he has been referred to a specialist.     Brittney/Scheduling- please call to discuss possibly moving up his echo appt   "

## 2022-06-20 NOTE — TELEPHONE ENCOUNTER
Patient's wife called to report he complains of tiring easily.  She has a pulse oximeter at home and his HR running between 64-70 and oxygen around 90.  No fever, chills or nausea.  She asks if it could be his Thyroid medication as it was changed to 50 mcg daily.  If Rx needs to be sent in please send to Will./ LEIDA Dan can be reached at [phone]

## 2022-06-27 ENCOUNTER — HOSPITAL ENCOUNTER (OUTPATIENT)
Dept: CARDIOLOGY | Facility: HOSPITAL | Age: 87
Discharge: HOME | End: 2022-06-27
Admitting: INTERNAL MEDICINE

## 2022-06-27 VITALS
HEART RATE: 81 BPM | BODY MASS INDEX: 22.66 KG/M2 | WEIGHT: 153 LBS | HEIGHT: 69 IN | DIASTOLIC BLOOD PRESSURE: 70 MMHG | SYSTOLIC BLOOD PRESSURE: 144 MMHG

## 2022-06-27 DIAGNOSIS — I35.0 AORTIC STENOSIS, MODERATE: ICD-10-CM

## 2022-06-27 LAB
AORTIC DIMENSIONLESS INDEX: 0.2 (DI)
BH CV ECHO LEFT VENTRICLE GLOBAL LONGITUDINAL STRAIN: -21.9 %
BH CV ECHO MEAS - ACS: 1.13 CM
BH CV ECHO MEAS - AO MAX PG: 57.8 MMHG
BH CV ECHO MEAS - AO MEAN PG: 27.3 MMHG
BH CV ECHO MEAS - AO ROOT DIAM: 2.6 CM
BH CV ECHO MEAS - AO V2 MAX: 380.2 CM/SEC
BH CV ECHO MEAS - AO V2 VTI: 88.2 CM
BH CV ECHO MEAS - AVA(I,D): 0.75 CM2
BH CV ECHO MEAS - EDV(CUBED): 70.8 ML
BH CV ECHO MEAS - EDV(MOD-SP2): 93 ML
BH CV ECHO MEAS - EDV(MOD-SP4): 84 ML
BH CV ECHO MEAS - EF(MOD-BP): 59 %
BH CV ECHO MEAS - EF(MOD-SP2): 62.4 %
BH CV ECHO MEAS - EF(MOD-SP4): 59.5 %
BH CV ECHO MEAS - ESV(CUBED): 28.1 ML
BH CV ECHO MEAS - ESV(MOD-SP2): 35 ML
BH CV ECHO MEAS - ESV(MOD-SP4): 34 ML
BH CV ECHO MEAS - FS: 26.5 %
BH CV ECHO MEAS - IVS/LVPW: 1.27 CM
BH CV ECHO MEAS - IVSD: 1.14 CM
BH CV ECHO MEAS - LAT PEAK E' VEL: 10.6 CM/SEC
BH CV ECHO MEAS - LV DIASTOLIC VOL/BSA (35-75): 45.6 CM2
BH CV ECHO MEAS - LV MASS(C)D: 136.9 GRAMS
BH CV ECHO MEAS - LV MAX PG: 2.8 MMHG
BH CV ECHO MEAS - LV MEAN PG: 1.59 MMHG
BH CV ECHO MEAS - LV SYSTOLIC VOL/BSA (12-30): 18.4 CM2
BH CV ECHO MEAS - LV V1 MAX: 84.4 CM/SEC
BH CV ECHO MEAS - LV V1 VTI: 20 CM
BH CV ECHO MEAS - LVIDD: 4.1 CM
BH CV ECHO MEAS - LVIDS: 3 CM
BH CV ECHO MEAS - LVOT AREA: 3.3 CM2
BH CV ECHO MEAS - LVOT DIAM: 2.05 CM
BH CV ECHO MEAS - LVPWD: 0.9 CM
BH CV ECHO MEAS - MED PEAK E' VEL: 16 CM/SEC
BH CV ECHO MEAS - MR MAX PG: 115.8 MMHG
BH CV ECHO MEAS - MR MAX VEL: 538 CM/SEC
BH CV ECHO MEAS - MR MEAN PG: 84.1 MMHG
BH CV ECHO MEAS - MR MEAN VEL: 441.9 CM/SEC
BH CV ECHO MEAS - MR VTI: 192.5 CM
BH CV ECHO MEAS - MV DEC SLOPE: 625.2 CM/SEC2
BH CV ECHO MEAS - MV DEC TIME: 0.18 MSEC
BH CV ECHO MEAS - MV E MAX VEL: 139 CM/SEC
BH CV ECHO MEAS - MV MAX PG: 8.6 MMHG
BH CV ECHO MEAS - MV MEAN PG: 2.45 MMHG
BH CV ECHO MEAS - MV P1/2T: 72.3 MSEC
BH CV ECHO MEAS - MV V2 VTI: 36.6 CM
BH CV ECHO MEAS - MVA(P1/2T): 3 CM2
BH CV ECHO MEAS - MVA(VTI): 1.8 CM2
BH CV ECHO MEAS - PA ACC TIME: 0.09 SEC
BH CV ECHO MEAS - PA PR(ACCEL): 38.6 MMHG
BH CV ECHO MEAS - PA V2 MAX: 88.2 CM/SEC
BH CV ECHO MEAS - PI END-D VEL: 67.9 CM/SEC
BH CV ECHO MEAS - QP/QS: 0.56
BH CV ECHO MEAS - RAP SYSTOLE: 15 MMHG
BH CV ECHO MEAS - RV MAX PG: 0.57 MMHG
BH CV ECHO MEAS - RV V1 MAX: 37.7 CM/SEC
BH CV ECHO MEAS - RV V1 VTI: 8.7 CM
BH CV ECHO MEAS - RVOT DIAM: 2.33 CM
BH CV ECHO MEAS - RVSP: 86.5 MMHG
BH CV ECHO MEAS - SI(MOD-SP2): 31.5 ML/M2
BH CV ECHO MEAS - SI(MOD-SP4): 27.1 ML/M2
BH CV ECHO MEAS - SV(LVOT): 65.8 ML
BH CV ECHO MEAS - SV(MOD-SP2): 58 ML
BH CV ECHO MEAS - SV(MOD-SP4): 50 ML
BH CV ECHO MEAS - SV(RVOT): 37.1 ML
BH CV ECHO MEAS - TAPSE (>1.6): 1.95 CM
BH CV ECHO MEAS - TR MAX PG: 71.5 MMHG
BH CV ECHO MEAS - TR MAX VEL: 422.9 CM/SEC
BH CV ECHO MEASUREMENTS AVERAGE E/E' RATIO: 10.45
BH CV VAS BP LEFT ARM: NORMAL MMHG
BH CV XLRA - RV BASE: 3.8 CM
BH CV XLRA - RV LENGTH: 6.4 CM
BH CV XLRA - RV MID: 2.6 CM
BH CV XLRA - TDI S': 11.1 CM/SEC
LEFT ATRIUM VOLUME INDEX: 46.6 ML/M2
MAXIMAL PREDICTED HEART RATE: 131 BPM
SINUS: 3 CM
STJ: 2.47 CM
STRESS TARGET HR: 111 BPM

## 2022-06-27 PROCEDURE — 93356 MYOCRD STRAIN IMG SPCKL TRCK: CPT

## 2022-06-27 PROCEDURE — 93306 TTE W/DOPPLER COMPLETE: CPT

## 2022-06-27 PROCEDURE — 93306 TTE W/DOPPLER COMPLETE: CPT | Performed by: INTERNAL MEDICINE

## 2022-06-27 PROCEDURE — 93356 MYOCRD STRAIN IMG SPCKL TRCK: CPT | Performed by: INTERNAL MEDICINE

## 2022-06-28 ENCOUNTER — TELEPHONE (OUTPATIENT)
Dept: CARDIOLOGY | Facility: CLINIC | Age: 87
End: 2022-06-28

## 2022-06-28 NOTE — TELEPHONE ENCOUNTER
Left message about echo results.  Moderate to severe aortic stenosis fairly stable from previous.  No further testing required at this time.

## 2022-07-20 ENCOUNTER — TELEPHONE (OUTPATIENT)
Dept: CARDIOLOGY | Facility: CLINIC | Age: 87
End: 2022-07-20

## 2022-07-20 ENCOUNTER — TRANSCRIBE ORDERS (OUTPATIENT)
Dept: ADMINISTRATIVE | Facility: HOSPITAL | Age: 87
End: 2022-07-20

## 2022-07-20 DIAGNOSIS — J47.1 BRONCHIECTASIS WITH (ACUTE) EXACERBATION: Primary | ICD-10-CM

## 2022-07-20 NOTE — TELEPHONE ENCOUNTER
Ni called to report patient has been prescribed Azithromycin 250 mg tablets to be taken 2 today and one for the next 4 days thereafter.  She does not like to change anything with his care until she checks with you first. / LEIDA Dan can be reached at [phone]

## 2022-07-20 NOTE — TELEPHONE ENCOUNTER
I called but got the voicemail.  I explained on voicemail is okay for patient to take azithromycin.

## 2022-08-03 ENCOUNTER — HOSPITAL ENCOUNTER (OUTPATIENT)
Dept: CT IMAGING | Facility: HOSPITAL | Age: 87
Discharge: HOME | End: 2022-08-03
Admitting: INTERNAL MEDICINE

## 2022-08-03 ENCOUNTER — APPOINTMENT (OUTPATIENT)
Dept: CT IMAGING | Facility: HOSPITAL | Age: 87
End: 2022-08-03

## 2022-08-03 DIAGNOSIS — J47.1 BRONCHIECTASIS WITH (ACUTE) EXACERBATION: ICD-10-CM

## 2022-08-03 PROCEDURE — 71250 CT THORAX DX C-: CPT

## 2022-08-11 ENCOUNTER — TELEPHONE (OUTPATIENT)
Dept: CARDIOLOGY | Facility: CLINIC | Age: 87
End: 2022-08-11

## 2022-08-11 NOTE — TELEPHONE ENCOUNTER
Called Asia Ly, however there was no answer and the phone cut out while leaving voicemail.  Will continue to try and contact patient.     Thank you,  Brenda Rasmussen RN  Triage Nurse [facility] [place] [facility]

## 2022-08-11 NOTE — TELEPHONE ENCOUNTER
Pt's wife called. Mr. Ly is c/o feet/ankle edema for the past couple of weeks.  I tried to call pt back to see if he is having any other symptoms but they did not answer.  Can you please try to call and triage?  Thanks/ana    # [phone]

## 2022-08-12 NOTE — TELEPHONE ENCOUNTER
"Called Asia Ly and spoke with his spouse Ni.  Ni reports patient is swelling \"from his ankles to his toes\".  Ni stated that swelling goes away overnight, but will return as the day goes on.  Patient is currently having labs drawn weekly due to low sodium levels.  Additionally, Ni stated that Dr. Lobo has started patient on furosemide 20 mg, daily and potassium chloride 10 meq, daily.  Patient has an appointment on Tuesday for lab draw.    Ni stated patient was recently diagnosed with pneumonia, received antibiotic treatment, and is feeling better now.  Per Ni, patient stated he \"feels more normal today\".  Patient denies shortness of breath.    Vitals at last office visit:  BP: 110/64  HR: 70  Oxygen saturation: 98%  Weight: 147 lbs    Ni is asking if there is anything additional that needs to be done or if she should bring patient in for an appointment.     LOV: 5/4 with Dr. Brown  Next OV: 11/9/22 with MIKALA Hackett    Please let me know how you would like to proceed.    Thank you,  Brenda Rasmussen, RN  Triage Nurse [facility]    "

## 2022-08-15 NOTE — TELEPHONE ENCOUNTER
Left voicemail for Asia Ly requesting callback.    Thank you,  Brenda Rasmussen RN  Triage Nurse [facility]

## 2022-08-15 NOTE — TELEPHONE ENCOUNTER
Wife returned call.  I let her know that AL agrees with addition of lasix and potassium and follow up labs that PCP has ordered.  Pt's wife verbalizes understanding and was encouraged to call back with any further questions/concerns.    Agnes Rojas RN  [facility] Triage  08/15/22 11:56 EDT

## 2022-10-04 ENCOUNTER — CLINICAL SUPPORT NO REQUIREMENTS (OUTPATIENT)
Dept: CARDIOLOGY | Facility: CLINIC | Age: 87
End: 2022-10-04

## 2022-10-04 ENCOUNTER — OFFICE VISIT (OUTPATIENT)
Dept: CARDIOLOGY | Facility: CLINIC | Age: 87
End: 2022-10-04

## 2022-10-04 VITALS
BODY MASS INDEX: 22.07 KG/M2 | WEIGHT: 149 LBS | HEART RATE: 67 BPM | SYSTOLIC BLOOD PRESSURE: 130 MMHG | HEIGHT: 69 IN | DIASTOLIC BLOOD PRESSURE: 80 MMHG

## 2022-10-04 DIAGNOSIS — I48.91 ATRIAL FIBRILLATION WITH RAPID VENTRICULAR RESPONSE: Primary | ICD-10-CM

## 2022-10-04 DIAGNOSIS — I48.0 AF (PAROXYSMAL ATRIAL FIBRILLATION): ICD-10-CM

## 2022-10-04 DIAGNOSIS — Z79.01 ANTICOAGULANT LONG-TERM USE: ICD-10-CM

## 2022-10-04 DIAGNOSIS — I44.2 COMPLETE HEART BLOCK: Primary | ICD-10-CM

## 2022-10-04 PROCEDURE — 93280 PM DEVICE PROGR EVAL DUAL: CPT | Performed by: INTERNAL MEDICINE

## 2022-10-04 PROCEDURE — 93000 ELECTROCARDIOGRAM COMPLETE: CPT

## 2022-10-04 PROCEDURE — 99213 OFFICE O/P EST LOW 20 MIN: CPT

## 2022-10-04 NOTE — PROGRESS NOTES
Date of Office Visit: 10/04/2022  Encounter Provider: KYLAH Metzger  Place of Service: [facility] CARDIOLOGY  Patient Name: Asia Ly  :1932    Chief Complaint   Patient presents with   • Atrial Fibrillation w/RVR     1 yr f/u   • Pacemaker Check   :     HPI: Asia Ly is a 89 y.o. male who is followed by Dr. Brown and Dr. Nettles. He has a history of atrial fibrillation----s/p PPM and AV node ablation (2021--[facility]).    He saw Dr. Nettles last October and was doing very well post AV node ablation.  he was in AF but completely unaware. He was extremely symptomatic in the past with his AF. Left bundle pacing, QRS morphology looked great. He was advised to follow up in 1 year.                           He presents today for follow up. He is here with his wife.     He says that over the past year he has done very well.     He has not had any issues with his AF. He is in AF today and is completely unaware.     AF burden on device is 99%.    Normal device testing and function in office today.  AP: 4%, RV: 99.9%.     He denies any chest pain, dyspnea, palpitations, or fatigue.     He is in AF today. QRS morphology looks great.       Past Medical History:   Diagnosis Date   • Aortic stenosis    • Aspiration pneumonia (HCC)    • Atrial fibrillation (HCC)    • Bronchiectasis (HCC)    • CKD (chronic kidney disease)    • COPD (chronic obstructive pulmonary disease) (HCC)    • Crohn's disease (HCC)    • Dizziness    • Elevated cholesterol    • Gastric ulcer    • Generalized weakness    • Gout    • Gout    • Hard of hearing    • Hyperlipidemia    • Hypertension    • Leg swelling    • Lower extremity edema    • Mild anemia    • Near syncope    • Nonrheumatic aortic valve stenosis    • PAF (paroxysmal atrial fibrillation) (HCC)    • Palpitations    • Peptic ulcer    • Pneumonia of left lung due to infectious organism    • Pulmonary fibrosis (HCC)    • Stroke (HCC)    • TIA  "(transient ischemic attack)        Past Surgical History:   Procedure Laterality Date   • BRONCHOSCOPY N/A 10/22/2018    Procedure: BRONCHOSCOPY WITH BRONCHALVEOLAR LAVAGE;  Surgeon: Chidi Oconnor MD;  [facility];  Service: Pulmonary   • BRONCHOSCOPY N/A 7/8/2021    Procedure: BRONCHOSCOPY with BAL;  Surgeon: Chidi Oconnor MD;  Location: [facility] ENDOSCOPY;  Service: Pulmonary;  Laterality: N/A;  Pre: bronchectasis  Post: same   • CARDIAC ELECTROPHYSIOLOGY PROCEDURE N/A 7/9/2021    Procedure: Pacemaker DC new--[employer] possible His lead;  Surgeon: Rashaun Nettles MD;  Location: [facility] CATH INVASIVE LOCATION;  Service: Cardiology;  Laterality: N/A;   • CARDIAC ELECTROPHYSIOLOGY PROCEDURE N/A 9/3/2021    Procedure: AV node 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 ppm;  Surgeon: Rashaun Nettles MD;  Location: [facility] CATH INVASIVE LOCATION;  Service: Cardiovascular;  Laterality: N/A;   • CATARACT EXTRACTION Bilateral    • COLONOSCOPY     • COLONOSCOPY N/A 3/9/2018    Procedure: COLONOSCOPY to terminal ileum with bx;  Surgeon: Aron Cabrera MD;  Location: [facility] ENDOSCOPY;  Service:    • COLONOSCOPY N/A 11/19/2020    Procedure: COLONOSCOPY to cecum:  apc to cecum angiodysplagia,;  Surgeon: Aron Cabrera MD;  Location: [facility] ENDOSCOPY;  Service: Gastroenterology;  Laterality: N/A;  GI bleed  post:  diverticulosis, angiodysplagia   • CYSTECTOMY      back and shoulder area   • ENDOSCOPY N/A 3/9/2018    Procedure: ESOPHAGOGASTRODUODENOSCOPY with bx;  Surgeon: Aron Cabrera MD;  Location: [facility] ENDOSCOPY;  Service:    • ENDOSCOPY N/A 11/19/2020    Procedure: ESOPHAGOGASTRODUODENOSCOPY;  Surgeon: Aron Cabrera MD;  Location: [facility] ENDOSCOPY;  Service: Gastroenterology;  Laterality: N/A;  GI bleed  post:  HH.   • EYE SURGERY Left     detached retina   • EYE SURGERY Right     \"repaired a hole in the eye\"   • INSERT / REPLACE / REMOVE PACEMAKER     • TESTICLE SURGERY      benign tumor removed. "       Social History     Socioeconomic History   • Marital status:    Tobacco Use   • Smoking status: Former Smoker     Types: Cigars     Quit date: 10/28/1994     Years since quittin.9   • Smokeless tobacco: Never Used   • Tobacco comment: Quit 25 years ago   Vaping Use   • Vaping Use: Never used   Substance and Sexual Activity   • Alcohol use: No     Comment: daily caffiene - 1/2 cup of coffee   • Drug use: No   • Sexual activity: Defer       Family History   Problem Relation Age of Onset   • Stroke Mother    • Heart disease Mother    • Hypertension Mother    • Diabetes Mother    • Heart disease Sister         Heart Valve Replacement   • Ovarian cancer Sister    • Rheumatic fever Sister    • Diabetes Sister    • Stroke Father        Review of Systems   Constitutional: Negative for chills, fever and malaise/fatigue.   Cardiovascular: Negative for chest pain, dyspnea on exertion, leg swelling, near-syncope, orthopnea, palpitations, paroxysmal nocturnal dyspnea and syncope.   Respiratory: Negative for cough and shortness of breath.    Hematologic/Lymphatic: Negative.    Musculoskeletal: Negative for joint pain, joint swelling and myalgias.   Gastrointestinal: Negative for abdominal pain, diarrhea, melena, nausea and vomiting.   Genitourinary: Negative for frequency and hematuria.   Neurological: Negative for light-headedness, numbness, paresthesias and seizures.   Allergic/Immunologic: Negative.    All other systems reviewed and are negative.      Allergies   Allergen Reactions   • Amiodarone Unknown (See Comments)     Pulmonary fibrosis   • Diltiazem Arrhythmia   • Doxycycline Rash   • Cefdinir Arrhythmia     A-FIB   • Indomethacin Rash         Current Outpatient Medications:   •  allopurinol (ZYLOPRIM) 100 MG tablet, Take 100 mg by mouth 2 (Two) Times a Day., Disp: , Rfl:   •  apixaban (ELIQUIS) 5 MG tablet tablet, Take 1 tablet by mouth Every 12 (Twelve) Hours., Disp: 180 tablet, Rfl: 3  •   "atorvastatin (LIPITOR) 10 MG tablet, Take one tablet by mouth Mon, Wed and Fri., Disp: , Rfl:   •  BUDESONIDE PO, Take 3 mg by mouth Daily., Disp: , Rfl:   •  furosemide (LASIX) 20 MG tablet, Take 20 mg by mouth Daily., Disp: , Rfl:   •  hydrocortisone 2.5 % ointment, Apply  topically to the appropriate area as directed 2 (Two) Times a Day. to affected area, Disp: , Rfl:   •  IPRATROPIUM-ALBUTEROL IN, Inhale Daily., Disp: , Rfl:   •  metoprolol tartrate (LOPRESSOR) 50 MG tablet, Take 1 tablet by mouth 3 (Three) Times a Day., Disp: 270 tablet, Rfl: 3  •  Nutritional Supplements (BOOST 100 CALORIE SMART PO), Take  by mouth Daily., Disp: , Rfl:   •  omeprazole (priLOSEC) 40 MG capsule, Take 40 mg by mouth Daily., Disp: , Rfl:   •  potassium chloride (K-DUR,KLOR-CON) 10 MEQ ER tablet, Take 10 mEq by mouth Daily., Disp: , Rfl:   •  vitamin D (ERGOCALCIFEROL) 1.25 MG (05139 UT) capsule capsule, Take 50,000 Units by mouth 1 (One) Time Per Week., Disp: , Rfl:       Objective:     Vitals:    10/04/22 0942   BP: 130/80   Pulse: 67   Weight: 67.6 kg (149 lb)   Height: 175.3 cm (69\")     Body mass index is 22 kg/m².    PHYSICAL EXAM:    Vitals Reviewed.   General Appearance: No acute distress, well developed and well nourished.   Eyes: Conjunctiva and lids: No erythema, swelling, or discharge. Sclera non-icteric.   HENT: Atraumatic, normocephalic. External eyes, ears, and nose normal.   Respiratory: No signs of respiratory distress. Respiration rhythm and depth normal.   Clear to auscultation. No rales, crackles, rhonchi, or wheezing auscultated.   Cardiovascular:  Heart Rate and Rhythm: Paced, Heart Sounds: Normal S1 and S2. No S3 or S4 noted.  Gastrointestinal:  Abdomen soft, non-distended, non-tender.   Musculoskeletal: Normal movement of extremities  Skin: Warm and dry.   Psychiatric: Patient alert and oriented to person, place, and time. Speech and behavior appropriate. Normal mood and affect.       ECG 12 " Lead    Date/Time: 10/4/2022 10:22 AM  Performed by: Nomi Aguilera APRN  Authorized by: Nomi Aguilera APRN   Comparison: compared with previous ECG   Similar to previous ECG  Rhythm: atrial fibrillation and paced  Rhythm comments: AF/  BPM: 67                Assessment:       Diagnosis Plan   1. Atrial fibrillation with rapid ventricular response (HCC)     2. Anticoagulant long-term use            Plan:   1. Atrial fibrillation----s/p PPM and AV node ablation (9/3/2021)-- he has done very well since AV node ablation last year. Was previously very symptomatic and presented to ED multiple times for AF.  Normal testing and function of device today. He is in AF and unaware. QRS morphology looks great.       2. Long term AC-- he remains on warfarin for AC. No bleeding issues.         Follow up in one year with Dr. Nettles.           As always, it has been a pleasure to participate in your patient's care.      Sincerely,         KYLAH Metzger

## 2022-10-21 ENCOUNTER — HOSPITAL ENCOUNTER (EMERGENCY)
Facility: HOSPITAL | Age: 87
Discharge: LEFT BEFORE DISCHARGE | End: 2022-10-21

## 2022-10-21 VITALS — RESPIRATION RATE: 16 BRPM | TEMPERATURE: 100 F | HEART RATE: 75 BPM | OXYGEN SATURATION: 90 %

## 2022-10-21 PROCEDURE — 99211 OFF/OP EST MAY X REQ PHY/QHP: CPT

## 2022-10-21 RX ORDER — SODIUM CHLORIDE 0.9 % (FLUSH) 0.9 %
10 SYRINGE (ML) INJECTION AS NEEDED
Status: DISCONTINUED | OUTPATIENT
Start: 2022-10-21 | End: 2022-10-21 | Stop reason: HOSPADM

## 2022-10-21 NOTE — ED TRIAGE NOTES
frequent urination x 1 month but worsened last night and today    Patient was placed in face mask during first look triage.  Patient was wearing a face mask throughout encounter.  I wore personal protective equipment throughout the encounter.  Hand hygiene was performed before and after patient encounter.

## 2022-10-24 PROCEDURE — 93296 REM INTERROG EVL PM/IDS: CPT | Performed by: INTERNAL MEDICINE

## 2022-10-24 PROCEDURE — 93294 REM INTERROG EVL PM/LDLS PM: CPT | Performed by: INTERNAL MEDICINE

## 2022-10-27 ENCOUNTER — OFFICE VISIT (OUTPATIENT)
Dept: CARDIOLOGY | Facility: CLINIC | Age: 87
End: 2022-10-27

## 2022-10-27 VITALS
HEART RATE: 62 BPM | OXYGEN SATURATION: 100 % | HEIGHT: 69 IN | DIASTOLIC BLOOD PRESSURE: 60 MMHG | BODY MASS INDEX: 21.48 KG/M2 | WEIGHT: 145 LBS | SYSTOLIC BLOOD PRESSURE: 128 MMHG

## 2022-10-27 DIAGNOSIS — I44.2 COMPLETE HEART BLOCK: ICD-10-CM

## 2022-10-27 DIAGNOSIS — R53.83 OTHER FATIGUE: Primary | ICD-10-CM

## 2022-10-27 DIAGNOSIS — I48.0 AF (PAROXYSMAL ATRIAL FIBRILLATION): ICD-10-CM

## 2022-10-27 DIAGNOSIS — Z87.09 H/O PLEURAL EFFUSION: ICD-10-CM

## 2022-10-27 DIAGNOSIS — R06.09 DOE (DYSPNEA ON EXERTION): ICD-10-CM

## 2022-10-27 PROCEDURE — 99214 OFFICE O/P EST MOD 30 MIN: CPT | Performed by: NURSE PRACTITIONER

## 2022-10-27 NOTE — PROGRESS NOTES
Date of Office Visit: 10/27/22  Encounter Provider: KYLAH Jha  Place of Service: [facility] CARDIOLOGY  Patient Name: Asia Ly  :1932    Chief Complaint   Patient presents with   • Atrial fibrillation with rapid ventricular response (HCC)   • Hyperlipidemia   • Transient Ischemic Attack   • Aortic Stenosis   • Follow-up   • Shortness of Breath   :     HPI: Asia Ly is a 89 y.o. male  with paroxysmal atrial fibrillation, stroke and TIA in 2016, aortic stenosis, hyperlipidemia, B12 anemia,Crohn's disease, colon polyps, lower extremity edema, TIA,  pulmonary hypertension and pulmonary fibrosis.      He has been followed by Dr. Hector Rivera. He is now followed by Dr. Brown and I will visit with him in follow up today.      Has been on sotalol in the past as well as amiodarone for recurrent atrial fibrillation.  He had Echocardiogram 2016 showed normal left ventricular systolic dysfunction, moderate tricuspid insufficiency, mild mitral insufficiency , mild pulmonary hypertension.  He also had a perfusion stress test that was negative for ischemia.  He was taken off diltiazem for allergic reaction to rash.  2017 he had an echocardiogram which showed an EF of 60%, grade 2 diastolic dysfunction and mild aortic he had increased lower extremity edema and had again rapid atrial fibrillation.  He was started on a diuretic.  He then had pneumonia hypoxia and influenza and was started on Tikosyn.  He has some prolonged QTC complicated by antibiotics.  He then had issues with gout felt to be related to discontinuation of indomethacin and was prescribed colchicine.  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  He then had some atrial fibrillation with RVR and was given oral metoprolol and Cardizem and converted back to sinus rhythm.  He was evaluated by electrophysiology with Dr. Nettles who felt  that his ablation at his age would be very high risk procedure.  He was later started on digoxin for recurrent rapid atrial fibrillation.  He was admitted again January 2019 and electrophysiology felt that he may for AV node ablation and pacemaker implantation.  However he preferred to stay on Tikosyn and accept occasional breakthroughs.     He then had some confusion and upper epigastric abdominal pain he was hospitalized August 2020 found to have transaminitis with hyperbilirubinemia.  He had some atrial fibrillation with RVR received IV metoprolol and his heart rate improved.  Echocardiogram showed normal left ventricular systolic function, mild concentric hypertrophy, moderate aortic valve stenoses with aortic valve area 1.1 cm², maximum pressure gradient of 42 and mean pressure gradient 24 mmHg.  RVSP was severely elevated at 57.8.  He had mild aortic valve regurgitation and mild mitral regurgitation.     In November 2020 was hospitalized with hematochezia and underwent GI evaluation with no evidence of active bleed.  He was cleared to resume apixaban and was later found to have bilateral pneumonia.     He was treated again for pneumonia and had a bronchoscopy July 7.  He is colonized Pseudomonas and infectious disease recommended conservative treatment.  He had some rapid atrial fibrillation during that admission and had a pacemaker placed by Dr. Nettles.    And ultimately had AV node ablation 09/2021. .  His Tikosyn was stopped.      He presents today for reassessment.  He has less swelling since his Lasix was increased earlier this week.  He had evidence of moderate right pleural effusion on recent x-ray.  He has had no chest pain.  He is under evaluation for as needed oxygen.  He continues to have fatigue but that is not new.        Allergies   Allergen Reactions   • Amiodarone Unknown (See Comments)     Pulmonary fibrosis   • Diltiazem Arrhythmia   • Doxycycline Rash   • Cefdinir Arrhythmia     A-FIB   •  "Indomethacin Rash           Family and social history reviewed.     ROS  All other systems were reviewed and are negative          Objective:     Vitals:    10/27/22 1040   BP: 128/60   BP Location: Left arm   Patient Position: Sitting   Pulse: 62   SpO2: 100%   Weight: 65.8 kg (145 lb)   Height: 175.3 cm (69\")     Body mass index is 21.41 kg/m².    PHYSICAL EXAM:  Pulmonary:      Effort: Pulmonary effort is normal.      Breath sounds: Examination of the right-lower field reveals decreased breath sounds and rales. Decreased breath sounds present. Rales present.   Cardiovascular:      Normal rate.      Murmurs: There is a systolic murmur at the URSB.         Procedures      Current Outpatient Medications   Medication Sig Dispense Refill   • allopurinol (ZYLOPRIM) 100 MG tablet Take 100 mg by mouth 2 (Two) Times a Day.     • apixaban (ELIQUIS) 5 MG tablet tablet Take 1 tablet by mouth Every 12 (Twelve) Hours. 180 tablet 3   • atorvastatin (LIPITOR) 10 MG tablet Take one tablet by mouth Mon, Wed and Fri.     • BUDESONIDE PO Take 3 mg by mouth Daily.     • furosemide (LASIX) 20 MG tablet Take 1 tablet by mouth 2 (Two) Times a Day.     • hydrocortisone 2.5 % ointment Apply  topically to the appropriate area as directed 2 (Two) Times a Day. to affected area     • IPRATROPIUM-ALBUTEROL IN Inhale Daily.     • metoprolol tartrate (LOPRESSOR) 50 MG tablet Take 1 tablet by mouth 3 (Three) Times a Day. 270 tablet 3   • Nutritional Supplements (BOOST 100 CALORIE SMART PO) Take  by mouth Daily.     • omeprazole (priLOSEC) 40 MG capsule Take 40 mg by mouth Daily.     • potassium chloride (K-DUR,KLOR-CON) 10 MEQ ER tablet Take 10 mEq by mouth Daily.     • vitamin D (ERGOCALCIFEROL) 1.25 MG (35002 UT) capsule capsule Take 50,000 Units by mouth 1 (One) Time Per Week.       No current facility-administered medications for this visit.     Assessment:       Diagnosis Plan   1. Other fatigue  XR Chest 2 View      2. YANES (dyspnea on " exertion)  XR Chest 2 View      3. H/O pleural effusion  XR Chest 2 View      4. AF (paroxysmal atrial fibrillation) (HCC)        5. Complete heart block (HCC)             Orders Placed This Encounter   Procedures   • XR Chest 2 View     Standing Status:   Future     Standing Expiration Date:   10/27/2023     Order Specific Question:   Reason for Exam:     Answer:   follow up pleural effusion right     Order Specific Question:   Release to patient     Answer:   Routine Release         Plan:         1. 89 year old gentleman with paroxysmal atrial fibrillation off amiodarone due to history of pulmonary fibrosis.  He  had a rash with diltiazem in the past.  Now status post AV node ablation with permanent pacemaker.  He is off Tikosyn. He is asymptomatic with atrial fibrillation.  He has no bleeding issues with Eliquis he also follows with Dr. Nettles  2. Aortic stenosis mild in November 2017 and moderate- severe on most recent echo June 2022 continue 20 mg twice daily Lasix and arrange for repeat chest x-ray next week to follow-up on pleural effusion  3.  History of TIA no recurrence- continue eliquis   3.  History of pulmonary fibrosis usually sees Dr. Chidi Oconnor.    4.  Prediabetes  5.  History of Crohn's disease and iron deficient anemia usually sees Dr. Susie Mccoy  6.  Hypertension continue current regimen  7.  Hyperlipidemia continue current regimen  8.  History of cholecystitis  9.   History of gout  10.   Pleural effusion, right greater than left-as above we will recheck echo now that he is on higher dose Lasix to make sure fluid is decreasing          It has been a pleasure to participate in this patient's care.      Thank you,  KYLAH Jha      **I used Dragon to dictate this note:**

## 2022-10-31 ENCOUNTER — HOSPITAL ENCOUNTER (OUTPATIENT)
Dept: GENERAL RADIOLOGY | Facility: HOSPITAL | Age: 87
Discharge: HOME | End: 2022-10-31
Admitting: NURSE PRACTITIONER

## 2022-10-31 ENCOUNTER — TELEPHONE (OUTPATIENT)
Dept: CARDIOLOGY | Facility: CLINIC | Age: 87
End: 2022-10-31

## 2022-10-31 DIAGNOSIS — Z87.09 H/O PLEURAL EFFUSION: ICD-10-CM

## 2022-10-31 DIAGNOSIS — R06.09 DOE (DYSPNEA ON EXERTION): ICD-10-CM

## 2022-10-31 DIAGNOSIS — R53.83 OTHER FATIGUE: ICD-10-CM

## 2022-10-31 PROCEDURE — 71046 X-RAY EXAM CHEST 2 VIEWS: CPT

## 2022-10-31 NOTE — TELEPHONE ENCOUNTER
----- Message from KYLAH Jha sent at 10/31/2022  3:07 PM EDT -----  Please inform patient's wife that his pleural fluid has decreased from moderate to small on the right.  I recommend that he continue Lasix 20 mg twice daily.

## 2022-10-31 NOTE — TELEPHONE ENCOUNTER
Called and left VM. Will continue to try to reach patient.     Pavithra Isaac RN  Triage [facility] [place]

## 2022-10-31 NOTE — TELEPHONE ENCOUNTER
Notified patient's wife of results/recommendations. She verbalized understanding.    Pavithra Isaac RN  [place] [facility] [place] [facility] [place]

## 2023-01-10 ENCOUNTER — OFFICE (OUTPATIENT)
Dept: URBAN - METROPOLITAN AREA CLINIC 66 | Facility: CLINIC | Age: 88
End: 2023-01-10

## 2023-01-10 ENCOUNTER — TRANSCRIBE ORDERS (OUTPATIENT)
Dept: ADMINISTRATIVE | Facility: HOSPITAL | Age: 88
End: 2023-01-10
Payer: MEDICARE

## 2023-01-10 ENCOUNTER — LAB (OUTPATIENT)
Dept: LAB | Facility: HOSPITAL | Age: 88
End: 2023-01-10
Payer: MEDICARE

## 2023-01-10 VITALS
WEIGHT: 141 LBS | SYSTOLIC BLOOD PRESSURE: 120 MMHG | HEIGHT: 68 IN | DIASTOLIC BLOOD PRESSURE: 72 MMHG | HEART RATE: 87 BPM

## 2023-01-10 DIAGNOSIS — Z79.52 LONG TERM (CURRENT) USE OF SYSTEMIC STEROIDS: ICD-10-CM

## 2023-01-10 DIAGNOSIS — E06.0 ACUTE THYROIDITIS: ICD-10-CM

## 2023-01-10 DIAGNOSIS — D50.9 IRON DEFICIENCY ANEMIA, UNSPECIFIED IRON DEFICIENCY ANEMIA TYPE: ICD-10-CM

## 2023-01-10 DIAGNOSIS — K50.10 CROHN'S DISEASE OF LARGE INTESTINE WITHOUT COMPLICATIONS: ICD-10-CM

## 2023-01-10 DIAGNOSIS — D50.9 IRON DEFICIENCY ANEMIA, UNSPECIFIED: ICD-10-CM

## 2023-01-10 DIAGNOSIS — Z79.52 LONG TERM CURRENT USE OF SYSTEMIC STEROIDS: ICD-10-CM

## 2023-01-10 DIAGNOSIS — D50.9 IRON DEFICIENCY ANEMIA, UNSPECIFIED IRON DEFICIENCY ANEMIA TYPE: Primary | ICD-10-CM

## 2023-01-10 LAB
CORTIS SERPL-MCNC: 4.59 MCG/DL
T-UPTAKE NFR SERPL: 0.67 TBI (ref 0.8–1.3)
T4 SERPL-MCNC: 6.81 MCG/DL (ref 4.5–11.7)
TSH SERPL DL<=0.05 MIU/L-ACNC: 3.34 UIU/ML (ref 0.27–4.2)

## 2023-01-10 PROCEDURE — 84443 ASSAY THYROID STIM HORMONE: CPT

## 2023-01-10 PROCEDURE — 84479 ASSAY OF THYROID (T3 OR T4): CPT

## 2023-01-10 PROCEDURE — 99213 OFFICE O/P EST LOW 20 MIN: CPT | Performed by: INTERNAL MEDICINE

## 2023-01-10 PROCEDURE — 82533 TOTAL CORTISOL: CPT

## 2023-01-10 PROCEDURE — 36415 COLL VENOUS BLD VENIPUNCTURE: CPT

## 2023-01-10 PROCEDURE — 84436 ASSAY OF TOTAL THYROXINE: CPT

## 2023-01-10 RX ORDER — BUDESONIDE 3 MG/1
3 CAPSULE ORAL
Qty: 90 | Refills: 4 | Status: ACTIVE

## 2023-02-27 ENCOUNTER — TELEPHONE (OUTPATIENT)
Dept: CARDIOLOGY | Facility: CLINIC | Age: 88
End: 2023-02-27
Payer: MEDICARE

## 2023-02-27 ENCOUNTER — HOSPITAL ENCOUNTER (EMERGENCY)
Facility: HOSPITAL | Age: 88
Discharge: HOME | End: 2023-02-27
Attending: EMERGENCY MEDICINE | Admitting: EMERGENCY MEDICINE
Payer: MEDICARE

## 2023-02-27 VITALS
BODY MASS INDEX: 21.41 KG/M2 | TEMPERATURE: 98.2 F | OXYGEN SATURATION: 94 % | HEART RATE: 61 BPM | RESPIRATION RATE: 18 BRPM | HEIGHT: 69 IN | DIASTOLIC BLOOD PRESSURE: 66 MMHG | SYSTOLIC BLOOD PRESSURE: 140 MMHG

## 2023-02-27 DIAGNOSIS — R04.0 EPISTAXIS: Primary | ICD-10-CM

## 2023-02-27 DIAGNOSIS — Z79.01 CHRONIC ANTICOAGULATION: ICD-10-CM

## 2023-02-27 LAB
BASOPHILS # BLD AUTO: 0.07 10*3/MM3 (ref 0–0.2)
BASOPHILS NFR BLD AUTO: 0.8 % (ref 0–1.5)
DEPRECATED RDW RBC AUTO: 55.9 FL (ref 37–54)
EOSINOPHIL # BLD AUTO: 0.12 10*3/MM3 (ref 0–0.4)
EOSINOPHIL NFR BLD AUTO: 1.3 % (ref 0.3–6.2)
ERYTHROCYTE [DISTWIDTH] IN BLOOD BY AUTOMATED COUNT: 16.8 % (ref 12.3–15.4)
HCT VFR BLD AUTO: 39 % (ref 37.5–51)
HGB BLD-MCNC: 12.4 G/DL (ref 13–17.7)
IMM GRANULOCYTES # BLD AUTO: 0.03 10*3/MM3 (ref 0–0.05)
IMM GRANULOCYTES NFR BLD AUTO: 0.3 % (ref 0–0.5)
LYMPHOCYTES # BLD AUTO: 1.13 10*3/MM3 (ref 0.7–3.1)
LYMPHOCYTES NFR BLD AUTO: 12.2 % (ref 19.6–45.3)
MCH RBC QN AUTO: 29 PG (ref 26.6–33)
MCHC RBC AUTO-ENTMCNC: 31.8 G/DL (ref 31.5–35.7)
MCV RBC AUTO: 91.1 FL (ref 79–97)
MONOCYTES # BLD AUTO: 0.78 10*3/MM3 (ref 0.1–0.9)
MONOCYTES NFR BLD AUTO: 8.4 % (ref 5–12)
NEUTROPHILS NFR BLD AUTO: 7.11 10*3/MM3 (ref 1.7–7)
NEUTROPHILS NFR BLD AUTO: 77 % (ref 42.7–76)
NRBC BLD AUTO-RTO: 0 /100 WBC (ref 0–0.2)
PLATELET # BLD AUTO: 147 10*3/MM3 (ref 140–450)
PMV BLD AUTO: 10.7 FL (ref 6–12)
RBC # BLD AUTO: 4.28 10*6/MM3 (ref 4.14–5.8)
WBC NRBC COR # BLD: 9.24 10*3/MM3 (ref 3.4–10.8)

## 2023-02-27 PROCEDURE — 99282 EMERGENCY DEPT VISIT SF MDM: CPT

## 2023-02-27 PROCEDURE — 85025 COMPLETE CBC W/AUTO DIFF WBC: CPT | Performed by: PHYSICIAN ASSISTANT

## 2023-02-27 PROCEDURE — 36415 COLL VENOUS BLD VENIPUNCTURE: CPT

## 2023-02-28 ENCOUNTER — TELEPHONE (OUTPATIENT)
Dept: CARDIOLOGY | Facility: CLINIC | Age: 88
End: 2023-02-28
Payer: MEDICARE

## 2023-02-28 NOTE — TELEPHONE ENCOUNTER
Spoke to wife and patient had epistaxis for which she was seen in the ER.  Saw ENT today and had cautery.  He is still on Eliquis and doing fairly well.  Given his propensity for bleeding we may need to consider decreasing dose of Eliquis even though he does not technically meet criteria for lower dose.  Follow clinical progress.  Okay to hold for dental procedures on March 21

## 2023-03-01 ENCOUNTER — HOSPITAL ENCOUNTER (EMERGENCY)
Facility: HOSPITAL | Age: 88
Discharge: HOME | End: 2023-03-01
Attending: EMERGENCY MEDICINE | Admitting: EMERGENCY MEDICINE
Payer: MEDICARE

## 2023-03-01 VITALS
DIASTOLIC BLOOD PRESSURE: 58 MMHG | SYSTOLIC BLOOD PRESSURE: 119 MMHG | RESPIRATION RATE: 15 BRPM | HEIGHT: 68 IN | BODY MASS INDEX: 21.22 KG/M2 | WEIGHT: 140 LBS | HEART RATE: 63 BPM | TEMPERATURE: 98.7 F | OXYGEN SATURATION: 92 %

## 2023-03-01 DIAGNOSIS — Z79.01 CHRONIC ANTICOAGULATION: ICD-10-CM

## 2023-03-01 DIAGNOSIS — R04.0 ACUTE ANTERIOR EPISTAXIS: Primary | ICD-10-CM

## 2023-03-01 PROCEDURE — 99282 EMERGENCY DEPT VISIT SF MDM: CPT

## 2023-03-01 RX ORDER — OXYMETAZOLINE HYDROCHLORIDE 0.05 G/100ML
2 SPRAY NASAL 2 TIMES DAILY
Status: DISCONTINUED | OUTPATIENT
Start: 2023-03-01 | End: 2023-03-01

## 2023-03-01 RX ORDER — TRANEXAMIC ACID 100 MG/ML
500 INJECTION, SOLUTION INTRAVENOUS ONCE
Status: DISCONTINUED | OUTPATIENT
Start: 2023-03-01 | End: 2023-03-01 | Stop reason: HOSPADM

## 2023-03-01 RX ADMIN — PHENYLEPHRINE HYDROCHLORIDE 2 SPRAY: 0.5 SPRAY NASAL at 03:10

## 2023-03-01 NOTE — DISCHARGE INSTRUCTIONS
Please follow-up with ENT for removal of the nasal packing in 2 to 3 days.  Return to the ER for recurrent bleeding.

## 2023-03-01 NOTE — TELEPHONE ENCOUNTER
Dr. Estrada spoke with patient's wife yesterday.  I have copied and pasted Dr. Brown's note dated 2/28/23 below./ LEIDA       February 28, 2023           4:11 PM    Nixon Brown Jr., MD contacted Asia Ly John Jr., MD          4:14 PM  Note    Spoke to wife and patient had epistaxis for which she was seen in the ER.  Saw ENT today and had cautery.  He is still on Eliquis and doing fairly well.  Given his propensity for bleeding we may need to consider decreasing dose of Eliquis even though he does not technically meet criteria for lower dose.  Follow clinical progress.  Okay to hold for dental procedures on March 21

## 2023-03-01 NOTE — ED PROVIDER NOTES
EMERGENCY DEPARTMENT ENCOUNTER    Room Number:  12/12  Date seen:  3/1/2023  PCP: Lubna Lobo MD  Historian: Patient, wife      HPI:  Chief Complaint: Nosebleed  A complete HPI/ROS/PMH/PSH/SH/FH are unobtainable due to: Nothing  Context: Asia Ly is a 89 y.o. male who presents to the ED c/o nosebleed onset about 40 minutes prior to arrival.  He reports he was recently here for nosebleed and then followed up with ENT this yesterday who performed cauterization.  He is on Eliquis due to history of atrial fibrillation.  His wife reports that he also has a history of anemia but his cardiologist wants him to stay on the Eliquis.  He denies any pain currently.            PAST MEDICAL HISTORY  Active Ambulatory Problems     Diagnosis Date Noted   • Ataxia 08/19/2016   • TIA (transient ischemic attack) 08/19/2016   • Atrial fibrillation, paroxysmal 08/19/2016   • Hyperlipidemia 08/19/2016   • Gout 08/19/2016   • Crohn's disease (HCC) 08/19/2016   • Atrial fibrillation with RVR ([facility]) 10/27/2018   • Acute cholecystitis 08/26/2020   • Obstructive jaundice 08/26/2020   • Acute lower GI bleeding 11/17/2020   • Aortic stenosis    • Pulmonary fibrosis ([facility])    • Anticoagulant long-term use    • Febrile illness, acute 12/07/2020   • Atrial fibrillation with rapid ventricular response (HCC) 06/22/2021     Resolved Ambulatory Problems     Diagnosis Date Noted   • Hypoxia 04/29/2018     Past Medical History:   Diagnosis Date   • Aspiration pneumonia ([facility])    • Bronchiectasis ([facility])    • CKD (chronic kidney disease)    • COPD (chronic obstructive pulmonary disease) ([facility])    • Dizziness    • Elevated cholesterol    • Gastric ulcer    • Generalized weakness    • Hard of hearing    • Hypertension    • Leg swelling    • Lower extremity edema    • Mild anemia    • Near syncope    • Nonrheumatic aortic valve stenosis    • PAF (paroxysmal atrial fibrillation) ([facility])    • Palpitations    • Peptic ulcer    • Pneumonia of left lung due to  "infectious organism    • Stroke (HCC)          PAST SURGICAL HISTORY  Past Surgical History:   Procedure Laterality Date   • BRONCHOSCOPY N/A 10/22/2018    Procedure: BRONCHOSCOPY WITH BRONCHALVEOLAR LAVAGE;  Surgeon: Chidi Oconnor MD;  [facility];  Service: Pulmonary   • BRONCHOSCOPY N/A 7/8/2021    Procedure: BRONCHOSCOPY with BAL;  Surgeon: Chidi Oconnor MD;  [facility];  Service: Pulmonary;  Laterality: N/A;  Pre: bronchectasis  Post: same   • CARDIAC ELECTROPHYSIOLOGY PROCEDURE N/A 7/9/2021    Procedure: Pacemaker DC new--[employer] possible His lead;  Surgeon: Rashaun Nettles MD;  Location: [facility] CATH INVASIVE LOCATION;  Service: Cardiology;  Laterality: N/A;   • CARDIAC ELECTROPHYSIOLOGY PROCEDURE N/A 9/3/2021    Procedure: AV node 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 ppm;  Surgeon: Rashaun Nettles MD;  Location: [facility] CATH INVASIVE LOCATION;  Service: Cardiovascular;  Laterality: N/A;   • CATARACT EXTRACTION Bilateral    • COLONOSCOPY     • COLONOSCOPY N/A 3/9/2018    Procedure: COLONOSCOPY to terminal ileum with bx;  Surgeon: Aron Cabrera MD;  Location: [facility] ENDOSCOPY;  Service:    • COLONOSCOPY N/A 11/19/2020    Procedure: COLONOSCOPY to cecum:  apc to cecum angiodysplagia,;  Surgeon: Aron Cabrera MD;  Location: [facility] ENDOSCOPY;  Service: Gastroenterology;  Laterality: N/A;  GI bleed  post:  diverticulosis, angiodysplagia   • CYSTECTOMY      back and shoulder area   • ENDOSCOPY N/A 3/9/2018    Procedure: ESOPHAGOGASTRODUODENOSCOPY with bx;  Surgeon: Aron Cabrera MD;  Location: [facility] ENDOSCOPY;  Service:    • ENDOSCOPY N/A 11/19/2020    Procedure: ESOPHAGOGASTRODUODENOSCOPY;  Surgeon: Aron Cabrera MD;  Location: [facility] ENDOSCOPY;  Service: Gastroenterology;  Laterality: N/A;  GI bleed  post:  HH.   • EYE SURGERY Left     detached retina   • EYE SURGERY Right     \"repaired a hole in the eye\"   • INSERT / REPLACE / REMOVE PACEMAKER     • TESTICLE SURGERY   "    benign tumor removed.         FAMILY HISTORY  Family History   Problem Relation Age of Onset   • Stroke Mother    • Heart disease Mother    • Hypertension Mother    • Diabetes Mother    • Heart disease Sister         Heart Valve Replacement   • Ovarian cancer Sister    • Rheumatic fever Sister    • Diabetes Sister    • Stroke Father          SOCIAL HISTORY  Social History     Socioeconomic History   • Marital status:    Tobacco Use   • Smoking status: Former     Types: Cigars     Quit date: 10/28/1994     Years since quittin.3   • Smokeless tobacco: Never   • Tobacco comments:     Quit 25 years ago   Vaping Use   • Vaping Use: Never used   Substance and Sexual Activity   • Alcohol use: No     Comment: daily caffiene - 1/2 cup of coffee   • Drug use: No   • Sexual activity: Defer         ALLERGIES  Amiodarone, Diltiazem, Doxycycline, Cefdinir, and Indomethacin        REVIEW OF SYSTEMS  Review of Systems   Review of all 14 systems is negative other than stated in the HPI above.      PHYSICAL EXAM  ED Triage Vitals [23 0357]   Temp Heart Rate Resp BP SpO2   98.7 °F (37.1 °C) 63 15 119/58 92 %      Temp src Heart Rate Source Patient Position BP Location FiO2 (%)   Oral -- -- -- --       Physical Exam      GENERAL: Awake and alert, no acute distress  HENT: nares patent, trace blood within the nares bilaterally, no active bleeding noted.  There is an apparent area of recent cauterization on the nasal septum within the left nare.  EYES: no scleral icterus, EOMI  CV: irregular rhythm, normal rate  RESPIRATORY: normal effort  MUSCULOSKELETAL: no deformity  NEURO: alert, moves all extremities, follows commands  PSYCH:  calm, cooperative  SKIN: warm, dry    Vital signs and nursing notes reviewed.          LAB RESULTS  No results found for this or any previous visit (from the past 24 hour(s)).    Ordered the above labs and reviewed the results.        RADIOLOGY  No Radiology Exams Resulted Within Past 24  Hours    Ordered the above noted radiological studies. Reviewed by me in PACS.            PROCEDURES  Epistaxis Management    Date/Time: 3/1/2023 5:41 AM  Performed by: Godfrey Wood MD  Authorized by: Godfrey Wood MD     Consent:     Consent obtained:  Verbal  Anesthesia:     Anesthesia method:  None  Procedure details:     Treatment site:  L anterior    Treatment method:  Merocel sponge    Treatment complexity:  Limited  Post-procedure details:     Assessment:  Bleeding stopped    Procedure completion:  Tolerated well, no immediate complications                  MEDICATIONS GIVEN IN ER  Medications   tranexamic acid 500 MG/5ML nasal (ED/Crit Care for epistaxis) - VIAL DISPENSE 500 mg (has no administration in time range)   phenylephrine (LICO-SYNEPHRINE) 0.5 % nasal spray 2 spray (2 sprays Nasal Given During Downtime 3/1/23 0310)                   MEDICAL DECISION MAKING, PROGRESS, and CONSULTS    All labs have been independently reviewed by me.  All radiology studies have been reviewed by me and I have also reviewed the radiology report.   EKG's independently viewed and interpreted by me.  Discussion below represents my analysis of pertinent findings related to patient's condition, differential diagnosis, treatment plan and final disposition.      Additional sources:  - Discussed/ obtained information from independent historians: Patient's wife at bedside who provided most of the history and HPI    - External (non-ED) record review: I reviewed cardiology office visit with Alta Anish from 10/27/2022.  He is on Eliquis due to history of TIA.      Orders placed during this visit:  No orders of the defined types were placed in this encounter.          Differential diagnosis:    Epistaxis  Coagulopathy        Independent interpretation of labs, radiology studies, and discussions with consultants:  ED Course as of 03/01/23 0540   Wed Mar 01, 2023   0411 Patient reassessed.  The nasal clamp had been  taken off.  There is no current bleeding.  I went ahead and irrigated both naris with Afrin spray.  I offered to go ahead and pack his nose to reduce risk of rebleeding, in which case I would recommend follow-up with ENT on Friday for packing removal.  His wife does not think that he will tolerate the packing well and she would prefer that we not pack it today.  They had seen ENT yesterday and she believes that she can get him back into see them today if necessary.  I advised that we will continue to monitor him briefly to ensure he does not have any further bleeding prior to discharge.  They are agreeable with this plan. [JR]   0509 Patient had a slow trickle of blood in his posterior oropharynx.  I proceeded with attempted Merisel packing bilaterally.  No difficulty with packing of the left nare.  Patient has an apparent deviated septum with narrowing of the right nasal passage and I was unable to pass the Merisel packing. [JR]   0540 Patient reassessed.  His nose is hemostatic.  No blood in the posterior oropharynx.  Appropriate discharge home at this time with instructions to follow-up with ENT for packing removal in 2 to 3 days.  Continue Eliquis.  Return precautions were discussed. [JR]      ED Course User Index  [JR] Godfrey Wood MD             I wore an N95 mask, face shield, and gloves during this patient encounter.  Patient also wearing a surgical mask.  Hand hygeine performed before and after seeing the patient.    DIAGNOSIS  Final diagnoses:   Acute anterior epistaxis   Chronic anticoagulation         DISPOSITION  DISCHARGE    Patient discharged in stable condition.    Reviewed implications of results, diagnosis, meds, responsibility to follow up, warning signs and symptoms of possible worsening, potential complications and reasons to return to ER.    Patient/Family voiced understanding of above instructions.    Discussed plan for discharge, as there is no emergent indication for admission.  Patient referred to primary care provider for BP management due to today's BP. Pt/family is agreeable and understands need for follow up and repeat testing.  Pt is aware that discharge does not mean that nothing is wrong but it indicates no emergency is present that requires admission and they must continue care with follow-up as given below or physician of their choice.     FOLLOW-UP  Samira Costello PA-C  [address] [facility] [address] [phone]    Schedule an appointment as soon as possible for a visit            Medication List      No changes were made to your prescriptions during this visit.                   Latest Documented Vital Signs:  As of 05:40 EST  BP- 119/58 HR- 63 Temp- 98.7 °F (37.1 °C) (Oral) O2 sat- 92%              --    Please note that portions of this were completed with a voice recognition program.       Note Disclaimer: At [facility], we believe that sharing information builds trust and better relationships. You are receiving this note because you are receiving care at [facility] or recently visited. It is possible you will see health information before a provider has talked with you about it. This kind of information can be easy to misunderstand. To help you fully understand what it means for your health, we urge you to discuss this note with your provider.           Godfrey Wodo MD  03/01/23 0504

## 2023-03-03 ENCOUNTER — HOSPITAL ENCOUNTER (EMERGENCY)
Facility: HOSPITAL | Age: 88
Discharge: HOME | End: 2023-03-03
Attending: EMERGENCY MEDICINE | Admitting: EMERGENCY MEDICINE
Payer: MEDICARE

## 2023-03-03 VITALS
TEMPERATURE: 98.8 F | HEART RATE: 60 BPM | OXYGEN SATURATION: 95 % | RESPIRATION RATE: 18 BRPM | DIASTOLIC BLOOD PRESSURE: 61 MMHG | SYSTOLIC BLOOD PRESSURE: 143 MMHG

## 2023-03-03 DIAGNOSIS — R04.0 EPISTAXIS: Primary | ICD-10-CM

## 2023-03-03 DIAGNOSIS — Z79.01 ANTICOAGULATED: ICD-10-CM

## 2023-03-03 PROCEDURE — 99283 EMERGENCY DEPT VISIT LOW MDM: CPT

## 2023-03-03 RX ORDER — AZITHROMYCIN 250 MG/1
TABLET, FILM COATED ORAL
Qty: 6 TABLET | Refills: 0 | Status: SHIPPED | OUTPATIENT
Start: 2023-03-03

## 2023-03-03 RX ADMIN — PHENYLEPHRINE HYDROCHLORIDE 2 SPRAY: 0.5 SPRAY NASAL at 08:43

## 2023-03-03 NOTE — DISCHARGE INSTRUCTIONS
Follow-up with Dr. Scott later today as scheduled.  If your nose starts bleeding again, insert 2 sprays of Afrin in your nose and then pinch her nose for at least 20 minutes.

## 2023-03-03 NOTE — ED TRIAGE NOTES
From home with c/o nosebleed.  Bleeding controlled at this time. Has been seen here recently for same

## 2023-03-03 NOTE — ED PROVIDER NOTES
EMERGENCY DEPARTMENT ENCOUNTER    Room Number:  09/09  Date seen:  3/3/2023  PCP: Lubna Lobo MD  Historian: Patient, wife, EMS      HPI:  Chief Complaint: Nosebleed  A complete HPI/ROS/PMH/PSH/SH/FH are unobtainable due to: Nothing  Context: Asia Ly is a 89 y.o. male who presents to the ED from home by EMS c/o right-sided nosebleed that began around 6 AM.  Bleeding is mild.  Patient also reports spitting up some blood.  He has been sneezing some.  He takes Eliquis.  His dose was decreased yesterday by his PCP.  Denies dizziness, chest pain, shortness of breath, fever, sore throat, or syncope.  He has been seen here in the ED x2 in the past few days for a left-sided nosebleed.  He was last seen 2 days ago and had a Merocel sponge placed in his left nare.  He was scheduled to see Dr. Scott, ENT, today to have it removed.  However, the sponge fell out earlier this morning.  He also saw Dr. Scott a few days ago after his first ED visit.            PAST MEDICAL HISTORY  Active Ambulatory Problems     Diagnosis Date Noted   • Ataxia 08/19/2016   • TIA (transient ischemic attack) 08/19/2016   • Atrial fibrillation, paroxysmal 08/19/2016   • Hyperlipidemia 08/19/2016   • Gout 08/19/2016   • Crohn's disease (HCC) 08/19/2016   • Atrial fibrillation with RVR (HCC) 10/27/2018   • Acute cholecystitis 08/26/2020   • Obstructive jaundice 08/26/2020   • Acute lower GI bleeding 11/17/2020   • Aortic stenosis    • Pulmonary fibrosis (HCC)    • Anticoagulant long-term use    • Febrile illness, acute 12/07/2020   • Atrial fibrillation with rapid ventricular response (HCC) 06/22/2021     Resolved Ambulatory Problems     Diagnosis Date Noted   • Hypoxia 04/29/2018     Past Medical History:   Diagnosis Date   • Aspiration pneumonia (HCC)    • Bronchiectasis (HCC)    • CKD (chronic kidney disease)    • COPD (chronic obstructive pulmonary disease) (HCC)    • Dizziness    • Elevated cholesterol    • Gastric ulcer    • Generalized  weakness    • Hard of hearing    • Hypertension    • Leg swelling    • Lower extremity edema    • Mild anemia    • Near syncope    • Nonrheumatic aortic valve stenosis    • PAF (paroxysmal atrial fibrillation) ([facility])    • Palpitations    • Peptic ulcer    • Pneumonia of left lung due to infectious organism    • Stroke (HCC)          PAST SURGICAL HISTORY  Past Surgical History:   Procedure Laterality Date   • BRONCHOSCOPY N/A 10/22/2018    Procedure: BRONCHOSCOPY WITH BRONCHALVEOLAR LAVAGE;  Surgeon: Chidi Oconnor MD;  Location: [facility] ENDOSCOPY;  Service: Pulmonary   • BRONCHOSCOPY N/A 7/8/2021    Procedure: BRONCHOSCOPY with BAL;  Surgeon: Chidi Oconnor MD;  Location: [facility] ENDOSCOPY;  Service: Pulmonary;  Laterality: N/A;  Pre: bronchectasis  Post: same   • CARDIAC ELECTROPHYSIOLOGY PROCEDURE N/A 7/9/2021    Procedure: Pacemaker DC new--[employer] possible His lead;  Surgeon: Rashaun Nettles MD;  Location: [facility] CATH INVASIVE LOCATION;  Service: Cardiology;  Laterality: N/A;   • CARDIAC ELECTROPHYSIOLOGY PROCEDURE N/A 9/3/2021    Procedure: AV node 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 ppm;  Surgeon: Rashaun Nettles MD;  Location: [facility] CATH INVASIVE LOCATION;  Service: Cardiovascular;  Laterality: N/A;   • CATARACT EXTRACTION Bilateral    • COLONOSCOPY     • COLONOSCOPY N/A 3/9/2018    Procedure: COLONOSCOPY to terminal ileum with bx;  Surgeon: Aron Cabrera MD;  Location: [facility] ENDOSCOPY;  Service:    • COLONOSCOPY N/A 11/19/2020    Procedure: COLONOSCOPY to cecum:  apc to cecum angiodysplagia,;  Surgeon: Aron Cabrera MD;  Location: [facility] ENDOSCOPY;  Service: Gastroenterology;  Laterality: N/A;  GI bleed  post:  diverticulosis, angiodysplagia   • CYSTECTOMY      back and shoulder area   • ENDOSCOPY N/A 3/9/2018    Procedure: ESOPHAGOGASTRODUODENOSCOPY with bx;  Surgeon: Aron Cabrera MD;  Location: [facility] ENDOSCOPY;  Service:    • ENDOSCOPY N/A 11/19/2020    Procedure: ESOPHAGOGASTRODUODENOSCOPY;   "Surgeon: Aron Cabrera MD;  Location: [facility] ENDOSCOPY;  Service: Gastroenterology;  Laterality: N/A;  GI bleed  post:  HH.   • EYE SURGERY Left     detached retina   • EYE SURGERY Right     \"repaired a hole in the eye\"   • INSERT / REPLACE / REMOVE PACEMAKER     • TESTICLE SURGERY      benign tumor removed.         FAMILY HISTORY  Family History   Problem Relation Age of Onset   • Stroke Mother    • Heart disease Mother    • Hypertension Mother    • Diabetes Mother    • Heart disease Sister         Heart Valve Replacement   • Ovarian cancer Sister    • Rheumatic fever Sister    • Diabetes Sister    • Stroke Father          SOCIAL HISTORY  Social History     Socioeconomic History   • Marital status:    Tobacco Use   • Smoking status: Former     Types: Cigars     Quit date: 10/28/1994     Years since quittin.3   • Smokeless tobacco: Never   • Tobacco comments:     Quit 25 years ago   Vaping Use   • Vaping Use: Never used   Substance and Sexual Activity   • Alcohol use: No     Comment: daily caffiene - 1/2 cup of coffee   • Drug use: No   • Sexual activity: Defer         ALLERGIES  Amiodarone, Diltiazem, Doxycycline, Cefdinir, and Indomethacin        REVIEW OF SYSTEMS  Review of Systems     All systems have been reviewed and are negative except as as discussed in the HPI    PHYSICAL EXAM  ED Triage Vitals [23 0818]   Temp Heart Rate Resp BP SpO2   98.8 °F (37.1 °C) 76 18 148/62 97 %      Temp src Heart Rate Source Patient Position BP Location FiO2 (%)   Tympanic -- -- -- --       Physical Exam      GENERAL: Awake, alert, oriented x3.  Well-developed, well-nourished elderly male.  Resting comfortably in no acute distress  HENT: NCAT, there is a small amount of blood in the posterior oropharynx, there is scant blood in the anterior aspect of the right nare, there is no obvious source of active bleeding  EYES: no scleral icterus  CV: regular rhythm, normal rate  RESPIRATORY: normal effort, clear to " auscultation bilaterally  ABDOMEN: soft, nontender  MUSCULOSKELETAL: Extremities are nontender with full range of motion  NEURO: Speech is normal.  No facial droop.  PSYCH:  calm, cooperative  SKIN: warm, dry    Vital signs and nursing notes reviewed.          LAB RESULTS  No results found for this or any previous visit (from the past 24 hour(s)).    Ordered the above labs and reviewed the results.        RADIOLOGY  No Radiology Exams Resulted Within Past 24 Hours    Ordered the above noted radiological studies. Reviewed by me in PACS.            PROCEDURES  Procedures              MEDICATIONS GIVEN IN ER  Medications   phenylephrine (LICO-SYNEPHRINE) 0.5 % nasal spray 2 spray (2 sprays Nasal Given 3/3/23 0843)                   MEDICAL DECISION MAKING, PROGRESS, and CONSULTS    All labs have been independently reviewed by me.  All radiology studies have been reviewed by me and I have also reviewed the radiology report.   EKG's independently viewed and interpreted by me.  Discussion below represents my analysis of pertinent findings related to patient's condition, differential diagnosis, treatment plan and final disposition.      Additional sources:  - Discussed/ obtained information from independent historians: Wife at bedside, EMS    - External (non-ED) record review:  I reviewed cardiology office visit with Alta Oconnell from 10/27/2022.  He is on Eliquis due to history of TIA.     Patient was seen here in the ED on 2/27/2023 for nosebleed.  It resolved without intervention.  He was seen here again on 3/1/2023 also for nosebleed.  He was treated with Afrin and tranexamic acid.  Merocel sponge was placed in the left nare.  He was referred to ENT. Hemoglobin was 12.1 at that time.    - Chronic or social conditions impacting care: N/A          Orders placed during this visit:  No orders of the defined types were placed in this encounter.        Additional orders considered but not ordered:  CBC: Patient has blood only  small amount.  CBC was done 2 days ago.  Have low suspicion that the patient is acutely anemic.        Differential diagnosis:    Epistaxis, hypertension, coagulopathy, nasal trauma      Independent interpretation of labs, radiology studies, and discussions with consultants:  ED Course as of 03/03/23 1020   Fri Mar 03, 2023   0840 There is minimal blood in the right anterior nare.  Patient gently blew his nose.  There was no obvious source of active bleeding.  We will use Afrin [WH]   0850 BP: 140/65 [WH]   1015 Patient was reexamined.  He has not had any further bleeding after 2 sprays of Benny-Synephrine..  There is a small amount of dried blood in the right nare.  He has an appointment with Dr. Scott, ENT, at 1230 today.  Patient will be discharged. [WH]      ED Course User Index  [] Armen Nur MD               DIAGNOSIS  Final diagnoses:   Epistaxis   Anticoagulated         DISPOSITION  DISCHARGE    Patient discharged in stable condition.    Reviewed implications of results, diagnosis, meds, responsibility to follow up, warning signs and symptoms of possible worsening, potential complications and reasons to return to ER, including worsening/recurrent bleeding, dizziness, fainting, shortness of breath, or other concern..    Patient/Family voiced understanding of above instructions.    Discussed plan for discharge, as there is no emergent indication for admission. Patient referred to primary care provider for BP management due to today's BP. Pt/family is agreeable and understands need for follow up and repeat testing.  Pt is aware that discharge does not mean that nothing is wrong but it indicates no emergency is present that requires admission and they must continue care with follow-up as given below or physician of their choice.     FOLLOW-UP  Pavan Scott MD  [address] [facility] [address] [phone]    Go today  As scheduled         Medication List      No changes were made to  your prescriptions during this visit.                   Latest Documented Vital Signs:  As of 10:20 EST  BP- 140/65 HR- 60 Temp- 98.8 °F (37.1 °C) (Tympanic) O2 sat- 95%              --    Please note that portions of this were completed with a voice recognition program.       Note Disclaimer: At [facility], we believe that sharing information builds trust and better relationships. You are receiving this note because you are receiving care at [facility] or recently visited. It is possible you will see health information before a provider has talked with you about it. This kind of information can be easy to misunderstand. To help you fully understand what it means for your health, we urge you to discuss this note with your provider.           Armen Nur MD  03/03/23 3607

## 2023-03-20 ENCOUNTER — TELEPHONE (OUTPATIENT)
Dept: CARDIOLOGY | Facility: CLINIC | Age: 88
End: 2023-03-20

## 2023-03-20 ENCOUNTER — LAB (OUTPATIENT)
Dept: LAB | Facility: HOSPITAL | Age: 88
End: 2023-03-20
Payer: MEDICARE

## 2023-03-20 ENCOUNTER — OFFICE VISIT (OUTPATIENT)
Dept: CARDIOLOGY | Facility: CLINIC | Age: 88
End: 2023-03-20
Payer: MEDICARE

## 2023-03-20 VITALS
SYSTOLIC BLOOD PRESSURE: 140 MMHG | OXYGEN SATURATION: 99 % | HEART RATE: 72 BPM | HEIGHT: 68 IN | DIASTOLIC BLOOD PRESSURE: 60 MMHG | BODY MASS INDEX: 21.82 KG/M2 | WEIGHT: 144 LBS

## 2023-03-20 DIAGNOSIS — I48.0 AF (PAROXYSMAL ATRIAL FIBRILLATION): ICD-10-CM

## 2023-03-20 DIAGNOSIS — Z51.81 ENCOUNTER FOR THERAPEUTIC DRUG LEVEL MONITORING: ICD-10-CM

## 2023-03-20 DIAGNOSIS — I48.0 AF (PAROXYSMAL ATRIAL FIBRILLATION): Primary | ICD-10-CM

## 2023-03-20 LAB
DEPRECATED RDW RBC AUTO: 52.2 FL (ref 37–54)
ERYTHROCYTE [DISTWIDTH] IN BLOOD BY AUTOMATED COUNT: 15.6 % (ref 12.3–15.4)
HCT VFR BLD AUTO: 36.7 % (ref 37.5–51)
HGB BLD-MCNC: 11.8 G/DL (ref 13–17.7)
MCH RBC QN AUTO: 29.4 PG (ref 26.6–33)
MCHC RBC AUTO-ENTMCNC: 32.2 G/DL (ref 31.5–35.7)
MCV RBC AUTO: 91.5 FL (ref 79–97)
PLATELET # BLD AUTO: 228 10*3/MM3 (ref 140–450)
PMV BLD AUTO: 11.1 FL (ref 6–12)
RBC # BLD AUTO: 4.01 10*6/MM3 (ref 4.14–5.8)
WBC NRBC COR # BLD: 9.16 10*3/MM3 (ref 3.4–10.8)

## 2023-03-20 PROCEDURE — 85027 COMPLETE CBC AUTOMATED: CPT

## 2023-03-20 PROCEDURE — 99214 OFFICE O/P EST MOD 30 MIN: CPT | Performed by: NURSE PRACTITIONER

## 2023-03-20 PROCEDURE — 1160F RVW MEDS BY RX/DR IN RCRD: CPT | Performed by: NURSE PRACTITIONER

## 2023-03-20 PROCEDURE — 36415 COLL VENOUS BLD VENIPUNCTURE: CPT

## 2023-03-20 PROCEDURE — 1159F MED LIST DOCD IN RCRD: CPT | Performed by: NURSE PRACTITIONER

## 2023-03-20 NOTE — PROGRESS NOTES
Date of Office Visit: 23  Encounter Provider: KYLAH Jha  Place of Service: [facility] CARDIOLOGY  Patient Name: Asia Ly  :1932    Chief Complaint   Patient presents with   • Atrial fibrillation with rapid ventricular response (HCC)   • PAF (paroxysmal atrial fibrillation) (HCC)   • Hyperlipidemia   • Stroke   • Follow-up   :     HPI: Asia Ly is a 89 y.o. male  with paroxysmal atrial fibrillation, stroke and TIA in 2016, aortic stenosis, hyperlipidemia, B12 anemia,Crohn's disease, colon polyps, lower extremity edema, TIA,  pulmonary hypertension and pulmonary fibrosis.      He has been followed by Dr. Hector Rivera. He is now followed by Dr. Brown and I will visit with him in follow up today.      Has been on sotalol in the past as well as amiodarone for recurrent atrial fibrillation.  He had Echocardiogram 2016 showed normal left ventricular systolic dysfunction, moderate tricuspid insufficiency, mild mitral insufficiency , mild pulmonary hypertension.  He also had a perfusion stress test that was negative for ischemia.  He was taken off diltiazem for allergic reaction to rash.  2017 he had an echocardiogram which showed an EF of 60%, grade 2 diastolic dysfunction and mild aortic he had increased lower extremity edema and had again rapid atrial fibrillation.  He was started on a diuretic.  He then had pneumonia hypoxia and influenza and was started on Tikosyn.  He has some prolonged QTC complicated by antibiotics.  He then had issues with gout felt to be related to discontinuation of indomethacin and was prescribed colchicine.  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  He then had some atrial fibrillation with RVR and was given oral metoprolol and Cardizem and converted back to sinus rhythm.  He was evaluated by electrophysiology with Dr. Nettles who felt that his ablation  at his age would be very high risk procedure.  He was later started on digoxin for recurrent rapid atrial fibrillation.  He was admitted again January 2019 and electrophysiology felt that he may for AV node ablation and pacemaker implantation.  However he preferred to stay on Tikosyn and accept occasional breakthroughs.     He then had some confusion and upper epigastric abdominal pain he was hospitalized August 2020 found to have transaminitis with hyperbilirubinemia.  He had some atrial fibrillation with RVR received IV metoprolol and his heart rate improved.  Echocardiogram showed normal left ventricular systolic function, mild concentric hypertrophy, moderate aortic valve stenoses with aortic valve area 1.1 cm², maximum pressure gradient of 42 and mean pressure gradient 24 mmHg.  RVSP was severely elevated at 57.8.  He had mild aortic valve regurgitation and mild mitral regurgitation.     In November 2020 was hospitalized with hematochezia and underwent GI evaluation with no evidence of active bleed.  He was cleared to resume apixaban and was later found to have bilateral pneumonia.     He was treated again for pneumonia and had a bronchoscopy July 7.  He is colonized Pseudomonas and infectious disease recommended conservative treatment.  He had some rapid atrial fibrillation during that admission and had a pacemaker placed by Dr. Nettles.    And ultimately had AV node ablation 09/2021. .  His Tikosyn was stopped.      He now presents for reassessment.  He has had multiple nosebleeds.  He saw ENT and had cauterization with packing 2 Fridays ago.  Since then, he has been off Eliquis completely.  He has no chest pain or shortness of breath or edema.  No blood in the urine or stool.  His wife expresses concern regarding restarting Eliquis but understands that it also helps to prevent strokes.        Allergies   Allergen Reactions   • Amiodarone Unknown (See Comments)     Pulmonary fibrosis   • Diltiazem Arrhythmia  "  • Doxycycline Rash   • Cefdinir Arrhythmia     A-FIB   • Indomethacin Rash           Family and social history reviewed.     ROS  All other systems were reviewed and are negative          Objective:     Vitals:    03/20/23 0931   BP: 140/60   BP Location: Left arm   Patient Position: Sitting   Pulse: 72   SpO2: 99%   Weight: 65.3 kg (144 lb)   Height: 172.7 cm (68\")     Body mass index is 21.9 kg/m².    PHYSICAL EXAM:  Pulmonary:      Breath sounds: Examination of the right-lower field reveals decreased breath sounds and rales. Examination of the left-lower field reveals decreased breath sounds and rales. Decreased breath sounds present. Rales present.   Cardiovascular:      Normal rate.      Murmurs: There is a systolic murmur.         Procedures      Current Outpatient Medications   Medication Sig Dispense Refill   • allopurinol (ZYLOPRIM) 100 MG tablet Take 1 tablet by mouth 2 (Two) Times a Day.     • apixaban (ELIQUIS) 2.5 MG tablet tablet Take 1 tablet by mouth Every 12 (Twelve) Hours. 180 tablet 3   • azithromycin (ZITHROMAX) 250 MG tablet Take 2 by mouth today then 1 daily for 4 days 6 tablet 0   • BUDESONIDE PO Take one tablet by mouth 3 -5 times a weekly     • furosemide (LASIX) 20 MG tablet Take 1 tablet by mouth Daily.     • hydrocortisone 2.5 % ointment Apply  topically to the appropriate area as directed 2 (Two) Times a Day. to affected area     • IPRATROPIUM-ALBUTEROL IN Inhale 3 (Three) Times a Day.     • metoprolol tartrate (LOPRESSOR) 50 MG tablet Take 1 tablet by mouth 3 (Three) Times a Day. 270 tablet 3   • Nutritional Supplements (BOOST 100 CALORIE SMART PO) Take  by mouth Daily.     • omeprazole (priLOSEC) 40 MG capsule Take 1 capsule by mouth Daily.     • potassium chloride (K-DUR,KLOR-CON) 10 MEQ ER tablet Take 1 tablet by mouth Daily.     • white petrolatum ointment Apply 1 application topically to the appropriate area as directed As Needed (nasal dryness, epistaxis). 28.25 g 0     No " current facility-administered medications for this visit.     Assessment:       Diagnosis Plan   1. AF (paroxysmal atrial fibrillation) (HCC)  CBC (No Diff)      2. Encounter for therapeutic drug level monitoring  CBC (No Diff)           Orders Placed This Encounter   Procedures   • CBC (No Diff)     Standing Status:   Future     Standing Expiration Date:   3/20/2024     Order Specific Question:   Release to patient     Answer:   Routine Release         Plan:       1. 89 year old gentleman with paroxysmal atrial fibrillation off amiodarone due to history of pulmonary fibrosis.  He  had a rash with diltiazem in the past.  Now status post AV node ablation with permanent pacemaker.  He is off Tikosyn. He is asymptomatic with atrial fibrillation.  He has recent nosebleeds 03/2023 and required cauterization and packing. We will slowly reintroduce Eliquis at 2.5 mg. His wife will give him one tablet daily x 1-2 weeks before resuming twice daily. His wife wants blood counts checked so CBC ordered.  he also follows with Dr. Nettles  2. Aortic stenosis mild in November 2017 and moderate- severe on most recent echo June 2022 . He is overall stable   3.  History of TIA no recurrence- continue eliquis at low dose, as above  3.  History of pulmonary fibrosis usually sees Dr. Chidi Oconnor.    4.  Prediabetes  5.  History of Crohn's disease and iron deficient anemia usually sees Dr. Susie Mccoy  6.  Hypertension continue current regimen  7.  Hyperlipidemia- now off statin therapy which is fine with me.   8.  History of cholecystitis  9.   History of gout  10. History of    Pleural effusion- breathing is stable on current dose lasix               It has been a pleasure to participate in this patient's care.      Thank you,  KYLAH Jha      **I used Dragon to dictate this note:**

## 2023-03-28 NOTE — TELEPHONE ENCOUNTER
Patient's wife called to relay patient is doing good on Eliquis 2.5 mg every morning.  He has not had any bleeding issues.  / LEIDA Dan can be reached at [phone].

## 2023-03-28 NOTE — TELEPHONE ENCOUNTER
I spoke with patient's wife.  She will increase patient's Eliquis 2.5 mg from am to bid per Alta's instructions.  She verbalized understanding of the instruction given./ LEIDA

## 2023-04-25 ENCOUNTER — HOSPITAL ENCOUNTER (INPATIENT)
Facility: HOSPITAL | Age: 88
LOS: 4 days | Discharge: HOME | DRG: 177 | End: 2023-04-29
Attending: EMERGENCY MEDICINE | Admitting: HOSPITALIST
Payer: MEDICARE

## 2023-04-25 ENCOUNTER — APPOINTMENT (OUTPATIENT)
Dept: GENERAL RADIOLOGY | Facility: HOSPITAL | Age: 88
DRG: 177 | End: 2023-04-25
Payer: MEDICARE

## 2023-04-25 DIAGNOSIS — J12.82 PNEUMONIA DUE TO COVID-19 VIRUS: ICD-10-CM

## 2023-04-25 DIAGNOSIS — R53.1 GENERALIZED WEAKNESS: ICD-10-CM

## 2023-04-25 DIAGNOSIS — U07.1 COVID-19 VIRUS INFECTION: Primary | ICD-10-CM

## 2023-04-25 DIAGNOSIS — U07.1 PNEUMONIA DUE TO COVID-19 VIRUS: ICD-10-CM

## 2023-04-25 LAB
ALBUMIN SERPL-MCNC: 3.7 G/DL (ref 3.5–5.2)
ALBUMIN/GLOB SERPL: 1.1 G/DL
ALP SERPL-CCNC: 54 U/L (ref 39–117)
ALT SERPL W P-5'-P-CCNC: 9 U/L (ref 1–41)
ANION GAP SERPL CALCULATED.3IONS-SCNC: 11.3 MMOL/L (ref 5–15)
APTT PPP: 31.9 SECONDS (ref 22.7–35.4)
AST SERPL-CCNC: 13 U/L (ref 1–40)
BASOPHILS # BLD AUTO: 0.02 10*3/MM3 (ref 0–0.2)
BASOPHILS NFR BLD AUTO: 0.3 % (ref 0–1.5)
BILIRUB SERPL-MCNC: 0.5 MG/DL (ref 0–1.2)
BUN SERPL-MCNC: 29 MG/DL (ref 8–23)
BUN/CREAT SERPL: 24.4 (ref 7–25)
CALCIUM SPEC-SCNC: 8.9 MG/DL (ref 8.2–9.6)
CHLORIDE SERPL-SCNC: 103 MMOL/L (ref 98–107)
CO2 SERPL-SCNC: 21.7 MMOL/L (ref 22–29)
CREAT SERPL-MCNC: 1.19 MG/DL (ref 0.76–1.27)
DEPRECATED RDW RBC AUTO: 49.3 FL (ref 37–54)
EGFRCR SERPLBLD CKD-EPI 2021: 58 ML/MIN/1.73
EOSINOPHIL # BLD AUTO: 0.09 10*3/MM3 (ref 0–0.4)
EOSINOPHIL NFR BLD AUTO: 1.3 % (ref 0.3–6.2)
ERYTHROCYTE [DISTWIDTH] IN BLOOD BY AUTOMATED COUNT: 15.5 % (ref 12.3–15.4)
FLUAV SUBTYP SPEC NAA+PROBE: NOT DETECTED
FLUBV RNA ISLT QL NAA+PROBE: NOT DETECTED
GLOBULIN UR ELPH-MCNC: 3.5 GM/DL
GLUCOSE SERPL-MCNC: 89 MG/DL (ref 65–99)
HCT VFR BLD AUTO: 35.4 % (ref 37.5–51)
HGB BLD-MCNC: 11.6 G/DL (ref 13–17.7)
IMM GRANULOCYTES # BLD AUTO: 0.04 10*3/MM3 (ref 0–0.05)
IMM GRANULOCYTES NFR BLD AUTO: 0.6 % (ref 0–0.5)
INR PPP: 1.36 (ref 0.9–1.1)
LYMPHOCYTES # BLD AUTO: 0.6 10*3/MM3 (ref 0.7–3.1)
LYMPHOCYTES NFR BLD AUTO: 8.6 % (ref 19.6–45.3)
MCH RBC QN AUTO: 28.8 PG (ref 26.6–33)
MCHC RBC AUTO-ENTMCNC: 32.8 G/DL (ref 31.5–35.7)
MCV RBC AUTO: 87.8 FL (ref 79–97)
MONOCYTES # BLD AUTO: 0.77 10*3/MM3 (ref 0.1–0.9)
MONOCYTES NFR BLD AUTO: 11 % (ref 5–12)
NEUTROPHILS NFR BLD AUTO: 5.45 10*3/MM3 (ref 1.7–7)
NEUTROPHILS NFR BLD AUTO: 78.2 % (ref 42.7–76)
NRBC BLD AUTO-RTO: 0 /100 WBC (ref 0–0.2)
PLATELET # BLD AUTO: 142 10*3/MM3 (ref 140–450)
PMV BLD AUTO: 11.9 FL (ref 6–12)
POTASSIUM SERPL-SCNC: 4.6 MMOL/L (ref 3.5–5.2)
PROCALCITONIN SERPL-MCNC: 0.13 NG/ML (ref 0–0.25)
PROT SERPL-MCNC: 7.2 G/DL (ref 6–8.5)
PROTHROMBIN TIME: 17 SECONDS (ref 11.7–14.2)
RBC # BLD AUTO: 4.03 10*6/MM3 (ref 4.14–5.8)
SARS-COV-2 RNA NPH QL NAA+NON-PROBE: DETECTED
SODIUM SERPL-SCNC: 136 MMOL/L (ref 136–145)
WBC NRBC COR # BLD: 6.97 10*3/MM3 (ref 3.4–10.8)

## 2023-04-25 PROCEDURE — 80053 COMPREHEN METABOLIC PANEL: CPT | Performed by: PHYSICIAN ASSISTANT

## 2023-04-25 PROCEDURE — 85610 PROTHROMBIN TIME: CPT | Performed by: PHYSICIAN ASSISTANT

## 2023-04-25 PROCEDURE — 25010000002 AMPICILLIN-SULBACTAM PER 1.5 G: Performed by: PHYSICIAN ASSISTANT

## 2023-04-25 PROCEDURE — 99285 EMERGENCY DEPT VISIT HI MDM: CPT

## 2023-04-25 PROCEDURE — 85730 THROMBOPLASTIN TIME PARTIAL: CPT | Performed by: PHYSICIAN ASSISTANT

## 2023-04-25 PROCEDURE — 84145 PROCALCITONIN (PCT): CPT | Performed by: PHYSICIAN ASSISTANT

## 2023-04-25 PROCEDURE — 71046 X-RAY EXAM CHEST 2 VIEWS: CPT

## 2023-04-25 PROCEDURE — 85025 COMPLETE CBC W/AUTO DIFF WBC: CPT | Performed by: PHYSICIAN ASSISTANT

## 2023-04-25 RX ADMIN — AMPICILLIN SODIUM AND SULBACTAM SODIUM 3 G: 2; 1 INJECTION, POWDER, FOR SOLUTION INTRAMUSCULAR; INTRAVENOUS at 21:46

## 2023-04-25 NOTE — ED NOTES
Patient to ER via car from home for productive cough and fever    Patient recently treated for bronchitis

## 2023-04-25 NOTE — ED PROVIDER NOTES
EMERGENCY DEPARTMENT ENCOUNTER    Room Number:  08/08  Date seen:  4/25/2023  PCP: Lubna Lobo MD  Discussed/ obtained information from independent historians: Wife at bedside      HPI:  Chief Complaint: Fever    Context: Asia Ly is a 89 y.o. male who presents to the ED c/o fever that started today.  Wife reports patient has been dealing with a cough for the past 4 to 5 weeks.  He was placed on antibiotics several weeks ago but continued to have persistent cough.  Today, she noticed he was acting very lethargic and checked his temperature.  She noted him to have a fever of 101F.  He has continued to have cough.  She is concerned because patient has recurrent issues with pneumonia and bronchitis.  Patient is on Eliquis for history of atrial fibrillation.  No other systemic complaints at this time.      External (non-ED) record review:   · Patient seen by his PCP today for paroxysmal atrial fibrillation.  Plan to continue on metoprolol 3 times daily and Eliquis twice daily.  · Most recent CBC with WBC 9.16.  Most recent CMP with creatinine 1.48      PAST MEDICAL HISTORY  Active Ambulatory Problems     Diagnosis Date Noted   • Ataxia 08/19/2016   • TIA (transient ischemic attack) 08/19/2016   • Atrial fibrillation, paroxysmal 08/19/2016   • Hyperlipidemia 08/19/2016   • Gout 08/19/2016   • Crohn's disease 08/19/2016   • Atrial fibrillation with RVR 10/27/2018   • Acute cholecystitis 08/26/2020   • Obstructive jaundice 08/26/2020   • Acute lower GI bleeding 11/17/2020   • Aortic stenosis    • Pulmonary fibrosis    • Anticoagulant long-term use    • Febrile illness, acute 12/07/2020   • Atrial fibrillation with rapid ventricular response 06/22/2021     Resolved Ambulatory Problems     Diagnosis Date Noted   • Hypoxia 04/29/2018     Past Medical History:   Diagnosis Date   • Aspiration pneumonia    • Bleeding from the nose    • Bronchiectasis    • CKD (chronic kidney disease)    • COPD (chronic obstructive  pulmonary disease)    • Dizziness    • Elevated cholesterol    • Gastric ulcer    • Generalized weakness    • Hard of hearing    • Hypertension    • Leg swelling    • Lower extremity edema    • Mild anemia    • Near syncope    • Nonrheumatic aortic valve stenosis    • PAF (paroxysmal atrial fibrillation)    • Palpitations    • Peptic ulcer    • Pneumonia of left lung due to infectious organism    • Stroke          PAST SURGICAL HISTORY  Past Surgical History:   Procedure Laterality Date   • BRONCHOSCOPY N/A 10/22/2018    Procedure: BRONCHOSCOPY WITH BRONCHALVEOLAR LAVAGE;  Surgeon: Chidi Oconnor MD;  Location: [facility] ENDOSCOPY;  Service: Pulmonary   • BRONCHOSCOPY N/A 7/8/2021    Procedure: BRONCHOSCOPY with BAL;  Surgeon: Chidi Oconnor MD;  Location: [facility] ENDOSCOPY;  Service: Pulmonary;  Laterality: N/A;  Pre: bronchectasis  Post: same   • CARDIAC ELECTROPHYSIOLOGY PROCEDURE N/A 7/9/2021    Procedure: Pacemaker DC new--[employer] possible His lead;  Surgeon: Rashaun Nettles MD;  Location: [facility] CATH INVASIVE LOCATION;  Service: Cardiology;  Laterality: N/A;   • CARDIAC ELECTROPHYSIOLOGY PROCEDURE N/A 9/3/2021    Procedure: AV node 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 ppm;  Surgeon: Rashaun Nettles MD;  Location: [facility] CATH INVASIVE LOCATION;  Service: Cardiovascular;  Laterality: N/A;   • CATARACT EXTRACTION Bilateral    • COLONOSCOPY     • COLONOSCOPY N/A 3/9/2018    Procedure: COLONOSCOPY to terminal ileum with bx;  Surgeon: Aron Cabrera MD;  Location: [facility] ENDOSCOPY;  Service:    • COLONOSCOPY N/A 11/19/2020    Procedure: COLONOSCOPY to cecum:  apc to cecum angiodysplagia,;  Surgeon: Aron Cabrrea MD;  Location: [facility] ENDOSCOPY;  Service: Gastroenterology;  Laterality: N/A;  GI bleed  post:  diverticulosis, angiodysplagia   • CYSTECTOMY      back and shoulder area   • ENDOSCOPY N/A 3/9/2018    Procedure: ESOPHAGOGASTRODUODENOSCOPY with bx;  Surgeon: Aron Cabrera MD;  Location: [facility] ENDOSCOPY;  " Service:    • ENDOSCOPY N/A 2020    Procedure: ESOPHAGOGASTRODUODENOSCOPY;  Surgeon: Aron Cabrera MD;  Location: [facility] ENDOSCOPY;  Service: Gastroenterology;  Laterality: N/A;  GI bleed  post:  HH.   • EYE SURGERY Left     detached retina   • EYE SURGERY Right     \"repaired a hole in the eye\"   • INSERT / REPLACE / REMOVE PACEMAKER     • TESTICLE SURGERY      benign tumor removed.         FAMILY HISTORY  Family History   Problem Relation Age of Onset   • Stroke Mother    • Heart disease Mother    • Hypertension Mother    • Diabetes Mother    • Heart disease Sister         Heart Valve Replacement   • Ovarian cancer Sister    • Rheumatic fever Sister    • Diabetes Sister    • Stroke Father          SOCIAL HISTORY  Social History     Socioeconomic History   • Marital status:    Tobacco Use   • Smoking status: Former     Types: Cigars     Quit date: 10/28/1994     Years since quittin.5   • Smokeless tobacco: Never   • Tobacco comments:     Quit 25 years ago   Vaping Use   • Vaping Use: Never used   Substance and Sexual Activity   • Alcohol use: No     Comment: daily caffiene - 1/2 cup of coffee   • Drug use: No   • Sexual activity: Defer         ALLERGIES  Amiodarone, Diltiazem, Doxycycline, Cefdinir, and Indomethacin        REVIEW OF SYSTEMS  Review of Systems   Constitutional: Positive for fever. Negative for chills.   HENT: Negative for ear pain and sore throat.    Respiratory: Positive for cough. Negative for shortness of breath.    Cardiovascular: Negative for chest pain and palpitations.   Gastrointestinal: Negative for abdominal pain and vomiting.   Genitourinary: Negative for dysuria and hematuria.   Musculoskeletal: Negative for arthralgias and joint swelling.   Skin: Negative for pallor and rash.   Neurological: Negative for syncope and headaches.   Psychiatric/Behavioral: Negative for confusion and hallucinations.            PHYSICAL EXAM  ED Triage Vitals   Temp Heart Rate Resp BP " SpO2   04/25/23 1847 04/25/23 1847 04/25/23 1847 04/25/23 1859 04/25/23 1847   100.4 °F (38 °C) 77 18 124/67 95 %      Temp src Heart Rate Source Patient Position BP Location FiO2 (%)   -- -- -- -- --              Physical Exam  Constitutional:       General: He is not in acute distress.     Appearance: Normal appearance.   HENT:      Head: Normocephalic and atraumatic.      Nose: Nose normal.      Mouth/Throat:      Mouth: Mucous membranes are moist.   Eyes:      Conjunctiva/sclera: Conjunctivae normal.      Pupils: Pupils are equal, round, and reactive to light.   Cardiovascular:      Rate and Rhythm: Normal rate and regular rhythm.      Pulses: Normal pulses.      Heart sounds: Normal heart sounds.   Pulmonary:      Effort: Pulmonary effort is normal.      Breath sounds: Normal breath sounds.   Abdominal:      General: There is no distension.   Musculoskeletal:         General: Normal range of motion.      Cervical back: Normal range of motion and neck supple.   Skin:     General: Skin is warm.      Capillary Refill: Capillary refill takes less than 2 seconds.   Neurological:      General: No focal deficit present.      Mental Status: He is alert and oriented to person, place, and time.   Psychiatric:         Mood and Affect: Mood normal.           Vital signs and nursing notes reviewed.          LAB RESULTS  Recent Results (from the past 24 hour(s))   COVID-19 and FLU A/B PCR - Swab, Nasopharynx    Collection Time: 04/25/23  7:00 PM    Specimen: Nasopharynx; Swab   Result Value Ref Range    COVID19 Detected (C) Not Detected - Ref. Range    Influenza A PCR Not Detected Not Detected    Influenza B PCR Not Detected Not Detected   Comprehensive Metabolic Panel    Collection Time: 04/25/23  8:17 PM    Specimen: Arm, Right; Blood   Result Value Ref Range    Glucose 89 65 - 99 mg/dL    BUN 29 (H) 8 - 23 mg/dL    Creatinine 1.19 0.76 - 1.27 mg/dL    Sodium 136 136 - 145 mmol/L    Potassium 4.6 3.5 - 5.2 mmol/L     Chloride 103 98 - 107 mmol/L    CO2 21.7 (L) 22.0 - 29.0 mmol/L    Calcium 8.9 8.2 - 9.6 mg/dL    Total Protein 7.2 6.0 - 8.5 g/dL    Albumin 3.7 3.5 - 5.2 g/dL    ALT (SGPT) 9 1 - 41 U/L    AST (SGOT) 13 1 - 40 U/L    Alkaline Phosphatase 54 39 - 117 U/L    Total Bilirubin 0.5 0.0 - 1.2 mg/dL    Globulin 3.5 gm/dL    A/G Ratio 1.1 g/dL    BUN/Creatinine Ratio 24.4 7.0 - 25.0    Anion Gap 11.3 5.0 - 15.0 mmol/L    eGFR 58.0 (L) >60.0 mL/min/1.73   Protime-INR    Collection Time: 04/25/23  8:17 PM    Specimen: Arm, Right; Blood   Result Value Ref Range    Protime 17.0 (H) 11.7 - 14.2 Seconds    INR 1.36 (H) 0.90 - 1.10   aPTT    Collection Time: 04/25/23  8:17 PM    Specimen: Arm, Right; Blood   Result Value Ref Range    PTT 31.9 22.7 - 35.4 seconds   CBC Auto Differential    Collection Time: 04/25/23  8:17 PM    Specimen: Arm, Right; Blood   Result Value Ref Range    WBC 6.97 3.40 - 10.80 10*3/mm3    RBC 4.03 (L) 4.14 - 5.80 10*6/mm3    Hemoglobin 11.6 (L) 13.0 - 17.7 g/dL    Hematocrit 35.4 (L) 37.5 - 51.0 %    MCV 87.8 79.0 - 97.0 fL    MCH 28.8 26.6 - 33.0 pg    MCHC 32.8 31.5 - 35.7 g/dL    RDW 15.5 (H) 12.3 - 15.4 %    RDW-SD 49.3 37.0 - 54.0 fl    MPV 11.9 6.0 - 12.0 fL    Platelets 142 140 - 450 10*3/mm3    Neutrophil % 78.2 (H) 42.7 - 76.0 %    Lymphocyte % 8.6 (L) 19.6 - 45.3 %    Monocyte % 11.0 5.0 - 12.0 %    Eosinophil % 1.3 0.3 - 6.2 %    Basophil % 0.3 0.0 - 1.5 %    Immature Grans % 0.6 (H) 0.0 - 0.5 %    Neutrophils, Absolute 5.45 1.70 - 7.00 10*3/mm3    Lymphocytes, Absolute 0.60 (L) 0.70 - 3.10 10*3/mm3    Monocytes, Absolute 0.77 0.10 - 0.90 10*3/mm3    Eosinophils, Absolute 0.09 0.00 - 0.40 10*3/mm3    Basophils, Absolute 0.02 0.00 - 0.20 10*3/mm3    Immature Grans, Absolute 0.04 0.00 - 0.05 10*3/mm3    nRBC 0.0 0.0 - 0.2 /100 WBC   Procalcitonin    Collection Time: 04/25/23  8:17 PM    Specimen: Arm, Right; Blood   Result Value Ref Range    Procalcitonin 0.13 0.00 - 0.25 ng/mL       Ordered the  above labs and reviewed the results.        RADIOLOGY  XR Chest 2 View    Result Date: 4/25/2023  EXAMINATION: 2 VIEWS OF THE CHEST  HISTORY: 89-year-old male with history of a cough.  FINDINGS: PA and lateral chest radiographs were obtained. Comparison is made to a chest radiograph dated 10/31/2022. There is persistent chronic opacification at the right lung with a meniscus sign posteriorly and laterally. Interval development of interstitial opacification at the base of left lung is noted. A dual lead left-sided cardiac pacemaker appears stable.      There is an interstitial infiltrate at the base of the left lower lobe most consistent with interstitial pneumonia. Stable chronic consolidation at the base of the right lung with a chronic right pleural effusion is noted.  This report was finalized on 4/25/2023 8:53 PM by Dr. Rah López M.D.        Ordered the above noted radiological studies. Reviewed by me in PACS.              MEDICATIONS GIVEN IN ER  Medications   ampicillin-sulbactam (UNASYN) 3 g in sodium chloride 0.9 % 100 mL IVPB-VTB (has no administration in time range)                   MEDICAL DECISION MAKING, PROGRESS, and CONSULTS    All labs have been independently reviewed by me.  All radiology studies have been reviewed by me and I have also reviewed the radiology report.   EKG's independently viewed and interpreted by me.  Discussion below represents my analysis of pertinent findings related to patient's condition, differential diagnosis, treatment plan and final disposition.      Additional sources:    - Chronic or social conditions impacting care: Anticoagulation status          Orders placed during this visit:  Orders Placed This Encounter   Procedures   • COVID-19 and FLU A/B PCR - Swab, Nasopharynx   • XR Chest 2 View   • Comprehensive Metabolic Panel   • Protime-INR   • aPTT   • CBC Auto Differential   • Procalcitonin   • LIPPS (on-call MD unless specified)   • CBC & Differential          Additional orders considered but not ordered:  CT scan of chest    Differential diagnosis:  Pneumonia, bronchitis, pleural effusion      Independent interpretation of labs, radiology studies, and discussions with consultants:  ED Course as of 04/25/23 2120 Tue Apr 25, 2023 1938 Chest x-ray independently interpreted by me is right lower lobe opacity [MP]   2010 COVID19(!!): Detected [MP]   2010 Patient positive for COVID-19 today [MP]   2030 WBC: 6.97 [MP]   2030 Hemoglobin(!): 11.6 [MP]   2030 Neutrophil Rel %(!): 78.2 [MP]   2030 Lymphocyte Rel %(!): 8.6 [MP]   2119 Spoke with Dr. Bergeron.  Reviewed patient presentation and ED findings.  He agrees to admit the patient to a telemetry bed. [MP]      ED Course User Index  [MP] Gracie Whiting PA-C             Patient was wearing a face mask when I entered the room and they continued to wear a mask throughout their stay in the ED.  I wore PPE, including  gloves, face mask with shield or face mask with goggles whenever I was in the room with patient.     DIAGNOSIS  Final diagnoses:   COVID-19 virus infection   Pneumonia due to COVID-19 virus   Generalized weakness       Latest Documented Vital Signs:  As of 21:20 EDT  BP- 112/56 HR- 58 Temp- 100.4 °F (38 °C) O2 sat- 93%              --    Please note that portions of this were completed with a voice recognition program.       Note Disclaimer: At [facility], we believe that sharing information builds trust and better relationships. You are receiving this note because you are receiving care at [facility] or recently visited. It is possible you will see health information before a provider has talked with you about it. This kind of information can be easy to misunderstand. To help you fully understand what it means for your health, we urge you to discuss this note with your provider.           Gracie Whiting PA-C  04/25/23 2121       Gracie Whiting PA-C  04/25/23 2213

## 2023-04-26 LAB
ALBUMIN SERPL-MCNC: 3.3 G/DL (ref 3.5–5.2)
ALP SERPL-CCNC: 54 U/L (ref 39–117)
ALT SERPL W P-5'-P-CCNC: 11 U/L (ref 1–41)
ANION GAP SERPL CALCULATED.3IONS-SCNC: 12.2 MMOL/L (ref 5–15)
AST SERPL-CCNC: 18 U/L (ref 1–40)
BASOPHILS # BLD AUTO: 0.02 10*3/MM3 (ref 0–0.2)
BASOPHILS NFR BLD AUTO: 0.3 % (ref 0–1.5)
BILIRUB CONJ SERPL-MCNC: <0.2 MG/DL (ref 0–0.3)
BILIRUB INDIRECT SERPL-MCNC: ABNORMAL MG/DL
BILIRUB SERPL-MCNC: 0.5 MG/DL (ref 0–1.2)
BUN SERPL-MCNC: 27 MG/DL (ref 8–23)
BUN/CREAT SERPL: 25.2 (ref 7–25)
CALCIUM SPEC-SCNC: 8.7 MG/DL (ref 8.2–9.6)
CHLORIDE SERPL-SCNC: 105 MMOL/L (ref 98–107)
CO2 SERPL-SCNC: 20.8 MMOL/L (ref 22–29)
CREAT SERPL-MCNC: 1.07 MG/DL (ref 0.76–1.27)
CREAT SERPL-MCNC: 1.2 MG/DL (ref 0.76–1.27)
DEPRECATED RDW RBC AUTO: 50.2 FL (ref 37–54)
EGFRCR SERPLBLD CKD-EPI 2021: 57.4 ML/MIN/1.73
EGFRCR SERPLBLD CKD-EPI 2021: 65.9 ML/MIN/1.73
EOSINOPHIL # BLD AUTO: 0.04 10*3/MM3 (ref 0–0.4)
EOSINOPHIL NFR BLD AUTO: 0.6 % (ref 0.3–6.2)
ERYTHROCYTE [DISTWIDTH] IN BLOOD BY AUTOMATED COUNT: 15.7 % (ref 12.3–15.4)
FERRITIN SERPL-MCNC: 88.8 NG/ML (ref 30–400)
GLUCOSE BLDC GLUCOMTR-MCNC: 98 MG/DL (ref 70–130)
GLUCOSE SERPL-MCNC: 62 MG/DL (ref 65–99)
HCT VFR BLD AUTO: 32.7 % (ref 37.5–51)
HGB BLD-MCNC: 10.9 G/DL (ref 13–17.7)
IMM GRANULOCYTES # BLD AUTO: 0.05 10*3/MM3 (ref 0–0.05)
IMM GRANULOCYTES NFR BLD AUTO: 0.8 % (ref 0–0.5)
LYMPHOCYTES # BLD AUTO: 0.76 10*3/MM3 (ref 0.7–3.1)
LYMPHOCYTES NFR BLD AUTO: 12.3 % (ref 19.6–45.3)
MCH RBC QN AUTO: 29.6 PG (ref 26.6–33)
MCHC RBC AUTO-ENTMCNC: 33.3 G/DL (ref 31.5–35.7)
MCV RBC AUTO: 88.9 FL (ref 79–97)
MONOCYTES # BLD AUTO: 0.71 10*3/MM3 (ref 0.1–0.9)
MONOCYTES NFR BLD AUTO: 11.5 % (ref 5–12)
NEUTROPHILS NFR BLD AUTO: 4.59 10*3/MM3 (ref 1.7–7)
NEUTROPHILS NFR BLD AUTO: 74.5 % (ref 42.7–76)
NRBC BLD AUTO-RTO: 0 /100 WBC (ref 0–0.2)
NT-PROBNP SERPL-MCNC: 5224 PG/ML (ref 0–1800)
PLATELET # BLD AUTO: 116 10*3/MM3 (ref 140–450)
PMV BLD AUTO: 11.3 FL (ref 6–12)
POTASSIUM SERPL-SCNC: 4.2 MMOL/L (ref 3.5–5.2)
PROT SERPL-MCNC: 7 G/DL (ref 6–8.5)
RBC # BLD AUTO: 3.68 10*6/MM3 (ref 4.14–5.8)
SODIUM SERPL-SCNC: 138 MMOL/L (ref 136–145)
WBC NRBC COR # BLD: 6.17 10*3/MM3 (ref 3.4–10.8)

## 2023-04-26 PROCEDURE — 82565 ASSAY OF CREATININE: CPT | Performed by: INTERNAL MEDICINE

## 2023-04-26 PROCEDURE — 80076 HEPATIC FUNCTION PANEL: CPT | Performed by: INTERNAL MEDICINE

## 2023-04-26 PROCEDURE — 82728 ASSAY OF FERRITIN: CPT | Performed by: HOSPITALIST

## 2023-04-26 PROCEDURE — 83880 ASSAY OF NATRIURETIC PEPTIDE: CPT | Performed by: HOSPITALIST

## 2023-04-26 PROCEDURE — 82962 GLUCOSE BLOOD TEST: CPT

## 2023-04-26 PROCEDURE — 25010000002 DEXAMETHASONE PER 1 MG: Performed by: HOSPITALIST

## 2023-04-26 PROCEDURE — 25010000002 REMDESIVIR 100 MG/20ML SOLUTION 1 EACH VIAL: Performed by: INTERNAL MEDICINE

## 2023-04-26 PROCEDURE — 25010000002 ENOXAPARIN PER 10 MG: Performed by: HOSPITALIST

## 2023-04-26 PROCEDURE — 85025 COMPLETE CBC W/AUTO DIFF WBC: CPT | Performed by: HOSPITALIST

## 2023-04-26 PROCEDURE — XW033E5 INTRODUCTION OF REMDESIVIR ANTI-INFECTIVE INTO PERIPHERAL VEIN, PERCUTANEOUS APPROACH, NEW TECHNOLOGY GROUP 5: ICD-10-PCS | Performed by: INTERNAL MEDICINE

## 2023-04-26 PROCEDURE — 80048 BASIC METABOLIC PNL TOTAL CA: CPT | Performed by: HOSPITALIST

## 2023-04-26 RX ORDER — CETIRIZINE HYDROCHLORIDE 10 MG/1
10 TABLET ORAL DAILY
Status: DISCONTINUED | OUTPATIENT
Start: 2023-04-26 | End: 2023-04-29

## 2023-04-26 RX ORDER — BUDESONIDE AND FORMOTEROL FUMARATE DIHYDRATE 160; 4.5 UG/1; UG/1
2 AEROSOL RESPIRATORY (INHALATION)
Status: DISCONTINUED | OUTPATIENT
Start: 2023-04-26 | End: 2023-04-29 | Stop reason: HOSPADM

## 2023-04-26 RX ORDER — GUAIFENESIN 600 MG/1
600 TABLET, EXTENDED RELEASE ORAL EVERY 12 HOURS SCHEDULED
Status: DISCONTINUED | OUTPATIENT
Start: 2023-04-26 | End: 2023-04-29 | Stop reason: HOSPADM

## 2023-04-26 RX ORDER — PANTOPRAZOLE SODIUM 40 MG/1
40 TABLET, DELAYED RELEASE ORAL
Status: DISCONTINUED | OUTPATIENT
Start: 2023-04-26 | End: 2023-04-26

## 2023-04-26 RX ORDER — MELATONIN
1000 DAILY
Status: DISCONTINUED | OUTPATIENT
Start: 2023-04-26 | End: 2023-04-29

## 2023-04-26 RX ORDER — SODIUM CHLORIDE 9 MG/ML
50 INJECTION, SOLUTION INTRAVENOUS CONTINUOUS
Status: DISCONTINUED | OUTPATIENT
Start: 2023-04-26 | End: 2023-04-27

## 2023-04-26 RX ORDER — METOPROLOL TARTRATE 50 MG/1
50 TABLET, FILM COATED ORAL 3 TIMES DAILY
Status: DISCONTINUED | OUTPATIENT
Start: 2023-04-26 | End: 2023-04-26

## 2023-04-26 RX ORDER — IPRATROPIUM BROMIDE AND ALBUTEROL SULFATE 2.5; .5 MG/3ML; MG/3ML
3 SOLUTION RESPIRATORY (INHALATION) EVERY 4 HOURS PRN
Status: DISCONTINUED | OUTPATIENT
Start: 2023-04-26 | End: 2023-04-29

## 2023-04-26 RX ORDER — FAMOTIDINE 20 MG/1
20 TABLET, FILM COATED ORAL 2 TIMES DAILY
Status: DISCONTINUED | OUTPATIENT
Start: 2023-04-26 | End: 2023-04-29 | Stop reason: HOSPADM

## 2023-04-26 RX ORDER — DEXAMETHASONE SODIUM PHOSPHATE 10 MG/ML
6 INJECTION INTRAMUSCULAR; INTRAVENOUS DAILY
Status: DISCONTINUED | OUTPATIENT
Start: 2023-04-26 | End: 2023-04-27

## 2023-04-26 RX ORDER — ZINC SULFATE 50(220)MG
220 CAPSULE ORAL DAILY
Status: DISCONTINUED | OUTPATIENT
Start: 2023-04-26 | End: 2023-04-29

## 2023-04-26 RX ORDER — ENOXAPARIN SODIUM 100 MG/ML
40 INJECTION SUBCUTANEOUS EVERY 24 HOURS
Status: DISCONTINUED | OUTPATIENT
Start: 2023-04-26 | End: 2023-04-26

## 2023-04-26 RX ORDER — ALLOPURINOL 100 MG/1
100 TABLET ORAL 2 TIMES DAILY
Status: DISCONTINUED | OUTPATIENT
Start: 2023-04-26 | End: 2023-04-29 | Stop reason: HOSPADM

## 2023-04-26 RX ORDER — ASCORBIC ACID 500 MG
500 TABLET ORAL DAILY
Status: DISCONTINUED | OUTPATIENT
Start: 2023-04-26 | End: 2023-04-29

## 2023-04-26 RX ADMIN — METOPROLOL TARTRATE 25 MG: 25 TABLET, FILM COATED ORAL at 20:17

## 2023-04-26 RX ADMIN — METOPROLOL TARTRATE 25 MG: 25 TABLET, FILM COATED ORAL at 15:25

## 2023-04-26 RX ADMIN — GUAIFENESIN 600 MG: 600 TABLET, EXTENDED RELEASE ORAL at 20:17

## 2023-04-26 RX ADMIN — FAMOTIDINE 20 MG: 20 TABLET, FILM COATED ORAL at 13:23

## 2023-04-26 RX ADMIN — ENOXAPARIN SODIUM 40 MG: 100 INJECTION SUBCUTANEOUS at 01:25

## 2023-04-26 RX ADMIN — SODIUM CHLORIDE 75 ML/HR: 9 INJECTION, SOLUTION INTRAVENOUS at 13:23

## 2023-04-26 RX ADMIN — APIXABAN 2.5 MG: 2.5 TABLET, FILM COATED ORAL at 20:16

## 2023-04-26 RX ADMIN — APIXABAN 2.5 MG: 2.5 TABLET, FILM COATED ORAL at 15:25

## 2023-04-26 RX ADMIN — CETIRIZINE HYDROCHLORIDE 10 MG: 10 TABLET ORAL at 15:25

## 2023-04-26 RX ADMIN — ALLOPURINOL 100 MG: 100 TABLET ORAL at 15:25

## 2023-04-26 RX ADMIN — PANTOPRAZOLE SODIUM 40 MG: 40 TABLET, DELAYED RELEASE ORAL at 05:24

## 2023-04-26 RX ADMIN — SODIUM CHLORIDE 75 ML/HR: 9 INJECTION, SOLUTION INTRAVENOUS at 01:26

## 2023-04-26 RX ADMIN — REMDESIVIR 200 MG: 100 INJECTION, POWDER, LYOPHILIZED, FOR SOLUTION INTRAVENOUS at 21:51

## 2023-04-26 RX ADMIN — ALLOPURINOL 100 MG: 100 TABLET ORAL at 20:16

## 2023-04-26 RX ADMIN — DEXAMETHASONE SODIUM PHOSPHATE 6 MG: 10 INJECTION INTRAMUSCULAR; INTRAVENOUS at 08:05

## 2023-04-26 RX ADMIN — Medication 1000 UNITS: at 20:16

## 2023-04-26 RX ADMIN — GUAIFENESIN 600 MG: 600 TABLET, EXTENDED RELEASE ORAL at 13:23

## 2023-04-26 RX ADMIN — Medication 220 MG: at 20:16

## 2023-04-26 NOTE — H&P
History and physical    Primary care physician      Chief complaint  Fever cough and shortness of breath    History of present illness  89-year-old male with history of chronic atrial fibrillation aortic stenosis hypertension hyperlipidemia pulmonary fibrosis bronchiectasis pulmonary hypertension chronic disease and gastroesophageal reflux disease who is well-known to our service brought to the emergency room with fever cough shortness of breath for last several days to weeks.  Patient is treated with empiric antibiotics without any improvement.  Patient evaluated in ER found to have COVID-19 pneumonia admit for management.  Patient denies any chest pain palpitation abdominal pain nausea vomiting diarrhea.  Patient is DNR per his wishes.    PAST MEDICAL HISTORY  • Aspiration pneumonia     • Bleeding from the nose     • Bronchiectasis     • CKD (chronic kidney disease)     • COPD (chronic obstructive pulmonary disease)     • Dizziness     • Elevated cholesterol     • Gastric ulcer     • Generalized weakness     • Hard of hearing     • Hypertension     • Leg swelling     • Lower extremity edema     • Mild anemia     • Near syncope     • Nonrheumatic aortic valve stenosis     • PAF (paroxysmal atrial fibrillation)     • Palpitations     • Peptic ulcer     • Pneumonia of left lung due to infectious organism     • Stroke        PAST SURGICAL HISTORY              Procedure Laterality Date   • BRONCHOSCOPY N/A 10/22/2018     Procedure: BRONCHOSCOPY WITH BRONCHALVEOLAR LAVAGE;  Surgeon: Chidi Oconnor MD;  [place] [facility] [place];  Service: Pulmonary   • BRONCHOSCOPY N/A 7/8/2021     Procedure: BRONCHOSCOPY with BAL;  Surgeon: Chidi Oconnor MD;  [place] [facility] [place] [facility];  Service: Pulmonary;  Laterality: N/A;  Pre: bronchectasis  Post: same   • CARDIAC ELECTROPHYSIOLOGY PROCEDURE N/A 7/9/2021     Procedure: Pacemaker DC new--[employer] possible His lead;  Surgeon: Rashaun Nettles MD;  Location:   "[facility];  Service: Cardiology;  Laterality: N/A;   • CARDIAC ELECTROPHYSIOLOGY PROCEDURE N/A 9/3/2021     Procedure: AV node 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 ppm;  Surgeon: Rashaun Nettles MD;  Location:  [facility];  Service: Cardiovascular;  Laterality: N/A;   • CATARACT EXTRACTION Bilateral     • COLONOSCOPY       • COLONOSCOPY N/A 3/9/2018     Procedure: COLONOSCOPY to terminal ileum with bx;  Surgeon: Aron Cabrera MD;  Location: [facility] ENDOSCOPY;  Service:    • COLONOSCOPY N/A 2020     Procedure: COLONOSCOPY to cecum:  apc to cecum angiodysplagia,;  Surgeon: Aron Cabrera MD;  Location: [facility] ENDOSCOPY;  Service: Gastroenterology;  Laterality: N/A;  GI bleed  post:  diverticulosis, angiodysplagia   • CYSTECTOMY         back and shoulder area   • ENDOSCOPY N/A 3/9/2018     Procedure: ESOPHAGOGASTRODUODENOSCOPY with bx;  Surgeon: Aron Cabrera MD;  Location: [facility] ENDOSCOPY;  Service:    • ENDOSCOPY N/A 2020     Procedure: ESOPHAGOGASTRODUODENOSCOPY;  Surgeon: Aron Cabrera MD;  Location: [facility] ENDOSCOPY;  Service: Gastroenterology;  Laterality: N/A;  GI bleed  post:  HH.   • EYE SURGERY Left       detached retina   • EYE SURGERY Right       \"repaired a hole in the eye\"   • INSERT / REPLACE / REMOVE PACEMAKER       • TESTICLE SURGERY                  FAMILY HISTORY           Problem Relation Age of Onset   • Stroke Mother     • Heart disease Mother     • Hypertension Mother     • Diabetes Mother     • Heart disease Sister           Heart Valve Replacement   • Ovarian cancer Sister     • Rheumatic fever Sister     • Diabetes Sister     • Stroke Father        SOCIAL HISTORY                 Socioeconomic History   • Marital status:    Tobacco Use   • Smoking status: Former       Types: Cigars       Quit date: 10/28/1994       Years since quittin.5   • Smokeless tobacco: Never   • Tobacco comments:       Quit 25 years ago   Vaping Use   • Vaping " "Use: Never used   Substance and Sexual Activity   • Alcohol use: No       Comment: daily caffiene - 1/2 cup of coffee   • Drug use: No   • Sexual activity: Defer         ALLERGIES  Amiodarone, Diltiazem, Doxycycline, Cefdinir, and Indomethacin  Home medications reviewed     REVIEW OF SYSTEMS  Constitutional: Positive for fever. Negative for chills.   HENT: Negative for ear pain and sore throat.    Respiratory: Positive for cough and shortness of breath   Cardiovascular: Negative for chest pain and palpitations.   Gastrointestinal: Negative for abdominal pain and vomiting.   Genitourinary: Negative for dysuria and hematuria.   Musculoskeletal: Negative for arthralgias and joint swelling.   Skin: Negative for pallor and rash.   Neurological: Negative for syncope and headaches.   Psychiatric/Behavioral: Negative for confusion and hallucinations.      PHYSICAL EXAM  Blood pressure 116/60, pulse 60, temperature 98.9 °F (37.2 °C), temperature source Oral, resp. rate 20, height 172.7 cm (68\"), weight 61.8 kg (136 lb 3.9 oz), SpO2 96 %.    Constitutional:       General: He is not in acute distress.     Appearance: Normal appearance.   HENT:      Head: Normocephalic and atraumatic.      Nose: Nose normal.      Mouth/Throat:      Mouth: Mucous membranes are moist.   Eyes:      Conjunctiva/sclera: Conjunctivae normal.      Pupils: Pupils are equal, round, and reactive to light.   Cardiovascular:      Rate and Rhythm: Normal rate and regular rhythm.      Pulses: Normal pulses.      Heart sounds: Normal heart sounds.   Pulmonary:      Effort: Pulmonary effort is normal.      Breath sounds: Normal breath sounds.   Abdominal:      General: There is no distension.   Musculoskeletal:         General: Normal range of motion.      Cervical back: Normal range of motion and neck supple.   Skin:     General: Skin is warm.      Capillary Refill: Capillary refill takes less than 2 seconds.   Neurological:      General: No focal deficit " present.      Mental Status: He is alert and oriented to person, place, and time.   Psychiatric:         Mood and Affect: Mood normal.      LAB RESULTS  Lab Results (last 24 hours)     Procedure Component Value Units Date/Time    BNP [[id number]] Collected: 04/26/23 1335    Specimen: Blood Updated: 04/26/23 1422    Ferritin [[id number]] Collected: 04/26/23 1335    Specimen: Blood Updated: 04/26/23 1422    POC Glucose Once [[id number]]  (Normal) Collected: 04/26/23 1229    Specimen: Blood Updated: 04/26/23 1231     Glucose 98 mg/dL      Comment: Meter: [id number] : [id number] Ileana HERNADEZ       Basic Metabolic Panel [[id number]]  (Abnormal) Collected: 04/26/23 0608    Specimen: Blood Updated: 04/26/23 0751     Glucose 62 mg/dL      BUN 27 mg/dL      Creatinine 1.07 mg/dL      Sodium 138 mmol/L      Potassium 4.2 mmol/L      Comment: Slight hemolysis detected by analyzer. Results may be affected.        Chloride 105 mmol/L      CO2 20.8 mmol/L      Calcium 8.7 mg/dL      BUN/Creatinine Ratio 25.2     Anion Gap 12.2 mmol/L      eGFR 65.9 mL/min/1.73     Narrative:      GFR Normal >60  Chronic Kidney Disease <60  Kidney Failure <15    The GFR formula is only valid for adults with stable renal function between ages 18 and 70.    CBC & Differential [[id number]]  (Abnormal) Collected: 04/26/23 0608    Specimen: Blood Updated: 04/26/23 0733    Narrative:      The following orders were created for panel order CBC & Differential.  Procedure                               Abnormality         Status                     ---------                               -----------         ------                     CBC Auto Differential[[id number]]        Abnormal            Final result                 Please view results for these tests on the individual orders.    CBC Auto Differential [[id number]]  (Abnormal) Collected: 04/26/23 0608    Specimen: Blood Updated: 04/26/23 0733     WBC 6.17 10*3/mm3      RBC 3.68 10*6/mm3      Hemoglobin  "10.9 g/dL      Hematocrit 32.7 %      MCV 88.9 fL      MCH 29.6 pg      MCHC 33.3 g/dL      RDW 15.7 %      RDW-SD 50.2 fl      MPV 11.3 fL      Platelets 116 10*3/mm3      Neutrophil % 74.5 %      Lymphocyte % 12.3 %      Monocyte % 11.5 %      Eosinophil % 0.6 %      Basophil % 0.3 %      Immature Grans % 0.8 %      Neutrophils, Absolute 4.59 10*3/mm3      Lymphocytes, Absolute 0.76 10*3/mm3      Monocytes, Absolute 0.71 10*3/mm3      Eosinophils, Absolute 0.04 10*3/mm3      Basophils, Absolute 0.02 10*3/mm3      Immature Grans, Absolute 0.05 10*3/mm3      nRBC 0.0 /100 WBC     Procalcitonin [[id number]]  (Normal) Collected: 04/25/23 2017    Specimen: Blood from Arm, Right Updated: 04/25/23 2108     Procalcitonin 0.13 ng/mL     Narrative:      As a Marker for Sepsis (Non-Neonates):    1. <0.5 ng/mL represents a low risk of severe sepsis and/or septic shock.  2. >2 ng/mL represents a high risk of severe sepsis and/or septic shock.    As a Marker for Lower Respiratory Tract Infections that require antibiotic therapy:    PCT on Admission    Antibiotic Therapy       6-12 Hrs later    >0.5                Strongly Recommended  >0.25 - <0.5        Recommended   0.1 - 0.25          Discouraged              Remeasure/reassess PCT  <0.1                Strongly Discouraged     Remeasure/reassess PCT    As 28 day mortality risk marker: \"Change in Procalcitonin Result\" (>80% or <=80%) if Day 0 (or Day 1) and Day 4 values are available. Refer to http://www.[employer]-pct-calculator.com    Change in PCT <=80%  A decrease of PCT levels below or equal to 80% defines a positive change in PCT test result representing a higher risk for 28-day all-cause mortality of patients diagnosed with severe sepsis for septic shock.    Change in PCT >80%  A decrease of PCT levels of more than 80% defines a negative change in PCT result representing a lower risk for 28-day all-cause mortality of patients diagnosed with severe sepsis or septic shock.    "    Comprehensive Metabolic Panel [[id number]]  (Abnormal) Collected: 04/25/23 2017    Specimen: Blood from Arm, Right Updated: 04/25/23 2101     Glucose 89 mg/dL      BUN 29 mg/dL      Creatinine 1.19 mg/dL      Sodium 136 mmol/L      Potassium 4.6 mmol/L      Chloride 103 mmol/L      CO2 21.7 mmol/L      Calcium 8.9 mg/dL      Total Protein 7.2 g/dL      Albumin 3.7 g/dL      ALT (SGPT) 9 U/L      AST (SGOT) 13 U/L      Alkaline Phosphatase 54 U/L      Total Bilirubin 0.5 mg/dL      Globulin 3.5 gm/dL      A/G Ratio 1.1 g/dL      BUN/Creatinine Ratio 24.4     Anion Gap 11.3 mmol/L      eGFR 58.0 mL/min/1.73     Narrative:      GFR Normal >60  Chronic Kidney Disease <60  Kidney Failure <15    The GFR formula is only valid for adults with stable renal function between ages 18 and 70.    Protime-INR [[id number]]  (Abnormal) Collected: 04/25/23 2017    Specimen: Blood from Arm, Right Updated: 04/25/23 2034     Protime 17.0 Seconds      INR 1.36    aPTT [[id number]]  (Normal) Collected: 04/25/23 2017    Specimen: Blood from Arm, Right Updated: 04/25/23 2034     PTT 31.9 seconds     CBC & Differential [[id number]]  (Abnormal) Collected: 04/25/23 2017    Specimen: Blood from Arm, Right Updated: 04/25/23 2028    Narrative:      The following orders were created for panel order CBC & Differential.  Procedure                               Abnormality         Status                     ---------                               -----------         ------                     CBC Auto Differential[[id number]]        Abnormal            Final result                 Please view results for these tests on the individual orders.    CBC Auto Differential [[id number]]  (Abnormal) Collected: 04/25/23 2017    Specimen: Blood from Arm, Right Updated: 04/25/23 2028     WBC 6.97 10*3/mm3      RBC 4.03 10*6/mm3      Hemoglobin 11.6 g/dL      Hematocrit 35.4 %      MCV 87.8 fL      MCH 28.8 pg      MCHC 32.8 g/dL      RDW 15.5 %      RDW-SD 49.3 fl       MPV 11.9 fL      Platelets 142 10*3/mm3      Neutrophil % 78.2 %      Lymphocyte % 8.6 %      Monocyte % 11.0 %      Eosinophil % 1.3 %      Basophil % 0.3 %      Immature Grans % 0.6 %      Neutrophils, Absolute 5.45 10*3/mm3      Lymphocytes, Absolute 0.60 10*3/mm3      Monocytes, Absolute 0.77 10*3/mm3      Eosinophils, Absolute 0.09 10*3/mm3      Basophils, Absolute 0.02 10*3/mm3      Immature Grans, Absolute 0.04 10*3/mm3      nRBC 0.0 /100 WBC     COVID-19 and FLU A/B PCR - Swab, Nasopharynx [[id number]]  (Abnormal) Collected: 04/25/23 1900    Specimen: Swab from Nasopharynx Updated: 04/25/23 2007     COVID19 Detected     Influenza A PCR Not Detected     Influenza B PCR Not Detected        Imaging Results (Last 24 Hours)     Procedure Component Value Units Date/Time    XR Chest 2 View [[id number]] Collected: 04/25/23 1957     Updated: 04/25/23 2056    Narrative:      EXAMINATION: 2 VIEWS OF THE CHEST     HISTORY: 89-year-old male with history of a cough.      FINDINGS: PA and lateral chest radiographs were obtained. Comparison is  made to a chest radiograph dated 10/31/2022. There is persistent chronic  opacification at the right lung with a meniscus sign posteriorly and  laterally. Interval development of interstitial opacification at the  base of left lung is noted. A dual lead left-sided cardiac pacemaker  appears stable.       Impression:      There is an interstitial infiltrate at the base of the left  lower lobe most consistent with interstitial pneumonia. Stable chronic  consolidation at the base of the right lung with a chronic right pleural  effusion is noted.     This report was finalized on 4/25/2023 8:53 PM by Dr. Rah López M.D.             Current Facility-Administered Medications:   •  allopurinol (ZYLOPRIM) tablet 100 mg, 100 mg, Oral, BID, Calvin Bergeron MD  •  apixaban (ELIQUIS) tablet 2.5 mg, 2.5 mg, Oral, Q12H, Calvin Bergeron MD  •  budesonide-formoterol (SYMBICORT) 160-4.5 MCG/ACT  inhaler 2 puff, 2 puff, Inhalation, BID - RT, Shelbie Bergeron MD  •  cetirizine (zyrTEC) tablet 10 mg, 10 mg, Oral, Daily, Shelbie Bergeron MD  •  dexamethasone (DECADRON) injection 6 mg, 6 mg, Intravenous, Daily, Shelbie Bergeron MD, 6 mg at 04/26/23 0805  •  famotidine (PEPCID) tablet 20 mg, 20 mg, Oral, BID, Shelbie Bergeron MD, 20 mg at 04/26/23 1323  •  guaiFENesin (MUCINEX) 12 hr tablet 600 mg, 600 mg, Oral, Q12H, Shelbie Bergeron MD, 600 mg at 04/26/23 1323  •  ipratropium-albuterol (DUO-NEB) nebulizer solution 3 mL, 3 mL, Nebulization, Q4H PRN, Shelbie Bergeron MD  •  metoprolol tartrate (LOPRESSOR) tablet 25 mg, 25 mg, Oral, TID, Shelbie Bergeron MD  •  sodium chloride 0.9 % infusion, 75 mL/hr, Intravenous, Continuous, Shelbie Bergeron MD, Last Rate: 75 mL/hr at 04/26/23 1323, 75 mL/hr at 04/26/23 1323     ASSESSMENT  Acute hypoxic respiratory failure  COVID-19 pneumonia  Chronic atrial fibrillation  Hypertension  Hyperlipidemia  Aortic stenosis  Pulmonary fibrosis  Bronchiectasis  Pulm hypertension  History of Crohn's disease  Gastroesophageal reflux disease    PLAN  Admit  Supplemental oxygen  Albuterol and Symbicort  Decadron  Hold onto remdesivir  Symptomatic treatment for cough congestion and fever  Adjust home medications  Stress ulcer DVT prophylaxis  Infectious disease consult  Supportive care  DNR  Discussed with family and nursing staff  Follow closely further recommendation current hospital course    SHELBIE BERGERON MD

## 2023-04-26 NOTE — CONSULTS
CONSULT NOTE    Infectious Diseases - Rolly Terrell MD  [facility]       Patient Identification:  Name: Asia Ly  Age: 89 y.o.  Sex: male  :  1932  MRN: [id number]             Date of Consultation: 2023      Primary Care Physician: Lubna Lobo MD                               Requesting Physician: Dr. Bergeron  Reason for Consultation: COVID-19 infection    Source of information review of records and attempted conversation with the patient.  Patient is forgetful and history that time unreliable.      Impression: Patient is a 89-year-old male who has complicated past medical history including history of pulmonary fibrosis and bronchiectasis, chronic hypoxic respiratory failure, hypertension gastroesophageal reflux disease atrial fibrillation aortic stenosis brought to the emergency room with a long history of cough and congestion due to underlying pulmonary fibrosis and bronchiectasis.  In this background patient has been treated with antibiotics for bronchitis since without any improvement in his cough pattern and yesterday he was noted by family members to be more lethargic with at home temperature noted to be 101.  His temperature is new in the setting of previous history of pneumonia and bronchiectasis.  Patient is on anticoagulation therapy.  Patient was seen in the emergency room and was noted to be more hypoxic than his baseline due to underlying bronchiectasis and was noted to have COVID-19 assay positive for COVID-19.  Patient was started on dexamethasone in the emergency room for hypoxia in the setting of COVID-19 infection as markers for bacterial infection were negative in terms of procalcitonin being 0.13.  Infectious disease service is consulted.  Patient currently has nasal cannula oxygen in place interactive and pleasantly forgetful.  For vomiting was not sure whether he is at [facility].  This presentation in the above context is consistent with:  1-acute  COVID-19 infection with worsening hypoxia in the setting of chronic bronchiectasis and other complicated past medical history such as COPD chronic hypoxia inflammatory bowel disease paroxysmal atrial fibrillation etc.  2-bronchiectasis with interstitial lung disease  3-COPD  4-atrial fibrillation  5-history of TIA and stroke  6-possible underlying memory issues.      Recommendations/Discussions:  At this juncture I agree with the care plan consisting of dexamethasone with oxygen supplementation and supportive care.    Given the fact that he had a fever yesterday in the setting of previous known bronchiectasis and pneumonias with positive COVID-19 assay when he came in for that patient is a new onset COVID-19 infection in the background of progressive chronic lung disease and hypoxia when can resume his due to new onset COVID-19 infection in the background of chronic lung disease and hence would recommend treating him with remdesivir for hypoxic COVID-19 infection of fairly new onset.  Patient symptoms started within the window of benefit as fever is less than 6 days old.  Monitor closely for need for oxygen supplementation and possibility of superimposed bacterial infection.  Thank you Dr. Duran for letting me be the part of your patient care please see above impression and recommendations        History of Present Illness:   Patient is a 89-year-old male who has complicated past medical history including history of pulmonary fibrosis and bronchiectasis, chronic hypoxic respiratory failure, hypertension gastroesophageal reflux disease atrial fibrillation aortic stenosis brought to the emergency room with a long history of cough and congestion due to underlying pulmonary fibrosis and bronchiectasis.  In this background patient has been treated with antibiotics for bronchitis since without any improvement in his cough pattern and yesterday he was noted by family members to be more lethargic with at home temperature  noted to be 101.  His temperature is new in the setting of previous history of pneumonia and bronchiectasis.  Patient is on anticoagulation therapy.  Patient was seen in the emergency room and was noted to be more hypoxic than his baseline due to underlying bronchiectasis and was noted to have COVID-19 assay positive for COVID-19.  Patient was started on dexamethasone in the emergency room for hypoxia in the setting of COVID-19 infection as markers for bacterial infection were negative in terms of procalcitonin being 0.13.  Infectious disease service is consulted.  Patient currently has nasal cannula oxygen in place interactive and pleasantly forgetful.  For vomiting was not sure whether he is at [facility].  This presentation in the above context is consistent with:  1-acute COVID-19 infection with worsening hypoxia in the setting of chronic bronchiectasis and other complicated past medical history such as COPD chronic hypoxia inflammatory bowel disease paroxysmal atrial fibrillation etc.  2-bronchiectasis with interstitial lung disease  3-COPD  4-atrial fibrillation  5-history of TIA and stroke  6-possible underlying memory issues.      Past Medical History:  Past Medical History:   Diagnosis Date   • Aortic stenosis    • Aspiration pneumonia    • Atrial fibrillation    • Bleeding from the nose    • Bronchiectasis    • CKD (chronic kidney disease)    • COPD (chronic obstructive pulmonary disease)    • Crohn's disease    • Dizziness    • Elevated cholesterol    • Gastric ulcer    • Generalized weakness    • Gout    • Gout    • Hard of hearing    • Hyperlipidemia    • Hypertension    • Leg swelling    • Lower extremity edema    • Mild anemia    • Near syncope    • Nonrheumatic aortic valve stenosis    • PAF (paroxysmal atrial fibrillation)    • Palpitations    • Peptic ulcer    • Pneumonia of left lung due to infectious organism    • Pulmonary fibrosis    • Stroke    • TIA (transient ischemic attack)      Past  "Surgical History:  Past Surgical History:   Procedure Laterality Date   • BRONCHOSCOPY N/A 10/22/2018    Procedure: BRONCHOSCOPY WITH BRONCHALVEOLAR LAVAGE;  Surgeon: Chidi Oconnor MD;  [facility];  Service: Pulmonary   • BRONCHOSCOPY N/A 7/8/2021    Procedure: BRONCHOSCOPY with BAL;  Surgeon: Chidi Oconnor MD;  Location: [facility] ENDOSCOPY;  Service: Pulmonary;  Laterality: N/A;  Pre: bronchectasis  Post: same   • CARDIAC ELECTROPHYSIOLOGY PROCEDURE N/A 7/9/2021    Procedure: Pacemaker DC new--[employer] possible His lead;  Surgeon: Rashaun Nettles MD;  Location: [facility] CATH INVASIVE LOCATION;  Service: Cardiology;  Laterality: N/A;   • CARDIAC ELECTROPHYSIOLOGY PROCEDURE N/A 9/3/2021    Procedure: AV node 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 ppm;  Surgeon: Rashaun Nettles MD;  Location: [facility] CATH INVASIVE LOCATION;  Service: Cardiovascular;  Laterality: N/A;   • CATARACT EXTRACTION Bilateral    • COLONOSCOPY     • COLONOSCOPY N/A 3/9/2018    Procedure: COLONOSCOPY to terminal ileum with bx;  Surgeon: Aron Cabrera MD;  Location: [facility] ENDOSCOPY;  Service:    • COLONOSCOPY N/A 11/19/2020    Procedure: COLONOSCOPY to cecum:  apc to cecum angiodysplagia,;  Surgeon: Aron Cabrera MD;  Location: [facility] ENDOSCOPY;  Service: Gastroenterology;  Laterality: N/A;  GI bleed  post:  diverticulosis, angiodysplagia   • CYSTECTOMY      back and shoulder area   • ENDOSCOPY N/A 3/9/2018    Procedure: ESOPHAGOGASTRODUODENOSCOPY with bx;  Surgeon: Aron Cabrera MD;  Location: [facility] ENDOSCOPY;  Service:    • ENDOSCOPY N/A 11/19/2020    Procedure: ESOPHAGOGASTRODUODENOSCOPY;  Surgeon: Aron Cabrera MD;  Location: [facility] ENDOSCOPY;  Service: Gastroenterology;  Laterality: N/A;  GI bleed  post:  HH.   • EYE SURGERY Left     detached retina   • EYE SURGERY Right     \"repaired a hole in the eye\"   • INSERT / REPLACE / REMOVE PACEMAKER     • TESTICLE SURGERY      benign tumor removed.      Home " Meds:  Medications Prior to Admission   Medication Sig Dispense Refill Last Dose   • allopurinol (ZYLOPRIM) 100 MG tablet Take 1 tablet by mouth 2 (Two) Times a Day.      • apixaban (ELIQUIS) 2.5 MG tablet tablet Take 1 tablet by mouth Every 12 (Twelve) Hours. 180 tablet 3    • azithromycin (ZITHROMAX) 250 MG tablet Take 2 by mouth today then 1 daily for 4 days 6 tablet 0    • BUDESONIDE PO Take one tablet by mouth 3 -5 times a weekly      • furosemide (LASIX) 20 MG tablet Take 1 tablet by mouth Daily.      • hydrocortisone 2.5 % ointment Apply  topically to the appropriate area as directed 2 (Two) Times a Day. to affected area      • IPRATROPIUM-ALBUTEROL IN Inhale 3 (Three) Times a Day.      • metoprolol tartrate (LOPRESSOR) 50 MG tablet Take 1 tablet by mouth 3 (Three) Times a Day. 270 tablet 3    • Nutritional Supplements (BOOST 100 CALORIE SMART PO) Take  by mouth Daily.      • omeprazole (priLOSEC) 40 MG capsule Take 1 capsule by mouth Daily.      • potassium chloride (K-DUR,KLOR-CON) 10 MEQ ER tablet Take 1 tablet by mouth Daily.      • white petrolatum ointment Apply 1 application topically to the appropriate area as directed As Needed (nasal dryness, epistaxis). 28.25 g 0      Current Meds:     Current Facility-Administered Medications:   •  allopurinol (ZYLOPRIM) tablet 100 mg, 100 mg, Oral, BID, Calvin Bergeron MD, 100 mg at 04/26/23 1525  •  apixaban (ELIQUIS) tablet 2.5 mg, 2.5 mg, Oral, Q12H, Calvin Bergeron MD, 2.5 mg at 04/26/23 1525  •  ascorbic acid (VITAMIN C) tablet 500 mg, 500 mg, Oral, Daily, Calvin Bergeron MD  •  budesonide-formoterol (SYMBICORT) 160-4.5 MCG/ACT inhaler 2 puff, 2 puff, Inhalation, BID - RT, Calvin Bergeron MD  •  cetirizine (zyrTEC) tablet 10 mg, 10 mg, Oral, Daily, Calvin Bergeron MD, 10 mg at 04/26/23 1525  •  cholecalciferol (VITAMIN D3) tablet 1,000 Units, 1,000 Units, Oral, Daily, Calvin Bergeron MD  •  dexamethasone (DECADRON) injection 6 mg, 6 mg, Intravenous, Daily, Calvin Bergeron,  "MD, 6 mg at 23 0805  •  famotidine (PEPCID) tablet 20 mg, 20 mg, Oral, BID, Calvin Bergeron MD, 20 mg at 23 1323  •  guaiFENesin (MUCINEX) 12 hr tablet 600 mg, 600 mg, Oral, Q12H, Calvin Bergeron MD, 600 mg at 23 1323  •  ipratropium-albuterol (DUO-NEB) nebulizer solution 3 mL, 3 mL, Nebulization, Q4H PRN, Calvin Bergeron MD  •  metoprolol tartrate (LOPRESSOR) tablet 25 mg, 25 mg, Oral, TID, Calvin Bergeron MD, 25 mg at 23 1525  •  sodium chloride 0.9 % infusion, 50 mL/hr, Intravenous, Continuous, Calvin Bergeron MD, Last Rate: 50 mL/hr at 23 1525, 50 mL/hr at 23 1525  •  zinc sulfate (ZINCATE) capsule 220 mg, 220 mg, Oral, Daily, Calvin Bergeron MD  Allergies:  Allergies   Allergen Reactions   • Amiodarone Unknown (See Comments)     Pulmonary fibrosis   • Diltiazem Arrhythmia   • Doxycycline Rash   • Cefdinir Arrhythmia     A-FIB   • Indomethacin Rash     Social History:   Social History     Tobacco Use   • Smoking status: Former     Types: Cigars     Quit date: 10/28/1994     Years since quittin.5   • Smokeless tobacco: Never   • Tobacco comments:     Quit 25 years ago   Substance Use Topics   • Alcohol use: No     Comment: daily caffiene - 1/2 cup of coffee      Family History:  Family History   Problem Relation Age of Onset   • Stroke Mother    • Heart disease Mother    • Hypertension Mother    • Diabetes Mother    • Heart disease Sister         Heart Valve Replacement   • Ovarian cancer Sister    • Rheumatic fever Sister    • Diabetes Sister    • Stroke Father           Review of Systems  See history of present illness and past medical history.   Limited due to his forgetfulness.      Vitals:   /60   Pulse 60   Temp 98.9 °F (37.2 °C) (Oral)   Resp 20   Ht 172.7 cm (68\")   Wt 61.8 kg (136 lb 3.9 oz)   SpO2 96%   BMI 20.72 kg/m²   I/O:     Intake/Output Summary (Last 24 hours) at [address]  Last data filed at 2023 0900  Gross per 24 hour   Intake 140 ml   Output " --   Net 140 ml     Exam:  Patient is examined using the personal protective equipment as per guidelines from infection control for this particular patient as enacted.  Hand washing was performed before and after patient interaction.  General Appearance:   Alert cooperative cardiac.  Elderly male does not appear to be in any acute distress but does appear ill.   Head:    Normocephalic, without obvious abnormality, atraumatic   Eyes:    PERRL, conjunctivae/corneas clear, EOM's intact, both eyes   Ears:    Normal external ear canals, both ears   Nose:   Nares normal, septum midline, mucosa normal, no drainage    or sinus tenderness   Throat:   Lips, tongue, gums normal; oral mucosa pink and moist   Neck:   Supple, symmetrical, trachea midline, no adenopathy;     thyroid:  no enlargement/tenderness/nodules; no carotid    bruit or JVD   Back:     Symmetric, no curvature, ROM normal, no CVA tenderness   Lungs:     Clear to auscultation bilaterally, respirations unlabored   Chest Wall:    No tenderness or deformity    Heart:   S1-S2 regular   Abdomen:    Soft nontender   Extremities:   Extremities normal, atraumatic, no cyanosis or edema   Pulses:   Pulses palpable in all extremities; symmetric all extremities   Skin:   Skin color normal, Skin is warm and dry,  no rashes or palpable lesions   Neurologic:  For left foot and hard feeling.  Pleasant and grossly nonfocal       Data Review:    I reviewed the patient's new clinical results.  Results from last 7 days   Lab Units 04/26/23  0608 04/25/23 2017   WBC 10*3/mm3 6.17 6.97   HEMOGLOBIN g/dL 10.9* 11.6*   PLATELETS 10*3/mm3 116* 142     Results from last 7 days   Lab Units 04/26/23  0608 04/25/23 2017   SODIUM mmol/L 138 136   POTASSIUM mmol/L 4.2 4.6   CHLORIDE mmol/L 105 103   CO2 mmol/L 20.8* 21.7*   BUN mg/dL 27* 29*   CREATININE mg/dL 1.07 1.19   CALCIUM mg/dL 8.7 8.9   GLUCOSE mg/dL 62* 89     XR Chest 2 View    Result Date: 4/25/2023  There is an interstitial  infiltrate at the base of the left lower lobe most consistent with interstitial pneumonia. Stable chronic consolidation at the base of the right lung with a chronic right pleural effusion is noted.  This report was finalized on 4/25/2023 8:53 PM by Dr. Rah López M.D.    '  Microbiology Results (last 10 days)     Procedure Component Value - Date/Time    COVID-19 and FLU A/B PCR - Swab, Nasopharynx [[id number]]  (Abnormal) Collected: 04/25/23 1900    Lab Status: Final result Specimen: Swab from Nasopharynx Updated: 04/25/23 2007     COVID19 Detected     Influenza A PCR Not Detected     Influenza B PCR Not Detected            Assessment:  Active Hospital Problems    Diagnosis  POA   • **COVID-19 virus infection [U07.1]  Yes      Resolved Hospital Problems   No resolved problems to display.         Plan:  See above  Rolly Pedroza MD   4/26/2023  18:19 EDT    Parts of this note may be an electronic transcription/translation of spoken language to printed text using the Dragon dictation system.

## 2023-04-26 NOTE — PLAN OF CARE
Goal Outcome Evaluation:  Plan of Care Reviewed With: patient  Pt arrived from ER., upon arrival vital signs were obtained, a bed scale weight was obtained and admission intake and assessment were done. Pt had no c/o pain and is rested comfortably.         Progress: improving    Problem: Adult Inpatient Plan of Care  Goal: Plan of Care Review  4/26/2023 0006 by She Tam RN  Outcome: Ongoing, Progressing  Flowsheets (Taken 4/26/2023 0006)  Progress: improving  Plan of Care Reviewed With: patient  4/26/2023 0005 by She Tam RN  Outcome: Ongoing, Progressing  Goal: Patient-Specific Goal (Individualized)  4/26/2023 0006 by She Tam RN  Outcome: Ongoing, Progressing  4/26/2023 0005 by She Tam RN  Outcome: Ongoing, Progressing  Goal: Absence of Hospital-Acquired Illness or Injury  4/26/2023 0006 by She Tam RN  Outcome: Ongoing, Progressing  4/26/2023 0005 by She Tam RN  Outcome: Ongoing, Progressing  Goal: Optimal Comfort and Wellbeing  4/26/2023 0006 by She Tam RN  Outcome: Ongoing, Progressing  4/26/2023 0005 by She Tam RN  Outcome: Ongoing, Progressing  Goal: Readiness for Transition of Care  4/26/2023 0006 by She Tam RN  Outcome: Ongoing, Progressing  4/26/2023 0005 by She Tam RN  Outcome: Ongoing, Progressing  Intervention: Mutually Develop Transition Plan  Recent Flowsheet Documentation  Taken 4/25/2023 2356 by She Tam RN  Transportation Anticipated: family or friend will provide  Patient/Family Anticipated Services at Transition: none  Patient/Family Anticipates Transition to: home with family  Taken 4/25/2023 2352 by She Tam RN  Equipment Currently Used at Home: none     Problem: Infection  Goal: Absence of Infection Signs and Symptoms  Outcome: Ongoing, Progressing

## 2023-04-26 NOTE — PROGRESS NOTES
Nutrition Services    Patient Name:  Asia Ly  YOB: 1932  MRN: [id number]  Admit Date:  4/25/2023  Assessment Date:  04/26/23    Comment: Nutrition assessment per RN admission screen for decrease po intake.  Dx: COVID-19 pneumonia, weakness    Labs glu 62, BUN 27, OJ given per RN  IVF's at 75  Pt did eat some breakfast this am    Plan:  Encouraged good po intake with all meals.  Will add supplements Boost plus BID.   Will monitor intake      CLINICAL NUTRITION ASSESSMENT      Reason for Assessment Nurse Admission Screen     Diagnosis/Problem    COVID-19 pneumonia, weakness   Medical/Surgical History Past Medical History:   Diagnosis Date   • Aortic stenosis    • Aspiration pneumonia    • Atrial fibrillation    • Bleeding from the nose    • Bronchiectasis    • CKD (chronic kidney disease)    • COPD (chronic obstructive pulmonary disease)    • Crohn's disease    • Dizziness    • Elevated cholesterol    • Gastric ulcer    • Generalized weakness    • Gout    • Gout    • Hard of hearing    • Hyperlipidemia    • Hypertension    • Leg swelling    • Lower extremity edema    • Mild anemia    • Near syncope    • Nonrheumatic aortic valve stenosis    • PAF (paroxysmal atrial fibrillation)    • Palpitations    • Peptic ulcer    • Pneumonia of left lung due to infectious organism    • Pulmonary fibrosis    • Stroke    • TIA (transient ischemic attack)        Past Surgical History:   Procedure Laterality Date   • BRONCHOSCOPY N/A 10/22/2018    Procedure: BRONCHOSCOPY WITH BRONCHALVEOLAR LAVAGE;  Surgeon: Chidi Oconnor MD;  Location: [facility] ENDOSCOPY;  Service: Pulmonary   • BRONCHOSCOPY N/A 7/8/2021    Procedure: BRONCHOSCOPY with BAL;  Surgeon: Chidi Oconnor MD;  Location: [facility] ENDOSCOPY;  Service: Pulmonary;  Laterality: N/A;  Pre: bronchectasis  Post: same   • CARDIAC ELECTROPHYSIOLOGY PROCEDURE N/A 7/9/2021    Procedure: Pacemaker DC new--[employer] possible His lead;  Surgeon: Rashaun Nettles,  "MD;  Location: [facility] CATH INVASIVE LOCATION;  Service: Cardiology;  Laterality: N/A;   • CARDIAC ELECTROPHYSIOLOGY PROCEDURE N/A 9/3/2021    Procedure: AV node 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 ppm;  Surgeon: Rashaun Nettles MD;  Location: [facility] CATH INVASIVE LOCATION;  Service: Cardiovascular;  Laterality: N/A;   • CATARACT EXTRACTION Bilateral    • COLONOSCOPY     • COLONOSCOPY N/A 3/9/2018    Procedure: COLONOSCOPY to terminal ileum with bx;  Surgeon: Aron Cabrera MD;  Location: [facility] ENDOSCOPY;  Service:    • COLONOSCOPY N/A 11/19/2020    Procedure: COLONOSCOPY to cecum:  apc to cecum angiodysplagia,;  Surgeon: Aron Cabrera MD;  Location: [facility] ENDOSCOPY;  Service: Gastroenterology;  Laterality: N/A;  GI bleed  post:  diverticulosis, angiodysplagia   • CYSTECTOMY      back and shoulder area   • ENDOSCOPY N/A 3/9/2018    Procedure: ESOPHAGOGASTRODUODENOSCOPY with bx;  Surgeon: Aron Cabrera MD;  Location: [facility] ENDOSCOPY;  Service:    • ENDOSCOPY N/A 11/19/2020    Procedure: ESOPHAGOGASTRODUODENOSCOPY;  Surgeon: Aron Cabrera MD;  Location: [facility] ENDOSCOPY;  Service: Gastroenterology;  Laterality: N/A;  GI bleed  post:  HH.   • EYE SURGERY Left     detached retina   • EYE SURGERY Right     \"repaired a hole in the eye\"   • INSERT / REPLACE / REMOVE PACEMAKER     • TESTICLE SURGERY      benign tumor removed.        Encounter Information        Nutrition History:  Decrease po intake   Food Preferences:    Supplements:    Factors Affecting Intake: decreased appetite, weakness     Anthropometrics        Current Height  Current Weight  BMI kg/m2 Height: 172.7 cm (68\")  Weight: 61.8 kg (136 lb 3.9 oz) (04/25/23 2343)  Body mass index is 20.72 kg/m².   Adjusted BMI (if applicable)        Admission Weight        Ideal Body Weight (IBW) 68.4 kg   Adjusted IBW (if applicable)        Usual Body Weight (UBW) See wt hx   Weight Change/Trend Loss, thinks he has lost 3-5lbs       Weight History Wt Readings from Last " 30 Encounters:   04/25/23 2343 61.8 kg (136 lb 3.9 oz)   04/25/23 1850 65.3 kg (144 lb)   03/20/23 0931 65.3 kg (144 lb)   03/01/23 0220 63.5 kg (140 lb)   10/27/22 1040 65.8 kg (145 lb)   10/04/22 0942 67.6 kg (149 lb)   06/27/22 1332 69.4 kg (153 lb)   05/04/22 0921 69.5 kg (153 lb 3.2 oz)   01/27/22 1102 68.9 kg (152 lb)   10/26/21 1300 67.6 kg (149 lb)   10/14/21 0934 65.8 kg (145 lb)   09/28/21 1030 67.1 kg (148 lb)   09/28/21 1203 66.7 kg (147 lb)   09/03/21 0709 67.1 kg (148 lb)   07/22/21 1340 65.3 kg (144 lb)   07/14/21 1624 67.1 kg (148 lb)   07/13/21 1221 66.7 kg (147 lb)   07/06/21 1635 67.6 kg (149 lb 0.5 oz)   06/23/21 0541 63.5 kg (140 lb)   06/22/21 2258 63.8 kg (140 lb 11.2 oz)   06/22/21 2241 67.6 kg (149 lb)   04/12/21 1118 67.9 kg (149 lb 12.8 oz)   03/24/21 1236 67.5 kg (148 lb 12.8 oz)   12/18/20 1432 66.2 kg (146 lb)   12/07/20 0816 65.3 kg (144 lb)   11/18/20 1204 65.4 kg (144 lb 2.9 oz)   11/18/20 0500 65.4 kg (144 lb 2.9 oz)   11/17/20 0353 67.1 kg (148 lb)   10/12/20 1117 65.8 kg (145 lb)   08/27/20 1257 70.8 kg (156 lb)   08/26/20 0755 71 kg (156 lb 8.4 oz)   08/28/20 0601 70.8 kg (156 lb)   08/26/20 0219 71 kg (156 lb 8.4 oz)   05/31/20 2238 69.9 kg (154 lb)   04/29/20 1125 70.3 kg (155 lb)   10/28/19 1318 71.7 kg (158 lb)           --  Tests/Procedures        Tests/Procedures No new tests/procedures     Labs       Pertinent Labs    Results from last 7 days   Lab Units 04/26/23  0608 04/25/23 2017   SODIUM mmol/L 138 136   POTASSIUM mmol/L 4.2 4.6   CHLORIDE mmol/L 105 103   CO2 mmol/L 20.8* 21.7*   BUN mg/dL 27* 29*   CREATININE mg/dL 1.07 1.19   CALCIUM mg/dL 8.7 8.9   BILIRUBIN mg/dL  --  0.5   ALK PHOS U/L  --  54   ALT (SGPT) U/L  --  9   AST (SGOT) U/L  --  13   GLUCOSE mg/dL 62* 89     Results from last 7 days   Lab Units 04/26/23  0608 04/25/23 2017   HEMOGLOBIN g/dL 10.9* 11.6*   HEMATOCRIT % 32.7* 35.4*   WBC 10*3/mm3 6.17 6.97   ALBUMIN g/dL  --  3.7     Results from last 7  days   Lab Units 04/26/23  0608 04/25/23 2017   INR   --  1.36*   APTT seconds  --  31.9   PLATELETS 10*3/mm3 116* 142     COVID19   Date Value Ref Range Status   04/25/2023 Detected (C) Not Detected - Ref. Range Final     Lab Results   Component Value Date    HGBA1C 5.7 (H) 04/11/2022          Medications           Scheduled Medications dexamethasone, 6 mg, Intravenous, Daily  enoxaparin, 40 mg, Subcutaneous, Q24H  pantoprazole, 40 mg, Oral, Q AM       Infusions sodium chloride, 75 mL/hr, Last Rate: 75 mL/hr (04/26/23 0126)       PRN Medications      Physical Findings          Physical Appearance alert   Oral/Mouth Cavity WNL   Edema  no edema   Gastrointestinal normoactive   Skin  skin intact   Tubes/Drains none   NFPE Not applicable at this time   -  Estimated/Assessed Needs       Energy Requirements    Weight for Calculation 61.8 kg   Method for Estimation  30 kcal/kg   EST Needs (kcal/day) 1854       Protein Requirements    Weight for Calculation 61.8 kg   EST Protein Needs (g/kg) 1.0 - 1.2 gm/kg   EST Daily Needs (g/day) 61-74       Fluid Requirements     Method for Estimation 1 mL/kcal    Estimated Needs (mL/day)    -  Current Nutrition Orders & Evaluation of Intake       Oral Nutrition     Food Allergies NKFA   Current PO Diet Diet: Regular/House Diet; Texture: Regular Texture (IDDSI 7); Fluid Consistency: Thin (IDDSI 0)   Supplement    PO Evaluation     % PO Intake pending    # of Days Evaluated    --  PES STATEMENT / NUTRITION DIAGNOSIS      Nutrition Dx Problem  Problem: Predicted Suboptimal Intake  Etiology: Medical Diagnosis and Factors Affecting Nutrition decrease appetite, weakness  Signs/Symptoms: Report/Observation    Comment:    --  NUTRITION INTERVENTION / PLAN OF CARE      Intervention Goal(s) Maintain nutrition status, Reduce/improve symptoms, Meet estimated needs, Disease management/therapy, Tolerate PO , Increase intake, Maintain weight and PO intake goal %: 75         RD Intervention/Action  Interview for preferences, Encourage intake and Follow Tx Progress         Prescription/Orders:       PO Diet       Supplements Boost Plus BID      Snacks       Enteral Nutrition       Parenteral Nutrition    New Prescription Ordered? Yes   --      Monitor/Evaluation Per protocol   Discharge Plan/Needs Pending clinical course   Education Will instruct as appropriate   --    RD to follow per protocol.      Electronically signed by:  Yuli Urrutia RD  04/26/23 08:46 EDT

## 2023-04-26 NOTE — ED PROVIDER NOTES
MD ATTESTATION NOTE    The NICK and I have discussed this patient's history, physical exam, and treatment plan.    I provided a substantive portion of the care of this patient. I personally performed the physical exam, in its entirety. The attached note describes my personal findings.      Asia Ly is a 89 y.o. male who presents to the ED c/o cough and fever.  Fever began today.      On exam:  GENERAL: not distressed  HENT: nares patent  EYES: no scleral icterus  CV: regular rhythm, regular rate  RESPIRATORY: normal effort, diminished breath sounds the right lung base  ABDOMEN: soft, nontender  MUSCULOSKELETAL: no deformity  NEURO: alert, moves all extremities, follows commands  SKIN: warm, dry    Labs  Recent Results (from the past 24 hour(s))   COVID-19 and FLU A/B PCR - Swab, Nasopharynx    Collection Time: 04/25/23  7:00 PM    Specimen: Nasopharynx; Swab   Result Value Ref Range    COVID19 Detected (C) Not Detected - Ref. Range    Influenza A PCR Not Detected Not Detected    Influenza B PCR Not Detected Not Detected   Comprehensive Metabolic Panel    Collection Time: 04/25/23  8:17 PM    Specimen: Arm, Right; Blood   Result Value Ref Range    Glucose 89 65 - 99 mg/dL    BUN 29 (H) 8 - 23 mg/dL    Creatinine 1.19 0.76 - 1.27 mg/dL    Sodium 136 136 - 145 mmol/L    Potassium 4.6 3.5 - 5.2 mmol/L    Chloride 103 98 - 107 mmol/L    CO2 21.7 (L) 22.0 - 29.0 mmol/L    Calcium 8.9 8.2 - 9.6 mg/dL    Total Protein 7.2 6.0 - 8.5 g/dL    Albumin 3.7 3.5 - 5.2 g/dL    ALT (SGPT) 9 1 - 41 U/L    AST (SGOT) 13 1 - 40 U/L    Alkaline Phosphatase 54 39 - 117 U/L    Total Bilirubin 0.5 0.0 - 1.2 mg/dL    Globulin 3.5 gm/dL    A/G Ratio 1.1 g/dL    BUN/Creatinine Ratio 24.4 7.0 - 25.0    Anion Gap 11.3 5.0 - 15.0 mmol/L    eGFR 58.0 (L) >60.0 mL/min/1.73   Protime-INR    Collection Time: 04/25/23  8:17 PM    Specimen: Arm, Right; Blood   Result Value Ref Range    Protime 17.0 (H) 11.7 - 14.2 Seconds    INR 1.36 (H) 0.90 -  1.10   aPTT    Collection Time: 04/25/23  8:17 PM    Specimen: Arm, Right; Blood   Result Value Ref Range    PTT 31.9 22.7 - 35.4 seconds   CBC Auto Differential    Collection Time: 04/25/23  8:17 PM    Specimen: Arm, Right; Blood   Result Value Ref Range    WBC 6.97 3.40 - 10.80 10*3/mm3    RBC 4.03 (L) 4.14 - 5.80 10*6/mm3    Hemoglobin 11.6 (L) 13.0 - 17.7 g/dL    Hematocrit 35.4 (L) 37.5 - 51.0 %    MCV 87.8 79.0 - 97.0 fL    MCH 28.8 26.6 - 33.0 pg    MCHC 32.8 31.5 - 35.7 g/dL    RDW 15.5 (H) 12.3 - 15.4 %    RDW-SD 49.3 37.0 - 54.0 fl    MPV 11.9 6.0 - 12.0 fL    Platelets 142 140 - 450 10*3/mm3    Neutrophil % 78.2 (H) 42.7 - 76.0 %    Lymphocyte % 8.6 (L) 19.6 - 45.3 %    Monocyte % 11.0 5.0 - 12.0 %    Eosinophil % 1.3 0.3 - 6.2 %    Basophil % 0.3 0.0 - 1.5 %    Immature Grans % 0.6 (H) 0.0 - 0.5 %    Neutrophils, Absolute 5.45 1.70 - 7.00 10*3/mm3    Lymphocytes, Absolute 0.60 (L) 0.70 - 3.10 10*3/mm3    Monocytes, Absolute 0.77 0.10 - 0.90 10*3/mm3    Eosinophils, Absolute 0.09 0.00 - 0.40 10*3/mm3    Basophils, Absolute 0.02 0.00 - 0.20 10*3/mm3    Immature Grans, Absolute 0.04 0.00 - 0.05 10*3/mm3    nRBC 0.0 0.0 - 0.2 /100 WBC   Procalcitonin    Collection Time: 04/25/23  8:17 PM    Specimen: Arm, Right; Blood   Result Value Ref Range    Procalcitonin 0.13 0.00 - 0.25 ng/mL       Radiology  XR Chest 2 View    Result Date: 4/25/2023  EXAMINATION: 2 VIEWS OF THE CHEST  HISTORY: 89-year-old male with history of a cough.  FINDINGS: PA and lateral chest radiographs were obtained. Comparison is made to a chest radiograph dated 10/31/2022. There is persistent chronic opacification at the right lung with a meniscus sign posteriorly and laterally. Interval development of interstitial opacification at the base of left lung is noted. A dual lead left-sided cardiac pacemaker appears stable.      There is an interstitial infiltrate at the base of the left lower lobe most consistent with interstitial pneumonia.  Stable chronic consolidation at the base of the right lung with a chronic right pleural effusion is noted.  This report was finalized on 4/25/2023 8:53 PM by Dr. Rah López M.D.        Medications given in the ED:  Medications   dexamethasone (DECADRON) injection 6 mg (has no administration in time range)   Enoxaparin Sodium (LOVENOX) syringe 40 mg (has no administration in time range)   sodium chloride 0.9 % infusion (has no administration in time range)   pantoprazole (PROTONIX) EC tablet 40 mg (has no administration in time range)   ampicillin-sulbactam (UNASYN) 3 g in sodium chloride 0.9 % 100 mL IVPB-VTB (0 g Intravenous Stopped 4/25/23 224)       Orders placed during this visit:  Orders Placed This Encounter   Procedures   • COVID-19 and FLU A/B PCR - Swab, Nasopharynx   • XR Chest 2 View   • Comprehensive Metabolic Panel   • Protime-INR   • aPTT   • CBC Auto Differential   • Procalcitonin   • Basic Metabolic Panel   • CBC Auto Differential   • Diet: Regular/House Diet; Texture: Regular Texture (IDDSI 7); Fluid Consistency: Thin (IDDSI 0)   • Cardiac Monitoring   • LIPPS (on-call MD unless specified)   • Oxygen Therapy- Nasal Cannula; 2 LPM; Titrate for SPO2: 90% - 95%   • Inpatient Admission   • CBC & Differential   • CBC & Differential       Medical Decision Making:  ED Course as of 04/26/23 0121 Tue Apr 25, 2023   1938 Chest x-ray independently interpreted by me is right lower lobe opacity [MP]   2010 COVID19(!!): Detected [MP]   2010 Patient positive for COVID-19 today [MP]   2030 WBC: 6.97 [MP]   2030 Hemoglobin(!): 11.6 [MP]   2030 Neutrophil Rel %(!): 78.2 [MP]   2030 Lymphocyte Rel %(!): 8.6 [MP]   2119 Spoke with Dr. Bergeron.  Reviewed patient presentation and ED findings.  He agrees to admit the patient to a telemetry bed. [MP]      ED Course User Index  [MP] Garcie Whiting, ANGIE         PPE: Both the patient and I wore a surgical mask throughout the entire patient encounter.      Diagnosis  Final diagnoses:   COVID-19 virus infection   Pneumonia due to COVID-19 virus   Generalized weakness        William Lau II, MD  04/26/23 0122

## 2023-04-26 NOTE — ED NOTES
"Nursing report ED to floor  Asia Ly  89 y.o.  male    HPI :   Chief Complaint   Patient presents with    Fever    Cough       Admitting doctor:   Calvin Bergeron MD    Admitting diagnosis:   The primary encounter diagnosis was COVID-19 virus infection. Diagnoses of Pneumonia due to COVID-19 virus and Generalized weakness were also pertinent to this visit.    Code status:   Current Code Status       Date Active Code Status Order ID Comments User Context       Prior            Allergies:   Amiodarone, Diltiazem, Doxycycline, Cefdinir, and Indomethacin    Isolation:   No active isolations    Intake and Output  No intake or output data in the 24 hours ending 04/25/23 2121    Weight:       04/25/23 1850   Weight: 65.3 kg (144 lb)       Most recent vitals:   Vitals:    04/25/23 1847 04/25/23 1850 04/25/23 1859 04/25/23 2031   BP:   124/67 112/56   Pulse: 77   58   Resp: 18      Temp: 100.4 °F (38 °C)      SpO2: 95%   93%   Weight:  65.3 kg (144 lb)     Height:  172.7 cm (68\")         Active LDAs/IV Access:   Lines, Drains & Airways       Active LDAs       Name Placement date Placement time Site Days    Peripheral IV 04/25/23 2016 Right Antecubital 04/25/23 2016  Antecubital  less than 1                    Labs (abnormal labs have a star):   Labs Reviewed   COVID-19 AND FLU A/B, NP SWAB IN TRANSPORT MEDIA 8-12 HR TAT - Abnormal; Notable for the following components:       Result Value    COVID19 Detected (*)     All other components within normal limits   COMPREHENSIVE METABOLIC PANEL - Abnormal; Notable for the following components:    BUN 29 (*)     CO2 21.7 (*)     eGFR 58.0 (*)     All other components within normal limits    Narrative:     GFR Normal >60  Chronic Kidney Disease <60  Kidney Failure <15    The GFR formula is only valid for adults with stable renal function between ages 18 and 70.   PROTIME-INR - Abnormal; Notable for the following components:    Protime 17.0 (*)     INR 1.36 (*)     All other " "components within normal limits   CBC WITH AUTO DIFFERENTIAL - Abnormal; Notable for the following components:    RBC 4.03 (*)     Hemoglobin 11.6 (*)     Hematocrit 35.4 (*)     RDW 15.5 (*)     Neutrophil % 78.2 (*)     Lymphocyte % 8.6 (*)     Immature Grans % 0.6 (*)     Lymphocytes, Absolute 0.60 (*)     All other components within normal limits   APTT - Normal   PROCALCITONIN - Normal    Narrative:     As a Marker for Sepsis (Non-Neonates):    1. <0.5 ng/mL represents a low risk of severe sepsis and/or septic shock.  2. >2 ng/mL represents a high risk of severe sepsis and/or septic shock.    As a Marker for Lower Respiratory Tract Infections that require antibiotic therapy:    PCT on Admission    Antibiotic Therapy       6-12 Hrs later    >0.5                Strongly Recommended  >0.25 - <0.5        Recommended   0.1 - 0.25          Discouraged              Remeasure/reassess PCT  <0.1                Strongly Discouraged     Remeasure/reassess PCT    As 28 day mortality risk marker: \"Change in Procalcitonin Result\" (>80% or <=80%) if Day 0 (or Day 1) and Day 4 values are available. Refer to http://www.[facility]-pct-calculator.com    Change in PCT <=80%  A decrease of PCT levels below or equal to 80% defines a positive change in PCT test result representing a higher risk for 28-day all-cause mortality of patients diagnosed with severe sepsis for septic shock.    Change in PCT >80%  A decrease of PCT levels of more than 80% defines a negative change in PCT result representing a lower risk for 28-day all-cause mortality of patients diagnosed with severe sepsis or septic shock.      CBC AND DIFFERENTIAL    Narrative:     The following orders were created for panel order CBC & Differential.  Procedure                               Abnormality         Status                     ---------                               -----------         ------                     CBC Auto Differential[[id number]]        Abnormal         "    Final result                 Please view results for these tests on the individual orders.       EKG:   No orders to display       Meds given in ED:   Medications   ampicillin-sulbactam (UNASYN) 3 g in sodium chloride 0.9 % 100 mL IVPB-VTB (has no administration in time range)       Imaging results:  XR Chest 2 View    Result Date: 2023  There is an interstitial infiltrate at the base of the left lower lobe most consistent with interstitial pneumonia. Stable chronic consolidation at the base of the right lung with a chronic right pleural effusion is noted.  This report was finalized on 2023 8:53 PM by Dr. Rah López M.D.       Ambulatory status:  Up w/ assistance    Social issues:   Social History     Socioeconomic History    Marital status:    Tobacco Use    Smoking status: Former     Types: Cigars     Quit date: 10/28/1994     Years since quittin.5    Smokeless tobacco: Never    Tobacco comments:     Quit 25 years ago   Vaping Use    Vaping Use: Never used   Substance and Sexual Activity    Alcohol use: No     Comment: daily caffiene - 1/2 cup of coffee    Drug use: No    Sexual activity: Defer       NIH Stroke Scale:         Joyce Vicente RN  23 21:21 EDT

## 2023-04-26 NOTE — PROGRESS NOTES
"[facility]  Clinical Pharmacy Department     Remdesivir Review Note    Asia Ly is a 89 y.o. male with confirmed COVID-19 infection on day 2 of hospitalization.     Consulting Provider:  Dr Pedroza  Date of Confirmed SARS-CoV-2: 4/25/23  Date of Symptom Onset: 4/25/23  Other Antimicrobials: none  Hydroxychloroquine or chloroquine prior to arrival: none    Allergies  Allergies as of 04/25/2023 - Reviewed 04/25/2023   Allergen Reaction Noted    Amiodarone Unknown (See Comments) 10/22/2018    Diltiazem Arrhythmia 09/18/2017    Doxycycline Rash 04/26/2018    Cefdinir Arrhythmia 09/03/2021    Indomethacin Rash 10/22/2018       Microbiology:  Microbiology Results (last 10 days)       Procedure Component Value - Date/Time    COVID-19 and FLU A/B PCR - Swab, Nasopharynx [[id number]]  (Abnormal) Collected: 04/25/23 1900    Lab Status: Final result Specimen: Swab from Nasopharynx Updated: 04/25/23 2007     COVID19 Detected     Influenza A PCR Not Detected     Influenza B PCR Not Detected            Vitals/Labs/I&O  Visit Vitals  /60   Pulse 60   Temp 98.9 °F (37.2 °C) (Oral)   Resp 20   Ht 172.7 cm (68\")   Wt 61.8 kg (136 lb 3.9 oz)   SpO2 96%   BMI 20.72 kg/m²       Results from last 7 days   Lab Units 04/26/23  0608 04/25/23 2017   WBC 10*3/mm3 6.17 6.97     Results from last 7 days   Lab Units 04/25/23 2017   PROCALCITONIN ng/mL 0.13     Results from last 7 days   Lab Units 04/25/23 2017   AST (SGOT) U/L 13      Results from last 7 days   Lab Units 04/25/23  2017   ALT (SGPT) U/L 9       Estimated Creatinine Clearance: 40.1 mL/min (by C-G formula based on SCr of 1.07 mg/dL).  Results from last 7 days   Lab Units 04/26/23  0608 04/25/23 2017   BUN mg/dL 27* 29*   CREATININE mg/dL 1.07 1.19     Intake & Output (last 3 days)         04/23 0701  04/24 0700 04/24 0701  04/25 0700 04/25 0701 04/26 0700 04/26 0701 04/27 0700    P.O.    140    Total Intake(mL/kg)    140 (2.3)    Net    +140            Urine " Unmeasured Occurrence   1 x 1 x            Assessment/Plan:    Patient meets the following inclusion/exclusion criteria:  Patient is hospitalized with confirmed COVID-19 infection (and accompanying symptoms)  Patient meets one of the two below criteria:  Patient is requiring an increase in baseline supplemental oxygen requirements OR SpO2 </= 94% on room air secondary to COVID-19 infection OR  Patient is symptomatic but not requiring supplemental oxygen or an increase in baseline oxygen AND has at least one of the below high risk criteria for progression to severe illness. High risk criteria:   Age >/= 65  Cancer  Cardiovascular diseases (HF, CAD, or cardiomyopathies)  CKD  Chronic lung disease (COPD, CF, interstitial lung disease, PE, pulmonary hypertension, bronchopulmonary dysplasia, bronchiectasis)  Diabetes mellitis (insulin dependent or poorly controlled)  Immunocompromising conditions or receipt of immunosuppressive medications  Obesity (BMI > 35 kg/m2)  Pregnancy and recent pregnancy (within 7 days of delivery)  Sickle cell disease  Baseline and daily LFTs and Scr have been ordered prior to remdesivir initiation  ALT is not ? 10 times the upper limit of normal  Patient is not on concomitant hydroxychloroquine or chloroquine  Patient does not require invasive mechanical ventilation (IMV) or ECMO  Patient is within 10 days from symptom onset (for criteria 2a) or within 7 days of symptom onset (for criteria 2b)    Remdesivir 200 mg IV x 1 dose, followed by 100 mg IV q24h for 4 days or until hospital discharge (whichever comes first) has been ordered.    Thank you for involving pharmacy in this patient's care. Please contact pharmacy with any questions or concerns.                           Kristina Potter, Pharm.D., [facility]  Clinical Pharmacist   4/26/2023  18:30 EDT

## 2023-04-26 NOTE — CASE MANAGEMENT/SOCIAL WORK
Discharge Planning Assessment  [facility]     Patient Name: Asia Ly  MRN: [id number]  Today's Date: 4/26/2023    Admit Date: 4/25/2023    Plan: HH vs SNF   Discharge Needs Assessment     Row Name 04/26/23 1511       Living Environment    People in Home spouse    Current Living Arrangements home    Primary Care Provided by self    Provides Primary Care For no one    Family Caregiver if Needed child(astrid), adult    Able to Return to Prior Arrangements yes       Transition Planning    Patient/Family Anticipates Transition to home with help/services;inpatient rehabilitation facility    Patient/Family Anticipated Services at Transition skilled nursing;rehabilitation services;home health care    Transportation Anticipated family or friend will provide       Discharge Needs Assessment    Equipment Currently Used at Home nebulizer    Concerns to be Addressed discharge planning    Discharge Facility/Level of Care Needs home with home health;nursing facility, skilled               Discharge Plan     Row Name 04/26/23 1511       Plan    Plan HH vs SNF    Patient/Family in Agreement with Plan yes    Plan Comments CCP attempted to call patient in room due to covid-19 isolation but he did not answer. CCP left [facility] for his spouse to discuss dc planning. CCP spoke with patient's son Raul over the phone; explained role, verified facesheet, and discussed dc plan. Patient uses a nebulizer at home. He has a walker and cane if needed. He lives with his spouse in a 2 level home but resides on 1 level. There are 2 steps to enter home and no handrail. Son states he is going to consider placing handrail on those steps soon. He has used [facility] in the past. He has no history of SNF. CCP explained difference between SNF & HH to son. Son is requesting PT to see patient while he is here in the hospital. CCP explained to son that if patient were to need SNF, there are only a few facilities in the area that accept covid positive patient's; he  acknowledged. CCP following for PT eval to assess level of care needs. BORIS, CSW              Continued Care and Services - Admitted Since 4/25/2023    Coordination has not been started for this encounter.          Demographic Summary     Row Name 04/26/23 1510       General Information    Admission Type inpatient    Arrived From home    Referral Source admission list    Reason for Consult discharge planning    Preferred Language English       Contact Information    Permission Granted to Share Info With family/designee               Functional Status     Row Name 04/26/23 1510       Functional Status    Usual Activity Tolerance moderate    Current Activity Tolerance fair       Functional Status, IADL    Medications independent    Meal Preparation independent    Housekeeping independent    Laundry independent    Shopping independent       Mental Status    General Appearance WDL WDL               Psychosocial    No documentation.                Abuse/Neglect    No documentation.                Legal    No documentation.                Substance Abuse    No documentation.                Patient Forms    No documentation.                   GREG Salazar

## 2023-04-27 LAB
ALBUMIN SERPL-MCNC: 2.8 G/DL (ref 3.5–5.2)
ALBUMIN/GLOB SERPL: 0.8 G/DL
ALP SERPL-CCNC: 47 U/L (ref 39–117)
ALT SERPL W P-5'-P-CCNC: 11 U/L (ref 1–41)
ANION GAP SERPL CALCULATED.3IONS-SCNC: 7 MMOL/L (ref 5–15)
AST SERPL-CCNC: 11 U/L (ref 1–40)
BASOPHILS # BLD AUTO: 0 10*3/MM3 (ref 0–0.2)
BASOPHILS NFR BLD AUTO: 0 % (ref 0–1.5)
BILIRUB CONJ SERPL-MCNC: <0.2 MG/DL (ref 0–0.3)
BILIRUB SERPL-MCNC: 0.3 MG/DL (ref 0–1.2)
BUN SERPL-MCNC: 23 MG/DL (ref 8–23)
BUN/CREAT SERPL: 21.9 (ref 7–25)
CALCIUM SPEC-SCNC: 8.9 MG/DL (ref 8.2–9.6)
CHLORIDE SERPL-SCNC: 108 MMOL/L (ref 98–107)
CHOLEST SERPL-MCNC: 103 MG/DL (ref 0–200)
CO2 SERPL-SCNC: 23 MMOL/L (ref 22–29)
CREAT SERPL-MCNC: 1.05 MG/DL (ref 0.76–1.27)
CRP SERPL-MCNC: 6.62 MG/DL (ref 0–0.5)
D DIMER PPP FEU-MCNC: 1.09 MCGFEU/ML (ref 0–0.9)
DEPRECATED RDW RBC AUTO: 50.6 FL (ref 37–54)
EGFRCR SERPLBLD CKD-EPI 2021: 67.4 ML/MIN/1.73
EOSINOPHIL # BLD AUTO: 0 10*3/MM3 (ref 0–0.4)
EOSINOPHIL NFR BLD AUTO: 0 % (ref 0.3–6.2)
ERYTHROCYTE [DISTWIDTH] IN BLOOD BY AUTOMATED COUNT: 15.7 % (ref 12.3–15.4)
GLOBULIN UR ELPH-MCNC: 3.6 GM/DL
GLUCOSE SERPL-MCNC: 111 MG/DL (ref 65–99)
HBA1C MFR BLD: 6.1 % (ref 4.8–5.6)
HCT VFR BLD AUTO: 32.1 % (ref 37.5–51)
HDLC SERPL-MCNC: 47 MG/DL (ref 40–60)
HGB BLD-MCNC: 10.6 G/DL (ref 13–17.7)
IMM GRANULOCYTES # BLD AUTO: 0.02 10*3/MM3 (ref 0–0.05)
IMM GRANULOCYTES NFR BLD AUTO: 0.4 % (ref 0–0.5)
LDLC SERPL CALC-MCNC: 47 MG/DL (ref 0–100)
LDLC/HDLC SERPL: 1.06 {RATIO}
LYMPHOCYTES # BLD AUTO: 0.57 10*3/MM3 (ref 0.7–3.1)
LYMPHOCYTES NFR BLD AUTO: 11.1 % (ref 19.6–45.3)
MCH RBC QN AUTO: 29.6 PG (ref 26.6–33)
MCHC RBC AUTO-ENTMCNC: 33 G/DL (ref 31.5–35.7)
MCV RBC AUTO: 89.7 FL (ref 79–97)
MONOCYTES # BLD AUTO: 0.56 10*3/MM3 (ref 0.1–0.9)
MONOCYTES NFR BLD AUTO: 10.9 % (ref 5–12)
NEUTROPHILS NFR BLD AUTO: 3.99 10*3/MM3 (ref 1.7–7)
NEUTROPHILS NFR BLD AUTO: 77.6 % (ref 42.7–76)
NRBC BLD AUTO-RTO: 0 /100 WBC (ref 0–0.2)
PLATELET # BLD AUTO: 114 10*3/MM3 (ref 140–450)
PMV BLD AUTO: 11.7 FL (ref 6–12)
POTASSIUM SERPL-SCNC: 4.1 MMOL/L (ref 3.5–5.2)
PROT SERPL-MCNC: 6.4 G/DL (ref 6–8.5)
RBC # BLD AUTO: 3.58 10*6/MM3 (ref 4.14–5.8)
SODIUM SERPL-SCNC: 138 MMOL/L (ref 136–145)
TRIGL SERPL-MCNC: 30 MG/DL (ref 0–150)
TSH SERPL DL<=0.05 MIU/L-ACNC: 1.5 UIU/ML (ref 0.27–4.2)
VLDLC SERPL-MCNC: 9 MG/DL (ref 5–40)
WBC NRBC COR # BLD: 5.14 10*3/MM3 (ref 3.4–10.8)

## 2023-04-27 PROCEDURE — 86140 C-REACTIVE PROTEIN: CPT | Performed by: HOSPITALIST

## 2023-04-27 PROCEDURE — 94799 UNLISTED PULMONARY SVC/PX: CPT

## 2023-04-27 PROCEDURE — 97110 THERAPEUTIC EXERCISES: CPT

## 2023-04-27 PROCEDURE — 25010000002 DEXAMETHASONE PER 1 MG: Performed by: HOSPITALIST

## 2023-04-27 PROCEDURE — 85025 COMPLETE CBC W/AUTO DIFF WBC: CPT | Performed by: HOSPITALIST

## 2023-04-27 PROCEDURE — 97161 PT EVAL LOW COMPLEX 20 MIN: CPT

## 2023-04-27 PROCEDURE — 84443 ASSAY THYROID STIM HORMONE: CPT | Performed by: HOSPITALIST

## 2023-04-27 PROCEDURE — 82248 BILIRUBIN DIRECT: CPT | Performed by: INTERNAL MEDICINE

## 2023-04-27 PROCEDURE — 83036 HEMOGLOBIN GLYCOSYLATED A1C: CPT | Performed by: HOSPITALIST

## 2023-04-27 PROCEDURE — 94761 N-INVAS EAR/PLS OXIMETRY MLT: CPT

## 2023-04-27 PROCEDURE — 85379 FIBRIN DEGRADATION QUANT: CPT | Performed by: HOSPITALIST

## 2023-04-27 PROCEDURE — 94640 AIRWAY INHALATION TREATMENT: CPT

## 2023-04-27 PROCEDURE — 25010000002 REMDESIVIR 100 MG/20ML SOLUTION 1 EACH VIAL: Performed by: INTERNAL MEDICINE

## 2023-04-27 PROCEDURE — 94664 DEMO&/EVAL PT USE INHALER: CPT

## 2023-04-27 PROCEDURE — 80061 LIPID PANEL: CPT | Performed by: HOSPITALIST

## 2023-04-27 PROCEDURE — 80053 COMPREHEN METABOLIC PANEL: CPT | Performed by: HOSPITALIST

## 2023-04-27 RX ORDER — DEXAMETHASONE 6 MG/1
6 TABLET ORAL
Status: DISCONTINUED | OUTPATIENT
Start: 2023-04-28 | End: 2023-04-29 | Stop reason: HOSPADM

## 2023-04-27 RX ADMIN — ALLOPURINOL 100 MG: 100 TABLET ORAL at 08:48

## 2023-04-27 RX ADMIN — OXYCODONE HYDROCHLORIDE AND ACETAMINOPHEN 500 MG: 500 TABLET ORAL at 08:48

## 2023-04-27 RX ADMIN — APIXABAN 2.5 MG: 2.5 TABLET, FILM COATED ORAL at 08:48

## 2023-04-27 RX ADMIN — BUDESONIDE AND FORMOTEROL FUMARATE DIHYDRATE 2 PUFF: 160; 4.5 AEROSOL RESPIRATORY (INHALATION) at 20:07

## 2023-04-27 RX ADMIN — ALLOPURINOL 100 MG: 100 TABLET ORAL at 20:46

## 2023-04-27 RX ADMIN — DEXAMETHASONE SODIUM PHOSPHATE 6 MG: 10 INJECTION INTRAMUSCULAR; INTRAVENOUS at 08:49

## 2023-04-27 RX ADMIN — GUAIFENESIN 600 MG: 600 TABLET, EXTENDED RELEASE ORAL at 20:46

## 2023-04-27 RX ADMIN — SODIUM CHLORIDE 50 ML/HR: 9 INJECTION, SOLUTION INTRAVENOUS at 06:10

## 2023-04-27 RX ADMIN — REMDESIVIR 100 MG: 100 INJECTION, POWDER, LYOPHILIZED, FOR SOLUTION INTRAVENOUS at 18:29

## 2023-04-27 RX ADMIN — METOPROLOL TARTRATE 25 MG: 25 TABLET, FILM COATED ORAL at 08:49

## 2023-04-27 RX ADMIN — FAMOTIDINE 20 MG: 20 TABLET, FILM COATED ORAL at 20:46

## 2023-04-27 RX ADMIN — GUAIFENESIN 600 MG: 600 TABLET, EXTENDED RELEASE ORAL at 08:48

## 2023-04-27 RX ADMIN — Medication 220 MG: at 08:49

## 2023-04-27 RX ADMIN — CETIRIZINE HYDROCHLORIDE 10 MG: 10 TABLET ORAL at 08:49

## 2023-04-27 RX ADMIN — Medication 1000 UNITS: at 08:48

## 2023-04-27 RX ADMIN — FAMOTIDINE 20 MG: 20 TABLET, FILM COATED ORAL at 08:49

## 2023-04-27 RX ADMIN — METOPROLOL TARTRATE 25 MG: 25 TABLET, FILM COATED ORAL at 15:44

## 2023-04-27 RX ADMIN — BUDESONIDE AND FORMOTEROL FUMARATE DIHYDRATE 2 PUFF: 160; 4.5 AEROSOL RESPIRATORY (INHALATION) at 11:50

## 2023-04-27 RX ADMIN — APIXABAN 2.5 MG: 2.5 TABLET, FILM COATED ORAL at 20:46

## 2023-04-27 NOTE — PLAN OF CARE
Goal Outcome Evaluation:  Plan of Care Reviewed With: patient           Outcome Evaluation: Pt 89 yo male admitted w persistent cough, fever, noted COVID +. Pt baseline independent w mobility lives w spouse, doesnt use AD. On PT exam pt ambulated household distances supervision level no AD, no significant gait/balance deficits. No acute skilled PT warranted, encouraged continued mobilization acutely as tolerated, will sign off.

## 2023-04-27 NOTE — THERAPY DISCHARGE NOTE
Acute Care - Physical Therapy Initial Evaluation/Discharge  [facility]     Patient Name: Asia Ly  : 1932  MRN: [id number]  Today's Date: 2023   Onset of Illness/Injury or Date of Surgery: 23     Referring Physician: Rubio      Admit Date: 2023    Visit Dx:    ICD-10-CM ICD-9-CM   1. COVID-19 virus infection  U07.1 079.89   2. Pneumonia due to COVID-19 virus  U07.1 480.8    J12.82 079.89   3. Generalized weakness  R53.1 780.79     Patient Active Problem List   Diagnosis   • Ataxia   • TIA (transient ischemic attack)   • Atrial fibrillation, paroxysmal   • Hyperlipidemia   • Gout   • Crohn's disease   • Atrial fibrillation with RVR   • Acute cholecystitis   • Obstructive jaundice   • Acute lower GI bleeding   • Aortic stenosis   • Pulmonary fibrosis   • Anticoagulant long-term use   • Febrile illness, acute   • Atrial fibrillation with rapid ventricular response   • COVID-19 virus infection     Past Medical History:   Diagnosis Date   • Aortic stenosis    • Aspiration pneumonia    • Atrial fibrillation    • Bleeding from the nose    • Bronchiectasis    • CKD (chronic kidney disease)    • COPD (chronic obstructive pulmonary disease)    • Crohn's disease    • Dizziness    • Elevated cholesterol    • Gastric ulcer    • Generalized weakness    • Gout    • Gout    • Hard of hearing    • Hyperlipidemia    • Hypertension    • Leg swelling    • Lower extremity edema    • Mild anemia    • Near syncope    • Nonrheumatic aortic valve stenosis    • PAF (paroxysmal atrial fibrillation)    • Palpitations    • Peptic ulcer    • Pneumonia of left lung due to infectious organism    • Pulmonary fibrosis    • Stroke    • TIA (transient ischemic attack)      Past Surgical History:   Procedure Laterality Date   • BRONCHOSCOPY N/A 10/22/2018    Procedure: BRONCHOSCOPY WITH BRONCHALVEOLAR LAVAGE;  Surgeon: Chidi Oconnor MD;  Location: [facility] ENDOSCOPY;  Service: Pulmonary   • BRONCHOSCOPY N/A 2021     "Procedure: BRONCHOSCOPY with BAL;  Surgeon: Chidi Oconnor MD;  Location: [facility] ENDOSCOPY;  Service: Pulmonary;  Laterality: N/A;  Pre: bronchectasis  Post: same   • CARDIAC ELECTROPHYSIOLOGY PROCEDURE N/A 7/9/2021    Procedure: Pacemaker DC new--[employer] possible His lead;  Surgeon: Rashaun Nettles MD;  Location: [facility] CATH INVASIVE LOCATION;  Service: Cardiology;  Laterality: N/A;   • CARDIAC ELECTROPHYSIOLOGY PROCEDURE N/A 9/3/2021    Procedure: AV node 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 ppm;  Surgeon: Rashaun Nettles MD;  Location: [facility] CATH INVASIVE LOCATION;  Service: Cardiovascular;  Laterality: N/A;   • CATARACT EXTRACTION Bilateral    • COLONOSCOPY     • COLONOSCOPY N/A 3/9/2018    Procedure: COLONOSCOPY to terminal ileum with bx;  Surgeon: Aron Cabrera MD;  Location: [facility] ENDOSCOPY;  Service:    • COLONOSCOPY N/A 11/19/2020    Procedure: COLONOSCOPY to cecum:  apc to cecum angiodysplagia,;  Surgeon: Aron Cabrera MD;  Location: [facility] ENDOSCOPY;  Service: Gastroenterology;  Laterality: N/A;  GI bleed  post:  diverticulosis, angiodysplagia   • CYSTECTOMY      back and shoulder area   • ENDOSCOPY N/A 3/9/2018    Procedure: ESOPHAGOGASTRODUODENOSCOPY with bx;  Surgeon: Aron Cabrera MD;  Location: [facility] ENDOSCOPY;  Service:    • ENDOSCOPY N/A 11/19/2020    Procedure: ESOPHAGOGASTRODUODENOSCOPY;  Surgeon: Aron Cabrera MD;  Location: [facility] ENDOSCOPY;  Service: Gastroenterology;  Laterality: N/A;  GI bleed  post:  HH.   • EYE SURGERY Left     detached retina   • EYE SURGERY Right     \"repaired a hole in the eye\"   • INSERT / REPLACE / REMOVE PACEMAKER     • TESTICLE SURGERY      benign tumor removed.       PT Assessment (last 12 hours)     PT Evaluation and Treatment     Row Name 04/27/23 0900          Physical Therapy Time and Intention    Subjective Information no complaints  -     Document Type discharge evaluation/summary  -     Mode of Treatment individual therapy;physical therapy  " -     Patient Effort excellent  -     Symptoms Noted During/After Treatment none  -     Row Name 04/27/23 0900          General Information    Patient Profile Reviewed yes  -     Onset of Illness/Injury or Date of Surgery 04/25/23  -     Referring Physician Rubio  -     Patient Observations alert;cooperative;agree to therapy  -     General Observations of Patient pt supine in bed no acute distress  -     Prior Level of Function independent:  -     Equipment Currently Used at Home none  -     Pertinent History of Current Functional Problem COVID  -     Existing Precautions/Restrictions no known precautions/restrictions  -     Barriers to Rehab hearing deficit  -     Row Name 04/27/23 0900          Living Environment    Current Living Arrangements home  -     People in Home spouse  -     Row Name 04/27/23 0900          Pain    Pretreatment Pain Rating 0/10 - no pain  -     Posttreatment Pain Rating 0/10 - no pain  -     Row Name 04/27/23 0900          Cognition    Affect/Mental Status (Cognition) WFL  -     Orientation Status (Cognition) oriented x 3  -     Row Name 04/27/23 0900          Range of Motion Comprehensive    General Range of Motion bilateral lower extremity ROM WFL  -     Row Name 04/27/23 0900          Strength (Manual Muscle Testing)    Strength (Manual Muscle Testing) strength is WFL;bilateral lower extremities  grossly at least 3/5  -     Row Name 04/27/23 0900          Bed Mobility    Bed Mobility supine-sit;sit-supine  -     Supine-Sit [place] (Bed Mobility) independent  -     Sit-Supine [place] (Bed Mobility) independent  -     Assistive Device (Bed Mobility) bed rails  -     Row Name 04/27/23 0900          Transfers    Transfers sit-stand transfer;stand-sit transfer  -     Row Name 04/27/23 0900          Sit-Stand Transfer    Sit-Stand [place] (Transfers) independent  -     Row Name 04/27/23 0900          Stand-Sit Transfer     Stand-Sit [place] (Transfers) independent  -LH     Row Name 04/27/23 0900          Gait/Stairs (Locomotion)    [place] Level (Gait) supervision  -     Distance in Feet (Gait) 60  -     Pattern (Gait) step-through  -     Comment, (Gait/Stairs) in room  -LH     Row Name 04/27/23 0900          Balance    Balance Assessment sitting static balance;standing static balance  -     Static Sitting Balance independent  -     Position, Sitting Balance unsupported;sitting edge of bed  -     Static Standing Balance independent  -     Position/Device Used, Standing Balance unsupported  -[facility] Name 04/27/23 0900          Motor Skills    Therapeutic Exercise --  AP, LAQs, MIP x 5  -LH     Row Name 04/27/23 0900          Plan of Care Review    Plan of Care Reviewed With patient  -     Outcome Evaluation Pt 89 yo male admitted w persistent cough, fever, noted COVID +. Pt baseline independent w mobility lives w spouse, doesnt use AD. On PT exam pt ambulated household distances supervision level no AD, no significant gait/balance deficits. No acute skilled PT warranted, encouraged continued mobilization acutely as tolerated, will sign off.  -LH     Row Name 04/27/23 0900          Vital Signs    Pre SpO2 (%) 95  -     O2 Delivery Pre Treatment supplemental O2  3l  -     Intra SpO2 (%) 89  -     O2 Delivery Intra Treatment room air  -     Post SpO2 (%) 93  -     O2 Delivery Post Treatment supplemental O2  3l  -LH     Row Name 04/27/23 0900          Positioning and Restraints    Pre-Treatment Position in bed  -     Post Treatment Position bed  -     In Bed fowlers;notified nsg;call light within reach;encouraged to call for assist;exit alarm on  -LH     Row Name 04/27/23 0900          Therapy Assessment/Plan (PT)    Criteria for Skilled Interventions Met (PT) no problems identified which require skilled intervention  -     Therapy Frequency (PT) evaluation only  -LH     Row Name 04/27/23 0900           PT Evaluation Complexity    History, PT Evaluation Complexity 1-2 personal factors and/or comorbidities  -     Examination of Body Systems (PT Eval Complexity) 1-2 elements  -     Clinical Presentation (PT Evaluation Complexity) stable  -     Clinical Decision Making (PT Evaluation Complexity) low complexity  -     Overall Complexity (PT Evaluation Complexity) low complexity  -           User Key  (r) = Recorded By, (t) = Taken By, (c) = Cosigned By    Initials Name Provider Type     Yamel Rasmussen, PT Physical Therapist                  Physical Therapy Education     Title: PT OT SLP Therapies (Done)     Topic: Physical Therapy (Done)     Point: Mobility training (Done)     Learning Progress Summary           Patient Acceptance, E, VU,DU by  at 4/27/2023 0909                   Point: Home exercise program (Done)     Learning Progress Summary           Patient Acceptance, E, VU,DU by  at 4/27/2023 0909                   Point: Body mechanics (Done)     Learning Progress Summary           Patient Acceptance, E, VU,DU by  at 4/27/2023 0909                   Point: Precautions (Done)     Learning Progress Summary           Patient Acceptance, E, VU,DU by  at 4/27/2023 0909                               User Key     Initials Effective Dates Name Provider Type Discipline     06/16/21 -  Yamel Rasmussen, PT Physical Therapist PT                PT Recommendation and Plan  Anticipated Discharge Disposition (PT): home with 24/7 care  Therapy Frequency (PT): evaluation only  Plan of Care Reviewed With: patient  Outcome Evaluation: Pt 89 yo male admitted w persistent cough, fever, noted COVID +. Pt baseline independent w mobility lives w spouse, doesnt use AD. On PT exam pt ambulated household distances supervision level no AD, no significant gait/balance deficits. No acute skilled PT warranted, encouraged continued mobilization acutely as tolerated, will sign off.     Outcome Measures     Row Name  04/27/23 0900             How much help from another person do you currently need...    Turning from your back to your side while in flat bed without using bedrails? 4  -LH      Moving from lying on back to sitting on the side of a flat bed without bedrails? 4  -LH      Moving to and from a bed to a chair (including a wheelchair)? 4  -LH      Standing up from a chair using your arms (e.g., wheelchair, bedside chair)? 4  -LH      Climbing 3-5 steps with a railing? 3  -LH      To walk in hospital room? 4  -      AM-PAC 6 Clicks Score (PT) 23  -         Functional Assessment    Outcome Measure Options AM-PAC 6 Clicks Basic Mobility (PT)  -            User Key  (r) = Recorded By, (t) = Taken By, (c) = Cosigned By    Initials Name Provider Type     Yamel Rasmussen, PT Physical Therapist                 Time Calculation:    PT Charges     Row Name 04/27/23 0909             Time Calculation    Start Time 0808  -      Stop Time 0822  -      Time Calculation (min) 14 min  -      PT Received On 04/27/23  -         Time Calculation- PT    Total Timed Code Minutes- PT 9 minute(s)  -            User Key  (r) = Recorded By, (t) = Taken By, (c) = Cosigned By    Initials Name Provider Type     Yamel Rasmussen, PT Physical Therapist              Therapy Charges for Today     Code Description Service Date Service Provider Modifiers Qty    66196010673  PT EVAL LOW COMPLEXITY 3 4/27/2023 Yamel Rasmussen, PT GP 1    [id number] HC PT THER PROC EA 15 MIN 4/27/2023 Yamel Rasmussen, PT GP 1          PT G-Codes  Outcome Measure Options: AM-PAC 6 Clicks Basic Mobility (PT)  AM-PAC 6 Clicks Score (PT): 23    PT Discharge Summary  Anticipated Discharge Disposition (PT): home with 24/7 care    Yamel Rasmussen, PT  4/27/2023

## 2023-04-27 NOTE — PROGRESS NOTES
"  Infectious Diseases Progress Note    Rolly Pedroza MD     [place]  [place]: 2 days  Patient Identification:  Name: Asia Ly  Age: 89 y.o.  Sex: male  :  1932  MRN: [id number]         Primary Care Physician: Lubna Lobo MD        Subjective: Comfortable and pleasant but pleasantly confused.  Interval History: See consultation note.  Oxygen requirement seems to be decreasing.    Objective:    Scheduled Meds:allopurinol, 100 mg, Oral, BID  apixaban, 2.5 mg, Oral, Q12H  vitamin C, 500 mg, Oral, Daily  budesonide-formoterol, 2 puff, Inhalation, BID - RT  cetirizine, 10 mg, Oral, Daily  cholecalciferol, 1,000 Units, Oral, Daily  [START ON 2023] dexamethasone, 6 mg, Oral, Daily With Breakfast  famotidine, 20 mg, Oral, BID  guaiFENesin, 600 mg, Oral, Q12H  [START ON 2023] metoprolol tartrate, 25 mg, Oral, Q12H  remdesivir, 100 mg, Intravenous, Q24H  zinc sulfate, 220 mg, Oral, Daily      Continuous Infusions:     Vital signs in last 24 hours:  Temp:  [97.3 °F (36.3 °C)-98.6 °F (37 °C)] 97.4 °F (36.3 °C)  Heart Rate:  [60-79] 64  Resp:  [18] 18  BP: (118-157)/(55-85) 157/84    Intake/Output:    Intake/Output Summary (Last 24 hours) at 2023 1829  Last data filed at 2023 2300  Gross per 24 hour   Intake 669.17 ml   Output --   Net 669.17 ml       Exam:  /84 (BP Location: Left arm, Patient Position: Lying)   Pulse 64   Temp 97.4 °F (36.3 °C) (Oral)   Resp 18   Ht 172.7 cm (68\")   Wt 61.8 kg (136 lb 3.9 oz)   SpO2 91%   BMI 20.72 kg/m²   Patient is examined using the personal protective equipment as per guidelines from infection control for this particular patient as enacted.  Hand washing was performed before and after patient interaction.  General Appearance:  Awake interactive does not appear to be in any acute distress                          Head:    Normocephalic, without obvious abnormality, atraumatic                           Eyes:    PERRL, " conjunctivae/corneas clear, EOM's intact, both eyes                         Throat:   Lips, tongue, gums normal; oral mucosa pink and moist                           Neck:   Supple, symmetrical, trachea midline, no JVD                         Lungs:    Clear to auscultation bilaterally, respirations unlabored                 Chest Wall:    No tenderness or deformity                          Heart:  S1-S2 irregular                  Abdomen:   Soft nontender                 Extremities:   Extremities normal, atraumatic, no cyanosis or edema                        Pulses:   Pulses palpable in all extremities                            Skin:   Skin is warm and dry,  no rashes or palpable lesions                  Neurologic: Grossly nonfocal       Data Review:    I reviewed the patient's new clinical results.  Results from last 7 days   Lab Units 04/27/23  0514 04/26/23  0608 04/25/23 2017   WBC 10*3/mm3 5.14 6.17 6.97   HEMOGLOBIN g/dL 10.6* 10.9* 11.6*   PLATELETS 10*3/mm3 114* 116* 142     Results from last 7 days   Lab Units 04/27/23  0514 04/26/23 1926 04/26/23 0608 04/25/23 2017   SODIUM mmol/L 138  --  138 136   POTASSIUM mmol/L 4.1  --  4.2 4.6   CHLORIDE mmol/L 108*  --  105 103   CO2 mmol/L 23.0  --  20.8* 21.7*   BUN mg/dL 23  --  27* 29*   CREATININE mg/dL 1.05 1.20 1.07 1.19   CALCIUM mg/dL 8.9  --  8.7 8.9   GLUCOSE mg/dL 111*  --  62* 89       Microbiology Results (last 10 days)     Procedure Component Value - Date/Time    COVID-19 and FLU A/B PCR - Swab, Nasopharynx [[id number]]  (Abnormal) Collected: 04/25/23 1900    Lab Status: Final result Specimen: Swab from Nasopharynx Updated: 04/25/23 2007     COVID19 Detected     Influenza A PCR Not Detected     Influenza B PCR Not Detected            Assessment:    COVID-19 virus infection  1-acute COVID-19 infection with worsening hypoxia in the setting of chronic bronchiectasis and other complicated past medical history such as COPD chronic hypoxia  inflammatory bowel disease paroxysmal atrial fibrillation etc.  2-bronchiectasis with interstitial lung disease  3-COPD  4-atrial fibrillation  5-history of TIA and stroke  6-possible underlying memory issues.        Recommendations/Discussions:  See my discussion and recommendation on 4/26/2023  Continue supportive care and monitor need for oxygen supplementation and keep an eye on his liver function test.  Rolly Pedroza MD  4/27/2023  18:29 EDT    Parts of this note may be an electronic transcription/translation of spoken language to printed text using the Dragon dictation system.

## 2023-04-27 NOTE — PROGRESS NOTES
"Daily progress note    Primary care physician      Chief complaint  Doing better with no new complaints and denies any fever cough shortness of breath.    History of present illness  89-year-old male with history of chronic atrial fibrillation aortic stenosis hypertension hyperlipidemia pulmonary fibrosis bronchiectasis pulmonary hypertension chronic disease and gastroesophageal reflux disease who is well-known to our service brought to the emergency room with fever cough shortness of breath for last several days to weeks.  Patient is treated with empiric antibiotics without any improvement.  Patient evaluated in ER found to have COVID-19 pneumonia admit for management.  Patient denies any chest pain palpitation abdominal pain nausea vomiting diarrhea.  Patient is DNR per his wishes.    REVIEW OF SYSTEMS  Constitutional: Positive for fever. Negative for chills.   HENT: Negative for ear pain and sore throat.    Respiratory: Positive for cough and shortness of breath   Cardiovascular: Negative for chest pain and palpitations.   Gastrointestinal: Negative for abdominal pain and vomiting.   Genitourinary: Negative for dysuria and hematuria.   Musculoskeletal: Negative for arthralgias and joint swelling.   Skin: Negative for pallor and rash.   Neurological: Negative for syncope and headaches.   Psychiatric/Behavioral: Negative for confusion and hallucinations.      PHYSICAL EXAM  Blood pressure 157/84, pulse 64, temperature 97.4 °F (36.3 °C), temperature source Oral, resp. rate 18, height 172.7 cm (68\"), weight 61.8 kg (136 lb 3.9 oz), SpO2 91 %.    Constitutional:       General: He is not in acute distress.     Appearance: Normal appearance.   HENT:      Head: Normocephalic and atraumatic.      Nose: Nose normal.      Mouth/Throat:      Mouth: Mucous membranes are moist.   Eyes:      Conjunctiva/sclera: Conjunctivae normal.      Pupils: Pupils are equal, round, and reactive to light.   Cardiovascular:      " Rate and Rhythm: Normal rate and regular rhythm.      Pulses: Normal pulses.      Heart sounds: Normal heart sounds.   Pulmonary:      Effort: Pulmonary effort is normal.      Breath sounds: Normal breath sounds.   Abdominal:      General: There is no distension.   Musculoskeletal:         General: Normal range of motion.      Cervical back: Normal range of motion and neck supple.   Skin:     General: Skin is warm.      Capillary Refill: Capillary refill takes less than 2 seconds.   Neurological:      General: No focal deficit present.      Mental Status: He is alert and oriented to person, place, and time.   Psychiatric:         Mood and Affect: Mood normal.      LAB RESULTS  Lab Results (last 24 hours)     Procedure Component Value Units Date/Time    TSH [[id number]]  (Normal) Collected: 04/27/23 0514    Specimen: Blood Updated: 04/27/23 0609     TSH 1.500 uIU/mL     Lipid Panel [[id number]] Collected: 04/27/23 0514    Specimen: Blood Updated: 04/27/23 0605     Total Cholesterol 103 mg/dL      Triglycerides 30 mg/dL      HDL Cholesterol 47 mg/dL      LDL Cholesterol  47 mg/dL      VLDL Cholesterol 9 mg/dL      LDL/HDL Ratio 1.06    Narrative:      Cholesterol Reference Ranges  (U.S. Department of Health and Human Services ATP III Classifications)    Desirable          <200 mg/dL  Borderline High    200-239 mg/dL  High Risk          >240 mg/dL      Triglyceride Reference Ranges  (U.S. Department of Health and Human Services ATP III Classifications)    Normal           <150 mg/dL  Borderline High  150-199 mg/dL  High             200-499 mg/dL  Very High        >500 mg/dL    HDL Reference Ranges  (U.S. Department of Health and Human Services ATP III Classifications)    Low     <40 mg/dl (major risk factor for CHD)  High    >60 mg/dl ('negative' risk factor for CHD)        LDL Reference Ranges  (U.S. Department of Health and Human Services ATP III Classifications)    Optimal          <100 mg/dL  Near Optimal     100-129  mg/dL  Borderline High  130-159 mg/dL  High             160-189 mg/dL  Very High        >189 mg/dL    Bilirubin, Direct [[id number]]  (Normal) Collected: 04/27/23 0514    Specimen: Blood Updated: 04/27/23 0605     Bilirubin, Direct <0.2 mg/dL     Comprehensive Metabolic Panel [[id number]]  (Abnormal) Collected: 04/27/23 0514    Specimen: Blood Updated: 04/27/23 0605     Glucose 111 mg/dL      BUN 23 mg/dL      Creatinine 1.05 mg/dL      Sodium 138 mmol/L      Potassium 4.1 mmol/L      Chloride 108 mmol/L      CO2 23.0 mmol/L      Calcium 8.9 mg/dL      Total Protein 6.4 g/dL      Albumin 2.8 g/dL      ALT (SGPT) 11 U/L      AST (SGOT) 11 U/L      Alkaline Phosphatase 47 U/L      Total Bilirubin 0.3 mg/dL      Globulin 3.6 gm/dL      A/G Ratio 0.8 g/dL      BUN/Creatinine Ratio 21.9     Anion Gap 7.0 mmol/L      eGFR 67.4 mL/min/1.73     Narrative:      GFR Normal >60  Chronic Kidney Disease <60  Kidney Failure <15    The GFR formula is only valid for adults with stable renal function between ages 18 and 70.    C-reactive Protein [[id number]]  (Abnormal) Collected: 04/27/23 0514    Specimen: Blood Updated: 04/27/23 0605     C-Reactive Protein 6.62 mg/dL     Hemoglobin A1c [[id number]]  (Abnormal) Collected: 04/27/23 0514    Specimen: Blood Updated: 04/27/23 0600     Hemoglobin A1C 6.10 %     Narrative:      Hemoglobin A1C Ranges:    Increased Risk for Diabetes  5.7% to 6.4%  Diabetes                     >= 6.5%  Diabetic Goal                < 7.0%    D-dimer, Quantitative [[id number]]  (Abnormal) Collected: 04/27/23 0514    Specimen: Blood Updated: 04/27/23 0555     D-Dimer, Quantitative 1.09 MCGFEU/mL     Narrative:      According to the assay 's published package insert, a normal (<0.50 MCGFEU/mL) D-dimer result in conjunction with a non-high clinical probability assessment, excludes deep vein thrombosis (DVT) and pulmonary embolism (PE) with high sensitivity.    D-dimer values increase with age and this can  "make VTE exclusion of an older population difficult. To address this, the American College of [employer], based on best available evidence and recent guidelines, recommends that clinicians use age-adjusted D-dimer thresholds in patients greater than 50 years of age with: a) a low probability of PE who do not meet all Pulmonary Embolism Rule Out Criteria, or b) in those with intermediate probability of PE.   The formula for an age-adjusted D-dimer cut-off is \"age/100\".  For example, a 60 year old patient would have an age-adjusted cut-off of 0.60 MCGFEU/mL and an 80 year old 0.80 MCGFEU/mL.    CBC & Differential [[id number]]  (Abnormal) Collected: 04/27/23 0514    Specimen: Blood Updated: 04/27/23 0546    Narrative:      The following orders were created for panel order CBC & Differential.  Procedure                               Abnormality         Status                     ---------                               -----------         ------                     CBC Auto Differential[[id number]]        Abnormal            Final result                 Please view results for these tests on the individual orders.    CBC Auto Differential [[id number]]  (Abnormal) Collected: 04/27/23 0514    Specimen: Blood Updated: 04/27/23 0546     WBC 5.14 10*3/mm3      RBC 3.58 10*6/mm3      Hemoglobin 10.6 g/dL      Hematocrit 32.1 %      MCV 89.7 fL      MCH 29.6 pg      MCHC 33.0 g/dL      RDW 15.7 %      RDW-SD 50.6 fl      MPV 11.7 fL      Platelets 114 10*3/mm3      Neutrophil % 77.6 %      Lymphocyte % 11.1 %      Monocyte % 10.9 %      Eosinophil % 0.0 %      Basophil % 0.0 %      Immature Grans % 0.4 %      Neutrophils, Absolute 3.99 10*3/mm3      Lymphocytes, Absolute 0.57 10*3/mm3      Monocytes, Absolute 0.56 10*3/mm3      Eosinophils, Absolute 0.00 10*3/mm3      Basophils, Absolute 0.00 10*3/mm3      Immature Grans, Absolute 0.02 10*3/mm3      nRBC 0.0 /100 WBC     Hepatic Function Panel [[id number]]  (Abnormal) Collected: " 04/26/23 1926    Specimen: Blood Updated: 04/26/23 2020     Total Protein 7.0 g/dL      Albumin 3.3 g/dL      ALT (SGPT) 11 U/L      AST (SGOT) 18 U/L      Alkaline Phosphatase 54 U/L      Total Bilirubin 0.5 mg/dL      Bilirubin, Direct <0.2 mg/dL      Bilirubin, Indirect --     Comment: Unable to calculate       Creatinine Serum (kidney function) GFR component [[id number]]  (Abnormal) Collected: 04/26/23 1926    Specimen: Blood Updated: 04/26/23 2020     Creatinine 1.20 mg/dL      eGFR 57.4 mL/min/1.73     Narrative:      GFR Normal >60  Chronic Kidney Disease <60  Kidney Failure <15    The GFR formula is only valid for adults with stable renal function between ages 18 and 70.        Imaging Results (Last 24 Hours)     ** No results found for the last 24 hours. **          Current Facility-Administered Medications:   •  allopurinol (ZYLOPRIM) tablet 100 mg, 100 mg, Oral, BID, Calvin Bergeron MD, 100 mg at 04/27/23 0848  •  apixaban (ELIQUIS) tablet 2.5 mg, 2.5 mg, Oral, Q12H, Calvin Bergeron MD, 2.5 mg at 04/27/23 0848  •  ascorbic acid (VITAMIN C) tablet 500 mg, 500 mg, Oral, Daily, Calvin Bergeron MD, 500 mg at 04/27/23 0848  •  budesonide-formoterol (SYMBICORT) 160-4.5 MCG/ACT inhaler 2 puff, 2 puff, Inhalation, BID - RT, Calvin Bergeron MD, 2 puff at 04/27/23 1150  •  cetirizine (zyrTEC) tablet 10 mg, 10 mg, Oral, Daily, Calvin Bergeron MD, 10 mg at 04/27/23 0849  •  cholecalciferol (VITAMIN D3) tablet 1,000 Units, 1,000 Units, Oral, Daily, Calvin Bergeron MD, 1,000 Units at 04/27/23 0848  •  [START ON 4/28/2023] dexamethasone (DECADRON) tablet 6 mg, 6 mg, Oral, Daily With Breakfast, Calvin Bergeron MD  •  famotidine (PEPCID) tablet 20 mg, 20 mg, Oral, BID, Calvin Bergeron MD, 20 mg at 04/27/23 0849  •  guaiFENesin (MUCINEX) 12 hr tablet 600 mg, 600 mg, Oral, Q12H, Calvin Bergeron MD, 600 mg at 04/27/23 0848  •  ipratropium-albuterol (DUO-NEB) nebulizer solution 3 mL, 3 mL, Nebulization, Q4H PRN, Calvin Bergeron MD  •  metoprolol  tartrate (LOPRESSOR) tablet 25 mg, 25 mg, Oral, TID, Shelibe Bergeron MD, 25 mg at 04/27/23 1544  •  [COMPLETED] remdesivir 200 mg in sodium chloride 0.9 % 290 mL IVPB (powder vial), 200 mg, Intravenous, Q24H, 200 mg at 04/26/23 2151 **FOLLOWED BY** remdesivir 100 mg in sodium chloride 0.9 % 250 mL IVPB (powder vial), 100 mg, Intravenous, Q24H, Rolly Pedroza MD  •  sodium chloride 0.9 % infusion, 50 mL/hr, Intravenous, Continuous, Shelbie Bergeron MD, Last Rate: 50 mL/hr at 04/27/23 0610, 50 mL/hr at 04/27/23 0610  •  zinc sulfate (ZINCATE) capsule 220 mg, 220 mg, Oral, Daily, Shelbie Bergeron MD, 220 mg at 04/27/23 0849     ASSESSMENT  Acute hypoxic respiratory failure  COVID-19 pneumonia  Chronic atrial fibrillation  Hypertension  Hyperlipidemia  Aortic stenosis  Pulmonary fibrosis  Bronchiectasis  Pulm hypertension  History of Crohn's disease  Gastroesophageal reflux disease    PLAN  CPM  Supplemental oxygen  Albuterol and Symbicort  Decadron  Remdesivir  Symptomatic treatment for cough congestion and fever  Adjust home medications  Stress ulcer DVT prophylaxis  Infectious disease consult appreciated  Supportive care  DNR  PT OT  Discussed with family and nursing staff  Follow closely further recommendation current hospital course    SHELBIE BERGERON MD    Copied text in this note has been reviewed and is accurate as of 04/27/23

## 2023-04-27 NOTE — PLAN OF CARE
Goal Outcome Evaluation:  Plan of Care Reviewed With: patient  Progress: improving     First dose of Remdesivir administered. Wife updated over phone twice during shift. No acute changes during shift. Monitored patient, offered/provided support and care as needed.

## 2023-04-28 LAB
ALBUMIN SERPL-MCNC: 3.4 G/DL (ref 3.5–5.2)
ALBUMIN/GLOB SERPL: 0.9 G/DL
ALP SERPL-CCNC: 55 U/L (ref 39–117)
ALT SERPL W P-5'-P-CCNC: 16 U/L (ref 1–41)
ANION GAP SERPL CALCULATED.3IONS-SCNC: 12.4 MMOL/L (ref 5–15)
AST SERPL-CCNC: 18 U/L (ref 1–40)
BILIRUB CONJ SERPL-MCNC: <0.2 MG/DL (ref 0–0.3)
BILIRUB SERPL-MCNC: 0.4 MG/DL (ref 0–1.2)
BUN SERPL-MCNC: 22 MG/DL (ref 8–23)
BUN/CREAT SERPL: 22 (ref 7–25)
CALCIUM SPEC-SCNC: 9 MG/DL (ref 8.2–9.6)
CHLORIDE SERPL-SCNC: 105 MMOL/L (ref 98–107)
CO2 SERPL-SCNC: 20.6 MMOL/L (ref 22–29)
CREAT SERPL-MCNC: 1 MG/DL (ref 0.76–1.27)
CRP SERPL-MCNC: 4.65 MG/DL (ref 0–0.5)
DEPRECATED RDW RBC AUTO: 52.1 FL (ref 37–54)
EGFRCR SERPLBLD CKD-EPI 2021: 71.5 ML/MIN/1.73
ERYTHROCYTE [DISTWIDTH] IN BLOOD BY AUTOMATED COUNT: 15.8 % (ref 12.3–15.4)
GLOBULIN UR ELPH-MCNC: 3.7 GM/DL
GLUCOSE SERPL-MCNC: 85 MG/DL (ref 65–99)
HCT VFR BLD AUTO: 37.9 % (ref 37.5–51)
HGB BLD-MCNC: 12.5 G/DL (ref 13–17.7)
MCH RBC QN AUTO: 29.8 PG (ref 26.6–33)
MCHC RBC AUTO-ENTMCNC: 33 G/DL (ref 31.5–35.7)
MCV RBC AUTO: 90.2 FL (ref 79–97)
PLATELET # BLD AUTO: 125 10*3/MM3 (ref 140–450)
PMV BLD AUTO: 12.1 FL (ref 6–12)
POTASSIUM SERPL-SCNC: 4.2 MMOL/L (ref 3.5–5.2)
PROT SERPL-MCNC: 7.1 G/DL (ref 6–8.5)
RBC # BLD AUTO: 4.2 10*6/MM3 (ref 4.14–5.8)
SODIUM SERPL-SCNC: 138 MMOL/L (ref 136–145)
WBC NRBC COR # BLD: 8.37 10*3/MM3 (ref 3.4–10.8)

## 2023-04-28 PROCEDURE — 25010000002 REMDESIVIR 100 MG/20ML SOLUTION 1 EACH VIAL: Performed by: INTERNAL MEDICINE

## 2023-04-28 PROCEDURE — 97535 SELF CARE MNGMENT TRAINING: CPT

## 2023-04-28 PROCEDURE — 94761 N-INVAS EAR/PLS OXIMETRY MLT: CPT

## 2023-04-28 PROCEDURE — 94799 UNLISTED PULMONARY SVC/PX: CPT

## 2023-04-28 PROCEDURE — 80053 COMPREHEN METABOLIC PANEL: CPT | Performed by: HOSPITALIST

## 2023-04-28 PROCEDURE — 97165 OT EVAL LOW COMPLEX 30 MIN: CPT

## 2023-04-28 PROCEDURE — 86140 C-REACTIVE PROTEIN: CPT | Performed by: HOSPITALIST

## 2023-04-28 PROCEDURE — 82248 BILIRUBIN DIRECT: CPT | Performed by: INTERNAL MEDICINE

## 2023-04-28 PROCEDURE — 85025 COMPLETE CBC W/AUTO DIFF WBC: CPT | Performed by: HOSPITALIST

## 2023-04-28 PROCEDURE — 94664 DEMO&/EVAL PT USE INHALER: CPT

## 2023-04-28 RX ADMIN — ALLOPURINOL 100 MG: 100 TABLET ORAL at 11:00

## 2023-04-28 RX ADMIN — BUDESONIDE AND FORMOTEROL FUMARATE DIHYDRATE 2 PUFF: 160; 4.5 AEROSOL RESPIRATORY (INHALATION) at 07:53

## 2023-04-28 RX ADMIN — FAMOTIDINE 20 MG: 20 TABLET, FILM COATED ORAL at 22:40

## 2023-04-28 RX ADMIN — CETIRIZINE HYDROCHLORIDE 10 MG: 10 TABLET ORAL at 11:00

## 2023-04-28 RX ADMIN — OXYCODONE HYDROCHLORIDE AND ACETAMINOPHEN 500 MG: 500 TABLET ORAL at 11:01

## 2023-04-28 RX ADMIN — Medication 220 MG: at 11:00

## 2023-04-28 RX ADMIN — ALLOPURINOL 100 MG: 100 TABLET ORAL at 22:40

## 2023-04-28 RX ADMIN — GUAIFENESIN 600 MG: 600 TABLET, EXTENDED RELEASE ORAL at 11:01

## 2023-04-28 RX ADMIN — APIXABAN 2.5 MG: 2.5 TABLET, FILM COATED ORAL at 22:40

## 2023-04-28 RX ADMIN — DEXAMETHASONE 6 MG: 6 TABLET ORAL at 11:01

## 2023-04-28 RX ADMIN — APIXABAN 2.5 MG: 2.5 TABLET, FILM COATED ORAL at 11:01

## 2023-04-28 RX ADMIN — METOPROLOL TARTRATE 25 MG: 25 TABLET, FILM COATED ORAL at 11:01

## 2023-04-28 RX ADMIN — METOPROLOL TARTRATE 25 MG: 25 TABLET, FILM COATED ORAL at 22:40

## 2023-04-28 RX ADMIN — BUDESONIDE AND FORMOTEROL FUMARATE DIHYDRATE 2 PUFF: 160; 4.5 AEROSOL RESPIRATORY (INHALATION) at 20:18

## 2023-04-28 RX ADMIN — FAMOTIDINE 20 MG: 20 TABLET, FILM COATED ORAL at 11:01

## 2023-04-28 RX ADMIN — GUAIFENESIN 600 MG: 600 TABLET, EXTENDED RELEASE ORAL at 22:40

## 2023-04-28 RX ADMIN — Medication 1000 UNITS: at 11:01

## 2023-04-28 RX ADMIN — REMDESIVIR 100 MG: 100 INJECTION, POWDER, LYOPHILIZED, FOR SOLUTION INTRAVENOUS at 18:10

## 2023-04-28 NOTE — DISCHARGE PLACEMENT REQUEST
"Alex Burk (89 y.o. Male)     Date of Birth   08/21/1932    Social Security Number       Address   [address] [place] [address]    Home Phone   [phone]    MRN   [id number]       [employer]ist   [employer]    Marital Status                               Admission Date   4/25/23    Admission Type   Emergency    Admitting Provider   Calvin Bergeron MD    Attending Provider   Calvin Bergeron MD    Department, Room/Bed   [address], [address]       Discharge Date       Discharge Disposition       Discharge Destination                               Attending Provider: Calvin Bergeron MD    Allergies: Amiodarone, Diltiazem, Doxycycline, Cefdinir, Indomethacin    Isolation: Enh Drop/Con   Infection: COVID (confirmed) (04/25/23)   Code Status: No CPR    Ht: 172.7 cm (68\")   Wt: 61.8 kg (136 lb 3.9 oz)    Admission Cmt: None   Principal Problem: COVID-19 virus infection [U07.1]                 Active Insurance as of 4/25/2023     Primary Coverage     Payor Plan Insurance Group Employer/Plan Group    MEDICARE MEDICARE A & B      Payor Plan Address Payor Plan Phone Number Payor Plan Fax Number Effective Dates    [address] [phone]  8/1/1997 - None Entered    [facility] [address]       Subscriber Name Subscriber Birth Date Member ID       ALEX BURK 8/21/1932 [id number]           Secondary Coverage     Payor Plan Insurance Group Employer/Plan Group    [facility] SUPP [id number]     Payor Plan Address Payor Plan Phone Number Payor Plan Fax Number Effective Dates    [address] [address]   12/1/2016 - None Entered    [facility] [id number]       Subscriber Name Subscriber Birth Date Member ID       ALEX BURK 8/21/1932 [id number]                 Emergency Contacts      (Rel.) Home Phone Work Phone Mobile Phone    Ni Burk (Spouse) [phone] -- [phone]    HermanRaul hamilton (Son) [phone] -- [phone]              "

## 2023-04-28 NOTE — PLAN OF CARE
Goal Outcome Evaluation:  Plan of Care Reviewed With: patient  Progress: improving        Alert, oriented. Remained on room air. No acute changes during shift. Monitored patient, offered/provided support and care as needed.

## 2023-04-28 NOTE — PROGRESS NOTES
"  Infectious Diseases Progress Note    Rolly Pedroza MD     [facility]  [place]: 3 days  Patient Identification:  Name: Asia Ly  Age: 89 y.o.  Sex: male  :  1932  MRN: [id number]         Primary Care Physician: Lubna Lobo MD        Subjective: Doing well without oxygen.  Still has some cough.  Interval History: See consultation note.  Now on room air.    Objective:    Scheduled Meds:allopurinol, 100 mg, Oral, BID  apixaban, 2.5 mg, Oral, Q12H  vitamin C, 500 mg, Oral, Daily  budesonide-formoterol, 2 puff, Inhalation, BID - RT  cetirizine, 10 mg, Oral, Daily  cholecalciferol, 1,000 Units, Oral, Daily  dexamethasone, 6 mg, Oral, Daily With Breakfast  famotidine, 20 mg, Oral, BID  guaiFENesin, 600 mg, Oral, Q12H  metoprolol tartrate, 25 mg, Oral, Q12H  remdesivir, 100 mg, Intravenous, Q24H  zinc sulfate, 220 mg, Oral, Daily      Continuous Infusions:     Vital signs in last 24 hours:  Temp:  [97.4 °F (36.3 °C)-97.9 °F (36.6 °C)] 97.9 °F (36.6 °C)  Heart Rate:  [61-83] 66  Resp:  [18] 18  BP: (125-167)/(61-86) 143/63    Intake/Output:    Intake/Output Summary (Last 24 hours) at 2023 1804  Last data filed at 2023 2100  Gross per 24 hour   Intake 120 ml   Output --   Net 120 ml       Exam:  /63 (BP Location: Left arm, Patient Position: Lying)   Pulse 66   Temp 97.9 °F (36.6 °C) (Oral)   Resp 18   Ht 172.7 cm (68\")   Wt 61.8 kg (136 lb 3.9 oz)   SpO2 96%   BMI 20.72 kg/m²   Patient is examined using the personal protective equipment as per guidelines from infection control for this particular patient as enacted.  Hand washing was performed before and after patient interaction.  General Appearance:  Awake interactive does not appear to be in any acute distress                          Head:    Normocephalic, without obvious abnormality, atraumatic                           Eyes:    PERRL, conjunctivae/corneas clear, EOM's intact, both eyes                         Throat:   " Lips, tongue, gums normal; oral mucosa pink and moist                           Neck:   Supple, symmetrical, trachea midline, no JVD                         Lungs:    Clear to auscultation bilaterally, respirations unlabored                 Chest Wall:    No tenderness or deformity                          Heart:  S1-S2 irregular                  Abdomen:   Soft nontender                 Extremities:   Extremities normal, atraumatic, no cyanosis or edema                        Pulses:   Pulses palpable in all extremities                            Skin:   Skin is warm and dry,  no rashes or palpable lesions                  Neurologic: Grossly nonfocal       Data Review:    I reviewed the patient's new clinical results.  Results from last 7 days   Lab Units 04/28/23  0646 04/27/23  0514 04/26/23  0608 04/25/23 2017   WBC 10*3/mm3 8.37 5.14 6.17 6.97   HEMOGLOBIN g/dL 12.5* 10.6* 10.9* 11.6*   PLATELETS 10*3/mm3 125* 114* 116* 142     Results from last 7 days   Lab Units 04/28/23  0646 04/27/23  0514 04/26/23 1926 04/26/23 0608 04/25/23 2017   SODIUM mmol/L 138 138  --  138 136   POTASSIUM mmol/L 4.2 4.1  --  4.2 4.6   CHLORIDE mmol/L 105 108*  --  105 103   CO2 mmol/L 20.6* 23.0  --  20.8* 21.7*   BUN mg/dL 22 23  --  27* 29*   CREATININE mg/dL 1.00 1.05 1.20 1.07 1.19   CALCIUM mg/dL 9.0 8.9  --  8.7 8.9   GLUCOSE mg/dL 85 111*  --  62* 89       Microbiology Results (last 10 days)     Procedure Component Value - Date/Time    COVID-19 and FLU A/B PCR - Swab, Nasopharynx [[id number]]  (Abnormal) Collected: 04/25/23 1900    Lab Status: Final result Specimen: Swab from Nasopharynx Updated: 04/25/23 2007     COVID19 Detected     Influenza A PCR Not Detected     Influenza B PCR Not Detected            Assessment:    COVID-19 virus infection  1-acute COVID-19 infection with worsening hypoxia in the setting of chronic bronchiectasis and other complicated past medical history such as COPD chronic hypoxia inflammatory  bowel disease paroxysmal atrial fibrillation etc.  2-bronchiectasis with interstitial lung disease  3-COPD  4-atrial fibrillation  5-history of TIA and stroke  6-possible underlying memory issues.        Recommendations/Discussions:  See my discussion and recommendation on 4/26/2023  Continue supportive care and monitor need for oxygen supplementation and keep an eye on his liver function test.  If he continues to do well and does not require any oxygen supplementation and has resolution of hypoxia then patient can be discharged before completing the course of remdesivir and 10-day course of dexamethasone.  Rolly Pedroza MD  4/28/2023  18:04 EDT    Parts of this note may be an electronic transcription/translation of spoken language to printed text using the Dragon dictation system.

## 2023-04-28 NOTE — PLAN OF CARE
Goal Outcome Evaluation: patient alert and oriented and very pleasant.  Up with stand by assist.  Wife at bedside.  Remains on remdesivir. VSS and call light in reach.

## 2023-04-28 NOTE — CASE MANAGEMENT/SOCIAL WORK
Continued Stay Note  [facility]     Patient Name: Asia Ly  MRN: [id number]  Today's Date: 4/28/2023    Admit Date: 4/25/2023    Plan: Home with [facility] referral   Discharge Plan     Row Name 04/28/23 1037       Plan    Plan Home with [facility] referral    Patient/Family in Agreement with Plan other (see comments)    Plan Comments Recieved  wife requesting HH, CCP called wife Ni and left  requesting call back. Referral pending to [facility]. Nolvia RAMIREZ/CCP               Discharge Codes    No documentation.               Expected Discharge Date and Time     Expected Discharge Date Expected Discharge Time    Apr 28, 2023             Sendy Holm RN

## 2023-04-28 NOTE — PLAN OF CARE
Goal Outcome Evaluation:  Plan of Care Reviewed With: patient           Outcome Evaluation: Pt presents from home with cough and fever and work up for COVID.  Pt lives at home with spouse and is independent at baseline. Pt seen by OT and is able to move OOB, ambulate to the bathroom, complete grooming at sink and complete lower body dressing tasks.  Pt on room air and no overt SOA or fatigue noted.  Pt plans to return home and denies ADL or mobility concerns. OT ed pt on pacing, benefit of activity and cont up walking to BR with nursing.   No further skilled acute care OT needs. Discuss with RN.

## 2023-04-28 NOTE — THERAPY DISCHARGE NOTE
Acute Care - Occupational Therapy Discharge  [facility]    Patient Name: Asia Ly  : 1932    MRN: [id number]                              Today's Date: 2023       Admit Date: 2023    Visit Dx:     ICD-10-CM ICD-9-CM   1. COVID-19 virus infection  U07.1 079.89   2. Pneumonia due to COVID-19 virus  U07.1 480.8    J12.82 079.89   3. Generalized weakness  R53.1 780.79     Patient Active Problem List   Diagnosis   • Ataxia   • TIA (transient ischemic attack)   • Atrial fibrillation, paroxysmal   • Hyperlipidemia   • Gout   • Crohn's disease   • Atrial fibrillation with RVR   • Acute cholecystitis   • Obstructive jaundice   • Acute lower GI bleeding   • Aortic stenosis   • Pulmonary fibrosis   • Anticoagulant long-term use   • Febrile illness, acute   • Atrial fibrillation with rapid ventricular response   • COVID-19 virus infection     Past Medical History:   Diagnosis Date   • Aortic stenosis    • Aspiration pneumonia    • Atrial fibrillation    • Bleeding from the nose    • Bronchiectasis    • CKD (chronic kidney disease)    • COPD (chronic obstructive pulmonary disease)    • Crohn's disease    • Dizziness    • Elevated cholesterol    • Gastric ulcer    • Generalized weakness    • Gout    • Gout    • Hard of hearing    • Hyperlipidemia    • Hypertension    • Leg swelling    • Lower extremity edema    • Mild anemia    • Near syncope    • Nonrheumatic aortic valve stenosis    • PAF (paroxysmal atrial fibrillation)    • Palpitations    • Peptic ulcer    • Pneumonia of left lung due to infectious organism    • Pulmonary fibrosis    • Stroke    • TIA (transient ischemic attack)      Past Surgical History:   Procedure Laterality Date   • BRONCHOSCOPY N/A 10/22/2018    Procedure: BRONCHOSCOPY WITH BRONCHALVEOLAR LAVAGE;  Surgeon: Chidi Oconnor MD;  Location: [facility] ENDOSCOPY;  Service: Pulmonary   • BRONCHOSCOPY N/A 2021    Procedure: BRONCHOSCOPY with BAL;  Surgeon: Chidi Oconnor MD;   "Location: [facility] ENDOSCOPY;  Service: Pulmonary;  Laterality: N/A;  Pre: bronchectasis  Post: same   • CARDIAC ELECTROPHYSIOLOGY PROCEDURE N/A 7/9/2021    Procedure: Pacemaker DC new--[employer] possible His lead;  Surgeon: Rashaun Nettles MD;  Location: [facility] CATH INVASIVE LOCATION;  Service: Cardiology;  Laterality: N/A;   • CARDIAC ELECTROPHYSIOLOGY PROCEDURE N/A 9/3/2021    Procedure: AV node 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 ppm;  Surgeon: Rashaun Nettles MD;  Location: [facility] CATH INVASIVE LOCATION;  Service: Cardiovascular;  Laterality: N/A;   • CATARACT EXTRACTION Bilateral    • COLONOSCOPY     • COLONOSCOPY N/A 3/9/2018    Procedure: COLONOSCOPY to terminal ileum with bx;  Surgeon: Aron Cabrera MD;  Location: [facility] ENDOSCOPY;  Service:    • COLONOSCOPY N/A 11/19/2020    Procedure: COLONOSCOPY to cecum:  apc to cecum angiodysplagia,;  Surgeon: Aron Cabrera MD;  Location: [facility] ENDOSCOPY;  Service: Gastroenterology;  Laterality: N/A;  GI bleed  post:  diverticulosis, angiodysplagia   • CYSTECTOMY      back and shoulder area   • ENDOSCOPY N/A 3/9/2018    Procedure: ESOPHAGOGASTRODUODENOSCOPY with bx;  Surgeon: Aron Cabrera MD;  Location: [facility] ENDOSCOPY;  Service:    • ENDOSCOPY N/A 11/19/2020    Procedure: ESOPHAGOGASTRODUODENOSCOPY;  Surgeon: Aron Cabrera MD;  Location: [facility] ENDOSCOPY;  Service: Gastroenterology;  Laterality: N/A;  GI bleed  post:  HH.   • EYE SURGERY Left     detached retina   • EYE SURGERY Right     \"repaired a hole in the eye\"   • INSERT / REPLACE / REMOVE PACEMAKER     • TESTICLE SURGERY      benign tumor removed.      General Information     Row Name 04/28/23 1546          OT Time and Intention    Document Type evaluation;therapy note (daily note);discharge evaluation/summary  -LE     Mode of Treatment individual therapy;occupational therapy  -LE     Row Name 04/28/23 5234          General Information    Patient Profile Reviewed yes  -LE     Prior Level of Function " independent:;ADL's;transfer  -LE     Existing Precautions/Restrictions --  COVID  -     Row Name 04/28/23 1547          Living Environment    People in Home spouse  -[facility] Name 04/28/23 1547          Cognition    Orientation Status (Cognition) oriented x 4  -[facility] Name 04/28/23 1547          Safety Issues, Functional Mobility    Comment, Safety Issues/Impairments (Mobility) non skid socks  -LE           User Key  (r) = Recorded By, (t) = Taken By, (c) = Cosigned By    Initials Name Provider Type    Yamel Fox OTR Occupational Therapist               Mobility/ADL's     Row Name 04/28/23 1548          Bed Mobility    Supine-Sit [place] (Bed Mobility) independent  -LE     Sit-Supine [place] (Bed Mobility) independent  -[facility] Name 04/28/23 1548          Transfers    Transfers sit-stand transfer;toilet transfer;stand-sit transfer  -[facility] Name 04/28/23 1548          Sit-Stand Transfer    Sit-Stand [place] (Transfers) independent  -[facility] Name 04/28/23 1548          Stand-Sit Transfer    Stand-Sit [place] (Transfers) independent  -[facility] Name 04/28/23 1548          Toilet Transfer    Type (Toilet Transfer) stand pivot/stand step  -LE     [place] Level (Toilet Transfer) independent  -[facility] Name 04/28/23 1548          Functional Mobility    Functional Mobility- Ind. Level independent  -LE     Functional Mobility- Comment bed to bathroom to bed.  -     Row Name 04/28/23 1548          Activities of Daily Living    BADL Assessment/Intervention toileting;grooming;lower body dressing  -[facility] Name 04/28/23 1548          Toileting Assessment/Training    [place] Level (Toileting) toileting skills;independent;perform perineal hygiene;adjust/manage clothing  -     Row Name 04/28/23 1548          Grooming Assessment/Training    [place] Level (Grooming) wash face, hands;independent  -LE     Position (Grooming) sink side;unsupported standing  -[facility]  Name 04/28/23 1548          Lower Body Dressing Assessment/Training    [place] Level (Lower Body Dressing) doff;don;shoes/slippers;socks;independent  -LE     Position (Lower Body Dressing) edge of bed sitting  -LE           User Key  (r) = Recorded By, (t) = Taken By, (c) = Cosigned By    Initials Name Provider Type    Yamel Fox, OTR Occupational Therapist               Obj/Interventions     Row Name 04/28/23 1550          Range of Motion Comprehensive    General Range of Motion bilateral upper extremity ROM WFL  -LE     Row Name 04/28/23 1550          Strength Comprehensive (MMT)    General Manual Muscle Testing (MMT) Assessment no strength deficits identified  -LE     Comment, General Manual Muscle Testing (MMT) Assessment in BUE  -LE     Row Name 04/28/23 1550          Balance    Comment, Balance no overt LOB noted.  -LE           User Key  (r) = Recorded By, (t) = Taken By, (c) = Cosigned By    Initials Name Provider Type    Yamel Fox, OTR Occupational Therapist               Goals/Plan     Row Name 04/28/23 1551          Problem Specific Goal 1 (OT)    Problem Specific Goal 1 (OT) Pt to verbalize benefit of activity, cont to call for assist to walk to BR due to lines.  -LE     Time Frame (Problem Specific Goal 1, OT) 1 day  -LE     Progress/Outcome (Problem Specific Goal 1, OT) goal met  -LE           User Key  (r) = Recorded By, (t) = Taken By, (c) = Cosigned By    Initials Name Provider Type    Yamel Fox, OTR Occupational Therapist               Clinical Impression     Row Name 04/28/23 1534          Pain Assessment    Pretreatment Pain Rating 0/10 - no pain  -LE     Row Name 04/28/23 1534          Plan of Care Review    Plan of Care Reviewed With patient  -LE     Outcome Evaluation Pt presents from home with cough and fever and work up for COVID.  Pt lives at home with spouse and is independent at baseline. Pt seen by OT and is able to move OOB, ambulate to the bathroom, complete  grooming at sink and complete lower body dressing tasks.  Pt on room air and no overt SOA or fatigue noted.  Pt plans to return home and denies ADL or mobility concerns. OT ed pt on pacing, benefit of activity and cont up walking to BR with nursing.   No further skilled acute care OT needs. Discuss with RN.  -LE     Row Name 04/28/23 1535          Therapy Assessment/Plan (OT)    Therapy Frequency (OT) evaluation only  -LE     Row Name 04/28/23 1535          Therapy Plan Review/Discharge Plan (OT)    Anticipated Discharge Disposition (OT) home  -LE     Row Name 04/28/23 1535          Vital Signs    Pre SpO2 (%) 92  -LE     O2 Delivery Pre Treatment room air  -LE     Intra SpO2 (%) 90  -LE     Post SpO2 (%) 93  -LE     O2 Delivery Post Treatment room air  -LE     Pre Patient Position Supine  -LE     Intra Patient Position Standing  -LE     Post Patient Position Supine  -LE     Row Name 04/28/23 1535          Positioning and Restraints    Pre-Treatment Position in bed  -LE     Post Treatment Position bed  -LE     In Bed notified nsg;fowlers;call light within reach;encouraged to call for assist;exit alarm on  -LE           User Key  (r) = Recorded By, (t) = Taken By, (c) = Cosigned By    Initials Name Provider Type    Yamel Fox OTR Occupational Therapist               Outcome Measures     Row Name 04/28/23 5631          How much help from another is currently needed...    Putting on and taking off regular lower body clothing? 4  -LE     Bathing (including washing, rinsing, and drying) 4  -LE     Toileting (which includes using toilet bed pan or urinal) 4  -LE     Putting on and taking off regular upper body clothing 4  -LE     Taking care of personal grooming (such as brushing teeth) 4  -LE     Eating meals 4  -LE     AM-PAC 6 Clicks Score (OT) 24  -LE     Row Name 04/28/23 1551          Functional Assessment    Outcome Measure Options AM-PAC 6 Clicks Daily Activity (OT)  -LE           User Key  (r) = Recorded  By, (t) = Taken By, (c) = Cosigned By    Initials Name Provider Type    Yamel Fox OTAUGUSTIN Occupational Therapist              Occupational Therapy Education     Title: PT OT SLP Therapies (Done)     Topic: Occupational Therapy (Done)     Point: ADL training (Done)     Description:   Instruct learner(s) on proper safety adaptation and remediation techniques during self care or transfers.   Instruct in proper use of assistive devices.              Learning Progress Summary           Patient Acceptance, E, Bed IU by LEIDY at 4/28/2023 1552                   Point: Precautions (Done)     Description:   Instruct learner(s) on prescribed precautions during self-care and functional transfers.              Learning Progress Summary           Patient Acceptance, E, Bed IU by LEIDY at 4/28/2023 1552                   Point: Body mechanics (Done)     Description:   Instruct learner(s) on proper positioning and spine alignment during self-care, functional mobility activities and/or exercises.              Learning Progress Summary           Patient Acceptance, E, Bed IU by LEIDY at 4/28/2023 1552                               User Key     Initials Effective Dates Name Provider Type Discipline    LEIDY 06/16/21 -  Yamel Pena OTR Occupational Therapist OT              OT Recommendation and Plan  Therapy Frequency (OT): evaluation only  Plan of Care Review  Plan of Care Reviewed With: patient  Outcome Evaluation: Pt presents from home with cough and fever and work up for COVID.  Pt lives at home with spouse and is independent at baseline. Pt seen by OT and is able to move OOB, ambulate to the bathroom, complete grooming at sink and complete lower body dressing tasks.  Pt on room air and no overt SOA or fatigue noted.  Pt plans to return home and denies ADL or mobility concerns. OT ed pt on pacing, benefit of activity and cont up walking to BR with nursing.   No further skilled acute care OT needs. Discuss with RN.  Plan of Care Reviewed  With: patient  Outcome Evaluation: Pt presents from home with cough and fever and work up for COVID.  Pt lives at home with spouse and is independent at baseline. Pt seen by OT and is able to move OOB, ambulate to the bathroom, complete grooming at sink and complete lower body dressing tasks.  Pt on room air and no overt SOA or fatigue noted.  Pt plans to return home and denies ADL or mobility concerns. OT ed pt on pacing, benefit of activity and cont up walking to BR with nursing.   No further skilled acute care OT needs. Discuss with RN.     Time Calculation:    Time Calculation- OT     Row Name 04/28/23 1552             Time Calculation- OT    OT Start Time 1441  -LE      OT Stop Time 1455  -LE      OT Time Calculation (min) 14 min  -LE      Total Timed Code Minutes- OT 8 minute(s)  -LE      OT Received On 04/28/23  -LE         Timed Charges    71426 - OT Self Care/Mgmt Minutes 8  -LE         Total Minutes    Timed Charges Total Minutes 8  -LE       Total Minutes 8  -LE            User Key  (r) = Recorded By, (t) = Taken By, (c) = Cosigned By    Initials Name Provider Type    Yamel Fox OTR Occupational Therapist              Therapy Charges for Today     Code Description Service Date Service Provider Modifiers Qty    [id number]  OT EVAL LOW COMPLEXITY 2 4/28/2023 Yamel Pena OTR GO 1    [id number]  OT SELF CARE/MGMT/TRAIN EA 15 MIN 4/28/2023 Yamel Pena OTR GO 1             OT Discharge Summary  Anticipated Discharge Disposition (OT): home  Reason for Discharge: All goals achieved, At baseline function  Discharge Destination: Home    ABILIO Campbell  4/28/2023

## 2023-04-28 NOTE — PROGRESS NOTES
"Daily progress note    Primary care physician      Chief complaint  Doing better with no new complaints but persistent nonproductive cough    History of present illness  89-year-old male with history of chronic atrial fibrillation aortic stenosis hypertension hyperlipidemia pulmonary fibrosis bronchiectasis pulmonary hypertension chronic disease and gastroesophageal reflux disease who is well-known to our service brought to the emergency room with fever cough shortness of breath for last several days to weeks.  Patient is treated with empiric antibiotics without any improvement.  Patient evaluated in ER found to have COVID-19 pneumonia admit for management.  Patient denies any chest pain palpitation abdominal pain nausea vomiting diarrhea.  Patient is DNR per his wishes.    REVIEW OF SYSTEMS  Unremarkable except weakness and cough     PHYSICAL EXAM  Blood pressure 143/63, pulse 66, temperature 97.9 °F (36.6 °C), temperature source Oral, resp. rate 18, height 172.7 cm (68\"), weight 61.8 kg (136 lb 3.9 oz), SpO2 96 %.    Constitutional:       General: He is not in acute distress.     Appearance: Normal appearance.   HENT:      Head: Normocephalic and atraumatic.      Nose: Nose normal.      Mouth/Throat:      Mouth: Mucous membranes are moist.   Eyes:      Conjunctiva/sclera: Conjunctivae normal.      Pupils: Pupils are equal, round, and reactive to light.   Cardiovascular:      Rate and Rhythm: Normal rate and regular rhythm.      Pulses: Normal pulses.      Heart sounds: Normal heart sounds.   Pulmonary:      Effort: Pulmonary effort is normal.      Breath sounds: Normal breath sounds.   Abdominal:      General: There is no distension.   Musculoskeletal:         General: Normal range of motion.      Cervical back: Normal range of motion and neck supple.   Skin:     General: Skin is warm.      Capillary Refill: Capillary refill takes less than 2 seconds.   Neurological:      General: No focal deficit " present.      Mental Status: He is alert and oriented to person, place, and time.   Psychiatric:         Mood and Affect: Mood normal.      LAB RESULTS  Lab Results (last 24 hours)     Procedure Component Value Units Date/Time    Comprehensive Metabolic Panel [[id number]]  (Abnormal) Collected: 04/28/23 0646    Specimen: Blood Updated: 04/28/23 0727     Glucose 85 mg/dL      BUN 22 mg/dL      Creatinine 1.00 mg/dL      Sodium 138 mmol/L      Potassium 4.2 mmol/L      Chloride 105 mmol/L      CO2 20.6 mmol/L      Calcium 9.0 mg/dL      Total Protein 7.1 g/dL      Albumin 3.4 g/dL      ALT (SGPT) 16 U/L      AST (SGOT) 18 U/L      Alkaline Phosphatase 55 U/L      Total Bilirubin 0.4 mg/dL      Globulin 3.7 gm/dL      A/G Ratio 0.9 g/dL      BUN/Creatinine Ratio 22.0     Anion Gap 12.4 mmol/L      eGFR 71.5 mL/min/1.73     Narrative:      GFR Normal >60  Chronic Kidney Disease <60  Kidney Failure <15    The GFR formula is only valid for adults with stable renal function between ages 18 and 70.    Bilirubin, Direct [[id number]]  (Normal) Collected: 04/28/23 0646    Specimen: Blood Updated: 04/28/23 0727     Bilirubin, Direct <0.2 mg/dL     C-reactive Protein [[id number]]  (Abnormal) Collected: 04/28/23 0646    Specimen: Blood Updated: 04/28/23 0727     C-Reactive Protein 4.65 mg/dL     CBC & Differential [[id number]]  (Abnormal) Collected: 04/28/23 0646    Specimen: Blood Updated: 04/28/23 0710    Narrative:      The following orders were created for panel order CBC & Differential.  Procedure                               Abnormality         Status                     ---------                               -----------         ------                     CBC Auto Differential[[id number]]        Abnormal            Final result                 Please view results for these tests on the individual orders.    CBC Auto Differential [[id number]]  (Abnormal) Collected: 04/28/23 0646    Specimen: Blood Updated: 04/28/23 0710     WBC  8.37 10*3/mm3      RBC 4.20 10*6/mm3      Hemoglobin 12.5 g/dL      Hematocrit 37.9 %      MCV 90.2 fL      MCH 29.8 pg      MCHC 33.0 g/dL      RDW 15.8 %      RDW-SD 52.1 fl      MPV 12.1 fL      Platelets 125 10*3/mm3         Imaging Results (Last 24 Hours)     ** No results found for the last 24 hours. **          Current Facility-Administered Medications:   •  allopurinol (ZYLOPRIM) tablet 100 mg, 100 mg, Oral, BID, Calvin Bergeron MD, 100 mg at 04/28/23 1100  •  apixaban (ELIQUIS) tablet 2.5 mg, 2.5 mg, Oral, Q12H, Calvin Bergeron MD, 2.5 mg at 04/28/23 1101  •  ascorbic acid (VITAMIN C) tablet 500 mg, 500 mg, Oral, Daily, Calvin Bergeron MD, 500 mg at 04/28/23 1101  •  budesonide-formoterol (SYMBICORT) 160-4.5 MCG/ACT inhaler 2 puff, 2 puff, Inhalation, BID - RT, Calvin Bergeron MD, 2 puff at 04/28/23 0753  •  cetirizine (zyrTEC) tablet 10 mg, 10 mg, Oral, Daily, Calvin Bergeron MD, 10 mg at 04/28/23 1100  •  cholecalciferol (VITAMIN D3) tablet 1,000 Units, 1,000 Units, Oral, Daily, Calvin Bergeron MD, 1,000 Units at 04/28/23 1101  •  dexamethasone (DECADRON) tablet 6 mg, 6 mg, Oral, Daily With Breakfast, Calvin Bergeron MD, 6 mg at 04/28/23 1101  •  famotidine (PEPCID) tablet 20 mg, 20 mg, Oral, BID, Calvin Bergeron MD, 20 mg at 04/28/23 1101  •  guaiFENesin (MUCINEX) 12 hr tablet 600 mg, 600 mg, Oral, Q12H, Calvin Bergeron MD, 600 mg at 04/28/23 1101  •  ipratropium-albuterol (DUO-NEB) nebulizer solution 3 mL, 3 mL, Nebulization, Q4H PRN, Calvin Bergeron MD  •  metoprolol tartrate (LOPRESSOR) tablet 25 mg, 25 mg, Oral, Q12H, Calvin Bergeron MD, 25 mg at 04/28/23 1101  •  [COMPLETED] remdesivir 200 mg in sodium chloride 0.9 % 290 mL IVPB (powder vial), 200 mg, Intravenous, Q24H, 200 mg at 04/26/23 2151 **FOLLOWED BY** remdesivir 100 mg in sodium chloride 0.9 % 250 mL IVPB (powder vial), 100 mg, Intravenous, Q24H, Rolly Pedroza MD, 100 mg at 04/27/23 1829  •  zinc sulfate (ZINCATE) capsule 220 mg, 220 mg, Oral, Daily, Rubio  MD Shelbie, 220 mg at 04/28/23 1100     ASSESSMENT  Acute hypoxic respiratory failure  COVID-19 pneumonia  Chronic atrial fibrillation  Hypertension  Hyperlipidemia  Aortic stenosis  Pulmonary fibrosis  Bronchiectasis  Pulm hypertension  History of Crohn's disease  Gastroesophageal reflux disease    PLAN  CPM  Supplemental oxygen  Albuterol and Symbicort  Decadron  Remdesivir  Symptomatic treatment for cough congestion and fever  Adjust home medications  Stress ulcer DVT prophylaxis  Infectious disease consult appreciated  Supportive care  DNR  PT OT  Discussed with family and nursing staff  Follow closely further recommendation current hospital course    SHELBIE COWART MD    Copied text in this note has been reviewed and is accurate as of 04/28/23

## 2023-04-28 NOTE — CASE MANAGEMENT/SOCIAL WORK
Continued Stay Note  [facility]     Patient Name: Asia Ly  MRN: [id number]  Today's Date: 4/28/2023    Admit Date: 4/25/2023    Plan: Home with wife and [employer] , wife to transport   Discharge Plan     Row Name 04/28/23 1233       Plan    Plan Home with wife and [employer] , wife to transport    Patient/Family in Agreement with Plan yes    Plan Comments Spoke with pts wife via inbound call to room phone. Introduced self and explained CCP role. Discussed dc planning and she wishes to have [facility] and already has spoken with them. She denies any other dc needs at this time. Pt remains on room air. She will transport home. Nolvia RN/CCP    Row Name 04/28/23 1037       Plan    Plan Home with [employer]  referral    Patient/Family in Agreement with Plan other (see comments)    Plan Comments Recieved vm wife requesting HH, CCP called wife Ni and left vm requesting call back. Referral pending to [employer] . Nolvia RN/CCP               Discharge Codes    No documentation.               Expected Discharge Date and Time     Expected Discharge Date Expected Discharge Time    Apr 28, 2023             Sendy Holm RN

## 2023-04-29 ENCOUNTER — HOME HEALTH ADMISSION (OUTPATIENT)
Dept: HOME HEALTH SERVICES | Facility: HOME HEALTHCARE | Age: 88
End: 2023-04-29
Payer: MEDICARE

## 2023-04-29 ENCOUNTER — READMISSION MANAGEMENT (OUTPATIENT)
Dept: CALL CENTER | Facility: HOSPITAL | Age: 88
End: 2023-04-29
Payer: MEDICARE

## 2023-04-29 VITALS
HEIGHT: 68 IN | HEART RATE: 64 BPM | BODY MASS INDEX: 20.72 KG/M2 | RESPIRATION RATE: 18 BRPM | DIASTOLIC BLOOD PRESSURE: 56 MMHG | OXYGEN SATURATION: 96 % | WEIGHT: 136.69 LBS | TEMPERATURE: 97.3 F | SYSTOLIC BLOOD PRESSURE: 113 MMHG

## 2023-04-29 LAB
ALBUMIN SERPL-MCNC: 3.3 G/DL (ref 3.5–5.2)
ALBUMIN/GLOB SERPL: 0.9 G/DL
ALP SERPL-CCNC: 51 U/L (ref 39–117)
ALT SERPL W P-5'-P-CCNC: 11 U/L (ref 1–41)
ANION GAP SERPL CALCULATED.3IONS-SCNC: 10 MMOL/L (ref 5–15)
AST SERPL-CCNC: 17 U/L (ref 1–40)
BASOPHILS # BLD AUTO: 0 10*3/MM3 (ref 0–0.2)
BASOPHILS NFR BLD AUTO: 0 % (ref 0–1.5)
BILIRUB CONJ SERPL-MCNC: <0.2 MG/DL (ref 0–0.3)
BILIRUB SERPL-MCNC: 0.3 MG/DL (ref 0–1.2)
BUN SERPL-MCNC: 28 MG/DL (ref 8–23)
BUN/CREAT SERPL: 30.8 (ref 7–25)
CALCIUM SPEC-SCNC: 8.5 MG/DL (ref 8.2–9.6)
CHLORIDE SERPL-SCNC: 105 MMOL/L (ref 98–107)
CO2 SERPL-SCNC: 23 MMOL/L (ref 22–29)
CREAT SERPL-MCNC: 0.91 MG/DL (ref 0.76–1.27)
CRP SERPL-MCNC: 2.47 MG/DL (ref 0–0.5)
DEPRECATED RDW RBC AUTO: 50.2 FL (ref 37–54)
EGFRCR SERPLBLD CKD-EPI 2021: 80.1 ML/MIN/1.73
EOSINOPHIL # BLD AUTO: 0 10*3/MM3 (ref 0–0.4)
EOSINOPHIL NFR BLD AUTO: 0 % (ref 0.3–6.2)
ERYTHROCYTE [DISTWIDTH] IN BLOOD BY AUTOMATED COUNT: 15.8 % (ref 12.3–15.4)
GLOBULIN UR ELPH-MCNC: 3.5 GM/DL
GLUCOSE SERPL-MCNC: 107 MG/DL (ref 65–99)
HCT VFR BLD AUTO: 34.9 % (ref 37.5–51)
HGB BLD-MCNC: 11.5 G/DL (ref 13–17.7)
IMM GRANULOCYTES # BLD AUTO: 0.05 10*3/MM3 (ref 0–0.05)
IMM GRANULOCYTES NFR BLD AUTO: 0.6 % (ref 0–0.5)
LYMPHOCYTES # BLD AUTO: 0.62 10*3/MM3 (ref 0.7–3.1)
LYMPHOCYTES NFR BLD AUTO: 7.8 % (ref 19.6–45.3)
MCH RBC QN AUTO: 28.8 PG (ref 26.6–33)
MCHC RBC AUTO-ENTMCNC: 33 G/DL (ref 31.5–35.7)
MCV RBC AUTO: 87.5 FL (ref 79–97)
MONOCYTES # BLD AUTO: 0.34 10*3/MM3 (ref 0.1–0.9)
MONOCYTES NFR BLD AUTO: 4.3 % (ref 5–12)
NEUTROPHILS NFR BLD AUTO: 6.89 10*3/MM3 (ref 1.7–7)
NEUTROPHILS NFR BLD AUTO: 87.3 % (ref 42.7–76)
NRBC BLD AUTO-RTO: 0 /100 WBC (ref 0–0.2)
PLATELET # BLD AUTO: 134 10*3/MM3 (ref 140–450)
PMV BLD AUTO: 12.1 FL (ref 6–12)
POTASSIUM SERPL-SCNC: 4.3 MMOL/L (ref 3.5–5.2)
PROT SERPL-MCNC: 6.8 G/DL (ref 6–8.5)
RBC # BLD AUTO: 3.99 10*6/MM3 (ref 4.14–5.8)
SODIUM SERPL-SCNC: 138 MMOL/L (ref 136–145)
WBC NRBC COR # BLD: 7.9 10*3/MM3 (ref 3.4–10.8)

## 2023-04-29 PROCEDURE — 80053 COMPREHEN METABOLIC PANEL: CPT | Performed by: HOSPITALIST

## 2023-04-29 PROCEDURE — 82248 BILIRUBIN DIRECT: CPT | Performed by: INTERNAL MEDICINE

## 2023-04-29 PROCEDURE — 94799 UNLISTED PULMONARY SVC/PX: CPT

## 2023-04-29 PROCEDURE — 94761 N-INVAS EAR/PLS OXIMETRY MLT: CPT

## 2023-04-29 PROCEDURE — 86140 C-REACTIVE PROTEIN: CPT | Performed by: HOSPITALIST

## 2023-04-29 PROCEDURE — 25010000002 REMDESIVIR 100 MG/20ML SOLUTION 1 EACH VIAL: Performed by: INTERNAL MEDICINE

## 2023-04-29 PROCEDURE — 94664 DEMO&/EVAL PT USE INHALER: CPT

## 2023-04-29 PROCEDURE — 85025 COMPLETE CBC W/AUTO DIFF WBC: CPT | Performed by: HOSPITALIST

## 2023-04-29 RX ORDER — GUAIFENESIN 600 MG/1
600 TABLET, EXTENDED RELEASE ORAL EVERY 12 HOURS SCHEDULED
Qty: 60 TABLET | Refills: 0 | Status: ON HOLD | OUTPATIENT
Start: 2023-04-29 | End: 2023-05-06

## 2023-04-29 RX ORDER — DEXAMETHASONE 6 MG/1
6 TABLET ORAL
Qty: 5 TABLET | Refills: 0 | Status: ON HOLD | OUTPATIENT
Start: 2023-04-30 | End: 2023-05-06

## 2023-04-29 RX ADMIN — OXYCODONE HYDROCHLORIDE AND ACETAMINOPHEN 500 MG: 500 TABLET ORAL at 09:21

## 2023-04-29 RX ADMIN — GUAIFENESIN 600 MG: 600 TABLET, EXTENDED RELEASE ORAL at 09:20

## 2023-04-29 RX ADMIN — FAMOTIDINE 20 MG: 20 TABLET, FILM COATED ORAL at 09:20

## 2023-04-29 RX ADMIN — CETIRIZINE HYDROCHLORIDE 10 MG: 10 TABLET ORAL at 09:20

## 2023-04-29 RX ADMIN — REMDESIVIR 100 MG: 100 INJECTION, POWDER, LYOPHILIZED, FOR SOLUTION INTRAVENOUS at 13:09

## 2023-04-29 RX ADMIN — DEXAMETHASONE 6 MG: 6 TABLET ORAL at 09:20

## 2023-04-29 RX ADMIN — BUDESONIDE AND FORMOTEROL FUMARATE DIHYDRATE 2 PUFF: 160; 4.5 AEROSOL RESPIRATORY (INHALATION) at 08:04

## 2023-04-29 RX ADMIN — Medication 220 MG: at 09:20

## 2023-04-29 RX ADMIN — Medication 1000 UNITS: at 09:20

## 2023-04-29 RX ADMIN — APIXABAN 2.5 MG: 2.5 TABLET, FILM COATED ORAL at 09:20

## 2023-04-29 RX ADMIN — METOPROLOL TARTRATE 25 MG: 25 TABLET, FILM COATED ORAL at 09:20

## 2023-04-29 RX ADMIN — ALLOPURINOL 100 MG: 100 TABLET ORAL at 09:20

## 2023-04-29 NOTE — PROGRESS NOTES
"  Infectious Diseases Progress Note    Rolly Pedroza MD     [facility]  [place]: 4 days  Patient Identification:  Name: Asia Ly  Age: 89 y.o.  Sex: male  :  1932  MRN: [id number]         Primary Care Physician: Lubna Lobo MD        Subjective: Doing well with minimal cough.  Wife at the bedside agrees that he is improved.  Does not require any more oxygen.  Interval History: See consultation note.  Now on room air.    Objective:    Scheduled Meds:allopurinol, 100 mg, Oral, BID  apixaban, 2.5 mg, Oral, Q12H  vitamin C, 500 mg, Oral, Daily  budesonide-formoterol, 2 puff, Inhalation, BID - RT  cetirizine, 10 mg, Oral, Daily  cholecalciferol, 1,000 Units, Oral, Daily  dexamethasone, 6 mg, Oral, Daily With Breakfast  famotidine, 20 mg, Oral, BID  guaiFENesin, 600 mg, Oral, Q12H  metoprolol tartrate, 25 mg, Oral, Q12H  remdesivir, 100 mg, Intravenous, Q24H  zinc sulfate, 220 mg, Oral, Daily      Continuous Infusions:     Vital signs in last 24 hours:  Temp:  [96.9 °F (36.1 °C)-97.9 °F (36.6 °C)] 96.9 °F (36.1 °C)  Heart Rate:  [60-88] 64  Resp:  [18] 18  BP: (135-156)/(63-83) 156/65    Intake/Output:    Intake/Output Summary (Last 24 hours) at 2023 0942  Last data filed at 2023 0923  Gross per 24 hour   Intake 850 ml   Output 500 ml   Net 350 ml       Exam:  /65 (BP Location: Left arm, Patient Position: Lying)   Pulse 64   Temp 96.9 °F (36.1 °C) (Oral)   Resp 18   Ht 172.7 cm (68\")   Wt 62 kg (136 lb 11 oz)   SpO2 96%   BMI 20.78 kg/m²   Patient is examined using the personal protective equipment as per guidelines from infection control for this particular patient as enacted.  Hand washing was performed before and after patient interaction.  General Appearance:  Awake interactive does not appear to be in any acute distress                          Head:    Normocephalic, without obvious abnormality, atraumatic                           Eyes:    PERRL, conjunctivae/corneas " clear, EOM's intact, both eyes                         Throat:   Lips, tongue, gums normal; oral mucosa pink and moist                           Neck:   Supple, symmetrical, trachea midline, no JVD                         Lungs:    Clear to auscultation bilaterally, respirations unlabored                 Chest Wall:    No tenderness or deformity                          Heart:  S1-S2 irregular                  Abdomen:   Soft nontender                 Extremities:   Extremities normal, atraumatic, no cyanosis or edema                        Pulses:   Pulses palpable in all extremities                            Skin:   Skin is warm and dry,  no rashes or palpable lesions                  Neurologic: Grossly nonfocal       Data Review:    I reviewed the patient's new clinical results.  Results from last 7 days   Lab Units 04/29/23  0515 04/28/23  0646 04/27/23  0514 04/26/23  0608 04/25/23 2017   WBC 10*3/mm3 7.90 8.37 5.14 6.17 6.97   HEMOGLOBIN g/dL 11.5* 12.5* 10.6* 10.9* 11.6*   PLATELETS 10*3/mm3 134* 125* 114* 116* 142     Results from last 7 days   Lab Units 04/29/23  0515 04/28/23  0646 04/27/23  0514 04/26/23 1926 04/26/23  0608 04/25/23 2017   SODIUM mmol/L 138 138 138  --  138 136   POTASSIUM mmol/L 4.3 4.2 4.1  --  4.2 4.6   CHLORIDE mmol/L 105 105 108*  --  105 103   CO2 mmol/L 23.0 20.6* 23.0  --  20.8* 21.7*   BUN mg/dL 28* 22 23  --  27* 29*   CREATININE mg/dL 0.91 1.00 1.05 1.20 1.07 1.19   CALCIUM mg/dL 8.5 9.0 8.9  --  8.7 8.9   GLUCOSE mg/dL 107* 85 111*  --  62* 89       Microbiology Results (last 10 days)     Procedure Component Value - Date/Time    COVID-19 and FLU A/B PCR - Swab, Nasopharynx [[id number]]  (Abnormal) Collected: 04/25/23 1900    Lab Status: Final result Specimen: Swab from Nasopharynx Updated: 04/25/23 2007     COVID19 Detected     Influenza A PCR Not Detected     Influenza B PCR Not Detected            Assessment:    COVID-19 virus infection  1-acute COVID-19 infection with  worsening hypoxia in the setting of chronic bronchiectasis and other complicated past medical history such as COPD chronic hypoxia inflammatory bowel disease paroxysmal atrial fibrillation etc.  2-bronchiectasis with interstitial lung disease  3-COPD  4-atrial fibrillation  5-history of TIA and stroke  6-possible underlying memory issues.        Recommendations/Discussions:  See my discussion and recommendation on 4/26/2023  Continue supportive care and monitor need for oxygen supplementation and keep an eye on his liver function test.  If he continues to do well and does not require any oxygen supplementation and has resolution of hypoxia then patient can be discharged before completing the course of remdesivir and 10-day course of dexamethasone.  Rolly Pedroza MD  4/29/2023  09:42 EDT    Parts of this note may be an electronic transcription/translation of spoken language to printed text using the Dragon dictation system.

## 2023-04-29 NOTE — PROGRESS NOTES
[facility] will follow post hospital. Patient/spouse agreeable to service. Contact information and PCP confirmed. Patient is not currently receiving any other home health care services.

## 2023-04-29 NOTE — PLAN OF CARE
Problem: Adult Inpatient Plan of Care  Goal: Plan of Care Review  Outcome: Ongoing, Progressing  Flowsheets  Taken 4/29/2023 0641 by Lian Wilkins, RN  Outcome Evaluation: Alertx4. forgetful. Falls risk overnight due to increased forgetfulness. Bed alarm this am off. Patient less forgetful. Urinals at bedside. Patient can ambulate independently in room. Room air.  Taken 4/28/2023 1535 by Yamel Pena OTR  Plan of Care Reviewed With: patient  Taken 4/28/2023 0509 by Yvette Nuñez, RN  Progress: improving   Goal Outcome Evaluation:              Outcome Evaluation: Alertx4. forgetful. Falls risk overnight due to increased forgetfulness. Bed alarm this am off. Patient less forgetful. Urinals at bedside. Patient can ambulate independently in room. Room air.

## 2023-04-29 NOTE — DISCHARGE SUMMARY
Discharge summary    Date of admission 4/25/2023  Date of discharge 4/29/2023    Final diagnosis  Acute hypoxic respiratory failure  COVID-19 pneumonia  Chronic atrial fibrillation  Hypertension  Hyperlipidemia  Aortic stenosis  Pulmonary fibrosis  Bronchiectasis  Pulm hypertension  History of Crohn's disease  Gastroesophageal reflux disease    Discharge medications    Current Facility-Administered Medications:   •  allopurinol (ZYLOPRIM) tablet 100 mg, 100 mg, Oral, BID, Calvin Bergeron MD, 100 mg at 04/29/23 0920  •  apixaban (ELIQUIS) tablet 2.5 mg, 2.5 mg, Oral, Q12H, Calvin Bergeron MD, 2.5 mg at 04/29/23 0920  •  budesonide-formoterol (SYMBICORT) 160-4.5 MCG/ACT inhaler 2 puff, 2 puff, Inhalation, BID - RT, Calvin Bergeron MD, 2 puff at 04/29/23 0804  •  dexamethasone (DECADRON) tablet 6 mg, 6 mg, Oral, Daily With Breakfast, Calvin Bergeron MD, 6 mg at 04/29/23 0920  •  famotidine (PEPCID) tablet 20 mg, 20 mg, Oral, BID, Calvin Bergeron MD, 20 mg at 04/29/23 0920  •  guaiFENesin (MUCINEX) 12 hr tablet 600 mg, 600 mg, Oral, Q12H, Calvin Bergeron MD, 600 mg at 04/29/23 0920  •  metoprolol tartrate (LOPRESSOR) tablet 25 mg, 25 mg, Oral, Q12H, Calvin Bergeron MD, 25 mg at 04/29/23 0920     Consults obtained  Infectious disease    Procedures  None    Hospital course  89 white male with multiple medical problems and well-known to our service admitted to emergency room with shortness of breath cough and fever.  Patient work-up revealed COVID-19 pneumonia admit for management.  Patient also found to be in acute hypoxic respiratory failure.  Patient treated with supplemental oxygen nebulizer Decadron and remdesivir.  Patient responded to the treatment and his Decadron changed to by mouth and his remdesivir was stopped as he is no more on oxygen and cleared for discharge.  Patient followed by infectious disease.  Patient advised to continue quarantine for total 14 days and continue symptomatic treatment and he will finish remaining  days of Decadron at home.  Patient remained DNR throughout hospital course.    Discharge diet regular    Activity as tolerated    Medication as above    Follow-up with primary doctor in 1 week and follow-up with infectious disease per the instruction and take medication as directed.    SHELBIE COWART MD

## 2023-04-30 ENCOUNTER — HOME CARE VISIT (OUTPATIENT)
Dept: HOME HEALTH SERVICES | Facility: HOME HEALTHCARE | Age: 88
End: 2023-04-30
Payer: MEDICARE

## 2023-04-30 PROCEDURE — G0299 HHS/HOSPICE OF RN EA 15 MIN: HCPCS

## 2023-04-30 NOTE — OUTREACH NOTE
Prep Survey    Flowsheet Row Responses   [facility] facility patient discharged from? [facility]   Is LACE score < 7 ? No   Eligibility Readm Mgmt   Discharge diagnosis Covid 19   Does the patient have one of the following disease processes/diagnoses(primary or secondary)? Other   Does the patient have Home health ordered? Yes   What is the Home health agency?  [facility]   Is there a DME ordered? No   Prep survey completed? Yes          JOSE RASMUSSEN - Registered Nurse

## 2023-04-30 NOTE — Clinical Note
Dear Dr Rubio Ly   32    The above named patient was admitted into home health services as of 23.  We will be doing nursing visits twice a week for disease management teaching, COVID 19 precautions,  and medication teaching.  If you have any other needs or any questions, please feel free to contact our agency at [phone].     Thank you so much,     Faina JIANGN RN   [facility]  [phone]

## 2023-05-01 ENCOUNTER — TELEPHONE (OUTPATIENT)
Dept: CARDIOLOGY | Facility: CLINIC | Age: 88
End: 2023-05-01
Payer: MEDICARE

## 2023-05-01 ENCOUNTER — HOME CARE VISIT (OUTPATIENT)
Dept: HOME HEALTH SERVICES | Facility: HOME HEALTHCARE | Age: 88
End: 2023-05-01
Payer: MEDICARE

## 2023-05-01 PROCEDURE — G0151 HHCP-SERV OF PT,EA 15 MIN: HCPCS

## 2023-05-01 NOTE — TELEPHONE ENCOUNTER
Ni was concerned patient should follow up with cardiology.  I scheduled him an appt with KYLAH Jha on 5/9/23 at 3:00.  She will give patient Lasix and potassium as well as Metoprolol twice daily.   / LEIDA

## 2023-05-01 NOTE — TELEPHONE ENCOUNTER
Patient's wife (Ni) called to report patient was hospitalized with Covid.  Upon Discharge, Metoprolol was changed to 25 mg bid, She asks if patient should go back to tid.  Patient was given steroids while in the hospital.  He has 3 days left to complete the prescription. He was instructed to stop taking Lasix.  He had edema of his legs last night but seems to be improving .  Please call Ni or advise.    Ni can be reached at [phone].

## 2023-05-01 NOTE — CASE MANAGEMENT/SOCIAL WORK
Case Management Discharge Note      Final Note: Home with [facility] JERRICA NOLAND RN/CCP         Selected Continued Care - Discharged on 4/29/2023 Admission date: 4/25/2023 - Discharge disposition: Home or Self Care    Destination    No services have been selected for the patient.              Durable Medical Equipment    No services have been selected for the patient.              Dialysis/Infusion    No services have been selected for the patient.              Home Medical Care     Service Provider Selected Services Address Phone Fax Patient Preferred    [facility] [address] [address] [phone] --          Therapy    No services have been selected for the patient.              Community Resources    No services have been selected for the patient.              Community & DME    No services have been selected for the patient.                  Transportation Services  Private: Car    Final Discharge Disposition Code: 06 - home with home health care

## 2023-05-01 NOTE — Clinical Note
TO:   Lubna Lobo MD   [address]        Patient admitted to [facility] PT services for 2 week 1 ; 1 week 2 for ther-ex instruction , energy conservation education; fall prevention education and gait training following recent Covid-19 diagnosis.

## 2023-05-01 NOTE — TELEPHONE ENCOUNTER
Just use lasix and potassium as needed for bad swelling otherwise should take metoprolol twice daily as directed at discharge

## 2023-05-01 NOTE — HOME HEALTH
REASON FOR REFERRAL:  89 year old male  presents with complaints of : fatigue, decreased activity tolerance .  Was recently hospitalized for Covid-19 from 4/25-4/29. Per hospital d/c summary patient to quarantine for 14 days until 5/9/23.  Has been to ER 4 x since 2/27    DIAGNOSIS: Covid 19 pneumonia    Acute hypoxic respiratory failure   Aortic stenosis   Pulmonary fibrosis   Bronchiectasis   Pulm hypertension             PERTINENT MEDICAL HISTORY:   Ataxia   TIA (transient ischemic attack)   Atrial fibrillation, paroxysmal   Hyperlipidemia   Gout   Crohn's disease   Acute cholecystitis   Obstructive jaundice   Acute lower GI bleeding    Atrial fibrillation with rapid ventricular response  History of Crohn's disease   Gastroesophageal reflux disease        PRIOR LEVEL OF FUNCTION: patient is forgetful so wife has been helping with shower daily ; I eating; I dressing; ambulated independently without assistive device; able to complete dressing independently; required supervision for minor cognitive deficits    SUBJECTIVE: wife reports that patient was able to shave independently since return home. states that patient balance has been mildly challenged when he attempts to rise immediately after rising    ASSESSMENT overall patient with good functional strength leading to ability to rise from chairs and ambulate with limited assist.  fatigues quickly and would benefit from instruction in cardiopulmonary exercise program to improve activity tolerance       DATE OF NEXT APPOINTMENT WITH DOCTOR: cardiology 5/9/23  ____________    PLAN FOR NEXT VISIT:   MEDICAL NECESSITY FOR ONGOING SKILLED THERAPY:  Skilled physical therapy is medically necessary for treatment of: activity tolerance deficits following recent hospitalization for Covid 19 pneumonia.  .Requires instruction in appropriate progression of exercises; education in fall prevention ; gait training to reduce reliance on caregiver; balance retraining to prevent  falls.  Without skilled physical therapy, patient at risk for: falls, rehospitalization, increased reliance on caregiver,        SPECIFIC INTERVENTIONS AND GOALS TO ADDRESS ON NEXT VISIT:  - progress HEP   - gait training to restore normal mechanics  - fall prevention education  - continued home safety education       FREQUENCY AND DURATION:  -2 week 1; 1 week 2;     ANY OTHER FOLLOW UP NEEDED: none    REASSESSMENT DUE DATE: 30 day: 6/2/23

## 2023-05-02 VITALS
RESPIRATION RATE: 18 BRPM | HEART RATE: 69 BPM | DIASTOLIC BLOOD PRESSURE: 68 MMHG | TEMPERATURE: 98.4 F | SYSTOLIC BLOOD PRESSURE: 124 MMHG | OXYGEN SATURATION: 100 %

## 2023-05-02 NOTE — PROGRESS NOTES
Enter Query Response Below      Query Response:       Chronic kidney disease stage III Electronically signed by Calvin Bergeron MD, 23, 11:01 AM EDT.         If applicable, please update the problem list.       Patient: Asia Ly        : 1932  Account: [id number]           Admit Date: 2023        How to Respond to this query:       a. Click New Note     b. Answer query within the yellow box.                c. Update the Problem List, if applicable.      If you have any questions about this query contact me at: [email or web address]    Dr. Bergeron,    89 year old male admitte with Covid 19 pneumonia, Past Medical History includes CKD.  Creatinine 1.19 - 0.91, eGFR 58.0 - 80.1 during the admission.      Treatment: IV 0.9% saline infusion at 50mL/hr, daily lab monitoring     Please specify the stage of CKD____________      KDIGO Stages of Chronic Kidney Disease [email or web address]    CKD stage 1: GFR > 90 with kidney damage  CKD stage 2: GFR 60-89  CKD stage 3a: GFR 45-59  CKD stage 3b: GFR 30-44  CKD stage 4: GFR 15-29  CKD stage 5: <15  ESRD: CKD on continuous dialysis        By submitting this query, we are merely seeking further clarification of documentation to accurately reflect all conditions that you are monitoring, evaluating, treating or that extend the hospitalization or utilize additional resources of care. Please utilize your independent clinical judgment when addressing the question(s) above.     This query and your response, once completed, will be entered into the legal medical record.    Sincerely,  Gisele Bennett RN CCDS  Clinical Documentation Integrity Program

## 2023-05-02 NOTE — PROGRESS NOTES
"Enter Query Response Below      Query Response:       Acute hypoxic respiratory failure Electronically signed by Calvin Bergeron MD, 23, 11:00 AM EDT.         If applicable, please update the problem list.       Patient: Asia Ly        : 1932  Account: [id number]           Admit Date: 2023        How to Respond to this query:       a. Click New Note     b. Answer query within the yellow box.                c. Update the Problem List, if applicable.      If you have any questions about this query contact me at: [email or web address]    Dr. Bergeron,    89 year old male admitted with Covid 19 pneumonia was diagnosed with acute hypoxic respiratory failure. O2 sat 95- 87% on room air, treated with supplemental oxygen at 2LPM with O2 sat 91-98%. Shortness of breath without documented increased work of breathing, \"normal effort,\" \"unlabored.\"    After study, was acute hypoxic respiratory failure clinically supported during this admission?    - Yes, please include additional clinical indicators:____________  - No  - Other- specify________  - Unable to determine      By submitting this query, we are merely seeking further clarification of documentation to accurately reflect all conditions that you are monitoring, evaluating, treating or that extend the hospitalization or utilize additional resources of care. Please utilize your independent clinical judgment when addressing the question(s) above.     This query and your response, once completed, will be entered into the legal medical record.    Sincerely,  Gisele Bennett RN CCDS  Clinical Documentation Integrity Program     "

## 2023-05-03 ENCOUNTER — READMISSION MANAGEMENT (OUTPATIENT)
Dept: CALL CENTER | Facility: HOSPITAL | Age: 88
End: 2023-05-03
Payer: MEDICARE

## 2023-05-03 ENCOUNTER — HOME CARE VISIT (OUTPATIENT)
Dept: HOME HEALTH SERVICES | Facility: HOME HEALTHCARE | Age: 88
End: 2023-05-03
Payer: MEDICARE

## 2023-05-03 VITALS
DIASTOLIC BLOOD PRESSURE: 68 MMHG | TEMPERATURE: 97.1 F | RESPIRATION RATE: 18 BRPM | OXYGEN SATURATION: 96 % | SYSTOLIC BLOOD PRESSURE: 124 MMHG | HEART RATE: 69 BPM

## 2023-05-03 PROCEDURE — G0495 RN CARE TRAIN/EDU IN HH: HCPCS

## 2023-05-03 NOTE — OUTREACH NOTE
Medical Week 1 Survey    Flowsheet Row Responses   [facility] patient discharged from? [facility]   Does the patient have one of the following disease processes/diagnoses(primary or secondary)? Other   Week 1 attempt successful? No   Unsuccessful attempts Attempt 1          Lora Rodgers Registered Nurse

## 2023-05-04 NOTE — HOME HEALTH
SOC      Primary diagnoses/co-morbidities/recent procedures in past 60 days that impact current episode: COVID-19, Patient has hx of crohns, TIA, yuriy, ataxia, chronic pain.      Current level of functional ability: ambulate with assist     Homebound status and living arrangements: considerable effort due to weakness, must have assist of one to leave home at all times.     Skilled nursing needs:  assessment and teaching for covid 19, medication teaching.     Focus of care for next 60 days for each discipline ordered: covid 19     Skin integrity/wound status: no skin issues at this time.  Skin bruises easily due to long term anticoag use.     Code status: Full    Most recent fall risk: High     Estimated date when home care services will end:  6/28/23    Plan of Care confirmed with Dr Lobo via in "1,2,3 List[employer]" message.

## 2023-05-05 ENCOUNTER — HOME CARE VISIT (OUTPATIENT)
Dept: HOME HEALTH SERVICES | Facility: HOME HEALTHCARE | Age: 88
End: 2023-05-05
Payer: MEDICARE

## 2023-05-05 VITALS
OXYGEN SATURATION: 99 % | SYSTOLIC BLOOD PRESSURE: 124 MMHG | TEMPERATURE: 99.3 F | DIASTOLIC BLOOD PRESSURE: 70 MMHG | RESPIRATION RATE: 18 BRPM | HEART RATE: 62 BPM

## 2023-05-05 PROCEDURE — G0495 RN CARE TRAIN/EDU IN HH: HCPCS

## 2023-05-06 ENCOUNTER — HOSPITAL ENCOUNTER (INPATIENT)
Facility: HOSPITAL | Age: 88
LOS: 2 days | Discharge: HOME | DRG: 871 | End: 2023-05-08
Attending: EMERGENCY MEDICINE | Admitting: HOSPITALIST
Payer: MEDICARE

## 2023-05-06 ENCOUNTER — READMISSION MANAGEMENT (OUTPATIENT)
Dept: CALL CENTER | Facility: HOSPITAL | Age: 88
End: 2023-05-06
Payer: MEDICARE

## 2023-05-06 ENCOUNTER — APPOINTMENT (OUTPATIENT)
Dept: GENERAL RADIOLOGY | Facility: HOSPITAL | Age: 88
DRG: 871 | End: 2023-05-06
Payer: MEDICARE

## 2023-05-06 ENCOUNTER — APPOINTMENT (OUTPATIENT)
Dept: CT IMAGING | Facility: HOSPITAL | Age: 88
DRG: 871 | End: 2023-05-06
Payer: MEDICARE

## 2023-05-06 DIAGNOSIS — J18.9 PNEUMONIA OF BOTH LUNGS DUE TO INFECTIOUS ORGANISM, UNSPECIFIED PART OF LUNG: Primary | ICD-10-CM

## 2023-05-06 DIAGNOSIS — I26.94 MULTIPLE SUBSEGMENTAL PULMONARY EMBOLI WITHOUT ACUTE COR PULMONALE: ICD-10-CM

## 2023-05-06 DIAGNOSIS — U07.1 COVID-19 VIRUS INFECTION: ICD-10-CM

## 2023-05-06 DIAGNOSIS — I48.91 ATRIAL FIBRILLATION, UNSPECIFIED TYPE: ICD-10-CM

## 2023-05-06 DIAGNOSIS — R77.8 TROPONIN LEVEL ELEVATED: ICD-10-CM

## 2023-05-06 LAB
ALBUMIN SERPL-MCNC: 3.1 G/DL (ref 3.5–5.2)
ALBUMIN/GLOB SERPL: 0.8 G/DL
ALP SERPL-CCNC: 59 U/L (ref 39–117)
ALT SERPL W P-5'-P-CCNC: 22 U/L (ref 1–41)
ANION GAP SERPL CALCULATED.3IONS-SCNC: 9.7 MMOL/L (ref 5–15)
AST SERPL-CCNC: 18 U/L (ref 1–40)
ATMOSPHERIC PRESS: 752.6 MMHG
B PARAPERT DNA SPEC QL NAA+PROBE: NOT DETECTED
B PERT DNA SPEC QL NAA+PROBE: NOT DETECTED
BASE EXCESS BLDV CALC-SCNC: 1.6 MMOL/L (ref -2–2)
BASOPHILS # BLD AUTO: 0.02 10*3/MM3 (ref 0–0.2)
BASOPHILS NFR BLD AUTO: 0.1 % (ref 0–1.5)
BDY SITE: ABNORMAL
BILIRUB SERPL-MCNC: 1.5 MG/DL (ref 0–1.2)
BUN SERPL-MCNC: 31 MG/DL (ref 8–23)
BUN/CREAT SERPL: 30.7 (ref 7–25)
C PNEUM DNA NPH QL NAA+NON-PROBE: NOT DETECTED
CALCIUM SPEC-SCNC: 9.3 MG/DL (ref 8.2–9.6)
CHLORIDE SERPL-SCNC: 101 MMOL/L (ref 98–107)
CO2 SERPL-SCNC: 24.3 MMOL/L (ref 22–29)
CREAT SERPL-MCNC: 1.01 MG/DL (ref 0.76–1.27)
D-LACTATE SERPL-SCNC: 1.2 MMOL/L (ref 0.5–2)
D-LACTATE SERPL-SCNC: 2.5 MMOL/L (ref 0.5–2)
DEPRECATED RDW RBC AUTO: 49.4 FL (ref 37–54)
EGFRCR SERPLBLD CKD-EPI 2021: 70.6 ML/MIN/1.73
EOSINOPHIL # BLD AUTO: 0.12 10*3/MM3 (ref 0–0.4)
EOSINOPHIL NFR BLD AUTO: 0.7 % (ref 0.3–6.2)
ERYTHROCYTE [DISTWIDTH] IN BLOOD BY AUTOMATED COUNT: 15.6 % (ref 12.3–15.4)
FLUAV SUBTYP SPEC NAA+PROBE: NOT DETECTED
FLUBV RNA ISLT QL NAA+PROBE: NOT DETECTED
GAS FLOW AIRWAY: 2 LPM
GEN 5 2HR TROPONIN T REFLEX: 44 NG/L
GLOBULIN UR ELPH-MCNC: 4.1 GM/DL
GLUCOSE SERPL-MCNC: 96 MG/DL (ref 65–99)
HADV DNA SPEC NAA+PROBE: NOT DETECTED
HCO3 BLDV-SCNC: 23.7 MMOL/L (ref 22–28)
HCOV 229E RNA SPEC QL NAA+PROBE: NOT DETECTED
HCOV HKU1 RNA SPEC QL NAA+PROBE: NOT DETECTED
HCOV NL63 RNA SPEC QL NAA+PROBE: NOT DETECTED
HCOV OC43 RNA SPEC QL NAA+PROBE: NOT DETECTED
HCT VFR BLD AUTO: 40 % (ref 37.5–51)
HGB BLD-MCNC: 13.1 G/DL (ref 13–17.7)
HMPV RNA NPH QL NAA+NON-PROBE: NOT DETECTED
HPIV1 RNA ISLT QL NAA+PROBE: NOT DETECTED
HPIV2 RNA SPEC QL NAA+PROBE: NOT DETECTED
HPIV3 RNA NPH QL NAA+PROBE: NOT DETECTED
HPIV4 P GENE NPH QL NAA+PROBE: NOT DETECTED
IMM GRANULOCYTES # BLD AUTO: 0.2 10*3/MM3 (ref 0–0.05)
IMM GRANULOCYTES NFR BLD AUTO: 1.2 % (ref 0–0.5)
INR PPP: 1.51 (ref 0.9–1.1)
LYMPHOCYTES # BLD AUTO: 0.31 10*3/MM3 (ref 0.7–3.1)
LYMPHOCYTES NFR BLD AUTO: 1.9 % (ref 19.6–45.3)
M PNEUMO IGG SER IA-ACNC: NOT DETECTED
MAGNESIUM SERPL-MCNC: 1.7 MG/DL (ref 1.6–2.4)
MCH RBC QN AUTO: 28.7 PG (ref 26.6–33)
MCHC RBC AUTO-ENTMCNC: 32.8 G/DL (ref 31.5–35.7)
MCV RBC AUTO: 87.5 FL (ref 79–97)
MODALITY: ABNORMAL
MONOCYTES # BLD AUTO: 0.6 10*3/MM3 (ref 0.1–0.9)
MONOCYTES NFR BLD AUTO: 3.7 % (ref 5–12)
NEUTROPHILS NFR BLD AUTO: 14.78 10*3/MM3 (ref 1.7–7)
NEUTROPHILS NFR BLD AUTO: 92.4 % (ref 42.7–76)
NRBC BLD AUTO-RTO: 0 /100 WBC (ref 0–0.2)
NT-PROBNP SERPL-MCNC: 5361 PG/ML (ref 0–1800)
PCO2 BLDV: 29.5 MM HG (ref 41–51)
PH BLDV: 7.51 PH UNITS (ref 7.31–7.41)
PLATELET # BLD AUTO: 177 10*3/MM3 (ref 140–450)
PMV BLD AUTO: 11 FL (ref 6–12)
PO2 BLDV: 46.1 MM HG (ref 35–45)
POTASSIUM SERPL-SCNC: 4.4 MMOL/L (ref 3.5–5.2)
PROCALCITONIN SERPL-MCNC: 0.52 NG/ML (ref 0–0.25)
PROT SERPL-MCNC: 7.2 G/DL (ref 6–8.5)
PROTHROMBIN TIME: 18.4 SECONDS (ref 11.7–14.2)
QT INTERVAL: 383 MS
RBC # BLD AUTO: 4.57 10*6/MM3 (ref 4.14–5.8)
RHINOVIRUS RNA SPEC NAA+PROBE: NOT DETECTED
RSV RNA NPH QL NAA+NON-PROBE: NOT DETECTED
SAO2 % BLDCOA: 86.6 % (ref 92–99)
SARS-COV-2 RNA NPH QL NAA+NON-PROBE: DETECTED
SODIUM SERPL-SCNC: 135 MMOL/L (ref 136–145)
TOTAL RATE: 16 BREATHS/MINUTE
TROPONIN T DELTA: 16 NG/L
TROPONIN T SERPL HS-MCNC: 28 NG/L
WBC NRBC COR # BLD: 16.03 10*3/MM3 (ref 3.4–10.8)

## 2023-05-06 PROCEDURE — 25010000002 CEFTRIAXONE PER 250 MG: Performed by: EMERGENCY MEDICINE

## 2023-05-06 PROCEDURE — 83605 ASSAY OF LACTIC ACID: CPT | Performed by: PHYSICIAN ASSISTANT

## 2023-05-06 PROCEDURE — 85025 COMPLETE CBC W/AUTO DIFF WBC: CPT | Performed by: PHYSICIAN ASSISTANT

## 2023-05-06 PROCEDURE — 36415 COLL VENOUS BLD VENIPUNCTURE: CPT

## 2023-05-06 PROCEDURE — 0202U NFCT DS 22 TRGT SARS-COV-2: CPT | Performed by: EMERGENCY MEDICINE

## 2023-05-06 PROCEDURE — 87040 BLOOD CULTURE FOR BACTERIA: CPT | Performed by: PHYSICIAN ASSISTANT

## 2023-05-06 PROCEDURE — 25510000001 IOPAMIDOL PER 1 ML: Performed by: EMERGENCY MEDICINE

## 2023-05-06 PROCEDURE — 94799 UNLISTED PULMONARY SVC/PX: CPT

## 2023-05-06 PROCEDURE — 83880 ASSAY OF NATRIURETIC PEPTIDE: CPT | Performed by: PHYSICIAN ASSISTANT

## 2023-05-06 PROCEDURE — 25010000002 ENOXAPARIN PER 10 MG: Performed by: EMERGENCY MEDICINE

## 2023-05-06 PROCEDURE — 85610 PROTHROMBIN TIME: CPT | Performed by: EMERGENCY MEDICINE

## 2023-05-06 PROCEDURE — 94640 AIRWAY INHALATION TREATMENT: CPT

## 2023-05-06 PROCEDURE — 93005 ELECTROCARDIOGRAM TRACING: CPT

## 2023-05-06 PROCEDURE — 99285 EMERGENCY DEPT VISIT HI MDM: CPT

## 2023-05-06 PROCEDURE — 71275 CT ANGIOGRAPHY CHEST: CPT

## 2023-05-06 PROCEDURE — 94664 DEMO&/EVAL PT USE INHALER: CPT

## 2023-05-06 PROCEDURE — 84484 ASSAY OF TROPONIN QUANT: CPT | Performed by: PHYSICIAN ASSISTANT

## 2023-05-06 PROCEDURE — 93010 ELECTROCARDIOGRAM REPORT: CPT | Performed by: INTERNAL MEDICINE

## 2023-05-06 PROCEDURE — 25010000002 AZITHROMYCIN PER 500 MG: Performed by: HOSPITALIST

## 2023-05-06 PROCEDURE — 80053 COMPREHEN METABOLIC PANEL: CPT | Performed by: PHYSICIAN ASSISTANT

## 2023-05-06 PROCEDURE — 84145 PROCALCITONIN (PCT): CPT | Performed by: PHYSICIAN ASSISTANT

## 2023-05-06 PROCEDURE — 71045 X-RAY EXAM CHEST 1 VIEW: CPT

## 2023-05-06 PROCEDURE — 82803 BLOOD GASES ANY COMBINATION: CPT

## 2023-05-06 PROCEDURE — 83735 ASSAY OF MAGNESIUM: CPT | Performed by: EMERGENCY MEDICINE

## 2023-05-06 RX ORDER — SODIUM CHLORIDE 9 MG/ML
125 INJECTION, SOLUTION INTRAVENOUS CONTINUOUS
Status: DISCONTINUED | OUTPATIENT
Start: 2023-05-06 | End: 2023-05-06

## 2023-05-06 RX ORDER — GUAIFENESIN 600 MG/1
600 TABLET, EXTENDED RELEASE ORAL EVERY 12 HOURS SCHEDULED
Status: DISCONTINUED | OUTPATIENT
Start: 2023-05-06 | End: 2023-05-08 | Stop reason: HOSPADM

## 2023-05-06 RX ORDER — SODIUM CHLORIDE 9 MG/ML
75 INJECTION, SOLUTION INTRAVENOUS CONTINUOUS
Status: DISCONTINUED | OUTPATIENT
Start: 2023-05-06 | End: 2023-05-07

## 2023-05-06 RX ORDER — ATORVASTATIN CALCIUM 10 MG/1
1 TABLET, FILM COATED ORAL EVERY OTHER DAY
COMMUNITY
Start: 2020-01-01

## 2023-05-06 RX ORDER — ENOXAPARIN SODIUM 100 MG/ML
1 INJECTION SUBCUTANEOUS ONCE
Status: COMPLETED | OUTPATIENT
Start: 2023-05-06 | End: 2023-05-06

## 2023-05-06 RX ORDER — SODIUM CHLORIDE 0.9 % (FLUSH) 0.9 %
10 SYRINGE (ML) INJECTION AS NEEDED
Status: DISCONTINUED | OUTPATIENT
Start: 2023-05-06 | End: 2023-05-08 | Stop reason: HOSPADM

## 2023-05-06 RX ORDER — ENOXAPARIN SODIUM 100 MG/ML
1 INJECTION SUBCUTANEOUS ONCE
Status: DISCONTINUED | OUTPATIENT
Start: 2023-05-06 | End: 2023-05-06

## 2023-05-06 RX ORDER — PANTOPRAZOLE SODIUM 40 MG/1
40 TABLET, DELAYED RELEASE ORAL
Status: DISCONTINUED | OUTPATIENT
Start: 2023-05-07 | End: 2023-05-06

## 2023-05-06 RX ORDER — ATORVASTATIN CALCIUM 20 MG/1
10 TABLET, FILM COATED ORAL EVERY OTHER DAY
Status: DISCONTINUED | OUTPATIENT
Start: 2023-05-07 | End: 2023-05-08 | Stop reason: HOSPADM

## 2023-05-06 RX ORDER — ACETAMINOPHEN 500 MG
1000 TABLET ORAL ONCE
Status: COMPLETED | OUTPATIENT
Start: 2023-05-06 | End: 2023-05-06

## 2023-05-06 RX ORDER — ALLOPURINOL 100 MG/1
100 TABLET ORAL 2 TIMES DAILY
Status: DISCONTINUED | OUTPATIENT
Start: 2023-05-06 | End: 2023-05-08 | Stop reason: HOSPADM

## 2023-05-06 RX ORDER — ACETAMINOPHEN 500 MG
1000 TABLET ORAL ONCE
Status: DISCONTINUED | OUTPATIENT
Start: 2023-05-06 | End: 2023-05-06

## 2023-05-06 RX ORDER — IPRATROPIUM BROMIDE AND ALBUTEROL SULFATE 2.5; .5 MG/3ML; MG/3ML
3 SOLUTION RESPIRATORY (INHALATION)
Status: DISCONTINUED | OUTPATIENT
Start: 2023-05-06 | End: 2023-05-08

## 2023-05-06 RX ORDER — PANTOPRAZOLE SODIUM 40 MG/1
40 TABLET, DELAYED RELEASE ORAL
Status: DISCONTINUED | OUTPATIENT
Start: 2023-05-06 | End: 2023-05-08 | Stop reason: HOSPADM

## 2023-05-06 RX ORDER — BUDESONIDE 3 MG/1
6 CAPSULE, COATED PELLETS ORAL DAILY
Status: DISCONTINUED | OUTPATIENT
Start: 2023-05-06 | End: 2023-05-08 | Stop reason: HOSPADM

## 2023-05-06 RX ORDER — ENOXAPARIN SODIUM 100 MG/ML
1 INJECTION SUBCUTANEOUS EVERY 12 HOURS
Status: DISCONTINUED | OUTPATIENT
Start: 2023-05-07 | End: 2023-05-07

## 2023-05-06 RX ADMIN — IOPAMIDOL 85 ML: 755 INJECTION, SOLUTION INTRAVENOUS at 13:48

## 2023-05-06 RX ADMIN — ENOXAPARIN SODIUM 60 MG: 100 INJECTION SUBCUTANEOUS at 15:08

## 2023-05-06 RX ADMIN — PANTOPRAZOLE SODIUM 40 MG: 40 TABLET, DELAYED RELEASE ORAL at 17:45

## 2023-05-06 RX ADMIN — ACETAMINOPHEN 1000 MG: 500 TABLET ORAL at 08:07

## 2023-05-06 RX ADMIN — ALLOPURINOL 100 MG: 100 TABLET ORAL at 20:37

## 2023-05-06 RX ADMIN — SODIUM CHLORIDE 125 ML/HR: 9 INJECTION, SOLUTION INTRAVENOUS at 08:05

## 2023-05-06 RX ADMIN — GUAIFENESIN 600 MG: 600 TABLET, EXTENDED RELEASE ORAL at 20:37

## 2023-05-06 RX ADMIN — CEFTRIAXONE 2 G: 2 INJECTION, POWDER, FOR SOLUTION INTRAMUSCULAR; INTRAVENOUS at 11:31

## 2023-05-06 RX ADMIN — AZITHROMYCIN MONOHYDRATE 500 MG: 500 INJECTION, POWDER, LYOPHILIZED, FOR SOLUTION INTRAVENOUS at 21:01

## 2023-05-06 RX ADMIN — IPRATROPIUM BROMIDE AND ALBUTEROL SULFATE 3 ML: 2.5; .5 SOLUTION RESPIRATORY (INHALATION) at 20:01

## 2023-05-06 RX ADMIN — SODIUM CHLORIDE 75 ML/HR: 9 INJECTION, SOLUTION INTRAVENOUS at 17:45

## 2023-05-06 RX ADMIN — METOPROLOL TARTRATE 12.5 MG: 25 TABLET, FILM COATED ORAL at 20:37

## 2023-05-06 NOTE — CONSULTS
Referring Provider: Dr. Bergeron  Reason for Consultation: Pneumonia/PE    Patient Care Team:  Lubna Lobo MD as PCP - General  Lubna Lobo MD as PCP - Family Medicine    Chief complaint:   Cough and dyspnea    History of present illness:    Subjective   This is a 89-year-old male patient with a history of bronchiectasis and pulmonary fibrosis.  He follows with Dr. Chidi Oconnor.  He has A-fib and is on Eliquis 2.5 mg twice daily.  He was recently hospitalized for COVID pneumonia/infection and was discharged on 4/29.  He was treated with remdesivir and Decadron.    He was doing well up until yesterday when he started having cough, productive of moderate amount of whitish to yellowish phlegm associated with weakness and fever and shortness of breath.  He presented to [facility] today and underwent CTA of the chest which was reported as small bilateral subsegmental PE and pulmonary consolidations.    Patient is currently on 2 L oxygen.  He admitted cough.  He had a fever in the ED of 101.2.    Review of Systems  Constitutional: No fever or chills.   ENMT: No sinus congestion  Cardiovascular: No chest pain, palpitation or legs swelling.    Respiratory: See above  Gastrointestinal: No constipation, diarrhea or abdominal pain.  No nausea or vomiting.  Neurology: No headache, weakness, numbness or dizziness.   Musculoskeletal: No joints pain, stiffness or swelling.   Psychiatry: No depression.  Genitourinary: No dysuria or frequent urination  Endo: No weight changes. No cold or warm intolerance.  Lymphatic: No swollen glands.  Integumentary: No rash.    History  Past Medical History:   Diagnosis Date   • Aortic stenosis    • Aspiration pneumonia    • Atrial fibrillation    • Bleeding from the nose    • Bronchiectasis    • CKD (chronic kidney disease)    • COPD (chronic obstructive pulmonary disease)    • Crohn's disease    • Dizziness    • Elevated cholesterol    • Gastric ulcer    • Generalized  weakness    • Gout    • Gout    • Hard of hearing    • Hyperlipidemia    • Hypertension    • Leg swelling    • Lower extremity edema    • Mild anemia    • Near syncope    • Nonrheumatic aortic valve stenosis    • PAF (paroxysmal atrial fibrillation)    • Palpitations    • Peptic ulcer    • Pneumonia of left lung due to infectious organism    • Pulmonary fibrosis    • Stroke    • TIA (transient ischemic attack)    ,   Past Surgical History:   Procedure Laterality Date   • BRONCHOSCOPY N/A 10/22/2018    Procedure: BRONCHOSCOPY WITH BRONCHALVEOLAR LAVAGE;  Surgeon: Chidi Oconnor MD;  Location: [facility] ENDOSCOPY;  Service: Pulmonary   • BRONCHOSCOPY N/A 7/8/2021    Procedure: BRONCHOSCOPY with BAL;  Surgeon: Chidi Oconnor MD;  Location: [facility] ENDOSCOPY;  Service: Pulmonary;  Laterality: N/A;  Pre: bronchectasis  Post: same   • CARDIAC ELECTROPHYSIOLOGY PROCEDURE N/A 7/9/2021    Procedure: Pacemaker DC new--[employer] possible His lead;  Surgeon: Rashaun Nettles MD;  Location: [facility] CATH INVASIVE LOCATION;  Service: Cardiology;  Laterality: N/A;   • CARDIAC ELECTROPHYSIOLOGY PROCEDURE N/A 9/3/2021    Procedure: AV node 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 ppm;  Surgeon: Rashaun Nettles MD;  Location: [facility] CATH INVASIVE LOCATION;  Service: Cardiovascular;  Laterality: N/A;   • CATARACT EXTRACTION Bilateral    • COLONOSCOPY     • COLONOSCOPY N/A 3/9/2018    Procedure: COLONOSCOPY to terminal ileum with bx;  Surgeon: Aron Cabrera MD;  Location: [facility] ENDOSCOPY;  Service:    • COLONOSCOPY N/A 11/19/2020    Procedure: COLONOSCOPY to cecum:  apc to cecum angiodysplagia,;  Surgeon: Aron Cabrera MD;  Location: [facility] ENDOSCOPY;  Service: Gastroenterology;  Laterality: N/A;  GI bleed  post:  diverticulosis, angiodysplagia   • CYSTECTOMY      back and shoulder area   • ENDOSCOPY N/A 3/9/2018    Procedure: ESOPHAGOGASTRODUODENOSCOPY with bx;  Surgeon: Aron Cabrera MD;  Location: [facility] ENDOSCOPY;  Service:    •  "ENDOSCOPY N/A 2020    Procedure: ESOPHAGOGASTRODUODENOSCOPY;  Surgeon: Aron Cabrera MD;  Location: [facility] ENDOSCOPY;  Service: Gastroenterology;  Laterality: N/A;  GI bleed  post:  HH.   • EYE SURGERY Left     detached retina   • EYE SURGERY Right     \"repaired a hole in the eye\"   • INSERT / REPLACE / REMOVE PACEMAKER     • TESTICLE SURGERY      benign tumor removed.   ,   Family History   Problem Relation Age of Onset   • Stroke Mother    • Heart disease Mother    • Hypertension Mother    • Diabetes Mother    • Heart disease Sister         Heart Valve Replacement   • Ovarian cancer Sister    • Rheumatic fever Sister    • Diabetes Sister    • Stroke Father    ,   Social History     Socioeconomic History   • Marital status:    Tobacco Use   • Smoking status: Former     Types: Cigars     Quit date: 10/28/1994     Years since quittin.5   • Smokeless tobacco: Never   • Tobacco comments:     Quit 25 years ago   Vaping Use   • Vaping Use: Never used   Substance and Sexual Activity   • Alcohol use: No     Comment: daily caffiene - 1/2 cup of coffee   • Drug use: No   • Sexual activity: Defer     E-cigarette/Vaping   • E-cigarette/Vaping Use Never User    • Passive Exposure No    • Counseling Given No      E-cigarette/Vaping Substances   • Nicotine No    • THC No    • CBD No    • Flavoring No      E-cigarette/Vaping Devices   • Disposable No    • Pre-filled or Refillable Cartridge No    • Refillable Tank No    • Pre-filled Pod No        ,   Medications Prior to Admission   Medication Sig Dispense Refill Last Dose   • allopurinol (ZYLOPRIM) 100 MG tablet Take 1 tablet by mouth 2 (Two) Times a Day.      • apixaban (ELIQUIS) 2.5 MG tablet tablet Take 1 tablet by mouth Every 12 (Twelve) Hours. 180 tablet 3    • atorvastatin (LIPITOR) 10 MG tablet Take 1 tablet by mouth Every Other Day. M--      • BUDESONIDE PO 3 mg Daily. Take one tablet by mouth      • hydrocortisone 2.5 % ointment Apply  topically to " the appropriate area as directed 2 (Two) Times a Day. to affected area      • IPRATROPIUM-ALBUTEROL IN Inhale 3 (Three) Times a Day.      • metoprolol tartrate (LOPRESSOR) 25 MG tablet Take 1 tablet by mouth Every 12 (Twelve) Hours for 30 days. 60 tablet 0    • Nutritional Supplements (BOOST 100 CALORIE SMART PO) Take  by mouth Daily.      • omeprazole (priLOSEC) 40 MG capsule Take 1 capsule by mouth Daily.      , Scheduled Meds:  allopurinol, 100 mg, Oral, BID  [START ON 5/7/2023] atorvastatin, 10 mg, Oral, Every Other Day  azithromycin, 500 mg, Intravenous, Q24H  Budesonide, 6 mg, Oral, Daily  [START ON 5/7/2023] cefTRIAXone, 1 g, Intravenous, Q24H  [START ON 5/7/2023] enoxaparin, 1 mg/kg, Subcutaneous, Q12H  guaiFENesin, 600 mg, Oral, Q12H  ipratropium-albuterol, 3 mL, Nebulization, Q4H - RT  metoprolol tartrate, 12.5 mg, Oral, Q12H  pantoprazole, 40 mg, Oral, BID AC     and Allergies:  Amiodarone, Diltiazem, Doxycycline, Cefdinir, and Indomethacin    Objective     Vital Signs   Temp:  [97.7 °F (36.5 °C)-101.2 °F (38.4 °C)] 97.7 °F (36.5 °C)  Heart Rate:  [60-95] 60  Resp:  [18-22] 18  BP: (101-136)/(48-68) 136/61    PPE used per hospital policy    Physical Exam:  Constitutional: Not in acute distress.  Eyes: Injected conjunctivae, EOMI. pupils equal reactive to light.  ENMT: Banda 3. No oral thrush.   Neck:  Trachea midline. No thyromegaly  Heart: RRR, no murmur  Lungs: Equal but diminished air entry at the bases.  Bilateral basal crackles.  No wheezing.  No use of accessory muscles..   Abdomen: Obese. Soft. No tenderness or dullness. No HSM.  Extremities: No cyanosis, clubbing or pitting edema.  Warm extremities and well-perfused.  Neuro: Conscious, alert, oriented x3.  Strength 5/5 in arms.  Psych: Appropriate mood and affect.    Integumentary: No rash.  Normal skin turgor  Lymphatic: No palpable cervical or supraclavicular lymph nodes.      Diagnostic imaging:  I personally and independently reviewed the  following images:   CT chest 5/6/2023: Small bilateral pleural effusion.  Bilateral lower lobe consolidations.      Laboratory workup:    Results from last 7 days   Lab Units 05/06/23  0748   SODIUM mmol/L 135*   POTASSIUM mmol/L 4.4   CHLORIDE mmol/L 101   CO2 mmol/L 24.3   BUN mg/dL 31*   CREATININE mg/dL 1.01   GLUCOSE mg/dL 96   CALCIUM mg/dL 9.3     Results from last 7 days   Lab Units 05/06/23  1109 05/06/23  0748   HSTROP T ng/L 44* 28*     Results from last 7 days   Lab Units 05/06/23  0748   WBC 10*3/mm3 16.03*   HEMOGLOBIN g/dL 13.1   HEMATOCRIT % 40.0   PLATELETS 10*3/mm3 177     Results from last 7 days   Lab Units 05/06/23  0804   INR  1.51*     Results from last 7 days   Lab Units 05/06/23  0748   PROBNP pg/mL 5,361.0*       Assessment     1. Acute bilateral small subsegmental PE  2. Trace/mild bilateral pleural effusion  3. Bilateral lower lobe consolidation  4. Leukocytosis with left shift and borderline procalcitonin suggestive of bacterial pneumonia  5. COPD, no exacerbation    Recommendations:    · Lovenox 1 bindu/KG twice daily.  Can later switched to Eliquis and increase the dose to 5 mg twice daily or use warfarin or Xarelto.  Patient was already on Eliquis 2.5 mg twice daily.  Also sensitivity of CT chest to peripheral PE is lower and therefore diagnosis can be questionable.  If in doubt we could get Doppler of the lower extremities.  · Antibiotics with Rocephin.  Check sputum culture.  Check urine strep antigen.  · Oxygen via NC and titrate to keep SPO2 >90%  · DuoNeb 4 times a day    Steven Donohue MD  05/06/23  17:55 EDT

## 2023-05-06 NOTE — ED PROVIDER NOTES
EMERGENCY DEPARTMENT ENCOUNTER    Room Number:  33/33  Date of encounter:  5/6/2023  PCP: Lubna Lobo MD  Historian: Patient and spouse  Relevant information and history provided by sources other than the patient will be included below and in the ED Course.  Review of pertinent past medical records may also be included in record below and ED Course.    HPI:  Chief Complaint: Increased shortness of breath, fever, increased weakness, pain in chest  A complete HPI/ROS/PMH/PSH/SH/FH are unobtainable due to: Not applicable  Context: Asia Ly is a 89 y.o. male who presents to the ED c/o this is an elderly male with multiple comorbidities.  Please see medical history reviewed below.  He had a rough yesterday afternoon and evening.  Started spiking fevers again.  Increased generalized weakness.  Not able to get up and walk because he was too diffusely weak.  Increased shortness of breath.  He has had increased cough.  Cough is productive of yellow phlegm.  Also reports pain on the left side of his chest with coughing and moving and breathing.  He has not been eating or drinking as much.  But denies any vomiting or diarrhea.  Denies any new weight gain or any increased swelling to his lower extremities.  Denies abdominal pain.  I did clarify with the patient and spouse in the room that he is a DO NOT RESUSCITATE with no intubation or CPR if it became necessary.        Previous Episodes: Yes like when he just was in the hospital and feels like the pneumonia is worse or coming back.  Current Symptoms: See above    MEDICAL HISTORY REVIEWED  This patient was discharged April 29, 2023 she had acute hypoxic respiratory failure with COVID-19 pneumonia.  She has chronic atrial fibrillation, hypertension, hyperlipidemia, aortic stenosis and pulmonary fibrosis and bronchiectasis.  She also has pulmonary hypertension and Crohn's disease and GERD.  I read her medicines on discharge.  She is on steroids guaifenesin metoprolol  allopurinol apixaban 2.5 mg twice a day pudendal side hydrocortisone DuoNeb and Prilosec.  She was treated with oxygen remdesivir Decadron and nebulized treatments infectious disease was consulted.  Patient was eventually good to be discharged home.  It is very important to note this patient is a DO NOT RESUSCITATE and remain DO NOT RESUSCITATE throughout hospital stay.  No intubation or CPR      PAST MEDICAL HISTORY  Active Ambulatory Problems     Diagnosis Date Noted   • Ataxia 08/19/2016   • TIA (transient ischemic attack) 08/19/2016   • Atrial fibrillation, paroxysmal 08/19/2016   • Hyperlipidemia 08/19/2016   • Gout 08/19/2016   • Crohn's disease 08/19/2016   • Atrial fibrillation with RVR 10/27/2018   • Acute cholecystitis 08/26/2020   • Obstructive jaundice 08/26/2020   • Acute lower GI bleeding 11/17/2020   • Aortic stenosis    • Pulmonary fibrosis    • Anticoagulant long-term use    • Febrile illness, acute 12/07/2020   • Atrial fibrillation with rapid ventricular response 06/22/2021   • COVID-19 virus infection 04/25/2023     Resolved Ambulatory Problems     Diagnosis Date Noted   • Hypoxia 04/29/2018     Past Medical History:   Diagnosis Date   • Aspiration pneumonia    • Bleeding from the nose    • Bronchiectasis    • CKD (chronic kidney disease)    • COPD (chronic obstructive pulmonary disease)    • Dizziness    • Elevated cholesterol    • Gastric ulcer    • Generalized weakness    • Hard of hearing    • Hypertension    • Leg swelling    • Lower extremity edema    • Mild anemia    • Near syncope    • Nonrheumatic aortic valve stenosis    • PAF (paroxysmal atrial fibrillation)    • Palpitations    • Peptic ulcer    • Pneumonia of left lung due to infectious organism    • Stroke          PAST SURGICAL HISTORY  Past Surgical History:   Procedure Laterality Date   • BRONCHOSCOPY N/A 10/22/2018    Procedure: BRONCHOSCOPY WITH BRONCHALVEOLAR LAVAGE;  Surgeon: Chiid Oconnor MD;  Location: [facility] ENDOSCOPY;   "Service: Pulmonary   • BRONCHOSCOPY N/A 7/8/2021    Procedure: BRONCHOSCOPY with BAL;  Surgeon: Chidi Oconnor MD;  Location: [facility] ENDOSCOPY;  Service: Pulmonary;  Laterality: N/A;  Pre: bronchectasis  Post: same   • CARDIAC ELECTROPHYSIOLOGY PROCEDURE N/A 7/9/2021    Procedure: Pacemaker DC new--[employer] possible His lead;  Surgeon: Rashaun Nettles MD;  Location: [facility] CATH INVASIVE LOCATION;  Service: Cardiology;  Laterality: N/A;   • CARDIAC ELECTROPHYSIOLOGY PROCEDURE N/A 9/3/2021    Procedure: AV node 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 ppm;  Surgeon: Rashaun Nettles MD;  Location: [facility] CATH INVASIVE LOCATION;  Service: Cardiovascular;  Laterality: N/A;   • CATARACT EXTRACTION Bilateral    • COLONOSCOPY     • COLONOSCOPY N/A 3/9/2018    Procedure: COLONOSCOPY to terminal ileum with bx;  Surgeon: Aron Cabrera MD;  Location: [facility] ENDOSCOPY;  Service:    • COLONOSCOPY N/A 11/19/2020    Procedure: COLONOSCOPY to cecum:  apc to cecum angiodysplagia,;  Surgeon: Aron Cabrera MD;  Location: [facility] ENDOSCOPY;  Service: Gastroenterology;  Laterality: N/A;  GI bleed  post:  diverticulosis, angiodysplagia   • CYSTECTOMY      back and shoulder area   • ENDOSCOPY N/A 3/9/2018    Procedure: ESOPHAGOGASTRODUODENOSCOPY with bx;  Surgeon: Aron Cabrera MD;  Location: [facility] ENDOSCOPY;  Service:    • ENDOSCOPY N/A 11/19/2020    Procedure: ESOPHAGOGASTRODUODENOSCOPY;  Surgeon: Aron Cabrera MD;  Location: [facility] ENDOSCOPY;  Service: Gastroenterology;  Laterality: N/A;  GI bleed  post:  HH.   • EYE SURGERY Left     detached retina   • EYE SURGERY Right     \"repaired a hole in the eye\"   • INSERT / REPLACE / REMOVE PACEMAKER     • TESTICLE SURGERY      benign tumor removed.         FAMILY HISTORY  Family History   Problem Relation Age of Onset   • Stroke Mother    • Heart disease Mother    • Hypertension Mother    • Diabetes Mother    • Heart disease Sister         Heart Valve Replacement   • Ovarian cancer " Sister    • Rheumatic fever Sister    • Diabetes Sister    • Stroke Father          SOCIAL HISTORY  Social History     Socioeconomic History   • Marital status:    Tobacco Use   • Smoking status: Former     Types: Cigars     Quit date: 10/28/1994     Years since quittin.5   • Smokeless tobacco: Never   • Tobacco comments:     Quit 25 years ago   Vaping Use   • Vaping Use: Never used   Substance and Sexual Activity   • Alcohol use: No     Comment: daily caffiene - 1/2 cup of coffee   • Drug use: No   • Sexual activity: Defer         ALLERGIES  Amiodarone, Diltiazem, Doxycycline, Cefdinir, and Indomethacin        REVIEW OF SYSTEMS  Review of Systems     All systems reviewed and negative except for those discussed in HPI.       PHYSICAL EXAM    I have reviewed the triage vital signs and nursing notes.    ED Triage Vitals [23 0712]   Temp Heart Rate Resp BP SpO2   (!) 101.2 °F (38.4 °C) 95 20 125/59 92 %      Temp src Heart Rate Source Patient Position BP Location FiO2 (%)   Oral Monitor Sitting Right arm --       GENERAL: Elderly frail male appears weak and tired.  He is chronically weak appearing.  Chronically of pills ear ill.  But also currently appears sickly and weak.  Patient was hypoxic with oxygen saturation in the mid 80 percentile range on room air at triage.  2 L of oxygen were placed upon the patient..Vital signs on my initial evaluation oxygen saturation on 2 L is 95%.  Blood pressure and heart rate is normal.  HENT: nares patent  Head/neck/ face are symmetric without gross deformity, signs of trauma, or swelling  EYES: no scleral icterus, no conjunctival pallor.  NECK: Supple, no meningismus  CV: regular rhythm, regular rate with intact distal pulses.  Harsh systolic murmur 2 out of 6.  RESPIRATORY: Patient has some increased respiratory distress with mild tachypnea.  Respiratory rate is in the low 20s.  He has rhonchi diffusely.  Has an obvious cough on exam.  On my exam his cough is  essentially dry is not coughing up phlegm.  ABDOMEN: soft and nontender.  MUSCULOSKELETAL: no deformity.  No edema to lower extremities.  Intact distal pulses to upper and lower extremities are equal strong and symmetric.  NEURO: alert and appropriate, moves all extremities, follows commands.  Generalized weakness.  No focal motor or sensory changes  SKIN: warm, dry    Vital signs and nursing notes reviewed.        LAB RESULTS  Recent Results (from the past 24 hour(s))   ECG 12 Lead Dyspnea    Collection Time: 05/06/23  7:22 AM   Result Value Ref Range    QT Interval 383 ms   Comprehensive Metabolic Panel    Collection Time: 05/06/23  7:48 AM    Specimen: Arm, Right; Blood   Result Value Ref Range    Glucose 96 65 - 99 mg/dL    BUN 31 (H) 8 - 23 mg/dL    Creatinine 1.01 0.76 - 1.27 mg/dL    Sodium 135 (L) 136 - 145 mmol/L    Potassium 4.4 3.5 - 5.2 mmol/L    Chloride 101 98 - 107 mmol/L    CO2 24.3 22.0 - 29.0 mmol/L    Calcium 9.3 8.2 - 9.6 mg/dL    Total Protein 7.2 6.0 - 8.5 g/dL    Albumin 3.1 (L) 3.5 - 5.2 g/dL    ALT (SGPT) 22 1 - 41 U/L    AST (SGOT) 18 1 - 40 U/L    Alkaline Phosphatase 59 39 - 117 U/L    Total Bilirubin 1.5 (H) 0.0 - 1.2 mg/dL    Globulin 4.1 gm/dL    A/G Ratio 0.8 g/dL    BUN/Creatinine Ratio 30.7 (H) 7.0 - 25.0    Anion Gap 9.7 5.0 - 15.0 mmol/L    eGFR 70.6 >60.0 mL/min/1.73   Lactic Acid, Plasma    Collection Time: 05/06/23  7:48 AM    Specimen: Arm, Right; Blood   Result Value Ref Range    Lactate 2.5 (C) 0.5 - 2.0 mmol/L   Procalcitonin    Collection Time: 05/06/23  7:48 AM    Specimen: Arm, Right; Blood   Result Value Ref Range    Procalcitonin 0.52 (H) 0.00 - 0.25 ng/mL   CBC Auto Differential    Collection Time: 05/06/23  7:48 AM    Specimen: Arm, Right; Blood   Result Value Ref Range    WBC 16.03 (H) 3.40 - 10.80 10*3/mm3    RBC 4.57 4.14 - 5.80 10*6/mm3    Hemoglobin 13.1 13.0 - 17.7 g/dL    Hematocrit 40.0 37.5 - 51.0 %    MCV 87.5 79.0 - 97.0 fL    MCH 28.7 26.6 - 33.0 pg     MCHC 32.8 31.5 - 35.7 g/dL    RDW 15.6 (H) 12.3 - 15.4 %    RDW-SD 49.4 37.0 - 54.0 fl    MPV 11.0 6.0 - 12.0 fL    Platelets 177 140 - 450 10*3/mm3    Neutrophil % 92.4 (H) 42.7 - 76.0 %    Lymphocyte % 1.9 (L) 19.6 - 45.3 %    Monocyte % 3.7 (L) 5.0 - 12.0 %    Eosinophil % 0.7 0.3 - 6.2 %    Basophil % 0.1 0.0 - 1.5 %    Immature Grans % 1.2 (H) 0.0 - 0.5 %    Neutrophils, Absolute 14.78 (H) 1.70 - 7.00 10*3/mm3    Lymphocytes, Absolute 0.31 (L) 0.70 - 3.10 10*3/mm3    Monocytes, Absolute 0.60 0.10 - 0.90 10*3/mm3    Eosinophils, Absolute 0.12 0.00 - 0.40 10*3/mm3    Basophils, Absolute 0.02 0.00 - 0.20 10*3/mm3    Immature Grans, Absolute 0.20 (H) 0.00 - 0.05 10*3/mm3    nRBC 0.0 0.0 - 0.2 /100 WBC   BNP    Collection Time: 05/06/23  7:48 AM    Specimen: Arm, Right; Blood   Result Value Ref Range    proBNP 5,361.0 (H) 0.0 - 1,800.0 pg/mL   High Sensitivity Troponin T    Collection Time: 05/06/23  7:48 AM    Specimen: Arm, Right; Blood   Result Value Ref Range    HS Troponin T 28 (H) <15 ng/L   Magnesium    Collection Time: 05/06/23  7:48 AM    Specimen: Arm, Right; Blood   Result Value Ref Range    Magnesium 1.7 1.6 - 2.4 mg/dL   Respiratory Panel PCR w/COVID-19(SARS-CoV-2) [facility]AD[facility]/JOY/MONIQUE/PAD/COR/MAD/ELBERT In-House, NP Swab in [facility], 3-4 HR TAT - Swab, Nasopharynx    Collection Time: 05/06/23  7:52 AM    Specimen: Nasopharynx; Swab   Result Value Ref Range    ADENOVIRUS, PCR Not Detected Not Detected    Coronavirus 229E Not Detected Not Detected    Coronavirus HKU1 Not Detected Not Detected    Coronavirus NL63 Not Detected Not Detected    Coronavirus OC43 Not Detected Not Detected    COVID19 Detected (C) Not Detected - Ref. Range    Human Metapneumovirus Not Detected Not Detected    Human Rhinovirus/Enterovirus Not Detected Not Detected    Influenza A PCR Not Detected Not Detected    Influenza B PCR Not Detected Not Detected    Parainfluenza Virus 1 Not Detected Not Detected    Parainfluenza Virus 2 Not Detected  Not Detected    Parainfluenza Virus 3 Not Detected Not Detected    Parainfluenza Virus 4 Not Detected Not Detected    RSV, PCR Not Detected Not Detected    Bordetella pertussis pcr Not Detected Not Detected    Bordetella parapertussis PCR Not Detected Not Detected    Chlamydophila pneumoniae PCR Not Detected Not Detected    Mycoplasma pneumo by PCR Not Detected Not Detected   Protime-INR    Collection Time: 05/06/23  8:04 AM    Specimen: Blood   Result Value Ref Range    Protime 18.4 (H) 11.7 - 14.2 Seconds    INR 1.51 (H) 0.90 - 1.10   Blood Gas, Venous -    Collection Time: 05/06/23  9:29 AM    Specimen: Venous Blood   Result Value Ref Range    Site OTHER     pH, Venous 7.512 (H) 7.310 - 7.410 pH Units    pCO2, Venous 29.5 (L) 41.0 - 51.0 mm Hg    pO2, Venous 46.1 (H) 35.0 - 45.0 mm Hg    HCO3, Venous 23.7 22.0 - 28.0 mmol/L    Base Excess, Venous 1.6 -2.0 - 2.0 mmol/L    O2 Saturation Calculated 86.6 (L) 92.0 - 99.0 %    Barometric Pressure for Blood Gas 752.6 mmHg    Modality Cannula     Flow Rate 2 lpm    Rate 16 Breaths/minute   STAT Lactic Acid, Reflex    Collection Time: 05/06/23 11:09 AM    Specimen: Blood   Result Value Ref Range    Lactate 1.2 0.5 - 2.0 mmol/L   High Sensitivity Troponin T 2Hr    Collection Time: 05/06/23 11:09 AM    Specimen: Blood   Result Value Ref Range    HS Troponin T 44 (H) <15 ng/L    Troponin T Delta 16 (C) >=-4 - <+4 ng/L       Ordered the above labs and independently reviewed the results.        RADIOLOGY  XR Chest 1 View    Result Date: 5/6/2023  XR CHEST 1 VW-  HISTORY: Male who is 89 years-old,  short of breath  TECHNIQUE: Frontal view of the chest  COMPARISON: 04/25/2023  FINDINGS: The heart is enlarged. Left-sided pacemaker and cardiac leads are seen. Aorta is calcified. Pulmonary vasculature is unremarkable. Bibasilar opacities appear mildly increased. Persistent right pleural effusion appears similar to prior exam. No pneumothorax. No acute osseous process.       Increased bilateral basilar opacities. Persistent right pleural effusion.  This report was finalized on 5/6/2023 7:42 AM by Dr. Dejuan Potter M.D.      CT Angiogram Chest    Result Date: 5/6/2023  CT ANGIOGRAM CHEST-  INDICATIONS: Fever, cough  TECHNIQUE: Radiation dose reduction techniques were utilized, including automated exposure control and exposure modulation based on body size. CT angiography of the chest. Three-dimensional reconstructions.  COMPARISON: 08/02/2022  FINDINGS:  Small pulmonary embolic disease is seen in bilateral lower lobe segmental pulmonary branch arteries. RV LV ratio is about 1.2, that could be evidence of right heart strain.  The ascending aorta is dilated, 4.1 cm. Aortic lumen is not well characterized owing to phase of contrast enhancement.  The heart size is enlarged without pericardial effusion. Left-sided pacemaker and cardiac leads are noted. Moderate coronary arterial calcification is apparent. Mildly enlarged mediastinal lymph nodes are noted, for example right paratracheal 1.3 cm short axis, axial image 59; subcarinal, 1.4 cm. These lymph nodes could be reactive in nature or potentially evidence of neoplasm, clinical correlation and follow-up advised. The airways appear clear.  Mild bilateral pleural effusions are present. The lungs show bilateral consolidative opacities in the lower lungs, especially in the lower lobes, markedly worse in comparison with the prior exam, suggesting bilateral pneumonia, possibility of underlying neoplasm is not excluded, follow-up is advised. Mild bilateral pleural effusions are present.  Upper abdominal structures show no acute findings. A gallstone is apparent in the gallbladder.  Degenerative changes are seen in the spine. No acute fracture is identified.       1. Critical test result. Bilateral pulmonary embolic disease. RV LV ratio is 1.2. Dilated ascending aorta. 2. Extensive bilateral pulmonary consolidations. Mild bilateral pleural  effusions. Mediastinal adenopathy. Follow-up advised.  Discussed by telephone with Dr. French at 1407, 05/06/2023.  This report was finalized on 5/6/2023 2:10 PM by Dr. Dejuan Potter M.D.        I ordered the above noted radiological studies. Reviewed by me and discussed with radiologist.  See dictation for official radiology interpretation.      PROCEDURES    Critical Care  Performed by: Supa French MD  Authorized by: Supa French MD     Critical care provider statement:     Critical care time (minutes): 45.    Critical care time was exclusive of:  Separately billable procedures and treating other patients    Critical care was necessary to treat or prevent imminent or life-threatening deterioration of the following conditions:  Respiratory failure and sepsis    Critical care was time spent personally by me on the following activities:  Blood draw for specimens, development of treatment plan with patient or surrogate, discussions with consultants, evaluation of patient's response to treatment, examination of patient, obtaining history from patient or surrogate, review of old charts, re-evaluation of patient's condition, pulse oximetry, ordering and review of radiographic studies, ordering and review of laboratory studies and ordering and performing treatments and interventions    I assumed direction of critical care for this patient from another provider in my specialty: no      Care discussed with: admitting provider            MEDICATIONS GIVEN IN ER    Medications   sodium chloride 0.9 % flush 10 mL (has no administration in time range)   sodium chloride 0.9 % flush 10 mL (has no administration in time range)   sodium chloride 0.9 % infusion (125 mL/hr Intravenous New Bag 5/6/23 0805)   acetaminophen (TYLENOL) tablet 1,000 mg (1,000 mg Oral Given 5/6/23 0807)   cefTRIAXone (ROCEPHIN) 2 g in sodium chloride 0.9 % 100 mL IVPB-VTB (0 g Intravenous Stopped 5/6/23 1318)   iopamidol (ISOVUE-370) 76 %  injection 100 mL (85 mL Intravenous Given by Other 5/6/23 1348)   Enoxaparin Sodium (LOVENOX) syringe 60 mg (60 mg Subcutaneous Given 5/6/23 1508)         All labs have been independently reviewed by me.  All radiology studies have been reviewed by me and I discussed with radiologist dictating the report when indicated below.  All EKG's independently viewed and interpreted by me.  Discussion below represents my analysis of pertinent findings related to patient's condition, differential diagnosis, treatment plan and final disposition.        PROGRESS, DATA ANALYSIS, CONSULTS, AND MEDICAL DECISION MAKING    DDx includes CHF, acute coronary syndrome, pulmonary embolism, pneumothorax, pneumonia, asthma/COPD,aspiration,  pulmonary hypertension, metabolic acidosis, deconditioning, anemia, other hematologic etiologies such as CO poisoning, anxiety.     This is a gentleman very likely that has pneumonia.  Potentially could be continuation of the COVID-pneumonia or potentially could be related to a new bacterial pneumonia.  It is also important note this gentleman has chronic pulmonary disease on top of this pneumonia.  He has bronchiectasis and pulmonary fibrosis.  Informed patient and spouse of the test that we will order.  Informed that this gentleman will need to be admitted to the hospital.  All questions answered at this time.      ED Course as of 05/06/23 1511   Sat May 06, 2023   0806 My own independent interpretation of the EKG that was done at 7:22 AM revealed a rate of 70 it is atrial fibrillation with a normal ventricular rate has some mild intravelar conduction delay with normal axis.  Patient has LVH  There is diffuse nonspecific ST and T wave changes  QT looks okay  I compared this to the previous EKG and it looks fairly similar.  Previous EKG was on 9/3/2021. [MM]   1117 COVID19(!!): Detected [MM]   1117 Procalcitonin(!): 0.52 [MM]   1117 Lactate(!!): 2.5 [MM]   1117 WBC(!): 16.03 [MM]   1118 Patient has  increased bilateral basilar consolidations on my own independent interpretation of the chest x-ray.  Also has a right pleural effusion is persistent.  This is concerning for worsening bilateral pneumonia.  I did review the radiologist report port as well. [MM]   1306 Troponin T Delta(!!): 16 [MM]   1306 pH, Venous(!): 7.512 [MM]   1409 I discussed the results of the CT angiogram of the chest with the radiologist Dr. Potter.  Patient has bilateral segmental branches of pulmonary embolism on the right and left.  There is some increased right to left heart strain.  There is no saddle pulmonary emboli.  There is also bilateral pulmonary consolidation consistent of an inflammatory process in adenopathy. [MM]   1419 I talked with the patient as well as the spouse and informed the results of the CT scan and lab work.  He has worsening of his COVID infection and has developed new bilateral blood clots.  Informed him of the serious condition.  Currently his blood pressures normal.  He is on 2 L with an oxygen saturation of 96%.  All questions answered at this time. [MM]   1420 I did discuss the case with Dr. Bergeron.  Informed of the patient's presenting symptoms and results of test.  Agrees to admit the patient to the hospital.  All questions answered.  Informed him initial treatment we began here. [MM]      ED Course User Index  [MM] Supa French MD       AS OF 15:11 EDT VITALS:    BP - 103/49  HR - 60  TEMP - 98.3 °F (36.8 °C) (Oral)  02 SATS - 96%    SOCIAL DETERMINANTS OF HEALTH THAT IMPACT OR LIMIT CARE (For example..Homelessness,safe discharge, inability to obtain care, follow up, or prescriptions):      DIAGNOSIS  Final diagnoses:   Pneumonia of both lungs due to infectious organism, unspecified part of lung   COVID-19 virus infection   Troponin level elevated   Atrial fibrillation, unspecified type   Multiple subsegmental pulmonary emboli without acute cor pulmonale         DISPOSITION  I have reviewed the test  results with my patient and explained the current treatment plan.  I answered all of the patient's questions.  The patient will be admitted to monitor bed at this time.  The patient is not hypotensive and is tolerating their current disease condition well enough for a monitored bed at this time.  The patient's current condition does not require intensive care treatment at this time.            DICTATED UTILIZING DRAGON DICTATION    Note Disclaimer: At [facility], we believe that sharing information builds trust and better relationships. You are receiving this note because you recently visited [facility]. It is possible you will see health information before a provider has talked with you about it. This kind of information can be easy to misunderstand. To help you fully understand what it means for your health, we urge you to discuss this note with your provider.     Supa French MD  05/06/23 2106

## 2023-05-06 NOTE — ED TRIAGE NOTES
Pt presents to ED from home with complaints of increased SOA since last night. Pt recently discharged after admission for COVID pneumonia. Pt hypoxic upon arrival to triage. Wife states pt is so weak he is unable to walk.

## 2023-05-06 NOTE — H&P
History and physical    Primary care physician      Chief complaint  Shortness of breath    History of present illness  89-year-old male who is well-known to our service with history of atrial fibrillation on Eliquis hypertension hyperlipidemia aortic stenosis pulmonary fibrosis bronchiectasis pulmonary hypertension Crohn's disease and gastroesophageal for disease who was recently treated for COVID-19 pneumonia and discharged in stable condition presented to [facility] emergency room with worsening shortness of breath for last few days and developed fever chills this morning.  Patient also have productive cough which is also getting worse.  Patient denies any chest pain abdominal pain nausea vomiting diarrhea.  Patient work-up in ER revealed acute pulmonary emboli bilateral despite on Eliquis and also found to have bilateral pneumonia admit for management.  Patient is DNR per his wishes.    PAST MEDICAL HISTORY  • Aspiration pneumonia     • Bleeding from the nose     • Bronchiectasis     • CKD (chronic kidney disease)     • COPD (chronic obstructive pulmonary disease)     • Dizziness     • Elevated cholesterol     • Gastric ulcer     • Generalized weakness     • Hard of hearing     • Hypertension     • Leg swelling     • Lower extremity edema     • Mild anemia     • Near syncope     • Nonrheumatic aortic valve stenosis     • PAF (paroxysmal atrial fibrillation)     • Palpitations     • Peptic ulcer     • Pneumonia of left lung due to infectious organism     • Stroke        PAST SURGICAL HISTORY              Procedure Laterality Date   • BRONCHOSCOPY N/A 10/22/2018     Procedure: BRONCHOSCOPY WITH BRONCHALVEOLAR LAVAGE;  Surgeon: Chidi Oconnor MD;  Location: [facility] ENDOSCOPY;  Service: Pulmonary   • BRONCHOSCOPY N/A 7/8/2021     Procedure: BRONCHOSCOPY with BAL;  Surgeon: Chidi Oconnor MD;  Location: [facility] ENDOSCOPY;  Service: Pulmonary;  Laterality: N/A;  Pre: bronchectasis  Post: same  "  • CARDIAC ELECTROPHYSIOLOGY PROCEDURE N/A 7/9/2021     Procedure: Pacemaker DC new--[employer] possible His lead;  Surgeon: Rashaun Nettles MD;  Location: [facility] CATH INVASIVE LOCATION;  Service: Cardiology;  Laterality: N/A;   • CARDIAC ELECTROPHYSIOLOGY PROCEDURE N/A 9/3/2021     Procedure: AV node 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 ppm;  Surgeon: Rashaun Nettles MD;  Location:  [facility] CATH INVASIVE LOCATION;  Service: Cardiovascular;  Laterality: N/A;   • CATARACT EXTRACTION Bilateral     • COLONOSCOPY       • COLONOSCOPY N/A 3/9/2018     Procedure: COLONOSCOPY to terminal ileum with bx;  Surgeon: Aron Cabrera MD;  Location: [facility] ENDOSCOPY;  Service:    • COLONOSCOPY N/A 11/19/2020     Procedure: COLONOSCOPY to cecum:  apc to cecum angiodysplagia,;  Surgeon: Aron Cabrera MD;  Location: [facility] ENDOSCOPY;  Service: Gastroenterology;  Laterality: N/A;  GI bleed  post:  diverticulosis, angiodysplagia   • CYSTECTOMY         back and shoulder area   • ENDOSCOPY N/A 3/9/2018     Procedure: ESOPHAGOGASTRODUODENOSCOPY with bx;  Surgeon: Aron Cabrera MD;  Location: [facility] ENDOSCOPY;  Service:    • ENDOSCOPY N/A 11/19/2020     Procedure: ESOPHAGOGASTRODUODENOSCOPY;  Surgeon: Aron Cabrera MD;  Location: [facility] ENDOSCOPY;  Service: Gastroenterology;  Laterality: N/A;  GI bleed  post:  HH.   • EYE SURGERY Left       detached retina   • EYE SURGERY Right       \"repaired a hole in the eye\"   • INSERT / REPLACE / REMOVE PACEMAKER       • TESTICLE SURGERY         benign tumor removed.         FAMILY HISTORY           Problem Relation Age of Onset   • Stroke Mother     • Heart disease Mother     • Hypertension Mother     • Diabetes Mother     • Heart disease Sister           Heart Valve Replacement   • Ovarian cancer Sister     • Rheumatic fever Sister     • Diabetes Sister     • Stroke Father        SOCIAL HISTORY                 Socioeconomic History   • Marital status:    Tobacco Use   • Smoking status: " "Former       Types: Cigars       Quit date: 10/28/1994       Years since quittin.5   • Smokeless tobacco: Never   • Tobacco comments:       Quit 25 years ago   Vaping Use   • Vaping Use: Never used   Substance and Sexual Activity   • Alcohol use: No       Comment: daily caffiene - 1/2 cup of coffee   • Drug use: No   • Sexual activity: Defer         ALLERGIES  Amiodarone, Diltiazem, Doxycycline, Cefdinir, and Indomethacin  Home medications reviewed     REVIEW OF SYSTEMS  All systems reviewed and negative except for those discussed in HPI.      PHYSICAL EXAM   Blood pressure 136/61, pulse 60, temperature 97.7 °F (36.5 °C), temperature source Oral, resp. rate 18, height 172.7 cm (68\"), weight 61.7 kg (136 lb 0.4 oz), SpO2 96 %.    GENERAL: Elderly frail male appears weak and tired.  He is chronically weak appearing.    HEENT:  Unremarkable  NECK: Supple, no meningismus  CV: regular rhythm, regular rate with intact distal pulses.  Harsh systolic murmur 2 out of 6.  RESPIRATORY:  Normal effort and moving air bilaterally  ABDOMEN:  Soft nontender nondistended bowel sounds positive  MUSCULOSKELETAL: no deformity.  No edema to lower extremities.    NEURO: alert and appropriate, moves all extremities, follows commands.    SKIN: warm, dry     LAB RESULTS  Lab Results (last 24 hours)     Procedure Component Value Units Date/Time    High Sensitivity Troponin T 2Hr [[id number]]  (Abnormal) Collected: 23 1109    Specimen: Blood Updated: 23 1144     HS Troponin T 44 ng/L      Troponin T Delta 16 ng/L     Narrative:      High Sensitive Troponin T Reference Range:  <10.0 ng/L- Negative Female for AMI  <15.0 ng/L- Negative Male for AMI  >=10 - Abnormal Female indicating possible myocardial injury.  >=15 - Abnormal Male indicating possible myocardial injury.   Clinicians would have to utilize clinical acumen, EKG, Troponin, and serial changes to determine if it is an Acute Myocardial Infarction or myocardial injury " due to an underlying chronic condition.         STAT Lactic Acid, Reflex [[id number]]  (Normal) Collected: 05/06/23 1109    Specimen: Blood Updated: 05/06/23 1135     Lactate 1.2 mmol/L     Blood Gas, Venous - [[id number]]  (Abnormal) Collected: 05/06/23 0929    Specimen: Venous Blood Updated: 05/06/23 0932     Site OTHER     pH, Venous 7.512 pH Units      pCO2, Venous 29.5 mm Hg      pO2, Venous 46.1 mm Hg      HCO3, Venous 23.7 mmol/L      Base Excess, Venous 1.6 mmol/L      O2 Saturation Calculated 86.6 %      Comment: [id number] Meter: [id number] : windy Wood[place] Benton        Barometric Pressure for Blood Gas 752.6 mmHg      Modality Cannula     Flow Rate 2 lpm      Rate 16 Breaths/minute     Respiratory Panel PCR w/COVID-19(SARS-CoV-2) [facility] In-House, NP Swab in UTM/VTM, 3-4 HR TAT - Swab, Nasopharynx [[id number]]  (Abnormal) Collected: 05/06/23 0752    Specimen: Swab from Nasopharynx Updated: 05/06/23 0914     ADENOVIRUS, PCR Not Detected     Coronavirus 229E Not Detected     Coronavirus HKU1 Not Detected     Coronavirus NL63 Not Detected     Coronavirus OC43 Not Detected     COVID19 Detected     Human Metapneumovirus Not Detected     Human Rhinovirus/Enterovirus Not Detected     Influenza A PCR Not Detected     Influenza B PCR Not Detected     Parainfluenza Virus 1 Not Detected     Parainfluenza Virus 2 Not Detected     Parainfluenza Virus 3 Not Detected     Parainfluenza Virus 4 Not Detected     RSV, PCR Not Detected     Bordetella pertussis pcr Not Detected     Bordetella parapertussis PCR Not Detected     Chlamydophila pneumoniae PCR Not Detected     Mycoplasma pneumo by PCR Not Detected    Narrative:      In the setting of a positive respiratory panel with a viral infection PLUS a negative procalcitonin without other underlying concern for bacterial infection, consider observing off antibiotics or discontinuation of antibiotics and continue supportive care. If the respiratory  "panel is positive for atypical bacterial infection (Bordetella pertussis, Chlamydophila pneumoniae, or Mycoplasma pneumoniae), consider antibiotic de-escalation to target atypical bacterial infection.    Protime-INR [[id number]]  (Abnormal) Collected: 05/06/23 0804    Specimen: Blood Updated: 05/06/23 0832     Protime 18.4 Seconds      INR 1.51    High Sensitivity Troponin T [[id number]]  (Abnormal) Collected: 05/06/23 0748    Specimen: Blood from Arm, Right Updated: 05/06/23 0830     HS Troponin T 28 ng/L     Narrative:      High Sensitive Troponin T Reference Range:  <10.0 ng/L- Negative Female for AMI  <15.0 ng/L- Negative Male for AMI  >=10 - Abnormal Female indicating possible myocardial injury.  >=15 - Abnormal Male indicating possible myocardial injury.   Clinicians would have to utilize clinical acumen, EKG, Troponin, and serial changes to determine if it is an Acute Myocardial Infarction or myocardial injury due to an underlying chronic condition.         Procalcitonin [[id number]]  (Abnormal) Collected: 05/06/23 0748    Specimen: Blood from Arm, Right Updated: 05/06/23 0830     Procalcitonin 0.52 ng/mL     Narrative:      As a Marker for Sepsis (Non-Neonates):    1. <0.5 ng/mL represents a low risk of severe sepsis and/or septic shock.  2. >2 ng/mL represents a high risk of severe sepsis and/or septic shock.    As a Marker for Lower Respiratory Tract Infections that require antibiotic therapy:    PCT on Admission    Antibiotic Therapy       6-12 Hrs later    >0.5                Strongly Recommended  >0.25 - <0.5        Recommended   0.1 - 0.25          Discouraged              Remeasure/reassess PCT  <0.1                Strongly Discouraged     Remeasure/reassess PCT    As 28 day mortality risk marker: \"Change in Procalcitonin Result\" (>80% or <=80%) if Day 0 (or Day 1) and Day 4 values are available. Refer to http://[email or web address]-pct-calculator.com    Change in PCT <=80%  A decrease of PCT levels below or equal " to 80% defines a positive change in PCT test result representing a higher risk for 28-day all-cause mortality of patients diagnosed with severe sepsis for septic shock.    Change in PCT >80%  A decrease of PCT levels of more than 80% defines a negative change in PCT result representing a lower risk for 28-day all-cause mortality of patients diagnosed with severe sepsis or septic shock.       BNP [[id number]]  (Abnormal) Collected: 05/06/23 0748    Specimen: Blood from Arm, Right Updated: 05/06/23 0830     proBNP 5,361.0 pg/mL     Narrative:      Among patients with dyspnea, NT-proBNP is highly sensitive for the detection of acute congestive heart failure. In addition NT-proBNP of <300 pg/ml effectively rules out acute congestive heart failure with 99% negative predictive value.    Results may be falsely decreased if patient taking Biotin.      Lactic Acid, Plasma [[id number]]  (Abnormal) Collected: 05/06/23 0748    Specimen: Blood from Arm, Right Updated: 05/06/23 0825     Lactate 2.5 mmol/L     Comprehensive Metabolic Panel [[id number]]  (Abnormal) Collected: 05/06/23 0748    Specimen: Blood from Arm, Right Updated: 05/06/23 0824     Glucose 96 mg/dL      BUN 31 mg/dL      Creatinine 1.01 mg/dL      Sodium 135 mmol/L      Potassium 4.4 mmol/L      Chloride 101 mmol/L      CO2 24.3 mmol/L      Calcium 9.3 mg/dL      Total Protein 7.2 g/dL      Albumin 3.1 g/dL      ALT (SGPT) 22 U/L      AST (SGOT) 18 U/L      Alkaline Phosphatase 59 U/L      Total Bilirubin 1.5 mg/dL      Globulin 4.1 gm/dL      A/G Ratio 0.8 g/dL      BUN/Creatinine Ratio 30.7     Anion Gap 9.7 mmol/L      eGFR 70.6 mL/min/1.73     Narrative:      GFR Normal >60  Chronic Kidney Disease <60  Kidney Failure <15    The GFR formula is only valid for adults with stable renal function between ages 18 and 70.    Magnesium [[id number]]  (Normal) Collected: 05/06/23 0748    Specimen: Blood from Arm, Right Updated: 05/06/23 0824     Magnesium 1.7 mg/dL      Blood Culture - Blood, Arm, Left [[id number]] Collected: 05/06/23 0809    Specimen: Blood from Arm, Left Updated: 05/06/23 0813    CBC & Differential [[id number]]  (Abnormal) Collected: 05/06/23 0748    Specimen: Blood from Arm, Right Updated: 05/06/23 0803    Narrative:      The following orders were created for panel order CBC & Differential.  Procedure                               Abnormality         Status                     ---------                               -----------         ------                     CBC Auto Differential[[id number]]        Abnormal            Final result                 Please view results for these tests on the individual orders.    CBC Auto Differential [[id number]]  (Abnormal) Collected: 05/06/23 0748    Specimen: Blood from Arm, Right Updated: 05/06/23 0803     WBC 16.03 10*3/mm3      RBC 4.57 10*6/mm3      Hemoglobin 13.1 g/dL      Hematocrit 40.0 %      MCV 87.5 fL      MCH 28.7 pg      MCHC 32.8 g/dL      RDW 15.6 %      RDW-SD 49.4 fl      MPV 11.0 fL      Platelets 177 10*3/mm3      Neutrophil % 92.4 %      Lymphocyte % 1.9 %      Monocyte % 3.7 %      Eosinophil % 0.7 %      Basophil % 0.1 %      Immature Grans % 1.2 %      Neutrophils, Absolute 14.78 10*3/mm3      Lymphocytes, Absolute 0.31 10*3/mm3      Monocytes, Absolute 0.60 10*3/mm3      Eosinophils, Absolute 0.12 10*3/mm3      Basophils, Absolute 0.02 10*3/mm3      Immature Grans, Absolute 0.20 10*3/mm3      nRBC 0.0 /100 WBC     Blood Culture - Blood, Arm, Right [[id number]] Collected: 05/06/23 0748    Specimen: Blood from Arm, Right Updated: 05/06/23 0756        Imaging Results (Last 24 Hours)     Procedure Component Value Units Date/Time    CT Angiogram Chest [[id number]] Collected: 05/06/23 1402     Updated: 05/06/23 1413    Narrative:      CT ANGIOGRAM CHEST-     INDICATIONS: Fever, cough     TECHNIQUE: Radiation dose reduction techniques were utilized, including  automated exposure control and exposure modulation  based on body size.  CT angiography of the chest. Three-dimensional reconstructions.     COMPARISON: 08/02/2022      FINDINGS:     Small pulmonary embolic disease is seen in bilateral lower lobe  segmental pulmonary branch arteries. RV LV ratio is about 1.2, that  could be evidence of right heart strain.     The ascending aorta is dilated, 4.1 cm. Aortic lumen is not well  characterized owing to phase of contrast enhancement.     The heart size is enlarged without pericardial effusion. Left-sided  pacemaker and cardiac leads are noted. Moderate coronary arterial  calcification is apparent. Mildly enlarged mediastinal lymph nodes are  noted, for example right paratracheal 1.3 cm short axis, axial image 59;  subcarinal, 1.4 cm. These lymph nodes could be reactive in nature or  potentially evidence of neoplasm, clinical correlation and follow-up  advised. The airways appear clear.     Mild bilateral pleural effusions are present. The lungs show bilateral  consolidative opacities in the lower lungs, especially in the lower  lobes, markedly worse in comparison with the prior exam, suggesting  bilateral pneumonia, possibility of underlying neoplasm is not excluded,  follow-up is advised. Mild bilateral pleural effusions are present.     Upper abdominal structures show no acute findings. A gallstone is  apparent in the gallbladder.     Degenerative changes are seen in the spine. No acute fracture is  identified.       Impression:         1. Critical test result. Bilateral pulmonary embolic disease. RV LV  ratio is 1.2. Dilated ascending aorta.  2. Extensive bilateral pulmonary consolidations. Mild bilateral pleural  effusions. Mediastinal adenopathy. Follow-up advised.     Discussed by telephone with Dr. French at 1407, 05/06/2023.     This report was finalized on 5/6/2023 2:10 PM by Dr. Dejuan Potter M.D.       XR Chest 1 View [[id number]] Collected: 05/06/23 0740     Updated: 05/06/23 0745    Narrative:      XR  CHEST 1 VW-     HISTORY: Male who is 89 years-old,  short of breath     TECHNIQUE: Frontal view of the chest     COMPARISON: 04/25/2023     FINDINGS: The heart is enlarged. Left-sided pacemaker and cardiac leads  are seen. Aorta is calcified. Pulmonary vasculature is unremarkable.  Bibasilar opacities appear mildly increased. Persistent right pleural  effusion appears similar to prior exam. No pneumothorax. No acute  osseous process.       Impression:      Increased bilateral basilar opacities. Persistent right  pleural effusion.     This report was finalized on 5/6/2023 7:42 AM by Dr. Dejuan Potter M.D.           ECG 12 Lead             Component  Ref Range & Units 07:22  (5/6/23) 1 yr ago  (9/3/21) 1 yr ago  (9/3/21) 1 yr ago  (7/11/21) 1 yr ago  (7/10/21) 1 yr ago  (7/9/21) 1 yr ago  (7/8/21)   QT Interval  ms 383 P  405  414  332  397  390  392    Resulting Agency  ECG BH ECG BH ECG BH ECG  ECG  ECG BH ECG             HEART RATE= 70  bpm  RR Interval= 857  ms  OR Interval=   ms  P Horizontal Axis=   deg  P Front Axis=   deg  QRSD Interval= 94  ms  QT Interval= 383  ms  QRS Axis= -6  deg  T Wave Axis= 99  deg  - ABNORMAL ECG -  Atrial fibrillation  Abnormal R-wave progression, early transition  Probable LVH with secondary repol abnrm  Baseline wander in lead(s) V2               Current Facility-Administered Medications:   •  allopurinol (ZYLOPRIM) tablet 100 mg, 100 mg, Oral, BID, Calvin Bergeron MD  •  [START ON 5/7/2023] atorvastatin (LIPITOR) tablet 10 mg, 10 mg, Oral, Every Other Day, Calvin Bergeron MD  •  Budesonide (ENTOCORT EC) 24 hr capsule 6 mg, 6 mg, Oral, Daily, Calvin Bergeron MD  •  [START ON 5/7/2023] cefTRIAXone (ROCEPHIN) 1 g in sodium chloride 0.9 % 100 mL IVPB-VTB, 1 g, Intravenous, Q24H, Calvin Bergeron MD  •  [START ON 5/7/2023] Enoxaparin Sodium (LOVENOX) syringe 60 mg, 1 mg/kg, Subcutaneous, Q12H, Calvin Bergeron MD  •  ipratropium-albuterol (DUO-NEB) nebulizer solution 3 mL, 3 mL,  Nebulization, Q4H - RT, Shelbie Bergeron MD  •  metoprolol tartrate (LOPRESSOR) tablet 25 mg, 25 mg, Oral, Q12H, Shelbie Bergeron MD  •  [START ON 5/7/2023] pantoprazole (PROTONIX) EC tablet 40 mg, 40 mg, Oral, Q AM, Shelbie Bergeron MD  •  Pharmacy to Dose enoxaparin (LOVENOX), , Does not apply, Continuous PRN, Shelbie Bergeron MD  •  [COMPLETED] Insert Peripheral IV, , , Once **AND** sodium chloride 0.9 % flush 10 mL, 10 mL, Intravenous, PRN, Monty Pena PA  •  [COMPLETED] Insert Peripheral IV, , , Once **AND** sodium chloride 0.9 % flush 10 mL, 10 mL, Intravenous, PRN, Supa French MD  •  sodium chloride 0.9 % infusion, 125 mL/hr, Intravenous, Continuous, Supa French MD, Last Rate: 125 mL/hr at 05/06/23 0805, 125 mL/hr at 05/06/23 0805     ASSESSMENT  Acute hypoxic respiratory failure  Bilateral pulmonary embolism  Bilateral pneumonia  Persistent COVID-19 infection with pneumonia  Hypertension  Hyperlipidemia  Aortic stenosis  Pulmonary fibrosis  Bronchiectasis  Pulm hypertension  Crohn's disease  Gastroesophageal reflux disease    PLAN  Admit  Supplemental oxygen nebulizer  Lovenox and discontinue Eliquis  IV antibiotics  Pulmonary consult  Infectious disease to follow patient  Check lower EXTR Doppler study  Adjust home medications  Stress ulcer DVT prophylaxis  Supportive care  PT OT  DNR  Discussed with wife and nursing staff  Follow closely further recommendation current hospital course    SHELBIE BERGERON MD

## 2023-05-07 ENCOUNTER — APPOINTMENT (OUTPATIENT)
Dept: CARDIOLOGY | Facility: HOSPITAL | Age: 88
DRG: 871 | End: 2023-05-07
Payer: MEDICARE

## 2023-05-07 LAB
ALBUMIN SERPL-MCNC: 2.8 G/DL (ref 3.5–5.2)
ALBUMIN/GLOB SERPL: 0.8 G/DL
ALP SERPL-CCNC: 54 U/L (ref 39–117)
ALT SERPL W P-5'-P-CCNC: 18 U/L (ref 1–41)
ANION GAP SERPL CALCULATED.3IONS-SCNC: 10.3 MMOL/L (ref 5–15)
AST SERPL-CCNC: 16 U/L (ref 1–40)
BASOPHILS # BLD AUTO: 0.01 10*3/MM3 (ref 0–0.2)
BASOPHILS NFR BLD AUTO: 0.1 % (ref 0–1.5)
BH CV LOW VAS LEFT PERONEAL VESSEL: 1
BH CV LOWER VASCULAR LEFT COMMON FEMORAL AUGMENT: NORMAL
BH CV LOWER VASCULAR LEFT COMMON FEMORAL COMPETENT: NORMAL
BH CV LOWER VASCULAR LEFT COMMON FEMORAL COMPRESS: NORMAL
BH CV LOWER VASCULAR LEFT COMMON FEMORAL PHASIC: NORMAL
BH CV LOWER VASCULAR LEFT COMMON FEMORAL SPONT: NORMAL
BH CV LOWER VASCULAR LEFT DISTAL FEMORAL COMPRESS: NORMAL
BH CV LOWER VASCULAR LEFT GASTRONEMIUS COMPRESS: NORMAL
BH CV LOWER VASCULAR LEFT GREATER SAPH AK COMPRESS: NORMAL
BH CV LOWER VASCULAR LEFT GREATER SAPH BK COMPRESS: NORMAL
BH CV LOWER VASCULAR LEFT LESSER SAPH COMPRESS: NORMAL
BH CV LOWER VASCULAR LEFT MID FEMORAL AUGMENT: NORMAL
BH CV LOWER VASCULAR LEFT MID FEMORAL COMPETENT: NORMAL
BH CV LOWER VASCULAR LEFT MID FEMORAL COMPRESS: NORMAL
BH CV LOWER VASCULAR LEFT MID FEMORAL PHASIC: NORMAL
BH CV LOWER VASCULAR LEFT MID FEMORAL SPONT: NORMAL
BH CV LOWER VASCULAR LEFT PERONEAL COMPRESS: NORMAL
BH CV LOWER VASCULAR LEFT PERONEAL THROMBUS: NORMAL
BH CV LOWER VASCULAR LEFT POPLITEAL AUGMENT: NORMAL
BH CV LOWER VASCULAR LEFT POPLITEAL COMPETENT: NORMAL
BH CV LOWER VASCULAR LEFT POPLITEAL COMPRESS: NORMAL
BH CV LOWER VASCULAR LEFT POPLITEAL PHASIC: NORMAL
BH CV LOWER VASCULAR LEFT POPLITEAL SPONT: NORMAL
BH CV LOWER VASCULAR LEFT POSTERIOR TIBIAL COMPRESS: NORMAL
BH CV LOWER VASCULAR LEFT PROFUNDA FEMORAL COMPRESS: NORMAL
BH CV LOWER VASCULAR LEFT PROXIMAL FEMORAL COMPRESS: NORMAL
BH CV LOWER VASCULAR LEFT SAPHENOFEMORAL JUNCTION COMPRESS: NORMAL
BH CV LOWER VASCULAR RIGHT COMMON FEMORAL AUGMENT: NORMAL
BH CV LOWER VASCULAR RIGHT COMMON FEMORAL COMPETENT: NORMAL
BH CV LOWER VASCULAR RIGHT COMMON FEMORAL COMPRESS: NORMAL
BH CV LOWER VASCULAR RIGHT COMMON FEMORAL PHASIC: NORMAL
BH CV LOWER VASCULAR RIGHT COMMON FEMORAL SPONT: NORMAL
BH CV LOWER VASCULAR RIGHT DISTAL FEMORAL COMPRESS: NORMAL
BH CV LOWER VASCULAR RIGHT GASTRONEMIUS COMPRESS: NORMAL
BH CV LOWER VASCULAR RIGHT GREATER SAPH AK COMPRESS: NORMAL
BH CV LOWER VASCULAR RIGHT GREATER SAPH BK COMPRESS: NORMAL
BH CV LOWER VASCULAR RIGHT LESSER SAPH COMPRESS: NORMAL
BH CV LOWER VASCULAR RIGHT MID FEMORAL AUGMENT: NORMAL
BH CV LOWER VASCULAR RIGHT MID FEMORAL COMPETENT: NORMAL
BH CV LOWER VASCULAR RIGHT MID FEMORAL COMPRESS: NORMAL
BH CV LOWER VASCULAR RIGHT MID FEMORAL PHASIC: NORMAL
BH CV LOWER VASCULAR RIGHT MID FEMORAL SPONT: NORMAL
BH CV LOWER VASCULAR RIGHT PERONEAL COMPRESS: NORMAL
BH CV LOWER VASCULAR RIGHT POPLITEAL AUGMENT: NORMAL
BH CV LOWER VASCULAR RIGHT POPLITEAL COMPETENT: NORMAL
BH CV LOWER VASCULAR RIGHT POPLITEAL COMPRESS: NORMAL
BH CV LOWER VASCULAR RIGHT POPLITEAL PHASIC: NORMAL
BH CV LOWER VASCULAR RIGHT POPLITEAL SPONT: NORMAL
BH CV LOWER VASCULAR RIGHT POSTERIOR TIBIAL COMPRESS: NORMAL
BH CV LOWER VASCULAR RIGHT PROFUNDA FEMORAL COMPRESS: NORMAL
BH CV LOWER VASCULAR RIGHT PROXIMAL FEMORAL COMPRESS: NORMAL
BH CV LOWER VASCULAR RIGHT SAPHENOFEMORAL JUNCTION COMPRESS: NORMAL
BH CV VAS PRELIMINARY FINDINGS SCRIPTING: 1
BILIRUB SERPL-MCNC: 0.9 MG/DL (ref 0–1.2)
BUN SERPL-MCNC: 25 MG/DL (ref 8–23)
BUN/CREAT SERPL: 25.3 (ref 7–25)
CALCIUM SPEC-SCNC: 8.7 MG/DL (ref 8.2–9.6)
CHLORIDE SERPL-SCNC: 105 MMOL/L (ref 98–107)
CHOLEST SERPL-MCNC: 92 MG/DL (ref 0–200)
CO2 SERPL-SCNC: 22.7 MMOL/L (ref 22–29)
CREAT SERPL-MCNC: 0.99 MG/DL (ref 0.76–1.27)
DEPRECATED RDW RBC AUTO: 51.3 FL (ref 37–54)
EGFRCR SERPLBLD CKD-EPI 2021: 72.4 ML/MIN/1.73
EOSINOPHIL # BLD AUTO: 0.14 10*3/MM3 (ref 0–0.4)
EOSINOPHIL NFR BLD AUTO: 1.4 % (ref 0.3–6.2)
ERYTHROCYTE [DISTWIDTH] IN BLOOD BY AUTOMATED COUNT: 15.7 % (ref 12.3–15.4)
GLOBULIN UR ELPH-MCNC: 3.6 GM/DL
GLUCOSE SERPL-MCNC: 98 MG/DL (ref 65–99)
HBA1C MFR BLD: 5.8 % (ref 4.8–5.6)
HCT VFR BLD AUTO: 35.2 % (ref 37.5–51)
HDLC SERPL-MCNC: 42 MG/DL (ref 40–60)
HGB BLD-MCNC: 11.6 G/DL (ref 13–17.7)
IMM GRANULOCYTES # BLD AUTO: 0.14 10*3/MM3 (ref 0–0.05)
IMM GRANULOCYTES NFR BLD AUTO: 1.4 % (ref 0–0.5)
LDLC SERPL CALC-MCNC: 34 MG/DL (ref 0–100)
LDLC/HDLC SERPL: 0.81 {RATIO}
LYMPHOCYTES # BLD AUTO: 0.51 10*3/MM3 (ref 0.7–3.1)
LYMPHOCYTES NFR BLD AUTO: 5.2 % (ref 19.6–45.3)
MAXIMAL PREDICTED HEART RATE: 130 BPM
MCH RBC QN AUTO: 29.4 PG (ref 26.6–33)
MCHC RBC AUTO-ENTMCNC: 33 G/DL (ref 31.5–35.7)
MCV RBC AUTO: 89.3 FL (ref 79–97)
MONOCYTES # BLD AUTO: 0.61 10*3/MM3 (ref 0.1–0.9)
MONOCYTES NFR BLD AUTO: 6.2 % (ref 5–12)
NEUTROPHILS NFR BLD AUTO: 8.43 10*3/MM3 (ref 1.7–7)
NEUTROPHILS NFR BLD AUTO: 85.7 % (ref 42.7–76)
NRBC BLD AUTO-RTO: 0 /100 WBC (ref 0–0.2)
PLATELET # BLD AUTO: 147 10*3/MM3 (ref 140–450)
PMV BLD AUTO: 11.6 FL (ref 6–12)
POTASSIUM SERPL-SCNC: 4 MMOL/L (ref 3.5–5.2)
PROT SERPL-MCNC: 6.4 G/DL (ref 6–8.5)
RBC # BLD AUTO: 3.94 10*6/MM3 (ref 4.14–5.8)
S PNEUM AG SPEC QL LA: NEGATIVE
SODIUM SERPL-SCNC: 138 MMOL/L (ref 136–145)
STRESS TARGET HR: 111 BPM
TRIGL SERPL-MCNC: 80 MG/DL (ref 0–150)
TSH SERPL DL<=0.05 MIU/L-ACNC: 2.02 UIU/ML (ref 0.27–4.2)
VLDLC SERPL-MCNC: 16 MG/DL (ref 5–40)
WBC NRBC COR # BLD: 9.84 10*3/MM3 (ref 3.4–10.8)

## 2023-05-07 PROCEDURE — 25010000002 ENOXAPARIN PER 10 MG: Performed by: HOSPITALIST

## 2023-05-07 PROCEDURE — 80053 COMPREHEN METABOLIC PANEL: CPT | Performed by: HOSPITALIST

## 2023-05-07 PROCEDURE — 94664 DEMO&/EVAL PT USE INHALER: CPT

## 2023-05-07 PROCEDURE — 93970 EXTREMITY STUDY: CPT

## 2023-05-07 PROCEDURE — 84443 ASSAY THYROID STIM HORMONE: CPT | Performed by: HOSPITALIST

## 2023-05-07 PROCEDURE — 94799 UNLISTED PULMONARY SVC/PX: CPT

## 2023-05-07 PROCEDURE — 94761 N-INVAS EAR/PLS OXIMETRY MLT: CPT

## 2023-05-07 PROCEDURE — 80061 LIPID PANEL: CPT | Performed by: HOSPITALIST

## 2023-05-07 PROCEDURE — 87899 AGENT NOS ASSAY W/OPTIC: CPT | Performed by: INTERNAL MEDICINE

## 2023-05-07 PROCEDURE — 83036 HEMOGLOBIN GLYCOSYLATED A1C: CPT | Performed by: HOSPITALIST

## 2023-05-07 PROCEDURE — 25010000002 AZITHROMYCIN PER 500 MG: Performed by: HOSPITALIST

## 2023-05-07 PROCEDURE — 25010000002 CEFTRIAXONE PER 250 MG: Performed by: HOSPITALIST

## 2023-05-07 PROCEDURE — 85025 COMPLETE CBC W/AUTO DIFF WBC: CPT | Performed by: HOSPITALIST

## 2023-05-07 RX ADMIN — ENOXAPARIN SODIUM 60 MG: 100 INJECTION SUBCUTANEOUS at 02:35

## 2023-05-07 RX ADMIN — METOPROLOL TARTRATE 12.5 MG: 25 TABLET, FILM COATED ORAL at 21:14

## 2023-05-07 RX ADMIN — IPRATROPIUM BROMIDE AND ALBUTEROL SULFATE 3 ML: 2.5; .5 SOLUTION RESPIRATORY (INHALATION) at 15:25

## 2023-05-07 RX ADMIN — IPRATROPIUM BROMIDE AND ALBUTEROL SULFATE 3 ML: 2.5; .5 SOLUTION RESPIRATORY (INHALATION) at 11:20

## 2023-05-07 RX ADMIN — PANTOPRAZOLE SODIUM 40 MG: 40 TABLET, DELAYED RELEASE ORAL at 16:59

## 2023-05-07 RX ADMIN — AZITHROMYCIN MONOHYDRATE 500 MG: 500 INJECTION, POWDER, LYOPHILIZED, FOR SOLUTION INTRAVENOUS at 17:00

## 2023-05-07 RX ADMIN — PANTOPRAZOLE SODIUM 40 MG: 40 TABLET, DELAYED RELEASE ORAL at 09:00

## 2023-05-07 RX ADMIN — ALLOPURINOL 100 MG: 100 TABLET ORAL at 21:14

## 2023-05-07 RX ADMIN — CEFTRIAXONE SODIUM 1 G: 1 INJECTION, POWDER, FOR SOLUTION INTRAMUSCULAR; INTRAVENOUS at 09:01

## 2023-05-07 RX ADMIN — ALLOPURINOL 100 MG: 100 TABLET ORAL at 09:00

## 2023-05-07 RX ADMIN — IPRATROPIUM BROMIDE AND ALBUTEROL SULFATE 3 ML: 2.5; .5 SOLUTION RESPIRATORY (INHALATION) at 07:24

## 2023-05-07 RX ADMIN — IPRATROPIUM BROMIDE AND ALBUTEROL SULFATE 3 ML: 2.5; .5 SOLUTION RESPIRATORY (INHALATION) at 20:04

## 2023-05-07 RX ADMIN — GUAIFENESIN 600 MG: 600 TABLET, EXTENDED RELEASE ORAL at 09:00

## 2023-05-07 RX ADMIN — ATORVASTATIN CALCIUM 10 MG: 20 TABLET, FILM COATED ORAL at 09:00

## 2023-05-07 RX ADMIN — BUDESONIDE 6 MG: 3 CAPSULE ORAL at 09:00

## 2023-05-07 RX ADMIN — APIXABAN 5 MG: 5 TABLET, FILM COATED ORAL at 16:59

## 2023-05-07 RX ADMIN — GUAIFENESIN 600 MG: 600 TABLET, EXTENDED RELEASE ORAL at 21:15

## 2023-05-07 RX ADMIN — METOPROLOL TARTRATE 12.5 MG: 25 TABLET, FILM COATED ORAL at 09:00

## 2023-05-07 NOTE — PLAN OF CARE
Goal Outcome Evaluation: Pt resting in bed with no signs of distress noted at this time. Pt on RA at this time. VS stable. Bed in lowest position with siderails up X2. Call light within reach. RN will continue to monitor.

## 2023-05-07 NOTE — PROGRESS NOTES
"                                              LOS: 1 day   Patient Care Team:  Lubna Lobo MD as PCP - General  Lubna Lobo MD as PCP - Family Medicine    Chief Complaint:  F/up pneumonia and PE    Subjective   Interval History  He was on 2 L oxygen but he was weaned off.  He has intermittent cough, sometimes difficult to expectorate.  No chest pain.  No dyspnea at rest.    REVIEW OF SYSTEMS:       GASTROINTESTINAL: No anorexia, nausea, vomiting or diarrhea. No abdominal pain.  CONSTITUTIONAL: No fever or chills.     Ventilator/Non-Invasive Ventilation Settings (From admission, onward)    None                Physical Exam:     Vital Signs  Temp:  [97.3 °F (36.3 °C)-98.8 °F (37.1 °C)] 98.8 °F (37.1 °C)  Heart Rate:  [59-66] 66  Resp:  [16-18] 18  BP: ()/(41-61) 129/56    Intake/Output Summary (Last 24 hours) at 5/7/2023 1129  Last data filed at 5/7/2023 0859  Gross per 24 hour   Intake 340 ml   Output 200 ml   Net 140 ml     Flowsheet Rows    Flowsheet Row First Filed Value   Admission Height 172.7 cm (68\") Documented at 05/06/2023 0712   Admission Weight 61.7 kg (136 lb) Documented at 05/06/2023 0712          PPE used per hospital policy    General Appearance:   Alert, cooperative, in no acute distress   ENMT:  Mallampati score 3, moist mucous membrane   Eyes:  Pupils equal and reactive to light. EOMI   Neck:   Large. Trachea midline. No thyromegaly.   Lungs:    Mild crackles at the bases.  No wheezing.  No labored breathing    Heart:   Regular rhythm and normal rate, normal S1 and S2, no         murmur   Skin:   No rash or ecchymosis   Abdomen:    Obese. Soft. No tenderness. No HSM.   Neuro/psych:  Conscious, alert, oriented x3. Strength 5/5 in upper and lower  ext.  Appropriate mood and affect   Extremities:  No cyanosis, clubbing or edema.  Warm extremities and well-perfused          Results Review:        Results from last 7 days   Lab Units 05/07/23  0650 05/06/23  0748   SODIUM mmol/L 138 135* "   POTASSIUM mmol/L 4.0 4.4   CHLORIDE mmol/L 105 101   CO2 mmol/L 22.7 24.3   BUN mg/dL 25* 31*   CREATININE mg/dL 0.99 1.01   GLUCOSE mg/dL 98 96   CALCIUM mg/dL 8.7 9.3     Results from last 7 days   Lab Units 05/06/23  1109 05/06/23  0748   HSTROP T ng/L 44* 28*     Results from last 7 days   Lab Units 05/07/23  0650 05/06/23  0748   WBC 10*3/mm3 9.84 16.03*   HEMOGLOBIN g/dL 11.6* 13.1   HEMATOCRIT % 35.2* 40.0   PLATELETS 10*3/mm3 147 177     Results from last 7 days   Lab Units 05/06/23  0804   INR  1.51*     Results from last 7 days   Lab Units 05/06/23  0748   PROBNP pg/mL 5,361.0*                   Results from last 7 days   Lab Units 05/06/23  0929   FLOW RATE lpm 2   MODALITY  Cannula   O2 SATURATION CALC % 86.6*         I reviewed the patient's new clinical results.        Medication Review:   allopurinol, 100 mg, Oral, BID  atorvastatin, 10 mg, Oral, Every Other Day  azithromycin, 500 mg, Intravenous, Q24H  Budesonide, 6 mg, Oral, Daily  cefTRIAXone, 1 g, Intravenous, Q24H  enoxaparin, 1 mg/kg, Subcutaneous, Q12H  guaiFENesin, 600 mg, Oral, Q12H  ipratropium-albuterol, 3 mL, Nebulization, Q4H - RT  metoprolol tartrate, 12.5 mg, Oral, Q12H  pantoprazole, 40 mg, Oral, BID AC        sodium chloride, 75 mL/hr, Last Rate: 75 mL/hr (05/06/23 1745)          Assessment     1. Acute bilateral small subsegmental PE  2. Acute left calf DVT  3. Trace/mild bilateral pleural effusion  4. Bilateral lower lobe consolidation  5. Leukocytosis with left shift and borderline procalcitonin suggestive of bacterial pneumonia  6. COPD, no exacerbation      Plan     · Lovenox 1 bindu/KG twice daily.  Can be transitioned to Eliquis 5 mg twice daily.  He was previously on low-dose Eliquis 2.5 mg twice daily for A-fib.  · Rocephin.  Strep pneumo negative.  Sputum culture ordered.  · DuoNeb 4 times a day.  Start flutter to improve mucus clearance.  · Oxygen by NC and titrate keep SPO2 >90%.  Patient was titrated off oxygen today  while resting in bed    Can probably be discharged home soon.      Steven Donohue MD  05/07/23  11:29 EDT            This note was dictated utilizing Dragon dictation

## 2023-05-07 NOTE — OUTREACH NOTE
Medical Week 1 Survey    Flowsheet Row Responses   [facility] patient discharged from? [place]   Does the patient have one of the following disease processes/diagnoses(primary or secondary)? Other   Week 1 attempt successful? No   Unsuccessful attempts Attempt 2   Revoke Readmitted          Juliet SALGADO - Registered Nurse

## 2023-05-07 NOTE — CONSULTS
CONSULT NOTE    Infectious Diseases - Rolly Terrell MD  [facility]       Patient Identification:  Name: Asia Ly  Age: 89 y.o.  Sex: male  :  1932  MRN: [id number]             Date of Consultation: 2023      Primary Care Physician: Lubna Lobo MD                               Requesting Physician: Dr. Duran  Reason for Consultation: Persistent COVID-19 infection with pneumonia    Impression: 89-year-old male who was recently hospitalized with acute COVID-19 infection with hypoxia and was treated with combination of remdesivir and dexamethasone and was eventually discharged on 2023 after being in the hospital for 4 days and having received 5 days of remdesivir with resolution of hypoxia.  Patient had significant decline in his functional capacity despite improvement in his respiratory status and despite the fact that he was discharged on Eliquis and the fact that he was taking his Eliquis regularly presented to the emergency room from home with increasing shortness of breath starting last night.  Evaluation in the emergency room revealed respiratory viral panel positive for COVID-19 along with patient exhibiting hypoxia.  CT scan of the chest PE protocol performed revealed acute pulmonary thromboembolism.  Patient has been started on anticoagulation therapy and this morning he is breathing better and more awake and interactive and does not appear to be in any acute distress.  Patient has been empirically started on ceftriaxone for suspected secondary bacterial pneumonia.  Patient denies any fever and chills.  Venous Doppler of the lower extremities ordered the result of which are pending.  Strep pneumo antigen is negative blood cultures are negative his lactate level is 2.5 and his procalcitonin 0.52.  CT scan of the chest shows bilateral consolidation and pleural effusion along with BNP of 5361.  This presentation in the above context is consistent with:  1-acute hypoxemic  respiratory failure with respiratory distress likely due to acute pulmonary thromboembolism due to hypercoagulable state as a result of recent immobility due to acute hospitalization and underlying COVID-19 infection  2-breakthrough hypercoagulable state despite being on anticoagulation therapy  3-possible exacerbation of congestive heart failure with bilateral lung infiltrate and hypoxia and elevated BNP  4-possible secondary bacterial pneumonia given elevated procalcitonin level in this situation  5-history of atrial fibrillation  6-history of COPD  7-chronic kidney disease  8-chronic anticoagulation therapy  9-elevated troponin.    Recommendations/Discussions:  At this juncture I agree with the care plan consisting of anticoagulation therapy, empiric Rocephin and follow-up on blood cultures and his oxygen requirement.  If his hemodynamics allow consider diuretics and repeat chest x-ray to see if the infiltrates are improving.  Continue current antibiotic therapy for 5 days and watch closely for side effects of antibiotic treatment such as nausea vomiting diarrhea rash worsening renal function and hepatic function occurrence of C. difficile infection and development of cytopenias.  Thank you Dr. Duran for letting me be the part of your patient care please see above impression and recommendations      History of Present Illness:   89-year-old male who was recently hospitalized with acute COVID-19 infection with hypoxia and was treated with combination of remdesivir and dexamethasone and was eventually discharged on 4/29/2023 after being in the hospital for 4 days and having received 5 days of remdesivir with resolution of hypoxia.  Patient had significant decline in his functional capacity despite improvement in his respiratory status and despite the fact that he was discharged on Eliquis and the fact that he was taking his Eliquis regularly presented to the emergency room from home with increasing shortness of  breath starting last night.  Evaluation in the emergency room revealed respiratory viral panel positive for COVID-19 along with patient exhibiting hypoxia.  CT scan of the chest PE protocol performed revealed acute pulmonary thromboembolism.  Patient has been started on anticoagulation therapy and this morning he is breathing better and more awake and interactive and does not appear to be in any acute distress.  Patient has been empirically started on ceftriaxone for suspected secondary bacterial pneumonia.  Patient denies any fever and chills.  Venous Doppler of the lower extremities ordered the result of which are pending.  Strep pneumo antigen is negative blood cultures are negative his lactate level is 2.5 and his procalcitonin 0.52.  CT scan of the chest shows bilateral consolidation and pleural effusion along with BNP of 5361.      Past Medical History:  Past Medical History:   Diagnosis Date   • Aortic stenosis    • Aspiration pneumonia    • Atrial fibrillation    • Bleeding from the nose    • Bronchiectasis    • CKD (chronic kidney disease)    • COPD (chronic obstructive pulmonary disease)    • Crohn's disease    • Dizziness    • Elevated cholesterol    • Gastric ulcer    • Generalized weakness    • Gout    • Gout    • Hard of hearing    • Hyperlipidemia    • Hypertension    • Leg swelling    • Lower extremity edema    • Mild anemia    • Near syncope    • Nonrheumatic aortic valve stenosis    • PAF (paroxysmal atrial fibrillation)    • Palpitations    • Peptic ulcer    • Pneumonia of left lung due to infectious organism    • Pulmonary fibrosis    • Stroke    • TIA (transient ischemic attack)      Past Surgical History:  Past Surgical History:   Procedure Laterality Date   • BRONCHOSCOPY N/A 10/22/2018    Procedure: BRONCHOSCOPY WITH BRONCHALVEOLAR LAVAGE;  Surgeon: Chidi Oconnor MD;  Location: [facility] ENDOSCOPY;  Service: Pulmonary   • BRONCHOSCOPY N/A 7/8/2021    Procedure: BRONCHOSCOPY with BAL;   "Surgeon: Chidi Oconnor MD;  Location: [facility] ENDOSCOPY;  Service: Pulmonary;  Laterality: N/A;  Pre: bronchectasis  Post: same   • CARDIAC ELECTROPHYSIOLOGY PROCEDURE N/A 7/9/2021    Procedure: Pacemaker DC new--[employer] possible His lead;  Surgeon: Rashaun Nettles MD;  Location: [facility] CATH INVASIVE LOCATION;  Service: Cardiology;  Laterality: N/A;   • CARDIAC ELECTROPHYSIOLOGY PROCEDURE N/A 9/3/2021    Procedure: AV node 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 ppm;  Surgeon: Rashaun Nettles MD;  Location: [facility] CATH INVASIVE LOCATION;  Service: Cardiovascular;  Laterality: N/A;   • CATARACT EXTRACTION Bilateral    • COLONOSCOPY     • COLONOSCOPY N/A 3/9/2018    Procedure: COLONOSCOPY to terminal ileum with bx;  Surgeon: Aron Cabrera MD;  Location: [facility] ENDOSCOPY;  Service:    • COLONOSCOPY N/A 11/19/2020    Procedure: COLONOSCOPY to cecum:  apc to cecum angiodysplagia,;  Surgeon: Aron Cabrera MD;  Location: [facility] ENDOSCOPY;  Service: Gastroenterology;  Laterality: N/A;  GI bleed  post:  diverticulosis, angiodysplagia   • CYSTECTOMY      back and shoulder area   • ENDOSCOPY N/A 3/9/2018    Procedure: ESOPHAGOGASTRODUODENOSCOPY with bx;  Surgeon: Aron Cabrera MD;  Location: [facility] ENDOSCOPY;  Service:    • ENDOSCOPY N/A 11/19/2020    Procedure: ESOPHAGOGASTRODUODENOSCOPY;  Surgeon: Aron Cabrera MD;  Location: [facility] ENDOSCOPY;  Service: Gastroenterology;  Laterality: N/A;  GI bleed  post:  HH.   • EYE SURGERY Left     detached retina   • EYE SURGERY Right     \"repaired a hole in the eye\"   • INSERT / REPLACE / REMOVE PACEMAKER     • TESTICLE SURGERY      benign tumor removed.      Home Meds:  Medications Prior to Admission   Medication Sig Dispense Refill Last Dose   • allopurinol (ZYLOPRIM) 100 MG tablet Take 1 tablet by mouth 2 (Two) Times a Day.      • apixaban (ELIQUIS) 2.5 MG tablet tablet Take 1 tablet by mouth Every 12 (Twelve) Hours. 180 tablet 3    • atorvastatin (LIPITOR) 10 MG tablet Take " 1 tablet by mouth Every Other Day. M-W-F      • BUDESONIDE PO 3 mg Daily. Take one tablet by mouth      • hydrocortisone 2.5 % ointment Apply  topically to the appropriate area as directed 2 (Two) Times a Day. to affected area      • IPRATROPIUM-ALBUTEROL IN Inhale 3 (Three) Times a Day.      • metoprolol tartrate (LOPRESSOR) 25 MG tablet Take 1 tablet by mouth Every 12 (Twelve) Hours for 30 days. 60 tablet 0    • Nutritional Supplements (BOOST 100 CALORIE SMART PO) Take  by mouth Daily.      • omeprazole (priLOSEC) 40 MG capsule Take 1 capsule by mouth Daily.        Current Meds:     Current Facility-Administered Medications:   •  allopurinol (ZYLOPRIM) tablet 100 mg, 100 mg, Oral, BID, Calvin Bergeron MD, 100 mg at 05/06/23 2037  •  atorvastatin (LIPITOR) tablet 10 mg, 10 mg, Oral, Every Other Day, Calvin Bergeron MD  •  azithromycin (ZITHROMAX) 500 mg in sodium chloride 0.9 % 250 mL IVPB-VTB, 500 mg, Intravenous, Q24H, Calvin Bergeron MD, 500 mg at 05/06/23 2101  •  Budesonide (ENTOCORT EC) 24 hr capsule 6 mg, 6 mg, Oral, Daily, Calvin Bergeron MD  •  cefTRIAXone (ROCEPHIN) 1 g in sodium chloride 0.9 % 100 mL IVPB-VTB, 1 g, Intravenous, Q24H, Calvin Bergeron MD  •  Enoxaparin Sodium (LOVENOX) syringe 60 mg, 1 mg/kg, Subcutaneous, Q12H, Calvin Bergeron MD, 60 mg at 05/07/23 0235  •  guaiFENesin (MUCINEX) 12 hr tablet 600 mg, 600 mg, Oral, Q12H, Calvin Bergeron MD, 600 mg at 05/06/23 2037  •  ipratropium-albuterol (DUO-NEB) nebulizer solution 3 mL, 3 mL, Nebulization, Q4H - RT, Calvin Bergeron MD, 3 mL at 05/07/23 0724  •  metoprolol tartrate (LOPRESSOR) tablet 12.5 mg, 12.5 mg, Oral, Q12H, Calvin Bergeron MD, 12.5 mg at 05/06/23 2037  •  pantoprazole (PROTONIX) EC tablet 40 mg, 40 mg, Oral, BID AC, Calvin Bergeron MD, 40 mg at 05/06/23 1745  •  [COMPLETED] Insert Peripheral IV, , , Once **AND** sodium chloride 0.9 % flush 10 mL, 10 mL, Intravenous, PRN, Monty Pena, PA  •  [COMPLETED] Insert Peripheral IV, , , Once **AND** sodium  "chloride 0.9 % flush 10 mL, 10 mL, Intravenous, PRN, Supa French MD  •  sodium chloride 0.9 % infusion, 75 mL/hr, Intravenous, Continuous, Calvin Bergeron MD, Last Rate: 75 mL/hr at 23, 75 mL/hr at 23  Allergies:  Allergies   Allergen Reactions   • Amiodarone Unknown (See Comments)     Pulmonary fibrosis   • Diltiazem Arrhythmia   • Doxycycline Rash   • Cefdinir Arrhythmia     A-FIB   • Indomethacin Rash     Social History:   Social History     Tobacco Use   • Smoking status: Former     Types: Cigars     Quit date: 10/28/1994     Years since quittin.5   • Smokeless tobacco: Never   • Tobacco comments:     Quit 25 years ago   Substance Use Topics   • Alcohol use: No     Comment: daily caffiene - 1/2 cup of coffee      Family History:  Family History   Problem Relation Age of Onset   • Stroke Mother    • Heart disease Mother    • Hypertension Mother    • Diabetes Mother    • Heart disease Sister         Heart Valve Replacement   • Ovarian cancer Sister    • Rheumatic fever Sister    • Diabetes Sister    • Stroke Father           Review of Systems  See history of present illness and past medical history.   As described in history presenting illness.    Vitals:   /56 (BP Location: Left arm, Patient Position: Lying)   Pulse 59   Temp 97.3 °F (36.3 °C) (Oral)   Resp 16   Ht 172.7 cm (68\")   Wt 61.7 kg (136 lb 0.4 oz)   SpO2 97%   BMI 20.68 kg/m²   I/O:     Intake/Output Summary (Last 24 hours) at 2023 0824  Last data filed at 2023 1509  Gross per 24 hour   Intake 100 ml   Output 200 ml   Net -100 ml     Exam:  Patient is examined using the personal protective equipment as per guidelines from infection control for this particular patient as enacted.  Hand washing was performed before and after patient interaction.  General Appearance:   Awake pleasant interactive male   Head:    Normocephalic, without obvious abnormality, atraumatic   Eyes:    PERRL, conjunctivae/corneas " clear, EOM's intact, both eyes   Ears:    Normal external ear canals, both ears   Nose:   Nares normal, septum midline, mucosa normal, no drainage    or sinus tenderness   Throat:   Lips, tongue, gums normal; oral mucosa pink and moist   Neck:   Supple, symmetrical, trachea midline, no adenopathy;     thyroid:  no enlargement/tenderness/nodules; no carotid    bruit or JVD   Back:     Symmetric, no curvature, ROM normal, no CVA tenderness   Lungs:     Clear to auscultation bilaterally, respirations unlabored   Chest Wall:    No tenderness or deformity    Heart:   Irregularly irregular   Abdomen:    Soft nontender   Extremities:   Extremities normal, atraumatic, no cyanosis or edema   Pulses:   Pulses palpable in all extremities; symmetric all extremities   Skin:  Ecchymotic changes in the lower extremities   Neurologic:  Pleasantly forgetful but grossly nonfocal examination.       Data Review:    I reviewed the patient's new clinical results.  Results from last 7 days   Lab Units 05/07/23  0650 05/06/23  0748   WBC 10*3/mm3 9.84 16.03*   HEMOGLOBIN g/dL 11.6* 13.1   PLATELETS 10*3/mm3 147 177     Results from last 7 days   Lab Units 05/07/23  0650 05/06/23  0748   SODIUM mmol/L 138 135*   POTASSIUM mmol/L 4.0 4.4   CHLORIDE mmol/L 105 101   CO2 mmol/L 22.7 24.3   BUN mg/dL 25* 31*   CREATININE mg/dL 0.99 1.01   CALCIUM mg/dL 8.7 9.3   GLUCOSE mg/dL 98 96     XR Chest 2 View    Result Date: 4/25/2023  There is an interstitial infiltrate at the base of the left lower lobe most consistent with interstitial pneumonia. Stable chronic consolidation at the base of the right lung with a chronic right pleural effusion is noted.  This report was finalized on 4/25/2023 8:53 PM by Dr. Rah López M.D.      XR Chest 1 View    Result Date: 5/6/2023  Increased bilateral basilar opacities. Persistent right pleural effusion.  This report was finalized on 5/6/2023 7:42 AM by Dr. Dejuan Potter M.D.      CT Angiogram  Chest    Result Date: 5/6/2023   1. Critical test result. Bilateral pulmonary embolic disease. RV LV ratio is 1.2. Dilated ascending aorta. 2. Extensive bilateral pulmonary consolidations. Mild bilateral pleural effusions. Mediastinal adenopathy. Follow-up advised.  Discussed by telephone with Dr. French at 1407, 05/06/2023.  This report was finalized on 5/6/2023 2:10 PM by Dr. Dejuan Potter M.D.      Microbiology Results (last 10 days)     Procedure Component Value - Date/Time    S. Pneumo Ag Urine or CSF - Urine, Urine, Clean Catch [[id number]]  (Normal) Collected: 05/07/23 0954    Lab Status: Final result Specimen: Urine, Clean Catch Updated: 05/07/23 1048     Strep Pneumo Ag Negative    Blood Culture - Blood, Arm, Left [[id number]]  (Normal) Collected: 05/06/23 0809    Lab Status: Preliminary result Specimen: Blood from Arm, Left Updated: 05/07/23 0815     Blood Culture No growth at 24 hours    Respiratory Panel PCR w/COVID-19(SARS-CoV-2) MADHU/JOY/MONIQUE/PAD/COR/MAD/ELBERT In-House, NP Swab in UTM/VTM, 3-4 HR TAT - Swab, Nasopharynx [[id number]]  (Abnormal) Collected: 05/06/23 0752    Lab Status: Final result Specimen: Swab from Nasopharynx Updated: 05/06/23 0914     ADENOVIRUS, PCR Not Detected     Coronavirus 229E Not Detected     Coronavirus HKU1 Not Detected     Coronavirus NL63 Not Detected     Coronavirus OC43 Not Detected     COVID19 Detected     Human Metapneumovirus Not Detected     Human Rhinovirus/Enterovirus Not Detected     Influenza A PCR Not Detected     Influenza B PCR Not Detected     Parainfluenza Virus 1 Not Detected     Parainfluenza Virus 2 Not Detected     Parainfluenza Virus 3 Not Detected     Parainfluenza Virus 4 Not Detected     RSV, PCR Not Detected     Bordetella pertussis pcr Not Detected     Bordetella parapertussis PCR Not Detected     Chlamydophila pneumoniae PCR Not Detected     Mycoplasma pneumo by PCR Not Detected    Narrative:      In the setting of a positive respiratory panel  with a viral infection PLUS a negative procalcitonin without other underlying concern for bacterial infection, consider observing off antibiotics or discontinuation of antibiotics and continue supportive care. If the respiratory panel is positive for atypical bacterial infection (Bordetella pertussis, Chlamydophila pneumoniae, or Mycoplasma pneumoniae), consider antibiotic de-escalation to target atypical bacterial infection.    Blood Culture - Blood, Arm, Right [[id number]]  (Normal) Collected: 05/06/23 0748    Lab Status: Preliminary result Specimen: Blood from Arm, Right Updated: 05/07/23 0800     Blood Culture No growth at 24 hours        ECG 12 Lead Dyspnea   Final Result   HEART RATE= 70  bpm   RR Interval= 857  ms   NE Interval=   ms   P Horizontal Axis=   deg   P Front Axis=   deg   QRSD Interval= 94  ms   QT Interval= 383  ms   QRS Axis= -6  deg   T Wave Axis= 99  deg   - ABNORMAL ECG -   Atrial fibrillation - new   Abnormal R-wave progression, early transition   Probable LVH with secondary repol abnrm   Electronically Signed By: Ruthann Lynn ([facility]) 06-May-2023 17:18:48   Date and Time of Study: 2023-05-06 07:22:30      SCANNED - TELEMETRY     Final Result      SCANNED - TELEMETRY     Final Result      SCANNED - TELEMETRY     Final Result      SCANNED - TELEMETRY     Final Result          Assessment:  Active Hospital Problems    Diagnosis  POA   • **Pneumonia of both lungs due to infectious organism, unspecified part of lung [J18.9]  Yes      Resolved Hospital Problems   No resolved problems to display.         Plan:  The above  Rolly Pedroza MD   5/7/2023  08:24 EDT    Parts of this note may be an electronic transcription/translation of spoken language to printed text using the Dragon dictation system.

## 2023-05-07 NOTE — PROGRESS NOTES
"Daily progress note    Primary care physician      Chief complaint  Doing better with no new complaints and denies any chest pain shortness of breath palpitation but remain weak.    History of present illness  89-year-old male who is well-known to our service with history of atrial fibrillation on Eliquis hypertension hyperlipidemia aortic stenosis pulmonary fibrosis bronchiectasis pulmonary hypertension Crohn's disease and gastroesophageal for disease who was recently treated for COVID-19 pneumonia and discharged in stable condition presented to [facility] emergency room with worsening shortness of breath for last few days and developed fever chills this morning.  Patient also have productive cough which is also getting worse.  Patient denies any chest pain abdominal pain nausea vomiting diarrhea.  Patient work-up in ER revealed acute pulmonary emboli bilateral despite on Eliquis and also found to have bilateral pneumonia admit for management.  Patient is DNR per his wishes.     REVIEW OF SYSTEMS  All systems reviewed and negative except for those discussed in HPI.      PHYSICAL EXAM   Blood pressure 112/55, pulse 59, temperature 98.5 °F (36.9 °C), temperature source Oral, resp. rate 18, height 172.7 cm (68\"), weight 61.7 kg (136 lb 0.4 oz), SpO2 92 %.    GENERAL: Elderly frail male appears weak and tired.  He is chronically weak appearing.    HEENT:  Unremarkable  NECK: Supple, no meningismus  CV: regular rhythm, regular rate with intact distal pulses.  Harsh systolic murmur 2 out of 6.  RESPIRATORY:  Normal effort and moving air bilaterally  ABDOMEN:  Soft nontender nondistended bowel sounds positive  MUSCULOSKELETAL: no deformity.  No edema to lower extremities.    NEURO: alert and appropriate, moves all extremities, follows commands.    SKIN: warm, dry     LAB RESULTS  Lab Results (last 24 hours)     Procedure Component Value Units Date/Time    S. Pneumo Ag Urine or CSF - Urine, Urine, " Clean Catch [[id number]]  (Normal) Collected: 05/07/23 0954    Specimen: Urine, Clean Catch Updated: 05/07/23 1048     Strep Pneumo Ag Negative    Blood Culture - Blood, Arm, Left [[id number]]  (Normal) Collected: 05/06/23 0809    Specimen: Blood from Arm, Left Updated: 05/07/23 0815     Blood Culture No growth at 24 hours    Blood Culture - Blood, Arm, Right [[id number]]  (Normal) Collected: 05/06/23 0748    Specimen: Blood from Arm, Right Updated: 05/07/23 0800     Blood Culture No growth at 24 hours    TSH [[id number]]  (Normal) Collected: 05/07/23 0650    Specimen: Blood Updated: 05/07/23 0748     TSH 2.020 uIU/mL     Lipid Panel [[id number]] Collected: 05/07/23 0650    Specimen: Blood Updated: 05/07/23 0742     Total Cholesterol 92 mg/dL      Triglycerides 80 mg/dL      HDL Cholesterol 42 mg/dL      LDL Cholesterol  34 mg/dL      VLDL Cholesterol 16 mg/dL      LDL/HDL Ratio 0.81    Narrative:      Cholesterol Reference Ranges  (U.S. Department of Health and Human Services ATP III Classifications)    Desirable          <200 mg/dL  Borderline High    200-239 mg/dL  High Risk          >240 mg/dL      Triglyceride Reference Ranges  (U.S. Department of Health and Human Services ATP III Classifications)    Normal           <150 mg/dL  Borderline High  150-199 mg/dL  High             200-499 mg/dL  Very High        >500 mg/dL    HDL Reference Ranges  (U.S. Department of Health and Human Services ATP III Classifications)    Low     <40 mg/dl (major risk factor for CHD)  High    >60 mg/dl ('negative' risk factor for CHD)        LDL Reference Ranges  (U.S. Department of Health and Human Services ATP III Classifications)    Optimal          <100 mg/dL  Near Optimal     100-129 mg/dL  Borderline High  130-159 mg/dL  High             160-189 mg/dL  Very High        >189 mg/dL    Comprehensive Metabolic Panel [[id number]]  (Abnormal) Collected: 05/07/23 0650    Specimen: Blood Updated: 05/07/23 0742     Glucose 98 mg/dL      BUN  25 mg/dL      Creatinine 0.99 mg/dL      Sodium 138 mmol/L      Potassium 4.0 mmol/L      Chloride 105 mmol/L      CO2 22.7 mmol/L      Calcium 8.7 mg/dL      Total Protein 6.4 g/dL      Albumin 2.8 g/dL      ALT (SGPT) 18 U/L      AST (SGOT) 16 U/L      Alkaline Phosphatase 54 U/L      Total Bilirubin 0.9 mg/dL      Globulin 3.6 gm/dL      A/G Ratio 0.8 g/dL      BUN/Creatinine Ratio 25.3     Anion Gap 10.3 mmol/L      eGFR 72.4 mL/min/1.73     Narrative:      GFR Normal >60  Chronic Kidney Disease <60  Kidney Failure <15    The GFR formula is only valid for adults with stable renal function between ages 18 and 70.    Hemoglobin A1c [[id number]]  (Abnormal) Collected: 05/07/23 0650    Specimen: Blood Updated: 05/07/23 0735     Hemoglobin A1C 5.80 %     Narrative:      Hemoglobin A1C Ranges:    Increased Risk for Diabetes  5.7% to 6.4%  Diabetes                     >= 6.5%  Diabetic Goal                < 7.0%    CBC & Differential [[id number]]  (Abnormal) Collected: 05/07/23 0650    Specimen: Blood Updated: 05/07/23 0725    Narrative:      The following orders were created for panel order CBC & Differential.  Procedure                               Abnormality         Status                     ---------                               -----------         ------                     CBC Auto Differential[[id number]]        Abnormal            Final result                 Please view results for these tests on the individual orders.    CBC Auto Differential [[id number]]  (Abnormal) Collected: 05/07/23 0650    Specimen: Blood Updated: 05/07/23 0725     WBC 9.84 10*3/mm3      RBC 3.94 10*6/mm3      Hemoglobin 11.6 g/dL      Hematocrit 35.2 %      MCV 89.3 fL      MCH 29.4 pg      MCHC 33.0 g/dL      RDW 15.7 %      RDW-SD 51.3 fl      MPV 11.6 fL      Platelets 147 10*3/mm3      Neutrophil % 85.7 %      Lymphocyte % 5.2 %      Monocyte % 6.2 %      Eosinophil % 1.4 %      Basophil % 0.1 %      Immature Grans % 1.4 %       Neutrophils, Absolute 8.43 10*3/mm3      Lymphocytes, Absolute 0.51 10*3/mm3      Monocytes, Absolute 0.61 10*3/mm3      Eosinophils, Absolute 0.14 10*3/mm3      Basophils, Absolute 0.01 10*3/mm3      Immature Grans, Absolute 0.14 10*3/mm3      nRBC 0.0 /100 WBC         Imaging Results (Last 24 Hours)     ** No results found for the last 24 hours. **        ECG 12 Lead             Component  Ref Range & Units 07:22  (5/6/23) 1 yr ago  (9/3/21) 1 yr ago  (9/3/21) 1 yr ago  (7/11/21) 1 yr ago  (7/10/21) 1 yr ago  (7/9/21) 1 yr ago  (7/8/21)   QT Interval  ms 383 P  405  414  332  397  390  392    Resulting Agency BH ECG BH ECG BH ECG BH ECG BH ECG BH ECG BH ECG             HEART RATE= 70  bpm  RR Interval= 857  ms  NM Interval=   ms  P Horizontal Axis=   deg  P Front Axis=   deg  QRSD Interval= 94  ms  QT Interval= 383  ms  QRS Axis= -6  deg  T Wave Axis= 99  deg  - ABNORMAL ECG -  Atrial fibrillation  Abnormal R-wave progression, early transition  Probable LVH with secondary repol abnrm  Baseline wander in lead(s) V2               Current Facility-Administered Medications:   •  allopurinol (ZYLOPRIM) tablet 100 mg, 100 mg, Oral, BID, Calvin Bergeron MD, 100 mg at 05/07/23 0900  •  atorvastatin (LIPITOR) tablet 10 mg, 10 mg, Oral, Every Other Day, Calvin Bergeron MD, 10 mg at 05/07/23 0900  •  azithromycin (ZITHROMAX) 500 mg in sodium chloride 0.9 % 250 mL IVPB-VTB, 500 mg, Intravenous, Q24H, Calvin Bergeron MD, 500 mg at 05/06/23 2101  •  Budesonide (ENTOCORT EC) 24 hr capsule 6 mg, 6 mg, Oral, Daily, Calvin Bergeron MD, 6 mg at 05/07/23 0900  •  cefTRIAXone (ROCEPHIN) 1 g in sodium chloride 0.9 % 100 mL IVPB-VTB, 1 g, Intravenous, Q24H, Calvin Bergeron MD, Last Rate: 200 mL/hr at 05/07/23 0901, 1 g at 05/07/23 0901  •  Enoxaparin Sodium (LOVENOX) syringe 60 mg, 1 mg/kg, Subcutaneous, Q12H, Calvin Bergeron MD, 60 mg at 05/07/23 0235  •  guaiFENesin (MUCINEX) 12 hr tablet 600 mg, 600 mg, Oral, Q12H, Calvin Bergeron MD, 600 mg at  05/07/23 0900  •  ipratropium-albuterol (DUO-NEB) nebulizer solution 3 mL, 3 mL, Nebulization, Q4H - RT, Shelbie Bergeron MD, 3 mL at 05/07/23 1525  •  metoprolol tartrate (LOPRESSOR) tablet 12.5 mg, 12.5 mg, Oral, Q12H, Shelbie Bergeron MD, 12.5 mg at 05/07/23 0900  •  pantoprazole (PROTONIX) EC tablet 40 mg, 40 mg, Oral, BID AC, Shelbie Bergeron MD, 40 mg at 05/07/23 0900  •  [COMPLETED] Insert Peripheral IV, , , Once **AND** sodium chloride 0.9 % flush 10 mL, 10 mL, Intravenous, PRN, Monty Pena PA  •  [COMPLETED] Insert Peripheral IV, , , Once **AND** sodium chloride 0.9 % flush 10 mL, 10 mL, Intravenous, PRN, Supa French MD  •  sodium chloride 0.9 % infusion, 75 mL/hr, Intravenous, Continuous, Shelbie Bergeron MD, Last Rate: 75 mL/hr at 05/06/23 1745, 75 mL/hr at 05/06/23 1745     ASSESSMENT  Acute hypoxic respiratory failure  Bilateral pulmonary embolism  Acute left lower extremity DVT  Bilateral pneumonia  Persistent COVID-19 infection with pneumonia  Hypertension  Hyperlipidemia  Aortic stenosis  Pulmonary fibrosis  Bronchiectasis  Pulm hypertension  Crohn's disease  Gastroesophageal reflux disease    PLAN  CPM  Supplemental oxygen nebulizer  Anticoagulation  Continue IV antibiotics  Pulmonary consult appreciated  Infectious disease to follow patient  Adjust home medications  Stress ulcer DVT prophylaxis  Supportive care  PT OT  DNR  Discussed with wife and nursing staff  Follow closely further recommendation current hospital course    SHELBIE BERGERON MD    Copied text in this note has been reviewed and is accurate as of 05/07/23

## 2023-05-07 NOTE — PROGRESS NOTES
"[facility] Clinical Pharmacy Services: Enoxaparin Consult    Asia Ly has a pharmacy consult to dose full-dose enoxaparin per Dr. Bergeron's request.     Indication: DVT/PE (active thrombosis)  Home Anticoagulation: apixaban 2.5mg BID     Relevant clinical data and objective history reviewed:  89 y.o. male 172.7 cm (68\") 61.7 kg (136 lb 0.4 oz)   Body mass index is 20.68 kg/m².   Results from last 7 days   Lab Units 05/06/23  0804 05/06/23  0748   CREATININE mg/dL  --  1.01   PLATELETS 10*3/mm3  --  177   HEMOGLOBIN g/dL  --  13.1   INR  1.51*  --      Estimated Creatinine Clearance: 42.4 mL/min (by C-G formula based on SCr of 1.01 mg/dL).    Assessment/Plan    Patient started on  60mg (1mg/kg) subcutaneous every 12 hours. Consult order will be discontinued but pharmacy will continue to follow.     Charlotte Jain [facility]    Clinical Pharmacist   "

## 2023-05-08 ENCOUNTER — READMISSION MANAGEMENT (OUTPATIENT)
Dept: CALL CENTER | Facility: HOSPITAL | Age: 88
End: 2023-05-08
Payer: MEDICARE

## 2023-05-08 ENCOUNTER — TELEPHONE (OUTPATIENT)
Dept: CARDIOLOGY | Facility: CLINIC | Age: 88
End: 2023-05-08

## 2023-05-08 ENCOUNTER — TRANSCRIBE ORDERS (OUTPATIENT)
Dept: HOME HEALTH SERVICES | Facility: HOME HEALTHCARE | Age: 88
End: 2023-05-08
Payer: MEDICARE

## 2023-05-08 ENCOUNTER — HOME CARE VISIT (OUTPATIENT)
Dept: HOME HEALTH SERVICES | Facility: HOME HEALTHCARE | Age: 88
End: 2023-05-08
Payer: MEDICARE

## 2023-05-08 VITALS
SYSTOLIC BLOOD PRESSURE: 149 MMHG | OXYGEN SATURATION: 91 % | BODY MASS INDEX: 20.62 KG/M2 | HEIGHT: 68 IN | HEART RATE: 60 BPM | TEMPERATURE: 97.6 F | RESPIRATION RATE: 18 BRPM | WEIGHT: 136.02 LBS | DIASTOLIC BLOOD PRESSURE: 66 MMHG

## 2023-05-08 VITALS
TEMPERATURE: 98.4 F | RESPIRATION RATE: 18 BRPM | DIASTOLIC BLOOD PRESSURE: 64 MMHG | HEART RATE: 68 BPM | OXYGEN SATURATION: 100 % | SYSTOLIC BLOOD PRESSURE: 118 MMHG

## 2023-05-08 DIAGNOSIS — U07.1 PNEUMONIA DUE TO COVID-19 VIRUS: Primary | ICD-10-CM

## 2023-05-08 DIAGNOSIS — I82.4Y2 ACUTE DEEP VEIN THROMBOSIS (DVT) OF PROXIMAL VEIN OF LEFT LOWER EXTREMITY: ICD-10-CM

## 2023-05-08 DIAGNOSIS — I26.99 ACUTE PULMONARY EMBOLISM, UNSPECIFIED PULMONARY EMBOLISM TYPE, UNSPECIFIED WHETHER ACUTE COR PULMONALE PRESENT: ICD-10-CM

## 2023-05-08 DIAGNOSIS — J12.82 PNEUMONIA DUE TO COVID-19 VIRUS: Primary | ICD-10-CM

## 2023-05-08 LAB
ANION GAP SERPL CALCULATED.3IONS-SCNC: 7 MMOL/L (ref 5–15)
BASOPHILS # BLD AUTO: 0 10*3/MM3 (ref 0–0.2)
BASOPHILS NFR BLD AUTO: 0 % (ref 0–1.5)
BUN SERPL-MCNC: 22 MG/DL (ref 8–23)
BUN/CREAT SERPL: 26.8 (ref 7–25)
CALCIUM SPEC-SCNC: 8.4 MG/DL (ref 8.2–9.6)
CHLORIDE SERPL-SCNC: 108 MMOL/L (ref 98–107)
CO2 SERPL-SCNC: 21 MMOL/L (ref 22–29)
CREAT SERPL-MCNC: 0.82 MG/DL (ref 0.76–1.27)
DEPRECATED RDW RBC AUTO: 51.5 FL (ref 37–54)
EGFRCR SERPLBLD CKD-EPI 2021: 83.4 ML/MIN/1.73
EOSINOPHIL # BLD AUTO: 0.01 10*3/MM3 (ref 0–0.4)
EOSINOPHIL NFR BLD AUTO: 0.1 % (ref 0.3–6.2)
ERYTHROCYTE [DISTWIDTH] IN BLOOD BY AUTOMATED COUNT: 15.6 % (ref 12.3–15.4)
GLUCOSE SERPL-MCNC: 191 MG/DL (ref 65–99)
HCT VFR BLD AUTO: 31.3 % (ref 37.5–51)
HGB BLD-MCNC: 10 G/DL (ref 13–17.7)
IMM GRANULOCYTES # BLD AUTO: 0.09 10*3/MM3 (ref 0–0.05)
IMM GRANULOCYTES NFR BLD AUTO: 1.3 % (ref 0–0.5)
LYMPHOCYTES # BLD AUTO: 0.24 10*3/MM3 (ref 0.7–3.1)
LYMPHOCYTES NFR BLD AUTO: 3.6 % (ref 19.6–45.3)
MCH RBC QN AUTO: 29.1 PG (ref 26.6–33)
MCHC RBC AUTO-ENTMCNC: 31.9 G/DL (ref 31.5–35.7)
MCV RBC AUTO: 91 FL (ref 79–97)
MONOCYTES # BLD AUTO: 0.57 10*3/MM3 (ref 0.1–0.9)
MONOCYTES NFR BLD AUTO: 8.5 % (ref 5–12)
NEUTROPHILS NFR BLD AUTO: 5.81 10*3/MM3 (ref 1.7–7)
NEUTROPHILS NFR BLD AUTO: 86.5 % (ref 42.7–76)
NRBC BLD AUTO-RTO: 0 /100 WBC (ref 0–0.2)
PLATELET # BLD AUTO: 125 10*3/MM3 (ref 140–450)
PMV BLD AUTO: 11.2 FL (ref 6–12)
POTASSIUM SERPL-SCNC: 3.9 MMOL/L (ref 3.5–5.2)
RBC # BLD AUTO: 3.44 10*6/MM3 (ref 4.14–5.8)
SODIUM SERPL-SCNC: 136 MMOL/L (ref 136–145)
WBC NRBC COR # BLD: 6.72 10*3/MM3 (ref 3.4–10.8)

## 2023-05-08 PROCEDURE — 97535 SELF CARE MNGMENT TRAINING: CPT

## 2023-05-08 PROCEDURE — 85025 COMPLETE CBC W/AUTO DIFF WBC: CPT | Performed by: HOSPITALIST

## 2023-05-08 PROCEDURE — 94664 DEMO&/EVAL PT USE INHALER: CPT

## 2023-05-08 PROCEDURE — 94799 UNLISTED PULMONARY SVC/PX: CPT

## 2023-05-08 PROCEDURE — 97162 PT EVAL MOD COMPLEX 30 MIN: CPT

## 2023-05-08 PROCEDURE — 80048 BASIC METABOLIC PNL TOTAL CA: CPT | Performed by: HOSPITALIST

## 2023-05-08 PROCEDURE — 97166 OT EVAL MOD COMPLEX 45 MIN: CPT

## 2023-05-08 PROCEDURE — 97530 THERAPEUTIC ACTIVITIES: CPT

## 2023-05-08 PROCEDURE — 94618 PULMONARY STRESS TESTING: CPT

## 2023-05-08 PROCEDURE — 94761 N-INVAS EAR/PLS OXIMETRY MLT: CPT

## 2023-05-08 RX ORDER — GUAIFENESIN 600 MG/1
600 TABLET, EXTENDED RELEASE ORAL EVERY 12 HOURS SCHEDULED
Qty: 60 TABLET | Refills: 0 | Status: SHIPPED | OUTPATIENT
Start: 2023-05-08 | End: 2023-06-07

## 2023-05-08 RX ORDER — CEFUROXIME AXETIL 250 MG/1
250 TABLET ORAL EVERY 12 HOURS SCHEDULED
Status: DISCONTINUED | OUTPATIENT
Start: 2023-05-08 | End: 2023-05-08 | Stop reason: HOSPADM

## 2023-05-08 RX ORDER — CEFUROXIME AXETIL 250 MG/1
250 TABLET ORAL EVERY 12 HOURS SCHEDULED
Qty: 5 TABLET | Refills: 0 | Status: SHIPPED | OUTPATIENT
Start: 2023-05-08 | End: 2023-05-11

## 2023-05-08 RX ADMIN — IPRATROPIUM BROMIDE AND ALBUTEROL SULFATE 3 ML: 2.5; .5 SOLUTION RESPIRATORY (INHALATION) at 00:26

## 2023-05-08 RX ADMIN — BUDESONIDE 6 MG: 3 CAPSULE ORAL at 08:55

## 2023-05-08 RX ADMIN — GUAIFENESIN 600 MG: 600 TABLET, EXTENDED RELEASE ORAL at 08:55

## 2023-05-08 RX ADMIN — CEFUROXIME AXETIL 250 MG: 250 TABLET ORAL at 11:36

## 2023-05-08 RX ADMIN — ALLOPURINOL 100 MG: 100 TABLET ORAL at 08:55

## 2023-05-08 RX ADMIN — PANTOPRAZOLE SODIUM 40 MG: 40 TABLET, DELAYED RELEASE ORAL at 17:22

## 2023-05-08 RX ADMIN — IPRATROPIUM BROMIDE AND ALBUTEROL SULFATE 3 ML: 2.5; .5 SOLUTION RESPIRATORY (INHALATION) at 08:37

## 2023-05-08 RX ADMIN — PANTOPRAZOLE SODIUM 40 MG: 40 TABLET, DELAYED RELEASE ORAL at 08:55

## 2023-05-08 RX ADMIN — IPRATROPIUM BROMIDE AND ALBUTEROL SULFATE 3 ML: 2.5; .5 SOLUTION RESPIRATORY (INHALATION) at 12:01

## 2023-05-08 RX ADMIN — METOPROLOL TARTRATE 12.5 MG: 25 TABLET, FILM COATED ORAL at 08:55

## 2023-05-08 RX ADMIN — APIXABAN 5 MG: 5 TABLET, FILM COATED ORAL at 08:55

## 2023-05-08 RX ADMIN — IPRATROPIUM BROMIDE AND ALBUTEROL SULFATE 3 ML: 2.5; .5 SOLUTION RESPIRATORY (INHALATION) at 03:20

## 2023-05-08 NOTE — DISCHARGE PLACEMENT REQUEST
"Hermanjoy Alex (89 y.o. Male)     Date of Birth   08/21/1932    Social Security Number       Address   [address] [place] [address]    Home Phone   [phone]    MRN   [id number]       Buddhist   [place] Voodoo    Marital Status                               Admission Date   5/6/23    Admission Type   Emergency    Admitting Provider   Calvin Bergeron MD    Attending Provider   Calvin Bergeron MD    Department, Room/Bed   [address], [address]1       Discharge Date       Discharge Disposition   Home or Self Care    Discharge Destination                               Attending Provider: Calvin Bergeron MD    Allergies: Amiodarone, Diltiazem, Doxycycline, Cefdinir, Indomethacin    Isolation: Enh Drop/Con   Infection: COVID (confirmed) (04/25/23)   Code Status: No CPR    Ht: 172.7 cm (68\")   Wt: 61.7 kg (136 lb 0.4 oz)    Admission Cmt: None   Principal Problem: Pneumonia of both lungs due to infectious organism, unspecified part of lung [J18.9]                 Active Insurance as of 5/6/2023     Primary Coverage     Payor Plan Insurance Group Employer/Plan Group    MEDICARE MEDICARE A & B      Payor Plan Address Payor Plan Phone Number Payor Plan Fax Number Effective Dates    [address] [phone]  8/1/1997 - None Entered    [facility] [id number]       Subscriber Name Subscriber Birth Date Member ID       ALEX BURK 8/21/1932 [id number]           Secondary Coverage     Payor Plan Insurance Group Employer/Plan Group    [facility] SUPP [id number]     Payor Plan Address Payor Plan Phone Number Payor Plan Fax Number Effective Dates    [address]   12/1/2016 - None Entered    [facility] [id number]       Subscriber Name Subscriber Birth Date Member ID       ALEX BURK 8/21/1932 [id number]                 Emergency Contacts      (Rel.) Home Phone Work Phone Mobile Phone    AliyhaNi (Spouse) [phone] -- [phone]    Raul Burk " ([facility]) [phone] -- [phone]

## 2023-05-08 NOTE — PROGRESS NOTES
Nutrition Services    Patient Name:  Asia Ly  YOB: 1932  MRN: [id number]  Admit Date:  5/6/2023    Assessment Date:  05/08/23    Comment: MST 2    Nutrition assessment completed. MST 2. NFPE performed, no clinical signs of muscle wasting or fat loss. 8 lb (5.5%) wt loss x 2 mo. Pt c/o decreased appetite. 50% PO intake x 1 day.     REC:  1. Continue to encourage PO/ONS intake     Will continue to monitor per protocol.    CLINICAL NUTRITION ASSESSMENT      Reason for Assessment MST score 2+     Diagnosis/Problem   PNA of both lungs    Medical/Surgical History Past Medical History:   Diagnosis Date   • Aortic stenosis    • Aspiration pneumonia    • Atrial fibrillation    • Bleeding from the nose    • Bronchiectasis    • CKD (chronic kidney disease)    • COPD (chronic obstructive pulmonary disease)    • Crohn's disease    • Dizziness    • Elevated cholesterol    • Gastric ulcer    • Generalized weakness    • Gout    • Gout    • Hard of hearing    • Hyperlipidemia    • Hypertension    • Leg swelling    • Lower extremity edema    • Mild anemia    • Near syncope    • Nonrheumatic aortic valve stenosis    • PAF (paroxysmal atrial fibrillation)    • Palpitations    • Peptic ulcer    • Pneumonia of left lung due to infectious organism    • Pulmonary fibrosis    • Stroke    • TIA (transient ischemic attack)        Past Surgical History:   Procedure Laterality Date   • BRONCHOSCOPY N/A 10/22/2018    Procedure: BRONCHOSCOPY WITH BRONCHALVEOLAR LAVAGE;  Surgeon: Chidi Oconnor MD;  Location: [facility] ENDOSCOPY;  Service: Pulmonary   • BRONCHOSCOPY N/A 7/8/2021    Procedure: BRONCHOSCOPY with BAL;  Surgeon: Chidi Oconnor MD;  Location: [facility] ENDOSCOPY;  Service: Pulmonary;  Laterality: N/A;  Pre: bronchectasis  Post: same   • CARDIAC ELECTROPHYSIOLOGY PROCEDURE N/A 7/9/2021    Procedure: Pacemaker DC new--[employer] possible His lead;  Surgeon: Rashaun Nettles MD;  Location: [facility] CATH INVASIVE  "LOCATION;  Service: Cardiology;  Laterality: N/A;   • CARDIAC ELECTROPHYSIOLOGY PROCEDURE N/A 9/3/2021    Procedure: AV node 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 ppm;  Surgeon: Rashaun Nettles MD;  Location: [facility] CATH INVASIVE LOCATION;  Service: Cardiovascular;  Laterality: N/A;   • CATARACT EXTRACTION Bilateral    • COLONOSCOPY     • COLONOSCOPY N/A 3/9/2018    Procedure: COLONOSCOPY to terminal ileum with bx;  Surgeon: Aron Cabrera MD;  [facility];  Service:    • COLONOSCOPY N/A 11/19/2020    Procedure: COLONOSCOPY to cecum:  apc to cecum angiodysplagia,;  Surgeon: Aron Cabrera MD;  [facility];  Service: Gastroenterology;  Laterality: N/A;  GI bleed  post:  diverticulosis, angiodysplagia   • CYSTECTOMY      back and shoulder area   • ENDOSCOPY N/A 3/9/2018    Procedure: ESOPHAGOGASTRODUODENOSCOPY with bx;  Surgeon: Aron Cabrera MD;  [facility];  Service:    • ENDOSCOPY N/A 11/19/2020    Procedure: ESOPHAGOGASTRODUODENOSCOPY;  Surgeon: Aron Cabrera MD;  Location: [facility] ENDOSCOPY;  Service: Gastroenterology;  Laterality: N/A;  GI bleed  post:  HH.   • EYE SURGERY Left     detached retina   • EYE SURGERY Right     \"repaired a hole in the eye\"   • INSERT / REPLACE / REMOVE PACEMAKER     • TESTICLE SURGERY      benign tumor removed.        Encounter Information        Nutrition History:     Food Preferences:    Supplements:    Factors Affecting Intake: decreased appetite     Anthropometrics        Current Height  Current Weight  BMI kg/m2 Height: 172.7 cm (68\")  Weight: 61.7 kg (136 lb 0.4 oz) (05/06/23 1600)  Body mass index is 20.68 kg/m².   Adjusted BMI (if applicable)    BMI Category Normal/Healthy (18.4 - 24.9)       Admission Weight 61.7 kg (136 lb 0.4 oz)       Ideal Body Weight (IBW) 68.4 kg (150 lb 12.7 oz)   Adjusted IBW (if applicable)        Usual Body Weight (UBW) 145-150 lb per wt hx    Weight Change/Trend Loss, Amount/Timeframe: 8 lb (5.5%) wt loss " x 2 mo       Weight History Wt Readings from Last 30 Encounters:   05/06/23 1600 61.7 kg (136 lb 0.4 oz)   05/06/23 0712 61.7 kg (136 lb)   04/29/23 0540 62 kg (136 lb 11 oz)   04/25/23 2343 61.8 kg (136 lb 3.9 oz)   04/25/23 1850 65.3 kg (144 lb)   03/20/23 0931 65.3 kg (144 lb)   03/01/23 0220 63.5 kg (140 lb)   10/27/22 1040 65.8 kg (145 lb)   10/04/22 0942 67.6 kg (149 lb)   06/27/22 1332 69.4 kg (153 lb)   05/04/22 0921 69.5 kg (153 lb 3.2 oz)   01/27/22 1102 68.9 kg (152 lb)   10/26/21 1300 67.6 kg (149 lb)   10/14/21 0934 65.8 kg (145 lb)   09/28/21 1030 67.1 kg (148 lb)   09/28/21 1203 66.7 kg (147 lb)   09/03/21 0709 67.1 kg (148 lb)   07/22/21 1340 65.3 kg (144 lb)   07/14/21 1624 67.1 kg (148 lb)   07/13/21 1221 66.7 kg (147 lb)   07/06/21 1635 67.6 kg (149 lb 0.5 oz)   06/23/21 0541 63.5 kg (140 lb)   06/22/21 2258 63.8 kg (140 lb 11.2 oz)   06/22/21 2241 67.6 kg (149 lb)   04/12/21 1118 67.9 kg (149 lb 12.8 oz)   03/24/21 1236 67.5 kg (148 lb 12.8 oz)   12/18/20 1432 66.2 kg (146 lb)   12/07/20 0816 65.3 kg (144 lb)   11/18/20 1204 65.4 kg (144 lb 2.9 oz)   11/18/20 0500 65.4 kg (144 lb 2.9 oz)   11/17/20 0353 67.1 kg (148 lb)   10/12/20 1117 65.8 kg (145 lb)   08/27/20 1257 70.8 kg (156 lb)   08/26/20 0755 71 kg (156 lb 8.4 oz)   08/28/20 0601 70.8 kg (156 lb)   08/26/20 0219 71 kg (156 lb 8.4 oz)   05/31/20 2238 69.9 kg (154 lb)   04/29/20 1125 70.3 kg (155 lb)             Estimated/Assessed Needs       Energy Requirements    Weight for Calculation 61.7 kg   Method for Estimation  25 kcal/kg, 30 kcal/kg   EST Needs (kcal/day) 0252-9118 kcal       Protein Requirements    Weight for Calculation 61.7 kg   EST Protein Needs (g/kg) 1.0 - 1.2 gm/kg   EST Daily Needs (g/day) 62-74 g       Fluid Requirements     Method for Estimation 1 mL/kcal    Estimated Needs (mL/day)      Tests/Procedures        Tests/Procedures No new tests/procedures     Labs       Pertinent Labs    Results from last 7 days   Lab Units  05/08/23  0613 05/07/23  0650 05/06/23  0748   SODIUM mmol/L 136 138 135*   POTASSIUM mmol/L 3.9 4.0 4.4   CHLORIDE mmol/L 108* 105 101   CO2 mmol/L 21.0* 22.7 24.3   BUN mg/dL 22 25* 31*   CREATININE mg/dL 0.82 0.99 1.01   CALCIUM mg/dL 8.4 8.7 9.3   BILIRUBIN mg/dL  --  0.9 1.5*   ALK PHOS U/L  --  54 59   ALT (SGPT) U/L  --  18 22   AST (SGOT) U/L  --  16 18   GLUCOSE mg/dL 191* 98 96     Results from last 7 days   Lab Units 05/08/23  0613 05/07/23  0650 05/06/23  0748   MAGNESIUM mg/dL  --   --  1.7   HEMOGLOBIN g/dL 10.0* 11.6* 13.1   HEMATOCRIT % 31.3* 35.2* 40.0   WBC 10*3/mm3 6.72 9.84 16.03*   TRIGLYCERIDES mg/dL  --  80  --    ALBUMIN g/dL  --  2.8* 3.1*     Results from last 7 days   Lab Units 05/08/23  0613 05/07/23  0650 05/06/23  0804 05/06/23  0748   INR   --   --  1.51*  --    PLATELETS 10*3/mm3 125* 147  --  177     COVID19   Date Value Ref Range Status   05/06/2023 Detected (C) Not Detected - Ref. Range Final     Lab Results   Component Value Date    HGBA1C 5.80 (H) 05/07/2023          Medications           Scheduled Medications allopurinol, 100 mg, Oral, BID  apixaban, 5 mg, Oral, Q12H  atorvastatin, 10 mg, Oral, Every Other Day  Budesonide, 6 mg, Oral, Daily  cefuroxime, 250 mg, Oral, Q12H  guaiFENesin, 600 mg, Oral, Q12H  ipratropium-albuterol, 3 mL, Nebulization, Q4H - RT  metoprolol tartrate, 12.5 mg, Oral, Q12H  pantoprazole, 40 mg, Oral, BID AC       Infusions     PRN Medications •  [COMPLETED] Insert Peripheral IV **AND** sodium chloride  •  [COMPLETED] Insert Peripheral IV **AND** sodium chloride     Physical Findings          Physical Appearance alert, oriented, on oxygen therapy   Oral/Mouth Cavity tooth or teeth missing   Edema  no edema   Gastrointestinal non-distended , last bowel movement:5/7   Skin  bruising   Tubes/Drains/Lines none   NFPE No clinical signs of muscle wasting or fat loss   --  Current Nutrition Orders & Evaluation of Intake       Oral Nutrition     Food Allergies  NKFA   Current PO Diet Diet: Regular/House Diet; Texture: Regular Texture (IDDSI 7); Fluid Consistency: Thin (IDDSI 0)   Supplement Boost Plus, BID L/D   PO Evaluation     % PO Intake 50%    # of Days Evaluated 1   --  PES STATEMENT / NUTRITION DIAGNOSIS      Nutrition Dx Problem  Problem: Unintentional Weight Loss  Etiology: Factors Affecting Nutrition decreased appetite   Signs/Symptoms: Unintended Weight Change   --  NUTRITION INTERVENTION / PLAN OF CARE      Intervention Goal(s) Maintain nutrition status, Reduce/improve symptoms, Meet estimated needs, Disease management/therapy, Increase intake, Maintain weight, No significant weight loss and PO intake goal %: 75%         RD Intervention/Action Encourage intake, Follow Tx Progress and Care plan reviewed         Prescription/Orders:       PO Diet       Supplements Boost Plus BID L/D      Snacks       Enteral Nutrition       Parenteral Nutrition    New Prescription Ordered? Continue same per protocol, No changes at this time   --      Monitor/Evaluation Per protocol, PO intake, Supplement intake, Weight, Symptoms   Discharge Plan/Needs Pending clinical course   Education Will instruct as appropriate   --    RD to follow per protocol.      Electronically signed by:  Christie Boggs, Dietetic Intern   05/08/23 13:01 EDT

## 2023-05-08 NOTE — PLAN OF CARE
Problem: Adult Inpatient Plan of Care  Goal: Plan of Care Review  5/8/2023 1232 by Navid Laurent RN  Outcome: Adequate for Care Transition  5/8/2023 1230 by Navid Laurent RN  Outcome: Ongoing, Progressing  5/8/2023 1230 by Navid Laurent RN  Outcome: Ongoing, Progressing  Goal: Patient-Specific Goal (Individualized)  5/8/2023 1232 by Navid Laurent RN  Outcome: Adequate for Care Transition  5/8/2023 1230 by Navid Laurent RN  Outcome: Ongoing, Progressing  5/8/2023 1230 by Navid Laurent RN  Outcome: Ongoing, Progressing  Goal: Absence of Hospital-Acquired Illness or Injury  5/8/2023 1232 by Navid Laurent RN  Outcome: Adequate for Care Transition  5/8/2023 1230 by Navid Laurent RN  Outcome: Ongoing, Progressing  5/8/2023 1230 by Navid Laurent RN  Outcome: Ongoing, Progressing  Intervention: Identify and Manage Fall Risk  Recent Flowsheet Documentation  Taken 5/8/2023 1200 by Navid Laurent RN  Safety Promotion/Fall Prevention: safety round/check completed  Taken 5/8/2023 1000 by Navid Laurent RN  Safety Promotion/Fall Prevention: safety round/check completed  Taken 5/8/2023 0800 by Navid Laurent RN  Safety Promotion/Fall Prevention: safety round/check completed  Intervention: Prevent Skin Injury  Recent Flowsheet Documentation  Taken 5/8/2023 1200 by Naivd Laurent RN  Body Position:   position changed independently   neutral body alignment   neutral head position   supine  Taken 5/8/2023 1000 by Navid Laurent RN  Body Position:   position changed independently   neutral body alignment   neutral head position   supine   weight shifting  Taken 5/8/2023 0800 by Navid Laurent RN  Body Position:   position changed independently   neutral body alignment   neutral head position   supine   weight shifting  Skin Protection:   adhesive use limited   transparent dressing maintained  Intervention: Prevent and Manage VTE (Venous Thromboembolism) Risk  Recent Flowsheet Documentation  Taken 5/8/2023 1200  by Miles, Shoemaker, RN  Activity Management: up in chair  Taken 5/8/2023 1000 by Navid Laurent RN  Activity Management:   up ad michell   ambulated in room  Taken 5/8/2023 0800 by Navid Laurent RN  Activity Management: up ad michell  VTE Prevention/Management:   bilateral   sequential compression devices off   patient refused intervention  Intervention: Prevent Infection  Recent Flowsheet Documentation  Taken 5/8/2023 1200 by Navid Laurent RN  Infection Prevention: single patient room provided  Taken 5/8/2023 1000 by Navid Laurent RN  Infection Prevention: single patient room provided  Taken 5/8/2023 0800 by Navid Lauretn RN  Infection Prevention: single patient room provided  Goal: Optimal Comfort and Wellbeing  5/8/2023 1232 by Navid Laurent RN  Outcome: Adequate for Care Transition  5/8/2023 1230 by Navid Laurent RN  Outcome: Ongoing, Progressing  5/8/2023 1230 by Navid Laurent RN  Outcome: Ongoing, Progressing  Intervention: Monitor Pain and Promote Comfort  Recent Flowsheet Documentation  Taken 5/8/2023 0800 by Navid Laurent RN  Pain Management Interventions:   see MAR   quiet environment facilitated   position adjusted   pillow support provided   care clustered  Intervention: Provide Person-Centered Care  Recent Flowsheet Documentation  Taken 5/8/2023 0800 by Navid Laurent RN  Trust Relationship/Rapport: care explained  Goal: Readiness for Transition of Care  5/8/2023 1232 by Navid Laurent RN  Outcome: Adequate for Care Transition  5/8/2023 1230 by Navid Laurent RN  Outcome: Ongoing, Progressing  5/8/2023 1230 by Navid Laurent RN  Outcome: Ongoing, Progressing     Problem: Fall Injury Risk  Goal: Absence of Fall and Fall-Related Injury  5/8/2023 1232 by Navid Laurent RN  Outcome: Adequate for Care Transition  5/8/2023 1230 by Navid Laurent RN  Outcome: Ongoing, Progressing  5/8/2023 1230 by Navid Laurent RN  Outcome: Ongoing, Progressing  Intervention: Identify and Manage  Contributors  Recent Flowsheet Documentation  Taken 5/8/2023 1200 by Navid Laurent RN  Medication Review/Management: medications reviewed  Taken 5/8/2023 1000 by Navid Laurent RN  Medication Review/Management: medications reviewed  Taken 5/8/2023 0800 by Navid Laurent RN  Medication Review/Management: medications reviewed  Intervention: Promote Injury-Free Environment  Recent Flowsheet Documentation  Taken 5/8/2023 1200 by Navid Laurent RN  Safety Promotion/Fall Prevention: safety round/check completed  Taken 5/8/2023 1000 by Navid Laurent RN  Safety Promotion/Fall Prevention: safety round/check completed  Taken 5/8/2023 0800 by Navid Laurent RN  Safety Promotion/Fall Prevention: safety round/check completed     Problem: Hypertension Comorbidity  Goal: Blood Pressure in Desired Range  5/8/2023 1232 by Naivd aLurent RN  Outcome: Adequate for Care Transition  5/8/2023 1230 by Navid Laurent RN  Outcome: Ongoing, Progressing  5/8/2023 1230 by Navid Laurent RN  Outcome: Ongoing, Progressing  Intervention: Maintain Blood Pressure Management  Recent Flowsheet Documentation  Taken 5/8/2023 1200 by Navid Laurent RN  Medication Review/Management: medications reviewed  Taken 5/8/2023 1000 by Navid Laurent RN  Medication Review/Management: medications reviewed  Taken 5/8/2023 0800 by Navid Laurent RN  Medication Review/Management: medications reviewed   Goal Outcome Evaluation:

## 2023-05-08 NOTE — PLAN OF CARE
Goal Outcome Evaluation:  Plan of Care Reviewed With: patient, spouse, son           Outcome Evaluation: Pt presents from home with SOA, weakness, unable to to walk.  Pt lives at home with spouse who assists intermittently with ADL and prompts pt for basic tasks.  Family insists pt use walker due to falls but pt does not always use. Pt seen by OT and is able to move OOB, ambulate to bathroom for toilet and shower xfers with CGA without AD. Pt is alert and oriented to self/place but confusion noted at times during conversation.  Pt is able to doff/patrick socks at EOB and denies pain.  B UE are functional.  Wife plans return home with assist and is agreeable to home health OT.  Discuss pt with CCP and RN.  Will cont skilled OT while in acute care and home health at d/c to increase safety and independence.      Therapist used appropriate personal protective equipment including [id number], face shield, gown and gloves during the entire therapy session. Hand hygiene was completed before and after therapy session. Patient is in enhanced droplet precautions.

## 2023-05-08 NOTE — PROGRESS NOTES
Enter Query Response Below      Query Response:     Pneumonia with sepsis Electronically signed by Calvin Bergeron MD, 23, 11:19 AM EDT.           If applicable, please update the problem list.   Patient: Asia Ly        : 1932  Account: [id number]           Admit Date: 23        How to Respond to this query:       a. Click New Note     b. Answer query within the yellow box.                c. Update the Problem List, if applicable.      If you have any questions about this query contact me at: [email or web address]    Dr. Bergeron,  Patient admitted with bacterial pneumonia post covid, Acute respiratory failure, Acute PE and DVT. In the ED note Sepsis is noted, but not mentioned in any other progress notes.   On admit the patient had the following clinical present: WBC 16.03, Lactic 2.5, procalcitonin 0.52 and Temp 101.2. Treated with IV fluids, Zithromax, Rocephin and changed to Ceftin to complete 5 to 7 days treatment for post COVID-19 bacterial pneumonia.     Please clarify if Sepsis is being treated and monitored during this admission.    -Sepsis secondary pneumonia with related Acute respiratory failure, present on admit  -Bacterial pneumonia secondary to post Covid condition only  -other______________  -unable to determine         By submitting this query, we are merely seeking further clarification of documentation to accurately reflect all conditions that you are monitoring, evaluating, treating or that extend the hospitalization or utilize additional resources of care. Please utilize your independent clinical judgment when addressing the question(s) above.     This query and your response, once completed, will be entered into the legal medical record.    Sincerely,  Lali Valerio  Clinical Documentation Integrity Program

## 2023-05-08 NOTE — PROGRESS NOTES
"Daily progress note    Primary care physician      Chief complaint  Doing better with no new complaints and wants to go home.  Patient family at bedside.  Patient denies any chest pain shortness of breath palpitation.    History of present illness  89-year-old male who is well-known to our service with history of atrial fibrillation on Eliquis hypertension hyperlipidemia aortic stenosis pulmonary fibrosis bronchiectasis pulmonary hypertension Crohn's disease and gastroesophageal for disease who was recently treated for COVID-19 pneumonia and discharged in stable condition presented to [facility] emergency room with worsening shortness of breath for last few days and developed fever chills this morning.  Patient also have productive cough which is also getting worse.  Patient denies any chest pain abdominal pain nausea vomiting diarrhea.  Patient work-up in ER revealed acute pulmonary emboli bilateral despite on Eliquis and also found to have bilateral pneumonia admit for management.  Patient is DNR per his wishes.     REVIEW OF SYSTEMS  Unremarkable     PHYSICAL EXAM   Blood pressure 149/66, pulse 60, temperature 97.6 °F (36.4 °C), temperature source Oral, resp. rate 18, height 172.7 cm (68\"), weight 61.7 kg (136 lb 0.4 oz), SpO2 91 %.    GENERAL: Elderly frail male appears weak and tired.  He is chronically weak appearing.    HEENT:  Unremarkable  NECK: Supple, no meningismus  CV: regular rhythm, regular rate with intact distal pulses.  Harsh systolic murmur 2 out of 6.  RESPIRATORY:  Normal effort and moving air bilaterally  ABDOMEN:  Soft nontender nondistended bowel sounds positive  MUSCULOSKELETAL: no deformity.  No edema to lower extremities.    NEURO: alert and appropriate, moves all extremities, follows commands.    SKIN: warm, dry     LAB RESULTS  Lab Results (last 24 hours)     Procedure Component Value Units Date/Time    Blood Culture - Blood, Arm, Left [[id number]]  (Normal) " Collected: 05/06/23 0809    Specimen: Blood from Arm, Left Updated: 05/08/23 0815     Blood Culture No growth at 2 days    Blood Culture - Blood, Arm, Right [[id number]]  (Normal) Collected: 05/06/23 0748    Specimen: Blood from Arm, Right Updated: 05/08/23 0800     Blood Culture No growth at 2 days    Basic Metabolic Panel [[id number]]  (Abnormal) Collected: 05/08/23 0613    Specimen: Blood Updated: 05/08/23 0723     Glucose 191 mg/dL      BUN 22 mg/dL      Creatinine 0.82 mg/dL      Sodium 136 mmol/L      Potassium 3.9 mmol/L      Chloride 108 mmol/L      CO2 21.0 mmol/L      Calcium 8.4 mg/dL      BUN/Creatinine Ratio 26.8     Anion Gap 7.0 mmol/L      eGFR 83.4 mL/min/1.73     Narrative:      GFR Normal >60  Chronic Kidney Disease <60  Kidney Failure <15    The GFR formula is only valid for adults with stable renal function between ages 18 and 70.    CBC & Differential [[id number]]  (Abnormal) Collected: 05/08/23 0613    Specimen: Blood Updated: 05/08/23 0708    Narrative:      The following orders were created for panel order CBC & Differential.  Procedure                               Abnormality         Status                     ---------                               -----------         ------                     CBC Auto Differential[[id number]]        Abnormal            Final result                 Please view results for these tests on the individual orders.    CBC Auto Differential [[id number]]  (Abnormal) Collected: 05/08/23 0613    Specimen: Blood Updated: 05/08/23 0708     WBC 6.72 10*3/mm3      RBC 3.44 10*6/mm3      Hemoglobin 10.0 g/dL      Hematocrit 31.3 %      MCV 91.0 fL      MCH 29.1 pg      MCHC 31.9 g/dL      RDW 15.6 %      RDW-SD 51.5 fl      MPV 11.2 fL      Platelets 125 10*3/mm3      Neutrophil % 86.5 %      Lymphocyte % 3.6 %      Monocyte % 8.5 %      Eosinophil % 0.1 %      Basophil % 0.0 %      Immature Grans % 1.3 %      Neutrophils, Absolute 5.81 10*3/mm3      Lymphocytes, Absolute  0.24 10*3/mm3      Monocytes, Absolute 0.57 10*3/mm3      Eosinophils, Absolute 0.01 10*3/mm3      Basophils, Absolute 0.00 10*3/mm3      Immature Grans, Absolute 0.09 10*3/mm3      nRBC 0.0 /100 WBC         Imaging Results (Last 24 Hours)     ** No results found for the last 24 hours. **        ECG 12 Lead             Component  Ref Range & Units 07:22  (5/6/23) 1 yr ago  (9/3/21) 1 yr ago  (9/3/21) 1 yr ago  (7/11/21) 1 yr ago  (7/10/21) 1 yr ago  (7/9/21) 1 yr ago  (7/8/21)   QT Interval  ms 383 P  405  414  332  397  390  392    Resulting Agency BH ECG BH ECG BH ECG BH ECG BH ECG BH ECG BH ECG             HEART RATE= 70  bpm  RR Interval= 857  ms  SD Interval=   ms  P Horizontal Axis=   deg  P Front Axis=   deg  QRSD Interval= 94  ms  QT Interval= 383  ms  QRS Axis= -6  deg  T Wave Axis= 99  deg  - ABNORMAL ECG -  Atrial fibrillation  Abnormal R-wave progression, early transition  Probable LVH with secondary repol abnrm  Baseline wander in lead(s) V2               Current Facility-Administered Medications:   •  allopurinol (ZYLOPRIM) tablet 100 mg, 100 mg, Oral, BID, Calvin Bergeron MD, 100 mg at 05/08/23 0855  •  apixaban (ELIQUIS) tablet 5 mg, 5 mg, Oral, Q12H, Calvin Bergeron MD, 5 mg at 05/08/23 0855  •  atorvastatin (LIPITOR) tablet 10 mg, 10 mg, Oral, Every Other Day, Calvin Bergeron MD, 10 mg at 05/07/23 0900  •  Budesonide (ENTOCORT EC) 24 hr capsule 6 mg, 6 mg, Oral, Daily, Calvin Bergeron MD, 6 mg at 05/08/23 0855  •  cefuroxime (CEFTIN) tablet 250 mg, 250 mg, Oral, Q12H, Rolly Pedroza MD, 250 mg at 05/08/23 1136  •  guaiFENesin (MUCINEX) 12 hr tablet 600 mg, 600 mg, Oral, Q12H, Calvin Bergeron MD, 600 mg at 05/08/23 0855  •  ipratropium-albuterol (DUO-NEB) nebulizer solution 3 mL, 3 mL, Nebulization, Q4H - RT, Calvin Bergeron MD, 3 mL at 05/08/23 1201  •  metoprolol tartrate (LOPRESSOR) tablet 12.5 mg, 12.5 mg, Oral, Q12H, Calvin Bergeron MD, 12.5 mg at 05/08/23 0855  •  pantoprazole (PROTONIX) EC tablet 40 mg, 40  mg, Oral, BID AC, Shelbie Bergeron MD, 40 mg at 05/08/23 0855  •  [COMPLETED] Insert Peripheral IV, , , Once **AND** sodium chloride 0.9 % flush 10 mL, 10 mL, Intravenous, PRN, Monty Pena PA  •  [COMPLETED] Insert Peripheral IV, , , Once **AND** sodium chloride 0.9 % flush 10 mL, 10 mL, Intravenous, PRN, Supa French MD     ASSESSMENT  Acute hypoxic respiratory failure  Bilateral pulmonary embolism  Acute left lower extremity DVT  Bilateral pneumonia  Persistent COVID-19 infection with pneumonia  Hypertension  Hyperlipidemia  Aortic stenosis  Pulmonary fibrosis  Bronchiectasis  Pulm hypertension  Crohn's disease  Gastroesophageal reflux disease    PLAN  Discharge home on oral antibiotics and full dose of Eliquis  Discharge summary dictated    SHELBIE BERGERON MD    Copied text in this note has been reviewed and is accurate as of 05/08/23

## 2023-05-08 NOTE — THERAPY EVALUATION
Patient Name: Asia Ly  : 1932    MRN: [id number]                              Today's Date: 2023       Admit Date: 2023    Visit Dx:     ICD-10-CM ICD-9-CM   1. Pneumonia of both lungs due to infectious organism, unspecified part of lung  J18.9 483.8   2. COVID-19 virus infection  U07.1 079.89   3. Troponin level elevated  R77.8 790.6   4. Atrial fibrillation, unspecified type  I48.91 427.31   5. Multiple subsegmental pulmonary emboli without acute cor pulmonale  I26.94 415.19     Patient Active Problem List   Diagnosis   • Ataxia   • TIA (transient ischemic attack)   • Atrial fibrillation, paroxysmal   • Hyperlipidemia   • Gout   • Crohn's disease   • Atrial fibrillation with RVR   • Acute cholecystitis   • Obstructive jaundice   • Acute lower GI bleeding   • Aortic stenosis   • Pulmonary fibrosis   • Anticoagulant long-term use   • Febrile illness, acute   • Atrial fibrillation with rapid ventricular response   • COVID-19 virus infection   • Pneumonia of both lungs due to infectious organism, unspecified part of lung     Past Medical History:   Diagnosis Date   • Aortic stenosis    • Aspiration pneumonia    • Atrial fibrillation    • Bleeding from the nose    • Bronchiectasis    • CKD (chronic kidney disease)    • COPD (chronic obstructive pulmonary disease)    • Crohn's disease    • Dizziness    • Elevated cholesterol    • Gastric ulcer    • Generalized weakness    • Gout    • Gout    • Hard of hearing    • Hyperlipidemia    • Hypertension    • Leg swelling    • Lower extremity edema    • Mild anemia    • Near syncope    • Nonrheumatic aortic valve stenosis    • PAF (paroxysmal atrial fibrillation)    • Palpitations    • Peptic ulcer    • Pneumonia of left lung due to infectious organism    • Pulmonary fibrosis    • Stroke    • TIA (transient ischemic attack)      Past Surgical History:   Procedure Laterality Date   • BRONCHOSCOPY N/A 10/22/2018    Procedure: BRONCHOSCOPY WITH BRONCHALVEOLAR  "LAVAGE;  Surgeon: Chidi Oconnor MD;  Location: [facility] ENDOSCOPY;  Service: Pulmonary   • BRONCHOSCOPY N/A 7/8/2021    Procedure: BRONCHOSCOPY with BAL;  Surgeon: Chidi Oconnor MD;  Location: [facility] ENDOSCOPY;  Service: Pulmonary;  Laterality: N/A;  Pre: bronchectasis  Post: same   • CARDIAC ELECTROPHYSIOLOGY PROCEDURE N/A 7/9/2021    Procedure: Pacemaker DC new--[employer] possible His lead;  Surgeon: Rashaun Nettles MD;  Location: [facility] CATH INVASIVE LOCATION;  Service: Cardiology;  Laterality: N/A;   • CARDIAC ELECTROPHYSIOLOGY PROCEDURE N/A 9/3/2021    Procedure: AV node 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 ppm;  Surgeon: Rashaun Nettles MD;  Location: [facility] CATH INVASIVE LOCATION;  Service: Cardiovascular;  Laterality: N/A;   • CATARACT EXTRACTION Bilateral    • COLONOSCOPY     • COLONOSCOPY N/A 3/9/2018    Procedure: COLONOSCOPY to terminal ileum with bx;  Surgeon: Aron Cabrera MD;  Location: [facility] ENDOSCOPY;  Service:    • COLONOSCOPY N/A 11/19/2020    Procedure: COLONOSCOPY to cecum:  apc to cecum angiodysplagia,;  Surgeon: Aron Cabrera MD;  Location: [facility] ENDOSCOPY;  Service: Gastroenterology;  Laterality: N/A;  GI bleed  post:  diverticulosis, angiodysplagia   • CYSTECTOMY      back and shoulder area   • ENDOSCOPY N/A 3/9/2018    Procedure: ESOPHAGOGASTRODUODENOSCOPY with bx;  Surgeon: Aron Cabrera MD;  Location: [facility] ENDOSCOPY;  Service:    • ENDOSCOPY N/A 11/19/2020    Procedure: ESOPHAGOGASTRODUODENOSCOPY;  Surgeon: Aron Cabrera MD;  Location: [facility] ENDOSCOPY;  Service: Gastroenterology;  Laterality: N/A;  GI bleed  post:  HH.   • EYE SURGERY Left     detached retina   • EYE SURGERY Right     \"repaired a hole in the eye\"   • INSERT / REPLACE / REMOVE PACEMAKER     • TESTICLE SURGERY      benign tumor removed.      General Information     Row Name 05/08/23 1046          OT Time and Intention    Document Type evaluation;therapy note (daily note)  -LE     Mode of Treatment physical " therapy;occupational therapy;co-treatment  to mamize mobility and safety, per chart admit due to inability to walk.  -LE     Row Name 05/08/23 1046          General Information    Patient Profile Reviewed yes  -LE     Prior Level of Function independent:;transfer;ADL's  wife assist with back for bathing.  reports she talks pt through getting dressed.  wife reports depending on the day what help he needs both physically and cognitively  -LE     Existing Precautions/Restrictions fall  -LE     Barriers to Rehab cognitive status  -LE     Row Name 05/08/23 1046          Living Environment    People in Home spouse  -LE     Row Name 05/08/23 1046          Cognition    Orientation Status (Cognition) oriented to;person;place  reports admit because getting PNA.  -     Row Name 05/08/23 1046          Safety Issues, Functional Mobility    Safety Issues Affecting Function (Mobility) insight into deficits/self-awareness;judgment;problem-solving  -LE     Impairments Affecting Function (Mobility) balance;cognition;endurance/activity tolerance  -LE     Comment, Safety Issues/Impairments (Mobility) non skid socks and gait belt worn.  son reports they insist pt use walker due to h/o and concern for falls but wife reports pt does not always use.  -LE           User Key  (r) = Recorded By, (t) = Taken By, (c) = Cosigned By    Initials Name Provider Type    Yamel Fox OTR Occupational Therapist                 Mobility/ADL's     Row Name 05/08/23 1050          Bed Mobility    Bed Mobility supine-sit  -LE     Supine-Sit Trail (Bed Mobility) standby assist  -LE     Assistive Device (Bed Mobility) head of bed elevated  -LEIDY     Row Name 05/08/23 1050          Transfers    Transfers stand-sit transfer;toilet transfer;sit-stand transfer;bed-chair transfer  -LEIDY     Row Name 05/08/23 1050          Bed-Chair Transfer    Bed-Chair Trail (Transfers) contact guard;nonverbal cues (demo/gesture);verbal cues  -LEIDY     Row Name  05/08/23 1050          Sit-Stand Transfer    Sit-Stand Bath (Transfers) nonverbal cues (demo/gesture);verbal cues;standby assist  -     Row Name 05/08/23 1050          Stand-Sit Transfer    Stand-Sit Bath (Transfers) nonverbal cues (demo/gesture);verbal cues;contact guard  -LE     Row Name 05/08/23 1050          Toilet Transfer    Bath Level (Toilet Transfer) contact guard;nonverbal cues (demo/gesture);verbal cues  -LE     Assistive Device (Toilet Transfer) grab bars/safety frame  -     Row Name 05/08/23 1050          Functional Mobility    Functional Mobility- Ind. Level contact guard assist;nonverbal cues required (demo/gesture);verbal cues required  -LE     Functional Mobility- Comment bed to bathroom to chair.  -     Row Name 05/08/23 1050          Activities of Daily Living    BADL Assessment/Intervention toileting;feeding;grooming;lower body dressing  -     Row Name 05/08/23 1050          Shower Transfer    Type (Shower Transfer) lateral  -LE     Bath Level (Shower Transfer) contact guard  -LE     Assistive Device (Shower Transfer) grab bar, tub/shower  -LE     Comment, (Shower Transfer) has shower bench at home.  -     Row Name 05/08/23 1050          Toileting Assessment/Training    Comment, (Toileting) did not void during OT.  -     Row Name 05/08/23 1050          Self-Feeding Assessment/Training    Comment, (Feeding) denies difficulty.  -     Row Name 05/08/23 1050          Lower Body Dressing Assessment/Training    Bath Level (Lower Body Dressing) don;doff;socks;standby assist  -LE     Position (Lower Body Dressing) edge of bed sitting  -LE           User Key  (r) = Recorded By, (t) = Taken By, (c) = Cosigned By    Initials Name Provider Type    Yamel Fox OTR Occupational Therapist               Obj/Interventions     Row Name 05/08/23 1052          Sensory Assessment (Somatosensory)    Sensory Assessment (Somatosensory) UE sensation intact  -      Row Name 05/08/23 1052          Range of Motion Comprehensive    General Range of Motion bilateral upper extremity ROM WFL  -LE     Comment, General Range of Motion B Ue 2/3 AROM  -LE     Row Name 05/08/23 1052          Strength Comprehensive (MMT)    Comment, General Manual Muscle Testing (MMT) Assessment B UE 4/5.  -LE     Row Name 05/08/23 1052          Balance    Comment, Balance mild unsteadiness with mobility.  -LE           User Key  (r) = Recorded By, (t) = Taken By, (c) = Cosigned By    Initials Name Provider Type    Yamel Fox OTR Occupational Therapist               Goals/Plan     [facility] Name 05/08/23 1052          Transfer Goal 1 (OT)    Activity/Assistive Device (Transfer Goal 1, OT) bed-to-chair/chair-to-bed;toilet;sit-to-stand/stand-to-sit  walker level if pt agreeable.  -LE     Bland Level/Cues Needed (Transfer Goal 1, OT) standby assist  -LE     Time Frame (Transfer Goal 1, OT) 2 weeks  -LE     Progress/Outcome (Transfer Goal 1, OT) goal ongoing  -LE     Row Name 05/08/23 1052          Dressing Goal 1 (OT)    Activity/Device (Dressing Goal 1, OT) upper body dressing;lower body dressing  -LE     Bland/Cues Needed (Dressing Goal 1, OT) standby assist;set-up required;verbal cues required  -LE     Time Frame (Dressing Goal 1, OT) 2 weeks  -LE     Progress/Outcome (Dressing Goal 1, OT) goal ongoing  -LE     Row Name 05/08/23 1052          Toileting Goal 1 (OT)    Activity/Device (Toileting Goal 1, OT) toileting skills, all  -LE     Bland Level/Cues Needed (Toileting Goal 1, OT) standby assist;set-up required;verbal cues required  -LE     Time Frame (Toileting Goal 1, OT) 2 weeks  -LE     Progress/Outcome (Toileting Goal 1, OT) goal ongoing  -LE     Row Name 05/08/23 1052          Problem Specific Goal 1 (OT)    Problem Specific Goal 1 (OT) SBA ambulation to/from bathroom with walker consistently.  -LE     Time Frame (Problem Specific Goal 1, OT) 2 weeks  -LE      Progress/Outcome (Problem Specific Goal 1, OT) goal ongoing  -LE     Row Name 05/08/23 1052          Therapy Assessment/Plan (OT)    Planned Therapy Interventions (OT) activity tolerance training;adaptive equipment training;BADL retraining;functional balance retraining;transfer/mobility retraining;patient/caregiver education/training;occupation/activity based interventions  -LE           User Key  (r) = Recorded By, (t) = Taken By, (c) = Cosigned By    Initials Name Provider Type    Yamel Fox, OTR Occupational Therapist               Clinical Impression     Row Name 05/08/23 1039          Pain Assessment    Pretreatment Pain Rating 0/10 - no pain  -LE     Row Name 05/08/23 1039          Plan of Care Review    Plan of Care Reviewed With patient;spouse;son  -LE     Outcome Evaluation Pt presents from home with SOA, weakness, unable to to walk.  Pt lives at home with spouse who assists intermittently with ADL and prompts pt for basic tasks.  Family insists pt use walker due to falls but pt does not always use. Pt seen by OT and is able to move OOB, ambulate to bathroom for toilet and shower xfers with CGA without AD. Pt is alert and oriented to self/place but confusion noted at times during conversation.  Pt is able to doff/patrick socks at EOB and denies pain.  B UE are functional.  Wife plans return home with assist and is agreeable to home health OT.  Discuss pt with CCP and RN.  Will cont skilled OT while in acute care and home health at d/c to increase safety and independence.  -LE     Row Name 05/08/23 1039          Therapy Assessment/Plan (OT)    Patient/Family Therapy Goal Statement (OT) reduce risk of falls.  -LE     Rehab Potential (OT) fair, will monitor progress closely  -LE     Criteria for Skilled Therapeutic Interventions Met (OT) yes;meets criteria  -LE     Therapy Frequency (OT) 3 times/wk  -LE     Row Name 05/08/23 1039          Therapy Plan Review/Discharge Plan (OT)    Equipment Needs Upon  Discharge (OT) --  own walker, shower chair.  -LE     Anticipated Discharge Disposition (OT) home with 24/7 care;home with home health  -LE     Row Name 05/08/23 1039          Vital Signs    O2 Delivery Pre Treatment room air  -LE     O2 Delivery Intra Treatment room air  -LE     Post SpO2 (%) 95  -LE     O2 Delivery Post Treatment room air  -LE     Pre Patient Position Supine  -LE     Intra Patient Position Standing  -LE     Post Patient Position Sitting  -LE     Row Name 05/08/23 1039          Positioning and Restraints    Pre-Treatment Position in bed  -LE     Post Treatment Position chair  -LE     In Chair notified nsg;reclined;call light within reach;encouraged to call for assist;exit alarm on;with family/caregiver  -LE           User Key  (r) = Recorded By, (t) = Taken By, (c) = Cosigned By    Initials Name Provider Type    Yamel Fox OTR Occupational Therapist               Outcome Measures     Row Name 05/08/23 1053          How much help from another is currently needed...    Putting on and taking off regular lower body clothing? 3  -LE     Bathing (including washing, rinsing, and drying) 3  -LE     Toileting (which includes using toilet bed pan or urinal) 3  -LE     Putting on and taking off regular upper body clothing 3  -LE     Taking care of personal grooming (such as brushing teeth) 3  -LE     Eating meals 4  -LE     AM-PAC 6 Clicks Score (OT) 19  -LE     Row Name 05/08/23 1042 05/08/23 1000       How much help from another person do you currently need...    Turning from your back to your side while in flat bed without using bedrails? 3  -CB 4  -MM    Moving from lying on back to sitting on the side of a flat bed without bedrails? 3  -CB 4  -MM    Moving to and from a bed to a chair (including a wheelchair)? 3  -CB 3  -MM    Standing up from a chair using your arms (e.g., wheelchair, bedside chair)? 3  -CB 3  -MM    Climbing 3-5 steps with a railing? 3  -CB 3  -MM    To walk in hospital room? 3   -CB 3  -MM    AM-PAC 6 Clicks Score (PT) 18  -CB 20  -MM    Highest level of mobility 6 --> Walked 10 steps or more  -CB 6 --> Walked 10 steps or more  -MM    Row Name 05/08/23 1053 05/08/23 1042       Functional Assessment    Outcome Measure Options AM-PAC 6 Clicks Daily Activity (OT)  -LE AM-PAC 6 Clicks Basic Mobility (PT)  -CB          User Key  (r) = Recorded By, (t) = Taken By, (c) = Cosigned By    Initials Name Provider Type    LE Yamel Pena, OTR Occupational Therapist    CB Leslie Jones, PT Physical Therapist    MM Navid Laurent, RN Registered Nurse                Occupational Therapy Education     Title: PT OT SLP Therapies (In Progress)     Topic: Occupational Therapy (Done)     Point: ADL training (Done)     Description:   Instruct learner(s) on proper safety adaptation and remediation techniques during self care or transfers.   Instruct in proper use of assistive devices.              Learning Progress Summary           Patient Acceptance, E, Bed IU by  at 5/8/2023 1054   Family Acceptance, E, Bed IU by  at 5/8/2023 1054                   Point: Precautions (Done)     Description:   Instruct learner(s) on prescribed precautions during self-care and functional transfers.              Learning Progress Summary           Patient Acceptance, E, Bed IU by  at 5/8/2023 1054   Family Acceptance, E, Bed IU by  at 5/8/2023 1054                   Point: Body mechanics (Done)     Description:   Instruct learner(s) on proper positioning and spine alignment during self-care, functional mobility activities and/or exercises.              Learning Progress Summary           Patient Acceptance, E, Bed IU by  at 5/8/2023 1054   Family Acceptance, E, Bed IU by  at 5/8/2023 1054                               User Key     Initials Effective Dates Name Provider Type Discipline     06/16/21 -  Yamel Pena OTR Occupational Therapist OT              OT Recommendation and Plan  Planned Therapy Interventions  (OT): activity tolerance training, adaptive equipment training, BADL retraining, functional balance retraining, transfer/mobility retraining, patient/caregiver education/training, occupation/activity based interventions  Therapy Frequency (OT): 3 times/wk  Plan of Care Review  Plan of Care Reviewed With: patient, spouse, son  Outcome Evaluation: Pt presents from home with SOA, weakness, unable to to walk.  Pt lives at home with spouse who assists intermittently with ADL and prompts pt for basic tasks.  Family insists pt use walker due to falls but pt does not always use. Pt seen by OT and is able to move OOB, ambulate to bathroom for toilet and shower xfers with CGA without AD. Pt is alert and oriented to self/place but confusion noted at times during conversation.  Pt is able to doff/patrick socks at EOB and denies pain.  B UE are functional.  Wife plans return home with assist and is agreeable to home health OT.  Discuss pt with CCP and RN.  Will cont skilled OT while in acute care and home health at d/c to increase safety and independence.     Time Calculation:    Time Calculation- OT     Row Name 05/08/23 1055             Time Calculation- OT    OT Start Time 1000  -LE      OT Stop Time 1024  additional time talking with CCP and RN and PT  -LE      OT Time Calculation (min) 24 min  -LE      Total Timed Code Minutes- OT 10 minute(s)  -LE      OT Received On 05/08/23  -LE      OT - Next Appointment 05/10/23  -LE      OT Goal Re-Cert Due Date 05/22/23  -LE         Timed Charges    05028 - OT Self Care/Mgmt Minutes 10  -LE         Total Minutes    Timed Charges Total Minutes 10  -LE       Total Minutes 10  -LE            User Key  (r) = Recorded By, (t) = Taken By, (c) = Cosigned By    Initials Name Provider Type    Yamel Fox OTR Occupational Therapist              Therapy Charges for Today     Code Description Service Date Service Provider Modifiers Qty    [id number]  OT EVAL MOD COMPLEXITY 3 5/8/2023 Jean  ABILIO Montesinos GO 1    [id number] HC OT SELF CARE/MGMT/TRAIN EA 15 MIN 5/8/2023 Yamel Pena OTR GO 1               ABILIO Campbell  5/8/2023

## 2023-05-08 NOTE — OUTREACH NOTE
Prep Survey    Flowsheet Row Responses   [facility] facility patient discharged from? [facility]   Is LACE score < 7 ? No   Eligibility Readm Mgmt   Discharge diagnosis Pneumonia of both lungs    Does the patient have one of the following disease processes/diagnoses(primary or secondary)? Pneumonia   Does the patient have Home health ordered? Yes   What is the [facility] health agency[facility]    Is there a DME ordered? No   Prep survey completed? Yes          Juliet SALGADO - Registered Nurse

## 2023-05-08 NOTE — HOME HEALTH
THIS VISIT:  TEACHING AND INSTRUCTION ON MEDS, BREATHING EXERCISES, ACTIVITY NEEDS AND LIMITATIONS.      NEXT VISIT:  CONTINUE TEACHING ON COVID 19, INFECTION, MEDS

## 2023-05-08 NOTE — PROGRESS NOTES
Enter Query Response Below      Query Response:         Stage II Electronically signed by Calvin Bergeron MD, 23, 11:20 AM EDT.       If applicable, please update the problem list.   Patient: Asia Ly        : 1932  Account: [id number]           Admit Date: 23        How to Respond to this query:       a. Click New Note     b. Answer query within the yellow box.                c. Update the Problem List, if applicable.      If you have any questions about this query contact  [email or web address]    ,  Patient noted to have a history of CKD without stage. During this admission the patient's labs are as follows:  GFR   5/6-70.6  5/7-72.4    Please clarify the stage of the patients CKD.    KDIGO CKD staging [email or web address]  Stage 1    GFR > 90  Stage 2    GFR 60-89  Stage 3a  GFR 45-59  Stage 3b  GFR 30-44  Stage 4    GFR 15-29  Stage 5    GFR <15  By submitting this query, we are merely seeking further clarification of documentation to accurately reflect all conditions that you are monitoring, evaluating, treating or that extend the hospitalization or utilize additional resources of care. Please utilize your independent clinical judgment when addressing the question(s) above.     This query and your response, once completed, will be entered into the legal medical record.    Sincerely,  Lali Valerio  Clinical Documentation Integrity Program      10-Jul-2019 23:27

## 2023-05-08 NOTE — PLAN OF CARE
Problem: Adult Inpatient Plan of Care  Goal: Absence of Hospital-Acquired Illness or Injury  Intervention: Prevent Skin Injury  Description: Perform a screening for skin injury risk, such as pressure or moisture associated skin damage on admission and at regular intervals throughout hospital stay.  Keep all areas of skin (especially folds) clean and dry.  Maintain adequate skin hydration.  Relieve and redistribute pressure and protect bony prominences; implement measures based on patient-specific risk factors.  Match turning and repositioning schedule to clinical condition.  Encourage weight shift frequently; assist with reposition if unable to complete independently.  Float heels off bed; avoid pressure on the Achilles tendon.  Keep skin free from extended contact with medical devices.  Encourage functional activity and mobility, as early as tolerated.  Use aids (e.g., slide boards, mechanical lift) during transfer.  Recent Flowsheet Documentation  Taken 5/8/2023 0425 by Juan C Graves RN  Body Position:   position changed independently   side-lying   left  Taken 5/8/2023 0221 by Juan C Graves RN  Body Position:   position changed independently   lower extremity elevated  Taken 5/8/2023 0023 by Juan C Graves RN  Body Position:   position changed independently   side-lying  Skin Protection: adhesive use limited  Taken 5/7/2023 2222 by Juan C Graves RN  Body Position: position changed independently  Taken 5/7/2023 2017 by Juan C Graves RN  Body Position:   position changed independently   supine, legs elevated  Skin Protection: adhesive use limited   Goal Outcome Evaluation:   Patient VSS,bed alarm set, and call light in reach.

## 2023-05-08 NOTE — PROGRESS NOTES
Exercise Oximetry    Patient Name:Asia Ly   MRN: [id number]   Date: 05/08/23             ROOM AIR BASELINE   SpO2% 95   Heart Rate   65   Blood Pressure      EXERCISE ON ROOM AIR SpO2% EXERCISE ON O2 @ 2 LPM SpO2%   1 MINUTE 93 1 MINUTE    2 MINUTES 91 2 MINUTES    3 MINUTES 90 3 MINUTES    4 MINUTES 87 4 MINUTES    5 MINUTES  5 MINUTES       91   6 MINUTES  6 MINUTES       91              Distance Walked   Distance Walked   Dyspnea (Lesley Scale)   Dyspnea (Lesley Scale)   Fatigue (Lesley Scale)   Fatigue (Lesley Scale)   SpO2% Post Exercise  93 SpO2% Post Exercise   HR Post Exercise  88 HR Post Exercise   Time to Recovery  3 minutes Time to Recovery     Comments: Patient required 2lpm NC after approximately 4 minutes of walk.  Patient had no complaints of SOA or pain.

## 2023-05-08 NOTE — PROGRESS NOTES
"  Infectious Diseases Progress Note    Rolly Pedroza MD     [facility]  [place]: 2 days  Patient Identification:  Name: Asia Ly  Age: 89 y.o.  Sex: male  :  1932  MRN: [id number]         Primary Care Physician: Lubna Lobo MD        Subjective: Up and about in the room feeling better.  Able to ambulate from bedside to commode and back without being in any respiratory distress and can do it without oxygen supplementation.  Interval History: See consultation note.    Objective:    Scheduled Meds:allopurinol, 100 mg, Oral, BID  apixaban, 5 mg, Oral, Q12H  atorvastatin, 10 mg, Oral, Every Other Day  azithromycin, 500 mg, Intravenous, Q24H  Budesonide, 6 mg, Oral, Daily  cefTRIAXone, 1 g, Intravenous, Q24H  guaiFENesin, 600 mg, Oral, Q12H  ipratropium-albuterol, 3 mL, Nebulization, Q4H - RT  metoprolol tartrate, 12.5 mg, Oral, Q12H  pantoprazole, 40 mg, Oral, BID AC      Continuous Infusions:     Vital signs in last 24 hours:  Temp:  [97.6 °F (36.4 °C)-98.8 °F (37.1 °C)] 97.9 °F (36.6 °C)  Heart Rate:  [59-66] 59  Resp:  [16-18] 16  BP: (112-155)/(54-65) 139/65    Intake/Output:    Intake/Output Summary (Last 24 hours) at 2023 0654  Last data filed at 2023 1730  Gross per 24 hour   Intake 720 ml   Output --   Net 720 ml       Exam:  /65 (BP Location: Left arm, Patient Position: Lying)   Pulse 59   Temp 97.9 °F (36.6 °C) (Oral)   Resp 16   Ht 172.7 cm (68\")   Wt 61.7 kg (136 lb 0.4 oz)   SpO2 99%   BMI 20.68 kg/m²   Patient is examined using the personal protective equipment as per guidelines from infection control for this particular patient as enacted.  Hand washing was performed before and after patient interaction.  General Appearance:  Up and about and ambulating with assistance without any issues and claims to be feeling better.  In no distress and does not require oxygen supplementation                          Head:    Normocephalic, without obvious abnormality, " atraumatic                           Eyes:    PERRL, conjunctivae/corneas clear, EOM's intact, both eyes                         Throat:   Lips, tongue, gums normal; oral mucosa pink and moist                           Neck:   Supple, symmetrical, trachea midline, no JVD                         Lungs:    Clear to auscultation bilaterally, respirations unlabored                 Chest Wall:    No tenderness or deformity                          Heart:  S1-S2 regular                  Abdomen:   Soft nontender                 Extremities:   Extremities normal, atraumatic, no cyanosis or edema                        Pulses:   Pulses palpable in all extremities                            Skin:   Skin is warm and dry,  no rashes or palpable lesions                  Neurologic: Awake alert cooperative pleasantly confused but directable.       Data Review:    I reviewed the patient's new clinical results.  Results from last 7 days   Lab Units 05/07/23  0650 05/06/23  0748   WBC 10*3/mm3 9.84 16.03*   HEMOGLOBIN g/dL 11.6* 13.1   PLATELETS 10*3/mm3 147 177     Results from last 7 days   Lab Units 05/07/23  0650 05/06/23  0748   SODIUM mmol/L 138 135*   POTASSIUM mmol/L 4.0 4.4   CHLORIDE mmol/L 105 101   CO2 mmol/L 22.7 24.3   BUN mg/dL 25* 31*   CREATININE mg/dL 0.99 1.01   CALCIUM mg/dL 8.7 9.3   GLUCOSE mg/dL 98 96     Blood Culture   Date Value Ref Range Status   05/06/2023 No growth at 2 days  Preliminary     No results found for: BCIDPCR, CXREFLEX, CSFCX, CULTURETIS  No results found for: CULTURES, HSVCX, URCX  No results found for: EYECULTURE, GCCX, HSVCULTURE, LABHSV  No results found for: LEGIONELLA, MRSACX, MUMPSCX, MYCOPLASCX  No results found for: NOCARDIACX, STOOLCX  No results found for: THROATCX, UNSTIMCULT, URINECX, CULTURE, VZVCULTUR  No results found for: VIRALCULTU, WOUNDCX.rad  ECG 12 Lead Dyspnea   Final Result   HEART RATE= 70  bpm   RR Interval= 857  ms   WV Interval=   ms   P Horizontal Axis=   deg   P  Front Axis=   deg   QRSD Interval= 94  ms   QT Interval= 383  ms   QRS Axis= -6  deg   T Wave Axis= 99  deg   - ABNORMAL ECG -   Atrial fibrillation - new   Abnormal R-wave progression, early transition   Probable LVH with secondary repol abnrm   Electronically Signed By: Ruthann Lynn ([facility]) 06-May-2023 17:18:48   Date and Time of Study: 2023-05-06 07:22:30      SCANNED - TELEMETRY     Final Result      SCANNED - TELEMETRY     Final Result      SCANNED - TELEMETRY     Final Result      SCANNED - TELEMETRY     Final Result      SCANNED - TELEMETRY     Final Result      SCANNED - TELEMETRY     Final Result      SCANNED - TELEMETRY     Final Result      SCANNED - TELEMETRY     Final Result        Microbiology Results (last 10 days)     Procedure Component Value - Date/Time    S. Pneumo Ag Urine or CSF - Urine, Urine, Clean Catch [[id number]]  (Normal) Collected: 05/07/23 0954    Lab Status: Final result Specimen: Urine, Clean Catch Updated: 05/07/23 1048     Strep Pneumo Ag Negative    Blood Culture - Blood, Arm, Left [[id number]]  (Normal) Collected: 05/06/23 0809    Lab Status: Preliminary result Specimen: Blood from Arm, Left Updated: 05/08/23 0815     Blood Culture No growth at 2 days    Respiratory Panel PCR w/COVID-19(SARS-CoV-2) MADHU/JOY/MONIQUE/PAD/COR/MAD/ELBERT In-House, NP Swab in UTM/VTM, 3-4 HR TAT - Swab, Nasopharynx [[id number]]  (Abnormal) Collected: 05/06/23 0752    Lab Status: Final result Specimen: Swab from Nasopharynx Updated: 05/06/23 0914     ADENOVIRUS, PCR Not Detected     Coronavirus 229E Not Detected     Coronavirus HKU1 Not Detected     Coronavirus NL63 Not Detected     Coronavirus OC43 Not Detected     COVID19 Detected     Human Metapneumovirus Not Detected     Human Rhinovirus/Enterovirus Not Detected     Influenza A PCR Not Detected     Influenza B PCR Not Detected     Parainfluenza Virus 1 Not Detected     Parainfluenza Virus 2 Not Detected     Parainfluenza Virus 3 Not Detected     Parainfluenza  Virus 4 Not Detected     RSV, PCR Not Detected     Bordetella pertussis pcr Not Detected     Bordetella parapertussis PCR Not Detected     Chlamydophila pneumoniae PCR Not Detected     Mycoplasma pneumo by PCR Not Detected    Narrative:      In the setting of a positive respiratory panel with a viral infection PLUS a negative procalcitonin without other underlying concern for bacterial infection, consider observing off antibiotics or discontinuation of antibiotics and continue supportive care. If the respiratory panel is positive for atypical bacterial infection (Bordetella pertussis, Chlamydophila pneumoniae, or Mycoplasma pneumoniae), consider antibiotic de-escalation to target atypical bacterial infection.    Blood Culture - Blood, Arm, Right [[id number]]  (Normal) Collected: 05/06/23 0748    Lab Status: Preliminary result Specimen: Blood from Arm, Right Updated: 05/08/23 0800     Blood Culture No growth at 2 days            Assessment:    Pneumonia of both lungs due to infectious organism, unspecified part of lung  1-acute hypoxemic respiratory failure with respiratory distress likely due to acute pulmonary thromboembolism due to hypercoagulable state as a result of recent immobility due to acute hospitalization and underlying COVID-19 infection  2-breakthrough hypercoagulable state despite being on anticoagulation therapy  3-possible exacerbation of congestive heart failure with bilateral lung infiltrate and hypoxia and elevated BNP  4-possible secondary bacterial pneumonia given elevated procalcitonin level in this situation  5-history of atrial fibrillation  6-history of COPD  7-chronic kidney disease  8-chronic anticoagulation therapy  9-elevated troponin.     Recommendations/Discussions:  Seems to be doing very well.  His Rocephin can be changed to oral Ceftin to complete 5 to 7 days treatment for post COVID-19 bacterial pneumonia given his elevated procalcitonin and worsening lung infiltrate after initial  treatment for COVID-19 infection with remdesivir and dexamethasone.  Management of ongoing acute pulmonary embolism as per primary team and pulmonary service and he seems to have been transitioned to oral anticoagulation therapy.  Patient's family members are educated to keep an eye on side effects of antibiotics such as nausea vomiting diarrhea rash C. difficile infection yeast infection selection of resistant pathogens cytopenias and interaction with other medications.  They do understand that antibiotics can cause side effects and problems but also agree that he needs antibiotic therapy going forward for his diagnosed pneumonia in the setting of recent COVID-19 infection.  Rolly Pedroza MD  5/8/2023  06:54 EDT    Parts of this note may be an electronic transcription/translation of spoken language to printed text using the Dragon dictation system.

## 2023-05-08 NOTE — DISCHARGE SUMMARY
Discharge summary    Date of admission 5/6/2023  Date of discharge 5/8/2023    Final diagnosis  Acute hypoxic respiratory failure  Bilateral pulmonary embolism  Acute left lower extremity DVT  Bilateral pneumonia  Recent COVID-19 infection  Hypertension  Hyperlipidemia  Aortic stenosis  Pulmonary fibrosis  Bronchiectasis  Pulm hypertension  Crohn's disease  Gastroesophageal reflux disease    Discharge medications    Current Facility-Administered Medications:   •  allopurinol (ZYLOPRIM) tablet 100 mg, 100 mg, Oral, BID, Calvin Bergeron MD, 100 mg at 05/08/23 0855  •  apixaban (ELIQUIS) tablet 5 mg, 5 mg, Oral, Q12H, Calvin Bergeron MD, 5 mg at 05/08/23 0855  •  atorvastatin (LIPITOR) tablet 10 mg, 10 mg, Oral, Every Other Day, Calvin Bergeron MD, 10 mg at 05/07/23 0900  •  Budesonide (ENTOCORT EC) 24 hr capsule 6 mg, 6 mg, Oral, Daily, Calvin Bergeron MD, 6 mg at 05/08/23 0855  •  cefuroxime (CEFTIN) tablet 250 mg, 250 mg, Oral, Q12H, Rolly Pedroza MD, 250 mg at 05/08/23 1136  •  guaiFENesin (MUCINEX) 12 hr tablet 600 mg, 600 mg, Oral, Q12H, Calvin Bergeron MD, 600 mg at 05/08/23 0855  •  metoprolol tartrate (LOPRESSOR) tablet 12.5 mg, 12.5 mg, Oral, Q12H, Calvin Bergeron MD, 12.5 mg at 05/08/23 0855  •  pantoprazole (PROTONIX) EC tablet 40 mg, 40 mg, Oral, BID AC, Calvin Bergeron MD, 40 mg at 05/08/23 0855  •  [COMPLETED] Insert Peripheral IV, , , Once **AND** sodium chloride 0.9 % flush 10 mL, 10 mL, Intravenous, PRN, Monty Pena PA  •  [COMPLETED] Insert Peripheral IV, , , Once **AND** sodium chloride 0.9 % flush 10 mL, 10 mL, Intravenous, PRN, Supa French MD     Consults obtained  Pulmonary  Infectious disease    Procedures  None    Hospital course  89-year-old white male who is well-known to our service with history of atrial fibrillation hypertension hyperlipidemia pulmonary fibrosis bronchiectasis and Crohn's disease who was recently treated for COVID-19 infection and discharged home in stable condition presented to  [facility] emergency room with increased shortness of breath and work-up in ER revealed acute pulmonary emboli bilateral and also found to have bilateral pneumonia with recent COVID-19 infection admitted for management.  Patient admitted treated with supplemental oxygen nebulizer empiric antibiotics and Lovenox and further work-up with lower extremity Doppler showed acute left lower extremity DVT.  Patient remain on Lovenox and his Eliquis changed to full dose as he failed 2.5 twice daily.  Patient followed by pulmonary infectious disease and his cultures remains negative and infectious disease recommend 5 days of oral Ceftin on discharge.  At the time of discharge his oxygen saturation 92-95% on room air.  Patient remained DNR throughout hospital course.    Discharge diet regular     Activity as tolerated     Medications as above    Follow-up with prime doctor in 1 week and follow-up with pulmonary and infectious disease per the instructions and take medication as directed.    SHELBIE COWART MD

## 2023-05-08 NOTE — PLAN OF CARE
Goal Outcome Evaluation:  Plan of Care Reviewed With: patient, spouse, son           Outcome Evaluation: Patient is a 89 yo male who presented with SOA with recent dc 4/29 with COVID pneumonia. He lives with his spouse and is ind at baseline without use of AD. He has rwx and wife reports she attempts to have him use it on his days when he is weaker but pt often refuses. Today he completed bed mobility and multiple STS requiring SBA. He ambulated 60ft +25ft without AD requiring SBA-CGA. Pt is impulsive at times and has decreased safety awareness. He may continue to benefit from skilled PT to address functional deficits. Also safe to ambulate with nsg staff. Plan home with 24/7 care and HHPT at [place].

## 2023-05-08 NOTE — TELEPHONE ENCOUNTER
Caller: Ni Ly    Relationship: Emergency Contact    Best call back number: [phone]        Who are you requesting to speak with (clinical staff, provider,  specific staff member): CLINICAL        What was the call regarding: FYI: PT IS IN HOSPITAL WITH COVID CANCELED APPT FOR 05/09/2023    Do you require a callback: ONLY IF YOU NEED TO

## 2023-05-08 NOTE — PROGRESS NOTES
Patient is current with [facility]. Will follow for dc plans/ home health needs.

## 2023-05-08 NOTE — PROGRESS NOTES
"                                              LOS: 2 days   Patient Care Team:  Lubna Lobo MD as PCP - General  Lubna Lobo MD as PCP - Family Medicine    Chief Complaint:  F/up pneumonia and PE    Subjective   Interval History  On RA.  SPO2 92%.  Has minimal cough with only minimal phlegm.  No shortness of breath.    REVIEW OF SYSTEMS:       GASTROINTESTINAL: No anorexia, nausea, vomiting or diarrhea. No abdominal pain.  CONSTITUTIONAL: No fever or chills.  Cardiovascular: No chest pain or palpitation    Ventilator/Non-Invasive Ventilation Settings (From admission, onward)    None                Physical Exam:     Vital Signs  Temp:  [97.6 °F (36.4 °C)-98.5 °F (36.9 °C)] 98.2 °F (36.8 °C)  Heart Rate:  [59-66] 66  Resp:  [16-18] 18  BP: (112-159)/(54-89) 159/89    Intake/Output Summary (Last 24 hours) at 5/8/2023 1152  Last data filed at 5/8/2023 0800  Gross per 24 hour   Intake 600 ml   Output --   Net 600 ml     Flowsheet Rows    Flowsheet Row First Filed Value   Admission Height 172.7 cm (68\") Documented at 05/06/2023 0712   Admission Weight 61.7 kg (136 lb) Documented at 05/06/2023 0712          PPE used per hospital policy    General Appearance:   Alert, cooperative, in no acute distress   ENMT:  Mallampati score 3, moist mucous membrane   Eyes:  Pupils equal and reactive to light. EOMI   Neck:   Large. Trachea midline. No thyromegaly.   Lungs:    Very minimal crackles at the bases and improved since yesterday.  No wheezing.    Heart:   Regular rhythm and normal rate, normal S1 and S2, no         murmur   Skin:   No rash or ecchymosis   Abdomen:    Obese. Soft. No tenderness. No HSM.   Neuro/psych:  Conscious, alert, oriented x3. Strength 5/5 in upper and lower  ext.  Appropriate mood and affect   Extremities:  No cyanosis, clubbing or edema.  Warm extremities and well-perfused          Results Review:        Results from last 7 days   Lab Units 05/08/23  0613 05/07/23  0650 05/06/23  0748   SODIUM " mmol/L 136 138 135*   POTASSIUM mmol/L 3.9 4.0 4.4   CHLORIDE mmol/L 108* 105 101   CO2 mmol/L 21.0* 22.7 24.3   BUN mg/dL 22 25* 31*   CREATININE mg/dL 0.82 0.99 1.01   GLUCOSE mg/dL 191* 98 96   CALCIUM mg/dL 8.4 8.7 9.3     Results from last 7 days   Lab Units 05/06/23  1109 05/06/23  0748   HSTROP T ng/L 44* 28*     Results from last 7 days   Lab Units 05/08/23  0613 05/07/23  0650 05/06/23  0748   WBC 10*3/mm3 6.72 9.84 16.03*   HEMOGLOBIN g/dL 10.0* 11.6* 13.1   HEMATOCRIT % 31.3* 35.2* 40.0   PLATELETS 10*3/mm3 125* 147 177     Results from last 7 days   Lab Units 05/06/23  0804   INR  1.51*     Results from last 7 days   Lab Units 05/06/23  0748   PROBNP pg/mL 5,361.0*                   Results from last 7 days   Lab Units 05/06/23  0929   FLOW RATE lpm 2   MODALITY  Cannula   O2 SATURATION CALC % 86.6*         I reviewed the patient's new clinical results.        Medication Review:   allopurinol, 100 mg, Oral, BID  apixaban, 5 mg, Oral, Q12H  atorvastatin, 10 mg, Oral, Every Other Day  Budesonide, 6 mg, Oral, Daily  cefuroxime, 250 mg, Oral, Q12H  guaiFENesin, 600 mg, Oral, Q12H  ipratropium-albuterol, 3 mL, Nebulization, Q4H - RT  metoprolol tartrate, 12.5 mg, Oral, Q12H  pantoprazole, 40 mg, Oral, BID AC               Assessment     1. Acute bilateral small subsegmental PE  2. Acute left calf DVT  3. Trace/mild bilateral pleural effusion  4. Bilateral lower lobe consolidation  5. Leukocytosis with left shift and borderline procalcitonin suggestive of bacterial pneumonia  6. COPD, no exacerbation  7. History of major nasal bleeding treated endoscopically by Dr. Scott's-during that time, his Eliquis was stopped for 2 weeks and Eliquis dose was reduced from 5 to 2.5 mg twice daily      Plan     · Eliquis 5 mg twice daily for at least 3-6 months.  Then can either continue the same dose depending on risk factors and risk/benefit ratio or reduce down to 2.5 mg twice daily.  This could be decided by either PCP,  cardiology or pulmonary.  · Switch to cefuroxime twice a day for pneumonia  · DuoNeb 4 times a day.  Start flutter to improve mucus clearance.  Can resume home nebulizer/inhaler therapy on DC  · Check walking oximetry prior to DC.  Titrate oxygen to keep SPO2 >90%.      Okay to DC home.    Had a long discussion with the patient, his son and his wife regarding the issue with PE/DVT, etiologies of clotting,, treatment course and duration of therapy and expectation and the risks which include bleeding.  Discussed ways to mitigate nasal bleeding such as using humidifier at home and avoid bending over and avoid strenuous activities.  Also discussed ways to reduce the risk of clotting which involved mainly having an active lifestyle.      Steven Donohue MD  05/08/23  11:52 EDT    Time>35 min face to face and on the shell >1/2 directed toward counseling and coordination of care          This note was dictated utilizing Dragon dictation

## 2023-05-08 NOTE — THERAPY EVALUATION
Patient Name: Asia Ly  : 1932    MRN: [id number]                              Today's Date: 2023       Admit Date: 2023    Visit Dx:     ICD-10-CM ICD-9-CM   1. Pneumonia of both lungs due to infectious organism, unspecified part of lung  J18.9 483.8   2. COVID-19 virus infection  U07.1 079.89   3. Troponin level elevated  R77.8 790.6   4. Atrial fibrillation, unspecified type  I48.91 427.31   5. Multiple subsegmental pulmonary emboli without acute cor pulmonale  I26.94 415.19     Patient Active Problem List   Diagnosis   • Ataxia   • TIA (transient ischemic attack)   • Atrial fibrillation, paroxysmal   • Hyperlipidemia   • Gout   • Crohn's disease   • Atrial fibrillation with RVR   • Acute cholecystitis   • Obstructive jaundice   • Acute lower GI bleeding   • Aortic stenosis   • Pulmonary fibrosis   • Anticoagulant long-term use   • Febrile illness, acute   • Atrial fibrillation with rapid ventricular response   • COVID-19 virus infection   • Pneumonia of both lungs due to infectious organism, unspecified part of lung     Past Medical History:   Diagnosis Date   • Aortic stenosis    • Aspiration pneumonia    • Atrial fibrillation    • Bleeding from the nose    • Bronchiectasis    • CKD (chronic kidney disease)    • COPD (chronic obstructive pulmonary disease)    • Crohn's disease    • Dizziness    • Elevated cholesterol    • Gastric ulcer    • Generalized weakness    • Gout    • Gout    • Hard of hearing    • Hyperlipidemia    • Hypertension    • Leg swelling    • Lower extremity edema    • Mild anemia    • Near syncope    • Nonrheumatic aortic valve stenosis    • PAF (paroxysmal atrial fibrillation)    • Palpitations    • Peptic ulcer    • Pneumonia of left lung due to infectious organism    • Pulmonary fibrosis    • Stroke    • TIA (transient ischemic attack)      Past Surgical History:   Procedure Laterality Date   • BRONCHOSCOPY N/A 10/22/2018    Procedure: BRONCHOSCOPY WITH BRONCHALVEOLAR  "LAVAGE;  Surgeon: Chidi Oconnor MD;  Location: [facility] ENDOSCOPY;  Service: Pulmonary   • BRONCHOSCOPY N/A 7/8/2021    Procedure: BRONCHOSCOPY with BAL;  Surgeon: Chidi Oconnor MD;  Location: [facility] ENDOSCOPY;  Service: Pulmonary;  Laterality: N/A;  Pre: bronchectasis  Post: same   • CARDIAC ELECTROPHYSIOLOGY PROCEDURE N/A 7/9/2021    Procedure: Pacemaker DC new--[employer] possible His lead;  Surgeon: Rashaun Nettles MD;  Location: [facility] CATH INVASIVE LOCATION;  Service: Cardiology;  Laterality: N/A;   • CARDIAC ELECTROPHYSIOLOGY PROCEDURE N/A 9/3/2021    Procedure: AV node 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 ppm;  Surgeon: Rashaun Nettles MD;  Location: [facility] CATH INVASIVE LOCATION;  Service: Cardiovascular;  Laterality: N/A;   • CATARACT EXTRACTION Bilateral    • COLONOSCOPY     • COLONOSCOPY N/A 3/9/2018    Procedure: COLONOSCOPY to terminal ileum with bx;  Surgeon: Aron Cabrera MD;  Location: [facility] ENDOSCOPY;  Service:    • COLONOSCOPY N/A 11/19/2020    Procedure: COLONOSCOPY to cecum:  apc to cecum angiodysplagia,;  Surgeon: Aron Cabrera MD;  Location: [facility] ENDOSCOPY;  Service: Gastroenterology;  Laterality: N/A;  GI bleed  post:  diverticulosis, angiodysplagia   • CYSTECTOMY      back and shoulder area   • ENDOSCOPY N/A 3/9/2018    Procedure: ESOPHAGOGASTRODUODENOSCOPY with bx;  Surgeon: Aron Cabrera MD;  Location: [facility] ENDOSCOPY;  Service:    • ENDOSCOPY N/A 11/19/2020    Procedure: ESOPHAGOGASTRODUODENOSCOPY;  Surgeon: Aron Cabrera MD;  Location: [facility] ENDOSCOPY;  Service: Gastroenterology;  Laterality: N/A;  GI bleed  post:  HH.   • EYE SURGERY Left     detached retina   • EYE SURGERY Right     \"repaired a hole in the eye\"   • INSERT / REPLACE / REMOVE PACEMAKER     • TESTICLE SURGERY      benign tumor removed.      General Information     Row Name 05/08/23 1039          Physical Therapy Time and Intention    Document Type evaluation  -CB     Mode of Treatment co-treatment;physical " therapy;occupational therapy  -CB     Row Name 05/08/23 1034          General Information    Patient Profile Reviewed yes  -CB     Prior Level of Function independent:;transfer;gait;bed mobility  -CB     Existing Precautions/Restrictions fall  -CB     Barriers to Rehab none identified  -CB     Row Name 05/08/23 1034          Living Environment    People in Home spouse  -CB     Row Name 05/08/23 1034          Home Main Entrance    Number of Stairs, Main Entrance three  -CB     Stair Railings, Main Entrance railing on right side (ascending)  -CB     Row Name 05/08/23 1034          Cognition    Orientation Status (Cognition) oriented x 3  -CB     Row Name 05/08/23 1034          Safety Issues, Functional Mobility    Safety Issues Affecting Function (Mobility) insight into deficits/self-awareness;impulsivity;problem-solving;safety precaution awareness;safety precautions follow-through/compliance;awareness of need for assistance  -CB     Impairments Affecting Function (Mobility) balance;endurance/activity tolerance  -CB           User Key  (r) = Recorded By, (t) = Taken By, (c) = Cosigned By    Initials Name Provider Type    CB Leslie Jones PT Physical Therapist               Mobility     Row Name 05/08/23 1035          Bed Mobility    Bed Mobility supine-sit  -CB     Supine-Sit [place] (Bed Mobility) standby assist  -CB     Assistive Device (Bed Mobility) head of bed elevated  -CB     Row Name 05/08/23 1035          Sit-Stand Transfer    Sit-Stand [place] (Transfers) standby assist;verbal cues  -CB     Assistive Device (Sit-Stand Transfers) --  no AD  -CB     Comment, (Sit-Stand Transfer) x4 STS reps  -CB     Row Name 05/08/23 1035          Gait/Stairs (Locomotion)    [place] Level (Gait) contact guard;standby assist;verbal cues  -CB     Assistive Device (Gait) --  no AD  -CB     Distance in Feet (Gait) 60ft +25ft  -CB     Deviations/Abnormal Patterns (Gait) gait speed decreased;stride length decreased   -CB     Comment, (Gait/Stairs) minimal unsteadiness noted although no overt LOB  -CB           User Key  (r) = Recorded By, (t) = Taken By, (c) = Cosigned By    Initials Name Provider Type    Leslie Rush PT Physical Therapist               Obj/Interventions     Row Name 05/08/23 1036          Range of Motion Comprehensive    General Range of Motion bilateral lower extremity ROM WFL  -CB     Row Name 05/08/23 1036          Strength Comprehensive (MMT)    Comment, General Manual Muscle Testing (MMT) Assessment BLE 4+/5  -CB     Row Name 05/08/23 1036          Balance    Balance Assessment standing static balance;standing dynamic balance  -CB     Static Standing Balance standby assist  -CB     Dynamic Standing Balance standby assist;contact guard;verbal cues  -CB     Position/Device Used, Standing Balance unsupported  -CB     Balance Interventions sitting;standing;sit to stand;supported;static;dynamic;minimal challenge  -CB     Row Name 05/08/23 1036          Sensory Assessment (Somatosensory)    Sensory Assessment (Somatosensory) sensation intact  -CB           User Key  (r) = Recorded By, (t) = Taken By, (c) = Cosigned By    Initials Name Provider Type    Leslie Rush PT Physical Therapist               Goals/Plan     Row Name 05/08/23 1041          Bed Mobility Goal 1 (PT)    Activity/Assistive Device (Bed Mobility Goal 1, PT) bed mobility activities, all  -CB     [place] Level/Cues Needed (Bed Mobility Goal 1, PT) modified independence  -CB     Time Frame (Bed Mobility Goal 1, PT) long term goal (LTG);1 week  -CB     Row Name 05/08/23 1041          Transfer Goal 1 (PT)    Activity/Assistive Device (Transfer Goal 1, PT) sit-to-stand/stand-to-sit;bed-to-chair/chair-to-bed  -CB     [place] Level/Cues Needed (Transfer Goal 1, PT) supervision required  -CB     Time Frame (Transfer Goal 1, PT) long term goal (LTG);1 week  -CB     Row Name 05/08/23 1041          Gait Training Goal 1 (PT)     Activity/Assistive Device (Gait Training Goal 1, PT) gait (walking locomotion);improve balance and speed;increase endurance/gait distance;decrease fall risk  -CB     [place] Level (Gait Training Goal 1, PT) standby assist  -CB     Distance (Gait Training Goal 1, PT) 150ft  -CB     Time Frame (Gait Training Goal 1, PT) long term goal (LTG);1 week  -CB     Row Name 05/08/23 1041          Therapy Assessment/Plan (PT)    Planned Therapy Interventions (PT) balance training;bed mobility training;gait training;home exercise program;patient/family education;strengthening;transfer training  -CB           User Key  (r) = Recorded By, (t) = Taken By, (c) = Cosigned By    Initials Name Provider Type    Leslie Rush, PT Physical Therapist               Clinical Impression     Row Name 05/08/23 1036          Pain    Pretreatment Pain Rating 0/10 - no pain  -CB     Posttreatment Pain Rating 0/10 - no pain  -CB     Row Name 05/08/23 1036          Plan of Care Review    Plan of Care Reviewed With patient;spouse;son  -CB     Outcome Evaluation Patient is a 89 yo male who presented with SOA with recent dc 4/29 with COVID pneumonia. He lives with his spouse and is ind at baseline without use of AD. He has rwx and wife reports she attempts to have him use it on his days when he is weaker but pt often refuses. Today he completed bed mobility and multiple STS requiring SBA. He ambulated 60ft +25ft without AD requiring SBA-CGA. Pt is impulsive at times and has decreased safety awareness. He may continue to benefit from skilled PT to address functional deficits. Also safe to ambulate with [facility] staff. Plan home with 24/7 care and HHPT at [place].  -CB     Row Name 05/08/23 1036          Therapy Assessment/Plan (PT)    Rehab Potential (PT) good, to achieve stated therapy goals  -CB     Criteria for Skilled Interventions Met (PT) yes  -CB     Therapy Frequency (PT) 3 times/wk  -CB     Row Name 05/08/23 1036          Vital Signs    Post  SpO2 (%) 95  -CB     O2 Delivery Post Treatment room air  -CB     Row Name 05/08/23 1036          Positioning and Restraints    Pre-Treatment Position in bed  -CB     Post Treatment Position chair  -CB     In Chair notified nsg;reclined;call light within reach;encouraged to call for assist;exit alarm on;with family/caregiver;legs elevated  -CB           User Key  (r) = Recorded By, (t) = Taken By, (c) = Cosigned By    Initials Name Provider Type    Leslie Rush, PT Physical Therapist               Outcome Measures     Row Name 05/08/23 1042 05/08/23 1000       How much help from another person do you currently need...    Turning from your back to your side while in flat bed without using bedrails? 3  -CB 4  -MM    Moving from lying on back to sitting on the side of a flat bed without bedrails? 3  -CB 4  -MM    Moving to and from a bed to a chair (including a wheelchair)? 3  -CB 3  -MM    Standing up from a chair using your arms (e.g., wheelchair, bedside chair)? 3  -CB 3  -MM    Climbing 3-5 steps with a railing? 3  -CB 3  -MM    To walk in hospital room? 3  -CB 3  -MM    AM-PAC 6 Clicks Score (PT) 18  -CB 20  -MM    Highest level of mobility 6 --> Walked 10 steps or more  -CB 6 --> Walked 10 steps or more  -MM    Row Name 05/08/23 1042          Functional Assessment    Outcome Measure Options AM-PAC 6 Clicks Basic Mobility (PT)  -CB           User Key  (r) = Recorded By, (t) = Taken By, (c) = Cosigned By    Initials Name Provider Type    Leslie Rush, PT Physical Therapist    Navid Hernandez, RN Registered Nurse                             Physical Therapy Education     Title: PT OT SLP Therapies (In Progress)     Topic: Physical Therapy (In Progress)     Point: Mobility training (Done)     Learning Progress Summary           Patient Acceptance, E,TB, VU,NR by JULES at 5/8/2023 1042                   Point: Home exercise program (Not Started)     Learner Progress:  Not documented in this visit.           Point: Body mechanics (Not Started)     Learner Progress:  Not documented in this visit.          Point: Precautions (Done)     Learning Progress Summary           Patient Acceptance, E,TB, VU,NR by CB at 5/8/2023 1042                               User Key     Initials Effective Dates Name Provider Type Discipline    CB 10/22/21 -  Leslie Jones, PT Physical Therapist PT              PT Recommendation and Plan  Planned Therapy Interventions (PT): balance training, bed mobility training, gait training, home exercise program, patient/family education, strengthening, transfer training  Plan of Care Reviewed With: patient, spouse, son  Outcome Evaluation: Patient is a 89 yo male who presented with SOA with recent dc 4/29 with COVID pneumonia. He lives with his spouse and is ind at baseline without use of AD. He has rwx and wife reports she attempts to have him use it on his days when he is weaker but pt often refuses. Today he completed bed mobility and multiple STS requiring SBA. He ambulated 60ft +25ft without AD requiring SBA-CGA. Pt is impulsive at times and has decreased safety awareness. He may continue to benefit from skilled PT to address functional deficits. Also safe to ambulate with [facility] staff. Plan home with 24/7 care and HHPT at [place].     Time Calculation:    PT Charges     Row Name 05/08/23 1043             Time Calculation    Start Time 1005  -CB      Stop Time 1027  -CB      Time Calculation (min) 22 min  -CB      PT Received On 05/08/23  -CB      PT - Next Appointment 05/10/23  -CB      PT Goal Re-Cert Due Date 05/15/23  -CB         Time Calculation- PT    Total Timed Code Minutes- PT 12 minute(s)  -CB         Timed Charges    61079 - PT Therapeutic Activity Minutes 12  -CB         Total Minutes    Timed Charges Total Minutes 12  -CB       Total Minutes 12  -CB            User Key  (r) = Recorded By, (t) = Taken By, (c) = Cosigned By    Initials Name Provider Type    CB Leslie Jones, PT Physical  Therapist              Therapy Charges for Today     Code Description Service Date Service Provider Modifiers Qty    87390895523  PT THERAPEUTIC ACT EA 15 MIN 5/8/2023 Leslie Jones, PT GP 1    [id number]  PT EVAL MOD COMPLEXITY 3 5/8/2023 Leslie Jones, PT GP 1          PT G-Codes  Outcome Measure Options: AM-PAC 6 Clicks Basic Mobility (PT)  AM-PAC 6 Clicks Score (PT): 18  PT Discharge Summary  Anticipated Discharge Disposition (PT): home with home health, home with 24/7 care    Leslie Jones, PT  5/8/2023

## 2023-05-08 NOTE — PLAN OF CARE
Problem: Adult Inpatient Plan of Care  Goal: Plan of Care Review  5/8/2023 1230 by Navid Laurent RN  Outcome: Ongoing, Progressing  5/8/2023 1230 by Navid Laurent RN  Outcome: Ongoing, Progressing  Goal: Patient-Specific Goal (Individualized)  5/8/2023 1230 by Navid Laurent RN  Outcome: Ongoing, Progressing  5/8/2023 1230 by Navid Laurent RN  Outcome: Ongoing, Progressing  Goal: Absence of Hospital-Acquired Illness or Injury  5/8/2023 1230 by Navid Laurent RN  Outcome: Ongoing, Progressing  5/8/2023 1230 by Navid Laurent RN  Outcome: Ongoing, Progressing  Intervention: Identify and Manage Fall Risk  Recent Flowsheet Documentation  Taken 5/8/2023 1200 by Navid Laurent RN  Safety Promotion/Fall Prevention: safety round/check completed  Taken 5/8/2023 1000 by Navid Laurent RN  Safety Promotion/Fall Prevention: safety round/check completed  Taken 5/8/2023 0800 by Navid Laurent RN  Safety Promotion/Fall Prevention: safety round/check completed  Intervention: Prevent Skin Injury  Recent Flowsheet Documentation  Taken 5/8/2023 1200 by Navid Laurent RN  Body Position:   position changed independently   neutral body alignment   neutral head position   supine  Taken 5/8/2023 1000 by Navid Laurent RN  Body Position:   position changed independently   neutral body alignment   neutral head position   supine   weight shifting  Taken 5/8/2023 0800 by Navid Laurent RN  Body Position:   position changed independently   neutral body alignment   neutral head position   supine   weight shifting  Skin Protection:   adhesive use limited   transparent dressing maintained  Intervention: Prevent and Manage VTE (Venous Thromboembolism) Risk  Recent Flowsheet Documentation  Taken 5/8/2023 1200 by Navid Laurent RN  Activity Management: up in chair  Taken 5/8/2023 1000 by Navid Laurent RN  Activity Management:   up ad michell   ambulated in room  Taken 5/8/2023 0800 by Navid Laurent RN  Activity Management: up ad  michell  VTE Prevention/Management:   bilateral   sequential compression devices off   patient refused intervention  Intervention: Prevent Infection  Recent Flowsheet Documentation  Taken 5/8/2023 1200 by Navid Laurent RN  Infection Prevention: single patient room provided  Taken 5/8/2023 1000 by Navid Laurent RN  Infection Prevention: single patient room provided  Taken 5/8/2023 0800 by Navid Laurent RN  Infection Prevention: single patient room provided  Goal: Optimal Comfort and Wellbeing  5/8/2023 1230 by Navid Laurent RN  Outcome: Ongoing, Progressing  5/8/2023 1230 by Navid Laurent RN  Outcome: Ongoing, Progressing  Intervention: Monitor Pain and Promote Comfort  Recent Flowsheet Documentation  Taken 5/8/2023 0800 by Navid Laurent RN  Pain Management Interventions:   see MAR   quiet environment facilitated   position adjusted   pillow support provided   care clustered  Intervention: Provide Person-Centered Care  Recent Flowsheet Documentation  Taken 5/8/2023 0800 by Navid Laurent RN  Trust Relationship/Rapport: care explained  Goal: Readiness for Transition of Care  5/8/2023 1230 by Navid Laurent RN  Outcome: Ongoing, Progressing  5/8/2023 1230 by Navid Laurent RN  Outcome: Ongoing, Progressing     Problem: Fall Injury Risk  Goal: Absence of Fall and Fall-Related Injury  5/8/2023 1230 by Navid Laurent RN  Outcome: Ongoing, Progressing  5/8/2023 1230 by Navid Laurent RN  Outcome: Ongoing, Progressing  Intervention: Identify and Manage Contributors  Recent Flowsheet Documentation  Taken 5/8/2023 1200 by Navid Laurent RN  Medication Review/Management: medications reviewed  Taken 5/8/2023 1000 by Navid Laurent RN  Medication Review/Management: medications reviewed  Taken 5/8/2023 0800 by Navid Laurent RN  Medication Review/Management: medications reviewed  Intervention: Promote Injury-Free Environment  Recent Flowsheet Documentation  Taken 5/8/2023 1200 by Navid Laurent RN  Safety  Promotion/Fall Prevention: safety round/check completed  Taken 5/8/2023 1000 by Navid Laurent RN  Safety Promotion/Fall Prevention: safety round/check completed  Taken 5/8/2023 0800 by Navid Laurent RN  Safety Promotion/Fall Prevention: safety round/check completed     Problem: Hypertension Comorbidity  Goal: Blood Pressure in Desired Range  5/8/2023 1230 by Navid Laurent RN  Outcome: Ongoing, Progressing  5/8/2023 1230 by Navid Laurent RN  Outcome: Ongoing, Progressing  Intervention: Maintain Blood Pressure Management  Recent Flowsheet Documentation  Taken 5/8/2023 1200 by Navid Laurent RN  Medication Review/Management: medications reviewed  Taken 5/8/2023 1000 by Navid Laurent RN  Medication Review/Management: medications reviewed  Taken 5/8/2023 0800 by Navid Laurent RN  Medication Review/Management: medications reviewed   Goal Outcome Evaluation:      Wife at bedside, pt cooperative and pleasant this am.  Diet tolerated, vss, x1 assist to bathroom, A&Ox4, NSR, RA, 18 r ac with ns at 75m/h.  GALINA Saldivar signed off, no distress/pain at this time. Can make needs known.  Possible d/c today.  Walking pulse ox ordered per pulm.

## 2023-05-09 ENCOUNTER — TELEPHONE (OUTPATIENT)
Dept: CARDIOLOGY | Facility: CLINIC | Age: 88
End: 2023-05-09
Payer: MEDICARE

## 2023-05-09 NOTE — CASE MANAGEMENT/SOCIAL WORK
Case Management Discharge Note      Final Note: Discharge to home with [facility]-  ORIN RN CCP         Selected Continued Care - Discharged on 5/8/2023 Admission date: 5/6/2023 - Discharge disposition: Home or Self Care    Destination    No services have been selected for the patient.              Durable Medical Equipment Coordination complete.    Service Provider Selected Services Address Phone Fax Patient Preferred    [facility] [address], [place] [address] [phone] --          Dialysis/Infusion    No services have been selected for the patient.              Home Medical Care Coordination complete.    Service Provider Selected Services Address Phone Fax Patient Preferred     [facility] [address] [address] [phone] --          Therapy    No services have been selected for the patient.              Community Resources    No services have been selected for the patient.              Community & DME    No services have been selected for the patient.                Selected Continued Care - Prior Encounters Includes continued care and service providers with selected services from prior encounters from 2/5/2023 to 5/8/2023    Discharged on 4/29/2023 Admission date: 4/25/2023 - Discharge disposition: Home-Health Care c    Home Medical Care     Service Provider Selected Services Address Phone Fax Patient Preferred     [facility] [address] [id number] [phone] --                    Transportation Services  Private: Car    Final Discharge Disposition Code: 06 - home with home health care (Religious )

## 2023-05-09 NOTE — TELEPHONE ENCOUNTER
I spoke with Ni.  Discussed hospitalization where he had COVID in April.  He then was diagnosed with lower extremity DVT and bilateral pulmonary emboli so I agree with Eliquis 5 mg twice daily.    , He has no significant bleeding issues or concerns    Please call to arrange 2 to 3-week hospital follow-up appointment with me

## 2023-05-09 NOTE — PROGRESS NOTES
Continued Stay Note  [facility]     Patient Name: Asia Ly  MRN: [id number]  Today's Date: 5/9/2023    Admit Date: 5/6/2023    Plan: home O2 through [facility]   Discharge Plan     Row Name 05/09/23 1339       Plan    Plan home O2 through [facility]    Plan Comments Home O2 arranged through [facility] and delivered today. Patient and wife informed. CKnabel               Discharge Codes    No documentation.               Expected Discharge Date and Time     Expected Discharge Date Expected Discharge Time    May 8, 2023             Charlotte Skelton RN

## 2023-05-09 NOTE — TELEPHONE ENCOUNTER
I have added patient on the schedule for 5/26/23 at 8:30am./ LEIDA   I will call Ni and inform her.

## 2023-05-09 NOTE — TELEPHONE ENCOUNTER
Ni called to ask if [facility]  She reports patient was discharged yesterday but is having a difficulty ambulating at home without oxygen. The home health nurse advised patient be taken back to the hospital but Ni is going to wait another day to see how he does. She would like to discuss this with you.      Please call Ni (Erwin) at [phone]/ LEIDA     I will forward an Rx to you for Eliquis.

## 2023-05-10 ENCOUNTER — HOME CARE VISIT (OUTPATIENT)
Dept: HOME HEALTH SERVICES | Facility: HOME HEALTHCARE | Age: 88
End: 2023-05-10
Payer: MEDICARE

## 2023-05-10 ENCOUNTER — READMISSION MANAGEMENT (OUTPATIENT)
Dept: CALL CENTER | Facility: HOSPITAL | Age: 88
End: 2023-05-10
Payer: MEDICARE

## 2023-05-10 PROCEDURE — G0299 HHS/HOSPICE OF RN EA 15 MIN: HCPCS

## 2023-05-10 NOTE — TELEPHONE ENCOUNTER
I spoke with Ni and informed her of Asia's appt with AL on 5/26/23 at 8:30am. She verbalized underdstanding/ LEIDA

## 2023-05-10 NOTE — OUTREACH NOTE
COPD/PN Week 1 Survey    Flowsheet Row Responses   [facility] patient discharged from? [place]   Does the patient have one of the following disease processes/diagnoses(primary or secondary)? Pneumonia   Week 1 attempt successful? Yes   Call start time 1734   Call end time 1746   Discharge diagnosis Pneumonia of both lungs    Is patient permission given to speak with other caregiver? Yes   List who call center can speak with Ni Ly Spouse    Person spoke with today (if not patient) and relationship Ni Ly Spouse    Meds reviewed with patient/caregiver? Yes   Does the patient have all medications ordered at discharge? Yes   Is the patient taking all medications as directed (includes completed medication regime)? Yes   Does the patient have a primary care provider?  Yes   Does the patient have an appointment with their PCP or specialist within 7 days of discharge? No   Comments regarding PCP Dr Lobo PCP    Nursing Interventions Educated patient on importance of making appointment, Advised patient to make appointment   Has the patient kept scheduled appointments due by today? N/A   What is the Home health agency?  [facility]    Has home health visited the patient within 72 hours of discharge? Yes   DME comments Wife reports that they need a lightweight transport wheelchair. She reports that her son is handling getting one.    Pulse Ox monitoring Intermittent   Pulse Ox device source Patient   O2 Sat comments O2 sat in the 90's as long as he has the oxygen on.    O2 Sat: education provided Sat levels   Psychosocial issues? Yes   Did the patient receive a copy of their discharge instructions? Yes   Nursing interventions Reviewed instructions with patient   What is the patient's perception of their health status since discharge? Improving   If the patient is a current smoker, are they able to teach back resources for cessation? Not a smoker   Is the patient/caregiver able to teach back the  hierarchy of who to call/visit for symptoms/problems? PCP, Specialist, Home health nurse, Urgent Care, ED, 911 Yes   Is the patient/caregiver able to teach back importance of completing antibiotic course of treatment? Yes   Week 1 call completed? Yes   Is the patient interested in additional calls from an ambulatory ?  NOTE:  applies to high risk patients requiring additional follow-up. No          LOVE GARCIA - Registered Nurse

## 2023-05-10 NOTE — Clinical Note
Dear Dr Rubio Ly   32    The above patient has been discharged from the hospital on 23 and was referred back to home health services.  Skilled nursing visits will continue at one visit weekly.  Therapy has also been ordered and they will come do their assessments as well.  If you have any questions or needs for this patient, please feel free to call our office at [phone]    Thank you    Faina HERNANDEZ RN  [facility]

## 2023-05-11 ENCOUNTER — HOME CARE VISIT (OUTPATIENT)
Dept: HOME HEALTH SERVICES | Facility: HOME HEALTHCARE | Age: 88
End: 2023-05-11
Payer: MEDICARE

## 2023-05-11 LAB
BACTERIA SPEC AEROBE CULT: NORMAL
BACTERIA SPEC AEROBE CULT: NORMAL

## 2023-05-12 VITALS
OXYGEN SATURATION: 94 % | DIASTOLIC BLOOD PRESSURE: 70 MMHG | SYSTOLIC BLOOD PRESSURE: 128 MMHG | RESPIRATION RATE: 18 BRPM | TEMPERATURE: 98.6 F | HEART RATE: 65 BPM

## 2023-05-15 NOTE — HOME HEALTH
JOSE visit     Home Health need continues for disease mgmt teaching, medication teaching, safety teaching     Skilled need:  teaching and instruction of all medications, infection prevention measures, o2 teaching, DVT prevention teaching, pneumonia teaching.     Focus of care:   Pneumonia of both lungs due to infectious organism, unspecified part of lung   Troponin level elevated;  Atrial fibrillation, unspecified type;    Multiple subsegmental pulmonary emboli without acute cor pulmonale    Skin integrity/wound status: no skin issues at this time     Code status: Full    Most recent fall risk: High     Estimated date when home care services will end 6/28/23    Plan of Care confirmed with Dr Bergeron via in basket on 5/10/23

## 2023-05-16 ENCOUNTER — HOME CARE VISIT (OUTPATIENT)
Dept: HOME HEALTH SERVICES | Facility: HOME HEALTHCARE | Age: 88
End: 2023-05-16
Payer: MEDICARE

## 2023-05-16 VITALS
SYSTOLIC BLOOD PRESSURE: 114 MMHG | DIASTOLIC BLOOD PRESSURE: 62 MMHG | OXYGEN SATURATION: 97 % | TEMPERATURE: 97.5 F | RESPIRATION RATE: 17 BRPM | HEART RATE: 62 BPM

## 2023-05-16 PROCEDURE — G0151 HHCP-SERV OF PT,EA 15 MIN: HCPCS

## 2023-05-16 NOTE — Clinical Note
Patient admitted to home health PT services for 2 week 1; 1 week 2 for ther-ex instruction; energy conservation education

## 2023-05-17 ENCOUNTER — HOME CARE VISIT (OUTPATIENT)
Dept: HOME HEALTH SERVICES | Facility: HOME HEALTHCARE | Age: 88
End: 2023-05-17
Payer: MEDICARE

## 2023-05-17 VITALS
DIASTOLIC BLOOD PRESSURE: 66 MMHG | OXYGEN SATURATION: 99 % | SYSTOLIC BLOOD PRESSURE: 116 MMHG | HEART RATE: 59 BPM | TEMPERATURE: 98 F | RESPIRATION RATE: 18 BRPM

## 2023-05-17 PROCEDURE — G0495 RN CARE TRAIN/EDU IN HH: HCPCS

## 2023-05-17 NOTE — HOME HEALTH
REASON FOR REFERRAL: 89 year old male presents with complaints of : fatigue, decreased activity tolerance . Was recently hospitalized for Covid-19 from 4/25-4/29 and then again rehospitalized   from 5/6-5/8 with increased shortness of breath due to acute pulmonary emboli bilateral,  bilateral pneumonia,  recent COVID-19 infection . No longer under Covid quarantine.  Has been to ER   5x since 2/27 .  Patient with O2 recommendations for nighttime use (2L) and as needed during day.  Remains DNR.      DIAGNOSIS:   Acute hypoxic respiratory failure   Bilateral pulmonary embolism   Acute left lower extremity DVT   Bilateral pneumonia   Recent COVID-19 infection   Hypertension   Hyperlipidemia   Aortic stenosis   Pulmonary fibrosis   Bronchiectasis   Pulm hypertension   Crohn's disease   Gastroesophageal reflux disease         PRIOR LEVEL OF FUNCTION: patient is forgetful so wife has been helping with shower daily ; I eating; I dressing; ambulated independently without assistive device; able to complete dressing independently; required supervision for minor cognitive deficits     SUBJECTIVE: wife reports that patient was able to shave independently since return home. states that patient balance has been mildly challenged when he attempts to rise immediately after rising     ASSESSMENT overall patient with good functional strength leading to ability to rise from chairs and ambulate with limited assist. fatigues quickly and would benefit from instruction in cardiopulmonary exercise program to improve activity tolerance     SOCIAL ENVIRONMENT: lives with wife; has paid caregiver assist 5 days this week; will decrease to 2-3 days consistently next week; has RW and SPC but does not use      DATE OF NEXT APPOINTMENT WITH DOCTOR: cardiology 5/26/23   ____________   PLAN FOR NEXT VISIT:   MEDICAL NECESSITY FOR ONGOING SKILLED THERAPY: Skilled physical therapy is medically necessary for treatment of: activity tolerance deficits  following recent hospitalization for Covid 19 pneumonia, PE, DVT .  Requires instruction in appropriate progression of exercises; education in fall prevention ; gait training to reduce reliance on caregiver; balance retraining to prevent falls. Without skilled physical therapy, patient at risk for: falls, rehospitalization, increased reliance on caregiver,     SPECIFIC INTERVENTIONS AND GOALS TO ADDRESS ON NEXT VISIT:   - progress HEP   - gait training to restore normal mechanics   - fall prevention education   - continued home safety education     FREQUENCY AND DURATION:   -2 week 1; 1 week 2;     ANY OTHER FOLLOW UP NEEDED: none   REASSESSMENT DUE DATE: 30 day: 6/2/23

## 2023-05-19 ENCOUNTER — HOME CARE VISIT (OUTPATIENT)
Dept: HOME HEALTH SERVICES | Facility: HOME HEALTHCARE | Age: 88
End: 2023-05-19
Payer: MEDICARE

## 2023-05-19 PROCEDURE — G0151 HHCP-SERV OF PT,EA 15 MIN: HCPCS

## 2023-05-20 VITALS
HEART RATE: 61 BPM | DIASTOLIC BLOOD PRESSURE: 50 MMHG | OXYGEN SATURATION: 96 % | RESPIRATION RATE: 17 BRPM | TEMPERATURE: 98 F | SYSTOLIC BLOOD PRESSURE: 102 MMHG

## 2023-05-21 NOTE — HOME HEALTH
"SUBJECTIVE: wife reports that patient has been compliant with HEP. feels that patient is \"getting better\"    ASSESSMENT continued O2 sats in 90's with activity.  slowly improving activity tolerance.     SOCIAL ENVIRONMENT: lives with wife; has paid caregiver assist 5 days this week; will decrease to as needed per wife report; has RW and SPC but does not use   DATE OF NEXT APPOINTMENT WITH DOCTOR: cardiology 5/26/23   ____________   PLAN FOR NEXT VISIT:   MEDICAL NECESSITY FOR ONGOING SKILLED THERAPY: Skilled physical therapy is medically necessary for treatment of: activity tolerance deficits following recent hospitalization for Covid 19 pneumonia, PE, DVT . Requires instruction in appropriate progression of exercises; education in fall prevention ; gait training to reduce reliance on caregiver; balance retraining to prevent falls. Without skilled physical therapy, patient at risk for: falls, rehospitalization, increased reliance on caregiver,   SPECIFIC INTERVENTIONS AND GOALS TO ADDRESS ON NEXT VISIT:   - progress HEP   - gait training to restore normal mechanics   - fall prevention education   - continued home safety education   FREQUENCY AND DURATION:   -2 week 1; 1 week 2;   ANY OTHER FOLLOW UP NEEDED: none   REASSESSMENT DUE DATE: 30 day: 6/2/23"

## 2023-05-24 ENCOUNTER — HOME CARE VISIT (OUTPATIENT)
Dept: HOME HEALTH SERVICES | Facility: HOME HEALTHCARE | Age: 88
End: 2023-05-24
Payer: MEDICARE

## 2023-05-24 VITALS
RESPIRATION RATE: 18 BRPM | SYSTOLIC BLOOD PRESSURE: 126 MMHG | TEMPERATURE: 98.2 F | DIASTOLIC BLOOD PRESSURE: 72 MMHG | OXYGEN SATURATION: 99 % | HEART RATE: 62 BPM

## 2023-05-24 PROCEDURE — G0495 RN CARE TRAIN/EDU IN HH: HCPCS

## 2023-05-25 ENCOUNTER — HOME CARE VISIT (OUTPATIENT)
Dept: HOME HEALTH SERVICES | Facility: HOME HEALTHCARE | Age: 88
End: 2023-05-25
Payer: MEDICARE

## 2023-05-25 PROCEDURE — G0151 HHCP-SERV OF PT,EA 15 MIN: HCPCS

## 2023-05-26 ENCOUNTER — OFFICE VISIT (OUTPATIENT)
Dept: CARDIOLOGY | Facility: CLINIC | Age: 88
End: 2023-05-26
Payer: MEDICARE

## 2023-05-26 VITALS
SYSTOLIC BLOOD PRESSURE: 112 MMHG | HEART RATE: 70 BPM | WEIGHT: 139 LBS | HEIGHT: 68 IN | DIASTOLIC BLOOD PRESSURE: 60 MMHG | OXYGEN SATURATION: 98 % | BODY MASS INDEX: 21.07 KG/M2

## 2023-05-26 VITALS — RESPIRATION RATE: 18 BRPM

## 2023-05-26 DIAGNOSIS — I48.0 AF (PAROXYSMAL ATRIAL FIBRILLATION): Primary | ICD-10-CM

## 2023-05-26 DIAGNOSIS — I48.0 PAF (PAROXYSMAL ATRIAL FIBRILLATION): ICD-10-CM

## 2023-05-26 DIAGNOSIS — I48.91 ATRIAL FIBRILLATION WITH RAPID VENTRICULAR RESPONSE: ICD-10-CM

## 2023-05-26 DIAGNOSIS — Z87.09 H/O PLEURAL EFFUSION: ICD-10-CM

## 2023-05-26 DIAGNOSIS — J84.10 PULMONARY FIBROSIS: ICD-10-CM

## 2023-05-26 DIAGNOSIS — I44.2 COMPLETE HEART BLOCK: ICD-10-CM

## 2023-05-26 NOTE — PROGRESS NOTES
Date of Office Visit: 23  Encounter Provider: KYLAH Jha  Place of Service: [facility] CARDIOLOGY  Patient Name: Asia Ly  :1932    Chief Complaint   Patient presents with   • Atrial fibrillation with rapid ventricular response   • Pleural Effusion   • Follow-up   :     HPI: Asia Ly is a 89 y.o. male  with  paroxysmal atrial fibrillation, stroke and TIA in , aortic stenosis, hyperlipidemia, B12 anemia, Crohn's disease, colon polyps, lower extremity edema, TIA,  DVT and PE ( 2023), dilated ascending aorta,  pulmonary hypertension and pulmonary fibrosis.      He has been followed by Dr. Hector Rivera. He is now followed by Dr. Brown and I will visit with him in follow up today.      Has been on sotalol in the past as well as amiodarone for recurrent atrial fibrillation.  He had Echocardiogram 2016 showed normal left ventricular systolic dysfunction, moderate tricuspid insufficiency, mild mitral insufficiency , mild pulmonary hypertension.  He also had a perfusion stress test that was negative for ischemia.  He was taken off diltiazem for allergic reaction to rash.  2017 he had an echocardiogram which showed an EF of 60%, grade 2 diastolic dysfunction and mild aortic he had increased lower extremity edema and had again rapid atrial fibrillation.  He was started on a diuretic.  He then had pneumonia hypoxia and influenza and was started on Tikosyn.  He has some prolonged QTC complicated by antibiotics.  He then had issues with gout felt to be related to discontinuation of indomethacin and was prescribed colchicine.  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  He then had some atrial fibrillation with RVR and was given oral metoprolol and Cardizem and converted back to sinus rhythm.  He was evaluated by electrophysiology with Dr. Nettles who felt that his ablation at his age  would be very high risk procedure.  He was later started on digoxin for recurrent rapid atrial fibrillation.  He was admitted again January 2019 and electrophysiology felt that he may for AV node ablation and pacemaker implantation.  However he preferred to stay on Tikosyn and accept occasional breakthroughs.     He then had some confusion and upper epigastric abdominal pain he was hospitalized August 2020 found to have transaminitis with hyperbilirubinemia.  He had some atrial fibrillation with RVR received IV metoprolol and his heart rate improved.  Echocardiogram showed normal left ventricular systolic function, mild concentric hypertrophy, moderate aortic valve stenoses with aortic valve area 1.1 cm², maximum pressure gradient of 42 and mean pressure gradient 24 mmHg.  RVSP was severely elevated at 57.8.  He had mild aortic valve regurgitation and mild mitral regurgitation.     In November 2020 was hospitalized with hematochezia and underwent GI evaluation with no evidence of active bleed.  He was cleared to resume apixaban and was later found to have bilateral pneumonia.     He was treated again for pneumonia and had a bronchoscopy July 7.  He is colonized Pseudomonas and infectious disease recommended conservative treatment.  He had some rapid atrial fibrillation during that admission and had a pacemaker placed by Dr. Nettles.    And ultimately had AV node ablation 09/2021. .  His Tikosyn was stopped.      He had nosebleeds and saw ENT in march 2023 who  Performed cauterization with packing and was off eliquis until we restarted it at a lower dose of 2.5 mg twice daily    He then was hospitalized with COVID PNA in April and was diagnosed with  Acute left lower extremity DVT and bilateral pulmonary embolism so he was put back on full dose Eliquis.  He was prescribed oxygen and was also started on daily Lasix for lower extremity swelling.  He was treated with antibiotics. Outpatient, his PCP increased his Lasix  "to to twice daily for 3 to 4 days reported by his wife.  Today, his wife reports taking Lasix daily and just minimal ankle swelling at the end of the day.  His breathing is much better.  He is no longer using oxygen during the day but just at night to keep oxygenation saturation above 90%.  He has no chest pain tightness or pressure.  No palpitations.  His blood pressures been stable at home.        Allergies   Allergen Reactions   • Amiodarone Unknown (See Comments)     Pulmonary fibrosis   • Diltiazem Arrhythmia   • Doxycycline Rash   • Cefdinir Arrhythmia     A-FIB   • Indomethacin Rash           Family and social history reviewed.     ROS  All other systems were reviewed and are negative          Objective:     Vitals:    05/26/23 0828   BP: 112/60   BP Location: Left arm   Patient Position: Sitting   Pulse: 70   SpO2: 98%   Weight: 63 kg (139 lb)   Height: 172.7 cm (68\")     Body mass index is 21.13 kg/m².    PHYSICAL EXAM:  Pulmonary:      Breath sounds: Examination of the right-middle field reveals rales. Examination of the left-middle field reveals rales. Examination of the right-lower field reveals rales. Examination of the left-lower field reveals rales. Wheezing present. Rales present.      Comments: Faint upper exp wheeze   Fibrotic sounds/rales  Cardiovascular:      Normal rate. Regular rhythm.      Comments: No edema         Procedures      Current Outpatient Medications   Medication Sig Dispense Refill   • allopurinol (ZYLOPRIM) 100 MG tablet Take 1 tablet by mouth 2 (Two) Times a Day. Indications: Gout     • apixaban (ELIQUIS) 5 MG tablet tablet Take 1 tablet by mouth Every 12 (Twelve) Hours. Indications: DVT/PE (active thrombosis) 180 tablet 3   • atorvastatin (LIPITOR) 10 MG tablet Take 1 tablet by mouth Every Other Day. M-W-F  Indications: High Amount of Fats in the Blood     • BUDESONIDE PO Take 3 mg by mouth Daily. Take one tablet by mouth  Indications: Colon cancer screen, ulcerative or crohns " colitis     • furosemide (LASIX) 20 MG tablet Take 1 tablet by mouth Daily. Indications: Edema, High Blood Pressure Disorder     • hydrocortisone 2.5 % ointment Apply  topically to the appropriate area as directed 2 (Two) Times a Day. to affected area  Indications: Skin Inflammation     • IPRATROPIUM-ALBUTEROL IN Inhale 2 puffs 3 (Three) Times a Day. Indications: COPD exacerbation, complicated     • metoprolol tartrate (LOPRESSOR) 25 MG tablet Take 1 tablet by mouth Every 12 (Twelve) Hours. Indications: High Blood Pressure Disorder 180 tablet 2   • Nutritional Supplements (BOOST 100 CALORIE SMART PO) Take 1 bottle by mouth Daily. Indications: Nutritional Support     • O2 (OXYGEN) Inhale 2 L/min As Needed (soa). Indications: Oxygen Saturation Decreased     • omeprazole (priLOSEC) 40 MG capsule Take 1 capsule by mouth Daily. Indications: Heartburn     • potassium chloride (K-DUR,KLOR-CON) 20 MEQ CR tablet Take 1 tablet by mouth Daily. CHANGED FROM TWICE DAILY TO ONCE DAILY PER DR PIERCE   Indications: Low Amount of Potassium in the Blood       No current facility-administered medications for this visit.     Assessment:       Diagnosis Plan   1. AF (paroxysmal atrial fibrillation)        2. H/O pleural effusion        3. Complete heart block        4. PAF (paroxysmal atrial fibrillation)        5. Atrial fibrillation with rapid ventricular response        6. Pulmonary fibrosis             No orders of the defined types were placed in this encounter.        Plan:       1. 89 year old gentleman with paroxysmal atrial fibrillation off amiodarone due to history of pulmonary fibrosis.  He  had a rash with diltiazem in the past.  Now status post AV node ablation with permanent pacemaker.  He is off Tikosyn. He is asymptomatic with atrial fibrillation.  He is back on Eliquis at 5 mg BID.   he also follows with Dr. Nettles  2. Aortic stenosis mild in November 2017 and moderate- severe on most recent echo June 2022 . He is  overall stable   3.  History of TIA no recurrence- continue eliquis at 5 mg BID3.   4.  Prediabetes  5.  History of Crohn's disease and iron deficient anemia usually sees Dr. Susie Mccoy  6.  Hypertension continue current regimen  7.  Hyperlipidemia- now off statin therapy which is fine with me.   8.  History of cholecystitis  9.   History of gout  10. History of   pleural effusion- breathing is stable on current dose lasix 20 mg daily  11. COVID 19 infection 04/2023  12.  Left LE DVT and bilateral PE 04/2023- on full dose eliquis 5 mg BID  13. epistaxis requiring cauterization and packing by ENT 03/2023  14. Dilated ascending aorta at 4.1 cm on CTA chest 05/2023  15. Pulmonary fibrosis. Now with PRN oxygen. Follows with Dr. Chidi Oconnor.      Follow up in 6 weeks.   Call with questions or concerns.             It has been a pleasure to participate in this patient's care.      Thank you,  KYLAH Jha      **I used Dragon to dictate this note:**

## 2023-05-26 NOTE — HOME HEALTH
SUBJECTIVE: wife reports that patient continues to feel better.  received a cane in the mail that her son would like patient to try  ASSESSMENT continued O2 sats in mid 90's with activity. slowly improving activity tolerance. requires supervision with use of cane until more familiar with technique.no LOB noted this visit  SOCIAL ENVIRONMENT: lives with wife; has paid caregiver as needed;  ; has RW and SPC ; supplemental O2  DATE OF NEXT APPOINTMENT WITH DOCTOR: cardiology 5/26/23   ____________   PLAN FOR NEXT VISIT:   MEDICAL NECESSITY FOR ONGOING SKILLED THERAPY: Skilled physical therapy is medically necessary for treatment of: activity tolerance deficits following recent hospitalization for Covid 19 pneumonia, PE, DVT . Requires instruction in appropriate progression of exercises; education in fall prevention ; gait training to reduce reliance on caregiver; balance retraining to prevent falls. Without skilled physical therapy, patient at risk for: falls, rehospitalization, increased reliance on caregiver,   SPECIFIC INTERVENTIONS AND GOALS TO ADDRESS ON NEXT VISIT:   -review HEP   - gait training to restore normal mechanics with newly acquired cane  -  d/c assessments  FREQUENCY AND DURATION:   -2 week 1; 1 week 2;   ANY OTHER FOLLOW UP NEEDED: none   REASSESSMENT DUE DATE: 30 day: 6/2/23

## 2023-05-30 ENCOUNTER — HOME CARE VISIT (OUTPATIENT)
Dept: HOME HEALTH SERVICES | Facility: HOME HEALTHCARE | Age: 88
End: 2023-05-30
Payer: MEDICARE

## 2023-05-30 VITALS
RESPIRATION RATE: 18 BRPM | DIASTOLIC BLOOD PRESSURE: 64 MMHG | TEMPERATURE: 98.1 F | SYSTOLIC BLOOD PRESSURE: 108 MMHG | HEART RATE: 60 BPM | OXYGEN SATURATION: 99 %

## 2023-05-30 PROCEDURE — G0495 RN CARE TRAIN/EDU IN HH: HCPCS

## 2023-06-01 ENCOUNTER — TELEPHONE (OUTPATIENT)
Dept: CARDIOLOGY | Facility: CLINIC | Age: 88
End: 2023-06-01

## 2023-06-01 ENCOUNTER — HOME CARE VISIT (OUTPATIENT)
Dept: HOME HEALTH SERVICES | Facility: HOME HEALTHCARE | Age: 88
End: 2023-06-01
Payer: MEDICARE

## 2023-06-01 PROCEDURE — G0151 HHCP-SERV OF PT,EA 15 MIN: HCPCS

## 2023-06-01 NOTE — TELEPHONE ENCOUNTER
Patient is needing to have two teeth extracted.  Ni is calling to see if he is cleared for this and if he can hold Eliquis. She states their  mail order pharmacy is running behind and would like Rx for Metoprolol to be sent to [facility].  / LEIDA Dan [phone]

## 2023-06-01 NOTE — TELEPHONE ENCOUNTER
Too soon since recent DVT and PE to hold eliquis. They should see if the dentist will pull his teeth while on it or else the extraction should wait 3 months from DVT/ PE

## 2023-06-02 VITALS
HEART RATE: 69 BPM | OXYGEN SATURATION: 97 % | RESPIRATION RATE: 17 BRPM | SYSTOLIC BLOOD PRESSURE: 118 MMHG | DIASTOLIC BLOOD PRESSURE: 60 MMHG

## 2023-06-02 NOTE — HOME HEALTH
"SUBJECTIVE: \" I never have pain\"  Wife reports they were able to successfully navigate a MD appt the other day with cane.     ASSESSMENT continued O2 sats in mid 90's with activity. has resumed baseline activity tolerance. requires supervision with use of cane and on uneven terrain until more familiar with technique. ready for dc from formal home PT services    SOCIAL ENVIRONMENT: lives with wife; has paid caregiver as needed; ; has RW and SPC ; supplemental O2"

## 2023-06-03 ENCOUNTER — HOSPITAL ENCOUNTER (EMERGENCY)
Facility: HOSPITAL | Age: 88
Discharge: HOME | End: 2023-06-03
Attending: EMERGENCY MEDICINE
Payer: MEDICARE

## 2023-06-03 ENCOUNTER — APPOINTMENT (OUTPATIENT)
Dept: CT IMAGING | Facility: HOSPITAL | Age: 88
End: 2023-06-03
Payer: MEDICARE

## 2023-06-03 VITALS
RESPIRATION RATE: 16 BRPM | SYSTOLIC BLOOD PRESSURE: 115 MMHG | WEIGHT: 139 LBS | BODY MASS INDEX: 21.07 KG/M2 | OXYGEN SATURATION: 97 % | DIASTOLIC BLOOD PRESSURE: 68 MMHG | TEMPERATURE: 98.8 F | HEIGHT: 68 IN | HEART RATE: 59 BPM

## 2023-06-03 DIAGNOSIS — K40.20 BILATERAL INGUINAL HERNIA WITHOUT OBSTRUCTION OR GANGRENE, RECURRENCE NOT SPECIFIED: ICD-10-CM

## 2023-06-03 DIAGNOSIS — K40.90 REDUCIBLE LEFT INGUINAL HERNIA: Primary | ICD-10-CM

## 2023-06-03 PROCEDURE — 99282 EMERGENCY DEPT VISIT SF MDM: CPT

## 2023-06-03 PROCEDURE — 74176 CT ABD & PELVIS W/O CONTRAST: CPT

## 2023-06-03 NOTE — ED PROVIDER NOTES
EMERGENCY DEPARTMENT ENCOUNTER    Room Number:  12/12  Date of encounter:  6/3/2023  PCP: Lubna Lobo MD  Historian: Patient and spouse  Relevant information and history provided by sources other than the patient will be included below and in the ED Course.  Review of pertinent past medical records may also be included in record below and ED Course.    HPI:  Chief Complaint: Swelling left groin  A complete HPI/ROS/PMH/PSH/SH/FH are unobtainable due to: Not applicable although he is a 89-year-old male and he is hard of hearing.  Context: Asia Ly is a 89 y.o. male who presents to the ED c/o chest about 2 hours prior to arrival here at about 5 PM noticed some swelling and a little discomfort to his left groin.  His wife helped him day this morning and did not notice any of the swelling and they believe again that the swelling started late this afternoon somewhere around 5 PM.  He really denies pain but he states there is a mild discomfort there he has had no nausea or vomiting or fevers or chills.  He had a bowel movement today and is passing flatus.  Denies any other complaint.  He has been compliant with all his medicines which do include Eliquis.        Previous Episodes: No  Current Symptoms: See above    MEDICAL HISTORY REVIEWED  Patient was discharged from the hospital May 8, 2023.  He came in with acute hypoxic respiratory failure and bilateral pulmonary embolism he had acute left lower extremity DVT he has had a recent COVID infection he does have a history of hypertension hyperlipidemia pulmonary fibrosis aortic stenosis pulmonary hypertension and Crohn's disease and GERD.  He was recently in the hospital for COVID and improved and was discharged and returned with acute pulmonary emboli bilaterally patient was discharged on Eliquis his cultures were negative.  It is important to note that in reviewing Dr. Bergeron's note he remained a DNR throughout hospital course.      PAST MEDICAL HISTORY  Active  Ambulatory Problems     Diagnosis Date Noted    Ataxia 08/19/2016    TIA (transient ischemic attack) 08/19/2016    Atrial fibrillation, paroxysmal 08/19/2016    Hyperlipidemia 08/19/2016    Gout 08/19/2016    Crohn's disease 08/19/2016    Atrial fibrillation with RVR 10/27/2018    Acute cholecystitis 08/26/2020    Obstructive jaundice 08/26/2020    Acute lower GI bleeding 11/17/2020    Aortic stenosis     Pulmonary fibrosis     Anticoagulant long-term use     Febrile illness, acute 12/07/2020    Atrial fibrillation with rapid ventricular response 06/22/2021    COVID-19 virus infection 04/25/2023    Pneumonia of both lungs due to infectious organism, unspecified part of lung 05/06/2023     Resolved Ambulatory Problems     Diagnosis Date Noted    Hypoxia 04/29/2018     Past Medical History:   Diagnosis Date    Aspiration pneumonia     Bleeding from the nose     Bronchiectasis     CKD (chronic kidney disease)     COPD (chronic obstructive pulmonary disease)     Dizziness     Elevated cholesterol     Gastric ulcer     Generalized weakness     Hard of hearing     Hypertension     Leg swelling     Lower extremity edema     Mild anemia     Near syncope     Nonrheumatic aortic valve stenosis     PAF (paroxysmal atrial fibrillation)     Palpitations     Peptic ulcer     Pneumonia of left lung due to infectious organism     Stroke          PAST SURGICAL HISTORY  Past Surgical History:   Procedure Laterality Date    BRONCHOSCOPY N/A 10/22/2018    Procedure: BRONCHOSCOPY WITH BRONCHALVEOLAR LAVAGE;  Surgeon: Chidi Oconnor MD;  Location: [facility] ENDOSCOPY;  Service: Pulmonary    BRONCHOSCOPY N/A 7/8/2021    Procedure: BRONCHOSCOPY with BAL;  Surgeon: Chidi Oconnor MD;  Location: [facility] ENDOSCOPY;  Service: Pulmonary;  Laterality: N/A;  Pre: bronchectasis  Post: same    CARDIAC ELECTROPHYSIOLOGY PROCEDURE N/A 7/9/2021    Procedure: Pacemaker DC new--[employer] possible His lead;  Surgeon: Rashaun Nettles MD;  Location:  " [facility] CATH INVASIVE LOCATION;  Service: Cardiology;  Laterality: N/A;    CARDIAC ELECTROPHYSIOLOGY PROCEDURE N/A 9/3/2021    Procedure: AV node 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 ppm;  Surgeon: Rashaun Nettles MD;  Location: [facility] C[facility]IVE [facility];  Service: Cardiovascular;  Laterality: N/A;    CATARACT EXTRACTION Bilateral     COLONOSCOPY      COLONOSCOPY N/A 3/9/2018    Procedure: COLONOSCOPY to terminal ileum with bx;  Surgeon: Aron Cabrera MD;  Location[facility] END[facility];  Service:     COLONOSCOPY N/A 2020    Procedure: COLONOSCOPY to cecum:  apc to cecum angiodysplagia,;  Surgeon: Aron Cabrera MD;  Location: [facility] ENDOSCOPY;  Service: Gastroenterology;  Laterality: N/A;  GI bleed  post:  diverticulosis, angiodysplagia    CYSTECTOMY      back and shoulder area    ENDOSCOPY N/A 3/9/2018    Procedure: ESOPHAGOGASTRODUODENOSCOPY with bx;  Surgeon: Aron Cabrera MD;  Location: [facility] ENDOSCOPY;  Service:     ENDOSCOPY N/A 2020    Procedure: ESOPHAGOGASTRODUODENOSCOPY;  Surgeon: Aron Cabrera MD;  Location: [facility] ENDOSCOPY;  Service: Gastroenterology;  Laterality: N/A;  GI bleed  post:  HH.    EYE SURGERY Left     detached retina    EYE SURGERY Right     \"repaired a hole in the eye\"    INSERT / REPLACE / REMOVE PACEMAKER      TESTICLE SURGERY      benign tumor removed.         FAMILY HISTORY  Family History   Problem Relation Age of Onset    Stroke Mother     Heart disease Mother     Hypertension Mother     Diabetes Mother     Heart disease Sister         Heart Valve Replacement    Ovarian cancer Sister     Rheumatic fever Sister     Diabetes Sister     Stroke Father          SOCIAL HISTORY  Social History     Socioeconomic History    Marital status:    Tobacco Use    Smoking status: Former     Types: Cigars     Quit date: 10/28/1994     Years since quittin.6    Smokeless tobacco: Never    Tobacco comments:     Quit 25 years ago   Vaping Use    Vaping Use: Never used   Substance " and Sexual Activity    Alcohol use: No     Comment: daily caffiene - 1/2 cup of coffee    Drug use: No    Sexual activity: Defer         ALLERGIES  Amiodarone, Diltiazem, Doxycycline, Cefdinir, and Indomethacin        REVIEW OF SYSTEMS  Review of Systems     All systems reviewed and negative except for those discussed in HPI.       PHYSICAL EXAM    I have reviewed the triage vital signs and nursing notes.    ED Triage Vitals   Temp Heart Rate Resp BP SpO2   06/03/23 1731 06/03/23 1731 06/03/23 1731 06/03/23 1732 06/03/23 1731   98.8 °F (37.1 °C) 85 16 134/64 95 %      Temp src Heart Rate Source Patient Position BP Location FiO2 (%)   06/03/23 1731 06/03/23 1731 -- -- --   Tympanic Monitor          GENERAL: Elderly male that is frail and chronically ill.  No acute distress.Vital signs on my initial evaluation unremarkable  HENT: nares patent  Head/neck/ face are symmetric without gross deformity, signs of trauma, or swelling  EYES: no scleral icterus, no conjunctival pallor.  NECK: Supple, no meningismus  CV: regular rhythm, regular rate with intact distal pulses.  RESPIRATORY: normal effort and no respiratory distress.  ABDOMEN: Min is soft.  He has no tenderness to his left inguinal area he has a little bit of protrusion and swelling this very consistent with hernia.  I suspect successfully applied pressure and I believe I successfully really reduced the hernia.  No longer swelling in both sides of his pelvis and inguinal region appear symmetric.  He has normal bowel sounds.  GENITOURINARY: Normal phallus. Bilaterally descended testes without masses. No scrotal masses.   no inguinal adenopathy.  No rash or signs of infection. No urethral discharge. Nontender to palpation.  MUSCULOSKELETAL: no deformity.  Intact distal pulses  NEURO: alert and appropriate, moves all extremities, follows commands.  Focal motor or sensory changes  SKIN: warm, dry    Vital signs and nursing notes reviewed.        LAB RESULTS  No  results found for this or any previous visit (from the past 24 hour(s)).    Ordered the above labs and independently reviewed the results.        RADIOLOGY  CT Abdomen Pelvis Without Contrast    Result Date: 6/3/2023  Patient: ALEX BURK  Time Out: 21:17 Exam(s): CT ABDOMEN + PELVIS Without Contrast EXAM:   CT Abdomen and Pelvis Without Intravenous Contrast CLINICAL HISTORY:    Reason for exam: Attention to left inguinal region. Patient had inguinal hernia that I believe I successfully reduced.. TECHNIQUE:   Axial computed tomography images of the abdomen and pelvis without intravenous contrast.  CTDI is 7.14 mGy and DLP is 356 mGy-cm.  This CT exam was performed according to the principle of ALARA (As Low As Reasonably Achievable) by using one or more of the following dose reduction techniques: automated exposure control, adjustment of the mA and or kV according to patient size, and or use of iterative reconstruction technique. COMPARISON:   No relevant prior studies available. FINDINGS:   Lung bases:  Chronic peribronchial opacities in the right middle lobe, lingula, and both lower lobes do not appear changed from 8 26 20.   Pleural space:  Small pleural effusions bilaterally.   Heart:  Cardiomegaly, coronary artery atherosclerosis, and partially imaged cardiac pacer leads.  ABDOMEN:   Liver:  Calcified granulomas right liver.  Liver otherwise unremarkable.   Gallbladder and bile ducts:  Cholelithiasis without acute cholecystitis or biliary dilatation.   Pancreas:  Unremarkable.  No ductal dilation.   Spleen:  Calcified splenic granulomas.   Adrenals:  Unremarkable.  No mass.   Kidneys and ureters:  No hydronephrosis or radiopaque stones.  Simple cortical cysts in each kidney; no further follow-up required.   Stomach and bowel:   Diverticulosis without diverticulitis.  No bowel obstruction or mucosal thickening.  PELVIS:   Appendix:  Normal appendix.   Bladder:  Unremarkable.  No stones.  Normal urinary  bladder.   Reproductive:  Prostatomegaly.  ABDOMEN and PELVIS:   Intraperitoneal space:  Unremarkable.  No free air.  No significant fluid collection.   Bones joints:  No acute fracture or dislocation.  Degenerative changes of the spine.   Soft tissues:  Small bilateral fat-containing inguinal hernias.  No associated bowel.   Vasculature:  Atherosclerosis without aortic aneurysm.   Lymph nodes:  Unremarkable.  No enlarged lymph nodes. IMPRESSION:     1.  Small bilateral fat-containing inguinal hernias.  No associated bowel. 2.  Consolidations at the lung bases are unchanged from 8 26 20 and likely chronic. 3.  Small pleural effusions.     Electronically signed by Joe Beckett M.D. on 06-03-23 at 2117     I ordered the above noted radiological studies. Reviewed by me and discussed with radiologist.  See dictation for official radiology interpretation.      PROCEDURES    Procedures      MEDICATIONS GIVEN IN ER    Medications - No data to display      All labs have been independently reviewed by me.  All radiology studies have been reviewed by me and I discussed with radiologist dictating the report when indicated below.  All EKG's independently viewed and interpreted by me.  Discussion below represents my analysis of pertinent findings related to patient's condition, differential diagnosis, treatment plan and final disposition.        PROGRESS, DATA ANALYSIS, CONSULTS, AND MEDICAL DECISION MAKING    This gentleman has an inguinal hernia on the left which I believe I successfully reduced.  I Jennifer just continue to watch him make sure it does not protrude out.  I will do a CT scan of the pelvis without contrast just to make sure everything appears appropriate.  He is not have any pain at this time.  I discussed the plan with the patient and spouse.  All questions answered      ED Course as of 06/03/23 2319   Sat Jun 03, 2023 2135 I reviewed the CT scan report from the radiologist there is small bilateral  fat-containing inguinal hernias there is no associated bowel no signs of stranding or inflammation.  Chronic consolidation at the lung base which is unchanged from previous scan on 8/26/2020.  Please see complete dictated report from radiologist [MM]   5775 I have reevaluated this gentleman and reassessed him he has had no reoccurrence of his left inguinal hernia.  I talked with him and his spouse at length and informed that it might pop out and if it pops out to lay him down and gently massage that area.  I informed him if he gets up or stands up he can put a little pressure on that area to try to prevent it from popping out.  I will give him a general surgeon to follow-up with.  He does not need any emergent repair.  He has a reducible left inguinal hernia.  Gave him also instructions if he develops this protrusion and is not able to go back in develops any nausea or vomiting to return to the emergency department.  All questions answered [MM]      ED Course User Index  [MM] Supa French MD       AS OF 23:19 EDT VITALS:    BP - 115/68  HR - 59  TEMP - 98.8 °F (37.1 °C) (Tympanic)  02 SATS - 97%    SOCIAL DETERMINANTS OF HEALTH THAT IMPACT OR LIMIT CARE (For example..Homelessness,safe discharge, inability to obtain care, follow up, or prescriptions):      DIAGNOSIS  Final diagnoses:   Reducible left inguinal hernia   Bilateral inguinal hernia without obstruction or gangrene, recurrence not specified         DISPOSITION  DISCHARGE    Patient discharged in stable condition.    Reviewed implications of results, diagnosis, meds, responsibility to follow up, warning signs and symptoms of possible worsening, potential complications and reasons to return to ER, including worsening of symptoms, worsening of pain, not able to reduce hernia if it returns, nausea or vomiting, or any concerns..    Patient/Family voiced understanding of above instructions.    Discussed plan for discharge, as there is no emergent indication  for admission. Pt/family is agreeable and understands need for follow up and repeat testing.  Pt is aware that discharge does not mean that nothing is wrong but it indicates no emergency is present that requires admission and they must continue care with follow-up as given below or physician of their choice.     FOLLOW-UP  Leah Lemos MD  [address]  [address]  [facility] [place] [address]  [phone]      Call Monday morning to arrange follow-up in the next 1 to 2 weeks.  Return if pain returns and swelling persists, nausea or vomiting, chest pain, shortness of breath, any concerns.    Lubna Lobo MD  [address]  [address]  [facility] [place] [address]  [phone]      Monday morning to follow-up this week with Dr. Bean.         Medication List      No changes were made to your prescriptions during this visit.                 DICTATED UTILIZING DRAGON DICTATION    Note Disclaimer: At [facility], we believe that sharing information builds trust and better relationships. You are receiving this note because you recently visited [facility]. It is possible you will see health information before a provider has talked with you about it. This kind of information can be easy to misunderstand. To help you fully understand what it means for your health, we urge you to discuss this note with your provider.       Supa French MD  06/03/23 5601

## 2023-06-08 ENCOUNTER — HOME CARE VISIT (OUTPATIENT)
Dept: HOME HEALTH SERVICES | Facility: HOME HEALTHCARE | Age: 88
End: 2023-06-08
Payer: MEDICARE

## 2023-06-08 PROCEDURE — G0495 RN CARE TRAIN/EDU IN HH: HCPCS

## 2023-06-09 VITALS
HEART RATE: 59 BPM | DIASTOLIC BLOOD PRESSURE: 66 MMHG | OXYGEN SATURATION: 99 % | TEMPERATURE: 97.4 F | RESPIRATION RATE: 18 BRPM | SYSTOLIC BLOOD PRESSURE: 128 MMHG

## 2023-06-13 ENCOUNTER — OFFICE VISIT (OUTPATIENT)
Dept: SURGERY | Facility: CLINIC | Age: 88
End: 2023-06-13
Payer: MEDICARE

## 2023-06-13 VITALS
HEIGHT: 68 IN | WEIGHT: 141 LBS | DIASTOLIC BLOOD PRESSURE: 75 MMHG | BODY MASS INDEX: 21.37 KG/M2 | SYSTOLIC BLOOD PRESSURE: 110 MMHG

## 2023-06-13 DIAGNOSIS — K40.90 LEFT INGUINAL HERNIA: Primary | ICD-10-CM

## 2023-06-13 PROCEDURE — 1160F RVW MEDS BY RX/DR IN RCRD: CPT | Performed by: SURGERY

## 2023-06-13 PROCEDURE — 99204 OFFICE O/P NEW MOD 45 MIN: CPT | Performed by: SURGERY

## 2023-06-13 PROCEDURE — 1159F MED LIST DOCD IN RCRD: CPT | Performed by: SURGERY

## 2023-06-13 NOTE — LETTER
June 13, 2023     Lubna Lobo MD     Patient: Asia Ly   YOB: 1932   Date of Visit: 6/13/2023       Dear Lubna,    Thank you for referring Asia Ly to me for evaluation. Below are the relevant portions of my assessment and plan of care.    If you have questions, please do not hesitate to call me. I look forward to following Asia along with you.         Sincerely,        Itz Zaidi Jr., MD        CC:   No Recipients    Itz Zaidi Jr., MD  06/13/23 1751  Sign when Signing Visit  Subjective  Asia Ly is a 89 y.o. male who presents to the office in surgical consultation from Lubna Lobo MD for a left inguinal hernia.    History of Present Illness     The patient has multiple medical problems and is on Eliquis and developed a bulge in the left groin over the past several months.  It has been relatively small in size and he has been able to reduce it.  He got up from his chair 10 days ago and developed much more severe pain in the left groin with an incarcerated inguinal hernia.  This prompted a presentation to the emergency room where the inguinal hernia was reduced.  During that visit a CT scan of the abdomen and pelvis was performed that showed bilateral inguinal hernias that contained fat.  He has never noticed a bulge in the right groin.  There has never been symptoms in the right groin.    Review of Systems   Constitutional:  Negative for fatigue and fever.   Respiratory:  Negative for chest tightness and shortness of breath.    Cardiovascular:  Negative for chest pain and palpitations.   Gastrointestinal:  Negative for abdominal pain, blood in stool, constipation, diarrhea, nausea and vomiting.   Past Medical History:   Diagnosis Date   • Aortic stenosis    • Aspiration pneumonia    • Atrial fibrillation    • Bleeding from the nose    • Bronchiectasis    • CKD (chronic kidney disease)    • COPD (chronic obstructive pulmonary disease)    • Crohn's disease    • Dizziness     • Elevated cholesterol    • Gastric ulcer    • Generalized weakness    • Gout    • Gout    • Hard of hearing    • Hyperlipidemia    • Hypertension    • Leg swelling    • Lower extremity edema    • Mild anemia    • Near syncope    • Nonrheumatic aortic valve stenosis    • PAF (paroxysmal atrial fibrillation)    • Palpitations    • Peptic ulcer    • Pneumonia of left lung due to infectious organism    • Pulmonary fibrosis    • Stroke    • TIA (transient ischemic attack)      Past Surgical History:   Procedure Laterality Date   • BRONCHOSCOPY N/A 10/22/2018    Procedure: BRONCHOSCOPY WITH BRONCHALVEOLAR LAVAGE;  Surgeon: Chidi Oconnor MD;  Location: [facility] ENDOSCOPY;  Service: Pulmonary   • BRONCHOSCOPY N/A 7/8/2021    Procedure: BRONCHOSCOPY with BAL;  Surgeon: Chidi Oconnor MD;  [facility];  Service: Pulmonary;  Laterality: N/A;  Pre: bronchectasis  Post: same   • CARDIAC ELECTROPHYSIOLOGY PROCEDURE N/A 7/9/2021    Procedure: Pacemaker DC new--[employer] possible His lead;  Surgeon: Rashaun Nettles MD;  Location: [facility] CATH INVASIVE LOCATION;  Service: Cardiology;  Laterality: N/A;   • CARDIAC ELECTROPHYSIOLOGY PROCEDURE N/A 9/3/2021    Procedure: AV node 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 ppm;  Surgeon: Rashaun Nettles MD;  Location: [facility] CATH INVASIVE LOCATION;  Service: Cardiovascular;  Laterality: N/A;   • CATARACT EXTRACTION Bilateral    • COLONOSCOPY     • COLONOSCOPY N/A 3/9/2018    Procedure: COLONOSCOPY to terminal ileum with bx;  Surgeon: Aron Cabrera MD;  Location: [facility] ENDOSCOPY;  Service:    • COLONOSCOPY N/A 11/19/2020    Procedure: COLONOSCOPY to cecum:  apc to cecum angiodysplagia,;  Surgeon: Aron Cabrera MD;  Location: [facility] ENDOSCOPY;  Service: Gastroenterology;  Laterality: N/A;  GI bleed  post:  diverticulosis, angiodysplagia   • CYSTECTOMY      back and shoulder area   • ENDOSCOPY N/A 3/9/2018    Procedure: ESOPHAGOGASTRODUODENOSCOPY with bx;  Surgeon: Aron MCNEAL  "MD Rick;  Location: [facility] ENDOSCOPY;  Service:    • ENDOSCOPY N/A 2020    Procedure: ESOPHAGOGASTRODUODENOSCOPY;  Surgeon: Aron Cabrera MD;  Location: [facility] ENDOSCOPY;  Service: Gastroenterology;  Laterality: N/A;  GI bleed  post:  HH.   • EYE SURGERY Left     detached retina   • EYE SURGERY Right     \"repaired a hole in the eye\"   • INSERT / REPLACE / REMOVE PACEMAKER     • TESTICLE SURGERY      benign tumor removed.     Family History   Problem Relation Age of Onset   • Stroke Mother    • Heart disease Mother    • Hypertension Mother    • Diabetes Mother    • Heart disease Sister         Heart Valve Replacement   • Ovarian cancer Sister    • Rheumatic fever Sister    • Diabetes Sister    • Stroke Father      Social History     Socioeconomic History   • Marital status:    Tobacco Use   • Smoking status: Former     Types: Cigars     Quit date: 10/28/1994     Years since quittin.6   • Smokeless tobacco: Never   • Tobacco comments:     Quit 25 years ago   Vaping Use   • Vaping Use: Never used   Substance and Sexual Activity   • Alcohol use: No     Comment: daily caffiene - 1/2 cup of coffee   • Drug use: No   • Sexual activity: Defer       Objective  Physical Exam  Constitutional:       Appearance: Normal appearance. He is well-developed. He is not toxic-appearing.   Eyes:      General: No scleral icterus.  Pulmonary:      Effort: Pulmonary effort is normal. No respiratory distress.   Abdominal:      Palpations: Abdomen is soft.      Tenderness: There is no abdominal tenderness.      Hernia: Hernia is present in the left inguinal area. There is no hernia in the right inguinal area.      Comments: There is a moderately sized left inguinal hernia that is reducible.  There is no palpable right inguinal hernia.   Skin:     General: Skin is warm and dry.   Neurological:      Mental Status: He is alert and oriented to person, place, and time.   Psychiatric:         Behavior: Behavior normal.         " Thought Content: Thought content normal.         Judgment: Judgment normal.       Assessment & Plan    1.  Left inguinal hernia: The patient has a symptomatic left inguinal hernia that was recently incarcerated.  He is at risk of developing another incarceration.  If he develops an incarcerated hernia that requires emergency surgery, he is at high risk for complications based on his multiple medical problems.  It is more appropriate to perform an elective left inguinal hernia repair to try to avoid complications.  This was discussed with the patient and his wife.  We will plan to proceed with a left inguinal hernia repair.  The patient understands the indications, alternatives, risks, and benefits of the procedure and wishes to proceed.     2.  Right inguinal hernia: The patient has a small fat-containing right inguinal hernia based on CT scan of the abdomen and pelvis.  There is no palpable right inguinal hernia at this time and he has never noticed a bulge in that area.  The findings of a fat-containing hernia may represent a cord lipoma.  There is no indication for right inguinal hernia repair at this time.    3.  Chronic anticoagulation: Patient is on Eliquis and this will need to be held for 2 days prior to surgery.  This was discussed with the patient and his wife.  A message will be sent to his cardiologist to confirm that his Eliquis can be held.

## 2023-06-13 NOTE — PROGRESS NOTES
Subjective   Asia Ly is a 89 y.o. male who presents to the office in surgical consultation from Lubna Lobo MD for a left inguinal hernia.    History of Present Illness     The patient has multiple medical problems and is on Eliquis and developed a bulge in the left groin over the past several months.  It has been relatively small in size and he has been able to reduce it.  He got up from his chair 10 days ago and developed much more severe pain in the left groin with an incarcerated inguinal hernia.  This prompted a presentation to the emergency room where the inguinal hernia was reduced.  During that visit a CT scan of the abdomen and pelvis was performed that showed bilateral inguinal hernias that contained fat.  He has never noticed a bulge in the right groin.  There has never been symptoms in the right groin.    Review of Systems   Constitutional:  Negative for fatigue and fever.   Respiratory:  Negative for chest tightness and shortness of breath.    Cardiovascular:  Negative for chest pain and palpitations.   Gastrointestinal:  Negative for abdominal pain, blood in stool, constipation, diarrhea, nausea and vomiting.   Past Medical History:   Diagnosis Date    Aortic stenosis     Aspiration pneumonia     Atrial fibrillation     Bleeding from the nose     Bronchiectasis     CKD (chronic kidney disease)     COPD (chronic obstructive pulmonary disease)     Crohn's disease     Dizziness     Elevated cholesterol     Gastric ulcer     Generalized weakness     Gout     Gout     Hard of hearing     Hyperlipidemia     Hypertension     Leg swelling     Lower extremity edema     Mild anemia     Near syncope     Nonrheumatic aortic valve stenosis     PAF (paroxysmal atrial fibrillation)     Palpitations     Peptic ulcer     Pneumonia of left lung due to infectious organism     Pulmonary fibrosis     Stroke     TIA (transient ischemic attack)      Past Surgical History:   Procedure Laterality Date    BRONCHOSCOPY  "N/A 10/22/2018    Procedure: BRONCHOSCOPY WITH BRONCHALVEOLAR LAVAGE;  Surgeon: Chidi Oconnor MD;  Location: [facility] ENDOSCOPY;  Service: Pulmonary    BRONCHOSCOPY N/A 7/8/2021    Procedure: BRONCHOSCOPY with BAL;  Surgeon: Cihdi Oconnor MD;  Location: [facility] ENDOSCOPY;  Service: Pulmonary;  Laterality: N/A;  Pre: bronchectasis  Post: same    CARDIAC ELECTROPHYSIOLOGY PROCEDURE N/A 7/9/2021    Procedure: Pacemaker DC new--[employer]tr[employer] possible His lead;  Surgeon: Rashaun Nettles MD;  Location: [facility] CATH INVASIVE LOCATION;  Service: Cardiology;  Laterality: N/A;    CARDIAC ELECTROPHYSIOLOGY PROCEDURE N/A 9/3/2021    Procedure: AV node 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 ppm;  Surgeon: Rashaun Nettles MD;  Location: [facility] CATH INVASIVE LOCATION;  Service: Cardiovascular;  Laterality: N/A;    CATARACT EXTRACTION Bilateral     COLONOSCOPY      COLONOSCOPY N/A 3/9/2018    Procedure: COLONOSCOPY to terminal ileum with bx;  Surgeon: Aron Cabrera MD;  Location: [facility] ENDOSCOPY;  Service:     COLONOSCOPY N/A 11/19/2020    Procedure: COLONOSCOPY to cecum:  apc to cecum angiodysplagia,;  Surgeon: Aron Cabrera MD;  Location: [facility] ENDOSCOPY;  Service: Gastroenterology;  Laterality: N/A;  GI bleed  post:  diverticulosis, angiodysplagia    CYSTECTOMY      back and shoulder area    ENDOSCOPY N/A 3/9/2018    Procedure: ESOPHAGOGASTRODUODENOSCOPY with bx;  Surgeon: Aron Cabrera MD;  Location: [facility] ENDOSCOPY;  Service:     ENDOSCOPY N/A 11/19/2020    Procedure: ESOPHAGOGASTRODUODENOSCOPY;  Surgeon: Aron Cabrera MD;  Location: [facility] ENDOSCOPY;  Service: Gastroenterology;  Laterality: N/A;  GI bleed  post:  HH.    EYE SURGERY Left     detached retina    EYE SURGERY Right     \"repaired a hole in the eye\"    INSERT / REPLACE / REMOVE PACEMAKER      TESTICLE SURGERY      benign tumor removed.     Family History   Problem Relation Age of Onset    Stroke Mother     Heart disease Mother     Hypertension Mother     " Diabetes Mother     Heart disease Sister         Heart Valve Replacement    Ovarian cancer Sister     Rheumatic fever Sister     Diabetes Sister     Stroke Father      Social History     Socioeconomic History    Marital status:    Tobacco Use    Smoking status: Former     Types: Cigars     Quit date: 10/28/1994     Years since quittin.6    Smokeless tobacco: Never    Tobacco comments:     Quit 25 years ago   Vaping Use    Vaping Use: Never used   Substance and Sexual Activity    Alcohol use: No     Comment: daily caffiene - 1/2 cup of coffee    Drug use: No    Sexual activity: Defer       Objective   Physical Exam  Constitutional:       Appearance: Normal appearance. He is well-developed. He is not toxic-appearing.   Eyes:      General: No scleral icterus.  Pulmonary:      Effort: Pulmonary effort is normal. No respiratory distress.   Abdominal:      Palpations: Abdomen is soft.      Tenderness: There is no abdominal tenderness.      Hernia: Hernia is present in the left inguinal area. There is no hernia in the right inguinal area.      Comments: There is a moderately sized left inguinal hernia that is reducible.  There is no palpable right inguinal hernia.   Skin:     General: Skin is warm and dry.   Neurological:      Mental Status: He is alert and oriented to person, place, and time.   Psychiatric:         Behavior: Behavior normal.         Thought Content: Thought content normal.         Judgment: Judgment normal.       Assessment & Plan     1.  Left inguinal hernia: The patient has a symptomatic left inguinal hernia that was recently incarcerated.  He is at risk of developing another incarceration.  If he develops an incarcerated hernia that requires emergency surgery, he is at high risk for complications based on his multiple medical problems.  It is more appropriate to perform an elective left inguinal hernia repair to try to avoid complications.  This was discussed with the patient and his wife.   We will plan to proceed with a left inguinal hernia repair.  The patient understands the indications, alternatives, risks, and benefits of the procedure and wishes to proceed.     2.  Right inguinal hernia: The patient has a small fat-containing right inguinal hernia based on CT scan of the abdomen and pelvis.  There is no palpable right inguinal hernia at this time and he has never noticed a bulge in that area.  The findings of a fat-containing hernia may represent a cord lipoma.  There is no indication for right inguinal hernia repair at this time.    3.  Chronic anticoagulation: Patient is on Eliquis and this will need to be held for 2 days prior to surgery.  This was discussed with the patient and his wife.  A message will be sent to his cardiologist to confirm that his Eliquis can be held.

## 2023-06-14 ENCOUNTER — TELEPHONE (OUTPATIENT)
Dept: SURGERY | Facility: CLINIC | Age: 88
End: 2023-06-14
Payer: MEDICARE

## 2023-06-14 ENCOUNTER — TELEPHONE (OUTPATIENT)
Dept: CARDIOLOGY | Facility: CLINIC | Age: 88
End: 2023-06-14
Payer: MEDICARE

## 2023-06-14 ENCOUNTER — TELEPHONE (OUTPATIENT)
Dept: SURGERY | Facility: CLINIC | Age: 88
End: 2023-06-14

## 2023-06-14 NOTE — TELEPHONE ENCOUNTER
I called but no answer. The bottom line is it is too soon to hold Eliquis for elective surgery due to recent PEs

## 2023-06-14 NOTE — TELEPHONE ENCOUNTER
Please inform patient's wife Ni that I have reviewed with Dr. Brown. Patient will need to wait 3 full months prior to holding Eliquis for elective inguinal surgery due to bilateral PEs in 05/2023. I have discussed with Dr. Itz Zaidi and surgery will need to be post-poned.

## 2023-06-14 NOTE — TELEPHONE ENCOUNTER
Caller: LEXI BURK    Relationship to patient: SPOUSE    Best call back number:     [phone]       Patient is needing: PT'S SPOUSE HAS BEEN INFORMED BY CARDIO  THAT PT IS TO POSTPONE HERNIA SX 3 MONTHS. SHE WOULD LIKE TO KEEP SX DATE AND ALSO SEEKS CLINICAL ADVICE FROM DR RIZZO BECAUSE THE PATIENT IS IN SO MUCH PAIN AND SHE BELIEVES IT WILL BE TOO WORSE IF PT WAITS 3 MONTHS.

## 2023-06-14 NOTE — TELEPHONE ENCOUNTER
I spoke with the patient's wife on the telephone because she called earlier with concern that cardiology wanted to postpone the inguinal hernia repair for 3 months.  He had a recent pulmonary embolus and they are concerned about going off the anticoagulation too soon based on the pulmonary embolus.  This was explained to his wife.  She was advised to purchase a truss that can be used to hold the hernia in and alleviate his symptoms while we wait until a safe time to stop the anticoagulation for surgery.

## 2023-07-24 PROCEDURE — 93294 REM INTERROG EVL PM/LDLS PM: CPT | Performed by: INTERNAL MEDICINE

## 2023-07-24 PROCEDURE — 93296 REM INTERROG EVL PM/IDS: CPT | Performed by: INTERNAL MEDICINE

## 2023-08-25 ENCOUNTER — TELEPHONE (OUTPATIENT)
Dept: SURGERY | Facility: CLINIC | Age: 88
End: 2023-08-25
Payer: MEDICARE

## 2023-08-25 ENCOUNTER — TELEPHONE (OUTPATIENT)
Dept: CARDIOLOGY | Facility: CLINIC | Age: 88
End: 2023-08-25
Payer: MEDICARE

## 2023-08-25 NOTE — TELEPHONE ENCOUNTER
Patient wife calling stating that his hernia pain has become so severe that Dr Itz Zaidi is gong to do surgery on 8/29/2023. Patient is currently on Eliquis and she is asking for instructions on holding it prior to the surgery? Please advise

## 2023-08-25 NOTE — TELEPHONE ENCOUNTER
He may proceed as scheduled and Hold Eliquis 48 hours prior.  He will need to resume Eliquis once cleared by Dr. Zaidi but as soon as possible      KYLAH Jha  [facility]   [address]  [place] [address]  Ph: [phone]  Fax: [phone]

## 2023-08-25 NOTE — TELEPHONE ENCOUNTER
I spoke with the patient's wife on the telephone.  The patient had a brief episode of incarceration but was able to reduce his left inguinal hernia.  We will proceed with a left inguinal hernia repair.

## 2023-08-25 NOTE — TELEPHONE ENCOUNTER
I spoke with Ni on behalf of Asia regarding his increased pain from the inguinal hernia. Asia started having increased pain last night & the hernia has gotten bigger in size. Ni states Asia pushed the hernia back in & the pain decreased significantly & he is still feeling better. Ni let me know Asia has gotten the okay from his cardiologist Dr. Brown to stop his blood thinner & proceed with surgery. This is noted in  most recent office visit on 07/13/2023.

## 2023-08-25 NOTE — TELEPHONE ENCOUNTER
Spoke with patients wife advised to hold Eliquis 48 hours.   Verbalized understanding and no further questions.       DAVID Carnes

## 2023-08-28 RX ORDER — DOXYCYCLINE HYCLATE 50 MG/1
1 CAPSULE, GELATIN COATED ORAL
COMMUNITY
Start: 2023-08-12

## 2023-08-29 ENCOUNTER — HOSPITAL ENCOUNTER (OUTPATIENT)
Facility: HOSPITAL | Age: 88
Setting detail: HOSPITAL OUTPATIENT SURGERY
Discharge: HOME | End: 2023-08-29
Attending: SURGERY | Admitting: SURGERY
Payer: MEDICARE

## 2023-08-29 ENCOUNTER — ANESTHESIA EVENT (OUTPATIENT)
Dept: PERIOP | Facility: HOSPITAL | Age: 88
End: 2023-08-29
Payer: MEDICARE

## 2023-08-29 ENCOUNTER — ANESTHESIA (OUTPATIENT)
Dept: PERIOP | Facility: HOSPITAL | Age: 88
End: 2023-08-29
Payer: MEDICARE

## 2023-08-29 VITALS
BODY MASS INDEX: 21.89 KG/M2 | HEART RATE: 58 BPM | WEIGHT: 144.4 LBS | OXYGEN SATURATION: 93 % | DIASTOLIC BLOOD PRESSURE: 57 MMHG | HEIGHT: 68 IN | RESPIRATION RATE: 16 BRPM | TEMPERATURE: 98.1 F | SYSTOLIC BLOOD PRESSURE: 148 MMHG

## 2023-08-29 DIAGNOSIS — K40.90 LEFT INGUINAL HERNIA: ICD-10-CM

## 2023-08-29 LAB
ANION GAP SERPL CALCULATED.3IONS-SCNC: 11 MMOL/L (ref 5–15)
BUN SERPL-MCNC: 26 MG/DL (ref 8–23)
BUN/CREAT SERPL: 23.2 (ref 7–25)
CALCIUM SPEC-SCNC: 9.5 MG/DL (ref 8.2–9.6)
CHLORIDE SERPL-SCNC: 103 MMOL/L (ref 98–107)
CO2 SERPL-SCNC: 26 MMOL/L (ref 22–29)
CREAT SERPL-MCNC: 1.12 MG/DL (ref 0.76–1.27)
DEPRECATED RDW RBC AUTO: 53 FL (ref 37–54)
EGFRCR SERPLBLD CKD-EPI 2021: 62 ML/MIN/1.73
ERYTHROCYTE [DISTWIDTH] IN BLOOD BY AUTOMATED COUNT: 15.2 % (ref 12.3–15.4)
GLUCOSE SERPL-MCNC: 81 MG/DL (ref 65–99)
HCT VFR BLD AUTO: 37.3 % (ref 37.5–51)
HGB BLD-MCNC: 12.2 G/DL (ref 13–17.7)
MCH RBC QN AUTO: 31.3 PG (ref 26.6–33)
MCHC RBC AUTO-ENTMCNC: 32.7 G/DL (ref 31.5–35.7)
MCV RBC AUTO: 95.6 FL (ref 79–97)
PLATELET # BLD AUTO: 146 10*3/MM3 (ref 140–450)
PMV BLD AUTO: 11 FL (ref 6–12)
POTASSIUM SERPL-SCNC: 4.4 MMOL/L (ref 3.5–5.2)
RBC # BLD AUTO: 3.9 10*6/MM3 (ref 4.14–5.8)
SODIUM SERPL-SCNC: 140 MMOL/L (ref 136–145)
WBC NRBC COR # BLD: 7.65 10*3/MM3 (ref 3.4–10.8)

## 2023-08-29 PROCEDURE — 25010000002 CLINDAMYCIN 900 MG/6ML SOLUTION: Performed by: SURGERY

## 2023-08-29 PROCEDURE — 80048 BASIC METABOLIC PNL TOTAL CA: CPT | Performed by: SURGERY

## 2023-08-29 PROCEDURE — 49520 REREPAIR ING HERNIA REDUCE: CPT | Performed by: SURGERY

## 2023-08-29 PROCEDURE — C9290 INJ, BUPIVACAINE LIPOSOME: HCPCS | Performed by: SURGERY

## 2023-08-29 PROCEDURE — 25010000002 SUGAMMADEX 200 MG/2ML SOLUTION

## 2023-08-29 PROCEDURE — 25010000002 ONDANSETRON PER 1 MG

## 2023-08-29 PROCEDURE — 25010000002 CLINDAMYCIN 900 MG/50ML SOLUTION: Performed by: SURGERY

## 2023-08-29 PROCEDURE — 85027 COMPLETE CBC AUTOMATED: CPT | Performed by: SURGERY

## 2023-08-29 PROCEDURE — 25010000002 FENTANYL CITRATE (PF) 50 MCG/ML SOLUTION

## 2023-08-29 PROCEDURE — S0260 H&P FOR SURGERY: HCPCS | Performed by: SURGERY

## 2023-08-29 PROCEDURE — 25010000002 PROPOFOL 10 MG/ML EMULSION

## 2023-08-29 PROCEDURE — C1781 MESH (IMPLANTABLE): HCPCS | Performed by: SURGERY

## 2023-08-29 PROCEDURE — 25010000002 PHENYLEPHRINE 10 MG/ML SOLUTION 5 ML VIAL

## 2023-08-29 PROCEDURE — 25010000002 DEXAMETHASONE SODIUM PHOSPHATE 20 MG/5ML SOLUTION

## 2023-08-29 PROCEDURE — 0 BUPIVACAINE LIPOSOME 1.3 % SUSPENSION: Performed by: SURGERY

## 2023-08-29 DEVICE — BARD SOFT MESH
Type: IMPLANTABLE DEVICE | Site: ABDOMEN | Status: FUNCTIONAL
Brand: BARD SOFT MESH

## 2023-08-29 RX ORDER — FLUMAZENIL 0.1 MG/ML
0.2 INJECTION INTRAVENOUS AS NEEDED
Status: DISCONTINUED | OUTPATIENT
Start: 2023-08-29 | End: 2023-08-29 | Stop reason: HOSPADM

## 2023-08-29 RX ORDER — BUPIVACAINE HYDROCHLORIDE AND EPINEPHRINE 5; 5 MG/ML; UG/ML
INJECTION, SOLUTION EPIDURAL; INTRACAUDAL; PERINEURAL AS NEEDED
Status: DISCONTINUED | OUTPATIENT
Start: 2023-08-29 | End: 2023-08-29 | Stop reason: HOSPADM

## 2023-08-29 RX ORDER — DROPERIDOL 2.5 MG/ML
0.62 INJECTION, SOLUTION INTRAMUSCULAR; INTRAVENOUS
Status: DISCONTINUED | OUTPATIENT
Start: 2023-08-29 | End: 2023-08-29 | Stop reason: HOSPADM

## 2023-08-29 RX ORDER — PHENYLEPHRINE HCL IN 0.9% NACL 0.5 MG/5ML
SYRINGE (ML) INTRAVENOUS AS NEEDED
Status: DISCONTINUED | OUTPATIENT
Start: 2023-08-29 | End: 2023-08-29 | Stop reason: SURG

## 2023-08-29 RX ORDER — HYDROMORPHONE HYDROCHLORIDE 1 MG/ML
0.25 INJECTION, SOLUTION INTRAMUSCULAR; INTRAVENOUS; SUBCUTANEOUS
Status: DISCONTINUED | OUTPATIENT
Start: 2023-08-29 | End: 2023-08-29 | Stop reason: HOSPADM

## 2023-08-29 RX ORDER — EPHEDRINE SULFATE 50 MG/ML
5 INJECTION, SOLUTION INTRAVENOUS ONCE AS NEEDED
Status: DISCONTINUED | OUTPATIENT
Start: 2023-08-29 | End: 2023-08-29 | Stop reason: HOSPADM

## 2023-08-29 RX ORDER — LIDOCAINE HYDROCHLORIDE 20 MG/ML
INJECTION, SOLUTION INFILTRATION; PERINEURAL AS NEEDED
Status: DISCONTINUED | OUTPATIENT
Start: 2023-08-29 | End: 2023-08-29 | Stop reason: SURG

## 2023-08-29 RX ORDER — IPRATROPIUM BROMIDE AND ALBUTEROL SULFATE 2.5; .5 MG/3ML; MG/3ML
3 SOLUTION RESPIRATORY (INHALATION) ONCE AS NEEDED
Status: DISCONTINUED | OUTPATIENT
Start: 2023-08-29 | End: 2023-08-29 | Stop reason: HOSPADM

## 2023-08-29 RX ORDER — SODIUM CHLORIDE 0.9 % (FLUSH) 0.9 %
3-10 SYRINGE (ML) INJECTION AS NEEDED
Status: DISCONTINUED | OUTPATIENT
Start: 2023-08-29 | End: 2023-08-29 | Stop reason: HOSPADM

## 2023-08-29 RX ORDER — LIDOCAINE HYDROCHLORIDE 10 MG/ML
0.5 INJECTION, SOLUTION INFILTRATION; PERINEURAL ONCE AS NEEDED
Status: DISCONTINUED | OUTPATIENT
Start: 2023-08-29 | End: 2023-08-29 | Stop reason: HOSPADM

## 2023-08-29 RX ORDER — CLINDAMYCIN PHOSPHATE 900 MG/50ML
900 INJECTION INTRAVENOUS ONCE
Status: DISCONTINUED | OUTPATIENT
Start: 2023-08-29 | End: 2023-08-29

## 2023-08-29 RX ORDER — CLINDAMYCIN PHOSPHATE 900 MG/50ML
900 INJECTION, SOLUTION INTRAVENOUS ONCE
Status: COMPLETED | OUTPATIENT
Start: 2023-08-29 | End: 2023-08-29

## 2023-08-29 RX ORDER — LABETALOL HYDROCHLORIDE 5 MG/ML
5 INJECTION, SOLUTION INTRAVENOUS
Status: DISCONTINUED | OUTPATIENT
Start: 2023-08-29 | End: 2023-08-29 | Stop reason: HOSPADM

## 2023-08-29 RX ORDER — ONDANSETRON 2 MG/ML
INJECTION INTRAMUSCULAR; INTRAVENOUS AS NEEDED
Status: DISCONTINUED | OUTPATIENT
Start: 2023-08-29 | End: 2023-08-29 | Stop reason: SURG

## 2023-08-29 RX ORDER — ONDANSETRON 2 MG/ML
4 INJECTION INTRAMUSCULAR; INTRAVENOUS ONCE AS NEEDED
Status: DISCONTINUED | OUTPATIENT
Start: 2023-08-29 | End: 2023-08-29 | Stop reason: HOSPADM

## 2023-08-29 RX ORDER — MAGNESIUM HYDROXIDE 1200 MG/15ML
LIQUID ORAL AS NEEDED
Status: DISCONTINUED | OUTPATIENT
Start: 2023-08-29 | End: 2023-08-29 | Stop reason: HOSPADM

## 2023-08-29 RX ORDER — HYDROCODONE BITARTRATE AND ACETAMINOPHEN 7.5; 325 MG/1; MG/1
1 TABLET ORAL EVERY 4 HOURS PRN
Status: DISCONTINUED | OUTPATIENT
Start: 2023-08-29 | End: 2023-08-29 | Stop reason: HOSPADM

## 2023-08-29 RX ORDER — HYDROCODONE BITARTRATE AND ACETAMINOPHEN 5; 325 MG/1; MG/1
1 TABLET ORAL ONCE AS NEEDED
Status: DISCONTINUED | OUTPATIENT
Start: 2023-08-29 | End: 2023-08-29 | Stop reason: HOSPADM

## 2023-08-29 RX ORDER — DEXAMETHASONE SODIUM PHOSPHATE 4 MG/ML
INJECTION, SOLUTION INTRA-ARTICULAR; INTRALESIONAL; INTRAMUSCULAR; INTRAVENOUS; SOFT TISSUE AS NEEDED
Status: DISCONTINUED | OUTPATIENT
Start: 2023-08-29 | End: 2023-08-29 | Stop reason: SURG

## 2023-08-29 RX ORDER — HYDROCODONE BITARTRATE AND ACETAMINOPHEN 5; 325 MG/1; MG/1
1 TABLET ORAL EVERY 6 HOURS PRN
Qty: 20 TABLET | Refills: 0 | Status: SHIPPED | OUTPATIENT
Start: 2023-08-29

## 2023-08-29 RX ORDER — ROCURONIUM BROMIDE 10 MG/ML
INJECTION, SOLUTION INTRAVENOUS AS NEEDED
Status: DISCONTINUED | OUTPATIENT
Start: 2023-08-29 | End: 2023-08-29 | Stop reason: SURG

## 2023-08-29 RX ORDER — PROPOFOL 10 MG/ML
VIAL (ML) INTRAVENOUS AS NEEDED
Status: DISCONTINUED | OUTPATIENT
Start: 2023-08-29 | End: 2023-08-29 | Stop reason: SURG

## 2023-08-29 RX ORDER — FENTANYL CITRATE 50 UG/ML
INJECTION, SOLUTION INTRAMUSCULAR; INTRAVENOUS AS NEEDED
Status: DISCONTINUED | OUTPATIENT
Start: 2023-08-29 | End: 2023-08-29 | Stop reason: SURG

## 2023-08-29 RX ORDER — HYDRALAZINE HYDROCHLORIDE 20 MG/ML
5 INJECTION INTRAMUSCULAR; INTRAVENOUS
Status: DISCONTINUED | OUTPATIENT
Start: 2023-08-29 | End: 2023-08-29 | Stop reason: HOSPADM

## 2023-08-29 RX ORDER — DIPHENHYDRAMINE HYDROCHLORIDE 50 MG/ML
12.5 INJECTION INTRAMUSCULAR; INTRAVENOUS
Status: DISCONTINUED | OUTPATIENT
Start: 2023-08-29 | End: 2023-08-29 | Stop reason: HOSPADM

## 2023-08-29 RX ORDER — SODIUM CHLORIDE 0.9 % (FLUSH) 0.9 %
3 SYRINGE (ML) INJECTION EVERY 12 HOURS SCHEDULED
Status: DISCONTINUED | OUTPATIENT
Start: 2023-08-29 | End: 2023-08-29 | Stop reason: HOSPADM

## 2023-08-29 RX ORDER — SODIUM CHLORIDE, SODIUM LACTATE, POTASSIUM CHLORIDE, CALCIUM CHLORIDE 600; 310; 30; 20 MG/100ML; MG/100ML; MG/100ML; MG/100ML
9 INJECTION, SOLUTION INTRAVENOUS CONTINUOUS
Status: DISCONTINUED | OUTPATIENT
Start: 2023-08-29 | End: 2023-08-29 | Stop reason: HOSPADM

## 2023-08-29 RX ORDER — FENTANYL CITRATE 50 UG/ML
25 INJECTION, SOLUTION INTRAMUSCULAR; INTRAVENOUS
Status: DISCONTINUED | OUTPATIENT
Start: 2023-08-29 | End: 2023-08-29 | Stop reason: HOSPADM

## 2023-08-29 RX ORDER — NALOXONE HCL 0.4 MG/ML
0.2 VIAL (ML) INJECTION AS NEEDED
Status: DISCONTINUED | OUTPATIENT
Start: 2023-08-29 | End: 2023-08-29 | Stop reason: HOSPADM

## 2023-08-29 RX ADMIN — SODIUM CHLORIDE 900 MG: 9 INJECTION, SOLUTION INTRAVENOUS at 11:06

## 2023-08-29 RX ADMIN — PROPOFOL 100 MG: 10 INJECTION, EMULSION INTRAVENOUS at 11:21

## 2023-08-29 RX ADMIN — FENTANYL CITRATE 12.5 MCG: 50 INJECTION, SOLUTION INTRAMUSCULAR; INTRAVENOUS at 12:07

## 2023-08-29 RX ADMIN — PROPOFOL 30 MG: 10 INJECTION, EMULSION INTRAVENOUS at 11:45

## 2023-08-29 RX ADMIN — FENTANYL CITRATE 12.5 MCG: 50 INJECTION, SOLUTION INTRAMUSCULAR; INTRAVENOUS at 12:44

## 2023-08-29 RX ADMIN — PROPOFOL 30 MG: 10 INJECTION, EMULSION INTRAVENOUS at 11:49

## 2023-08-29 RX ADMIN — Medication 100 MCG: at 12:22

## 2023-08-29 RX ADMIN — ROCURONIUM BROMIDE 30 MG: 10 INJECTION, SOLUTION INTRAVENOUS at 11:22

## 2023-08-29 RX ADMIN — DEXAMETHASONE SODIUM PHOSPHATE 8 MG: 4 INJECTION, SOLUTION INTRAMUSCULAR; INTRAVENOUS at 11:25

## 2023-08-29 RX ADMIN — ROCURONIUM BROMIDE 10 MG: 10 INJECTION, SOLUTION INTRAVENOUS at 12:05

## 2023-08-29 RX ADMIN — PHENYLEPHRINE HYDROCHLORIDE 0.5 MCG/KG/MIN: 10 INJECTION, SOLUTION INTRAVENOUS at 11:21

## 2023-08-29 RX ADMIN — LIDOCAINE HYDROCHLORIDE 80 MG: 20 INJECTION, SOLUTION INFILTRATION; PERINEURAL at 11:21

## 2023-08-29 RX ADMIN — SUGAMMADEX 200 MG: 100 INJECTION, SOLUTION INTRAVENOUS at 12:35

## 2023-08-29 RX ADMIN — SODIUM CHLORIDE, POTASSIUM CHLORIDE, SODIUM LACTATE AND CALCIUM CHLORIDE 9 ML/HR: 600; 310; 30; 20 INJECTION, SOLUTION INTRAVENOUS at 10:48

## 2023-08-29 RX ADMIN — CLINDAMYCIN IN 5 PERCENT DEXTROSE 900 MG: 18 INJECTION, SOLUTION INTRAVENOUS at 11:07

## 2023-08-29 RX ADMIN — ONDANSETRON 4 MG: 2 INJECTION INTRAMUSCULAR; INTRAVENOUS at 12:35

## 2023-08-29 NOTE — H&P
Progress Notes  Itz Zaidi Jr., MD (Physician)  General Surgery  Expand All Collapse All     Subjective      Asia Ly is a 89 y.o. male who presents for a left inguinal hernia.     History of Present Illness      The patient has multiple medical problems and is on Eliquis and developed a bulge in the left groin over the past several months.  It has been relatively small in size and he has been able to reduce it.  He got up from his chair 10 days ago and developed much more severe pain in the left groin with an incarcerated inguinal hernia.  This prompted a presentation to the emergency room where the inguinal hernia was reduced.  During that visit a CT scan of the abdomen and pelvis was performed that showed bilateral inguinal hernias that contained fat.  He has never noticed a bulge in the right groin.  There has never been symptoms in the right groin.     Review of Systems   Constitutional:  Negative for fatigue and fever.   Respiratory:  Negative for chest tightness and shortness of breath.    Cardiovascular:  Negative for chest pain and palpitations.   Gastrointestinal:  Negative for abdominal pain, blood in stool, constipation, diarrhea, nausea and vomiting.   Medical History        Past Medical History:   Diagnosis Date    Aortic stenosis      Aspiration pneumonia      Atrial fibrillation      Bleeding from the nose      Bronchiectasis      CKD (chronic kidney disease)      COPD (chronic obstructive pulmonary disease)      Crohn's disease      Dizziness      Elevated cholesterol      Gastric ulcer      Generalized weakness      Gout      Gout      Hard of hearing      Hyperlipidemia      Hypertension      Leg swelling      Lower extremity edema      Mild anemia      Near syncope      Nonrheumatic aortic valve stenosis      PAF (paroxysmal atrial fibrillation)      Palpitations      Peptic ulcer      Pneumonia of left lung due to infectious organism      Pulmonary fibrosis      Stroke      TIA  "(transient ischemic attack)           Surgical History         Past Surgical History:   Procedure Laterality Date    BRONCHOSCOPY N/A 10/22/2018     Procedure: BRONCHOSCOPY WITH BRONCHALVEOLAR LAVAGE;  Surgeon: Chidi Oconnor MD;  Location: [facility] ENDOSCOPY;  Service: Pulmonary    BRONCHOSCOPY N/A 7/8/2021     Procedure: BRONCHOSCOPY with BAL;  Surgeon: Chidi Oconnor MD;  Location: [facility] ENDOSCOPY;  Service: Pulmonary;  Laterality: N/A;  Pre: bronchectasis  Post: same    CARDIAC ELECTROPHYSIOLOGY PROCEDURE N/A 7/9/2021     Procedure: Pacemaker DC new--[employer]tr[employer] possible His lead;  Surgeon: Rashaun Nettles MD;  Location: [facility] CATH INVASIVE LOCATION;  Service: Cardiology;  Laterality: N/A;    CARDIAC ELECTROPHYSIOLOGY PROCEDURE N/A 9/3/2021     Procedure: AV node 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 ppm;  Surgeon: Rashaun Nettles MD;  Location: [facility] CATH INVASIVE LOCATION;  Service: Cardiovascular;  Laterality: N/A;    CATARACT EXTRACTION Bilateral      COLONOSCOPY        COLONOSCOPY N/A 3/9/2018     Procedure: COLONOSCOPY to terminal ileum with bx;  Surgeon: Aron Cabrera MD;  Location: [facility] ENDOSCOPY;  Service:     COLONOSCOPY N/A 11/19/2020     Procedure: COLONOSCOPY to cecum:  apc to cecum angiodysplagia,;  Surgeon: Aron Cabrera MD;  Location: [facility] ENDOSCOPY;  Service: Gastroenterology;  Laterality: N/A;  GI bleed  post:  diverticulosis, angiodysplagia    CYSTECTOMY         back and shoulder area    ENDOSCOPY N/A 3/9/2018     Procedure: ESOPHAGOGASTRODUODENOSCOPY with bx;  Surgeon: Aron Cabrera MD;  Location: [facility] ENDOSCOPY;  Service:     ENDOSCOPY N/A 11/19/2020     Procedure: ESOPHAGOGASTRODUODENOSCOPY;  Surgeon: Aron Cabrera MD;  Location: [facility] ENDOSCOPY;  Service: Gastroenterology;  Laterality: N/A;  GI bleed  post:  HH.    EYE SURGERY Left       detached retina    EYE SURGERY Right       \"repaired a hole in the eye\"    INSERT / REPLACE / REMOVE PACEMAKER        TESTICLE SURGERY     "     benign tumor removed.               Family History   Problem Relation Age of Onset    Stroke Mother      Heart disease Mother      Hypertension Mother      Diabetes Mother      Heart disease Sister           Heart Valve Replacement    Ovarian cancer Sister      Rheumatic fever Sister      Diabetes Sister      Stroke Father        Social History   Social History            Socioeconomic History    Marital status:    Tobacco Use    Smoking status: Former       Types: Cigars       Quit date: 10/28/1994       Years since quittin.6    Smokeless tobacco: Never    Tobacco comments:       Quit 25 years ago   Vaping Use    Vaping Use: Never used   Substance and Sexual Activity    Alcohol use: No       Comment: daily caffiene - 1/2 cup of coffee    Drug use: No    Sexual activity: Defer                  Objective      Physical Exam  Constitutional:       Appearance: Normal appearance. He is well-developed. He is not toxic-appearing.   Eyes:      General: No scleral icterus.  Pulmonary:      Effort: Pulmonary effort is normal. No respiratory distress.   Abdominal:      Palpations: Abdomen is soft.      Tenderness: There is no abdominal tenderness.      Hernia: Hernia is present in the left inguinal area. There is no hernia in the right inguinal area.      Comments: There is a moderately sized left inguinal hernia that is reducible.  There is no palpable right inguinal hernia.   Skin:     General: Skin is warm and dry.   Neurological:      Mental Status: He is alert and oriented to person, place, and time.   Psychiatric:         Behavior: Behavior normal.         Thought Content: Thought content normal.         Judgment: Judgment normal.               Assessment & Plan        1.  Left inguinal hernia: The patient has a symptomatic left inguinal hernia that was recently incarcerated.  He is at risk of developing another incarceration.  If he develops an incarcerated hernia that requires emergency surgery, he is  at high risk for complications based on his multiple medical problems.  It is more appropriate to perform an elective left inguinal hernia repair to try to avoid complications.  This was discussed with the patient and his wife.  We will plan to proceed with a left inguinal hernia repair.  The patient understands the indications, alternatives, risks, and benefits of the procedure and wishes to proceed.      2.  Right inguinal hernia: The patient has a small fat-containing right inguinal hernia based on CT scan of the abdomen and pelvis.  There is no palpable right inguinal hernia at this time and he has never noticed a bulge in that area.  The findings of a fat-containing hernia may represent a cord lipoma.  There is no indication for right inguinal hernia repair at this time.     3.  Chronic anticoagulation: Patient is on Eliquis and this will need to be held for 2 days prior to surgery.  This was discussed with the patient and his wife.  A message will be sent to his cardiologist to confirm that his Eliquis can be held.

## 2023-08-29 NOTE — ANESTHESIA PROCEDURE NOTES
Airway  Urgency: elective    Date/Time: 8/29/2023 11:24 AM  Airway not difficult    General Information and Staff    Patient location during procedure: OR  Anesthesiologist: Luz Marina Petty MD  CRNA/CAA: Gracia Turner CRNA    Indications and Patient Condition  Indications for airway management: airway protection    Preoxygenated: yes  MILS not maintained throughout  Mask difficulty assessment: 2 - vent by mask + OA or adjuvant +/- NMBA    Final Airway Details  Final airway type: endotracheal airway      Successful airway: ETT  Cuffed: yes   Successful intubation technique: direct laryngoscopy  Facilitating devices/methods: intubating stylet  Endotracheal tube insertion site: oral  Blade: Isak  Blade size: 4  ETT size (mm): 8.0  Cormack-Lehane Classification: grade I - full view of glottis  Placement verified by: chest auscultation and capnometry   Cuff volume (mL): 10  Measured from: lips  ETT/EBT  to lips (cm): 22  Number of attempts at approach: 1  Assessment: lips, teeth, and gum same as pre-op and atraumatic intubation    Additional Comments  Airway exam prior to DL, teeth/lips inspected. Preoxygenated with 100% O2; sniffing position, easy mask ventilation. Eyes taped. Atraumatic intubation. Lips and teeth intact, no damage. ETT connected to vent. Confirmed EBBS, +EtCO2.

## 2023-08-29 NOTE — ANESTHESIA POSTPROCEDURE EVALUATION
"Patient: Asia Ly    Procedure Summary       Date: 08/29/23 Room / Location: [facility] OR 03 / [facility] MAIN OR    Anesthesia Start: 1113 Anesthesia Stop: 1259    Procedure: INGUINAL HERNIA REPAIR (Left: Abdomen) Diagnosis:       Left inguinal hernia      (Left inguinal hernia [K40.90])    Surgeons: Itz Zaidi Jr., MD Provider: Luz Marina Petty MD    Anesthesia Type: generalKorin ASA Status: 4            Anesthesia Type: general, [place]    Vitals  Vitals Value Taken Time   /65 08/29/23 1345   Temp 36.7 øC (98.1 øF) 08/29/23 1255   Pulse 63 08/29/23 1348   Resp 16 08/29/23 1300   SpO2 94 % 08/29/23 1348   Vitals shown include unvalidated device data.        Post Anesthesia Care and Evaluation    Patient location during evaluation: PACU  Patient participation: complete - patient participated  Level of consciousness: awake and alert  Pain management: adequate    Airway patency: patent  Anesthetic complications: No anesthetic complications    Cardiovascular status: acceptable  Respiratory status: acceptable  Hydration status: acceptable    Comments: /74   Pulse 61   Temp 36.7 øC (98.1 øF) (Oral)   Resp 16   Ht 172.7 cm (68\")   Wt 65.5 kg (144 lb 6.4 oz)   SpO2 97%   BMI 21.96 kg/mý       "

## 2023-08-29 NOTE — ANESTHESIA PREPROCEDURE EVALUATION
Anesthesia Evaluation     Patient summary reviewed and Nursing notes reviewed   no history of anesthetic complications:   NPO Solid Status: > 8 hours  NPO Liquid Status: > 2 hours           Airway   Mallampati: II  TM distance: >3 FB  Neck ROM: full  Dental          Pulmonary      ROS comment: Pulmonary HTN (RVSP 86.5), pulmonary fibrosis  Cardiovascular     ECG reviewed    (+) hypertension, valvular problems/murmurs AS, dysrhythmias Atrial Fib    ROS comment: EKG: afib    Echo 4/2022:   ú Left ventricular ejection fraction appears to be 61 - 65%. Left ventricular systolic function is normal.  ú Left ventricular wall thickness is consistent with mild concentric hypertrophy  ú Normal right ventricular cavity size and systolic function noted.  ú The left atrial cavity is severely dilated.  ú Moderate to severe aortic valve stenosis is present.  ú Aortic valve area is 0.75 cm2. Peak velocity of the flow distal to the aortic valve is 380.2 cm/s. Aortic valve maximum pressure gradient is 57.8 mmHg. Aortic valve mean pressure gradient is 27.3 mmHg. Aortic valve dimensionless index is 0.20  ú Mild to moderate tricuspid valve regurgitation is present.  ú Calculated right ventricular systolic pressure from tricuspid regurgitation is 86.5 mmHg.  ú Borderline dilation of the aortic root is present.  ú There is a trivial pericardial effusion.         Neuro/Psych  (+) TIA  GI/Hepatic/Renal/Endo    (+) renal disease CRI    Musculoskeletal     Abdominal    Substance History      OB/GYN          Other                        Anesthesia Plan    ASA 4     general and Korin     intravenous induction     Anesthetic plan, risks, benefits, and alternatives have been provided, discussed and informed consent has been obtained with: patient.      CODE STATUS:

## 2023-08-29 NOTE — OP NOTE
Surgeon: Itz Zaidi Jr., M.D.    Assistant: BEVERLY Arroyo    An assistant was necessary and provided valuable retraction and exposure, as well as assistance in mobilization of the spermatic cord, separation of the hernia sac and complete reduction, mesh placement, and wound closure.    Pre-Operative Diagnosis:     Left inguinal hernia [K40.90]    Post-Operative Diagnosis:    Left inguinal hernia    Procedure Performed:     Left inguinal hernia repair with mesh    Indications:     The patient is a pleasant 89-year-old male who has had a previous left inguinal hernia repair and has developed a recurrent bulge in the left groin.  He has had 2 separate episodes of incarceration that required manual reduction.  He presents now for left inguinal hernia repair.  The patient understands the indications, alternatives, risks, and benefits of the procedure and wishes to proceed.     Procedure:     The patient was identified and taken to the operating room where he was placed in the supine position on the operating table.  Monitors were placed and he underwent a general endotracheal anesthesia and was appropriately monitored throughout the case by the anesthesia personnel.  The left groin was prepped and draped in the standard surgical fashion.  A standard groin incision was made with a scalpel and carried through the skin into the subcutaneous tissue using his previous incision.  The subcutaneous tissue was divided using Bovie electrocautery to the external oblique aponeurosis which was then divided in direction of its fibers.  The spermatic cord was then identified, surrounded, and elevated out of the groin with a Penrose drain.  This dissection was somewhat tedious due to previous scar tissue.  The spermatic cord was then carefully inspected and the hernia sac was identified.  The patient was noted to have an indirect hernia.  The hernia was freed from attachments to the spermatic cord and completely reduced.  A  piece of mesh was selected, soaked in antibiotics, and fashioned appropriately using a 3 x 6 Bard soft mesh.  It was then sutured in place using a Ashlie type repair.  It was sutured to the pubic tubercle and along the inguinal ligament using 2-0 proline sutures.  It was then tacked along conjoined tendon using 0 Ethibond sutures in an interrupted fashion.  Legs were created to reestablish the internal ring and secured with the 0 Ethibond sutures.  The cord was noted to be hemostatic.  The area was infiltrated with 0.5% Marcaine with epinephrine as well as Exparel.  The spermatic cord was returned to its original position.  The external oblique aponeurosis was reapproximated using 2-0 Vicryl sutures.  Catherine's fascia was reapproximated using 3-0 Vicryl sutures in a running fashion.  The skin was then closed with 4-0 Monocryl in a subcuticular fashion followed by Mastisol and Steri-Strips and a sterile dressing.  The sponge, needle, and instrument counts were correct at the end the case.  The patient tolerated procedure well and was transferred to the recovery room in stable condition.     Estimated Blood Loss:      minimal    Specimens:     Order Name Source Comment Collection Info Order Time   BASIC METABOLIC PANEL   Collected By: Gracie William RN 8/29/2023  9:23 AM     Release to patient   Routine Release        CBC (NO DIFF)   Collected By: Gracie William RN 8/29/2023  9:23 AM     Release to patient   Routine Release            Itz Zaidi Jr., M.D.

## 2023-08-29 NOTE — DISCHARGE INSTRUCTIONS
Dr. Itz Zaidi   [address]  [place], [place] [address] [phone]    Discharge Instructions for Hernia Surgery      Go home, rest and take it easy today; however, you should get up and move about several times today to reduce the risk of developing a clot in your legs.      You may experience some dizziness or memory loss from the anesthesia.  This may last for the next 24 hours.  Someone should plan on staying with you for the first 24 hours for your safety.    Do not make any important legal decisions or sign any legal papers for the next 24 hours.      Eat and drink lightly today.  Start off with liquids, jello, soup, crackers or other bland foods at first. You may advance your diet tomorrow as tolerated as long as you do not experience any nausea or vomiting.     You may remove your outer dressings in 2 days.  The white tapes called steri-strips should stay in place.  They will fall off on their own in 1-2 weeks.  Do not worry if they come off sooner.      You may notice some bleeding/drainage on your outer dressings. A little bloody drainage is normal. If the bleeding/drainage is such that the bandage cannot absorb it, remove the dressing, apply clean gauze and apply firm pressure for a full 15 minutes.  If the bleeding continues, please call me.    You may shower tomorrow.  No tub baths until your incisions are completely healed.     No lifting > 20 lbs. until you are seen at your follow-up visit.         You have received a prescription for a narcotic pain medicine, as you will have some pain following surgery.   You will not be totally pain free, but your pain medicine should make the pain tolerable.  Please take your pain medicine as prescribed and always take your pills with food to prevent nausea. If you are having severe pain that cannot be controlled by the pain medicine, please contact me.      If you had a laparoscopic surgery, it is not unusual to experience pain/discomfort in your  shoulders or under your ribs after surgery.  It is from the gas used during the laparoscopic procedure and usually lasts 1-3 days.  The prescription pain medicine is used to treat the surgical pain and does not typically alleviate this “gassy” pain.     No driving for 24 hours and for as long as you are taking your prescription pain medicine.      You will need to call the office at [phone] to schedule a follow-up appointment in 2 weeks.           Remember to contact me for any of the following:    Fever > 101 degrees  Severe pain that cannot be controlled by taking your pain pills  Severe nausea or vomiting   Significant bleeding of your incisions  Drainage that has a bad smell or is yellow or green in appearance  Any other questions or concerns        Additional Instruction for Inguinal Hernia Patients Only    If you did not urinate at the hospital after your surgery or if you feel the need to urinate and cannot, this will necessitate a return to the Emergency Room for placement of a urinary catheter.  You should also notify me as well.  As a rule, you should be able to empty your bladder within 4-6 hours after discharge from the hospital.      You may notice some scrotal bruising and/or swelling. A scrotal support or briefs as well as ice packs may be used to alleviate discomfort

## 2023-09-14 ENCOUNTER — OFFICE VISIT (OUTPATIENT)
Dept: SURGERY | Facility: CLINIC | Age: 88
End: 2023-09-14
Payer: MEDICARE

## 2023-09-14 VITALS
HEIGHT: 68 IN | DIASTOLIC BLOOD PRESSURE: 85 MMHG | WEIGHT: 143.4 LBS | SYSTOLIC BLOOD PRESSURE: 135 MMHG | BODY MASS INDEX: 21.73 KG/M2

## 2023-09-14 DIAGNOSIS — Z48.89 POSTOPERATIVE VISIT: Primary | ICD-10-CM

## 2023-09-14 NOTE — LETTER
September 14, 2023     Lubna Lobo MD     Patient: Asia Ly   YOB: 1932   Date of Visit: 9/14/2023     Dear Lubna Lobo MD:       Thank you for referring Asia Ly to me for evaluation. Below are the relevant portions of my assessment and plan of care.    If you have questions, please do not hesitate to call me. I look forward to following Asia along with you.         Sincerely,        Itz Zaidi Jr., MD        CC:   No Recipients    Itz Zaidi Jr., MD  09/14/23 1331  Sign when Signing Visit  Subjective  Asia Ly is a 89 y.o. male who returns to the office after undergoing a left inguinal hernia repair on 8/29/2023.     History of Present Illness     The patient is recovering well with no significant postop symptoms.  He is having no abdominal pain.  He has a good appetite and normal bowel function.  His energy level is good.      Review of Systems   Constitutional:  Negative for activity change, appetite change, fatigue and fever.   Respiratory:  Negative for chest tightness and shortness of breath.    Cardiovascular:  Negative for chest pain and palpitations.   Gastrointestinal:  Negative for abdominal pain, constipation, diarrhea and nausea.   Skin:  Negative for rash and wound.   Psychiatric/Behavioral:  Negative for agitation and confusion.      Objective  Physical Exam  Constitutional:       General: He is not in acute distress.     Appearance: Normal appearance. He is not ill-appearing or toxic-appearing.   Pulmonary:      Effort: Pulmonary effort is normal. No respiratory distress.   Abdominal:      Palpations: Abdomen is soft.      Tenderness: There is no abdominal tenderness.   Skin:     Comments: Incision: intact with no evidence of infection.   Neurological:      Mental Status: He is alert.   Psychiatric:         Behavior: Behavior normal.       Assessment & Plan    1.  Postoperative visit: The patient is recovering well from his left inguinal hernia repair.   He was advised to continue to avoid heavy lifting.  He will follow-up on an as-needed basis.

## 2023-09-14 NOTE — PROGRESS NOTES
Harshil Ly is a 89 y.o. male who returns to the office after undergoing a left inguinal hernia repair on 8/29/2023.     History of Present Illness     The patient is recovering well with no significant postop symptoms.  He is having no abdominal pain.  He has a good appetite and normal bowel function.  His energy level is good.      Review of Systems   Constitutional:  Negative for activity change, appetite change, fatigue and fever.   Respiratory:  Negative for chest tightness and shortness of breath.    Cardiovascular:  Negative for chest pain and palpitations.   Gastrointestinal:  Negative for abdominal pain, constipation, diarrhea and nausea.   Skin:  Negative for rash and wound.   Psychiatric/Behavioral:  Negative for agitation and confusion.      Objective   Physical Exam  Constitutional:       General: He is not in acute distress.     Appearance: Normal appearance. He is not ill-appearing or toxic-appearing.   Pulmonary:      Effort: Pulmonary effort is normal. No respiratory distress.   Abdominal:      Palpations: Abdomen is soft.      Tenderness: There is no abdominal tenderness.   Skin:     Comments: Incision: intact with no evidence of infection.   Neurological:      Mental Status: He is alert.   Psychiatric:         Behavior: Behavior normal.       Assessment & Plan     1.  Postoperative visit: The patient is recovering well from his left inguinal hernia repair.  He was advised to continue to avoid heavy lifting.  He will follow-up on an as-needed basis.            0 = independent

## 2023-10-10 ENCOUNTER — OFFICE VISIT (OUTPATIENT)
Age: 88
End: 2023-10-10
Payer: MEDICARE

## 2023-10-10 ENCOUNTER — CLINICAL SUPPORT NO REQUIREMENTS (OUTPATIENT)
Age: 88
End: 2023-10-10
Payer: MEDICARE

## 2023-10-10 VITALS
WEIGHT: 145 LBS | HEIGHT: 68 IN | HEART RATE: 87 BPM | DIASTOLIC BLOOD PRESSURE: 74 MMHG | BODY MASS INDEX: 21.98 KG/M2 | SYSTOLIC BLOOD PRESSURE: 138 MMHG

## 2023-10-10 DIAGNOSIS — I48.19 PERSISTENT ATRIAL FIBRILLATION: Primary | ICD-10-CM

## 2023-10-10 DIAGNOSIS — I44.2 COMPLETE HEART BLOCK: ICD-10-CM

## 2023-10-10 PROCEDURE — 93000 ELECTROCARDIOGRAM COMPLETE: CPT | Performed by: INTERNAL MEDICINE

## 2023-10-10 PROCEDURE — 99214 OFFICE O/P EST MOD 30 MIN: CPT | Performed by: INTERNAL MEDICINE

## 2023-10-10 NOTE — PROGRESS NOTES
Date of Office Visit: 10/10/2023  Encounter Provider: Rashaun Nettles MD  Place of Service: [facility] CARDIOLOGY  Patient Name: Asia Ly  :1932    Chief Complaint   Patient presents with    complete heart block    Atrial Fibrillation w/RVR           Pacemaker Check   :     HPI: Asia Ly is a 89 y.o. male who presents today for persistent atrial fibrillation.     He has CSP pacemaker with great LBB capture, s/p AV Node    He has done well, hasn't been in the hospital since we did it for atrial fibrillation.  He was in hospital for Covid in May    He is now on oxygen.     He looks good, and is glad to not have to worry about atrial fibrillation.           Past Medical History:   Diagnosis Date    Aortic stenosis     Aspiration pneumonia     Atrial fibrillation     Bleeding from the nose     Bronchiectasis     CKD (chronic kidney disease)     COPD (chronic obstructive pulmonary disease)     Crohn's disease     Dizziness     Elevated cholesterol     Gastric ulcer     Generalized weakness     Gout     Gout     Hard of hearing     Hyperlipidemia     Hypertension     Inguinal hernia     left    Leg swelling     Lower extremity edema     Mild anemia     Near syncope     Nonrheumatic aortic valve stenosis     PAF (paroxysmal atrial fibrillation)     Palpitations     Peptic ulcer     Pneumonia due to COVID-19 virus 2023    Pneumonia of left lung due to infectious organism     Pulmonary embolism, bilateral 2023    Pulmonary fibrosis     Stroke     TIA (transient ischemic attack)        Past Surgical History:   Procedure Laterality Date    BRONCHOSCOPY N/A 10/22/2018    Procedure: BRONCHOSCOPY WITH BRONCHALVEOLAR LAVAGE;  Surgeon: Chidi Oconnor MD;  Location: [facility] ENDOSCOPY;  Service: Pulmonary    BRONCHOSCOPY N/A 2021    Procedure: BRONCHOSCOPY with BAL;  Surgeon: Chidi Oconnor MD;  Location: [facility] ENDOSCOPY;  Service: Pulmonary;  Laterality: N/A;  Pre:  "bronchectasis  Post: same    CARDIAC ELECTROPHYSIOLOGY PROCEDURE N/A 2021    Procedure: Pacemaker DC new--[employer] possible His lead;  Surgeon: Rashaun Nettles MD;  Location: [facility] CATH INVASIVE LOCATION;  Service: Cardiology;  Laterality: N/A;    CARDIAC ELECTROPHYSIOLOGY PROCEDURE N/A 9/3/2021    Procedure: AV node 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 ppm;  Surgeon: Rashaun Nettles MD;  Location: [facility] CATH INVASIVE LOCATION;  Service: Cardiovascular;  Laterality: N/A;    CATARACT EXTRACTION Bilateral     COLONOSCOPY      COLONOSCOPY N/A 3/9/2018    Procedure: COLONOSCOPY to terminal ileum with bx;  Surgeon: Aron Cabrera MD;  Location: [facility] ENDOSCOPY;  Service:     COLONOSCOPY N/A 2020    Procedure: COLONOSCOPY to cecum:  apc to cecum angiodysplagia,;  Surgeon: Aron Cabrera MD;  Location: [facility] ENDOSCOPY;  Service: Gastroenterology;  Laterality: N/A;  GI bleed  post:  diverticulosis, angiodysplagia    CYSTECTOMY      back and shoulder area    ENDOSCOPY N/A 3/9/2018    Procedure: ESOPHAGOGASTRODUODENOSCOPY with bx;  Surgeon: Aron Cabrera MD;  Location: [facility] ENDOSCOPY;  Service:     ENDOSCOPY N/A 2020    Procedure: ESOPHAGOGASTRODUODENOSCOPY;  Surgeon: Aron Cabrera MD;  Location: [facility] ENDOSCOPY;  Service: Gastroenterology;  Laterality: N/A;  GI bleed  post:  HH.    EYE SURGERY Left     detached retina    EYE SURGERY Right     \"repaired a hole in the eye\"    INGUINAL HERNIA REPAIR Left 2023    Procedure: INGUINAL HERNIA REPAIR;  Surgeon: Itz Zaidi Jr., MD;  Location: [facility] MAIN OR;  Service: General;  Laterality: Left;    INSERT / REPLACE / REMOVE PACEMAKER      TESTICLE SURGERY      benign tumor removed.       Social History     Socioeconomic History    Marital status:    Tobacco Use    Smoking status: Former     Types: Cigars     Quit date: 10/28/1994     Years since quittin.9    Smokeless tobacco: Never    Tobacco comments:     Quit 25 years ago   Vaping " Use    Vaping Use: Never used   Substance and Sexual Activity    Alcohol use: No     Comment: daily caffiene - 1/2 cup of coffee    Drug use: No    Sexual activity: Defer       Family History   Problem Relation Age of Onset    Stroke Mother     Heart disease Mother     Hypertension Mother     Diabetes Mother     Stroke Father     Heart disease Sister         Heart Valve Replacement    Ovarian cancer Sister     Rheumatic fever Sister     Diabetes Sister     Malig Hyperthermia Neg Hx        Review of Systems   Constitutional: Negative.   Cardiovascular: Negative.    Respiratory: Negative.     Gastrointestinal: Negative.        Allergies   Allergen Reactions    Amiodarone Unknown (See Comments)     Pulmonary fibrosis    Diltiazem Arrhythmia    Doxycycline Rash    Cefdinir Arrhythmia     A-FIB    Indomethacin Rash         Current Outpatient Medications:     allopurinol (ZYLOPRIM) 100 MG tablet, Take 1 tablet by mouth 2 (Two) Times a Day. Indications: Gout, Disp: , Rfl:     apixaban (ELIQUIS) 5 MG tablet tablet, Take 1 tablet by mouth Every 12 (Twelve) Hours. Indications: DVT/PE (active thrombosis), Disp: 180 tablet, Rfl: 3    atorvastatin (LIPITOR) 10 MG tablet, Take 1 tablet by mouth Every Other Day. M-W-F  Indications: High Amount of Fats in the Blood, Disp: , Rfl:     BUDESONIDE PO, Take 3 mg by mouth Daily. Take one tablet by mouth  Indications: Colon cancer screen, ulcerative or crohns colitis, Disp: , Rfl:     ferrous gluconate (FERGON) 324 MG tablet, Take 1 tablet by mouth Daily With Breakfast., Disp: , Rfl:     furosemide (LASIX) 20 MG tablet, Take 1 tablet by mouth Daily. Indications: Edema, High Blood Pressure Disorder, Disp: , Rfl:     hydrocortisone 2.5 % ointment, Apply  topically to the appropriate area as directed 2 (Two) Times a Day. to affected area  Indications: Skin Inflammation, Disp: , Rfl:     IPRATROPIUM-ALBUTEROL IN, Inhale 2 puffs 3 (Three) Times a Day. Indications: COPD exacerbation,  "complicated, Disp: , Rfl:     metoprolol tartrate (LOPRESSOR) 25 MG tablet, Take 1 tablet by mouth Every 12 (Twelve) Hours. Indications: High Blood Pressure Disorder, Disp: 60 tablet, Rfl: 2    Nutritional Supplements (BOOST 100 CALORIE SMART PO), Take 1 bottle by mouth Daily. Indications: Nutritional Support, Disp: , Rfl:     O2 (OXYGEN), Inhale 2 L/min As Needed (soa). Indications: Oxygen Saturation Decreased, Disp: , Rfl:     omeprazole (priLOSEC) 40 MG capsule, Take 1 capsule by mouth Daily. Indications: Heartburn, Disp: , Rfl:     potassium chloride (K-DUR,KLOR-CON) 20 MEQ CR tablet, Take 1 tablet by mouth Daily. CHANGED FROM TWICE DAILY TO ONCE DAILY PER DR PIERCE   Indications: Low Amount of Potassium in the Blood (Patient not taking: Reported on 10/10/2023), Disp: , Rfl:       Objective:     Vitals:    10/10/23 0839   BP: 138/74   Pulse: 87   Weight: 65.8 kg (145 lb)   Height: 172.7 cm (68\")     Body mass index is 22.05 kg/mý.    PHYSICAL EXAM:    Vitals and nursing note reviewed.   Constitutional:       General: Not in acute distress.  Pulmonary:      Effort: Pulmonary effort is normal. No respiratory distress.   Chest:      Comments: Chest incision is well healed, normal looking pocket.   Cardiovascular:      Normal rate. Regular rhythm.   Edema:     Peripheral edema absent.   Skin:     General: Skin is warm and dry.   Neurological:      Mental Status: Alert and oriented to person, place, and time.   Psychiatric:         Behavior: Behavior normal.         Thought Content: Thought content normal.         Judgment: Judgment normal.             ECG 12 Lead    Date/Time: 10/10/2023 10:01 AM  Performed by: Rashaun Nettles MD    Authorized by: Rashaun Nettles MD  Comparison: compared with previous ECG from 7/13/2023  Similar to previous ECG  Rhythm: atrial fibrillation and paced            Assessment:       Diagnosis Plan   1. Persistent atrial fibrillation               Plan:       Atrial " Fibrillation    Looks great, this has been a great treatment for this gentlemen.  He needs to stay on apixaban.  5 mg BID is still the appropriate dose.     We discussed benefit of atorvastatin in his age group.     After discussion, we are going to stop it.     As always, it has been a pleasure to participate in your patient's care.      Sincerely,         Rashaun Nettles MD

## 2023-10-16 NOTE — PROGRESS NOTES
"Daily progress note    Primary care physician      Chief complaint  Doing better with no new complaints and wants to go home    History of present illness  89-year-old male with history of chronic atrial fibrillation aortic stenosis hypertension hyperlipidemia pulmonary fibrosis bronchiectasis pulmonary hypertension chronic disease and gastroesophageal reflux disease who is well-known to our service brought to the emergency room with fever cough shortness of breath for last several days to weeks.  Patient is treated with empiric antibiotics without any improvement.  Patient evaluated in ER found to have COVID-19 pneumonia admit for management.  Patient denies any chest pain palpitation abdominal pain nausea vomiting diarrhea.  Patient is DNR per his wishes.    REVIEW OF SYSTEMS  Unremarkable except weakness and cough     PHYSICAL EXAM  Blood pressure 113/56, pulse 64, temperature 97.3 °F (36.3 °C), temperature source Oral, resp. rate 18, height 172.7 cm (68\"), weight 62 kg (136 lb 11 oz), SpO2 96 %.    Constitutional:       General: He is not in acute distress.     Appearance: Normal appearance.   HENT:      Head: Normocephalic and atraumatic.      Nose: Nose normal.      Mouth/Throat:      Mouth: Mucous membranes are moist.   Eyes:      Conjunctiva/sclera: Conjunctivae normal.      Pupils: Pupils are equal, round, and reactive to light.   Cardiovascular:      Rate and Rhythm: Normal rate and regular rhythm.      Pulses: Normal pulses.      Heart sounds: Normal heart sounds.   Pulmonary:      Effort: Pulmonary effort is normal.      Breath sounds: Normal breath sounds.   Abdominal:      General: There is no distension.   Musculoskeletal:         General: Normal range of motion.      Cervical back: Normal range of motion and neck supple.   Skin:     General: Skin is warm.      Capillary Refill: Capillary refill takes less than 2 seconds.   Neurological:      General: No focal deficit present.      Mental " Kimberlee Torres was advised that we would sign orders- verbalized understanding Status: He is alert and oriented to person, place, and time.   Psychiatric:         Mood and Affect: Mood normal.      LAB RESULTS  Lab Results (last 24 hours)     Procedure Component Value Units Date/Time    Comprehensive Metabolic Panel [[id number]]  (Abnormal) Collected: 04/29/23 0515    Specimen: Blood Updated: 04/29/23 0606     Glucose 107 mg/dL      BUN 28 mg/dL      Creatinine 0.91 mg/dL      Sodium 138 mmol/L      Potassium 4.3 mmol/L      Chloride 105 mmol/L      CO2 23.0 mmol/L      Calcium 8.5 mg/dL      Total Protein 6.8 g/dL      Albumin 3.3 g/dL      ALT (SGPT) 11 U/L      AST (SGOT) 17 U/L      Alkaline Phosphatase 51 U/L      Total Bilirubin 0.3 mg/dL      Globulin 3.5 gm/dL      A/G Ratio 0.9 g/dL      BUN/Creatinine Ratio 30.8     Anion Gap 10.0 mmol/L      eGFR 80.1 mL/min/1.73     Narrative:      GFR Normal >60  Chronic Kidney Disease <60  Kidney Failure <15    The GFR formula is only valid for adults with stable renal function between ages 18 and 70.    Bilirubin, Direct [[id number]]  (Normal) Collected: 04/29/23 0515    Specimen: Blood Updated: 04/29/23 0606     Bilirubin, Direct <0.2 mg/dL     C-reactive Protein [[id number]]  (Abnormal) Collected: 04/29/23 0515    Specimen: Blood Updated: 04/29/23 0606     C-Reactive Protein 2.47 mg/dL     CBC & Differential [[id number]]  (Abnormal) Collected: 04/29/23 0515    Specimen: Blood Updated: 04/29/23 0547    Narrative:      The following orders were created for panel order CBC & Differential.  Procedure                               Abnormality         Status                     ---------                               -----------         ------                     CBC Auto Differential[[id number]]        Abnormal            Final result                 Please view results for these tests on the individual orders.    CBC Auto Differential [[id number]]  (Abnormal) Collected: 04/29/23 0515    Specimen: Blood Updated: 04/29/23 0547     WBC 7.90 10*3/mm3       RBC 3.99 10*6/mm3      Hemoglobin 11.5 g/dL      Hematocrit 34.9 %      MCV 87.5 fL      MCH 28.8 pg      MCHC 33.0 g/dL      RDW 15.8 %      RDW-SD 50.2 fl      MPV 12.1 fL      Platelets 134 10*3/mm3      Neutrophil % 87.3 %      Lymphocyte % 7.8 %      Monocyte % 4.3 %      Eosinophil % 0.0 %      Basophil % 0.0 %      Immature Grans % 0.6 %      Neutrophils, Absolute 6.89 10*3/mm3      Lymphocytes, Absolute 0.62 10*3/mm3      Monocytes, Absolute 0.34 10*3/mm3      Eosinophils, Absolute 0.00 10*3/mm3      Basophils, Absolute 0.00 10*3/mm3      Immature Grans, Absolute 0.05 10*3/mm3      nRBC 0.0 /100 WBC         Imaging Results (Last 24 Hours)     ** No results found for the last 24 hours. **          Current Facility-Administered Medications:   •  allopurinol (ZYLOPRIM) tablet 100 mg, 100 mg, Oral, BID, Calvin Bergeron MD, 100 mg at 04/29/23 0920  •  apixaban (ELIQUIS) tablet 2.5 mg, 2.5 mg, Oral, Q12H, Calvin Bergeron MD, 2.5 mg at 04/29/23 0920  •  ascorbic acid (VITAMIN C) tablet 500 mg, 500 mg, Oral, Daily, Calvin Bergeron MD, 500 mg at 04/29/23 0921  •  budesonide-formoterol (SYMBICORT) 160-4.5 MCG/ACT inhaler 2 puff, 2 puff, Inhalation, BID - RT, Calvin Bergeron MD, 2 puff at 04/29/23 0804  •  cetirizine (zyrTEC) tablet 10 mg, 10 mg, Oral, Daily, Calvin Bergeron MD, 10 mg at 04/29/23 0920  •  cholecalciferol (VITAMIN D3) tablet 1,000 Units, 1,000 Units, Oral, Daily, Calvin Bergeron MD, 1,000 Units at 04/29/23 0920  •  dexamethasone (DECADRON) tablet 6 mg, 6 mg, Oral, Daily With Breakfast, Calvin Bergeron MD, 6 mg at 04/29/23 0920  •  famotidine (PEPCID) tablet 20 mg, 20 mg, Oral, BID, Calvin Bergeron MD, 20 mg at 04/29/23 0920  •  guaiFENesin (MUCINEX) 12 hr tablet 600 mg, 600 mg, Oral, Q12H, Calvin Bergeron MD, 600 mg at 04/29/23 0920  •  ipratropium-albuterol (DUO-NEB) nebulizer solution 3 mL, 3 mL, Nebulization, Q4H PRN, Calvin Bergeron MD  •  metoprolol tartrate (LOPRESSOR) tablet 25 mg, 25 mg, Oral, Q12H, Calvin Bergeron,  MD, 25 mg at 04/29/23 0920  •  [COMPLETED] remdesivir 200 mg in sodium chloride 0.9 % 290 mL IVPB (powder vial), 200 mg, Intravenous, Q24H, 200 mg at 04/26/23 2151 **FOLLOWED BY** remdesivir 100 mg in sodium chloride 0.9 % 250 mL IVPB (powder vial), 100 mg, Intravenous, Q24H, Rolly Pedroza MD, 100 mg at 04/29/23 1309  •  zinc sulfate (ZINCATE) capsule 220 mg, 220 mg, Oral, Daily, Shelbie Bergeron MD, 220 mg at 04/29/23 0920     ASSESSMENT  Acute hypoxic respiratory failure  COVID-19 pneumonia  Chronic atrial fibrillation  Hypertension  Hyperlipidemia  Aortic stenosis  Pulmonary fibrosis  Bronchiectasis  Pulm hypertension  History of Crohn's disease  Gastroesophageal reflux disease    PLAN  Discharge home  Discharge summary dictated    SHELBIE BERGERON MD    Copied text in this note has been reviewed and is accurate as of 04/29/23

## 2023-11-22 DIAGNOSIS — R06.02 SHORTNESS OF BREATH: Primary | ICD-10-CM

## 2023-11-24 ENCOUNTER — LAB (OUTPATIENT)
Dept: LAB | Facility: HOSPITAL | Age: 88
End: 2023-11-24
Payer: MEDICARE

## 2023-11-24 DIAGNOSIS — R06.02 SHORTNESS OF BREATH: ICD-10-CM

## 2023-11-24 LAB
ANION GAP SERPL CALCULATED.3IONS-SCNC: 14 MMOL/L (ref 5–15)
BUN SERPL-MCNC: 35 MG/DL (ref 8–23)
BUN/CREAT SERPL: 26.5 (ref 7–25)
CALCIUM SPEC-SCNC: 9.4 MG/DL (ref 8.2–9.6)
CHLORIDE SERPL-SCNC: 97 MMOL/L (ref 98–107)
CO2 SERPL-SCNC: 26 MMOL/L (ref 22–29)
CREAT SERPL-MCNC: 1.32 MG/DL (ref 0.76–1.27)
EGFRCR SERPLBLD CKD-EPI 2021: 50.9 ML/MIN/1.73
GLUCOSE SERPL-MCNC: 168 MG/DL (ref 65–99)
NT-PROBNP SERPL-MCNC: 4854 PG/ML (ref 0–1800)
POTASSIUM SERPL-SCNC: 4.2 MMOL/L (ref 3.5–5.2)
SODIUM SERPL-SCNC: 137 MMOL/L (ref 136–145)

## 2023-11-24 PROCEDURE — 36415 COLL VENOUS BLD VENIPUNCTURE: CPT

## 2023-11-24 PROCEDURE — 83880 ASSAY OF NATRIURETIC PEPTIDE: CPT

## 2023-11-24 PROCEDURE — 80048 BASIC METABOLIC PNL TOTAL CA: CPT

## 2023-11-27 ENCOUNTER — OFFICE VISIT (OUTPATIENT)
Dept: CARDIOLOGY | Facility: CLINIC | Age: 88
End: 2023-11-27
Payer: MEDICARE

## 2023-11-27 ENCOUNTER — TELEPHONE (OUTPATIENT)
Dept: CARDIOLOGY | Facility: CLINIC | Age: 88
End: 2023-11-27

## 2023-11-27 ENCOUNTER — TELEPHONE (OUTPATIENT)
Dept: CARDIOLOGY | Facility: CLINIC | Age: 88
End: 2023-11-27
Payer: MEDICARE

## 2023-11-27 ENCOUNTER — HOSPITAL ENCOUNTER (OUTPATIENT)
Dept: CARDIOLOGY | Facility: HOSPITAL | Age: 88
Discharge: HOME | End: 2023-11-27
Admitting: NURSE PRACTITIONER
Payer: MEDICARE

## 2023-11-27 VITALS
SYSTOLIC BLOOD PRESSURE: 130 MMHG | HEART RATE: 74 BPM | WEIGHT: 141.6 LBS | HEIGHT: 68 IN | DIASTOLIC BLOOD PRESSURE: 77 MMHG | BODY MASS INDEX: 21.46 KG/M2 | OXYGEN SATURATION: 98 %

## 2023-11-27 DIAGNOSIS — I48.19 PERSISTENT ATRIAL FIBRILLATION: ICD-10-CM

## 2023-11-27 DIAGNOSIS — R06.09 DOE (DYSPNEA ON EXERTION): ICD-10-CM

## 2023-11-27 DIAGNOSIS — I48.19 PERSISTENT ATRIAL FIBRILLATION: Primary | ICD-10-CM

## 2023-11-27 LAB
ANION GAP SERPL CALCULATED.3IONS-SCNC: 12.3 MMOL/L (ref 5–15)
BUN SERPL-MCNC: 36 MG/DL (ref 8–23)
BUN/CREAT SERPL: 28.3 (ref 7–25)
CALCIUM SPEC-SCNC: 9.6 MG/DL (ref 8.2–9.6)
CHLORIDE SERPL-SCNC: 95 MMOL/L (ref 98–107)
CO2 SERPL-SCNC: 30.7 MMOL/L (ref 22–29)
CREAT SERPL-MCNC: 1.27 MG/DL (ref 0.76–1.27)
EGFRCR SERPLBLD CKD-EPI 2021: 53.3 ML/MIN/1.73
GLUCOSE SERPL-MCNC: 92 MG/DL (ref 65–99)
POTASSIUM SERPL-SCNC: 4.4 MMOL/L (ref 3.5–5.2)
SODIUM SERPL-SCNC: 138 MMOL/L (ref 136–145)

## 2023-11-27 PROCEDURE — 99214 OFFICE O/P EST MOD 30 MIN: CPT | Performed by: NURSE PRACTITIONER

## 2023-11-27 PROCEDURE — 80048 BASIC METABOLIC PNL TOTAL CA: CPT | Performed by: NURSE PRACTITIONER

## 2023-11-27 PROCEDURE — 36415 COLL VENOUS BLD VENIPUNCTURE: CPT

## 2023-11-27 RX ORDER — AZITHROMYCIN 250 MG/1
500 TABLET, FILM COATED ORAL DAILY
COMMUNITY
Start: 2023-11-20

## 2023-11-27 RX ORDER — CLINDAMYCIN PHOSPHATE 10 MG/G
GEL TOPICAL
COMMUNITY
Start: 2023-10-16 | End: 2024-10-15

## 2023-11-27 RX ORDER — FUROSEMIDE 40 MG/1
40 TABLET ORAL DAILY
Qty: 30 TABLET | Refills: 5 | Status: SHIPPED | OUTPATIENT
Start: 2023-11-27

## 2023-11-27 RX ORDER — KETOCONAZOLE 20 MG/ML
SHAMPOO TOPICAL
COMMUNITY
Start: 2023-10-05

## 2023-11-27 NOTE — TELEPHONE ENCOUNTER
Results and recommendations called to pt's wife.  Instructed to call with any further questions or concerns.  Verbalized understanding.  Wife states that she will give pt 2 20mg furosemide daily until she is able to get 40mg tablets.    [place]- pt's wife also states that his oxygen saturation is down to 93%.  She wanted to know if you think she needs to contact his pulmonologist to get portable O2 in case he drops further    Thanks so much,  Kaylin Vicente, RN  Triage Nurse, [facility]  11/27/23 13:04 EST

## 2023-11-27 NOTE — TELEPHONE ENCOUNTER
Let her know that above 90 is good for him but yes, portable oxygen will need to come from pulmonary. He should wear oxygen to keep oxygen sat above 88-90%.

## 2023-11-27 NOTE — TELEPHONE ENCOUNTER
Reviewed recommendations with patient, verbalized understanding, will call with any further questions or complaints.      Kaylin Vicente RN  Triage Nurse  11/27/23 13:47 EST

## 2023-11-27 NOTE — TELEPHONE ENCOUNTER
Please inform patient's wife Ni that his kidney function is doing well overall.  Potassium is normal.  He needs to continue potassium once a day and we will also decrease furosemide to once a day.  He will continue furosemide 40 mg daily

## 2023-11-27 NOTE — PROGRESS NOTES
Date of Office Visit: 23  Encounter Provider: KYLAH Jha  Place of Service: [facility] CARDIOLOGY  Patient Name: Asia Ly  :1932    Chief Complaint   Patient presents with    Dizziness    Balance Issues    Headache    Edema    Chest Pain    Shortness of Breath    Follow-up   :     HPI: Asia Ly is a 89 y.o. male  with paroxysmal atrial fibrillation, stroke and TIA in , aortic stenosis, hyperlipidemia, B12 anemia, Crohn's disease, colon polyps, lower extremity edema, TIA,  DVT and PE ( 2023), dilated ascending aorta,  pulmonary hypertension and pulmonary fibrosis.      He has been followed by Dr. Hector Rivrea. He is now followed by Dr. Brown and I will visit with him in follow up today.      Has been on sotalol in the past as well as amiodarone for recurrent atrial fibrillation.  He had Echocardiogram 2016 showed normal left ventricular systolic dysfunction, moderate tricuspid insufficiency, mild mitral insufficiency , mild pulmonary hypertension.  He also had a perfusion stress test that was negative for ischemia.  He was taken off diltiazem for allergic reaction to rash.  2017 he had an echocardiogram which showed an EF of 60%, grade 2 diastolic dysfunction and mild aortic he had increased lower extremity edema and had again rapid atrial fibrillation.  He was started on a diuretic.  He then had pneumonia hypoxia and influenza and was started on Tikosyn.  He has some prolonged QTC complicated by antibiotics.  He then had issues with gout felt to be related to discontinuation of indomethacin and was prescribed colchicine.  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  He then had some atrial fibrillation with RVR and was given oral metoprolol and Cardizem and converted back to sinus rhythm.  He was evaluated by electrophysiology with Dr. Nettles who felt that his ablation at  his age would be very high risk procedure.  He was later started on digoxin for recurrent rapid atrial fibrillation.  He was admitted again January 2019 and electrophysiology felt that he may for AV node ablation and pacemaker implantation.  However he preferred to stay on Tikosyn and accept occasional breakthroughs.     He then had some confusion and upper epigastric abdominal pain he was hospitalized August 2020 found to have transaminitis with hyperbilirubinemia.  He had some atrial fibrillation with RVR received IV metoprolol and his heart rate improved.  Echocardiogram showed normal left ventricular systolic function, mild concentric hypertrophy, moderate aortic valve stenoses with aortic valve area 1.1 cm², maximum pressure gradient of 42 and mean pressure gradient 24 mmHg.  RVSP was severely elevated at 57.8.  He had mild aortic valve regurgitation and mild mitral regurgitation.     In November 2020 was hospitalized with hematochezia and underwent GI evaluation with no evidence of active bleed.  He was cleared to resume apixaban and was later found to have bilateral pneumonia.     He was treated again for pneumonia and had a bronchoscopy July 7.  He is colonized Pseudomonas and infectious disease recommended conservative treatment.  He had some rapid atrial fibrillation during that admission and had a pacemaker placed by Dr. Nettles.    And ultimately had AV node ablation 09/2021. .  His Tikosyn was stopped.      He had nosebleeds and saw ENT in march 2023 who  Performed cauterization with packing and was off eliquis until we restarted it at a lower dose of 2.5 mg twice daily.  His nosebleed stopped but then he got ill with COVID and developed acute left lower extremity DVT May 2023 and he was put back on Eliquis 5 mg twice daily.    He presents today for reassessment.  He called on 11/22/2023 with complaint of increased lower extremity edema and increased shortness of breath as well as weight gain.  His  "Lasix was increased.  He presents today for reassessment accompanied by his spouse.  His wife confirms he has been taking Lasix 40 mg twice daily since last week.  He has lost 12 to 13 pounds, has less edema and improved breathing.  He is also requiring less oxygen.  His mental status has improved.  No near-syncope or syncope.              Allergies   Allergen Reactions    Amiodarone Unknown (See Comments)     Pulmonary fibrosis    Diltiazem Arrhythmia    Doxycycline Rash    Cefdinir Arrhythmia     A-FIB    Indomethacin Rash           Family and social history reviewed.     ROS  All other systems were reviewed and are negative          Objective:     Vitals:    11/27/23 0937   BP: 130/77   BP Location: Left arm   Patient Position: Sitting   Cuff Size: Adult   Pulse: 74   SpO2: 98%   Weight: 64.2 kg (141 lb 9.6 oz)   Height: 172.7 cm (68\")     Body mass index is 21.53 kg/m².    PHYSICAL EXAM:  Pulmonary:      Effort: Pulmonary effort is normal.      Breath sounds: Normal breath sounds. Rales present.   Cardiovascular:      Normal rate. Regular rhythm.      Murmurs: There is a systolic murmur.         Procedures      Current Outpatient Medications   Medication Sig Dispense Refill    allopurinol (ZYLOPRIM) 100 MG tablet Take 1 tablet by mouth 2 (Two) Times a Day. Indications: Gout      apixaban (ELIQUIS) 5 MG tablet tablet Take 1 tablet by mouth Every 12 (Twelve) Hours. Indications: DVT/PE (active thrombosis) 180 tablet 3    atorvastatin (LIPITOR) 10 MG tablet Take 1 tablet by mouth Every Other Day. M-W-F  Indications: High Amount of Fats in the Blood      azithromycin (ZITHROMAX) 250 MG tablet Take 2 tablets by mouth Daily.      BUDESONIDE PO Take 3 mg by mouth Daily. Take one tablet by mouth  Indications: Colon cancer screen, ulcerative or crohns colitis      clindamycin (CLINDAGEL) 1 % gel Apply  topically to the appropriate area as directed.      ferrous gluconate (FERGON) 324 MG tablet Take 1 tablet by mouth Daily " With Breakfast.      furosemide (LASIX) 40 MG tablet Take 1 tablet by mouth Daily. Indications: Edema, High Blood Pressure Disorder 30 tablet 5    hydrocortisone 2.5 % ointment Apply  topically to the appropriate area as directed 2 (Two) Times a Day. to affected area  Indications: Skin Inflammation      IPRATROPIUM-ALBUTEROL IN Inhale 2 puffs 3 (Three) Times a Day. Indications: COPD exacerbation, complicated      ketoconazole (NIZORAL) 2 % shampoo APPLY SHAMPOO TO AFFECTED AREA DAILY      metoprolol tartrate (LOPRESSOR) 25 MG tablet Take 1 tablet by mouth Every 12 (Twelve) Hours. Indications: High Blood Pressure Disorder 60 tablet 2    Nutritional Supplements (BOOST 100 CALORIE SMART PO) Take 1 bottle by mouth Daily. Indications: Nutritional Support      O2 (OXYGEN) Inhale 2 L/min As Needed (soa). Indications: Oxygen Saturation Decreased      omeprazole (priLOSEC) 40 MG capsule Take 1 capsule by mouth Daily. Indications: Heartburn       No current facility-administered medications for this visit.     Assessment:       Diagnosis Plan   1. Persistent atrial fibrillation  Basic Metabolic Panel      2. YANES (dyspnea on exertion)  Basic Metabolic Panel           Orders Placed This Encounter   Procedures    Basic Metabolic Panel     Standing Status:   Future     Number of Occurrences:   1     Standing Expiration Date:   11/27/2024     Order Specific Question:   Release to patient     Answer:   Routine Release [[id number]]         Plan:           1. 89 year old gentleman with paroxysmal atrial fibrillation off amiodarone due to history of pulmonary fibrosis.  He  had a rash with diltiazem in the past.  Now status post AV node ablation with permanent pacemaker.  He is off Tikosyn. He is asymptomatic with atrial fibrillation.  He is back on Eliquis at 5 mg BID.   he also follows with Dr. Nettles  2. Aortic stenosis mild in November 2017 and moderate- severe on most recent echo June 2022 . He is overall stable   3.  History of  TIA no recurrence- continue eliquis at 5 mg BID3.   4.  Prediabetes  5.  History of Crohn's disease and iron deficient anemia usually sees Dr. Susei Mccoy  6.  Hypertension continue current regimen  7.  Hyperlipidemia- now off statin therapy which is fine with me.   8.  History of cholecystitis  9.   History of gout  10. History of   pleural effusion- breathing is stable on current dose lasix 20 mg daily  11. COVID 19 infection 04/2023  12.  Left LE DVT and bilateral PE 04/2023- on full dose eliquis 5 mg BID  13. epistaxis requiring cauterization and packing by ENT 03/2023  14. Dilated ascending aorta at 4.1 cm on CTA chest 05/2023  15. Pulmonary fibrosis. Now with PRN oxygen. Follows with Dr. Chidi Oconnor.    16.  Acute volume overload-he has improved with Lasix 40 mg twice daily.  I will plan to check his renal function today but we will also decrease Lasix to 40 mg daily.  At this time, he will continue once daily potassium replacement      Follow-up in January as scheduled            It has been a pleasure to participate in this patient's care.      Thank you,  KYLAH Jha      **I used Dragon to dictate this note:**

## 2023-11-27 NOTE — TELEPHONE ENCOUNTER
BARBARA pt//LOV 10/10 with EP but 7/13 with [facility].  Hx of PAF, bilateral PE, pulm fibrosis, pulm HTN.    Patient's wife is calling on his behalf.  She shares with me that on Wednesday night she called the on call for swelling in LE and increased SOA.  Our provider ordered BNP and BMP which were completed 11/24.  She also instructed them to increase the lasix to 40 mg daily and get an appt early next week.  He is still taking 40 mg daily and she reports his swelling and SOA have improved but not at baseline.    Patient wears O2 overnight at baseline.  End of last week he was having to wear it around the clock during the day.  Now he is able to take breaks without the O2.  O2 sat this morning was 92%.  They do not have a BP to report.  She reports HR is stable but no number to provide.  My concern was getting him here without O2 because they do not have portable tanks anymore only a concentrator for home.  She says he looks a lot better now than he did end of last week, he is not requiring the O2 all the time now, and she feels like she can get him here for an appointment safely.    I added them on for AL to see at 10.      Agnes Rojas RN  [facility] Triage  11/27/23 08:37 EST

## 2023-12-13 ENCOUNTER — TELEPHONE (OUTPATIENT)
Dept: CARDIOLOGY | Facility: CLINIC | Age: 88
End: 2023-12-13
Payer: MEDICARE

## 2023-12-13 NOTE — TELEPHONE ENCOUNTER
Reviewed recommendations with patient's wife, verbalized understanding, will call with any further questions or complaints.  She will monitor for rash, and if one develops, she will stop the doxycycline and call PCP immediately.    Kaylin Vicente RN  Triage Nurse  12/13/23 14:55 EST

## 2023-12-13 NOTE — TELEPHONE ENCOUNTER
Pt's wife called in.  States that pt saw Dr. Lobo today, and he ordered doxycycline for pt (for lungs).  It is listed as an allergy, but wife thought that it was one that you told her was ok to take after PPM/ablasion, but in EPIC the reaction is listed as rash, not arrhythmia.  Wife states that she is unsure of reaction, and that pt is forgetful and unable to verify reaction.  She would like to know if you think it's ok for him to take the doxycycline.    Thanks so much,  Kaylin Vicente, RN  Triage RN  12/13/23 14:25 EST

## 2023-12-18 ENCOUNTER — TELEPHONE (OUTPATIENT)
Dept: CARDIOLOGY | Facility: CLINIC | Age: 88
End: 2023-12-18

## 2023-12-18 NOTE — TELEPHONE ENCOUNTER
Caller: Ni Ly    Relationship: Emergency Contact    Best call back number: [phone]    What is the best time to reach you: ANY    Who are you requesting to speak with (clinical staff, provider,  specific staff member): CHEN DAWSON    What was the call regarding: PATIENT NEEDS A PRESCRIPTION FOR HIS METOPROLOL TARTRATE 25MG. [phone] IN ORDER FOR THEM TO REFILL. DR DAVILA [phone].  PATIENT HAS A WEEK OF PILLS RIGHT NOW

## 2024-01-29 ENCOUNTER — OFFICE VISIT (OUTPATIENT)
Dept: CARDIOLOGY | Facility: CLINIC | Age: 89
End: 2024-01-29
Payer: COMMERCIAL

## 2024-01-29 VITALS
BODY MASS INDEX: 21.43 KG/M2 | DIASTOLIC BLOOD PRESSURE: 70 MMHG | WEIGHT: 141.4 LBS | HEIGHT: 68 IN | OXYGEN SATURATION: 97 % | SYSTOLIC BLOOD PRESSURE: 130 MMHG | HEART RATE: 78 BPM

## 2024-01-29 DIAGNOSIS — I48.19 PERSISTENT ATRIAL FIBRILLATION: Primary | ICD-10-CM

## 2024-01-29 DIAGNOSIS — Z86.711 HISTORY OF PULMONARY EMBOLISM: ICD-10-CM

## 2024-01-29 DIAGNOSIS — J84.10 PULMONARY FIBROSIS: ICD-10-CM

## 2024-01-29 DIAGNOSIS — I48.0 PAF (PAROXYSMAL ATRIAL FIBRILLATION): ICD-10-CM

## 2024-01-29 DIAGNOSIS — I44.2 COMPLETE HEART BLOCK: ICD-10-CM

## 2024-01-29 PROCEDURE — 99214 OFFICE O/P EST MOD 30 MIN: CPT | Performed by: NURSE PRACTITIONER

## 2024-01-29 NOTE — PROGRESS NOTES
Date of Office Visit: 24  Encounter Provider: KYLAH Jha  Place of Service: [facility] CARDIOLOGY  Patient Name: Asia Ly  :1932    Chief Complaint   Patient presents with    Shortness of Breath    Edema    Follow-up   :     HPI: Asia Ly is a 89 y.o. male  with paroxysmal atrial fibrillation, stroke and TIA in , aortic stenosis, hyperlipidemia, B12 anemia, Crohn's disease, colon polyps, lower extremity edema, TIA,  DVT and PE ( 2023), dilated ascending aorta,  pulmonary hypertension and pulmonary fibrosis.      He has been followed by Dr. Hector Rivera. He is now followed by Dr. Brown and I will visit with him in follow up today.      Has been on sotalol in the past as well as amiodarone for recurrent atrial fibrillation.  He had Echocardiogram 2016 showed normal left ventricular systolic dysfunction, moderate tricuspid insufficiency, mild mitral insufficiency , mild pulmonary hypertension.  He also had a perfusion stress test that was negative for ischemia.  He was taken off diltiazem for allergic reaction to rash.  2017 he had an echocardiogram which showed an EF of 60%, grade 2 diastolic dysfunction and mild aortic he had increased lower extremity edema and had again rapid atrial fibrillation.  He was started on a diuretic.  He then had pneumonia hypoxia and influenza and was started on Tikosyn.  He has some prolonged QTC complicated by antibiotics.  He then had issues with gout felt to be related to discontinuation of indomethacin and was prescribed colchicine.  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  He then had some atrial fibrillation with RVR and was given oral metoprolol and Cardizem and converted back to sinus rhythm.  He was evaluated by electrophysiology with Dr. Nettles who felt that his ablation at his age would be very high risk procedure.  He was later  started on digoxin for recurrent rapid atrial fibrillation.  He was admitted again January 2019 and electrophysiology felt that he may for AV node ablation and pacemaker implantation.  However he preferred to stay on Tikosyn and accept occasional breakthroughs.     He then had some confusion and upper epigastric abdominal pain he was hospitalized August 2020 found to have transaminitis with hyperbilirubinemia.  He had some atrial fibrillation with RVR received IV metoprolol and his heart rate improved.  Echocardiogram showed normal left ventricular systolic function, mild concentric hypertrophy, moderate aortic valve stenoses with aortic valve area 1.1 cm², maximum pressure gradient of 42 and mean pressure gradient 24 mmHg.  RVSP was severely elevated at 57.8.  He had mild aortic valve regurgitation and mild mitral regurgitation.     In November 2020 was hospitalized with hematochezia and underwent GI evaluation with no evidence of active bleed.  He was cleared to resume apixaban and was later found to have bilateral pneumonia.     He was treated again for pneumonia and had a bronchoscopy July 7.  He is colonized Pseudomonas and infectious disease recommended conservative treatment.  He had some rapid atrial fibrillation during that admission and had a pacemaker placed by Dr. Nettles.    And ultimately had AV node ablation 09/2021. .  His Tikosyn was stopped.      He had nosebleeds and saw ENT in march 2023 who  Performed cauterization with packing and was off eliquis until we restarted it at a lower dose of 2.5 mg twice daily.  His nosebleed stopped but then he got ill with COVID and developed acute left lower extremity DVT May 2023 and he was put back on Eliquis 5 mg twice daily.     He had some increased lower extremity edema which improved with Lasix 40 mg twice daily November 2023.  We then decrease Lasix to 40 mg daily.    He presents today for reassessment and he has not developed any increased lower  "extremity swelling.  He uses oxygen at night and has occasional dizziness but no near-syncope or syncope.  He denies chest pain tightness or pressure.            Allergies   Allergen Reactions    Amiodarone Unknown (See Comments)     Pulmonary fibrosis    Diltiazem Arrhythmia    Doxycycline Rash    Cefdinir Arrhythmia     A-FIB    Indomethacin Rash           Family and social history reviewed.     ROS  All other systems were reviewed and are negative          Objective:     Vitals:    01/29/24 1423   BP: 130/70   BP Location: Left arm   Patient Position: Sitting   Cuff Size: Adult   Pulse: 78   SpO2: 97%   Weight: 64.1 kg (141 lb 6.4 oz)   Height: 172.7 cm (68\")     Body mass index is 21.5 kg/m².    PHYSICAL EXAM:  Pulmonary:      Breath sounds: Examination of the right-lower field reveals rales. Examination of the left-lower field reveals rales.   Cardiovascular:      Regular rhythm.      Murmurs: There is a systolic murmur.   Edema:     Ankle: trace edema of the right ankle.        Procedures      Current Outpatient Medications   Medication Sig Dispense Refill    allopurinol (ZYLOPRIM) 100 MG tablet Take 1 tablet by mouth 2 (Two) Times a Day. Indications: Gout      apixaban (ELIQUIS) 5 MG tablet tablet Take 1 tablet by mouth Every 12 (Twelve) Hours. Indications: DVT/PE (active thrombosis) 180 tablet 3    atorvastatin (LIPITOR) 10 MG tablet Take 1 tablet by mouth Every Other Day. M-W-F  Indications: High Amount of Fats in the Blood      azithromycin (ZITHROMAX) 250 MG tablet Take 2 tablets by mouth Daily.      BUDESONIDE PO Take 3 mg by mouth Daily. Take one tablet by mouth  Indications: Colon cancer screen, ulcerative or crohns colitis      clindamycin (CLINDAGEL) 1 % gel Apply  topically to the appropriate area as directed.      ferrous gluconate (FERGON) 324 MG tablet Take 1 tablet by mouth Daily With Breakfast.      furosemide (LASIX) 40 MG tablet Take 1 tablet by mouth Daily. Indications: Edema, High Blood " Pressure Disorder 30 tablet 5    hydrocortisone 2.5 % ointment Apply  topically to the appropriate area as directed 2 (Two) Times a Day. to affected area  Indications: Skin Inflammation      IPRATROPIUM-ALBUTEROL IN Inhale 2 puffs 3 (Three) Times a Day. Indications: COPD exacerbation, complicated      ketoconazole (NIZORAL) 2 % shampoo APPLY SHAMPOO TO AFFECTED AREA DAILY      metoprolol tartrate (LOPRESSOR) 25 MG tablet Take 1 tablet by mouth Every 12 (Twelve) Hours. Indications: High Blood Pressure Disorder 180 tablet 1    Nutritional Supplements (BOOST 100 CALORIE SMART PO) Take 1 bottle by mouth Daily. Indications: Nutritional Support      O2 (OXYGEN) Inhale 2 L/min As Needed (soa). Indications: Oxygen Saturation Decreased      omeprazole (priLOSEC) 40 MG capsule Take 1 capsule by mouth Daily. Indications: Heartburn       No current facility-administered medications for this visit.     Assessment:       Diagnosis Plan   1. Persistent atrial fibrillation        2. PAF (paroxysmal atrial fibrillation)        3. Complete heart block        4. History of pulmonary embolism        5. Pulmonary fibrosis             No orders of the defined types were placed in this encounter.        Plan:       1. 89 year old gentleman with paroxysmal atrial fibrillation off amiodarone due to history of pulmonary fibrosis.  CHADS2 Vascor of 6.  He  had a rash with diltiazem in the past.  Now status post AV node ablation with permanent pacemaker.  He is off Tikosyn. He is asymptomatic with atrial fibrillation.  He we will continue Eliquis at 5 mg BID.   he also follows with Dr. Nettles  2. Aortic stenosis mild in November 2017 and moderate- severe on most recent echo June 2022 . He is overall stable   3.  History of TIA no recurrence- continue eliquis at 5 mg BID3.   4.  Prediabetes  5.  History of Crohn's disease and iron deficient anemia usually sees Dr. Susie Mccoy  6.  Hypertension continue current regimen  7.  Hyperlipidemia- now  off statin therapy which is fine with me.   8.  History of cholecystitis  9.   History of gout  10. History of   pleural effusion- breathing is stable on current dose lasix 20 mg daily  11. COVID 19 infection 04/2023  12.  Left LE DVT and bilateral PE 04/2023- on full dose eliquis 5 mg BID  13. epistaxis requiring cauterization and packing by ENT 03/2023  14. Dilated ascending aorta at 4.1 cm on CTA chest 05/2023  15. Pulmonary fibrosis. Now with PRN oxygen. Follows with Dr. Chidi Oconnor.    16.  Lower extremity edema now stable with Lasix 40 mg daily.       Follow-up in 6 months.  Call with questions or concerns        It has been a pleasure to participate in this patient's care.      Thank you,  KYLAH Jha      **I used Dragon to dictate this note:**

## 2024-02-07 ENCOUNTER — OFFICE (OUTPATIENT)
Dept: URBAN - METROPOLITAN AREA CLINIC 66 | Facility: CLINIC | Age: 89
End: 2024-02-07

## 2024-02-07 VITALS
SYSTOLIC BLOOD PRESSURE: 129 MMHG | DIASTOLIC BLOOD PRESSURE: 63 MMHG | HEIGHT: 68 IN | HEART RATE: 74 BPM | WEIGHT: 141 LBS

## 2024-02-07 DIAGNOSIS — Z79.52 LONG TERM (CURRENT) USE OF SYSTEMIC STEROIDS: ICD-10-CM

## 2024-02-07 DIAGNOSIS — K50.90 CROHN'S DISEASE, UNSPECIFIED, WITHOUT COMPLICATIONS: ICD-10-CM

## 2024-02-07 DIAGNOSIS — R13.10 DYSPHAGIA, UNSPECIFIED: ICD-10-CM

## 2024-02-07 DIAGNOSIS — K21.9 GASTRO-ESOPHAGEAL REFLUX DISEASE WITHOUT ESOPHAGITIS: ICD-10-CM

## 2024-02-07 PROCEDURE — 99214 OFFICE O/P EST MOD 30 MIN: CPT | Performed by: NURSE PRACTITIONER

## 2024-02-07 RX ORDER — BUDESONIDE 3 MG/1
3 CAPSULE ORAL
Qty: 90 | Refills: 4 | Status: ACTIVE

## 2024-03-26 ENCOUNTER — TELEPHONE (OUTPATIENT)
Dept: CARDIOLOGY | Facility: CLINIC | Age: 89
End: 2024-03-26

## 2024-03-26 NOTE — TELEPHONE ENCOUNTER
Caller: Ni Ly    Relationship: Emergency Contact    Best call back number: [phone]      PATIENT IS TO HAVE A PET CT DONE 3.28.24 AND HIS WIFE IS WANTING TO KNOW ABOUT THE DYE THAT IS USED DURING THIS TEST AND IF IT WILL BE HARMFUL TO HIM IN ANY WAY.

## 2024-06-28 NOTE — PROGRESS NOTES
Bill Aguero (Resident) [facility]     Mar/chart reviewed  F/u bronchiectasis with acute exacerbation  Still with some cough    Vital Sign Min/Max for last 24 hours  Temp  Min: 96.9 °F (36.1 °C)  Max: 97.8 °F (36.6 °C)   BP  Min: 100/52  Max: 119/59   Pulse  Min: 56  Max: 79   Resp  Min: 16  Max: 20   SpO2  Min: 95 %  Max: 96 %   No Data Recorded   No Data Recorded     Nad, axox3,   perrl, eomi, no icterus,  mmm, no jvd, trachea midline, neck supple,  Chest decreased bilaterally, no crackles, no wheezes,   rrr,   soft, nt, nd +bs,  no c/c/ edema    Labs:  Nothing new    Ct chest: report and images reviewed    A/P:  1. Influenza B  2. Bronchiectasis with ae  3. Elevated troponin  4. CKD  5. Persistent afib  6. Abnormal ct chest    D/c home if ok with cards

## 2024-07-06 NOTE — TELEPHONE ENCOUNTER
Operative Note  Department of Obstetrics and Gynecology  [facility]     Patient: Jovanny Sullivan   : 1993  MRN: [id number]       Acct: [id number]   PCP: No primary care provider on file.  Date of Procedure: 24    Pre-operative Diagnosis: 30 y.o. female  at 38w0d    Spontaneous labor  Face presentation; mentum transverse  Asthma  Long QT syndrome  Neurocardiogenic syncope  History of cardiac loop recorder  Elevated 1 hour GTT  History of postpartum hemorrhage  History of blood transfusion  Anemia  BMI 25     Post-operative Diagnosis:     living infant female  Spontaneous labor  Face presentation; mentum transverse  Asthma  Long QT syndrome  Neurocardiogenic syncope  History of cardiac loop recorder  Elevated 1 hour GTT  History of postpartum hemorrhage  History of blood transfusion  Anemia  BMI 25    Procedure: primary low transverse  section    Indications: Patient is a 30y.o. female  @ 38w0d gestation who presented at 37w6d with contraction. She progressed in spontaneous labor, received and epidural and AROM. Patient was then checked with SVE and noted fetus presenting part was face. After discussion of all r/b/a will proceed with primary  section 2/2 face presentation.     Surgeon: Dr. Devine  Assistants: Whitney Leal DO, PGY4; Brittany Esquivel, PGY2    Anesthesia: spinal with duramorph    Procedure Details   The patient was seen pre-operatively. The risks, benefits, complications, treatment options, and expected outcomes were discussed with the patient. The patient concurred with the proposed plan, giving informed consent. The patient was taken to the Operating Room, identified as Jovanny Sullivan and the procedure verified as  Delivery. A Time Out was held and the above information confirmed.     After spinal anesthesia, the patient was draped and prepped in the usual sterile manner. A Pfannenstiel incision was made and carried  Thanks

## 2024-08-12 ENCOUNTER — OFFICE VISIT (OUTPATIENT)
Dept: CARDIOLOGY | Facility: CLINIC | Age: 89
End: 2024-08-12
Payer: MEDICARE

## 2024-08-12 ENCOUNTER — LAB (OUTPATIENT)
Dept: LAB | Facility: HOSPITAL | Age: 89
End: 2024-08-12
Payer: MEDICARE

## 2024-08-12 VITALS
BODY MASS INDEX: 21.95 KG/M2 | SYSTOLIC BLOOD PRESSURE: 118 MMHG | OXYGEN SATURATION: 93 % | DIASTOLIC BLOOD PRESSURE: 60 MMHG | WEIGHT: 144.8 LBS | HEIGHT: 68 IN

## 2024-08-12 DIAGNOSIS — R06.02 SHORTNESS OF BREATH: Primary | ICD-10-CM

## 2024-08-12 DIAGNOSIS — R06.02 SHORTNESS OF BREATH: ICD-10-CM

## 2024-08-12 LAB
ANION GAP SERPL CALCULATED.3IONS-SCNC: 9.6 MMOL/L (ref 5–15)
BUN SERPL-MCNC: 42 MG/DL (ref 8–23)
BUN/CREAT SERPL: 32.6 (ref 7–25)
CALCIUM SPEC-SCNC: 9.3 MG/DL (ref 8.2–9.6)
CHLORIDE SERPL-SCNC: 104 MMOL/L (ref 98–107)
CO2 SERPL-SCNC: 24.4 MMOL/L (ref 22–29)
CREAT SERPL-MCNC: 1.29 MG/DL (ref 0.76–1.27)
EGFRCR SERPLBLD CKD-EPI 2021: 52.3 ML/MIN/1.73
GLUCOSE SERPL-MCNC: 123 MG/DL (ref 65–99)
POTASSIUM SERPL-SCNC: 4.9 MMOL/L (ref 3.5–5.2)
SODIUM SERPL-SCNC: 138 MMOL/L (ref 136–145)

## 2024-08-12 PROCEDURE — 1159F MED LIST DOCD IN RCRD: CPT | Performed by: INTERNAL MEDICINE

## 2024-08-12 PROCEDURE — 99214 OFFICE O/P EST MOD 30 MIN: CPT | Performed by: INTERNAL MEDICINE

## 2024-08-12 PROCEDURE — 1160F RVW MEDS BY RX/DR IN RCRD: CPT | Performed by: INTERNAL MEDICINE

## 2024-08-12 PROCEDURE — 80048 BASIC METABOLIC PNL TOTAL CA: CPT

## 2024-08-12 PROCEDURE — 36415 COLL VENOUS BLD VENIPUNCTURE: CPT

## 2024-08-12 RX ORDER — POTASSIUM CHLORIDE 750 MG/1
10 TABLET, EXTENDED RELEASE ORAL DAILY
COMMUNITY
Start: 2024-01-22 | End: 2025-01-21

## 2024-08-12 RX ORDER — FUROSEMIDE 20 MG/1
20 TABLET ORAL DAILY
COMMUNITY

## 2024-08-12 RX ORDER — BUDESONIDE 3 MG/1
3 CAPSULE, COATED PELLETS ORAL EVERY MORNING
COMMUNITY

## 2024-08-12 NOTE — PROGRESS NOTES
[facility]      Patient Name: Asia Ly  :1932  Age: 89 y.o.  Encounter Provider:  Nixon Brown Jr, MD      Chief Complaint:   Chief Complaint   Patient presents with    Follow-up     6 month         Atrial Fibrillation  Symptoms are negative for chest pain, dizziness, palpitations and syncope. Past medical history includes hyperlipidemia.   Hyperlipidemia  Pertinent negatives include no chest pain or focal weakness.   Transient Ischemic Attack  Associated symptoms include coughing. Pertinent negatives include no abdominal pain, chest pain, chills, fever, joint swelling or rash.   Follow-up  Conditions present:  Hyperlipidemia  Current symptoms: weight loss and cough. Current symptoms include no chest pain, no dizziness, no palpitations, no orthopnea, no PND, no focal weakness, no wheezing, no syncope and no abdominal pain.       Asia Ly is a 89 y.o. male past medical history of paroxysmal atrial fibrillation, stroke, moderate aortic stenosis, Crohn's disease, pulmonary fibrosis and pulmonary hypertension who presents for follow-up of chronic mental illness.  I first met Mr. Ly in mid November when he presented to the hospital with hematochezia.  He had a full GI work-up which showed no evidence of active bleeding.  He was placed back on apixaban given his high risk for future stroke.  He presented back to the hospital a few days later with febrile illness.  He was found to have bilateral pneumonia and had an extensive hospital stay with full course of IV antibiotics.  He is slowly recovering from that hospital stay and states that his main complaint is generalized weakness and fatigue.  He does feel that he is getting better with each day.  He was found to have residual pneumonia by PCP and placed on 7-day oral antibiotic course.  He has not had fever in the past week.  He denies chest pain, palpitations, dizziness or syncope.  He denies orthopnea or PND but has some  residual edema.  He has some increased exertional dyspnea but states that this is improving since his first course of antibiotics.  He is back on apixaban with no further bleeding complications.  Recovery has been slow but steady. Patient had AV johanne ablation on October 3.  He feels well since then.  No palpitations, dizziness or syncope.  He has mild chronic stable dyspnea on exertion.  Known aortic valve stenosis.  Echocardiogram today shows EF of 50 to 55% with moderate aortic stenosis and mild to moderate aortic valve regurgitation.  Significant pulmonary hypertension is noted which is unchanged from previous.    He was hospitalized in May with COVID-19 infection and bilateral PE while on low-dose apixaban.  He was placed on Lovenox ultimately switched to normal dose of apixaban.  We have been watching closely because he has a history of epistaxis on higher dose of apixaban.  He has been doing fairly well but has a reducible hernia that has been giving him more pain and has been occurring more frequently.  We asked Dr. Ramirez Zaidi to hold off on surgery until he has had 3 months of anticoagulation.  He should be ready for surgery in early August.  No chest pain.  He has had a slow recovery from this latest hospitalization but seems to be doing better and walking with a cane.  No orthopnea, PND or edema.  No palpitations, dizziness or syncope.    Doing fairly well since last visit with KYLAH Hackett.  Moderate to severe aortic stenosis was noted on echocardiogram in 2022.  He has no chest pain.  Functional capacity is limited she is multifactorial.  He has chronic stable dyspnea on exertion in the setting of pulmonary fibrosis.  He denies orthopnea, PND or edema.  Rare palpitations with no dizziness or syncope.    The following portions of the patient's history were reviewed and updated as appropriate: allergies, current medications, past family history, past medical history, past social history, past  "surgical history and problem list.      Review of Systems   Constitutional: Positive for decreased appetite, malaise/fatigue and weight loss. Negative for chills and fever.   HENT:  Positive for hearing loss.    Cardiovascular:  Negative for chest pain, leg swelling, near-syncope, orthopnea, palpitations, paroxysmal nocturnal dyspnea and syncope.   Respiratory:  Positive for cough. Negative for wheezing.    Skin:  Positive for itching. Negative for rash.   Musculoskeletal:  Negative for joint pain and joint swelling.   Gastrointestinal:  Negative for bloating and abdominal pain.   Neurological:  Negative for dizziness and focal weakness.   Psychiatric/Behavioral:  Negative for altered mental status and suicidal ideas.      ROS was reviewed, updated and amended when necessary.    OBJECTIVE:   Vital Signs  Vitals:    08/12/24 1429   BP: 118/60   SpO2: 93%     Estimated body mass index is 22.02 kg/m² as calculated from the following:    Height as of this encounter: 172.7 cm (67.99\").    Weight as of this encounter: 65.7 kg (144 lb 12.8 oz).    Vitals reviewed.   Constitutional:       Appearance: Healthy appearance. Not in distress.   Neck:      Vascular: No JVR. JVD normal.   Pulmonary:      Effort: Pulmonary effort is normal.      Breath sounds: No wheezing. No rhonchi.      Comments: Mild bibasilar crackles  Chest:      Chest wall: Not tender to palpatation.   Cardiovascular:      PMI at left midclavicular line. Normal rate. Regular rhythm.      Murmurs: There is a systolic murmur.      No gallop.  No click. No rub.   Pulses:     Intact distal pulses.   Edema:     Peripheral edema absent.   Abdominal:      General: Bowel sounds are normal.      Palpations: Abdomen is soft.      Tenderness: There is no abdominal tenderness.   Musculoskeletal: Normal range of motion.         General: No tenderness. Skin:     General: Skin is warm and dry.   Neurological:      General: No focal deficit present.      Mental Status: Alert " and oriented to person, place and time.       Physical exam was reviewed, updated and amended when necessary.    Procedures          ASSESSMENT:     89-year-old male with a forementioned past medical history who presents for hospital follow-up for bilateral pneumonia.    PLAN OF CARE:     PAF - continue metoprolol and apixaban.  No further evidence for bleeding.  Monitor closely.  Device interrogation reviewed and device is functioning normally.  Status post AV johanne ablation.  Bilateral PE -doing well on full dose apixaban.  Comfortable on room air.  Increasing activity as tolerated.  Aortic stenosis -is hard to tell if he is truly having symptoms as his functional capacity is reduced.  Repeat echo.  Pulmonary fibrosis  Pulmonary hypertension -mild bibasilar crackles noted.  This is more likely to be pulmonary fibrosis than heart failure.  Continue low-dose furosemide.  CKD stable on labs today.  Crohn's disease    Return to clinic 6 months.             Discharge Medications            Accurate as of August 12, 2024  2:45 PM. If you have any questions, ask your nurse or doctor.                Continue These Medications        Instructions Start Date   allopurinol 100 MG tablet  Commonly known as: ZYLOPRIM   1 tablet, Oral, 2 Times Daily      apixaban 5 MG tablet tablet  Commonly known as: ELIQUIS   5 mg, Oral, Every 12 Hours Scheduled      atorvastatin 10 MG tablet  Commonly known as: LIPITOR   1 tablet, Oral, Every Other Day, M-W-F      azithromycin 250 MG tablet  Commonly known as: ZITHROMAX   500 mg, Oral, Daily      BOOST 100 CALORIE SMART PO   1 bottle, Oral, Daily      Budesonide 3 MG 24 hr capsule  Commonly known as: ENTOCORT EC   3 mg, Oral, Every Morning      clindamycin 1 % gel   Topical      ferrous gluconate 324 MG tablet  Commonly known as: FERGON   1 tablet, Oral, Daily With Breakfast      furosemide 20 MG tablet  Commonly known as: LASIX   20 mg, Oral, Daily      hydrocortisone 2.5 % ointment    Topical, 2 Times Daily, to affected area      IPRATROPIUM-ALBUTEROL IN   2 puffs, Inhalation, 3 Times Daily      ketoconazole 2 % shampoo  Commonly known as: NIZORAL   APPLY SHAMPOO TO AFFECTED AREA DAILY      metoprolol tartrate 25 MG tablet  Commonly known as: LOPRESSOR   25 mg, Oral, Every 12 Hours Scheduled      O2  Commonly known as: OXYGEN   2 L/min, Inhalation, As Needed      omeprazole 40 MG capsule  Commonly known as: priLOSEC   1 capsule, Oral, Daily      potassium chloride 10 MEQ CR tablet  Commonly known as: KLOR-CON M10   10 mEq, Oral, Daily               Thank you for allowing me to participate in the care of your patient,      Sincerely,   Nixon Brown MD   [facility]  08/12/24  14:45 EDT

## 2024-08-12 NOTE — PROGRESS NOTES
Please let the patient know that labs are stable and he can continue medications as they currently are.

## 2024-08-13 ENCOUNTER — TELEPHONE (OUTPATIENT)
Dept: CARDIOLOGY | Facility: CLINIC | Age: 89
End: 2024-08-13
Payer: MEDICARE

## 2024-08-13 NOTE — TELEPHONE ENCOUNTER
Called and left VM. Will continue to try to reach patient. HUB transfer call to triage.     Pavithra Guillaume RN  Triage [facility]

## 2024-08-13 NOTE — TELEPHONE ENCOUNTER
Pt's wife called back. Went over results and recommendations. She verbalized understanding.    Thank you,    Kristina Rodriguez RN  Triage [facility] [place]  08/13/24 10:09 EDT

## 2024-08-13 NOTE — TELEPHONE ENCOUNTER
----- Message from Nixon Brown sent at 8/12/2024  4:50 PM EDT -----  Please let the patient know that labs are stable and he can continue medications as they currently are.

## 2024-09-11 ENCOUNTER — HOSPITAL ENCOUNTER (OUTPATIENT)
Dept: CARDIOLOGY | Facility: HOSPITAL | Age: 89
Discharge: HOME | End: 2024-09-11
Admitting: INTERNAL MEDICINE
Payer: MEDICARE

## 2024-09-11 ENCOUNTER — TELEPHONE (OUTPATIENT)
Dept: CARDIOLOGY | Facility: CLINIC | Age: 89
End: 2024-09-11

## 2024-09-11 VITALS
WEIGHT: 144 LBS | SYSTOLIC BLOOD PRESSURE: 124 MMHG | HEIGHT: 68 IN | BODY MASS INDEX: 21.82 KG/M2 | DIASTOLIC BLOOD PRESSURE: 80 MMHG | HEART RATE: 70 BPM

## 2024-09-11 DIAGNOSIS — R06.02 SHORTNESS OF BREATH: ICD-10-CM

## 2024-09-11 LAB
AORTIC DIMENSIONLESS INDEX: 0.2 (DI)
ASCENDING AORTA: 3.7 CM
BH CV ECHO MEAS - AI P1/2T: 649.8 MSEC
BH CV ECHO MEAS - AO MAX PG: 95.1 MMHG
BH CV ECHO MEAS - AO MEAN PG: 69.4 MMHG
BH CV ECHO MEAS - AO ROOT DIAM: 3 CM
BH CV ECHO MEAS - AO V2 MAX: 487.7 CM/SEC
BH CV ECHO MEAS - AO V2 VTI: 124.4 CM
BH CV ECHO MEAS - AVA(I,D): 0.68 CM2
BH CV ECHO MEAS - EDV(CUBED): 72 ML
BH CV ECHO MEAS - EDV(MOD-SP2): 69 ML
BH CV ECHO MEAS - EDV(MOD-SP4): 69 ML
BH CV ECHO MEAS - EF(MOD-BP): 66.1 %
BH CV ECHO MEAS - EF(MOD-SP2): 68.1 %
BH CV ECHO MEAS - EF(MOD-SP4): 62.3 %
BH CV ECHO MEAS - ESV(MOD-SP2): 22 ML
BH CV ECHO MEAS - ESV(MOD-SP4): 26 ML
BH CV ECHO MEAS - FS: 31.7 %
BH CV ECHO MEAS - IVS/LVPW: 0.88 CM
BH CV ECHO MEAS - IVSD: 0.88 CM
BH CV ECHO MEAS - LAT PEAK E' VEL: 7.7 CM/SEC
BH CV ECHO MEAS - LV DIASTOLIC VOL/BSA (35-75): 38.8 CM2
BH CV ECHO MEAS - LV MASS(C)D: 124.4 GRAMS
BH CV ECHO MEAS - LV MAX PG: 4.6 MMHG
BH CV ECHO MEAS - LV MEAN PG: 2.22 MMHG
BH CV ECHO MEAS - LV SYSTOLIC VOL/BSA (12-30): 14.6 CM2
BH CV ECHO MEAS - LV V1 MAX: 107.4 CM/SEC
BH CV ECHO MEAS - LV V1 VTI: 26.1 CM
BH CV ECHO MEAS - LVIDD: 4.2 CM
BH CV ECHO MEAS - LVIDS: 2.8 CM
BH CV ECHO MEAS - LVOT AREA: 3.3 CM2
BH CV ECHO MEAS - LVOT DIAM: 2.04 CM
BH CV ECHO MEAS - LVPWD: 1 CM
BH CV ECHO MEAS - MED PEAK E' VEL: 5.7 CM/SEC
BH CV ECHO MEAS - MR MAX PG: 87.1 MMHG
BH CV ECHO MEAS - MR MAX VEL: 466.7 CM/SEC
BH CV ECHO MEAS - MV DEC SLOPE: 974.8 CM/SEC2
BH CV ECHO MEAS - MV DEC TIME: 0.2 SEC
BH CV ECHO MEAS - MV E MAX VEL: 143 CM/SEC
BH CV ECHO MEAS - MV MAX PG: 9.5 MMHG
BH CV ECHO MEAS - MV MEAN PG: 3.3 MMHG
BH CV ECHO MEAS - MV P1/2T: 45.5 MSEC
BH CV ECHO MEAS - MV V2 VTI: 28.1 CM
BH CV ECHO MEAS - MVA(P1/2T): 4.8 CM2
BH CV ECHO MEAS - MVA(VTI): 3 CM2
BH CV ECHO MEAS - PA V2 MAX: 109.1 CM/SEC
BH CV ECHO MEAS - PULM DIAS VEL: 25.3 CM/SEC
BH CV ECHO MEAS - PULM S/D: 0.62
BH CV ECHO MEAS - PULM SYS VEL: 15.8 CM/SEC
BH CV ECHO MEAS - QP/QS: 0.45
BH CV ECHO MEAS - RAP SYSTOLE: 15 MMHG
BH CV ECHO MEAS - RV MAX PG: 1.19 MMHG
BH CV ECHO MEAS - RV V1 MAX: 54.5 CM/SEC
BH CV ECHO MEAS - RV V1 VTI: 12.5 CM
BH CV ECHO MEAS - RVOT DIAM: 1.98 CM
BH CV ECHO MEAS - RVSP: 79.2 MMHG
BH CV ECHO MEAS - SV(LVOT): 85 ML
BH CV ECHO MEAS - SV(MOD-SP2): 47 ML
BH CV ECHO MEAS - SV(MOD-SP4): 43 ML
BH CV ECHO MEAS - SV(RVOT): 38.3 ML
BH CV ECHO MEAS - SVI(LVOT): 47.8 ML/M2
BH CV ECHO MEAS - SVI(MOD-SP2): 26.4 ML/M2
BH CV ECHO MEAS - SVI(MOD-SP4): 24.2 ML/M2
BH CV ECHO MEAS - TAPSE (>1.6): 1.9 CM
BH CV ECHO MEAS - TR MAX PG: 64.2 MMHG
BH CV ECHO MEAS - TR MAX VEL: 400.8 CM/SEC
BH CV ECHO MEASUREMENTS AVERAGE E/E' RATIO: 21.34
BH CV XLRA - RV BASE: 3.9 CM
BH CV XLRA - RV LENGTH: 6.4 CM
BH CV XLRA - RV MID: 2.08 CM
BH CV XLRA - TDI S': 8.7 CM/SEC
LEFT ATRIUM VOLUME INDEX: 56.2 ML/M2
SINUS: 3.3 CM
STJ: 2.8 CM

## 2024-09-11 PROCEDURE — 93306 TTE W/DOPPLER COMPLETE: CPT

## 2024-09-11 NOTE — TELEPHONE ENCOUNTER
Spoke to wife and son about severe aortic stenosis approaching critical.  I think given his symptoms he should be evaluated in structural clinic for TAVR.  I left a message for his other son Patricia who is a physician in [place].  After that phone conversation we will decide on whether or not the family wants to move forward with TAVR evaluation.

## 2024-09-18 ENCOUNTER — TELEPHONE (OUTPATIENT)
Dept: CARDIOLOGY | Facility: CLINIC | Age: 89
End: 2024-09-18

## 2024-09-18 ENCOUNTER — TELEPHONE (OUTPATIENT)
Age: 89
End: 2024-09-18
Payer: MEDICARE

## 2024-09-18 NOTE — TELEPHONE ENCOUNTER
Caller: Ni Ly    Relationship: Emergency Contact    Best call back number: [phone]    Do you know the name of the person who called: ELVIRA    What was the call regarding: PATIENT MISSED CALL AND NEEDS CALL BACK, HUB ATTEMPTED WT, NO ANSWER IN PRACTICE.

## 2024-09-20 NOTE — TELEPHONE ENCOUNTER
09/20/2024    Called and left a voicemail for this patient to return call to schedule this appointment.     Thanks  Cesar

## 2024-09-20 NOTE — TELEPHONE ENCOUNTER
Caller: Ni Ly    Relationship: Emergency Contact    Best call back number: [phone]    What is the best time to reach you: ANYTIME    Who are you requesting to speak with (clinical staff, provider,  specific staff member): ANYONE    What was the call regarding: PT'S WIFE IS CALLING TO LET US KNOW THAT THE PT WANTS TO WAIT TO HAVE THE TEST FOR A FEW MORE MONTHS.     Is it okay if the provider responds through MyChart: NO

## 2024-09-22 ENCOUNTER — HOSPITAL ENCOUNTER (EMERGENCY)
Facility: HOSPITAL | Age: 89
Discharge: HOME | End: 2024-09-22
Attending: EMERGENCY MEDICINE | Admitting: EMERGENCY MEDICINE
Payer: MEDICARE

## 2024-09-22 ENCOUNTER — APPOINTMENT (OUTPATIENT)
Dept: GENERAL RADIOLOGY | Facility: HOSPITAL | Age: 89
End: 2024-09-22
Payer: MEDICARE

## 2024-09-22 VITALS
HEART RATE: 54 BPM | DIASTOLIC BLOOD PRESSURE: 94 MMHG | OXYGEN SATURATION: 92 % | TEMPERATURE: 98 F | BODY MASS INDEX: 21.67 KG/M2 | HEIGHT: 68 IN | WEIGHT: 143 LBS | SYSTOLIC BLOOD PRESSURE: 156 MMHG | RESPIRATION RATE: 15 BRPM

## 2024-09-22 DIAGNOSIS — J40 BRONCHITIS: Primary | ICD-10-CM

## 2024-09-22 DIAGNOSIS — J44.1 CHRONIC OBSTRUCTIVE PULMONARY DISEASE WITH ACUTE EXACERBATION: ICD-10-CM

## 2024-09-22 LAB
ALBUMIN SERPL-MCNC: 3.9 G/DL (ref 3.5–5.2)
ALBUMIN/GLOB SERPL: 1 G/DL
ALP SERPL-CCNC: 55 U/L (ref 39–117)
ALT SERPL W P-5'-P-CCNC: 8 U/L (ref 1–41)
ANION GAP SERPL CALCULATED.3IONS-SCNC: 10.8 MMOL/L (ref 5–15)
AST SERPL-CCNC: 13 U/L (ref 1–40)
BASOPHILS # BLD AUTO: 0.04 10*3/MM3 (ref 0–0.2)
BASOPHILS NFR BLD AUTO: 0.6 % (ref 0–1.5)
BILIRUB SERPL-MCNC: 0.8 MG/DL (ref 0–1.2)
BUN SERPL-MCNC: 33 MG/DL (ref 8–23)
BUN/CREAT SERPL: 24.4 (ref 7–25)
CALCIUM SPEC-SCNC: 9.3 MG/DL (ref 8.2–9.6)
CHLORIDE SERPL-SCNC: 104 MMOL/L (ref 98–107)
CO2 SERPL-SCNC: 24.2 MMOL/L (ref 22–29)
CREAT SERPL-MCNC: 1.35 MG/DL (ref 0.76–1.27)
DEPRECATED RDW RBC AUTO: 57.2 FL (ref 37–54)
EGFRCR SERPLBLD CKD-EPI 2021: 49.3 ML/MIN/1.73
EOSINOPHIL # BLD AUTO: 0.15 10*3/MM3 (ref 0–0.4)
EOSINOPHIL NFR BLD AUTO: 2.4 % (ref 0.3–6.2)
ERYTHROCYTE [DISTWIDTH] IN BLOOD BY AUTOMATED COUNT: 15.5 % (ref 12.3–15.4)
FLUAV SUBTYP SPEC NAA+PROBE: NOT DETECTED
FLUBV RNA ISLT QL NAA+PROBE: NOT DETECTED
GLOBULIN UR ELPH-MCNC: 3.8 GM/DL
GLUCOSE SERPL-MCNC: 94 MG/DL (ref 65–99)
HCT VFR BLD AUTO: 37.8 % (ref 37.5–51)
HGB BLD-MCNC: 12 G/DL (ref 13–17.7)
IMM GRANULOCYTES # BLD AUTO: 0.02 10*3/MM3 (ref 0–0.05)
IMM GRANULOCYTES NFR BLD AUTO: 0.3 % (ref 0–0.5)
LYMPHOCYTES # BLD AUTO: 0.96 10*3/MM3 (ref 0.7–3.1)
LYMPHOCYTES NFR BLD AUTO: 15.2 % (ref 19.6–45.3)
MCH RBC QN AUTO: 31.6 PG (ref 26.6–33)
MCHC RBC AUTO-ENTMCNC: 31.7 G/DL (ref 31.5–35.7)
MCV RBC AUTO: 99.5 FL (ref 79–97)
MONOCYTES # BLD AUTO: 0.52 10*3/MM3 (ref 0.1–0.9)
MONOCYTES NFR BLD AUTO: 8.2 % (ref 5–12)
NEUTROPHILS NFR BLD AUTO: 4.62 10*3/MM3 (ref 1.7–7)
NEUTROPHILS NFR BLD AUTO: 73.3 % (ref 42.7–76)
PLATELET # BLD AUTO: 136 10*3/MM3 (ref 140–450)
PMV BLD AUTO: 11.4 FL (ref 6–12)
POTASSIUM SERPL-SCNC: 4 MMOL/L (ref 3.5–5.2)
PROT SERPL-MCNC: 7.7 G/DL (ref 6–8.5)
RBC # BLD AUTO: 3.8 10*6/MM3 (ref 4.14–5.8)
SARS-COV-2 RNA RESP QL NAA+PROBE: NOT DETECTED
SODIUM SERPL-SCNC: 139 MMOL/L (ref 136–145)
TROPONIN T SERPL HS-MCNC: 41 NG/L
WBC NRBC COR # BLD AUTO: 6.31 10*3/MM3 (ref 3.4–10.8)

## 2024-09-22 PROCEDURE — 71045 X-RAY EXAM CHEST 1 VIEW: CPT

## 2024-09-22 PROCEDURE — 96374 THER/PROPH/DIAG INJ IV PUSH: CPT

## 2024-09-22 PROCEDURE — 84484 ASSAY OF TROPONIN QUANT: CPT | Performed by: EMERGENCY MEDICINE

## 2024-09-22 PROCEDURE — 93005 ELECTROCARDIOGRAM TRACING: CPT | Performed by: EMERGENCY MEDICINE

## 2024-09-22 PROCEDURE — 99284 EMERGENCY DEPT VISIT MOD MDM: CPT

## 2024-09-22 PROCEDURE — 87636 SARSCOV2 & INF A&B AMP PRB: CPT | Performed by: EMERGENCY MEDICINE

## 2024-09-22 PROCEDURE — 93010 ELECTROCARDIOGRAM REPORT: CPT | Performed by: INTERNAL MEDICINE

## 2024-09-22 PROCEDURE — 80053 COMPREHEN METABOLIC PANEL: CPT | Performed by: EMERGENCY MEDICINE

## 2024-09-22 PROCEDURE — 85025 COMPLETE CBC W/AUTO DIFF WBC: CPT | Performed by: EMERGENCY MEDICINE

## 2024-09-22 PROCEDURE — 99284 EMERGENCY DEPT VISIT MOD MDM: CPT | Performed by: EMERGENCY MEDICINE

## 2024-09-22 PROCEDURE — 25010000002 METHYLPREDNISOLONE PER 125 MG: Performed by: EMERGENCY MEDICINE

## 2024-09-22 RX ORDER — PREDNISONE 20 MG/1
TABLET ORAL
Qty: 9 TABLET | Refills: 0 | Status: ON HOLD | OUTPATIENT
Start: 2024-09-22

## 2024-09-22 RX ORDER — AZITHROMYCIN 250 MG/1
TABLET, FILM COATED ORAL
Qty: 4 TABLET | Refills: 0 | Status: ON HOLD | OUTPATIENT
Start: 2024-09-22

## 2024-09-22 RX ORDER — IPRATROPIUM BROMIDE AND ALBUTEROL SULFATE 2.5; .5 MG/3ML; MG/3ML
3 SOLUTION RESPIRATORY (INHALATION) ONCE
Status: COMPLETED | OUTPATIENT
Start: 2024-09-22 | End: 2024-09-22

## 2024-09-22 RX ORDER — SODIUM CHLORIDE 0.9 % (FLUSH) 0.9 %
10 SYRINGE (ML) INJECTION AS NEEDED
Status: DISCONTINUED | OUTPATIENT
Start: 2024-09-22 | End: 2024-09-22 | Stop reason: HOSPADM

## 2024-09-22 RX ORDER — METHYLPREDNISOLONE SODIUM SUCCINATE 125 MG/2ML
125 INJECTION, POWDER, LYOPHILIZED, FOR SOLUTION INTRAMUSCULAR; INTRAVENOUS ONCE
Status: COMPLETED | OUTPATIENT
Start: 2024-09-22 | End: 2024-09-22

## 2024-09-22 RX ORDER — AZITHROMYCIN 250 MG/1
500 TABLET, FILM COATED ORAL ONCE
Status: COMPLETED | OUTPATIENT
Start: 2024-09-22 | End: 2024-09-22

## 2024-09-22 RX ADMIN — AZITHROMYCIN 500 MG: 250 TABLET, FILM COATED ORAL at 02:12

## 2024-09-22 RX ADMIN — METHYLPREDNISOLONE SODIUM SUCCINATE 125 MG: 125 INJECTION INTRAMUSCULAR; INTRAVENOUS at 02:10

## 2024-09-22 RX ADMIN — IPRATROPIUM BROMIDE AND ALBUTEROL SULFATE 3 ML: .5; 3 SOLUTION RESPIRATORY (INHALATION) at 02:12

## 2024-09-22 NOTE — [facility] PROVIDER NOTE
EMERGENCY DEPARTMENT ENCOUNTER    Room Number:  07/07  Date seen:  9/22/2024  Time seen: 01:28 EDT  PCP: Lubna Lobo MD  Historian:     Discussed/obtained information from independent historians:     HPI:    Patient is a 89-year-old male.  He does have a history of COPD.  His wife reports that he has had some increased cough and shortness of breath for the last 2 weeks.  Over the last 2 days he did have an elevated temperature at home with temperature max of 100.  He does complain of some occasional chest discomfort with cough.  No chest pain at this time. The patient's wife likewise does report thick sputum production.  No known sick contacts    External (non-ED) record review:        Chronic or social conditions impacting care:    ALLERGIES  Amiodarone, Diltiazem, Doxycycline, Allopurinol, Cefdinir, and Indomethacin    PAST MEDICAL HISTORY  Active Ambulatory Problems     Diagnosis Date Noted    Ataxia 08/19/2016    TIA (transient ischemic attack) 08/19/2016    Atrial fibrillation, paroxysmal 08/19/2016    Hyperlipidemia 08/19/2016    Gout 08/19/2016    Crohn's disease 08/19/2016    Atrial fibrillation with RVR 10/27/2018    Acute cholecystitis 08/26/2020    Obstructive jaundice 08/26/2020    Acute lower GI bleeding 11/17/2020    Aortic stenosis     Pulmonary fibrosis     Anticoagulant long-term use     Febrile illness, acute 12/07/2020    Atrial fibrillation with rapid ventricular response 06/22/2021    COVID-19 virus infection 04/25/2023    Pneumonia of both lungs due to infectious organism, unspecified part of lung 05/06/2023    Left inguinal hernia 06/13/2023     Resolved Ambulatory Problems     Diagnosis Date Noted    Hypoxia 04/29/2018     Past Medical History:   Diagnosis Date    Aspiration pneumonia     Bleeding from the nose     Bronchiectasis     CKD (chronic kidney disease)     COPD (chronic obstructive pulmonary disease)     Dizziness     Elevated cholesterol     Gastric ulcer      Generalized weakness     Hard of hearing     Hypertension     Inguinal hernia     Leg swelling     Lower extremity edema     Mild anemia     Near syncope     Nonrheumatic aortic valve stenosis     PAF (paroxysmal atrial fibrillation)     Palpitations     Peptic ulcer     Pneumonia due to COVID-19 virus 05/06/2023    Pneumonia of left lung due to infectious organism     Pulmonary embolism, bilateral 05/06/2023    Stroke        PAST SURGICAL HISTORY  Past Surgical History:   Procedure Laterality Date    BRONCHOSCOPY N/A 10/22/2018    Procedure: BRONCHOSCOPY WITH BRONCHALVEOLAR LAVAGE;  Surgeon: Chidi Oconnor MD;  Location: [facility] ENDOSCOPY;  Service: Pulmonary    BRONCHOSCOPY N/A 7/8/2021    Procedure: BRONCHOSCOPY with BAL;  Surgeon: Chidi Oconnor MD;  Location: [facility] ENDOSCOPY;  Service: Pulmonary;  Laterality: N/A;  Pre: bronchectasis  Post: same    CARDIAC ELECTROPHYSIOLOGY PROCEDURE N/A 7/9/2021    Procedure: Pacemaker DC new--[employer] possible His lead;  Surgeon: Rashaun Nettles MD;  Location: [facility] CATH INVASIVE LOCATION;  Service: Cardiology;  Laterality: N/A;    CARDIAC ELECTROPHYSIOLOGY PROCEDURE N/A 9/3/2021    Procedure: AV node 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 ppm;  Surgeon: Rashaun Nettles MD;  Location: [facility] CATH INVASIVE LOCATION;  Service: Cardiovascular;  Laterality: N/A;    CATARACT EXTRACTION Bilateral     COLONOSCOPY      COLONOSCOPY N/A 3/9/2018    Procedure: COLONOSCOPY to terminal ileum with bx;  Surgeon: Aron Cabrera MD;  Location: [facility] ENDOSCOPY;  Service:     COLONOSCOPY N/A 11/19/2020    Procedure: COLONOSCOPY to cecum:  apc to cecum angiodysplagia,;  Surgeon: Aron Cabrera MD;  Location: [facility] ENDOSCOPY;  Service: Gastroenterology;  Laterality: N/A;  GI bleed  post:  diverticulosis, angiodysplagia    CYSTECTOMY      back and shoulder area    ENDOSCOPY N/A 3/9/2018    Procedure: ESOPHAGOGASTRODUODENOSCOPY with bx;  Surgeon: Aron Cabrera MD;  Location: [facility]  "ENDOSCOPY;  Service:     ENDOSCOPY N/A 2020    Procedure: ESOPHAGOGASTRODUODENOSCOPY;  Surgeon: Aron Cabrera MD;  Location: [facility] ENDOSCOPY;  Service: Gastroenterology;  Laterality: N/A;  GI bleed  post:  HH.    EYE SURGERY Left     detached retina    EYE SURGERY Right     \"repaired a hole in the eye\"    INGUINAL HERNIA REPAIR Left 2023    Procedure: INGUINAL HERNIA REPAIR;  Surgeon: Itz Zaidi Jr., MD;  Location: [facility] MAIN OR;  Service: General;  Laterality: Left;    INSERT / REPLACE / REMOVE PACEMAKER      TESTICLE SURGERY      benign tumor removed.       FAMILY HISTORY  Family History   Problem Relation Age of Onset    Stroke Mother     Heart disease Mother     Hypertension Mother     Diabetes Mother     Stroke Father     Heart disease Sister         Heart Valve Replacement    Ovarian cancer Sister     Rheumatic fever Sister     Diabetes Sister     Malig Hyperthermia Neg Hx        SOCIAL HISTORY  Social History     Socioeconomic History    Marital status:    Tobacco Use    Smoking status: Former     Types: Cigars     Quit date: 10/28/1994     Years since quittin.9     Passive exposure: Past    Smokeless tobacco: Never    Tobacco comments:     Quit 25 years ago   Vaping Use    Vaping status: Never Used   Substance and Sexual Activity    Alcohol use: No     Comment: daily caffiene - 1/2 cup of coffee    Drug use: No    Sexual activity: Defer       REVIEW OF SYSTEMS  Review of Systems    All systems reviewed and negative except for those discussed in HPI.       PHYSICAL EXAM    I have reviewed the triage vital signs and nursing notes.  Vitals:    24 0250   BP:    Pulse: 54   Resp:    Temp:    SpO2: 92%     Physical Exam    Vital signs: Reviewed in nurses notes    General: Awake alert.  No acute respiratory distress noted    HEENT: Normocephalic atraumatic nasopharynx is clear    Neck:   Supple without elevation of JVD    Respiratory:   Equal breath sounds bilaterally with " coarse and expiratory wheezes bilaterally    Cardiovascular: Rate is regular, rhythm is pretty regular at this time.  There is a 3 out of sick systolic ejection murmur noted.  No pretibial or pedal edema noted    Abdomen: Nondistended nontender    Skin:   Warm and dry.  No rashes noted    Neurological examination: Patient currently awake alert.  He is oriented to person place.  He moves all 4 extremities equally.  No facial palsy  LAB RESULTS  Recent Results (from the past 24 hour(s))   ECG 12 Lead Chest Pain    Collection Time: 09/22/24  1:08 AM   Result Value Ref Range    QT Interval 342 ms    QTC Interval 400 ms   COVID-19 and FLU A/B PCR, 1 HR TAT - Swab, Nasopharynx    Collection Time: 09/22/24  1:16 AM    Specimen: Nasopharynx; Swab   Result Value Ref Range    COVID19 Not Detected Not Detected - Ref. Range    Influenza A PCR Not Detected Not Detected    Influenza B PCR Not Detected Not Detected   Comprehensive Metabolic Panel    Collection Time: 09/22/24  1:16 AM    Specimen: Blood   Result Value Ref Range    Glucose 94 65 - 99 mg/dL    BUN 33 (H) 8 - 23 mg/dL    Creatinine 1.35 (H) 0.76 - 1.27 mg/dL    Sodium 139 136 - 145 mmol/L    Potassium 4.0 3.5 - 5.2 mmol/L    Chloride 104 98 - 107 mmol/L    CO2 24.2 22.0 - 29.0 mmol/L    Calcium 9.3 8.2 - 9.6 mg/dL    Total Protein 7.7 6.0 - 8.5 g/dL    Albumin 3.9 3.5 - 5.2 g/dL    ALT (SGPT) 8 1 - 41 U/L    AST (SGOT) 13 1 - 40 U/L    Alkaline Phosphatase 55 39 - 117 U/L    Total Bilirubin 0.8 0.0 - 1.2 mg/dL    Globulin 3.8 gm/dL    A/G Ratio 1.0 g/dL    BUN/Creatinine Ratio 24.4 7.0 - 25.0    Anion Gap 10.8 5.0 - 15.0 mmol/L    eGFR 49.3 (L) >60.0 mL/min/1.73   High Sensitivity Troponin T    Collection Time: 09/22/24  1:16 AM    Specimen: Blood   Result Value Ref Range    HS Troponin T 41 (H) <22 ng/L   CBC Auto Differential    Collection Time: 09/22/24  1:16 AM    Specimen: Blood   Result Value Ref Range    WBC 6.31 3.40 - 10.80 10*3/mm3    RBC 3.80 (L) 4.14 -  5.80 10*6/mm3    Hemoglobin 12.0 (L) 13.0 - 17.7 g/dL    Hematocrit 37.8 37.5 - 51.0 %    MCV 99.5 (H) 79.0 - 97.0 fL    MCH 31.6 26.6 - 33.0 pg    MCHC 31.7 31.5 - 35.7 g/dL    RDW 15.5 (H) 12.3 - 15.4 %    RDW-SD 57.2 (H) 37.0 - 54.0 fl    MPV 11.4 6.0 - 12.0 fL    Platelets 136 (L) 140 - 450 10*3/mm3    Neutrophil % 73.3 42.7 - 76.0 %    Lymphocyte % 15.2 (L) 19.6 - 45.3 %    Monocyte % 8.2 5.0 - 12.0 %    Eosinophil % 2.4 0.3 - 6.2 %    Basophil % 0.6 0.0 - 1.5 %    Immature Grans % 0.3 0.0 - 0.5 %    Neutrophils, Absolute 4.62 1.70 - 7.00 10*3/mm3    Lymphocytes, Absolute 0.96 0.70 - 3.10 10*3/mm3    Monocytes, Absolute 0.52 0.10 - 0.90 10*3/mm3    Eosinophils, Absolute 0.15 0.00 - 0.40 10*3/mm3    Basophils, Absolute 0.04 0.00 - 0.20 10*3/mm3    Immature Grans, Absolute 0.02 0.00 - 0.05 10*3/mm3       Ordered the above labs and independently interpreted results.  My findings will be discussed in the ED course or medical decision making section below      PROCEDURES:  ECG 12 Lead      Date/Time: 9/22/2024 1:08 AM    Performed by: Melvin Garcia MD  Authorized by: Melvin Garcia MD  Interpreted by ED physician  Rhythm: paced  Comments: Ventricular paced rhythm with underlying atrial flutter noted.          RADIOLOGY RESULTS  XR Chest 1 View    Result Date: 9/22/2024  Patient: ALEX BURK  Time Out: 01:32 Exam(s): XR CXR 1 VIEW EXAM:   XR Chest, 1 View CLINICAL HISTORY:    Reason for exam: Chest Pain Triage Protocol. TECHNIQUE:   Frontal view of the chest. COMPARISON:   No relevant prior studies available. FINDINGS:   Lungs:  Scattered lung opacities.  Interstitial prominence.  Atelectasis without or with consolidation.   Pleural space:  Right small to moderate and left trace pleural fluid.   Heart:  Cardiomegaly.   Bones joints:  No acute displaced fracture.   Tubes, lines and devices:  Left chest pacer. IMPRESSION:     1.  Consider CHF volume overload as above. 2.  Correlate to exclude pneumonia or  aspiration. 3.  Recommend chest imaging in 3 months.     Electronically signed by Nixon Cantu MD on 09-22-24 at 0132      Ordered the above noted radiological studies.  Independently interpreted by me.  My findings will be discussed in the medical decision section below.     PROGRESS, DATA ANALYSIS, CONSULTS AND MEDICAL DECISION MAKING    Please note that this section constitutes my independent interpretation of clinical data including lab results, radiology, EKG's.  This constitutes my independent professional opinion regarding differential diagnosis and management of this patient.  It may include any factors such as history from outside sources, review of external records, social determinants of health, management of medications, response to those treatments, and discussions with other providers.    ED Course as of 09/22/24 0257   Sun Sep 22, 2024   0245 Patient is resting very comfortably at this time.  No chest pain here.  He does have very minimal elevation of high-sensitivity troponin likely secondary to chronic CHF.  EKG is not ischemic    Plan: Will increase Lasix to 40 a day for the next 4 days.  Will treat with course of azithromycin as well as short course of prednisone taper [TC]      ED Course User Index  [TC] Melvin Garcia MD     Orders placed during this visit:  Orders Placed This Encounter   Procedures    COVID-19 and FLU A/B PCR, 1 HR TAT - Swab, Nasopharynx    XR Chest 1 View    Comprehensive Metabolic Panel    High Sensitivity Troponin T    CBC Auto Differential    High Sensitivity Troponin T 2Hr    NPO Diet NPO Type: Strict NPO    Undress and Gown    Continuous Pulse Oximetry    Oxygen Therapy- Nasal Cannula; Titrate 1-6 LPM Per SpO2; 90 - 95%    ECG 12 Lead Chest Pain    ECG 12 Lead    Insert Peripheral IV    CBC & Differential    ED Acknowledgement Form Needed;       Medications   sodium chloride 0.9 % flush 10 mL (has no administration in time range)   ipratropium-albuterol (DUO-NEB)  nebulizer solution 3 mL (3 mL Nebulization Given 9/22/24 0212)   methylPREDNISolone sodium succinate (SOLU-Medrol) injection 125 mg (125 mg Intravenous Given 9/22/24 0210)   azithromycin (ZITHROMAX) tablet 500 mg (500 mg Oral Given 9/22/24 0212)            Medical Decision Making  Problems Addressed:  Bronchitis: complicated acute illness or injury  Chronic obstructive pulmonary disease with acute exacerbation: complicated acute illness or injury    Amount and/or Complexity of Data Reviewed  Labs: ordered.  Radiology: ordered.  ECG/medicine tests: ordered and independent interpretation performed.    Risk  Prescription drug management.            DIAGNOSIS  Final diagnoses:   Bronchitis   Chronic obstructive pulmonary disease with acute exacerbation          Medication List        New Prescriptions      predniSONE 20 MG tablet  Commonly known as: DELTASONE  2 po daily x 3d, then 1 po daily x 3d.            Changed      * azithromycin 250 MG tablet  Commonly known as: ZITHROMAX  What changed: Another medication with the same name was added. Make sure you understand how and when to take each.     * azithromycin 250 MG tablet  Commonly known as: ZITHROMAX  1 po daily x 4 days.  What changed: You were already taking a medication with the same name, and this prescription was added. Make sure you understand how and when to take each.           * This list has 2 medication(s) that are the same as other medications prescribed for you. Read the directions carefully, and ask your doctor or other care provider to review them with you.                   Where to Get Your Medications        These medications were sent to [id number] - [place], [place] - [address] - [phone]  - [phone]   [address] [address]      Phone: [phone]   azithromycin 250 MG tablet  predniSONE 20 MG tablet         FOLLOW-UP  Lubna Lobo  MD  [address]  [address]  [facility] [address]  [phone]    In 1 week          Latest Documented Vital Signs:  As of 02:57 EDT  BP- 156/94 HR- 54 Temp- 98 °F (36.7 °C) (Oral) O2 sat- 92%    Appropriate PPE utilized throughout this patient encounter to include mask, if indicated, per current protocol. Hand hygiene was performed before donning PPE and after removal when leaving the room.    Please note that portions of this were completed with a voice recognition program.     Note Disclaimer: At [facility], we believe that sharing information builds trust and better relationships. You are receiving this note because you are receiving care at [facility] or recently visited. It is possible you will see health information before a provider has talked with you about it. This kind of information can be easy to misunderstand. To help you fully understand what it means for your health, we urge you to discuss this note with your provider.

## 2024-09-22 NOTE — DISCHARGE INSTRUCTIONS
At this time we are going to treat you with a 5-day course of azithromycin, an antibiotic that has very good lung coverage.    We are also going to utilize a course of prednisone.    Lastly please double your Lasix, AKA furosemide, for the next 4 days from 20 mg daily to 40 mg daily for  Sunday Monday Tuesday and Wednesday.    Please read all of the instructions in this handout.  If you receive prescriptions please fill them and take them as directed.  Please call your primary care physician for follow-up appointment in the next 5 to 7 days.  If you do not have a physician you may call the [facility] referral line at [phone].    You may return to the emergency department at any time for any concerns such as worsening symptoms.  If you received a work or school note it will be printed at the back of this packet.

## 2024-09-23 LAB
QT INTERVAL: 342 MS
QTC INTERVAL: 400 MS

## 2024-09-28 ENCOUNTER — APPOINTMENT (OUTPATIENT)
Dept: CT IMAGING | Facility: HOSPITAL | Age: 89
DRG: 176 | End: 2024-09-28
Payer: MEDICARE

## 2024-09-28 ENCOUNTER — APPOINTMENT (OUTPATIENT)
Dept: GENERAL RADIOLOGY | Facility: HOSPITAL | Age: 89
DRG: 176 | End: 2024-09-28
Payer: MEDICARE

## 2024-09-28 ENCOUNTER — HOSPITAL ENCOUNTER (INPATIENT)
Facility: HOSPITAL | Age: 89
LOS: 5 days | Discharge: HOME | DRG: 176 | End: 2024-10-03
Attending: EMERGENCY MEDICINE | Admitting: HOSPITALIST
Payer: MEDICARE

## 2024-09-28 DIAGNOSIS — B34.8 RHINOVIRUS INFECTION: ICD-10-CM

## 2024-09-28 DIAGNOSIS — J90 PLEURAL EFFUSION, RIGHT: ICD-10-CM

## 2024-09-28 DIAGNOSIS — R06.00 ACUTE DYSPNEA: Primary | ICD-10-CM

## 2024-09-28 DIAGNOSIS — N18.9 CHRONIC KIDNEY DISEASE, UNSPECIFIED CKD STAGE: ICD-10-CM

## 2024-09-28 DIAGNOSIS — I26.99 ACUTE PULMONARY EMBOLISM WITHOUT ACUTE COR PULMONALE, UNSPECIFIED PULMONARY EMBOLISM TYPE: ICD-10-CM

## 2024-09-28 LAB
ALBUMIN SERPL-MCNC: 3.5 G/DL (ref 3.5–5.2)
ALBUMIN/GLOB SERPL: 1 G/DL
ALP SERPL-CCNC: 54 U/L (ref 39–117)
ALT SERPL W P-5'-P-CCNC: 27 U/L (ref 1–41)
ANION GAP SERPL CALCULATED.3IONS-SCNC: 11.1 MMOL/L (ref 5–15)
APTT PPP: 31.5 SECONDS (ref 22.7–35.4)
AST SERPL-CCNC: 22 U/L (ref 1–40)
B PARAPERT DNA SPEC QL NAA+PROBE: NOT DETECTED
B PERT DNA SPEC QL NAA+PROBE: NOT DETECTED
BASOPHILS # BLD AUTO: 0.02 10*3/MM3 (ref 0–0.2)
BASOPHILS NFR BLD AUTO: 0.2 % (ref 0–1.5)
BILIRUB SERPL-MCNC: 0.5 MG/DL (ref 0–1.2)
BUN SERPL-MCNC: 43 MG/DL (ref 8–23)
BUN/CREAT SERPL: 30.1 (ref 7–25)
C PNEUM DNA NPH QL NAA+NON-PROBE: NOT DETECTED
CALCIUM SPEC-SCNC: 8.9 MG/DL (ref 8.2–9.6)
CHLORIDE SERPL-SCNC: 100 MMOL/L (ref 98–107)
CO2 SERPL-SCNC: 26.9 MMOL/L (ref 22–29)
CREAT SERPL-MCNC: 1.43 MG/DL (ref 0.76–1.27)
D-LACTATE SERPL-SCNC: 1 MMOL/L (ref 0.5–2)
DEPRECATED RDW RBC AUTO: 50.5 FL (ref 37–54)
EGFRCR SERPLBLD CKD-EPI 2021: 46 ML/MIN/1.73
EOSINOPHIL # BLD AUTO: 0.11 10*3/MM3 (ref 0–0.4)
EOSINOPHIL NFR BLD AUTO: 1 % (ref 0.3–6.2)
ERYTHROCYTE [DISTWIDTH] IN BLOOD BY AUTOMATED COUNT: 14.2 % (ref 12.3–15.4)
FLUAV SUBTYP SPEC NAA+PROBE: NOT DETECTED
FLUBV RNA ISLT QL NAA+PROBE: NOT DETECTED
GEN 5 2HR TROPONIN T REFLEX: 47 NG/L
GLOBULIN UR ELPH-MCNC: 3.5 GM/DL
GLUCOSE SERPL-MCNC: 78 MG/DL (ref 65–99)
HADV DNA SPEC NAA+PROBE: NOT DETECTED
HCOV 229E RNA SPEC QL NAA+PROBE: NOT DETECTED
HCOV HKU1 RNA SPEC QL NAA+PROBE: NOT DETECTED
HCOV NL63 RNA SPEC QL NAA+PROBE: NOT DETECTED
HCOV OC43 RNA SPEC QL NAA+PROBE: NOT DETECTED
HCT VFR BLD AUTO: 35.8 % (ref 37.5–51)
HGB BLD-MCNC: 11.6 G/DL (ref 13–17.7)
HMPV RNA NPH QL NAA+NON-PROBE: NOT DETECTED
HPIV1 RNA ISLT QL NAA+PROBE: NOT DETECTED
HPIV2 RNA SPEC QL NAA+PROBE: NOT DETECTED
HPIV3 RNA NPH QL NAA+PROBE: NOT DETECTED
HPIV4 P GENE NPH QL NAA+PROBE: NOT DETECTED
IMM GRANULOCYTES # BLD AUTO: 0.28 10*3/MM3 (ref 0–0.05)
IMM GRANULOCYTES NFR BLD AUTO: 2.7 % (ref 0–0.5)
INR PPP: 1.66 (ref 0.9–1.1)
LYMPHOCYTES # BLD AUTO: 0.95 10*3/MM3 (ref 0.7–3.1)
LYMPHOCYTES NFR BLD AUTO: 9 % (ref 19.6–45.3)
M PNEUMO IGG SER IA-ACNC: NOT DETECTED
MCH RBC QN AUTO: 31.4 PG (ref 26.6–33)
MCHC RBC AUTO-ENTMCNC: 32.4 G/DL (ref 31.5–35.7)
MCV RBC AUTO: 96.8 FL (ref 79–97)
MONOCYTES # BLD AUTO: 0.99 10*3/MM3 (ref 0.1–0.9)
MONOCYTES NFR BLD AUTO: 9.4 % (ref 5–12)
NEUTROPHILS NFR BLD AUTO: 77.7 % (ref 42.7–76)
NEUTROPHILS NFR BLD AUTO: 8.2 10*3/MM3 (ref 1.7–7)
NRBC BLD AUTO-RTO: 0 /100 WBC (ref 0–0.2)
NT-PROBNP SERPL-MCNC: 7325 PG/ML (ref 0–1800)
PLATELET # BLD AUTO: 189 10*3/MM3 (ref 140–450)
PMV BLD AUTO: 11.8 FL (ref 6–12)
POTASSIUM SERPL-SCNC: 4.3 MMOL/L (ref 3.5–5.2)
PROCALCITONIN SERPL-MCNC: 0.16 NG/ML (ref 0–0.25)
PROT SERPL-MCNC: 7 G/DL (ref 6–8.5)
PROTHROMBIN TIME: 19.9 SECONDS (ref 11.7–14.2)
QT INTERVAL: 439 MS
QTC INTERVAL: 443 MS
RBC # BLD AUTO: 3.7 10*6/MM3 (ref 4.14–5.8)
RHINOVIRUS RNA SPEC NAA+PROBE: DETECTED
RSV RNA NPH QL NAA+NON-PROBE: NOT DETECTED
SARS-COV-2 RNA NPH QL NAA+NON-PROBE: NOT DETECTED
SODIUM SERPL-SCNC: 138 MMOL/L (ref 136–145)
TROPONIN T DELTA: -2 NG/L
TROPONIN T SERPL HS-MCNC: 49 NG/L
WBC NRBC COR # BLD AUTO: 10.55 10*3/MM3 (ref 3.4–10.8)

## 2024-09-28 PROCEDURE — 84145 PROCALCITONIN (PCT): CPT | Performed by: EMERGENCY MEDICINE

## 2024-09-28 PROCEDURE — 36415 COLL VENOUS BLD VENIPUNCTURE: CPT

## 2024-09-28 PROCEDURE — 85730 THROMBOPLASTIN TIME PARTIAL: CPT | Performed by: EMERGENCY MEDICINE

## 2024-09-28 PROCEDURE — 25010000002 HEPARIN (PORCINE) 25000-0.45 UT/250ML-% SOLUTION: Performed by: EMERGENCY MEDICINE

## 2024-09-28 PROCEDURE — 71275 CT ANGIOGRAPHY CHEST: CPT

## 2024-09-28 PROCEDURE — 93005 ELECTROCARDIOGRAM TRACING: CPT | Performed by: EMERGENCY MEDICINE

## 2024-09-28 PROCEDURE — 80053 COMPREHEN METABOLIC PANEL: CPT | Performed by: EMERGENCY MEDICINE

## 2024-09-28 PROCEDURE — 71045 X-RAY EXAM CHEST 1 VIEW: CPT

## 2024-09-28 PROCEDURE — 0202U NFCT DS 22 TRGT SARS-COV-2: CPT | Performed by: EMERGENCY MEDICINE

## 2024-09-28 PROCEDURE — 85025 COMPLETE CBC W/AUTO DIFF WBC: CPT | Performed by: EMERGENCY MEDICINE

## 2024-09-28 PROCEDURE — 93010 ELECTROCARDIOGRAM REPORT: CPT | Performed by: INTERNAL MEDICINE

## 2024-09-28 PROCEDURE — 83605 ASSAY OF LACTIC ACID: CPT | Performed by: EMERGENCY MEDICINE

## 2024-09-28 PROCEDURE — 85610 PROTHROMBIN TIME: CPT | Performed by: EMERGENCY MEDICINE

## 2024-09-28 PROCEDURE — 83880 ASSAY OF NATRIURETIC PEPTIDE: CPT | Performed by: EMERGENCY MEDICINE

## 2024-09-28 PROCEDURE — 25510000001 IOPAMIDOL PER 1 ML: Performed by: EMERGENCY MEDICINE

## 2024-09-28 PROCEDURE — 99285 EMERGENCY DEPT VISIT HI MDM: CPT

## 2024-09-28 PROCEDURE — 84484 ASSAY OF TROPONIN QUANT: CPT | Performed by: EMERGENCY MEDICINE

## 2024-09-28 PROCEDURE — 25010000002 FUROSEMIDE PER 20 MG: Performed by: EMERGENCY MEDICINE

## 2024-09-28 RX ORDER — ALLOPURINOL 100 MG/1
100 TABLET ORAL 2 TIMES DAILY
Status: DISCONTINUED | OUTPATIENT
Start: 2024-09-29 | End: 2024-10-03 | Stop reason: HOSPADM

## 2024-09-28 RX ORDER — FUROSEMIDE 10 MG/ML
80 INJECTION INTRAMUSCULAR; INTRAVENOUS ONCE
Status: COMPLETED | OUTPATIENT
Start: 2024-09-28 | End: 2024-09-28

## 2024-09-28 RX ORDER — BUDESONIDE 3 MG/1
3 CAPSULE, COATED PELLETS ORAL EVERY MORNING
Status: DISCONTINUED | OUTPATIENT
Start: 2024-09-29 | End: 2024-10-03 | Stop reason: HOSPADM

## 2024-09-28 RX ORDER — FUROSEMIDE 20 MG
20 TABLET ORAL DAILY
Status: DISCONTINUED | OUTPATIENT
Start: 2024-09-29 | End: 2024-10-03 | Stop reason: HOSPADM

## 2024-09-28 RX ORDER — METOPROLOL TARTRATE 25 MG/1
25 TABLET, FILM COATED ORAL EVERY 12 HOURS SCHEDULED
Status: DISCONTINUED | OUTPATIENT
Start: 2024-09-29 | End: 2024-10-03 | Stop reason: HOSPADM

## 2024-09-28 RX ORDER — IOPAMIDOL 755 MG/ML
100 INJECTION, SOLUTION INTRAVASCULAR
Status: COMPLETED | OUTPATIENT
Start: 2024-09-28 | End: 2024-09-28

## 2024-09-28 RX ORDER — SODIUM CHLORIDE 0.9 % (FLUSH) 0.9 %
10 SYRINGE (ML) INJECTION AS NEEDED
Status: DISCONTINUED | OUTPATIENT
Start: 2024-09-28 | End: 2024-10-03 | Stop reason: HOSPADM

## 2024-09-28 RX ORDER — ATORVASTATIN CALCIUM 20 MG/1
10 TABLET, FILM COATED ORAL EVERY OTHER DAY
Status: DISCONTINUED | OUTPATIENT
Start: 2024-09-29 | End: 2024-10-03 | Stop reason: HOSPADM

## 2024-09-28 RX ORDER — PANTOPRAZOLE SODIUM 40 MG/1
40 TABLET, DELAYED RELEASE ORAL
Status: DISCONTINUED | OUTPATIENT
Start: 2024-09-29 | End: 2024-10-03 | Stop reason: HOSPADM

## 2024-09-28 RX ORDER — IPRATROPIUM BROMIDE AND ALBUTEROL SULFATE 2.5; .5 MG/3ML; MG/3ML
3 SOLUTION RESPIRATORY (INHALATION)
Status: DISCONTINUED | OUTPATIENT
Start: 2024-09-29 | End: 2024-10-03 | Stop reason: HOSPADM

## 2024-09-28 RX ORDER — BENZONATATE 100 MG/1
200 CAPSULE ORAL 3 TIMES DAILY
Status: DISCONTINUED | OUTPATIENT
Start: 2024-09-29 | End: 2024-10-03 | Stop reason: HOSPADM

## 2024-09-28 RX ORDER — HEPARIN SODIUM 10000 [USP'U]/100ML
18 INJECTION, SOLUTION INTRAVENOUS
Status: DISPENSED | OUTPATIENT
Start: 2024-09-28 | End: 2024-10-01

## 2024-09-28 RX ADMIN — METOPROLOL TARTRATE 25 MG: 25 TABLET, FILM COATED ORAL at 23:17

## 2024-09-28 RX ADMIN — FUROSEMIDE 80 MG: 10 INJECTION, SOLUTION INTRAMUSCULAR; INTRAVENOUS at 19:46

## 2024-09-28 RX ADMIN — HEPARIN SODIUM 18 UNITS/KG/HR: 10000 INJECTION, SOLUTION INTRAVENOUS at 21:54

## 2024-09-28 RX ADMIN — IOPAMIDOL 85 ML: 755 INJECTION, SOLUTION INTRAVENOUS at 20:47

## 2024-09-28 NOTE — ED PROVIDER NOTES
EMERGENCY DEPARTMENT ENCOUNTER  Room Number:  29/29  PCP: Lubna Lobo MD  Independent Historians: Patient and spouse      HPI:  Chief Complaint: Dyspnea    A complete HPI/ROS/PMH/PSH/SH/FH are unobtainable due to: None    Chronic or social conditions impacting patient care (Social Determinants of Health): None      Context: Asia Ly is a 89 y.o. male with a medical history of DVT/PE, pulmonary fibrosis, bronchiectasis, pulmonary hypertension, Crohn's disease, hypertension and hyperlipidemia as well as aortic stenosis who presents to the ED c/o acute persistent dyspnea with some cough.  Patient actually began with cough associated with some fever about 2 weeks ago.  Slightly more than a week ago he seen at urgent care and started on azithromycin and steroids.  He then followed up with his pulmonologist midweek this past week and finally with his primary care provider yesterday.  He has completed his steroids and antibiotics.  His diuretic was increased by his primary care provider yesterday for the next 3 days.  At rest the patient has no complaints.  He feels a bit dyspneic when he gets up and walks.  The patient's spouse became concerned today because he required a supplemental oxygen during the night last night and during the day today after walking back from the bathroom and complaining of some fatigue she noted his pulse oximetry to be between 88 and 90% at home.  Cough is much better than it had been.  No hemoptysis.  No abdominal pain nausea or vomiting.  No recurrent fevers in the last week.      Review of prior external notes (non-ED) -and- Review of prior external test results outside of this encounter:  Hospital discharge summary 5/8/2023 reviewed: Patient admitted for acute hypoxemic respiratory failure secondary to bilateral pulmonary emboli from acute left lower extremity DVT      PAST MEDICAL HISTORY  Active Ambulatory Problems     Diagnosis Date Noted    Ataxia 08/19/2016    TIA (transient  ischemic attack) 08/19/2016    Atrial fibrillation, paroxysmal 08/19/2016    Hyperlipidemia 08/19/2016    Gout 08/19/2016    Crohn's disease 08/19/2016    Atrial fibrillation with RVR 10/27/2018    Acute cholecystitis 08/26/2020    Obstructive jaundice 08/26/2020    Acute lower GI bleeding 11/17/2020    Aortic stenosis     Pulmonary fibrosis     Anticoagulant long-term use     Febrile illness, acute 12/07/2020    Atrial fibrillation with rapid ventricular response 06/22/2021    COVID-19 virus infection 04/25/2023    Pneumonia of both lungs due to infectious organism, unspecified part of lung 05/06/2023    Left inguinal hernia 06/13/2023     Resolved Ambulatory Problems     Diagnosis Date Noted    Hypoxia 04/29/2018     Past Medical History:   Diagnosis Date    Aspiration pneumonia     Bleeding from the nose     Bronchiectasis     CKD (chronic kidney disease)     COPD (chronic obstructive pulmonary disease)     Dizziness     Elevated cholesterol     Gastric ulcer     Generalized weakness     Hard of hearing     Hypertension     Inguinal hernia     Leg swelling     Lower extremity edema     Mild anemia     Near syncope     Nonrheumatic aortic valve stenosis     PAF (paroxysmal atrial fibrillation)     Palpitations     Peptic ulcer     Pneumonia due to COVID-19 virus 05/06/2023    Pneumonia of left lung due to infectious organism     Pulmonary embolism, bilateral 05/06/2023    Stroke          PAST SURGICAL HISTORY  Past Surgical History:   Procedure Laterality Date    BRONCHOSCOPY N/A 10/22/2018    Procedure: BRONCHOSCOPY WITH BRONCHALVEOLAR LAVAGE;  Surgeon: Chidi Oconnor MD;  Location: [facility] ENDOSCOPY;  Service: Pulmonary    BRONCHOSCOPY N/A 7/8/2021    Procedure: BRONCHOSCOPY with BAL;  Surgeon: Chidi Oconnor MD;  Location: [facility] ENDOSCOPY;  Service: Pulmonary;  Laterality: N/A;  Pre: bronchectasis  Post: same    CARDIAC ELECTROPHYSIOLOGY PROCEDURE N/A 7/9/2021    Procedure: Pacemaker DC new--[employer]  "possible His lead;  Surgeon: Rashaun Nettles MD;  Location: [facility] CATH INVASIVE LOCATION;  Service: Cardiology;  Laterality: N/A;    CARDIAC ELECTROPHYSIOLOGY PROCEDURE N/A 9/3/2021    Procedure: AV node 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 ppm;  Surgeon: Rashaun Nettles MD;  Location: [facility] CATH INVASIVE LOCATION;  Service: Cardiovascular;  Laterality: N/A;    CATARACT EXTRACTION Bilateral     COLONOSCOPY      COLONOSCOPY N/A 3/9/2018    Procedure: COLONOSCOPY to terminal ileum with bx;  Surgeon: Aron Cabrera MD;  Location: [facility] ENDOSCOPY;  Service:     COLONOSCOPY N/A 11/19/2020    Procedure: COLONOSCOPY to cecum:  apc to cecum angiodysplagia,;  Surgeon: Aron Cabrera MD;  Location: [facility] ENDOSCOPY;  Service: Gastroenterology;  Laterality: N/A;  GI bleed  post:  diverticulosis, angiodysplagia    CYSTECTOMY      back and shoulder area    ENDOSCOPY N/A 3/9/2018    Procedure: ESOPHAGOGASTRODUODENOSCOPY with bx;  Surgeon: Aron Cabrera MD;  Location: [facility] ENDOSCOPY;  Service:     ENDOSCOPY N/A 11/19/2020    Procedure: ESOPHAGOGASTRODUODENOSCOPY;  Surgeon: Aron Cabrera MD;  Location: [facility] ENDOSCOPY;  Service: Gastroenterology;  Laterality: N/A;  GI bleed  post:  HH.    EYE SURGERY Left     detached retina    EYE SURGERY Right     \"repaired a hole in the eye\"    INGUINAL HERNIA REPAIR Left 8/29/2023    Procedure: INGUINAL HERNIA REPAIR;  Surgeon: Itz Zaidi Jr., MD;  Location: [facility] MAIN OR;  Service: General;  Laterality: Left;    INSERT / REPLACE / REMOVE PACEMAKER      TESTICLE SURGERY      benign tumor removed.         FAMILY HISTORY  Family History   Problem Relation Age of Onset    Stroke Mother     Heart disease Mother     Hypertension Mother     Diabetes Mother     Stroke Father     Heart disease Sister         Heart Valve Replacement    Ovarian cancer Sister     Rheumatic fever Sister     Diabetes Sister     Malig Hyperthermia Neg Hx          SOCIAL HISTORY  Social History "     Socioeconomic History    Marital status:    Tobacco Use    Smoking status: Former     Types: Cigars     Quit date: 10/28/1994     Years since quittin.9     Passive exposure: Past    Smokeless tobacco: Never    Tobacco comments:     Quit 25 years ago   Vaping Use    Vaping status: Never Used   Substance and Sexual Activity    Alcohol use: No     Comment: daily caffiene - 1/2 cup of coffee    Drug use: No    Sexual activity: Defer         ALLERGIES  Amiodarone, Diltiazem, Doxycycline, Allopurinol, Cefdinir, and Indomethacin      REVIEW OF SYSTEMS  Review of Systems  Included in HPI  All systems reviewed and negative except for those discussed in HPI.      PHYSICAL EXAM    I have reviewed the triage vital signs and nursing notes.    ED Triage Vitals [24 1747]   Temp Heart Rate Resp BP SpO2   99.8 °F (37.7 °C) 82 16 -- 99 %      Temp src Heart Rate Source Patient Position BP Location FiO2 (%)   Tympanic Monitor -- -- --       Physical Exam    Physical Exam   Constitutional: No distress.  Nontoxic but chronically ill-appearing.  Very pleasant and somewhat hard of hearing.  HENT:  Head: Normocephalic and atraumatic.   Oropharynx: Mucous membranes are moist.   Eyes: . No scleral icterus. No conjunctival pallor.  Neck: Normal range of motion. Neck supple.   Cardiovascular: Pink warm and well perfused throughout.    Pulmonary/Chest: No respiratory distress.  No tachypnea or increased work of breathing appreciated.  Crackles bilaterally.  Abdominal: Soft. There is no tenderness. There is no rebound and no guarding.   Musculoskeletal: Moves all extremities equally.    Neurological: Alert and oriented.  No acute focal deficit appreciated.  Skin: Skin is pink, warm, and dry.   Psychiatric: Mood and affect normal.   Nursing note and vitals reviewed.             LAB RESULTS  Recent Results (from the past 24 hour(s))   ECG 12 Lead Dyspnea    Collection Time: 24  6:11 PM   Result Value Ref Range    QT  Interval 439 ms    QTC Interval 443 ms   Comprehensive Metabolic Panel    Collection Time: 09/28/24  6:48 PM    Specimen: Blood   Result Value Ref Range    Glucose 78 65 - 99 mg/dL    BUN 43 (H) 8 - 23 mg/dL    Creatinine 1.43 (H) 0.76 - 1.27 mg/dL    Sodium 138 136 - 145 mmol/L    Potassium 4.3 3.5 - 5.2 mmol/L    Chloride 100 98 - 107 mmol/L    CO2 26.9 22.0 - 29.0 mmol/L    Calcium 8.9 8.2 - 9.6 mg/dL    Total Protein 7.0 6.0 - 8.5 g/dL    Albumin 3.5 3.5 - 5.2 g/dL    ALT (SGPT) 27 1 - 41 U/L    AST (SGOT) 22 1 - 40 U/L    Alkaline Phosphatase 54 39 - 117 U/L    Total Bilirubin 0.5 0.0 - 1.2 mg/dL    Globulin 3.5 gm/dL    A/G Ratio 1.0 g/dL    BUN/Creatinine Ratio 30.1 (H) 7.0 - 25.0    Anion Gap 11.1 5.0 - 15.0 mmol/L    eGFR 46.0 (L) >60.0 mL/min/1.73   High Sensitivity Troponin T    Collection Time: 09/28/24  6:48 PM    Specimen: Blood   Result Value Ref Range    HS Troponin T 49 (H) <22 ng/L   BNP    Collection Time: 09/28/24  6:48 PM    Specimen: Blood   Result Value Ref Range    proBNP 7,325.0 (H) 0.0 - 1,800.0 pg/mL   Procalcitonin    Collection Time: 09/28/24  6:48 PM    Specimen: Blood   Result Value Ref Range    Procalcitonin 0.16 0.00 - 0.25 ng/mL   Lactic Acid, Plasma    Collection Time: 09/28/24  6:48 PM    Specimen: Blood   Result Value Ref Range    Lactate 1.0 0.5 - 2.0 mmol/L   CBC Auto Differential    Collection Time: 09/28/24  6:48 PM    Specimen: Blood   Result Value Ref Range    WBC 10.55 3.40 - 10.80 10*3/mm3    RBC 3.70 (L) 4.14 - 5.80 10*6/mm3    Hemoglobin 11.6 (L) 13.0 - 17.7 g/dL    Hematocrit 35.8 (L) 37.5 - 51.0 %    MCV 96.8 79.0 - 97.0 fL    MCH 31.4 26.6 - 33.0 pg    MCHC 32.4 31.5 - 35.7 g/dL    RDW 14.2 12.3 - 15.4 %    RDW-SD 50.5 37.0 - 54.0 fl    MPV 11.8 6.0 - 12.0 fL    Platelets 189 140 - 450 10*3/mm3    Neutrophil % 77.7 (H) 42.7 - 76.0 %    Lymphocyte % 9.0 (L) 19.6 - 45.3 %    Monocyte % 9.4 5.0 - 12.0 %    Eosinophil % 1.0 0.3 - 6.2 %    Basophil % 0.2 0.0 - 1.5 %     Immature Grans % 2.7 (H) 0.0 - 0.5 %    Neutrophils, Absolute 8.20 (H) 1.70 - 7.00 10*3/mm3    Lymphocytes, Absolute 0.95 0.70 - 3.10 10*3/mm3    Monocytes, Absolute 0.99 (H) 0.10 - 0.90 10*3/mm3    Eosinophils, Absolute 0.11 0.00 - 0.40 10*3/mm3    Basophils, Absolute 0.02 0.00 - 0.20 10*3/mm3    Immature Grans, Absolute 0.28 (H) 0.00 - 0.05 10*3/mm3    nRBC 0.0 0.0 - 0.2 /100 WBC   Respiratory Panel PCR w/COVID-19(SARS-CoV-2) [facility]Y/MONIQUE/PAD/COR/ELBERT In-House, NP Swab in UT/VTM, 2 HR TAT - Swab, Nasopharynx    Collection Time: 09/28/24  6:50 PM    Specimen: Nasopharynx; Swab   Result Value Ref Range    ADENOVIRUS, PCR Not Detected Not Detected    Coronavirus 229E Not Detected Not Detected    Coronavirus HKU1 Not Detected Not Detected    Coronavirus NL63 Not Detected Not Detected    Coronavirus OC43 Not Detected Not Detected    COVID19 Not Detected Not Detected - Ref. Range    Human Metapneumovirus Not Detected Not Detected    Human Rhinovirus/Enterovirus Detected (A) Not Detected    Influenza A PCR Not Detected Not Detected    Influenza B PCR Not Detected Not Detected    Parainfluenza Virus 1 Not Detected Not Detected    Parainfluenza Virus 2 Not Detected Not Detected    Parainfluenza Virus 3 Not Detected Not Detected    Parainfluenza Virus 4 Not Detected Not Detected    RSV, PCR Not Detected Not Detected    Bordetella pertussis pcr Not Detected Not Detected    Bordetella parapertussis PCR Not Detected Not Detected    Chlamydophila pneumoniae PCR Not Detected Not Detected    Mycoplasma pneumo by PCR Not Detected Not Detected   High Sensitivity Troponin T 2Hr    Collection Time: 09/28/24  8:59 PM    Specimen: Blood   Result Value Ref Range    HS Troponin T 47 (H) <22 ng/L    Troponin T Delta -2 >=-4 - <+4 ng/L         RADIOLOGY  CT Angiogram Chest Pulmonary Embolism    Result Date: 9/28/2024  CT ANGIOGRAM OF THE CHEST  HISTORY: Shortness of breath  COMPARISON: May 6, 2023  TECHNIQUE: Axial CT imaging was obtained  through the thorax. IV contrast was administered. Three-D reformatted images were obtained.  FINDINGS: The study is positive for bilateral pulmonary emboli. Most proximal thrombus is noted within the main right pulmonary artery. It is difficult to assess for right heart strain, given timing of the contrast bolus. However, I don't think the RV to LV ratio is greater than 1. Examination was not optimized for assessment of the thoracic aorta. There is dilatation of the ascending aorta, measuring up to 4 cm. The remainder of the thoracic aorta is normal in caliber. There is calcification of the thoracic aorta and coronary arteries. Left-sided pacemaker is present. The heart is enlarged. Thyroid gland, trachea, and esophagus appear unremarkable. There are prominent mediastinal lymph nodes. For example, a subcarinal lymph node measures up to 1.5 cm. This appears similar to the prior exam. Lower right paratracheal node measures up to 1.2 cm in short axis dimensions. This also appears similar to the prior exam. There is interlobular septal thickening, as well as areas of groundglass attenuation. There are bilateral pleural effusions, right greater than left. Constellation of findings likely reflects congestive heart failure. However, more focal airspace consolidation is noted at both lung bases. This may represent pneumonia. The patient has had similar findings in the past, and there may be a component of chronic scarring, but superimposed pneumonia is also suspected. There is reflux of contrast material into the hepatic veins and inferior vena cava, which can be seen in the setting of pulmonary arterial hypertension. Images through the upper abdomen demonstrate cholelithiasis. There are simple appearing bilateral renal cysts. No acute abnormalities are noted within the upper abdomen. The patient does have some prominent chest wall collaterals. This is suspected to be secondary to some stenosis of the left subclavian vein,  secondary to the pacemaker.        1. Bilateral pulmonary emboli. RV to LV ratio appears to be less than 1, although is difficult to assess, due to timing of the contrast bolus. Presence or absence of pulmonary infarction cannot be assessed, due to the presence of pulmonary infiltrates. 2. Hardy megaly, interlobular septal thickening, and bilateral effusions, suggesting congestive heart failure. 3. Dense infiltrates noted at the lung bases bilaterally. There may be a component of chronic scarring at the lung bases, but certainly superimposed pneumonia is suspected.  FINDINGS were called to Dr. Petty at 9:29 p.m.  Radiation dose reduction techniques were utilized, including automated exposure control and exposure modulation based on body size.   This report was finalized on 9/28/2024 9:29 PM by Dr. Judith Ortiz M.D on Workstation: [id number]      XR Chest 1 View    Result Date: 9/28/2024  XR CHEST 1 VW-  Clinical: Dyspnea  COMPARISON examination 9/22/2024  FINDINGS: Transvenous pacemaker in position as before. There is cardiac enlargement. Atherosclerotic calcification of the aorta. Is again demonstrated a right-sided pleural effusion which is similar to or slightly more pronounced than before. There is diffuse prominence of the interstitium, suggesting vascular congestion, not quite as pronounced as before. No new area of consolidation has developed, the remainder is unremarkable.  This report was finalized on 9/28/2024 6:30 PM by Dr. Arsalan Max M.D on Workstation: [id number]         MEDICATIONS GIVEN IN ER  Medications   sodium chloride 0.9 % flush 10 mL (has no administration in time range)   heparin 19376 units/250 mL (100 units/mL) in 0.45 % NaCl infusion (has no administration in time range)   furosemide (LASIX) injection 80 mg (80 mg Intravenous Given 9/28/24 1946)   iopamidol (ISOVUE-370) 76 % injection 100 mL (85 mL Intravenous Given by Other 9/28/24 2047)         ORDERS PLACED DURING THIS  VISIT:  Orders Placed This Encounter   Procedures    Respiratory Panel PCR w/COVID-19(SARS-CoV-2) [facility]/MONIQUE/[facility]/COR/[facility]BERT In-House, NP Swab in UTM/VTM, 2 HR TAT - Swab, Nasopharynx    XR Chest 1 View    CT Angiogram Chest Pulmonary Embolism    Comprehensive Metabolic Panel    High Sensitivity Troponin T    BNP    Procalcitonin    Lactic Acid, Plasma    CBC Auto Differential    High Sensitivity Troponin T 2Hr    Protime-INR    aPTT    aPTT    aPTT    CBC Auto Differential    Monitor Blood Pressure    Continuous Pulse Oximetry    Adjust Heparin Rate Based on aPTT Using Nomogram    Verify All Anticoagulant Orders Are Discontinued Upon Initiation of Heparin Protocol (eg Enoxaparin, Fondaparinux, Apixaban, Dabigatran, Edoxaban, or Rivaroxaban)    RN To Release aPTT Order 6 Hours After Heparin Infusion & 6 Hours After Each Infusion Change Until  2 Consecutive Therapeutic aPTTs Are Achieved    Pulmonology (on-call MD unless specified)    LIPPS (on-call MD unless specified)    ECG 12 Lead Dyspnea    Insert Peripheral IV    Inpatient Admission    CBC & Differential    CBC & Differential         OUTPATIENT MEDICATION MANAGEMENT:  Current Facility-Administered Medications Ordered in Epic   Medication Dose Route Frequency Provider Last Rate Last Admin    heparin 46047 units/250 mL (100 units/mL) in 0.45 % NaCl infusion  18 Units/kg/hr Intravenous Titrated Jeromy Petty MD        sodium chloride 0.9 % flush 10 mL  10 mL Intravenous PRN Jeromy Petty MD         Current Outpatient Medications Ordered in Epic   Medication Sig Dispense Refill    allopurinol (ZYLOPRIM) 100 MG tablet Take 1 tablet by mouth 2 (Two) Times a Day. Indications: Gout      apixaban (ELIQUIS) 5 MG tablet tablet Take 1 tablet by mouth Every 12 (Twelve) Hours. Indications: DVT/PE (active thrombosis) 180 tablet 3    atorvastatin (LIPITOR) 10 MG tablet Take 1 tablet by mouth Every Other Day. M-W-F  Indications: High Amount of Fats in the Blood      azithromycin  (ZITHROMAX) 250 MG tablet Take 2 tablets by mouth Daily.      azithromycin (ZITHROMAX) 250 MG tablet 1 po daily x 4 days. 4 tablet 0    Budesonide (ENTOCORT EC) 3 MG 24 hr capsule Take 1 capsule by mouth Every Morning.      clindamycin (CLINDAGEL) 1 % gel Apply  topically to the appropriate area as directed.      ferrous gluconate (FERGON) 324 MG tablet Take 1 tablet by mouth Daily With Breakfast.      furosemide (LASIX) 20 MG tablet Take 1 tablet by mouth Daily.      hydrocortisone 2.5 % ointment Apply  topically to the appropriate area as directed 2 (Two) Times a Day. to affected area  Indications: Skin Inflammation      IPRATROPIUM-ALBUTEROL IN Inhale 2 puffs 3 (Three) Times a Day. Indications: COPD exacerbation, complicated      ketoconazole (NIZORAL) 2 % shampoo APPLY SHAMPOO TO AFFECTED AREA DAILY      metoprolol tartrate (LOPRESSOR) 25 MG tablet Take 1 tablet by mouth Every 12 (Twelve) Hours. Indications: High Blood Pressure Disorder 180 tablet 1    Nutritional Supplements (BOOST 100 CALORIE SMART PO) Take 1 bottle by mouth Daily. Indications: Nutritional Support      O2 (OXYGEN) Inhale 2 L/min As Needed (soa). Indications: Oxygen Saturation Decreased      omeprazole (priLOSEC) 40 MG capsule Take 1 capsule by mouth Daily. Indications: Heartburn      potassium chloride (KLOR-CON M10) 10 MEQ CR tablet Take 1 tablet by mouth Daily.      predniSONE (DELTASONE) 20 MG tablet 2 po daily x 3d, then 1 po daily x 3d. 9 tablet 0         PROCEDURES  Procedures      Total critical care time: Approximately 40 minutes    Due to a high probability of clinically significant, life threatening deterioration, the patient required my highest level of preparedness to intervene emergently and I personally spent this critical care time directly and personally managing the patient. This critical care time included obtaining a history; examining the patient; vital sign monitoring; ordering and review of studies; arranging urgent  treatment with development of a management plan; evaluation of patient's response to treatment; frequent reassessment; and, discussions with other providers.    This critical care time was performed to assess and manage the high probability of imminent, life-threatening deterioration that could result in multi-organ failure. It was exclusive of separately billable procedures and treating other patients and teaching time.    Please see MDM section and the rest of the note for further information on patient assessment and treatment.        PROGRESS, DATA ANALYSIS, CONSULTS, AND MEDICAL DECISION MAKING  All labs have been independently interpreted by me.  All radiology studies have been reviewed by me. All EKG's have been independently viewed and interpreted by me.  Discussion below represents my analysis of pertinent findings related to patient's condition, differential diagnosis, treatment plan and final disposition.    Differential diagnosis:   My differential diagnosis for dyspnea includes but is not limited to:  Asthma, COPD, pneumonia, pulmonary embolus, acute respiratory distress syndrome, pneumothorax, pleural effusion, pulmonary fibrosis, congestive heart failure, myocardial infarction, DKA, uremia, acidosis, sepsis, anemia, drug related, hyperventilation, CNS disease      Clinical Scores:                  ED Course as of 09/28/24 2144   Sat Sep 28, 2024   1819 EKG           EKG time: 1811  Rhythm/Rate: Paced, 60  P waves and HI: N/A  QRS, axis: Narrow complex  ST and T waves: No STEMI; QTc within normal limits    Interpreted Contemporaneously by me, independently viewed  Comparison: 9/22/2024   [RS]   1820 RADIOLOGY      Study: Single view chest  Findings: Persistent abnormality right base as compared to 9/22/2024  I independently viewed and interpreted these images contemporaneously with treatment.    [RS]   1914 WBC: 10.55 [RS]   1914 Hemoglobin(!): 11.6  Stable [RS]   1914 Platelets: 189 [RS]   1914  Immature Grans, Absolute(!): 0.28 [RS]   1932 HS Troponin T(!): 49  Consistent with chronic elevation [RS]   1932 Lactate: 1.0 [RS]   1932 Procalcitonin: 0.16 [RS]   1932 proBNP(!): 7,325.0  Slightly above prior chronic elevation [RS]   1932 BUN(!): 43 [RS]   1932 Creatinine(!): 1.43  History CKD [RS]   2037 Human Rhinovirus/Enterovirus(!): Detected [RS]   2132 Troponin T Delta: -2 [RS]   2132 CONSULT        Provider: Dr. Ortiz-radiologist    Discussion: Acute pulmonary emboli bilateral.  No significant elevation of RV to LV ratio identified    Agreeable c treatment and planned disposition.         [RS]   2132 Initiate heparin infusion.  Plan admission. [RS]   2143 CONSULT        Provider: Dr. Mcclain-pulmonary    Discussion: Reviewed patient history, ED presentation and evaluation as well as workup and plan.  Agreeable to consult.    Agreeable c treatment and planned disposition.         [RS]   2143 CONSULT        Provider: Dr. Zheng -covering for Dr. Bergeron    Discussion: Reviewed patient history, ED presentation evaluation as well as plan for heparin and consultation with pulmonology.  Agreeable to accept patient for full admission with telemetry on behalf of Dr. Bergeron.    Agreeable c treatment and planned disposition.         [RS]   2144 I reviewed clinical findings with the patient and his spouse.  We discussed need for heparin infusion and admission.  All agreeable. [RS]      ED Course User Index  [RS] Jeromy Petty MD         Prescription drug monitoring program review:     AS OF 21:44 EDT VITALS:    BP - 155/65  HR - 62  TEMP - 99.8 °F (37.7 °C) (Tympanic)  O2 SATS - 96%    COMPLEXITY OF CARE  The patient requires admission.      DIAGNOSIS  Final diagnoses:   Acute dyspnea   Acute pulmonary embolism without acute cor pulmonale, unspecified pulmonary embolism type   Rhinovirus infection   Chronic kidney disease, unspecified CKD stage   Pleural effusion, right         DISPOSITION  ED Disposition        ED Disposition   Decision to Admit    Condition   --    Comment   Level of Care: Telemetry [5]   Diagnosis: Pulmonary emboli [165250]   Admitting Physician: SHELBIE COWART [[id number]]   Certification: I Certify That Inpatient Hospital Services Are Medically Necessary For Greater Than 2 Midnights                    ADMISSION    Discussed treatment plan and reason for admission with pt/family and admitting physician.  Pt/family voiced understanding of the plan for admission for further testing/treatment as needed.       Please note that portions of this document were completed with a voice recognition program.    Note Disclaimer: At [facility], we believe that sharing information builds trust and better relationships. You are receiving this note because you recently visited [facility]. It is possible you will see health information before a provider has talked with you about it. This kind of information can be easy to misunderstand. To help you fully understand what it means for your health, we urge you to discuss this note with your provider.         Jeromy Petty MD  09/28/24 [id number]

## 2024-09-29 ENCOUNTER — APPOINTMENT (OUTPATIENT)
Dept: CARDIOLOGY | Facility: HOSPITAL | Age: 89
DRG: 176 | End: 2024-09-29
Payer: MEDICARE

## 2024-09-29 LAB
AORTIC DIMENSIONLESS INDEX: 0.2 (DI)
APTT PPP: 57.1 SECONDS (ref 22.7–35.4)
APTT PPP: 76.6 SECONDS (ref 22.7–35.4)
BASOPHILS # BLD AUTO: 0.02 10*3/MM3 (ref 0–0.2)
BASOPHILS NFR BLD AUTO: 0.2 % (ref 0–1.5)
BH CV ECHO MEAS - AO MAX PG: 87.9 MMHG
BH CV ECHO MEAS - AO MEAN PG: 54.6 MMHG
BH CV ECHO MEAS - AO V2 MAX: 468.8 CM/SEC
BH CV ECHO MEAS - AO V2 VTI: 125 CM
BH CV ECHO MEAS - AVA(I,D): 0.65 CM2
BH CV ECHO MEAS - EDV(CUBED): 26.7 ML
BH CV ECHO MEAS - EDV(MOD-SP2): 42 ML
BH CV ECHO MEAS - EDV(MOD-SP4): 78 ML
BH CV ECHO MEAS - EF(MOD-BP): 69.5 %
BH CV ECHO MEAS - EF(MOD-SP2): 69 %
BH CV ECHO MEAS - EF(MOD-SP4): 69.2 %
BH CV ECHO MEAS - ESV(CUBED): 10.3 ML
BH CV ECHO MEAS - ESV(MOD-SP2): 13 ML
BH CV ECHO MEAS - ESV(MOD-SP4): 24 ML
BH CV ECHO MEAS - FS: 27.2 %
BH CV ECHO MEAS - IVS/LVPW: 1.11 CM
BH CV ECHO MEAS - IVSD: 1.24 CM
BH CV ECHO MEAS - LV DIASTOLIC VOL/BSA (35-75): 43.6 CM2
BH CV ECHO MEAS - LV MASS(C)D: 106.1 GRAMS
BH CV ECHO MEAS - LV MAX PG: 2.31 MMHG
BH CV ECHO MEAS - LV MEAN PG: 1.52 MMHG
BH CV ECHO MEAS - LV SYSTOLIC VOL/BSA (12-30): 13.4 CM2
BH CV ECHO MEAS - LV V1 MAX: 76 CM/SEC
BH CV ECHO MEAS - LV V1 VTI: 22.4 CM
BH CV ECHO MEAS - LVIDD: 3 CM
BH CV ECHO MEAS - LVIDS: 2.17 CM
BH CV ECHO MEAS - LVOT AREA: 3.6 CM2
BH CV ECHO MEAS - LVOT DIAM: 2.15 CM
BH CV ECHO MEAS - LVPWD: 1.12 CM
BH CV ECHO MEAS - MV DEC SLOPE: 500.5 CM/SEC2
BH CV ECHO MEAS - MV DEC TIME: 212 SEC
BH CV ECHO MEAS - MV E MAX VEL: 139 CM/SEC
BH CV ECHO MEAS - MV MAX PG: 7.7 MMHG
BH CV ECHO MEAS - MV MEAN PG: 2.8 MMHG
BH CV ECHO MEAS - MV P1/2T: 85.2 MSEC
BH CV ECHO MEAS - MV V2 VTI: 37.4 CM
BH CV ECHO MEAS - MVA(P1/2T): 2.6 CM2
BH CV ECHO MEAS - MVA(VTI): 2.18 CM2
BH CV ECHO MEAS - RAP SYSTOLE: 3 MMHG
BH CV ECHO MEAS - RVSP: 56.3 MMHG
BH CV ECHO MEAS - SV(LVOT): 81.6 ML
BH CV ECHO MEAS - SV(MOD-SP2): 29 ML
BH CV ECHO MEAS - SV(MOD-SP4): 54 ML
BH CV ECHO MEAS - SVI(LVOT): 45.6 ML/M2
BH CV ECHO MEAS - SVI(MOD-SP2): 16.2 ML/M2
BH CV ECHO MEAS - SVI(MOD-SP4): 30.2 ML/M2
BH CV ECHO MEAS - TAPSE (>1.6): 1.22 CM
BH CV ECHO MEAS - TR MAX PG: 53.3 MMHG
BH CV ECHO MEAS - TR MAX VEL: 365 CM/SEC
BH CV ECHO MEAS RV FREE WALL STRAIN: -16.5 %
BH CV LOW VAS LEFT COMMON FEMORAL SPONT: 1
BH CV LOWER VASCULAR LEFT COMMON FEMORAL AUGMENT: NORMAL
BH CV LOWER VASCULAR LEFT COMMON FEMORAL COMPETENT: NORMAL
BH CV LOWER VASCULAR LEFT COMMON FEMORAL COMPRESS: NORMAL
BH CV LOWER VASCULAR LEFT COMMON FEMORAL PHASIC: NORMAL
BH CV LOWER VASCULAR LEFT COMMON FEMORAL SPONT: NORMAL
BH CV LOWER VASCULAR LEFT COMMON FEMORAL THROMBUS: NORMAL
BH CV LOWER VASCULAR LEFT DISTAL FEMORAL COMPRESS: NORMAL
BH CV LOWER VASCULAR LEFT GASTRONEMIUS COMPRESS: NORMAL
BH CV LOWER VASCULAR LEFT GREATER SAPH AK COMPRESS: NORMAL
BH CV LOWER VASCULAR LEFT GREATER SAPH BK COMPRESS: NORMAL
BH CV LOWER VASCULAR LEFT LESSER SAPH COMPRESS: NORMAL
BH CV LOWER VASCULAR LEFT MID FEMORAL AUGMENT: NORMAL
BH CV LOWER VASCULAR LEFT MID FEMORAL COMPETENT: NORMAL
BH CV LOWER VASCULAR LEFT MID FEMORAL COMPRESS: NORMAL
BH CV LOWER VASCULAR LEFT MID FEMORAL PHASIC: NORMAL
BH CV LOWER VASCULAR LEFT MID FEMORAL SPONT: NORMAL
BH CV LOWER VASCULAR LEFT PERONEAL COMPRESS: NORMAL
BH CV LOWER VASCULAR LEFT POPLITEAL AUGMENT: NORMAL
BH CV LOWER VASCULAR LEFT POPLITEAL COMPETENT: NORMAL
BH CV LOWER VASCULAR LEFT POPLITEAL COMPRESS: NORMAL
BH CV LOWER VASCULAR LEFT POPLITEAL PHASIC: NORMAL
BH CV LOWER VASCULAR LEFT POPLITEAL SPONT: NORMAL
BH CV LOWER VASCULAR LEFT POSTERIOR TIBIAL COMPRESS: NORMAL
BH CV LOWER VASCULAR LEFT PROFUNDA FEMORAL COMPRESS: NORMAL
BH CV LOWER VASCULAR LEFT PROXIMAL FEMORAL COMPRESS: NORMAL
BH CV LOWER VASCULAR LEFT SAPHENOFEMORAL JUNCTION COMPRESS: NORMAL
BH CV LOWER VASCULAR RIGHT COMMON FEMORAL AUGMENT: NORMAL
BH CV LOWER VASCULAR RIGHT COMMON FEMORAL COMPETENT: NORMAL
BH CV LOWER VASCULAR RIGHT COMMON FEMORAL COMPRESS: NORMAL
BH CV LOWER VASCULAR RIGHT COMMON FEMORAL PHASIC: NORMAL
BH CV LOWER VASCULAR RIGHT COMMON FEMORAL SPONT: NORMAL
BH CV LOWER VASCULAR RIGHT DISTAL FEMORAL COMPRESS: NORMAL
BH CV LOWER VASCULAR RIGHT GASTRONEMIUS COMPRESS: NORMAL
BH CV LOWER VASCULAR RIGHT GREATER SAPH AK COMPRESS: NORMAL
BH CV LOWER VASCULAR RIGHT GREATER SAPH BK COMPRESS: NORMAL
BH CV LOWER VASCULAR RIGHT LESSER SAPH COMPRESS: NORMAL
BH CV LOWER VASCULAR RIGHT MID FEMORAL AUGMENT: NORMAL
BH CV LOWER VASCULAR RIGHT MID FEMORAL COMPETENT: NORMAL
BH CV LOWER VASCULAR RIGHT MID FEMORAL COMPRESS: NORMAL
BH CV LOWER VASCULAR RIGHT MID FEMORAL PHASIC: NORMAL
BH CV LOWER VASCULAR RIGHT MID FEMORAL SPONT: NORMAL
BH CV LOWER VASCULAR RIGHT PERONEAL COMPRESS: NORMAL
BH CV LOWER VASCULAR RIGHT POPLITEAL AUGMENT: NORMAL
BH CV LOWER VASCULAR RIGHT POPLITEAL COMPETENT: NORMAL
BH CV LOWER VASCULAR RIGHT POPLITEAL COMPRESS: NORMAL
BH CV LOWER VASCULAR RIGHT POPLITEAL PHASIC: NORMAL
BH CV LOWER VASCULAR RIGHT POPLITEAL SPONT: NORMAL
BH CV LOWER VASCULAR RIGHT POSTERIOR TIBIAL COMPRESS: NORMAL
BH CV LOWER VASCULAR RIGHT PROFUNDA FEMORAL COMPRESS: NORMAL
BH CV LOWER VASCULAR RIGHT PROXIMAL FEMORAL COMPRESS: NORMAL
BH CV LOWER VASCULAR RIGHT SAPHENOFEMORAL JUNCTION COMPRESS: NORMAL
BH CV XLRA - RV BASE: 3.3 CM
BH CV XLRA - RV LENGTH: 7 CM
BH CV XLRA - RV MID: 2.42 CM
DEPRECATED RDW RBC AUTO: 46.8 FL (ref 37–54)
EOSINOPHIL # BLD AUTO: 0.1 10*3/MM3 (ref 0–0.4)
EOSINOPHIL NFR BLD AUTO: 1.1 % (ref 0.3–6.2)
ERYTHROCYTE [DISTWIDTH] IN BLOOD BY AUTOMATED COUNT: 14 % (ref 12.3–15.4)
HCT VFR BLD AUTO: 37.2 % (ref 37.5–51)
HGB BLD-MCNC: 12.4 G/DL (ref 13–17.7)
IMM GRANULOCYTES # BLD AUTO: 0.23 10*3/MM3 (ref 0–0.05)
IMM GRANULOCYTES NFR BLD AUTO: 2.6 % (ref 0–0.5)
LYMPHOCYTES # BLD AUTO: 1.14 10*3/MM3 (ref 0.7–3.1)
LYMPHOCYTES NFR BLD AUTO: 12.9 % (ref 19.6–45.3)
MCH RBC QN AUTO: 31 PG (ref 26.6–33)
MCHC RBC AUTO-ENTMCNC: 33.3 G/DL (ref 31.5–35.7)
MCV RBC AUTO: 93 FL (ref 79–97)
MONOCYTES # BLD AUTO: 0.79 10*3/MM3 (ref 0.1–0.9)
MONOCYTES NFR BLD AUTO: 8.9 % (ref 5–12)
NEUTROPHILS NFR BLD AUTO: 6.58 10*3/MM3 (ref 1.7–7)
NEUTROPHILS NFR BLD AUTO: 74.3 % (ref 42.7–76)
NRBC BLD AUTO-RTO: 0 /100 WBC (ref 0–0.2)
PLATELET # BLD AUTO: 211 10*3/MM3 (ref 140–450)
PMV BLD AUTO: 11.9 FL (ref 6–12)
RBC # BLD AUTO: 4 10*6/MM3 (ref 4.14–5.8)
WBC NRBC COR # BLD AUTO: 8.86 10*3/MM3 (ref 3.4–10.8)

## 2024-09-29 PROCEDURE — 99222 1ST HOSP IP/OBS MODERATE 55: CPT | Performed by: INTERNAL MEDICINE

## 2024-09-29 PROCEDURE — 93321 DOPPLER ECHO F-UP/LMTD STD: CPT | Performed by: INTERNAL MEDICINE

## 2024-09-29 PROCEDURE — 93308 TTE F-UP OR LMTD: CPT | Performed by: INTERNAL MEDICINE

## 2024-09-29 PROCEDURE — 85730 THROMBOPLASTIN TIME PARTIAL: CPT | Performed by: HOSPITALIST

## 2024-09-29 PROCEDURE — 93970 EXTREMITY STUDY: CPT

## 2024-09-29 PROCEDURE — 25510000001 PERFLUTREN 6.52 MG/ML SUSPENSION 2 ML VIAL: Performed by: INTERNAL MEDICINE

## 2024-09-29 PROCEDURE — 93321 DOPPLER ECHO F-UP/LMTD STD: CPT

## 2024-09-29 PROCEDURE — 93308 TTE F-UP OR LMTD: CPT

## 2024-09-29 PROCEDURE — 94761 N-INVAS EAR/PLS OXIMETRY MLT: CPT

## 2024-09-29 PROCEDURE — 93356 MYOCRD STRAIN IMG SPCKL TRCK: CPT

## 2024-09-29 PROCEDURE — 94799 UNLISTED PULMONARY SVC/PX: CPT

## 2024-09-29 PROCEDURE — 94664 DEMO&/EVAL PT USE INHALER: CPT

## 2024-09-29 PROCEDURE — 25010000002 HEPARIN (PORCINE) 25000-0.45 UT/250ML-% SOLUTION: Performed by: INTERNAL MEDICINE

## 2024-09-29 PROCEDURE — 93325 DOPPLER ECHO COLOR FLOW MAPG: CPT | Performed by: INTERNAL MEDICINE

## 2024-09-29 PROCEDURE — 93970 EXTREMITY STUDY: CPT | Performed by: SURGERY

## 2024-09-29 PROCEDURE — 93356 MYOCRD STRAIN IMG SPCKL TRCK: CPT | Performed by: INTERNAL MEDICINE

## 2024-09-29 PROCEDURE — 93325 DOPPLER ECHO COLOR FLOW MAPG: CPT

## 2024-09-29 PROCEDURE — 85025 COMPLETE CBC W/AUTO DIFF WBC: CPT | Performed by: EMERGENCY MEDICINE

## 2024-09-29 PROCEDURE — 36415 COLL VENOUS BLD VENIPUNCTURE: CPT | Performed by: EMERGENCY MEDICINE

## 2024-09-29 RX ADMIN — BENZONATATE 200 MG: 100 CAPSULE ORAL at 21:22

## 2024-09-29 RX ADMIN — HEPARIN SODIUM 22 UNITS/KG/HR: 10000 INJECTION, SOLUTION INTRAVENOUS at 17:53

## 2024-09-29 RX ADMIN — PANTOPRAZOLE SODIUM 40 MG: 40 TABLET, DELAYED RELEASE ORAL at 05:37

## 2024-09-29 RX ADMIN — IPRATROPIUM BROMIDE AND ALBUTEROL SULFATE 3 ML: 2.5; .5 SOLUTION RESPIRATORY (INHALATION) at 10:56

## 2024-09-29 RX ADMIN — METOPROLOL TARTRATE 25 MG: 25 TABLET, FILM COATED ORAL at 08:42

## 2024-09-29 RX ADMIN — METOPROLOL TARTRATE 25 MG: 25 TABLET, FILM COATED ORAL at 21:22

## 2024-09-29 RX ADMIN — ALLOPURINOL 100 MG: 100 TABLET ORAL at 21:22

## 2024-09-29 RX ADMIN — FUROSEMIDE 20 MG: 20 TABLET ORAL at 08:42

## 2024-09-29 RX ADMIN — BENZONATATE 200 MG: 100 CAPSULE ORAL at 08:42

## 2024-09-29 RX ADMIN — PERFLUTREN 2 ML: 6.52 INJECTION, SUSPENSION INTRAVENOUS at 12:22

## 2024-09-29 RX ADMIN — IPRATROPIUM BROMIDE AND ALBUTEROL SULFATE 3 ML: 2.5; .5 SOLUTION RESPIRATORY (INHALATION) at 19:46

## 2024-09-29 RX ADMIN — ATORVASTATIN CALCIUM 10 MG: 20 TABLET, FILM COATED ORAL at 08:42

## 2024-09-29 RX ADMIN — ALLOPURINOL 100 MG: 100 TABLET ORAL at 08:42

## 2024-09-29 RX ADMIN — IPRATROPIUM BROMIDE AND ALBUTEROL SULFATE 3 ML: 2.5; .5 SOLUTION RESPIRATORY (INHALATION) at 15:42

## 2024-09-29 RX ADMIN — BUDESONIDE 3 MG: 3 CAPSULE ORAL at 05:37

## 2024-09-29 RX ADMIN — BENZONATATE 200 MG: 100 CAPSULE ORAL at 14:32

## 2024-09-29 NOTE — PLAN OF CARE
Goal Outcome Evaluation:           Progress: improving  Outcome Evaluation: New admit from ED.  vss overnight on RA. Pt adlib to the bathroom. IV lasix given.  Heparin gtt @ 18 units.

## 2024-09-29 NOTE — H&P
HISTORY AND PHYSICAL   [facility]        Date of Admission: 2024  Patient Identification:  Name: Asia Ly  Age: 89 y.o.  Sex: male  :  1932  MRN: [id number]                     Primary Care Physician: Lubna Lobo MD    Chief Complaint:  89 year old gentleman presented to the emergency room with shortness of breath; he had been to urgent care where antibiotics were started; he did not get better; he had diuretics increased; he was seen by his pcp and pulmonologist; he was hypoxic so his wife brought him to the ED;     History of Present Illness:   As above    Past Medical History:  Past Medical History:   Diagnosis Date    Aortic stenosis     Aspiration pneumonia     Atrial fibrillation     Bleeding from the nose     Bronchiectasis     CKD (chronic kidney disease)     COPD (chronic obstructive pulmonary disease)     Crohn's disease     Dizziness     Elevated cholesterol     Gastric ulcer     Generalized weakness     Gout     Gout     Hard of hearing     Hyperlipidemia     Hypertension     Inguinal hernia     left    Leg swelling     Lower extremity edema     Mild anemia     Near syncope     Nonrheumatic aortic valve stenosis     PAF (paroxysmal atrial fibrillation)     Palpitations     Peptic ulcer     Pneumonia due to COVID-19 virus 2023    Pneumonia of left lung due to infectious organism     Pulmonary embolism, bilateral 2023    Pulmonary fibrosis     Stroke     TIA (transient ischemic attack)      Past Surgical History:  Past Surgical History:   Procedure Laterality Date    BRONCHOSCOPY N/A 10/22/2018    Procedure: BRONCHOSCOPY WITH BRONCHALVEOLAR LAVAGE;  Surgeon: Chidi Oconnor MD;  Location: [facility] ENDOSCOPY;  Service: Pulmonary    BRONCHOSCOPY N/A 2021    Procedure: BRONCHOSCOPY with BAL;  Surgeon: Chidi Oconnor MD;  Location: [facility] ENDOSCOPY;  Service: Pulmonary;  Laterality: N/A;  Pre: bronchectasis  Post: same    CARDIAC ELECTROPHYSIOLOGY PROCEDURE  "N/A 7/9/2021    Procedure: Pacemaker DC new--[employer] possible His lead;  Surgeon: Rashaun Nettles MD;  Location: [facility] CATH INVASIVE LOCATION;  Service: Cardiology;  Laterality: N/A;    CARDIAC ELECTROPHYSIOLOGY PROCEDURE N/A 9/3/2021    Procedure: AV node 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 ppm;  Surgeon: Rashaun Nettles MD;  Location: [facility] CATH INVASIVE LOCATION;  Service: Cardiovascular;  Laterality: N/A;    CATARACT EXTRACTION Bilateral     COLONOSCOPY      COLONOSCOPY N/A 3/9/2018    Procedure: COLONOSCOPY to terminal ileum with bx;  Surgeon: Aron Cabrera MD;  Location: [facility] ENDOSCOPY;  Service:     COLONOSCOPY N/A 11/19/2020    Procedure: COLONOSCOPY to cecum:  apc to cecum angiodysplagia,;  Surgeon: Aron Cabrera MD;  Location: [facility] ENDOSCOPY;  Service: Gastroenterology;  Laterality: N/A;  GI bleed  post:  diverticulosis, angiodysplagia    CYSTECTOMY      back and shoulder area    ENDOSCOPY N/A 3/9/2018    Procedure: ESOPHAGOGASTRODUODENOSCOPY with bx;  Surgeon: Aron Cabrera MD;  Location: [facility] ENDOSCOPY;  Service:     ENDOSCOPY N/A 11/19/2020    Procedure: ESOPHAGOGASTRODUODENOSCOPY;  Surgeon: Aron Cabrera MD;  Location: [facility] ENDOSCOPY;  Service: Gastroenterology;  Laterality: N/A;  GI bleed  post:  HH.    EYE SURGERY Left     detached retina    EYE SURGERY Right     \"repaired a hole in the eye\"    INGUINAL HERNIA REPAIR Left 8/29/2023    Procedure: INGUINAL HERNIA REPAIR;  Surgeon: Itz Zaidi Jr., MD;  Location: [facility] MAIN OR;  Service: General;  Laterality: Left;    INSERT / REPLACE / REMOVE PACEMAKER      TESTICLE SURGERY      benign tumor removed.      Home Meds:  Medications Prior to Admission   Medication Sig Dispense Refill Last Dose    allopurinol (ZYLOPRIM) 100 MG tablet Take 1 tablet by mouth 2 (Two) Times a Day. Indications: Gout   9/28/2024    apixaban (ELIQUIS) 5 MG tablet tablet Take 1 tablet by mouth Every 12 (Twelve) Hours. Indications: DVT/PE (active thrombosis) " 180 tablet 3 9/28/2024    atorvastatin (LIPITOR) 10 MG tablet Take 1 tablet by mouth Every Other Day. M-W-F  Indications: High Amount of Fats in the Blood   9/27/2024    ferrous gluconate (FERGON) 324 MG tablet Take 1 tablet by mouth Daily With Breakfast.   9/28/2024    furosemide (LASIX) 20 MG tablet Take 1 tablet by mouth Daily.   9/27/2024    metoprolol tartrate (LOPRESSOR) 25 MG tablet Take 1 tablet by mouth Every 12 (Twelve) Hours. Indications: High Blood Pressure Disorder 180 tablet 1 9/28/2024    omeprazole (priLOSEC) 40 MG capsule Take 1 capsule by mouth Daily. Indications: Heartburn   9/28/2024    azithromycin (ZITHROMAX) 250 MG tablet Take 2 tablets by mouth Daily.       azithromycin (ZITHROMAX) 250 MG tablet 1 po daily x 4 days. 4 tablet 0     Budesonide (ENTOCORT EC) 3 MG 24 hr capsule Take 1 capsule by mouth Every Morning.       clindamycin (CLINDAGEL) 1 % gel Apply  topically to the appropriate area as directed.       hydrocortisone 2.5 % ointment Apply  topically to the appropriate area as directed 2 (Two) Times a Day. to affected area  Indications: Skin Inflammation       IPRATROPIUM-ALBUTEROL IN Inhale 2 puffs 3 (Three) Times a Day. Indications: COPD exacerbation, complicated       ketoconazole (NIZORAL) 2 % shampoo APPLY SHAMPOO TO AFFECTED AREA DAILY       Nutritional Supplements (BOOST 100 CALORIE SMART PO) Take 1 bottle by mouth Daily. Indications: Nutritional Support       O2 (OXYGEN) Inhale 2 L/min As Needed (soa). Indications: Oxygen Saturation Decreased       potassium chloride (KLOR-CON M10) 10 MEQ CR tablet Take 1 tablet by mouth Daily.       predniSONE (DELTASONE) 20 MG tablet 2 po daily x 3d, then 1 po daily x 3d. 9 tablet 0        Allergies:  Allergies   Allergen Reactions    Amiodarone Unknown (See Comments)     Pulmonary fibrosis    Diltiazem Arrhythmia    Doxycycline Rash    Allopurinol Unknown - Low Severity     Other reaction(s): Joint pain      Other Reaction(s): Joint pain     Cefdinir Arrhythmia     A-FIB    Indomethacin Rash     Immunizations:  Immunization History   Administered Date(s) Administered    COVID-19 (MODERNA) 12YRS+ (SPIKEVAX) 2023    COVID-19 (MODERNA) 1st,2nd,3rd Dose Monovalent 10/23/2021    COVID-19 (MODERNA) BIVALENT 12+YRS 10/10/2022     Social History:   Social History     Social History Narrative    Not on file     Social History     Socioeconomic History    Marital status:    Tobacco Use    Smoking status: Former     Types: Cigars     Quit date: 10/28/1994     Years since quittin.9     Passive exposure: Past    Smokeless tobacco: Never    Tobacco comments:     Quit 25 years ago   Vaping Use    Vaping status: Never Used   Substance and Sexual Activity    Alcohol use: No     Comment: daily caffiene - 1/2 cup of coffee    Drug use: No    Sexual activity: Defer       Family History:  Family History   Problem Relation Age of Onset    Stroke Mother     Heart disease Mother     Hypertension Mother     Diabetes Mother     Stroke Father     Heart disease Sister         Heart Valve Replacement    Ovarian cancer Sister     Rheumatic fever Sister     Diabetes Sister     Malig Hyperthermia Neg Hx         Review of Systems  See history of present illness and past medical history.  Patient denies headache, dizziness, syncope, falls, trauma, change in vision, change in hearing, change in taste, changes in weight, changes in appetite, focal weakness, numbness, or paresthesia.  Patient denies chest pain, palpitations, dyspnea, orthopnea, PND, cough, sinus pressure, rhinorrhea, epistaxis, hemoptysis, nausea, vomiting,hematemesis, diarrhea, constipation or hematochezia.  Denies cold or heat intolerance, polydipsia, polyuria, polyphagia. Denies hematuria, pyuria, dysuria, hesitancy, frequency or urgency. Denies consumption of raw and under cooked meats foods or change in water source.  Denies fever, chills, sweats, night sweats.  Denies missing any routine  "medications. Remainder of ROS is negative.    Objective:  T Max 24 hrs: Temp (24hrs), Av.3 °F (36.8 °C), Min:97.5 °F (36.4 °C), Max:99.8 °F (37.7 °C)    Vitals Ranges:   Temp:  [97.5 °F (36.4 °C)-99.8 °F (37.7 °C)] 97.9 °F (36.6 °C)  Heart Rate:  [59-82] 70  Resp:  [14-20] 20  BP: (110-155)/(56-70) 118/58      Exam:  /58 (BP Location: Left arm, Patient Position: Sitting)   Pulse 70   Temp 97.9 °F (36.6 °C) (Oral)   Resp 20   Ht 172.7 cm (68\")   Wt 66.2 kg (146 lb)   SpO2 94%   BMI 22.20 kg/m²     General Appearance:    Alert, cooperative, no distress, appears stated age   Head:    Normocephalic, without obvious abnormality, atraumatic   Eyes:    PERRL, conjunctivae/corneas clear, EOM's intact, both eyes   Ears:    Normal external ear canals, both ears   Nose:   Nares normal, septum midline, mucosa normal, no drainage    or sinus tenderness   Throat:   Lips, mucosa, and tongue normal   Neck:   Supple, symmetrical, trachea midline, no adenopathy;     thyroid:  no enlargement/tenderness/nodules; no carotid    bruit or JVD   Back:     Symmetric, no curvature, ROM normal, no CVA tenderness   Lungs:     Decreased breath sounds bilaterally, respirations unlabored   Chest Wall:    No tenderness or deformity    Heart:    Regular rate and rhythm, S1 and S2 normal, no murmur, rub   or gallop   Abdomen:     Soft, nontender, bowel sounds active all four quadrants,     no masses, no hepatomegaly, no splenomegaly   Extremities:   Extremities normal, atraumatic, no cyanosis or edema      .    Data Review:  Labs in chart were reviewed.  WBC   Date Value Ref Range Status   2024 8.86 3.40 - 10.80 10*3/mm3 Final     Hemoglobin   Date Value Ref Range Status   2024 12.4 (L) 13.0 - 17.7 g/dL Final     Hematocrit   Date Value Ref Range Status   2024 37.2 (L) 37.5 - 51.0 % Final     Platelets   Date Value Ref Range Status   2024 211 140 - 450 10*3/mm3 Final     Sodium   Date Value Ref Range Status "   09/28/2024 138 136 - 145 mmol/L Final     Potassium   Date Value Ref Range Status   09/28/2024 4.3 3.5 - 5.2 mmol/L Final     Chloride   Date Value Ref Range Status   09/28/2024 100 98 - 107 mmol/L Final     CO2   Date Value Ref Range Status   09/28/2024 26.9 22.0 - 29.0 mmol/L Final     BUN   Date Value Ref Range Status   09/28/2024 43 (H) 8 - 23 mg/dL Final     Creatinine   Date Value Ref Range Status   09/28/2024 1.43 (H) 0.76 - 1.27 mg/dL Final     Glucose   Date Value Ref Range Status   09/28/2024 78 65 - 99 mg/dL Final     Calcium   Date Value Ref Range Status   09/28/2024 8.9 8.2 - 9.6 mg/dL Final     AST (SGOT)   Date Value Ref Range Status   09/28/2024 22 1 - 40 U/L Final     ALT (SGPT)   Date Value Ref Range Status   09/28/2024 27 1 - 41 U/L Final     Alkaline Phosphatase   Date Value Ref Range Status   09/28/2024 54 39 - 117 U/L Final       Results from last 7 days   Lab Units 09/23/24  1305   FREE T4 ng/dL 1.15          Imaging Results (All)       Procedure Component Value Units Date/Time    CT Angiogram Chest Pulmonary Embolism [[id number]] Collected: 09/28/24 2118     Updated: 09/28/24 2132    Narrative:      CT ANGIOGRAM OF THE CHEST     HISTORY: Shortness of breath     COMPARISON: May 6, 2023     TECHNIQUE: Axial CT imaging was obtained through the thorax. IV contrast  was administered. Three-D reformatted images were obtained.     FINDINGS:  The study is positive for bilateral pulmonary emboli. Most proximal  thrombus is noted within the main right pulmonary artery. It is  difficult to assess for right heart strain, given timing of the contrast  bolus. However, I don't think the RV to LV ratio is greater than 1.  Examination was not optimized for assessment of the thoracic aorta.  There is dilatation of the ascending aorta, measuring up to 4 cm. The  remainder of the thoracic aorta is normal in caliber. There is  calcification of the thoracic aorta and coronary arteries. Left-sided  pacemaker is  present. The heart is enlarged. Thyroid gland, trachea, and  esophagus appear unremarkable. There are prominent mediastinal lymph  nodes. For example, a subcarinal lymph node measures up to 1.5 cm. This  appears similar to the prior exam. Lower right paratracheal node  measures up to 1.2 cm in short axis dimensions. This also appears  similar to the prior exam. There is interlobular septal thickening, as  well as areas of groundglass attenuation. There are bilateral pleural  effusions, right greater than left. Constellation of findings likely  reflects congestive heart failure. However, more focal airspace  consolidation is noted at both lung bases. This may represent pneumonia.  The patient has had similar findings in the past, and there may be a  component of chronic scarring, but superimposed pneumonia is also  suspected. There is reflux of contrast material into the hepatic veins  and inferior vena cava, which can be seen in the setting of pulmonary  arterial hypertension. Images through the upper abdomen demonstrate  cholelithiasis. There are simple appearing bilateral renal cysts. No  acute abnormalities are noted within the upper abdomen. The patient does  have some prominent chest wall collaterals. This is suspected to be  secondary to some stenosis of the left subclavian vein, secondary to the  pacemaker.          Impression:         1. Bilateral pulmonary emboli. RV to LV ratio appears to be less than 1,  although is difficult to assess, due to timing of the contrast bolus.  Presence or absence of pulmonary infarction cannot be assessed, due to  the presence of pulmonary infiltrates.  2. Leija megaly, interlobular septal thickening, and bilateral  effusions, suggesting congestive heart failure.  3. Dense infiltrates noted at the lung bases bilaterally. There may be a  component of chronic scarring at the lung bases, but certainly  superimposed pneumonia is suspected.     FINDINGS were called to Dr. Petty  at 9:29 p.m.     Radiation dose reduction techniques were utilized, including automated  exposure control and exposure modulation based on body size.        This report was finalized on 9/28/2024 9:29 PM by Dr. Judith Ortiz M.D on Workstation: [id number]       XR Chest 1 View [[id number]] Collected: 09/28/24 1829     Updated: 09/28/24 1834    Narrative:      XR CHEST 1 VW-     Clinical: Dyspnea     COMPARISON examination 9/22/2024     FINDINGS: Transvenous pacemaker in position as before. There is cardiac  enlargement. Atherosclerotic calcification of the aorta. Is again  demonstrated a right-sided pleural effusion which is similar to or  slightly more pronounced than before. There is diffuse prominence of the  interstitium, suggesting vascular congestion, not quite as pronounced as  before. No new area of consolidation has developed, the remainder is  unremarkable.     This report was finalized on 9/28/2024 6:30 PM by Dr. Arsalan Max M.D  on Workstation: [id number]                 Assessment:  Active Hospital Problems    Diagnosis  POA    **Pulmonary emboli [I26.99]  Yes      Resolved Hospital Problems   No resolved problems to display.   Crohns disease  A fib  Pulmonary fibrosis  bronchiectasis    Plan:  Continue heparin  Hematology to see  He was on eliquis when he developed the clot  Echo noted and unremarkable  Notes reviewed  Dw wife and patient  Monitor on telemetry    Dimple Jaquez MD  9/29/2024  16:44 EDT

## 2024-09-29 NOTE — PLAN OF CARE
Goal Outcome Evaluation:  Plan of Care Reviewed With: patient, spouse        Progress: improving  Outcome Evaluation: Pt remaining on heparin gtt- last aptt at 1400 was 76.6 (therapuetic range) will recheck in the AM.  Pt remains up ad michell; keyana slight confusion at times; ECHO and doppler completed today.  Pt and wife requested not to be on coumadin later.

## 2024-09-29 NOTE — CONSULTS
Patient Identification:  Asia Ly  89 y.o.  male  8/21/1932  [id number]          LOS 0    Requesting physician: Dr. Jeromy Petty    Reason for Consult: PE    History of Present Illness:     89-year-old male with a history of A-fib, aortic stenosis, bronchiectasis, COPD, Crohn's, and prior PE on anticoagulation who presents to the ER due to reported worsening dyspnea and cough.  He tells me that his wife made him come in due to his persistent cough, and he would have preferred to come in tomorrow.  Workup in the ER with CT PE study revealed bilateral PE. He has remained on room air and has not been hypoxic during his hospital stay.  At home he does not use oxygen.  When I asked if he has been taking his blood thinners at home, he reports that his wife is the one who handles his medications and that she often talks about blood thinners so he probably does take them he thinks.  He reports that he has been having a cough for several months, but it may have been getting worse recently.  Otherwise, he denies any fevers or chills recently.  He is not aware of any sick contacts and no recent travel.  Denies mopped assist, abdominal pain, nausea, vomiting.    Upon review of the chart, his spouse apparently became concerned about him because he was requiring supplemental oxygen last night and during the day and his home pulse ox read between 88-90%.  He follows with Dr. Chiid Chung in our pulmonary clinic.  He saw him on 9/25/2024, and at that time wife was with him and said he had been having fevers.  The plan was to proceed with bronchoscopy for BAL given concern for possible infection.    Discussed case with Dr. Jeromy Petty in the ER.  Will initiate on heparin drip, and obtain echo in the morning to further evaluate for right heart strain given limitations from the CT scan.      Past Medical History:  Past Medical History:   Diagnosis Date    Aortic stenosis     Aspiration pneumonia     Atrial fibrillation      Bleeding from the nose     Bronchiectasis     CKD (chronic kidney disease)     COPD (chronic obstructive pulmonary disease)     Crohn's disease     Dizziness     Elevated cholesterol     Gastric ulcer     Generalized weakness     Gout     Gout     Hard of hearing     Hyperlipidemia     Hypertension     Inguinal hernia     left    Leg swelling     Lower extremity edema     Mild anemia     Near syncope     Nonrheumatic aortic valve stenosis     PAF (paroxysmal atrial fibrillation)     Palpitations     Peptic ulcer     Pneumonia due to COVID-19 virus 05/06/2023    Pneumonia of left lung due to infectious organism     Pulmonary embolism, bilateral 05/06/2023    Pulmonary fibrosis     Stroke     TIA (transient ischemic attack)        Past Surgical History:  Past Surgical History:   Procedure Laterality Date    BRONCHOSCOPY N/A 10/22/2018    Procedure: BRONCHOSCOPY WITH BRONCHALVEOLAR LAVAGE;  Surgeon: Chidi Oconnor MD;  Location: [facility] ENDOSCOPY;  Service: Pulmonary    BRONCHOSCOPY N/A 7/8/2021    Procedure: BRONCHOSCOPY with BAL;  Surgeon: Chidi Oconnor MD;  Location: [facility] ENDOSCOPY;  Service: Pulmonary;  Laterality: N/A;  Pre: bronchectasis  Post: same    CARDIAC ELECTROPHYSIOLOGY PROCEDURE N/A 7/9/2021    Procedure: Pacemaker DC new--[employer] possible His lead;  Surgeon: Rashaun Nettles MD;  Location: [facility] CATH INVASIVE LOCATION;  Service: Cardiology;  Laterality: N/A;    CARDIAC ELECTROPHYSIOLOGY PROCEDURE N/A 9/3/2021    Procedure: AV node 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 ppm;  Surgeon: Rashaun Nettles MD;  Location: [facility] CATH INVASIVE LOCATION;  Service: Cardiovascular;  Laterality: N/A;    CATARACT EXTRACTION Bilateral     COLONOSCOPY      COLONOSCOPY N/A 3/9/2018    Procedure: COLONOSCOPY to terminal ileum with bx;  Surgeon: Aron Cabrera MD;  Location: [facility] ENDOSCOPY;  Service:     COLONOSCOPY N/A 11/19/2020    Procedure: COLONOSCOPY to cecum:  apc to cecum angiodysplagia,;  Surgeon: Rick  "Aron MCNEAL MD;  Location: [facility] ENDOSCOPY;  Service: Gastroenterology;  Laterality: N/A;  GI bleed  post:  diverticulosis, angiodysplagia    CYSTECTOMY      back and shoulder area    ENDOSCOPY N/A 3/9/2018    Procedure: ESOPHAGOGASTRODUODENOSCOPY with bx;  Surgeon: Aron Cabrera MD;  Location: [facility] ENDOSCOPY;  Service:     ENDOSCOPY N/A 2020    Procedure: ESOPHAGOGASTRODUODENOSCOPY;  Surgeon: Aron Cabrera MD;  Location: [facility] ENDOSCOPY;  Service: Gastroenterology;  Laterality: N/A;  GI bleed  post:  HH.    EYE SURGERY Left     detached retina    EYE SURGERY Right     \"repaired a hole in the eye\"    INGUINAL HERNIA REPAIR Left 2023    Procedure: INGUINAL HERNIA REPAIR;  Surgeon: Itz Zaidi Jr., MD;  Location: [facility] MAIN OR;  Service: General;  Laterality: Left;    INSERT / REPLACE / REMOVE PACEMAKER      TESTICLE SURGERY      benign tumor removed.        Home Meds:  (Not in a hospital admission)        Allergies:  Allergies   Allergen Reactions    Amiodarone Unknown (See Comments)     Pulmonary fibrosis    Diltiazem Arrhythmia    Doxycycline Rash    Allopurinol Unknown - Low Severity     Other reaction(s): Joint pain      Other Reaction(s): Joint pain    Cefdinir Arrhythmia     A-FIB    Indomethacin Rash       Social History:   Social History     Socioeconomic History    Marital status:    Tobacco Use    Smoking status: Former     Types: Cigars     Quit date: 10/28/1994     Years since quittin.9     Passive exposure: Past    Smokeless tobacco: Never    Tobacco comments:     Quit 25 years ago   Vaping Use    Vaping status: Never Used   Substance and Sexual Activity    Alcohol use: No     Comment: daily caffiene - 1/2 cup of coffee    Drug use: No    Sexual activity: Defer       Family History:  Family History   Problem Relation Age of Onset    Stroke Mother     Heart disease Mother     Hypertension Mother     Diabetes Mother     Stroke Father     Heart disease Sister         " "Heart Valve Replacement    Ovarian cancer Sister     Rheumatic fever Sister     Diabetes Sister     Malig Hyperthermia Neg Hx        Review of Systems:  Review of Systems   Respiratory:  Positive for cough.    All other systems reviewed and are negative.       Objective:    PHYSICAL EXAM:    /65   Pulse 62   Temp 99.8 °F (37.7 °C) (Tympanic)   Resp 16   Ht 172.7 cm (68\")   Wt 66.4 kg (146 lb 4.8 oz)   SpO2 96%   BMI 22.24 kg/m²  Body mass index is 22.24 kg/m². 96% 66.4 kg (146 lb 4.8 oz)    Physical Exam  Constitutional:       Appearance: Normal appearance.   HENT:      Head: Normocephalic and atraumatic.      Nose: Nose normal.      Mouth/Throat:      Mouth: Mucous membranes are moist.      Pharynx: Oropharynx is clear.   Eyes:      Extraocular Movements: Extraocular movements intact.      Conjunctiva/sclera: Conjunctivae normal.   Cardiovascular:      Rate and Rhythm: Normal rate and regular rhythm.      Pulses: Normal pulses.      Heart sounds: Normal heart sounds. No murmur heard.  Pulmonary:      Effort: Pulmonary effort is normal. No respiratory distress.      Breath sounds: Normal breath sounds. No stridor. No wheezing or rales.   Abdominal:      General: Abdomen is flat. Bowel sounds are normal.      Palpations: Abdomen is soft.      Tenderness: There is no abdominal tenderness.   Skin:     General: Skin is warm and dry.   Neurological:      General: No focal deficit present.      Mental Status: He is alert and oriented to person, place, and time.            Results Review:   I have personally reviewed the results from last note by [facility] physician to 9/27/2024 22:51 EDT and agree with these findings:  [x]  Laboratory accordion  [x]  Microbiology  [x]  Radiology  [x]  EKG/Telemetry   [x]  Cardiology/Vascular   [x]  Pathology  []  Old records  []  Other:       Medication Review:  I have reviewed the current MAR.  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  Continue heparin drip  - COPD stable, continue DuoNebs 4 times daily  - CTA study concerning for bilateral PEs, but limited in the ability to determine right heart strain.  Lab work concerning for elevated troponin and proBNP on admission, will obtain echo tomorrow morning to further assess for right heart strain.  If right heart strain, will need cardiology consult to evaluate for possible thrombectomy.  - Concerning the rhinovirus infection, he is not hypoxic or in respiratory distress.  Maintain supportive management and DuoNebs and Tessalon Perles.  - Concerning the bronchiectasis, he does not appear to be infectious at this time given his normal labs and no fevers and minimal sputum production.  Recommend holding off on antibiotics.      Carl Mcclain MD  [facility], [facility]  Pulmonary and Critical Care Medicine    Electronically signed by Carl Mcclain MD, 09/28/24, 9:42 PM EDT.  Part of this note may be an electronic transcription/translation of spoken language to printed text using the Dragon Dictation System.

## 2024-09-29 NOTE — ED NOTES
"Nursing report ED to floor  Asia Ly  89 y.o.  male    HPI :  HPI (Adult)  Stated Reason for Visit: soa  History Obtained From: patient    Chief Complaint  Chief Complaint   Patient presents with    Shortness of Breath       Admitting doctor:   Calvin Bergeron MD    Admitting diagnosis:   The primary encounter diagnosis was Acute dyspnea. Diagnoses of Acute pulmonary embolism without acute cor pulmonale, unspecified pulmonary embolism type, Rhinovirus infection, Chronic kidney disease, unspecified CKD stage, and Pleural effusion, right were also pertinent to this visit.    Code status:   Current Code Status       Date Active Code Status Order ID Comments User Context       Prior            Allergies:   Amiodarone, Diltiazem, Doxycycline, Allopurinol, Cefdinir, and Indomethacin    Isolation:   Droplet    Intake and Output    Intake/Output Summary (Last 24 hours) at 9/28/2024 2152  Last data filed at 9/28/2024 2006  Gross per 24 hour   Intake --   Output 150 ml   Net -150 ml       Weight:       09/28/24 2132   Weight: 66.4 kg (146 lb 4.8 oz)       Most recent vitals:   Vitals:    09/28/24 2001 09/28/24 2101 09/28/24 2102 09/28/24 2132   BP: 149/65 155/65     Pulse: 59 62     Resp:       Temp:       TempSrc:       SpO2: 94%  96%    Weight:    66.4 kg (146 lb 4.8 oz)   Height:    172.7 cm (68\")       Active LDAs/IV Access:   Lines, Drains & Airways       Active LDAs       Name Placement date Placement time Site Days    Peripheral IV 09/28/24 1839 Anterior;Right Forearm 09/28/24 1839  Forearm  less than 1    Peripheral IV 09/28/24 2028 Left Antecubital 09/28/24 2028  Antecubital  less than 1                    Labs (abnormal labs have a star):   Labs Reviewed   RESPIRATORY PANEL PCR W/ COVID-19 (SARS-COV-2), NP SWAB IN UTM/VTP, 2 HR TAT - Abnormal; Notable for the following components:       Result Value    Human Rhinovirus/Enterovirus Detected (*)     All other components within normal limits    Narrative:     In " the setting of a positive respiratory panel with a viral infection PLUS a negative procalcitonin without other underlying concern for bacterial infection, consider observing off antibiotics or discontinuation of antibiotics and continue supportive care. If the respiratory panel is positive for atypical bacterial infection (Bordetella pertussis, Chlamydophila pneumoniae, or Mycoplasma pneumoniae), consider antibiotic de-escalation to target atypical bacterial infection.   COMPREHENSIVE METABOLIC PANEL - Abnormal; Notable for the following components:    BUN 43 (*)     Creatinine 1.43 (*)     BUN/Creatinine Ratio 30.1 (*)     eGFR 46.0 (*)     All other components within normal limits    Narrative:     GFR Normal >60  Chronic Kidney Disease <60  Kidney Failure <15    The GFR formula is only valid for adults with stable renal function between ages 18 and 70.   TROPONIN - Abnormal; Notable for the following components:    HS Troponin T 49 (*)     All other components within normal limits    Narrative:     High Sensitive Troponin T Reference Range:  <14.0 ng/L- Negative Female for AMI  <22.0 ng/L- Negative Male for AMI  >=14 - Abnormal Female indicating possible myocardial injury.  >=22 - Abnormal Male indicating possible myocardial injury.   Clinicians would have to utilize clinical acumen, EKG, Troponin, and serial changes to determine if it is an Acute Myocardial Infarction or myocardial injury due to an underlying chronic condition.        BNP (IN-HOUSE) - Abnormal; Notable for the following components:    proBNP 7,325.0 (*)     All other components within normal limits    Narrative:     This assay is used as an aid in the diagnosis of individuals suspected of having heart failure. It can be used as an aid in the diagnosis of acute decompensated heart failure (ADHF) in patients presenting with signs and symptoms of ADHF to the emergency department (ED). In addition, NT-proBNP of <300 pg/mL indicates ADHF is not  likely.    Age Range Result Interpretation  NT-proBNP Concentration (pg/mL:      <50             Positive            >450                   Gray                 300-450                    Negative             <300    50-75           Positive            >900                  Gray                300-900                  Negative            <300      >75             Positive            >1800                  Gray                300-1800                  Negative            <300   CBC WITH AUTO DIFFERENTIAL - Abnormal; Notable for the following components:    RBC 3.70 (*)     Hemoglobin 11.6 (*)     Hematocrit 35.8 (*)     Neutrophil % 77.7 (*)     Lymphocyte % 9.0 (*)     Immature Grans % 2.7 (*)     Neutrophils, Absolute 8.20 (*)     Monocytes, Absolute 0.99 (*)     Immature Grans, Absolute 0.28 (*)     All other components within normal limits   HIGH SENSITIVITIY TROPONIN T 2HR - Abnormal; Notable for the following components:    HS Troponin T 47 (*)     All other components within normal limits    Narrative:     High Sensitive Troponin T Reference Range:  <14.0 ng/L- Negative Female for AMI  <22.0 ng/L- Negative Male for AMI  >=14 - Abnormal Female indicating possible myocardial injury.  >=22 - Abnormal Male indicating possible myocardial injury.   Clinicians would have to utilize clinical acumen, EKG, Troponin, and serial changes to determine if it is an Acute Myocardial Infarction or myocardial injury due to an underlying chronic condition.        PROCALCITONIN - Normal    Narrative:     As a Marker for Sepsis (Non-Neonates):    1. <0.5 ng/mL represents a low risk of severe sepsis and/or septic shock.  2. >2 ng/mL represents a high risk of severe sepsis and/or septic shock.    As a Marker for Lower Respiratory Tract Infections that require antibiotic therapy:    PCT on Admission    Antibiotic Therapy       6-12 Hrs later    >0.5                Strongly Recommended  >0.25 - <0.5        Recommended   0.1 - 0.25       "    Discouraged              Remeasure/reassess PCT  <0.1                Strongly Discouraged     Remeasure/reassess PCT    As 28 day mortality risk marker: \"Change in Procalcitonin Result\" (>80% or <=80%) if Day 0 (or Day 1) and Day 4 values are available. Refer to [email or web address]://[email or web address]    Change in PCT <=80%  A decrease of PCT levels below or equal to 80% defines a positive change in PCT test result representing a higher risk for 28-day all-cause mortality of patients diagnosed with severe sepsis for septic shock.    Change in PCT >80%  A decrease of PCT levels of more than 80% defines a negative change in PCT result representing a lower risk for 28-day all-cause mortality of patients diagnosed with severe sepsis or septic shock.      LACTIC ACID, PLASMA - Normal   PROTIME-INR   APTT   CBC WITH AUTO DIFFERENTIAL   CBC AND DIFFERENTIAL    Narrative:     The following orders were created for panel order CBC & Differential.  Procedure                               Abnormality         Status                     ---------                               -----------         ------                     CBC Auto Differential[[id number]]        Abnormal            Final result                 Please view results for these tests on the individual orders.   CBC AND DIFFERENTIAL    Narrative:     The following orders were created for panel order CBC & Differential.  Procedure                               Abnormality         Status                     ---------                               -----------         ------                     CBC Auto Differential[[id number]]                                                         Please view results for these tests on the individual orders.       EKG:   ECG 12 Lead Dyspnea   Final Result   HEART RATE=61  bpm   RR Pjjyaifh=744  ms   AR Interval=  ms   P Horizontal Axis=262  deg   P Front Axis=  deg   QRSD Wyknvasd=686  ms   QT Kckhcjux=073  ms   XIcH=523  ms   QRS " Axis=-10  deg   T Wave Axis=67  deg   - ABNORMAL ECG -   Afib/flut and V-paced complexes   No further analysis attempted due to paced rhythm   No change from previous tracing   Electronically Signed By: Francois Mayorga ([facility]) 2024 20:11:24   Date and Time of Study:2024 18:11:45          Meds given in ED:   Medications   sodium chloride 0.9 % flush 10 mL (has no administration in time range)   heparin 53307 units/250 mL (100 units/mL) in 0.45 % NaCl infusion (has no administration in time range)   furosemide (LASIX) injection 80 mg (80 mg Intravenous Given 24)   iopamidol (ISOVUE-370) 76 % injection 100 mL (85 mL Intravenous Given by Other 24)       Imaging results:  CT Angiogram Chest Pulmonary Embolism    Result Date: 2024   1. Bilateral pulmonary emboli. RV to LV ratio appears to be less than 1, although is difficult to assess, due to timing of the contrast bolus. Presence or absence of pulmonary infarction cannot be assessed, due to the presence of pulmonary infiltrates. 2. Hardy megaly, interlobular septal thickening, and bilateral effusions, suggesting congestive heart failure. 3. Dense infiltrates noted at the lung bases bilaterally. There may be a component of chronic scarring at the lung bases, but certainly superimposed pneumonia is suspected.  FINDINGS were called to Dr. Petty at 9:29 p.m.  Radiation dose reduction techniques were utilized, including automated exposure control and exposure modulation based on body size.   This report was finalized on 2024 9:29 PM by Dr. Judith Ortiz M.D on Workstation: [id number]       Ambulatory status:   - assist x 1    Social issues:   Social History     Socioeconomic History    Marital status:    Tobacco Use    Smoking status: Former     Types: Cigars     Quit date: 10/28/1994     Years since quittin.9     Passive exposure: Past    Smokeless tobacco: Never    Tobacco comments:     Quit 25 years ago   Vaping  Use    Vaping status: Never Used   Substance and Sexual Activity    Alcohol use: No     Comment: daily caffiene - 1/2 cup of coffee    Drug use: No    Sexual activity: Defer       Peripheral Neurovascular  Peripheral Neurovascular (Adult)  Peripheral Neurovascular WDL: WDL    Neuro Cognitive  Neuro Cognitive (Adult)  Cognitive/Neuro/Behavioral WDL: WDL, orientation  Orientation: oriented x 4    Learning  Learning Assessment (Adult)  Learning Readiness and Ability: sensory deficit noted  Education Provided  Person Taught: patient, spouse  Teaching Method: verbal instruction  Teaching Focus: symptom/problem overview  Education Outcome Evaluation: eager to learn, acceptance expressed, verbalizes understanding    Respiratory  Respiratory (Adult)  Airway WDL: WDL  Respiratory WDL  Respiratory WDL: .WDL except, all, cough  Rhythm/Pattern, Respiratory: shortness of breath, shallow, unlabored  Cough Frequency: frequent  Cough Type: productive, congested    Abdominal Pain       Pain Assessments  Pain (Adult)  (0-10) Pain Rating: Rest: 0    NIH Stroke Scale       Nehal Ruelas RN  09/28/24 21:52 EDT

## 2024-09-29 NOTE — CONSULTS
Date of Hospital Visit: 2024  Encounter Provider: Nixon Brown MD  Place of Service: [facility] CARDIOLOGY  Patient Name: Asia Ly  :1932  Referral Provider: Calvin Bergeron MD    Chief complaint    History of Present Illness    Asia Ly is a 89 y.o. male past medical history of paroxysmal atrial fibrillation, stroke, moderate aortic stenosis, Crohn's disease, pulmonary fibrosis and pulmonary hypertension who presents for follow-up of chronic mental illness.  I first met Mr. Ly in mid November when he presented to the hospital with hematochezia.  He had a full GI work-up which showed no evidence of active bleeding.  He was placed back on apixaban given his high risk for future stroke.  He presented back to the hospital a few days later with febrile illness.  He was found to have bilateral pneumonia and had an extensive hospital stay with full course of IV antibiotics.  He is slowly recovering from that hospital stay and states that his main complaint is generalized weakness and fatigue.  He does feel that he is getting better with each day.  He was found to have residual pneumonia by PCP and placed on 7-day oral antibiotic course.  He has not had fever in the past week.  He denies chest pain, palpitations, dizziness or syncope.  He denies orthopnea or PND but has some residual edema.  He has some increased exertional dyspnea but states that this is improving since his first course of antibiotics.  He is back on apixaban with no further bleeding complications.  Recovery has been slow but steady. Patient had AV johanne ablation on October 3.  He feels well since then.  No palpitations, dizziness or syncope.  He has mild chronic stable dyspnea on exertion.  Known aortic valve stenosis.  Echocardiogram today shows EF of 50 to 55% with moderate aortic stenosis and mild to moderate aortic valve regurgitation.  Significant pulmonary hypertension is noted which is  unchanged from previous.     He was hospitalized in May with COVID-19 infection and bilateral PE while on low-dose apixaban.  He was placed on Lovenox ultimately switched to normal dose of apixaban.  We have been watching closely because he has a history of epistaxis on higher dose of apixaban.  He has been doing fairly well but has a reducible hernia that has been giving him more pain and has been occurring more frequently.  We asked Dr. Ramirez Zaidi to hold off on surgery until he has had 3 months of anticoagulation.  He should be ready for surgery in early August.  No chest pain.  He has had a slow recovery from this latest hospitalization but seems to be doing better and walking with a cane.  No orthopnea, PND or edema.  No palpitations, dizziness or syncope.    I last saw him in the office on 8/12/2024. At that time, he was doing fairly well. He has chronic stable dyspnea on exertion in the setting of pulmonary fibrosis. He denied orthopnea, PND, or edema. He did note rare palpitations with no dizziness or syncope. There were no medication changes made at that time.     He presented to the emergency department on 9/28/2024 with complaints of worsening dyspnea and cough. He was started on Azithromycin and steroids a week ago at urgent care. He has no completed those. His diuretic was increased by his PCP for 3 days. He saw Dr. Chidi Oconnor on 9/25/2024. At that appointment, his wife reported that the patient had been having fevers. There were plans made to proceed with a bronchoscopy for bronchoalveolar lavage given concern for possible infection on 10/1/2024. He had a CT angiogram of the chest done that revealed bilateral pulmonary emboli with and RV:LV appearing less than 1. There was also note of hardy megaly, interlobular septal thickening, and bilateral effusions, suggestive of congestive heart failure.     The remainder of his workup was notable for an elevated but flat troponin (49, 47), proBNP 7325, BUN  43, creatinine 1.43, INR 1.66, hemoglobin 1.6.  He has not required any supplemental oxygen.  His blood pressure has been stable as well as his heart rate.  He is afebrile.    I spoke to his wife who states that his Eliquis has not been missed since it was held in the spring for epistaxis.  Of note he has had multiple episodes of epistaxis in the past with blood thinner but had been doing well with Eliquis.  Limited echocardiogram is pending.  He denies chest pain or shortness of air above baseline.  He denies orthopnea, PND or edema.  No palpitations, dizziness or syncope.    HPI was reviewed, updated and amended when necessary.    Past Medical History:   Diagnosis Date    Aortic stenosis     Aspiration pneumonia     Atrial fibrillation     Bleeding from the nose     Bronchiectasis     CKD (chronic kidney disease)     COPD (chronic obstructive pulmonary disease)     Crohn's disease     Dizziness     Elevated cholesterol     Gastric ulcer     Generalized weakness     Gout     Gout     Hard of hearing     Hyperlipidemia     Hypertension     Inguinal hernia     left    Leg swelling     Lower extremity edema     Mild anemia     Near syncope     Nonrheumatic aortic valve stenosis     PAF (paroxysmal atrial fibrillation)     Palpitations     Peptic ulcer     Pneumonia due to COVID-19 virus 05/06/2023    Pneumonia of left lung due to infectious organism     Pulmonary embolism, bilateral 05/06/2023    Pulmonary fibrosis     Stroke     TIA (transient ischemic attack)        Past Surgical History:   Procedure Laterality Date    BRONCHOSCOPY N/A 10/22/2018    Procedure: BRONCHOSCOPY WITH BRONCHALVEOLAR LAVAGE;  Surgeon: Chidi Oconnor MD;  Location: [facility] ENDOSCOPY;  Service: Pulmonary    BRONCHOSCOPY N/A 7/8/2021    Procedure: BRONCHOSCOPY with BAL;  Surgeon: Chidi Oconnor MD;  Location: [facility] ENDOSCOPY;  Service: Pulmonary;  Laterality: N/A;  Pre: bronchectasis  Post: same    CARDIAC ELECTROPHYSIOLOGY PROCEDURE N/A  "7/9/2021    Procedure: Pacemaker DC new--[employer] possible His lead;  Surgeon: Rashaun Nettles MD;  Location: [facility] CATH INVASIVE LOCATION;  Service: Cardiology;  Laterality: N/A;    CARDIAC ELECTROPHYSIOLOGY PROCEDURE N/A 9/3/2021    Procedure: AV node 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 ppm;  Surgeon: Rasahun Nettles MD;  Location: [facility] CATH INVASIVE LOCATION;  Service: Cardiovascular;  Laterality: N/A;    CATARACT EXTRACTION Bilateral     COLONOSCOPY      COLONOSCOPY N/A 3/9/2018    Procedure: COLONOSCOPY to terminal ileum with bx;  Surgeon: Aron Cabrera MD;  Location: [facility] ENDOSCOPY;  Service:     COLONOSCOPY N/A 11/19/2020    Procedure: COLONOSCOPY to cecum:  apc to cecum angiodysplagia,;  Surgeon: Aron Cabrera MD;  Location: [facility] ENDOSCOPY;  Service: Gastroenterology;  Laterality: N/A;  GI bleed  post:  diverticulosis, angiodysplagia    CYSTECTOMY      back and shoulder area    ENDOSCOPY N/A 3/9/2018    Procedure: ESOPHAGOGASTRODUODENOSCOPY with bx;  Surgeon: Aron Cabrera MD;  Location: [facility] ENDOSCOPY;  Service:     ENDOSCOPY N/A 11/19/2020    Procedure: ESOPHAGOGASTRODUODENOSCOPY;  Surgeon: Aron Cabrera MD;  Location: [facility] ENDOSCOPY;  Service: Gastroenterology;  Laterality: N/A;  GI bleed  post:  HH.    EYE SURGERY Left     detached retina    EYE SURGERY Right     \"repaired a hole in the eye\"    INGUINAL HERNIA REPAIR Left 8/29/2023    Procedure: INGUINAL HERNIA REPAIR;  Surgeon: Itz Zaidi Jr., MD;  Location: [facility] MAIN OR;  Service: General;  Laterality: Left;    INSERT / REPLACE / REMOVE PACEMAKER      TESTICLE SURGERY      benign tumor removed.       Medications Prior to Admission   Medication Sig Dispense Refill Last Dose    allopurinol (ZYLOPRIM) 100 MG tablet Take 1 tablet by mouth 2 (Two) Times a Day. Indications: Gout   9/28/2024    apixaban (ELIQUIS) 5 MG tablet tablet Take 1 tablet by mouth Every 12 (Twelve) Hours. Indications: DVT/PE (active thrombosis) 180 tablet 3 " 9/28/2024    atorvastatin (LIPITOR) 10 MG tablet Take 1 tablet by mouth Every Other Day. M-W-F  Indications: High Amount of Fats in the Blood   9/27/2024    ferrous gluconate (FERGON) 324 MG tablet Take 1 tablet by mouth Daily With Breakfast.   9/28/2024    furosemide (LASIX) 20 MG tablet Take 1 tablet by mouth Daily.   9/27/2024    metoprolol tartrate (LOPRESSOR) 25 MG tablet Take 1 tablet by mouth Every 12 (Twelve) Hours. Indications: High Blood Pressure Disorder 180 tablet 1 9/28/2024    omeprazole (priLOSEC) 40 MG capsule Take 1 capsule by mouth Daily. Indications: Heartburn   9/28/2024    azithromycin (ZITHROMAX) 250 MG tablet Take 2 tablets by mouth Daily.       azithromycin (ZITHROMAX) 250 MG tablet 1 po daily x 4 days. 4 tablet 0     Budesonide (ENTOCORT EC) 3 MG 24 hr capsule Take 1 capsule by mouth Every Morning.       clindamycin (CLINDAGEL) 1 % gel Apply  topically to the appropriate area as directed.       hydrocortisone 2.5 % ointment Apply  topically to the appropriate area as directed 2 (Two) Times a Day. to affected area  Indications: Skin Inflammation       IPRATROPIUM-ALBUTEROL IN Inhale 2 puffs 3 (Three) Times a Day. Indications: COPD exacerbation, complicated       ketoconazole (NIZORAL) 2 % shampoo APPLY SHAMPOO TO AFFECTED AREA DAILY       Nutritional Supplements (BOOST 100 CALORIE SMART PO) Take 1 bottle by mouth Daily. Indications: Nutritional Support       O2 (OXYGEN) Inhale 2 L/min As Needed (soa). Indications: Oxygen Saturation Decreased       potassium chloride (KLOR-CON M10) 10 MEQ CR tablet Take 1 tablet by mouth Daily.       predniSONE (DELTASONE) 20 MG tablet 2 po daily x 3d, then 1 po daily x 3d. 9 tablet 0        Current Meds  Scheduled Meds:allopurinol, 100 mg, Oral, BID  atorvastatin, 10 mg, Oral, Every Other Day  benzonatate, 200 mg, Oral, TID  Budesonide, 3 mg, Oral, QAM  furosemide, 20 mg, Oral, Daily  ipratropium-albuterol, 3 mL, Nebulization, 4x Daily - RT  metoprolol  tartrate, 25 mg, Oral, Q12H  pantoprazole, 40 mg, Oral, Q AM      Continuous Infusions:heparin, 18 Units/kg/hr, Last Rate: 22 Units/kg/hr (24 0711)      PRN Meds:.  O2    [COMPLETED] Insert Peripheral IV **AND** sodium chloride    Allergies as of 2024 - Reviewed 2024   Allergen Reaction Noted    Amiodarone Unknown (See Comments) 10/22/2018    Diltiazem Arrhythmia 2017    Doxycycline Rash 2018    Allopurinol Unknown - Low Severity 2015    Cefdinir Arrhythmia 2021    Indomethacin Rash 10/22/2018       Social History     Socioeconomic History    Marital status:    Tobacco Use    Smoking status: Former     Types: Cigars     Quit date: 10/28/1994     Years since quittin.9     Passive exposure: Past    Smokeless tobacco: Never    Tobacco comments:     Quit 25 years ago   Vaping Use    Vaping status: Never Used   Substance and Sexual Activity    Alcohol use: No     Comment: daily caffiene - 1/2 cup of coffee    Drug use: No    Sexual activity: Defer       Family History   Problem Relation Age of Onset    Stroke Mother     Heart disease Mother     Hypertension Mother     Diabetes Mother     Stroke Father     Heart disease Sister         Heart Valve Replacement    Ovarian cancer Sister     Rheumatic fever Sister     Diabetes Sister     Malig Hyperthermia Neg Hx      Past surgical, medical, social and family history was reviewed, updated and amended when necessary.    Review of Systems   Constitutional: Negative for chills and fever.   HENT:  Negative for hoarse voice and sore throat.    Eyes:  Negative for double vision and photophobia.   Cardiovascular:  Negative for chest pain, leg swelling, near-syncope, orthopnea, palpitations, paroxysmal nocturnal dyspnea and syncope.   Respiratory:  Positive for cough. Negative for wheezing.    Skin:  Negative for poor wound healing and rash.   Musculoskeletal:  Negative for arthritis and joint swelling.   Gastrointestinal:   "Negative for bloating, abdominal pain, hematemesis and hematochezia.   Neurological:  Negative for dizziness and focal weakness.   Psychiatric/Behavioral:  Negative for depression and suicidal ideas.             Objective:   Temp:  [97.5 °F (36.4 °C)-99.8 °F (37.7 °C)] 98.1 °F (36.7 °C)  Heart Rate:  [59-82] 68  Resp:  [14-18] 18  BP: (110-155)/(56-70) 110/62  Body mass index is 22.24 kg/m².  Flowsheet Rows      Flowsheet Row First Filed Value   Admission Height 172.7 cm (68\") Documented at 09/28/2024 2132   Admission Weight 66.4 kg (146 lb 4.8 oz) Documented at 09/28/2024 2132          Vitals:    09/29/24 0720   BP: 110/62   Pulse: 68   Resp: 18   Temp: 98.1 °F (36.7 °C)   SpO2: 95%       Vitals reviewed.   Constitutional:       Appearance: Frail. Acutely ill-appearing.   Neck:      Vascular: No JVR. JVD normal.   Pulmonary:      Effort: Pulmonary effort is normal.      Breath sounds: No wheezing. No rhonchi. Bibasilar Rales present.   Chest:      Chest wall: Not tender to palpatation.   Cardiovascular:      PMI at left midclavicular line. Normal rate. Regular rhythm. Normal S1. Normal S2.       Murmurs: There is no murmur.      No gallop.  No click. No rub.   Pulses:     Intact distal pulses.   Edema:     Peripheral edema absent.   Abdominal:      General: Bowel sounds are normal.      Palpations: Abdomen is soft.      Tenderness: There is no abdominal tenderness.   Musculoskeletal: Normal range of motion.         General: No tenderness. Skin:     General: Skin is warm and dry.   Neurological:      General: No focal deficit present.      Mental Status: Alert and oriented to person, place and time.                 Lab Review:      Results from last 7 days   Lab Units 09/28/24  1848   SODIUM mmol/L 138   POTASSIUM mmol/L 4.3   CHLORIDE mmol/L 100   CO2 mmol/L 26.9   BUN mg/dL 43*   CREATININE mg/dL 1.43*   CALCIUM mg/dL 8.9   BILIRUBIN mg/dL 0.5   ALK PHOS U/L 54   ALT (SGPT) U/L 27   AST (SGOT) U/L 22   GLUCOSE " mg/dL 78     Results from last 7 days   Lab Units 09/28/24 2059 09/28/24  1848   HSTROP T ng/L 47* 49*     @LABNTbnp@  Results from last 7 days   Lab Units 09/29/24  0426 09/28/24  1848 09/23/24  1305   WBC 10*3/mm3 8.86 10.55 17.83*   HEMOGLOBIN g/dL 12.4* 11.6* 11.8*   HEMATOCRIT % 37.2* 35.8* 37.7*   PLATELETS 10*3/mm3 211 189 174     Results from last 7 days   Lab Units 09/29/24  0426 09/28/24  2135   INR   --  1.66*   APTT seconds 57.1* 31.5         @[facility](chol,trig,hdl,ldl)              I personally viewed and interpreted the patient's EKG/Telemetry data    Pulmonary emboli    Assessment and Plan:    Pulmonary embolism -very interesting that he has failed apixaban therapy and I would like to review the images with the pulmonologist to compare them to previous CT chest.  He has no evidence for cor pulmonale or pulmonary edema.  He has the stable fine crackles of pulmonary fibrosis.  I am going to monitor his clinical progress closely but think he may benefit best from a hematology consult.  Continue heparin.  He has history of significant epistaxis on anticoagulation we will need to monitor this closely.  Severe aortic stenosis -we are in the workup of evaluation for candidacy of TAVR.  I have discussed this with his son who is a physician.  Atrial fibrillation -status post AV johanne ablation with left bundle pacer.  Currently on heparin.  Pulmonary hypertension -in the setting of pulmonary fibrosis followed by Dr. Chidi Chung.  I will discuss further with pulmonology.  History of Crohn's disease    Nixon Brown MD  09/29/24  09:14 EDT.  Time spent in reviewing chart, discussion and examination:

## 2024-09-29 NOTE — PROGRESS NOTES
"  PROGRESS NOTE  Patient Name: Asia Ly  Age/Sex: 89 y.o. male  : 1932  MRN: [id number]    Date of Admission: 2024  Date of Encounter Visit: 24   LOS: 1 day   Patient Care Team:  Lubna Lobo MD as PCP - General  Lubna Lobo MD as PCP - Family Medicine    Chief Complaint: Recurrent PE    Hospital course: Patient had a PE in May 2023, has been on anticoagulation with no problem with good adherence to the medical regimen yet he developed another venous thromboembolic events and had a confirmed new onset PE on this admission.  Currently on the heparin drip        REVIEW OF SYSTEMS:   CONSTITUTIONAL: no fever or chills  CARDIOVASCULAR: No chest pain, chest pressure or chest discomfort. No palpitations, positive edema.   RESPIRATORY: Improved shortness of breath  GASTROINTESTINAL: No anorexia, nausea, vomiting or diarrhea. No abdominal pain or blood.   HEMATOLOGIC: No bleeding or bruising.     Ventilator/Non-Invasive Ventilation Settings (From admission, onward)      None              Vital Signs  Temp:  [97.5 °F (36.4 °C)-99.8 °F (37.7 °C)] 98.1 °F (36.7 °C)  Heart Rate:  [59-82] 60  Resp:  [14-18] 18  BP: (110-155)/(56-70) 110/62  SpO2:  [93 %-99 %] 95 %  on    Device (Oxygen Therapy): room air    Intake/Output Summary (Last 24 hours) at 2024 1134  Last data filed at 2024 0911  Gross per 24 hour   Intake 118 ml   Output 1550 ml   Net -1432 ml     Flowsheet Rows      Flowsheet Row First Filed Value   Admission Height 172.7 cm (68\") Documented at 2024   Admission Weight 66.4 kg (146 lb 4.8 oz) Documented at 2024          Body mass index is 22.24 kg/m².      24   Weight: 66.4 kg (146 lb 4.8 oz)       Physical Exam:  GEN:  No acute distress, alert, cooperative, well developed   EYES:   Sclerae clear. No icterus. PERRL. Normal EOM  ENT:   External ears/nose normal, no oral lesions, no thrush, mucous membranes moist  NECK:  Supple, midline trachea, no " "JVD  LUNGS: Normal chest on inspection, minimal crackles, no wheezes. Respirations regular, even and unlabored.   CV:  Regular rhythm and rate. Normal S1/S2. No murmurs, gallops, or rubs noted.  ABD:  Soft, nontender and nondistended. Normal bowel sounds. No guarding  EXT:  Moves all extremities well. No cyanosis. No redness. No edema.   Skin: Dry, intact, no bleeding    Results Review:    Results From Last 14 Days   Lab Units 09/28/24  1848   LACTATE mmol/L 1.0     Results from last 7 days   Lab Units 09/28/24  1848   SODIUM mmol/L 138   POTASSIUM mmol/L 4.3   CHLORIDE mmol/L 100   CO2 mmol/L 26.9   BUN mg/dL 43*   CREATININE mg/dL 1.43*   CALCIUM mg/dL 8.9   AST (SGOT) U/L 22   ALT (SGPT) U/L 27   ANION GAP mmol/L 11.1   ALBUMIN g/dL 3.5     Results from last 7 days   Lab Units 09/28/24 2059 09/28/24  1848   HSTROP T ng/L 47* 49*         Results from last 7 days   Lab Units 09/28/24  1848   PROBNP pg/mL 7,325.0*     Results from last 7 days   Lab Units 09/29/24  0426 09/28/24  1848 09/23/24  1305   WBC 10*3/mm3 8.86 10.55 17.83*   HEMOGLOBIN g/dL 12.4* 11.6* 11.8*   HEMATOCRIT % 37.2* 35.8* 37.7*   PLATELETS 10*3/mm3 211 189 174   MCV fL 93.0 96.8 98.4   NEUTROPHIL % % 74.3 77.7* 91.3*   LYMPHOCYTE % % 12.9* 9.0* 3.6*   MONOCYTES % % 8.9 9.4 4.4   EOSINOPHIL % % 1.1 1.0 0.0   BASOPHIL % % 0.2 0.2 0.1   IMM GRAN % % 2.6* 2.7* 0.6     Results from last 7 days   Lab Units 09/29/24  0426 09/28/24  2135   INR   --  1.66*   APTT seconds 57.1* 31.5               Invalid input(s): \"LDLCALC\"          No results found for: \"POCGLU\"  Results from last 7 days   Lab Units 09/28/24  1848   PROCALCITONIN ng/mL 0.16   LACTATE mmol/L 1.0         Results from last 7 days   Lab Units 09/23/24  1502   NITRITE UA  Negative     Results from last 7 days   Lab Units 09/28/24  1850   COVID19  Not Detected   ADENOVIRUS DETECTION BY PCR  Not Detected   CORONAVIRUS 229E  Not Detected   CORONAVIRUS HKU1  Not Detected   CORONAVIRUS NL63  Not " Detected   CORONAVIRUS OC43  Not Detected   HUMAN METAPNEUMOVIRUS  Not Detected   HUMAN RHINOVIRUS/ENTEROVIRUS  Detected*   INFLUENZA B PCR  Not Detected   PARAINFLUENZA 1  Not Detected   PARAINFLUENZA VIRUS 2  Not Detected   PARAINFLUENZA VIRUS 3  Not Detected   PARAINFLUENZA VIRUS 4  Not Detected   BORDETELLA PERTUSSIS PCR  Not Detected   BORDETELLA PARAPERTUSSIS PCR  Not Detected   CHLAMYDOPHILA PNEUMONIAE PCR  Not Detected   MYCOPLAMA PNEUMO PCR  Not Detected   RSV, PCR  Not Detected     Results from last 7 days   Lab Units 09/23/24  1502 09/23/24  1305   PROTEIN TOTAL URINE  Negative  --    URIC ACID mg/dL  --  6.2           Imaging:   Imaging Results (All)               I reviewed the patient's new clinical results.  I personally viewed and interpreted the patient's imaging results:        Medication Review:   allopurinol, 100 mg, Oral, BID  atorvastatin, 10 mg, Oral, Every Other Day  benzonatate, 200 mg, Oral, TID  Budesonide, 3 mg, Oral, QAM  furosemide, 20 mg, Oral, Daily  ipratropium-albuterol, 3 mL, Nebulization, 4x Daily - RT  metoprolol tartrate, 25 mg, Oral, Q12H  pantoprazole, 40 mg, Oral, Q AM        heparin, 18 Units/kg/hr, Last Rate: 22 Units/kg/hr (09/29/24 0711)        ASSESSMENT:   Recurrent bilateral PE (while on oral anticoagulation with Eliquis 5 mg twice daily dosing)  Rhinovirus infection  Cough  Bronchiectasis  Post-inflammatory pulmonary fibrosis  COPD, stable  Paroxysmal A-fib  Hyperlipidemia  Gout  Crohn's disease  Nonrheumatic aortic valve stenosis  History of COVID-19  History of stroke  CKD  Essential hypertension    PLAN:  Patient has failed anticoagulation, discussed with the spouse at the bedside, she is 100% certain that he did not skip any of his oral anticoagulation medication.  Changing to a anticoagulation regimen that is not part of the DOAC is something they prefer not to consider, patient and family do not want him to be on the Coumadin.  Currently is on the heparin drip  which should be adequate and may even switch to the Lovenox  Whether the patient is a potential candidate for other DOAC after failing the apixaban is to be discussed with hematology and will take the final recommendation on that    Discussed with cardiology and with the patient and family  Labs/Notes/films were independently reviewed and pertinent results are summarized above  The copied texts in this note were reviewed and they are accurate as of 09/29/24    Disposition: Per primary team    Alla Luna MD  09/29/24  11:34 EDT          Dictated utilizing Dragon dictation

## 2024-09-30 LAB
APTT PPP: 88.7 SECONDS (ref 22.7–35.4)
BASOPHILS # BLD AUTO: 0.03 10*3/MM3 (ref 0–0.2)
BASOPHILS NFR BLD AUTO: 0.4 % (ref 0–1.5)
DEPRECATED RDW RBC AUTO: 48.3 FL (ref 37–54)
EOSINOPHIL # BLD AUTO: 0.15 10*3/MM3 (ref 0–0.4)
EOSINOPHIL NFR BLD AUTO: 1.8 % (ref 0.3–6.2)
ERYTHROCYTE [DISTWIDTH] IN BLOOD BY AUTOMATED COUNT: 14 % (ref 12.3–15.4)
HCT VFR BLD AUTO: 36.8 % (ref 37.5–51)
HGB BLD-MCNC: 11.6 G/DL (ref 13–17.7)
IMM GRANULOCYTES # BLD AUTO: 0.31 10*3/MM3 (ref 0–0.05)
IMM GRANULOCYTES NFR BLD AUTO: 3.6 % (ref 0–0.5)
LYMPHOCYTES # BLD AUTO: 1.19 10*3/MM3 (ref 0.7–3.1)
LYMPHOCYTES NFR BLD AUTO: 13.9 % (ref 19.6–45.3)
MCH RBC QN AUTO: 29.8 PG (ref 26.6–33)
MCHC RBC AUTO-ENTMCNC: 31.5 G/DL (ref 31.5–35.7)
MCV RBC AUTO: 94.6 FL (ref 79–97)
MONOCYTES # BLD AUTO: 0.81 10*3/MM3 (ref 0.1–0.9)
MONOCYTES NFR BLD AUTO: 9.5 % (ref 5–12)
NEUTROPHILS NFR BLD AUTO: 6.05 10*3/MM3 (ref 1.7–7)
NEUTROPHILS NFR BLD AUTO: 70.8 % (ref 42.7–76)
NRBC BLD AUTO-RTO: 0 /100 WBC (ref 0–0.2)
PLATELET # BLD AUTO: 183 10*3/MM3 (ref 140–450)
PMV BLD AUTO: 11.4 FL (ref 6–12)
RBC # BLD AUTO: 3.89 10*6/MM3 (ref 4.14–5.8)
WBC NRBC COR # BLD AUTO: 8.54 10*3/MM3 (ref 3.4–10.8)

## 2024-09-30 PROCEDURE — 85730 THROMBOPLASTIN TIME PARTIAL: CPT | Performed by: HOSPITALIST

## 2024-09-30 PROCEDURE — 94799 UNLISTED PULMONARY SVC/PX: CPT

## 2024-09-30 PROCEDURE — 94761 N-INVAS EAR/PLS OXIMETRY MLT: CPT

## 2024-09-30 PROCEDURE — 99232 SBSQ HOSP IP/OBS MODERATE 35: CPT | Performed by: INTERNAL MEDICINE

## 2024-09-30 PROCEDURE — 94664 DEMO&/EVAL PT USE INHALER: CPT

## 2024-09-30 PROCEDURE — 25010000002 HEPARIN (PORCINE) 25000-0.45 UT/250ML-% SOLUTION: Performed by: INTERNAL MEDICINE

## 2024-09-30 PROCEDURE — 99223 1ST HOSP IP/OBS HIGH 75: CPT | Performed by: INTERNAL MEDICINE

## 2024-09-30 PROCEDURE — 94760 N-INVAS EAR/PLS OXIMETRY 1: CPT

## 2024-09-30 PROCEDURE — 85025 COMPLETE CBC W/AUTO DIFF WBC: CPT | Performed by: INTERNAL MEDICINE

## 2024-09-30 RX ADMIN — BENZONATATE 200 MG: 100 CAPSULE ORAL at 14:07

## 2024-09-30 RX ADMIN — IPRATROPIUM BROMIDE AND ALBUTEROL SULFATE 3 ML: 2.5; .5 SOLUTION RESPIRATORY (INHALATION) at 12:20

## 2024-09-30 RX ADMIN — HEPARIN SODIUM 22 UNITS/KG/HR: 10000 INJECTION, SOLUTION INTRAVENOUS at 10:26

## 2024-09-30 RX ADMIN — ALLOPURINOL 100 MG: 100 TABLET ORAL at 21:10

## 2024-09-30 RX ADMIN — BENZONATATE 200 MG: 100 CAPSULE ORAL at 08:57

## 2024-09-30 RX ADMIN — BENZONATATE 200 MG: 100 CAPSULE ORAL at 21:10

## 2024-09-30 RX ADMIN — IPRATROPIUM BROMIDE AND ALBUTEROL SULFATE 3 ML: 2.5; .5 SOLUTION RESPIRATORY (INHALATION) at 07:26

## 2024-09-30 RX ADMIN — IPRATROPIUM BROMIDE AND ALBUTEROL SULFATE 3 ML: 2.5; .5 SOLUTION RESPIRATORY (INHALATION) at 15:06

## 2024-09-30 RX ADMIN — PANTOPRAZOLE SODIUM 40 MG: 40 TABLET, DELAYED RELEASE ORAL at 05:20

## 2024-09-30 RX ADMIN — IPRATROPIUM BROMIDE AND ALBUTEROL SULFATE 3 ML: 2.5; .5 SOLUTION RESPIRATORY (INHALATION) at 19:52

## 2024-09-30 RX ADMIN — BUDESONIDE 3 MG: 3 CAPSULE ORAL at 05:20

## 2024-09-30 RX ADMIN — ALLOPURINOL 100 MG: 100 TABLET ORAL at 08:56

## 2024-09-30 RX ADMIN — METOPROLOL TARTRATE 25 MG: 25 TABLET, FILM COATED ORAL at 21:10

## 2024-09-30 NOTE — PLAN OF CARE
Goal Outcome Evaluation:      A/O x4, on room air, V-paced on tele. Remains on heparin drip at therapeutic rate. VSS.

## 2024-09-30 NOTE — PROGRESS NOTES
LOS: 2 days   Patient Care Team:  Lubna Lobo MD as PCP - General  Lubna Lobo MD as PCP - Family Medicine    Chief Complaint: Follow-up recurrent pulmonary embolism, severe aortic stenosis.    Interval History: No complaints this morning.  He denied any shortness of breath or chest pain.    Vital Signs:  Temp:  [97.5 °F (36.4 °C)-98.1 °F (36.7 °C)] 97.9 °F (36.6 °C)  Heart Rate:  [60-70] 61  Resp:  [14-22] 18  BP: (106-119)/(48-59) 115/54    Intake/Output Summary (Last 24 hours) at 9/30/2024 1137  Last data filed at 9/29/2024 1830  Gross per 24 hour   Intake 472 ml   Output --   Net 472 ml       Physical Exam:   General Appearance:    No acute distress, alert and oriented x4, elderly appearing.   Lungs:     Faint bibasilar rales.    Heart:    Regular rhythm and normal rate. III/VI SM RUSB and LUSB.   Abdomen:     Soft, nontender, nondistended.    Extremities:   No clubbing, cyanosis, or edema.     Results Review:    Results from last 7 days   Lab Units 09/28/24  1848   SODIUM mmol/L 138   POTASSIUM mmol/L 4.3   CHLORIDE mmol/L 100   CO2 mmol/L 26.9   BUN mg/dL 43*   CREATININE mg/dL 1.43*   GLUCOSE mg/dL 78   CALCIUM mg/dL 8.9     Results from last 7 days   Lab Units 09/28/24  2059 09/28/24  1848   HSTROP T ng/L 47* 49*     Results from last 7 days   Lab Units 09/30/24  0516   WBC 10*3/mm3 8.54   HEMOGLOBIN g/dL 11.6*   HEMATOCRIT % 36.8*   PLATELETS 10*3/mm3 183     Results from last 7 days   Lab Units 09/30/24  0932 09/29/24  1411 09/29/24  0426 09/28/24  2135   INR   --   --   --  1.66*   APTT seconds 88.7* 76.6* 57.1* 31.5                   I reviewed the patient's new clinical results.        Assessment:  1.  Acute bilateral pulmonary embolism, recurrent (previous PE in May 2023)  2.  Chronic left lower extremity DVT of the common femoral vein  3.  Potential anticoagulation failure with Eliquis  4.  Rhinovirus infection  5.  Severe aortic stenosis (previously known)  6.  Pulmonary fibrosis  7.   Pulmonary hypertension  8.  COPD without acute exacerbation  9.  Persistent atrial fibrillation with [employer] left bundle pacemaker and prior AV node ablation.  10.  Chronic kidney disease  11.  History of CVA  12.  Crohn's disease    Plan:  -Again, he has likely failed anticoagulation with Eliquis.  The patient and his family have been fairly adamant that he has not missed any doses.  Continue heparin drip for now.  Hematology consult is pending for anticoagulation recommendations.    -Severe aortic stenosis has previously been known.  He has dry rales from pulmonary fibrosis on exam.  I do not see any evidence of significant volume overload or decompensated CHF.  Outpatient TAVR evaluation in progress.    -Limited echocardiogram with normal right ventricular size and low normal function.  EF was 50 to 55%.  Severe aortic stenosis has previously been known.  This was unchanged.  There is no indication for pulmonary thrombectomy.      Matt Vickers MD  09/30/24  11:37 EDT

## 2024-09-30 NOTE — PROGRESS NOTES
"Daily progress note        Date of Admission: 2024  Patient Identification:  Name: Asia Ly  Age: 89 y.o.  Sex: male  :  1932  MRN: [id number]                     Primary Care Physician: Lubna Lobo MD    Chief Complaint:    Awake and alert and feeling same like yesterday with no new complaints and family at bedside.  Patient denies any chest pain shortness of breath palpitation.    History of Present Illness:   89 year old gentleman presented to the emergency room with shortness of breath; he had been to urgent care where antibiotics were started; he did not get better; he had diuretics increased; he was seen by his pcp and pulmonologist; he was hypoxic so his wife brought him to the ED;     Review of Systems  See history of present illness and past medical history.  Patient denies headache, dizziness, syncope, falls, trauma, change in vision, change in hearing, change in taste, changes in weight, changes in appetite, focal weakness, numbness, or paresthesia.  Patient denies chest pain, palpitations, dyspnea, orthopnea, PND, cough, sinus pressure, rhinorrhea, epistaxis, hemoptysis, nausea, vomiting,hematemesis, diarrhea, constipation or hematochezia.  Denies cold or heat intolerance, polydipsia, polyuria, polyphagia. Denies hematuria, pyuria, dysuria, hesitancy, frequency or urgency. Denies consumption of raw and under cooked meats foods or change in water source.  Denies fever, chills, sweats, night sweats.  Denies missing any routine medications. Remainder of ROS is negative.    Exam:  /56 (BP Location: Right arm, Patient Position: Lying)   Pulse 75   Temp 97.3 °F (36.3 °C) (Oral)   Resp 18   Ht 172.7 cm (68\")   Wt 66.2 kg (146 lb)   SpO2 94%   BMI 22.20 kg/m²     General Appearance:    Alert, cooperative, no distress, appears stated age   Head:    Normocephalic, without obvious abnormality, atraumatic   Eyes:    PERRL, conjunctivae/corneas clear, EOM's intact, both eyes   Ears:  "   Normal external ear canals, both ears   Nose:   Nares normal, septum midline, mucosa normal, no drainage    or sinus tenderness   Throat:   Lips, mucosa, and tongue normal   Neck:   Supple, symmetrical, trachea midline, no adenopathy;     thyroid:  no enlargement/tenderness/nodules; no carotid    bruit or JVD   Back:     Symmetric, no curvature, ROM normal, no CVA tenderness   Lungs:     Decreased breath sounds bilaterally, respirations unlabored   Chest Wall:    No tenderness or deformity    Heart:    Regular rate and rhythm, S1 and S2 normal, no murmur, rub   or gallop   Abdomen:     Soft, nontender, bowel sounds active all four quadrants,     no masses, no hepatomegaly, no splenomegaly   Extremities:   Extremities normal, atraumatic, no cyanosis or edema      .  Data Review:  Lab Results (last 24 hours)       Procedure Component Value Units Date/Time    aPTT [[id number]]  (Abnormal) Collected: 09/30/24 0932    Specimen: Blood from Arm, Left Updated: 09/30/24 1049     PTT 88.7 seconds     CBC & Differential [[id number]]  (Abnormal) Collected: 09/30/24 0516    Specimen: Blood Updated: 09/30/24 0615    Narrative:      The following orders were created for panel order CBC & Differential.  Procedure                               Abnormality         Status                     ---------                               -----------         ------                     CBC Auto Differential[[id number]]        Abnormal            Final result                 Please view results for these tests on the individual orders.    CBC Auto Differential [[id number]]  (Abnormal) Collected: 09/30/24 0516    Specimen: Blood Updated: 09/30/24 0615     WBC 8.54 10*3/mm3      RBC 3.89 10*6/mm3      Hemoglobin 11.6 g/dL      Hematocrit 36.8 %      MCV 94.6 fL      MCH 29.8 pg      MCHC 31.5 g/dL      RDW 14.0 %      RDW-SD 48.3 fl      MPV 11.4 fL      Platelets 183 10*3/mm3      Neutrophil % 70.8 %      Lymphocyte % 13.9 %      Monocyte % 9.5 %       Eosinophil % 1.8 %      Basophil % 0.4 %      Immature Grans % 3.6 %      Neutrophils, Absolute 6.05 10*3/mm3      Lymphocytes, Absolute 1.19 10*3/mm3      Monocytes, Absolute 0.81 10*3/mm3      Eosinophils, Absolute 0.15 10*3/mm3      Basophils, Absolute 0.03 10*3/mm3      Immature Grans, Absolute 0.31 10*3/mm3      nRBC 0.0 /100 WBC     aPTT [[id number]]  (Abnormal) Collected: 09/29/24 1411    Specimen: Blood from Arm, Left Updated: 09/29/24 1532     PTT 76.6 seconds           Imaging Results (All)       Procedure Component Value Units Date/Time    CT Angiogram Chest Pulmonary Embolism [[id number]] Collected: 09/28/24 2118     Updated: 09/28/24 2132    Narrative:      CT ANGIOGRAM OF THE CHEST     HISTORY: Shortness of breath     COMPARISON: May 6, 2023     TECHNIQUE: Axial CT imaging was obtained through the thorax. IV contrast  was administered. Three-D reformatted images were obtained.     FINDINGS:  The study is positive for bilateral pulmonary emboli. Most proximal  thrombus is noted within the main right pulmonary artery. It is  difficult to assess for right heart strain, given timing of the contrast  bolus. However, I don't think the RV to LV ratio is greater than 1.  Examination was not optimized for assessment of the thoracic aorta.  There is dilatation of the ascending aorta, measuring up to 4 cm. The  remainder of the thoracic aorta is normal in caliber. There is  calcification of the thoracic aorta and coronary arteries. Left-sided  pacemaker is present. The heart is enlarged. Thyroid gland, trachea, and  esophagus appear unremarkable. There are prominent mediastinal lymph  nodes. For example, a subcarinal lymph node measures up to 1.5 cm. This  appears similar to the prior exam. Lower right paratracheal node  measures up to 1.2 cm in short axis dimensions. This also appears  similar to the prior exam. There is interlobular septal thickening, as  well as areas of groundglass attenuation. There are  bilateral pleural  effusions, right greater than left. Constellation of findings likely  reflects congestive heart failure. However, more focal airspace  consolidation is noted at both lung bases. This may represent pneumonia.  The patient has had similar findings in the past, and there may be a  component of chronic scarring, but superimposed pneumonia is also  suspected. There is reflux of contrast material into the hepatic veins  and inferior vena cava, which can be seen in the setting of pulmonary  arterial hypertension. Images through the upper abdomen demonstrate  cholelithiasis. There are simple appearing bilateral renal cysts. No  acute abnormalities are noted within the upper abdomen. The patient does  have some prominent chest wall collaterals. This is suspected to be  secondary to some stenosis of the left subclavian vein, secondary to the  pacemaker.          Impression:         1. Bilateral pulmonary emboli. RV to LV ratio appears to be less than 1,  although is difficult to assess, due to timing of the contrast bolus.  Presence or absence of pulmonary infarction cannot be assessed, due to  the presence of pulmonary infiltrates.  2. Leija megaly, interlobular septal thickening, and bilateral  effusions, suggesting congestive heart failure.  3. Dense infiltrates noted at the lung bases bilaterally. There may be a  component of chronic scarring at the lung bases, but certainly  superimposed pneumonia is suspected.     FINDINGS were called to Dr. Petty at 9:29 p.m.     Radiation dose reduction techniques were utilized, including automated  exposure control and exposure modulation based on body size.        This report was finalized on 9/28/2024 9:29 PM by Dr. Judith Ortiz M.D on Workstation: [id number]       XR Chest 1 View [[id number]] Collected: 09/28/24 1829     Updated: 09/28/24 1834    Narrative:      XR CHEST 1 VW-     Clinical: Dyspnea     COMPARISON examination 9/22/2024     FINDINGS:  Transvenous pacemaker in position as before. There is cardiac  enlargement. Atherosclerotic calcification of the aorta. Is again  demonstrated a right-sided pleural effusion which is similar to or  slightly more pronounced than before. There is diffuse prominence of the  interstitium, suggesting vascular congestion, not quite as pronounced as  before. No new area of consolidation has developed, the remainder is  unremarkable.     This report was finalized on 9/28/2024 6:30 PM by Dr. Arsalan Max M.D  on Workstation: [id number]               Current Facility-Administered Medications:     allopurinol (ZYLOPRIM) tablet 100 mg, 100 mg, Oral, BID, Osman Zheng MD, 100 mg at 09/30/24 0856    atorvastatin (LIPITOR) tablet 10 mg, 10 mg, Oral, Every Other Day, Osman Zheng MD, 10 mg at 09/29/24 0842    benzonatate (TESSALON) capsule 200 mg, 200 mg, Oral, TID, Carl Mcclain MD, 200 mg at 09/30/24 1407    Budesonide (ENTOCORT EC) 24 hr capsule 3 mg, 3 mg, Oral, QAM, Osman Zheng MD, 3 mg at 09/30/24 0520    furosemide (LASIX) tablet 20 mg, 20 mg, Oral, Daily, Osman Zheng MD, 20 mg at 09/29/24 0842    heparin 72047 units/250 mL (100 units/mL) in 0.45 % NaCl infusion, 18 Units/kg/hr, Intravenous, Titrated, sOman Zheng MD, Last Rate: 14.6 mL/hr at 09/30/24 1026, 22 Units/kg/hr at 09/30/24 1026    ipratropium-albuterol (DUO-NEB) nebulizer solution 3 mL, 3 mL, Nebulization, 4x Daily - RT, Osman Zehng MD, 3 mL at 09/30/24 1220    metoprolol tartrate (LOPRESSOR) tablet 25 mg, 25 mg, Oral, Q12H, Osman Zheng MD, 25 mg at 09/29/24 2122    O2 (OXYGEN), 2 L/min, Inhalation, PRN, Osman Zheng MD    pantoprazole (PROTONIX) EC tablet 40 mg, 40 mg, Oral, Q AM, Osman Zheng MD, 40 mg at 09/30/24 3120    [COMPLETED] Insert Peripheral IV, , , Once **AND** sodium chloride 0.9 % flush 10 mL, 10 mL, Intravenous, PRN, Osman Zheng MD       ASSESSMENT  Acute bilateral pulmonary  embolism  Chronic left lower extremity DVT  Severe aortic stenosis  Chronic atrial fibrillation  COPD bronchiectasis and pulmonary fibrosis  Pulmonary hypertension  Hypertension  Hyperlipidemia  Chron's disease  Chronic kidney disease      PLAN  CPM  Continue anticoagulation per hematology  Continue home medications  Stress ulcer DVT prophylaxis  Pulmonary hematology and cardiology following patient  Supportive care  PT OT  Patient is full code  Discussed with family nursing staff  Follow closely further recommendation current hospital course    Calvin Bergeron MD  9/30/2024  14:38 EDT

## 2024-09-30 NOTE — PROGRESS NOTES
"  PROGRESS NOTE  Patient Name: Asia Ly  Age/Sex: 89 y.o. male  : 1932  MRN: [id number]    Date of Admission: 2024  Date of Encounter Visit: 24   LOS: 2 days   Patient Care Team:  Lubna Lobo MD as PCP - General  Lubna Lobo MD as PCP - Family Medicine    Chief Complaint: Recurrent PE    Hospital course: Patient had a PE in May 2023, has been on anticoagulation with no problem with good adherence to the medical regimen yet he developed another venous thromboembolic events and had a confirmed new onset PE on this admission.  Currently on the heparin drip  Patient is supposed to be seen by Dr. Zhao today for further recommendation  The patient feels back to his baseline, he does have some minimal cough but no hemoptysis.  He did have an episode of epistaxis more than a year ago requiring discontinuation of the anticoagulation but none in the last several months.        REVIEW OF SYSTEMS:   CONSTITUTIONAL: no fever or chills  CARDIOVASCULAR: No chest pain, chest pressure or chest discomfort. No palpitations, positive edema.   RESPIRATORY: Improved shortness of breath  GASTROINTESTINAL: No anorexia, nausea, vomiting or diarrhea. No abdominal pain or blood.   HEMATOLOGIC: No bleeding or bruising.     Ventilator/Non-Invasive Ventilation Settings (From admission, onward)      None              Vital Signs  Temp:  [97.3 °F (36.3 °C)-98.1 °F (36.7 °C)] 97.3 °F (36.3 °C)  Heart Rate:  [60-75] 75  Resp:  [14-22] 16  BP: (106-126)/(48-59) 126/56  SpO2:  [93 %-100 %] 94 %  on    Device (Oxygen Therapy): room air    Intake/Output Summary (Last 24 hours) at 2024 1323  Last data filed at 2024 1830  Gross per 24 hour   Intake 472 ml   Output --   Net 472 ml     Flowsheet Rows      Flowsheet Row First Filed Value   Admission Height 172.7 cm (68\") Documented at 2024   Admission Weight 66.4 kg (146 lb 4.8 oz) Documented at 2024          Body mass index is 22.2 kg/m².      " Care Transitions Note:  Patient admitted for chest pain on 3/2 CHF exacerbation.   09/28/24  2132 09/29/24  1138   Weight: 66.4 kg (146 lb 4.8 oz) 66.2 kg (146 lb)       Physical Exam:  GEN:  No acute distress, alert, cooperative, well developed   EYES:   Sclerae clear. No icterus. PERRL. Normal EOM  ENT:   External ears/nose normal, no oral lesions, no thrush, mucous membranes moist  NECK:  Supple, midline trachea, no JVD  LUNGS: Normal chest on inspection, Respirations regular, even and unlabored.  Slightly wet sounding cough with normal sounding quiet breathing except for the very fine rales from his probable fibrosis  CV:  Regular rhythm and rate. Normal S1/S2. No murmurs, gallops, or rubs noted.  ABD:  Soft, nontender and nondistended. Normal bowel sounds. No guarding  EXT:  Moves all extremities well. No cyanosis. No redness. No edema.   Skin: Dry, intact, no bleeding    Results Review:    Results From Last 14 Days   Lab Units 09/28/24  1848   LACTATE mmol/L 1.0     Results from last 7 days   Lab Units 09/28/24  1848   SODIUM mmol/L 138   POTASSIUM mmol/L 4.3   CHLORIDE mmol/L 100   CO2 mmol/L 26.9   BUN mg/dL 43*   CREATININE mg/dL 1.43*   CALCIUM mg/dL 8.9   AST (SGOT) U/L 22   ALT (SGPT) U/L 27   ANION GAP mmol/L 11.1   ALBUMIN g/dL 3.5     Results from last 7 days   Lab Units 09/28/24  2059 09/28/24  1848   HSTROP T ng/L 47* 49*         Results from last 7 days   Lab Units 09/28/24  1848   PROBNP pg/mL 7,325.0*     Results from last 7 days   Lab Units 09/30/24  0516 09/29/24  0426 09/28/24  1848   WBC 10*3/mm3 8.54 8.86 10.55   HEMOGLOBIN g/dL 11.6* 12.4* 11.6*   HEMATOCRIT % 36.8* 37.2* 35.8*   PLATELETS 10*3/mm3 183 211 189   MCV fL 94.6 93.0 96.8   NEUTROPHIL % % 70.8 74.3 77.7*   LYMPHOCYTE % % 13.9* 12.9* 9.0*   MONOCYTES % % 9.5 8.9 9.4   EOSINOPHIL % % 1.8 1.1 1.0   BASOPHIL % % 0.4 0.2 0.2   IMM GRAN % % 3.6* 2.6* 2.7*     Results from last 7 days   Lab Units 09/30/24  0932 09/29/24  1411 09/29/24  0426 09/28/24  2135   INR   --   --   --  1.66*   APTT seconds 88.7* 76.6* 57.1*  "31.5               Invalid input(s): \"LDLCALC\"          No results found for: \"POCGLU\"  Results from last 7 days   Lab Units 09/28/24  1848   PROCALCITONIN ng/mL 0.16   LACTATE mmol/L 1.0         Results from last 7 days   Lab Units 09/23/24  1502   NITRITE UA  Negative     Results from last 7 days   Lab Units 09/28/24  1850   COVID19  Not Detected   ADENOVIRUS DETECTION BY PCR  Not Detected   CORONAVIRUS 229E  Not Detected   CORONAVIRUS HKU1  Not Detected   CORONAVIRUS NL63  Not Detected   CORONAVIRUS OC43  Not Detected   HUMAN METAPNEUMOVIRUS  Not Detected   HUMAN RHINOVIRUS/ENTEROVIRUS  Detected*   INFLUENZA B PCR  Not Detected   PARAINFLUENZA 1  Not Detected   PARAINFLUENZA VIRUS 2  Not Detected   PARAINFLUENZA VIRUS 3  Not Detected   PARAINFLUENZA VIRUS 4  Not Detected   BORDETELLA PERTUSSIS PCR  Not Detected   BORDETELLA PARAPERTUSSIS PCR  Not Detected   CHLAMYDOPHILA PNEUMONIAE PCR  Not Detected   MYCOPLAMA PNEUMO PCR  Not Detected   RSV, PCR  Not Detected     Results from last 7 days   Lab Units 09/23/24  1502   PROTEIN TOTAL URINE  Negative           Imaging:   Imaging Results (All)               I reviewed the patient's new clinical results.  I personally viewed and interpreted the patient's imaging results:        Medication Review:   allopurinol, 100 mg, Oral, BID  atorvastatin, 10 mg, Oral, Every Other Day  benzonatate, 200 mg, Oral, TID  Budesonide, 3 mg, Oral, QAM  furosemide, 20 mg, Oral, Daily  ipratropium-albuterol, 3 mL, Nebulization, 4x Daily - RT  metoprolol tartrate, 25 mg, Oral, Q12H  pantoprazole, 40 mg, Oral, Q AM        heparin, 18 Units/kg/hr, Last Rate: 22 Units/kg/hr (09/30/24 1026)        ASSESSMENT:   Recurrent bilateral PE (while on oral anticoagulation with Eliquis 5 mg twice daily dosing)  Rhinovirus infection  Cough  Bronchiectasis  Post-inflammatory pulmonary fibrosis  COPD, stable  Paroxysmal A-fib  Hyperlipidemia  Gout  Crohn's disease  Nonrheumatic aortic valve " stenosis  History of COVID-19  History of stroke  CKD  Essential hypertension    PLAN:  Patient has failed anticoagulation, discussed with the spouse at the bedside, she is 100% certain that he did not skip any of his oral anticoagulation medication.  Okay to transition to Lovenox but the big question is what type of oral anticoagulation may apply to his case since he failed the high dose Eliquis at 5 mg p.o. twice daily.  Awaiting the input from the oncology/hematology team  Otherwise patient remained stable and will continue to follow-up on his respiratory status and watch for bleeding while on the heparin drip    Discussed with him and spouse  Labs/Notes/films were independently reviewed and pertinent results are summarized above  The copied texts in this note were reviewed and they are accurate as of 09/30/24    Disposition: Per primary team    Alla Luna MD  09/30/24  13:23 EDT          Dictated utilizing Dragon dictation   Dupixent Pregnancy And Lactation Text: This medication likely crosses the placenta but the risk for the fetus is uncertain. This medication is excreted in breast milk.

## 2024-09-30 NOTE — CONSULTS
[facility] GROUP INITIAL INPATIENT CONSULTATION NOTE    REASON FOR CONSULTATION: Recurrent venous thromboembolism despite being on oral anticoagulation with Eliquis    HISTORY OF PRESENT ILLNESS:  Asia Ly is a 89 y.o. male who we are asked to see today in consultation for recurrent venous thromboembolism despite being on oral anticoagulation with Eliquis    The patient has a past medical history of paroxysmal atrial fibrillation, stroke, moderate aortic stenosis, Crohn's disease, pulmonary fibrosis with pulmonary hypertension.  Recent COVID-19 infection and pneumonia.    The patient presented with shortness of breath, cough.  There have been no missed doses of Eliquis per him his wife.      CT angiogram of the chest on 9/28/2024 with bilateral pulmonary emboli, dense infiltrates at both lung bases.  Bilateral lower extremity venous duplex on 9/29/2024 shows chronic left lower extremity DVT in the common femoral vein with other veins normal bilaterally.    Currently on a heparin drip.        Past Medical History:   Diagnosis Date    Aortic stenosis     Aspiration pneumonia     Atrial fibrillation     Bleeding from the nose     Bronchiectasis     CKD (chronic kidney disease)     COPD (chronic obstructive pulmonary disease)     Crohn's disease     Dizziness     Elevated cholesterol     Gastric ulcer     Generalized weakness     Gout     Gout     Hard of hearing     Hyperlipidemia     Hypertension     Inguinal hernia     left    Leg swelling     Lower extremity edema     Mild anemia     Near syncope     Nonrheumatic aortic valve stenosis     PAF (paroxysmal atrial fibrillation)     Palpitations     Peptic ulcer     Pneumonia due to COVID-19 virus 05/06/2023    Pneumonia of left lung due to infectious organism     Pulmonary embolism, bilateral 05/06/2023    Pulmonary fibrosis     Stroke     TIA (transient ischemic attack)        Past Surgical History:   Procedure Laterality Date    BRONCHOSCOPY N/A  "10/22/2018    Procedure: BRONCHOSCOPY WITH BRONCHALVEOLAR LAVAGE;  Surgeon: Chidi Oconnor MD;  Location: [facility] ENDOSCOPY;  Service: Pulmonary    BRONCHOSCOPY N/A 7/8/2021    Procedure: BRONCHOSCOPY with BAL;  Surgeon: Chidi Oconnor MD;  Location: [facility] ENDOSCOPY;  Service: Pulmonary;  Laterality: N/A;  Pre: bronchectasis  Post: same    CARDIAC ELECTROPHYSIOLOGY PROCEDURE N/A 7/9/2021    Procedure: Pacemaker DC new--[employer] possible His lead;  Surgeon: Rashaun Nettles MD;  Location: [facility] CATH INVASIVE LOCATION;  Service: Cardiology;  Laterality: N/A;    CARDIAC ELECTROPHYSIOLOGY PROCEDURE N/A 9/3/2021    Procedure: AV node 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 ppm;  Surgeon: Rashaun Nettles MD;  Location: [facility] CATH INVASIVE LOCATION;  Service: Cardiovascular;  Laterality: N/A;    CATARACT EXTRACTION Bilateral     COLONOSCOPY      COLONOSCOPY N/A 3/9/2018    Procedure: COLONOSCOPY to terminal ileum with bx;  Surgeon: Aron Cabrera MD;  Location: [facility] ENDOSCOPY;  Service:     COLONOSCOPY N/A 11/19/2020    Procedure: COLONOSCOPY to cecum:  apc to cecum angiodysplagia,;  Surgeon: Aron Cabrera MD;  Location: [facility] ENDOSCOPY;  Service: Gastroenterology;  Laterality: N/A;  GI bleed  post:  diverticulosis, angiodysplagia    CYSTECTOMY      back and shoulder area    ENDOSCOPY N/A 3/9/2018    Procedure: ESOPHAGOGASTRODUODENOSCOPY with bx;  Surgeon: Aron Cabrera MD;  Location: [facility] ENDOSCOPY;  Service:     ENDOSCOPY N/A 11/19/2020    Procedure: ESOPHAGOGASTRODUODENOSCOPY;  Surgeon: Aron Cabrera MD;  Location: [facility] ENDOSCOPY;  Service: Gastroenterology;  Laterality: N/A;  GI bleed  post:  HH.    EYE SURGERY Left     detached retina    EYE SURGERY Right     \"repaired a hole in the eye\"    INGUINAL HERNIA REPAIR Left 8/29/2023    Procedure: INGUINAL HERNIA REPAIR;  Surgeon: Itz Zaidi Jr., MD;  Location: [facility] MAIN OR;  Service: General;  Laterality: Left;    INSERT / REPLACE / REMOVE PACEMAKER  "     TESTICLE SURGERY      benign tumor removed.       SOCIAL HISTORY:   reports that he quit smoking about 29 years ago. His smoking use included cigars. He has been exposed to tobacco smoke. He has never used smokeless tobacco. He reports that he does not drink alcohol and does not use drugs.    FAMILY HISTORY:  family history includes Diabetes in his mother and sister; Heart disease in his mother and sister; Hypertension in his mother; Ovarian cancer in his sister; Rheumatic fever in his sister; Stroke in his father and mother.    ALLERGIES:  Allergies   Allergen Reactions    Amiodarone Unknown (See Comments)     Pulmonary fibrosis    Diltiazem Arrhythmia    Doxycycline Rash    Allopurinol Unknown - Low Severity     Other reaction(s): Joint pain      Other Reaction(s): Joint pain    Cefdinir Arrhythmia     A-FIB    Indomethacin Rash       MEDICATIONS:  As listed in the electronic medical record.    Review of Systems   Respiratory:  Positive for cough and shortness of breath.    Hematological:  Bruises/bleeds easily.   All other systems reviewed and are negative.      Vitals:    09/30/24 0730 09/30/24 0744 09/30/24 1220 09/30/24 1310   BP:  115/54  126/56   BP Location:  Right arm     Patient Position:  Lying     Pulse: 61 61 64 75   Resp:  18 16    Temp:  97.9 °F (36.6 °C)  97.3 °F (36.3 °C)   TempSrc:  Oral     SpO2: 100% 94% 95% 94%   Weight:       Height:           Physical Exam  Vitals and nursing note reviewed.   Constitutional:       General: He is not in acute distress.     Appearance: He is well-developed.   HENT:      Head: Normocephalic and atraumatic. Hair is normal.   Eyes:      General: Lids are normal.      Conjunctiva/sclera: Conjunctivae normal.      Pupils: Pupils are equal.   Neck:      Thyroid: No thyroid mass or thyromegaly.      Vascular: No JVD.   Cardiovascular:      Rate and Rhythm: Normal rate and regular rhythm.      Heart sounds: No murmur heard.      with a grade of 3/6.      No  friction rub. No gallop.   Pulmonary:      Effort: Pulmonary effort is normal. No respiratory distress.      Breath sounds: Normal breath sounds. Examination of the right-upper field reveals rhonchi. Examination of the left-upper field reveals rhonchi. Examination of the right-middle field reveals rhonchi. Examination of the left-middle field reveals rhonchi. Examination of the right-lower field reveals rhonchi. Examination of the left-lower field reveals rhonchi. No wheezing or rales.   Abdominal:      General: There is no distension.      Palpations: Abdomen is soft. There is no hepatomegaly, splenomegaly or mass.      Tenderness: There is no abdominal tenderness. There is no guarding.   Musculoskeletal:         General: No tenderness or deformity. Normal range of motion.      Cervical back: Neck supple.   Lymphadenopathy:      Cervical: No cervical adenopathy.      Upper Body:      Right upper body: No supraclavicular adenopathy.      Left upper body: No supraclavicular adenopathy.   Skin:     General: Skin is warm and dry.      Findings: No rash.      Comments: Distal LUE hematoma   Neurological:      Mental Status: He is alert and oriented to person, place, and time.   Psychiatric:         Behavior: Behavior normal.         Thought Content: Thought content normal.         Judgment: Judgment normal.         DIAGNOSTIC DATA:  Results from last 7 days   Lab Units 09/30/24  0516   WBC 10*3/mm3 8.54   HEMOGLOBIN g/dL 11.6*   HEMATOCRIT % 36.8*   PLATELETS 10*3/mm3 183     Lab Results   Component Value Date    NEUTROABS 6.05 09/30/2024     Results from last 7 days   Lab Units 09/28/24  1848   SODIUM mmol/L 138   POTASSIUM mmol/L 4.3   CHLORIDE mmol/L 100   CO2 mmol/L 26.9   BUN mg/dL 43*   CREATININE mg/dL 1.43*   GLUCOSE mg/dL 78   CALCIUM mg/dL 8.9     Results from last 7 days   Lab Units 09/30/24  0932 09/29/24  1411 09/29/24  0426 09/28/24  2135   INR   --   --   --  1.66*   APTT seconds 88.7* 76.6* 57.1* 31.5            IMAGING:      CT Angiogram Chest Pulmonary Embolism (09/28/2024 20:48)     CT images personally reviewed.  Infiltrates at both bases.  Bilateral pulmonary embolism.  Right pleural effusion.  Cardiomegaly.    ASSESSMENT:  This is a 89 y.o. male with:    *Bilateral pulmonary embolism  Prior PE in May 2023  Occurred in spite of no missed doses of Eliquis 5 mg twice daily  CT angiogram 9/29/2024 with bilateral pulmonary emboli  Bilateral lower extremity venous duplex 9/29/2024 with chronic left common femoral vein DVT  Currently on a heparin drip    *Severe arctic stenosis  Workup for TAVR ongoing    *Atrial fibrillation status post AV johanne ablation with left bundle pacer    *Pulmonary hypertension and fibrosis  Followed by Dr. Chidi Oconnor with pulmonology    *Human rhinovirus/enterovirus infection with positive PCR 9/28/2024    *Chronic kidney disease  Last creatinine on 9/28/2024 was 1.43, from 1.35    *Crohn's disease    *COPD    *Mild normocytic anemia  Hgb 11.6, from 12.4, from 11.6    RECOMMENDATIONS/PLAN:   I agree with others that the new PE represents a failure of Eliquis. I don't usually substitute Xarelto in this situation. Therefore, I would endorse Lovenox (renally dosed if needed per the pharmacist) as a bridge to warfarin. I advised the patient and his wife of this today. Cardiology has been managing his anticoagulation, so I presume that their anticoagulation clinic can manage this as an outpatient.   Workup for TAVR ongoing  Discussed with the patient, his wife, and his nurse.  We will sign off at this time, but we certainly remain available as needed.      Pavan Zhao MD

## 2024-09-30 NOTE — PROGRESS NOTES
Discharge Planning Assessment  [facility]     Patient Name: Asia Ly  MRN: [id number]  Today's Date: 9/30/2024    Admit Date: 9/28/2024    Plan: Return home with spouse and [facility] following - referral pending   Discharge Needs Assessment       Row Name 09/30/24 0957       Living Environment    People in Home spouse    Name(s) of People in Home Enmanuel Rosen    Current Living Arrangements home    Potentially Unsafe Housing Conditions none    Primary Care Provided by self    Provides Primary Care For no one, unable/limited ability to care for self    Family Caregiver if Needed spouse    Family Caregiver Names Enmanuel Dan [phone]    Quality of Family Relationships helpful    Able to Return to Prior Arrangements yes       Resource/Environmental Concerns    Resource/Environmental Concerns none    Transportation Concerns none       Transition Planning    Patient/Family Anticipates Transition to home with family    Patient/Family Anticipated Services at Transition home health care    Transportation Anticipated family or friend will provide       Discharge Needs Assessment    Readmission Within the Last 30 Days no previous admission in last 30 days    Equipment Currently Used at Home cane, straight;oxygen  Has a walker if needed    Concerns to be Addressed denies needs/concerns at this time    Anticipated Changes Related to Illness none    Equipment Needed After Discharge none    Provided Post Acute Provider List? N/A    N/A Provider List Comment Has used [facility] in the past and would like to use them again.                   Discharge Plan       Row Name 09/30/24 1003       Plan    Plan Return home with spouse and [facility] following - referral pending    Patient/Family in Agreement with Plan yes    Plan Comments Spoke with patient and wife Jessika [phone] at bedside.  Patient lives with wife in a SS house. Patient uses a cane, has a walker if needed, uses O2 @ night from [place].  He has used [facility] in the  past and has never been to SNF. PCP is Dr. Lubna Lobo and pharmacy is [facility] [address] @ [place], [facility].  Wife drives and will assist if needed.  They would like referral to [facility] - call to Elayne and she will evaluate.  Plan is to return home at [place].  CCP will follow.  Gerald RAMIREZ                  Continued Care and Services - Admitted Since 9/28/2024       Home Medical Care       Service Provider Request Status Selected Services Address Phone Fax Patient Preferred     [facility] Pending - Request Sent N/A [id number] [address] [phone] --                  Expected Discharge Date and Time       Expected Discharge Date Expected Discharge Time    Oct 2, 2024            Demographic Summary       Row Name 09/30/24 0956       General Information    Admission Type inpatient    Arrived From home    Referral Source admission list    Reason for Consult discharge planning    Preferred Language English                   Functional Status       Row Name 09/30/24 0957       Functional Status    Usual Activity Tolerance moderate    Current Activity Tolerance moderate       Functional Status, IADL    Medications assistive person    Meal Preparation assistive person    Housekeeping assistive person    Laundry assistive person    Shopping assistive person       Mental Status    General Appearance WDL WDL       Mental Status Summary    Recent Changes in Mental Status/Cognitive Functioning no changes                            Becky S. [place], RN

## 2024-09-30 NOTE — DISCHARGE PLACEMENT REQUEST
"Alex Burk (89 y.o. Male)       Date of Birth   08/21/1932    Social Security Number       Address   [id number] [place], [place] [address] [address]    Home Phone   [phone]    MRN   [id number]       Restorationism   Bulgarian Hinduism    Marital Status                               Admission Date   9/28/24    Admission Type   Emergency    Admitting Provider   Calvin Bergeron MD    Attending Provider   Calvin Bergeron MD    Department, Room/Bed   [address], [address]/1       Discharge Date       Discharge Disposition       Discharge Destination                                 Attending Provider: Calvin Bergeron MD    Allergies: Amiodarone, Diltiazem, Doxycycline, Allopurinol, Cefdinir, Indomethacin    Isolation: Droplet   Infection: Rhinovirus  (09/28/24)   Code Status: Prior    Ht: 172.7 cm (68\")   Wt: 66.2 kg (146 lb)    Admission Cmt: None   Principal Problem: Pulmonary emboli [I26.99]                   Active Insurance as of 9/28/2024       Primary Coverage       Payor Plan Insurance Group Employer/Plan Group    MEDICARE MEDICARE A & B        Payor Plan Address Payor Plan Phone Number Payor Plan Fax Number Effective Dates    [address] [phone]  8/1/1997 - None Entered    [facility] [id number]         Subscriber Name Subscriber Birth Date Member ID       ALEX BURK 8/21/1932 [id number]               Secondary Coverage       Payor Plan Insurance Group Employer/Plan Group    [facility] SUPP [id number]       Payor Plan Address Payor Plan Phone Number Payor Plan Fax Number Effective Dates    [address]   12/1/2016 - None Entered    [facility] [id number]         Subscriber Name Subscriber Birth Date Member ID       ALEX BURK 8/21/1932 [id number]                     Emergency Contacts        (Rel.) Home Phone Work Phone Mobile Phone    SURY Burk LEXI (Spouse) [phone] -- [phone]    HermanRaul hamilton (Son) [phone] -- [phone]      "

## 2024-10-01 LAB
ALBUMIN SERPL-MCNC: 3.3 G/DL (ref 3.5–5.2)
ALBUMIN/GLOB SERPL: 0.9 G/DL
ALP SERPL-CCNC: 54 U/L (ref 39–117)
ALT SERPL W P-5'-P-CCNC: 19 U/L (ref 1–41)
ANION GAP SERPL CALCULATED.3IONS-SCNC: 12 MMOL/L (ref 5–15)
APTT PPP: 95.4 SECONDS (ref 22.7–35.4)
AST SERPL-CCNC: 11 U/L (ref 1–40)
BASOPHILS # BLD AUTO: 0.03 10*3/MM3 (ref 0–0.2)
BASOPHILS NFR BLD AUTO: 0.4 % (ref 0–1.5)
BILIRUB SERPL-MCNC: 0.5 MG/DL (ref 0–1.2)
BUN SERPL-MCNC: 42 MG/DL (ref 8–23)
BUN/CREAT SERPL: 28 (ref 7–25)
CALCIUM SPEC-SCNC: 9 MG/DL (ref 8.2–9.6)
CHLORIDE SERPL-SCNC: 100 MMOL/L (ref 98–107)
CHOLEST SERPL-MCNC: 109 MG/DL (ref 0–200)
CO2 SERPL-SCNC: 26 MMOL/L (ref 22–29)
CREAT SERPL-MCNC: 1.5 MG/DL (ref 0.76–1.27)
DEPRECATED RDW RBC AUTO: 49.8 FL (ref 37–54)
EGFRCR SERPLBLD CKD-EPI 2021: 43.4 ML/MIN/1.73
EOSINOPHIL # BLD AUTO: 0.1 10*3/MM3 (ref 0–0.4)
EOSINOPHIL NFR BLD AUTO: 1.2 % (ref 0.3–6.2)
ERYTHROCYTE [DISTWIDTH] IN BLOOD BY AUTOMATED COUNT: 14 % (ref 12.3–15.4)
GLOBULIN UR ELPH-MCNC: 3.6 GM/DL
GLUCOSE SERPL-MCNC: 119 MG/DL (ref 65–99)
HBA1C MFR BLD: 5.9 % (ref 4.8–5.6)
HCT VFR BLD AUTO: 36.9 % (ref 37.5–51)
HDLC SERPL-MCNC: 45 MG/DL (ref 40–60)
HGB BLD-MCNC: 11.4 G/DL (ref 13–17.7)
IMM GRANULOCYTES # BLD AUTO: 0.2 10*3/MM3 (ref 0–0.05)
IMM GRANULOCYTES NFR BLD AUTO: 2.4 % (ref 0–0.5)
LDLC SERPL CALC-MCNC: 51 MG/DL (ref 0–100)
LDLC/HDLC SERPL: 1.17 {RATIO}
LYMPHOCYTES # BLD AUTO: 0.98 10*3/MM3 (ref 0.7–3.1)
LYMPHOCYTES NFR BLD AUTO: 12 % (ref 19.6–45.3)
MCH RBC QN AUTO: 29.8 PG (ref 26.6–33)
MCHC RBC AUTO-ENTMCNC: 30.9 G/DL (ref 31.5–35.7)
MCV RBC AUTO: 96.6 FL (ref 79–97)
MONOCYTES # BLD AUTO: 0.51 10*3/MM3 (ref 0.1–0.9)
MONOCYTES NFR BLD AUTO: 6.2 % (ref 5–12)
NEUTROPHILS NFR BLD AUTO: 6.35 10*3/MM3 (ref 1.7–7)
NEUTROPHILS NFR BLD AUTO: 77.8 % (ref 42.7–76)
NRBC BLD AUTO-RTO: 0 /100 WBC (ref 0–0.2)
PLATELET # BLD AUTO: 179 10*3/MM3 (ref 140–450)
PMV BLD AUTO: 11.4 FL (ref 6–12)
POTASSIUM SERPL-SCNC: 3.9 MMOL/L (ref 3.5–5.2)
PROT SERPL-MCNC: 6.9 G/DL (ref 6–8.5)
RBC # BLD AUTO: 3.82 10*6/MM3 (ref 4.14–5.8)
SODIUM SERPL-SCNC: 138 MMOL/L (ref 136–145)
TRIGL SERPL-MCNC: 57 MG/DL (ref 0–150)
TSH SERPL DL<=0.05 MIU/L-ACNC: 2.4 UIU/ML (ref 0.27–4.2)
VLDLC SERPL-MCNC: 13 MG/DL (ref 5–40)
WBC NRBC COR # BLD AUTO: 8.17 10*3/MM3 (ref 3.4–10.8)

## 2024-10-01 PROCEDURE — 94799 UNLISTED PULMONARY SVC/PX: CPT

## 2024-10-01 PROCEDURE — 83036 HEMOGLOBIN GLYCOSYLATED A1C: CPT | Performed by: HOSPITALIST

## 2024-10-01 PROCEDURE — 94761 N-INVAS EAR/PLS OXIMETRY MLT: CPT

## 2024-10-01 PROCEDURE — 80061 LIPID PANEL: CPT | Performed by: HOSPITALIST

## 2024-10-01 PROCEDURE — 94664 DEMO&/EVAL PT USE INHALER: CPT

## 2024-10-01 PROCEDURE — 25010000002 ENOXAPARIN PER 10 MG: Performed by: HOSPITALIST

## 2024-10-01 PROCEDURE — 84443 ASSAY THYROID STIM HORMONE: CPT | Performed by: HOSPITALIST

## 2024-10-01 PROCEDURE — 85730 THROMBOPLASTIN TIME PARTIAL: CPT | Performed by: HOSPITALIST

## 2024-10-01 PROCEDURE — 97161 PT EVAL LOW COMPLEX 20 MIN: CPT

## 2024-10-01 PROCEDURE — 85025 COMPLETE CBC W/AUTO DIFF WBC: CPT | Performed by: INTERNAL MEDICINE

## 2024-10-01 PROCEDURE — 99232 SBSQ HOSP IP/OBS MODERATE 35: CPT | Performed by: INTERNAL MEDICINE

## 2024-10-01 PROCEDURE — 80053 COMPREHEN METABOLIC PANEL: CPT | Performed by: HOSPITALIST

## 2024-10-01 RX ORDER — WARFARIN SODIUM 5 MG/1
5 TABLET ORAL
Status: DISCONTINUED | OUTPATIENT
Start: 2024-10-01 | End: 2024-10-03 | Stop reason: HOSPADM

## 2024-10-01 RX ORDER — ENOXAPARIN SODIUM 100 MG/ML
1 INJECTION SUBCUTANEOUS EVERY 12 HOURS
Status: DISCONTINUED | OUTPATIENT
Start: 2024-10-01 | End: 2024-10-03

## 2024-10-01 RX ADMIN — FUROSEMIDE 20 MG: 20 TABLET ORAL at 08:31

## 2024-10-01 RX ADMIN — PANTOPRAZOLE SODIUM 40 MG: 40 TABLET, DELAYED RELEASE ORAL at 05:04

## 2024-10-01 RX ADMIN — METOPROLOL TARTRATE 25 MG: 25 TABLET, FILM COATED ORAL at 20:10

## 2024-10-01 RX ADMIN — BENZONATATE 200 MG: 100 CAPSULE ORAL at 20:09

## 2024-10-01 RX ADMIN — BENZONATATE 200 MG: 100 CAPSULE ORAL at 08:31

## 2024-10-01 RX ADMIN — ALLOPURINOL 100 MG: 100 TABLET ORAL at 08:31

## 2024-10-01 RX ADMIN — IPRATROPIUM BROMIDE AND ALBUTEROL SULFATE 3 ML: 2.5; .5 SOLUTION RESPIRATORY (INHALATION) at 11:29

## 2024-10-01 RX ADMIN — IPRATROPIUM BROMIDE AND ALBUTEROL SULFATE 3 ML: 2.5; .5 SOLUTION RESPIRATORY (INHALATION) at 07:50

## 2024-10-01 RX ADMIN — BENZONATATE 200 MG: 100 CAPSULE ORAL at 15:28

## 2024-10-01 RX ADMIN — ATORVASTATIN CALCIUM 10 MG: 20 TABLET, FILM COATED ORAL at 08:30

## 2024-10-01 RX ADMIN — WARFARIN SODIUM 5 MG: 5 TABLET ORAL at 17:20

## 2024-10-01 RX ADMIN — ALLOPURINOL 100 MG: 100 TABLET ORAL at 20:09

## 2024-10-01 RX ADMIN — IPRATROPIUM BROMIDE AND ALBUTEROL SULFATE 3 ML: 2.5; .5 SOLUTION RESPIRATORY (INHALATION) at 21:27

## 2024-10-01 RX ADMIN — METOPROLOL TARTRATE 25 MG: 25 TABLET, FILM COATED ORAL at 08:30

## 2024-10-01 RX ADMIN — BUDESONIDE 3 MG: 3 CAPSULE ORAL at 05:04

## 2024-10-01 RX ADMIN — ENOXAPARIN SODIUM 70 MG: 100 INJECTION SUBCUTANEOUS at 17:20

## 2024-10-01 RX ADMIN — IPRATROPIUM BROMIDE AND ALBUTEROL SULFATE 3 ML: 2.5; .5 SOLUTION RESPIRATORY (INHALATION) at 15:47

## 2024-10-01 NOTE — PROGRESS NOTES
[facility] Clinical Pharmacy Services: Pharmacy Education - Warfarin     Asia Ly has been ordered warfarin for DVT, afib .     Counseling points included the following:  Warfarin's indication, patient's need for the medication, and dosing/frequency of warfarin.  Explained the meaning for INR, how INR is monitored, the patient's goal INR range (2-3).  Enforced the importance of taking warfarin at the same time every day, preferably in the evening so that the provider managing his warfarin can make dose adjustments more easily following subsequent visits.  Explained possible side effects of warfarin therapy, including increased risk of bleeding, and s/sx of bleeding. Also talked about ways to control bleeding for minor cuts and scrapes.  Emphasized the importance of going to the emergency room if any of the following occur: Falling and hitting your head; noticing bright red blood in urine or dark/tarry stools; vomiting up blood or vomit has a coffee-ground like texture; coughing up blood.  Discussed the importance of informing any physician or dentist that they have been started on warfarin, in case they need to be taken off for a procedure.  Reviewed food interactions and explained that green, leafy vegetables can interact heavily with warfarin.  Discussed all important drug interactions, including over-the-counter medications and supplements.  Instructed the patient not to begin or discontinue any medications without informing his/her physician/pharmacist.    Patient expressed understanding of the information provided and has no additional questions at this time.    Eve Quarles [facility]  Clinical Pharmacist

## 2024-10-01 NOTE — PROGRESS NOTES
"[facility] Clinical Pharmacy Services: Enoxaparin Consult    Asia Ly has a pharmacy consult to dose full-dose enoxaparin per Dr. Bergeron's request.     Indication:  Atrial fibrillation and DVT/PE (active thrombosis)  Home Anticoagulation: Eliquis 5 mg BID     Relevant clinical data and objective history reviewed:  89 y.o. male 172.7 cm (68\") 66.2 kg (146 lb)   Body mass index is 22.2 kg/m².   Results from last 7 days   Lab Units 10/01/24  0621   PLATELETS 10*3/mm3 179     Estimated Creatinine Clearance: 29.4 mL/min (A) (by C-G formula based on SCr of 1.5 mg/dL (H)).    Assessment/Plan:  Patient's CrCl is right at 30 mL/min cutoff and Scr is slightly increased from baseline.  Going with BID dosing due to active thrombosis.  Will trend CrCl and reduce to once daily as needed.    Will start patient on  70 (1mg/kg) subcutaneous every 12 hours, adjusted for renal function. Consult order will be discontinued but pharmacy will continue to follow.     Pavan Mcpherson, [facility]  Clinical Pharmacist   "

## 2024-10-01 NOTE — PROGRESS NOTES
"Daily progress note        Date of Admission: 2024  Patient Identification:  Name: Asia Ly  Age: 89 y.o.  Sex: male  :  1932  MRN: [id number]                     Primary Care Physician: Lubna Lobo MD    Subjective  Doing same with no new complaints and family at bedside with multiple questions.    History of Present Illness:   89 year old gentleman presented to the emergency room with shortness of breath; he had been to urgent care where antibiotics were started; he did not get better; he had diuretics increased; he was seen by his pcp and pulmonologist; he was hypoxic so his wife brought him to the ED;     Review of Systems  Unremarkable    Exam:  /46 (BP Location: Left arm, Patient Position: Sitting)   Pulse 62   Temp 97.7 °F (36.5 °C) (Oral)   Resp 18   Ht 172.7 cm (68\")   Wt 66.2 kg (146 lb)   SpO2 97%   BMI 22.20 kg/m²     General Appearance:    Alert, cooperative, no distress, appears stated age   Head:    Normocephalic, without obvious abnormality, atraumatic   Eyes:    PERRL, conjunctivae/corneas clear, EOM's intact, both eyes   Ears:    Normal external ear canals, both ears   Nose:   Nares normal, septum midline, mucosa normal, no drainage    or sinus tenderness   Throat:   Lips, mucosa, and tongue normal   Neck:   Supple, symmetrical, trachea midline, no adenopathy;     thyroid:  no enlargement/tenderness/nodules; no carotid    bruit or JVD   Back:     Symmetric, no curvature, ROM normal, no CVA tenderness   Lungs:     Decreased breath sounds bilaterally, respirations unlabored   Chest Wall:    No tenderness or deformity    Heart:    Regular rate and rhythm, S1 and S2 normal, no murmur, rub   or gallop   Abdomen:     Soft, nontender, bowel sounds active all four quadrants,     no masses, no hepatomegaly, no splenomegaly   Extremities:   Extremities normal, atraumatic, no cyanosis or edema      .  Data Review:  Lab Results (last 24 hours)       Procedure Component Value " Units Date/Time    TSH [[id number]]  (Normal) Collected: 10/01/24 0621    Specimen: Blood Updated: 10/01/24 0657     TSH 2.400 uIU/mL     Comprehensive Metabolic Panel [[id number]]  (Abnormal) Collected: 10/01/24 0621    Specimen: Blood Updated: 10/01/24 0651     Glucose 119 mg/dL      BUN 42 mg/dL      Creatinine 1.50 mg/dL      Sodium 138 mmol/L      Potassium 3.9 mmol/L      Chloride 100 mmol/L      CO2 26.0 mmol/L      Calcium 9.0 mg/dL      Total Protein 6.9 g/dL      Albumin 3.3 g/dL      ALT (SGPT) 19 U/L      AST (SGOT) 11 U/L      Alkaline Phosphatase 54 U/L      Total Bilirubin 0.5 mg/dL      Globulin 3.6 gm/dL      A/G Ratio 0.9 g/dL      BUN/Creatinine Ratio 28.0     Anion Gap 12.0 mmol/L      eGFR 43.4 mL/min/1.73     Narrative:      GFR Normal >60  Chronic Kidney Disease <60  Kidney Failure <15    The GFR formula is only valid for adults with stable renal function between ages 18 and 70.    Lipid Panel [[id number]] Collected: 10/01/24 0621    Specimen: Blood Updated: 10/01/24 0651     Total Cholesterol 109 mg/dL      Triglycerides 57 mg/dL      HDL Cholesterol 45 mg/dL      LDL Cholesterol  51 mg/dL      VLDL Cholesterol 13 mg/dL      LDL/HDL Ratio 1.17    Narrative:      Cholesterol Reference Ranges  (U.S. Department of Health and Human Services ATP III Classifications)    Desirable          <200 mg/dL  Borderline High    200-239 mg/dL  High Risk          >240 mg/dL      Triglyceride Reference Ranges  (U.S. Department of Health and Human Services ATP III Classifications)    Normal           <150 mg/dL  Borderline High  150-199 mg/dL  High             200-499 mg/dL  Very High        >500 mg/dL    HDL Reference Ranges  (U.S. Department of Health and Human Services ATP III Classifications)    Low     <40 mg/dl (major risk factor for CHD)  High    >60 mg/dl ('negative' risk factor for CHD)        LDL Reference Ranges  (U.S. Department of Health and Human Services ATP III Classifications)    Optimal           <100 mg/dL  Near Optimal     100-129 mg/dL  Borderline High  130-159 mg/dL  High             160-189 mg/dL  Very High        >189 mg/dL    aPTT [[id number]]  (Abnormal) Collected: 10/01/24 0620    Specimen: Blood Updated: 10/01/24 0646     PTT 95.4 seconds     Hemoglobin A1c [[id number]]  (Abnormal) Collected: 10/01/24 0621    Specimen: Blood Updated: 10/01/24 0641     Hemoglobin A1C 5.90 %     Narrative:      Hemoglobin A1C Ranges:    Increased Risk for Diabetes  5.7% to 6.4%  Diabetes                     >= 6.5%  Diabetic Goal                < 7.0%    CBC & Differential [[id number]]  (Abnormal) Collected: 10/01/24 0621    Specimen: Blood Updated: 10/01/24 0631    Narrative:      The following orders were created for panel order CBC & Differential.  Procedure                               Abnormality         Status                     ---------                               -----------         ------                     CBC Auto Differential[[id number]]        Abnormal            Final result                 Please view results for these tests on the individual orders.    CBC Auto Differential [[id number]]  (Abnormal) Collected: 10/01/24 0621    Specimen: Blood Updated: 10/01/24 0631     WBC 8.17 10*3/mm3      RBC 3.82 10*6/mm3      Hemoglobin 11.4 g/dL      Hematocrit 36.9 %      MCV 96.6 fL      MCH 29.8 pg      MCHC 30.9 g/dL      RDW 14.0 %      RDW-SD 49.8 fl      MPV 11.4 fL      Platelets 179 10*3/mm3      Neutrophil % 77.8 %      Lymphocyte % 12.0 %      Monocyte % 6.2 %      Eosinophil % 1.2 %      Basophil % 0.4 %      Immature Grans % 2.4 %      Neutrophils, Absolute 6.35 10*3/mm3      Lymphocytes, Absolute 0.98 10*3/mm3      Monocytes, Absolute 0.51 10*3/mm3      Eosinophils, Absolute 0.10 10*3/mm3      Basophils, Absolute 0.03 10*3/mm3      Immature Grans, Absolute 0.20 10*3/mm3      nRBC 0.0 /100 WBC           Imaging Results (All)       Procedure Component Value Units Date/Time    CT Angiogram Chest  Pulmonary Embolism [[id number]] Collected: 09/28/24 2118     Updated: 09/28/24 2132    Narrative:      CT ANGIOGRAM OF THE CHEST     HISTORY: Shortness of breath     COMPARISON: May 6, 2023     TECHNIQUE: Axial CT imaging was obtained through the thorax. IV contrast  was administered. Three-D reformatted images were obtained.     FINDINGS:  The study is positive for bilateral pulmonary emboli. Most proximal  thrombus is noted within the main right pulmonary artery. It is  difficult to assess for right heart strain, given timing of the contrast  bolus. However, I don't think the RV to LV ratio is greater than 1.  Examination was not optimized for assessment of the thoracic aorta.  There is dilatation of the ascending aorta, measuring up to 4 cm. The  remainder of the thoracic aorta is normal in caliber. There is  calcification of the thoracic aorta and coronary arteries. Left-sided  pacemaker is present. The heart is enlarged. Thyroid gland, trachea, and  esophagus appear unremarkable. There are prominent mediastinal lymph  nodes. For example, a subcarinal lymph node measures up to 1.5 cm. This  appears similar to the prior exam. Lower right paratracheal node  measures up to 1.2 cm in short axis dimensions. This also appears  similar to the prior exam. There is interlobular septal thickening, as  well as areas of groundglass attenuation. There are bilateral pleural  effusions, right greater than left. Constellation of findings likely  reflects congestive heart failure. However, more focal airspace  consolidation is noted at both lung bases. This may represent pneumonia.  The patient has had similar findings in the past, and there may be a  component of chronic scarring, but superimposed pneumonia is also  suspected. There is reflux of contrast material into the hepatic veins  and inferior vena cava, which can be seen in the setting of pulmonary  arterial hypertension. Images through the upper abdomen  demonstrate  cholelithiasis. There are simple appearing bilateral renal cysts. No  acute abnormalities are noted within the upper abdomen. The patient does  have some prominent chest wall collaterals. This is suspected to be  secondary to some stenosis of the left subclavian vein, secondary to the  pacemaker.          Impression:         1. Bilateral pulmonary emboli. RV to LV ratio appears to be less than 1,  although is difficult to assess, due to timing of the contrast bolus.  Presence or absence of pulmonary infarction cannot be assessed, due to  the presence of pulmonary infiltrates.  2. Leija megaly, interlobular septal thickening, and bilateral  effusions, suggesting congestive heart failure.  3. Dense infiltrates noted at the lung bases bilaterally. There may be a  component of chronic scarring at the lung bases, but certainly  superimposed pneumonia is suspected.     FINDINGS were called to Dr. Petty at 9:29 p.m.     Radiation dose reduction techniques were utilized, including automated  exposure control and exposure modulation based on body size.        This report was finalized on 9/28/2024 9:29 PM by Dr. Judith Ortiz M.D on Workstation: [id number]       XR Chest 1 View [[id number]] Collected: 09/28/24 1829     Updated: 09/28/24 1834    Narrative:      XR CHEST 1 VW-     Clinical: Dyspnea     COMPARISON examination 9/22/2024     FINDINGS: Transvenous pacemaker in position as before. There is cardiac  enlargement. Atherosclerotic calcification of the aorta. Is again  demonstrated a right-sided pleural effusion which is similar to or  slightly more pronounced than before. There is diffuse prominence of the  interstitium, suggesting vascular congestion, not quite as pronounced as  before. No new area of consolidation has developed, the remainder is  unremarkable.     This report was finalized on 9/28/2024 6:30 PM by Dr. Arsalan Max M.D  on Workstation: [id number]               Current  Facility-Administered Medications:     allopurinol (ZYLOPRIM) tablet 100 mg, 100 mg, Oral, BID, Osman Zheng MD, 100 mg at 10/01/24 0831    atorvastatin (LIPITOR) tablet 10 mg, 10 mg, Oral, Every Other Day, Osman Zheng MD, 10 mg at 10/01/24 0830    benzonatate (TESSALON) capsule 200 mg, 200 mg, Oral, TID, Carl Mcclain MD, 200 mg at 10/01/24 0831    Budesonide (ENTOCORT EC) 24 hr capsule 3 mg, 3 mg, Oral, QAM, Osman Zheng MD, 3 mg at 10/01/24 0504    Enoxaparin Sodium (LOVENOX) syringe 70 mg, 1 mg/kg, Subcutaneous, Q12H, Calvin Bergeron MD    furosemide (LASIX) tablet 20 mg, 20 mg, Oral, Daily, Osman Zheng MD, 20 mg at 10/01/24 0831    heparin 92937 units/250 mL (100 units/mL) in 0.45 % NaCl infusion, 18 Units/kg/hr, Intravenous, Titrated, Calvin Bergeron MD, Last Rate: 14.6 mL/hr at 09/30/24 1026, 22 Units/kg/hr at 09/30/24 1026    ipratropium-albuterol (DUO-NEB) nebulizer solution 3 mL, 3 mL, Nebulization, 4x Daily - RT, Osman Zheng MD, 3 mL at 10/01/24 1129    metoprolol tartrate (LOPRESSOR) tablet 25 mg, 25 mg, Oral, Q12H, Osman Zheng MD, 25 mg at 10/01/24 0830    O2 (OXYGEN), 2 L/min, Inhalation, PRN, Osman Zheng MD    pantoprazole (PROTONIX) EC tablet 40 mg, 40 mg, Oral, Q AM, Osman Zheng MD, 40 mg at 10/01/24 0504    Pharmacy to Dose enoxaparin (LOVENOX), , Does not apply, Continuous PRPEACE, Calvin Bergeron MD    Pharmacy to dose warfarin, , Does not apply, Continuous PRN, Calvin Bergeron MD    [COMPLETED] Insert Peripheral IV, , , Once **AND** sodium chloride 0.9 % flush 10 mL, 10 mL, Intravenous, PRN, Osman Zheng MD    warfarin (COUMADIN) tablet 5 mg, 5 mg, Oral, Daily, Calvin Bergeron MD       ASSESSMENT  Acute bilateral pulmonary embolism  Chronic left lower extremity DVT  Severe aortic stenosis  Chronic atrial fibrillation  COPD bronchiectasis and pulmonary fibrosis  Pulmonary hypertension  Hypertension  Hyperlipidemia  Chron's disease  Chronic kidney  disease      PLAN  CPM  Switch to Lovenox and bridged with Coumadin until INR more than 2.0  Continue home medications  Stress ulcer DVT prophylaxis  Pulmonary hematology and cardiology following patient  Supportive care  PT OT  Discussed with family nursing staff  Discharge planning    Calvin Bergeron MD  10/1/2024  13:52 EDT

## 2024-10-01 NOTE — SIGNIFICANT NOTE
10/01/24 1034   OTHER   Discipline occupational therapist   Therapy Assessment/Plan (PT)   Criteria for Skilled Interventions Met (PT) no problems identified which require skilled intervention  (Pt. up ADLIB in room, no OT needs at this time. OT to s/o)

## 2024-10-01 NOTE — THERAPY DISCHARGE NOTE
Patient Name: Asia Ly  : 1932    MRN: [id number]                              Today's Date: 10/1/2024       Admit Date: 2024    Visit Dx:     ICD-10-CM ICD-9-CM   1. Acute dyspnea  R06.00 786.09   2. Acute pulmonary embolism without acute cor pulmonale, unspecified pulmonary embolism type  I26.99 415.19   3. Rhinovirus infection  B34.8 079.3   4. Chronic kidney disease, unspecified CKD stage  N18.9 585.9   5. Pleural effusion, right  J90 511.9     Patient Active Problem List   Diagnosis    Ataxia    TIA (transient ischemic attack)    Atrial fibrillation, paroxysmal    Hyperlipidemia    Gout    Crohn's disease    Atrial fibrillation with RVR    Acute cholecystitis    Obstructive jaundice    Acute lower GI bleeding    Aortic stenosis    Pulmonary fibrosis    Anticoagulant long-term use    Febrile illness, acute    Atrial fibrillation with rapid ventricular response    COVID-19 virus infection    Pneumonia of both lungs due to infectious organism, unspecified part of lung    Left inguinal hernia    Pulmonary emboli     Past Medical History:   Diagnosis Date    Aortic stenosis     Aspiration pneumonia     Atrial fibrillation     Bleeding from the nose     Bronchiectasis     CKD (chronic kidney disease)     COPD (chronic obstructive pulmonary disease)     Crohn's disease     Dizziness     Elevated cholesterol     Gastric ulcer     Generalized weakness     Gout     Gout     Hard of hearing     Hyperlipidemia     Hypertension     Inguinal hernia     left    Leg swelling     Lower extremity edema     Mild anemia     Near syncope     Nonrheumatic aortic valve stenosis     PAF (paroxysmal atrial fibrillation)     Palpitations     Peptic ulcer     Pneumonia due to COVID-19 virus 2023    Pneumonia of left lung due to infectious organism     Pulmonary embolism, bilateral 2023    Pulmonary fibrosis     Stroke     TIA (transient ischemic attack)      Past Surgical History:   Procedure Laterality Date  "   BRONCHOSCOPY N/A 10/22/2018    Procedure: BRONCHOSCOPY WITH BRONCHALVEOLAR LAVAGE;  Surgeon: Chidi Oconnor MD;  [place] [facility] [place] [facility];  Service: Pulmonary    BRONCHOSCOPY N/A 7/8/2021    Procedure: BRONCHOSCOPY with BAL;  Surgeon: Chidi Oconnor MD;  Location: [facility];  Service: Pulmonary;  Laterality: N/A;  Pre: bronchectasis  Post: same    CARDIAC ELECTROPHYSIOLOGY PROCEDURE N/A 7/9/2021    Procedure: Pacemaker DC new--[employer] possible His lead;  Surgeon: Rashaun Nettles MD;  [place] [facility];  Service: Cardiology;  Laterality: N/A;    CARDIAC ELECTROPHYSIOLOGY PROCEDURE N/A 9/3/2021    Procedure: AV node 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 ppm;  Surgeon: Rashaun Ntetles MD;  [place] [facility];  Service: Cardiovascular;  Laterality: N/A;    CATARACT EXTRACTION Bilateral     COLONOSCOPY      COLONOSCOPY N/A 3/9/2018    Procedure: COLONOSCOPY to terminal ileum with bx;  Surgeon: Aron Cabrera MD;  [place]: [facility];  Service:     COLONOSCOPY N/A 11/19/2020    Procedure: COLONOSCOPY to cecum:  apc to cecum angiodysplagia,;  Surgeon: Aron Cabrera MD;  Location: [facility];  Service: Gastroenterology;  Laterality: N/A;  GI bleed  post:  diverticulosis, angiodysplagia    CYSTECTOMY      back and shoulder area    ENDOSCOPY N/A 3/9/2018    Procedure: ESOPHAGOGASTRODUODENOSCOPY with bx;  Surgeon: Aron Cabrera MD;  Location: [facility];  Service:     ENDOSCOPY N/A 11/19/2020    Procedure: ESOPHAGOGASTRODUODENOSCOPY;  Surgeon: Aron Cabrera MD;  Location: [facility];  Service: Gastroenterology;  Laterality: N/A;  GI bleed  post:  HH.    EYE SURGERY Left     detached retina    EYE SURGERY Right     \"repaired a hole in the eye\"    INGUINAL HERNIA REPAIR Left 8/29/2023    Procedure: INGUINAL HERNIA REPAIR;  Surgeon: Itz Zaidi Jr., MD;  [place] [facility] [facility];  Service: General;  Laterality: Left;    INSERT / REPLACE " / REMOVE PACEMAKER      TESTICLE SURGERY      benign tumor removed.      General Information       Row Name 10/01/24 0824          Physical Therapy Time and Intention    Document Type discharge evaluation/summary  -MS     Mode of Treatment physical therapy;individual therapy  -MS       Row Name 10/01/24 0824          General Information    Patient Profile Reviewed yes  -MS     Prior Level of Function independent:  -MS     Existing Precautions/Restrictions --   Exit alarm  -MS     Barriers to Rehab none identified  -MS       Row Name 10/01/24 0824          Cognition    Orientation Status (Cognition) oriented x 3  -MS       Row Name 10/01/24 0824          Safety Issues, Functional Mobility    Comment, Safety Issues/Impairments (Mobility) Gait belt used for safety.  -MS               User Key  (r) = Recorded By, (t) = Taken By, (c) = Cosigned By      Initials Name Provider Type    Bib Aguilar, PT Physical Therapist                   Mobility       Row Name 10/01/24 0824          Bed Mobility    Bed Mobility supine-sit;sit-supine  -MS     Supine-Sit Tama (Bed Mobility) independent  -MS     Sit-Supine Tama (Bed Mobility) independent  -MS       Row Name 10/01/24 0824          Sit-Stand Transfer    Sit-Stand Tama (Transfers) independent  -MS       Row Name 10/01/24 0824          Gait/Stairs (Locomotion)    Tama Level (Gait) independent  -MS     Distance in Feet (Gait) 30  -MS     Comment, (Gait/Stairs) Pt. also performed bathroom activities independently this AM.  No balance deficits noted.  -MS               User Key  (r) = Recorded By, (t) = Taken By, (c) = Cosigned By      Initials Name Provider Type    Bib Aguilar PT Physical Therapist                   Obj/Interventions       Row Name 10/01/24 0825          Range of Motion Comprehensive    Comment, General Range of Motion BUE/LE (WFL's)  -MS       Row Name 10/01/24 0825          Strength Comprehensive (MMT)     Comment, General Manual Muscle Testing (MMT) Assessment BUE/LE (4-/5)  -MS               User Key  (r) = Recorded By, (t) = Taken By, (c) = Cosigned By      Initials Name Provider Type    Bib Aguilar, PT Physical Therapist                   Goals/Plan    No documentation.                  Clinical Impression       Row Name 10/01/24 0825          Pain    Pretreatment Pain Rating 0/10 - no pain  -MS     Posttreatment Pain Rating 0/10 - no pain  -MS       Row Name 10/01/24 0825          Plan of Care Review    Plan of Care Reviewed With patient  -MS       Row Name 10/01/24 0825          Therapy Assessment/Plan (PT)    Criteria for Skilled Interventions Met (PT) no problems identified which require skilled intervention  -MS       Row Name 10/01/24 0825          Positioning and Restraints    Pre-Treatment Position in bed  -MS     Post Treatment Position bed  -MS     In Bed notified nsg;supine;call light within reach;encouraged to call for assist;exit alarm on;with nsg  -MS               User Key  (r) = Recorded By, (t) = Taken By, (c) = Cosigned By      Initials Name Provider Type    Bib Aguilar, PT Physical Therapist                   Outcome Measures       Row Name 10/01/24 0825 09/30/24 2030       How much help from another person do you currently need...    Turning from your back to your side while in flat bed without using bedrails? 4  -MS 4  -JN    Moving from lying on back to sitting on the side of a flat bed without bedrails? 4  -MS 4  -JN    Moving to and from a bed to a chair (including a wheelchair)? 4  -MS 4  -JN    Standing up from a chair using your arms (e.g., wheelchair, bedside chair)? 4  -MS 4  -JN    Climbing 3-5 steps with a railing? 4  -MS 3  -JN    To walk in hospital room? 4  -MS 4  -JN    AM-PAC 6 Clicks Score (PT) 24  -MS 23  -JN    Highest Level of Mobility Goal 8 --> Walked 250 feet or more  -MS 7 --> Walk 25 feet or more  -JN      Row Name 10/01/24 0825          Functional  Assessment    Outcome Measure Options AM-PAC 6 Clicks Basic Mobility (PT)  -MS               User Key  (r) = Recorded By, (t) = Taken By, (c) = Cosigned By      Initials Name Provider Type    Jovan Aguilarvictoriano HAMM, PT Physical Therapist    Godfrey Medina, RN Registered Nurse                  Physical Therapy Education       Title: PT OT SLP Therapies (Resolved)       Topic: Physical Therapy (Resolved)       Point: Mobility training (Resolved)       Learning Progress Summary             Patient Acceptance, E,D, VU,DU by MS at 10/1/2024 0826                         Point: Home exercise program (Resolved)       Learner Progress:  Not documented in this visit.              Point: Body mechanics (Resolved)       Learning Progress Summary             Patient Acceptance, E,D, VU,DU by MS at 10/1/2024 0826                         Point: Precautions (Resolved)       Learning Progress Summary             Patient Acceptance, E,D, VU,DU by MS at 10/1/2024 0826                                         User Key       Initials Effective Dates Name Provider Type Discipline    MS 06/16/21 -  ErazoBib PT Physical Therapist PT                  PT Recommendation and Plan     Plan of Care Reviewed With: patient  Outcome Evaluation: Pt. is currently independent with functional mobility and has no further questions/concerns regarding functional mobility or home safety.  Encouraged pt. to continue ambulation with nursing staff while here in the hospital.  Otherwise, P.T. will sign off.     Time Calculation:         PT Charges       Row Name 10/01/24 0828             Time Calculation    Start Time 0800  -MS      Stop Time 0813  -MS      Time Calculation (min) 13 min  -MS      PT Received On 10/01/24  -MS         Time Calculation- PT    Total Timed Code Minutes- PT 12 minute(s)  -MS                User Key  (r) = Recorded By, (t) = Taken By, (c) = Cosigned By      Initials Name Provider Type    Bib Aguilar NORIS, PT Physical  Therapist                  Therapy Charges for Today       Code Description Service Date Service Provider Modifiers Qty    77768116098 HC PT EVAL LOW COMPLEXITY 2 10/1/2024 Bib Erazo, PT GP 1            PT G-Codes  Outcome Measure Options: AM-PAC 6 Clicks Basic Mobility (PT)  AM-PAC 6 Clicks Score (PT): 24    PT Discharge Summary  Anticipated Discharge Disposition (PT): home  Reason for Discharge: Independent, At baseline function  Discharge Destination: Home    Bib Erazo, PT  10/1/2024

## 2024-10-01 NOTE — PROGRESS NOTES
LOS: 3 days   Patient Care Team:  Lubna Lobo MD as PCP - General  Lubna Lobo MD as PCP - Family Medicine    Chief Complaint: Follow-up recurrent pulmonary embolism, severe aortic stenosis.    Interval History: No complaints again this morning.  He denied any shortness of breath or chest pain.  His wife was at bedside.    Vital Signs:  Temp:  [97.3 °F (36.3 °C)-97.7 °F (36.5 °C)] 97.7 °F (36.5 °C)  Heart Rate:  [60-63] 62  Resp:  [16-18] 16  BP: (105-126)/(46-90) 105/46    Intake/Output Summary (Last 24 hours) at 10/1/2024 1516  Last data filed at 9/30/2024 1848  Gross per 24 hour   Intake 330 ml   Output --   Net 330 ml       Physical Exam:   General Appearance:    No acute distress, alert and oriented x4, elderly appearing.   Lungs:     Faint bibasilar rales.    Heart:    Regular rhythm and normal rate. III/VI SM RUSB and LUSB.   Abdomen:     Soft, nontender, nondistended.    Extremities:   No clubbing, cyanosis, or edema.     Results Review:    Results from last 7 days   Lab Units 10/01/24  0621   SODIUM mmol/L 138   POTASSIUM mmol/L 3.9   CHLORIDE mmol/L 100   CO2 mmol/L 26.0   BUN mg/dL 42*   CREATININE mg/dL 1.50*   GLUCOSE mg/dL 119*   CALCIUM mg/dL 9.0     Results from last 7 days   Lab Units 09/28/24  2059 09/28/24  1848   HSTROP T ng/L 47* 49*     Results from last 7 days   Lab Units 10/01/24  0621   WBC 10*3/mm3 8.17   HEMOGLOBIN g/dL 11.4*   HEMATOCRIT % 36.9*   PLATELETS 10*3/mm3 179     Results from last 7 days   Lab Units 10/01/24  0620 09/30/24  0932 09/29/24  1411 09/29/24  0426 09/28/24  2135   INR   --   --   --   --  1.66*   APTT seconds 95.4* 88.7* 76.6*   < > 31.5    < > = values in this interval not displayed.     Results from last 7 days   Lab Units 10/01/24  0621   CHOLESTEROL mg/dL 109         Results from last 7 days   Lab Units 10/01/24  0621   CHOLESTEROL mg/dL 109   TRIGLYCERIDES mg/dL 57   HDL CHOL mg/dL 45   LDL CHOL mg/dL 51       I reviewed the patient's new clinical  results.        Assessment:  1.  Acute bilateral pulmonary embolism, recurrent (previous PE in May 2023)  2.  Chronic left lower extremity DVT of the common femoral vein  3.  Anticoagulation failure with Eliquis  4.  Rhinovirus infection  5.  Severe aortic stenosis (previously known)  6.  Pulmonary fibrosis  7.  Pulmonary hypertension  8.  COPD without acute exacerbation  9.  Persistent atrial fibrillation with [employer] left bundle pacemaker and prior AV node ablation.  10.  Chronic kidney disease  11.  History of CVA  12.  Crohn's disease    Plan:  -Again, he has likely failed anticoagulation with Eliquis.  The patient and his family have been fairly adamant that he has not missed any doses.  Heparin drip switched to Lovenox and warfarin.  Hematology recommends warfarin going forward.  He will need to be set up for the anticoagulation clinic at discharge.    -Given the failure of Eliquis, I would set his goal INR 2.5-3.5.    -Severe aortic stenosis has previously been known.  He has dry rales from pulmonary fibrosis on exam.  I do not see any evidence of significant volume overload or decompensated CHF.  Outpatient TAVR evaluation in progress.    -Limited echocardiogram with normal right ventricular size and low normal function.  EF was 50 to 55%.  Severe aortic stenosis has previously been known.  This was unchanged.  There is no indication for pulmonary thrombectomy.    -Discussed with his patient and his wife at bedside.    Matt Vickers MD  10/01/24  15:16 EDT

## 2024-10-01 NOTE — PROGRESS NOTES
"  PROGRESS NOTE  Patient Name: Asia Ly  Age/Sex: 89 y.o. male  : 1932  MRN: [id number]    Date of Admission: 2024  Date of Encounter Visit: 10/01/24   LOS: 3 days   Patient Care Team:  Lubna Lobo MD as PCP - General  Lubna Lobo MD as PCP - Family Medicine    Chief Complaint: Recurrent PE    Hospital course: Patient had a PE in May 2023, has been on anticoagulation with no problem with good adherence to the medical regimen yet he developed another venous thromboembolic events and had a confirmed new onset PE on this admission.  Currently on the Lovenox and off the heparin drip and the plan is to start Coumadin based on the recommendation from the hematology attending.  Patient was hoping to avoid the Coumadin but after understanding the reasoning behind the recommendation they are agreeable to start the Coumadin follow-up with the Coumadin clinic      REVIEW OF SYSTEMS:   CONSTITUTIONAL: no fever or chills  CARDIOVASCULAR: No chest pain, chest pressure or chest discomfort. No palpitations, positive edema.   RESPIRATORY: Improved shortness of breath  GASTROINTESTINAL: No anorexia, nausea, vomiting or diarrhea. No abdominal pain or blood.   HEMATOLOGIC: No bleeding or bruising.     Ventilator/Non-Invasive Ventilation Settings (From admission, onward)      None              Vital Signs  Temp:  [97.3 °F (36.3 °C)-97.7 °F (36.5 °C)] 97.7 °F (36.5 °C)  Heart Rate:  [60-63] 62  Resp:  [16-18] 16  BP: (105-126)/(46-90) 105/46  SpO2:  [94 %-99 %] 97 %  on    Device (Oxygen Therapy): room air    Intake/Output Summary (Last 24 hours) at 10/1/2024 1456  Last data filed at 2024 1848  Gross per 24 hour   Intake 330 ml   Output --   Net 330 ml     Flowsheet Rows      Flowsheet Row First Filed Value   Admission Height 172.7 cm (68\") Documented at 2024   Admission Weight 66.4 kg (146 lb 4.8 oz) Documented at 2024          Body mass index is 22.2 kg/m².      24 " 09/29/24  1138   Weight: 66.4 kg (146 lb 4.8 oz) 66.2 kg (146 lb)       Physical Exam:  GEN:  No acute distress, alert, cooperative, well developed   EYES:   Sclerae clear. No icterus. PERRL. Normal EOM  ENT:   External ears/nose normal, no oral lesions, no thrush, mucous membranes moist  NECK:  Supple, midline trachea, no JVD  LUNGS: Normal chest on inspection, Respirations regular, even and unlabored.  Slightly wet sounding cough with normal sounding quiet breathing except for the very fine rales from his probable fibrosis  CV:  Regular rhythm and rate. Normal S1/S2. No murmurs, gallops, or rubs noted.  ABD:  Soft, nontender and nondistended. Normal bowel sounds. No guarding  EXT:  Moves all extremities well. No cyanosis. No redness. No edema.   Skin: Dry, intact, no bleeding    Results Review:    Results From Last 14 Days   Lab Units 10/01/24  0621 09/28/24  1848   LACTATE mmol/L  --  1.0   TRIGLYCERIDES mg/dL 57  --      Results from last 7 days   Lab Units 10/01/24  0621 09/28/24  1848   SODIUM mmol/L 138 138   POTASSIUM mmol/L 3.9 4.3   CHLORIDE mmol/L 100 100   CO2 mmol/L 26.0 26.9   BUN mg/dL 42* 43*   CREATININE mg/dL 1.50* 1.43*   CALCIUM mg/dL 9.0 8.9   AST (SGOT) U/L 11 22   ALT (SGPT) U/L 19 27   ANION GAP mmol/L 12.0 11.1   ALBUMIN g/dL 3.3* 3.5     Results from last 7 days   Lab Units 09/28/24 2059 09/28/24  1848   HSTROP T ng/L 47* 49*     Results from last 7 days   Lab Units 10/01/24  0621   TSH uIU/mL 2.400     Results from last 7 days   Lab Units 09/28/24  1848   PROBNP pg/mL 7,325.0*     Results from last 7 days   Lab Units 10/01/24  0621 09/30/24  0516 09/29/24  0426 09/28/24  1848   WBC 10*3/mm3 8.17 8.54 8.86 10.55   HEMOGLOBIN g/dL 11.4* 11.6* 12.4* 11.6*   HEMATOCRIT % 36.9* 36.8* 37.2* 35.8*   PLATELETS 10*3/mm3 179 183 211 189   MCV fL 96.6 94.6 93.0 96.8   NEUTROPHIL % % 77.8* 70.8 74.3 77.7*   LYMPHOCYTE % % 12.0* 13.9* 12.9* 9.0*   MONOCYTES % % 6.2 9.5 8.9 9.4   EOSINOPHIL % % 1.2 1.8  "1.1 1.0   BASOPHIL % % 0.4 0.4 0.2 0.2   IMM GRAN % % 2.4* 3.6* 2.6* 2.7*     Results from last 7 days   Lab Units 10/01/24  0620 09/30/24  0932 09/29/24  1411 09/29/24  0426 09/28/24  2135   INR   --   --   --   --  1.66*   APTT seconds 95.4* 88.7* 76.6* 57.1* 31.5         Results from last 7 days   Lab Units 10/01/24  0621   CHOLESTEROL mg/dL 109   TRIGLYCERIDES mg/dL 57   HDL CHOL mg/dL 45         Results from last 7 days   Lab Units 10/01/24  0621   HEMOGLOBIN A1C % 5.90*     No results found for: \"POCGLU\"  Results from last 7 days   Lab Units 09/28/24  1848   PROCALCITONIN ng/mL 0.16   LACTATE mmol/L 1.0               Results from last 7 days   Lab Units 09/28/24  1850   COVID19  Not Detected   ADENOVIRUS DETECTION BY PCR  Not Detected   CORONAVIRUS 229E  Not Detected   CORONAVIRUS HKU1  Not Detected   CORONAVIRUS NL63  Not Detected   CORONAVIRUS OC43  Not Detected   HUMAN METAPNEUMOVIRUS  Not Detected   HUMAN RHINOVIRUS/ENTEROVIRUS  Detected*   INFLUENZA B PCR  Not Detected   PARAINFLUENZA 1  Not Detected   PARAINFLUENZA VIRUS 2  Not Detected   PARAINFLUENZA VIRUS 3  Not Detected   PARAINFLUENZA VIRUS 4  Not Detected   BORDETELLA PERTUSSIS PCR  Not Detected   BORDETELLA PARAPERTUSSIS PCR  Not Detected   CHLAMYDOPHILA PNEUMONIAE PCR  Not Detected   MYCOPLAMA PNEUMO PCR  Not Detected   RSV, PCR  Not Detected                 Imaging:   Imaging Results (All)               I reviewed the patient's new clinical results.  I personally viewed and interpreted the patient's imaging results:        Medication Review:   allopurinol, 100 mg, Oral, BID  atorvastatin, 10 mg, Oral, Every Other Day  benzonatate, 200 mg, Oral, TID  Budesonide, 3 mg, Oral, QAM  enoxaparin, 1 mg/kg, Subcutaneous, Q12H  furosemide, 20 mg, Oral, Daily  ipratropium-albuterol, 3 mL, Nebulization, 4x Daily - RT  metoprolol tartrate, 25 mg, Oral, Q12H  pantoprazole, 40 mg, Oral, Q AM  warfarin, 5 mg, Oral, Daily        heparin, 18 Units/kg/hr, Last " Rate: 22 Units/kg/hr (09/30/24 1026)  Pharmacy to Dose enoxaparin (LOVENOX),   Pharmacy to dose warfarin,         ASSESSMENT:   Recurrent bilateral PE (while on oral anticoagulation with Eliquis 5 mg twice daily dosing)  Rhinovirus infection  Cough  Bronchiectasis  Post-inflammatory pulmonary fibrosis  COPD, stable  Paroxysmal A-fib  Hyperlipidemia  Gout  Crohn's disease  Nonrheumatic aortic valve stenosis  History of COVID-19  History of stroke  CKD  Essential hypertension    PLAN:  Patient is cleared for discharge home from the pulmonary standpoint on Lovenox for bridge therapy until patient is therapeutic on Coumadin  Patient failed the DOAC while on the full dose of Eliquis and Coumadin is the next oral alternative option otherwise patient is to be on Lovenox which is more problematic according the patient and for dosing/administration purposes.  We will sign off, patient is follow-up with Dr. Chidi Chung as an outpatient  Discussed with him and spouse  Labs/Notes/films were independently reviewed and pertinent results are summarized above  The copied texts in this note were reviewed and they are accurate as of 10/01/24    Disposition: Per primary team    Alla Luna MD  10/01/24  14:56 EDT          Dictated utilizing Dragon dictation

## 2024-10-01 NOTE — PROGRESS NOTES
[facility] Clinical Pharmacy Services: Warfarin Dosing/Monitoring Consult    Asia Ly is a 89 y.o. male, estimated creatinine clearance is 29.4 mL/min (A) (by C-G formula based on SCr of 1.5 mg/dL (H)). weighing 66.2 kg (146 lb).    Results from last 7 days   Lab Units 10/01/24  0621 10/01/24  0620 09/30/24  0932 09/30/24  0516 09/29/24  1411 09/29/24  0426 09/28/24  2135 09/28/24  1848   INR   --   --   --   --   --   --  1.66*  --    APTT seconds  --  95.4* 88.7*  --  76.6* 57.1* 31.5  --    HEMOGLOBIN g/dL 11.4*  --   --  11.6*  --  12.4*  --  11.6*   HEMATOCRIT % 36.9*  --   --  36.8*  --  37.2*  --  35.8*   PLATELETS 10*3/mm3 179  --   --  183  --  211  --  189     Prior to admission anticoagulation: Eliquis 5 mg    Hospital Anticoagulation:  Consulting provider: Dr. Bergeron  Start date: 10/1  Indication:  Atrial Fibrillation and DVT/PE (active thrombosis)  Target INR: 2 - 3  Expected duration: indefinite   Bridge Therapy: Yes  with enoxaparin    Potential food or drug interactions: N/A    Education complete?/Date: No; plan for follow up TBD    Assessment/Plan:  Will bridge with lovenox 70 mg bid for 5 days.  Starting warfarin 5 mg today at 1800 with daily INR.  Patient is older, which puts him into a higher sensitivity category, but dose not have any other factors.    Dose: 5 mg daily  Monitor for any signs or symptoms of bleeding  Follow up daily INRs and dose adjustments    Pharmacy will continue to follow until discharge or discontinuation of warfarin.     Pavan Mcpherson, [facility]  Clinical Pharmacist

## 2024-10-01 NOTE — PLAN OF CARE
Goal Outcome Evaluation:         Heparin drip discontinued. First dose of Lovenox and warfarin given this evening. VSS.

## 2024-10-02 LAB
ANION GAP SERPL CALCULATED.3IONS-SCNC: 10.1 MMOL/L (ref 5–15)
BASOPHILS # BLD AUTO: 0.03 10*3/MM3 (ref 0–0.2)
BASOPHILS NFR BLD AUTO: 0.4 % (ref 0–1.5)
BUN SERPL-MCNC: 41 MG/DL (ref 8–23)
BUN/CREAT SERPL: 34.7 (ref 7–25)
CALCIUM SPEC-SCNC: 9.2 MG/DL (ref 8.2–9.6)
CHLORIDE SERPL-SCNC: 102 MMOL/L (ref 98–107)
CO2 SERPL-SCNC: 26.9 MMOL/L (ref 22–29)
CREAT SERPL-MCNC: 1.18 MG/DL (ref 0.76–1.27)
DEPRECATED RDW RBC AUTO: 49.2 FL (ref 37–54)
EGFRCR SERPLBLD CKD-EPI 2021: 57.9 ML/MIN/1.73
EOSINOPHIL # BLD AUTO: 0.1 10*3/MM3 (ref 0–0.4)
EOSINOPHIL NFR BLD AUTO: 1.2 % (ref 0.3–6.2)
ERYTHROCYTE [DISTWIDTH] IN BLOOD BY AUTOMATED COUNT: 14 % (ref 12.3–15.4)
GLUCOSE SERPL-MCNC: 98 MG/DL (ref 65–99)
HCT VFR BLD AUTO: 34.6 % (ref 37.5–51)
HGB BLD-MCNC: 11 G/DL (ref 13–17.7)
IMM GRANULOCYTES # BLD AUTO: 0.28 10*3/MM3 (ref 0–0.05)
IMM GRANULOCYTES NFR BLD AUTO: 3.4 % (ref 0–0.5)
INR PPP: 1.21 (ref 0.9–1.1)
LYMPHOCYTES # BLD AUTO: 1.12 10*3/MM3 (ref 0.7–3.1)
LYMPHOCYTES NFR BLD AUTO: 13.7 % (ref 19.6–45.3)
MCH RBC QN AUTO: 30.6 PG (ref 26.6–33)
MCHC RBC AUTO-ENTMCNC: 31.8 G/DL (ref 31.5–35.7)
MCV RBC AUTO: 96.4 FL (ref 79–97)
MONOCYTES # BLD AUTO: 0.56 10*3/MM3 (ref 0.1–0.9)
MONOCYTES NFR BLD AUTO: 6.9 % (ref 5–12)
NEUTROPHILS NFR BLD AUTO: 6.07 10*3/MM3 (ref 1.7–7)
NEUTROPHILS NFR BLD AUTO: 74.4 % (ref 42.7–76)
NRBC BLD AUTO-RTO: 0 /100 WBC (ref 0–0.2)
PLATELET # BLD AUTO: 181 10*3/MM3 (ref 140–450)
PMV BLD AUTO: 11.5 FL (ref 6–12)
POTASSIUM SERPL-SCNC: 4 MMOL/L (ref 3.5–5.2)
PROTHROMBIN TIME: 15.5 SECONDS (ref 11.7–14.2)
RBC # BLD AUTO: 3.59 10*6/MM3 (ref 4.14–5.8)
SODIUM SERPL-SCNC: 139 MMOL/L (ref 136–145)
WBC NRBC COR # BLD AUTO: 8.16 10*3/MM3 (ref 3.4–10.8)

## 2024-10-02 PROCEDURE — 25010000002 ENOXAPARIN PER 10 MG: Performed by: HOSPITALIST

## 2024-10-02 PROCEDURE — 94799 UNLISTED PULMONARY SVC/PX: CPT

## 2024-10-02 PROCEDURE — 99232 SBSQ HOSP IP/OBS MODERATE 35: CPT | Performed by: NURSE PRACTITIONER

## 2024-10-02 PROCEDURE — 94664 DEMO&/EVAL PT USE INHALER: CPT

## 2024-10-02 PROCEDURE — 80048 BASIC METABOLIC PNL TOTAL CA: CPT | Performed by: HOSPITALIST

## 2024-10-02 PROCEDURE — 85610 PROTHROMBIN TIME: CPT | Performed by: HOSPITALIST

## 2024-10-02 PROCEDURE — 85025 COMPLETE CBC W/AUTO DIFF WBC: CPT | Performed by: HOSPITALIST

## 2024-10-02 RX ADMIN — ENOXAPARIN SODIUM 70 MG: 100 INJECTION SUBCUTANEOUS at 08:57

## 2024-10-02 RX ADMIN — BUDESONIDE 3 MG: 3 CAPSULE ORAL at 06:01

## 2024-10-02 RX ADMIN — BENZONATATE 200 MG: 100 CAPSULE ORAL at 16:21

## 2024-10-02 RX ADMIN — BENZONATATE 200 MG: 100 CAPSULE ORAL at 20:50

## 2024-10-02 RX ADMIN — IPRATROPIUM BROMIDE AND ALBUTEROL SULFATE 3 ML: 2.5; .5 SOLUTION RESPIRATORY (INHALATION) at 10:44

## 2024-10-02 RX ADMIN — IPRATROPIUM BROMIDE AND ALBUTEROL SULFATE 3 ML: 2.5; .5 SOLUTION RESPIRATORY (INHALATION) at 07:07

## 2024-10-02 RX ADMIN — PANTOPRAZOLE SODIUM 40 MG: 40 TABLET, DELAYED RELEASE ORAL at 06:01

## 2024-10-02 RX ADMIN — FUROSEMIDE 20 MG: 20 TABLET ORAL at 08:56

## 2024-10-02 RX ADMIN — BENZONATATE 200 MG: 100 CAPSULE ORAL at 08:56

## 2024-10-02 RX ADMIN — ALLOPURINOL 100 MG: 100 TABLET ORAL at 08:56

## 2024-10-02 RX ADMIN — ALLOPURINOL 100 MG: 100 TABLET ORAL at 20:50

## 2024-10-02 RX ADMIN — IPRATROPIUM BROMIDE AND ALBUTEROL SULFATE 3 ML: 2.5; .5 SOLUTION RESPIRATORY (INHALATION) at 14:42

## 2024-10-02 RX ADMIN — ENOXAPARIN SODIUM 70 MG: 100 INJECTION SUBCUTANEOUS at 17:37

## 2024-10-02 RX ADMIN — IPRATROPIUM BROMIDE AND ALBUTEROL SULFATE 3 ML: 2.5; .5 SOLUTION RESPIRATORY (INHALATION) at 20:48

## 2024-10-02 RX ADMIN — WARFARIN SODIUM 5 MG: 5 TABLET ORAL at 17:37

## 2024-10-02 RX ADMIN — METOPROLOL TARTRATE 25 MG: 25 TABLET, FILM COATED ORAL at 08:56

## 2024-10-02 NOTE — PLAN OF CARE
Goal Outcome Evaluation:  Plan of Care Reviewed With: patient        Progress: improving  Outcome Evaluation: No acute distress noted, vital signs stable, meds and lab reviewed, continue monitoring.

## 2024-10-02 NOTE — PLAN OF CARE
Problem: Adult Inpatient Plan of Care  Goal: Plan of Care Review  Outcome: Progressing  Flowsheets (Taken 10/2/2024 0401)  Progress: improving  Plan of Care Reviewed With: patient  Pt A&O x4. Remains on RA. Stand by assist to bathroom. VSS. Started Warfarin and lovenox for DVT. All needs met.

## 2024-10-02 NOTE — PROGRESS NOTES
"Daily progress note        Date of Admission: 2024  Patient Identification:  Name: Asia Ly  Age: 89 y.o.  Sex: male  :  1932  MRN: [id number]                     Primary Care Physician: Lubna Lobo MD    Subjective  Doing same with no new complaints and family at bedside     History of Present Illness:   89 year old gentleman presented to the emergency room with shortness of breath; he had been to urgent care where antibiotics were started; he did not get better; he had diuretics increased; he was seen by his pcp and pulmonologist; he was hypoxic so his wife brought him to the ED;     Review of Systems  Unremarkable    Exam:  /84 (BP Location: Right arm, Patient Position: Sitting)   Pulse 60   Temp 97.5 °F (36.4 °C) (Oral)   Resp 16   Ht 172.7 cm (68\")   Wt 66.2 kg (146 lb)   SpO2 98%   BMI 22.20 kg/m²     General Appearance:    Alert, cooperative, no distress, appears stated age   Head:    Normocephalic, without obvious abnormality, atraumatic   Eyes:    PERRL, conjunctivae/corneas clear, EOM's intact, both eyes   Ears:    Normal external ear canals, both ears   Nose:   Nares normal, septum midline, mucosa normal, no drainage    or sinus tenderness   Throat:   Lips, mucosa, and tongue normal   Neck:   Supple, symmetrical, trachea midline, no adenopathy;     thyroid:  no enlargement/tenderness/nodules; no carotid    bruit or JVD   Back:     Symmetric, no curvature, ROM normal, no CVA tenderness   Lungs:     Decreased breath sounds bilaterally, respirations unlabored   Chest Wall:    No tenderness or deformity    Heart:    Regular rate and rhythm, S1 and S2 normal, no murmur, rub   or gallop   Abdomen:     Soft, nontender, bowel sounds active all four quadrants,     no masses, no hepatomegaly, no splenomegaly   Extremities:   Extremities normal, atraumatic, no cyanosis or edema      .  Data Review:  Lab Results (last 24 hours)       Procedure Component Value Units Date/Time    " Protime-INR [[id number]]  (Abnormal) Collected: 10/02/24 0423    Specimen: Blood Updated: 10/02/24 0530     Protime 15.5 Seconds      INR 1.21    Basic Metabolic Panel [[id number]]  (Abnormal) Collected: 10/02/24 0423    Specimen: Blood Updated: 10/02/24 0516     Glucose 98 mg/dL      BUN 41 mg/dL      Creatinine 1.18 mg/dL      Sodium 139 mmol/L      Potassium 4.0 mmol/L      Chloride 102 mmol/L      CO2 26.9 mmol/L      Calcium 9.2 mg/dL      BUN/Creatinine Ratio 34.7     Anion Gap 10.1 mmol/L      eGFR 57.9 mL/min/1.73     Narrative:      GFR Normal >60  Chronic Kidney Disease <60  Kidney Failure <15    The GFR formula is only valid for adults with stable renal function between ages 18 and 70.    CBC & Differential [[id number]]  (Abnormal) Collected: 10/02/24 0424    Specimen: Blood Updated: 10/02/24 0459    Narrative:      The following orders were created for panel order CBC & Differential.  Procedure                               Abnormality         Status                     ---------                               -----------         ------                     CBC Auto Differential[[id number]]        Abnormal            Final result                 Please view results for these tests on the individual orders.    CBC Auto Differential [[id number]]  (Abnormal) Collected: 10/02/24 0424    Specimen: Blood Updated: 10/02/24 0459     WBC 8.16 10*3/mm3      RBC 3.59 10*6/mm3      Hemoglobin 11.0 g/dL      Hematocrit 34.6 %      MCV 96.4 fL      MCH 30.6 pg      MCHC 31.8 g/dL      RDW 14.0 %      RDW-SD 49.2 fl      MPV 11.5 fL      Platelets 181 10*3/mm3      Neutrophil % 74.4 %      Lymphocyte % 13.7 %      Monocyte % 6.9 %      Eosinophil % 1.2 %      Basophil % 0.4 %      Immature Grans % 3.4 %      Neutrophils, Absolute 6.07 10*3/mm3      Lymphocytes, Absolute 1.12 10*3/mm3      Monocytes, Absolute 0.56 10*3/mm3      Eosinophils, Absolute 0.10 10*3/mm3      Basophils, Absolute 0.03 10*3/mm3      Immature Grans,  Absolute 0.28 10*3/mm3      nRBC 0.0 /100 WBC           Imaging Results (All)       Procedure Component Value Units Date/Time    CT Angiogram Chest Pulmonary Embolism [[id number]] Collected: 09/28/24 2118     Updated: 09/28/24 2132    Narrative:      CT ANGIOGRAM OF THE CHEST     HISTORY: Shortness of breath     COMPARISON: May 6, 2023     TECHNIQUE: Axial CT imaging was obtained through the thorax. IV contrast  was administered. Three-D reformatted images were obtained.     FINDINGS:  The study is positive for bilateral pulmonary emboli. Most proximal  thrombus is noted within the main right pulmonary artery. It is  difficult to assess for right heart strain, given timing of the contrast  bolus. However, I don't think the RV to LV ratio is greater than 1.  Examination was not optimized for assessment of the thoracic aorta.  There is dilatation of the ascending aorta, measuring up to 4 cm. The  remainder of the thoracic aorta is normal in caliber. There is  calcification of the thoracic aorta and coronary arteries. Left-sided  pacemaker is present. The heart is enlarged. Thyroid gland, trachea, and  esophagus appear unremarkable. There are prominent mediastinal lymph  nodes. For example, a subcarinal lymph node measures up to 1.5 cm. This  appears similar to the prior exam. Lower right paratracheal node  measures up to 1.2 cm in short axis dimensions. This also appears  similar to the prior exam. There is interlobular septal thickening, as  well as areas of groundglass attenuation. There are bilateral pleural  effusions, right greater than left. Constellation of findings likely  reflects congestive heart failure. However, more focal airspace  consolidation is noted at both lung bases. This may represent pneumonia.  The patient has had similar findings in the past, and there may be a  component of chronic scarring, but superimposed pneumonia is also  suspected. There is reflux of contrast material into the hepatic  veins  and inferior vena cava, which can be seen in the setting of pulmonary  arterial hypertension. Images through the upper abdomen demonstrate  cholelithiasis. There are simple appearing bilateral renal cysts. No  acute abnormalities are noted within the upper abdomen. The patient does  have some prominent chest wall collaterals. This is suspected to be  secondary to some stenosis of the left subclavian vein, secondary to the  pacemaker.          Impression:         1. Bilateral pulmonary emboli. RV to LV ratio appears to be less than 1,  although is difficult to assess, due to timing of the contrast bolus.  Presence or absence of pulmonary infarction cannot be assessed, due to  the presence of pulmonary infiltrates.  2. Leija megaly, interlobular septal thickening, and bilateral  effusions, suggesting congestive heart failure.  3. Dense infiltrates noted at the lung bases bilaterally. There may be a  component of chronic scarring at the lung bases, but certainly  superimposed pneumonia is suspected.     FINDINGS were called to Dr. Petty at 9:29 p.m.     Radiation dose reduction techniques were utilized, including automated  exposure control and exposure modulation based on body size.        This report was finalized on 9/28/2024 9:29 PM by Dr. Judith Ortiz M.D on Workstation: [id number]       XR Chest 1 View [[id number]] Collected: 09/28/24 1829     Updated: 09/28/24 1834    Narrative:      XR CHEST 1 VW-     Clinical: Dyspnea     COMPARISON examination 9/22/2024     FINDINGS: Transvenous pacemaker in position as before. There is cardiac  enlargement. Atherosclerotic calcification of the aorta. Is again  demonstrated a right-sided pleural effusion which is similar to or  slightly more pronounced than before. There is diffuse prominence of the  interstitium, suggesting vascular congestion, not quite as pronounced as  before. No new area of consolidation has developed, the remainder is  unremarkable.      This report was finalized on 9/28/2024 6:30 PM by Dr. Arsalan Max M.D  on Workstation: [id number]               Current Facility-Administered Medications:     allopurinol (ZYLOPRIM) tablet 100 mg, 100 mg, Oral, BID, Osman Zheng MD, 100 mg at 10/02/24 0856    atorvastatin (LIPITOR) tablet 10 mg, 10 mg, Oral, Every Other Day, Osman Zheng MD, 10 mg at 10/01/24 0830    benzonatate (TESSALON) capsule 200 mg, 200 mg, Oral, TID, Carl Mcclain MD, 200 mg at 10/02/24 0856    Budesonide (ENTOCORT EC) 24 hr capsule 3 mg, 3 mg, Oral, QAM, Osman Zheng MD, 3 mg at 10/02/24 0601    Enoxaparin Sodium (LOVENOX) syringe 70 mg, 1 mg/kg, Subcutaneous, Q12H, Calivn Bergeron MD, 70 mg at 10/02/24 0857    furosemide (LASIX) tablet 20 mg, 20 mg, Oral, Daily, Osman Zheng MD, 20 mg at 10/02/24 0856    ipratropium-albuterol (DUO-NEB) nebulizer solution 3 mL, 3 mL, Nebulization, 4x Daily - RT, Osman Zheng MD, 3 mL at 10/02/24 1044    metoprolol tartrate (LOPRESSOR) tablet 25 mg, 25 mg, Oral, Q12H, Osman Zheng MD, 25 mg at 10/02/24 0856    O2 (OXYGEN), 2 L/min, Inhalation, PRN, Osman Zheng MD    pantoprazole (PROTONIX) EC tablet 40 mg, 40 mg, Oral, Q AM, Osman Zheng MD, 40 mg at 10/02/24 0601    Pharmacy to Dose enoxaparin (LOVENOX), , Does not apply, Continuous PRRubio HAND Aftab, MD    Pharmacy to dose warfarin, , Does not apply, Continuous PRRubio HAND Aftab, MD    [COMPLETED] Insert Peripheral IV, , , Once **AND** sodium chloride 0.9 % flush 10 mL, 10 mL, Intravenous, PRN, Osman Zheng MD    warfarin (COUMADIN) tablet 5 mg, 5 mg, Oral, Daily, Calvin Bergeron MD, 5 mg at 10/01/24 1720       ASSESSMENT  Acute bilateral pulmonary embolism  Chronic left lower extremity DVT  Severe aortic stenosis  Chronic atrial fibrillation  COPD bronchiectasis and pulmonary fibrosis  Pulmonary hypertension  Hypertension  Hyperlipidemia  Chron's disease  Chronic kidney disease      PLAN  CPM  Continue  Lovenox and bridged with Coumadin until INR more than 2.0  Continue home medications  Stress ulcer DVT prophylaxis  Pulmonary hematology and cardiology following patient  Supportive care  PT OT  Discussed with family nursing staff  Discharge planning    Calvin Bergeron MD  10/2/2024  14:31 EDT

## 2024-10-02 NOTE — PROGRESS NOTES
Continued Stay Note  [facility]     Patient Name: Asia Ly  MRN: [id number]  Today's Date: 10/2/2024    Admit Date: 9/28/2024    Plan: Return home with spouse and [facility] following   Discharge Plan       Row Name 10/02/24 1510       Plan    Plan Return home with spouse and [facility] following    Patient/Family in Agreement with Plan yes    Plan Comments Per wife, she pays a caregiver to stay with him when she runs errands.  Caregiver is willing to give the Lovenox injections.  Co pay for 5 days of Lovenox is $97.  Wife is agreeable.  CCP following.  Gerald RAMIREZ                      Expected Discharge Date and Time       Expected Discharge Date Expected Discharge Time    Oct 3, 2024               Becky S. [place], RN

## 2024-10-02 NOTE — PROGRESS NOTES
Continued Stay Note  [facility]     Patient Name: Asia Ly  MRN: [id number]  Today's Date: 10/2/2024    Admit Date: 9/28/2024    Plan: Retutn home with spouse and [facility] following   Discharge Plan       Row Name 10/02/24 0919       Plan    Plan Retutn home with spouse and [facility] following    Patient/Family in Agreement with Plan yes    Plan Comments Spoke with patient and wife at bedside.  Wife is not comfortable giving Lovenox injections.  She is considering hiring someone to give them.  Given list of In-Home personal caregivers.  Call to [facility] that patient may DC tomorrow.  Message to pharmacy to check cost of Lovenox.  CCP following.  Gerald RAMIREZ                      Expected Discharge Date and Time       Expected Discharge Date Expected Discharge Time    Oct 3, 2024               Becky S. [place], RN

## 2024-10-02 NOTE — PROGRESS NOTES
"    Patient Name: Asia Ly  :1932  89 y.o.      Patient Care Team:  Lubna Lobo MD as PCP - General  Lubna Lobo MD as PCP - Family Medicine    Chief Complaint: follow up pulmonary embolism, severe AS    Interval History:    He is on room air. INR 1.21.    Objective   Vital Signs  Temp:  [97.5 °F (36.4 °C)-98.1 °F (36.7 °C)] 97.5 °F (36.4 °C)  Heart Rate:  [60-70] 61  Resp:  [16] 16  BP: (115-136)/(64-84) 122/84    Intake/Output Summary (Last 24 hours) at 10/2/2024 1556  Last data filed at 10/2/2024 1319  Gross per 24 hour   Intake 700 ml   Output --   Net 700 ml     Flowsheet Rows      Flowsheet Row First Filed Value   Admission Height 172.7 cm (68\") Documented at 2024   Admission Weight 66.4 kg (146 lb 4.8 oz) Documented at 2024            Physical Exam:   General Appearance:    Alert, cooperative, in no acute distress   Lungs:     Clear to auscultation.  Normal respiratory effort and rate.      Heart:    Regular rhythm and normal rate, normal S1 and S2, 3/6 systolic murmur, gallops or rubs.     Chest Wall:    No abnormalities observed   Abdomen:     Soft, nontender, positive bowel sounds.     Extremities:   no cyanosis, clubbing or edema.  No marked joint deformities.  Adequate musculoskeletal strength.       Results Review:    Results from last 7 days   Lab Units 10/02/24  0423   SODIUM mmol/L 139   POTASSIUM mmol/L 4.0   CHLORIDE mmol/L 102   CO2 mmol/L 26.9   BUN mg/dL 41*   CREATININE mg/dL 1.18   GLUCOSE mg/dL 98   CALCIUM mg/dL 9.2     Results from last 7 days   Lab Units 24  1848   HSTROP T ng/L 47* 49*     Results from last 7 days   Lab Units 10/02/24  0424   WBC 10*3/mm3 8.16   HEMOGLOBIN g/dL 11.0*   HEMATOCRIT % 34.6*   PLATELETS 10*3/mm3 181     Results from last 7 days   Lab Units 10/02/24  0423 10/01/24  0620 24  0932 24  1411 24  0426 24  2135   INR  1.21*  --   --   --   --  1.66*   APTT seconds  --  95.4* 88.7* " 76.6*   < > 31.5    < > = values in this interval not displayed.         Results from last 7 days   Lab Units 10/01/24  0621   CHOLESTEROL mg/dL 109   TRIGLYCERIDES mg/dL 57   HDL CHOL mg/dL 45   LDL CHOL mg/dL 51               Medication Review:   allopurinol, 100 mg, Oral, BID  atorvastatin, 10 mg, Oral, Every Other Day  benzonatate, 200 mg, Oral, TID  Budesonide, 3 mg, Oral, QAM  enoxaparin, 1 mg/kg, Subcutaneous, Q12H  furosemide, 20 mg, Oral, Daily  ipratropium-albuterol, 3 mL, Nebulization, 4x Daily - RT  metoprolol tartrate, 25 mg, Oral, Q12H  pantoprazole, 40 mg, Oral, Q AM  warfarin, 5 mg, Oral, Daily         Pharmacy to Dose enoxaparin (LOVENOX),   Pharmacy to dose warfarin,         Assessment & Plan   Acute bilateral pulmonary embolism, recurrent (previous in May 2023)  Chronic left lower extremity DVT of the common femoral vein  Anticoagulation failure on apixaban -- now on heparin/warfarin.   Rhinovirus infection  Severe aortic stenosis , outpatient TAVR evaluation  Pulmonary fibrosis  Pulmonary hypertension  COPD with acute exacerbation  Persistent atrial fibrillation status post PPM and AV node ablation  History of stroke  Chronic kidney disease     Continue heparin and warfarin. Target INR 2.5-3.5. will send referral to medication management clinic.     KYLAH Garcia  [facility]  10/02/24  15:56 EDT

## 2024-10-03 ENCOUNTER — TELEPHONE (OUTPATIENT)
Dept: PHARMACY | Facility: HOSPITAL | Age: 89
End: 2024-10-03
Payer: MEDICARE

## 2024-10-03 ENCOUNTER — DOCUMENTATION (OUTPATIENT)
Dept: HOME HEALTH SERVICES | Facility: HOME HEALTHCARE | Age: 89
End: 2024-10-03
Payer: MEDICARE

## 2024-10-03 ENCOUNTER — HOME HEALTH ADMISSION (OUTPATIENT)
Dept: HOME HEALTH SERVICES | Facility: HOME HEALTHCARE | Age: 89
End: 2024-10-03
Payer: MEDICARE

## 2024-10-03 ENCOUNTER — READMISSION MANAGEMENT (OUTPATIENT)
Dept: CALL CENTER | Facility: HOSPITAL | Age: 89
End: 2024-10-03
Payer: MEDICARE

## 2024-10-03 ENCOUNTER — TRANSCRIBE ORDERS (OUTPATIENT)
Dept: HOME HEALTH SERVICES | Facility: HOME HEALTHCARE | Age: 89
End: 2024-10-03
Payer: MEDICARE

## 2024-10-03 VITALS
WEIGHT: 146 LBS | DIASTOLIC BLOOD PRESSURE: 58 MMHG | SYSTOLIC BLOOD PRESSURE: 143 MMHG | HEART RATE: 60 BPM | HEIGHT: 68 IN | RESPIRATION RATE: 16 BRPM | TEMPERATURE: 97.5 F | OXYGEN SATURATION: 95 % | BODY MASS INDEX: 22.13 KG/M2

## 2024-10-03 DIAGNOSIS — B34.8 RHINOVIRUS: ICD-10-CM

## 2024-10-03 DIAGNOSIS — I35.0 SEVERE AORTIC STENOSIS: ICD-10-CM

## 2024-10-03 DIAGNOSIS — I48.91 ATRIAL FIBRILLATION, UNSPECIFIED TYPE: ICD-10-CM

## 2024-10-03 DIAGNOSIS — Z79.01 ANTICOAGULATED: ICD-10-CM

## 2024-10-03 DIAGNOSIS — J90 PLEURAL EFFUSION: ICD-10-CM

## 2024-10-03 DIAGNOSIS — I26.99 OTHER ACUTE PULMONARY EMBOLISM, UNSPECIFIED WHETHER ACUTE COR PULMONALE PRESENT: Primary | ICD-10-CM

## 2024-10-03 LAB
ANION GAP SERPL CALCULATED.3IONS-SCNC: 10.1 MMOL/L (ref 5–15)
BASOPHILS # BLD AUTO: 0.04 10*3/MM3 (ref 0–0.2)
BASOPHILS NFR BLD AUTO: 0.5 % (ref 0–1.5)
BUN SERPL-MCNC: 37 MG/DL (ref 8–23)
BUN/CREAT SERPL: 29.4 (ref 7–25)
CALCIUM SPEC-SCNC: 9.7 MG/DL (ref 8.2–9.6)
CHLORIDE SERPL-SCNC: 102 MMOL/L (ref 98–107)
CO2 SERPL-SCNC: 26.9 MMOL/L (ref 22–29)
CREAT SERPL-MCNC: 1.26 MG/DL (ref 0.76–1.27)
DEPRECATED RDW RBC AUTO: 48 FL (ref 37–54)
EGFRCR SERPLBLD CKD-EPI 2021: 53.5 ML/MIN/1.73
EOSINOPHIL # BLD AUTO: 0.07 10*3/MM3 (ref 0–0.4)
EOSINOPHIL NFR BLD AUTO: 0.8 % (ref 0.3–6.2)
ERYTHROCYTE [DISTWIDTH] IN BLOOD BY AUTOMATED COUNT: 13.9 % (ref 12.3–15.4)
GLUCOSE SERPL-MCNC: 91 MG/DL (ref 65–99)
HCT VFR BLD AUTO: 39.3 % (ref 37.5–51)
HGB BLD-MCNC: 12.6 G/DL (ref 13–17.7)
IMM GRANULOCYTES # BLD AUTO: 0.23 10*3/MM3 (ref 0–0.05)
IMM GRANULOCYTES NFR BLD AUTO: 2.8 % (ref 0–0.5)
INR PPP: 1.14 (ref 0.9–1.1)
LYMPHOCYTES # BLD AUTO: 1.16 10*3/MM3 (ref 0.7–3.1)
LYMPHOCYTES NFR BLD AUTO: 14 % (ref 19.6–45.3)
MCH RBC QN AUTO: 30.6 PG (ref 26.6–33)
MCHC RBC AUTO-ENTMCNC: 32.1 G/DL (ref 31.5–35.7)
MCV RBC AUTO: 95.4 FL (ref 79–97)
MONOCYTES # BLD AUTO: 0.45 10*3/MM3 (ref 0.1–0.9)
MONOCYTES NFR BLD AUTO: 5.4 % (ref 5–12)
NEUTROPHILS NFR BLD AUTO: 6.34 10*3/MM3 (ref 1.7–7)
NEUTROPHILS NFR BLD AUTO: 76.5 % (ref 42.7–76)
NRBC BLD AUTO-RTO: 0 /100 WBC (ref 0–0.2)
PLATELET # BLD AUTO: 215 10*3/MM3 (ref 140–450)
PMV BLD AUTO: 11.3 FL (ref 6–12)
POTASSIUM SERPL-SCNC: 4.2 MMOL/L (ref 3.5–5.2)
PROTHROMBIN TIME: 14.8 SECONDS (ref 11.7–14.2)
RBC # BLD AUTO: 4.12 10*6/MM3 (ref 4.14–5.8)
SODIUM SERPL-SCNC: 139 MMOL/L (ref 136–145)
WBC NRBC COR # BLD AUTO: 8.29 10*3/MM3 (ref 3.4–10.8)

## 2024-10-03 PROCEDURE — 99231 SBSQ HOSP IP/OBS SF/LOW 25: CPT | Performed by: NURSE PRACTITIONER

## 2024-10-03 PROCEDURE — 25010000002 ENOXAPARIN PER 10 MG: Performed by: HOSPITALIST

## 2024-10-03 PROCEDURE — 85025 COMPLETE CBC W/AUTO DIFF WBC: CPT | Performed by: HOSPITALIST

## 2024-10-03 PROCEDURE — 94664 DEMO&/EVAL PT USE INHALER: CPT

## 2024-10-03 PROCEDURE — 85610 PROTHROMBIN TIME: CPT | Performed by: HOSPITALIST

## 2024-10-03 PROCEDURE — 94799 UNLISTED PULMONARY SVC/PX: CPT

## 2024-10-03 PROCEDURE — 80048 BASIC METABOLIC PNL TOTAL CA: CPT | Performed by: HOSPITALIST

## 2024-10-03 RX ORDER — WARFARIN SODIUM 5 MG/1
5 TABLET ORAL DAILY
Qty: 30 TABLET | Refills: 0 | Status: SHIPPED | OUTPATIENT
Start: 2024-10-03

## 2024-10-03 RX ORDER — ENOXAPARIN SODIUM 100 MG/ML
1 INJECTION SUBCUTANEOUS EVERY 12 HOURS
Qty: 4.8 ML | Refills: 0 | Status: SHIPPED | OUTPATIENT
Start: 2024-10-03 | End: 2024-10-06

## 2024-10-03 RX ORDER — ENOXAPARIN SODIUM 100 MG/ML
1 INJECTION SUBCUTANEOUS EVERY 12 HOURS
Qty: 8 ML | Refills: 0 | Status: CANCELLED | OUTPATIENT
Start: 2024-10-03 | End: 2024-10-09

## 2024-10-03 RX ORDER — ENOXAPARIN SODIUM 100 MG/ML
1 INJECTION SUBCUTANEOUS EVERY 12 HOURS
Status: DISCONTINUED | OUTPATIENT
Start: 2024-10-03 | End: 2024-10-03 | Stop reason: HOSPADM

## 2024-10-03 RX ADMIN — METOPROLOL TARTRATE 25 MG: 25 TABLET, FILM COATED ORAL at 08:29

## 2024-10-03 RX ADMIN — IPRATROPIUM BROMIDE AND ALBUTEROL SULFATE 3 ML: 2.5; .5 SOLUTION RESPIRATORY (INHALATION) at 06:47

## 2024-10-03 RX ADMIN — ALLOPURINOL 100 MG: 100 TABLET ORAL at 08:30

## 2024-10-03 RX ADMIN — PANTOPRAZOLE SODIUM 40 MG: 40 TABLET, DELAYED RELEASE ORAL at 05:53

## 2024-10-03 RX ADMIN — BENZONATATE 200 MG: 100 CAPSULE ORAL at 08:30

## 2024-10-03 RX ADMIN — ATORVASTATIN CALCIUM 10 MG: 20 TABLET, FILM COATED ORAL at 08:29

## 2024-10-03 RX ADMIN — FUROSEMIDE 20 MG: 20 TABLET ORAL at 08:30

## 2024-10-03 RX ADMIN — BUDESONIDE 3 MG: 3 CAPSULE ORAL at 05:53

## 2024-10-03 RX ADMIN — IPRATROPIUM BROMIDE AND ALBUTEROL SULFATE 3 ML: 2.5; .5 SOLUTION RESPIRATORY (INHALATION) at 10:45

## 2024-10-03 RX ADMIN — ENOXAPARIN SODIUM 70 MG: 100 INJECTION SUBCUTANEOUS at 08:30

## 2024-10-03 NOTE — PLAN OF CARE
Problem: Adult Inpatient Plan of Care  Goal: Plan of Care Review  Outcome: Progressing  Flowsheets (Taken 10/3/2024 0406)  Progress: improving  Plan of Care Reviewed With: patient  Pt rested well overnight. No complaints. Np s/s of distress. VSS. All needs met. Plan to possibly discharge home.

## 2024-10-03 NOTE — TELEPHONE ENCOUNTER
Elayne with [facility] called the clinic today to notify us that this newly referred patient will be discharging home from the hospital and starting warfarin + bridging with lovenox. [facility] has their first visit tomorrow so they will get an INR for us during that visit.

## 2024-10-03 NOTE — PLAN OF CARE
Goal Outcome Evaluation:      AVS completed for discharge. Spouse at bedside and ready to transport. Discharge pending meds-to-bed.

## 2024-10-03 NOTE — PROGRESS NOTES
[facility] following for home health needs.  Spoke with patient's wife and please call home number to schedule visits.  Verified all demographics and all correct.  Per Alexia with [facility] clinic, INR to be checked at SOC Friday 10/4 and Monday, 10/7 and call to them.  May accept orders from Dr Vickers and cardiology associates if needed.  Marina with PCP Dr Lobo authorized home health.  Spoke with patient's wife about need for her or friend/caregiver to administer the BID lovenox.  Wife states their caregiver is agreeable and per floor nurse has been educated in the lovenox injections.  SOC tomorrow.  Office notified.  D/C home today.

## 2024-10-03 NOTE — CASE MANAGEMENT/SOCIAL WORK
Case Management Discharge Note      Final Note: home with [facility]    Provided Post Acute Provider List?: N/A  N/A Provider List Comment: Has used [facility] in the past and would like to use them again.    Selected Continued Care - Discharged on 10/3/2024 Admission date: 9/28/2024 - Discharge disposition: Home or Self Care      Destination    No services have been selected for the patient.                Durable Medical Equipment    No services have been selected for the patient.                Dialysis/Infusion    No services have been selected for the patient.                Home Medical Care Coordination complete.      Service Provider Selected Services Address Phone Fax Patient Preferred    Count includes the [facility] [address] [address] [phone] --              Therapy    No services have been selected for the patient.                Community Resources    No services have been selected for the patient.                Community & DME    No services have been selected for the patient.                    Transportation Services  Private: Car    Final Discharge Disposition Code: 06 - home with home health care

## 2024-10-03 NOTE — PROGRESS NOTES
"Daily progress note        Date of Admission: 2024  Patient Identification:  Name: Asia Ly  Age: 89 y.o.  Sex: male  :  1932  MRN: [id number]                     Primary Care Physician: Lubna Lobo MD    Subjective  Doing same with no new complaints and family at bedside     History of Present Illness:   89 year old gentleman presented to the emergency room with shortness of breath; he had been to urgent care where antibiotics were started; he did not get better; he had diuretics increased; he was seen by his pcp and pulmonologist; he was hypoxic so his wife brought him to the ED;     Review of Systems  Unremarkable    Exam:  /58 (BP Location: Right arm, Patient Position: Lying)   Pulse 60   Temp 97.5 °F (36.4 °C) (Oral)   Resp 16   Ht 172.7 cm (68\")   Wt 66.2 kg (146 lb)   SpO2 95%   BMI 22.20 kg/m²     General Appearance:    Alert, cooperative, no distress, appears stated age   Head:    Normocephalic, without obvious abnormality, atraumatic   Eyes:    PERRL, conjunctivae/corneas clear, EOM's intact, both eyes   Ears:    Normal external ear canals, both ears   Nose:   Nares normal, septum midline, mucosa normal, no drainage    or sinus tenderness   Throat:   Lips, mucosa, and tongue normal   Neck:   Supple, symmetrical, trachea midline, no adenopathy;     thyroid:  no enlargement/tenderness/nodules; no carotid    bruit or JVD   Back:     Symmetric, no curvature, ROM normal, no CVA tenderness   Lungs:     Decreased breath sounds bilaterally, respirations unlabored   Chest Wall:    No tenderness or deformity    Heart:    Regular rate and rhythm, S1 and S2 normal, no murmur, rub   or gallop   Abdomen:     Soft, nontender, bowel sounds active all four quadrants,     no masses, no hepatomegaly, no splenomegaly   Extremities:   Extremities normal, atraumatic, no cyanosis or edema      .  Data Review:  Lab Results (last 24 hours)       Procedure Component Value Units Date/Time    " Protime-INR [[id number]]  (Abnormal) Collected: 10/03/24 0512    Specimen: Blood Updated: 10/03/24 0640     Protime 14.8 Seconds      INR 1.14    Basic Metabolic Panel [[id number]]  (Abnormal) Collected: 10/03/24 0512    Specimen: Blood Updated: 10/03/24 0633     Glucose 91 mg/dL      BUN 37 mg/dL      Creatinine 1.26 mg/dL      Sodium 139 mmol/L      Potassium 4.2 mmol/L      Comment: Slight hemolysis detected by analyzer. Result may be falsely elevated.        Chloride 102 mmol/L      CO2 26.9 mmol/L      Calcium 9.7 mg/dL      BUN/Creatinine Ratio 29.4     Anion Gap 10.1 mmol/L      eGFR 53.5 mL/min/1.73     Narrative:      GFR Normal >60  Chronic Kidney Disease <60  Kidney Failure <15    The GFR formula is only valid for adults with stable renal function between ages 18 and 70.    CBC & Differential [[id number]]  (Abnormal) Collected: 10/03/24 0512    Specimen: Blood Updated: 10/03/24 0610    Narrative:      The following orders were created for panel order CBC & Differential.  Procedure                               Abnormality         Status                     ---------                               -----------         ------                     CBC Auto Differential[[id number]]        Abnormal            Final result                 Please view results for these tests on the individual orders.    CBC Auto Differential [[id number]]  (Abnormal) Collected: 10/03/24 0512    Specimen: Blood Updated: 10/03/24 0610     WBC 8.29 10*3/mm3      RBC 4.12 10*6/mm3      Hemoglobin 12.6 g/dL      Hematocrit 39.3 %      MCV 95.4 fL      MCH 30.6 pg      MCHC 32.1 g/dL      RDW 13.9 %      RDW-SD 48.0 fl      MPV 11.3 fL      Platelets 215 10*3/mm3      Neutrophil % 76.5 %      Lymphocyte % 14.0 %      Monocyte % 5.4 %      Eosinophil % 0.8 %      Basophil % 0.5 %      Immature Grans % 2.8 %      Neutrophils, Absolute 6.34 10*3/mm3      Lymphocytes, Absolute 1.16 10*3/mm3      Monocytes, Absolute 0.45 10*3/mm3      Eosinophils,  Absolute 0.07 10*3/mm3      Basophils, Absolute 0.04 10*3/mm3      Immature Grans, Absolute 0.23 10*3/mm3      nRBC 0.0 /100 WBC           Imaging Results (All)       Procedure Component Value Units Date/Time    CT Angiogram Chest Pulmonary Embolism [[id number]] Collected: 09/28/24 2118     Updated: 09/28/24 2132    Narrative:      CT ANGIOGRAM OF THE CHEST     HISTORY: Shortness of breath     COMPARISON: May 6, 2023     TECHNIQUE: Axial CT imaging was obtained through the thorax. IV contrast  was administered. Three-D reformatted images were obtained.     FINDINGS:  The study is positive for bilateral pulmonary emboli. Most proximal  thrombus is noted within the main right pulmonary artery. It is  difficult to assess for right heart strain, given timing of the contrast  bolus. However, I don't think the RV to LV ratio is greater than 1.  Examination was not optimized for assessment of the thoracic aorta.  There is dilatation of the ascending aorta, measuring up to 4 cm. The  remainder of the thoracic aorta is normal in caliber. There is  calcification of the thoracic aorta and coronary arteries. Left-sided  pacemaker is present. The heart is enlarged. Thyroid gland, trachea, and  esophagus appear unremarkable. There are prominent mediastinal lymph  nodes. For example, a subcarinal lymph node measures up to 1.5 cm. This  appears similar to the prior exam. Lower right paratracheal node  measures up to 1.2 cm in short axis dimensions. This also appears  similar to the prior exam. There is interlobular septal thickening, as  well as areas of groundglass attenuation. There are bilateral pleural  effusions, right greater than left. Constellation of findings likely  reflects congestive heart failure. However, more focal airspace  consolidation is noted at both lung bases. This may represent pneumonia.  The patient has had similar findings in the past, and there may be a  component of chronic scarring, but superimposed  pneumonia is also  suspected. There is reflux of contrast material into the hepatic veins  and inferior vena cava, which can be seen in the setting of pulmonary  arterial hypertension. Images through the upper abdomen demonstrate  cholelithiasis. There are simple appearing bilateral renal cysts. No  acute abnormalities are noted within the upper abdomen. The patient does  have some prominent chest wall collaterals. This is suspected to be  secondary to some stenosis of the left subclavian vein, secondary to the  pacemaker.          Impression:         1. Bilateral pulmonary emboli. RV to LV ratio appears to be less than 1,  although is difficult to assess, due to timing of the contrast bolus.  Presence or absence of pulmonary infarction cannot be assessed, due to  the presence of pulmonary infiltrates.  2. Leija megaly, interlobular septal thickening, and bilateral  effusions, suggesting congestive heart failure.  3. Dense infiltrates noted at the lung bases bilaterally. There may be a  component of chronic scarring at the lung bases, but certainly  superimposed pneumonia is suspected.     FINDINGS were called to Dr. Petty at 9:29 p.m.     Radiation dose reduction techniques were utilized, including automated  exposure control and exposure modulation based on body size.        This report was finalized on 9/28/2024 9:29 PM by Dr. Judith Ortiz M.D on Workstation: [id number]       XR Chest 1 View [[id number]] Collected: 09/28/24 1829     Updated: 09/28/24 1834    Narrative:      XR CHEST 1 VW-     Clinical: Dyspnea     COMPARISON examination 9/22/2024     FINDINGS: Transvenous pacemaker in position as before. There is cardiac  enlargement. Atherosclerotic calcification of the aorta. Is again  demonstrated a right-sided pleural effusion which is similar to or  slightly more pronounced than before. There is diffuse prominence of the  interstitium, suggesting vascular congestion, not quite as pronounced  as  before. No new area of consolidation has developed, the remainder is  unremarkable.     This report was finalized on 9/28/2024 6:30 PM by Dr. Arsalan Max M.D  on Workstation: [id number]               Current Facility-Administered Medications:     allopurinol (ZYLOPRIM) tablet 100 mg, 100 mg, Oral, BID, Osman Zheng MD, 100 mg at 10/03/24 0830    atorvastatin (LIPITOR) tablet 10 mg, 10 mg, Oral, Every Other Day, Osman Zheng MD, 10 mg at 10/03/24 0829    benzonatate (TESSALON) capsule 200 mg, 200 mg, Oral, TID, Carl Mcclain MD, 200 mg at 10/03/24 0830    Budesonide (ENTOCORT EC) 24 hr capsule 3 mg, 3 mg, Oral, QAM, Osman Zheng MD, 3 mg at 10/03/24 0553    Enoxaparin Sodium (LOVENOX) syringe 70 mg, 1 mg/kg, Subcutaneous, Q12H, Calvin Bergeron MD    furosemide (LASIX) tablet 20 mg, 20 mg, Oral, Daily, Osman Zheng MD, 20 mg at 10/03/24 0830    ipratropium-albuterol (DUO-NEB) nebulizer solution 3 mL, 3 mL, Nebulization, 4x Daily - RT, Osman Zheng MD, 3 mL at 10/03/24 1045    metoprolol tartrate (LOPRESSOR) tablet 25 mg, 25 mg, Oral, Q12H, Osman Zheng MD, 25 mg at 10/03/24 0829    O2 (OXYGEN), 2 L/min, Inhalation, PRN, Osman Zheng MD    pantoprazole (PROTONIX) EC tablet 40 mg, 40 mg, Oral, Q AM, Osman Zheng MD, 40 mg at 10/03/24 0553    Pharmacy to dose warfarin, , Does not apply, Continuous PRN, Libra Gonzalez, APRN    [COMPLETED] Insert Peripheral IV, , , Once **AND** sodium chloride 0.9 % flush 10 mL, 10 mL, Intravenous, PRN, Osman Zheng MD    warfarin (COUMADIN) tablet 5 mg, 5 mg, Oral, Daily, Calvin Bergeron MD, 5 mg at 10/02/24 3799       ASSESSMENT  Acute bilateral pulmonary embolism  Chronic left lower extremity DVT  Severe aortic stenosis  Chronic atrial fibrillation  COPD bronchiectasis and pulmonary fibrosis  Pulmonary hypertension  Hypertension  Hyperlipidemia  Chron's disease  Chronic kidney disease    PLAN  Discharge home on Lovenox and  Coumadin  Discharge summary dictated    Calvin Bergeron MD  10/3/2024  12:45 EDT

## 2024-10-03 NOTE — PROGRESS NOTES
"    Patient Name: Asia Ly  :1932  89 y.o.      Patient Care Team:  Lubna Lobo MD as PCP - General  Lubna Lobo MD as PCP - Family Medicine    Chief Complaint: follow up pulmonary embolism, severe AS    Interval History:   He feels well. INR 1.14.    Objective   Vital Signs  Temp:  [97.2 °F (36.2 °C)-97.7 °F (36.5 °C)] 97.5 °F (36.4 °C)  Heart Rate:  [60-74] 60  Resp:  [16-18] 16  BP: (116-143)/(57-84) 143/58    Intake/Output Summary (Last 24 hours) at 10/3/2024 1226  Last data filed at 10/3/2024 0910  Gross per 24 hour   Intake 520 ml   Output --   Net 520 ml     Flowsheet Rows      Flowsheet Row First Filed Value   Admission Height 172.7 cm (68\") Documented at 2024   Admission Weight 66.4 kg (146 lb 4.8 oz) Documented at 2024            Physical Exam:   General Appearance:    Alert, cooperative, in no acute distress   Lungs:     Clear to auscultation.  Normal respiratory effort and rate.      Heart:    Regular rhythm and normal rate, normal S1 and S2, 3/6 systolic murmur, gallops or rubs.     Chest Wall:    No abnormalities observed   Abdomen:     Soft, nontender, positive bowel sounds.     Extremities:   no cyanosis, clubbing or edema.  No marked joint deformities.  Adequate musculoskeletal strength.       Results Review:    Results from last 7 days   Lab Units 10/03/24  05   SODIUM mmol/L 139   POTASSIUM mmol/L 4.2   CHLORIDE mmol/L 102   CO2 mmol/L 26.9   BUN mg/dL 37*   CREATININE mg/dL 1.26   GLUCOSE mg/dL 91   CALCIUM mg/dL 9.7*     Results from last 7 days   Lab Units 24  1848   HSTROP T ng/L 47* 49*     Results from last 7 days   Lab Units 10/03/24  0512   WBC 10*3/mm3 8.29   HEMOGLOBIN g/dL 12.6*   HEMATOCRIT % 39.3   PLATELETS 10*3/mm3 215     Results from last 7 days   Lab Units 10/03/24  0512 10/02/24  0423 10/01/24  0620 24  0932 24  1411 24  0426 24  2135   INR  1.14* 1.21*  --   --   --   --  1.66*   APTT " seconds  --   --  95.4* 88.7* 76.6*   < > 31.5    < > = values in this interval not displayed.         Results from last 7 days   Lab Units 10/01/24  0621   CHOLESTEROL mg/dL 109   TRIGLYCERIDES mg/dL 57   HDL CHOL mg/dL 45   LDL CHOL mg/dL 51               Medication Review:   allopurinol, 100 mg, Oral, BID  atorvastatin, 10 mg, Oral, Every Other Day  benzonatate, 200 mg, Oral, TID  Budesonide, 3 mg, Oral, QAM  enoxaparin, 1 mg/kg, Subcutaneous, Q12H  furosemide, 20 mg, Oral, Daily  ipratropium-albuterol, 3 mL, Nebulization, 4x Daily - RT  metoprolol tartrate, 25 mg, Oral, Q12H  pantoprazole, 40 mg, Oral, Q AM  warfarin, 5 mg, Oral, Daily         Pharmacy to dose warfarin,         Assessment & Plan   Acute bilateral pulmonary embolism, recurrent (previous in May 2023)  Chronic left lower extremity DVT of the common femoral vein  Anticoagulation failure on apixaban -- now on heparin/warfarin.   Rhinovirus infection  Severe aortic stenosis , outpatient TAVR evaluation  Pulmonary fibrosis  Pulmonary hypertension  COPD with acute exacerbation  Persistent atrial fibrillation status post PPM and AV node ablation  History of stroke  Chronic kidney disease     Continue heparin and warfarin. Target INR 2.5-3.5. Referral sent to medication management clinic.     Nothing further to add. Pharmacy is dosing warfarin. Will see as needed.     KYLAH Garcia  [facility]  10/03/24  12:26 EDT

## 2024-10-03 NOTE — DISCHARGE SUMMARY
Discharge summary    Date of admission 9/28/2024  Date of discharge 10/3/2024    Final diagnosis  Acute bilateral pulmonary embolism  Chronic left lower extremity DVT  Severe aortic stenosis  Chronic atrial fibrillation  COPD bronchiectasis and pulmonary fibrosis  Pulmonary hypertension  Hypertension  Hyperlipidemia  Chron's disease  Chronic kidney disease    Discharge medications    Current Facility-Administered Medications:     allopurinol (ZYLOPRIM) tablet 100 mg, 100 mg, Oral, BID, Osman Zheng MD, 100 mg at 10/03/24 0830    atorvastatin (LIPITOR) tablet 10 mg, 10 mg, Oral, Every Other Day, Osman Zheng MD, 10 mg at 10/03/24 0829    benzonatate (TESSALON) capsule 200 mg, 200 mg, Oral, TID, Carl Mcclain MD, 200 mg at 10/03/24 0830    Budesonide (ENTOCORT EC) 24 hr capsule 3 mg, 3 mg, Oral, QAM, Osman Zheng MD, 3 mg at 10/03/24 0553    Enoxaparin Sodium (LOVENOX) syringe 70 mg, 1 mg/kg, Subcutaneous, Q12H, Calvin Bergeron MD    furosemide (LASIX) tablet 20 mg, 20 mg, Oral, Daily, Osman Zheng MD, 20 mg at 10/03/24 0830    ipratropium-albuterol (DUO-NEB) nebulizer solution 3 mL, 3 mL, Nebulization, 4x Daily - RT, Osman Zheng MD, 3 mL at 10/03/24 1045    metoprolol tartrate (LOPRESSOR) tablet 25 mg, 25 mg, Oral, Q12H, Osmna Zheng MD, 25 mg at 10/03/24 0829    O2 (OXYGEN), 2 L/min, Inhalation, PRN, Osman Zheng MD    pantoprazole (PROTONIX) EC tablet 40 mg, 40 mg, Oral, Q AM, Osman Zheng MD, 40 mg at 10/03/24 0553    Pharmacy to dose warfarin, , Does not apply, Continuous PRN, Libra Gonzalez, APRN    [COMPLETED] Insert Peripheral IV, , , Once **AND** sodium chloride 0.9 % flush 10 mL, 10 mL, Intravenous, PRN, Osman Zheng MD    warfarin (COUMADIN) tablet 5 mg, 5 mg, Oral, Daily, Calvin Bergeron MD, 5 mg at 10/02/24 3487     Consults obtained  Cardiology  Pulmonary  Hematology oncology    Procedures  None    Hospital course  89-year-old male with history of atrial  fibrillation aortic stenosis COPD bronchiectasis coronary disease and chronic DVT admitted to emergency room with shortness of breath and workup in ER revealed acute bilateral pulm embolism admitted for management.  Patient was on Eliquis which was held and started on heparin and further evaluated by cardiology pulmonary and hematology.  Patient lower extremity Doppler study shows chronic DVT and 2D echo revealed severe pulmonary hypertension.  Patient remained on heparin and hematology recommend to switch to Lovenox and Coumadin and cleared for discharge.  Patient family learn how to give Lovenox until INR more than 2.    Discharge diet regular    Activity as tolerated    Medication as above    Follow-up with prime doctor in 1 week and follow-up with cardiology pulmonary and hematology per the instructions and take medication as directed.    SHELBIE COWART MD

## 2024-10-04 ENCOUNTER — HOME CARE VISIT (OUTPATIENT)
Dept: HOME HEALTH SERVICES | Facility: HOME HEALTHCARE | Age: 89
End: 2024-10-04
Payer: MEDICARE

## 2024-10-04 ENCOUNTER — ANTICOAGULATION VISIT (OUTPATIENT)
Dept: PHARMACY | Facility: HOSPITAL | Age: 89
End: 2024-10-04
Payer: MEDICARE

## 2024-10-04 VITALS
DIASTOLIC BLOOD PRESSURE: 60 MMHG | TEMPERATURE: 97.3 F | OXYGEN SATURATION: 97 % | HEART RATE: 60 BPM | SYSTOLIC BLOOD PRESSURE: 110 MMHG | RESPIRATION RATE: 18 BRPM

## 2024-10-04 DIAGNOSIS — I26.99 ACUTE PULMONARY EMBOLISM WITHOUT ACUTE COR PULMONALE, UNSPECIFIED PULMONARY EMBOLISM TYPE: Primary | ICD-10-CM

## 2024-10-04 LAB — INR PPP: 1.5

## 2024-10-04 PROCEDURE — G0299 HHS/HOSPICE OF RN EA 15 MIN: HCPCS

## 2024-10-04 NOTE — OUTREACH NOTE
Prep Survey      Flowsheet Row Responses   [facility] facility patient discharged from? [facility]   Is LACE score < 7 ? No   Eligibility Readm Mgmt   Discharge diagnosis Pulmonary emboli   Does the patient have one of the following disease processes/diagnoses(primary or secondary)? Other   Does the patient have Home health ordered? Yes   What is the Home health agency?  [facility]   Is there a DME ordered? No   Prep survey completed? Yes            JOSE RASMUSSEN - Registered Nurse

## 2024-10-04 NOTE — PROGRESS NOTES
Anticoagulation Clinic Progress Note    Anticoagulation Summary  As of 10/4/2024      INR goal:  2.5-3.5   TTR:  --   INR used for dosin.50 (10/4/2024)   Warfarin maintenance plan:  5 mg every day   Weekly warfarin total:  35 mg   Plan last modified:  Breanne Jain [facility] (10/4/2024)   Next INR check:  10/7/2024   Target end date:      Indications    Acute pulmonary embolism without acute cor pulmonale  unspecified pulmonary embolism type [I26.99]                 Anticoagulation Episode Summary       INR check location:      Preferred lab:      Send INR reminders to:  [facility] [place]    Comments:  New start to warfarin. Discharging from hospital 10/3 starting warfarin + bridging with lovenox. [facility] has 1st visit on 10/4 and will get INR.          Anticoagulation Care Providers       Provider Role Specialty Phone number    Libra Gonzalez, APRN Referring Cardiology [phone]            INR History:      2023     8:17 PM 2023     8:04 AM 2024     9:35 PM 10/2/2024     4:23 AM 10/3/2024     5:12 AM 10/4/2024    12:00 AM 10/4/2024     1:12 PM   Anticoagulation Monitoring   INR       1.50   INR Date       10/4/2024   INR Goal       2.5-3.5   Last Week Total       10 mg   Next Week Total       35 mg   Sun       5 mg   Mon       -   Tue       -   Wed       -   Thu       -   Fri       5 mg   Sat       5 mg   Historical INR 1.36  1.51  1.66  1.21  1.14  1.50            This result is from an external source.       Plan:  1. INR is Subtherapeutic today- see above in Anticoagulation Summary.   Provided instructions to Josy with [facility] to Continue their warfarin regimen- see above in Anticoagulation Summary.  Admitted -10/3 for an acute PE. Patient has a history of a.fib and a chronic DVT. Developed clot on eliquis. Discharged yesterday from the hospital on warfarin 5 mg daily and lovenox 80 mg every 12 hours. Continue 5 mg daily and lovenox and rck on Monday. Josy/[facility]  2.  Follow up in 3 days      Breanne Jain, RPH

## 2024-10-04 NOTE — HOME HEALTH
89M with history of atrial fibrillation, aortic stenosis, COPD, bronchiectasis, coronary disease, and chronic DVT.  Recent hospitalization due to acute bilateral pulm embolism.  Patient was on Eliquis which was held and started on heparin and further evaluated by cardiology pulmonary and hematology. Patient lower extremity Doppler study shows chronic DVT and 2D echo revealed severe pulmonary hypertension. Patient remained on heparin and hematology recommend to switch to Lovenox and Coumadin and cleared for discharge. Patient family learn how to give Lovenox until INR more than 2.  PT/INR 17.5/1.9, called to [facility].  Spouse and caregiver are present.  Verbalized understanding of lovenox injections.  Orders to repeat PT/INR Monday 10/7/24.  Patient uses cane to ambulate.  Oxygen at 2L hs.  Has nebulizer.  Medications reconciled.  SN FOC PE/DVT.  SN to teach and instruct PE/DVT, PT/INR as ordered, medication education.

## 2024-10-05 ENCOUNTER — HOME CARE VISIT (OUTPATIENT)
Dept: HOME HEALTH SERVICES | Facility: HOME HEALTHCARE | Age: 89
End: 2024-10-05
Payer: MEDICARE

## 2024-10-07 ENCOUNTER — HOME CARE VISIT (OUTPATIENT)
Dept: HOME HEALTH SERVICES | Facility: HOME HEALTHCARE | Age: 89
End: 2024-10-07
Payer: MEDICARE

## 2024-10-07 ENCOUNTER — ANTICOAGULATION VISIT (OUTPATIENT)
Dept: PHARMACY | Facility: HOSPITAL | Age: 89
End: 2024-10-07
Payer: MEDICARE

## 2024-10-07 ENCOUNTER — READMISSION MANAGEMENT (OUTPATIENT)
Dept: CALL CENTER | Facility: HOSPITAL | Age: 89
End: 2024-10-07
Payer: MEDICARE

## 2024-10-07 VITALS
RESPIRATION RATE: 18 BRPM | SYSTOLIC BLOOD PRESSURE: 116 MMHG | HEART RATE: 62 BPM | TEMPERATURE: 98.4 F | OXYGEN SATURATION: 93 % | DIASTOLIC BLOOD PRESSURE: 62 MMHG

## 2024-10-07 DIAGNOSIS — I26.99 ACUTE PULMONARY EMBOLISM WITHOUT ACUTE COR PULMONALE, UNSPECIFIED PULMONARY EMBOLISM TYPE: Primary | ICD-10-CM

## 2024-10-07 LAB
HH POC INTERNATIONAL NORMALIZATION RATIO: 2.2
HH POC PROTIME: 26.7 SECONDS
INR PPP: 2.2

## 2024-10-07 PROCEDURE — G0299 HHS/HOSPICE OF RN EA 15 MIN: HCPCS

## 2024-10-07 NOTE — OUTREACH NOTE
Medical Week 1 Survey      Flowsheet Row Responses   [facility] patient discharged from? [place]   Does the patient have one of the following disease processes/diagnoses(primary or secondary)? Other   Week 1 attempt successful? Yes   Call start time 1214   Call end time 1220   List who call center can speak with Spouse   Medication alerts for this patient coumadin, lovenox.   Meds reviewed with patient/caregiver? Yes   Is the patient having any side effects they believe may be caused by any medication additions or changes? No   Does the patient have all medications ordered at discharge? Yes   Is the patient taking all medications as directed (includes completed medication regime)? Yes   Comments regarding appointments Pt to see pcp on this day.   Does the patient have a primary care provider?  Yes   Does the patient have an appointment with their PCP within 7 days of discharge? Yes   Has the patient kept scheduled appointments due by today? N/A   Has home health visited the patient within 72 hours of discharge? Yes   Psychosocial issues? No   Did the patient receive a copy of their discharge instructions? Yes   Nursing interventions Reviewed instructions with patient   What is the patient's perception of their health status since discharge? Improving   Is the patient/caregiver able to teach back signs and symptoms related to disease process for when to call PCP? Yes   Is the patient/caregiver able to teach back signs and symptoms related to disease process for when to call 911? Yes   Is the patient/caregiver able to teach back the hierarchy of who to call/visit for symptoms/problems? PCP, Specialist, Home health nurse, Urgent Care, ED, 911 Yes   Week 1 call completed? Yes   Would this patient benefit from a Referral to Amb Social Work? No   Is the patient interested in additional calls from an ambulatory ? No   Wrap up additional comments Spouse reports pt is improving. HH is visiting. Pt has  all medications. Diet concerning coumadin discussed. Pt to see pcp on this day.   Call end time 1220            Juliet WANG - Registered Nurse

## 2024-10-07 NOTE — PROGRESS NOTES
Anticoagulation Clinic Progress Note    Anticoagulation Summary  As of 10/7/2024      INR goal:  2.5-3.5   TTR:  --   INR used for dosin.20 (10/7/2024)   Warfarin maintenance plan:  5 mg every day   Weekly warfarin total:  35 mg   Plan last modified:  Breanne Jain, [facility] (10/4/2024)   Next INR check:  10/10/2024   Target end date:      Indications    Acute pulmonary embolism without acute cor pulmonale  unspecified pulmonary embolism type [I26.99]                 Anticoagulation Episode Summary       INR check location:      Preferred lab:      Send INR reminders to:  [facility] [place]    Comments:  New start to warfarin. Discharging from hospital 10/3 starting warfarin + bridging with lovenox. [facility] has 1st visit on 10/4 and will get INR.          Anticoagulation Care Providers       Provider Role Specialty Phone number    Libra Gonzalez, APRN Referring Cardiology [phone]            INR History:      2024     9:35 PM 10/2/2024     4:23 AM 10/3/2024     5:12 AM 10/4/2024    12:00 AM 10/4/2024     1:12 PM 10/7/2024    12:00 AM 10/7/2024    10:49 AM   Anticoagulation Monitoring   INR     1.50  2.20   INR Date     10/4/2024  10/7/2024   INR Goal     2.5-3.5  2.5-3.5   Trend       Same   Last Week Total     10 mg  25 mg   Next Week Total     35 mg  37.5 mg   Sun     5 mg  -   Mon     -  7.5 mg (10/7)   Tue     -  5 mg   Wed     -  5 mg   Thu     -  -   Fri     5 mg  -   Sat     5 mg  -   Historical INR 1.66  1.21  1.14  1.50      2.20            This result is from an external source.       Plan:  1. INR is Subtherapeutic today- see above in Anticoagulation Summary.   Provided instructions to Oseas with [facility] to Change their warfarin regimen- see above in Anticoagulation Summary.  2. Follow up in 3 days      Soo Shipley, IrinaD

## 2024-10-10 ENCOUNTER — HOME CARE VISIT (OUTPATIENT)
Dept: HOME HEALTH SERVICES | Facility: HOME HEALTHCARE | Age: 89
End: 2024-10-10
Payer: MEDICARE

## 2024-10-10 ENCOUNTER — ANTICOAGULATION VISIT (OUTPATIENT)
Dept: PHARMACY | Facility: HOSPITAL | Age: 89
End: 2024-10-10
Payer: MEDICARE

## 2024-10-10 VITALS
OXYGEN SATURATION: 97 % | HEART RATE: 60 BPM | DIASTOLIC BLOOD PRESSURE: 66 MMHG | TEMPERATURE: 98.1 F | RESPIRATION RATE: 18 BRPM | SYSTOLIC BLOOD PRESSURE: 122 MMHG

## 2024-10-10 DIAGNOSIS — I26.99 ACUTE PULMONARY EMBOLISM WITHOUT ACUTE COR PULMONALE, UNSPECIFIED PULMONARY EMBOLISM TYPE: Primary | ICD-10-CM

## 2024-10-10 LAB
HH POC INTERNATIONAL NORMALIZATION RATIO: 3.9
HH POC PROTIME: 46.5 SECONDS
INR PPP: 3.9

## 2024-10-10 PROCEDURE — G0299 HHS/HOSPICE OF RN EA 15 MIN: HCPCS

## 2024-10-10 NOTE — PROGRESS NOTES
Anticoagulation Clinic Progress Note    Anticoagulation Summary  As of 10/10/2024      INR goal:  2.5-3.5   TTR:  --   INR used for dosing:  3.90 (10/10/2024)   Warfarin maintenance plan:  5 mg every day   Weekly warfarin total:  35 mg   Plan last modified:  Breanne Jain RPH (10/4/2024)   Next INR check:  10/14/2024   Target end date:      Indications    Acute pulmonary embolism without acute cor pulmonale  unspecified pulmonary embolism type [I26.99]                 Anticoagulation Episode Summary       INR check location:      Preferred lab:      Send INR reminders to:  [facility]    Comments:  New start to warfarin. Discharging from hospital 10/3 starting warfarin + bridging with lovenox. [facility] has 1st visit on 10/4 and will get INR.          Anticoagulation Care Providers       Provider Role Specialty Phone number    Libra Gonzalez, APRN Referring Cardiology [phone]            INR History:      10/3/2024     5:12 AM 10/4/2024    12:00 AM 10/4/2024     1:12 PM 10/7/2024    12:00 AM 10/7/2024    10:49 AM 10/10/2024    12:00 AM 10/10/2024     1:11 PM   Anticoagulation Monitoring   INR   1.50  2.20  3.90   INR Date   10/4/2024  10/7/2024  10/10/2024   INR Goal   2.5-3.5  2.5-3.5  2.5-3.5   Trend     Same  Same   Last Week Total   10 mg  25 mg  37.5 mg   Next Week Total   35 mg  37.5 mg  32.5 mg   Sun   5 mg  -  5 mg   Mon   -  7.5 mg (10/7)  -   Tue   -  5 mg  -   Wed   -  5 mg  -   Thu   -  -  2.5 mg (10/10)   Fri   5 mg  -  5 mg   Sat   5 mg  -  5 mg   Historical INR 1.14  1.50      2.20      3.90            This result is from an external source.       Plan:  1. INR is Supratherapeutic today- see above in Anticoagulation Summary.   Provided instructions to Josy with [facility] to Change their warfarin regimen- see above in Anticoagulation Summary.  partial to 2.5 mg then resume 5 mg daily, rck on Monday   2. Follow up in 4 days      Breanne Jain RPH

## 2024-10-14 ENCOUNTER — ANTICOAGULATION VISIT (OUTPATIENT)
Dept: PHARMACY | Facility: HOSPITAL | Age: 89
End: 2024-10-14
Payer: MEDICARE

## 2024-10-14 ENCOUNTER — HOME CARE VISIT (OUTPATIENT)
Dept: HOME HEALTH SERVICES | Facility: HOME HEALTHCARE | Age: 89
End: 2024-10-14
Payer: MEDICARE

## 2024-10-14 VITALS
TEMPERATURE: 98 F | SYSTOLIC BLOOD PRESSURE: 112 MMHG | HEART RATE: 61 BPM | RESPIRATION RATE: 18 BRPM | DIASTOLIC BLOOD PRESSURE: 62 MMHG | OXYGEN SATURATION: 97 %

## 2024-10-14 DIAGNOSIS — I26.99 ACUTE PULMONARY EMBOLISM WITHOUT ACUTE COR PULMONALE, UNSPECIFIED PULMONARY EMBOLISM TYPE: Primary | ICD-10-CM

## 2024-10-14 LAB
HH POC INTERNATIONAL NORMALIZATION RATIO: 4.3
HH POC PROTIME: 51.3 SECONDS
INR PPP: 4.3

## 2024-10-14 PROCEDURE — G0299 HHS/HOSPICE OF RN EA 15 MIN: HCPCS

## 2024-10-14 NOTE — PROGRESS NOTES
Anticoagulation Clinic Progress Note    Anticoagulation Summary  As of 10/14/2024      INR goal:  2.5-3.5   TTR:  0.0% (1 d)   INR used for dosin.30 (10/14/2024)   Warfarin maintenance plan:  5 mg every day   Weekly warfarin total:  35 mg   Plan last modified:  Breanne Jain [facility] (10/4/2024)   Next INR check:  10/21/2024   Target end date:  --    Indications    Acute pulmonary embolism without acute cor pulmonale  unspecified pulmonary embolism type [I26.99]                 Anticoagulation Episode Summary       INR check location:  --    Preferred lab:  --    Send INR reminders to:  [facility]    Comments:  New start to warfarin. Discharging from hospital 10/3 starting warfarin + bridging with lovenox. [facility] has 1st visit on 10/4 and will get INR.          Anticoagulation Care Providers       Provider Role Specialty Phone number    Libra Gonzalez APRN Referring Cardiology [phone]            Drug interactions: has remained unchanged.  Diet: has remained unchanged.    Clinic Interview:  No pertinent clinical findings have been reported.    INR History:      10/4/2024     1:12 PM 10/7/2024    12:00 AM 10/7/2024    10:49 AM 10/10/2024    12:00 AM 10/10/2024     1:11 PM 10/14/2024    12:00 AM 10/14/2024    11:35 AM   Anticoagulation Monitoring   INR 1.50  2.20  3.90  4.30   INR Date 10/4/2024  10/7/2024  10/10/2024  10/14/2024   INR Goal 2.5-3.5  2.5-3.5  2.5-3.5  2.5-3.5   Trend   Same  Same  Same   Last Week Total 10 mg  25 mg  37.5 mg  35 mg   Next Week Total 35 mg  37.5 mg  32.5 mg  30 mg   Sun 5 mg  -  5 mg  5 mg   Mon -  7.5 mg (10/7)  -  Hold (10/14)   Tue -  5 mg  -  5 mg   Wed -  5 mg  -  5 mg   Thu -  -  2.5 mg (10/10)  5 mg   Fri 5 mg  -  5 mg  5 mg   Sat 5 mg  -  5 mg  5 mg   Historical INR  2.20      3.90      4.30            This result is from an external source.       Plan:  1. INR is Supratherapeutic today- see above in Anticoagulation Summary.   No known reason per  Pablo for high INR. Instructed for pt to HOLD today's dose, then resume 5 mg daily, rck on Monday (Josy/[facility])  see above in Anticoagulation Summary.  2. Follow up in 1 week  3. Pt has agreed to only be called if INR out of range. They have been instructed to call if any changes in medications, doses, concerns, etc. Patient expresses understanding and has no further questions at this time.    Nayely Mock, [facility]

## 2024-10-15 ENCOUNTER — CLINICAL SUPPORT NO REQUIREMENTS (OUTPATIENT)
Age: 89
End: 2024-10-15
Payer: MEDICARE

## 2024-10-15 ENCOUNTER — OFFICE VISIT (OUTPATIENT)
Age: 89
End: 2024-10-15
Payer: MEDICARE

## 2024-10-15 VITALS
DIASTOLIC BLOOD PRESSURE: 70 MMHG | HEIGHT: 68 IN | SYSTOLIC BLOOD PRESSURE: 124 MMHG | WEIGHT: 141 LBS | BODY MASS INDEX: 21.37 KG/M2 | HEART RATE: 88 BPM

## 2024-10-15 DIAGNOSIS — I44.2 COMPLETE HEART BLOCK: Primary | ICD-10-CM

## 2024-10-15 DIAGNOSIS — I97.190 COMPLETE ATRIOVENTRICULAR BLOCK DUE TO ATRIOVENTRICULAR NODAL ABLATION: ICD-10-CM

## 2024-10-15 DIAGNOSIS — I44.2 COMPLETE ATRIOVENTRICULAR BLOCK DUE TO ATRIOVENTRICULAR NODAL ABLATION: ICD-10-CM

## 2024-10-15 DIAGNOSIS — I48.91 ATRIAL FIBRILLATION WITH RAPID VENTRICULAR RESPONSE: Primary | ICD-10-CM

## 2024-10-15 DIAGNOSIS — I26.99 ACUTE PULMONARY EMBOLISM WITHOUT ACUTE COR PULMONALE, UNSPECIFIED PULMONARY EMBOLISM TYPE: ICD-10-CM

## 2024-10-15 DIAGNOSIS — I35.0 AORTIC VALVE STENOSIS, ETIOLOGY OF CARDIAC VALVE DISEASE UNSPECIFIED: ICD-10-CM

## 2024-10-15 NOTE — PROGRESS NOTES
Date of Office Visit: 10/15/2024  Encounter Provider: Rashaun Nettles MD  Place of Service: [facility] CARDIOLOGY  Patient Name: Asia Ly  :1932    Chief Complaint   Patient presents with    complete heart block    persistent AFIB    Pacemaker Check   :     HPI: Asia Ly is a 89 y.o. male who presents today for persistent atrial fibrillation.    He has a history of persistent atrial fibrillation.  I placed a left bundle lead in  and did an AV node ablation.    He has done very well, no admissions for atrial fibrillation since we did this treatment.    He was recently admitted for bilateral pulmonary embolisms.  He was on Eliquis, but still had a chronic DVT.  He it was decided to switch him to warfarin.  He has had a little trouble regulating the warfarin, he was high on his last check, but is working with a clinic to get that in order.    He also has severe aortic stenosis, and has been recommended to consider TAVR.  His wife is a little hesitant given his age.          Past Medical History:   Diagnosis Date    Aortic stenosis     Aspiration pneumonia     Atrial fibrillation     Bleeding from the nose     Bronchiectasis     CKD (chronic kidney disease)     COPD (chronic obstructive pulmonary disease)     Crohn's disease     Dizziness     Elevated cholesterol     Gastric ulcer     Generalized weakness     Gout     Gout     Hard of hearing     Hyperlipidemia     Hypertension     Inguinal hernia     left    Leg swelling     Lower extremity edema     Mild anemia     Near syncope     Nonrheumatic aortic valve stenosis     PAF (paroxysmal atrial fibrillation)     Palpitations     Peptic ulcer     Pneumonia due to COVID-19 virus 2023    Pneumonia of left lung due to infectious organism     Pulmonary embolism, bilateral 2023    Pulmonary fibrosis     Stroke     TIA (transient ischemic attack)        Past Surgical History:   Procedure Laterality Date     "BRONCHOSCOPY N/A 10/22/2018    Procedure: BRONCHOSCOPY WITH BRONCHALVEOLAR LAVAGE;  Surgeon: Chidi Oconnor MD;  Location: [facility] ENDOSCOPY;  Service: Pulmonary    BRONCHOSCOPY N/A 7/8/2021    Procedure: BRONCHOSCOPY with BAL;  Surgeon: Chidi Oconnor MD;  Location: [facility] ENDOSCOPY;  Service: Pulmonary;  Laterality: N/A;  Pre: bronchectasis  Post: same    CARDIAC ELECTROPHYSIOLOGY PROCEDURE N/A 7/9/2021    Procedure: Pacemaker DC new--[employer] possible His lead;  Surgeon: Rashaun Nettles MD;  Location: [facility] CATH INVASIVE LOCATION;  Service: Cardiology;  Laterality: N/A;    CARDIAC ELECTROPHYSIOLOGY PROCEDURE N/A 9/3/2021    Procedure: AV node 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 ppm;  Surgeon: Rashaun Nettles MD;  Location: [facility] CATH INVASIVE LOCATION;  Service: Cardiovascular;  Laterality: N/A;    CATARACT EXTRACTION Bilateral     COLONOSCOPY      COLONOSCOPY N/A 3/9/2018    Procedure: COLONOSCOPY to terminal ileum with bx;  Surgeon: Aron Cabrera MD;  Location: [facility] ENDOSCOPY;  Service:     COLONOSCOPY N/A 11/19/2020    Procedure: COLONOSCOPY to cecum:  apc to cecum angiodysplagia,;  Surgeon: Aron Cabrera MD;  Location: [facility] ENDOSCOPY;  Service: Gastroenterology;  Laterality: N/A;  GI bleed  post:  diverticulosis, angiodysplagia    CYSTECTOMY      back and shoulder area    ENDOSCOPY N/A 3/9/2018    Procedure: ESOPHAGOGASTRODUODENOSCOPY with bx;  Surgeon: Aron Cabrera MD;  Location: [facility] ENDOSCOPY;  Service:     ENDOSCOPY N/A 11/19/2020    Procedure: ESOPHAGOGASTRODUODENOSCOPY;  Surgeon: Aron Cabrera MD;  Location: [facility] ENDOSCOPY;  Service: Gastroenterology;  Laterality: N/A;  GI bleed  post:  HH.    EYE SURGERY Left     detached retina    EYE SURGERY Right     \"repaired a hole in the eye\"    INGUINAL HERNIA REPAIR Left 8/29/2023    Procedure: INGUINAL HERNIA REPAIR;  Surgeon: Itz Zaidi Jr., MD;  Location: [facility] MAIN OR;  Service: General;  Laterality: Left;    INSERT / REPLACE / " REMOVE PACEMAKER      TESTICLE SURGERY      benign tumor removed.       Social History     Socioeconomic History    Marital status:    Tobacco Use    Smoking status: Former     Types: Cigars     Quit date: 10/28/1994     Years since quittin.9     Passive exposure: Past    Smokeless tobacco: Never    Tobacco comments:     Quit 25 years ago   Vaping Use    Vaping status: Never Used   Substance and Sexual Activity    Alcohol use: No     Comment: daily caffiene - 1/2 cup of coffee    Drug use: No    Sexual activity: Defer       Family History   Problem Relation Age of Onset    Stroke Mother     Heart disease Mother     Hypertension Mother     Diabetes Mother     Stroke Father     Heart disease Sister         Heart Valve Replacement    Ovarian cancer Sister     Rheumatic fever Sister     Diabetes Sister     Malig Hyperthermia Neg Hx        Review of Systems   Constitutional: Positive for malaise/fatigue.   Cardiovascular: Negative.    Respiratory:  Positive for shortness of breath.    Gastrointestinal: Negative.        Allergies   Allergen Reactions    Amiodarone Unknown (See Comments)     Pulmonary fibrosis    Diltiazem Arrhythmia    Doxycycline Rash    Allopurinol Unknown - Low Severity     Other reaction(s): Joint pain      Other Reaction(s): Joint pain    Cefdinir Arrhythmia     A-FIB    Indomethacin Rash         Current Outpatient Medications:     allopurinol (ZYLOPRIM) 100 MG tablet, Take 1 tablet by mouth 2 (Two) Times a Day. Indications: Gout, Disp: , Rfl:     atorvastatin (LIPITOR) 10 MG tablet, Take 1 tablet by mouth Every Other Day. M-W-F  Indications: High Amount of Fats in the Blood, Disp: , Rfl:     Budesonide (ENTOCORT EC) 3 MG 24 hr capsule, Take 1 capsule by mouth Every Morning. Indications: Crohn's Disease, Disp: , Rfl:     furosemide (LASIX) 20 MG tablet, Take 1 tablet by mouth Daily. Indications: Edema, Disp: , Rfl:     ipratropium (ATROVENT) 0.02 % nebulizer solution, Take 2.5 mL by  "nebulization 3 (Three) Times a Day. 0.5mg/2.5ml nebulizer treatments three times a day  Indications: Chronic Obstructive Lung Disease, Disp: , Rfl:     IPRATROPIUM-ALBUTEROL IN, Inhale 2 puffs 3 (Three) Times a Day. Indications: COPD exacerbation, complicated, Disp: , Rfl:     metoprolol tartrate (LOPRESSOR) 25 MG tablet, Take 1 tablet by mouth Every 12 (Twelve) Hours. Indications: High Blood Pressure Disorder, Disp: 180 tablet, Rfl: 1    O2 (OXYGEN), Inhale 2 L/min As Needed (soa). Indications: Oxygen Saturation Decreased, Disp: , Rfl:     omeprazole (priLOSEC) 40 MG capsule, Take 1 capsule by mouth Daily. Indications: Heartburn, Disp: , Rfl:     warfarin (COUMADIN) 5 MG tablet, Take 1 tablet by mouth Daily. Indications: DVT/PE (active thrombosis) (Patient taking differently: Take 1 tablet by mouth Daily. 2.5mg on thursday then 5mg daily  Indications: DVT/PE (active thrombosis)), Disp: 30 tablet, Rfl: 0      Objective:     Vitals:    10/15/24 1040   BP: 124/70   BP Location: Right arm   Patient Position: Sitting   Pulse: 88   Weight: 64 kg (141 lb)   Height: 172.7 cm (68\")     Body mass index is 21.44 kg/m².    PHYSICAL EXAM:    Vitals and nursing note reviewed.   Constitutional:       General: Not in acute distress.  Pulmonary:      Effort: Pulmonary effort is normal. No respiratory distress.   Chest:      Comments: Pacemaker in place on left chest  Cardiovascular:      Normal rate. Regular rhythm.   Edema:     Peripheral edema absent.   Skin:     General: Skin is warm and dry.   Neurological:      Mental Status: Alert and oriented to person, place, and time.   Psychiatric:         Behavior: Behavior normal.         Thought Content: Thought content normal.         Judgment: Judgment normal.             ECG 12 Lead    Date/Time: 10/15/2024 11:04 AM  Performed by: Rashaun Nettles MD    Authorized by: Rashaun Nettles MD  Comparison: compared with previous ECG from 9/28/2024  Rhythm: atrial fibrillation " and paced            Assessment:       Diagnosis Plan   1. Atrial fibrillation with rapid ventricular response        2. 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        3. Aortic valve stenosis, etiology of cardiac valve disease unspecified        4. Acute pulmonary embolism without acute cor pulmonale, unspecified pulmonary embolism type               Plan:       His persistent atrial fibrillation is very stable following conduction pacing with AV node ablation.  He has not had any admissions for A-fib with RVR which is a big problem prior to this.    He did develop pulmonary embolism, and thrombosis on Eliquis, but is now on warfarin.  He is working with the Coumadin clinic to get to stabilize levels.    I discussed aortic stenosis and TAVR with his wife.  I recommended he continue with evaluation as his aortic stenosis is very severe and likely will be life limiting and less he accepts treatment.    For his A-fib and device he can follow-up in 1 year, continue remote monitoring.    As always, it has been a pleasure to participate in your patient's care.      Sincerely,         Rashaun Nettels MD

## 2024-10-16 ENCOUNTER — READMISSION MANAGEMENT (OUTPATIENT)
Dept: CALL CENTER | Facility: HOSPITAL | Age: 89
End: 2024-10-16
Payer: MEDICARE

## 2024-10-16 NOTE — OUTREACH NOTE
Medical Week 2 Survey      Flowsheet Row Responses   [facility] patient discharged from? [place]   Does the patient have one of the following disease processes/diagnoses(primary or secondary)? Other   Week 2 attempt successful? No   Unsuccessful attempts Attempt 1            Brittney MON - Registered Nurse

## 2024-10-17 ENCOUNTER — HOME CARE VISIT (OUTPATIENT)
Dept: HOME HEALTH SERVICES | Facility: HOME HEALTHCARE | Age: 89
End: 2024-10-17
Payer: MEDICARE

## 2024-10-17 ENCOUNTER — ANTICOAGULATION VISIT (OUTPATIENT)
Dept: PHARMACY | Facility: HOSPITAL | Age: 89
End: 2024-10-17
Payer: MEDICARE

## 2024-10-17 DIAGNOSIS — I26.99 ACUTE PULMONARY EMBOLISM WITHOUT ACUTE COR PULMONALE, UNSPECIFIED PULMONARY EMBOLISM TYPE: Primary | ICD-10-CM

## 2024-10-17 LAB — INR PPP: 3.6

## 2024-10-17 PROCEDURE — G0299 HHS/HOSPICE OF RN EA 15 MIN: HCPCS

## 2024-10-17 NOTE — PROGRESS NOTES
Anticoagulation Clinic Progress Note    Anticoagulation Summary  As of 10/17/2024      INR goal:  2.5-3.5   TTR:  0.0% (4 d)   INR used for dosing:  3.60 (10/17/2024)   Warfarin maintenance plan:  2.5 mg every Thu, Sat; 5 mg all other days   Weekly warfarin total:  30 mg   Plan last modified:  Breanne Jain RPH (10/17/2024)   Next INR check:  10/21/2024   Target end date:  --    Indications    Acute pulmonary embolism without acute cor pulmonale  unspecified pulmonary embolism type [I26.99]                 Anticoagulation Episode Summary       INR check location:  --    Preferred lab:  --    Send INR reminders to:  [facility]    Comments:  New start to warfarin. Discharging from hospital 10/3 starting warfarin + bridging with lovenox. [facility] has 1st visit on 10/4 and will get INR.          Anticoagulation Care Providers       Provider Role Specialty Phone number    Libra Gonzalez, APRN Referring Cardiology [phone]            INR History:      10/7/2024    10:49 AM 10/10/2024    12:00 AM 10/10/2024     1:11 PM 10/14/2024    12:00 AM 10/14/2024    11:35 AM 10/17/2024    12:00 AM 10/17/2024     2:41 PM   Anticoagulation Monitoring   INR 2.20  3.90  4.30  3.60   INR Date 10/7/2024  10/10/2024  10/14/2024  10/17/2024   INR Goal 2.5-3.5  2.5-3.5  2.5-3.5  2.5-3.5   Trend Same  Same  Same  Down   Last Week Total 25 mg  37.5 mg  35 mg  27.5 mg   Next Week Total 37.5 mg  32.5 mg  30 mg  30 mg   Sun -  5 mg  5 mg  5 mg   Mon 7.5 mg (10/7)  -  Hold (10/14)  -   Tue 5 mg  -  5 mg  -   Wed 5 mg  -  5 mg  -   Thu -  2.5 mg (10/10)  5 mg  2.5 mg   Fri -  5 mg  5 mg  5 mg   Sat -  5 mg  5 mg  2.5 mg   Historical INR  3.90      4.30      3.60            This result is from an external source.       Plan:  1. INR is Supratherapeutic today- see above in Anticoagulation Summary.   Provided instructions to Anabelle with [facility] to Change their warfarin regimen- see above in Anticoagulation  Summary.  Trial on 2.5 mg on thurs, sat and 5 mg AOD, rck on Monday (LVM for Anabelle/H)   2. Follow up on Monday       Breanne Jian RPH

## 2024-10-18 ENCOUNTER — TRANSCRIBE ORDERS (OUTPATIENT)
Dept: ADMINISTRATIVE | Facility: HOSPITAL | Age: 89
End: 2024-10-18
Payer: MEDICARE

## 2024-10-18 VITALS
OXYGEN SATURATION: 98 % | TEMPERATURE: 97.5 F | SYSTOLIC BLOOD PRESSURE: 110 MMHG | DIASTOLIC BLOOD PRESSURE: 64 MMHG | RESPIRATION RATE: 18 BRPM | HEART RATE: 63 BPM

## 2024-10-18 DIAGNOSIS — I26.99 PULMONARY EMBOLISM, UNSPECIFIED CHRONICITY, UNSPECIFIED PULMONARY EMBOLISM TYPE, UNSPECIFIED WHETHER ACUTE COR PULMONALE PRESENT: Primary | ICD-10-CM

## 2024-10-21 ENCOUNTER — HOME CARE VISIT (OUTPATIENT)
Dept: HOME HEALTH SERVICES | Facility: HOME HEALTHCARE | Age: 89
End: 2024-10-21
Payer: MEDICARE

## 2024-10-21 ENCOUNTER — ANTICOAGULATION VISIT (OUTPATIENT)
Dept: PHARMACY | Facility: HOSPITAL | Age: 89
End: 2024-10-21
Payer: MEDICARE

## 2024-10-21 VITALS
DIASTOLIC BLOOD PRESSURE: 74 MMHG | SYSTOLIC BLOOD PRESSURE: 118 MMHG | HEART RATE: 61 BPM | OXYGEN SATURATION: 94 % | RESPIRATION RATE: 18 BRPM | TEMPERATURE: 97.7 F

## 2024-10-21 DIAGNOSIS — I26.99 ACUTE PULMONARY EMBOLISM WITHOUT ACUTE COR PULMONALE, UNSPECIFIED PULMONARY EMBOLISM TYPE: Primary | ICD-10-CM

## 2024-10-21 LAB
HH POC INTERNATIONAL NORMALIZATION RATIO: 3.3
HH POC PROTIME: 39.5 SECONDS
INR PPP: 3.3

## 2024-10-21 PROCEDURE — G0299 HHS/HOSPICE OF RN EA 15 MIN: HCPCS

## 2024-10-21 NOTE — PROGRESS NOTES
Anticoagulation Clinic Progress Note    Anticoagulation Summary  As of 10/21/2024      INR goal:  2.5-3.5   TTR:  33.3% (1.1 wk)   INR used for dosing:  3.30 (10/21/2024)   Warfarin maintenance plan:  2.5 mg every Thu, Sat; 5 mg all other days   Weekly warfarin total:  30 mg   No change documented:  Breanne Jain RPH   Plan last modified:  Breanne Jain RPH (10/17/2024)   Next INR check:  10/28/2024   Target end date:  --    Indications    Acute pulmonary embolism without acute cor pulmonale  unspecified pulmonary embolism type [I26.99]                 Anticoagulation Episode Summary       INR check location:  --    Preferred lab:  --    Send INR reminders to:  [facility]    Comments:  New start to warfarin. Discharging from hospital 10/3 starting warfarin + bridging with lovenox. [facility] has 1st visit on 10/4 and will get INR.          Anticoagulation Care Providers       Provider Role Specialty Phone number    Libra Gonzlaez, APRN Referring Cardiology [phone]            INR History:      10/10/2024     1:11 PM 10/14/2024    12:00 AM 10/14/2024    11:35 AM 10/17/2024    12:00 AM 10/17/2024     2:41 PM 10/21/2024    12:00 AM 10/21/2024    10:33 AM   Anticoagulation Monitoring   INR 3.90  4.30  3.60  3.30   INR Date 10/10/2024  10/14/2024  10/17/2024  10/21/2024   INR Goal 2.5-3.5  2.5-3.5  2.5-3.5  2.5-3.5   Trend Same  Same  Down  Same   Last Week Total 37.5 mg  35 mg  27.5 mg  25 mg   Next Week Total 32.5 mg  30 mg  30 mg  30 mg   Sun 5 mg  5 mg  5 mg  5 mg   Mon -  Hold (10/14)  -  5 mg   Tue -  5 mg  -  5 mg   Wed -  5 mg  -  5 mg   Thu 2.5 mg (10/10)  5 mg  2.5 mg  2.5 mg   Fri 5 mg  5 mg  5 mg  5 mg   Sat 5 mg  5 mg  2.5 mg  2.5 mg   Historical INR  4.30      3.60      3.30            This result is from an external source.       Plan:  1. INR is Therapeutic today- see above in Anticoagulation Summary.   Provided instructions to Josy with [facility] to Continue their  warfarin regimen- see above in Anticoagulation Summary.  2. Follow up in 1 week      Breanne Jain RPH

## 2024-10-21 NOTE — HOME HEALTH
SN to patient's home for routine visit.  Patient has memory issues so the majority of education was with his wife.  Patient was also included in teaching.  He stood and practiced deep breathing during our visit.  I encouraged him to do so every 2-3 hours while awake.  He was seen in office by Dr Oconnor today.  Wife reports that he has had much less coughing in recent days.  I reinforced teaching on all medications with his wife, reinforced teaching on s/s bleeding and blood clots, dietary restrictions r/t medications, home safety measures and skin breakdown prevention.  Patient and wife v/u and r/b.    Sample obtained from fingerstick.  INR 3.6 called to Alexia and med management.  I called orders to patient's wife as ordered.  Pt is to take 1/2 Thurs and Sat, one whole tab Friday and Sun, recheck INR on Monday 10/21.

## 2024-10-24 ENCOUNTER — HOME CARE VISIT (OUTPATIENT)
Dept: HOME HEALTH SERVICES | Facility: HOME HEALTHCARE | Age: 89
End: 2024-10-24
Payer: MEDICARE

## 2024-10-24 VITALS
DIASTOLIC BLOOD PRESSURE: 66 MMHG | OXYGEN SATURATION: 97 % | TEMPERATURE: 97.8 F | RESPIRATION RATE: 18 BRPM | HEART RATE: 61 BPM | SYSTOLIC BLOOD PRESSURE: 124 MMHG

## 2024-10-24 PROCEDURE — G0299 HHS/HOSPICE OF RN EA 15 MIN: HCPCS

## 2024-10-28 ENCOUNTER — ANTICOAGULATION VISIT (OUTPATIENT)
Dept: PHARMACY | Facility: HOSPITAL | Age: 89
End: 2024-10-28
Payer: MEDICARE

## 2024-10-28 ENCOUNTER — HOME CARE VISIT (OUTPATIENT)
Dept: HOME HEALTH SERVICES | Facility: HOME HEALTHCARE | Age: 89
End: 2024-10-28
Payer: MEDICARE

## 2024-10-28 VITALS
DIASTOLIC BLOOD PRESSURE: 68 MMHG | TEMPERATURE: 98.3 F | SYSTOLIC BLOOD PRESSURE: 122 MMHG | HEART RATE: 62 BPM | OXYGEN SATURATION: 95 % | RESPIRATION RATE: 18 BRPM

## 2024-10-28 DIAGNOSIS — I26.99 ACUTE PULMONARY EMBOLISM WITHOUT ACUTE COR PULMONALE, UNSPECIFIED PULMONARY EMBOLISM TYPE: Primary | ICD-10-CM

## 2024-10-28 LAB
HH POC INTERNATIONAL NORMALIZATION RATIO: 3.8
HH POC PROTIME: 45.6 SECONDS
INR PPP: 3.8

## 2024-10-28 PROCEDURE — G0299 HHS/HOSPICE OF RN EA 15 MIN: HCPCS

## 2024-10-28 NOTE — PROGRESS NOTES
Anticoagulation Clinic Progress Note    Anticoagulation Summary  As of 10/28/2024      INR goal:  2.5-3.5   TTR:  36.4% (2.1 wk)   INR used for dosing:  3.80 (10/28/2024)   Warfarin maintenance plan:  2.5 mg every Mon, Thu, Sat; 5 mg all other days   Weekly warfarin total:  27.5 mg   Plan last modified:  Breanne Jain ELIESER (10/28/2024)   Next INR check:  10/31/2024   Target end date:  --    Indications    Acute pulmonary embolism without acute cor pulmonale  unspecified pulmonary embolism type [I26.99]                 Anticoagulation Episode Summary       INR check location:  --    Preferred lab:  --    Send INR reminders to:  [facility]    Comments:  New start to warfarin. Discharging from hospital 10/3 starting warfarin + bridging with lovenox. [facility] has 1st visit on 10/4 and will get INR.          Anticoagulation Care Providers       Provider Role Specialty Phone number    Libra Gonzalez, APRN Referring Cardiology [phone]            INR History:      10/14/2024    11:35 AM 10/17/2024    12:00 AM 10/17/2024     2:41 PM 10/21/2024    12:00 AM 10/21/2024    10:33 AM 10/28/2024    12:00 AM 10/28/2024    11:08 AM   Anticoagulation Monitoring   INR 4.30  3.60  3.30  3.80   INR Date 10/14/2024  10/17/2024  10/21/2024  10/28/2024   INR Goal 2.5-3.5  2.5-3.5  2.5-3.5  2.5-3.5   Trend Same  Down  Same  Down   Last Week Total 35 mg  27.5 mg  25 mg  30 mg   Next Week Total 30 mg  30 mg  30 mg  27.5 mg   Sun 5 mg  5 mg  5 mg  -   Mon Hold (10/14)  -  5 mg  2.5 mg   Tue 5 mg  -  5 mg  5 mg   Wed 5 mg  -  5 mg  5 mg   Thu 5 mg  2.5 mg  2.5 mg  -   Fri 5 mg  5 mg  5 mg  -   Sat 5 mg  2.5 mg  2.5 mg  -   Historical INR  3.60      3.30      3.80            This result is from an external source.       Plan:  1. INR is Supratherapeutic today- see above in Anticoagulation Summary.   Provided instructions to Alexia with [facility] to Change their warfarin regimen- see above in Anticoagulation  Summary.      trial on 2.5 mg on mon, thurs, sat and 5 mg AOD, rck on Thursday (discharge date)   2. Follow up in 3 days      Breanne Jain RPH

## 2024-10-31 ENCOUNTER — ANTICOAGULATION VISIT (OUTPATIENT)
Dept: PHARMACY | Facility: HOSPITAL | Age: 89
End: 2024-10-31
Payer: MEDICARE

## 2024-10-31 ENCOUNTER — HOME CARE VISIT (OUTPATIENT)
Dept: HOME HEALTH SERVICES | Facility: HOME HEALTHCARE | Age: 89
End: 2024-10-31
Payer: MEDICARE

## 2024-10-31 ENCOUNTER — OFFICE VISIT (OUTPATIENT)
Dept: CARDIOLOGY | Facility: CLINIC | Age: 89
End: 2024-10-31
Payer: MEDICARE

## 2024-10-31 VITALS
BODY MASS INDEX: 21.37 KG/M2 | SYSTOLIC BLOOD PRESSURE: 124 MMHG | DIASTOLIC BLOOD PRESSURE: 86 MMHG | WEIGHT: 141 LBS | HEART RATE: 64 BPM | HEIGHT: 68 IN

## 2024-10-31 VITALS
SYSTOLIC BLOOD PRESSURE: 116 MMHG | DIASTOLIC BLOOD PRESSURE: 64 MMHG | OXYGEN SATURATION: 93 % | TEMPERATURE: 98.5 F | HEART RATE: 60 BPM | RESPIRATION RATE: 18 BRPM

## 2024-10-31 DIAGNOSIS — I26.99 ACUTE PULMONARY EMBOLISM WITHOUT ACUTE COR PULMONALE, UNSPECIFIED PULMONARY EMBOLISM TYPE: Primary | ICD-10-CM

## 2024-10-31 DIAGNOSIS — I35.0 AORTIC VALVE STENOSIS, ETIOLOGY OF CARDIAC VALVE DISEASE UNSPECIFIED: ICD-10-CM

## 2024-10-31 DIAGNOSIS — I44.2 COMPLETE HEART BLOCK: Primary | ICD-10-CM

## 2024-10-31 DIAGNOSIS — J84.10 PULMONARY FIBROSIS: ICD-10-CM

## 2024-10-31 DIAGNOSIS — I48.19 PERSISTENT ATRIAL FIBRILLATION: ICD-10-CM

## 2024-10-31 DIAGNOSIS — Z86.711 HISTORY OF PULMONARY EMBOLISM: ICD-10-CM

## 2024-10-31 LAB
HH POC INTERNATIONAL NORMALIZATION RATIO: 4
HH POC PROTIME: 48.1 SECONDS
INR PPP: 4

## 2024-10-31 PROCEDURE — G0299 HHS/HOSPICE OF RN EA 15 MIN: HCPCS

## 2024-10-31 NOTE — PROGRESS NOTES
Anticoagulation Clinic Progress Note    Anticoagulation Summary  As of 10/31/2024      INR goal:  2.5-3.5   TTR:  30.4% (2.6 wk)   INR used for dosin.00 (10/31/2024)   Warfarin maintenance plan:  5 mg every Sun, Wed, Fri; 2.5 mg all other days   Weekly warfarin total:  25 mg   Plan last modified:  Breanne Jain ELIESER (10/31/2024)   Next INR check:  2024   Target end date:  --    Indications    Acute pulmonary embolism without acute cor pulmonale  unspecified pulmonary embolism type [I26.99]                 Anticoagulation Episode Summary       INR check location:  --    Preferred lab:  --    Send INR reminders to:  [facility]    Comments:  New start to warfarin. Discharging from hospital 10/3 starting warfarin + bridging with lovenox. [facility] has 1st visit on 10/4 and will get INR.          Anticoagulation Care Providers       Provider Role Specialty Phone number    Libra Gonzalez, APRN Referring Cardiology [phone]            INR History:      10/17/2024     2:41 PM 10/21/2024    12:00 AM 10/21/2024    10:33 AM 10/28/2024    12:00 AM 10/28/2024    11:08 AM 10/31/2024    12:00 AM 10/31/2024    11:03 AM   Anticoagulation Monitoring   INR 3.60  3.30  3.80  4.00   INR Date 10/17/2024  10/21/2024  10/28/2024  10/31/2024   INR Goal 2.5-3.5  2.5-3.5  2.5-3.5  2.5-3.5   Trend Down  Same  Down  Down   Last Week Total 27.5 mg  25 mg  30 mg  27.5 mg   Next Week Total 30 mg  30 mg  27.5 mg  22.5 mg   Sun 5 mg  5 mg  -  5 mg   Mon -  5 mg  2.5 mg  2.5 mg   Tue -  5 mg  5 mg  2.5 mg   Wed -  5 mg  5 mg  5 mg   Thu 2.5 mg  2.5 mg  -  Hold (10/31)   Fri 5 mg  5 mg  -  5 mg   Sat 2.5 mg  2.5 mg  -  2.5 mg   Historical INR  3.30      3.80      4.00        Visit Report      Report Report       This result is from an external source.       Plan:  1. INR is Supratherapeutic today- see above in Anticoagulation Summary.   Provided instructions to Alexia with [facility] to Change their warfarin  regimen- see above in Anticoagulation Summary.  hold warfarin today then decrease to 5 mg on sun, wed, fri and 2.5 mg AOD, rck early next week. (Alexia/NIRAJ)   2. Follow up in 1 week      Breanne Jain RPH

## 2024-10-31 NOTE — PROGRESS NOTES
Date of Office Visit: 10/31/24  Encounter Provider: KYLAH Jha  Place of Service: [facility] CARDIOLOGY  Patient Name: Asia Ly  :1932    Chief Complaint   Patient presents with    Atrial Fibrillation   :     HPI: Asia Ly is a 89 y.o. male  with atrial fibrillation, stroke and TIA in 2016, aortic stenosis, hyperlipidemia, B12 anemia, Crohn's disease, colon polyps, lower extremity edema, TIA,  DVT and PE ( 2023), dilated ascending aorta,  pulmonary hypertension and pulmonary fibrosis.      He has been followed by Dr. Hector Rivera. He is now followed by Dr. Brown and I will visit with him in follow up today.  He is also followed by Dr. Rashaun Block with electrophysiology.     Has been on sotalol in the past as well as amiodarone for recurrent atrial fibrillation.  He had Echocardiogram 2016 showed normal left ventricular systolic dysfunction, moderate tricuspid insufficiency, mild mitral insufficiency , mild pulmonary hypertension.  He also had a perfusion stress test that was negative for ischemia.  He was taken off diltiazem for allergic reaction to rash.  2017 he had an echocardiogram which showed an EF of 60%, grade 2 diastolic dysfunction and mild aortic he had increased lower extremity edema and had again rapid atrial fibrillation.  He was started on a diuretic.  He then had pneumonia hypoxia and influenza and was started on Tikosyn.  He has some prolonged QTC complicated by antibiotics.  He then had issues with gout felt to be related to discontinuation of indomethacin and was prescribed colchicine.  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  He then had some atrial fibrillation with RVR and was given oral metoprolol and Cardizem and converted back to sinus rhythm.  He was evaluated by electrophysiology with Dr. Nettles who felt that his ablation at his age would be very  high risk procedure.  He was later started on digoxin for recurrent rapid atrial fibrillation.  He was admitted again January 2019 and electrophysiology felt that he may for AV node ablation and pacemaker implantation.  However he preferred to stay on Tikosyn and accept occasional breakthroughs.     He then had some confusion and upper epigastric abdominal pain he was hospitalized August 2020 found to have transaminitis with hyperbilirubinemia.  He had some atrial fibrillation with RVR received IV metoprolol and his heart rate improved.  Echocardiogram showed normal left ventricular systolic function, mild concentric hypertrophy, moderate aortic valve stenoses with aortic valve area 1.1 cm², maximum pressure gradient of 42 and mean pressure gradient 24 mmHg.  RVSP was severely elevated at 57.8.  He had mild aortic valve regurgitation and mild mitral regurgitation.     In November 2020 was hospitalized with hematochezia and underwent GI evaluation with no evidence of active bleed.  He was cleared to resume apixaban and was later found to have bilateral pneumonia.     He was treated again for pneumonia and had a bronchoscopy July 7.  He is colonized Pseudomonas and infectious disease recommended conservative treatment.  He had some rapid atrial fibrillation during that admission and had a pacemaker placed by Dr. Nettles.    And ultimately had AV node ablation 09/2021. .  His Tikosyn was stopped.      He had nosebleeds and saw ENT in march 2023 who  Performed cauterization with packing and was off eliquis until we restarted it at a lower dose of 2.5 mg twice daily.  His nosebleed stopped but then he got ill with COVID and developed acute left lower extremity DVT May 2023 and he was put back on Eliquis 5 mg twice daily.     He had some increased lower extremity edema which improved with Lasix 40 mg twice daily November 2023.  We then decrease Lasix to 40 mg daily.     He then was admitted in September 2024 after having  "low oxygen at home.  He reportedly was treated for bronchitis just prior to admission and  Was found to have new pulmonary embolism.  He was treated with heparin drip and then Eliquis was stopped and he was bridged with warfarin.      He presents today for reassessment.  He is companied by his wife and has home health services at this time.  His INR is being followed in our anticoagulation clinic.  He has no chest pain or shortness of breath or dizziness.  He denies worsening swelling.  He is taking his water pill as prescribed.  No near-syncope or syncope.          Allergies   Allergen Reactions    Amiodarone Unknown (See Comments)     Pulmonary fibrosis    Diltiazem Arrhythmia    Doxycycline Rash    Allopurinol Unknown - Low Severity     Other reaction(s): Joint pain      Other Reaction(s): Joint pain    Cefdinir Arrhythmia     A-FIB    Indomethacin Rash           Family and social history reviewed.     ROS  All other systems were reviewed and are negative          Objective:     Vitals:    10/31/24 1114   BP: 124/86   Pulse: 64   Weight: 64 kg (141 lb)   Height: 172.7 cm (68\")     Body mass index is 21.44 kg/m².    PHYSICAL EXAM:  Pulmonary:      Effort: Pulmonary effort is normal.      Breath sounds: Examination of the right-lower field reveals rales. Examination of the left-lower field reveals rales. Rales present.      Comments: Pulmonary fibrosis  Cardiovascular:      Normal rate. Regular rhythm.      Murmurs: There is a grade 2/6 systolic murmur at the URSB.   Edema:     Ankle: bilateral trace edema of the ankle.        Procedures      Current Outpatient Medications   Medication Sig Dispense Refill    allopurinol (ZYLOPRIM) 100 MG tablet Take 1 tablet by mouth 2 (Two) Times a Day. Indications: Gout      atorvastatin (LIPITOR) 10 MG tablet Take 1 tablet by mouth Every Other Day. M-W-F  Indications: High Amount of Fats in the Blood      Budesonide (ENTOCORT EC) 3 MG 24 hr capsule Take 1 capsule by mouth Every " Morning. Indications: Crohn's Disease      furosemide (LASIX) 20 MG tablet Take 1 tablet by mouth Daily. Indications: Edema      ipratropium (ATROVENT) 0.02 % nebulizer solution Take 2.5 mL by nebulization 3 (Three) Times a Day. 0.5mg/2.5ml nebulizer treatments three times a day  Indications: Chronic Obstructive Lung Disease      IPRATROPIUM-ALBUTEROL IN Inhale 2 puffs 3 (Three) Times a Day. Indications: COPD exacerbation, complicated      metoprolol tartrate (LOPRESSOR) 25 MG tablet Take 1 tablet by mouth Every 12 (Twelve) Hours. Indications: High Blood Pressure Disorder 180 tablet 1    O2 (OXYGEN) Inhale 2 L/min As Needed (soa). Indications: Oxygen Saturation Decreased      omeprazole (priLOSEC) 40 MG capsule Take 1 capsule by mouth Daily. Indications: Heartburn      warfarin (COUMADIN) 5 MG tablet Take 1 tablet by mouth Daily. Indications: DVT/PE (active thrombosis) (Patient taking differently: Take 1 tablet by mouth Daily. 2.5mg on thursday and Saturday, then 5mg all other days.  Indications: DVT/PE (active thrombosis)) 30 tablet 0     No current facility-administered medications for this visit.     Assessment:       Diagnosis Plan   1. Complete heart block        2. Aortic valve stenosis, etiology of cardiac valve disease unspecified        3. Persistent atrial fibrillation        4. History of pulmonary embolism        5. Pulmonary fibrosis             No orders of the defined types were placed in this encounter.        Plan:       1. 89 year old gentleman with paroxysmal atrial fibrillation.  Off amiodarone due to history of pulmonary fibrosis.  CHADS2 Vascor of 6.  He  had a rash with diltiazem in the past.  In 2021 he had AV node ablation with permanent pacemaker.    He is now on warfarin with target INR 2.5-3.5.   He also follows with Dr. Nettles  2.  Severe aortic valve stenoses.  Clinically, he is stable and fluid status appears controlled with current diuretic.  He is overall stable and we briefly  discussed TAVR today but patient's wife is not interested in any procedure.  I also have concern given recent PE that we should not rush new procedures since he is doing well clinically  -This is considered complex management of a chronic medical condition.    3.  History of TIA no recurrence-continue warfarin at this time.  Target INR 2.5-3.5  4.  Prediabetes  5.  History of Crohn's disease and iron deficient anemia usually sees Dr. Susie Mccoy  6.  Hypertension continue current regimen  7.  Hyperlipidemia- now off statin therapy which is fine with me.   8.  History of cholecystitis  9.   History of gout  10. History of   pleural effusion- breathing is stable on current dose lasix 20 mg daily  11. COVID 19 infection 04/2023  12.  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 epistaxis requiring cauterization and packing by ENT 03/2023  14. Dilated ascending aorta at 4.1 cm on CTA chest 05/2023  15. Pulmonary fibrosis. Now with PRN oxygen. Follows with Dr. Chidi Oconnor.  He has a CT scheduled in January  16.  Lower extremity edema now stable with Lasix 40 mg daily.  17.  Recurrent pulmonary embolism 09/2024  while on Eliquis and was switched to warfarin    Follow-up in February as scheduled.  He will see his PCP next week.        It has been a pleasure to participate in this patient's care.      Thank you,  KYLAH Jha      **I used Dragon to dictate this note:**

## 2024-11-04 ENCOUNTER — HOME CARE VISIT (OUTPATIENT)
Dept: HOME HEALTH SERVICES | Facility: HOME HEALTHCARE | Age: 89
End: 2024-11-04
Payer: MEDICARE

## 2024-11-04 ENCOUNTER — ANTICOAGULATION VISIT (OUTPATIENT)
Dept: PHARMACY | Facility: HOSPITAL | Age: 89
End: 2024-11-04
Payer: MEDICARE

## 2024-11-04 VITALS
RESPIRATION RATE: 16 BRPM | HEART RATE: 61 BPM | TEMPERATURE: 98.7 F | SYSTOLIC BLOOD PRESSURE: 116 MMHG | DIASTOLIC BLOOD PRESSURE: 64 MMHG | OXYGEN SATURATION: 98 %

## 2024-11-04 DIAGNOSIS — I26.99 ACUTE PULMONARY EMBOLISM WITHOUT ACUTE COR PULMONALE, UNSPECIFIED PULMONARY EMBOLISM TYPE: Primary | ICD-10-CM

## 2024-11-04 LAB
HH POC INTERNATIONAL NORMALIZATION RATIO: 3
HH POC PROTIME: 0 SECONDS
INR PPP: 3

## 2024-11-04 PROCEDURE — G0299 HHS/HOSPICE OF RN EA 15 MIN: HCPCS

## 2024-11-04 NOTE — PROGRESS NOTES
Anticoagulation Clinic Progress Note    Anticoagulation Summary  As of 11/4/2024      INR goal:  2.5-3.5   TTR:  33.9% (3.1 wk)   INR used for dosing:  3.00 (11/4/2024)   Warfarin maintenance plan:  5 mg every Sun, Wed, Fri; 2.5 mg all other days   Weekly warfarin total:  25 mg   No change documented:  Breanne Jain RPH   Plan last modified:  Breanne Jain RPH (10/31/2024)   Next INR check:  11/11/2024   Target end date:  --    Indications    Acute pulmonary embolism without acute cor pulmonale  unspecified pulmonary embolism type [I26.99]                 Anticoagulation Episode Summary       INR check location:  --    Preferred lab:  --    Send INR reminders to:  [facility]    Comments:  New start to warfarin. Discharging from hospital 10/3 starting warfarin + bridging with lovenox. [facility] has 1st visit on 10/4 and will get INR.          Anticoagulation Care Providers       Provider Role Specialty Phone number    Libra Gonzalez, APRN Referring Cardiology [phone]            INR History:      10/21/2024    10:33 AM 10/28/2024    12:00 AM 10/28/2024    11:08 AM 10/31/2024    12:00 AM 10/31/2024    11:03 AM 11/4/2024    12:00 AM 11/4/2024     1:10 PM   Anticoagulation Monitoring   INR 3.30  3.80  4.00  3.00   INR Date 10/21/2024  10/28/2024  10/31/2024  11/4/2024   INR Goal 2.5-3.5  2.5-3.5  2.5-3.5  2.5-3.5   Trend Same  Down  Down  Same   Last Week Total 25 mg  30 mg  27.5 mg  25 mg   Next Week Total 30 mg  27.5 mg  22.5 mg  25 mg   Sun 5 mg  -  5 mg  5 mg   Mon 5 mg  2.5 mg  2.5 mg  2.5 mg   Tue 5 mg  5 mg  2.5 mg  2.5 mg   Wed 5 mg  5 mg  5 mg  5 mg   Thu 2.5 mg  -  Hold (10/31)  2.5 mg   Fri 5 mg  -  5 mg  5 mg   Sat 2.5 mg  -  2.5 mg  2.5 mg   Historical INR  3.80      4.00      3.00        Visit Report    Report Report         This result is from an external source.       Plan:  1. INR is Therapeutic today- see above in Anticoagulation Summary.   Provided instructions to Qi  with [facility] to Continue their warfarin regimen- see above in Anticoagulation Summary.  2. Follow up in 1 week      Breanne Jain RPH

## 2024-11-06 ENCOUNTER — OFFICE (OUTPATIENT)
Age: 89
End: 2024-11-06

## 2024-11-06 ENCOUNTER — OFFICE (OUTPATIENT)
Dept: URBAN - METROPOLITAN AREA CLINIC 76 | Facility: CLINIC | Age: 89
End: 2024-11-06

## 2024-11-06 VITALS
HEIGHT: 68 IN | HEART RATE: 68 BPM | WEIGHT: 145 LBS | DIASTOLIC BLOOD PRESSURE: 78 MMHG | HEIGHT: 68 IN | HEIGHT: 68 IN | SYSTOLIC BLOOD PRESSURE: 110 MMHG | HEART RATE: 68 BPM | DIASTOLIC BLOOD PRESSURE: 78 MMHG | OXYGEN SATURATION: 96 % | HEIGHT: 68 IN | OXYGEN SATURATION: 96 % | OXYGEN SATURATION: 96 % | WEIGHT: 145 LBS | SYSTOLIC BLOOD PRESSURE: 110 MMHG | OXYGEN SATURATION: 96 % | WEIGHT: 145 LBS | HEIGHT: 68 IN | WEIGHT: 145 LBS | HEART RATE: 68 BPM | DIASTOLIC BLOOD PRESSURE: 78 MMHG | DIASTOLIC BLOOD PRESSURE: 78 MMHG | SYSTOLIC BLOOD PRESSURE: 110 MMHG | HEART RATE: 68 BPM | OXYGEN SATURATION: 96 % | SYSTOLIC BLOOD PRESSURE: 110 MMHG | SYSTOLIC BLOOD PRESSURE: 110 MMHG | OXYGEN SATURATION: 96 % | DIASTOLIC BLOOD PRESSURE: 78 MMHG | HEART RATE: 68 BPM | WEIGHT: 145 LBS | HEART RATE: 68 BPM | SYSTOLIC BLOOD PRESSURE: 110 MMHG | HEIGHT: 68 IN | SYSTOLIC BLOOD PRESSURE: 110 MMHG | DIASTOLIC BLOOD PRESSURE: 78 MMHG | WEIGHT: 145 LBS | OXYGEN SATURATION: 96 % | HEART RATE: 68 BPM | WEIGHT: 145 LBS | HEIGHT: 68 IN | DIASTOLIC BLOOD PRESSURE: 78 MMHG

## 2024-11-06 DIAGNOSIS — M81.0 AGE-RELATED OSTEOPOROSIS WITHOUT CURRENT PATHOLOGICAL FRACTU: ICD-10-CM

## 2024-11-06 DIAGNOSIS — K50.10 CROHN'S DISEASE OF LARGE INTESTINE WITHOUT COMPLICATIONS: ICD-10-CM

## 2024-11-06 DIAGNOSIS — K21.9 GASTRO-ESOPHAGEAL REFLUX DISEASE WITHOUT ESOPHAGITIS: ICD-10-CM

## 2024-11-06 PROCEDURE — 99213 OFFICE O/P EST LOW 20 MIN: CPT | Performed by: INTERNAL MEDICINE

## 2024-11-06 RX ORDER — BUDESONIDE 3 MG/1
3 CAPSULE ORAL
Qty: 90 | Refills: 4 | Status: ACTIVE

## 2024-11-11 ENCOUNTER — ANTICOAGULATION VISIT (OUTPATIENT)
Dept: PHARMACY | Facility: HOSPITAL | Age: 89
End: 2024-11-11
Payer: MEDICARE

## 2024-11-11 ENCOUNTER — HOME CARE VISIT (OUTPATIENT)
Dept: HOME HEALTH SERVICES | Facility: HOME HEALTHCARE | Age: 89
End: 2024-11-11
Payer: MEDICARE

## 2024-11-11 VITALS
OXYGEN SATURATION: 100 % | RESPIRATION RATE: 18 BRPM | TEMPERATURE: 98.4 F | SYSTOLIC BLOOD PRESSURE: 120 MMHG | DIASTOLIC BLOOD PRESSURE: 68 MMHG | HEART RATE: 60 BPM

## 2024-11-11 DIAGNOSIS — I26.99 ACUTE PULMONARY EMBOLISM WITHOUT ACUTE COR PULMONALE, UNSPECIFIED PULMONARY EMBOLISM TYPE: Primary | ICD-10-CM

## 2024-11-11 LAB
HH POC INTERNATIONAL NORMALIZATION RATIO: 3
HH POC PROTIME: 35.9 SECONDS
INR PPP: 3

## 2024-11-11 PROCEDURE — G0299 HHS/HOSPICE OF RN EA 15 MIN: HCPCS

## 2024-11-11 NOTE — PROGRESS NOTES
Anticoagulation Clinic Progress Note    Anticoagulation Summary  As of 11/11/2024      INR goal:  2.5-3.5   TTR:  49.9% (4.1 wk)   INR used for dosing:  3.00 (11/11/2024)   Warfarin maintenance plan:  5 mg every Sun, Wed, Fri; 2.5 mg all other days   Weekly warfarin total:  25 mg   No change documented:  Breanne Jain RPH   Plan last modified:  Breanne Jain RPH (10/31/2024)   Next INR check:  11/19/2024   Target end date:  --    Indications    Acute pulmonary embolism without acute cor pulmonale  unspecified pulmonary embolism type [I26.99]                 Anticoagulation Episode Summary       INR check location:  --    Preferred lab:  --    Send INR reminders to:  [facility]    Comments:  New start to warfarin. Discharging from hospital 10/3 starting warfarin + bridging with lovenox. [facility] has 1st visit on 10/4 and will get INR.          Anticoagulation Care Providers       Provider Role Specialty Phone number    Libra Gonzalez, APRN Referring Cardiology [phone]            INR History:      10/28/2024    11:08 AM 10/31/2024    12:00 AM 10/31/2024    11:03 AM 11/4/2024    12:00 AM 11/4/2024     1:10 PM 11/11/2024    12:00 AM 11/11/2024     1:32 PM   Anticoagulation Monitoring   INR 3.80  4.00  3.00  3.00   INR Date 10/28/2024  10/31/2024  11/4/2024  11/11/2024   INR Goal 2.5-3.5  2.5-3.5  2.5-3.5  2.5-3.5   Trend Down  Down  Same  Same   Last Week Total 30 mg  27.5 mg  25 mg  25 mg   Next Week Total 27.5 mg  22.5 mg  25 mg  25 mg   Sun -  5 mg  5 mg  5 mg   Mon 2.5 mg  2.5 mg  2.5 mg  2.5 mg   Tue 5 mg  2.5 mg  2.5 mg  2.5 mg   Wed 5 mg  5 mg  5 mg  5 mg   Thu -  Hold (10/31)  2.5 mg  2.5 mg   Fri -  5 mg  5 mg  5 mg   Sat -  2.5 mg  2.5 mg  2.5 mg   Historical INR  4.00      3.00      3.00        Visit Report  Report Report           This result is from an external source.       Plan:  1. INR is Therapeutic today- see above in Anticoagulation Summary.   Provided instructions to Faina  with [facility] to Continue their warfarin regimen- see above in Anticoagulation Summary.  2. Follow up in 1 week      Breanne Jain RPH

## 2024-11-18 ENCOUNTER — HOME CARE VISIT (OUTPATIENT)
Dept: HOME HEALTH SERVICES | Facility: HOME HEALTHCARE | Age: 89
End: 2024-11-18
Payer: MEDICARE

## 2024-11-18 ENCOUNTER — ANTICOAGULATION VISIT (OUTPATIENT)
Dept: PHARMACY | Facility: HOSPITAL | Age: 89
End: 2024-11-18
Payer: MEDICARE

## 2024-11-18 DIAGNOSIS — I26.99 ACUTE PULMONARY EMBOLISM WITHOUT ACUTE COR PULMONALE, UNSPECIFIED PULMONARY EMBOLISM TYPE: Primary | ICD-10-CM

## 2024-11-18 LAB — INR PPP: 2.7

## 2024-11-18 PROCEDURE — G0300 HHS/HOSPICE OF LPN EA 15 MIN: HCPCS

## 2024-11-18 NOTE — PROGRESS NOTES
Anticoagulation Clinic Progress Note    Anticoagulation Summary  As of 2024      INR goal:  2.5-3.5   TTR:  59.6% (1.2 mo)   INR used for dosin.70 (2024)   Warfarin maintenance plan:  5 mg every Sun, Wed, Fri; 2.5 mg all other days   Weekly warfarin total:  25 mg   No change documented:  Jeromy Putnam, PharmD   Plan last modified:  Breanne Jain [facility] (10/31/2024)   Next INR check:  2024   Target end date:  --    Indications    Acute pulmonary embolism without acute cor pulmonale  unspecified pulmonary embolism type [I26.99]                 Anticoagulation Episode Summary       INR check location:  --    Preferred lab:  --    Send INR reminders to:  [facility]    Comments:  New start to warfarin. Discharging from hospital 10/3 starting warfarin + bridging with lovenox. [facility] has 1st visit on 10/4 and will get INR.          Anticoagulation Care Providers       Provider Role Specialty Phone number    Libra Gonzalez, APRN Referring Cardiology [phone]          Findings:  Patient Findings     Negatives:  Signs/symptoms of thrombosis, Signs/symptoms of bleeding,   Laboratory test error suspected, Change in health, Change in alcohol use,   Change in activity, Upcoming invasive procedure, Emergency department   visit, Upcoming dental procedure, Missed doses, Extra doses, Change in   medications, Change in diet/appetite, Hospital admission, Bruising, Other   complaints      Clinical Outcomes     Negatives:  Major bleeding event, Thromboembolic event,   Anticoagulation-related hospital admission, Anticoagulation-related ED   visit, Anticoagulation-related fatality     INR History:      10/31/2024    11:03 AM 2024    12:00 AM 2024     1:10 PM 2024    12:00 AM 2024     1:32 PM 2024    12:00 AM 2024     2:26 PM   Anticoagulation Monitoring   INR 4.00  3.00  3.00  2.70   INR Date 10/31/2024  2024  2024  2024   INR Goal 2.5-3.5   2.5-3.5  2.5-3.5  2.5-3.5   Trend Down  Same  Same  Same   Last Week Total 27.5 mg  25 mg  25 mg  25 mg   Next Week Total 22.5 mg  25 mg  25 mg  25 mg   Sun 5 mg  5 mg  5 mg  5 mg   Mon 2.5 mg  2.5 mg  2.5 mg  2.5 mg   Tue 2.5 mg  2.5 mg  2.5 mg  2.5 mg   Wed 5 mg  5 mg  5 mg  5 mg   Thu Hold (10/31)  2.5 mg  2.5 mg  2.5 mg   Fri 5 mg  5 mg  5 mg  5 mg   Sat 2.5 mg  2.5 mg  2.5 mg  2.5 mg   Historical INR  3.00      3.00      2.70        Visit Report Report             This result is from an external source.       Plan:  1. INR is Therapeutic today- see above in Anticoagulation Summary.   Provided instructions to Mrs. Ly and Brenda NICK with [facility] to Continue their warfarin regimen- see above in Anticoagulation Summary.  2. Follow up in 1 week      Jeromy Putnam, IrinaD

## 2024-11-19 VITALS
OXYGEN SATURATION: 97 % | TEMPERATURE: 97.8 F | DIASTOLIC BLOOD PRESSURE: 72 MMHG | SYSTOLIC BLOOD PRESSURE: 122 MMHG | RESPIRATION RATE: 18 BRPM | HEART RATE: 74 BPM

## 2024-11-21 ENCOUNTER — TELEPHONE (OUTPATIENT)
Dept: CARDIOLOGY | Facility: CLINIC | Age: 89
End: 2024-11-21
Payer: MEDICARE

## 2024-11-21 NOTE — TELEPHONE ENCOUNTER
Received copy pulmonary note.  Aware patient will be scheduled for bronchoscopy by Dr. Chidi Oconnor.  If warfarin needs to be held, this will need to be communicated to the anticoagulation clinic to help manage.  Patient is stable to proceed with bronchoscopy from cardiovascular standpoint.

## 2024-11-24 ENCOUNTER — HOME CARE VISIT (OUTPATIENT)
Dept: HOME HEALTH SERVICES | Facility: HOME HEALTHCARE | Age: 89
End: 2024-11-24
Payer: MEDICARE

## 2024-11-26 ENCOUNTER — HOME CARE VISIT (OUTPATIENT)
Dept: HOME HEALTH SERVICES | Facility: HOME HEALTHCARE | Age: 89
End: 2024-11-26
Payer: MEDICARE

## 2024-11-26 ENCOUNTER — ANTICOAGULATION VISIT (OUTPATIENT)
Dept: PHARMACY | Facility: HOSPITAL | Age: 89
End: 2024-11-26
Payer: MEDICARE

## 2024-11-26 VITALS
RESPIRATION RATE: 18 BRPM | TEMPERATURE: 98.6 F | OXYGEN SATURATION: 97 % | DIASTOLIC BLOOD PRESSURE: 70 MMHG | HEART RATE: 80 BPM | SYSTOLIC BLOOD PRESSURE: 118 MMHG

## 2024-11-26 DIAGNOSIS — I26.99 ACUTE PULMONARY EMBOLISM WITHOUT ACUTE COR PULMONALE, UNSPECIFIED PULMONARY EMBOLISM TYPE: Primary | ICD-10-CM

## 2024-11-26 LAB
HH POC INTERNATIONAL NORMALIZATION RATIO: 2.4
HH POC PROTIME: 28.3 SECONDS
INR PPP: 2.4

## 2024-11-26 PROCEDURE — G0299 HHS/HOSPICE OF RN EA 15 MIN: HCPCS

## 2024-11-26 NOTE — PROGRESS NOTES
Anticoagulation Clinic Progress Note    Anticoagulation Summary  As of 2024      INR goal:  2.5-3.5   TTR:  60.9% (1.5 mo)   INR used for dosin.40 (2024)   Warfarin maintenance plan:  5 mg every Sun, Wed, Fri; 2.5 mg all other days   Weekly warfarin total:  25 mg   Plan last modified:  Breanne Jain RPH (10/31/2024)   Next INR check:  12/10/2024   Target end date:  --    Indications    Acute pulmonary embolism without acute cor pulmonale  unspecified pulmonary embolism type [I26.99]                 Anticoagulation Episode Summary       INR check location:  --    Preferred lab:  --    Send INR reminders to:  [facility]    Comments:  New start to warfarin- current with [facility]- discharging            Anticoagulation Care Providers       Provider Role Specialty Phone number    Libra Gonzalez, APRN Referring Cardiology [phone]            INR History:      2024     1:10 PM 2024    12:00 AM 2024     1:32 PM 2024    12:00 AM 2024     2:26 PM 2024    12:00 AM 2024    10:33 AM   Anticoagulation Monitoring   INR 3.00  3.00  2.70  2.40   INR Date 2024   INR Goal 2.5-3.5  2.5-3.5  2.5-3.5  2.5-3.5   Trend Same  Same  Same  Same   Last Week Total 25 mg  25 mg  25 mg  25 mg   Next Week Total 25 mg  25 mg  25 mg  27.5 mg   Sun 5 mg  5 mg  5 mg  5 mg   Mon 2.5 mg  2.5 mg  2.5 mg  2.5 mg   Tue 2.5 mg  2.5 mg  2.5 mg  5 mg (); Otherwise 2.5 mg   Wed 5 mg  5 mg  5 mg  5 mg   Thu 2.5 mg  2.5 mg  2.5 mg  2.5 mg   Fri 5 mg  5 mg  5 mg  5 mg   Sat 2.5 mg  2.5 mg  2.5 mg  2.5 mg   Historical INR  3.00      2.70      2.40            This result is from an external source.       Plan:  1. INR is Subtherapeutic today- see above in Anticoagulation Summary.   Provided instructions to Josy with [facility] to Change their warfarin regimen- see above in Anticoagulation Summary.  Spoke with the patients  spouse.   2. Follow up in 2 weeks      Breanne Jain RPH

## 2024-11-26 NOTE — CASE COMMUNICATION
INR OBTAINED 11/26/24  PT: 28.3    INR: 2.4  FAXED TO [facility]  ZAIRA AT [facility] CONTACTED PATIENT WITH INSTRUCTIONS AND ORDERS FOR INR IN CLINIC IN 2 WEEKS  PATIENT D/C FROM [facility]

## 2024-12-10 ENCOUNTER — ANTICOAGULATION VISIT (OUTPATIENT)
Dept: PHARMACY | Facility: HOSPITAL | Age: 89
End: 2024-12-10
Payer: MEDICARE

## 2024-12-10 ENCOUNTER — TELEPHONE (OUTPATIENT)
Dept: CARDIOLOGY | Facility: CLINIC | Age: 89
End: 2024-12-10
Payer: MEDICARE

## 2024-12-10 DIAGNOSIS — I26.99 ACUTE PULMONARY EMBOLISM WITHOUT ACUTE COR PULMONALE, UNSPECIFIED PULMONARY EMBOLISM TYPE: Primary | ICD-10-CM

## 2024-12-10 LAB
INR PPP: 3.6 (ref 0.91–1.09)
PROTHROMBIN TIME: 43 SECONDS (ref 10–13.8)

## 2024-12-10 PROCEDURE — 36416 COLLJ CAPILLARY BLOOD SPEC: CPT

## 2024-12-10 PROCEDURE — 85610 PROTHROMBIN TIME: CPT

## 2024-12-10 PROCEDURE — G0463 HOSPITAL OUTPT CLINIC VISIT: HCPCS | Performed by: PHARMACIST

## 2024-12-10 NOTE — TELEPHONE ENCOUNTER
Caller: SURY Ly     Relationship: [employer]    Best call back number: [phone]    What is your medical concern? BLOOD OXYGEN FLUCTUATING     How long has this issue been going on? TWO WEEKS    Is your provider already aware of this issue? NO    Have you been treated for this issue? PATIENT IS ON OXYGEN, USES IT AT NIGHT TO SLEEP BUT DURING THE DAY THE PATIENTS BLOOD OXYGEN LEVEL IS DROPPING LOWEST SEEN WAS 89. PLEASE ADVISE.

## 2024-12-10 NOTE — TELEPHONE ENCOUNTER
She is good spoke to his wife was noticed late and he has occasional episodes of shortness of breath at rest.  His oxygen saturation has been fluctuating lower but the lowest she has seen it is 89%.  Last time this was noted he had acute bilateral PE but his last INR was 3.6.  I am to get him into the clinic to see his because he also has severe aortic stenosis and may be developing fluid overload.  She knows to have him be seen in the emergency room if symptoms worsen.

## 2024-12-10 NOTE — PROGRESS NOTES
Anticoagulation Clinic Progress Note  Anticoagulation Summary  As of 12/10/2024      INR goal:  2.5-3.5   TTR:  66.5% (1.9 mo)   INR used for dosing:  3.6 (12/10/2024)   Warfarin maintenance plan:  5 mg every Sun, Wed, Fri; 2.5 mg all other days   Weekly warfarin total:  25 mg   No change documented:  Vance Jones, PharmD   Plan last modified:  Breanne Jain ELIESER (10/31/2024)   Next INR check:  12/26/2024   Target end date:  --    Indications    Acute pulmonary embolism without acute cor pulmonale  unspecified pulmonary embolism type [I26.99]                 Anticoagulation Episode Summary       INR check location:  --    Preferred lab:  --    Send INR reminders to:  [facility] [place]    Comments:  New start to warfarin- current with [facility]- discharging 11/26           Anticoagulation Care Providers       Provider Role Specialty Phone number    Libra Gonzalez, KYLAH Referring Cardiology [phone]            Clinic Interview:  Patient Findings     Positives:  Change in medications    Negatives:  Signs/symptoms of thrombosis, Signs/symptoms of bleeding,   Laboratory test error suspected, Change in health, Change in alcohol use,   Change in activity, Upcoming invasive procedure, Emergency department   visit, Upcoming dental procedure, Missed doses, Extra doses, Change in   diet/appetite, Hospital admission, Bruising, Other complaints    Comments:  Took a couple doses of Tylenol ~ 1-2 days prior due to neck   pain.      Clinical Outcomes     Negatives:  Major bleeding event, Thromboembolic event,   Anticoagulation-related hospital admission, Anticoagulation-related ED   visit, Anticoagulation-related fatality    Comments:  Took a couple doses of Tylenol ~ 1-2 days prior due to neck   pain. This could explain slightly elevated INR.       Education:  Asia Ly is a new start in the [facility]. He was accompanied by spouse, Maya Ly. Patient was okay with wife being  present. We discussed the followin) Warfarin's indication, mechanism, and dosing  2) Enforced the importance of taking warfarin as instructed and at the same time every day, preferably in the evening so that we can make dose adjustments more easily following subsequent clinic visits  3) What he should do about a missed dose; pts can take missed doses within about 12 hours of their usual scheduled dose, but he was instructed on the importance of not doubling up on doses unless told to do so by the [facility]  4) Explained possible side effects of warfarin therapy, including increased risk of bleeding, s/sx of bleeding and s/sx of any additional clots/PE/CVA.   5) Discussed monitoring of warfarin, the INR, goal INR range, and the frequency of monitoring  6) Reviewed drug/food/tobacco/EtOH interactions and provided written information covering these topics in more detail, explaining that green, leafy vegetables interact most heavily with warfarin  7) Instructed the pt not to take or discontinue any medications without informing his physician/pharmacist and reminded him to inform us of any dietary changes, as well  8) Explained that he would be coming into the clinic more frequently in these first few weeks of therapy as we try to adjust his dose and achieve a therapeutic INR x 2 consecutive readings. Once that is achieved, patient will follow up in clinic every 4 weeks, on average.    He stated no problems with transportation or scheduling clinic appts in this manner. he expressed understanding of the information provided and has no additional questions at this time.    Asia Ly was presented with a copy of the Patients Rights and Responsibilities. he expressed verbal consent and agreement to receive care in the [facility] under the current collaborative care agreement with [facility].       INR History:      2024    12:00 AM 2024     1:32 PM  11/18/2024    12:00 AM 11/18/2024     2:26 PM 11/26/2024    12:00 AM 11/26/2024    10:33 AM 12/10/2024    11:00 AM   Anticoagulation Monitoring   INR  3.00  2.70  2.40 3.6   INR Date  11/11/2024  11/18/2024  11/26/2024 12/10/2024   INR Goal  2.5-3.5  2.5-3.5  2.5-3.5 2.5-3.5   Trend  Same  Same  Same Same   Last Week Total  25 mg  25 mg  25 mg 25 mg   Next Week Total  25 mg  25 mg  27.5 mg 25 mg   Sun  5 mg  5 mg  5 mg 5 mg   Mon  2.5 mg  2.5 mg  2.5 mg 2.5 mg   Tue  2.5 mg  2.5 mg  5 mg (11/26); Otherwise 2.5 mg 2.5 mg   Wed  5 mg  5 mg  5 mg 5 mg   Thu  2.5 mg  2.5 mg  2.5 mg 2.5 mg   Fri  5 mg  5 mg  5 mg 5 mg   Sat  2.5 mg  2.5 mg  2.5 mg 2.5 mg   Historical INR 3.00      2.70      2.40             This result is from an external source.       Plan:  1. INR is Supratherapeutic today- see above in Anticoagulation Summary.   Will instruct Asia Ly to Continue their warfarin regimen- see above in Anticoagulation Summary.  2. Follow up in 2 weeks. Scheduled for 12/26 due to availability around holidays.  3. Patient declines warfarin refills.  4. Verbal and written information provided. Patient expresses understanding and has no further questions at this time.    Vance Jones, PharmD

## 2024-12-10 NOTE — TELEPHONE ENCOUNTER
Triage-can you help to get him on me or Dr. Brown's schedule this week ? Cahrla is out today. I could also see him in [facility] tomorrow at anytime they wish to present before 2p ( if they could avoid coming 12-1p during lunch break that would be best for me) and place standing lab orders also for workup. I think my only slot this week is Friday at 3p and that limits how much I can do in the office

## 2024-12-12 ENCOUNTER — HOSPITAL ENCOUNTER (OUTPATIENT)
Dept: GENERAL RADIOLOGY | Facility: HOSPITAL | Age: 89
Discharge: HOME | End: 2024-12-12
Payer: MEDICARE

## 2024-12-12 ENCOUNTER — TELEPHONE (OUTPATIENT)
Dept: CARDIOLOGY | Facility: CLINIC | Age: 89
End: 2024-12-12

## 2024-12-12 ENCOUNTER — ANTICOAGULATION VISIT (OUTPATIENT)
Dept: PHARMACY | Facility: HOSPITAL | Age: 89
End: 2024-12-12
Payer: MEDICARE

## 2024-12-12 ENCOUNTER — HOSPITAL ENCOUNTER (OUTPATIENT)
Dept: CARDIOLOGY | Facility: HOSPITAL | Age: 89
Discharge: HOME | End: 2024-12-12
Payer: MEDICARE

## 2024-12-12 ENCOUNTER — OFFICE VISIT (OUTPATIENT)
Dept: CARDIOLOGY | Facility: CLINIC | Age: 89
End: 2024-12-12
Payer: MEDICARE

## 2024-12-12 VITALS
HEART RATE: 79 BPM | SYSTOLIC BLOOD PRESSURE: 110 MMHG | HEIGHT: 68 IN | OXYGEN SATURATION: 94 % | BODY MASS INDEX: 22.07 KG/M2 | DIASTOLIC BLOOD PRESSURE: 78 MMHG | WEIGHT: 145.6 LBS

## 2024-12-12 DIAGNOSIS — J84.10 PULMONARY FIBROSIS: ICD-10-CM

## 2024-12-12 DIAGNOSIS — I50.33 ACUTE ON CHRONIC DIASTOLIC CHF (CONGESTIVE HEART FAILURE): ICD-10-CM

## 2024-12-12 DIAGNOSIS — I44.2 COMPLETE ATRIOVENTRICULAR BLOCK DUE TO ATRIOVENTRICULAR NODAL ABLATION: ICD-10-CM

## 2024-12-12 DIAGNOSIS — I50.33 ACUTE ON CHRONIC DIASTOLIC CHF (CONGESTIVE HEART FAILURE): Primary | ICD-10-CM

## 2024-12-12 DIAGNOSIS — I35.0 NONRHEUMATIC AORTIC VALVE STENOSIS: ICD-10-CM

## 2024-12-12 DIAGNOSIS — I26.99 ACUTE PULMONARY EMBOLISM WITHOUT ACUTE COR PULMONALE, UNSPECIFIED PULMONARY EMBOLISM TYPE: ICD-10-CM

## 2024-12-12 DIAGNOSIS — I97.190 COMPLETE ATRIOVENTRICULAR BLOCK DUE TO ATRIOVENTRICULAR NODAL ABLATION: ICD-10-CM

## 2024-12-12 DIAGNOSIS — I26.99 ACUTE PULMONARY EMBOLISM WITHOUT ACUTE COR PULMONALE, UNSPECIFIED PULMONARY EMBOLISM TYPE: Primary | ICD-10-CM

## 2024-12-12 DIAGNOSIS — I48.21 PERMANENT ATRIAL FIBRILLATION: ICD-10-CM

## 2024-12-12 LAB
ALBUMIN SERPL-MCNC: 3.8 G/DL (ref 3.5–5.2)
ALBUMIN/GLOB SERPL: 1 G/DL
ALP SERPL-CCNC: 67 U/L (ref 39–117)
ALT SERPL W P-5'-P-CCNC: 17 U/L (ref 1–41)
ANION GAP SERPL CALCULATED.3IONS-SCNC: 12.6 MMOL/L (ref 5–15)
AST SERPL-CCNC: 20 U/L (ref 1–40)
BASOPHILS # BLD AUTO: 0.04 10*3/MM3 (ref 0–0.2)
BASOPHILS NFR BLD AUTO: 0.5 % (ref 0–1.5)
BILIRUB SERPL-MCNC: 0.6 MG/DL (ref 0–1.2)
BUN SERPL-MCNC: 33 MG/DL (ref 8–23)
BUN/CREAT SERPL: 23.1 (ref 7–25)
CALCIUM SPEC-SCNC: 9.4 MG/DL (ref 8.2–9.6)
CHLORIDE SERPL-SCNC: 103 MMOL/L (ref 98–107)
CO2 SERPL-SCNC: 23.4 MMOL/L (ref 22–29)
CREAT SERPL-MCNC: 1.43 MG/DL (ref 0.76–1.27)
DEPRECATED RDW RBC AUTO: 52.3 FL (ref 37–54)
EGFRCR SERPLBLD CKD-EPI 2021: 46 ML/MIN/1.73
EOSINOPHIL # BLD AUTO: 0.11 10*3/MM3 (ref 0–0.4)
EOSINOPHIL NFR BLD AUTO: 1.5 % (ref 0.3–6.2)
ERYTHROCYTE [DISTWIDTH] IN BLOOD BY AUTOMATED COUNT: 15.6 % (ref 12.3–15.4)
GLOBULIN UR ELPH-MCNC: 4 GM/DL
GLUCOSE SERPL-MCNC: 106 MG/DL (ref 65–99)
HCT VFR BLD AUTO: 37.4 % (ref 37.5–51)
HGB BLD-MCNC: 12.2 G/DL (ref 13–17.7)
IMM GRANULOCYTES # BLD AUTO: 0.05 10*3/MM3 (ref 0–0.05)
IMM GRANULOCYTES NFR BLD AUTO: 0.7 % (ref 0–0.5)
INR PPP: 2.46 (ref 0.9–1.1)
LYMPHOCYTES # BLD AUTO: 0.93 10*3/MM3 (ref 0.7–3.1)
LYMPHOCYTES NFR BLD AUTO: 12.4 % (ref 19.6–45.3)
MCH RBC QN AUTO: 30 PG (ref 26.6–33)
MCHC RBC AUTO-ENTMCNC: 32.6 G/DL (ref 31.5–35.7)
MCV RBC AUTO: 92.1 FL (ref 79–97)
MONOCYTES # BLD AUTO: 0.48 10*3/MM3 (ref 0.1–0.9)
MONOCYTES NFR BLD AUTO: 6.4 % (ref 5–12)
NEUTROPHILS NFR BLD AUTO: 5.92 10*3/MM3 (ref 1.7–7)
NEUTROPHILS NFR BLD AUTO: 78.5 % (ref 42.7–76)
NRBC BLD AUTO-RTO: 0 /100 WBC (ref 0–0.2)
NT-PROBNP SERPL-MCNC: 4437 PG/ML (ref 0–1800)
PLATELET # BLD AUTO: 195 10*3/MM3 (ref 140–450)
PMV BLD AUTO: 10.9 FL (ref 6–12)
POTASSIUM SERPL-SCNC: 4.4 MMOL/L (ref 3.5–5.2)
PROT SERPL-MCNC: 7.8 G/DL (ref 6–8.5)
PROTHROMBIN TIME: 27 SECONDS (ref 11.7–14.2)
RBC # BLD AUTO: 4.06 10*6/MM3 (ref 4.14–5.8)
SODIUM SERPL-SCNC: 139 MMOL/L (ref 136–145)
WBC NRBC COR # BLD AUTO: 7.53 10*3/MM3 (ref 3.4–10.8)

## 2024-12-12 PROCEDURE — 80053 COMPREHEN METABOLIC PANEL: CPT | Performed by: INTERNAL MEDICINE

## 2024-12-12 PROCEDURE — 36415 COLL VENOUS BLD VENIPUNCTURE: CPT

## 2024-12-12 PROCEDURE — 85610 PROTHROMBIN TIME: CPT | Performed by: INTERNAL MEDICINE

## 2024-12-12 PROCEDURE — 71046 X-RAY EXAM CHEST 2 VIEWS: CPT

## 2024-12-12 PROCEDURE — 83880 ASSAY OF NATRIURETIC PEPTIDE: CPT | Performed by: INTERNAL MEDICINE

## 2024-12-12 PROCEDURE — 85025 COMPLETE CBC W/AUTO DIFF WBC: CPT | Performed by: INTERNAL MEDICINE

## 2024-12-12 NOTE — PROGRESS NOTES
[facility]      Patient Name: Asia Ly  :1932  Age: 89 y.o.  Encounter Provider:  Nixon Brown Jr, MD      Chief Complaint:   Chief Complaint   Patient presents with    Shortness of Breath         Atrial Fibrillation  Symptoms include shortness of breath. Symptoms are negative for chest pain, dizziness, palpitations and syncope. Past medical history includes hyperlipidemia.   Hyperlipidemia  Associated symptoms include shortness of breath. Pertinent negatives include no chest pain or focal weakness.   Transient Ischemic Attack  Associated symptoms include coughing. Pertinent negatives include no abdominal pain, chest pain, chills, fever, joint swelling or rash.   Primary Care Follow-Up  Conditions present:  Hyperlipidemia  Associated symptoms include: shortness of breath, weight loss and cough. Pertinent negatives include no chest pain, no dizziness, no palpitations, no orthopnea, no PND, no focal weakness, no wheezing, no syncope and no abdominal pain.   Shortness of Breath  Pertinent negatives include no abdominal pain, chest pain, fever, leg swelling, orthopnea, PND, rash, syncope or wheezing.       Asia Ly is a 89 y.o. male past medical history of paroxysmal atrial fibrillation, stroke, moderate aortic stenosis, Crohn's disease, pulmonary fibrosis and pulmonary hypertension who presents for follow-up of chronic mental illness.  I first met Mr. Ly in mid November when he presented to the hospital with hematochezia.  He had a full GI work-up which showed no evidence of active bleeding.  He was placed back on apixaban given his high risk for future stroke.  He presented back to the hospital a few days later with febrile illness.  He was found to have bilateral pneumonia and had an extensive hospital stay with full course of IV antibiotics.  He is slowly recovering from that hospital stay and states that his main complaint is generalized weakness and fatigue.  He does  feel that he is getting better with each day.  He was found to have residual pneumonia by PCP and placed on 7-day oral antibiotic course.  He has not had fever in the past week.  He denies chest pain, palpitations, dizziness or syncope.  He denies orthopnea or PND but has some residual edema.  He has some increased exertional dyspnea but states that this is improving since his first course of antibiotics.  He is back on apixaban with no further bleeding complications.  Recovery has been slow but steady. Patient had AV johanne ablation on October 3.  He feels well since then.  No palpitations, dizziness or syncope.  He has mild chronic stable dyspnea on exertion.  Known aortic valve stenosis.  Echocardiogram today shows EF of 50 to 55% with moderate aortic stenosis and mild to moderate aortic valve regurgitation.  Significant pulmonary hypertension is noted which is unchanged from previous.    He was hospitalized in May with COVID-19 infection and bilateral PE while on low-dose apixaban.  He was placed on Lovenox ultimately switched to normal dose of apixaban.  We have been watching closely because he has a history of epistaxis on higher dose of apixaban.  He has been doing fairly well but has a reducible hernia that has been giving him more pain and has been occurring more frequently.  We asked Dr. Ramirez Zaidi to hold off on surgery until he has had 3 months of anticoagulation.  He should be ready for surgery in early August.  No chest pain.  He has had a slow recovery from this latest hospitalization but seems to be doing better and walking with a cane.  No orthopnea, PND or edema.  No palpitations, dizziness or syncope.    At last visit in August we decided on repeat echocardiogram which showed severe aortic stenosis with gradients much increased from previous.  After discussion with family we decided to have structural heart evaluate for TAVR but the patient was in the hospital 3 weeks later with bilateral PE.  He  "was switched from apixaban to warfarin and was doing fairly well since then but TAVR evaluation was postponed due to this issue.  His wife has been noticing labile oxygen saturation although the patient has no complaints.  He is mildly confused but stable mental status.  He really never has any complaints  But is a poor historian.  On exam today he has no peripheral edema but significantly increased bilateral crackle.s    The following portions of the patient's history were reviewed and updated as appropriate: allergies, current medications, past family history, past medical history, past social history, past surgical history and problem list.      Review of Systems   Constitutional: Positive for decreased appetite, malaise/fatigue and weight loss. Negative for chills and fever.   HENT:  Positive for hearing loss.    Cardiovascular:  Negative for chest pain, leg swelling, near-syncope, orthopnea, palpitations, paroxysmal nocturnal dyspnea and syncope.   Respiratory:  Positive for cough and shortness of breath. Negative for wheezing.    Skin:  Positive for itching. Negative for rash.   Musculoskeletal:  Negative for joint pain and joint swelling.   Gastrointestinal:  Negative for bloating and abdominal pain.   Neurological:  Negative for dizziness and focal weakness.   Psychiatric/Behavioral:  Negative for altered mental status and suicidal ideas.      ROS was reviewed, updated and amended when necessary.    OBJECTIVE:   Vital Signs  Vitals:    12/12/24 1112   BP: 110/78   Pulse: 79   SpO2: 94%     Estimated body mass index is 22.14 kg/m² as calculated from the following:    Height as of this encounter: 172.7 cm (68\").    Weight as of this encounter: 66 kg (145 lb 9.6 oz).    Vitals reviewed.   Constitutional:       Appearance: Healthy appearance. Not in distress.   Neck:      Vascular: No JVR. JVD normal.   Pulmonary:      Effort: Pulmonary effort is normal.      Breath sounds: No wheezing. No rhonchi.      " Comments: Mild bibasilar crackles  Chest:      Chest wall: Not tender to palpatation.   Cardiovascular:      PMI at left midclavicular line. Normal rate. Regular rhythm.      Murmurs: There is a systolic murmur.      No gallop.  No click. No rub.   Pulses:     Intact distal pulses.   Edema:     Peripheral edema absent.   Abdominal:      General: Bowel sounds are normal.      Palpations: Abdomen is soft.      Tenderness: There is no abdominal tenderness.   Musculoskeletal: Normal range of motion.         General: No tenderness. Skin:     General: Skin is warm and dry.   Neurological:      General: No focal deficit present.      Mental Status: Alert and oriented to person, place and time.     Physical exam was reviewed, updated and amended when necessary.    Procedures          ASSESSMENT:     89-year-old male with a forementioned past medical history who presents for hospital follow-up for bilateral pneumonia.    PLAN OF CARE:     Acute on chronic diastolic heart failure -concern for mild exacerbation of heart failure related to severe valvular heart disease.  Labs drawn today will be reviewed.  Low suspicion for recurrent PE with INR 3.6.  Labs drawn in [facility] and I am sending him across the street for a chest x-ray.  We will increase his Lasix but I would like to see labs first especially with his CKD.  Dr. Guzman agrees to see him in clinic next week for TAVR evaluation.  PAF - continue metoprolol and warfarin.  No further evidence for bleeding.  Monitor closely.  Device interrogation reviewed and device is functioning normally.  Status post AV johanne ablation.  Bilateral PE -doing well on warfarin.  Comfortable on room air.  Increasing activity as tolerated.  Aortic stenosis -as above  Pulmonary fibrosis  Pulmonary hypertension  Crohn's disease    Return to clinic 3 months.             Discharge Medications            Accurate as of December 12, 2024 11:13 AM. If you have any questions, ask your nurse or doctor.                 Changes to Medications        Instructions Start Date   warfarin 5 MG tablet  Commonly known as: COUMADIN  What changed: additional instructions   5 mg, Oral, Daily             Continue These Medications        Instructions Start Date   allopurinol 100 MG tablet  Commonly known as: ZYLOPRIM   1 tablet, 2 Times Daily      atorvastatin 10 MG tablet  Commonly known as: LIPITOR   1 tablet, Every Other Day      Budesonide 3 MG 24 hr capsule  Commonly known as: ENTOCORT EC   1 capsule, Every Morning      furosemide 20 MG tablet  Commonly known as: LASIX   1 tablet, Daily      ipratropium 0.02 % nebulizer solution  Commonly known as: ATROVENT   500 mcg, 3 Times Daily      IPRATROPIUM-ALBUTEROL IN   2 puffs, 3 Times Daily      metoprolol tartrate 25 MG tablet  Commonly known as: LOPRESSOR   25 mg, Oral, Every 12 Hours Scheduled      O2  Commonly known as: OXYGEN   2 L/min, As Needed      omeprazole 40 MG capsule  Commonly known as: priLOSEC   1 capsule, Daily               Thank you for allowing me to participate in the care of your patient,      Sincerely,   Nixon Brown MD   [facility]  12/12/24  11:13 EST

## 2024-12-12 NOTE — TELEPHONE ENCOUNTER
X-ray has not resulted yet but I have reviewed images and compared to previous.  Right pleural effusion is slightly larger than previous with more vascular congestion and fluid in the right middle flank fissure which is increased compared to previous.  I think that his severe aortic stenosis is becoming symptomatic and we have set him up for an evaluation with Dr. Guzman next week.  The only problem with his labs is that on furosemide his creatinine is slightly elevated as well.  I am in to have them increase to 40 mg every other day and repeat a BMP in 1 week.  Follow structural heart evaluation next week.

## 2024-12-12 NOTE — PROGRESS NOTES
Anticoagulation Clinic Progress Note    Anticoagulation Summary  As of 2024      INR goal:  2.5-3.5   TTR:  67.2% (2 mo)   INR used for dosin.46 (2024)   Warfarin maintenance plan:  5 mg every Sun, Wed, Fri; 2.5 mg all other days   Weekly warfarin total:  25 mg   Plan last modified:  Breanne Jain ELIESER (10/31/2024)   Next INR check:  2024   Target end date:  --    Indications    Acute pulmonary embolism without acute cor pulmonale  unspecified pulmonary embolism type [I26.99]                 Anticoagulation Episode Summary       INR check location:  --    Preferred lab:  --    Send INR reminders to:  [facility] [place]    Comments:  New start to warfarin- current with [facility]- discharging            Anticoagulation Care Providers       Provider Role Specialty Phone number    Libra Gonzalez, APRN Referring Cardiology [phone]            Drug interactions: has remained unchanged.  Diet: has remained unchanged.    Clinic Interview:  No pertinent clinical findings have been reported.    INR History:      Latest Ref Rng & Units 2024    12:00 AM 2024     2:26 PM 2024    12:00 AM 2024    10:33 AM 12/10/2024    11:00 AM 2024    11:32 AM 2024     1:26 PM   Anticoagulation Monitoring   INR   2.70  2.40 3.6  2.46   INR Date   2024  2024 12/10/2024  2024   INR Goal   2.5-3.5  2.5-3.5 2.5-3.5  2.5-3.5   Trend   Same  Same Same  Same   Last Week Total   25 mg  25 mg 25 mg  25 mg   Next Week Total   25 mg  27.5 mg 25 mg  27.5 mg   Sun   5 mg  5 mg 5 mg  5 mg   Mon   2.5 mg  2.5 mg 2.5 mg  2.5 mg   Tue   2.5 mg  5 mg (); Otherwise 2.5 mg 2.5 mg  2.5 mg   Wed   5 mg  5 mg 5 mg  5 mg   Thu   2.5 mg  2.5 mg 2.5 mg  5 mg (); Otherwise 2.5 mg   Fri   5 mg  5 mg 5 mg  5 mg   Sat   2.5 mg  2.5 mg 2.5 mg  2.5 mg   Historical INR 0.90 - 1.10 2.70      2.40       2.46     Visit Report       Report Report       This result is from an  external source.       Plan:  1. INR is Subtherapeutic today- see above in Anticoagulation Summary.   Spoke w/ Ramirez, wife. Pt had cardiology appt today, Follow up from [facility]. Labs drawn, low suspicion of recurrent PE, cxr ordered today.  Instruct pt to take booster of 5mg today, then cont same . Has clinic appt for INR follow up on 12/26- see above in Anticoagulation Summary.  2. Follow up 12/26  3. Pt has agreed to only be called if INR out of range. They have been instructed to call if any changes in medications, doses, concerns, etc. Patient expresses understanding and has no further questions at this time.    Nayely Mock, [facility]

## 2024-12-13 ENCOUNTER — TELEPHONE (OUTPATIENT)
Dept: CARDIOLOGY | Facility: CLINIC | Age: 89
End: 2024-12-13
Payer: MEDICARE

## 2024-12-13 NOTE — TELEPHONE ENCOUNTER
----- Message from Nixon Brown sent at 12/12/2024 12:24 PM EST -----  Thank you for agreeing to see this gentleman in clinic next week for TAVR evaluation.

## 2024-12-13 NOTE — TELEPHONE ENCOUNTER
Per conversation with Dr. Guzman, he said to see if he can come in on this cath day, Tue 12/17/24 at 3p.    Theresa

## 2024-12-16 ENCOUNTER — TELEPHONE (OUTPATIENT)
Dept: CARDIOLOGY | Facility: CLINIC | Age: 89
End: 2024-12-16
Payer: MEDICARE

## 2024-12-16 RX ORDER — FUROSEMIDE 20 MG/1
TABLET ORAL
Start: 2024-12-16

## 2024-12-16 NOTE — TELEPHONE ENCOUNTER
I spoke with Patient's wife.  Discussed chest x-ray showing more fluid on the right than the left.  He is alternating between 40 mg and 20 mg and on the days when he takes Lasix 40 mg he has less swelling.  She will keep the appointment tomorrow with Dr. Guzman for aortic valve stenosis consultation

## 2024-12-17 ENCOUNTER — OFFICE VISIT (OUTPATIENT)
Age: 89
End: 2024-12-17
Payer: MEDICARE

## 2024-12-17 VITALS
DIASTOLIC BLOOD PRESSURE: 66 MMHG | WEIGHT: 145 LBS | SYSTOLIC BLOOD PRESSURE: 132 MMHG | HEIGHT: 68 IN | HEART RATE: 80 BPM | BODY MASS INDEX: 21.98 KG/M2

## 2024-12-17 DIAGNOSIS — I35.0 AORTIC VALVE STENOSIS, ETIOLOGY OF CARDIAC VALVE DISEASE UNSPECIFIED: ICD-10-CM

## 2024-12-17 DIAGNOSIS — Z86.711 HISTORY OF PULMONARY EMBOLISM: ICD-10-CM

## 2024-12-17 DIAGNOSIS — I50.33 ACUTE ON CHRONIC DIASTOLIC CHF (CONGESTIVE HEART FAILURE): Primary | ICD-10-CM

## 2024-12-17 DIAGNOSIS — I44.2 COMPLETE HEART BLOCK: ICD-10-CM

## 2024-12-17 NOTE — PROGRESS NOTES
Asia Ly  8/21/1932  Date of Office Visit: 12/17/24  Encounter Provider: Guilherme Guzman MD  Place of Service: [facility] CARDIOLOGY      CHIEF COMPLAINT:  Severe degenerative aortic valve stenosis  Pulmonary hypertension  Pulmonary fibrosis  History of recurrent pulmonary embolus    HISTORY OF PRESENT ILLNESS:  89-year-old male with medical history of paroxysmal atrial fibrillation, CVA, pulmonary fibrosis, pulmonary hypertension, hospitalization in May 2024 with COVID-19 infection and bilateral pulmonary emboli on low-dose Eliquis therapy.  Of note his initial CTA was 5/6/2023.  He had bilateral pulmonary emboli documented at that time with mostly segmental and distal lobar clot.  He was back in the hospital with worsening shortness of breath and recurrent pulmonary embolus.  He had therapy and subsequently was started on warfarin.  He had noticed some increased fluid overload as of late and his diuretic has been increased.  His last transthoracic echocardiogram was reviewed.  This was done on 9/11/2024.  Mean gradient across the aortic valve was noted to be 69 mmHg with a peak velocity of 4.9 m/s and a valve area of 0.7 cm².  The ejection fraction was normal to mildly hyperdynamic and there was moderate LVH noted.  RVSP is also significantly elevated at 79 mmHg    Review of Systems   Constitutional: Negative for fever and malaise/fatigue.   HENT:  Negative for nosebleeds and sore throat.    Eyes:  Negative for blurred vision and double vision.   Cardiovascular:  Negative for chest pain, claudication, palpitations and syncope.   Respiratory:  Negative for cough, shortness of breath and snoring.    Endocrine: Negative for cold intolerance, heat intolerance and polydipsia.   Skin:  Negative for itching, poor wound healing and rash.   Musculoskeletal:  Negative for joint pain, joint swelling, muscle weakness and myalgias.   Gastrointestinal:  Negative for abdominal pain,  melena, nausea and vomiting.   Neurological:  Negative for light-headedness, loss of balance, seizures, vertigo and weakness.   Psychiatric/Behavioral:  Negative for altered mental status and depression.        Past Medical History:   Diagnosis Date    Aortic stenosis     Aspiration pneumonia     Atrial fibrillation     Bleeding from the nose     Bronchiectasis     CKD (chronic kidney disease)     COPD (chronic obstructive pulmonary disease)     Crohn's disease     Deep vein thrombosis 10/16/2024    Left    Dizziness     Elevated cholesterol     Gastric ulcer     Generalized weakness     Gout     Gout     Hard of hearing     Hyperlipidemia     Hypertension     Inguinal hernia     left    Leg swelling     Lower extremity edema     Mild anemia     Near syncope     Nonrheumatic aortic valve stenosis     PAF (paroxysmal atrial fibrillation)     Palpitations     Peptic ulcer     Pneumonia due to COVID-19 virus 05/06/2023    Pneumonia of left lung due to infectious organism     Pulmonary embolism, bilateral 05/06/2023    Oct 2024    Pulmonary fibrosis     Stroke     TIA (transient ischemic attack)        The following portions of the patient's history were reviewed and updated as appropriate: Social history , Family history, and Surgical history     Current Outpatient Medications on File Prior to Visit   Medication Sig Dispense Refill    allopurinol (ZYLOPRIM) 100 MG tablet Take 1 tablet by mouth 2 (Two) Times a Day. Indications: Gout      atorvastatin (LIPITOR) 10 MG tablet Take 1 tablet by mouth Every Other Day. M-W-F  Indications: High Amount of Fats in the Blood      Budesonide (ENTOCORT EC) 3 MG 24 hr capsule Take 1 capsule by mouth Every Morning. Indications: Crohn's Disease      furosemide (LASIX) 20 MG tablet Alternate between one tablet and two tablets every other day  Indications: Edema      ipratropium (ATROVENT) 0.02 % nebulizer solution Take 2.5 mL by nebulization 3 (Three) Times a Day. 0.5mg/2.5ml nebulizer  "treatments three times a day  Indications: Chronic Obstructive Lung Disease      IPRATROPIUM-ALBUTEROL IN Inhale 2 puffs 3 (Three) Times a Day. Indications: COPD exacerbation, complicated      metoprolol tartrate (LOPRESSOR) 25 MG tablet Take 1 tablet by mouth Every 12 (Twelve) Hours. Indications: High Blood Pressure Disorder 180 tablet 1    O2 (OXYGEN) Inhale 2 L/min As Needed (soa). wears at bedtime  Indications: Oxygen Saturation Decreased      omeprazole (priLOSEC) 40 MG capsule Take 1 capsule by mouth Daily. Indications: Heartburn      warfarin (COUMADIN) 5 MG tablet Take 1 tablet by mouth Daily. Indications: DVT/PE (active thrombosis) (Patient taking differently: Take 1 tablet by mouth Daily. patient currently taking 5mg sundays, wednesdays, and fridays,  2.5mg all other days  Indications: DVT/PE (active thrombosis)) 30 tablet 0     No current facility-administered medications on file prior to visit.       Allergies   Allergen Reactions    Amiodarone Unknown (See Comments)     Pulmonary fibrosis    Diltiazem Arrhythmia    Doxycycline Rash    Allopurinol Unknown - Low Severity     Other reaction(s): Joint pain      Other Reaction(s): Joint pain    Cefdinir Arrhythmia     A-FIB    Indomethacin Rash       Vitals:    12/17/24 1457   BP: 132/66   Pulse: 80   Weight: 65.8 kg (145 lb)   Height: 172.7 cm (68\")     Body mass index is 22.05 kg/m².   Constitutional:       Appearance: Well-developed.   Eyes:      General: No scleral icterus.     Conjunctiva/sclera: Conjunctivae normal.   HENT:      Head: Normocephalic and atraumatic.   Neck:      Thyroid: No thyromegaly.      Vascular: Normal carotid pulses. No carotid bruit, hepatojugular reflux or JVD.      Trachea: No tracheal deviation.   Pulmonary:      Effort: No respiratory distress.      Breath sounds: Normal breath sounds. Examination of the right-middle field reveals rales. Examination of the left-middle field reveals rales. Examination of the right-lower field " reveals rales. Examination of the left-lower field reveals rales. No decreased breath sounds. No wheezing. No rhonchi. No rales.   Chest:      Chest wall: Not tender to palpatation.   Cardiovascular:      Normal rate. Regular rhythm.      Murmurs: There is a grade 3/6 mid to late systolic murmur.      No gallop.    Pulses:     Carotid: 2+ bilaterally.     Radial: 2+ bilaterally.     Femoral: 2+ bilaterally.     Dorsalis pedis: 2+ bilaterally.     Posterior tibial: 2+ bilaterally.  Edema:     Peripheral edema absent.   Abdominal:      General: Bowel sounds are normal. There is no distension.      Palpations: Abdomen is soft.      Tenderness: There is no abdominal tenderness.   Musculoskeletal:         General: No deformity.      Cervical back: Normal range of motion and neck supple. Skin:     Findings: No erythema or rash.   Neurological:      Mental Status: Alert and oriented to person, place, and time.      Sensory: No sensory deficit.   Psychiatric:         Behavior: Behavior normal.         Lab Results   Component Value Date    WBC 7.53 12/12/2024    HGB 12.2 (L) 12/12/2024    HCT 37.4 (L) 12/12/2024    MCV 92.1 12/12/2024     12/12/2024       Lab Results   Component Value Date    GLUCOSE 106 (H) 12/12/2024    BUN 33 (H) 12/12/2024    CREATININE 1.43 (H) 12/12/2024     12/12/2024    K 4.4 12/12/2024     12/12/2024    CALCIUM 9.4 12/12/2024    PROTEINTOT 7.8 12/12/2024    ALBUMIN 3.8 12/12/2024    ALT 17 12/12/2024    AST 20 12/12/2024    ALKPHOS 67 12/12/2024    BILITOT 0.6 12/12/2024    GLOB 4.0 12/12/2024    AGRATIO 1.0 12/12/2024    BCR 23.1 12/12/2024    ANIONGAP 12.6 12/12/2024    EGFR 46.0 (L) 12/12/2024       Lab Results   Component Value Date    GLUCOSE 106 (H) 12/12/2024    CALCIUM 9.4 12/12/2024     12/12/2024    K 4.4 12/12/2024    CO2 23.4 12/12/2024     12/12/2024    BUN 33 (H) 12/12/2024    CREATININE 1.43 (H) 12/12/2024    EGFR 46.0 (L) 12/12/2024    BCR 23.1  12/12/2024    ANIONGAP 12.6 12/12/2024       Lab Results   Component Value Date    CHOL 109 10/01/2024    CHLPL 146 10/25/2019    TRIG 57 10/01/2024    HDL 45 10/01/2024    LDL 51 10/01/2024       Procedures     Results for orders placed during the hospital encounter of 09/28/24    Adult Transthoracic Echo Limited W/ Cont if Necessary Per Protocol    Interpretation Summary    Limited echocardiogram to evaluate LV and RV function.  LV is normal size and function EF 50 to 55%.  RV is normal size with low normal global systolic function unchanged from previous.  Severe aortic stenosis is a known diagnosis and unchanged from previous.    Estimated right ventricular systolic pressure from tricuspid regurgitation is markedly elevated (>55 mmHg).  Severe pulmonary hypertension unchanged from previous echo.    9/11/24    Left ventricular systolic function is normal. Left ventricular ejection fraction appears to be 66 - 70%.    The left atrial cavity is severely dilated.    The right atrial cavity is mildly  dilated.    Severe aortic valve stenosis is present. Aortic valve area is 0.7 cm2.    Peak velocity of the flow distal to the aortic valve is 487.7 cm/s. Aortic valve mean pressure gradient is 69 mmHg.    Estimated right ventricular systolic pressure from tricuspid regurgitation is markedly elevated (>55 mmHg).       DISCUSSION/SUMMARY  89-year-old male with a medical history of pulmonary fibrosis, nocturnal oxygen usage, pulmonary hypertension, paroxysmal atrial fibrillation, recurrent pulmonary embolus, who presents to me secondary to his critical degenerative aortic valve stenosis.  He has an increased fluid overload and symptoms consistent with acute on chronic heart failure with preserved ejection fraction.  Mean gradient across the aortic valve is around 60 mmHg.  I think that this is certainly contributing to his episodes of fluid overload, however with his dyspnea it is tough to tell.  He has pulmonary fibrosis  and at least moderate pulmonary hypertension.    We had a long conversation and I think if he is a transfemoral candidate for transcatheter aortic valve replacement this is something to consider.    Acute on chronic heart failure with preserved ejection fraction: Appears euvolemic.  Blood pressure and heart rate well-controlled.    Paroxysmal atrial fibrillation - continue metoprolol and warfarin.  No further evidence for bleeding.  Monitor closely.  Device interrogation reviewed and device is functioning normally.  Status post AV johanne ablation.    3.  Bilateral PE -doing well on warfarin.  Comfortable on room air.  Increasing activity as tolerated.    4.  Critical degenerative aortic valve stenosis.  I think in the setting of his recurrent PE that stopping anticoagulant therapy multiple times during this process would be less than ideal.  I have recommended we move forward with a CT angiogram transcatheter aortic valve replacement protocol and see if he is a transfemoral candidate.  If he is we will have him see the surgeons and move forward.  No angina.  I am going to hold off on catheterization with the CKD.

## 2024-12-20 ENCOUNTER — TELEPHONE (OUTPATIENT)
Dept: CARDIOLOGY | Facility: CLINIC | Age: 89
End: 2024-12-20
Payer: MEDICARE

## 2024-12-20 NOTE — TELEPHONE ENCOUNTER
Caller: SURY Ly     Relationship: PATIENT    Best call back number: [phone]    What is your medical concern? PT'S WIFE IS CALLING TO SEE IF APPT ON 1/6/25 WAS FOR THE TEST THAT DR. MARTINEZ ORDERED. PLEASE GIVE WIFE A CALL BACK

## 2024-12-26 ENCOUNTER — ANTICOAGULATION VISIT (OUTPATIENT)
Dept: PHARMACY | Facility: HOSPITAL | Age: 89
End: 2024-12-26
Payer: MEDICARE

## 2024-12-26 DIAGNOSIS — I26.99 ACUTE PULMONARY EMBOLISM WITHOUT ACUTE COR PULMONALE, UNSPECIFIED PULMONARY EMBOLISM TYPE: Primary | ICD-10-CM

## 2024-12-26 LAB
INR PPP: 2.6 (ref 0.91–1.09)
PROTHROMBIN TIME: 31.1 SECONDS (ref 10–13.8)

## 2024-12-26 PROCEDURE — 85610 PROTHROMBIN TIME: CPT

## 2024-12-26 PROCEDURE — G0463 HOSPITAL OUTPT CLINIC VISIT: HCPCS

## 2024-12-26 PROCEDURE — 36416 COLLJ CAPILLARY BLOOD SPEC: CPT

## 2024-12-26 NOTE — PROGRESS NOTES
Anticoagulation Clinic Progress Note    Anticoagulation Summary  As of 2024      INR goal:  2.5-3.5   TTR:  68.0% (2.5 mo)   INR used for dosin.6 (2024)   Warfarin maintenance plan:  5 mg every Sun, Wed, Fri; 2.5 mg all other days   Weekly warfarin total:  25 mg   No change documented:  Jeromy Putnam, PharmD   Plan last modified:  Breanne Jain ELIESER (10/31/2024)   Next INR check:  2025   Target end date:  --    Indications    Acute pulmonary embolism without acute cor pulmonale  unspecified pulmonary embolism type [I26.99]                 Anticoagulation Episode Summary       INR check location:  --    Preferred lab:  --    Send INR reminders to:  [facility] CLINICAL [place]    Comments:  New start to warfarin- current with [facility]- discharging            Anticoagulation Care Providers       Provider Role Specialty Phone number    Libra Gonzalez APRN Referring Cardiology [phone]            Clinic Interview:  Patient Findings     Negatives:  Signs/symptoms of thrombosis, Signs/symptoms of bleeding,   Laboratory test error suspected, Change in health, Change in alcohol use,   Change in activity, Upcoming invasive procedure, Emergency department   visit, Upcoming dental procedure, Missed doses, Extra doses, Change in   medications, Change in diet/appetite, Hospital admission, Bruising, Other   complaints      Clinical Outcomes     Negatives:  Major bleeding event, Thromboembolic event,   Anticoagulation-related hospital admission, Anticoagulation-related ED   visit, Anticoagulation-related fatality        INR History:      Latest Ref Rng & Units 2024     2:26 PM 2024    12:00 AM 2024    10:33 AM 12/10/2024    11:00 AM 2024    11:32 AM 2024     1:26 PM 2024    11:00 AM   Anticoagulation Monitoring   INR  2.70  2.40 3.6  2.46 2.6   INR Date  2024  2024 12/10/2024  2024 2024   INR Goal  2.5-3.5  2.5-3.5 2.5-3.5  2.5-3.5 2.5-3.5    Trend  Same  Same Same  Same Same   Last Week Total  25 mg  25 mg 25 mg  25 mg 25 mg   Next Week Total  25 mg  27.5 mg 25 mg  27.5 mg 25 mg   Sun  5 mg  5 mg 5 mg  5 mg 5 mg   Mon  2.5 mg  2.5 mg 2.5 mg  2.5 mg 2.5 mg   Tue  2.5 mg  5 mg (11/26); Otherwise 2.5 mg 2.5 mg  2.5 mg 2.5 mg   Wed  5 mg  5 mg 5 mg  5 mg 5 mg   Thu  2.5 mg  2.5 mg 2.5 mg  5 mg (12/12); Otherwise 2.5 mg 2.5 mg   Fri  5 mg  5 mg 5 mg  5 mg 5 mg   Sat  2.5 mg  2.5 mg 2.5 mg  2.5 mg 2.5 mg   Historical INR 0.90 - 1.10  2.40       2.46      Visit Report      Report Report        This result is from an external source.       Plan:  1. INR is Therapeutic today- see above in Anticoagulation Summary.  Will instruct Asia Ly to Continue their warfarin regimen- see above in Anticoagulation Summary.  2. Follow up in 2 weeks.  3. Patient declines warfarin refills.  4. Verbal and written information provided. Patient expresses understanding and has no further questions at this time.    Jeromy Putnam, PharmD   This document is complete and the patient is ready for discharge.

## 2025-01-09 ENCOUNTER — ANTICOAGULATION VISIT (OUTPATIENT)
Dept: PHARMACY | Facility: HOSPITAL | Age: OVER 89
End: 2025-01-09
Payer: MEDICARE

## 2025-01-09 DIAGNOSIS — I26.99 ACUTE PULMONARY EMBOLISM WITHOUT ACUTE COR PULMONALE, UNSPECIFIED PULMONARY EMBOLISM TYPE: Primary | ICD-10-CM

## 2025-01-09 LAB
INR PPP: 2.7 (ref 0.91–1.09)
PROTHROMBIN TIME: 32.8 SECONDS (ref 10–13.8)

## 2025-01-09 PROCEDURE — 85610 PROTHROMBIN TIME: CPT

## 2025-01-09 PROCEDURE — 36416 COLLJ CAPILLARY BLOOD SPEC: CPT

## 2025-01-09 PROCEDURE — G0463 HOSPITAL OUTPT CLINIC VISIT: HCPCS

## 2025-01-09 NOTE — PROGRESS NOTES
Anticoagulation Clinic Progress Note    Anticoagulation Summary  As of 2025      INR goal:  2.5-3.5   TTR:  73.0% (2.9 mo)   INR used for dosin.7 (2025)   Warfarin maintenance plan:  5 mg every Sun, Wed, Fri; 2.5 mg all other days   Weekly warfarin total:  25 mg   No change documented:  Mehreen Wiseman, PharmD   Plan last modified:  Breanne Jain RPH (10/31/2024)   Next INR check:  2025   Target end date:  --    Indications    Acute pulmonary embolism without acute cor pulmonale  unspecified pulmonary embolism type [I26.99]                 Anticoagulation Episode Summary       INR check location:  --    Preferred lab:  --    Send INR reminders to:  [facility] CLINICAL [facility]OL    Comments:  New start to warfarin- current with [facility]- discharging            Anticoagulation Care Providers       Provider Role Specialty Phone number    Libra Gonzalez, KYLAH Referring Cardiology [phone]            Clinic Interview:  Patient Findings     Negatives:  Signs/symptoms of thrombosis, Signs/symptoms of bleeding,   Laboratory test error suspected, Change in health, Change in alcohol use,   Change in activity, Upcoming invasive procedure, Emergency department   visit, Upcoming dental procedure, Missed doses, Extra doses, Change in   medications, Change in diet/appetite, Hospital admission, Bruising, Other   complaints      Clinical Outcomes     Negatives:  Major bleeding event, Thromboembolic event,   Anticoagulation-related hospital admission, Anticoagulation-related ED   visit, Anticoagulation-related fatality        INR History:      Latest Ref Rng & Units 2024    12:00 AM 2024    10:33 AM 12/10/2024    11:00 AM 2024    11:32 AM 2024     1:26 PM 2024    11:00 AM 2025    11:30 AM   Anticoagulation Monitoring   INR   2.40 3.6  2.46 2.6 2.7   INR Date   2024 12/10/2024  2024 2024 2025   INR Goal   2.5-3.5 2.5-3.5  2.5-3.5 2.5-3.5 2.5-3.5    Trend   Same Same  Same Same Same   Last Week Total   25 mg 25 mg  25 mg 25 mg 25 mg   Next Week Total   27.5 mg 25 mg  27.5 mg 25 mg 25 mg   Sun   5 mg 5 mg  5 mg 5 mg 5 mg   Mon   2.5 mg 2.5 mg  2.5 mg 2.5 mg 2.5 mg   Tue   5 mg (11/26); Otherwise 2.5 mg 2.5 mg  2.5 mg 2.5 mg 2.5 mg   Wed   5 mg 5 mg  5 mg 5 mg 5 mg   Thu   2.5 mg 2.5 mg  5 mg (12/12); Otherwise 2.5 mg 2.5 mg 2.5 mg   Fri   5 mg 5 mg  5 mg 5 mg 5 mg   Sat   2.5 mg 2.5 mg  2.5 mg 2.5 mg 2.5 mg   Historical INR 0.90 - 1.10 2.40       2.46       Visit Report     Report Report         This result is from an external source.       Plan:  1. INR is Therapeutic today- see above in Anticoagulation Summary.  Will instruct Asia Ly to Continue their warfarin regimen- see above in Anticoagulation Summary.  2. Follow up in 4 weeks  3. Patient declines warfarin refills.  4. Verbal and written information provided. Patient expresses understanding and has no further questions at this time.    Mehreen Wiseman, IrinaD

## 2025-01-14 ENCOUNTER — HOSPITAL ENCOUNTER (OUTPATIENT)
Dept: CT IMAGING | Facility: HOSPITAL | Age: OVER 89
Discharge: HOME | End: 2025-01-14
Admitting: INTERNAL MEDICINE
Payer: MEDICARE

## 2025-01-14 VITALS — HEART RATE: 90 BPM

## 2025-01-14 DIAGNOSIS — I26.99 PULMONARY EMBOLISM, UNSPECIFIED CHRONICITY, UNSPECIFIED PULMONARY EMBOLISM TYPE, UNSPECIFIED WHETHER ACUTE COR PULMONALE PRESENT: ICD-10-CM

## 2025-01-14 DIAGNOSIS — I35.0 AORTIC VALVE STENOSIS, ETIOLOGY OF CARDIAC VALVE DISEASE UNSPECIFIED: ICD-10-CM

## 2025-01-14 PROCEDURE — 25510000001 IOPAMIDOL PER 1 ML: Performed by: INTERNAL MEDICINE

## 2025-01-14 PROCEDURE — 74174 CTA ABD&PLVS W/CONTRAST: CPT

## 2025-01-14 PROCEDURE — 71275 CT ANGIOGRAPHY CHEST: CPT

## 2025-01-14 RX ORDER — IOPAMIDOL 755 MG/ML
100 INJECTION, SOLUTION INTRAVASCULAR
Status: COMPLETED | OUTPATIENT
Start: 2025-01-14 | End: 2025-01-14

## 2025-01-14 RX ADMIN — IOPAMIDOL 100 ML: 755 INJECTION, SOLUTION INTRAVENOUS at 09:08

## 2025-01-14 NOTE — NURSING NOTE
Call placed to Dr. Chidi Oconnor's office.  Patient here for a CTA Chest, Abdomen and Pelvis with and without IV Contrast for a TAVR.  Dr. Oconnor had ordered a CT Chest Without IV Contrast to follow-up on a Pulmonary Embolism but you cannot do this Without Contrast.  You have to do a CTA Chest.  Call placed to Dr. Oconnor's office to let them know we what we are already doing today for scans and if this would suffice.  They said they are fine with that.

## 2025-01-16 ENCOUNTER — OFFICE VISIT (OUTPATIENT)
Dept: CARDIAC SURGERY | Facility: CLINIC | Age: OVER 89
End: 2025-01-16
Payer: MEDICARE

## 2025-01-16 VITALS
HEART RATE: 88 BPM | BODY MASS INDEX: 21.98 KG/M2 | HEIGHT: 68 IN | SYSTOLIC BLOOD PRESSURE: 155 MMHG | OXYGEN SATURATION: 92 % | DIASTOLIC BLOOD PRESSURE: 81 MMHG | RESPIRATION RATE: 18 BRPM | WEIGHT: 145 LBS

## 2025-01-16 DIAGNOSIS — I27.20 PULMONARY HYPERTENSION: Primary | ICD-10-CM

## 2025-01-16 DIAGNOSIS — I26.99 PULMONARY EMBOLISM ASSOCIATED WITH COVID-19: ICD-10-CM

## 2025-01-16 DIAGNOSIS — U07.1 PULMONARY EMBOLISM ASSOCIATED WITH COVID-19: ICD-10-CM

## 2025-01-16 DIAGNOSIS — I48.0 PAROXYSMAL ATRIAL FIBRILLATION: ICD-10-CM

## 2025-01-16 DIAGNOSIS — I35.0 NONRHEUMATIC AORTIC VALVE STENOSIS: ICD-10-CM

## 2025-01-16 PROCEDURE — 99205 OFFICE O/P NEW HI 60 MIN: CPT

## 2025-01-17 ENCOUNTER — DOCUMENTATION (OUTPATIENT)
Dept: CARDIOLOGY | Facility: HOSPITAL | Age: OVER 89
End: 2025-01-17
Payer: MEDICARE

## 2025-01-17 NOTE — NURSING NOTE
I met with Mr and Mrs Ly today after their visit with Dr Osuna We discussed the TAVR procedure and post op expectations . I was able to answer their questions.I have spoken with them previously and provided them with our introductory packet including information on shared decision making. They would like their son Raul to be here when the TAVR is completed I have provided them with my contact information and will speak with Raul as soon as possible to work out a timeline.

## 2025-01-27 ENCOUNTER — TELEPHONE (OUTPATIENT)
Dept: CARDIOLOGY | Facility: HOSPITAL | Age: OVER 89
End: 2025-01-27
Payer: MEDICARE

## 2025-01-27 NOTE — PROGRESS NOTES
"Chief Complaint  Aortic Stenosis    Subjective        Asia Ly presents to [facility] CARDIAC SURGERY  History of Present Illness  89 years old male with paroxysmal atrial fibrillation, history of CVA, pulmonary fibrosis, pulmonary hypertension, hospitalization in May 2024 with COVID 19 infection and bilateral pulmonary emboli on low-dose Eliquis, comes for evaluation about his aortic valve stenosis.  He has shortness of breath with exertion after that admission.  Last echocardiogram on 9/11/2024 showed severe aortic valve stenosis with an aortic valve area of 0.7 cm² and an aortic valve mean pressure gradient of 69 mmHg.      Objective   Vital Signs:  /81 (BP Location: Left arm, Patient Position: Sitting, Cuff Size: Large Adult)   Pulse 88   Resp 18   Ht 172.7 cm (68\")   Wt 65.8 kg (145 lb)   SpO2 92%   BMI 22.05 kg/m²   Estimated body mass index is 22.05 kg/m² as calculated from the following:    Height as of this encounter: 172.7 cm (68\").    Weight as of this encounter: 65.8 kg (145 lb).    BMI is within normal parameters. No other follow-up for BMI required.      Physical Exam   Result Review :                Assessment and Plan   There are no diagnoses linked to this encounter.    -Severe symptomatic aortic valve stenosis.  For TAVR  -Acute on chronic heart failure.  Euvolemic.  -Paroxysmal atrial fibrillation.  On warfarin.  -Bilateral PE.  On warfarin  89 years old male with multiple comorbidities with severe symptomatic aortic valve stenosis.  I think that his valve needs to be treated in order to improve symptoms and prolong life.  I will recommend to do a TAVR CTA and if he is candidate for transfemoral TAVR I think we should offer that option.  He is prohibitive risk for SAVR.  I agree with Dr. Guzman to hold off on cardiac cath with his CKD because he does not have angina.  I explained risk and benefits of the procedure to the patient and he agreed to proceed.  He needs " to decide if he wants to be an open rescue or not.  Probably due to his age I would not recommend to be an open rescue.     I spent 60 minutes caring for Asia on this date of service. This time includes time spent by me in the following activities:preparing for the visit, reviewing tests, obtaining and/or reviewing a separately obtained history, performing a medically appropriate examination and/or evaluation , counseling and educating the patient/family/caregiver, ordering medications, tests, or procedures, referring and communicating with other health care professionals , documenting information in the medical record, independently interpreting results and communicating that information with the patient/family/caregiver, and care coordination  Follow Up   No follow-ups on file.  Patient was given instructions and counseling regarding his condition or for health maintenance advice. Please see specific information pulled into the AVS if appropriate.

## 2025-01-28 ENCOUNTER — TELEPHONE (OUTPATIENT)
Dept: CARDIOLOGY | Facility: HOSPITAL | Age: OVER 89
End: 2025-01-28
Payer: MEDICARE

## 2025-01-28 NOTE — TELEPHONE ENCOUNTER
I spoke with Raul Ly's son  living in [place] and we discussed the TAVR procedure , testing, results ,and post op recovery in detail. I relayed concerns regarding critical aortic stenosis and the need to move forward in a timely manner . He states he will discuss this further with his parents and his brother who is a physician. I have provided my contact information and encouraged he and his family to call me with any further questions or concerns

## 2025-01-29 RX ORDER — WARFARIN SODIUM 5 MG/1
TABLET ORAL
Qty: 65 TABLET | Refills: 0 | Status: SHIPPED | OUTPATIENT
Start: 2025-01-29

## 2025-02-03 ENCOUNTER — TELEPHONE (OUTPATIENT)
Dept: CARDIOLOGY | Facility: CLINIC | Age: OVER 89
End: 2025-02-03
Payer: MEDICARE

## 2025-02-03 NOTE — TELEPHONE ENCOUNTER
Patient wife called stating that  has been having increased swelling in his ankles for approximately the last 3-4 days. She states it is not significant, but noticeable.     She also mentions that patients oxygen has been dropping during the day, but staying stable at night. She is unsure why this is happening and is wondering if she could speak with Dr Brown or [place] about this? Or if she needs to make an office visit for Mr Ly so he can be evaluated in clinic.     Mr Ly has an appointment scheduled currently for 2/24/2025 with [facility]       Please advise  DAVID Carnes   02/03/2025  03:51 PM

## 2025-02-04 ENCOUNTER — OFFICE VISIT (OUTPATIENT)
Dept: CARDIOLOGY | Facility: CLINIC | Age: OVER 89
End: 2025-02-04
Payer: MEDICARE

## 2025-02-04 ENCOUNTER — ANTICOAGULATION VISIT (OUTPATIENT)
Dept: PHARMACY | Facility: HOSPITAL | Age: OVER 89
End: 2025-02-04
Payer: MEDICARE

## 2025-02-04 VITALS
HEIGHT: 68 IN | SYSTOLIC BLOOD PRESSURE: 136 MMHG | BODY MASS INDEX: 22.05 KG/M2 | OXYGEN SATURATION: 95 % | HEART RATE: 72 BPM | DIASTOLIC BLOOD PRESSURE: 72 MMHG

## 2025-02-04 DIAGNOSIS — I27.20 PULMONARY HTN: ICD-10-CM

## 2025-02-04 DIAGNOSIS — I44.2 COMPLETE HEART BLOCK: ICD-10-CM

## 2025-02-04 DIAGNOSIS — Z79.01 WARFARIN ANTICOAGULATION: ICD-10-CM

## 2025-02-04 DIAGNOSIS — I26.99 ACUTE PULMONARY EMBOLISM WITHOUT ACUTE COR PULMONALE, UNSPECIFIED PULMONARY EMBOLISM TYPE: Primary | ICD-10-CM

## 2025-02-04 DIAGNOSIS — J84.10 PULMONARY FIBROSIS: Primary | ICD-10-CM

## 2025-02-04 LAB
INR PPP: 3.1 (ref 0.91–1.09)
PROTHROMBIN TIME: 37.3 SECONDS (ref 10–13.8)

## 2025-02-04 PROCEDURE — G2211 COMPLEX E/M VISIT ADD ON: HCPCS | Performed by: NURSE PRACTITIONER

## 2025-02-04 PROCEDURE — 1160F RVW MEDS BY RX/DR IN RCRD: CPT | Performed by: NURSE PRACTITIONER

## 2025-02-04 PROCEDURE — 1159F MED LIST DOCD IN RCRD: CPT | Performed by: NURSE PRACTITIONER

## 2025-02-04 PROCEDURE — 85610 PROTHROMBIN TIME: CPT

## 2025-02-04 PROCEDURE — G0463 HOSPITAL OUTPT CLINIC VISIT: HCPCS

## 2025-02-04 PROCEDURE — 99214 OFFICE O/P EST MOD 30 MIN: CPT | Performed by: NURSE PRACTITIONER

## 2025-02-04 RX ORDER — FUROSEMIDE 20 MG/1
40 TABLET ORAL DAILY
Qty: 60 TABLET | Refills: 1 | Status: SHIPPED | OUTPATIENT
Start: 2025-02-04

## 2025-02-04 NOTE — PROGRESS NOTES
I have supervised and reviewed the notes, assessments, and/or procedures performed. I personally performed the assessment and implemented the plan. I concur with the documentation of this patient encounter.    Breanne Jain, [facility]

## 2025-02-04 NOTE — PROGRESS NOTES
Date of Office Visit: 25  Encounter Provider: KYLAH Jha  Place of Service: [facility] CARDIOLOGY  Patient Name: Asia Ly  :1932    Chief Complaint   Patient presents with    Atrial Fibrillation   :     HPI: Asia Ly is a 89 y.o. male  with atrial fibrillation, stroke and TIA in 2016, aortic stenosis, hyperlipidemia, B12 anemia, Crohn's disease, colon polyps, lower extremity edema, TIA,  DVT and PE ( 2023), dilated ascending aorta,  pulmonary hypertension and pulmonary fibrosis.      He has been followed by Dr. Hector Rivera. He is now followed by Dr. Brown and I will visit with him in follow up today.  He is also followed by Dr. Rashaun Block with electrophysiology.     Has been on sotalol in the past as well as amiodarone for recurrent atrial fibrillation.  He had Echocardiogram 2016 showed normal left ventricular systolic dysfunction, moderate tricuspid insufficiency, mild mitral insufficiency , mild pulmonary hypertension.  He also had a perfusion stress test that was negative for ischemia.  He was taken off diltiazem for allergic reaction to rash.  2017 he had an echocardiogram which showed an EF of 60%, grade 2 diastolic dysfunction and mild aortic he had increased lower extremity edema and had again rapid atrial fibrillation.  He was started on a diuretic.  He then had pneumonia hypoxia and influenza and was started on Tikosyn.  He has some prolonged QTC complicated by antibiotics.  He then had issues with gout felt to be related to discontinuation of indomethacin and was prescribed colchicine.  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  He then had some atrial fibrillation with RVR and was given oral metoprolol and Cardizem and converted back to sinus rhythm.  He was evaluated by electrophysiology with Dr. Nettles who felt that his ablation at his age would be very  high risk procedure.  He was later started on digoxin for recurrent rapid atrial fibrillation.  He was admitted again January 2019 and electrophysiology felt that he may for AV node ablation and pacemaker implantation.  However he preferred to stay on Tikosyn and accept occasional breakthroughs.     He then had some confusion and upper epigastric abdominal pain he was hospitalized August 2020 found to have transaminitis with hyperbilirubinemia.  He had some atrial fibrillation with RVR received IV metoprolol and his heart rate improved.  Echocardiogram showed normal left ventricular systolic function, mild concentric hypertrophy, moderate aortic valve stenoses with aortic valve area 1.1 cm², maximum pressure gradient of 42 and mean pressure gradient 24 mmHg.  RVSP was severely elevated at 57.8.  He had mild aortic valve regurgitation and mild mitral regurgitation.     In November 2020 was hospitalized with hematochezia and underwent GI evaluation with no evidence of active bleed.  He was cleared to resume apixaban and was later found to have bilateral pneumonia.     He was treated again for pneumonia and had a bronchoscopy July 7.  He is colonized Pseudomonas and infectious disease recommended conservative treatment.  He had some rapid atrial fibrillation during that admission and had a pacemaker placed by Dr. Nettles.    And ultimately had AV node ablation 09/2021. .  His Tikosyn was stopped.      He had nosebleeds and saw ENT in march 2023 who  Performed cauterization with packing and was off eliquis until we restarted it at a lower dose of 2.5 mg twice daily.  His nosebleed stopped but then he got ill with COVID and developed acute left lower extremity DVT May 2023 and he was put back on Eliquis 5 mg twice daily.     He had some increased lower extremity edema which improved with Lasix 40 mg twice daily November 2023.  We then decrease Lasix to 40 mg daily.     He then was admitted in September 2024 after having  "low oxygen at home.  He reportedly was treated for bronchitis just prior to admission and  Was found to have new pulmonary embolism.  He was treated with heparin drip and then Eliquis was stopped and he was bridged with warfarin.     He was evaluated by Dr. Guilherme Guzman 12/17/2024 for TAVR vs SAVR. TAVR was felt to me the best route. He had CT angio TAVR chest/abdomen/pelvis 1/14/2025 which showed widely patent comic and external iliac arteries with common femoral arteries also patent.  There was concern for right renal artery stenosis, resolution of nonocclusive thrombus in the distal right main pulmonary artery, small to moderate loculated right pleural effusion with stable minimal left pleural effusion and advance fibrosis with bronchiectasis and volume loss was unchanged.  There was passive hepatic gesturing of the liver but no perihepatic fluid.  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    He presents today for reassessment.           Allergies   Allergen Reactions    Amiodarone Unknown (See Comments)     Pulmonary fibrosis    Diltiazem Arrhythmia    Doxycycline Rash    Allopurinol Unknown - Low Severity     Other reaction(s): Joint pain      Other Reaction(s): Joint pain    Cefdinir Arrhythmia     A-FIB    Indomethacin Rash           Family and social history reviewed.     ROS  All other systems were reviewed and are negative          Objective:     Vitals:    02/04/25 1426   BP: 136/72   BP Location: Left arm   Patient Position: Sitting   Cuff Size: Adult   Pulse: 72   SpO2: 95%   Height: 172.7 cm (68\")     Body mass index is 22.05 kg/m².    PHYSICAL EXAM:  Pulmonary:      Effort: Pulmonary effort is normal.      Breath sounds: Examination of the right-lower field reveals decreased breath sounds and rales. Examination of the left-lower field reveals decreased breath sounds and rales. Decreased breath sounds present. No wheezing. Rales " present.   Cardiovascular:      Normal rate. Regular rhythm.      Murmurs: There is a grade 2to 3/6 systolic murmur at the URSB.   Edema:     Pretibial: bilateral 1+ edema of the pretibial area.     Ankle: bilateral 1+ edema of the ankle.        Procedures      Current Outpatient Medications   Medication Sig Dispense Refill    allopurinol (ZYLOPRIM) 100 MG tablet Take 1 tablet by mouth 2 (Two) Times a Day. Indications: Gout      atorvastatin (LIPITOR) 10 MG tablet Take 1 tablet by mouth Every Other Day. M-W-F  Indications: High Amount of Fats in the Blood      Budesonide (ENTOCORT EC) 3 MG 24 hr capsule Take 1 capsule by mouth Every Morning. Indications: Crohn's Disease      furosemide (LASIX) 20 MG tablet Take 2 tablets by mouth Daily. Indications: Edema 60 tablet 1    ipratropium (ATROVENT) 0.02 % nebulizer solution Take 2.5 mL by nebulization 3 (Three) Times a Day. 0.5mg/2.5ml nebulizer treatments three times a day  Indications: Chronic Obstructive Lung Disease      IPRATROPIUM-ALBUTEROL IN Inhale 2 puffs 3 (Three) Times a Day. Indications: COPD exacerbation, complicated      metoprolol tartrate (LOPRESSOR) 25 MG tablet Take 1 tablet by mouth Every 12 (Twelve) Hours. Indications: High Blood Pressure Disorder 180 tablet 1    O2 (OXYGEN) Inhale 2 L/min As Needed (soa). wears at bedtime  Indications: Oxygen Saturation Decreased      omeprazole (priLOSEC) 40 MG capsule Take 1 capsule by mouth Daily. Indications: Heartburn      warfarin (COUMADIN) 5 MG tablet Take one tablet by mouth on sun, wed, fri and take one-half of a tablet by mouth all other days or as directed  Indications: DVT/PE (active thrombosis) 65 tablet 0     No current facility-administered medications for this visit.     Assessment:       Diagnosis Plan   1. Pulmonary fibrosis        2. Complete heart block        3. Pulmonary HTN        4. Warfarin anticoagulation             No orders of the defined types were placed in this encounter.        Plan:        1. 89 year old gentleman with paroxysmal atrial fibrillation.  Off amiodarone due to history of pulmonary fibrosis.  CHADS2 Vascor of 6.  He  had a rash with diltiazem in the past.  In 2021 he had AV node ablation with permanent pacemaker.    He is now on warfarin with target INR 2.5-3.5.   He also follows with Dr. Rashaun Nettles  2.  Severe aortic valve stenoses.  He met with Dr. Guilherme Guzman and had TAVR CTA angio chest, abdomen and pelvis 01/2025.  He met with Dr. Margarito Osuna 1/27/25 and TAVR procedure has been recommended.  On 1/28/2025 our TAVR coordinating RN, Dm Whiting s/w patient's son Kely Dan called son, Sofía  during our visit but he did not answer and I have requested to speak with him.  3.  History of TIA no recurrence-continue warfarin at this time.  Target INR 2.5-3.5  4.  Prediabetes  5.  History of Crohn's disease and iron deficient anemia usually sees Dr. Susie Mccoy  6.  Hypertension continue current regimen  7.  Hyperlipidemia- now off statin therapy which is fine with me.   8.  History of cholecystitis  9.   History of gout  10.bilateral pleural effusion- left small to moderate, right mild on recent CTA angio TAVR.  He took 1 additional Lasix tablet today and I have advised that he take double dose for total of 40 mg daily until he sees me again in 2 weeks..   11. COVID 19 infection 04/2023  12.  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 epistaxis requiring cauterization and packing by ENT 03/2023  14.  History of mild dilation of ascending aorta however this measured completely normal on CT angio TAVR chest/abdomen/pelvis 1/14/2025.  15. Pulmonary fibrosis. Now with PRN oxygen she mainly wears it at night but we discussed wearing oxygen as needed to keep oxygen saturation greater than 88%.. Follows with Dr. Chidi Oconnor.    16.  Lower extremity edema-as above he will take 40 mg daily for the next  2 weeks  17.  Recurrent pulmonary embolism 09/2024  while on Eliquis and was switched to warfarin       Follow-up with me in 2 weeks.          It has been a pleasure to participate in this patient's care.      Thank you,  KYLAH Jha      **I used Dragon to dictate this note:**

## 2025-02-04 NOTE — TELEPHONE ENCOUNTER
I spoke with patient's wife, Ni and informed her to give patient two furosemide 20 mg tablets today.  He has been taking 20 mg every day.  His oxygen saturation has gotten down to 88% but then comes up after putting oxygen on patient.  I offered my 2:30 PM slot today and Ni will bring Asia in to be seen

## 2025-02-04 NOTE — PROGRESS NOTES
Anticoagulation Clinic Progress Note    Anticoagulation Summary  As of 2/4/2025      INR goal:  2.5-3.5   TTR:  79.2% (3.8 mo)   INR used for dosing:  3.1 (2/4/2025)   Warfarin maintenance plan:  5 mg every Sun, Wed, Fri; 2.5 mg all other days   Weekly warfarin total:  25 mg   No change documented:  Arjun Daily, Pharmacy Technician   Plan last modified:  [facility] (10/31/2024)   Next INR check:  3/4/2025   Target end date:  --    Indications    Acute pulmonary embolism without acute cor pulmonale  unspecified pulmonary embolism type [I26.99]                 Anticoagulation Episode Summary       INR check location:  --    Preferred lab:  --    [facility]  [facility]    Comments:  New start to warfarin- current with [facility]- discharging 11/26           Anticoagulation Care Providers       Provider Role Specialty Phone number    Libra Gonzalez, KYLAH Referring Cardiology [phone]            Clinic Interview:  Patient Findings     Negatives:  Signs/symptoms of thrombosis, Signs/symptoms of bleeding,   Laboratory test error suspected, Change in health, Change in alcohol use,   Change in activity, Upcoming invasive procedure, Emergency department   visit, Upcoming dental procedure, Missed doses, Extra doses, Change in   medications, Change in diet/appetite, Hospital admission, Bruising, Other   complaints      Clinical Outcomes     Negatives:  Major bleeding event, Thromboembolic event,   Anticoagulation-related hospital admission, Anticoagulation-related ED   visit, Anticoagulation-related fatality        INR History:      Latest Ref Rng & Units 11/26/2024    10:33 AM 12/10/2024    11:00 AM 12/12/2024    11:32 AM 12/12/2024     1:26 PM 12/26/2024    11:00 AM 1/9/2025    11:30 AM 2/4/2025    11:00 AM   Anticoagulation Monitoring   INR  2.40 3.6  2.46 2.6 2.7 3.1   INR Date  11/26/2024 12/10/2024  12/12/2024 12/26/2024 1/9/2025 2/4/2025   INR Goal  2.5-3.5 2.5-3.5  2.5-3.5  2.5-3.5 2.5-3.5 2.5-3.5   Trend  Same Same  Same Same Same Same   Last Week Total  25 mg 25 mg  25 mg 25 mg 25 mg 25 mg   Next Week Total  27.5 mg 25 mg  27.5 mg 25 mg 25 mg 25 mg   Sun  5 mg 5 mg  5 mg 5 mg 5 mg 5 mg   Mon  2.5 mg 2.5 mg  2.5 mg 2.5 mg 2.5 mg 2.5 mg   Tue  5 mg (11/26); Otherwise 2.5 mg 2.5 mg  2.5 mg 2.5 mg 2.5 mg 2.5 mg   Wed  5 mg 5 mg  5 mg 5 mg 5 mg 5 mg   Thu  2.5 mg 2.5 mg  5 mg (12/12); Otherwise 2.5 mg 2.5 mg 2.5 mg 2.5 mg   Fri  5 mg 5 mg  5 mg 5 mg 5 mg 5 mg   Sat  2.5 mg 2.5 mg  2.5 mg 2.5 mg 2.5 mg 2.5 mg   Historical INR 0.90 - 1.10   2.46        Visit Report    Report Report          Plan:  1. INR is therapeutic today- see above in Anticoagulation Summary.   Will instruct Asia Ly to continue their warfarin regimen- see above in Anticoagulation Summary.  2. Follow up in 4 weeks.  3. Patient declines warfarin refills.  4. Verbal and written information provided. Patient expresses understanding and has no further questions at this time.    Arjun Daily, Pharmacy Technician

## 2025-02-10 ENCOUNTER — TELEPHONE (OUTPATIENT)
Dept: CARDIOLOGY | Facility: HOSPITAL | Age: OVER 89
End: 2025-02-10
Payer: MEDICARE

## 2025-02-10 NOTE — TELEPHONE ENCOUNTER
I left a voicemail for patients son Raul asking him to return my call to discuss his question concerning his father

## 2025-02-18 ENCOUNTER — OFFICE VISIT (OUTPATIENT)
Dept: CARDIOLOGY | Facility: CLINIC | Age: OVER 89
End: 2025-02-18
Payer: MEDICARE

## 2025-02-18 ENCOUNTER — HOSPITAL ENCOUNTER (OUTPATIENT)
Dept: CARDIOLOGY | Facility: HOSPITAL | Age: OVER 89
Discharge: HOME | End: 2025-02-18
Admitting: NURSE PRACTITIONER
Payer: MEDICARE

## 2025-02-18 VITALS
OXYGEN SATURATION: 98 % | HEART RATE: 63 BPM | DIASTOLIC BLOOD PRESSURE: 66 MMHG | BODY MASS INDEX: 22.13 KG/M2 | SYSTOLIC BLOOD PRESSURE: 120 MMHG | WEIGHT: 146 LBS | HEIGHT: 68 IN

## 2025-02-18 DIAGNOSIS — Z51.81 ENCOUNTER FOR THERAPEUTIC DRUG LEVEL MONITORING: ICD-10-CM

## 2025-02-18 DIAGNOSIS — J84.10 PULMONARY FIBROSIS: ICD-10-CM

## 2025-02-18 DIAGNOSIS — I50.33 ACUTE ON CHRONIC DIASTOLIC CHF (CONGESTIVE HEART FAILURE): Primary | ICD-10-CM

## 2025-02-18 DIAGNOSIS — I50.33 ACUTE ON CHRONIC DIASTOLIC CHF (CONGESTIVE HEART FAILURE): ICD-10-CM

## 2025-02-18 DIAGNOSIS — I35.0 NONRHEUMATIC AORTIC VALVE STENOSIS: ICD-10-CM

## 2025-02-18 LAB
ANION GAP SERPL CALCULATED.3IONS-SCNC: 10 MMOL/L (ref 5–15)
BUN SERPL-MCNC: 36 MG/DL (ref 8–23)
BUN/CREAT SERPL: 22.1 (ref 7–25)
CALCIUM SPEC-SCNC: 9.5 MG/DL (ref 8.2–9.6)
CHLORIDE SERPL-SCNC: 103 MMOL/L (ref 98–107)
CO2 SERPL-SCNC: 29 MMOL/L (ref 22–29)
CREAT SERPL-MCNC: 1.63 MG/DL (ref 0.76–1.27)
EGFRCR SERPLBLD CKD-EPI 2021: 39.3 ML/MIN/1.73
GLUCOSE SERPL-MCNC: 79 MG/DL (ref 65–99)
POTASSIUM SERPL-SCNC: 4.8 MMOL/L (ref 3.5–5.2)
SODIUM SERPL-SCNC: 142 MMOL/L (ref 136–145)

## 2025-02-18 PROCEDURE — 99214 OFFICE O/P EST MOD 30 MIN: CPT | Performed by: NURSE PRACTITIONER

## 2025-02-18 PROCEDURE — 93000 ELECTROCARDIOGRAM COMPLETE: CPT | Performed by: NURSE PRACTITIONER

## 2025-02-18 PROCEDURE — 36415 COLL VENOUS BLD VENIPUNCTURE: CPT

## 2025-02-18 PROCEDURE — 80048 BASIC METABOLIC PNL TOTAL CA: CPT | Performed by: NURSE PRACTITIONER

## 2025-02-18 NOTE — PROGRESS NOTES
Date of Office Visit: 25  Encounter Provider: KYLAH Jha  Place of Service: [facility] CARDIOLOGY  Patient Name: Asia Ly  :1932    Chief Complaint   Patient presents with    Atrial Fibrillation    Follow-up   :     HPI: Asia Ly is a 89 y.o. male  with  atrial fibrillation, stroke and TIA in , aortic stenosis, hyperlipidemia, B12 anemia, Crohn's disease, colon polyps, lower extremity edema, TIA,  DVT and PE ( 2023), dilated ascending aorta,  pulmonary hypertension and pulmonary fibrosis.      He has been followed by Dr. Hector Rivera. He is now followed by Dr. Brown and I will visit with him in follow up today.  He is also followed by Dr. Rashaun Block with electrophysiology.     Has been on sotalol in the past as well as amiodarone for recurrent atrial fibrillation.  He had Echocardiogram 2016 showed normal left ventricular systolic dysfunction, moderate tricuspid insufficiency, mild mitral insufficiency , mild pulmonary hypertension.  He also had a perfusion stress test that was negative for ischemia.  He was taken off diltiazem for allergic reaction to rash.  2017 he had an echocardiogram which showed an EF of 60%, grade 2 diastolic dysfunction and mild aortic he had increased lower extremity edema and had again rapid atrial fibrillation.  He was started on a diuretic.  He then had pneumonia hypoxia and influenza and was started on Tikosyn.  He has some prolonged QTC complicated by antibiotics.  He then had issues with gout felt to be related to discontinuation of indomethacin and was prescribed colchicine.  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  He then had some atrial fibrillation with RVR and was given oral metoprolol and Cardizem and converted back to sinus rhythm.  He was evaluated by electrophysiology with Dr. Nettles who felt that his ablation at his age  would be very high risk procedure.  He was later started on digoxin for recurrent rapid atrial fibrillation.  He was admitted again January 2019 and electrophysiology felt that he may for AV node ablation and pacemaker implantation.  However he preferred to stay on Tikosyn and accept occasional breakthroughs.     He then had some confusion and upper epigastric abdominal pain he was hospitalized August 2020 found to have transaminitis with hyperbilirubinemia.  He had some atrial fibrillation with RVR received IV metoprolol and his heart rate improved.  Echocardiogram showed normal left ventricular systolic function, mild concentric hypertrophy, moderate aortic valve stenoses with aortic valve area 1.1 cm², maximum pressure gradient of 42 and mean pressure gradient 24 mmHg.  RVSP was severely elevated at 57.8.  He had mild aortic valve regurgitation and mild mitral regurgitation.     In November 2020 was hospitalized with hematochezia and underwent GI evaluation with no evidence of active bleed.  He was cleared to resume apixaban and was later found to have bilateral pneumonia.     He was treated again for pneumonia and had a bronchoscopy July 7.  He is colonized Pseudomonas and infectious disease recommended conservative treatment.  He had some rapid atrial fibrillation during that admission and had a pacemaker placed by Dr. Nettles.    And ultimately had AV node ablation 09/2021. .  His Tikosyn was stopped.      He had nosebleeds and saw ENT in march 2023 who  Performed cauterization with packing and was off eliquis until we restarted it at a lower dose of 2.5 mg twice daily.  His nosebleed stopped but then he got ill with COVID and developed acute left lower extremity DVT May 2023 and he was put back on Eliquis 5 mg twice daily.     He had some increased lower extremity edema which improved with Lasix 40 mg twice daily November 2023.  We then decrease Lasix to 40 mg daily.     He then was admitted in September 2024  after having low oxygen at home.  He reportedly was treated for bronchitis just prior to admission and  Was found to have new pulmonary embolism.  He was treated with heparin drip and then Eliquis was stopped and he was bridged with warfarin.      He was evaluated by Dr. Guilherme Guzman 12/17/2024 for TAVR vs SAVR. TAVR was felt to me the best route. He had CT angio TAVR chest/abdomen/pelvis 1/14/2025 which showed widely patent comic and external iliac arteries with common femoral arteries also patent.  There was concern for right renal artery stenosis, resolution of nonocclusive thrombus in the distal right main pulmonary artery, small to moderate loculated right pleural effusion with stable minimal left pleural effusion and advance fibrosis with bronchiectasis and volume loss was unchanged.  There was passive hepatic gesturing of the liver but no perihepatic fluid.  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      He had increased shortness of breath and low oxygen readings with mild ankle swelling and was seen in the office on 2//25.  His Lasix was doubled.  He presents today for 2-week follow-up and has less shortness of breath.  His oxygen saturation has improved and is no longer dropping low.  His swelling has resolved.  Patient denies chest pain or dizziness or palpitation.        Allergies   Allergen Reactions    Amiodarone Unknown (See Comments)     Pulmonary fibrosis    Diltiazem Arrhythmia    Doxycycline Rash    Allopurinol Unknown - Low Severity     Other reaction(s): Joint pain      Other Reaction(s): Joint pain    Cefdinir Arrhythmia     A-FIB    Indomethacin Rash           Family and social history reviewed.     ROS  All other systems were reviewed and are negative          Objective:     Vitals:    02/18/25 1330   BP: 120/66   BP Location: Left arm   Patient Position: Sitting   Cuff Size: Adult   Pulse: 63   SpO2: 98%   Weight: 66.2  "kg (146 lb)   Height: 172.7 cm (68\")     Body mass index is 22.2 kg/m².    PHYSICAL EXAM:  Pulmonary:      Effort: Pulmonary effort is normal.      Breath sounds: Examination of the right-lower field reveals decreased breath sounds and rales. Examination of the left-lower field reveals decreased breath sounds and rales. Decreased breath sounds present. Rales present.   Cardiovascular:      Normal rate.      Murmurs: There is a grade 3to 2/6 systolic murmur at the URSB.           ECG 12 Lead    Date/Time: 2/18/2025 4:25 PM  Performed by: Alta Hackett APRN    Authorized by: Alta Hackett APRN  Comparison: compared with previous ECG   Similar to previous ECG  Rhythm: atrial fibrillation and paced  Rate: normal  Pacing: ventricular paced rhythm  Clinical impression: abnormal EKG            Current Outpatient Medications   Medication Sig Dispense Refill    allopurinol (ZYLOPRIM) 100 MG tablet Take 1 tablet by mouth 2 (Two) Times a Day. Indications: Gout      atorvastatin (LIPITOR) 10 MG tablet Take 1 tablet by mouth Every Other Day. M-W-F  Indications: High Amount of Fats in the Blood      Budesonide (ENTOCORT EC) 3 MG 24 hr capsule Take 1 capsule by mouth Every Morning. Indications: Crohn's Disease      furosemide (LASIX) 20 MG tablet Take 2 tablets by mouth Daily. Indications: Edema 60 tablet 1    ipratropium (ATROVENT) 0.02 % nebulizer solution Take 2.5 mL by nebulization 3 (Three) Times a Day. 0.5mg/2.5ml nebulizer treatments three times a day  Indications: Chronic Obstructive Lung Disease      IPRATROPIUM-ALBUTEROL IN Inhale 2 puffs 3 (Three) Times a Day. Indications: COPD exacerbation, complicated      metoprolol tartrate (LOPRESSOR) 25 MG tablet Take 1 tablet by mouth Every 12 (Twelve) Hours. Indications: High Blood Pressure Disorder 180 tablet 1    O2 (OXYGEN) Inhale 2 L/min As Needed (soa). wears at bedtime  Indications: Oxygen Saturation Decreased      omeprazole (priLOSEC) 40 MG capsule Take 1 capsule by " mouth Daily. Indications: Heartburn      warfarin (COUMADIN) 5 MG tablet Take one tablet by mouth on sun, wed, fri and take one-half of a tablet by mouth all other days or as directed  Indications: DVT/PE (active thrombosis) 65 tablet 0     No current facility-administered medications for this visit.     Assessment:       Diagnosis Plan   1. Acute on chronic diastolic CHF (congestive heart failure)  Basic Metabolic Panel      2. Encounter for therapeutic drug level monitoring  Basic Metabolic Panel           Orders Placed This Encounter   Procedures    Basic Metabolic Panel     Standing Status:   Future     Number of Occurrences:   1     Standing Expiration Date:   2/18/2026     Order Specific Question:   Release to patient     Answer:   Routine Release [[id number]]         Plan:       1. 89 year old gentleman with paroxysmal atrial fibrillation.  Off amiodarone due to history of pulmonary fibrosis.  CHADS2 Vascor of 6.  He  had a rash with diltiazem in the past.  In 2021 he had AV node ablation with permanent pacemaker.    He is now on warfarin with target INR 2.5-3.5.   He also follows with Dr. Rashaun Nettles  2.  Severe aortic valve stenoses.  He met with Dr. Guilherme Guzman and had TAVR CTA angio chest, abdomen and pelvis 01/2025.  He met with Dr. Margarito Osuna 1/27/25 and TAVR procedure has been recommended.  His son Raul has been in contact with our TAVR coordinating RN, Dm Whiting.  Both of patient's sons are in agreement to proceed with TAVR.  I spoke with Raul over the phone today who is coordinating his schedule to come to [place] in order to help his mother, Ni.  We are hoping to schedule TAVR in April or May  -Dr. Portillo made aware.  3.  History of TIA no recurrence-continue warfarin at this time.  Target INR 2.5-3.5  4.  Prediabetes  5.  History of Crohn's disease and iron deficient anemia usually sees Dr. Susie Mccoy  6.  Hypertension continue current regimen  7.   Hyperlipidemia-continue without statin.  8.  History of cholecystitis  9.   History of gout  10.bilateral pleural effusion-continue Lasix 40 mg daily.  11. COVID 19 infection 04/2023  12.  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 epistaxis requiring cauterization and packing by ENT 03/2023  14.  History of mild dilation of ascending aorta however this measured completely normal on CT angio TAVR chest/abdomen/pelvis 1/14/2025.  15. Pulmonary fibrosis. Now with PRN oxygen that he mainly wears at night. we discussed wearing oxygen as needed to keep oxygen saturation greater than 88%.. Follows with Dr. Chidi Oconnor.    16.  Lower extremity edema-resolved with Lasix 40 mg daily.  17.  Recurrent pulmonary embolism 09/2024  while on Eliquis and was switched to warfarin   18.  CKD-creatinine slightly increased but he feels better with higher dose Lasix so we will continue current regimen and follow his renal function closely.      He will follow-up in 1 month and his wife will call with any changes.            It has been a pleasure to participate in this patient's care.      Thank you,  KYLAH Jha      **I used Dragon to dictate this note:**

## 2025-03-04 ENCOUNTER — ANTICOAGULATION VISIT (OUTPATIENT)
Dept: PHARMACY | Facility: HOSPITAL | Age: OVER 89
End: 2025-03-04
Payer: MEDICARE

## 2025-03-04 DIAGNOSIS — I26.99 ACUTE PULMONARY EMBOLISM WITHOUT ACUTE COR PULMONALE, UNSPECIFIED PULMONARY EMBOLISM TYPE: Primary | ICD-10-CM

## 2025-03-04 LAB
INR PPP: 2.8 (ref 0.91–1.09)
PROTHROMBIN TIME: 33.7 SECONDS (ref 10–13.8)

## 2025-03-04 PROCEDURE — G0463 HOSPITAL OUTPT CLINIC VISIT: HCPCS

## 2025-03-04 PROCEDURE — 85610 PROTHROMBIN TIME: CPT

## 2025-03-04 NOTE — PROGRESS NOTES
I have supervised and reviewed the notes, assessments, and/or procedures performed. The documented assessment and plan were developed cooperatively, and the plan was implemented in my presence. I concur with the documentation of this patient encounter.    Breanne Jain, [facility]

## 2025-03-04 NOTE — PROGRESS NOTES
Anticoagulation Clinic Progress Note    Anticoagulation Summary  As of 3/4/2025      INR goal:  2.5-3.5   TTR:  83.3% (4.7 mo)   INR used for dosin.8 (3/4/2025)   Warfarin maintenance plan:  5 mg every Sun, Wed, Fri; 2.5 mg all other days   Weekly warfarin total:  25 mg   No change documented:  Leo Cobos, Pharmacy Intern   Plan last modified:  Breanne Jain [facility] (10/31/2024)   Next INR check:  2025   Target end date:  --    Indications    Acute pulmonary embolism without acute cor pulmonale  unspecified pulmonary embolism type [I26.99]                 Anticoagulation Episode Summary       INR check location:  --    Preferred lab:  --    Send INR reminders to:  [facility] CLINICAL [place]    Comments:  New start to warfarin- current with [facility]- discharging            Anticoagulation Care Providers       Provider Role Specialty Phone number    Libra Gonzalez, APRN Referring Cardiology [phone]            Clinic Interview:  Patient Findings     Negatives:  Signs/symptoms of thrombosis, Signs/symptoms of bleeding,   Laboratory test error suspected, Change in health, Change in alcohol use,   Change in activity, Upcoming invasive procedure, Emergency department   visit, Upcoming dental procedure, Missed doses, Extra doses, Change in   medications, Change in diet/appetite, Hospital admission, Bruising, Other   complaints    Comments:  INR=2.8. No changes. Continue same dosing regimen (5 mg   SuWeFr, 2.5 mg AODs). Recheck 4 weeks.       Clinical Outcomes     Negatives:  Major bleeding event, Thromboembolic event,   Anticoagulation-related hospital admission, Anticoagulation-related ED   visit, Anticoagulation-related fatality    Comments:  INR=2.8. No changes. Continue same dosing regimen (5 mg   SuWeFr, 2.5 mg AODs). Recheck 4 weeks.         INR History:      Latest Ref Rng & Units 12/10/2024    11:00 AM 2024    11:32 AM 2024     1:26 PM 2024    11:00 AM 2025     11:30 AM 2/4/2025    11:00 AM 3/4/2025    11:15 AM   Anticoagulation Monitoring   INR  3.6  2.46 2.6 2.7 3.1 2.8   INR Date  12/10/2024  12/12/2024 12/26/2024 1/9/2025 2/4/2025 3/4/2025   INR Goal  2.5-3.5  2.5-3.5 2.5-3.5 2.5-3.5 2.5-3.5 2.5-3.5   Trend  Same  Same Same Same Same Same   Last Week Total  25 mg  25 mg 25 mg 25 mg 25 mg 25 mg   Next Week Total  25 mg  27.5 mg 25 mg 25 mg 25 mg 25 mg   Sun  5 mg  5 mg 5 mg 5 mg 5 mg 5 mg   Mon  2.5 mg  2.5 mg 2.5 mg 2.5 mg 2.5 mg 2.5 mg   Tue  2.5 mg  2.5 mg 2.5 mg 2.5 mg 2.5 mg 2.5 mg   Wed  5 mg  5 mg 5 mg 5 mg 5 mg 5 mg   Thu  2.5 mg  5 mg (12/12); Otherwise 2.5 mg 2.5 mg 2.5 mg 2.5 mg 2.5 mg   Fri  5 mg  5 mg 5 mg 5 mg 5 mg 5 mg   Sat  2.5 mg  2.5 mg 2.5 mg 2.5 mg 2.5 mg 2.5 mg   Historical INR 0.90 - 1.10  2.46         Visit Report   Report Report   Report        Plan:  1. INR is Therapeutic today- see above in Anticoagulation Summary.  Will instruct Asia yL to Continue their warfarin regimen (5 mg SuWeFr, 2.5 mg AODs) - see above in Anticoagulation Summary.  2. Follow up in 4 weeks  3. Patient declines warfarin refills.  4. Verbal and written information provided. Patient expresses understanding and has no further questions at this time.    Richy Waldronsbury, Pharmacy Intern

## 2025-03-26 ENCOUNTER — HOSPITAL ENCOUNTER (OUTPATIENT)
Facility: HOSPITAL | Age: OVER 89
Setting detail: SURGERY ADMIT
End: 2025-03-26
Attending: THORACIC SURGERY (CARDIOTHORACIC VASCULAR SURGERY) | Admitting: THORACIC SURGERY (CARDIOTHORACIC VASCULAR SURGERY)
Payer: MEDICARE

## 2025-03-26 DIAGNOSIS — I50.32 CHRONIC DIASTOLIC CONGESTIVE HEART FAILURE: ICD-10-CM

## 2025-03-26 DIAGNOSIS — I35.0 NONRHEUMATIC AORTIC VALVE STENOSIS: Primary | ICD-10-CM

## 2025-03-26 DIAGNOSIS — I35.0 NONRHEUMATIC AORTIC VALVE STENOSIS: ICD-10-CM

## 2025-03-27 ENCOUNTER — PREP FOR SURGERY (OUTPATIENT)
Dept: OTHER | Facility: HOSPITAL | Age: OVER 89
End: 2025-03-27
Payer: MEDICARE

## 2025-03-27 DIAGNOSIS — I35.0 NONRHEUMATIC AORTIC VALVE STENOSIS: Primary | ICD-10-CM

## 2025-03-27 DIAGNOSIS — I11.0 HYPERTENSIVE HEART DISEASE WITH HEART FAILURE: ICD-10-CM

## 2025-03-27 DIAGNOSIS — R79.1 ABNORMAL COAGULATION PROFILE: ICD-10-CM

## 2025-03-27 DIAGNOSIS — R79.9 ABNORMAL FINDING OF BLOOD CHEMISTRY, UNSPECIFIED: ICD-10-CM

## 2025-03-27 RX ORDER — CHLORHEXIDINE GLUCONATE ORAL RINSE 1.2 MG/ML
15 SOLUTION DENTAL EVERY 12 HOURS
OUTPATIENT
Start: 2025-03-27 | End: 2025-03-28

## 2025-03-27 RX ORDER — CHLORHEXIDINE GLUCONATE ORAL RINSE 1.2 MG/ML
15 SOLUTION DENTAL ONCE
OUTPATIENT
Start: 2025-03-27 | End: 2025-03-27

## 2025-03-28 ENCOUNTER — TELEPHONE (OUTPATIENT)
Dept: CARDIOLOGY | Facility: HOSPITAL | Age: OVER 89
End: 2025-03-28

## 2025-03-28 NOTE — TELEPHONE ENCOUNTER
Mr. Ly called to schedule his Father's TAVR procedure.  He requested 5/1 for Pre Admission Testing and 5/6 for the TAVR procedure.      This is scheduled and I emailed the information as requested.

## 2025-04-01 ENCOUNTER — ANTICOAGULATION VISIT (OUTPATIENT)
Dept: PHARMACY | Facility: HOSPITAL | Age: OVER 89
End: 2025-04-01
Payer: MEDICARE

## 2025-04-01 ENCOUNTER — OFFICE VISIT (OUTPATIENT)
Dept: CARDIOLOGY | Age: OVER 89
End: 2025-04-01
Payer: MEDICARE

## 2025-04-01 VITALS
SYSTOLIC BLOOD PRESSURE: 126 MMHG | OXYGEN SATURATION: 97 % | DIASTOLIC BLOOD PRESSURE: 76 MMHG | HEIGHT: 68 IN | HEART RATE: 82 BPM | WEIGHT: 138.6 LBS | BODY MASS INDEX: 21.01 KG/M2

## 2025-04-01 DIAGNOSIS — I50.32 CHRONIC DIASTOLIC CONGESTIVE HEART FAILURE: ICD-10-CM

## 2025-04-01 DIAGNOSIS — I27.20 PULMONARY HTN: ICD-10-CM

## 2025-04-01 DIAGNOSIS — J84.10 PULMONARY FIBROSIS: ICD-10-CM

## 2025-04-01 DIAGNOSIS — I35.0 NONRHEUMATIC AORTIC VALVE STENOSIS: ICD-10-CM

## 2025-04-01 DIAGNOSIS — I26.99 ACUTE PULMONARY EMBOLISM WITHOUT ACUTE COR PULMONALE, UNSPECIFIED PULMONARY EMBOLISM TYPE: Primary | ICD-10-CM

## 2025-04-01 DIAGNOSIS — Z79.01 WARFARIN ANTICOAGULATION: ICD-10-CM

## 2025-04-01 DIAGNOSIS — I48.19 PERSISTENT ATRIAL FIBRILLATION: ICD-10-CM

## 2025-04-01 DIAGNOSIS — I73.9 PVD (PERIPHERAL VASCULAR DISEASE) WITH CLAUDICATION: Primary | ICD-10-CM

## 2025-04-01 DIAGNOSIS — I44.2 COMPLETE HEART BLOCK: ICD-10-CM

## 2025-04-01 LAB
INR PPP: 2.8 (ref 0.91–1.09)
PROTHROMBIN TIME: 34.2 SECONDS (ref 10–13.8)

## 2025-04-01 PROCEDURE — 85610 PROTHROMBIN TIME: CPT

## 2025-04-01 PROCEDURE — G0463 HOSPITAL OUTPT CLINIC VISIT: HCPCS

## 2025-04-01 NOTE — PROGRESS NOTES
Anticoagulation Clinic Progress Note    Anticoagulation Summary  As of 2025      INR goal:  2.5-3.5   TTR:  86.0% (5.7 mo)   INR used for dosin.8 (2025)   Warfarin maintenance plan:  5 mg every Sun, Wed, Fri; 2.5 mg all other days   Weekly warfarin total:  25 mg   No change documented:  Nayely Mock [facility]   Plan last modified:  Breanne Jain [facility] (10/31/2024)   Next INR check:  2025   Target end date:  --    Indications    Acute pulmonary embolism without acute cor pulmonale  unspecified pulmonary embolism type [I26.99]                 Anticoagulation Episode Summary       INR check location:  --    Preferred lab:  --    [facility] [place]    Comments:  New start to warfarin- current with [facility]- discharging            Anticoagulation Care Providers       Provider Role Specialty Phone number    Libra Gonzalez, APRN Referring Cardiology [phone]            Clinic Interview:      INR History:      Latest Ref Rng & Units 2024    11:32 AM 2024     1:26 PM 2024    11:00 AM 2025    11:30 AM 2025    11:00 AM 3/4/2025    11:15 AM 2025    11:00 AM   Anticoagulation Monitoring   INR   2.46 2.6 2.7 3.1 2.8 2.8   INR Date   2024 2024 2025 2025 3/4/2025 2025   INR Goal   2.5-3.5 2.5-3.5 2.5-3.5 2.5-3.5 2.5-3.5 2.5-3.5   Trend   Same Same Same Same Same Same   Last Week Total   25 mg 25 mg 25 mg 25 mg 25 mg 25 mg   Next Week Total   27.5 mg 25 mg 25 mg 25 mg 25 mg 25 mg   Sun   5 mg 5 mg 5 mg 5 mg 5 mg 5 mg   Mon   2.5 mg 2.5 mg 2.5 mg 2.5 mg 2.5 mg 2.5 mg   Tue   2.5 mg 2.5 mg 2.5 mg 2.5 mg 2.5 mg 2.5 mg   Wed   5 mg 5 mg 5 mg 5 mg 5 mg 5 mg   Thu   5 mg (); Otherwise 2.5 mg 2.5 mg 2.5 mg 2.5 mg 2.5 mg 2.5 mg   Fri   5 mg 5 mg 5 mg 5 mg 5 mg 5 mg   Sat   2.5 mg 2.5 mg 2.5 mg 2.5 mg 2.5 mg 2.5 mg   Historical INR 0.90 - 1.10 2.46          Visit Report  Report Report   Report         Plan:  1. INR is  Therapeutic today- see above in Anticoagulation Summary.  Will instruct Asia Ly to Continue their warfarin regimen- see above in Anticoagulation Summary.  2. Follow up in 1 month  3. Patient declines warfarin refills.  4. Verbal and written information provided. Patient expresses understanding and has no further questions at this time.    Nayely Mock, [facility]

## 2025-04-01 NOTE — PROGRESS NOTES
[facility]      Patient Name: Asia Ly  :1932  Age: 89 y.o.  Encounter Provider:  Nixon Brown Jr, MD      Chief Complaint:   Chief Complaint   Patient presents with    Acute on chronic diastolic CHF (congestive heart failure         Atrial Fibrillation  Symptoms include shortness of breath. Symptoms are negative for chest pain, dizziness, palpitations and syncope. Past medical history includes hyperlipidemia.   Hyperlipidemia  Associated symptoms include shortness of breath. Pertinent negatives include no chest pain or focal weakness.   Transient Ischemic Attack  Associated symptoms include coughing. Pertinent negatives include no abdominal pain, chest pain, chills, fever, joint swelling or rash.   Primary Care Follow-Up  Conditions present:  Hyperlipidemia  Associated symptoms include: shortness of breath, weight loss and cough. Pertinent negatives include no chest pain, no dizziness, no palpitations, no orthopnea, no PND, no focal weakness, no wheezing, no syncope and no abdominal pain.   Shortness of Breath  Pertinent negatives include no abdominal pain, chest pain, fever, leg swelling, orthopnea, PND, rash, syncope or wheezing.       Asia Ly is a 89 y.o. male past medical history of paroxysmal atrial fibrillation, stroke, moderate aortic stenosis, Crohn's disease, pulmonary fibrosis and pulmonary hypertension who presents for follow-up of chronic mental illness.  I first met Mr. Ly in mid November when he presented to the hospital with hematochezia.  He had a full GI work-up which showed no evidence of active bleeding.  He was placed back on apixaban given his high risk for future stroke.  He presented back to the hospital a few days later with febrile illness.  He was found to have bilateral pneumonia and had an extensive hospital stay with full course of IV antibiotics.  He is slowly recovering from that hospital stay and states that his main complaint is  generalized weakness and fatigue.  He does feel that he is getting better with each day.  He was found to have residual pneumonia by PCP and placed on 7-day oral antibiotic course.  He has not had fever in the past week.  He denies chest pain, palpitations, dizziness or syncope.  He denies orthopnea or PND but has some residual edema.  He has some increased exertional dyspnea but states that this is improving since his first course of antibiotics.  He is back on apixaban with no further bleeding complications.  Recovery has been slow but steady. Patient had AV johanne ablation on October 3.  He feels well since then.  No palpitations, dizziness or syncope.  He has mild chronic stable dyspnea on exertion.  Known aortic valve stenosis.  Echocardiogram today shows EF of 50 to 55% with moderate aortic stenosis and mild to moderate aortic valve regurgitation.  Significant pulmonary hypertension is noted which is unchanged from previous.    He was hospitalized in May with COVID-19 infection and bilateral PE while on low-dose apixaban.  He was placed on Lovenox ultimately switched to normal dose of apixaban.  We have been watching closely because he has a history of epistaxis on higher dose of apixaban.  He has been doing fairly well but has a reducible hernia that has been giving him more pain and has been occurring more frequently.  We asked Dr. Ramirez Zaidi to hold off on surgery until he has had 3 months of anticoagulation.  He should be ready for surgery in early August.  No chest pain.  He has had a slow recovery from this latest hospitalization but seems to be doing better and walking with a cane.  No orthopnea, PND or edema.  No palpitations, dizziness or syncope.    At last visit in August we decided on repeat echocardiogram which showed severe aortic stenosis with gradients much increased from previous.  After discussion with family we decided to have structural heart evaluate for TAVR but the patient was in the  hospital 3 weeks later with bilateral PE.  He was switched from apixaban to warfarin and was doing fairly well since then but TAVR evaluation was postponed due to this issue.  His wife has been noticing labile oxygen saturation although the patient has no complaints.  He is mildly confused but stable mental status.  He really never has any complaints  But is a poor historian.  On exam today he has no peripheral edema but significantly increased bilateral crackle.s    He had painful swelling of his left lower extremity mostly in the region of the first digit.  His wife state that it resolved after a dayd but often on the toes have looked dusky.  That is the same leg with known chronic DVT.  He has palpable pulses on exam today.  Blood pressure and heart rate are well-controlled.  Last LDL 51.    The following portions of the patient's history were reviewed and updated as appropriate: allergies, current medications, past family history, past medical history, past social history, past surgical history and problem list.      Review of Systems   Constitutional: Positive for decreased appetite, malaise/fatigue and weight loss. Negative for chills and fever.   HENT:  Positive for hearing loss.    Cardiovascular:  Negative for chest pain, leg swelling, near-syncope, orthopnea, palpitations, paroxysmal nocturnal dyspnea and syncope.   Respiratory:  Positive for cough and shortness of breath. Negative for wheezing.    Skin:  Positive for itching. Negative for rash.   Musculoskeletal:  Negative for joint pain and joint swelling.   Gastrointestinal:  Negative for bloating and abdominal pain.   Neurological:  Negative for dizziness and focal weakness.   Psychiatric/Behavioral:  Negative for altered mental status and suicidal ideas.      ROS was reviewed, updated and amended when necessary.    OBJECTIVE:   Vital Signs  Vitals:    04/01/25 1333   BP: 126/76   Pulse: 82   SpO2: 97%     Estimated body mass index is 21.07 kg/m² as  "calculated from the following:    Height as of this encounter: 172.7 cm (68\").    Weight as of this encounter: 62.9 kg (138 lb 9.6 oz).    Vitals reviewed.   Constitutional:       Appearance: Healthy appearance. Not in distress.   Neck:      Vascular: No JVR. JVD normal.   Pulmonary:      Effort: Pulmonary effort is normal.      Breath sounds: No wheezing. No rhonchi.      Comments: Mild bibasilar crackles  Chest:      Chest wall: Not tender to palpatation.   Cardiovascular:      PMI at left midclavicular line. Normal rate. Regular rhythm.      Murmurs: There is a systolic murmur.      No gallop.  No click. No rub.   Pulses:     Intact distal pulses.   Edema:     Peripheral edema absent.   Abdominal:      General: Bowel sounds are normal.      Palpations: Abdomen is soft.      Tenderness: There is no abdominal tenderness.   Musculoskeletal: Normal range of motion.         General: No tenderness. Skin:     General: Skin is warm and dry.   Neurological:      General: No focal deficit present.      Mental Status: Alert and oriented to person, place and time.       Physical exam was reviewed, updated and amended when necessary.    Procedures          ASSESSMENT:     89-year-old male with a forementioned past medical history who presents for hospital follow-up for bilateral pneumonia.    PLAN OF CARE:     Acute on chronic diastolic heart failure -patient is being worked up for TAVR which is scheduled for May 6, 2025.  He is fairly euvolemic today in clinic.  Blood pressure and heart rate are well-controlled.  PAF - continue metoprolol and warfarin.  No further evidence for bleeding.  Monitor closely.  Device interrogation reviewed and device is functioning normally.  Status post AV johanne ablation.  Bilateral PE -doing well on warfarin.  Comfortable on room air.  Increasing activity as tolerated.  Aortic stenosis -as above  Pulmonary fibrosis  Pulmonary hypertension  Crohn's disease  Lower extremity pain and swelling -I " feel that this is post thrombotic syndrome with the known chronic DVT in the left lower extremity.  We will check JONATHAN.  He is on warfarin with therapeutic INR.    Return to clinic August 2025             Discharge Medications            Accurate as of April 1, 2025  1:36 PM. If you have any questions, ask your nurse or doctor.                Continue These Medications        Instructions Start Date   allopurinol 100 MG tablet  Commonly known as: ZYLOPRIM   1 tablet, 2 Times Daily      atorvastatin 10 MG tablet  Commonly known as: LIPITOR   1 tablet, Every Other Day      Budesonide 3 MG 24 hr capsule  Commonly known as: ENTOCORT EC   1 capsule, Every Morning      furosemide 20 MG tablet  Commonly known as: LASIX   40 mg, Oral, Daily      ipratropium 0.02 % nebulizer solution  Commonly known as: ATROVENT   500 mcg, 3 Times Daily      IPRATROPIUM-ALBUTEROL IN   2 puffs, 3 Times Daily      metoprolol tartrate 25 MG tablet  Commonly known as: LOPRESSOR   25 mg, Oral, Every 12 Hours Scheduled      O2  Commonly known as: OXYGEN   2 L/min, As Needed      omeprazole 40 MG capsule  Commonly known as: priLOSEC   1 capsule, Daily      warfarin 5 MG tablet  Commonly known as: COUMADIN   Take one tablet by mouth on sun, wed, fri and take one-half of a tablet by mouth all other days or as directed               Thank you for allowing me to participate in the care of your patient,      Sincerely,   Nixon Brown MD   [facility]  04/01/25  13:36 EDT

## 2025-04-15 ENCOUNTER — HOSPITAL ENCOUNTER (EMERGENCY)
Facility: HOSPITAL | Age: OVER 89
Discharge: HOME | End: 2025-04-15
Attending: EMERGENCY MEDICINE | Admitting: EMERGENCY MEDICINE
Payer: MEDICARE

## 2025-04-15 ENCOUNTER — TELEPHONE (OUTPATIENT)
Dept: CARDIOLOGY | Age: OVER 89
End: 2025-04-15

## 2025-04-15 ENCOUNTER — APPOINTMENT (OUTPATIENT)
Dept: GENERAL RADIOLOGY | Facility: HOSPITAL | Age: OVER 89
End: 2025-04-15
Payer: MEDICARE

## 2025-04-15 VITALS
WEIGHT: 142 LBS | DIASTOLIC BLOOD PRESSURE: 64 MMHG | BODY MASS INDEX: 21.52 KG/M2 | HEIGHT: 68 IN | SYSTOLIC BLOOD PRESSURE: 150 MMHG | RESPIRATION RATE: 18 BRPM | HEART RATE: 60 BPM | TEMPERATURE: 98.4 F | OXYGEN SATURATION: 94 %

## 2025-04-15 DIAGNOSIS — M25.512 ACUTE PAIN OF LEFT SHOULDER: Primary | ICD-10-CM

## 2025-04-15 DIAGNOSIS — Z79.01 ANTICOAGULATED: ICD-10-CM

## 2025-04-15 DIAGNOSIS — Z86.79 HISTORY OF CARDIOMYOPATHY: ICD-10-CM

## 2025-04-15 LAB
ALBUMIN SERPL-MCNC: 3.7 G/DL (ref 3.5–5.2)
ALBUMIN/GLOB SERPL: 0.9 G/DL
ALP SERPL-CCNC: 62 U/L (ref 39–117)
ALT SERPL W P-5'-P-CCNC: 13 U/L (ref 1–41)
ANION GAP SERPL CALCULATED.3IONS-SCNC: 12.1 MMOL/L (ref 5–15)
APTT PPP: 37 SECONDS (ref 22.7–35.4)
AST SERPL-CCNC: 15 U/L (ref 1–40)
BASOPHILS # BLD AUTO: 0.04 10*3/MM3 (ref 0–0.2)
BASOPHILS NFR BLD AUTO: 0.5 % (ref 0–1.5)
BILIRUB SERPL-MCNC: 0.6 MG/DL (ref 0–1.2)
BUN SERPL-MCNC: 44 MG/DL (ref 8–23)
BUN/CREAT SERPL: 31 (ref 7–25)
CALCIUM SPEC-SCNC: 9.2 MG/DL (ref 8.2–9.6)
CHLORIDE SERPL-SCNC: 105 MMOL/L (ref 98–107)
CO2 SERPL-SCNC: 23.9 MMOL/L (ref 22–29)
CREAT SERPL-MCNC: 1.42 MG/DL (ref 0.76–1.27)
DEPRECATED RDW RBC AUTO: 52.8 FL (ref 37–54)
EGFRCR SERPLBLD CKD-EPI 2021: 46.4 ML/MIN/1.73
EOSINOPHIL # BLD AUTO: 0.1 10*3/MM3 (ref 0–0.4)
EOSINOPHIL NFR BLD AUTO: 1.2 % (ref 0.3–6.2)
ERYTHROCYTE [DISTWIDTH] IN BLOOD BY AUTOMATED COUNT: 15.7 % (ref 12.3–15.4)
GEN 5 1HR TROPONIN T REFLEX: 33 NG/L
GLOBULIN UR ELPH-MCNC: 4 GM/DL
GLUCOSE SERPL-MCNC: 140 MG/DL (ref 65–99)
HCT VFR BLD AUTO: 36.5 % (ref 37.5–51)
HGB BLD-MCNC: 12 G/DL (ref 13–17.7)
IMM GRANULOCYTES # BLD AUTO: 0.08 10*3/MM3 (ref 0–0.05)
IMM GRANULOCYTES NFR BLD AUTO: 1 % (ref 0–0.5)
INR PPP: 2.52 (ref 0.9–1.1)
LYMPHOCYTES # BLD AUTO: 1.04 10*3/MM3 (ref 0.7–3.1)
LYMPHOCYTES NFR BLD AUTO: 12.5 % (ref 19.6–45.3)
MCH RBC QN AUTO: 30.4 PG (ref 26.6–33)
MCHC RBC AUTO-ENTMCNC: 32.9 G/DL (ref 31.5–35.7)
MCV RBC AUTO: 92.4 FL (ref 79–97)
MONOCYTES # BLD AUTO: 0.69 10*3/MM3 (ref 0.1–0.9)
MONOCYTES NFR BLD AUTO: 8.3 % (ref 5–12)
NEUTROPHILS NFR BLD AUTO: 6.37 10*3/MM3 (ref 1.7–7)
NEUTROPHILS NFR BLD AUTO: 76.5 % (ref 42.7–76)
NRBC BLD AUTO-RTO: 0 /100 WBC (ref 0–0.2)
NT-PROBNP SERPL-MCNC: 3252 PG/ML (ref 0–1800)
PLATELET # BLD AUTO: 166 10*3/MM3 (ref 140–450)
PMV BLD AUTO: 11.3 FL (ref 6–12)
POTASSIUM SERPL-SCNC: 4.2 MMOL/L (ref 3.5–5.2)
PROT SERPL-MCNC: 7.7 G/DL (ref 6–8.5)
PROTHROMBIN TIME: 27.4 SECONDS (ref 11.7–14.2)
QT INTERVAL: 402 MS
QTC INTERVAL: 449 MS
RBC # BLD AUTO: 3.95 10*6/MM3 (ref 4.14–5.8)
SODIUM SERPL-SCNC: 141 MMOL/L (ref 136–145)
TROPONIN T % DELTA: 10
TROPONIN T NUMERIC DELTA: 3 NG/L
TROPONIN T SERPL HS-MCNC: 30 NG/L
WBC NRBC COR # BLD AUTO: 8.32 10*3/MM3 (ref 3.4–10.8)

## 2025-04-15 PROCEDURE — 85025 COMPLETE CBC W/AUTO DIFF WBC: CPT | Performed by: PHYSICIAN ASSISTANT

## 2025-04-15 PROCEDURE — 36415 COLL VENOUS BLD VENIPUNCTURE: CPT

## 2025-04-15 PROCEDURE — 73030 X-RAY EXAM OF SHOULDER: CPT

## 2025-04-15 PROCEDURE — 99284 EMERGENCY DEPT VISIT MOD MDM: CPT

## 2025-04-15 PROCEDURE — 93005 ELECTROCARDIOGRAM TRACING: CPT

## 2025-04-15 PROCEDURE — 85610 PROTHROMBIN TIME: CPT | Performed by: PHYSICIAN ASSISTANT

## 2025-04-15 PROCEDURE — 93005 ELECTROCARDIOGRAM TRACING: CPT | Performed by: EMERGENCY MEDICINE

## 2025-04-15 PROCEDURE — 84484 ASSAY OF TROPONIN QUANT: CPT | Performed by: PHYSICIAN ASSISTANT

## 2025-04-15 PROCEDURE — 85730 THROMBOPLASTIN TIME PARTIAL: CPT | Performed by: PHYSICIAN ASSISTANT

## 2025-04-15 PROCEDURE — 83880 ASSAY OF NATRIURETIC PEPTIDE: CPT | Performed by: PHYSICIAN ASSISTANT

## 2025-04-15 PROCEDURE — 80053 COMPREHEN METABOLIC PANEL: CPT | Performed by: PHYSICIAN ASSISTANT

## 2025-04-15 PROCEDURE — 71045 X-RAY EXAM CHEST 1 VIEW: CPT

## 2025-04-15 RX ORDER — SODIUM CHLORIDE 0.9 % (FLUSH) 0.9 %
10 SYRINGE (ML) INJECTION AS NEEDED
Status: DISCONTINUED | OUTPATIENT
Start: 2025-04-15 | End: 2025-04-15 | Stop reason: HOSPADM

## 2025-04-15 RX ORDER — TIZANIDINE 2 MG/1
2 TABLET ORAL NIGHTLY PRN
Qty: 30 TABLET | Refills: 0 | Status: SHIPPED | OUTPATIENT
Start: 2025-04-15

## 2025-04-15 NOTE — DISCHARGE INSTRUCTIONS
Follow-up with your primary care provider.  You may request a referral to physical therapy to help with the pain.  You can take Tylenol throughout the day to help with pain.  Use tizanidine muscle relaxer at bedtime to help with pain.  This medication may make you sleepy.  You may alternate heat and ice to try and help with pain.  Return to emergency department for any worsening symptoms.

## 2025-04-15 NOTE — TELEPHONE ENCOUNTER
I called Maya Dan (okay per CORAL) back and relayed that APAP is fine to take.  She v/u.    Yissel NORIEGA RN  Triage [facility]  04/15/25 14:48 EDT

## 2025-04-15 NOTE — TELEPHONE ENCOUNTER
Patient's wife Ni called.  Patient was seen in the ER this am for shoulder pain.  They did not give him anything for pain but informed patient to take Tylenol.   Ni wants to be sure it is OK to take Tylenol with his Warfarin. Please advise. / LEIDA     Patient can be reached at [phone]

## 2025-04-15 NOTE — ED PROVIDER NOTES
I have personally performed a face-to-face diagnostic evaluation of the patient.  I have reviewed and agree with the care plan as outlined by NP/PA.  My findings are as follows:    HPI:  Patient is a 89 y.o. male who presents with complaints of left shoulder pain.  Initially had symptoms about 3 days ago.  This seemed to improve, but started to complain of pain last night.  He notes pain to the anterior aspect of the shoulder, which is worse with movement.  No current chest pain or shortness of breath.  No known injury.      PE:  Physical Exam  Constitutional:       Appearance: Normal appearance.   HENT:      Head: Atraumatic.   Eyes:      Conjunctiva/sclera: Conjunctivae normal.   Cardiovascular:      Rate and Rhythm: Normal rate and regular rhythm.      Heart sounds: Murmur heard.   Pulmonary:      Effort: Pulmonary effort is normal. No respiratory distress.      Breath sounds: Normal breath sounds.   Abdominal:      Tenderness: There is no abdominal tenderness. There is no guarding or rebound.   Musculoskeletal:      Comments: LUE: Positive thumbs-up sign/okay sign/fingers abduct/adduct.  Sensation intact to light touch in median/radial/ulnar nerve distribution.  2+ radial/ulnar pulses.  Brisk cap refill in all digits.  No swelling, tenderness to palpation to the distal clavicle and glenohumeral joint, pain worse with range of movement of the shoulder, particularly with abduction   Skin:     Capillary Refill: Capillary refill takes less than 2 seconds.   Neurological:      Mental Status: He is alert and oriented to person, place, and time.           MDM:  I provided a substantiate portion of the care of this patient. I personally performed the medical decision making.    Medical records are reviewed in [facility] and Care Everywhere, if applicable    EKG Interpreted by me: Difficult baseline for interpretation, it appears to be normal sinus rhythm with first-degree AV block, LVH with repolarization changes, does not  appear significantly changed from previous    Recent Results (from the past 24 hours)   ECG 12 Lead Other; Shoulder Pain    Collection Time: 04/15/25  3:59 AM   Result Value Ref Range    QT Interval 402 ms    QTC Interval 449 ms   Comprehensive Metabolic Panel    Collection Time: 04/15/25  4:24 AM    Specimen: Blood   Result Value Ref Range    Glucose 140 (H) 65 - 99 mg/dL    BUN 44 (H) 8 - 23 mg/dL    Creatinine 1.42 (H) 0.76 - 1.27 mg/dL    Sodium 141 136 - 145 mmol/L    Potassium 4.2 3.5 - 5.2 mmol/L    Chloride 105 98 - 107 mmol/L    CO2 23.9 22.0 - 29.0 mmol/L    Calcium 9.2 8.2 - 9.6 mg/dL    Total Protein 7.7 6.0 - 8.5 g/dL    Albumin 3.7 3.5 - 5.2 g/dL    ALT (SGPT) 13 1 - 41 U/L    AST (SGOT) 15 1 - 40 U/L    Alkaline Phosphatase 62 39 - 117 U/L    Total Bilirubin 0.6 0.0 - 1.2 mg/dL    Globulin 4.0 gm/dL    A/G Ratio 0.9 g/dL    BUN/Creatinine Ratio 31.0 (H) 7.0 - 25.0    Anion Gap 12.1 5.0 - 15.0 mmol/L    eGFR 46.4 (L) >60.0 mL/min/1.73   Protime-INR    Collection Time: 04/15/25  4:24 AM    Specimen: Blood   Result Value Ref Range    Protime 27.4 (H) 11.7 - 14.2 Seconds    INR 2.52 (H) 0.90 - 1.10   aPTT    Collection Time: 04/15/25  4:24 AM    Specimen: Blood   Result Value Ref Range    PTT 37.0 (H) 22.7 - 35.4 seconds   BNP    Collection Time: 04/15/25  4:24 AM    Specimen: Blood   Result Value Ref Range    proBNP 3,252.0 (H) 0.0 - 1,800.0 pg/mL   High Sensitivity Troponin T    Collection Time: 04/15/25  4:24 AM    Specimen: Blood   Result Value Ref Range    HS Troponin T 30 (H) <22 ng/L   CBC Auto Differential    Collection Time: 04/15/25  4:24 AM    Specimen: Blood   Result Value Ref Range    WBC 8.32 3.40 - 10.80 10*3/mm3    RBC 3.95 (L) 4.14 - 5.80 10*6/mm3    Hemoglobin 12.0 (L) 13.0 - 17.7 g/dL    Hematocrit 36.5 (L) 37.5 - 51.0 %    MCV 92.4 79.0 - 97.0 fL    MCH 30.4 26.6 - 33.0 pg    MCHC 32.9 31.5 - 35.7 g/dL    RDW 15.7 (H) 12.3 - 15.4 %    RDW-SD 52.8 37.0 - 54.0 fl    MPV 11.3 6.0 - 12.0  fL    Platelets 166 140 - 450 10*3/mm3    Neutrophil % 76.5 (H) 42.7 - 76.0 %    Lymphocyte % 12.5 (L) 19.6 - 45.3 %    Monocyte % 8.3 5.0 - 12.0 %    Eosinophil % 1.2 0.3 - 6.2 %    Basophil % 0.5 0.0 - 1.5 %    Immature Grans % 1.0 (H) 0.0 - 0.5 %    Neutrophils, Absolute 6.37 1.70 - 7.00 10*3/mm3    Lymphocytes, Absolute 1.04 0.70 - 3.10 10*3/mm3    Monocytes, Absolute 0.69 0.10 - 0.90 10*3/mm3    Eosinophils, Absolute 0.10 0.00 - 0.40 10*3/mm3    Basophils, Absolute 0.04 0.00 - 0.20 10*3/mm3    Immature Grans, Absolute 0.08 (H) 0.00 - 0.05 10*3/mm3    nRBC 0.0 0.0 - 0.2 /100 WBC   High Sensitivity Troponin T 1Hr    Collection Time: 04/15/25  5:27 AM    Specimen: Arm, Left; Blood   Result Value Ref Range    HS Troponin T 33 (H) <22 ng/L    Troponin T Numeric Delta 3 ng/L    Troponin T % Delta 10 Abnormal if >/= 20%     I have reviewed the above labs    XR Shoulder 2+ View Left  Result Date: 4/15/2025  2 VIEWS LEFT SHOULDER  HISTORY: Shoulder pain  COMPARISON: None available.  FINDINGS: No acute fracture or subluxation of the left shoulder is seen. There are some mild degenerative changes of the left AC joint. There appears to be an old left-sided rib fracture. No aggressive osseous abnormalities are seen.      No acute osseous findings.  This report was finalized on 4/15/2025 5:32 AM by Dr. Judith Ortiz M.D on Workstation: [id number]      XR Chest 1 View  Result Date: 4/15/2025  SINGLE VIEW OF THE CHEST  HISTORY: Left clavicle pain  COMPARISON: December 12, 2024  FINDINGS: Left-sided pacemaker is noted. It appears stable in position. No pneumothorax is seen. There is cardiomegaly. There is vascular congestion. There is bibasilar consolidation. Right pleural effusion is again noted. There may be a trace left pleural effusion as well. Left clavicle is grossly intact.      Cardiomegaly with vascular congestion. Persistent bilateral pleural effusions, right greater than left.  This report was finalized on  4/15/2025 4:32 AM by Dr. Judith Ortiz M.D on Workstation: [id number]        My independent interpretation of the shoulder xray: No acute fracture    This is a 89-year-old male who is presenting with complaints of left shoulder pain.  He overall looks well.  He presents afebrile with unremarkable vital signs.  He has neurovascular intact in the affected extremity.  I do suspect more rotator cuff pathology/osteoarthritis based on exam.  He was evaluated with a plain film of the shoulder, which not demonstrate any acute bony findings.    Given age and underlying medical history, he was evaluated with an EKG.  This did not demonstrate any acute change from previous.  He has an indeterminately elevated troponin which is flat.  This is fairly consistent with his previous results and I suspect this is secondary to his underlying cardiomyopathy.  I do not really have any suspicion of ACS given exam and workup.    Patient is not tachycardic, hypoxic, and does not have any current unilateral leg swelling.  He is furthermore anticoagulated on Coumadin with an appropriate INR.  I do not have any suspicion of PE.    Chest x-ray did not demonstrate any other acute cardiopulmonary process as emergent cause of symptoms including pneumomediastinum, widened mediastinum, acute infiltrate or pneumothorax.  Chest x-ray was notable for some edema and pleural effusions.  He is really not acutely symptomatic from this, is not hypoxic, is not having any complaints of dyspnea including at rest or at exertion and he does have an underlying medical history that explains this finding.  I do not think this needs acute treatment or hospitalization given his presentation today and he can continue outpatient follow-up and appropriate medication regimen.    Will plan on discharge from the emergency department today given workup and plan for outpatient evaluation and supportive care.      Impression:  Final diagnoses:   Acute pain of left  shoulder   Anticoagulated   History of cardiomyopathy         Disposition:  ED Disposition       ED Disposition   Discharge    Condition   Stable    Comment   --                Brenda Lopez MD  04/15/25 0609

## 2025-04-15 NOTE — ED PROVIDER NOTES
EMERGENCY DEPARTMENT ENCOUNTER  Room Number:  01/01  PCP: Lubna Lobo MD  Independent Historians: Patient      HPI:  Chief Complaint: had concerns including Shoulder Pain.     A complete HPI/ROS/PMH/PSH/SH/FH are unobtainable due to: None    Chronic or social conditions impacting patient care (Social Determinants of Health): None      Context: Asia Ly is a 89 y.o. male with a medical history of TIA, atrial fibrillation, hyperlipidemia, aortic stenosis, pulmonary fibrosis, CHF, COPD, DVT, who presents to the ED c/o acute left shoulder pain.  Wife reports he complained of shoulder pain prior to bedtime.  Reports he toss and turn throughout the night and awoke at 0300 with worsening pain.  Pain does not radiate into the chest or down the arm.  No injury or trauma to the shoulder.  Wife reports he is currently using supplemental oxygen at bedtime.  He is anticoagulated on warfarin with goal INR between 2 and 3.  Is scheduled for aortic valve replacement on 5/6.  Patient has no other systemic complaints at this time.      Review of prior external notes (non-ED) -and- Review of prior external test results outside of this encounter:  Patient seen in office by cardiology on 4/1/2025 for peripheral vascular disease.  Reviewed assessment and plan.  Check arterial Doppler of bilat CMP with creatinine 1.29.  Eral lower extremity.  Reviewed labs collected on 1/22/2025.  CBC with hemoglobin 13.4,    Prescription drug monitoring program review:     N/A    PAST MEDICAL HISTORY  Active Ambulatory Problems     Diagnosis Date Noted    Ataxia 08/19/2016    TIA (transient ischemic attack) 08/19/2016    Atrial fibrillation, paroxysmal 08/19/2016    Hyperlipidemia 08/19/2016    Gout 08/19/2016    Crohn's disease 08/19/2016    Atrial fibrillation with RVR 10/27/2018    Acute cholecystitis 08/26/2020    Obstructive jaundice 08/26/2020    Acute lower GI bleeding 11/17/2020    Aortic stenosis     Pulmonary fibrosis     Anticoagulant  long-term use     Febrile illness, acute 12/07/2020    Atrial fibrillation with rapid ventricular response 06/22/2021    COVID-19 virus infection 04/25/2023    Pneumonia of both lungs due to infectious organism, unspecified part of lung 05/06/2023    Left inguinal hernia 06/13/2023    Other pulmonary embolism without acute cor pulmonale 09/28/2024    Acute pulmonary embolism without acute cor pulmonale, unspecified pulmonary embolism type 10/03/2024    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 10/15/2024    Chronic diastolic congestive heart failure 03/26/2025     Resolved Ambulatory Problems     Diagnosis Date Noted    Hypoxia 04/29/2018     Past Medical History:   Diagnosis Date    Aspiration pneumonia     Bleeding from the nose     Bronchiectasis     CKD (chronic kidney disease)     COPD (chronic obstructive pulmonary disease)     Deep vein thrombosis 10/16/2024    Dizziness     Elevated cholesterol     Gastric ulcer     Generalized weakness     Hard of hearing     Hypertension     Inguinal hernia     Leg swelling     Lower extremity edema     Mild anemia     Near syncope     Nonrheumatic aortic valve stenosis     PAF (paroxysmal atrial fibrillation)     Palpitations     Peptic ulcer     Pneumonia due to COVID-19 virus 05/06/2023    Pneumonia of left lung due to infectious organism     Pulmonary embolism, bilateral 05/06/2023    Stroke          PAST SURGICAL HISTORY  Past Surgical History:   Procedure Laterality Date    BRONCHOSCOPY N/A 10/22/2018    Procedure: BRONCHOSCOPY WITH BRONCHALVEOLAR LAVAGE;  Surgeon: Chidi Oconnor MD;  Location: [facility] ENDOSCOPY;  Service: Pulmonary    BRONCHOSCOPY N/A 7/8/2021    Procedure: BRONCHOSCOPY with BAL;  Surgeon: Chidi Oconnor MD;  Location: [facility] ENDOSCOPY;  Service: Pulmonary;  Laterality: N/A;  Pre: bronchectasis  Post: same    CARDIAC ELECTROPHYSIOLOGY PROCEDURE N/A 7/9/2021    Procedure: Pacemaker DC new--[employer] possible His lead;   "Surgeon: Rashaun Nettles MD;  Location: [facility] CATH INVASIVE LOCATION;  Service: Cardiology;  Laterality: N/A;    CARDIAC ELECTROPHYSIOLOGY PROCEDURE N/A 9/3/2021    Procedure: AV node 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 ppm;  Surgeon: Rashaun Nettles MD;  Location: [facility] CATH INVASIVE LOCATION;  Service: Cardiovascular;  Laterality: N/A;    CATARACT EXTRACTION Bilateral     COLONOSCOPY      COLONOSCOPY N/A 3/9/2018    Procedure: COLONOSCOPY to terminal ileum with bx;  Surgeon: Aron Cabrera MD;  Location: [facility] ENDOSCOPY;  Service:     COLONOSCOPY N/A 11/19/2020    Procedure: COLONOSCOPY to cecum:  apc to cecum angiodysplagia,;  Surgeon: Aron Cabrera MD;  Location: [facility] ENDOSCOPY;  Service: Gastroenterology;  Laterality: N/A;  GI bleed  post:  diverticulosis, angiodysplagia    CYSTECTOMY      back and shoulder area    ENDOSCOPY N/A 3/9/2018    Procedure: ESOPHAGOGASTRODUODENOSCOPY with bx;  Surgeon: Aron Cabrera MD;  Location: [facility] ENDOSCOPY;  Service:     ENDOSCOPY N/A 11/19/2020    Procedure: ESOPHAGOGASTRODUODENOSCOPY;  Surgeon: Aron Cabrera MD;  Location: [facility] ENDOSCOPY;  Service: Gastroenterology;  Laterality: N/A;  GI bleed  post:  HH.    EYE SURGERY Left     detached retina    EYE SURGERY Right     \"repaired a hole in the eye\"    INGUINAL HERNIA REPAIR Left 8/29/2023    Procedure: INGUINAL HERNIA REPAIR;  Surgeon: Itz Zaidi Jr., MD;  Location: [facility] MAIN OR;  Service: General;  Laterality: Left;    INSERT / REPLACE / REMOVE PACEMAKER      TESTICLE SURGERY      benign tumor removed.         FAMILY HISTORY  Family History   Problem Relation Age of Onset    Stroke Mother     Heart disease Mother     Hypertension Mother     Diabetes Mother     Stroke Father     Heart disease Sister         Heart Valve Replacement    Ovarian cancer Sister     Rheumatic fever Sister     Diabetes Sister     Malig Hyperthermia Neg Hx          SOCIAL HISTORY  Social History     Socioeconomic History    " Marital status:    Tobacco Use    Smoking status: Former     Types: Cigars     Quit date: 10/28/1994     Years since quittin.4     Passive exposure: Past    Smokeless tobacco: Never    Tobacco comments:     Quit 25 years ago   Vaping Use    Vaping status: Never Used   Substance and Sexual Activity    Alcohol use: No     Comment: daily caffiene - 1/2 cup of coffee    Drug use: No    Sexual activity: Defer         ALLERGIES  Amiodarone, Diltiazem, Doxycycline, Allopurinol, Cefdinir, and Indomethacin      REVIEW OF SYSTEMS  Included in HPI  All systems reviewed and negative except for those discussed in HPI.      PHYSICAL EXAM    I have reviewed the triage vital signs and nursing notes.    ED Triage Vitals   Temp Heart Rate Resp BP SpO2   04/15/25 0350 04/15/25 0350 04/15/25 0350 04/15/25 0353 04/15/25 0350   98.4 °F (36.9 °C) 92 20 153/79 94 %      Temp src Heart Rate Source Patient Position BP Location FiO2 (%)   -- -- -- 04/15/25 0353 --      Right arm        Physical Exam  Constitutional:       General: He is not in acute distress.     Appearance: Normal appearance.   HENT:      Head: Normocephalic and atraumatic.      Nose: Nose normal.      Mouth/Throat:      Mouth: Mucous membranes are moist.   Eyes:      Conjunctiva/sclera: Conjunctivae normal.      Pupils: Pupils are equal, round, and reactive to light.   Cardiovascular:      Rate and Rhythm: Normal rate and regular rhythm.      Pulses: Normal pulses.      Heart sounds: Normal heart sounds.      Comments: Distal pulses intact  Pulmonary:      Effort: Pulmonary effort is normal.      Breath sounds: Normal breath sounds.   Chest:      Comments: Tenderness over left distal clavicle  Abdominal:      General: There is no distension.   Musculoskeletal:         General: Normal range of motion.      Cervical back: Normal range of motion and neck supple.   Skin:     General: Skin is warm.      Capillary Refill: Capillary refill takes less than 2 seconds.    Neurological:      General: No focal deficit present.      Mental Status: He is alert and oriented to person, place, and time.   Psychiatric:         Mood and Affect: Mood normal.             LAB RESULTS  Recent Results (from the past 24 hours)   ECG 12 Lead Other; Shoulder Pain    Collection Time: 04/15/25  3:59 AM   Result Value Ref Range    QT Interval 402 ms    QTC Interval 449 ms   Comprehensive Metabolic Panel    Collection Time: 04/15/25  4:24 AM    Specimen: Blood   Result Value Ref Range    Glucose 140 (H) 65 - 99 mg/dL    BUN 44 (H) 8 - 23 mg/dL    Creatinine 1.42 (H) 0.76 - 1.27 mg/dL    Sodium 141 136 - 145 mmol/L    Potassium 4.2 3.5 - 5.2 mmol/L    Chloride 105 98 - 107 mmol/L    CO2 23.9 22.0 - 29.0 mmol/L    Calcium 9.2 8.2 - 9.6 mg/dL    Total Protein 7.7 6.0 - 8.5 g/dL    Albumin 3.7 3.5 - 5.2 g/dL    ALT (SGPT) 13 1 - 41 U/L    AST (SGOT) 15 1 - 40 U/L    Alkaline Phosphatase 62 39 - 117 U/L    Total Bilirubin 0.6 0.0 - 1.2 mg/dL    Globulin 4.0 gm/dL    A/G Ratio 0.9 g/dL    BUN/Creatinine Ratio 31.0 (H) 7.0 - 25.0    Anion Gap 12.1 5.0 - 15.0 mmol/L    eGFR 46.4 (L) >60.0 mL/min/1.73   Protime-INR    Collection Time: 04/15/25  4:24 AM    Specimen: Blood   Result Value Ref Range    Protime 27.4 (H) 11.7 - 14.2 Seconds    INR 2.52 (H) 0.90 - 1.10   aPTT    Collection Time: 04/15/25  4:24 AM    Specimen: Blood   Result Value Ref Range    PTT 37.0 (H) 22.7 - 35.4 seconds   BNP    Collection Time: 04/15/25  4:24 AM    Specimen: Blood   Result Value Ref Range    proBNP 3,252.0 (H) 0.0 - 1,800.0 pg/mL   High Sensitivity Troponin T    Collection Time: 04/15/25  4:24 AM    Specimen: Blood   Result Value Ref Range    HS Troponin T 30 (H) <22 ng/L   CBC Auto Differential    Collection Time: 04/15/25  4:24 AM    Specimen: Blood   Result Value Ref Range    WBC 8.32 3.40 - 10.80 10*3/mm3    RBC 3.95 (L) 4.14 - 5.80 10*6/mm3    Hemoglobin 12.0 (L) 13.0 - 17.7 g/dL    Hematocrit 36.5 (L) 37.5 - 51.0 %    MCV  92.4 79.0 - 97.0 fL    MCH 30.4 26.6 - 33.0 pg    MCHC 32.9 31.5 - 35.7 g/dL    RDW 15.7 (H) 12.3 - 15.4 %    RDW-SD 52.8 37.0 - 54.0 fl    MPV 11.3 6.0 - 12.0 fL    Platelets 166 140 - 450 10*3/mm3    Neutrophil % 76.5 (H) 42.7 - 76.0 %    Lymphocyte % 12.5 (L) 19.6 - 45.3 %    Monocyte % 8.3 5.0 - 12.0 %    Eosinophil % 1.2 0.3 - 6.2 %    Basophil % 0.5 0.0 - 1.5 %    Immature Grans % 1.0 (H) 0.0 - 0.5 %    Neutrophils, Absolute 6.37 1.70 - 7.00 10*3/mm3    Lymphocytes, Absolute 1.04 0.70 - 3.10 10*3/mm3    Monocytes, Absolute 0.69 0.10 - 0.90 10*3/mm3    Eosinophils, Absolute 0.10 0.00 - 0.40 10*3/mm3    Basophils, Absolute 0.04 0.00 - 0.20 10*3/mm3    Immature Grans, Absolute 0.08 (H) 0.00 - 0.05 10*3/mm3    nRBC 0.0 0.0 - 0.2 /100 WBC   High Sensitivity Troponin T 1Hr    Collection Time: 04/15/25  5:27 AM    Specimen: Arm, Left; Blood   Result Value Ref Range    HS Troponin T 33 (H) <22 ng/L    Troponin T Numeric Delta 3 ng/L    Troponin T % Delta 10 Abnormal if >/= 20%         RADIOLOGY  XR Shoulder 2+ View Left  Result Date: 4/15/2025  2 VIEWS LEFT SHOULDER  HISTORY: Shoulder pain  COMPARISON: None available.  FINDINGS: No acute fracture or subluxation of the left shoulder is seen. There are some mild degenerative changes of the left AC joint. There appears to be an old left-sided rib fracture. No aggressive osseous abnormalities are seen.      No acute osseous findings.  This report was finalized on 4/15/2025 5:32 AM by Dr. Judith Ortiz M.D on Workstation: [id number]      XR Chest 1 View  Result Date: 4/15/2025  SINGLE VIEW OF THE CHEST  HISTORY: Left clavicle pain  COMPARISON: December 12, 2024  FINDINGS: Left-sided pacemaker is noted. It appears stable in position. No pneumothorax is seen. There is cardiomegaly. There is vascular congestion. There is bibasilar consolidation. Right pleural effusion is again noted. There may be a trace left pleural effusion as well. Left clavicle is grossly intact.       Cardiomegaly with vascular congestion. Persistent bilateral pleural effusions, right greater than left.  This report was finalized on 4/15/2025 4:32 AM by Dr. Judith Ortiz M.D on Workstation: [id number]          MEDICATIONS GIVEN IN ER  Medications   sodium chloride 0.9 % flush 10 mL (has no administration in time range)         ORDERS PLACED DURING THIS VISIT:  Orders Placed This Encounter   Procedures    XR Chest 1 View    XR Shoulder 2+ View Left    Comprehensive Metabolic Panel    Protime-INR    aPTT    BNP    High Sensitivity Troponin T    CBC Auto Differential    High Sensitivity Troponin T 1Hr    ECG 12 Lead Other; Shoulder Pain    Insert Peripheral IV    CBC & Differential         OUTPATIENT MEDICATION MANAGEMENT:  Current Facility-Administered Medications Ordered in Epic   Medication Dose Route Frequency Provider Last Rate Last Admin    sodium chloride 0.9 % flush 10 mL  10 mL Intravenous PRN Gracie Whiting PA-C         Current Outpatient Medications Ordered in Epic   Medication Sig Dispense Refill    allopurinol (ZYLOPRIM) 100 MG tablet Take 1 tablet by mouth 2 (Two) Times a Day. Indications: Gout      atorvastatin (LIPITOR) 10 MG tablet Take 1 tablet by mouth Every Other Day. M-W-F  Indications: High Amount of Fats in the Blood      Budesonide (ENTOCORT EC) 3 MG 24 hr capsule Take 1 capsule by mouth Every Morning. Indications: Crohn's Disease      furosemide (LASIX) 20 MG tablet Take 2 tablets by mouth Daily. Indications: Edema 60 tablet 1    ipratropium (ATROVENT) 0.02 % nebulizer solution Take 2.5 mL by nebulization 3 (Three) Times a Day. 0.5mg/2.5ml nebulizer treatments three times a day  Indications: Chronic Obstructive Lung Disease      IPRATROPIUM-ALBUTEROL IN Inhale 2 puffs 3 (Three) Times a Day. Indications: COPD exacerbation, complicated      metoprolol tartrate (LOPRESSOR) 25 MG tablet Take 1 tablet by mouth Every 12 (Twelve) Hours. Indications: High Blood Pressure Disorder 180  tablet 1    O2 (OXYGEN) Inhale 2 L/min As Needed (soa). wears at bedtime  Indications: Oxygen Saturation Decreased      omeprazole (priLOSEC) 40 MG capsule Take 1 capsule by mouth Daily. Indications: Heartburn      tiZANidine (ZANAFLEX) 2 MG tablet Take 1 tablet by mouth At Night As Needed (Shoulder pain). 30 tablet 0    warfarin (COUMADIN) 5 MG tablet Take one tablet by mouth on sun, wed, fri and take one-half of a tablet by mouth all other days or as directed  Indications: DVT/PE (active thrombosis) 65 tablet 0             PROGRESS, DATA ANALYSIS, CONSULTS, AND MEDICAL DECISION MAKING  All labs have been independently interpreted by me.  All radiology studies have been reviewed by me. All EKG's have been independently viewed and interpreted by me.  Discussion below represents my analysis of pertinent findings related to patient's condition, differential diagnosis, treatment plan and final disposition.    Differential diagnosis includes but is not limited to AC separation, clavicle fracture, ACS.        ED Course as of 04/15/25 0609   Tue Apr 15, 2025   0420 Chest x-ray independently interpreted by me as no pneumothorax [MP]   0442 WBC: 8.32 [MP]   0442 Hemoglobin(!): 12.0 [MP]   0519 BUN(!): 44 [MP]   0519 Creatinine(!): 1.42 [MP]   0608 Patient presents to emergency department with left shoulder pain and no known injury.  Given patient's extensive cardiac history, he was evaluated with labs, EKG, left shoulder and chest x-ray.  He has stable chronic kidney disease, flat repeat troponin.  No evidence of ACS or traumatic shoulder injury.  Discussed supportive care with Tylenol and muscle relaxer.  Will have him follow-up with PCP and physical therapy.  Discussed ED return precautions.  He is otherwise well-appearing, hemodynamically stable, and therefore appropriate for discharge. [MP]      ED Course User Index  [MP] Gracie Whiting PA-C             AS OF 06:09 EDT VITALS:    BP - 153/79  HR - 92  TEMP - 98.4 °F  (36.9 °C)  O2 SATS - 94%    COMPLEXITY OF CARE  Admission was considered but after careful review of the patient's presentation, physical examination, diagnostic results, and response to treatment the patient may be safely discharged with outpatient follow-up.      DIAGNOSIS  Final diagnoses:   Acute pain of left shoulder   Anticoagulated   History of cardiomyopathy         DISPOSITION  ED Disposition       ED Disposition   Discharge    Condition   Stable    Comment   --                Please note that portions of this document were completed with a voice recognition program.    Note Disclaimer: At [facility], we believe that sharing information builds trust and better relationships. You are receiving this note because you recently visited [facility]. It is possible you will see health information before a provider has talked with you about it. This kind of information can be easy to misunderstand. To help you fully understand what it means for your health, we urge you to discuss this note with your provider.     Gracie Whiting PA-C  04/15/25 0609

## 2025-04-17 NOTE — TELEPHONE ENCOUNTER
Caller: SURY Ly    Relationship to patient: Emergency Contact    Best call back number: [phone]     Patient is needing: PT HAS BEEN HAVING ISSUES WITH HIS SHOULDER AND HIS PACEMAKER. WIFE STATED HE HAS NOT FALLEN AND NOTHING IS BRUISED. WIFE SAID HE CANNOT USE HIS LEFT HAND PROPERLY AND THAT HE WAS HAVING ISSUES WITH THIS. THEY HAVE BEEN TO THE ED REGARDING THIS AND NOTHING IS BROKEN, WIFE WANTED TO SEE IF THIS MAY BE REGARDING PACEMAKER. PLS CALL & ADVISE.

## 2025-04-17 NOTE — TELEPHONE ENCOUNTER
Triage-please let his wife know that I am aware that nothing was broke with the ER evaluation and since we have not seen any abnormality with his device, I recommend seeing his PCP for his pain

## 2025-04-17 NOTE — TELEPHONE ENCOUNTER
Maya called back.  I reviewed with her AL message.  She says that pt has FU with his PCP and methylprednisolone 5-day course was sent in for him, which he started today.    Yissel NORIEGA RN  Triage [facility]  04/17/25 15:08 EDT

## 2025-04-17 NOTE — TELEPHONE ENCOUNTER
I tried to call Asia Ly but there was no answer.  Left a voicemail asking patient to call back.  Will continue to try to reach pt.    HUB- if pt calls back, please transfer through to triage.    Yissel NORIEGA RN  Triage [facility]  04/17/25 14:59 EDT

## 2025-04-25 RX ORDER — WARFARIN SODIUM 5 MG/1
TABLET ORAL
Qty: 65 TABLET | Refills: 0 | Status: SHIPPED | OUTPATIENT
Start: 2025-04-25

## 2025-04-29 ENCOUNTER — TELEPHONE (OUTPATIENT)
Dept: PHARMACY | Facility: HOSPITAL | Age: OVER 89
End: 2025-04-29
Payer: MEDICARE

## 2025-04-29 ENCOUNTER — ANTICOAGULATION VISIT (OUTPATIENT)
Dept: PHARMACY | Facility: HOSPITAL | Age: OVER 89
End: 2025-04-29
Payer: MEDICARE

## 2025-04-29 DIAGNOSIS — I26.99 ACUTE PULMONARY EMBOLISM WITHOUT ACUTE COR PULMONALE, UNSPECIFIED PULMONARY EMBOLISM TYPE: Primary | ICD-10-CM

## 2025-04-29 LAB
INR PPP: 3.9 (ref 0.91–1.09)
PROTHROMBIN TIME: 46.3 SECONDS (ref 10–13.8)

## 2025-04-29 PROCEDURE — G0463 HOSPITAL OUTPT CLINIC VISIT: HCPCS

## 2025-04-29 PROCEDURE — 85610 PROTHROMBIN TIME: CPT

## 2025-04-29 RX ORDER — ENOXAPARIN SODIUM 100 MG/ML
INJECTION SUBCUTANEOUS
Qty: 8 ML | Refills: 0 | Status: SHIPPED | OUTPATIENT
Start: 2025-04-29

## 2025-04-29 NOTE — PATIENT INSTRUCTIONS
During today's Anticoagulation Visit, I provided the following perioperative instructions to Mr and Mrs Ly. Of note, CrCl is 30 mL/min based on his serum creatinine from 4/15/25; however, his CrCl has recently been as low as 26 mL/min. For that reason, would recommend enoxaparin dose of 70 mg every 24 hours (as opposed to every 12 hours).      5/01/25   HOLD warfarin  5/02/25   HOLD warfarin  5/03/25   HOLD warfarin; enoxaparin 70 mg every 24 hours in evening  5/04/25   HOLD warfarin; enoxaparin 70 mg every 24 hours in evening (>36 hours prior to procedure in light of impaired renal function)  5/05/25   HOLD warfarin; HOLD enoxaparin  5/06/25   PROCEDURE; Resume warfarin if cleared by surgeon; HOLD enoxaparin  5/07/25   warfarin 5 mg; HOLD enoxaparin per request of Dr. Guzman (refer to 4/29/25 Telephone Encounter)  5/08/25   warfarin 5 mg; enoxaparin 70 mg every 24 hours  5/09/25   warfarin 5 mg; enoxaparin 70 mg every 24 hours  5/10/25   warfarin 5 mg; enoxaparin 70 mg every 24 hours  5/11/25   warfarin 5 mg; enoxaparin 70 mg every 24 hours  5/12/25   INR CHECK to determine further plan

## 2025-04-29 NOTE — PROGRESS NOTES
Anticoagulation Clinic Progress Note    Anticoagulation Summary  As of 4/29/2025      INR goal:  2.5-3.5   TTR:  85.9% (6.6 mo)   INR used for dosing:  3.9 (4/29/2025)   Warfarin maintenance plan:  5 mg every Sun, Wed, Fri; 2.5 mg all other days   Weekly warfarin total:  25 mg   Plan last modified:  Breanne Jain RPH (10/31/2024)   Next INR check:  5/12/2025   Target end date:  --    Indications    Acute pulmonary embolism without acute cor pulmonale  unspecified pulmonary embolism type [I26.99]                 Anticoagulation Episode Summary       INR check location:  --    Preferred lab:  --    Send INR reminders to:   [facility] [place]    Comments:  --          Anticoagulation Care Providers       Provider Role Specialty Phone number    Libra Gonzalez APRN Referring Cardiology [phone]            Clinic Interview:  Patient Findings     Positives:  Upcoming invasive procedure, Change in medications    Negatives:  Signs/symptoms of thrombosis, Signs/symptoms of bleeding,   Laboratory test error suspected, Change in health, Change in alcohol use,   Change in activity, Emergency department visit, Upcoming dental procedure,   Missed doses, Extra doses, Change in diet/appetite, Hospital admission,   Bruising, Other complaints    Comments:  Reports he completed a Medrol Dosepak ~1 week ago for shoulder   pain. He has TAVR scheduled for 5/6/25 (Refer to 4/29/25 Telephone   Encounter).       Clinical Outcomes     Negatives:  Major bleeding event, Thromboembolic event,   Anticoagulation-related hospital admission, Anticoagulation-related ED   visit, Anticoagulation-related fatality    Comments:  Reports he completed a Medrol Dosepak ~1 week ago for shoulder   pain. He has TAVR scheduled for 5/6/25 (Refer to 4/29/25 Telephone   Encounter).         INR History:      Latest Ref Rng & Units 12/26/2024    11:00 AM 1/9/2025    11:30 AM 2/4/2025    11:00 AM 3/4/2025    11:15 AM 4/1/2025    11:00 AM 4/15/2025      4:24 AM 4/29/2025    10:30 AM   Anticoagulation Monitoring   INR  2.6 2.7 3.1 2.8 2.8  3.9   INR Date  12/26/2024 1/9/2025 2/4/2025 3/4/2025 4/1/2025  4/29/2025   INR Goal  2.5-3.5 2.5-3.5 2.5-3.5 2.5-3.5 2.5-3.5  2.5-3.5   Trend  Same Same Same Same Same  Same   Last Week Total  25 mg 25 mg 25 mg 25 mg 25 mg  25 mg   Next Week Total  25 mg 25 mg 25 mg 25 mg 25 mg  5 mg   Sun  5 mg 5 mg 5 mg 5 mg 5 mg  Hold (5/4); Otherwise 5 mg   Mon  2.5 mg 2.5 mg 2.5 mg 2.5 mg 2.5 mg  Hold (5/5)   Tue  2.5 mg 2.5 mg 2.5 mg 2.5 mg 2.5 mg  2.5 mg   Wed  5 mg 5 mg 5 mg 5 mg 5 mg  2.5 mg (4/30); Otherwise 5 mg   Thu  2.5 mg 2.5 mg 2.5 mg 2.5 mg 2.5 mg  Hold (5/1), 5 mg (5/8)   Fri  5 mg 5 mg 5 mg 5 mg 5 mg  Hold (5/2); Otherwise 5 mg   Sat  2.5 mg 2.5 mg 2.5 mg 2.5 mg 2.5 mg  Hold (5/3), 5 mg (5/10)   Historical INR 0.90 - 1.10      2.52     Visit Report    Report  Report         Plan:  1. INR is Supratherapeutic today- see above in Anticoagulation Summary.  Will instruct Asia Ly to Change their warfarin regimen as directed surrounding upcoming procedure - see above in Anticoagulation Summary. Administer enoxaparin 70 mg every 24 hours as directed surrounding upcoming procedure. Wt in chart noted to be 64.4 kg (4/15/25); However, he reports wt of 145 lbs (65.9 kg). Of note, CrCl is 30 mL/min based on his serum creatinine from 4/15/25; however, his CrCl has recently been as low as 26 mL/min. Provided/reviewed bridging calendar, enoxaparin how-to instructions, and instructions on how to achieve 70-mg dose of enoxaparin using an 80-mg syringe.     Perioperative Anticoagulation Instructions:  5/01/25   HOLD warfarin  5/02/25   HOLD warfarin  5/03/25   HOLD warfarin; enoxaparin 70 mg every 24 hours in evening  5/04/25   HOLD warfarin; enoxaparin 70 mg every 24 hours in evening (>36 hours prior to procedure in light of impaired renal function)  5/05/25   HOLD warfarin; HOLD enoxaparin  5/06/25   PROCEDURE; Resume warfarin if  cleared by surgeon; HOLD enoxaparin  5/07/25   warfarin 5 mg; HOLD enoxaparin per request of Dr. Guzman (refer to 4/29/25 Telephone Encounter)  5/08/25   warfarin 5 mg; enoxaparin 70 mg every 24 hours  5/09/25   warfarin 5 mg; enoxaparin 70 mg every 24 hours  5/10/25   warfarin 5 mg; enoxaparin 70 mg every 24 hours  5/11/25   warfarin 5 mg; enoxaparin 70 mg every 24 hours  5/12/25   INR CHECK to determine further plan    2. Follow up in clinic 5/12/25.   3. Patient desires warfarin refills; however, a warfarin refill sent to her preferred pharmacy ([facility]) on 4/25/25. Enoxaparin prescription sent to [facility] per pt request, which will be picked up on 5/1/25 following the appointment for pre-admission testing.   4. Verbal and written information provided. Patient expresses understanding and has no further questions at this time.    Jeromy Putnam, PharmD

## 2025-04-29 NOTE — TELEPHONE ENCOUNTER
Dr. Valdes,     Mr. Ly is scheduled for TAVR 5/6/25. He is prescribed warfarin; INR goal 2.5-3.5 (paroxysmal atrial fibrillation, h/o TIA, bilateral PE, chronic DVT).     During today's Anticoagulation Visit, I provided the following perioperative instructions to  and Mrs Ly. Of note, CrCl is 30 mL/min based on his serum creatinine from 4/15/25; however, his CrCl has recently been as low as 26 mL/min. For that reason, would recommend enoxaparin dose of 70 mg every 24 hours (as opposed to every 12 hours).     5/01/25   HOLD warfarin  5/02/25   HOLD warfarin  5/03/25   HOLD warfarin; enoxaparin 70 mg every 24 hours in evening  5/04/25   HOLD warfarin; enoxaparin 70 mg every 24 hours in evening (>36 hours prior to procedure in light of impaired renal function)  5/05/25   HOLD warfarin; HOLD enoxaparin  5/06/25   PROCEDURE; Resume warfarin if cleared by surgeon  5/07/25   warfarin 5 mg; enoxaparin 70 mg every 24 hours  5/08/25   warfarin 5 mg; enoxaparin 70 mg every 24 hours  5/09/25   warfarin 5 mg; enoxaparin 70 mg every 24 hours  5/10/25   warfarin 5 mg; enoxaparin 70 mg every 24 hours  5/11/25   warfarin 5 mg; enoxaparin 70 mg every 24 hours  5/12/25   INR CHECK to determine further plan    Please advise if you have alternative instructions.     Thank you!  Jeromy Putnam, PharmD  [facility]

## 2025-04-29 NOTE — TELEPHONE ENCOUNTER
Thank you! Would you like me to go ahead and enter the prescription for enoxaparin according to the aforementioned instructions, or would you prefer to enter the prescription on 5/1/25 based on PAT labs?

## 2025-04-29 NOTE — TELEPHONE ENCOUNTER
I have relayed the recommendation to Mr and Mrs Ly, including Dr. Guzman's recommendation to delay post-operative resumption of enoxaparin until 5/8/25. May refer to today's Anticoagulation Visit.     Prescription for enoxaparin has been sent to [facility]. They are agreeable to picking up the prescription after Pre-admission Testing on 5/1/25 in the event any last minute adjustments to the plan are recommended based on the outcome of the labs.

## 2025-05-01 ENCOUNTER — HOSPITAL ENCOUNTER (OUTPATIENT)
Dept: GENERAL RADIOLOGY | Facility: HOSPITAL | Age: OVER 89
Discharge: HOME | End: 2025-05-01
Payer: MEDICARE

## 2025-05-01 ENCOUNTER — PRE-ADMISSION TESTING (OUTPATIENT)
Dept: PREADMISSION TESTING | Facility: HOSPITAL | Age: OVER 89
End: 2025-05-01
Payer: MEDICARE

## 2025-05-01 VITALS
OXYGEN SATURATION: 94 % | BODY MASS INDEX: 23.42 KG/M2 | SYSTOLIC BLOOD PRESSURE: 107 MMHG | TEMPERATURE: 97.8 F | RESPIRATION RATE: 20 BRPM | HEIGHT: 64 IN | HEART RATE: 85 BPM | DIASTOLIC BLOOD PRESSURE: 72 MMHG | WEIGHT: 137.2 LBS

## 2025-05-01 DIAGNOSIS — I35.0 NONRHEUMATIC AORTIC VALVE STENOSIS: ICD-10-CM

## 2025-05-01 DIAGNOSIS — R79.9 ABNORMAL FINDING OF BLOOD CHEMISTRY, UNSPECIFIED: ICD-10-CM

## 2025-05-01 DIAGNOSIS — R79.1 ABNORMAL COAGULATION PROFILE: ICD-10-CM

## 2025-05-01 DIAGNOSIS — I11.0 HYPERTENSIVE HEART DISEASE WITH HEART FAILURE: ICD-10-CM

## 2025-05-01 LAB
ABO GROUP BLD: NORMAL
ALBUMIN SERPL-MCNC: 3.6 G/DL (ref 3.5–5.2)
ALBUMIN/GLOB SERPL: 0.9 G/DL
ALP SERPL-CCNC: 63 U/L (ref 39–117)
ALT SERPL W P-5'-P-CCNC: 11 U/L (ref 1–41)
ANION GAP SERPL CALCULATED.3IONS-SCNC: 10.5 MMOL/L (ref 5–15)
APTT PPP: 36.9 SECONDS (ref 22.7–35.4)
AST SERPL-CCNC: 13 U/L (ref 1–40)
BASOPHILS # BLD AUTO: 0.04 10*3/MM3 (ref 0–0.2)
BASOPHILS NFR BLD AUTO: 0.4 % (ref 0–1.5)
BILIRUB SERPL-MCNC: 0.7 MG/DL (ref 0–1.2)
BILIRUB UR QL STRIP: NEGATIVE
BLD GP AB SCN SERPL QL: NEGATIVE
BUN SERPL-MCNC: 35 MG/DL (ref 8–23)
BUN/CREAT SERPL: 24.1 (ref 7–25)
CALCIUM SPEC-SCNC: 9.2 MG/DL (ref 8.2–9.6)
CHLORIDE SERPL-SCNC: 99 MMOL/L (ref 98–107)
CLARITY UR: CLEAR
CLOSE TME COLL+ADP + EPINEP PNL BLD: 95 % (ref 86–100)
CO2 SERPL-SCNC: 27.5 MMOL/L (ref 22–29)
COLOR UR: YELLOW
CREAT SERPL-MCNC: 1.45 MG/DL (ref 0.76–1.27)
DEPRECATED RDW RBC AUTO: 49.2 FL (ref 37–54)
EGFRCR SERPLBLD CKD-EPI 2021: 45.2 ML/MIN/1.73
EOSINOPHIL # BLD AUTO: 0.12 10*3/MM3 (ref 0–0.4)
EOSINOPHIL NFR BLD AUTO: 1.3 % (ref 0.3–6.2)
ERYTHROCYTE [DISTWIDTH] IN BLOOD BY AUTOMATED COUNT: 15.3 % (ref 12.3–15.4)
GLOBULIN UR ELPH-MCNC: 3.9 GM/DL
GLUCOSE SERPL-MCNC: 89 MG/DL (ref 65–99)
GLUCOSE UR STRIP-MCNC: NEGATIVE MG/DL
HBA1C MFR BLD: 5.9 % (ref 4.8–5.6)
HCT VFR BLD AUTO: 37.9 % (ref 37.5–51)
HGB BLD-MCNC: 12.3 G/DL (ref 13–17.7)
HGB UR QL STRIP.AUTO: NEGATIVE
IMM GRANULOCYTES # BLD AUTO: 0.13 10*3/MM3 (ref 0–0.05)
IMM GRANULOCYTES NFR BLD AUTO: 1.4 % (ref 0–0.5)
INR PPP: 2.42 (ref 0.9–1.1)
KETONES UR QL STRIP: NEGATIVE
LEUKOCYTE ESTERASE UR QL STRIP.AUTO: NEGATIVE
LYMPHOCYTES # BLD AUTO: 1.44 10*3/MM3 (ref 0.7–3.1)
LYMPHOCYTES NFR BLD AUTO: 15.9 % (ref 19.6–45.3)
MCH RBC QN AUTO: 29.2 PG (ref 26.6–33)
MCHC RBC AUTO-ENTMCNC: 32.5 G/DL (ref 31.5–35.7)
MCV RBC AUTO: 90 FL (ref 79–97)
MONOCYTES # BLD AUTO: 0.76 10*3/MM3 (ref 0.1–0.9)
MONOCYTES NFR BLD AUTO: 8.4 % (ref 5–12)
NEUTROPHILS NFR BLD AUTO: 6.55 10*3/MM3 (ref 1.7–7)
NEUTROPHILS NFR BLD AUTO: 72.6 % (ref 42.7–76)
NITRITE UR QL STRIP: NEGATIVE
NRBC BLD AUTO-RTO: 0 /100 WBC (ref 0–0.2)
NT-PROBNP SERPL-MCNC: 2253 PG/ML (ref 0–1800)
PH UR STRIP.AUTO: 7 [PH] (ref 5–8)
PLATELET # BLD AUTO: 267 10*3/MM3 (ref 140–450)
PMV BLD AUTO: 10.6 FL (ref 6–12)
POTASSIUM SERPL-SCNC: 4.2 MMOL/L (ref 3.5–5.2)
PROT SERPL-MCNC: 7.5 G/DL (ref 6–8.5)
PROT UR QL STRIP: NEGATIVE
PROTHROMBIN TIME: 26.6 SECONDS (ref 11.7–14.2)
QT INTERVAL: 384 MS
QTC INTERVAL: 452 MS
RBC # BLD AUTO: 4.21 10*6/MM3 (ref 4.14–5.8)
RH BLD: POSITIVE
SODIUM SERPL-SCNC: 137 MMOL/L (ref 136–145)
SP GR UR STRIP: 1.01 (ref 1–1.03)
T&S EXPIRATION DATE: NORMAL
UROBILINOGEN UR QL STRIP: NORMAL
WBC NRBC COR # BLD AUTO: 9.04 10*3/MM3 (ref 3.4–10.8)

## 2025-05-01 PROCEDURE — 85610 PROTHROMBIN TIME: CPT

## 2025-05-01 PROCEDURE — 71046 X-RAY EXAM CHEST 2 VIEWS: CPT

## 2025-05-01 PROCEDURE — 80053 COMPREHEN METABOLIC PANEL: CPT

## 2025-05-01 PROCEDURE — 93005 ELECTROCARDIOGRAM TRACING: CPT

## 2025-05-01 PROCEDURE — 36415 COLL VENOUS BLD VENIPUNCTURE: CPT

## 2025-05-01 PROCEDURE — 83036 HEMOGLOBIN GLYCOSYLATED A1C: CPT

## 2025-05-01 PROCEDURE — 81003 URINALYSIS AUTO W/O SCOPE: CPT

## 2025-05-01 PROCEDURE — 85576 BLOOD PLATELET AGGREGATION: CPT

## 2025-05-01 PROCEDURE — 83880 ASSAY OF NATRIURETIC PEPTIDE: CPT

## 2025-05-01 PROCEDURE — 85730 THROMBOPLASTIN TIME PARTIAL: CPT

## 2025-05-01 PROCEDURE — 85025 COMPLETE CBC W/AUTO DIFF WBC: CPT

## 2025-05-01 PROCEDURE — 93010 ELECTROCARDIOGRAM REPORT: CPT | Performed by: INTERNAL MEDICINE

## 2025-05-01 PROCEDURE — 86901 BLOOD TYPING SEROLOGIC RH(D): CPT

## 2025-05-01 PROCEDURE — 86900 BLOOD TYPING SEROLOGIC ABO: CPT

## 2025-05-01 PROCEDURE — 86850 RBC ANTIBODY SCREEN: CPT

## 2025-05-01 RX ORDER — CHLORHEXIDINE GLUCONATE ORAL RINSE 1.2 MG/ML
15 SOLUTION DENTAL EVERY 12 HOURS
Status: DISPENSED | OUTPATIENT
Start: 2025-05-01 | End: 2025-05-02

## 2025-05-01 NOTE — NURSING NOTE
I met with Mr & Mrs Ly in PAT 1. The Pre-Admission staff provided pre-op instructions.  Labs and medications reviewed and discussed. He will take all meds as previously directed, through Monday evening, with the exception of Warfarin. Jeromy Putnam, Pharmacist in Anticoagulation Clinic provided instructions Warfarin/Lovenox bridging.       Mr. Ly lives at home independently with his wife. He reports using Oxygen prn at home and ambulates with a cane occasionally.     We reviewed the TAVR procedure, post op and recovery. KCCQ and Walk Tests were also completed. All questions answered to their satisfaction.  They have the [facility] contact information and are encouraged to call for any questions or concerns.

## 2025-05-04 ENCOUNTER — APPOINTMENT (OUTPATIENT)
Dept: GENERAL RADIOLOGY | Facility: HOSPITAL | Age: OVER 89
End: 2025-05-04
Payer: MEDICARE

## 2025-05-04 ENCOUNTER — HOSPITAL ENCOUNTER (EMERGENCY)
Facility: HOSPITAL | Age: OVER 89
Discharge: HOME | End: 2025-05-04
Attending: EMERGENCY MEDICINE | Admitting: EMERGENCY MEDICINE
Payer: MEDICARE

## 2025-05-04 VITALS
WEIGHT: 140 LBS | RESPIRATION RATE: 16 BRPM | DIASTOLIC BLOOD PRESSURE: 60 MMHG | OXYGEN SATURATION: 96 % | TEMPERATURE: 97.5 F | BODY MASS INDEX: 21.22 KG/M2 | HEIGHT: 68 IN | HEART RATE: 60 BPM | SYSTOLIC BLOOD PRESSURE: 127 MMHG

## 2025-05-04 DIAGNOSIS — N18.9 CHRONIC KIDNEY DISEASE, UNSPECIFIED CKD STAGE: ICD-10-CM

## 2025-05-04 DIAGNOSIS — M10.9 ACUTE GOUT OF LEFT HAND, UNSPECIFIED CAUSE: Primary | ICD-10-CM

## 2025-05-04 LAB
ALBUMIN SERPL-MCNC: 3.8 G/DL (ref 3.5–5.2)
ALBUMIN/GLOB SERPL: 1 G/DL
ALP SERPL-CCNC: 69 U/L (ref 39–117)
ALT SERPL W P-5'-P-CCNC: 13 U/L (ref 1–41)
ANION GAP SERPL CALCULATED.3IONS-SCNC: 11 MMOL/L (ref 5–15)
APTT PPP: 29.6 SECONDS (ref 22.7–35.4)
AST SERPL-CCNC: 14 U/L (ref 1–40)
BASOPHILS # BLD AUTO: 0.07 10*3/MM3 (ref 0–0.2)
BASOPHILS NFR BLD AUTO: 0.8 % (ref 0–1.5)
BILIRUB SERPL-MCNC: 0.7 MG/DL (ref 0–1.2)
BUN SERPL-MCNC: 37 MG/DL (ref 8–23)
BUN/CREAT SERPL: 22.7 (ref 7–25)
CALCIUM SPEC-SCNC: 9.3 MG/DL (ref 8.2–9.6)
CHLORIDE SERPL-SCNC: 100 MMOL/L (ref 98–107)
CO2 SERPL-SCNC: 27 MMOL/L (ref 22–29)
CREAT SERPL-MCNC: 1.63 MG/DL (ref 0.76–1.27)
DEPRECATED RDW RBC AUTO: 50.8 FL (ref 37–54)
EGFRCR SERPLBLD CKD-EPI 2021: 39.3 ML/MIN/1.73
EOSINOPHIL # BLD AUTO: 0.11 10*3/MM3 (ref 0–0.4)
EOSINOPHIL NFR BLD AUTO: 1.2 % (ref 0.3–6.2)
ERYTHROCYTE [DISTWIDTH] IN BLOOD BY AUTOMATED COUNT: 15.5 % (ref 12.3–15.4)
GLOBULIN UR ELPH-MCNC: 4 GM/DL
GLUCOSE SERPL-MCNC: 89 MG/DL (ref 65–99)
HCT VFR BLD AUTO: 38.3 % (ref 37.5–51)
HGB BLD-MCNC: 12.3 G/DL (ref 13–17.7)
IMM GRANULOCYTES # BLD AUTO: 0.07 10*3/MM3 (ref 0–0.05)
IMM GRANULOCYTES NFR BLD AUTO: 0.8 % (ref 0–0.5)
INR PPP: 1.47 (ref 0.9–1.1)
LYMPHOCYTES # BLD AUTO: 1.48 10*3/MM3 (ref 0.7–3.1)
LYMPHOCYTES NFR BLD AUTO: 16.2 % (ref 19.6–45.3)
MCH RBC QN AUTO: 29.4 PG (ref 26.6–33)
MCHC RBC AUTO-ENTMCNC: 32.1 G/DL (ref 31.5–35.7)
MCV RBC AUTO: 91.6 FL (ref 79–97)
MONOCYTES # BLD AUTO: 0.81 10*3/MM3 (ref 0.1–0.9)
MONOCYTES NFR BLD AUTO: 8.9 % (ref 5–12)
NEUTROPHILS NFR BLD AUTO: 6.6 10*3/MM3 (ref 1.7–7)
NEUTROPHILS NFR BLD AUTO: 72.1 % (ref 42.7–76)
NRBC BLD AUTO-RTO: 0 /100 WBC (ref 0–0.2)
PLATELET # BLD AUTO: 224 10*3/MM3 (ref 140–450)
PMV BLD AUTO: 11.1 FL (ref 6–12)
POTASSIUM SERPL-SCNC: 5.3 MMOL/L (ref 3.5–5.2)
PROT SERPL-MCNC: 7.8 G/DL (ref 6–8.5)
PROTHROMBIN TIME: 17.8 SECONDS (ref 11.7–14.2)
RBC # BLD AUTO: 4.18 10*6/MM3 (ref 4.14–5.8)
SODIUM SERPL-SCNC: 138 MMOL/L (ref 136–145)
URATE SERPL-MCNC: 6.3 MG/DL (ref 3.4–7)
WBC NRBC COR # BLD AUTO: 9.14 10*3/MM3 (ref 3.4–10.8)

## 2025-05-04 PROCEDURE — 85025 COMPLETE CBC W/AUTO DIFF WBC: CPT | Performed by: EMERGENCY MEDICINE

## 2025-05-04 PROCEDURE — 36415 COLL VENOUS BLD VENIPUNCTURE: CPT

## 2025-05-04 PROCEDURE — 85610 PROTHROMBIN TIME: CPT | Performed by: EMERGENCY MEDICINE

## 2025-05-04 PROCEDURE — 84550 ASSAY OF BLOOD/URIC ACID: CPT | Performed by: EMERGENCY MEDICINE

## 2025-05-04 PROCEDURE — 85730 THROMBOPLASTIN TIME PARTIAL: CPT | Performed by: EMERGENCY MEDICINE

## 2025-05-04 PROCEDURE — 73130 X-RAY EXAM OF HAND: CPT

## 2025-05-04 PROCEDURE — 80053 COMPREHEN METABOLIC PANEL: CPT | Performed by: EMERGENCY MEDICINE

## 2025-05-04 PROCEDURE — 99283 EMERGENCY DEPT VISIT LOW MDM: CPT

## 2025-05-04 RX ORDER — METHYLPREDNISOLONE 4 MG/1
TABLET ORAL
Qty: 21 TABLET | Refills: 0 | Status: SHIPPED | OUTPATIENT
Start: 2025-05-04

## 2025-05-04 RX ADMIN — DICLOFENAC SODIUM 2 G: 10 GEL TOPICAL at 09:14

## 2025-05-04 NOTE — ED PROVIDER NOTES
EMERGENCY DEPARTMENT ENCOUNTER  Room Number:  25/25  Date of encounter:  5/4/2025  PCP: Lubna Lobo MD  Patient Care Team:  Lubna Lobo MD as PCP - General  Lubna Lobo MD as PCP - Family Medicine  Chidi Oconnor MD as Consulting Physician (Pulmonary Disease)  Nixon Brown Jr., MD as Consulting Physician (Cardiology)     HPI:  Context: Asia Ly is a 89 y.o. male who presents to the ED c/o chief complaint of left thumb pain and swelling.  Symptoms began last night, no injury to the thumb, no cuts or scrapes on the thumb.  Thumb is swollen, red, painful to touch.  Family reports extensive history of gout with similar in the past, is on allopurinol at baseline.  Family also reports concern for blood clot, concern for blood clot specifically because of some swelling and patient is currently off of Coumadin and preparing for procedure.  No chest pain or shortness of breath.    MEDICAL HISTORY REVIEW  Reviewed in EPIC    PAST MEDICAL HISTORY  Active Ambulatory Problems     Diagnosis Date Noted    Ataxia 08/19/2016    TIA (transient ischemic attack) 08/19/2016    Atrial fibrillation, paroxysmal 08/19/2016    Hyperlipidemia 08/19/2016    Gout 08/19/2016    Crohn's disease 08/19/2016    Atrial fibrillation with RVR 10/27/2018    Acute cholecystitis 08/26/2020    Obstructive jaundice 08/26/2020    Acute lower GI bleeding 11/17/2020    Aortic stenosis     Pulmonary fibrosis     Anticoagulant long-term use     Febrile illness, acute 12/07/2020    Atrial fibrillation with rapid ventricular response 06/22/2021    COVID-19 virus infection 04/25/2023    Pneumonia of both lungs due to infectious organism, unspecified part of lung 05/06/2023    Left inguinal hernia 06/13/2023    Other pulmonary embolism without acute cor pulmonale 09/28/2024    Acute pulmonary embolism without acute cor pulmonale, unspecified pulmonary embolism type 10/03/2024    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 10/15/2024    Chronic diastolic congestive heart failure 03/26/2025     Resolved Ambulatory Problems     Diagnosis Date Noted    Hypoxia 04/29/2018     Past Medical History:   Diagnosis Date    Aspiration pneumonia     Bronchiectasis     Chronic deep vein thrombosis (DVT)     Chronic diastolic heart failure     CKD (chronic kidney disease)     COPD (chronic obstructive pulmonary disease)     Dizziness     Elevated cholesterol     Gastric ulcer     Generalized weakness     Hard of hearing     History of anticoagulant therapy     History of pulmonary embolus (PE) 2023    Hypertension     Leg swelling     Lower extremity edema     Mild anemia     Near syncope     Nonrheumatic aortic valve stenosis     PAF (paroxysmal atrial fibrillation)     Palpitations     Peptic ulcer     Pneumonia due to COVID-19 virus 05/06/2023    Stroke        PAST SURGICAL HISTORY  Past Surgical History:   Procedure Laterality Date    BRONCHOSCOPY N/A 10/22/2018    Procedure: BRONCHOSCOPY WITH BRONCHALVEOLAR LAVAGE;  Surgeon: Chidi Oconnor MD;  Location: [facility] ENDOSCOPY;  Service: Pulmonary    BRONCHOSCOPY N/A 07/08/2021    Procedure: BRONCHOSCOPY with BAL;  Surgeon: Chdii Oconnor MD;  Location: [facility] ENDOSCOPY;  Service: Pulmonary;  Laterality: N/A;  Pre: bronchectasis  Post: same    CARDIAC ELECTROPHYSIOLOGY PROCEDURE N/A 07/09/2021    Procedure: Pacemaker DC new--[employer] possible His lead;  Surgeon: Rashaun Nettles MD;  Location: [facility] CATH INVASIVE LOCATION;  Service: Cardiology;  Laterality: N/A;    CARDIAC ELECTROPHYSIOLOGY PROCEDURE N/A 09/03/2021    Procedure: AV node 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 ppm;  Surgeon: Rashaun Nettles MD;  Location: [facility] CATH INVASIVE LOCATION;  Service: Cardiovascular;  Laterality: N/A;    CATARACT EXTRACTION Bilateral     COLONOSCOPY      COLONOSCOPY N/A 03/09/2018    Procedure: COLONOSCOPY to terminal ileum with bx;  Surgeon: Aron Cabrera MD;  Location: [facility] ENDOSCOPY;   "Service:     COLONOSCOPY N/A 2020    Procedure: COLONOSCOPY to cecum:  apc to cecum angiodysplagia,;  Surgeon: Aron Cabrera MD;  [facility];  Service: Gastroenterology;  Laterality: N/A;  GI bleed  post:  diverticulosis, angiodysplagia    ENDOSCOPY N/A 2018    Procedure: ESOPHAGOGASTRODUODENOSCOPY with bx;  Surgeon: Aron Cabrera MD;  Location: [facility] ENDOSCOPY;  Service:     ENDOSCOPY N/A 2020    Procedure: ESOPHAGOGASTRODUODENOSCOPY;  Surgeon: Aron Cabrera MD;  Location: [facility] ENDOSCOPY;  Service: Gastroenterology;  Laterality: N/A;  GI bleed  post:  HH.    EYE SURGERY Right     \"repaired a hole in the eye\"    INGUINAL HERNIA REPAIR Left 2023    Procedure: INGUINAL HERNIA REPAIR;  Surgeon: Itz Zaidi Jr., MD;  Location: [facility] MAIN OR;  Service: General;  Laterality: Left;    INSERT / REPLACE / REMOVE PACEMAKER      LIPOMA EXCISION      BACK    RETINAL DETACHMENT REPAIR Bilateral     TESTICLE SURGERY      benign tumor removed.       FAMILY HISTORY  Family History   Problem Relation Age of Onset    Stroke Mother     Heart disease Mother     Hypertension Mother     Diabetes Mother     Stroke Father     Heart disease Sister         Heart Valve Replacement    Ovarian cancer Sister     Rheumatic fever Sister     Diabetes Sister     Malig Hyperthermia Neg Hx        SOCIAL HISTORY  Social History     Socioeconomic History    Marital status:    Tobacco Use    Smoking status: Former     Types: Cigars     Quit date: 10/28/1994     Years since quittin.5     Passive exposure: Past    Smokeless tobacco: Never    Tobacco comments:     Quit 25 years ago   Vaping Use    Vaping status: Never Used   Substance and Sexual Activity    Alcohol use: Not Currently     Comment: NONE X 10 YEARS    Drug use: No    Sexual activity: Defer       ALLERGIES  Amiodarone, Diltiazem, Doxycycline, Allopurinol, Cefdinir, and Indomethacin    The patient's allergies have been " reviewed    REVIEW OF SYSTEMS  All systems reviewed and negative except for those discussed in HPI.     PHYSICAL EXAM  I have reviewed the triage vital signs and nursing notes.  ED Triage Vitals   Temp Heart Rate Resp BP SpO2   05/04/25 0535 05/04/25 0535 05/04/25 0535 05/04/25 0539 05/04/25 0535   97.5 °F (36.4 °C) 83 16 129/69 95 %      Temp src Heart Rate Source Patient Position BP Location FiO2 (%)   -- 05/04/25 0737 05/04/25 0539 05/04/25 0539 --    Monitor Sitting Right arm        General: No acute distress.  HENT: NCAT, PERRL, Nares patent.  Eyes: no scleral icterus.  Neck: trachea midline, no ROM limitations.  CV: regular rhythm, regular rate.  Respiratory: normal effort, CTAB.  Abdomen: soft, nondistended, NTTP, no rebound tenderness, no guarding or rigidity.  Musculoskeletal: no deformity.  Left thumb: Pinkish erythema and swelling, warm to touch, no induration, no fluctuance or drainage, no skin lesions, hand is otherwise normal in appearance, no other swelling in the upper extremity.  Neuro: alert, moves all extremities, follows commands.  Skin: warm, dry.    LAB RESULTS  Recent Results (from the past 24 hours)   Comprehensive Metabolic Panel    Collection Time: 05/04/25  7:46 AM    Specimen: Arm, Right; Blood   Result Value Ref Range    Glucose 89 65 - 99 mg/dL    BUN 37 (H) 8 - 23 mg/dL    Creatinine 1.63 (H) 0.76 - 1.27 mg/dL    Sodium 138 136 - 145 mmol/L    Potassium 5.3 (H) 3.5 - 5.2 mmol/L    Chloride 100 98 - 107 mmol/L    CO2 27.0 22.0 - 29.0 mmol/L    Calcium 9.3 8.2 - 9.6 mg/dL    Total Protein 7.8 6.0 - 8.5 g/dL    Albumin 3.8 3.5 - 5.2 g/dL    ALT (SGPT) 13 1 - 41 U/L    AST (SGOT) 14 1 - 40 U/L    Alkaline Phosphatase 69 39 - 117 U/L    Total Bilirubin 0.7 0.0 - 1.2 mg/dL    Globulin 4.0 gm/dL    A/G Ratio 1.0 g/dL    BUN/Creatinine Ratio 22.7 7.0 - 25.0    Anion Gap 11.0 5.0 - 15.0 mmol/L    eGFR 39.3 (L) >60.0 mL/min/1.73   Uric Acid    Collection Time: 05/04/25  7:46 AM    Specimen: Arm,  Right; Blood   Result Value Ref Range    Uric Acid 6.3 3.4 - 7.0 mg/dL   CBC Auto Differential    Collection Time: 05/04/25  7:46 AM    Specimen: Arm, Right; Blood   Result Value Ref Range    WBC 9.14 3.40 - 10.80 10*3/mm3    RBC 4.18 4.14 - 5.80 10*6/mm3    Hemoglobin 12.3 (L) 13.0 - 17.7 g/dL    Hematocrit 38.3 37.5 - 51.0 %    MCV 91.6 79.0 - 97.0 fL    MCH 29.4 26.6 - 33.0 pg    MCHC 32.1 31.5 - 35.7 g/dL    RDW 15.5 (H) 12.3 - 15.4 %    RDW-SD 50.8 37.0 - 54.0 fl    MPV 11.1 6.0 - 12.0 fL    Platelets 224 140 - 450 10*3/mm3    Neutrophil % 72.1 42.7 - 76.0 %    Lymphocyte % 16.2 (L) 19.6 - 45.3 %    Monocyte % 8.9 5.0 - 12.0 %    Eosinophil % 1.2 0.3 - 6.2 %    Basophil % 0.8 0.0 - 1.5 %    Immature Grans % 0.8 (H) 0.0 - 0.5 %    Neutrophils, Absolute 6.60 1.70 - 7.00 10*3/mm3    Lymphocytes, Absolute 1.48 0.70 - 3.10 10*3/mm3    Monocytes, Absolute 0.81 0.10 - 0.90 10*3/mm3    Eosinophils, Absolute 0.11 0.00 - 0.40 10*3/mm3    Basophils, Absolute 0.07 0.00 - 0.20 10*3/mm3    Immature Grans, Absolute 0.07 (H) 0.00 - 0.05 10*3/mm3    nRBC 0.0 0.0 - 0.2 /100 WBC   Protime-INR    Collection Time: 05/04/25  8:36 AM    Specimen: Arm, Right; Blood   Result Value Ref Range    Protime 17.8 (H) 11.7 - 14.2 Seconds    INR 1.47 (H) 0.90 - 1.10   aPTT    Collection Time: 05/04/25  8:36 AM    Specimen: Arm, Right; Blood   Result Value Ref Range    PTT 29.6 22.7 - 35.4 seconds       I ordered the above labs and reviewed the results.    RADIOLOGY  XR Hand 3+ View Left  Result Date: 5/4/2025  XR HAND 3+ VW LEFT-  INDICATIONS: Pain and swelling.  TECHNIQUE: 4 views of the left hand  COMPARISON: None available  FINDINGS:  The bones are diffusely osteopenic. Soft tissue swelling of the thumb is noted, and may be evidence of inflammation, infection, injury, correlate clinically. No acute fracture, erosion, or dislocation is identified. Osteoarthritic degenerative changes are seen, predominantly at the lateral aspect of the wrist  and at distal interphalangeal joints. Arterial calcifications are conspicuous. Follow-up/further evaluation can be obtained as indications persist.       As described.    This report was finalized on 5/4/2025 8:14 AM by Dr. Dejuan Potter M.D on Workstation: [email or web address]        I ordered the above noted radiological studies. I reviewed the images and results. I agree with the radiologist interpretation.    PROCEDURES  Procedures    MEDICATIONS GIVEN IN ER  Medications   Diclofenac Sodium (VOLTAREN) 1 % gel 2 g (has no administration in time range)       PROGRESS, DATA ANALYSIS, CONSULTS, AND MEDICAL DECISION MAKING  A complete history and physical exam have been performed.  All available laboratory and imaging results have been reviewed by myself prior to disposition.    MDM    After the initial H&P, I discussed pertinent information from history and physical exam with patient/family.  Discussed differential diagnosis.  Discussed plan for ED evaluation/workup/treatment.  All questions answered.  Patient/family is agreeable with plan.  ED Course as of 05/04/25 0914   Sun May 04, 2025   0826 Patient with a extensive history of gout presents with left thumb pain and swelling, physical exam consistent with gout.  Family does report concern for blood clot, I discussed extremely low clinical suspicion of blood clot as patient should be having swelling in his entire hand and everything proximal to the blood clot as opposed to localized swelling HSS and discussed most consistent with gout, family reassured, do not wish for ultrasound imaging. [JG]   0827 Patient appears to have some chronic kidney disease at baseline, discussed best treatment would be for topical NSAIDs as well as steroids, discussed steroids may interfere with upcoming procedure and to discuss this with patient's proceduralist prior to continuing steroids on Monday.  Patient family agreeable with plan, no questions or concerns. [JG]   0836 Reviewed left  hand x-rays in PACS, no fracture per my read. [JG]   0910 Creatinine elevated 1.63, 3 days ago was 1.45 but 2 months ago was 1.63.  Patient has had no recent nausea vomiting or diarrhea.  Encouraging increased p.o. intake especially of fluids and need for follow-up with primary care for repeat BMP. [JG]      ED Course User Index  [JG] Bora Taylor MD       AS OF 09:14 EDT VITALS:    BP - 127/60  HR - 60  TEMP - 97.5 °F (36.4 °C)  O2 SATS - 96%    DIAGNOSIS  Final diagnoses:   Acute gout of left hand, unspecified cause   Chronic kidney disease, unspecified CKD stage         DISPOSITION  DISCHARGE    Patient discharged in stable condition.    Reviewed implications of results, diagnosis, meds, responsibility to follow up, warning signs and symptoms of possible worsening, potential complications and reasons to return to ER.    Patient/Family voiced understanding of above instructions.    Discussed plan for discharge, as there is no emergent indication for admission. Patient referred to primary care provider for BP management due to today's BP. Pt/family is agreeable and understands need for follow up and repeat testing.  Pt is aware that discharge does not mean that nothing is wrong but it indicates no emergency is present that requires admission and they must continue care with follow-up as given below or physician of their choice.     FOLLOW-UP  Lubna oLbo MD  [address] [place] [address]  [phone]    Schedule an appointment as soon as possible for a visit in 2 days  even if well, for repeat BMP         Medication List        New Prescriptions      Diclofenac Sodium 1 % gel gel  Commonly known as: VOLTAREN  Apply 2 g topically to the appropriate area as directed 4 (Four) Times a Day As Needed (pain/swelling).     methylPREDNISolone 4 MG dose pack  Commonly known as: MEDROL  Take as directed on package instructions.            Changed      enoxaparin sodium 80 MG/0.8ML solution  prefilled syringe syringe  Commonly known as: LOVENOX  Discard 0.1 mL, then inject 0.7 mL (70 mg) under the skin into the appropriate area every 24 hours as directed surrounding upcoming procedure.  What changed: additional instructions     warfarin 5 MG tablet  Commonly known as: COUMADIN  TAKE 1 TABLET BY MOUTH ON SUNDAY, WEDNESDAY, FRIDAY AND TAKE ONE-HALF TABLET BY MOUTH ALL OTHER DAYS OR AS DIRECTED  What changed: additional instructions               Where to Get Your Medications        These medications were sent to [place] [id number]      Hours: Monday to Friday 7 AM to 6 PM, Saturday & Sunday 8 AM to 4:30 PM (Closed 12 PM to 12:30 PM) Phone: [phone]   Diclofenac Sodium 1 % gel gel  methylPREDNISolone 4 MG dose pack            Bora Taylor MD  05/04/25 0916

## 2025-05-04 NOTE — ED NOTES
Patient and patient wife concerned with left thumb pain. Patient was unable to sleep last night due to the pain. They are concerned with gout and blood clots due to patients history of both. Patient has not been on his coumadin for two days for procedure scheduled for this coming Tuesday.

## 2025-05-05 ENCOUNTER — TELEPHONE (OUTPATIENT)
Dept: CARDIOLOGY | Facility: HOSPITAL | Age: OVER 89
End: 2025-05-05

## 2025-05-05 ENCOUNTER — TELEPHONE (OUTPATIENT)
Dept: PHARMACY | Facility: HOSPITAL | Age: OVER 89
End: 2025-05-05
Payer: MEDICARE

## 2025-05-05 NOTE — TELEPHONE ENCOUNTER
Spoke with Pat Estrella, Cardiology RN. She reports the TAVR is being postponed; however, the new date is not confirmed. Mr. Ly will be evaluated by Cardiology tomorrow to determine new plan with procedure possibly being rescheduled for later this week. Pat has already provided updated enoxaparin instructions to patient's family to ensure he is adequately protected. May refer to other Telephone Encounter from today. Will await update from Cardiology tomorrow.

## 2025-05-05 NOTE — TELEPHONE ENCOUNTER
Raul, Mr. Ly's son called reporting that his father was seen in Urgent Care yesterday for swollen, painful Left hand and a cough.  They deny fever, chills, nausea or any other symptoms. Reviewing the physician's note, Mr. Ly was diagnosed with exacerbation of Gout.  He was prescribed Steroid dose pack. According to the record, he was afebrile yesterday and remains so today.  WBC was WNL.  Creat was slightly elevated at 1.63. Raul reports that the swelling is better today.  His father still has a cough.     They are hopeful they can still get the TAVR procedure tomorrow as planned. I will reach out to Guillermina Valdes and Megan for recommendations.     1510 After discussing with Guillermina Guzman and Lucio, the TAVR procedure has been postponed.  Mr. Ly will take lovenox today and 1 dose in the AM.  He has agreed to come in the the Cardiac Cath Lab Pre/Post around 9AM and Dr. Guzman will meet with them and make further recommendations.

## 2025-05-06 ENCOUNTER — LAB (OUTPATIENT)
Dept: LAB | Facility: HOSPITAL | Age: OVER 89
End: 2025-05-06
Payer: MEDICARE

## 2025-05-06 ENCOUNTER — APPOINTMENT (OUTPATIENT)
Dept: PERIOP | Facility: HOSPITAL | Age: OVER 89
End: 2025-05-06
Payer: MEDICARE

## 2025-05-06 DIAGNOSIS — I25.10 ATHEROSCLEROSIS OF NATIVE CORONARY ARTERY OF NATIVE HEART WITHOUT ANGINA PECTORIS: ICD-10-CM

## 2025-05-06 DIAGNOSIS — I35.0 NONRHEUMATIC AORTIC VALVE STENOSIS: ICD-10-CM

## 2025-05-06 DIAGNOSIS — I35.0 NONRHEUMATIC AORTIC VALVE STENOSIS: Primary | ICD-10-CM

## 2025-05-06 LAB
ALBUMIN SERPL-MCNC: 4 G/DL (ref 3.5–5.2)
ALBUMIN/GLOB SERPL: 1 G/DL
ALP SERPL-CCNC: 61 U/L (ref 39–117)
ALT SERPL W P-5'-P-CCNC: 10 U/L (ref 1–41)
ANION GAP SERPL CALCULATED.3IONS-SCNC: 11.8 MMOL/L (ref 5–15)
AST SERPL-CCNC: 13 U/L (ref 1–40)
BASOPHILS # BLD AUTO: 0.01 10*3/MM3 (ref 0–0.2)
BASOPHILS NFR BLD AUTO: 0.1 % (ref 0–1.5)
BILIRUB SERPL-MCNC: 0.7 MG/DL (ref 0–1.2)
BUN SERPL-MCNC: 45 MG/DL (ref 8–23)
BUN/CREAT SERPL: 27.4 (ref 7–25)
CALCIUM SPEC-SCNC: 9.5 MG/DL (ref 8.2–9.6)
CHLORIDE SERPL-SCNC: 93 MMOL/L (ref 98–107)
CO2 SERPL-SCNC: 24.2 MMOL/L (ref 22–29)
CREAT SERPL-MCNC: 1.64 MG/DL (ref 0.76–1.27)
CRP SERPL-MCNC: 3.32 MG/DL (ref 0.01–0.5)
DEPRECATED RDW RBC AUTO: 50 FL (ref 37–54)
EGFRCR SERPLBLD CKD-EPI 2021: 39 ML/MIN/1.73
EOSINOPHIL # BLD AUTO: 0 10*3/MM3 (ref 0–0.4)
EOSINOPHIL NFR BLD AUTO: 0 % (ref 0.3–6.2)
ERYTHROCYTE [DISTWIDTH] IN BLOOD BY AUTOMATED COUNT: 15.3 % (ref 12.3–15.4)
ERYTHROCYTE [SEDIMENTATION RATE] IN BLOOD: 33 MM/HR (ref 0–20)
GLOBULIN UR ELPH-MCNC: 3.9 GM/DL
GLUCOSE SERPL-MCNC: 202 MG/DL (ref 65–99)
HCT VFR BLD AUTO: 38.8 % (ref 37.5–51)
HGB BLD-MCNC: 12.5 G/DL (ref 13–17.7)
IMM GRANULOCYTES # BLD AUTO: 0.07 10*3/MM3 (ref 0–0.05)
IMM GRANULOCYTES NFR BLD AUTO: 0.8 % (ref 0–0.5)
LYMPHOCYTES # BLD AUTO: 0.51 10*3/MM3 (ref 0.7–3.1)
LYMPHOCYTES NFR BLD AUTO: 5.7 % (ref 19.6–45.3)
MCH RBC QN AUTO: 29.2 PG (ref 26.6–33)
MCHC RBC AUTO-ENTMCNC: 32.2 G/DL (ref 31.5–35.7)
MCV RBC AUTO: 90.7 FL (ref 79–97)
MONOCYTES # BLD AUTO: 0.26 10*3/MM3 (ref 0.1–0.9)
MONOCYTES NFR BLD AUTO: 2.9 % (ref 5–12)
NEUTROPHILS NFR BLD AUTO: 8.08 10*3/MM3 (ref 1.7–7)
NEUTROPHILS NFR BLD AUTO: 90.5 % (ref 42.7–76)
NRBC BLD AUTO-RTO: 0 /100 WBC (ref 0–0.2)
PLATELET # BLD AUTO: 235 10*3/MM3 (ref 140–450)
PMV BLD AUTO: 11 FL (ref 6–12)
POTASSIUM SERPL-SCNC: 4.5 MMOL/L (ref 3.5–5.2)
PROCALCITONIN SERPL-MCNC: 0.11 NG/ML (ref 0–0.25)
PROT SERPL-MCNC: 7.9 G/DL (ref 6–8.5)
RBC # BLD AUTO: 4.28 10*6/MM3 (ref 4.14–5.8)
SODIUM SERPL-SCNC: 129 MMOL/L (ref 136–145)
WBC NRBC COR # BLD AUTO: 8.93 10*3/MM3 (ref 3.4–10.8)

## 2025-05-06 PROCEDURE — 86141 C-REACTIVE PROTEIN HS: CPT

## 2025-05-06 PROCEDURE — 84145 PROCALCITONIN (PCT): CPT

## 2025-05-06 PROCEDURE — 36415 COLL VENOUS BLD VENIPUNCTURE: CPT

## 2025-05-06 PROCEDURE — 85025 COMPLETE CBC W/AUTO DIFF WBC: CPT

## 2025-05-06 PROCEDURE — 80053 COMPREHEN METABOLIC PANEL: CPT

## 2025-05-06 PROCEDURE — 85652 RBC SED RATE AUTOMATED: CPT

## 2025-05-07 ENCOUNTER — TELEPHONE (OUTPATIENT)
Dept: CARDIOLOGY | Facility: HOSPITAL | Age: OVER 89
End: 2025-05-07

## 2025-05-07 ENCOUNTER — TELEPHONE (OUTPATIENT)
Dept: PHARMACY | Facility: HOSPITAL | Age: OVER 89
End: 2025-05-07
Payer: MEDICARE

## 2025-05-07 ENCOUNTER — TELEPHONE (OUTPATIENT)
Dept: CARDIOLOGY | Age: OVER 89
End: 2025-05-07

## 2025-05-07 ENCOUNTER — ANTICOAGULATION VISIT (OUTPATIENT)
Dept: PHARMACY | Facility: HOSPITAL | Age: OVER 89
End: 2025-05-07
Payer: MEDICARE

## 2025-05-07 DIAGNOSIS — I26.99 ACUTE PULMONARY EMBOLISM WITHOUT ACUTE COR PULMONALE, UNSPECIFIED PULMONARY EMBOLISM TYPE: Primary | ICD-10-CM

## 2025-05-07 NOTE — TELEPHONE ENCOUNTER
Per communication from Pat Estrella, Cardiology RN, Mr Ly is likely to be rescheduled for TAVR later this week or early next week. She is awaiting confirmation from Dr. Valdes about his availability.     Contacted Ms. Ly and instructed her to continue to hold warfarin and continue to administer enoxaparin 70 mg every 24 hours until we know if/when the TAVR will be rescheduled. She voiced understanding and is agreeable with plan.

## 2025-05-07 NOTE — PROGRESS NOTES
Anticoagulation Clinic Progress Note    Anticoagulation Summary  As of 5/7/2025      INR goal:  2.5-3.5   TTR:  84.5% (6.8 mo)   INR used for dosing:  No new INR was available at the time of this encounter.   Warfarin maintenance plan:  5 mg every Sun, Wed, Fri; 2.5 mg all other days   Weekly warfarin total:  25 mg   No change documented:  Jeromy Putnam, PharmD   Plan last modified:  Breanne Jain RPH (10/31/2024)   Next INR check:  5/12/2025   Target end date:  --    Indications    Acute pulmonary embolism without acute cor pulmonale  unspecified pulmonary embolism type [I26.99]                 Anticoagulation Episode Summary       INR check location:  --    Preferred lab:  --    Send INR reminders to:   M[facility]HU MUNIZ [facility]    Comments:  --          Anticoagulation Care Providers       Provider Role Specialty Phone number    Libra Gonzalez NORIS, APRN Referring Cardiology [phone]            Clinic Interview:  Patient Findings     Positives:  Upcoming invasive procedure, Change in medications, Other   complaints    Negatives:  Signs/symptoms of thrombosis, Signs/symptoms of bleeding,   Laboratory test error suspected, Change in health, Change in alcohol use,   Change in activity, Emergency department visit, Upcoming dental procedure,   Missed doses, Extra doses, Change in diet/appetite, Hospital admission,   Bruising    Comments:  TAVR has been cancelled, and this will likely be postponed   until summer. He has been bridging with enoxaparin due to question of if   TAVR might have been rescheduled later this week; however, it has   officially been cancelled. Spouse confirmed he has 6 syringes of   enoxaparin remaining at present before today's dose.       Clinical Outcomes     Negatives:  Major bleeding event, Thromboembolic event,   Anticoagulation-related hospital admission, Anticoagulation-related ED   visit, Anticoagulation-related fatality    Comments:  TAVR has been cancelled, and this will likely  be postponed   until summer. He has been bridging with enoxaparin due to question of if   TAVR might have been rescheduled later this week; however, it has   officially been cancelled. Spouse confirmed he has 6 syringes of   enoxaparin remaining at present before today's dose.         INR History:      Latest Ref Rng & Units 3/4/2025    11:15 AM 4/1/2025    11:00 AM 4/15/2025     4:24 AM 4/29/2025    10:30 AM 5/1/2025     7:52 AM 5/4/2025     8:36 AM 5/7/2025     2:34 PM   Anticoagulation Monitoring   INR  2.8 2.8  3.9   --   INR Date  3/4/2025 4/1/2025  4/29/2025      INR Goal  2.5-3.5 2.5-3.5  2.5-3.5   2.5-3.5   Trend  Same Same  Same   Same   Last Week Total  25 mg 25 mg  25 mg   2.5 mg   Next Week Total  25 mg 25 mg  5 mg   30 mg   Sun  5 mg 5 mg  Hold (5/4); Otherwise 5 mg   5 mg   Mon  2.5 mg 2.5 mg  Hold (5/5)   -   Tue  2.5 mg 2.5 mg  2.5 mg   -   Wed  5 mg 5 mg  2.5 mg (4/30); Otherwise 5 mg   5 mg   Thu  2.5 mg 2.5 mg  Hold (5/1), 5 mg (5/8)   5 mg (5/8)   Fri  5 mg 5 mg  Hold (5/2); Otherwise 5 mg   5 mg   Sat  2.5 mg 2.5 mg  Hold (5/3), 5 mg (5/10)   5 mg (5/10)   Historical INR 0.90 - 1.10   2.52   2.42  1.47     Visit Report   Report            Plan:  1. INR is  unknown  today (presumed to be subtherapeutic, as he has been holding warfarin) - see above in Anticoagulation Summary.   Will instruct Asia Ly to resume their warfarin regimen at dose of 5 mg daily for now alongside enoxaparin 70 mg every 24 hours - see above in Anticoagulation Summary.  2. Follow up in 5 days (5/12/25) in clinic.   3. They have been instructed to call if any changes in medications, doses, concerns, etc. Patient expresses understanding and has no further questions at this time.    Jeromy Putnam, PharmD

## 2025-05-07 NOTE — TELEPHONE ENCOUNTER
Caller: SURY Burk    Relationship: Emergency Contact    Best call back number: [phone]    What is the best time to reach you: ANY    Who are you requesting to speak with (clinical staff, provider,  specific staff member): CHEN DAWSON    Do you know the name of the person who called: SURY BURK    What was the call regarding: PT'S WIFE CALLED TO SPEAK WITH CHEN CONCERNING PT'S HEALTH AND A PROCEDURE HE HAS NOT YET HAD. WIFE STATES PT'S HEALTH NOT GOOD.    Is it okay if the provider responds through MyChart: NO

## 2025-05-07 NOTE — TELEPHONE ENCOUNTER
I spoke with Ni. She is upset that the TAVR has been delayed due to new cough. Patient had gout flare up over the weekend also.  She is also upset because her son flew in from out of town so this was unexpected change of plans.  She would like to know when I can see patient for follow-up so we will send a message to scheduling to help coordinate that. We also discussed setting an appt with pulmonary since cough is normally a precursor to bronchitis.     Pat-his wife is very hesitant to reschedule TAVR at this time.  I just wanted you to know that I also spoke with her    Scheduling- can he be scheduled with me sometime this month, any slot ok

## 2025-05-07 NOTE — TELEPHONE ENCOUNTER
As per Dr. Guzman's order, I called and spoke with Mrs. Ly to decrease his diuretic due to hyponatremia.  She stated that Asia gets 2 water pills daily so she will decrease it to 1 pill daily.

## 2025-05-12 ENCOUNTER — ANTICOAGULATION VISIT (OUTPATIENT)
Dept: PHARMACY | Facility: HOSPITAL | Age: OVER 89
End: 2025-05-12
Payer: MEDICARE

## 2025-05-12 DIAGNOSIS — I26.99 ACUTE PULMONARY EMBOLISM WITHOUT ACUTE COR PULMONALE, UNSPECIFIED PULMONARY EMBOLISM TYPE: Primary | ICD-10-CM

## 2025-05-12 LAB
INR PPP: 1.9 (ref 0.91–1.09)
PROTHROMBIN TIME: 23 SECONDS (ref 10–13.8)

## 2025-05-12 PROCEDURE — 85610 PROTHROMBIN TIME: CPT

## 2025-05-12 PROCEDURE — G0463 HOSPITAL OUTPT CLINIC VISIT: HCPCS

## 2025-05-12 NOTE — PROGRESS NOTES
Anticoagulation Clinic Progress Note    Anticoagulation Summary  As of 2025      INR goal:  2.5-3.5   TTR:  81.2% (7 mo)   INR used for dosin.9 (2025)   Warfarin maintenance plan:  5 mg every Sun, Wed, Fri; 2.5 mg all other days   Weekly warfarin total:  25 mg   Plan last modified:  Breanne Jain RPH (10/31/2024)   Next INR check:  5/15/2025   Target end date:  --    Indications    Acute pulmonary embolism without acute cor pulmonale  unspecified pulmonary embolism type [I26.99]                 Anticoagulation Episode Summary       INR check location:  --    Preferred lab:  --    Send INR reminders to:   MADHU MUNIZ [facility]    Comments:  --          Anticoagulation Care Providers       Provider Role Specialty Phone number    Libra Gonzalez, KYLAH Referring Cardiology [phone]            Clinic Interview:  Patient Findings     Positives:  Change in medications    Negatives:  Signs/symptoms of thrombosis, Signs/symptoms of bleeding,   Laboratory test error suspected, Change in health, Change in alcohol use,   Change in activity, Upcoming invasive procedure, Emergency department   visit, Upcoming dental procedure, Missed doses, Extra doses, Change in   diet/appetite, Hospital admission, Bruising, Other complaints    Comments:  Spouse reports no changes, resumed warfarin 5 mg and is   bridging with 70 mg of enoxaparin since , no other changes, spouse did   mention that pt's son is no longer able to help with enoxaparin injections   and she is not comfortable doing the injections      Clinical Outcomes     Negatives:  Major bleeding event, Thromboembolic event,   Anticoagulation-related hospital admission, Anticoagulation-related ED   visit, Anticoagulation-related fatality    Comments:  Spouse reports no changes, resumed warfarin 5 mg and is   bridging with 70 mg of enoxaparin since , no other changes, spouse did   mention that pt's son is no longer able to help with enoxaparin  injections   and she is not comfortable doing the injections        INR History:      Latest Ref Rng & Units 4/1/2025    11:00 AM 4/15/2025     4:24 AM 4/29/2025    10:30 AM 5/1/2025     7:52 AM 5/4/2025     8:36 AM 5/7/2025     2:34 PM 5/12/2025     9:30 AM   Anticoagulation Monitoring   INR  2.8  3.9   -- 1.9   INR Date  4/1/2025 4/29/2025 5/12/2025   INR Goal  2.5-3.5  2.5-3.5   2.5-3.5 2.5-3.5   Trend  Same  Same   Same Same   Last Week Total  25 mg  25 mg   2.5 mg 25 mg   Next Week Total  25 mg  5 mg   30 mg 30 mg   Sun  5 mg  Hold (5/4); Otherwise 5 mg   5 mg -   Mon  2.5 mg  Hold (5/5)   - 5 mg (5/12)   Tue  2.5 mg  2.5 mg   - 5 mg (5/13)   Wed  5 mg  2.5 mg (4/30); Otherwise 5 mg   5 mg 5 mg   Thu  2.5 mg  Hold (5/1), 5 mg (5/8)   5 mg (5/8) -   Fri  5 mg  Hold (5/2); Otherwise 5 mg   5 mg -   Sat  2.5 mg  Hold (5/3), 5 mg (5/10)   5 mg (5/10) -   Historical INR 0.90 - 1.10  2.52   2.42  1.47      Visit Report  Report             Plan:  1. INR is Subtherapeutic today- see above in Anticoagulation Summary.  Will instruct Asia Ly to continue 5 mg daily today (5/12), Tuesday (5/13), and Wednesday (5/14) and follow up on Thursday (5/15).  Patient has bridging with enoxaparin since 5/7 due to holding warfarin for a TAVR procedure, which was unfortunately cancelled.  Despite the patient's INR being subtherapeutic today, will not continue to bridge with enoxaparin due to the patient not having a caretaker at home who can provide the daily injections.  Previously, the patient's son was administering enoxaparin, but he has since returned to [place] and unfortunately the patient and the patient's spouse does not feel comfortable giving the injections.  Enoxaparin 70 mg was administered during today's appointment.  See above in Anticoagulation Summary.  2. Follow up in 5/15  3. Patient declines warfarin refills.  4. Verbal and written information provided. Patient expresses understanding and has no further  questions at this time.    Pavan Mcpherson [facility]

## 2025-05-13 ENCOUNTER — TELEPHONE (OUTPATIENT)
Dept: PHARMACY | Facility: HOSPITAL | Age: OVER 89
End: 2025-05-13
Payer: MEDICARE

## 2025-05-13 NOTE — TELEPHONE ENCOUNTER
Spoke with Mr. Ly's spouse, who reports he initiated course of doxycycline this morning. Of note, a history of rash is documented with use of doxycycline. She does not recall this event. Documentation from 4/26/18 appears to note that the rash was not definitely caused by doxycycline and perhaps may have been caused by diltiazem. Since he has already begun taking the doxycycline course, she plans to continue with its use. She is agreeable to notifying the doctor if he develops a rash.     INR check already scheduled for 5/15/25.

## 2025-05-15 ENCOUNTER — ANTICOAGULATION VISIT (OUTPATIENT)
Dept: PHARMACY | Facility: HOSPITAL | Age: OVER 89
End: 2025-05-15
Payer: MEDICARE

## 2025-05-15 DIAGNOSIS — I26.99 ACUTE PULMONARY EMBOLISM WITHOUT ACUTE COR PULMONALE, UNSPECIFIED PULMONARY EMBOLISM TYPE: Primary | ICD-10-CM

## 2025-05-15 LAB
INR PPP: 2.9 (ref 0.91–1.09)
PROTHROMBIN TIME: 34.9 SECONDS (ref 10–13.8)

## 2025-05-15 PROCEDURE — 85610 PROTHROMBIN TIME: CPT

## 2025-05-15 PROCEDURE — G0463 HOSPITAL OUTPT CLINIC VISIT: HCPCS

## 2025-05-15 NOTE — PROGRESS NOTES
Anticoagulation Clinic Progress Note    Anticoagulation Summary  As of 5/15/2025      INR goal:  2.5-3.5   TTR:  80.6% (7.1 mo)   INR used for dosin.9 (5/15/2025)   Warfarin maintenance plan:  5 mg every Sun, Wed, Fri; 2.5 mg all other days   Weekly warfarin total:  25 mg   Plan last modified:  Breanne Jain RPH (10/31/2024)   Next INR check:  2025   Target end date:  --    Indications    Acute pulmonary embolism without acute cor pulmonale  unspecified pulmonary embolism type [I26.99]                 Anticoagulation Episode Summary       INR check location:  --    Preferred lab:  --    Send INR reminders to:   M[facility] MUNIZ [facility]    Comments:  --          Anticoagulation Care Providers       Provider Role Specialty Phone number    Libra Gonzalez APRN Referring Cardiology [phone]            Clinic Interview:  Patient Findings     Positives:  Change in medications    Negatives:  Signs/symptoms of thrombosis, Signs/symptoms of bleeding,   Laboratory test error suspected, Change in health, Change in alcohol use,   Change in activity, Upcoming invasive procedure, Emergency department   visit, Upcoming dental procedure, Missed doses, Extra doses, Change in   diet/appetite, Hospital admission, Bruising, Other complaints    Comments:  Patient started 10-day course of doxycycline on  and will   continue until ; pt's wife reports no other changes      Clinical Outcomes     Negatives:  Major bleeding event, Thromboembolic event,   Anticoagulation-related hospital admission, Anticoagulation-related ED   visit, Anticoagulation-related fatality    Comments:  Patient started 10-day course of doxycycline on  and will   continue until ; pt's wife reports no other changes        INR History:      Latest Ref Rng & Units 4/15/2025     4:24 AM 2025    10:30 AM 2025     7:52 AM 2025     8:36 AM 2025     2:34 PM 2025     9:30 AM 5/15/2025    10:45 AM   Anticoagulation  Monitoring   INR   3.9   -- 1.9 2.9   INR Date   4/29/2025    5/12/2025 5/15/2025   INR Goal   2.5-3.5   2.5-3.5 2.5-3.5 2.5-3.5   Trend   Same   Same Same Same   Last Week Total   25 mg   2.5 mg 25 mg 35 mg   Next Week Total   5 mg   30 mg 30 mg 22.5 mg   Sun   Hold (5/4); Otherwise 5 mg   5 mg - 2.5 mg (5/18)   Mon   Hold (5/5)   - 5 mg (5/12) 2.5 mg   Tue   2.5 mg   - 5 mg (5/13) 2.5 mg   Wed   2.5 mg (4/30); Otherwise 5 mg   5 mg 5 mg 5 mg   Thu   Hold (5/1), 5 mg (5/8)   5 mg (5/8) - 2.5 mg   Fri   Hold (5/2); Otherwise 5 mg   5 mg - 5 mg   Sat   Hold (5/3), 5 mg (5/10)   5 mg (5/10) - 2.5 mg   Historical INR 0.90 - 1.10 2.52   2.42  1.47           Plan:  1. INR is Therapeutic today- see above in Anticoagulation Summary.  Will instruct Asia Ly to continue their warfarin regimen but decrease dose on Sunday (5/18) to 2.5 mg due to doxycycline (from 5/13-5/22) and significant INR increase of 1.0 since 5/12 - see above in Anticoagulation Summary.  2. Follow up in 1 week  3. Patient declines warfarin refills.  4. Verbal and written information provided. Patient expresses understanding and has no further questions at this time.    Pavan Mcpherson, [facility]

## 2025-05-22 ENCOUNTER — ANTICOAGULATION VISIT (OUTPATIENT)
Dept: PHARMACY | Facility: HOSPITAL | Age: OVER 89
End: 2025-05-22
Payer: MEDICARE

## 2025-05-22 DIAGNOSIS — I26.99 ACUTE PULMONARY EMBOLISM WITHOUT ACUTE COR PULMONALE, UNSPECIFIED PULMONARY EMBOLISM TYPE: Primary | ICD-10-CM

## 2025-05-22 LAB
INR PPP: 2 (ref 0.91–1.09)
PROTHROMBIN TIME: 24.2 SECONDS (ref 10–13.8)

## 2025-05-22 PROCEDURE — G0463 HOSPITAL OUTPT CLINIC VISIT: HCPCS

## 2025-05-22 PROCEDURE — 85610 PROTHROMBIN TIME: CPT

## 2025-05-22 NOTE — PROGRESS NOTES
Anticoagulation Clinic Progress Note    Anticoagulation Summary  As of 2025      INR goal:  2.5-3.5   TTR:  79.5% (7.4 mo)   INR used for dosin.0 (2025)   Warfarin maintenance plan:  5 mg every Sun, Wed, Fri; 2.5 mg all other days   Weekly warfarin total:  25 mg   Plan last modified:  Breanne Jain RPH (10/31/2024)   Next INR check:  2025   Target end date:  --    Indications    Acute pulmonary embolism without acute cor pulmonale  unspecified pulmonary embolism type [I26.99]                 Anticoagulation Episode Summary       INR check location:  --    Preferred lab:  --    Send INR reminders to:   [facility] MUNIZ [facility] [place]    Comments:  --          Anticoagulation Care Providers       Provider Role Specialty Phone number    Libra Gonzalez APRN Referring Cardiology [phone]            Clinic Interview:  Patient Findings     Positives:  Change in medications, Change in diet/appetite    Negatives:  Signs/symptoms of thrombosis, Signs/symptoms of bleeding,   Laboratory test error suspected, Change in health, Change in alcohol use,   Change in activity, Upcoming invasive procedure, Emergency department   visit, Upcoming dental procedure, Missed doses, Extra doses, Hospital   admission, Bruising, Other complaints    Comments:  Pt still taking doxycycline, with last dose tomorrow. Reports   slight decrease in appetite, but still eating same type of foods.  Pt's   wife reports no other changes to overall health and medications      Clinical Outcomes     Negatives:  Major bleeding event, Thromboembolic event,   Anticoagulation-related hospital admission, Anticoagulation-related ED   visit, Anticoagulation-related fatality    Comments:  Pt still taking doxycycline, with last dose tomorrow. Reports   slight decrease in appetite, but still eating same type of foods.  Pt's   wife reports no other changes to overall health and medications        INR History:      Latest Ref Rng & Units  4/29/2025    10:30 AM 5/1/2025     7:52 AM 5/4/2025     8:36 AM 5/7/2025     2:34 PM 5/12/2025     9:30 AM 5/15/2025    10:45 AM 5/22/2025     9:30 AM   Anticoagulation Monitoring   INR  3.9   -- 1.9 2.9 2.0   INR Date  4/29/2025    5/12/2025 5/15/2025 5/22/2025   INR Goal  2.5-3.5   2.5-3.5 2.5-3.5 2.5-3.5 2.5-3.5   Trend  Same   Same Same Same Same   Last Week Total  25 mg   2.5 mg 25 mg 35 mg 22.5 mg   Next Week Total  5 mg   30 mg 30 mg 22.5 mg 27.5 mg   Sun  Hold (5/4); Otherwise 5 mg   5 mg - 2.5 mg (5/18) 5 mg   Mon  Hold (5/5)   - 5 mg (5/12) 2.5 mg 2.5 mg   Tue  2.5 mg   - 5 mg (5/13) 2.5 mg 2.5 mg   Wed  2.5 mg (4/30); Otherwise 5 mg   5 mg 5 mg 5 mg 5 mg   Thu  Hold (5/1), 5 mg (5/8)   5 mg (5/8) - 2.5 mg 5 mg (5/22)   Fri  Hold (5/2); Otherwise 5 mg   5 mg - 5 mg 5 mg   Sat  Hold (5/3), 5 mg (5/10)   5 mg (5/10) - 2.5 mg 2.5 mg   Historical INR 0.90 - 1.10  2.42  1.47            Plan:  1. INR is Subtherapeutic today- see above in Anticoagulation Summary.  Will instruct Asia Ly to increase today's (5/22) to 5 mg then continue their warfarin regimen- see above in Anticoagulation Summary.  2. Follow up in 1 week  3. Patient declines warfarin refills.  4. Verbal and written information provided. Patient expresses understanding and has no further questions at this time.    Pavan Mcpherson [facility]

## 2025-05-29 ENCOUNTER — LAB (OUTPATIENT)
Dept: LAB | Facility: HOSPITAL | Age: OVER 89
End: 2025-05-29
Payer: MEDICARE

## 2025-05-29 ENCOUNTER — OFFICE VISIT (OUTPATIENT)
Dept: CARDIOLOGY | Age: OVER 89
End: 2025-05-29
Payer: MEDICARE

## 2025-05-29 ENCOUNTER — ANTICOAGULATION VISIT (OUTPATIENT)
Dept: PHARMACY | Facility: HOSPITAL | Age: OVER 89
End: 2025-05-29
Payer: MEDICARE

## 2025-05-29 VITALS
HEART RATE: 75 BPM | OXYGEN SATURATION: 99 % | DIASTOLIC BLOOD PRESSURE: 60 MMHG | HEIGHT: 68 IN | WEIGHT: 143 LBS | SYSTOLIC BLOOD PRESSURE: 118 MMHG | BODY MASS INDEX: 21.67 KG/M2

## 2025-05-29 DIAGNOSIS — E87.5 HYPERKALEMIA: Primary | ICD-10-CM

## 2025-05-29 DIAGNOSIS — Z51.81 ENCOUNTER FOR THERAPEUTIC DRUG LEVEL MONITORING: ICD-10-CM

## 2025-05-29 DIAGNOSIS — I26.99 ACUTE PULMONARY EMBOLISM WITHOUT ACUTE COR PULMONALE, UNSPECIFIED PULMONARY EMBOLISM TYPE: Primary | ICD-10-CM

## 2025-05-29 DIAGNOSIS — E87.5 HYPERKALEMIA: ICD-10-CM

## 2025-05-29 LAB
ANION GAP SERPL CALCULATED.3IONS-SCNC: 10.2 MMOL/L (ref 5–15)
BUN SERPL-MCNC: 31 MG/DL (ref 8–23)
BUN/CREAT SERPL: 20.7 (ref 7–25)
CALCIUM SPEC-SCNC: 9.3 MG/DL (ref 8.2–9.6)
CHLORIDE SERPL-SCNC: 99 MMOL/L (ref 98–107)
CO2 SERPL-SCNC: 28.8 MMOL/L (ref 22–29)
CREAT SERPL-MCNC: 1.5 MG/DL (ref 0.76–1.27)
EGFRCR SERPLBLD CKD-EPI 2021: 43.4 ML/MIN/1.73
GLUCOSE SERPL-MCNC: 86 MG/DL (ref 65–99)
INR PPP: 3.7 (ref 0.91–1.09)
MAGNESIUM SERPL-MCNC: 1.7 MG/DL (ref 1.7–2.3)
POTASSIUM SERPL-SCNC: 4.3 MMOL/L (ref 3.5–5.2)
PROTHROMBIN TIME: 44.2 SECONDS (ref 10–13.8)
SODIUM SERPL-SCNC: 138 MMOL/L (ref 136–145)

## 2025-05-29 PROCEDURE — 85610 PROTHROMBIN TIME: CPT

## 2025-05-29 PROCEDURE — 80048 BASIC METABOLIC PNL TOTAL CA: CPT

## 2025-05-29 PROCEDURE — 83735 ASSAY OF MAGNESIUM: CPT

## 2025-05-29 PROCEDURE — G0463 HOSPITAL OUTPT CLINIC VISIT: HCPCS

## 2025-05-29 PROCEDURE — 36415 COLL VENOUS BLD VENIPUNCTURE: CPT

## 2025-05-29 NOTE — PROGRESS NOTES
Date of Office Visit: 25  Encounter Provider: KYLAH Jha  Place of Service: [facility] CARDIOLOGY  Patient Name: Asia Ly  :1932    Chief Complaint   Patient presents with    Acute on chronic diastolic CHF (congestive heart failure)    Pulmonary fibrosis    Follow-up   :     HPI: Asia Ly is a 89 y.o. male  with atrial fibrillation, stroke and TIA in , aortic stenosis, hyperlipidemia, B12 anemia, Crohn's disease, colon polyps, lower extremity edema, TIA,  DVT and PE ( 2023), dilated ascending aorta,  pulmonary hypertension and pulmonary fibrosis.      He has been followed by Dr. Hector Rivera. He is now followed by Dr. Brown and I will visit with him in follow up today.  He is also followed by Dr. Rashaun Block with electrophysiology.     Has been on sotalol in the past as well as amiodarone for recurrent atrial fibrillation.  He had Echocardiogram 2016 showed normal left ventricular systolic dysfunction, moderate tricuspid insufficiency, mild mitral insufficiency , mild pulmonary hypertension.  He also had a perfusion stress test that was negative for ischemia.  He was taken off diltiazem for allergic reaction to rash.  2017 he had an echocardiogram which showed an EF of 60%, grade 2 diastolic dysfunction and mild aortic he had increased lower extremity edema and had again rapid atrial fibrillation.  He was started on a diuretic.  He then had pneumonia hypoxia and influenza and was started on Tikosyn.  He has some prolonged QTC complicated by antibiotics.  He then had issues with gout felt to be related to discontinuation of indomethacin and was prescribed colchicine.  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  He then had some atrial fibrillation with RVR and was given oral metoprolol and Cardizem and converted back to sinus rhythm.  He was evaluated by electrophysiology  with Dr. Nettles who felt that his ablation at his age would be very high risk procedure.  He was later started on digoxin for recurrent rapid atrial fibrillation.  He was admitted again January 2019 and electrophysiology felt that he may for AV node ablation and pacemaker implantation.  However he preferred to stay on Tikosyn and accept occasional breakthroughs.     He then had some confusion and upper epigastric abdominal pain he was hospitalized August 2020 found to have transaminitis with hyperbilirubinemia.  He had some atrial fibrillation with RVR received IV metoprolol and his heart rate improved.  Echocardiogram showed normal left ventricular systolic function, mild concentric hypertrophy, moderate aortic valve stenoses with aortic valve area 1.1 cm², maximum pressure gradient of 42 and mean pressure gradient 24 mmHg.  RVSP was severely elevated at 57.8.  He had mild aortic valve regurgitation and mild mitral regurgitation.     In November 2020 was hospitalized with hematochezia and underwent GI evaluation with no evidence of active bleed.  He was cleared to resume apixaban and was later found to have bilateral pneumonia.     He was treated again for pneumonia and had a bronchoscopy July 7.  He is colonized Pseudomonas and infectious disease recommended conservative treatment.  He had some rapid atrial fibrillation during that admission and had a pacemaker placed by Dr. Nettles.    And ultimately had AV node ablation 09/2021. .  His Tikosyn was stopped.      He had nosebleeds and saw ENT in march 2023 who  Performed cauterization with packing and was off eliquis until we restarted it at a lower dose of 2.5 mg twice daily.  His nosebleed stopped but then he got ill with COVID and developed acute left lower extremity DVT May 2023 and he was put back on Eliquis 5 mg twice daily.     He had some increased lower extremity edema which improved with Lasix 40 mg twice daily November 2023.  We then decrease Lasix to  40 mg daily.     He then was admitted in September 2024 after having low oxygen at home.  He reportedly was treated for bronchitis just prior to admission and  Was found to have new pulmonary embolism.  He was treated with heparin drip and then Eliquis was stopped and he was bridged with warfarin.      He was evaluated by Dr. Guilherme Guzman 12/17/2024 for TAVR vs SAVR. TAVR was felt to me the best route. He had CT angio TAVR chest/abdomen/pelvis 1/14/2025 which showed widely patent comic and external iliac arteries with common femoral arteries also patent.  There was concern for right renal artery stenosis, resolution of nonocclusive thrombus in the distal right main pulmonary artery, small to moderate loculated right pleural effusion with stable minimal left pleural effusion and advance fibrosis with bronchiectasis and volume loss was unchanged.  There was passive hepatic gesturing of the liver but no perihepatic fluid.  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     He then had some low oxygen readings and increased swelling February 2025 so Lasix was increased from 20 mg daily to 40 mg daily.  His oxygen saturation improved.    He presents today for reassessment.  He had an issue with gout 5//25 and was prescribed Medrol Dosepak.  His TAVR procedure was canceled 5/6/2025 due to development of cough.  He was treated with antibiotic per his PCP.  He continues to have swelling that accumulates over the day but improves by the morning.  He is taking 40 mg of Lasix daily.  He is scheduled for right knee cortisone shot next week.  No chest pain.  He has shortness of breath with exertion intermittently.  His swelling is stable.  No dizziness or palpitations.  No bleeding issues with warfarin.            Allergies   Allergen Reactions    Amiodarone Unknown (See Comments)     Pulmonary fibrosis    Diltiazem Arrhythmia    Doxycycline Rash    Allopurinol  "Unknown - Low Severity     Other reaction(s): Joint pain      Other Reaction(s): Joint pain    Cefdinir Arrhythmia     A-FIB    Indomethacin Rash           Family and social history reviewed.     ROS  All other systems were reviewed and are negative          Objective:     Vitals:    05/29/25 1027   BP: 118/60   BP Location: Left arm   Patient Position: Sitting   Cuff Size: Adult   Pulse: 75   SpO2: 99%   Weight: 64.9 kg (143 lb)   Height: 172.7 cm (68\")     Body mass index is 21.74 kg/m².    PHYSICAL EXAM:  Pulmonary:      Effort: Pulmonary effort is normal.      Breath sounds: Normal breath sounds.   Cardiovascular:      Normal rate. Regular rhythm.      Murmurs: There is a systolic murmur.   Edema:     Ankle: bilateral trace edema of the ankle.     Feet: bilateral 1+ edema of the feet.        Procedures      Current Outpatient Medications   Medication Sig Dispense Refill    allopurinol (ZYLOPRIM) 100 MG tablet Take 1 tablet by mouth 2 (Two) Times a Day. Indications: Gout      atorvastatin (LIPITOR) 10 MG tablet Take 1 tablet by mouth 3 (Three) Times a Week. M-W-F  Indications: High Amount of Fats in the Blood      Budesonide (ENTOCORT EC) 3 MG 24 hr capsule Take 1 capsule by mouth Every Morning. Indications: Crohn's Disease      Diclofenac Sodium (VOLTAREN) 1 % gel gel Apply 2 g topically to the appropriate area as directed 4 (Four) Times a Day As Needed (pain/swelling). 100 g 0    enoxaparin sodium (LOVENOX) 80 MG/0.8ML solution prefilled syringe syringe Discard 0.1 mL, then inject 0.7 mL (70 mg) under the skin into the appropriate area every 24 hours as directed surrounding upcoming procedure. (Patient taking differently: Discard 0.1 mL, then inject 0.7 mL (70 mg) under the skin into the appropriate area every 24 hours as directed surrounding upcoming procedure.    TO START LOVENOX 5/3/25) 8 mL 0    furosemide (LASIX) 20 MG tablet Take 2 tablets by mouth Daily. Indications: Edema 60 tablet 1    ipratropium " (ATROVENT) 0.02 % nebulizer solution Take 2.5 mL by nebulization 3 (Three) Times a Day. 0.5mg/2.5ml nebulizer treatments three times a day  Indications: Chronic Obstructive Lung Disease      IPRATROPIUM-ALBUTEROL IN Inhale 2 puffs As Needed. Indications: COPD exacerbation, complicated      methylPREDNISolone (MEDROL) 4 MG dose pack Take as directed on package instructions. 21 tablet 0    metoprolol tartrate (LOPRESSOR) 25 MG tablet Take 1 tablet by mouth Every 12 (Twelve) Hours. Indications: High Blood Pressure Disorder 180 tablet 1    O2 (OXYGEN) Inhale 2 L/min At Night As Needed (soa). wears at bedtime  Indications: Oxygen Saturation Decreased      omeprazole (priLOSEC) 40 MG capsule Take 1 capsule by mouth Daily. Indications: Heartburn      warfarin (COUMADIN) 5 MG tablet TAKE 1 TABLET BY MOUTH ON SUNDAY, WEDNESDAY, FRIDAY AND TAKE ONE-HALF TABLET BY MOUTH ALL OTHER DAYS OR AS DIRECTED (Patient taking differently: TAKE 1 TABLET BY MOUTH ON SUNDAY, WEDNESDAY, FRIDAY AND TAKE ONE-HALF TABLET BY MOUTH ALL OTHER DAYS OR AS DIRECTED    HELD FOR OR -  BRIDGE THERAPY WITH LOVENOX STARTING 5/3/25) 65 tablet 0     No current facility-administered medications for this visit.     Assessment:       Diagnosis Plan   1. Hyperkalemia  Basic Metabolic Panel      2. Encounter for therapeutic drug level monitoring  Magnesium           Orders Placed This Encounter   Procedures    Magnesium     Standing Status:   Future     Expected Date:   6/3/2025     Expiration Date:   8/29/2026     Release to patient:   Routine Release [[id number]]    Basic Metabolic Panel     Standing Status:   Future     Expected Date:   6/3/2025     Expiration Date:   8/29/2026     Release to patient:   Routine Release [[id number]]         Plan:       1. 89 year old gentleman with paroxysmal atrial fibrillation.  Off amiodarone due to history of pulmonary fibrosis.  CHADS2 Vascor of 6.  He  had a rash with diltiazem in the past.  In 2021 he had AV node  ablation with permanent pacemaker.    He is now on warfarin with target INR 2.5-3.5.   He also follows with Dr. Rashaun Nettles  2.  Severe aortic valve stenoses.  He met with Dr. Guilherme Guzman and Dr. Margarito Osuna 1/27/25 and TAVR procedure was recommended but then canceled 5/6/2025 due to increased cough and given his pulmonary fibrosis he was felt to be too high risk to proceed.  We will continue medical therapy at this time.  Fluid status appears stable with furosemide 40 mg daily.  Continue metoprolol 25 mg twice daily.    3.  History of TIA no recurrence-continue warfarin at this time.  Target INR 2.5-3.5  4.  Prediabetes  5.  History of Crohn's disease and iron deficient anemia usually sees Dr. Susie Mccoy  6.  Hypertension continue current regimen  7.  Hyperlipidemia-continue without statin.  8.  History of cholecystitis  9.   History of gout-had a flare of his left hand earlier this month but that is better.  10.bilateral pleural effusion-continue Lasix 40 mg daily.  11. COVID 19 infection 04/2023  12.  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 epistaxis requiring cauterization and packing by ENT 03/2023  14.  History of mild dilation of ascending aorta however this measured completely normal on CT angio TAVR chest/abdomen/pelvis 1/14/2025.  15. Pulmonary fibrosis.  He has PRN oxygen that he mainly wears at night. we discussed wearing oxygen as needed to keep oxygen saturation greater than 88%.. Follows with Dr. Chidi Oconnor.    16.  Lower extremity edema-stable with Lasix 40 mg daily.    17.  Recurrent pulmonary embolism 09/2024  while on Eliquis and was switched to warfarin   18.  CKD-I reviewed his creatinine from 5/20/2025 through [facility] Everywhere which showed creatinine of 1.8 with BUN 41 and GFR 35 with potassium 5.4.  I will recheck BMP and magnesium level given his wife's concern that he is not currently on  magnesium  19.  Right knee issue-following with [facility] therapeutic.  Scheduled for cortisone shot 6/2/2025 and okay to proceed.  Apparently this can be done while he is still on warfarin.    I told patient and his wife that I will call tomorrow afternoon with the results of his lab work from today and they verbalized understanding.  See me again in 1 month per his wife's request.  She will call sooner with any changes.          It has been a pleasure to participate in this patient's care.      Thank you,  KYLAH Jha      **I used Dragon to dictate this note:**

## 2025-05-29 NOTE — PROGRESS NOTES
Anticoagulation Clinic Progress Note    Anticoagulation Summary  As of 5/29/2025      INR goal:  2.5-3.5   TTR:  78.9% (7.6 mo)   INR used for dosing:  3.7 (5/29/2025)   Warfarin maintenance plan:  5 mg every Sun, Wed, Fri; 2.5 mg all other days   Weekly warfarin total:  25 mg   No change documented:  Arjun Daily, Pharmacy Technician   Plan last modified:  Breanne Jain RPH (10/31/2024)   Next INR check:  6/3/2025   Target end date:  --    Indications    Acute pulmonary embolism without acute cor pulmonale  unspecified pulmonary embolism type [I26.99]                 Anticoagulation Episode Summary       INR check location:  --    Preferred lab:  --    Send INR reminders to:   [facility]    Comments:  --          Anticoagulation Care Providers       Provider Role Specialty Phone number    Libra Gonzaelz APRN Referring Cardiology [phone]            Clinic Interview:  Patient Findings     Negatives:  Signs/symptoms of thrombosis, Signs/symptoms of bleeding,   Laboratory test error suspected, Change in health, Change in alcohol use,   Change in activity, Upcoming invasive procedure, Emergency department   visit, Upcoming dental procedure, Missed doses, Extra doses, Change in   medications, Change in diet/appetite, Hospital admission, Bruising, Other   complaints      Clinical Outcomes     Negatives:  Major bleeding event, Thromboembolic event,   Anticoagulation-related hospital admission, Anticoagulation-related ED   visit, Anticoagulation-related fatality        INR History:      Latest Ref Rng & Units 5/1/2025     7:52 AM 5/4/2025     8:36 AM 5/7/2025     2:34 PM 5/12/2025     9:30 AM 5/15/2025    10:45 AM 5/22/2025     9:30 AM 5/29/2025     1:00 PM   Anticoagulation Monitoring   INR    -- 1.9 2.9 2.0 3.7   INR Date     5/12/2025 5/15/2025 5/22/2025 5/29/2025   INR Goal    2.5-3.5 2.5-3.5 2.5-3.5 2.5-3.5 2.5-3.5   Trend    Same Same Same Same Same   Last Week Total    2.5 mg 25 mg 35  mg 22.5 mg 27.5 mg   Next Week Total    30 mg 30 mg 22.5 mg 27.5 mg 25 mg   Sun    5 mg - 2.5 mg (5/18) 5 mg 5 mg   Mon    - 5 mg (5/12) 2.5 mg 2.5 mg 2.5 mg   Tue    - 5 mg (5/13) 2.5 mg 2.5 mg -   Wed    5 mg 5 mg 5 mg 5 mg -   Thu    5 mg (5/8) - 2.5 mg 5 mg (5/22) 2.5 mg   Fri    5 mg - 5 mg 5 mg 5 mg   Sat    5 mg (5/10) - 2.5 mg 2.5 mg 2.5 mg   Historical INR 0.90 - 1.10 2.42  1.47         Visit Report        Report       Plan:  1. INR is subtherapeutic today- see above in Anticoagulation Summary.   Will instruct Asia Ly to continue their warfarin regimen as the patient is due to receive less warfarin today than the previous week- see above in Anticoagulation Summary.  2. Follow up in 1 week.  3. Patient declines warfarin refills.  4. Verbal and written information provided. Patient expresses understanding and has no further questions at this time.    Arjun Daily, Pharmacy Technician

## 2025-05-29 NOTE — PROGRESS NOTES
I have supervised and reviewed the notes, assessments, and/or procedures performed. I personally performed the assessment and implemented the plan. I concur with the documentation of this patient encounter.    Breanne Jain, [facility]

## 2025-05-30 ENCOUNTER — RESULTS FOLLOW-UP (OUTPATIENT)
Dept: CARDIOLOGY | Age: OVER 89
End: 2025-05-30
Payer: MEDICARE

## 2025-05-30 NOTE — TELEPHONE ENCOUNTER
I spoke with wife Ni and discussed Renal function stable. Potassium WNL. Magnesium level normal. No supplement necesarry.

## 2025-06-03 ENCOUNTER — ANTICOAGULATION VISIT (OUTPATIENT)
Dept: PHARMACY | Facility: HOSPITAL | Age: OVER 89
End: 2025-06-03
Payer: MEDICARE

## 2025-06-03 DIAGNOSIS — I26.99 ACUTE PULMONARY EMBOLISM WITHOUT ACUTE COR PULMONALE, UNSPECIFIED PULMONARY EMBOLISM TYPE: Primary | ICD-10-CM

## 2025-06-03 LAB
INR PPP: 4 (ref 0.91–1.09)
PROTHROMBIN TIME: 47.6 SECONDS (ref 10–13.8)

## 2025-06-03 PROCEDURE — G0463 HOSPITAL OUTPT CLINIC VISIT: HCPCS

## 2025-06-03 PROCEDURE — 85610 PROTHROMBIN TIME: CPT

## 2025-06-03 NOTE — PROGRESS NOTES
Anticoagulation Clinic Progress Note    Anticoagulation Summary  As of 6/3/2025      INR goal:  2.5-3.5   TTR:  77.2% (7.8 mo)   INR used for dosin.0 (6/3/2025)   Warfarin maintenance plan:  5 mg every Sun, Wed, Fri; 2.5 mg all other days   Weekly warfarin total:  25 mg   Plan last modified:  Breanne Jain RPH (10/31/2024)   Next INR check:  2025   Target end date:  --    Indications    Acute pulmonary embolism without acute cor pulmonale  unspecified pulmonary embolism type [I26.99]                 Anticoagulation Episode Summary       INR check location:  --    Preferred lab:  --    Send INR reminders to:   [facility] [place]    Comments:  --          Anticoagulation Care Providers       Provider Role Specialty Phone number    Libra Gonzalez APRN Referring Cardiology [phone]            Clinic Interview:      INR History:      Latest Ref Rng & Units 2025     8:36 AM 2025     2:34 PM 2025     9:30 AM 5/15/2025    10:45 AM 2025     9:30 AM 2025     1:00 PM 6/3/2025     1:00 PM   Anticoagulation Monitoring   INR   -- 1.9 2.9 2.0 3.7 4.0   INR Date    2025 5/15/2025 2025 2025 6/3/2025   INR Goal   2.5-3.5 2.5-3.5 2.5-3.5 2.5-3.5 2.5-3.5 2.5-3.5   Trend   Same Same Same Same Same Same   Last Week Total   2.5 mg 25 mg 35 mg 22.5 mg 27.5 mg 25 mg   Next Week Total   30 mg 30 mg 22.5 mg 27.5 mg 25 mg 22.5 mg   Sun   5 mg - 2.5 mg () 5 mg 5 mg 5 mg   Mon   - 5 mg () 2.5 mg 2.5 mg 2.5 mg 2.5 mg   Tue   - 5 mg () 2.5 mg 2.5 mg - Hold (6/3); Otherwise 2.5 mg   Wed   5 mg 5 mg 5 mg 5 mg - 5 mg   Thu   5 mg () - 2.5 mg 5 mg () 2.5 mg 2.5 mg   Fri   5 mg - 5 mg 5 mg 5 mg 5 mg   Sat   5 mg (5/10) - 2.5 mg 2.5 mg 2.5 mg 2.5 mg   Historical INR 0.90 - 1.10 1.47          Visit Report       Report        Plan:  1. INR is Supratherapeutic today- see above in Anticoagulation Summary.  Pt had steroid injection yesterday, Instructed for pt to HOLD dose  today, then cont same, casey in 2wks  see above in Anticoagulation Summary.  2. Follow up in 2 weeks  3. Patient declines warfarin refills.  4. Verbal and written information provided. Patient expresses understanding and has no further questions at this time.    Nayely Mock, [facility]

## 2025-06-12 ENCOUNTER — TELEPHONE (OUTPATIENT)
Dept: CARDIOLOGY | Age: OVER 89
End: 2025-06-12

## 2025-06-12 NOTE — TELEPHONE ENCOUNTER
Lets have her give him 3 Lasix each day through Sunday.  His usual dosing is 2 tablets each day so he should have an extra tablet now and he will take his usual 2 tablets in the morning with the third tablet approximately 6 hours later.  I would like her to call on Monday morning with an update of his swelling and if better my plan is to go back down to 2 tablets each day so we do not risk dehydrating him.  Does she have a left leg 6 to give him an extra tablet these 4 days?

## 2025-06-12 NOTE — TELEPHONE ENCOUNTER
Caller: SURY Ly    Relationship to patient: Emergency Contact    Best call back number: [phone]    Patient is needing: PTS WIFE IS CALLING IN LET CHEN DAWSON KNOW THAT THE PTS LEGS AND ANKLES ARE NOW SWELLING IN THE DAY TOO, NOT JUST AT NIGHT. WANTS TO KNOW WHAT SHE NEEDS TO DO.   PLEASE GIVE A CALLBACK TO ADVISE.

## 2025-06-12 NOTE — TELEPHONE ENCOUNTER
Notified patient's wife of recommendations. She verbalized understanding. She said they have enough Lasix.     Pavithra Guillaume RN  Triage MG

## 2025-06-16 ENCOUNTER — TELEPHONE (OUTPATIENT)
Dept: CARDIOLOGY | Age: OVER 89
End: 2025-06-16
Payer: MEDICARE

## 2025-06-16 DIAGNOSIS — I35.0 NONRHEUMATIC AORTIC VALVE STENOSIS: ICD-10-CM

## 2025-06-16 DIAGNOSIS — I50.32 CHRONIC DIASTOLIC CONGESTIVE HEART FAILURE: ICD-10-CM

## 2025-06-16 DIAGNOSIS — Z51.81 ENCOUNTER FOR THERAPEUTIC DRUG LEVEL MONITORING: Primary | ICD-10-CM

## 2025-06-16 RX ORDER — FUROSEMIDE 20 MG/1
60 TABLET ORAL DAILY
Qty: 90 TABLET | Refills: 3 | Status: SHIPPED | OUTPATIENT
Start: 2025-06-16

## 2025-06-16 NOTE — TELEPHONE ENCOUNTER
Caller: SURY Ly    Relationship to patient: Emergency Contact    Best call back number:  [phone]    Patient is needing: SINCE PATIENT STARTED TAKING 3 LASIX THREE DAYS HIS SWELLING HAS WENT DOWN IN THE MORNING. HE DOES HAVE SLIGHT VERY LITTLE SWELLING IN THE EVENING.         Patient's Warfarin therapy was stopped on 9/6 and has remained on Lovenox only. Discharged from [facility]. Ambulatory anticoagulation flowsheet, patient ist membership and care team info updated.

## 2025-06-16 NOTE — TELEPHONE ENCOUNTER
Mi-please relay it is okay to continue 3 tablets each day.  I will place an order for BMP and I want him to have these drawn Thursday or Friday this week.  Most Lasix sent to [facility]

## 2025-06-17 ENCOUNTER — ANTICOAGULATION VISIT (OUTPATIENT)
Dept: PHARMACY | Facility: HOSPITAL | Age: OVER 89
End: 2025-06-17
Payer: MEDICARE

## 2025-06-17 DIAGNOSIS — I26.99 ACUTE PULMONARY EMBOLISM WITHOUT ACUTE COR PULMONALE, UNSPECIFIED PULMONARY EMBOLISM TYPE: Primary | ICD-10-CM

## 2025-06-17 LAB
INR PPP: 2.7 (ref 0.91–1.09)
PROTHROMBIN TIME: 32.5 SECONDS (ref 10–13.8)

## 2025-06-17 PROCEDURE — G0463 HOSPITAL OUTPT CLINIC VISIT: HCPCS

## 2025-06-17 PROCEDURE — 85610 PROTHROMBIN TIME: CPT

## 2025-06-17 NOTE — PROGRESS NOTES
I have supervised and reviewed the notes, assessments, and/or procedures performed. I personally performed the assessment and implemented the plan. I concur with the documentation of this patient encounter.    Breanne Jain, [facility]

## 2025-06-17 NOTE — PROGRESS NOTES
Anticoagulation Clinic Progress Note    Anticoagulation Summary  As of 2025      INR goal:  2.5-3.5   TTR:  76.3% (8.2 mo)   INR used for dosin.7 (2025)   Warfarin maintenance plan:  5 mg every Sun, Wed, Fri; 2.5 mg all other days   Weekly warfarin total:  25 mg   No change documented:  Romi Horne   Plan last modified:  Breanne Jain RPH (10/31/2024)   Next INR check:  2025   Target end date:  --    Indications    Acute pulmonary embolism without acute cor pulmonale  unspecified pulmonary embolism type [I26.99]                 Anticoagulation Episode Summary       INR check location:  --    Preferred lab:  --    Send INR reminders to:   [facility]    Comments:  --          Anticoagulation Care Providers       Provider Role Specialty Phone number    Libra Gonzalez, KYLAH Referring Cardiology [phone]            Clinic Interview:  Patient Findings     Negatives:  Signs/symptoms of thrombosis, Signs/symptoms of bleeding,   Laboratory test error suspected, Change in health, Change in alcohol use,   Change in activity, Upcoming invasive procedure, Emergency department   visit, Upcoming dental procedure, Missed doses, Extra doses, Change in   medications, Change in diet/appetite, Hospital admission, Bruising, Other   complaints      Clinical Outcomes     Negatives:  Major bleeding event, Thromboembolic event,   Anticoagulation-related hospital admission, Anticoagulation-related ED   visit, Anticoagulation-related fatality        INR History:      2025     2:34 PM 2025     9:30 AM 5/15/2025    10:45 AM 2025     9:30 AM 2025     1:00 PM 6/3/2025     1:00 PM 2025    10:15 AM   Anticoagulation Monitoring   INR -- 1.9 2.9 2.0 3.7 4.0 2.7   INR Date  2025 5/15/2025 2025 2025 6/3/2025 2025   INR Goal 2.5-3.5 2.5-3.5 2.5-3.5 2.5-3.5 2.5-3.5 2.5-3.5 2.5-3.5   Trend Same Same Same Same Same Same Same   Last Week Total 2.5 mg 25 mg  35 mg 22.5 mg 27.5 mg 25 mg 25 mg   Next Week Total 30 mg 30 mg 22.5 mg 27.5 mg 25 mg 22.5 mg 25 mg   Sun 5 mg - 2.5 mg (5/18) 5 mg 5 mg 5 mg 5 mg   Mon - 5 mg (5/12) 2.5 mg 2.5 mg 2.5 mg 2.5 mg 2.5 mg   Tue - 5 mg (5/13) 2.5 mg 2.5 mg - Hold (6/3); Otherwise 2.5 mg 2.5 mg   Wed 5 mg 5 mg 5 mg 5 mg - 5 mg 5 mg   Thu 5 mg (5/8) - 2.5 mg 5 mg (5/22) 2.5 mg 2.5 mg 2.5 mg   Fri 5 mg - 5 mg 5 mg 5 mg 5 mg 5 mg   Sat 5 mg (5/10) - 2.5 mg 2.5 mg 2.5 mg 2.5 mg 2.5 mg   Visit Report     Report         Plan:  1. INR is therapeutic today- see above in Anticoagulation Summary.   Will instruct Asia Ly to continue their warfarin regimen- see above in Anticoagulation Summary.  2. Follow up in 2 weeks.  3. Patient declines warfarin refills.  4. Verbal and written information provided. Patient expresses understanding and has no further questions at this time.    Romi Horne

## 2025-06-19 ENCOUNTER — LAB (OUTPATIENT)
Dept: LAB | Facility: HOSPITAL | Age: OVER 89
End: 2025-06-19
Payer: MEDICARE

## 2025-06-19 ENCOUNTER — RESULTS FOLLOW-UP (OUTPATIENT)
Dept: CARDIOLOGY | Age: OVER 89
End: 2025-06-19
Payer: MEDICARE

## 2025-06-19 DIAGNOSIS — Z51.81 ENCOUNTER FOR THERAPEUTIC DRUG LEVEL MONITORING: ICD-10-CM

## 2025-06-19 DIAGNOSIS — I50.32 CHRONIC DIASTOLIC CONGESTIVE HEART FAILURE: ICD-10-CM

## 2025-06-19 DIAGNOSIS — I35.0 NONRHEUMATIC AORTIC VALVE STENOSIS: ICD-10-CM

## 2025-06-19 LAB
ANION GAP SERPL CALCULATED.3IONS-SCNC: 13.5 MMOL/L (ref 5–15)
BUN SERPL-MCNC: 38 MG/DL (ref 8–23)
BUN/CREAT SERPL: 27.7 (ref 7–25)
CALCIUM SPEC-SCNC: 8.9 MG/DL (ref 8.2–9.6)
CHLORIDE SERPL-SCNC: 100 MMOL/L (ref 98–107)
CO2 SERPL-SCNC: 26.5 MMOL/L (ref 22–29)
CREAT SERPL-MCNC: 1.37 MG/DL (ref 0.76–1.27)
EGFRCR SERPLBLD CKD-EPI 2021: 48.4 ML/MIN/1.73
GLUCOSE SERPL-MCNC: 86 MG/DL (ref 65–99)
POTASSIUM SERPL-SCNC: 4.5 MMOL/L (ref 3.5–5.2)
SODIUM SERPL-SCNC: 140 MMOL/L (ref 136–145)

## 2025-06-19 PROCEDURE — 36415 COLL VENOUS BLD VENIPUNCTURE: CPT

## 2025-06-19 PROCEDURE — 80048 BASIC METABOLIC PNL TOTAL CA: CPT

## 2025-06-19 NOTE — PROGRESS NOTES
RM:________                            : 1932  AGE: 89 y.o.      WT: ____________ HT: ______ BP: __________ HR ______   02% _______                   VISIT:   F/U   HOSP  CC __________________________ OTHER _________                                                  PACER/ ICD        EKG TODAY:  Y /  N        SMOKING: Y / N   PPD ________      EXERCISE: ____________________________________________________      SLEEP STUDY : Y / N     POS / NEG    CPAP / BIPAP     WEARING:  Y / N       DIAGNOSIS: ___________________________________   REFILLS  Y / N      CP _____  SOA______ EDEMA_____ DIZZINESS____ PALPITATIONS____

## 2025-06-19 NOTE — TELEPHONE ENCOUNTER
I spoke with his wife.  Swelling is much improved with 60 mg Lasix daily.  He will continue this dose.  Creatinine improved.  BUN elevated but we will continue to follow since his symptoms are better.  She will call with any issues.

## 2025-06-23 ENCOUNTER — RESULTS FOLLOW-UP (OUTPATIENT)
Dept: CARDIOLOGY | Age: OVER 89
End: 2025-06-23

## 2025-06-23 ENCOUNTER — OFFICE VISIT (OUTPATIENT)
Dept: CARDIOLOGY | Age: OVER 89
End: 2025-06-23
Payer: MEDICARE

## 2025-06-23 ENCOUNTER — HOSPITAL ENCOUNTER (OUTPATIENT)
Dept: CARDIOLOGY | Facility: HOSPITAL | Age: OVER 89
Discharge: HOME | End: 2025-06-23
Admitting: NURSE PRACTITIONER
Payer: MEDICARE

## 2025-06-23 VITALS
BODY MASS INDEX: 21.82 KG/M2 | WEIGHT: 144 LBS | SYSTOLIC BLOOD PRESSURE: 104 MMHG | HEIGHT: 68 IN | OXYGEN SATURATION: 97 % | HEART RATE: 67 BPM | DIASTOLIC BLOOD PRESSURE: 70 MMHG

## 2025-06-23 DIAGNOSIS — I50.32 CHRONIC DIASTOLIC CONGESTIVE HEART FAILURE: ICD-10-CM

## 2025-06-23 DIAGNOSIS — Z51.81 ENCOUNTER FOR THERAPEUTIC DRUG LEVEL MONITORING: ICD-10-CM

## 2025-06-23 DIAGNOSIS — I35.0 NONRHEUMATIC AORTIC VALVE STENOSIS: ICD-10-CM

## 2025-06-23 DIAGNOSIS — N17.9 AKI (ACUTE KIDNEY INJURY): ICD-10-CM

## 2025-06-23 DIAGNOSIS — I35.0 NONRHEUMATIC AORTIC VALVE STENOSIS: Primary | ICD-10-CM

## 2025-06-23 LAB
ANION GAP SERPL CALCULATED.3IONS-SCNC: 13.1 MMOL/L (ref 5–15)
BUN SERPL-MCNC: 39 MG/DL (ref 8–23)
BUN/CREAT SERPL: 21.7 (ref 7–25)
CALCIUM SPEC-SCNC: 9.4 MG/DL (ref 8.2–9.6)
CHLORIDE SERPL-SCNC: 98 MMOL/L (ref 98–107)
CO2 SERPL-SCNC: 24.9 MMOL/L (ref 22–29)
CREAT SERPL-MCNC: 1.8 MG/DL (ref 0.76–1.27)
EGFRCR SERPLBLD CKD-EPI 2021: 34.9 ML/MIN/1.73
GLUCOSE SERPL-MCNC: 111 MG/DL (ref 65–99)
POTASSIUM SERPL-SCNC: 5 MMOL/L (ref 3.5–5.2)
SODIUM SERPL-SCNC: 136 MMOL/L (ref 136–145)

## 2025-06-23 PROCEDURE — 36415 COLL VENOUS BLD VENIPUNCTURE: CPT

## 2025-06-23 PROCEDURE — 99214 OFFICE O/P EST MOD 30 MIN: CPT | Performed by: NURSE PRACTITIONER

## 2025-06-23 PROCEDURE — 80048 BASIC METABOLIC PNL TOTAL CA: CPT | Performed by: NURSE PRACTITIONER

## 2025-06-23 NOTE — PROGRESS NOTES
Date of Office Visit: 25  Encounter Provider: KYLAH Jha  Place of Service: [facility] CARDIOLOGY  Patient Name: Asia Ly  :1932    Chief Complaint   Patient presents with    Follow-up    Acute on chronic diastolic CHF (congestive heart failure)   :     HPI: Asia Ly is a 89 y.o. male  with atrial fibrillation, stroke and TIA in , aortic stenosis, hyperlipidemia, B12 anemia, Crohn's disease, colon polyps, lower extremity edema, TIA,  DVT and PE ( 2023), dilated ascending aorta,  pulmonary hypertension and pulmonary fibrosis.      He has been followed by Dr. Hector Rivera. He is now followed by Dr. Brown and I will visit with him in follow up today.  He is also followed by Dr. Rashaun Block with electrophysiology.     Has been on sotalol in the past as well as amiodarone for recurrent atrial fibrillation.  He had Echocardiogram 2016 showed normal left ventricular systolic dysfunction, moderate tricuspid insufficiency, mild mitral insufficiency , mild pulmonary hypertension.  He also had a perfusion stress test that was negative for ischemia.  He was taken off diltiazem for allergic reaction to rash.  2017 he had an echocardiogram which showed an EF of 60%, grade 2 diastolic dysfunction and mild aortic he had increased lower extremity edema and had again rapid atrial fibrillation.  He was started on a diuretic.  He then had pneumonia hypoxia and influenza and was started on Tikosyn.  He has some prolonged QTC complicated by antibiotics.  He then had issues with gout felt to be related to discontinuation of indomethacin and was prescribed colchicine.  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  He then had some atrial fibrillation with RVR and was given oral metoprolol and Cardizem and converted back to sinus rhythm.  He was evaluated by electrophysiology with Dr. Nettles who  felt that his ablation at his age would be very high risk procedure.  He was later started on digoxin for recurrent rapid atrial fibrillation.  He was admitted again January 2019 and electrophysiology felt that he may for AV node ablation and pacemaker implantation.  However he preferred to stay on Tikosyn and accept occasional breakthroughs.     He then had some confusion and upper epigastric abdominal pain he was hospitalized August 2020 found to have transaminitis with hyperbilirubinemia.  He had some atrial fibrillation with RVR received IV metoprolol and his heart rate improved.  Echocardiogram showed normal left ventricular systolic function, mild concentric hypertrophy, moderate aortic valve stenoses with aortic valve area 1.1 cm², maximum pressure gradient of 42 and mean pressure gradient 24 mmHg.  RVSP was severely elevated at 57.8.  He had mild aortic valve regurgitation and mild mitral regurgitation.     In November 2020 was hospitalized with hematochezia and underwent GI evaluation with no evidence of active bleed.  He was cleared to resume apixaban and was later found to have bilateral pneumonia.     He was treated again for pneumonia and had a bronchoscopy July 7.  He is colonized Pseudomonas and infectious disease recommended conservative treatment.  He had some rapid atrial fibrillation during that admission and had a pacemaker placed by Dr. Nettles.    And ultimately had AV node ablation 09/2021. .  His Tikosyn was stopped.      He had nosebleeds and saw ENT in march 2023 who  Performed cauterization with packing and was off eliquis until we restarted it at a lower dose of 2.5 mg twice daily.  His nosebleed stopped but then he got ill with COVID and developed acute left lower extremity DVT May 2023 and he was put back on Eliquis 5 mg twice daily.     He had some increased lower extremity edema which improved with Lasix 40 mg twice daily November 2023.  We then decrease Lasix to 40 mg daily.     He  then was admitted in September 2024 after having low oxygen at home.  He reportedly was treated for bronchitis just prior to admission and  Was found to have new pulmonary embolism.  He was treated with heparin drip and then Eliquis was stopped and he was bridged with warfarin.      He was evaluated by Dr. Guilherme Guzman 12/17/2024 for TAVR vs SAVR. TAVR was felt to me the best route. He had CT angio TAVR chest/abdomen/pelvis 1/14/2025 which showed widely patent comic and external iliac arteries with common femoral arteries also patent.  There was concern for right renal artery stenosis, resolution of nonocclusive thrombus in the distal right main pulmonary artery, small to moderate loculated right pleural effusion with stable minimal left pleural effusion and advance fibrosis with bronchiectasis and volume loss was unchanged.  There was passive hepatic gesturing of the liver but no perihepatic fluid.  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     He then had some low oxygen readings and increased swelling February 2025 so Lasix was increased from 20 mg daily to 40 mg daily.  His oxygen saturation improved.     In May 2025 he had an issue with gout and was prescribed Medrol Dosepak.  His TAVR procedure was canceled on 5/6/2025 due to development of cough.  He presents today for reassessment.  Has been on Lasix 60 mg daily and his swelling is now much better.  His breathing also improved.  No chest pain tightness or pressure or dizziness or palpitation.  He is accompanied by his spouse, Ni who helps him out tremendously at home.            Allergies   Allergen Reactions    Amiodarone Unknown (See Comments)     Pulmonary fibrosis    Diltiazem Arrhythmia    Doxycycline Rash    Allopurinol Unknown - Low Severity     Other reaction(s): Joint pain      Other Reaction(s): Joint pain    Cefdinir Arrhythmia     A-FIB    Indomethacin Rash  "          Family and social history reviewed.     ROS  All other systems were reviewed and are negative          Objective:     Vitals:    06/23/25 1355   BP: 104/70   Pulse: 67   SpO2: 97%   Weight: 65.3 kg (144 lb)   Height: 172.7 cm (68\")     Body mass index is 21.9 kg/m².    PHYSICAL EXAM:  Pulmonary:      Effort: Pulmonary effort is normal.   Cardiovascular:      Normal rate. Regular rhythm.      Murmurs: There is a grade 3/6 systolic murmur.         Procedures      Current Outpatient Medications   Medication Sig Dispense Refill    allopurinol (ZYLOPRIM) 100 MG tablet Take 1 tablet by mouth 2 (Two) Times a Day. Indications: Gout      atorvastatin (LIPITOR) 10 MG tablet Take 1 tablet by mouth 3 (Three) Times a Week. M-W-F  Indications: High Amount of Fats in the Blood      Budesonide (ENTOCORT EC) 3 MG 24 hr capsule Take 1 capsule by mouth Every Morning. Indications: Crohn's Disease      Diclofenac Sodium (VOLTAREN) 1 % gel gel Apply 2 g topically to the appropriate area as directed 4 (Four) Times a Day As Needed (pain/swelling). 100 g 0    enoxaparin sodium (LOVENOX) 80 MG/0.8ML solution prefilled syringe syringe Discard 0.1 mL, then inject 0.7 mL (70 mg) under the skin into the appropriate area every 24 hours as directed surrounding upcoming procedure. (Patient taking differently: Discard 0.1 mL, then inject 0.7 mL (70 mg) under the skin into the appropriate area every 24 hours as directed surrounding upcoming procedure.    TO START LOVENOX 5/3/25) 8 mL 0    furosemide (LASIX) 20 MG tablet Take 3 tablets by mouth Daily. Indications: Edema 90 tablet 3    ipratropium (ATROVENT) 0.02 % nebulizer solution Take 2.5 mL by nebulization 3 (Three) Times a Day. 0.5mg/2.5ml nebulizer treatments three times a day  Indications: Chronic Obstructive Lung Disease      IPRATROPIUM-ALBUTEROL IN Inhale 2 puffs As Needed. Indications: COPD exacerbation, complicated      metoprolol tartrate (LOPRESSOR) 25 MG tablet Take 1 tablet " by mouth Every 12 (Twelve) Hours. Indications: High Blood Pressure Disorder 180 tablet 1    O2 (OXYGEN) Inhale 2 L/min At Night As Needed (soa). wears at bedtime  Indications: Oxygen Saturation Decreased      omeprazole (priLOSEC) 40 MG capsule Take 1 capsule by mouth Daily. Indications: Heartburn      warfarin (COUMADIN) 5 MG tablet TAKE 1 TABLET BY MOUTH ON SUNDAY, WEDNESDAY, FRIDAY AND TAKE ONE-HALF TABLET BY MOUTH ALL OTHER DAYS OR AS DIRECTED (Patient taking differently: TAKE 1 TABLET BY MOUTH ON SUNDAY, WEDNESDAY, FRIDAY AND TAKE ONE-HALF TABLET BY MOUTH ALL OTHER DAYS OR AS DIRECTED    HELD FOR OR -  BRIDGE THERAPY WITH LOVENOX STARTING 5/3/25) 65 tablet 0     No current facility-administered medications for this visit.     Assessment:       Diagnosis Plan   1. Nonrheumatic aortic valve stenosis  Basic Metabolic Panel      2. Chronic diastolic congestive heart failure  Basic Metabolic Panel      3. Encounter for therapeutic drug level monitoring  Basic Metabolic Panel           Orders Placed This Encounter   Procedures    Basic Metabolic Panel     Standing Status:   Future     Number of Occurrences:   1     Expected Date:   6/28/2025     Expiration Date:   9/23/2026     Release to patient:   Routine Release [[id number]]         Plan:       1. 89 year old gentleman with paroxysmal atrial fibrillation.  Off amiodarone due to history of pulmonary fibrosis.  CHADS2 Vascor of 6.  He  had a rash with diltiazem in the past.  In 2021 he had AV node ablation with permanent pacemaker.    He is now on warfarin with target INR 2.5-3.5.   He also follows with Dr. Rashaun Nettles  2.  Severe aortic valve stenoses.  He met with Dr. Guilherme Guzman and Dr. Margarito Osuna 1/27/25 and TAVR procedure was recommended but then canceled 5/6/2025 due to increased cough and given his pulmonary fibrosis he was felt to be too high risk to proceed.  -  We will continue medical therapy at this time.  Fluid status appears stable  with furosemide 60 mg daily.  Continue metoprolol 25 mg twice daily.    - I will check renal function today on this regimen and providing his kidneys remain stable then we will continue current plan until his next follow-up in August.  3.  History of TIA no recurrence-continue warfarin at this time.  Target INR 2.5-3.5  4.  Prediabetes  5.  History of Crohn's disease and iron deficient anemia usually sees Dr. Susie Mccoy  6.  Hypertension continue current regimen  7.  Hyperlipidemia-continue without statin.  8.  History of cholecystitis  9.   History of gout-had a flare of his left hand 05/2025  10.chronic kidney disease-I will recheck BMP today  11. COVID 19 infection 04/2023  12.  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 epistaxis requiring cauterization and packing by ENT 03/2023  14.  History of mild dilation of ascending aorta however this measured completely normal on CT angio TAVR chest/abdomen/pelvis 1/14/2025.  15. Pulmonary fibrosis.  He has PRN oxygen that he mainly wears at night. we discussed wearing oxygen as needed to keep oxygen saturation greater than 88%.. Follows with Dr. Chidi Oconnor.    16.  Lower extremity edema-improved since increasing Lasix up to 60 mg daily.  17.  Recurrent pulmonary embolism 09/2024  while on Eliquis and was switched to warfarin   18.  Right knee issue-following with [facility] therapeutic.  He had cortisone shot earlier this month.  He swells more in this lower extremity as well.      Plan to keep office appointment as scheduled and call sooner with any questions or concerns.    addendum-creatinine increased to 1.8.  Will start to lower Lasix from 3 tablets every day to patient alternating between 2 tablets and 3 tablets every other day.  Plan to recheck BMP in 1 week on the same day as INR check 7/1/2025 and patient will see me again in 2 weeks on July 8.        It has been a pleasure to participate  in this patient's care.      Thank you,  KYLAH Jha      **I used Dragon to dictate this note:**

## 2025-06-23 NOTE — TELEPHONE ENCOUNTER
I called and spoke with patient's wife 6/20/2025 approximately 8:40 AM.  Discussed renal function has declined. We will alternate between  40 mg and 60 mg lasix to help preserve renal function.  Will plan repeat BMP in 1 week and they will monitor his fluid status and swelling.  She would like for him to see me again before his August appointment with Dr. Brown so I will see him in approximately 2 weeks.  His wife, Ni verbalized understanding she will only give him 2 tablets of Lasix today and then give him 3 tablets tomorrow and she will alternate with this regimen..

## 2025-06-24 RX ORDER — FUROSEMIDE 20 MG/1
TABLET ORAL
Start: 2025-06-24

## 2025-07-01 ENCOUNTER — LAB (OUTPATIENT)
Dept: LAB | Facility: HOSPITAL | Age: OVER 89
End: 2025-07-01
Payer: MEDICARE

## 2025-07-01 ENCOUNTER — ANTICOAGULATION VISIT (OUTPATIENT)
Dept: PHARMACY | Facility: HOSPITAL | Age: OVER 89
End: 2025-07-01
Payer: MEDICARE

## 2025-07-01 DIAGNOSIS — N17.9 AKI (ACUTE KIDNEY INJURY): ICD-10-CM

## 2025-07-01 DIAGNOSIS — I50.32 CHRONIC DIASTOLIC CONGESTIVE HEART FAILURE: ICD-10-CM

## 2025-07-01 DIAGNOSIS — Z51.81 ENCOUNTER FOR THERAPEUTIC DRUG LEVEL MONITORING: ICD-10-CM

## 2025-07-01 DIAGNOSIS — I26.99 ACUTE PULMONARY EMBOLISM WITHOUT ACUTE COR PULMONALE, UNSPECIFIED PULMONARY EMBOLISM TYPE: Primary | ICD-10-CM

## 2025-07-01 LAB
ANION GAP SERPL CALCULATED.3IONS-SCNC: 10 MMOL/L (ref 5–15)
BUN SERPL-MCNC: 30 MG/DL (ref 8–23)
BUN/CREAT SERPL: 20.5 (ref 7–25)
CALCIUM SPEC-SCNC: 9.3 MG/DL (ref 8.2–9.6)
CHLORIDE SERPL-SCNC: 98 MMOL/L (ref 98–107)
CO2 SERPL-SCNC: 29 MMOL/L (ref 22–29)
CREAT SERPL-MCNC: 1.46 MG/DL (ref 0.76–1.27)
EGFRCR SERPLBLD CKD-EPI 2021: 44.8 ML/MIN/1.73
GLUCOSE SERPL-MCNC: 88 MG/DL (ref 65–99)
INR PPP: 3.5 (ref 0.91–1.09)
POTASSIUM SERPL-SCNC: 4.3 MMOL/L (ref 3.5–5.2)
PROTHROMBIN TIME: 41.9 SECONDS (ref 10–13.8)
SODIUM SERPL-SCNC: 137 MMOL/L (ref 136–145)

## 2025-07-01 PROCEDURE — G0463 HOSPITAL OUTPT CLINIC VISIT: HCPCS

## 2025-07-01 PROCEDURE — 36415 COLL VENOUS BLD VENIPUNCTURE: CPT

## 2025-07-01 PROCEDURE — 85610 PROTHROMBIN TIME: CPT

## 2025-07-01 PROCEDURE — 80048 BASIC METABOLIC PNL TOTAL CA: CPT

## 2025-07-01 RX ORDER — WARFARIN SODIUM 5 MG/1
TABLET ORAL
Qty: 65 TABLET | Refills: 1 | Status: SHIPPED | OUTPATIENT
Start: 2025-07-01

## 2025-07-01 NOTE — PROGRESS NOTES
Anticoagulation Clinic Progress Note    Anticoagulation Summary  As of 7/1/2025      INR goal:  2.5-3.5   TTR:  77.6% (8.7 mo)   INR used for dosing:  3.5 (7/1/2025)   Warfarin maintenance plan:  5 mg every Sun, Wed, Fri; 2.5 mg all other days   Weekly warfarin total:  25 mg   No change documented:  Jeromy Putnam, PharmD   Plan last modified:  Breanne Jain RPH (10/31/2024)   Next INR check:  7/29/2025   Target end date:  --    Indications    Acute pulmonary embolism without acute cor pulmonale  unspecified pulmonary embolism type [I26.99]                 Anticoagulation Episode Summary       INR check location:  --    Preferred lab:  --    Send INR reminders to:   MADHU MUNIZ [facility]    Comments:  --          Anticoagulation Care Providers       Provider Role Specialty Phone number    Carlos Libra NORIS, KYLAH Referring Cardiology [phone]            Clinic Interview:  Patient Findings     Positives:  Change in health, Change in medications    Negatives:  Signs/symptoms of thrombosis, Signs/symptoms of bleeding,   Laboratory test error suspected, Change in alcohol use, Change in   activity, Upcoming invasive procedure, Emergency department visit,   Upcoming dental procedure, Missed doses, Extra doses, Change in   diet/appetite, Hospital admission, Bruising, Other complaints    Comments:  Reports some fluid retention recently, for which his   furosemide dose was adjusted  with improvement.       Clinical Outcomes     Negatives:  Major bleeding event, Thromboembolic event,   Anticoagulation-related hospital admission, Anticoagulation-related ED   visit, Anticoagulation-related fatality    Comments:  Reports some fluid retention recently, for which his   furosemide dose was adjusted  with improvement.         INR History:      5/12/2025     9:30 AM 5/15/2025    10:45 AM 5/22/2025     9:30 AM 5/29/2025     1:00 PM 6/3/2025     1:00 PM 6/17/2025    10:15 AM 7/1/2025    10:15 AM   Anticoagulation Monitoring    INR 1.9 2.9 2.0 3.7 4.0 2.7 3.5   INR Date 5/12/2025 5/15/2025 5/22/2025 5/29/2025 6/3/2025 6/17/2025 7/1/2025   INR Goal 2.5-3.5 2.5-3.5 2.5-3.5 2.5-3.5 2.5-3.5 2.5-3.5 2.5-3.5   Trend Same Same Same Same Same Same Same   Last Week Total 25 mg 35 mg 22.5 mg 27.5 mg 25 mg 25 mg 25 mg   Next Week Total 30 mg 22.5 mg 27.5 mg 25 mg 22.5 mg 25 mg 25 mg   Sun - 2.5 mg (5/18) 5 mg 5 mg 5 mg 5 mg 5 mg   Mon 5 mg (5/12) 2.5 mg 2.5 mg 2.5 mg 2.5 mg 2.5 mg 2.5 mg   Tue 5 mg (5/13) 2.5 mg 2.5 mg - Hold (6/3); Otherwise 2.5 mg 2.5 mg 2.5 mg   Wed 5 mg 5 mg 5 mg - 5 mg 5 mg 5 mg   Thu - 2.5 mg 5 mg (5/22) 2.5 mg 2.5 mg 2.5 mg 2.5 mg   Fri - 5 mg 5 mg 5 mg 5 mg 5 mg 5 mg   Sat - 2.5 mg 2.5 mg 2.5 mg 2.5 mg 2.5 mg 2.5 mg   Visit Report    Report          Plan:  1. INR is Therapeutic today- see above in Anticoagulation Summary.  Will instruct Asia Ly to Continue their warfarin regimen - see above in Anticoagulation Summary.  2. Follow up in 4 weeks.  3. Patient desires warfarin refills.  4. Verbal and written information provided. Patient expresses understanding and has no further questions at this time.    Jeromy Putnam, PharmD

## 2025-07-02 ENCOUNTER — ANTICOAGULATION VISIT (OUTPATIENT)
Dept: PHARMACY | Facility: HOSPITAL | Age: OVER 89
End: 2025-07-02
Payer: MEDICARE

## 2025-07-02 DIAGNOSIS — I26.99 ACUTE PULMONARY EMBOLISM WITHOUT ACUTE COR PULMONALE, UNSPECIFIED PULMONARY EMBOLISM TYPE: Primary | ICD-10-CM

## 2025-07-02 NOTE — PROGRESS NOTES
Anticoagulation Clinic Progress Note    Anticoagulation Summary  As of 7/2/2025      INR goal:  2.5-3.5   TTR:  77.6% (8.7 mo)   INR used for dosing:  No new INR was available at the time of this encounter.   Warfarin maintenance plan:  5 mg every Sun, Wed, Fri; 2.5 mg all other days   Weekly warfarin total:  25 mg   Plan last modified:  Breanne Jain RPH (10/31/2024)   Next INR check:  7/8/2025   Target end date:  --    Indications    Acute pulmonary embolism without acute cor pulmonale  unspecified pulmonary embolism type [I26.99]                 Anticoagulation Episode Summary       INR check location:  --    Preferred lab:  --    Send INR reminders to:   MADHU MUNIZ [facility]    Comments:  --          Anticoagulation Care Providers       Provider Role Specialty Phone number    Libra Gonzalez APRN Referring Cardiology [phone]            Clinic Interview:  Patient Findings     Positives:  Signs/symptoms of bleeding    Negatives:  Signs/symptoms of thrombosis, Laboratory test error   suspected, Change in health, Change in alcohol use, Change in activity,   Upcoming invasive procedure, Emergency department visit, Upcoming dental   procedure, Missed doses, Extra doses, Change in medications, Change in   diet/appetite, Hospital admission, Bruising, Other complaints    Comments:  Spouse called to report a small amount of bright red blood was   noted in his stool today. Of note, he reportedly has history of   hemorrhoids.       Clinical Outcomes     Negatives:  Major bleeding event, Thromboembolic event,   Anticoagulation-related hospital admission, Anticoagulation-related ED   visit, Anticoagulation-related fatality    Comments:  Spouse called to report a small amount of bright red blood was   noted in his stool today. Of note, he reportedly has history of   hemorrhoids.         INR History:      5/15/2025    10:45 AM 5/22/2025     9:30 AM 5/29/2025     1:00 PM 6/3/2025     1:00 PM 6/17/2025    10:15  AM 7/1/2025    10:15 AM 7/2/2025     1:04 PM   Anticoagulation Monitoring   INR 2.9 2.0 3.7 4.0 2.7 3.5 --   INR Date 5/15/2025 5/22/2025 5/29/2025 6/3/2025 6/17/2025 7/1/2025    INR Goal 2.5-3.5 2.5-3.5 2.5-3.5 2.5-3.5 2.5-3.5 2.5-3.5 2.5-3.5   Trend Same Same Same Same Same Same Same   Last Week Total 35 mg 22.5 mg 27.5 mg 25 mg 25 mg 25 mg 25 mg   Next Week Total 22.5 mg 27.5 mg 25 mg 22.5 mg 25 mg 25 mg 22.5 mg   Sun 2.5 mg (5/18) 5 mg 5 mg 5 mg 5 mg 5 mg 5 mg   Mon 2.5 mg 2.5 mg 2.5 mg 2.5 mg 2.5 mg 2.5 mg 2.5 mg   Tue 2.5 mg 2.5 mg - Hold (6/3); Otherwise 2.5 mg 2.5 mg 2.5 mg -   Wed 5 mg 5 mg - 5 mg 5 mg 5 mg 2.5 mg (7/2)   Thu 2.5 mg 5 mg (5/22) 2.5 mg 2.5 mg 2.5 mg 2.5 mg 2.5 mg   Fri 5 mg 5 mg 5 mg 5 mg 5 mg 5 mg 5 mg   Sat 2.5 mg 2.5 mg 2.5 mg 2.5 mg 2.5 mg 2.5 mg 2.5 mg   Visit Report   Report           Plan:  1. INR is unknown today (INR was 3.5 yesterday) - see above in Anticoagulation Summary.   Will instruct Asia Ly to Change their warfarin regimen (2.5 mg today, then resume 5 mg Sun/Wed/Fri, 2.5 mg all other days) - see above in Anticoagulation Summary.  2. Follow up in 6 days.  3. They have been instructed to call if any changes in medications, doses, concerns, etc. Patient expresses understanding and has no further questions at this time.    Jeromy Putnam, PharmD

## 2025-07-08 ENCOUNTER — ANTICOAGULATION VISIT (OUTPATIENT)
Dept: PHARMACY | Facility: HOSPITAL | Age: OVER 89
End: 2025-07-08
Payer: MEDICARE

## 2025-07-08 DIAGNOSIS — I26.99 ACUTE PULMONARY EMBOLISM WITHOUT ACUTE COR PULMONALE, UNSPECIFIED PULMONARY EMBOLISM TYPE: Primary | ICD-10-CM

## 2025-07-08 LAB
INR PPP: 3.1 (ref 0.91–1.09)
PROTHROMBIN TIME: 37.3 SECONDS (ref 10–13.8)

## 2025-07-08 PROCEDURE — 85610 PROTHROMBIN TIME: CPT

## 2025-07-08 PROCEDURE — G0463 HOSPITAL OUTPT CLINIC VISIT: HCPCS

## 2025-07-08 NOTE — PROGRESS NOTES
I have supervised and reviewed the notes, assessments, and/or procedures performed. I personally performed the assessment and implemented the plan. I concur with the documentation of this patient encounter.    Breanne Jain, [facility]

## 2025-07-08 NOTE — PROGRESS NOTES
2Anticoagulation Clinic Progress Note    Anticoagulation Summary  As of 7/8/2025      INR goal:  2.5-3.5   TTR:  78.1% (8.9 mo)   INR used for dosing:  3.1 (7/8/2025)   Warfarin maintenance plan:  5 mg every Sun, Wed, Fri; 2.5 mg all other days   Weekly warfarin total:  25 mg   No change documented:  Romi Horne   Plan last modified:  Breanne Jain RPH (10/31/2024)   Next INR check:  7/22/2025   Target end date:  --    Indications    Acute pulmonary embolism without acute cor pulmonale  unspecified pulmonary embolism type [I26.99]                 Anticoagulation Episode Summary       INR check location:  --    Preferred lab:  --    Send INR reminders to:   M[facility] MUNIZ [facility]    Comments:  --          Anticoagulation Care Providers       Provider Role Specialty Phone number    Libra Gonzalez APRN Referring Cardiology [phone]            Clinic Interview:  Patient Findings     Negatives:  Signs/symptoms of thrombosis, Signs/symptoms of bleeding,   Laboratory test error suspected, Change in health, Change in alcohol use,   Change in activity, Upcoming invasive procedure, Emergency department   visit, Upcoming dental procedure, Missed doses, Extra doses, Change in   medications, Change in diet/appetite, Hospital admission, Bruising, Other   complaints      Clinical Outcomes     Negatives:  Major bleeding event, Thromboembolic event,   Anticoagulation-related hospital admission, Anticoagulation-related ED   visit, Anticoagulation-related fatality        INR History:      5/22/2025     9:30 AM 5/29/2025     1:00 PM 6/3/2025     1:00 PM 6/17/2025    10:15 AM 7/1/2025    10:15 AM 7/2/2025     1:04 PM 7/8/2025     9:15 AM   Anticoagulation Monitoring   INR 2.0 3.7 4.0 2.7 3.5 -- 3.1   INR Date 5/22/2025 5/29/2025 6/3/2025 6/17/2025 7/1/2025  7/8/2025   INR Goal 2.5-3.5 2.5-3.5 2.5-3.5 2.5-3.5 2.5-3.5 2.5-3.5 2.5-3.5   Trend Same Same Same Same Same Same Same   Last Week Total 22.5 mg 27.5 mg  25 mg 25 mg 25 mg 25 mg 22.5 mg   Next Week Total 27.5 mg 25 mg 22.5 mg 25 mg 25 mg 22.5 mg 25 mg   Sun 5 mg 5 mg 5 mg 5 mg 5 mg 5 mg 5 mg   Mon 2.5 mg 2.5 mg 2.5 mg 2.5 mg 2.5 mg 2.5 mg 2.5 mg   Tue 2.5 mg - Hold (6/3); Otherwise 2.5 mg 2.5 mg 2.5 mg - 2.5 mg   Wed 5 mg - 5 mg 5 mg 5 mg 2.5 mg (7/2) 5 mg   Thu 5 mg (5/22) 2.5 mg 2.5 mg 2.5 mg 2.5 mg 2.5 mg 2.5 mg   Fri 5 mg 5 mg 5 mg 5 mg 5 mg 5 mg 5 mg   Sat 2.5 mg 2.5 mg 2.5 mg 2.5 mg 2.5 mg 2.5 mg 2.5 mg   Visit Report  Report            Plan:  1. INR is therapeutic today- see above in Anticoagulation Summary.   Will instruct Asia Ly to continue their warfarin regimen- see above in Anticoagulation Summary.  2. Follow up in 2 weeks.  3. Patient declines warfarin refills.  4. Verbal and written information provided. Patient expresses understanding and has no further questions at this time.    Romi Horne

## 2025-07-09 ENCOUNTER — OFFICE VISIT (OUTPATIENT)
Dept: CARDIOLOGY | Age: OVER 89
End: 2025-07-09
Payer: MEDICARE

## 2025-07-09 VITALS
DIASTOLIC BLOOD PRESSURE: 60 MMHG | SYSTOLIC BLOOD PRESSURE: 100 MMHG | BODY MASS INDEX: 21.9 KG/M2 | HEIGHT: 68 IN | OXYGEN SATURATION: 98 % | HEART RATE: 79 BPM

## 2025-07-09 DIAGNOSIS — I44.2 COMPLETE HEART BLOCK: ICD-10-CM

## 2025-07-09 DIAGNOSIS — J84.10 PULMONARY FIBROSIS: ICD-10-CM

## 2025-07-09 DIAGNOSIS — I50.32 CHRONIC DIASTOLIC CONGESTIVE HEART FAILURE: ICD-10-CM

## 2025-07-09 DIAGNOSIS — I35.0 NONRHEUMATIC AORTIC VALVE STENOSIS: Primary | ICD-10-CM

## 2025-07-09 NOTE — PROGRESS NOTES
Date of Office Visit: 25  Encounter Provider: KYLAH Jha  Place of Service: [facility] CARDIOLOGY  Patient Name: Asia Ly  :1932    Chief Complaint   Patient presents with    Acute on chronic diastolic CHF (congestive heart failure)    Nonrheumatic aortic valve stenosis    Follow-up   :     HPI: Asia Ly is a 89 y.o. male  with atrial fibrillation, stroke and TIA in , aortic stenosis, hyperlipidemia, B12 anemia, Crohn's disease, colon polyps, lower extremity edema, TIA,  DVT and PE ( 2023), dilated ascending aorta,  pulmonary hypertension and pulmonary fibrosis.      He has been followed by Dr. Hector Rivera. He is now followed by Dr. Brown and I will visit with him in follow up today.  He is also followed by Dr. Rashaun Block with electrophysiology.     Has been on sotalol in the past as well as amiodarone for recurrent atrial fibrillation.  He had Echocardiogram 2016 showed normal left ventricular systolic dysfunction, moderate tricuspid insufficiency, mild mitral insufficiency , mild pulmonary hypertension.  He also had a perfusion stress test that was negative for ischemia.  He was taken off diltiazem for allergic reaction to rash.  2017 he had an echocardiogram which showed an EF of 60%, grade 2 diastolic dysfunction and mild aortic he had increased lower extremity edema and had again rapid atrial fibrillation.  He was started on a diuretic.  He then had pneumonia hypoxia and influenza and was started on Tikosyn.  He has some prolonged QTC complicated by antibiotics.  He then had issues with gout felt to be related to discontinuation of indomethacin and was prescribed colchicine.  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  He then had some atrial fibrillation with RVR and was given oral metoprolol and Cardizem and converted back to sinus rhythm.  He was evaluated by  electrophysiology with Dr. Nettles who felt that his ablation at his age would be very high risk procedure.  He was later started on digoxin for recurrent rapid atrial fibrillation.  He was admitted again January 2019 and electrophysiology felt that he may for AV node ablation and pacemaker implantation.  However he preferred to stay on Tikosyn and accept occasional breakthroughs.     He then had some confusion and upper epigastric abdominal pain he was hospitalized August 2020 found to have transaminitis with hyperbilirubinemia.  He had some atrial fibrillation with RVR received IV metoprolol and his heart rate improved.  Echocardiogram showed normal left ventricular systolic function, mild concentric hypertrophy, moderate aortic valve stenoses with aortic valve area 1.1 cm², maximum pressure gradient of 42 and mean pressure gradient 24 mmHg.  RVSP was severely elevated at 57.8.  He had mild aortic valve regurgitation and mild mitral regurgitation.     In November 2020 was hospitalized with hematochezia and underwent GI evaluation with no evidence of active bleed.  He was cleared to resume apixaban and was later found to have bilateral pneumonia.     He was treated again for pneumonia and had a bronchoscopy July 7.  He is colonized Pseudomonas and infectious disease recommended conservative treatment.  He had some rapid atrial fibrillation during that admission and had a pacemaker placed by Dr. Nettles.    And ultimately had AV node ablation 09/2021. .  His Tikosyn was stopped.      He had nosebleeds and saw ENT in march 2023 who  Performed cauterization with packing and was off eliquis until we restarted it at a lower dose of 2.5 mg twice daily.  His nosebleed stopped but then he got ill with COVID and developed acute left lower extremity DVT May 2023 and he was put back on Eliquis 5 mg twice daily.     He had some increased lower extremity edema which improved with Lasix 40 mg twice daily November 2023.  We then  decrease Lasix to 40 mg daily.     He then was admitted in September 2024 after having low oxygen at home.  He reportedly was treated for bronchitis just prior to admission and  Was found to have new pulmonary embolism.  He was treated with heparin drip and then Eliquis was stopped and he was bridged with warfarin.      He was evaluated by Dr. Guilherme Guzman 12/17/2024 for TAVR vs SAVR. TAVR was felt to me the best route. He had CT angio TAVR chest/abdomen/pelvis 1/14/2025 which showed widely patent comic and external iliac arteries with common femoral arteries also patent.  There was concern for right renal artery stenosis, resolution of nonocclusive thrombus in the distal right main pulmonary artery, small to moderate loculated right pleural effusion with stable minimal left pleural effusion and advance fibrosis with bronchiectasis and volume loss was unchanged.  There was passive hepatic gesturing of the liver but no perihepatic fluid.  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     He then had some low oxygen readings and increased swelling February 2025 so Lasix was increased from 20 mg daily to 40 mg daily.  His oxygen saturation improved.   In May 2025 he had an issue with gout and was prescribed Medrol Dosepak. His TAVR procedure was canceled on 5/6/2025 due to development of cough.   His swelling improved with Lasix 60 mg daily but then he had decreased kidney function so we started alternating Lasix 40 mg and 60 mg every other day.  Your ankle swelling is stable.  His breathing is stable.  No chest pain or dizziness or palpitation.  No rapid weight gain.  He is ambulating with a cane today and accompanied by his wife, Ni.            Allergies   Allergen Reactions    Amiodarone Unknown (See Comments)     Pulmonary fibrosis    Diltiazem Arrhythmia    Doxycycline Rash    Allopurinol Unknown - Low Severity     Other reaction(s): Joint  "pain      Other Reaction(s): Joint pain    Cefdinir Arrhythmia     A-FIB    Indomethacin Rash           Family and social history reviewed.     ROS  All other systems were reviewed and are negative          Objective:     Vitals:    07/09/25 1519   BP: 100/60   BP Location: Left arm   Patient Position: Sitting   Cuff Size: Adult   Pulse: 79   SpO2: 98%   Height: 172.7 cm (68\")     Body mass index is 21.9 kg/m².    PHYSICAL EXAM:  Pulmonary:      Effort: Pulmonary effort is normal.      Breath sounds: Examination of the right-lower field reveals rales. Examination of the left-lower field reveals rales. Rales present.   Cardiovascular:      Normal rate.      Murmurs: There is a systolic murmur.         Procedures      Current Outpatient Medications   Medication Sig Dispense Refill    allopurinol (ZYLOPRIM) 100 MG tablet Take 1 tablet by mouth 2 (Two) Times a Day. Indications: Gout      atorvastatin (LIPITOR) 10 MG tablet Take 1 tablet by mouth 3 (Three) Times a Week. M-W-F  Indications: High Amount of Fats in the Blood      Budesonide (ENTOCORT EC) 3 MG 24 hr capsule Take 1 capsule by mouth Every Morning. Indications: Crohn's Disease      Diclofenac Sodium (VOLTAREN) 1 % gel gel Apply 2 g topically to the appropriate area as directed 4 (Four) Times a Day As Needed (pain/swelling). 100 g 0    enoxaparin sodium (LOVENOX) 80 MG/0.8ML solution prefilled syringe syringe Discard 0.1 mL, then inject 0.7 mL (70 mg) under the skin into the appropriate area every 24 hours as directed surrounding upcoming procedure. (Patient taking differently: Discard 0.1 mL, then inject 0.7 mL (70 mg) under the skin into the appropriate area every 24 hours as directed surrounding upcoming procedure.    TO START LOVENOX 5/3/25) 8 mL 0    furosemide (LASIX) 20 MG tablet Alternate between 2 tablets and 3 tablets every other day  Indications: Edema      ipratropium (ATROVENT) 0.02 % nebulizer solution Take 2.5 mL by nebulization 3 (Three) Times a " Day. 0.5mg/2.5ml nebulizer treatments three times a day  Indications: Chronic Obstructive Lung Disease      IPRATROPIUM-ALBUTEROL IN Inhale 2 puffs As Needed. Indications: COPD exacerbation, complicated      metoprolol tartrate (LOPRESSOR) 25 MG tablet Take 1 tablet by mouth Every 12 (Twelve) Hours. Indications: High Blood Pressure Disorder 180 tablet 1    O2 (OXYGEN) Inhale 2 L/min At Night As Needed (soa). wears at bedtime  Indications: Oxygen Saturation Decreased      omeprazole (priLOSEC) 40 MG capsule Take 1 capsule by mouth Daily. Indications: Heartburn      warfarin (COUMADIN) 5 MG tablet Take one tablet (5 mg) by mouth on Sun, Wed, and Fri, and take one-half tablet (2.5 mg) by mouth all other days or as directed. 65 tablet 1     No current facility-administered medications for this visit.     Assessment:      No diagnosis found.     No orders of the defined types were placed in this encounter.        Plan:           1. 89 year old gentleman with paroxysmal atrial fibrillation.  Off amiodarone due to history of pulmonary fibrosis.  CHADS2 Vascor of 6.  He  had a rash with diltiazem in the past.  In 2021 he had AV node ablation with permanent pacemaker.    He is now on warfarin with target INR 2.5-3.5.   He also follows with Dr. Rashaun Nettles  2.  Severe aortic valve stenoses.  He met with Dr. Guilherme Guzman and Dr. Margarito Osuna 1/27/25 and TAVR procedure was recommended but then canceled 5/6/2025 due to increased cough and given his pulmonary fibrosis he was felt to be too high risk to proceed.  -  We will continue medical therapy at this time.  Fluid status appears stable with trending between furosemide 40 mg and furosemide 60 mg every other day.  Continue metoprolol 25 mg twice daily.    3.  History of TIA no recurrence-continue warfarin at this time.  Target INR 2.5-3.5  4.  Prediabetes  5.  History of Crohn's disease and iron deficient anemia usually sees Dr. Susie Mccoy  6.  Hypertension  continue current regimen  7.  Hyperlipidemia-continue without statin.  8.  History of cholecystitis  9.   History of gout-had a flare of his left hand 05/2025  10.chronic kidney disease-stable on last check 1 week ago  11. COVID 19 infection 04/2023  12.  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 epistaxis requiring cauterization and packing by ENT 03/2023  14.  History of mild dilation of ascending aorta however this measured completely normal on CT angio TAVR chest/abdomen/pelvis 1/14/2025.  15. Pulmonary fibrosis.  He has PRN oxygen that he mainly wears at night. we discussed wearing oxygen as needed to keep oxygen saturation greater than 88%.. Follows with Dr. Chidi Oconnor.    16.  Lower extremity edema-stable with alternating Lasix 40 and 60 mg every other day  17.  Recurrent pulmonary embolism 09/2024  while on Eliquis and was switched to warfarin   18.  Right knee issue-following with [facility] therapeutic.  He had cortisone shot last month.  He swells more in this lower extremity as well.       Follow-up in 1 month.  Call sooner with any questions or concerns            It has been a pleasure to participate in this patient's care.      Thank you,  KYLAH Jha      **I used Dragon to dictate this note:**

## 2025-07-17 LAB
MC_CV_MDC_IDC_RATE_1: 150
MC_CV_MDC_IDC_RATE_1: 171
MC_CV_MDC_IDC_THERAPIES: NORMAL
MC_CV_MDC_IDC_ZONE_ID: 2
MC_CV_MDC_IDC_ZONE_ID: 6
MDC_IDC_MSMT_BATTERY_REMAINING_LONGEVITY: 98 MO
MDC_IDC_MSMT_BATTERY_RRT_TRIGGER: 2.62
MDC_IDC_MSMT_BATTERY_STATUS: NORMAL
MDC_IDC_MSMT_BATTERY_VOLTAGE: 2.99
MDC_IDC_MSMT_LEADCHNL_RA_DTM: NORMAL
MDC_IDC_MSMT_LEADCHNL_RA_IMPEDANCE_VALUE: 608
MDC_IDC_MSMT_LEADCHNL_RA_PACING_THRESHOLD_AMPLITUDE: 0.75
MDC_IDC_MSMT_LEADCHNL_RA_PACING_THRESHOLD_POLARITY: NORMAL
MDC_IDC_MSMT_LEADCHNL_RA_PACING_THRESHOLD_PULSEWIDTH: 0.4
MDC_IDC_MSMT_LEADCHNL_RA_SENSING_INTR_AMPL: 2
MDC_IDC_MSMT_LEADCHNL_RV_DTM: NORMAL
MDC_IDC_MSMT_LEADCHNL_RV_IMPEDANCE_VALUE: 513
MDC_IDC_MSMT_LEADCHNL_RV_PACING_THRESHOLD_AMPLITUDE: 0.75
MDC_IDC_MSMT_LEADCHNL_RV_PACING_THRESHOLD_POLARITY: NORMAL
MDC_IDC_MSMT_LEADCHNL_RV_PACING_THRESHOLD_PULSEWIDTH: 0.4
MDC_IDC_MSMT_LEADCHNL_RV_SENSING_INTR_AMPL: 24.12
MDC_IDC_PG_IMPLANT_DTM: NORMAL
MDC_IDC_PG_MFG: NORMAL
MDC_IDC_PG_MODEL: NORMAL
MDC_IDC_PG_SERIAL: NORMAL
MDC_IDC_PG_TYPE: NORMAL
MDC_IDC_SESS_DTM: NORMAL
MDC_IDC_SESS_TYPE: NORMAL
MDC_IDC_SET_BRADY_AT_MODE_SWITCH_RATE: 171
MDC_IDC_SET_BRADY_LOWRATE: 60
MDC_IDC_SET_BRADY_MAX_SENSOR_RATE: 130
MDC_IDC_SET_BRADY_MAX_TRACKING_RATE: 130
MDC_IDC_SET_BRADY_MODE: NORMAL
MDC_IDC_SET_BRADY_PAV_DELAY: 180
MDC_IDC_SET_BRADY_SAV_DELAY: 150
MDC_IDC_SET_LEADCHNL_RA_PACING_AMPLITUDE: 1.5
MDC_IDC_SET_LEADCHNL_RA_PACING_POLARITY: NORMAL
MDC_IDC_SET_LEADCHNL_RA_PACING_PULSEWIDTH: 0.4
MDC_IDC_SET_LEADCHNL_RA_SENSING_POLARITY: NORMAL
MDC_IDC_SET_LEADCHNL_RA_SENSING_SENSITIVITY: 0.15
MDC_IDC_SET_LEADCHNL_RV_PACING_AMPLITUDE: 2.5
MDC_IDC_SET_LEADCHNL_RV_PACING_POLARITY: NORMAL
MDC_IDC_SET_LEADCHNL_RV_PACING_PULSEWIDTH: 0.4
MDC_IDC_SET_LEADCHNL_RV_SENSING_POLARITY: NORMAL
MDC_IDC_SET_LEADCHNL_RV_SENSING_SENSITIVITY: 0.9
MDC_IDC_SET_ZONE_STATUS: NORMAL
MDC_IDC_SET_ZONE_STATUS: NORMAL
MDC_IDC_SET_ZONE_TYPE: NORMAL
MDC_IDC_SET_ZONE_TYPE: NORMAL
MDC_IDC_STAT_AT_BURDEN_PERCENT: 100
MDC_IDC_STAT_BRADY_RA_PERCENT_PACED: 0.01
MDC_IDC_STAT_BRADY_RV_PERCENT_PACED: 99.95

## 2025-07-22 ENCOUNTER — ANTICOAGULATION VISIT (OUTPATIENT)
Dept: PHARMACY | Facility: HOSPITAL | Age: OVER 89
End: 2025-07-22
Payer: MEDICARE

## 2025-07-22 DIAGNOSIS — I26.99 ACUTE PULMONARY EMBOLISM WITHOUT ACUTE COR PULMONALE, UNSPECIFIED PULMONARY EMBOLISM TYPE: Primary | ICD-10-CM

## 2025-07-22 LAB
INR PPP: 3 (ref 0.91–1.09)
PROTHROMBIN TIME: 36 SECONDS (ref 10–13.8)

## 2025-07-22 PROCEDURE — 85610 PROTHROMBIN TIME: CPT

## 2025-07-22 PROCEDURE — G0463 HOSPITAL OUTPT CLINIC VISIT: HCPCS

## 2025-07-22 NOTE — PROGRESS NOTES
Anticoagulation Clinic Progress Note    Anticoagulation Summary  As of 7/22/2025      INR goal:  2.5-3.5   TTR:  79.2% (9.4 mo)   INR used for dosing:  3.0 (7/22/2025)   Warfarin maintenance plan:  5 mg every Sun, Wed, Fri; 2.5 mg all other days   Weekly warfarin total:  25 mg   No change documented:  Romi Horne   Plan last modified:  Breanne Jain RPH (10/31/2024)   Next INR check:  8/19/2025   Target end date:  --    Indications    Acute pulmonary embolism without acute cor pulmonale  unspecified pulmonary embolism type [I26.99]                 Anticoagulation Episode Summary       INR check location:  --    Preferred lab:  --    Send INR reminders to:   MADHU MUNIZ [facility]    Comments:  --          Anticoagulation Care Providers       Provider Role Specialty Phone number    Libra Gonzalez, KYLAH Referring Cardiology [phone]            Clinic Interview:  Patient Findings     Negatives:  Signs/symptoms of thrombosis, Signs/symptoms of bleeding,   Laboratory test error suspected, Change in health, Change in alcohol use,   Change in activity, Upcoming invasive procedure, Emergency department   visit, Upcoming dental procedure, Missed doses, Extra doses, Change in   medications, Change in diet/appetite, Hospital admission, Bruising, Other   complaints      Clinical Outcomes     Negatives:  Major bleeding event, Thromboembolic event,   Anticoagulation-related hospital admission, Anticoagulation-related ED   visit, Anticoagulation-related fatality        INR History:      5/29/2025     1:00 PM 6/3/2025     1:00 PM 6/17/2025    10:15 AM 7/1/2025    10:15 AM 7/2/2025     1:04 PM 7/8/2025     9:15 AM 7/22/2025     9:15 AM   Anticoagulation Monitoring   INR 3.7 4.0 2.7 3.5 -- 3.1 3.0   INR Date 5/29/2025 6/3/2025 6/17/2025 7/1/2025  7/8/2025 7/22/2025   INR Goal 2.5-3.5 2.5-3.5 2.5-3.5 2.5-3.5 2.5-3.5 2.5-3.5 2.5-3.5   Trend Same Same Same Same Same Same Same   Last Week Total 27.5 mg 25 mg  25 mg 25 mg 25 mg 22.5 mg 25 mg   Next Week Total 25 mg 22.5 mg 25 mg 25 mg 22.5 mg 25 mg 25 mg   Sun 5 mg 5 mg 5 mg 5 mg 5 mg 5 mg 5 mg   Mon 2.5 mg 2.5 mg 2.5 mg 2.5 mg 2.5 mg 2.5 mg 2.5 mg   Tue - Hold (6/3); Otherwise 2.5 mg 2.5 mg 2.5 mg - 2.5 mg 2.5 mg   Wed - 5 mg 5 mg 5 mg 2.5 mg (7/2) 5 mg 5 mg   Thu 2.5 mg 2.5 mg 2.5 mg 2.5 mg 2.5 mg 2.5 mg 2.5 mg   Fri 5 mg 5 mg 5 mg 5 mg 5 mg 5 mg 5 mg   Sat 2.5 mg 2.5 mg 2.5 mg 2.5 mg 2.5 mg 2.5 mg 2.5 mg   Visit Report Report             Plan:  1. INR is therapeutic today- see above in Anticoagulation Summary.   Will instruct Asia Ly to continue their warfarin regimen- see above in Anticoagulation Summary.  2. Follow up in 4 weeks.  3. Patient declines warfarin refills.  4. Verbal and written information provided. Patient expresses understanding and has no further questions at this time.    Romi Horne

## 2025-07-22 NOTE — PROGRESS NOTES
I have supervised and reviewed the notes, assessments, and/or procedures performed. I personally performed the assessment and implemented the plan. I concur with the documentation of this patient encounter.    Breanne Jain, [facility]

## 2025-07-29 ENCOUNTER — APPOINTMENT (OUTPATIENT)
Dept: PHARMACY | Facility: HOSPITAL | Age: OVER 89
End: 2025-07-29
Payer: MEDICARE

## 2025-08-11 ENCOUNTER — OFFICE VISIT (OUTPATIENT)
Dept: CARDIOLOGY | Age: OVER 89
End: 2025-08-11
Payer: MEDICARE

## 2025-08-11 VITALS
DIASTOLIC BLOOD PRESSURE: 70 MMHG | WEIGHT: 142.6 LBS | HEIGHT: 68 IN | OXYGEN SATURATION: 98 % | HEART RATE: 67 BPM | BODY MASS INDEX: 21.61 KG/M2 | SYSTOLIC BLOOD PRESSURE: 120 MMHG

## 2025-08-11 DIAGNOSIS — I44.2 COMPLETE HEART BLOCK: ICD-10-CM

## 2025-08-11 DIAGNOSIS — I73.9 PVD (PERIPHERAL VASCULAR DISEASE) WITH CLAUDICATION: ICD-10-CM

## 2025-08-11 DIAGNOSIS — I50.32 CHRONIC DIASTOLIC CONGESTIVE HEART FAILURE: ICD-10-CM

## 2025-08-11 DIAGNOSIS — I35.0 NONRHEUMATIC AORTIC VALVE STENOSIS: Primary | ICD-10-CM

## 2025-08-11 DIAGNOSIS — I27.20 PULMONARY HTN: ICD-10-CM

## 2025-08-11 DIAGNOSIS — Z79.01 WARFARIN ANTICOAGULATION: ICD-10-CM

## 2025-08-11 DIAGNOSIS — J84.10 PULMONARY FIBROSIS: ICD-10-CM

## 2025-08-11 PROCEDURE — 1159F MED LIST DOCD IN RCRD: CPT | Performed by: INTERNAL MEDICINE

## 2025-08-11 PROCEDURE — 1160F RVW MEDS BY RX/DR IN RCRD: CPT | Performed by: INTERNAL MEDICINE

## 2025-08-11 PROCEDURE — G2211 COMPLEX E/M VISIT ADD ON: HCPCS | Performed by: INTERNAL MEDICINE

## 2025-08-11 PROCEDURE — 99214 OFFICE O/P EST MOD 30 MIN: CPT | Performed by: INTERNAL MEDICINE

## 2025-08-19 ENCOUNTER — ANTICOAGULATION VISIT (OUTPATIENT)
Dept: PHARMACY | Facility: HOSPITAL | Age: OVER 89
End: 2025-08-19
Payer: MEDICARE

## 2025-08-19 DIAGNOSIS — I26.99 ACUTE PULMONARY EMBOLISM WITHOUT ACUTE COR PULMONALE, UNSPECIFIED PULMONARY EMBOLISM TYPE: Primary | ICD-10-CM

## 2025-08-19 LAB
INR PPP: 4.3 (ref 0.91–1.09)
PROTHROMBIN TIME: 51.6 SECONDS (ref 10–13.8)

## 2025-08-19 PROCEDURE — G0463 HOSPITAL OUTPT CLINIC VISIT: HCPCS

## 2025-08-19 PROCEDURE — 85610 PROTHROMBIN TIME: CPT

## (undated) DEVICE — ERBE NESSY®PLATE 170 SPLIT; 168CM²; CABLE 3M: Brand: ERBE

## (undated) DEVICE — LOU PACE DEFIB: Brand: MEDLINE INDUSTRIES, INC.

## (undated) DEVICE — TUBING, SUCTION, 1/4" X 10', STRAIGHT: Brand: MEDLINE

## (undated) DEVICE — BITEBLOCK OMNI BLOC

## (undated) DEVICE — PK PROC MAJ 40

## (undated) DEVICE — CANN O2 ETCO2 FITS ALL CONN CO2 SMPL A/ 7IN DISP LF

## (undated) DEVICE — SUT PROLN 2/0 CT2 30IN 8411H

## (undated) DEVICE — PENCL ES MEGADINE EZ/CLEAN BUTN W/HOLSTR 10FT

## (undated) DEVICE — Device: Brand: SMARTABLATE

## (undated) DEVICE — TBG 02 CRUSH RESIST LF CLR 7FT

## (undated) DEVICE — PERCLOSE PROGLIDE™ SUTURE-MEDIATED CLOSURE SYSTEM: Brand: PERCLOSE PROGLIDE™

## (undated) DEVICE — SUT MNCRYL PLS ANTIB UD 4/0 PS2 18IN

## (undated) DEVICE — THE SINGLE USE ETRAP – POLYP TRAP IS USED FOR SUCTION RETRIEVAL OF ENDOSCOPICALLY REMOVED POLYPS.: Brand: ETRAP

## (undated) DEVICE — FIAPC® PROBE W/ FILTER 2200 A OD 2.3MM/6.9FR; L 2.2M/7.2FT: Brand: ERBE

## (undated) DEVICE — ANTIBACTERIAL UNDYED BRAIDED (POLYGLACTIN 910), SYNTHETIC ABSORBABLE SUTURE: Brand: COATED VICRYL

## (undated) DEVICE — LN SMPL CO2 SHTRM SD STREAM W/M LUER

## (undated) DEVICE — CATH GUIDE RIGHTSITE C315HIS-02

## (undated) DEVICE — TRAP FLD MINIVAC MEGADYNE 100ML

## (undated) DEVICE — LN SMPL O2 NASL/ORL SMART/CAPNOLINE PLS A/

## (undated) DEVICE — Device: Brand: THERMOCOOL SMARTTOUCH SF

## (undated) DEVICE — Device

## (undated) DEVICE — THE TORRENT IRRIGATION SCOPE CONNECTOR IS USED WITH THE TORRENT IRRIGATION TUBING TO PROVIDE IRRIGATION FLUIDS SUCH AS STERILE WATER DURING GASTROINTESTINAL ENDOSCOPIC PROCEDURES WHEN USED IN CONJUNCTION WITH AN IRRIGATION PUMP (OR ELECTROSURGICAL UNIT).: Brand: TORRENT

## (undated) DEVICE — DRSNG SURESITE WNDW 4X4.5

## (undated) DEVICE — APPL CHLORAPREP HI/LITE 26ML ORNG

## (undated) DEVICE — MSK AIRWY LARYNG LMA UNIQUE STD PK SZ4

## (undated) DEVICE — SLITTER CATH GUIDE ATTAIN ADJ

## (undated) DEVICE — GLV SURG PREMIERPRO ORTHO LTX PF SZ7.5 BRN

## (undated) DEVICE — FRCP BX RADJAW4 NDL 2.8 240CM LG OG BX40

## (undated) DEVICE — SINGLE USE BIOPSY VALVE MAJ-210: Brand: SINGLE USE BIOPSY VALVE (STERILE)

## (undated) DEVICE — INTRO SHEATH PRELUDE SNAP .038 9F 13CM W/SDPRT BLK

## (undated) DEVICE — PENROSE DRAIN 18" X 5/8": Brand: CARDINAL HEALTH

## (undated) DEVICE — INTRO SHEATH PRELUDE SNAP .038 7F 13CM W/SDPRT

## (undated) DEVICE — SENSR O2 OXIMAX FNGR A/ 18IN NONSTR

## (undated) DEVICE — SUT VIC 2/0 TIES 18IN J111T

## (undated) DEVICE — NDL HYPO PRECISIONGLIDE REG 25G 1 1/2

## (undated) DEVICE — SINGLE USE SUCTION VALVE MAJ-209: Brand: SINGLE USE SUCTION VALVE (STERILE)

## (undated) DEVICE — 3M™ STERI-STRIP™ COMPOUND BENZOIN TINCTURE 40 BAGS/CARTON 4 CARTONS/CASE C1544: Brand: 3M™ STERI-STRIP™

## (undated) DEVICE — KT ORCA ORCAPOD DISP STRL

## (undated) DEVICE — MSK AIRWY LARYNG LMA PILOT SZ4

## (undated) DEVICE — SUT ETHIB 0 CT2 CR8 18IN CX27D

## (undated) DEVICE — VITAL SIGNS™ JACKSON-REES CIRCUITS: Brand: VITAL SIGNS™

## (undated) DEVICE — CANN NASL CO2 TRULINK W/O2 A/

## (undated) DEVICE — SYR LUERLOK 20CC BX/50

## (undated) DEVICE — Device: Brand: DEFENDO AIR/WATER/SUCTION AND BIOPSY VALVE

## (undated) DEVICE — Device: Brand: REFERENCE PATCH CARTO 3

## (undated) DEVICE — LOU EP: Brand: MEDLINE INDUSTRIES, INC.

## (undated) DEVICE — ADAPT SWVL FIBROPTIC BRONCH

## (undated) DEVICE — ADAPT CLN BIOGUARD AIR/H2O DISP

## (undated) DEVICE — NDL HYPO ECLPS SFTY 22G 1 1/2IN

## (undated) DEVICE — CANNULA,ADULT,SOFT-TOUCH,7'TUBE,UC: Brand: PENDING

## (undated) DEVICE — TRAP,MUCUS SPECIMEN, 80CC: Brand: MEDLINE

## (undated) DEVICE — PINNACLE INTRODUCER SHEATH: Brand: PINNACLE

## (undated) DEVICE — TBG PENCL TELESCP MEGADYNE SMOKE EVAC 10FT